# Patient Record
Sex: FEMALE | Race: WHITE | Employment: OTHER | RURAL
[De-identification: names, ages, dates, MRNs, and addresses within clinical notes are randomized per-mention and may not be internally consistent; named-entity substitution may affect disease eponyms.]

---

## 2017-01-09 DIAGNOSIS — M54.50 BILATERAL LOW BACK PAIN WITHOUT SCIATICA: ICD-10-CM

## 2017-01-12 RX ORDER — TRAMADOL HYDROCHLORIDE 50 MG/1
TABLET ORAL
Qty: 60 TABLET | Refills: 0 | Status: SHIPPED | OUTPATIENT
Start: 2017-01-12 | End: 2017-01-18 | Stop reason: SDUPTHER

## 2017-01-18 ENCOUNTER — OFFICE VISIT (OUTPATIENT)
Dept: PRIMARY CARE CLINIC | Age: 67
End: 2017-01-18
Payer: MEDICAID

## 2017-01-18 ENCOUNTER — HOSPITAL ENCOUNTER (OUTPATIENT)
Dept: OTHER | Age: 67
Discharge: HOME | End: 2017-01-18
Attending: NURSE PRACTITIONER | Admitting: NURSE PRACTITIONER

## 2017-01-18 VITALS
HEART RATE: 78 BPM | HEIGHT: 58 IN | SYSTOLIC BLOOD PRESSURE: 110 MMHG | RESPIRATION RATE: 20 BRPM | WEIGHT: 199.2 LBS | BODY MASS INDEX: 41.82 KG/M2 | OXYGEN SATURATION: 94 % | DIASTOLIC BLOOD PRESSURE: 70 MMHG

## 2017-01-18 DIAGNOSIS — E55.9 VITAMIN D DEFICIENCY: ICD-10-CM

## 2017-01-18 DIAGNOSIS — Z79.4 TYPE 2 DIABETES MELLITUS WITHOUT COMPLICATION, WITH LONG-TERM CURRENT USE OF INSULIN (HCC): ICD-10-CM

## 2017-01-18 DIAGNOSIS — Z13.29 THYROID DISORDER SCREEN: ICD-10-CM

## 2017-01-18 DIAGNOSIS — E11.9 TYPE 2 DIABETES MELLITUS WITHOUT COMPLICATION, WITH LONG-TERM CURRENT USE OF INSULIN (HCC): Primary | ICD-10-CM

## 2017-01-18 DIAGNOSIS — M1A.9XX0 CHRONIC GOUT WITHOUT TOPHUS, UNSPECIFIED CAUSE, UNSPECIFIED SITE: ICD-10-CM

## 2017-01-18 DIAGNOSIS — G89.29 CHRONIC BILATERAL LOW BACK PAIN WITHOUT SCIATICA: ICD-10-CM

## 2017-01-18 DIAGNOSIS — M54.50 CHRONIC BILATERAL LOW BACK PAIN WITHOUT SCIATICA: ICD-10-CM

## 2017-01-18 DIAGNOSIS — E11.9 TYPE 2 DIABETES MELLITUS WITHOUT COMPLICATION, WITH LONG-TERM CURRENT USE OF INSULIN (HCC): ICD-10-CM

## 2017-01-18 DIAGNOSIS — Z79.4 TYPE 2 DIABETES MELLITUS WITHOUT COMPLICATION, WITH LONG-TERM CURRENT USE OF INSULIN (HCC): Primary | ICD-10-CM

## 2017-01-18 DIAGNOSIS — E78.01 FAMILIAL HYPERCHOLESTEROLEMIA: ICD-10-CM

## 2017-01-18 DIAGNOSIS — I10 ESSENTIAL HYPERTENSION: ICD-10-CM

## 2017-01-18 LAB
A/G RATIO: 1.8 (ref 0.8–2)
ALBUMIN SERPL-MCNC: 4.5 G/DL (ref 3.4–4.8)
ALP BLD-CCNC: 90 U/L (ref 25–100)
ALT SERPL-CCNC: 31 U/L (ref 4–36)
ANION GAP SERPL CALCULATED.3IONS-SCNC: 16 MMOL/L (ref 3–16)
AST SERPL-CCNC: 27 U/L (ref 8–33)
BILIRUB SERPL-MCNC: 0.3 MG/DL (ref 0.3–1.2)
BUN BLDV-MCNC: 58 MG/DL (ref 6–20)
CALCIUM SERPL-MCNC: 10.1 MG/DL (ref 8.5–10.5)
CHLORIDE BLD-SCNC: 91 MMOL/L (ref 98–107)
CHOLESTEROL, TOTAL: 191 MG/DL (ref 0–200)
CO2: 35 MMOL/L (ref 20–30)
CREAT SERPL-MCNC: 2 MG/DL (ref 0.4–1.2)
FOLATE: 14.21 NG/ML
GFR AFRICAN AMERICAN: 30
GFR NON-AFRICAN AMERICAN: 25
GLOBULIN: 2.5 G/DL
GLUCOSE BLD-MCNC: 153 MG/DL (ref 74–106)
HCT VFR BLD CALC: 43.1 % (ref 37–47)
HDLC SERPL-MCNC: 36 MG/DL (ref 40–60)
HEMOGLOBIN: 13.3 G/DL (ref 11.5–16.5)
LDL CHOLESTEROL CALCULATED: ABNORMAL MG/DL
LDL CHOLESTEROL DIRECT: 107 MG/DL
MCH RBC QN AUTO: 31.1 PG (ref 27–32)
MCHC RBC AUTO-ENTMCNC: 30.9 G/DL (ref 31–35)
MCV RBC AUTO: 100.9 FL (ref 80–100)
PDW BLD-RTO: 15.3 % (ref 11–16)
PLATELET # BLD: 376 K/UL (ref 150–400)
PMV BLD AUTO: 10.9 FL (ref 6–10)
POTASSIUM SERPL-SCNC: 4.1 MMOL/L (ref 3.4–5.1)
RBC # BLD: 4.27 M/UL (ref 3.8–5.8)
SODIUM BLD-SCNC: 142 MMOL/L (ref 136–145)
TOTAL PROTEIN: 7 G/DL (ref 6.4–8.3)
TRIGL SERPL-MCNC: 412 MG/DL (ref 0–249)
TSH SERPL DL<=0.05 MIU/L-ACNC: 2.82 UIU/ML (ref 0.35–5.5)
VITAMIN B-12: 292 PG/ML (ref 211–911)
VITAMIN D 25-HYDROXY: 16.8 (ref 32–100)
VLDLC SERPL CALC-MCNC: ABNORMAL MG/DL
WBC # BLD: 15.5 K/UL (ref 4–11)

## 2017-01-18 PROCEDURE — 99213 OFFICE O/P EST LOW 20 MIN: CPT | Performed by: NURSE PRACTITIONER

## 2017-01-18 RX ORDER — ALLOPURINOL 300 MG/1
TABLET ORAL
Qty: 30 TABLET | Refills: 3 | Status: SHIPPED | OUTPATIENT
Start: 2017-01-18 | End: 2017-05-24 | Stop reason: SDUPTHER

## 2017-01-18 RX ORDER — ASPIRIN 81 MG/1
TABLET ORAL
Qty: 30 TABLET | Refills: 4 | Status: SHIPPED | OUTPATIENT
Start: 2017-01-18 | End: 2017-07-21 | Stop reason: SDUPTHER

## 2017-01-18 RX ORDER — BUSPIRONE HYDROCHLORIDE 10 MG/1
TABLET ORAL
Qty: 90 TABLET | Refills: 3 | Status: SHIPPED | OUTPATIENT
Start: 2017-01-18 | End: 2017-06-21 | Stop reason: SDUPTHER

## 2017-01-18 RX ORDER — TRAMADOL HYDROCHLORIDE 50 MG/1
TABLET ORAL
Qty: 90 TABLET | Refills: 2 | Status: SHIPPED | OUTPATIENT
Start: 2017-01-18 | End: 2017-04-26 | Stop reason: SDUPTHER

## 2017-01-18 ASSESSMENT — ENCOUNTER SYMPTOMS
GASTROINTESTINAL NEGATIVE: 1
RESPIRATORY NEGATIVE: 1

## 2017-01-26 RX ORDER — SUCRALFATE 1 G/1
TABLET ORAL
Qty: 90 TABLET | Refills: 3 | Status: SHIPPED | OUTPATIENT
Start: 2017-01-26 | End: 2017-05-24 | Stop reason: SDUPTHER

## 2017-01-26 RX ORDER — POTASSIUM CHLORIDE 1500 MG/1
TABLET, EXTENDED RELEASE ORAL
Qty: 30 TABLET | Refills: 3 | Status: SHIPPED | OUTPATIENT
Start: 2017-01-26 | End: 2017-05-24 | Stop reason: SDUPTHER

## 2017-01-26 RX ORDER — METOLAZONE 5 MG/1
TABLET ORAL
Qty: 30 TABLET | Refills: 0 | Status: SHIPPED | OUTPATIENT
Start: 2017-01-26 | End: 2017-02-27

## 2017-02-08 ENCOUNTER — OFFICE VISIT (OUTPATIENT)
Dept: PRIMARY CARE CLINIC | Age: 67
End: 2017-02-08
Payer: MEDICAID

## 2017-02-08 VITALS
TEMPERATURE: 98.8 F | OXYGEN SATURATION: 92 % | DIASTOLIC BLOOD PRESSURE: 76 MMHG | WEIGHT: 206.4 LBS | BODY MASS INDEX: 43.32 KG/M2 | RESPIRATION RATE: 22 BRPM | HEART RATE: 88 BPM | HEIGHT: 58 IN | SYSTOLIC BLOOD PRESSURE: 122 MMHG

## 2017-02-08 DIAGNOSIS — J44.1 COPD EXACERBATION (HCC): Primary | ICD-10-CM

## 2017-02-08 PROCEDURE — 99213 OFFICE O/P EST LOW 20 MIN: CPT | Performed by: NURSE PRACTITIONER

## 2017-02-08 RX ORDER — PREDNISONE 20 MG/1
TABLET ORAL
Qty: 10 TABLET | Refills: 0 | Status: SHIPPED | OUTPATIENT
Start: 2017-02-08 | End: 2017-02-22

## 2017-02-08 RX ORDER — LEVOFLOXACIN 500 MG/1
500 TABLET, FILM COATED ORAL DAILY
Qty: 10 TABLET | Refills: 0 | Status: SHIPPED | OUTPATIENT
Start: 2017-02-08 | End: 2017-02-18

## 2017-02-08 RX ORDER — CEFTRIAXONE 1 G/1
1 INJECTION, POWDER, FOR SOLUTION INTRAMUSCULAR; INTRAVENOUS ONCE
Status: COMPLETED | OUTPATIENT
Start: 2017-02-08 | End: 2017-02-08

## 2017-02-08 RX ADMIN — CEFTRIAXONE 1 G: 1 INJECTION, POWDER, FOR SOLUTION INTRAMUSCULAR; INTRAVENOUS at 14:27

## 2017-02-08 ASSESSMENT — ENCOUNTER SYMPTOMS
SHORTNESS OF BREATH: 1
GASTROINTESTINAL NEGATIVE: 1
WHEEZING: 1
COUGH: 1

## 2017-02-19 DIAGNOSIS — J42 CHRONIC BRONCHITIS, UNSPECIFIED CHRONIC BRONCHITIS TYPE (HCC): ICD-10-CM

## 2017-02-20 RX ORDER — TIOTROPIUM BROMIDE 18 UG/1
CAPSULE ORAL; RESPIRATORY (INHALATION)
Qty: 30 CAPSULE | Refills: 2 | Status: SHIPPED | OUTPATIENT
Start: 2017-02-20 | End: 2017-05-24 | Stop reason: SDUPTHER

## 2017-02-22 ENCOUNTER — OFFICE VISIT (OUTPATIENT)
Dept: PRIMARY CARE CLINIC | Age: 67
End: 2017-02-22
Payer: MEDICAID

## 2017-02-22 ENCOUNTER — HOSPITAL ENCOUNTER (OUTPATIENT)
Dept: OTHER | Age: 67
Discharge: HOME | End: 2017-02-22
Attending: NURSE PRACTITIONER | Admitting: NURSE PRACTITIONER

## 2017-02-22 VITALS
SYSTOLIC BLOOD PRESSURE: 118 MMHG | WEIGHT: 197.4 LBS | DIASTOLIC BLOOD PRESSURE: 70 MMHG | BODY MASS INDEX: 41.44 KG/M2 | RESPIRATION RATE: 20 BRPM | HEART RATE: 88 BPM | HEIGHT: 58 IN | OXYGEN SATURATION: 96 %

## 2017-02-22 DIAGNOSIS — E11.9 TYPE 2 DIABETES MELLITUS WITHOUT COMPLICATION, WITH LONG-TERM CURRENT USE OF INSULIN (HCC): ICD-10-CM

## 2017-02-22 DIAGNOSIS — E53.8 B12 DEFICIENCY: ICD-10-CM

## 2017-02-22 DIAGNOSIS — Z79.4 TYPE 2 DIABETES MELLITUS WITHOUT COMPLICATION, WITH LONG-TERM CURRENT USE OF INSULIN (HCC): ICD-10-CM

## 2017-02-22 DIAGNOSIS — R25.2 HAND CRAMPS: ICD-10-CM

## 2017-02-22 DIAGNOSIS — J42 CHRONIC BRONCHITIS, UNSPECIFIED CHRONIC BRONCHITIS TYPE (HCC): ICD-10-CM

## 2017-02-22 DIAGNOSIS — N18.30 CRF (CHRONIC RENAL FAILURE), STAGE 3 (MODERATE) (HCC): ICD-10-CM

## 2017-02-22 DIAGNOSIS — R79.9 ELEVATED BUN: ICD-10-CM

## 2017-02-22 DIAGNOSIS — N18.30 CRF (CHRONIC RENAL FAILURE), STAGE 3 (MODERATE) (HCC): Primary | ICD-10-CM

## 2017-02-22 DIAGNOSIS — R25.2 CRAMP OF BOTH LOWER EXTREMITIES: ICD-10-CM

## 2017-02-22 PROBLEM — N18.9 CRF (CHRONIC RENAL FAILURE): Status: ACTIVE | Noted: 2017-02-22

## 2017-02-22 LAB
A/G RATIO: 1.7 (ref 0.8–2)
ALBUMIN SERPL-MCNC: 4.4 G/DL (ref 3.4–4.8)
ALP BLD-CCNC: 86 U/L (ref 25–100)
ALT SERPL-CCNC: 24 U/L (ref 4–36)
ANION GAP SERPL CALCULATED.3IONS-SCNC: 15 MMOL/L (ref 3–16)
AST SERPL-CCNC: 21 U/L (ref 8–33)
BILIRUB SERPL-MCNC: <0.2 MG/DL (ref 0.3–1.2)
BUN BLDV-MCNC: 84 MG/DL (ref 6–20)
CALCIUM SERPL-MCNC: 10.3 MG/DL (ref 8.5–10.5)
CHLORIDE BLD-SCNC: 87 MMOL/L (ref 98–107)
CO2: 39 MMOL/L (ref 20–30)
CREAT SERPL-MCNC: 1.8 MG/DL (ref 0.4–1.2)
GFR AFRICAN AMERICAN: 34
GFR NON-AFRICAN AMERICAN: 28
GLOBULIN: 2.6 G/DL
GLUCOSE BLD-MCNC: 67 MG/DL (ref 74–106)
HBA1C MFR BLD: 6.5 %
HCT VFR BLD CALC: 44.6 % (ref 37–47)
HEMOGLOBIN: 14 G/DL (ref 11.5–16.5)
MAGNESIUM: 2.1 MG/DL (ref 1.7–2.4)
MCH RBC QN AUTO: 30.6 PG (ref 27–32)
MCHC RBC AUTO-ENTMCNC: 31.4 G/DL (ref 31–35)
MCV RBC AUTO: 97.6 FL (ref 80–100)
PDW BLD-RTO: 15.2 % (ref 11–16)
PLATELET # BLD: 349 K/UL (ref 150–400)
PMV BLD AUTO: 11.2 FL (ref 6–10)
POTASSIUM SERPL-SCNC: 3.3 MMOL/L (ref 3.4–5.1)
RBC # BLD: 4.57 M/UL (ref 3.8–5.8)
SODIUM BLD-SCNC: 141 MMOL/L (ref 136–145)
TOTAL PROTEIN: 7 G/DL (ref 6.4–8.3)
WBC # BLD: 15.8 K/UL (ref 4–11)

## 2017-02-22 PROCEDURE — 99214 OFFICE O/P EST MOD 30 MIN: CPT | Performed by: NURSE PRACTITIONER

## 2017-02-22 RX ORDER — CYANOCOBALAMIN 1000 UG/ML
1000 INJECTION INTRAMUSCULAR; SUBCUTANEOUS ONCE
Status: COMPLETED | OUTPATIENT
Start: 2017-02-22 | End: 2017-02-22

## 2017-02-22 RX ADMIN — CYANOCOBALAMIN 1000 MCG: 1000 INJECTION INTRAMUSCULAR; SUBCUTANEOUS at 11:19

## 2017-02-22 ASSESSMENT — ENCOUNTER SYMPTOMS
WHEEZING: 1
GASTROINTESTINAL NEGATIVE: 1

## 2017-02-25 DIAGNOSIS — E78.01 FAMILIAL HYPERCHOLESTEROLEMIA: ICD-10-CM

## 2017-02-25 DIAGNOSIS — Z79.4 TYPE 2 DIABETES MELLITUS WITHOUT COMPLICATION, WITH LONG-TERM CURRENT USE OF INSULIN (HCC): ICD-10-CM

## 2017-02-25 DIAGNOSIS — E11.9 TYPE 2 DIABETES MELLITUS WITHOUT COMPLICATION, WITH LONG-TERM CURRENT USE OF INSULIN (HCC): ICD-10-CM

## 2017-02-25 DIAGNOSIS — K21.9 GASTROESOPHAGEAL REFLUX DISEASE, ESOPHAGITIS PRESENCE NOT SPECIFIED: ICD-10-CM

## 2017-02-27 ENCOUNTER — HOSPITAL ENCOUNTER (OUTPATIENT)
Dept: OTHER | Age: 67
Discharge: HOME | End: 2017-02-27
Attending: NURSE PRACTITIONER | Admitting: NURSE PRACTITIONER

## 2017-02-27 ENCOUNTER — OFFICE VISIT (OUTPATIENT)
Dept: PRIMARY CARE CLINIC | Age: 67
End: 2017-02-27
Payer: MEDICAID

## 2017-02-27 VITALS
BODY MASS INDEX: 42.27 KG/M2 | WEIGHT: 201.4 LBS | DIASTOLIC BLOOD PRESSURE: 68 MMHG | RESPIRATION RATE: 20 BRPM | HEIGHT: 58 IN | HEART RATE: 88 BPM | SYSTOLIC BLOOD PRESSURE: 110 MMHG | OXYGEN SATURATION: 93 %

## 2017-02-27 DIAGNOSIS — E11.9 TYPE 2 DIABETES MELLITUS WITHOUT COMPLICATION, WITH LONG-TERM CURRENT USE OF INSULIN (HCC): ICD-10-CM

## 2017-02-27 DIAGNOSIS — R79.9 ELEVATED BUN: ICD-10-CM

## 2017-02-27 DIAGNOSIS — N18.30 CRF (CHRONIC RENAL FAILURE), STAGE 3 (MODERATE) (HCC): Primary | ICD-10-CM

## 2017-02-27 DIAGNOSIS — Z79.4 TYPE 2 DIABETES MELLITUS WITHOUT COMPLICATION, WITH LONG-TERM CURRENT USE OF INSULIN (HCC): ICD-10-CM

## 2017-02-27 LAB
ANION GAP SERPL CALCULATED.3IONS-SCNC: 13 MMOL/L (ref 3–16)
BUN BLDV-MCNC: 53 MG/DL (ref 6–20)
CALCIUM SERPL-MCNC: 10.4 MG/DL (ref 8.5–10.5)
CHLORIDE BLD-SCNC: 90 MMOL/L (ref 98–107)
CO2: 39 MMOL/L (ref 20–30)
CREAT SERPL-MCNC: 1.3 MG/DL (ref 0.4–1.2)
GFR AFRICAN AMERICAN: 49
GFR NON-AFRICAN AMERICAN: 41
GLUCOSE BLD-MCNC: 188 MG/DL (ref 74–106)
POTASSIUM SERPL-SCNC: 3.7 MMOL/L (ref 3.4–5.1)
SODIUM BLD-SCNC: 142 MMOL/L (ref 136–145)

## 2017-02-27 PROCEDURE — 99213 OFFICE O/P EST LOW 20 MIN: CPT | Performed by: NURSE PRACTITIONER

## 2017-02-27 RX ORDER — FERROUS SULFATE 325(65) MG
TABLET ORAL
Qty: 90 TABLET | Refills: 2 | Status: SHIPPED | OUTPATIENT
Start: 2017-02-27 | End: 2017-05-24 | Stop reason: SDUPTHER

## 2017-02-27 RX ORDER — LOVASTATIN 10 MG/1
TABLET ORAL
Qty: 30 TABLET | Refills: 2 | Status: SHIPPED | OUTPATIENT
Start: 2017-02-27 | End: 2017-05-24 | Stop reason: SDUPTHER

## 2017-02-27 RX ORDER — PANTOPRAZOLE SODIUM 40 MG/1
TABLET, DELAYED RELEASE ORAL
Qty: 30 TABLET | Refills: 2 | Status: SHIPPED | OUTPATIENT
Start: 2017-02-27 | End: 2017-05-24 | Stop reason: SDUPTHER

## 2017-02-27 ASSESSMENT — ENCOUNTER SYMPTOMS
RESPIRATORY NEGATIVE: 1
GASTROINTESTINAL NEGATIVE: 1

## 2017-03-01 PROBLEM — R79.9 ELEVATED BUN: Status: ACTIVE | Noted: 2017-03-01

## 2017-03-19 DIAGNOSIS — K21.9 GASTROESOPHAGEAL REFLUX DISEASE, ESOPHAGITIS PRESENCE NOT SPECIFIED: ICD-10-CM

## 2017-03-22 ENCOUNTER — HOSPITAL ENCOUNTER (OUTPATIENT)
Dept: OTHER | Age: 67
Discharge: HOME | End: 2017-03-22
Attending: NURSE PRACTITIONER | Admitting: NURSE PRACTITIONER

## 2017-03-22 DIAGNOSIS — Z79.4 TYPE 2 DIABETES MELLITUS WITHOUT COMPLICATION, WITH LONG-TERM CURRENT USE OF INSULIN (HCC): ICD-10-CM

## 2017-03-22 DIAGNOSIS — E11.9 TYPE 2 DIABETES MELLITUS WITHOUT COMPLICATION, WITH LONG-TERM CURRENT USE OF INSULIN (HCC): ICD-10-CM

## 2017-03-22 LAB
A/G RATIO: 1.7 (ref 0.8–2)
ALBUMIN SERPL-MCNC: 4.2 G/DL (ref 3.4–4.8)
ALP BLD-CCNC: 103 U/L (ref 25–100)
ALT SERPL-CCNC: 38 U/L (ref 4–36)
ANION GAP SERPL CALCULATED.3IONS-SCNC: 12 MMOL/L (ref 3–16)
AST SERPL-CCNC: 31 U/L (ref 8–33)
BILIRUB SERPL-MCNC: 0.3 MG/DL (ref 0.3–1.2)
BUN BLDV-MCNC: 47 MG/DL (ref 6–20)
CALCIUM SERPL-MCNC: 9.8 MG/DL (ref 8.5–10.5)
CHLORIDE BLD-SCNC: 91 MMOL/L (ref 98–107)
CO2: 38 MMOL/L (ref 20–30)
CREAT SERPL-MCNC: 1.4 MG/DL (ref 0.4–1.2)
GFR AFRICAN AMERICAN: 45
GFR NON-AFRICAN AMERICAN: 38
GLOBULIN: 2.5 G/DL
GLUCOSE BLD-MCNC: 272 MG/DL (ref 74–106)
HBA1C MFR BLD: 7.5 %
HCT VFR BLD CALC: 43.6 % (ref 37–47)
HEMOGLOBIN: 14 G/DL (ref 11.5–16.5)
MCH RBC QN AUTO: 31.6 PG (ref 27–32)
MCHC RBC AUTO-ENTMCNC: 32.1 G/DL (ref 31–35)
MCV RBC AUTO: 98.4 FL (ref 80–100)
PDW BLD-RTO: 15.8 % (ref 11–16)
PLATELET # BLD: 386 K/UL (ref 150–400)
PMV BLD AUTO: 10.6 FL (ref 6–10)
POTASSIUM SERPL-SCNC: 3.8 MMOL/L (ref 3.4–5.1)
RBC # BLD: 4.43 M/UL (ref 3.8–5.8)
SODIUM BLD-SCNC: 141 MMOL/L (ref 136–145)
TOTAL PROTEIN: 6.7 G/DL (ref 6.4–8.3)
WBC # BLD: 13.1 K/UL (ref 4–11)

## 2017-03-22 RX ORDER — METOLAZONE 5 MG/1
TABLET ORAL
Qty: 30 TABLET | Refills: 0 | Status: SHIPPED | OUTPATIENT
Start: 2017-03-22 | End: 2017-04-26 | Stop reason: SDUPTHER

## 2017-03-22 RX ORDER — METOCLOPRAMIDE 5 MG/1
TABLET ORAL
Qty: 90 TABLET | Refills: 3 | Status: SHIPPED | OUTPATIENT
Start: 2017-03-22 | End: 2017-07-19 | Stop reason: SDUPTHER

## 2017-03-29 ENCOUNTER — TELEPHONE (OUTPATIENT)
Dept: PRIMARY CARE CLINIC | Age: 67
End: 2017-03-29

## 2017-03-30 ENCOUNTER — OFFICE VISIT (OUTPATIENT)
Dept: PRIMARY CARE CLINIC | Age: 67
End: 2017-03-30
Payer: MEDICAID

## 2017-03-30 VITALS
BODY MASS INDEX: 43.32 KG/M2 | HEIGHT: 58 IN | OXYGEN SATURATION: 93 % | WEIGHT: 206.4 LBS | DIASTOLIC BLOOD PRESSURE: 74 MMHG | RESPIRATION RATE: 20 BRPM | SYSTOLIC BLOOD PRESSURE: 128 MMHG | HEART RATE: 78 BPM

## 2017-03-30 DIAGNOSIS — J44.1 COPD EXACERBATION (HCC): ICD-10-CM

## 2017-03-30 DIAGNOSIS — Z79.4 TYPE 2 DIABETES MELLITUS WITHOUT COMPLICATION, WITH LONG-TERM CURRENT USE OF INSULIN (HCC): Primary | ICD-10-CM

## 2017-03-30 DIAGNOSIS — I10 ESSENTIAL HYPERTENSION: ICD-10-CM

## 2017-03-30 DIAGNOSIS — E11.9 TYPE 2 DIABETES MELLITUS WITHOUT COMPLICATION, WITH LONG-TERM CURRENT USE OF INSULIN (HCC): Primary | ICD-10-CM

## 2017-03-30 PROCEDURE — 99213 OFFICE O/P EST LOW 20 MIN: CPT | Performed by: NURSE PRACTITIONER

## 2017-03-30 RX ORDER — AMOXICILLIN AND CLAVULANATE POTASSIUM 875; 125 MG/1; MG/1
1 TABLET, FILM COATED ORAL 2 TIMES DAILY
Qty: 20 TABLET | Refills: 0 | Status: SHIPPED | OUTPATIENT
Start: 2017-03-30 | End: 2017-04-09

## 2017-03-30 ASSESSMENT — ENCOUNTER SYMPTOMS
GASTROINTESTINAL NEGATIVE: 1
COUGH: 1

## 2017-04-07 ENCOUNTER — OFFICE VISIT (OUTPATIENT)
Dept: PRIMARY CARE CLINIC | Age: 67
End: 2017-04-07
Payer: MEDICAID

## 2017-04-07 VITALS
HEIGHT: 58 IN | OXYGEN SATURATION: 92 % | SYSTOLIC BLOOD PRESSURE: 112 MMHG | DIASTOLIC BLOOD PRESSURE: 72 MMHG | RESPIRATION RATE: 20 BRPM | WEIGHT: 203.6 LBS | TEMPERATURE: 98.2 F | BODY MASS INDEX: 42.74 KG/M2 | HEART RATE: 82 BPM

## 2017-04-07 DIAGNOSIS — J44.1 COPD EXACERBATION (HCC): Primary | ICD-10-CM

## 2017-04-07 PROCEDURE — 99213 OFFICE O/P EST LOW 20 MIN: CPT | Performed by: FAMILY MEDICINE

## 2017-04-07 PROCEDURE — 96372 THER/PROPH/DIAG INJ SC/IM: CPT | Performed by: FAMILY MEDICINE

## 2017-04-07 RX ORDER — CEFTRIAXONE 500 MG/1
1000 INJECTION, POWDER, FOR SOLUTION INTRAMUSCULAR; INTRAVENOUS ONCE
Status: COMPLETED | OUTPATIENT
Start: 2017-04-07 | End: 2017-04-07

## 2017-04-07 RX ORDER — LEVOFLOXACIN 500 MG/1
500 TABLET, FILM COATED ORAL DAILY
Qty: 10 TABLET | Refills: 0 | Status: SHIPPED | OUTPATIENT
Start: 2017-04-07 | End: 2017-04-17

## 2017-04-07 RX ORDER — METHYLPREDNISOLONE SODIUM SUCCINATE 40 MG/ML
80 INJECTION, POWDER, LYOPHILIZED, FOR SOLUTION INTRAMUSCULAR; INTRAVENOUS ONCE
Status: COMPLETED | OUTPATIENT
Start: 2017-04-07 | End: 2017-04-07

## 2017-04-07 RX ORDER — METHYLPREDNISOLONE 4 MG/1
TABLET ORAL
Qty: 21 TABLET | Refills: 0 | Status: SHIPPED | OUTPATIENT
Start: 2017-04-07 | End: 2017-04-13

## 2017-04-07 RX ORDER — GUAIFENESIN AND CODEINE PHOSPHATE 100; 10 MG/5ML; MG/5ML
5 SOLUTION ORAL EVERY 4 HOURS PRN
Qty: 180 ML | Refills: 0 | Status: SHIPPED | OUTPATIENT
Start: 2017-04-07 | End: 2017-04-14

## 2017-04-07 RX ADMIN — METHYLPREDNISOLONE SODIUM SUCCINATE 80 MG: 40 INJECTION, POWDER, LYOPHILIZED, FOR SOLUTION INTRAMUSCULAR; INTRAVENOUS at 20:12

## 2017-04-07 RX ADMIN — CEFTRIAXONE 1000 MG: 500 INJECTION, POWDER, FOR SOLUTION INTRAMUSCULAR; INTRAVENOUS at 20:17

## 2017-04-07 ASSESSMENT — ENCOUNTER SYMPTOMS
COUGH: 1
RHINORRHEA: 0
EYE ITCHING: 0
DIARRHEA: 0
EYE REDNESS: 0
EYE DISCHARGE: 0
ABDOMINAL PAIN: 0
CONSTIPATION: 0
SHORTNESS OF BREATH: 1
VOMITING: 0
NAUSEA: 0
WHEEZING: 1
SORE THROAT: 1
SINUS PRESSURE: 1

## 2017-04-17 DIAGNOSIS — J42 CHRONIC BRONCHITIS, UNSPECIFIED CHRONIC BRONCHITIS TYPE (HCC): ICD-10-CM

## 2017-04-20 RX ORDER — MONTELUKAST SODIUM 10 MG/1
TABLET ORAL
Qty: 30 TABLET | Refills: 4 | Status: SHIPPED | OUTPATIENT
Start: 2017-04-20 | End: 2017-08-14 | Stop reason: SDUPTHER

## 2017-04-21 ENCOUNTER — TELEPHONE (OUTPATIENT)
Dept: PRIMARY CARE CLINIC | Age: 67
End: 2017-04-21

## 2017-04-21 ENCOUNTER — OFFICE VISIT (OUTPATIENT)
Dept: PRIMARY CARE CLINIC | Age: 67
End: 2017-04-21
Payer: MEDICAID

## 2017-04-21 ENCOUNTER — HOSPITAL ENCOUNTER (OUTPATIENT)
Dept: OTHER | Age: 67
Discharge: HOME | End: 2017-04-21
Attending: NURSE PRACTITIONER | Admitting: NURSE PRACTITIONER

## 2017-04-21 VITALS
OXYGEN SATURATION: 88 % | SYSTOLIC BLOOD PRESSURE: 120 MMHG | DIASTOLIC BLOOD PRESSURE: 80 MMHG | RESPIRATION RATE: 22 BRPM | WEIGHT: 202.8 LBS | HEIGHT: 58 IN | HEART RATE: 74 BPM | BODY MASS INDEX: 42.57 KG/M2

## 2017-04-21 DIAGNOSIS — E55.9 VITAMIN D DEFICIENCY: ICD-10-CM

## 2017-04-21 DIAGNOSIS — Z79.4 TYPE 2 DIABETES MELLITUS WITHOUT COMPLICATION, WITH LONG-TERM CURRENT USE OF INSULIN (HCC): ICD-10-CM

## 2017-04-21 DIAGNOSIS — J45.51 SEVERE PERSISTENT ASTHMA WITH ACUTE EXACERBATION: ICD-10-CM

## 2017-04-21 DIAGNOSIS — E11.9 TYPE 2 DIABETES MELLITUS WITHOUT COMPLICATION, WITH LONG-TERM CURRENT USE OF INSULIN (HCC): ICD-10-CM

## 2017-04-21 DIAGNOSIS — Z23 NEED FOR PNEUMOCOCCAL VACCINATION: ICD-10-CM

## 2017-04-21 DIAGNOSIS — J45.51 SEVERE PERSISTENT ASTHMA WITH ACUTE EXACERBATION: Primary | ICD-10-CM

## 2017-04-21 LAB
A/G RATIO: 1.7 (ref 0.8–2)
ALBUMIN SERPL-MCNC: 4.3 G/DL (ref 3.4–4.8)
ALP BLD-CCNC: 103 U/L (ref 25–100)
ALT SERPL-CCNC: 30 U/L (ref 4–36)
ANION GAP SERPL CALCULATED.3IONS-SCNC: 14 MMOL/L (ref 3–16)
AST SERPL-CCNC: 27 U/L (ref 8–33)
BILIRUB SERPL-MCNC: 0.3 MG/DL (ref 0.3–1.2)
BUN BLDV-MCNC: 31 MG/DL (ref 6–20)
CALCIUM SERPL-MCNC: 9.9 MG/DL (ref 8.5–10.5)
CHLORIDE BLD-SCNC: 94 MMOL/L (ref 98–107)
CO2: 37 MMOL/L (ref 20–30)
CREAT SERPL-MCNC: 1.3 MG/DL (ref 0.4–1.2)
GFR AFRICAN AMERICAN: 49
GFR NON-AFRICAN AMERICAN: 41
GLOBULIN: 2.6 G/DL
GLUCOSE BLD-MCNC: 98 MG/DL (ref 74–106)
HCT VFR BLD CALC: 46.4 % (ref 37–47)
HEMOGLOBIN: 14.6 G/DL (ref 11.5–16.5)
MCH RBC QN AUTO: 31.8 PG (ref 27–32)
MCHC RBC AUTO-ENTMCNC: 31.5 G/DL (ref 31–35)
MCV RBC AUTO: 101.1 FL (ref 80–100)
PDW BLD-RTO: 15.3 % (ref 11–16)
PLATELET # BLD: 368 K/UL (ref 150–400)
PMV BLD AUTO: 11.2 FL (ref 6–10)
POTASSIUM SERPL-SCNC: 4.3 MMOL/L (ref 3.4–5.1)
RBC # BLD: 4.59 M/UL (ref 3.8–5.8)
SODIUM BLD-SCNC: 145 MMOL/L (ref 136–145)
TOTAL PROTEIN: 6.9 G/DL (ref 6.4–8.3)
VITAMIN D 25-HYDROXY: 22.1 (ref 32–100)
WBC # BLD: 16.6 K/UL (ref 4–11)

## 2017-04-21 PROCEDURE — 99214 OFFICE O/P EST MOD 30 MIN: CPT | Performed by: NURSE PRACTITIONER

## 2017-04-21 PROCEDURE — 90670 PCV13 VACCINE IM: CPT | Performed by: NURSE PRACTITIONER

## 2017-04-21 PROCEDURE — 90471 IMMUNIZATION ADMIN: CPT | Performed by: NURSE PRACTITIONER

## 2017-04-21 RX ORDER — IPRATROPIUM BROMIDE AND ALBUTEROL SULFATE 2.5; .5 MG/3ML; MG/3ML
1 SOLUTION RESPIRATORY (INHALATION) EVERY 4 HOURS
Qty: 360 ML | Refills: 3 | Status: SHIPPED | OUTPATIENT
Start: 2017-04-21 | End: 2018-10-24 | Stop reason: SDUPTHER

## 2017-04-21 RX ORDER — CEFDINIR 300 MG/1
300 CAPSULE ORAL 2 TIMES DAILY
Qty: 20 CAPSULE | Refills: 0 | Status: SHIPPED | OUTPATIENT
Start: 2017-04-21 | End: 2017-05-01

## 2017-04-21 RX ORDER — IPRATROPIUM BROMIDE AND ALBUTEROL SULFATE 2.5; .5 MG/3ML; MG/3ML
1 SOLUTION RESPIRATORY (INHALATION) EVERY 4 HOURS
Qty: 360 ML | Refills: 5 | Status: SHIPPED | OUTPATIENT
Start: 2017-04-21 | End: 2017-04-26

## 2017-04-21 RX ORDER — PREDNISONE 20 MG/1
20 TABLET ORAL DAILY
Qty: 10 TABLET | Refills: 0 | Status: SHIPPED | OUTPATIENT
Start: 2017-04-21 | End: 2017-04-26 | Stop reason: ALTCHOICE

## 2017-04-21 RX ORDER — EZETIMIBE 10 MG/1
TABLET ORAL
Qty: 90 TABLET | Refills: 3 | Status: SHIPPED | OUTPATIENT
Start: 2017-04-21 | End: 2018-05-12 | Stop reason: SDUPTHER

## 2017-04-21 ASSESSMENT — ENCOUNTER SYMPTOMS
GASTROINTESTINAL NEGATIVE: 1
COUGH: 1
WHEEZING: 1

## 2017-04-24 RX ORDER — SYRINGE AND NEEDLE,INSULIN,1ML 31 GX5/16"
SYRINGE, EMPTY DISPOSABLE MISCELLANEOUS
Qty: 100 EACH | Refills: 4 | Status: SHIPPED | OUTPATIENT
Start: 2017-04-24 | End: 2019-09-24 | Stop reason: SDUPTHER

## 2017-04-26 ENCOUNTER — OFFICE VISIT (OUTPATIENT)
Dept: PRIMARY CARE CLINIC | Age: 67
End: 2017-04-26
Payer: MEDICAID

## 2017-04-26 VITALS
HEIGHT: 58 IN | SYSTOLIC BLOOD PRESSURE: 116 MMHG | WEIGHT: 205 LBS | BODY MASS INDEX: 43.03 KG/M2 | OXYGEN SATURATION: 93 % | DIASTOLIC BLOOD PRESSURE: 68 MMHG | HEART RATE: 81 BPM | RESPIRATION RATE: 20 BRPM

## 2017-04-26 DIAGNOSIS — G89.29 CHRONIC BILATERAL LOW BACK PAIN WITHOUT SCIATICA: ICD-10-CM

## 2017-04-26 DIAGNOSIS — T80.90XA INJECTION SITE REACTION, INITIAL ENCOUNTER: ICD-10-CM

## 2017-04-26 DIAGNOSIS — J42 CHRONIC BRONCHITIS, UNSPECIFIED CHRONIC BRONCHITIS TYPE (HCC): ICD-10-CM

## 2017-04-26 DIAGNOSIS — M54.50 CHRONIC BILATERAL LOW BACK PAIN WITHOUT SCIATICA: ICD-10-CM

## 2017-04-26 PROCEDURE — 99213 OFFICE O/P EST LOW 20 MIN: CPT | Performed by: FAMILY MEDICINE

## 2017-04-26 RX ORDER — TRAMADOL HYDROCHLORIDE 50 MG/1
TABLET ORAL
Qty: 90 TABLET | Refills: 1 | Status: SHIPPED | OUTPATIENT
Start: 2017-04-26 | End: 2017-07-05 | Stop reason: SDUPTHER

## 2017-04-26 RX ORDER — ERGOCALCIFEROL 1.25 MG/1
CAPSULE ORAL
Qty: 4 CAPSULE | Refills: 4 | Status: SHIPPED | OUTPATIENT
Start: 2017-04-26 | End: 2017-05-30 | Stop reason: SDUPTHER

## 2017-04-26 RX ORDER — LORATADINE 10 MG/1
TABLET ORAL
Qty: 30 TABLET | Refills: 4 | Status: SHIPPED | OUTPATIENT
Start: 2017-04-26 | End: 2017-05-30 | Stop reason: SDUPTHER

## 2017-04-26 RX ORDER — SITAGLIPTIN 100 MG/1
TABLET, FILM COATED ORAL
Qty: 30 TABLET | Refills: 3 | Status: SHIPPED | OUTPATIENT
Start: 2017-04-26 | End: 2017-08-14 | Stop reason: SDUPTHER

## 2017-04-26 RX ORDER — METOLAZONE 5 MG/1
TABLET ORAL
Qty: 30 TABLET | Refills: 0 | Status: SHIPPED | OUTPATIENT
Start: 2017-04-26 | End: 2017-05-24 | Stop reason: SDUPTHER

## 2017-04-26 ASSESSMENT — ENCOUNTER SYMPTOMS
COUGH: 1
SORE THROAT: 0
RHINORRHEA: 0
DIARRHEA: 0
EYE DISCHARGE: 0
EYE ITCHING: 0
VOMITING: 0
CONSTIPATION: 0
ABDOMINAL PAIN: 0
SHORTNESS OF BREATH: 1
WHEEZING: 1
NAUSEA: 0
EYE REDNESS: 0

## 2017-04-28 ENCOUNTER — TELEPHONE (OUTPATIENT)
Dept: PRIMARY CARE CLINIC | Age: 67
End: 2017-04-28

## 2017-05-01 ENCOUNTER — TELEPHONE (OUTPATIENT)
Dept: PRIMARY CARE CLINIC | Age: 67
End: 2017-05-01

## 2017-05-12 ENCOUNTER — OFFICE VISIT (OUTPATIENT)
Dept: PRIMARY CARE CLINIC | Age: 67
End: 2017-05-12
Payer: MEDICARE

## 2017-05-12 VITALS
OXYGEN SATURATION: 94 % | BODY MASS INDEX: 42.36 KG/M2 | DIASTOLIC BLOOD PRESSURE: 70 MMHG | WEIGHT: 201.8 LBS | HEIGHT: 58 IN | SYSTOLIC BLOOD PRESSURE: 122 MMHG | RESPIRATION RATE: 20 BRPM | HEART RATE: 74 BPM

## 2017-05-12 DIAGNOSIS — E11.9 TYPE 2 DIABETES MELLITUS WITHOUT COMPLICATION, WITH LONG-TERM CURRENT USE OF INSULIN (HCC): Primary | ICD-10-CM

## 2017-05-12 DIAGNOSIS — J01.00 ACUTE NON-RECURRENT MAXILLARY SINUSITIS: ICD-10-CM

## 2017-05-12 DIAGNOSIS — B37.9 YEAST INFECTION: ICD-10-CM

## 2017-05-12 DIAGNOSIS — E78.00 PURE HYPERCHOLESTEROLEMIA: ICD-10-CM

## 2017-05-12 DIAGNOSIS — H10.13 ALLERGIC CONJUNCTIVITIS, BILATERAL: ICD-10-CM

## 2017-05-12 DIAGNOSIS — Z79.4 TYPE 2 DIABETES MELLITUS WITHOUT COMPLICATION, WITH LONG-TERM CURRENT USE OF INSULIN (HCC): Primary | ICD-10-CM

## 2017-05-12 PROCEDURE — 99213 OFFICE O/P EST LOW 20 MIN: CPT | Performed by: NURSE PRACTITIONER

## 2017-05-12 RX ORDER — AMOXICILLIN AND CLAVULANATE POTASSIUM 875; 125 MG/1; MG/1
1 TABLET, FILM COATED ORAL 2 TIMES DAILY
Qty: 20 TABLET | Refills: 0 | Status: SHIPPED | OUTPATIENT
Start: 2017-05-12 | End: 2017-05-22

## 2017-05-12 RX ORDER — MOMETASONE FUROATE 50 UG/1
2 SPRAY, METERED NASAL DAILY
Qty: 1 INHALER | Refills: 3 | Status: SHIPPED | OUTPATIENT
Start: 2017-05-12 | End: 2017-05-15 | Stop reason: SDUPTHER

## 2017-05-12 RX ORDER — FLUCONAZOLE 150 MG/1
150 TABLET ORAL ONCE
Qty: 1 TABLET | Refills: 1 | Status: SHIPPED | OUTPATIENT
Start: 2017-05-12 | End: 2017-05-12

## 2017-05-12 RX ORDER — OLOPATADINE HYDROCHLORIDE 1 MG/ML
1 SOLUTION/ DROPS OPHTHALMIC 2 TIMES DAILY
Qty: 5 ML | Refills: 2 | Status: SHIPPED | OUTPATIENT
Start: 2017-05-12 | End: 2017-06-14 | Stop reason: SDUPTHER

## 2017-05-12 ASSESSMENT — ENCOUNTER SYMPTOMS
RHINORRHEA: 1
RESPIRATORY NEGATIVE: 1
GASTROINTESTINAL NEGATIVE: 1

## 2017-05-15 RX ORDER — MOMETASONE FUROATE 50 UG/1
2 SPRAY, METERED NASAL DAILY
Qty: 1 INHALER | Refills: 3 | Status: SHIPPED | OUTPATIENT
Start: 2017-05-15 | End: 2019-01-24 | Stop reason: ALTCHOICE

## 2017-05-16 ENCOUNTER — TELEPHONE (OUTPATIENT)
Dept: PRIMARY CARE CLINIC | Age: 67
End: 2017-05-16

## 2017-05-22 ENCOUNTER — OFFICE VISIT (OUTPATIENT)
Dept: PRIMARY CARE CLINIC | Age: 67
End: 2017-05-22
Payer: MEDICAID

## 2017-05-22 VITALS
BODY MASS INDEX: 42.64 KG/M2 | DIASTOLIC BLOOD PRESSURE: 70 MMHG | RESPIRATION RATE: 20 BRPM | HEART RATE: 78 BPM | OXYGEN SATURATION: 95 % | SYSTOLIC BLOOD PRESSURE: 110 MMHG | WEIGHT: 204 LBS

## 2017-05-22 DIAGNOSIS — T78.40XA ALLERGIC REACTION, INITIAL ENCOUNTER: Primary | ICD-10-CM

## 2017-05-22 PROBLEM — J01.00 ACUTE NON-RECURRENT MAXILLARY SINUSITIS: Status: RESOLVED | Noted: 2017-05-12 | Resolved: 2017-05-22

## 2017-05-22 PROCEDURE — 99213 OFFICE O/P EST LOW 20 MIN: CPT | Performed by: FAMILY MEDICINE

## 2017-05-22 RX ORDER — METHYLPREDNISOLONE SODIUM SUCCINATE 40 MG/ML
40 INJECTION, POWDER, LYOPHILIZED, FOR SOLUTION INTRAMUSCULAR; INTRAVENOUS ONCE
Status: COMPLETED | OUTPATIENT
Start: 2017-05-22 | End: 2017-05-22

## 2017-05-22 RX ORDER — DIPHENHYDRAMINE HCL 25 MG
25 CAPSULE ORAL EVERY 4 HOURS PRN
Qty: 30 CAPSULE | Refills: 0 | Status: SHIPPED | OUTPATIENT
Start: 2017-05-22 | End: 2017-06-01

## 2017-05-22 RX ADMIN — METHYLPREDNISOLONE SODIUM SUCCINATE 40 MG: 40 INJECTION, POWDER, LYOPHILIZED, FOR SOLUTION INTRAMUSCULAR; INTRAVENOUS at 15:30

## 2017-05-22 ASSESSMENT — ENCOUNTER SYMPTOMS
SORE THROAT: 0
CONSTIPATION: 0
DIARRHEA: 0
SHORTNESS OF BREATH: 1
WHEEZING: 1
ABDOMINAL PAIN: 0
NAUSEA: 0
RHINORRHEA: 0
VOMITING: 0
COUGH: 0

## 2017-05-24 DIAGNOSIS — E78.01 FAMILIAL HYPERCHOLESTEROLEMIA: ICD-10-CM

## 2017-05-24 DIAGNOSIS — K21.9 GASTROESOPHAGEAL REFLUX DISEASE, ESOPHAGITIS PRESENCE NOT SPECIFIED: ICD-10-CM

## 2017-05-24 DIAGNOSIS — M1A.9XX0 CHRONIC GOUT WITHOUT TOPHUS, UNSPECIFIED CAUSE, UNSPECIFIED SITE: ICD-10-CM

## 2017-05-24 DIAGNOSIS — J42 CHRONIC BRONCHITIS, UNSPECIFIED CHRONIC BRONCHITIS TYPE (HCC): ICD-10-CM

## 2017-05-24 RX ORDER — FUROSEMIDE 40 MG/1
40 TABLET ORAL 2 TIMES DAILY
Qty: 60 TABLET | Refills: 5 | Status: SHIPPED | OUTPATIENT
Start: 2017-05-24 | End: 2017-10-17 | Stop reason: SDUPTHER

## 2017-05-24 RX ORDER — PANTOPRAZOLE SODIUM 40 MG/1
TABLET, DELAYED RELEASE ORAL
Qty: 30 TABLET | Refills: 2 | Status: SHIPPED | OUTPATIENT
Start: 2017-05-24 | End: 2017-07-26 | Stop reason: SDUPTHER

## 2017-05-24 RX ORDER — FERROUS SULFATE 325(65) MG
TABLET ORAL
Qty: 90 TABLET | Refills: 2 | Status: SHIPPED | OUTPATIENT
Start: 2017-05-24 | End: 2017-07-19 | Stop reason: SDUPTHER

## 2017-05-24 RX ORDER — ALLOPURINOL 300 MG/1
TABLET ORAL
Qty: 30 TABLET | Refills: 3 | Status: SHIPPED | OUTPATIENT
Start: 2017-05-24 | End: 2017-08-22 | Stop reason: SDUPTHER

## 2017-05-24 RX ORDER — METOLAZONE 5 MG/1
TABLET ORAL
Qty: 30 TABLET | Refills: 0 | Status: SHIPPED | OUTPATIENT
Start: 2017-05-24 | End: 2017-06-27 | Stop reason: SDUPTHER

## 2017-05-24 RX ORDER — LOVASTATIN 10 MG/1
TABLET ORAL
Qty: 30 TABLET | Refills: 2 | Status: SHIPPED | OUTPATIENT
Start: 2017-05-24 | End: 2017-07-19 | Stop reason: SDUPTHER

## 2017-05-24 RX ORDER — SUCRALFATE 1 G/1
TABLET ORAL
Qty: 90 TABLET | Refills: 3 | Status: SHIPPED | OUTPATIENT
Start: 2017-05-24 | End: 2017-08-22 | Stop reason: SDUPTHER

## 2017-05-24 RX ORDER — POTASSIUM CHLORIDE 20 MEQ/1
TABLET, EXTENDED RELEASE ORAL
Qty: 30 TABLET | Refills: 3 | Status: SHIPPED | OUTPATIENT
Start: 2017-05-24 | End: 2017-08-22 | Stop reason: SDUPTHER

## 2017-05-30 DIAGNOSIS — J42 CHRONIC BRONCHITIS, UNSPECIFIED CHRONIC BRONCHITIS TYPE (HCC): ICD-10-CM

## 2017-05-30 RX ORDER — LORATADINE 10 MG/1
TABLET ORAL
Qty: 30 TABLET | Refills: 4 | Status: SHIPPED | OUTPATIENT
Start: 2017-05-30 | End: 2017-09-19 | Stop reason: SDUPTHER

## 2017-05-30 RX ORDER — ERGOCALCIFEROL 1.25 MG/1
CAPSULE ORAL
Qty: 4 CAPSULE | Refills: 4 | Status: SHIPPED | OUTPATIENT
Start: 2017-05-30 | End: 2018-02-08 | Stop reason: SDUPTHER

## 2017-06-13 ENCOUNTER — TELEPHONE (OUTPATIENT)
Dept: PRIMARY CARE CLINIC | Age: 67
End: 2017-06-13

## 2017-06-13 DIAGNOSIS — H10.13 ALLERGIC CONJUNCTIVITIS, BILATERAL: ICD-10-CM

## 2017-06-14 ENCOUNTER — HOSPITAL ENCOUNTER (OUTPATIENT)
Dept: OTHER | Age: 67
Discharge: HOME | End: 2017-06-14
Attending: NURSE PRACTITIONER | Admitting: NURSE PRACTITIONER

## 2017-06-14 DIAGNOSIS — E78.00 PURE HYPERCHOLESTEROLEMIA: ICD-10-CM

## 2017-06-14 DIAGNOSIS — E11.9 TYPE 2 DIABETES MELLITUS WITHOUT COMPLICATION, WITH LONG-TERM CURRENT USE OF INSULIN (HCC): ICD-10-CM

## 2017-06-14 DIAGNOSIS — Z79.4 TYPE 2 DIABETES MELLITUS WITHOUT COMPLICATION, WITH LONG-TERM CURRENT USE OF INSULIN (HCC): ICD-10-CM

## 2017-06-14 LAB
A/G RATIO: 1.3 (ref 0.8–2)
ALBUMIN SERPL-MCNC: 4 G/DL (ref 3.4–4.8)
ALP BLD-CCNC: 108 U/L (ref 25–100)
ALT SERPL-CCNC: 57 U/L (ref 4–36)
ANION GAP SERPL CALCULATED.3IONS-SCNC: 12 MMOL/L (ref 3–16)
AST SERPL-CCNC: 69 U/L (ref 8–33)
BILIRUB SERPL-MCNC: 0.3 MG/DL (ref 0.3–1.2)
BUN BLDV-MCNC: 43 MG/DL (ref 6–20)
CALCIUM SERPL-MCNC: 9.9 MG/DL (ref 8.5–10.5)
CHLORIDE BLD-SCNC: 91 MMOL/L (ref 98–107)
CHOLESTEROL, TOTAL: 212 MG/DL (ref 0–200)
CO2: 39 MMOL/L (ref 20–30)
CREAT SERPL-MCNC: 1.4 MG/DL (ref 0.4–1.2)
GFR AFRICAN AMERICAN: 45
GFR NON-AFRICAN AMERICAN: 38
GLOBULIN: 3 G/DL
GLUCOSE BLD-MCNC: 116 MG/DL (ref 74–106)
HBA1C MFR BLD: 8.1 %
HCT VFR BLD CALC: 45.3 % (ref 37–47)
HDLC SERPL-MCNC: 39 MG/DL (ref 40–60)
HEMOGLOBIN: 14.4 G/DL (ref 11.5–16.5)
LDL CHOLESTEROL CALCULATED: 95 MG/DL
MCH RBC QN AUTO: 32 PG (ref 27–32)
MCHC RBC AUTO-ENTMCNC: 31.8 G/DL (ref 31–35)
MCV RBC AUTO: 100.7 FL (ref 80–100)
PDW BLD-RTO: 15.1 % (ref 11–16)
PLATELET # BLD: 353 K/UL (ref 150–400)
PMV BLD AUTO: 9.8 FL (ref 6–10)
POTASSIUM SERPL-SCNC: 4.1 MMOL/L (ref 3.4–5.1)
RBC # BLD: 4.5 M/UL (ref 3.8–5.8)
SODIUM BLD-SCNC: 142 MMOL/L (ref 136–145)
TOTAL PROTEIN: 7 G/DL (ref 6.4–8.3)
TRIGL SERPL-MCNC: 391 MG/DL (ref 0–249)
VLDLC SERPL CALC-MCNC: 78 MG/DL
WBC # BLD: 12.7 K/UL (ref 4–11)

## 2017-06-14 RX ORDER — OLOPATADINE HYDROCHLORIDE 1 MG/ML
1 SOLUTION/ DROPS OPHTHALMIC 2 TIMES DAILY
Qty: 5 ML | Refills: 2 | Status: SHIPPED | OUTPATIENT
Start: 2017-06-14 | End: 2017-07-10 | Stop reason: CLARIF

## 2017-06-21 RX ORDER — BUSPIRONE HYDROCHLORIDE 10 MG/1
TABLET ORAL
Qty: 90 TABLET | Refills: 0 | Status: SHIPPED | OUTPATIENT
Start: 2017-06-21 | End: 2017-07-19 | Stop reason: SDUPTHER

## 2017-06-28 RX ORDER — METOLAZONE 5 MG/1
TABLET ORAL
Qty: 30 TABLET | Refills: 0 | Status: SHIPPED | OUTPATIENT
Start: 2017-06-28 | End: 2017-07-31 | Stop reason: SDUPTHER

## 2017-07-05 DIAGNOSIS — G89.29 CHRONIC BILATERAL LOW BACK PAIN WITHOUT SCIATICA: ICD-10-CM

## 2017-07-05 DIAGNOSIS — M54.50 CHRONIC BILATERAL LOW BACK PAIN WITHOUT SCIATICA: ICD-10-CM

## 2017-07-05 RX ORDER — TRAMADOL HYDROCHLORIDE 50 MG/1
TABLET ORAL
Qty: 90 TABLET | Refills: 0 | Status: SHIPPED | OUTPATIENT
Start: 2017-07-05 | End: 2017-07-12 | Stop reason: SDUPTHER

## 2017-07-10 ENCOUNTER — OFFICE VISIT (OUTPATIENT)
Dept: PRIMARY CARE CLINIC | Age: 67
End: 2017-07-10
Payer: MEDICARE

## 2017-07-10 VITALS
BODY MASS INDEX: 43.32 KG/M2 | SYSTOLIC BLOOD PRESSURE: 124 MMHG | HEART RATE: 84 BPM | WEIGHT: 206.4 LBS | HEIGHT: 58 IN | DIASTOLIC BLOOD PRESSURE: 76 MMHG | OXYGEN SATURATION: 97 % | RESPIRATION RATE: 20 BRPM

## 2017-07-10 DIAGNOSIS — E78.01 FAMILIAL HYPERCHOLESTEROLEMIA: ICD-10-CM

## 2017-07-10 DIAGNOSIS — J42 CHRONIC BRONCHITIS, UNSPECIFIED CHRONIC BRONCHITIS TYPE (HCC): ICD-10-CM

## 2017-07-10 DIAGNOSIS — E11.9 TYPE 2 DIABETES MELLITUS WITHOUT COMPLICATION, WITH LONG-TERM CURRENT USE OF INSULIN (HCC): Primary | ICD-10-CM

## 2017-07-10 DIAGNOSIS — I10 ESSENTIAL HYPERTENSION: ICD-10-CM

## 2017-07-10 DIAGNOSIS — Z79.4 TYPE 2 DIABETES MELLITUS WITHOUT COMPLICATION, WITH LONG-TERM CURRENT USE OF INSULIN (HCC): Primary | ICD-10-CM

## 2017-07-10 PROCEDURE — 99213 OFFICE O/P EST LOW 20 MIN: CPT | Performed by: NURSE PRACTITIONER

## 2017-07-10 RX ORDER — OLOPATADINE HYDROCHLORIDE 2 MG/ML
1 SOLUTION/ DROPS OPHTHALMIC 2 TIMES DAILY
Qty: 1 BOTTLE | Refills: 3 | Status: SHIPPED | OUTPATIENT
Start: 2017-07-10 | End: 2017-10-16 | Stop reason: SDUPTHER

## 2017-07-10 ASSESSMENT — ENCOUNTER SYMPTOMS
GASTROINTESTINAL NEGATIVE: 1
RESPIRATORY NEGATIVE: 1

## 2017-07-12 DIAGNOSIS — M54.50 CHRONIC BILATERAL LOW BACK PAIN WITHOUT SCIATICA: ICD-10-CM

## 2017-07-12 DIAGNOSIS — G89.29 CHRONIC BILATERAL LOW BACK PAIN WITHOUT SCIATICA: ICD-10-CM

## 2017-07-12 RX ORDER — TRAMADOL HYDROCHLORIDE 50 MG/1
TABLET ORAL
Qty: 90 TABLET | Refills: 0 | Status: SHIPPED | OUTPATIENT
Start: 2017-07-12 | End: 2017-09-11 | Stop reason: SDUPTHER

## 2017-07-18 ENCOUNTER — HOSPITAL ENCOUNTER (OUTPATIENT)
Dept: GENERAL RADIOLOGY | Age: 67
Discharge: HOME | End: 2017-07-18
Attending: INTERNAL MEDICINE | Admitting: INTERNAL MEDICINE

## 2017-07-18 DIAGNOSIS — N18.30 CHRONIC KIDNEY DISEASE, STAGE III (MODERATE) (HCC): ICD-10-CM

## 2017-07-18 DIAGNOSIS — N18.30 CHRONIC KIDNEY DISEASE, STAGE 3 (MODERATE): ICD-10-CM

## 2017-07-19 DIAGNOSIS — E78.01 FAMILIAL HYPERCHOLESTEROLEMIA: ICD-10-CM

## 2017-07-19 DIAGNOSIS — K21.9 GASTROESOPHAGEAL REFLUX DISEASE, ESOPHAGITIS PRESENCE NOT SPECIFIED: ICD-10-CM

## 2017-07-20 RX ORDER — METOCLOPRAMIDE 5 MG/1
TABLET ORAL
Qty: 90 TABLET | Refills: 2 | Status: SHIPPED | OUTPATIENT
Start: 2017-07-20 | End: 2017-11-20 | Stop reason: SDUPTHER

## 2017-07-20 RX ORDER — LOVASTATIN 10 MG/1
TABLET ORAL
Qty: 30 TABLET | Refills: 2 | Status: SHIPPED | OUTPATIENT
Start: 2017-07-20 | End: 2017-10-17 | Stop reason: SDUPTHER

## 2017-07-20 RX ORDER — FERROUS SULFATE 325(65) MG
TABLET ORAL
Qty: 90 TABLET | Refills: 2 | Status: SHIPPED | OUTPATIENT
Start: 2017-07-20 | End: 2017-10-30 | Stop reason: SDUPTHER

## 2017-07-20 RX ORDER — BUSPIRONE HYDROCHLORIDE 10 MG/1
TABLET ORAL
Qty: 90 TABLET | Refills: 0 | Status: SHIPPED | OUTPATIENT
Start: 2017-07-20 | End: 2017-08-22 | Stop reason: SDUPTHER

## 2017-07-21 RX ORDER — ASPIRIN 81 MG/1
TABLET ORAL
Qty: 30 TABLET | Refills: 1 | Status: SHIPPED | OUTPATIENT
Start: 2017-07-21 | End: 2017-09-18 | Stop reason: SDUPTHER

## 2017-07-26 DIAGNOSIS — K21.9 GASTROESOPHAGEAL REFLUX DISEASE, ESOPHAGITIS PRESENCE NOT SPECIFIED: ICD-10-CM

## 2017-07-27 RX ORDER — PANTOPRAZOLE SODIUM 40 MG/1
TABLET, DELAYED RELEASE ORAL
Qty: 30 TABLET | Refills: 2 | Status: SHIPPED | OUTPATIENT
Start: 2017-07-27 | End: 2017-10-17 | Stop reason: SDUPTHER

## 2017-08-02 RX ORDER — METOLAZONE 5 MG/1
TABLET ORAL
Qty: 30 TABLET | Refills: 0 | Status: SHIPPED | OUTPATIENT
Start: 2017-08-02 | End: 2017-08-31 | Stop reason: SDUPTHER

## 2017-08-14 DIAGNOSIS — J42 CHRONIC BRONCHITIS, UNSPECIFIED CHRONIC BRONCHITIS TYPE (HCC): ICD-10-CM

## 2017-08-16 DIAGNOSIS — M54.50 CHRONIC BILATERAL LOW BACK PAIN WITHOUT SCIATICA: ICD-10-CM

## 2017-08-16 DIAGNOSIS — G89.29 CHRONIC BILATERAL LOW BACK PAIN WITHOUT SCIATICA: ICD-10-CM

## 2017-08-16 RX ORDER — SITAGLIPTIN 100 MG/1
TABLET, FILM COATED ORAL
Qty: 30 TABLET | Refills: 3 | Status: SHIPPED | OUTPATIENT
Start: 2017-08-16 | End: 2017-12-15 | Stop reason: SDUPTHER

## 2017-08-16 RX ORDER — LANOLIN ALCOHOL/MO/W.PET/CERES
CREAM (GRAM) TOPICAL
Qty: 90 TABLET | Refills: 3 | Status: SHIPPED | OUTPATIENT
Start: 2017-08-16 | End: 2018-01-19 | Stop reason: SDUPTHER

## 2017-08-16 RX ORDER — MONTELUKAST SODIUM 10 MG/1
TABLET ORAL
Qty: 30 TABLET | Refills: 3 | Status: SHIPPED | OUTPATIENT
Start: 2017-08-16 | End: 2017-12-15 | Stop reason: SDUPTHER

## 2017-08-17 RX ORDER — TRAMADOL HYDROCHLORIDE 50 MG/1
TABLET ORAL
Qty: 90 TABLET | Refills: 0 | Status: SHIPPED | OUTPATIENT
Start: 2017-08-17 | End: 2017-09-18 | Stop reason: SDUPTHER

## 2017-08-22 DIAGNOSIS — J42 CHRONIC BRONCHITIS, UNSPECIFIED CHRONIC BRONCHITIS TYPE (HCC): ICD-10-CM

## 2017-08-22 DIAGNOSIS — M1A.9XX0 CHRONIC GOUT WITHOUT TOPHUS, UNSPECIFIED CAUSE, UNSPECIFIED SITE: ICD-10-CM

## 2017-08-22 RX ORDER — GLUCOSAMINE HCL/CHONDROITIN SU 500-400 MG
CAPSULE ORAL
Qty: 100 STRIP | Refills: 3 | Status: SHIPPED | OUTPATIENT
Start: 2017-08-22 | End: 2018-10-03 | Stop reason: SDUPTHER

## 2017-08-22 RX ORDER — POTASSIUM CHLORIDE 20 MEQ/1
TABLET, EXTENDED RELEASE ORAL
Qty: 30 TABLET | Refills: 3 | Status: SHIPPED | OUTPATIENT
Start: 2017-08-22 | End: 2017-12-15 | Stop reason: SDUPTHER

## 2017-08-22 RX ORDER — TIOTROPIUM BROMIDE 18 UG/1
CAPSULE ORAL; RESPIRATORY (INHALATION)
Qty: 30 CAPSULE | Refills: 2 | Status: SHIPPED | OUTPATIENT
Start: 2017-08-22 | End: 2017-12-15 | Stop reason: SDUPTHER

## 2017-08-22 RX ORDER — SYRINGE AND NEEDLE,INSULIN,1ML 31 GX5/16"
SYRINGE, EMPTY DISPOSABLE MISCELLANEOUS
Qty: 100 EACH | Refills: 0 | Status: SHIPPED | OUTPATIENT
Start: 2017-08-22 | End: 2017-09-19 | Stop reason: SDUPTHER

## 2017-08-22 RX ORDER — SUCRALFATE 1 G/1
TABLET ORAL
Qty: 90 TABLET | Refills: 3 | Status: SHIPPED | OUTPATIENT
Start: 2017-08-22 | End: 2018-01-19 | Stop reason: SDUPTHER

## 2017-08-22 RX ORDER — BUSPIRONE HYDROCHLORIDE 10 MG/1
TABLET ORAL
Qty: 90 TABLET | Refills: 0 | Status: CANCELLED | OUTPATIENT
Start: 2017-08-22

## 2017-08-22 RX ORDER — BUSPIRONE HYDROCHLORIDE 10 MG/1
TABLET ORAL
Qty: 90 TABLET | Refills: 0 | Status: SHIPPED | OUTPATIENT
Start: 2017-08-22 | End: 2017-09-26 | Stop reason: SDUPTHER

## 2017-08-22 RX ORDER — ALLOPURINOL 300 MG/1
TABLET ORAL
Qty: 30 TABLET | Refills: 3 | Status: SHIPPED | OUTPATIENT
Start: 2017-08-22 | End: 2017-12-15 | Stop reason: SDUPTHER

## 2017-09-01 RX ORDER — METOLAZONE 5 MG/1
TABLET ORAL
Qty: 30 TABLET | Refills: 0 | Status: SHIPPED | OUTPATIENT
Start: 2017-09-01 | End: 2017-09-30 | Stop reason: SDUPTHER

## 2017-09-11 DIAGNOSIS — G89.29 CHRONIC BILATERAL LOW BACK PAIN WITHOUT SCIATICA: ICD-10-CM

## 2017-09-11 DIAGNOSIS — M54.50 CHRONIC BILATERAL LOW BACK PAIN WITHOUT SCIATICA: ICD-10-CM

## 2017-09-13 RX ORDER — TRAMADOL HYDROCHLORIDE 50 MG/1
TABLET ORAL
Qty: 90 TABLET | Refills: 0 | Status: SHIPPED | OUTPATIENT
Start: 2017-09-13 | End: 2017-11-08 | Stop reason: SDUPTHER

## 2017-09-18 DIAGNOSIS — M54.50 CHRONIC BILATERAL LOW BACK PAIN WITHOUT SCIATICA: ICD-10-CM

## 2017-09-18 DIAGNOSIS — G89.29 CHRONIC BILATERAL LOW BACK PAIN WITHOUT SCIATICA: ICD-10-CM

## 2017-09-18 RX ORDER — TRAMADOL HYDROCHLORIDE 50 MG/1
TABLET ORAL
Qty: 90 TABLET | Refills: 0 | Status: SHIPPED | OUTPATIENT
Start: 2017-09-18 | End: 2017-10-11 | Stop reason: SDUPTHER

## 2017-09-19 DIAGNOSIS — J42 CHRONIC BRONCHITIS, UNSPECIFIED CHRONIC BRONCHITIS TYPE (HCC): ICD-10-CM

## 2017-09-19 RX ORDER — SYRINGE AND NEEDLE,INSULIN,1ML 31 GX5/16"
SYRINGE, EMPTY DISPOSABLE MISCELLANEOUS
Qty: 100 EACH | Refills: 3 | Status: SHIPPED | OUTPATIENT
Start: 2017-09-19 | End: 2018-01-10 | Stop reason: SDUPTHER

## 2017-09-19 RX ORDER — LORATADINE 10 MG/1
TABLET ORAL
Qty: 30 TABLET | Refills: 4 | Status: SHIPPED | OUTPATIENT
Start: 2017-09-19

## 2017-09-19 RX ORDER — ASPIRIN 81 MG/1
TABLET ORAL
Qty: 30 TABLET | Refills: 1 | Status: SHIPPED | OUTPATIENT
Start: 2017-09-19 | End: 2017-10-17 | Stop reason: SDUPTHER

## 2017-09-28 RX ORDER — BUSPIRONE HYDROCHLORIDE 10 MG/1
TABLET ORAL
Qty: 90 TABLET | Refills: 0 | Status: SHIPPED | OUTPATIENT
Start: 2017-09-28 | End: 2017-10-30 | Stop reason: SDUPTHER

## 2017-10-02 RX ORDER — METOLAZONE 5 MG/1
TABLET ORAL
Qty: 30 TABLET | Refills: 0 | Status: ON HOLD | OUTPATIENT
Start: 2017-10-02 | End: 2017-12-08

## 2017-10-05 ENCOUNTER — HOSPITAL ENCOUNTER (OUTPATIENT)
Dept: OTHER | Age: 67
Discharge: HOME | End: 2017-10-05
Attending: NURSE PRACTITIONER | Admitting: NURSE PRACTITIONER

## 2017-10-05 DIAGNOSIS — E78.01 FAMILIAL HYPERCHOLESTEROLEMIA: ICD-10-CM

## 2017-10-05 DIAGNOSIS — E11.9 TYPE 2 DIABETES MELLITUS WITHOUT COMPLICATION, WITH LONG-TERM CURRENT USE OF INSULIN (HCC): ICD-10-CM

## 2017-10-05 DIAGNOSIS — Z79.4 TYPE 2 DIABETES MELLITUS WITHOUT COMPLICATION, WITH LONG-TERM CURRENT USE OF INSULIN (HCC): ICD-10-CM

## 2017-10-05 LAB
A/G RATIO: 1.3 (ref 0.8–2)
ALBUMIN SERPL-MCNC: 3.9 G/DL (ref 3.4–4.8)
ALP BLD-CCNC: 103 U/L (ref 25–100)
ALT SERPL-CCNC: 50 U/L (ref 4–36)
ANION GAP SERPL CALCULATED.3IONS-SCNC: 11 MMOL/L (ref 3–16)
AST SERPL-CCNC: 42 U/L (ref 8–33)
BILIRUB SERPL-MCNC: 0.4 MG/DL (ref 0.3–1.2)
BUN BLDV-MCNC: 46 MG/DL (ref 6–20)
CALCIUM SERPL-MCNC: 9.7 MG/DL (ref 8.5–10.5)
CHLORIDE BLD-SCNC: 92 MMOL/L (ref 98–107)
CHOLESTEROL, TOTAL: 192 MG/DL (ref 0–200)
CO2: 38 MMOL/L (ref 20–30)
CREAT SERPL-MCNC: 1.5 MG/DL (ref 0.4–1.2)
GFR AFRICAN AMERICAN: 42
GFR NON-AFRICAN AMERICAN: 35
GLOBULIN: 2.9 G/DL
GLUCOSE BLD-MCNC: 276 MG/DL (ref 74–106)
HBA1C MFR BLD: 8.1 %
HCT VFR BLD CALC: 43.3 % (ref 37–47)
HDLC SERPL-MCNC: 30 MG/DL (ref 40–60)
HEMOGLOBIN: 13.8 G/DL (ref 11.5–16.5)
LDL CHOLESTEROL CALCULATED: ABNORMAL MG/DL
LDL CHOLESTEROL DIRECT: 102 MG/DL
MCH RBC QN AUTO: 31.9 PG (ref 27–32)
MCHC RBC AUTO-ENTMCNC: 31.9 G/DL (ref 31–35)
MCV RBC AUTO: 100 FL (ref 80–100)
PDW BLD-RTO: 14.7 % (ref 11–16)
PHOSPHORUS: 4 MG/DL (ref 2.5–4.5)
PLATELET # BLD: 273 K/UL (ref 150–400)
PMV BLD AUTO: 11 FL (ref 6–10)
POTASSIUM SERPL-SCNC: 3.9 MMOL/L (ref 3.4–5.1)
RBC # BLD: 4.33 M/UL (ref 3.8–5.8)
SODIUM BLD-SCNC: 141 MMOL/L (ref 136–145)
TOTAL PROTEIN: 6.8 G/DL (ref 6.4–8.3)
TRIGL SERPL-MCNC: 497 MG/DL (ref 0–249)
VLDLC SERPL CALC-MCNC: ABNORMAL MG/DL
WBC # BLD: 11.9 K/UL (ref 4–11)

## 2017-10-09 DIAGNOSIS — G89.29 CHRONIC BILATERAL LOW BACK PAIN WITHOUT SCIATICA: ICD-10-CM

## 2017-10-09 DIAGNOSIS — M54.50 CHRONIC BILATERAL LOW BACK PAIN WITHOUT SCIATICA: ICD-10-CM

## 2017-10-09 RX ORDER — TRAMADOL HYDROCHLORIDE 50 MG/1
TABLET ORAL
Qty: 90 TABLET | Refills: 0 | Status: SHIPPED | OUTPATIENT
Start: 2017-10-09 | End: 2017-10-11 | Stop reason: SDUPTHER

## 2017-10-11 ENCOUNTER — OFFICE VISIT (OUTPATIENT)
Dept: PRIMARY CARE CLINIC | Age: 67
End: 2017-10-11
Payer: MEDICARE

## 2017-10-11 VITALS
DIASTOLIC BLOOD PRESSURE: 76 MMHG | OXYGEN SATURATION: 94 % | WEIGHT: 201.9 LBS | RESPIRATION RATE: 20 BRPM | SYSTOLIC BLOOD PRESSURE: 122 MMHG | BODY MASS INDEX: 42.38 KG/M2 | HEIGHT: 58 IN | HEART RATE: 72 BPM

## 2017-10-11 DIAGNOSIS — Z13.29 THYROID DISORDER SCREEN: ICD-10-CM

## 2017-10-11 DIAGNOSIS — J42 CHRONIC BRONCHITIS, UNSPECIFIED CHRONIC BRONCHITIS TYPE (HCC): ICD-10-CM

## 2017-10-11 DIAGNOSIS — I10 ESSENTIAL HYPERTENSION: ICD-10-CM

## 2017-10-11 DIAGNOSIS — G47.33 OBSTRUCTIVE SLEEP APNEA SYNDROME: ICD-10-CM

## 2017-10-11 DIAGNOSIS — Z79.4 TYPE 2 DIABETES MELLITUS WITHOUT COMPLICATION, WITH LONG-TERM CURRENT USE OF INSULIN (HCC): Primary | ICD-10-CM

## 2017-10-11 DIAGNOSIS — E11.9 TYPE 2 DIABETES MELLITUS WITHOUT COMPLICATION, WITH LONG-TERM CURRENT USE OF INSULIN (HCC): Primary | ICD-10-CM

## 2017-10-11 DIAGNOSIS — E55.9 VITAMIN D DEFICIENCY: ICD-10-CM

## 2017-10-11 DIAGNOSIS — E78.01 FAMILIAL HYPERCHOLESTEROLEMIA: ICD-10-CM

## 2017-10-11 PROCEDURE — 99214 OFFICE O/P EST MOD 30 MIN: CPT | Performed by: NURSE PRACTITIONER

## 2017-10-11 ASSESSMENT — ENCOUNTER SYMPTOMS
GASTROINTESTINAL NEGATIVE: 1
RESPIRATORY NEGATIVE: 1

## 2017-10-11 NOTE — PROGRESS NOTES
Have you seen any other physician or provider since your last visit? yes - Aktar    Have you had any other diagnostic tests since your last visit? yes - labs    Have you changed or stopped any medications since your last visit including any over-the-counter medicines, vitamins, or herbal medicines? no     Are you taking all your prescribed medications? Yes  If NO, why? -  N/A    Patient here for follow up on dm, htn, hyperlipidemia and copd, she had labs to discuss. She saw [facility] yesterday and everything was good. She is complaining of back pain down into her legs.

## 2017-10-11 NOTE — PROGRESS NOTES
erythema. Psychiatric: She has a normal mood and affect. Judgment normal.   Nursing note and vitals reviewed. Results in Past 30 Days  Result Component Current Result Ref Range Previous Result Ref Range   Alb 3.9 (10/5/2017) 3.4 - 4.8 g/dL Not in Time Range    Albumin/Globulin Ratio 1.3 (10/5/2017) 0.8 - 2.0 Not in Time Range    Alkaline Phosphatase 103 (H) (10/5/2017) 25 - 100 U/L Not in Time Range    ALT 50 (H) (10/5/2017) 4 - 36 U/L Not in Time Range    AST 42 (H) (10/5/2017) 8 - 33 U/L Not in Time Range    BUN 46 (H) (10/5/2017) 6 - 20 mg/dL Not in Time Range    Calcium 9.7 (10/5/2017) 8.5 - 10.5 mg/dL Not in Time Range    Chloride 92 (L) (10/5/2017) 98 - 107 mmol/L Not in Time Range    CO2 38 (H) (10/5/2017) 20 - 30 mmol/L Not in Time Range    CREATININE 1.5 (H) (10/5/2017) 0.4 - 1.2 mg/dL Not in Time Range    GFR  42 (L) (10/5/2017) >59 Not in Time Range    GFR Non- 35 (L) (10/5/2017) >59 Not in Time Range    Globulin 2.9 (10/5/2017) g/dL Not in Time Range    Glucose 276 (H) (10/5/2017) 74 - 106 mg/dL Not in Time Range    Potassium 3.9 (10/5/2017) 3.4 - 5.1 mmol/L Not in Time Range    Sodium 141 (10/5/2017) 136 - 145 mmol/L Not in Time Range    Total Bilirubin 0.4 (10/5/2017) 0.3 - 1.2 mg/dL Not in Time Range    Total Protein 6.8 (10/5/2017) 6.4 - 8.3 g/dL Not in Time Range        Hemoglobin A1C (%)   Date Value   10/05/2017 8.1 (H)     MICROALBUMIN, RANDOM URINE (mg/dL)   Date Value   10/12/2016 <1.20     LDL Calculated (mg/dL)   Date Value   10/05/2017 see below         Lab Results   Component Value Date    WBC 11.9 10/05/2017    NEUTROABS 14.1 03/21/2015    HGB 13.8 10/05/2017    HCT 43.3 10/05/2017    .0 10/05/2017     10/05/2017       Lab Results   Component Value Date    TSH 2.82 01/18/2017         ASSESSMENT/PLAN:     1.  Type 2 diabetes mellitus without complication, with long-term current use of insulin [facility])  Lab Results   Component Value Date LABA1C 8.1 (H) 10/05/2017     No results found for: EAG  Her blood sugar is better but will increase her Tresiba by 5 units to 75. She has had to stop the Metfomin due to her renal function.   - CBC; Future  - Comprehensive Metabolic Panel; Future  - Hemoglobin A1C; Future    2. Essential hypertension  Stable on current meds. Continue with nephrology and monitor renal function.   - Lipid Panel; Future    3. Familial hypercholesterolemia  Lab Results   Component Value Date    LDLCALC see below 10/05/2017    LDLDIRECT 102 10/05/2017         4. Chronic bronchitis, unspecified chronic bronchitis type (HCC)  Stable, oxygen dependent. benefits from O2 at all times. 5. Obstructive sleep apnea syndrome  Will refer for evaluation of her bipap machine.   - External Referral To Pediatric Pulmonology    6. Vitamin D deficiency    - Vitamin D 25 Hydroxy; Future  - Vitamin B12 & Folate; Future    7. Thyroid disorder screen    - TSH without Reflex;  Future

## 2017-10-16 RX ORDER — OLOPATADINE HCL 0.2 %
1 DROPS OPHTHALMIC (EYE) 2 TIMES DAILY
Qty: 2.5 ML | Refills: 3 | Status: SHIPPED | OUTPATIENT
Start: 2017-10-16 | End: 2018-10-24 | Stop reason: SDUPTHER

## 2017-10-17 DIAGNOSIS — E78.01 FAMILIAL HYPERCHOLESTEROLEMIA: ICD-10-CM

## 2017-10-17 DIAGNOSIS — Z79.4 TYPE 2 DIABETES MELLITUS WITHOUT COMPLICATION, WITH LONG-TERM CURRENT USE OF INSULIN (HCC): ICD-10-CM

## 2017-10-17 DIAGNOSIS — K21.9 GASTROESOPHAGEAL REFLUX DISEASE, ESOPHAGITIS PRESENCE NOT SPECIFIED: ICD-10-CM

## 2017-10-17 DIAGNOSIS — E11.9 TYPE 2 DIABETES MELLITUS WITHOUT COMPLICATION, WITH LONG-TERM CURRENT USE OF INSULIN (HCC): ICD-10-CM

## 2017-10-18 RX ORDER — PANTOPRAZOLE SODIUM 40 MG/1
TABLET, DELAYED RELEASE ORAL
Qty: 30 TABLET | Refills: 2 | Status: SHIPPED | OUTPATIENT
Start: 2017-10-18 | End: 2018-01-18 | Stop reason: SDUPTHER

## 2017-10-18 RX ORDER — FUROSEMIDE 40 MG/1
TABLET ORAL
Qty: 60 TABLET | Refills: 5 | Status: SHIPPED | OUTPATIENT
Start: 2017-10-18 | End: 2018-04-23 | Stop reason: SDUPTHER

## 2017-10-18 RX ORDER — ASPIRIN 81 MG/1
TABLET ORAL
Qty: 30 TABLET | Refills: 1 | Status: SHIPPED | OUTPATIENT
Start: 2017-10-18 | End: 2018-01-31 | Stop reason: SDUPTHER

## 2017-10-18 RX ORDER — INSULIN DEGLUDEC INJECTION 100 U/ML
INJECTION, SOLUTION SUBCUTANEOUS
Qty: 15 ML | Refills: 5 | Status: ON HOLD | OUTPATIENT
Start: 2017-10-18 | End: 2017-12-08

## 2017-10-18 RX ORDER — LOVASTATIN 10 MG/1
TABLET ORAL
Qty: 30 TABLET | Refills: 2 | Status: SHIPPED | OUTPATIENT
Start: 2017-10-18 | End: 2018-02-28 | Stop reason: SDUPTHER

## 2017-11-01 RX ORDER — FERROUS SULFATE 325(65) MG
TABLET ORAL
Qty: 90 TABLET | Refills: 2 | Status: SHIPPED | OUTPATIENT
Start: 2017-11-01 | End: 2018-02-07 | Stop reason: SDUPTHER

## 2017-11-01 RX ORDER — BUSPIRONE HYDROCHLORIDE 10 MG/1
TABLET ORAL
Qty: 90 TABLET | Refills: 0 | Status: SHIPPED | OUTPATIENT
Start: 2017-11-01 | End: 2017-11-29 | Stop reason: SDUPTHER

## 2017-11-03 ENCOUNTER — TELEPHONE (OUTPATIENT)
Dept: PRIMARY CARE CLINIC | Age: 67
End: 2017-11-03

## 2017-11-08 ENCOUNTER — TELEPHONE (OUTPATIENT)
Dept: PRIMARY CARE CLINIC | Age: 67
End: 2017-11-08

## 2017-11-08 DIAGNOSIS — M54.50 CHRONIC BILATERAL LOW BACK PAIN WITHOUT SCIATICA: ICD-10-CM

## 2017-11-08 DIAGNOSIS — G89.29 CHRONIC BILATERAL LOW BACK PAIN WITHOUT SCIATICA: ICD-10-CM

## 2017-11-08 RX ORDER — TRAMADOL HYDROCHLORIDE 50 MG/1
TABLET ORAL
Qty: 90 TABLET | Refills: 0 | Status: SHIPPED | OUTPATIENT
Start: 2017-11-08 | End: 2019-08-02 | Stop reason: SDUPTHER

## 2017-11-08 NOTE — TELEPHONE ENCOUNTER
Received a Prior Authorization from [employer] for patient's Novolog. Not sure if patient is taking this medication or maybe we can change it to something covered.

## 2017-11-13 ENCOUNTER — HOSPITAL ENCOUNTER (OUTPATIENT)
Dept: NEUROLOGY | Age: 67
Discharge: HOME | End: 2017-11-13
Attending: INTERNAL MEDICINE | Admitting: INTERNAL MEDICINE

## 2017-11-20 DIAGNOSIS — K21.9 GASTROESOPHAGEAL REFLUX DISEASE, ESOPHAGITIS PRESENCE NOT SPECIFIED: ICD-10-CM

## 2017-11-21 ENCOUNTER — OFFICE VISIT (OUTPATIENT)
Dept: CARDIOLOGY | Facility: CLINIC | Age: 67
End: 2017-11-21

## 2017-11-21 VITALS
SYSTOLIC BLOOD PRESSURE: 140 MMHG | OXYGEN SATURATION: 88 % | HEART RATE: 72 BPM | BODY MASS INDEX: 42.78 KG/M2 | HEIGHT: 58 IN | WEIGHT: 203.8 LBS | DIASTOLIC BLOOD PRESSURE: 60 MMHG

## 2017-11-21 DIAGNOSIS — E78.2 MIXED HYPERLIPIDEMIA: ICD-10-CM

## 2017-11-21 DIAGNOSIS — I50.32 CHRONIC DIASTOLIC HEART FAILURE (HCC): Primary | ICD-10-CM

## 2017-11-21 DIAGNOSIS — I10 ESSENTIAL HYPERTENSION: ICD-10-CM

## 2017-11-21 PROCEDURE — 99213 OFFICE O/P EST LOW 20 MIN: CPT | Performed by: INTERNAL MEDICINE

## 2017-11-21 RX ORDER — INSULIN DEGLUDEC INJECTION 100 U/ML
75 INJECTION, SOLUTION SUBCUTANEOUS DAILY
COMMUNITY
Start: 2017-10-20 | End: 2018-11-29 | Stop reason: SDUPTHER

## 2017-11-21 RX ORDER — OLOPATADINE HCL 0.2 %
DROPS OPHTHALMIC (EYE) 2 TIMES DAILY
Refills: 3 | COMMUNITY
Start: 2017-11-09 | End: 2020-01-01 | Stop reason: HOSPADM

## 2017-11-21 RX ORDER — IBUPROFEN 600 MG/1
TABLET ORAL AS NEEDED
COMMUNITY
Start: 2017-10-23 | End: 2018-09-18 | Stop reason: SDUPTHER

## 2017-11-21 RX ORDER — LOVASTATIN 10 MG/1
10 TABLET ORAL NIGHTLY
COMMUNITY
End: 2018-05-17 | Stop reason: ALTCHOICE

## 2017-11-21 RX ORDER — PANTOPRAZOLE SODIUM 40 MG/1
40 TABLET, DELAYED RELEASE ORAL DAILY
COMMUNITY
End: 2018-05-17

## 2017-11-21 RX ORDER — METOCLOPRAMIDE 5 MG/1
TABLET ORAL
Qty: 90 TABLET | Refills: 2 | Status: SHIPPED | OUTPATIENT
Start: 2017-11-21 | End: 2018-02-24 | Stop reason: SDUPTHER

## 2017-11-21 RX ORDER — ERGOCALCIFEROL 1.25 MG/1
50000 CAPSULE ORAL WEEKLY
COMMUNITY
End: 2020-01-01

## 2017-11-21 NOTE — PROGRESS NOTES
[facility]  Office visit  Mingo Guidry  1950  [phone]    VISIT DATE:  2017    PCP: Luz Prater, APRN  [address]  [facility] [id number]    CC:  Chief Complaint   Patient presents with   • Chronic diastolic heart failure   • Shortness of Breath   • Hypertension       PROBLEM LIST:  1. Chronic diastolic heart failure:  a. Normal dobutamine echocardiogram stress, 2005.  b. Echocardiogram on 2009: EF 50% to 55%. Left atrium 3.5 cm.  2. Chronic lower extremity edema/venous insufficiency.   3. Benign hypertension.   4. Type 2 diabetes mellitus, insulin dependent.   5. Chronic obstructive pulmonary disease/asthmatic bronchitis, on O2 use chronically.   6. Obesity.   7. Acid reflux.   8. Gout.   9. Seasonal allergies/rhinitis.   10. Surgical history:  a.  section.   b. Hysterectomy.   c. Lung surgery, data insufficient.   d. Tonsillectomy/adenoidectomy.      ASSESSMENT:   Diagnosis Plan   1. Chronic diastolic heart failure     2. Essential hypertension     3. Mixed hyperlipidemia         PLAN:  Chronic diastolic heart failure: Currently euvolemic and compensated.  Afterload is well-controlled based on home blood pressure measures.  Continue Lasix with as needed metolazone when she gains 3 pounds above her baseline weight.    Hyperlipidemia: Ideal goal LDL less than 100.  Currently on statin and Zetia.    Subjective  67-year-old female with stage III kidney disease chronic COPD on 3 L home O2.  Reports stable functional capacity.  [place] heart class II.  Mild lower extremity edema which is being controlled with combination of Lasix and as needed metolazone.  She is currently taking metolazone about 2-3 times per week.  She has stable class III chronic kidney disease.  She is compliant with medical therapy.  Blood pressures running less than 140/90 mmHg.    PHYSICAL EXAMINATION:  Vitals:    17 0925   BP: 140/60   BP Location: Right arm   Patient  "Position: Sitting   Pulse: 72   SpO2: (!) 88%   Weight: 203 lb 12.8 oz (92.4 kg)   Height: 58\" (147.3 cm)     General Appearance:    Alert, cooperative, no distress, appears stated age   Head:    Normocephalic, without obvious abnormality, atraumatic   Eyes:    conjunctiva/corneas clear   Nose:   Nares normal, septum midline, mucosa normal, no drainage   Throat:   Lips, teeth and gums normal   Neck:   Supple, symmetrical, trachea midline, no carotid    bruit or JVD   Lungs:     Diminished throughout, scattered expiratory wheezing, respirations unlabored   Chest Wall:    No tenderness or deformity    Heart:    Regular rate and rhythm, S1 and S2 normal, no murmur, rub   or gallop, normal carotid impulse bilaterally without bruit.   Abdomen:     Soft, non-tender   Extremities:   Extremities normal, atraumatic, no cyanosis, 1+ bilateral pretibial edema    Pulses:   2+ and symmetric all extremities   Skin:   Skin color, texture, turgor normal, no rashes or lesions       Diagnostic Data:  Procedures  No results found for: CHLPL, TRIG, HDL, LDLDIRECT  No results found for: GLUCOSE, BUN, CREATININE, NA, K, CL, CO2, CREATININE, BUN  No results found for: HGBA1C  No results found for: WBC, HGB, HCT, PLT    Allergies  Allergies   Allergen Reactions   • Shellfish-Derived Products        Current Medications    Current Outpatient Prescriptions:   •  allopurinol (ZYLOPRIM) 300 MG tablet, Take 300 mg by mouth Daily. 1/2 tablet daily, Disp: , Rfl:   •  aspirin 81 MG tablet, Take 81 mg by mouth Daily., Disp: , Rfl:   •  busPIRone (BUSPAR) 10 MG tablet, Take 10 mg by mouth 3 (Three) Times a Day., Disp: , Rfl:   •  ezetimibe (ZETIA) 10 MG tablet, Take 10 mg by mouth Daily., Disp: , Rfl:   •  ferrous sulfate 325 (65 FE) MG tablet, Take 325 mg by mouth 3 (Three) Times a Day With Meals., Disp: , Rfl:   •  fluticasone-salmeterol (ADVAIR) 500-50 MCG/DOSE DISKUS, Inhale 1 puff 2 (Two) Times a Day., Disp: , Rfl:   •  furosemide (LASIX) 40 MG " tablet, Take 40 mg by mouth 2 (Two) Times a Day., Disp: , Rfl:   •  HUMALOG 100 UNIT/ML injection, 35 Units 3 (Three) Times a Day., Disp: , Rfl:   •  Loratadine (CLARITIN) 10 MG capsule, Take  by mouth Daily., Disp: , Rfl:   •  lovastatin (MEVACOR) 10 MG tablet, Take 10 mg by mouth Every Night., Disp: , Rfl:   •  magnesium oxide (MAG-OX) 400 MG tablet, Take 400 mg by mouth 3 (Three) Times a Day., Disp: , Rfl:   •  metoclopramide (REGLAN) 5 MG tablet, Take 5 mg by mouth 3 (Three) Times a Day., Disp: , Rfl:   •  metolazone (ZAROXOLYN) 5 MG tablet, Take 5 mg by mouth As Needed., Disp: , Rfl:   •  metoprolol tartrate (LOPRESSOR) 25 MG tablet, Take 25 mg by mouth 2 (Two) Times a Day., Disp: , Rfl:   •  montelukast (SINGULAIR) 10 MG tablet, Take 10 mg by mouth Every Night., Disp: , Rfl:   •  NASONEX 50 MCG/ACT nasal spray, As Needed., Disp: , Rfl:   •  O2 (OXYGEN), Inhale 3 L/min 1 (One) Time. 3 L/min daily and 4 L/min when Pt is walking, Disp: , Rfl:   •  pantoprazole (PROTONIX) 40 MG EC tablet, Take 40 mg by mouth Daily., Disp: , Rfl:   •  PATADAY 0.2 % solution ophthalmic solution, Administer  to both eyes 2 (Two) Times a Day., Disp: , Rfl: 3  •  potassium chloride (KLOR-CON) 20 MEQ packet, Take 20 mEq by mouth Daily., Disp: , Rfl:   •  sitaGLIPtin (JANUVIA) 100 MG tablet, Take 100 mg by mouth Daily. 1/2 tablet daily, Disp: , Rfl:   •  sucralfate (CARAFATE) 1 G tablet, Take 1 g by mouth 3 (Three) Times a Day., Disp: , Rfl:   •  tiotropium (SPIRIVA) 18 MCG per inhalation capsule, Place 1 capsule into inhaler and inhale Daily., Disp: , Rfl:   •  traMADol (ULTRAM) 50 MG tablet, Take 50 mg by mouth Every 6 (Six) Hours As Needed for moderate pain (4-6)., Disp: , Rfl:   •  TRESIBA FLEXTOUCH 100 UNIT/ML solution pen-injector injection, 75 Units Daily., Disp: , Rfl:   •  vitamin D (ERGOCALCIFEROL) 38894 units capsule capsule, Take 50,000 Units by mouth 1 (One) Time Per Week., Disp: , Rfl:           ROS  Review of Systems    Cardiovascular: Positive for chest pain, irregular heartbeat and leg swelling.   Respiratory: Positive for shortness of breath, sputum production and wheezing.        SOCIAL HX  Social History     Social History   • Marital status:      Spouse name: N/A   • Number of children: N/A   • Years of education: N/A     Occupational History   • Not on file.     Social History Main Topics   • Smoking status: Former Smoker   • Smokeless tobacco: Never Used      Comment: quit 17 years ago   • Alcohol use No   • Drug use: Not on file   • Sexual activity: Defer     Other Topics Concern   • Not on file     Social History Narrative       FAMILY HX  No family history on file.          Jorge Luis Mcnair III, MD, FACC

## 2017-11-30 RX ORDER — BUSPIRONE HYDROCHLORIDE 10 MG/1
TABLET ORAL
Qty: 90 TABLET | Refills: 0 | Status: SHIPPED | OUTPATIENT
Start: 2017-11-30 | End: 2018-01-20 | Stop reason: SDUPTHER

## 2017-12-04 PROBLEM — J44.1 COPD EXACERBATION (HCC): Status: ACTIVE | Noted: 2017-12-04

## 2017-12-05 PROBLEM — E11.9 TYPE 2 DIABETES MELLITUS (HCC): Status: ACTIVE | Noted: 2017-12-05

## 2017-12-05 PROBLEM — N18.30 STAGE 3 CHRONIC KIDNEY DISEASE (HCC): Status: ACTIVE | Noted: 2017-12-05

## 2017-12-07 ENCOUNTER — OUTSIDE FACILITY SERVICE (OUTPATIENT)
Dept: CARDIOLOGY | Facility: CLINIC | Age: 67
End: 2017-12-07

## 2017-12-07 PROBLEM — Z79.4 TYPE 2 DIABETES MELLITUS WITH COMPLICATION, WITH LONG-TERM CURRENT USE OF INSULIN (HCC): Status: ACTIVE | Noted: 2017-12-05

## 2017-12-07 PROBLEM — E11.8 TYPE 2 DIABETES MELLITUS WITH COMPLICATION, WITH LONG-TERM CURRENT USE OF INSULIN (HCC): Status: ACTIVE | Noted: 2017-12-05

## 2017-12-07 PROCEDURE — 93306 TTE W/DOPPLER COMPLETE: CPT | Performed by: INTERNAL MEDICINE

## 2017-12-11 ENCOUNTER — CARE COORDINATION (OUTPATIENT)
Dept: CARE COORDINATION | Age: 67
End: 2017-12-11

## 2017-12-12 NOTE — CARE COORDINATION
[facility] Transitions Initial Follow Up Call    Call within 2 business days of discharge: Yes    Patient: Devonne Habermann Patient : 1950   MRN: [id number]  Reason for Admission: There are no discharge diagnoses documented for the most recent discharge.   Discharge Date: 17 RARS: isinger Risk Score: 17.75     Spoke with: [address] [place] [address] Avenue: @[facility]    Non-face-to-face services provided:  Scheduled appointment with [facility] 12/15/17  Obtained and reviewed discharge summary and/or continuity of care documents    Inpatient Assessment  Care Transitions 24 Hour Call    Care Transitions Interventions         Follow Up  Future Appointments  Date Time Provider Elena Pfeiffer   12/15/2017 1:15 PM DO Rosa Prieto [facility]   2018 8:45 AM Emily Singh, [address] [place] [address] [facility]       Allison Patel RN

## 2017-12-15 ENCOUNTER — OFFICE VISIT (OUTPATIENT)
Dept: PRIMARY CARE CLINIC | Age: 67
End: 2017-12-15
Payer: MEDICARE

## 2017-12-15 VITALS
BODY MASS INDEX: 42.19 KG/M2 | OXYGEN SATURATION: 92 % | DIASTOLIC BLOOD PRESSURE: 60 MMHG | SYSTOLIC BLOOD PRESSURE: 110 MMHG | HEART RATE: 80 BPM | HEIGHT: 58 IN | WEIGHT: 201 LBS

## 2017-12-15 DIAGNOSIS — J44.1 COPD EXACERBATION (HCC): Primary | ICD-10-CM

## 2017-12-15 DIAGNOSIS — Z79.4 TYPE 2 DIABETES MELLITUS WITH COMPLICATION, WITH LONG-TERM CURRENT USE OF INSULIN (HCC): ICD-10-CM

## 2017-12-15 DIAGNOSIS — E11.8 TYPE 2 DIABETES MELLITUS WITH COMPLICATION, WITH LONG-TERM CURRENT USE OF INSULIN (HCC): ICD-10-CM

## 2017-12-15 DIAGNOSIS — R09.02 HYPOXIA: ICD-10-CM

## 2017-12-15 PROCEDURE — 1111F DSCHRG MED/CURRENT MED MERGE: CPT | Performed by: FAMILY MEDICINE

## 2017-12-15 PROCEDURE — 99213 OFFICE O/P EST LOW 20 MIN: CPT | Performed by: FAMILY MEDICINE

## 2017-12-15 PROCEDURE — 96372 THER/PROPH/DIAG INJ SC/IM: CPT | Performed by: FAMILY MEDICINE

## 2017-12-15 RX ORDER — CEFTRIAXONE 500 MG/1
1000 INJECTION, POWDER, FOR SOLUTION INTRAMUSCULAR; INTRAVENOUS ONCE
Status: COMPLETED | OUTPATIENT
Start: 2017-12-15 | End: 2017-12-15

## 2017-12-15 RX ORDER — METHYLPREDNISOLONE SODIUM SUCCINATE 125 MG/2ML
125 INJECTION, POWDER, LYOPHILIZED, FOR SOLUTION INTRAMUSCULAR; INTRAVENOUS ONCE
Status: COMPLETED | OUTPATIENT
Start: 2017-12-15 | End: 2017-12-15

## 2017-12-15 RX ADMIN — CEFTRIAXONE 1000 MG: 500 INJECTION, POWDER, FOR SOLUTION INTRAMUSCULAR; INTRAVENOUS at 15:01

## 2017-12-15 RX ADMIN — METHYLPREDNISOLONE SODIUM SUCCINATE 125 MG: 125 INJECTION, POWDER, LYOPHILIZED, FOR SOLUTION INTRAMUSCULAR; INTRAVENOUS at 15:02

## 2017-12-15 ASSESSMENT — PATIENT HEALTH QUESTIONNAIRE - PHQ9
2. FEELING DOWN, DEPRESSED OR HOPELESS: 0
1. LITTLE INTEREST OR PLEASURE IN DOING THINGS: 0
SUM OF ALL RESPONSES TO PHQ9 QUESTIONS 1 & 2: 0
SUM OF ALL RESPONSES TO PHQ QUESTIONS 1-9: 0

## 2017-12-15 NOTE — PATIENT INSTRUCTIONS
[place] to help you successfully quit smoking. For the best results, work with your doctor. Together, you can test your lung function and compare the results to those of a nonsmoking person. The results can be given to you as your lung age. Finding out your lung age right after having the test done may help you to stop smoking. Your doctor can also discuss with you all of your options and refer you to smoking-cessation support groups. You may wish to use nicotine replacement (gum, patches, inhaler) or one of the prescription medications that have been shown to increase quit rates and prolong abstinence from smoking. But whatever you and your doctor decide on these matters, it will still be you who decides when an how to quit. Here are some techniques:   Switch Brands   Switch to a brand you find distasteful. Change to a brand that is low in tar and nicotine a couple of weeks before your target quit date. This will help change your smoking behavior. However, do not smoke more cigarettes, inhale them more often or more deeply, or place your fingertips over the holes in the filters. All of these actions will increase your nicotine intake, and the idea is to get your body used to functioning without nicotine. Cut Down the Number of Cigarettes You Smoke   Smoke only half of each cigarette. Each day, postpone the lighting of your first cigarette by one hour. Decide you'll only smoke during odd or even hours of the day. Decide beforehand how many cigarettes you'll smoke during the day. For each additional cigarette, give a dollar to your favorite perez. Change your eating habits to help you cut down. For example, drink milk, which many people consider incompatible with smoking. End meals or snacks with something that won't lead to a cigarette. Reach for a glass of juice instead of a cigarette for a \"pick-me-up. \"   Remember: Cutting down can help you quit, but it's not a substitute for quitting.  If yourself or someone else. Estimate the cost in terms of packs of cigarettes, and put the money aside to buy these presents. Keep very busy on the big day. Go to the movies, exercise, take long walks, or go bike riding. Remind your family and friends that this is your quit date, and ask them to help you over the rough spots of the first couple of days and weeks. Buy yourself a treat or do something special to celebrate. Telephone and Internet Support   Telephone, web-, and computer-based programs can offer you the support that you need to quit and to stay smoke-free. You can find many programs online, like the American Lung Association's [place] from Smoking . Immediately After Quitting   Develop a clean, fresh, nonsmoking environment around yourselfat work and at home. Buy yourself flowersyou may be surprised how much you can enjoy their scent now. The first few days after you quit, spend as much free time as possible in places where smoking isn't allowed, such as [address] [place] [address], museums, theaters, department stores, and churches. Drink large quantities of water and fruit juice (but avoid sodas that contain caffeine). Try to avoid alcohol, coffee, and other beverages that you associate with cigarette smoking. Strike up conversation instead of a match for a cigarette. If you miss the sensation of having a cigarette in your hand, play with something elsea pencil, a paper clip, a marble. If you miss having something in your mouth, try toothpicks or a fake cigarette.

## 2017-12-15 NOTE — PROGRESS NOTES
Post-Discharge Transitional Care Management Services      Meghann Life   YOB: 1950    Date of Office Visit:  12/15/2017  Date of Hospital Admission: 12/4/17  Date of Hospital Discharge: 12/8/17  [facility] Risk Score [risk of hospital readmission >=10  medium risk (chance of readmission ~ 12%) >14  high risk (chance of readmission ~18%)]:Risk Score: 17.75    Care management risk score Rising risk (score 2-5) and Complex Care (Scores >=6): No Risk Score On File       Patient Active Problem List   Diagnosis    DM type 2 (diabetes mellitus, type 2)    Elevated LFTs    Hyperlipidemia    HTN (hypertension)    Back pain    [address] (chronic obstructive pulmonary disease) ([address]    B12 deficiency    COPD with exacerbation ([address]    Pneumonia    Acute renal failure ([address]    COPD with acute exacerbation ([address]    Moderate persistent asthma without complication    Hypoxia    Chronic gout without tophus    Vitamin D deficiency    CRF (chronic renal failure)    Hand cramps    Cramp of both lower extremities    Elevated BUN    Injection site reaction    Allergic reaction    COPD exacerbation (HCC)    Type 2 diabetes mellitus with complication, with long-term current use of insulin (HCC)    Stage 3 chronic kidney disease       Allergies   Allergen Reactions    Shellfish-Derived Products     Wheat Bran Hives       Medications listed as ordered at the time of discharge from hospital   [facility], [address] [place] [address] Medication Instructions MALIA:    Printed on:01/01/18 2858   Medication Information                      allopurinol (ZYLOPRIM) 300 MG tablet  TAKE ONE TABLET BY MOUTH EVERY DAY             ASPIR-LOW 81 MG EC tablet  TAKE ONE TABLET BY MOUTH EVERY DAY             Blood Glucose Monitoring Suppl DWAIN  1 each by Does not apply route 4 times daily Dx:E10.9 Test QID             busPIRone (BUSPAR) 10 MG tablet  TAKE ONE TABLET BY MOUTH THREE TIMES A DAY             COMFORT ASSIST INSULIN SYRINGE 31G X 5/16\" 1 ML MISC  USE AS DIRECTED FIVE TIMES A DAY             diclofenac sodium (VOLTAREN) 1 % GEL  Apply 2 g topically 2 times daily             diphenhydrAMINE-zinc acetate (BENADRYL) 1-0.1 % cream  Apply topically 3 times daily as needed.              ezetimibe (ZETIA) 10 MG tablet  TAKE ONE TABLET BY MOUTH DAILY             ferrous sulfate 325 (65 Fe) MG tablet  TAKE ONE TABLET BY MOUTH THREE TIMES A DAY WITH FOOD             fluticasone-salmeterol (ADVAIR DISKUS) 250-50 MCG/DOSE AEPB  INHALE ONE PUFF BY MOUTH EVERY 12 HOURS             furosemide (LASIX) 40 MG tablet  TAKE ONE TABLET BY MOUTH TWO TIMES A DAY             Glucose Blood (BLOOD GLUCOSE TEST STRIPS) STRP  DX:E10.9 Test QID             Insulin Degludec (TRESIBA FLEXTOUCH) 100 UNIT/ML SOPN  INJECT 75 UNITS INTO THE SKIN ONCE DAILY             insulin lispro (HUMALOG) 100 UNIT/ML injection vial  35 Units             Insulin Syringe-Needle U-100 (B-D INS SYR ULTRAFINE 1CC/31G) 31G X 5/16\" 1 ML MISC               ipratropium-albuterol (DUONEB) 0.5-2.5 (3) MG/3ML SOLN nebulizer solution  Inhale 3 mLs into the lungs every 4 hours             JANUVIA 100 MG tablet  TAKE ONE TABLET BY MOUTH EVERY DAY             loratadine (CLARITIN) 10 MG tablet  TAKE ONE TABLET BY MOUTH EVERY DAY             lovastatin (MEVACOR) 10 MG tablet  TAKE ONE TABLET BY MOUTH EVERY EVENING AT BEDTIME             magnesium oxide (MAG-OX) 400 (240 Mg) MG tablet  TAKE ONE TABLET BY MOUTH THREE TIMES A DAY             metoclopramide (REGLAN) 5 MG tablet  TAKE ONE TABLET BY MOUTH THREE TIMES A DAY             metolazone (ZAROXOLYN) 5 MG tablet  Take 1 tablet by mouth daily as needed (swelling)             metoprolol tartrate (LOPRESSOR) 25 MG tablet  TAKE ONE TABLET BY MOUTH TWO TIMES A DAY             mometasone (NASONEX) 50 MCG/ACT nasal spray  2 sprays by Nasal route daily             montelukast (SINGULAIR) 10 MG tablet  TAKE ONE TABLET BY MOUTH EVERY DAY             omalizumab (REGLAN) 5 MG tablet TAKE ONE TABLET BY MOUTH THREE TIMES A DAY 90 tablet 2    traMADol (ULTRAM) 50 MG tablet TAKE ONE TABLET BY MOUTH THREE TIMES A DAY AS NEEDED 90 tablet 0    ferrous sulfate 325 (65 Fe) MG tablet TAKE ONE TABLET BY MOUTH THREE TIMES A DAY WITH FOOD 90 tablet 2    ASPIR-LOW 81 MG EC tablet TAKE ONE TABLET BY MOUTH EVERY DAY 30 tablet 1    furosemide (LASIX) 40 MG tablet TAKE ONE TABLET BY MOUTH TWO TIMES A DAY 60 tablet 5    pantoprazole (PROTONIX) 40 MG tablet TAKE ONE TABLET BY MOUTH EVERY DAY 30 tablet 2    lovastatin (MEVACOR) 10 MG tablet TAKE ONE TABLET BY MOUTH EVERY EVENING AT BEDTIME 30 tablet 2    PATADAY 0.2 % SOLN ophthalmic solution PLACE 1 DROP INTO BOTH EYES 2 TIMES DAILY 2.5 mL 3    ULTICARE INSULIN SYRINGE 31G X 5/16\" 1 ML MISC USE FOR INSULIN INJECTIONS FIVE TIMES DAILY 100 each 3    loratadine (CLARITIN) 10 MG tablet TAKE ONE TABLET BY MOUTH EVERY DAY 30 tablet 4    sucralfate (CARAFATE) 1 GM tablet TAKE ONE TABLET BY MOUTH THREE TIMES A DAY 90 tablet 3    Glucose Blood (BLOOD GLUCOSE TEST STRIPS) STRP DX:E10.9 Test  strip 3    Blood Glucose Monitoring Suppl DWAIN 1 each by Does not apply route 4 times daily Dx:E10.9 Test QID 1 Device 0    [DISCONTINUED] allopurinol (ZYLOPRIM) 300 MG tablet TAKE ONE TABLET BY MOUTH EVERY DAY (Patient taking differently: TAKE 1/2 TABLET BY MOUTH EVERY DAY) 30 tablet 3    [DISCONTINUED] SPIRIVA HANDIHALER 18 MCG inhalation capsule INHALE THE CONTENTS OF 1 CAPSULE EVERY DAY 30 capsule 2    [DISCONTINUED] potassium chloride (KLOR-CON M) 20 MEQ extended release tablet TAKE ONE TABLET BY MOUTH EVERY DAY 30 tablet 3    magnesium oxide (MAG-OX) 400 (240 Mg) MG tablet TAKE ONE TABLET BY MOUTH THREE TIMES A DAY 90 tablet 3    [DISCONTINUED] JANUVIA 100 MG tablet TAKE ONE TABLET BY MOUTH EVERY DAY (Patient taking differently: TAKE 1/2 TABLET BY MOUTH EVERY DAY) 30 tablet 3    [DISCONTINUED] montelukast (SINGULAIR) 10 MG tablet TAKE

## 2017-12-18 DIAGNOSIS — G89.29 CHRONIC BILATERAL LOW BACK PAIN WITHOUT SCIATICA: ICD-10-CM

## 2017-12-18 DIAGNOSIS — M54.50 CHRONIC BILATERAL LOW BACK PAIN WITHOUT SCIATICA: ICD-10-CM

## 2017-12-19 RX ORDER — TRAMADOL HYDROCHLORIDE 50 MG/1
TABLET ORAL
Qty: 90 TABLET | Refills: 0 | Status: SHIPPED | OUTPATIENT
Start: 2017-12-19 | End: 2018-01-01 | Stop reason: SDUPTHER

## 2017-12-27 RX ORDER — PREDNISONE 10 MG/1
TABLET ORAL
Qty: 18 TABLET | Refills: 0 | Status: SHIPPED | OUTPATIENT
Start: 2017-12-27 | End: 2018-01-01 | Stop reason: ALTCHOICE

## 2018-01-01 NOTE — ASSESSMENT & PLAN NOTE
COPD exacerbation persists. Patient's lungs are still very coarse with wheezes. She has been given Rocephin & Medrol injections today.

## 2018-01-01 NOTE — ASSESSMENT & PLAN NOTE
Resolved. However, patient continues to be oxygen dependent. She is on 3 L by nasal cannula at the current time.

## 2018-01-02 PROBLEM — J18.9 PNEUMONIA DUE TO INFECTIOUS ORGANISM: Status: ACTIVE | Noted: 2018-01-02

## 2018-01-04 ENCOUNTER — HOSPITAL ENCOUNTER (INPATIENT)
Facility: HOSPITAL | Age: 68
LOS: 7 days | Discharge: HOME | End: 2018-01-11
Attending: INTERNAL MEDICINE | Admitting: INTERNAL MEDICINE

## 2018-01-04 DIAGNOSIS — Z74.09 IMPAIRED FUNCTIONAL MOBILITY, BALANCE, GAIT, AND ENDURANCE: ICD-10-CM

## 2018-01-04 DIAGNOSIS — J96.21 ACUTE ON CHRONIC RESPIRATORY FAILURE WITH HYPOXIA (HCC): ICD-10-CM

## 2018-01-04 DIAGNOSIS — J18.9 PNEUMONIA OF LEFT UPPER LOBE DUE TO INFECTIOUS ORGANISM: Primary | ICD-10-CM

## 2018-01-04 DIAGNOSIS — J44.1 ACUTE EXACERBATION OF CHRONIC OBSTRUCTIVE PULMONARY DISEASE (COPD) (HCC): ICD-10-CM

## 2018-01-04 LAB
GLUCOSE BLDC GLUCOMTR-MCNC: 133 MG/DL (ref 70–130)
GLUCOSE BLDC GLUCOMTR-MCNC: 134 MG/DL (ref 70–130)
VANCOMYCIN SERPL-MCNC: 14.24 MCG/ML (ref 5–40)

## 2018-01-04 PROCEDURE — 94640 AIRWAY INHALATION TREATMENT: CPT

## 2018-01-04 PROCEDURE — 94660 CPAP INITIATION&MGMT: CPT

## 2018-01-04 PROCEDURE — 25010000002 VANCOMYCIN PER 500 MG: Performed by: INTERNAL MEDICINE

## 2018-01-04 PROCEDURE — 5A09357 ASSISTANCE WITH RESPIRATORY VENTILATION, LESS THAN 24 CONSECUTIVE HOURS, CONTINUOUS POSITIVE AIRWAY PRESSURE: ICD-10-PCS | Performed by: INTERNAL MEDICINE

## 2018-01-04 PROCEDURE — 25010000002 PIPERACILLIN SOD-TAZOBACTAM PER 1 G: Performed by: INTERNAL MEDICINE

## 2018-01-04 PROCEDURE — 80202 ASSAY OF VANCOMYCIN: CPT | Performed by: INTERNAL MEDICINE

## 2018-01-04 PROCEDURE — 94799 UNLISTED PULMONARY SVC/PX: CPT

## 2018-01-04 PROCEDURE — 25010000002 METHYLPREDNISOLONE PER 40 MG: Performed by: INTERNAL MEDICINE

## 2018-01-04 PROCEDURE — 82962 GLUCOSE BLOOD TEST: CPT

## 2018-01-04 PROCEDURE — 99223 1ST HOSP IP/OBS HIGH 75: CPT | Performed by: INTERNAL MEDICINE

## 2018-01-04 RX ORDER — GUAIFENESIN 600 MG/1
600 TABLET, EXTENDED RELEASE ORAL EVERY 12 HOURS SCHEDULED
Status: DISCONTINUED | OUTPATIENT
Start: 2018-01-04 | End: 2018-01-11 | Stop reason: HOSPADM

## 2018-01-04 RX ORDER — IPRATROPIUM BROMIDE AND ALBUTEROL SULFATE 2.5; .5 MG/3ML; MG/3ML
3 SOLUTION RESPIRATORY (INHALATION)
Status: DISCONTINUED | OUTPATIENT
Start: 2018-01-04 | End: 2018-01-08

## 2018-01-04 RX ORDER — METHYLPREDNISOLONE SODIUM SUCCINATE 40 MG/ML
40 INJECTION, POWDER, LYOPHILIZED, FOR SOLUTION INTRAMUSCULAR; INTRAVENOUS EVERY 12 HOURS
Status: DISCONTINUED | OUTPATIENT
Start: 2018-01-04 | End: 2018-01-10

## 2018-01-04 RX ORDER — BUDESONIDE 0.5 MG/2ML
0.5 INHALANT ORAL
Status: DISCONTINUED | OUTPATIENT
Start: 2018-01-04 | End: 2018-01-11 | Stop reason: HOSPADM

## 2018-01-04 RX ORDER — BUSPIRONE HYDROCHLORIDE 10 MG/1
10 TABLET ORAL 3 TIMES DAILY
Status: DISCONTINUED | OUTPATIENT
Start: 2018-01-04 | End: 2018-01-11 | Stop reason: HOSPADM

## 2018-01-04 RX ORDER — ONDANSETRON 2 MG/ML
4 INJECTION INTRAMUSCULAR; INTRAVENOUS EVERY 6 HOURS PRN
Status: DISCONTINUED | OUTPATIENT
Start: 2018-01-04 | End: 2018-01-11 | Stop reason: HOSPADM

## 2018-01-04 RX ORDER — ONDANSETRON 4 MG/1
4 TABLET, ORALLY DISINTEGRATING ORAL EVERY 6 HOURS PRN
Status: DISCONTINUED | OUTPATIENT
Start: 2018-01-04 | End: 2018-01-11 | Stop reason: HOSPADM

## 2018-01-04 RX ORDER — SODIUM CHLORIDE 0.9 % (FLUSH) 0.9 %
1-10 SYRINGE (ML) INJECTION AS NEEDED
Status: DISCONTINUED | OUTPATIENT
Start: 2018-01-04 | End: 2018-01-11 | Stop reason: HOSPADM

## 2018-01-04 RX ORDER — ACETAMINOPHEN 325 MG/1
650 TABLET ORAL EVERY 4 HOURS PRN
Status: DISCONTINUED | OUTPATIENT
Start: 2018-01-04 | End: 2018-01-11 | Stop reason: HOSPADM

## 2018-01-04 RX ORDER — KETOTIFEN FUMARATE 0.35 MG/ML
1 SOLUTION/ DROPS OPHTHALMIC 2 TIMES DAILY
Status: DISCONTINUED | OUTPATIENT
Start: 2018-01-04 | End: 2018-01-11 | Stop reason: HOSPADM

## 2018-01-04 RX ORDER — PANTOPRAZOLE SODIUM 40 MG/1
40 TABLET, DELAYED RELEASE ORAL DAILY
Status: DISCONTINUED | OUTPATIENT
Start: 2018-01-04 | End: 2018-01-11 | Stop reason: HOSPADM

## 2018-01-04 RX ORDER — ONDANSETRON 4 MG/1
4 TABLET, FILM COATED ORAL EVERY 6 HOURS PRN
Status: DISCONTINUED | OUTPATIENT
Start: 2018-01-04 | End: 2018-01-11 | Stop reason: HOSPADM

## 2018-01-04 RX ORDER — ASPIRIN 81 MG/1
81 TABLET ORAL DAILY
Status: DISCONTINUED | OUTPATIENT
Start: 2018-01-04 | End: 2018-01-11 | Stop reason: HOSPADM

## 2018-01-04 RX ORDER — ATORVASTATIN CALCIUM 10 MG/1
10 TABLET, FILM COATED ORAL DAILY
Status: DISCONTINUED | OUTPATIENT
Start: 2018-01-04 | End: 2018-01-11 | Stop reason: HOSPADM

## 2018-01-04 RX ORDER — NICOTINE POLACRILEX 4 MG
1 LOZENGE BUCCAL
Status: DISCONTINUED | OUTPATIENT
Start: 2018-01-04 | End: 2018-01-11 | Stop reason: HOSPADM

## 2018-01-04 RX ORDER — DEXTROSE MONOHYDRATE 25 G/50ML
25 INJECTION, SOLUTION INTRAVENOUS
Status: DISCONTINUED | OUTPATIENT
Start: 2018-01-04 | End: 2018-01-11 | Stop reason: HOSPADM

## 2018-01-04 RX ORDER — ALLOPURINOL 100 MG/1
300 TABLET ORAL DAILY
Status: DISCONTINUED | OUTPATIENT
Start: 2018-01-04 | End: 2018-01-11 | Stop reason: HOSPADM

## 2018-01-04 RX ORDER — IPRATROPIUM BROMIDE AND ALBUTEROL SULFATE 2.5; .5 MG/3ML; MG/3ML
3 SOLUTION RESPIRATORY (INHALATION) EVERY 4 HOURS PRN
Status: DISCONTINUED | OUTPATIENT
Start: 2018-01-04 | End: 2018-01-11 | Stop reason: HOSPADM

## 2018-01-04 RX ADMIN — GUAIFENESIN 600 MG: 600 TABLET, EXTENDED RELEASE ORAL at 20:48

## 2018-01-04 RX ADMIN — PIPERACILLIN SODIUM AND TAZOBACTAM SODIUM 3.38 G: 3; .375 INJECTION, POWDER, FOR SOLUTION INTRAVENOUS at 22:59

## 2018-01-04 RX ADMIN — BUSPIRONE HYDROCHLORIDE 10 MG: 10 TABLET ORAL at 20:48

## 2018-01-04 RX ADMIN — BUDESONIDE 0.5 MG: 0.5 INHALANT RESPIRATORY (INHALATION) at 19:13

## 2018-01-04 RX ADMIN — METOPROLOL TARTRATE 25 MG: 25 TABLET ORAL at 20:48

## 2018-01-04 RX ADMIN — IPRATROPIUM BROMIDE AND ALBUTEROL SULFATE 3 ML: .5; 3 SOLUTION RESPIRATORY (INHALATION) at 19:12

## 2018-01-04 RX ADMIN — ATORVASTATIN CALCIUM 10 MG: 10 TABLET, FILM COATED ORAL at 18:18

## 2018-01-04 RX ADMIN — METHYLPREDNISOLONE SODIUM SUCCINATE 40 MG: 40 INJECTION, POWDER, FOR SOLUTION INTRAMUSCULAR; INTRAVENOUS at 18:17

## 2018-01-04 RX ADMIN — KETOTIFEN FUMARATE 1 DROP: 0.35 SOLUTION/ DROPS OPHTHALMIC at 20:49

## 2018-01-04 RX ADMIN — PANTOPRAZOLE SODIUM 40 MG: 40 TABLET, DELAYED RELEASE ORAL at 18:18

## 2018-01-04 RX ADMIN — VANCOMYCIN HYDROCHLORIDE 1500 MG: 500 INJECTION, POWDER, LYOPHILIZED, FOR SOLUTION INTRAVENOUS at 20:49

## 2018-01-04 RX ADMIN — ASPIRIN 81 MG: 81 TABLET, COATED ORAL at 18:18

## 2018-01-04 NOTE — H&P
[facility] HOSPITALIST   HISTORY AND PHYSICAL      Name:  Mingo Guidry   Age:  67 y.o.  Sex:  female  :  1950  MRN:  [id number]   Visit Number:  [id number]  Admission Date:  2018  Date Of Service:  18  Primary Care Physician:  JACKI Mistry    Chief Complaint:     Patient was transferred from [facility] inpatient unit for persistent pneumonia that has not improved.    History Of Presenting Illness:      This is a 67-year-old pleasant female with history of COPD on home oxygen, hypertension, diabetes was transferred from [facility] for further evaluation and pulmonology consultation.  The history is obtained from the patient as well as the daughters were at the bedside.  Patient states that she was initially admitted at [facility] in the beginning of December for pneumonia.  She was apparently in the hospital for 5 days and was subsequently discharged home on antibiotics and steroids.  She subsequently followed up with primary care and was given steroid injections.  She continued to have significant shortness of breath and chest congestion and was subsequently admitted again to the hospital 4 days ago.    According to the referring physician, patient continues to require high FiO2 despite being on antibiotic therapy.  CT scan of the chest, report of which is currently not available, as endorsed by the physician, showed bilateral pneumonia with scarring.  Patient has been on Zosyn and vancomycin however due to no improvement and possibility of bronchial obstruction, it was felt that the patient be transferred to [facility] for evaluation by Dr. Dahl.    Patient is currently lying down on the bed and is on nonrebreather oxygen but is comfortable at rest.  She is able to speak in full sentences.  She denies any chest pain at this time.  She is able to walk independently and is independent in daily activities.   She even drives to her own appointments.  She does have home oxygen and home nebulizer therapy.  She does give history of previous left lung surgery in 1997 for some nodule which apparently was found to be benign.    Review Of Systems:     General ROS: Patient denies any fevers, chills or loss of consciousness. Complains of generalized weakness.  Psychological ROS: No history of any hallucinations and delusions.  Ophthalmic ROS: No history of any diplopia or transient loss of vision.  ENT ROS: No history of sore throat, nasal congestion or ear pain.   Allergy and Immunology ROS: No history of rash or itching.  Hematological and Lymphatic ROS: No history of neck swelling or easy bleeding.  Endocrine ROS: No history of any recent unintentional weight gain or loss.  Respiratory ROS: As per history of present illness.  Cardiovascular ROS: No history of chest pain or palpitations.   Gastrointestinal ROS: No history of nausea and vomiting. Denies any abdominal pain. No diarrhea.   Genito-Urinary ROS: No history of dysuria or hematuria.  Musculoskeletal ROS: No muscle pain. No calf pain.   Neurological ROS: No history of any focal weakness. No loss of consciousness. Denies any numbness.  Dermatological ROS: No history of any redness or pruritis.     Past Medical History:    Past Medical History:   Diagnosis Date   • Acid reflux 10/17/2016   • Chronic diastolic heart failure 10/17/2016    Normal dobutamine echocardiogram stress, 04/07/2005. Echocardiogram on 05/08/2009:  EF 50% to 55%. Left atrium 3.5 cm.   • Chronic obstructive pulmonary disease 10/17/2016    asthmatic bronchitis, on O2 use chronically.     • Gout 10/17/2016   • High cholesterol 01/03/2018   • Hypertension 10/17/2016    Benign hypertension.   • Lower extremity edema 10/17/2016    Chronic lower extremity edema/venous insufficiency.   • Obesity 10/17/2016   • Seasonal allergies 10/17/2016   • Type 2 diabetes mellitus 10/17/2016    insulin dependent.        Past Surgical history:    Past Surgical History:   Procedure Laterality Date   •  SECTION     • HYSTERECTOMY     • LUNG SURGERY      data insufficient   • TONSILLECTOMY AND ADENOIDECTOMY         Social History:    Social History     Social History   • Marital status:      Spouse name: N/A   • Number of children: N/A   • Years of education: N/A     Occupational History   • Not on file.     Social History Main Topics   • Smoking status: Former Smoker   • Smokeless tobacco: Never Used      Comment: quit 17 years ago   • Alcohol use No   • Drug use: Not on file   • Sexual activity: Defer     Other Topics Concern   • Not on file     Social History Narrative     Family History:    No family history on file.    Allergies:      Shellfish-derived products and Wheat bran    Home Medications:    Prior to Admission Medications     Prescriptions Last Dose Informant Patient Reported? Taking?    allopurinol (ZYLOPRIM) 300 MG tablet   Yes No    Take 300 mg by mouth Daily. 1/2 tablet daily    aspirin 81 MG tablet   Yes No    Take 81 mg by mouth Daily.    busPIRone (BUSPAR) 10 MG tablet   Yes No    Take 10 mg by mouth 3 (Three) Times a Day.    ezetimibe (ZETIA) 10 MG tablet   Yes No    Take 10 mg by mouth Daily.    ferrous sulfate 325 (65 FE) MG tablet   Yes No    Take 325 mg by mouth 3 (Three) Times a Day With Meals.    fluticasone-salmeterol (ADVAIR) 500-50 MCG/DOSE DISKUS   Yes No    Inhale 1 puff 2 (Two) Times a Day.    furosemide (LASIX) 40 MG tablet   Yes No    Take 40 mg by mouth 2 (Two) Times a Day.    HUMALOG 100 UNIT/ML injection   Yes No    35 Units 3 (Three) Times a Day.    Loratadine (CLARITIN) 10 MG capsule   Yes No    Take  by mouth Daily.    lovastatin (MEVACOR) 10 MG tablet   Yes No    Take 10 mg by mouth Every Night.    magnesium oxide (MAG-OX) 400 MG tablet   Yes No    Take 400 mg by mouth 3 (Three) Times a Day.    metoclopramide (REGLAN) 5 MG tablet   Yes No    Take 5 mg by mouth 3 (Three)  Times a Day.    metolazone (ZAROXOLYN) 5 MG tablet   Yes No    Take 5 mg by mouth As Needed.    metoprolol tartrate (LOPRESSOR) 25 MG tablet   Yes No    Take 25 mg by mouth 2 (Two) Times a Day.    montelukast (SINGULAIR) 10 MG tablet   Yes No    Take 10 mg by mouth Every Night.    NASONEX 50 MCG/ACT nasal spray   Yes No    As Needed.    O2 (OXYGEN)   Yes No    Inhale 3 L/min 1 (One) Time. 3 L/min daily and 4 L/min when Pt is walking    pantoprazole (PROTONIX) 40 MG EC tablet   Yes No    Take 40 mg by mouth Daily.    PATADAY 0.2 % solution ophthalmic solution   Yes No    Administer  to both eyes 2 (Two) Times a Day.    potassium chloride (KLOR-CON) 20 MEQ packet   Yes No    Take 20 mEq by mouth Daily.    sitaGLIPtin (JANUVIA) 100 MG tablet   Yes No    Take 100 mg by mouth Daily. 1/2 tablet daily    sucralfate (CARAFATE) 1 G tablet   Yes No    Take 1 g by mouth 3 (Three) Times a Day.    tiotropium (SPIRIVA) 18 MCG per inhalation capsule   Yes No    Place 1 capsule into inhaler and inhale Daily.    traMADol (ULTRAM) 50 MG tablet   Yes No    Take 50 mg by mouth Every 6 (Six) Hours As Needed for moderate pain (4-6).    TRESIBA FLEXTOUCH 100 UNIT/ML solution pen-injector injection   Yes No    75 Units Daily.    vitamin D (ERGOCALCIFEROL) 71750 units capsule capsule   Yes No    Take 50,000 Units by mouth 1 (One) Time Per Week.         Vital Signs:    Heart Rate:  [92] 92  Resp:  [18] 18  BP: (134)/(85) 134/85    Last 3 weights    01/04/18  1653   Weight: 91.6 kg (201 lb 14.4 oz)       Body mass index is 42.2 kg/(m^2).    Physical Exam:    General Appearance:  Alert and cooperative, not in any acute distress.   Head:  Atraumatic and normocephalic, without obvious abnormality.   Eyes:          PERRLA, conjunctivae and sclerae normal, no Icterus. No pallor. Extra-occular movements are within normal limits.   Ears:  Ears appear intact with no abnormalities noted.   Throat: No oral lesions, no thrush, oral mucosa moist.   Neck:  Supple, trachea midline, no thyromegaly, no carotid bruit.   Back:   No kyphoscoliosis present. No tenderness to palpation,   range of motion normal.   Lungs:   Chest shape is normal. Breath sounds heard bilaterally equally.  Extensive wheezing heard mainly on the left upper lung fields.  Bilateral scattered wheezing and crackles heard. No Pleural rub or bronchial breathing.   Heart:  Normal S1 and S2, no murmur, no gallop, no rub. No JVD.   Abdomen:   Normal bowel sounds, no masses, no organomegaly. Soft, non-tender, obese, no guarding, no rebound tenderness.  Multiple subcutaneous fibrotic areas are palpated on the abdominal wall due to her insulin injections.   Extremities: Moves all extremities well, 1+ edema, no cyanosis, no clubbing.   Pulses: Pulses palpable and equal bilaterally.   Skin: No bleeding, bruising or rash.   Neurologic: Alert and oriented x 3. Moves all four limbs equally. No tremors. No facial asymetry. No asterixis.       Laboratory data:    None available at this time.    EKG:      Not done.    Radiology:    Imaging Results (last 72 hours)     ** No results found for the last 72 hours. **        Assessment:    1.  Acute on chronic hypoxic respiratory failure.  2.  Persistent left upper lobe pneumonia, with failure of antibiotic therapy.  3.  Acute COPD exacerbation.  4.  Diabetes mellitus type 2 with nephropathy.  5.  Essential hypertension.    Plan:    Patient is currently being admitted to the medical floor with telemetry.  We will continue her on nasal cannula as well as nonrebreather oxygen to maintain saturations above 92%.  She will be continued on bronchodilator nebulizations, budesonide, mucolytic agents as well as IV Solu-Medrol.  I will continue her on Zosyn and vancomycin for now.  I have discussed the patient's condition with Dr. Dahl and he will see her in the morning.  I will keep her nothing by mouth from midnight in case he wants to do a bronchoscopy.  She will be placed on  subcutaneous insulin protocol for coverage.  Her home medications will be continued.  I have discussed the patient's condition with her daughters were at the bedside.  Further recommendations depend upon her clinical course.    Dave Hairston MD  01/04/18  5:09 PM    Dictated utilizing Dragon dictation.

## 2018-01-04 NOTE — PLAN OF CARE
Problem: Patient Care Overview (Adult)  Goal: Plan of Care Review  Outcome: Ongoing (interventions implemented as appropriate)   01/04/18 0967   Coping/Psychosocial Response Interventions   Plan Of Care Reviewed With patient;family   Outcome Evaluation   Outcome Summary/Follow up Plan new admit       Problem: Respiratory Insufficiency (Adult)  Goal: Identify Related Risk Factors and Signs and Symptoms  Outcome: Ongoing (interventions implemented as appropriate)    Goal: Acid/Base Balance  Outcome: Ongoing (interventions implemented as appropriate)    Goal: Effective Ventilation  Outcome: Ongoing (interventions implemented as appropriate)      Problem: Pneumonia (Adult)  Goal: Signs and Symptoms of Listed Potential Problems Will be Absent or Manageable (Pneumonia)  Outcome: Ongoing (interventions implemented as appropriate)

## 2018-01-05 LAB
ANION GAP SERPL CALCULATED.3IONS-SCNC: 14.2 MMOL/L
ARTERIAL PATENCY WRIST A: POSITIVE
ATMOSPHERIC PRESS: 741 MMHG
BASE EXCESS BLDA CALC-SCNC: 20.2 MMOL/L
BASOPHILS # BLD AUTO: 0.01 10*3/MM3 (ref 0–0.2)
BASOPHILS NFR BLD AUTO: 0.1 % (ref 0–2.5)
BDY SITE: ABNORMAL
BUN BLD-MCNC: 55 MG/DL (ref 7–20)
BUN/CREAT SERPL: 30.6 (ref 7.1–23.5)
CALCIUM SPEC-SCNC: 9.2 MG/DL (ref 8.4–10.2)
CHLORIDE SERPL-SCNC: 93 MMOL/L (ref 98–107)
CO2 SERPL-SCNC: 42 MMOL/L (ref 26–30)
CREAT BLD-MCNC: 1.8 MG/DL (ref 0.6–1.3)
DEPRECATED RDW RBC AUTO: 53.7 FL (ref 37–54)
EOSINOPHIL # BLD AUTO: 0.01 10*3/MM3 (ref 0–0.7)
EOSINOPHIL NFR BLD AUTO: 0.1 % (ref 0–7)
ERYTHROCYTE [DISTWIDTH] IN BLOOD BY AUTOMATED COUNT: 14.3 % (ref 11.5–14.5)
GFR SERPL CREATININE-BSD FRML MDRD: 28 ML/MIN/1.73
GLUCOSE BLD-MCNC: 223 MG/DL (ref 74–98)
GLUCOSE BLDC GLUCOMTR-MCNC: 199 MG/DL (ref 70–130)
GLUCOSE BLDC GLUCOMTR-MCNC: 280 MG/DL (ref 70–130)
GLUCOSE BLDC GLUCOMTR-MCNC: 320 MG/DL (ref 70–130)
GLUCOSE BLDC GLUCOMTR-MCNC: 323 MG/DL (ref 70–130)
HCO3 BLDA-SCNC: 44.2 MMOL/L (ref 22–28)
HCT VFR BLD AUTO: 41.3 % (ref 37–47)
HGB BLD-MCNC: 13.2 G/DL (ref 12–16)
HGB BLDA-MCNC: 13.9 G/DL (ref 12–18)
HOROWITZ INDEX BLD+IHG-RTO: 70 %
IMM GRANULOCYTES # BLD: 0.12 10*3/MM3 (ref 0–0.06)
IMM GRANULOCYTES NFR BLD: 0.9 % (ref 0–0.6)
INR PPP: 1.17 (ref 0.9–1.1)
LYMPHOCYTES # BLD AUTO: 1.34 10*3/MM3 (ref 0.6–3.4)
LYMPHOCYTES NFR BLD AUTO: 10.4 % (ref 10–50)
MCH RBC QN AUTO: 32.2 PG (ref 27–31)
MCHC RBC AUTO-ENTMCNC: 32 G/DL (ref 30–37)
MCV RBC AUTO: 100.7 FL (ref 81–99)
MODALITY: ABNORMAL
MONOCYTES # BLD AUTO: 1.08 10*3/MM3 (ref 0–0.9)
MONOCYTES NFR BLD AUTO: 8.4 % (ref 0–12)
NEUTROPHILS # BLD AUTO: 10.28 10*3/MM3 (ref 2–6.9)
NEUTROPHILS NFR BLD AUTO: 80.1 % (ref 37–80)
NRBC BLD MANUAL-RTO: 0 /100 WBC (ref 0–0)
PCO2 BLDA: 64.3 MM HG (ref 35–45)
PH BLDA: 7.45 PH UNITS (ref 7.3–7.5)
PLATELET # BLD AUTO: 341 10*3/MM3 (ref 130–400)
PMV BLD AUTO: 10.4 FL (ref 6–12)
PO2 BLDA: 57.2 MM HG (ref 75–100)
POTASSIUM BLD-SCNC: 4.2 MMOL/L (ref 3.5–5.1)
PROTHROMBIN TIME: 13.1 SECONDS (ref 9.3–12.1)
RBC # BLD AUTO: 4.1 10*6/MM3 (ref 4.2–5.4)
SAO2 % BLDCOA: 87.9 %
SODIUM BLD-SCNC: 145 MMOL/L (ref 137–145)
TROPONIN I SERPL-MCNC: 0.02 NG/ML (ref 0–0.03)
WBC NRBC COR # BLD: 12.84 10*3/MM3 (ref 4.8–10.8)

## 2018-01-05 PROCEDURE — 25010000002 VANCOMYCIN PER 500 MG: Performed by: INTERNAL MEDICINE

## 2018-01-05 PROCEDURE — 85610 PROTHROMBIN TIME: CPT | Performed by: INTERNAL MEDICINE

## 2018-01-05 PROCEDURE — 63710000001 INSULIN ASPART PER 5 UNITS: Performed by: INTERNAL MEDICINE

## 2018-01-05 PROCEDURE — 84484 ASSAY OF TROPONIN QUANT: CPT | Performed by: INTERNAL MEDICINE

## 2018-01-05 PROCEDURE — 36600 WITHDRAWAL OF ARTERIAL BLOOD: CPT

## 2018-01-05 PROCEDURE — 94799 UNLISTED PULMONARY SVC/PX: CPT

## 2018-01-05 PROCEDURE — 82962 GLUCOSE BLOOD TEST: CPT

## 2018-01-05 PROCEDURE — 85025 COMPLETE CBC W/AUTO DIFF WBC: CPT | Performed by: INTERNAL MEDICINE

## 2018-01-05 PROCEDURE — 25010000002 METHYLPREDNISOLONE PER 40 MG: Performed by: INTERNAL MEDICINE

## 2018-01-05 PROCEDURE — 82805 BLOOD GASES W/O2 SATURATION: CPT

## 2018-01-05 PROCEDURE — 80048 BASIC METABOLIC PNL TOTAL CA: CPT | Performed by: INTERNAL MEDICINE

## 2018-01-05 PROCEDURE — 99232 SBSQ HOSP IP/OBS MODERATE 35: CPT | Performed by: INTERNAL MEDICINE

## 2018-01-05 PROCEDURE — 25010000002 ENOXAPARIN PER 10 MG: Performed by: INTERNAL MEDICINE

## 2018-01-05 PROCEDURE — 25010000002 PIPERACILLIN SOD-TAZOBACTAM PER 1 G: Performed by: INTERNAL MEDICINE

## 2018-01-05 PROCEDURE — 99223 1ST HOSP IP/OBS HIGH 75: CPT | Performed by: INTERNAL MEDICINE

## 2018-01-05 RX ADMIN — INSULIN ASPART 7 UNITS: 100 INJECTION, SOLUTION INTRAVENOUS; SUBCUTANEOUS at 18:03

## 2018-01-05 RX ADMIN — KETOTIFEN FUMARATE 1 DROP: 0.35 SOLUTION/ DROPS OPHTHALMIC at 22:18

## 2018-01-05 RX ADMIN — IPRATROPIUM BROMIDE AND ALBUTEROL SULFATE 3 ML: .5; 3 SOLUTION RESPIRATORY (INHALATION) at 20:58

## 2018-01-05 RX ADMIN — KETOTIFEN FUMARATE 1 DROP: 0.35 SOLUTION/ DROPS OPHTHALMIC at 13:06

## 2018-01-05 RX ADMIN — PIPERACILLIN SODIUM AND TAZOBACTAM SODIUM 3.38 G: 3; .375 INJECTION, POWDER, FOR SOLUTION INTRAVENOUS at 05:41

## 2018-01-05 RX ADMIN — TAZOBACTAM SODIUM AND PIPERACILLIN SODIUM 3.38 G: 375; 3 INJECTION, SOLUTION INTRAVENOUS at 23:47

## 2018-01-05 RX ADMIN — TAZOBACTAM SODIUM AND PIPERACILLIN SODIUM 3.38 G: 375; 3 INJECTION, SOLUTION INTRAVENOUS at 14:33

## 2018-01-05 RX ADMIN — BUSPIRONE HYDROCHLORIDE 10 MG: 10 TABLET ORAL at 22:17

## 2018-01-05 RX ADMIN — VANCOMYCIN HYDROCHLORIDE 1500 MG: 500 INJECTION, POWDER, LYOPHILIZED, FOR SOLUTION INTRAVENOUS at 22:19

## 2018-01-05 RX ADMIN — BUDESONIDE 0.5 MG: 0.5 INHALANT RESPIRATORY (INHALATION) at 07:42

## 2018-01-05 RX ADMIN — GUAIFENESIN 600 MG: 600 TABLET, EXTENDED RELEASE ORAL at 22:17

## 2018-01-05 RX ADMIN — PANTOPRAZOLE SODIUM 40 MG: 40 TABLET, DELAYED RELEASE ORAL at 13:03

## 2018-01-05 RX ADMIN — BUSPIRONE HYDROCHLORIDE 10 MG: 10 TABLET ORAL at 18:02

## 2018-01-05 RX ADMIN — ATORVASTATIN CALCIUM 10 MG: 10 TABLET, FILM COATED ORAL at 13:03

## 2018-01-05 RX ADMIN — METHYLPREDNISOLONE SODIUM SUCCINATE 40 MG: 40 INJECTION, POWDER, FOR SOLUTION INTRAMUSCULAR; INTRAVENOUS at 07:27

## 2018-01-05 RX ADMIN — METOPROLOL TARTRATE 25 MG: 25 TABLET ORAL at 22:17

## 2018-01-05 RX ADMIN — ENOXAPARIN SODIUM 30 MG: 30 INJECTION SUBCUTANEOUS at 14:33

## 2018-01-05 RX ADMIN — INSULIN ASPART 6 UNITS: 100 INJECTION, SOLUTION INTRAVENOUS; SUBCUTANEOUS at 22:17

## 2018-01-05 RX ADMIN — BUDESONIDE 0.5 MG: 0.5 INHALANT RESPIRATORY (INHALATION) at 20:58

## 2018-01-05 RX ADMIN — BUSPIRONE HYDROCHLORIDE 10 MG: 10 TABLET ORAL at 13:04

## 2018-01-05 RX ADMIN — IPRATROPIUM BROMIDE AND ALBUTEROL SULFATE 3 ML: .5; 3 SOLUTION RESPIRATORY (INHALATION) at 13:12

## 2018-01-05 RX ADMIN — INSULIN ASPART 7 UNITS: 100 INJECTION, SOLUTION INTRAVENOUS; SUBCUTANEOUS at 12:44

## 2018-01-05 RX ADMIN — GUAIFENESIN 600 MG: 600 TABLET, EXTENDED RELEASE ORAL at 13:03

## 2018-01-05 RX ADMIN — METOPROLOL TARTRATE 25 MG: 25 TABLET ORAL at 13:03

## 2018-01-05 RX ADMIN — METHYLPREDNISOLONE SODIUM SUCCINATE 40 MG: 40 INJECTION, POWDER, FOR SOLUTION INTRAMUSCULAR; INTRAVENOUS at 18:03

## 2018-01-05 RX ADMIN — IPRATROPIUM BROMIDE AND ALBUTEROL SULFATE 3 ML: .5; 3 SOLUTION RESPIRATORY (INHALATION) at 00:58

## 2018-01-05 RX ADMIN — ASPIRIN 81 MG: 81 TABLET, COATED ORAL at 13:04

## 2018-01-05 RX ADMIN — IPRATROPIUM BROMIDE AND ALBUTEROL SULFATE 3 ML: .5; 3 SOLUTION RESPIRATORY (INHALATION) at 07:42

## 2018-01-05 NOTE — PROGRESS NOTES
"Pharmacokinetic Initial Note - Vancomycin    Mingo Guidry is a 67 y.o. female  147.3 cm (58\") 91.6 kg (201 lb 14.4 oz)    Indication for use: HCAP      Results from last 7 days     Lab Units 01/05/18  0603   WBC 10*3/mm3 12.84*   CREATININE mg/dL 1.80*      Estimated Creatinine Clearance: 30.6 mL/min (by C-G formula based on Cr of 1.8).  Temp Readings from Last 1 Encounters:   01/05/18 97.2 °F (36.2 °C) (Axillary)       Culture results  Microbiology Results (last 10 days)       ** No results found for the last 240 hours. **            Other Antimicrobials  Zosyn 3.375 g IV q8h    Assessment/Plan  Initiated Vancomycin 1500 mg Q24H. Will order Vancomycin trough prior to third dose. Pharmacy will monitor renal function and adjust dose accordingly.    Jerman Harden, [facility]  01/05/18 3:14 PM  "

## 2018-01-05 NOTE — PROGRESS NOTES
[facility]    Name:  Mingo Guidry   Age:  67 y.o.  Sex:  female  :  1950  MRN:  [id number]   Visit Number:  [id number]  Admission Date:  2018  Date Of Service:  18  Primary Care Physician:  JACKI Mistry     LOS: 1 day :  Patient Care Team:  JACKI Mistry as PCP - General:    Chief Complaint:      Shortness of breath on minimal exertion.    Subjective / Interval History:     Patient is currently lying down in the bed and is on high flow oxygen with nonrebreather mask.  She is currently saturating in the lower 90s.  She continues to have shortness of breath but fairly stable at rest.  She is able to speak in full sentences without any difficulty.  She was seen by Dr. Dahl this morning and he feels that the patient may have difficulty in secretion clearance and advised vibration vest therapy.  She is currently on IV antibiotic therapy with Zosyn and vancomycin.  She denies any chest pain.  Denies any nausea or vomiting.  Dr. Dahl as tentatively scheduled her for possible bronchoscopy on Monday.    Review of Systems:     General ROS: Patient denies any fevers, chills or loss of consciousness.  Respiratory ROS: Complains of shortness of breath and cough.  Cardiovascular ROS: Denies chest pain or palpitations. No history of exertional chest pain.  Gastrointestinal ROS: Denies nausea and vomiting. Denies any abdominal pain. No diarrhea.  Neurological ROS: Denies any focal weakness. No loss of consciousness. Denies any numbness.  Dermatological ROS: Denies any redness or pruritis.    Vital Signs:    Temp:  [97.2 °F (36.2 °C)-98.2 °F (36.8 °C)] 97.2 °F (36.2 °C)  Heart Rate:  [] 86  Resp:  [18-22] 20  BP: (132-144)/(67-85) 134/77    Intake and output:    I/O last 3 completed shifts:  In: 740 [P.O.:240; IV Piggyback:500]  Out: 1400 [Urine:1400]  I/O this shift:  In: -   Out: 950 [Urine:950]    Physical Examination:    General  Appearance:  Alert and cooperative, not in any acute distress.   Head:  Atraumatic and normocephalic, without obvious abnormality.   Eyes:          PERRLA, conjunctivae and sclerae normal, no Icterus. No pallor. Extra-occular movements are within normal limits.   Neck: Supple, trachea midline, no thyromegaly, no carotid bruit.   Lungs:   Chest shape is normal. Breath sounds heard bilaterally equally.  Bilateral wheezing and scattered crackles heard.  Slightly better compared to yesterday. No Pleural rub or bronchial breathing.   Heart:  Normal S1 and S2, no murmur, no gallop, no rub. No JVD   Abdomen:   Normal bowel sounds, no masses, no organomegaly. Soft, non-tender, obese, no guarding, no rebound tenderness. Multiple subcutaneous fibrotic areas are palpated on the abdominal wall due to her insulin injections.   Extremities: Moves all extremities well, 1+ edema, no cyanosis, no            clubbing.   Skin: No bleeding, bruising or rash.   Neurologic: Awake, alert and oriented times 3. Moves all 4 extremities equally.   Laboratory results:      Results from last 7 days  Lab Units 01/05/18  0603   SODIUM mmol/L 145   POTASSIUM mmol/L 4.2   CHLORIDE mmol/L 93*   CO2 mmol/L 42.0*   BUN mg/dL 55*   CREATININE mg/dL 1.80*   CALCIUM mg/dL 9.2   GLUCOSE mg/dL 223*       Results from last 7 days  Lab Units 01/05/18  0603   WBC 10*3/mm3 12.84*   HEMOGLOBIN g/dL 13.2   HEMATOCRIT % 41.3   PLATELETS 10*3/mm3 341       Results from last 7 days  Lab Units 01/05/18  0603   INR  1.17*       Results from last 7 days  Lab Units 01/05/18  0603   TROPONIN I ng/mL 0.022           I have reviewed the patient's laboratory results.    Radiology results:    Imaging Results (last 24 hours)     ** No results found for the last 24 hours. **        Medication Review:     I have reviewed the patients active and prn medications.     Principal Problem:    Acute on chronic respiratory failure with hypoxia  Active Problems:    Acute exacerbation  of chronic obstructive pulmonary disease (COPD)    Pneumonia of left upper lobe due to infectious organism    Essential hypertension    Type 2 diabetes mellitus    Gout    Pneumonia    Assessment:    1.  Acute on chronic hypoxic respiratory failure.  2.  Persistent left upper lobe pneumonia, with failure of antibiotic therapy.  3.  Acute COPD exacerbation.  4.  Diabetes mellitus type 2 with nephropathy.  5.  Essential hypertension.  6.  Obstructive sleep apnea on home BiPAP therapy.  7.  Chronic diastolic heart failure.  8.  Chronic kidney disease stage II.    Plan:    Patient is currently on hydroxyzine and will be continued on bronchodilators, budesonide and IV antibiotic therapy with Zosyn and vancomycin.  Recent cultures from [facility] have been negative to yesterday.  She was seen by Dr. Dahl this morning and I have discussed the patient's condition with him.  He felt that doing bronchoscopy while she is significantly hypoxic and not be a good idea.  He has tentatively scheduled bronchoscopy for Monday.  Meanwhile we will place her on vibration vest percussion therapy to improve her sputum clearance.  She will be continued on BiPAP therapy.  She will be continued on subcutaneous insulin protocol.  She is on Lovenox for DVT prophylaxis.  Further recommendations depend upon her clinical course.    Dave Hairston MD  01/05/18  3:24 PM    Dictated utilizing Dragon dictation.

## 2018-01-05 NOTE — PROGRESS NOTES
Continued Stay Note  BRIELLE Mills     Patient Name: Mingo Guidry  MRN: [id number]  Today's Date: 1/5/2018    Admit Date: 1/4/2018          Discharge Plan       01/05/18 1013    Case Management/Social Work Plan    Additional Comments Pt was on BIPAP  Unable to have discauusion at this time DCP held until not on BIPAP ot family present  Did speak to Vilma at  [facility] if rehab is needed they will consider pt                Discharge Codes     None            Ana Romano

## 2018-01-05 NOTE — PLAN OF CARE
Problem: Respiratory Insufficiency (Adult)  Goal: Identify Related Risk Factors and Signs and Symptoms  Outcome: Outcome(s) achieved Date Met: 01/05/18

## 2018-01-05 NOTE — PLAN OF CARE
Problem: Patient Care Overview (Adult)  Goal: Plan of Care Review  Outcome: Ongoing (interventions implemented as appropriate)   01/05/18 0648   Coping/Psychosocial Response Interventions   Plan Of Care Reviewed With patient   Outcome Evaluation   Outcome Summary/Follow up Plan Pt. still requiring 12L HF NC to maintain sats in high 80s. Needs BIPAP most of night.   Patient Care Overview   Progress no change       Problem: Respiratory Insufficiency (Adult)  Goal: Acid/Base Balance  Outcome: Ongoing (interventions implemented as appropriate)    Goal: Effective Ventilation  Outcome: Ongoing (interventions implemented as appropriate)      Problem: Pneumonia (Adult)  Goal: Signs and Symptoms of Listed Potential Problems Will be Absent or Manageable (Pneumonia)  Outcome: Ongoing (interventions implemented as appropriate)

## 2018-01-05 NOTE — CONSULTS
"Date of consultation:   January 5, 2018    Requested by:   Hospitalist Service.     PCP: JACKI Mistry      Reason:  Left Upper Lobe consolidation and collapse?    History of Present Illness:  67 y.o. female  was transferred from another facility after she was noted to require high flow oxygen and worsening abnormality in the left upper lobe.  The patient is currently on BiPAP and history is somewhat limited.  As per my discussion with the admitting physician and review of the chart, as well as simple yes and no questions from the patient, she was initially admitted in December with similar symptoms but only improved slightly.  The patient was once again found to have hypoxemia at the assisted living facility and was sent back to the local hospital.  She underwent repeat CT scan which continued to show abnormality which seemed to have worsened and therefore she was transferred to our facility for further workup and management.    The patient underwent left lung surgery back in 1997 and says that the tumors were benign.  She mentions \"4 small tumors\" which were resected.     She also says that she used to smoke one half packs per day for about 20 years, having quit more than 25 years ago.  She also has a positive family history of lung cancer in her sister.    She has been followed for obstructive sleep apnea by a local sleep physician and is currently on BiPAP.  She says that she actually recently received a new BiPAP device.    Review of System: Could not be obtained, as the patient is on BiPAP. All other review of systems negative except indicated in HPI.    Past Medical History:  Past Medical History:   Diagnosis Date   • Acid reflux 10/17/2016   • Chronic diastolic heart failure 10/17/2016    Normal dobutamine echocardiogram stress, 04/07/2005. Echocardiogram on 05/08/2009:  EF 50% to 55%. Left atrium 3.5 cm.   • Chronic obstructive pulmonary disease 10/17/2016    asthmatic bronchitis, on O2 use " "chronically.     • Gout 10/17/2016   • High cholesterol 2018   • Hypertension 10/17/2016    Benign hypertension.   • Lower extremity edema 10/17/2016    Chronic lower extremity edema/venous insufficiency.   • Obesity 10/17/2016   • Seasonal allergies 10/17/2016   • Type 2 diabetes mellitus 10/17/2016    insulin dependent.         Past Surgical History:  Past Surgical History:   Procedure Laterality Date   •  SECTION     • HYSTERECTOMY     • LUNG SURGERY      data insufficient   • TONSILLECTOMY AND ADENOIDECTOMY           Family History:  History reviewed. No pertinent family history.      Social History:  Social History     Social History   • Marital status:      Spouse name: N/A   • Number of children: N/A   • Years of education: N/A     Social History Main Topics   • Smoking status: Former Smoker   • Smokeless tobacco: Never Used      Comment: quit 17 years ago   • Alcohol use No   • Drug use: None   • Sexual activity: Defer     Other Topics Concern   • None     Social History Narrative         Physical Exam:  /74 (BP Location: Left arm, Patient Position: Lying)  Pulse 73  Temp 97.2 °F (36.2 °C) (Axillary)   Resp 22  Ht 147.3 cm (58\")  Wt 91.6 kg (201 lb 14.4 oz)  LMP  (LMP Unknown)  SpO2 90%  BMI 42.2 kg/m2    Constitutional:            Vital signs reviewed            Patient is currently on BiPAP    Head/Face/Eyes:            Pupils appeared equal and reactive to light     ENT:             Patient was on the BiPAP      Neck:             Supple. No JVD noted.     Cardiovascular:              S1 + S2. Regular.     Respiratory:            Transmitted breath sounds bilaterally with somewhat decreased air entry left upper lung zone.            Percussion could not be performed at this time.            B/L basal crackles were also heard.     Abdomen:            Soft.  Bowel sounds sluggishly positive. No obvious organomegaly noted.    Musculoskeletal/Extremities:             Gait " could not be assessed at this time.              No clubbing in the upper extremities             No cyanosis noted in the upper extremities.             1+ edema noted in the lower extremities bilaterally.    Neurologic/Psychiatric:             Was able to follow simple commands              Exam was limited since the patient was on BiPAP.    Skin:             No obvious rash noted.             Warm and dry.      Labs:   Reviewed. Pertinent labs were noted.     Lab Results   Component Value Date    WBC 12.84 (H) 01/05/2018    HGB 13.2 01/05/2018    HCT 41.3 01/05/2018    .7 (H) 01/05/2018     01/05/2018       Lab Results   Component Value Date    GLUCOSE 223 (H) 01/05/2018    CALCIUM 9.2 01/05/2018     01/05/2018    K 4.2 01/05/2018    CO2 42.0 (C) 01/05/2018    CL 93 (L) 01/05/2018    BUN 55 (H) 01/05/2018    CREATININE 1.80 (H) 01/05/2018    EGFRIFNONA 28 (L) 01/05/2018    BCR 30.6 (H) 01/05/2018    ANIONGAP 14.2 01/05/2018         ABG:  Lab Results   Component Value Date    PHART 7.446 01/05/2018    SDE1CIV 64.3 (C) 01/05/2018    PO2ART 57.2 (L) 01/05/2018    HGBBG 13.9 01/05/2018    B1OCHJDS 87.9 01/05/2018       Imaging Study: Chest X Ray from 1/4/2018 ([facility])   1. No new lesions.  2. Left hemithorax volume loss with left upper lobe  postobstructive consolidation, unchanged.  2. Right lower lobe airspace disease      Echo: 12/7/2017   Summary   Normal LV systolic function and wall motion. LVEF estimated at 55%. Grade 2   diastolic dysfunction.   The mitral valve leaflets appear normal. No prolapse or stenosis. Mild   mitral regurgitation.   The aortic valve is poorly visualized. There is no aortic stenosis or   insufficiency.   The tricuspid valve appears structurally normal. There is trace tricuspid   regurgitation.      Assessment:  1.  Acute hypoxic respiratory failure  2.  Left upper lobe healthcare associated pneumonia  3.  Left upper lobe atelectasis?  4.  Chronic hypoxemia  5.   "Obstructive sleep apnea.  On BiPAP for many years.  6.  History of smoking  7.  History of lung cancer in her sister  8.  Chronic hypercarbic and hypoxic respiratory failure (last ABG from 1 January showed pH of 7.41 with a PCO2 55 and PO2 of 62)  9.  Grade 2 Diastolic Dysfunction.   10. Likely CKD. Baseline Creatinine seems to be 1.3-1.5.    Discussion/Recommendations:   Unfortunately images were not provided for review but I reviewed the report of the CT scan from December 2017 as well as January 1, 2018.  Based on the report the left upper lobe consolidation seems to be new.  Although the same area also has scarring, the report clearly mentions \"Left upper lobe apicoposterior segment consolidation and volume loss associated with narrowing of the left upper lobe segmental bronchi, but no discrete obstructing mass lesion. The airspace disease could reflect a combination of atelectasis and pneumonia.\"    The patient is requiring extremely high flow oxygen at this time and even with 15 L her O2 saturation remains only 88%.  If he proceed with bronchoscopy, she will end up being intubated and since she clinically does not seem to be in respiratory distress, I will try conservative approach including attempting BiPAP rather than CPAP.  I will place her on BiPAP at a setting of 18/10.    I will also advise percussion therapy to possibly increase clearance of the left upper lobe atelectasis/mucous plug.    I will tentatively schedule bronchoscopy for Monday, especially if there is no improvement.    Chest x-ray will be performed on Sunday.    The risks of bronchoscopy under conscious sedation/general anesthesia including but not limited to damage to the overlying structures, pneumothorax, bleeding, worsening of respiratory failure, requirement for chest tube placement among others were explained.    I have told the patient that she is definitely at high risk for possible intubation and requiring mechanical " ventilation.    Patient understood the risks and benefits and agreed to proceed with the procedure    The alternatives were also discussed with the patient.  In this patient we are already addressing alternative approach in the form of percussion, BiPAP and oxygen supplementation as well as other measures including nebulized treatments etc.    Due to the recent development of the left upper lobe consolidation, pneumonia is most likely explanation.  Since she was in the hospital recently, it should be considered healthcare associated.    For obstructive sleep apnea, I would recommend continuation of BiPAP.    Due to family history of lung cancer and her own history of smoking, she will definitely need further imaging studies at a later date, to document resolution/improvement of the left upper lobe consolidation.    I told the patient that if despite clinical improvement the left upper lobe does not completely clear, then she will definitely need to be considered for bronchoscopy even as an outpatient.    Some parts of history, ROS & physical exam were obtained by and with the APRN.     All the relevant labs, radiology images, echocardiograms etc were reviewed with the APRN. Plan was also discussed in detail with APRN.     The plan was discussed with the patient.  I have also discussed the case with the nursing staff.    Recommendations were also discussed with the referring provider.     I would like to thank you for the opportunity to participate in the care of this patient.  We will communicate changes and recommendations, if and when necessary.    We have updated the admitting attending and nursing staff, as appropriate, on the patient's current status and plan. I will be going off shift tonight and will be unavailable.       Ryder Dahl MD  01/05/18  9:25 AM    Dictated utilizing Dragon dictation.

## 2018-01-06 LAB
ANION GAP SERPL CALCULATED.3IONS-SCNC: 12.6 MMOL/L
BUN BLD-MCNC: 55 MG/DL (ref 7–20)
BUN/CREAT SERPL: 34.4 (ref 7.1–23.5)
CALCIUM SPEC-SCNC: 9.2 MG/DL (ref 8.4–10.2)
CHLORIDE SERPL-SCNC: 93 MMOL/L (ref 98–107)
CO2 SERPL-SCNC: 40 MMOL/L (ref 26–30)
CREAT BLD-MCNC: 1.6 MG/DL (ref 0.6–1.3)
GFR SERPL CREATININE-BSD FRML MDRD: 32 ML/MIN/1.73
GLUCOSE BLD-MCNC: 386 MG/DL (ref 74–98)
GLUCOSE BLDC GLUCOMTR-MCNC: 311 MG/DL (ref 70–130)
GLUCOSE BLDC GLUCOMTR-MCNC: 315 MG/DL (ref 70–130)
GLUCOSE BLDC GLUCOMTR-MCNC: 326 MG/DL (ref 70–130)
GLUCOSE BLDC GLUCOMTR-MCNC: 338 MG/DL (ref 70–130)
POTASSIUM BLD-SCNC: 4.6 MMOL/L (ref 3.5–5.1)
SODIUM BLD-SCNC: 141 MMOL/L (ref 137–145)

## 2018-01-06 PROCEDURE — 94799 UNLISTED PULMONARY SVC/PX: CPT

## 2018-01-06 PROCEDURE — 99232 SBSQ HOSP IP/OBS MODERATE 35: CPT | Performed by: INTERNAL MEDICINE

## 2018-01-06 PROCEDURE — 82962 GLUCOSE BLOOD TEST: CPT

## 2018-01-06 PROCEDURE — 80048 BASIC METABOLIC PNL TOTAL CA: CPT | Performed by: INTERNAL MEDICINE

## 2018-01-06 PROCEDURE — 25010000002 PIPERACILLIN SOD-TAZOBACTAM PER 1 G: Performed by: INTERNAL MEDICINE

## 2018-01-06 PROCEDURE — 94660 CPAP INITIATION&MGMT: CPT

## 2018-01-06 PROCEDURE — 25010000002 ENOXAPARIN PER 10 MG: Performed by: INTERNAL MEDICINE

## 2018-01-06 PROCEDURE — 63710000001 INSULIN ASPART PER 5 UNITS: Performed by: INTERNAL MEDICINE

## 2018-01-06 PROCEDURE — 25010000002 METHYLPREDNISOLONE PER 40 MG: Performed by: INTERNAL MEDICINE

## 2018-01-06 PROCEDURE — 63710000001 INSULIN DETEMIR PER 5 UNITS: Performed by: INTERNAL MEDICINE

## 2018-01-06 RX ADMIN — IPRATROPIUM BROMIDE AND ALBUTEROL SULFATE 3 ML: .5; 3 SOLUTION RESPIRATORY (INHALATION) at 06:49

## 2018-01-06 RX ADMIN — ATORVASTATIN CALCIUM 10 MG: 10 TABLET, FILM COATED ORAL at 09:19

## 2018-01-06 RX ADMIN — TAZOBACTAM SODIUM AND PIPERACILLIN SODIUM 3.38 G: 375; 3 INJECTION, SOLUTION INTRAVENOUS at 06:56

## 2018-01-06 RX ADMIN — ENOXAPARIN SODIUM 30 MG: 30 INJECTION SUBCUTANEOUS at 16:12

## 2018-01-06 RX ADMIN — TAZOBACTAM SODIUM AND PIPERACILLIN SODIUM 3.38 G: 375; 3 INJECTION, SOLUTION INTRAVENOUS at 22:28

## 2018-01-06 RX ADMIN — INSULIN ASPART 7 UNITS: 100 INJECTION, SOLUTION INTRAVENOUS; SUBCUTANEOUS at 12:22

## 2018-01-06 RX ADMIN — BUSPIRONE HYDROCHLORIDE 10 MG: 10 TABLET ORAL at 16:12

## 2018-01-06 RX ADMIN — BUDESONIDE 0.5 MG: 0.5 INHALANT RESPIRATORY (INHALATION) at 06:49

## 2018-01-06 RX ADMIN — INSULIN DETEMIR 30 UNITS: 100 INJECTION, SOLUTION SUBCUTANEOUS at 22:29

## 2018-01-06 RX ADMIN — INSULIN ASPART 7 UNITS: 100 INJECTION, SOLUTION INTRAVENOUS; SUBCUTANEOUS at 22:28

## 2018-01-06 RX ADMIN — METHYLPREDNISOLONE SODIUM SUCCINATE 40 MG: 40 INJECTION, POWDER, FOR SOLUTION INTRAMUSCULAR; INTRAVENOUS at 06:56

## 2018-01-06 RX ADMIN — ALLOPURINOL 300 MG: 100 TABLET ORAL at 09:18

## 2018-01-06 RX ADMIN — IPRATROPIUM BROMIDE AND ALBUTEROL SULFATE 3 ML: .5; 3 SOLUTION RESPIRATORY (INHALATION) at 01:00

## 2018-01-06 RX ADMIN — IPRATROPIUM BROMIDE AND ALBUTEROL SULFATE 3 ML: .5; 3 SOLUTION RESPIRATORY (INHALATION) at 12:38

## 2018-01-06 RX ADMIN — IPRATROPIUM BROMIDE AND ALBUTEROL SULFATE 3 ML: .5; 3 SOLUTION RESPIRATORY (INHALATION) at 19:05

## 2018-01-06 RX ADMIN — GUAIFENESIN 600 MG: 600 TABLET, EXTENDED RELEASE ORAL at 22:28

## 2018-01-06 RX ADMIN — BUDESONIDE 0.5 MG: 0.5 INHALANT RESPIRATORY (INHALATION) at 19:05

## 2018-01-06 RX ADMIN — METOPROLOL TARTRATE 25 MG: 25 TABLET ORAL at 09:18

## 2018-01-06 RX ADMIN — BUSPIRONE HYDROCHLORIDE 10 MG: 10 TABLET ORAL at 22:28

## 2018-01-06 RX ADMIN — PANTOPRAZOLE SODIUM 40 MG: 40 TABLET, DELAYED RELEASE ORAL at 09:19

## 2018-01-06 RX ADMIN — BUSPIRONE HYDROCHLORIDE 10 MG: 10 TABLET ORAL at 09:19

## 2018-01-06 RX ADMIN — KETOTIFEN FUMARATE 1 DROP: 0.35 SOLUTION/ DROPS OPHTHALMIC at 09:19

## 2018-01-06 RX ADMIN — KETOTIFEN FUMARATE 1 DROP: 0.35 SOLUTION/ DROPS OPHTHALMIC at 22:29

## 2018-01-06 RX ADMIN — METHYLPREDNISOLONE SODIUM SUCCINATE 40 MG: 40 INJECTION, POWDER, FOR SOLUTION INTRAMUSCULAR; INTRAVENOUS at 17:48

## 2018-01-06 RX ADMIN — METOPROLOL TARTRATE 25 MG: 25 TABLET ORAL at 22:28

## 2018-01-06 RX ADMIN — TAZOBACTAM SODIUM AND PIPERACILLIN SODIUM 3.38 G: 375; 3 INJECTION, SOLUTION INTRAVENOUS at 15:10

## 2018-01-06 RX ADMIN — ASPIRIN 81 MG: 81 TABLET, COATED ORAL at 09:19

## 2018-01-06 RX ADMIN — GUAIFENESIN 600 MG: 600 TABLET, EXTENDED RELEASE ORAL at 09:18

## 2018-01-06 RX ADMIN — INSULIN ASPART 7 UNITS: 100 INJECTION, SOLUTION INTRAVENOUS; SUBCUTANEOUS at 17:48

## 2018-01-06 RX ADMIN — INSULIN ASPART 7 UNITS: 100 INJECTION, SOLUTION INTRAVENOUS; SUBCUTANEOUS at 07:01

## 2018-01-06 NOTE — PROGRESS NOTES
[facility]    Name:  Mingo Guidry   Age:  67 y.o.  Sex:  female  :  1950  MRN:  [id number]   Visit Number:  [id number]  Admission Date:  2018  Date Of Service:  18  Primary Care Physician:  JACKI Mistry     LOS: 2 days :  Patient Care Team:  JACKI Mistry as PCP - General:    Chief Complaint:      Shortness of breath on minimal exertion.    Subjective / Interval History:     Patient is currently lying down in the bed and is comfortable at rest.  She is however using liters of high flow oxygen to maintain her saturations above 90%.  She does have shortness of breath and cough and it is about the same compared to yesterday.  She is currently on IV antibiotic therapy with Zosyn and vancomycin.  She has been unable to give sputum sample for culture.  Denies any chest pain, nausea or abdominal pain.  Her creatinine has improved to 1.6 today.    Review of Systems:     General ROS: Patient denies any fevers, chills or loss of consciousness.  Complains of generalized weakness.  Respiratory ROS: Complains of shortness of breath and cough.  Cardiovascular ROS: Denies chest pain or palpitations. No history of exertional chest pain.  Gastrointestinal ROS: Denies nausea and vomiting. Denies any abdominal pain. No diarrhea.  Neurological ROS: Denies any focal weakness. No loss of consciousness. Denies any numbness.  Dermatological ROS: Denies any redness or pruritis.    Vital Signs:    Temp:  [97.8 °F (36.6 °C)-98.3 °F (36.8 °C)] 98.3 °F (36.8 °C)  Heart Rate:  [68-99] 68  Resp:  [16-23] 22  BP: (122-160)/(66-97) 143/66    Intake and output:    I/O last 3 completed shifts:  In: 1820 [P.O.:720; IV Piggyback:1100]  Out: 3350 [Urine:3350]       Physical Examination:    General Appearance:  Alert and cooperative, not in any acute distress.   Head:  Atraumatic and normocephalic, without obvious abnormality.   Eyes:          PERRLA, conjunctivae  and sclerae normal, no Icterus. No pallor. Extra-occular movements are within normal limits.   Neck: Supple, trachea midline, no thyromegaly, no carotid bruit.   Lungs:   Chest shape is normal. Breath sounds heard bilaterally equally.  Bilateral wheezing and scattered crackles heard especially worse in the left upper chest. No Pleural rub or bronchial breathing.   Heart:  Normal S1 and S2, no murmur, no gallop, no rub. No JVD   Abdomen:   Normal bowel sounds, no masses, no organomegaly. Soft, non-tender, obese, no guarding, no rebound tenderness. Multiple subcutaneous fibrotic areas are palpated on the abdominal wall due to her insulin injections.   Extremities: Moves all extremities well, 1+ edema, no cyanosis, no            clubbing.   Skin: No bleeding, bruising or rash.   Neurologic: Awake, alert and oriented times 3. Moves all 4 extremities equally.   Laboratory results:      Results from last 7 days  Lab Units 01/06/18  0637 01/05/18  0603   SODIUM mmol/L 141 145   POTASSIUM mmol/L 4.6 4.2   CHLORIDE mmol/L 93* 93*   CO2 mmol/L 40.0* 42.0*   BUN mg/dL 55* 55*   CREATININE mg/dL 1.60* 1.80*   CALCIUM mg/dL 9.2 9.2   GLUCOSE mg/dL 386* 223*       Results from last 7 days  Lab Units 01/05/18  0603   WBC 10*3/mm3 12.84*   HEMOGLOBIN g/dL 13.2   HEMATOCRIT % 41.3   PLATELETS 10*3/mm3 341       Results from last 7 days  Lab Units 01/05/18  0603   INR  1.17*       Results from last 7 days  Lab Units 01/05/18  0603   TROPONIN I ng/mL 0.022           I have reviewed the patient's laboratory results.    Radiology results:    Imaging Results (last 24 hours)     ** No results found for the last 24 hours. **        Medication Review:     I have reviewed the patients active and prn medications.     Principal Problem:    Acute on chronic respiratory failure with hypoxia  Active Problems:    Acute exacerbation of chronic obstructive pulmonary disease (COPD)    Pneumonia of left upper lobe due to infectious organism     Essential hypertension    Type 2 diabetes mellitus    Gout    Pneumonia    Assessment:    1.  Acute on chronic hypoxic respiratory failure.  2.  Persistent left upper lobe pneumonia, with failure of antibiotic therapy.  3.  Acute COPD exacerbation.  4.  Diabetes mellitus type 2 with nephropathy.  5.  Essential hypertension.  6.  Obstructive sleep apnea on home BiPAP therapy.  7.  Chronic diastolic heart failure.  8.  Chronic kidney disease stage II.    Plan:    Patient is currently hemodynamically stable but continues to have shortness of breath requiring high flow oxygen.  She is currently on IV antibiotic therapy with Zosyn and vancomycin for her left upper lobe pneumonia.  She is on IV Solu-Medrol along with bronchodilators, budesonide and mucolytic agents.  I will order chest percussion therapy to improve her sputum clearance.    Patient does have uncontrolled diabetes with sugars in the 300s.  She will be placed on Levemir 30 units at night and we will uptitrate depending upon her sugars.  She is on subcutaneous insulin protocol.  She will be continued on BiPAP therapy during the night.  She may be scheduled for bronchoscopy on Monday.  We will get physical therapy consultation.  She is on Lovenox for DVT prophylaxis.  Further recommendations depend upon her clinical course.      Dave Hairston MD  01/06/18  5:03 PM    Dictated utilizing Dragon dictation.

## 2018-01-06 NOTE — PROGRESS NOTES
Discharge Planning Assessment   [facility]     Patient Name: Mingo Guidry  MRN: [id number]  Today's Date: 1/6/2018    Admit Date: 1/4/2018          Discharge Needs Assessment       01/06/18 1242    Living Environment    Lives With alone    Living Arrangements house    Discharge Needs Assessment    Equipment Currently Used at Home bipap/ cpap;oxygen;respiratory supplies            Discharge Plan       01/06/18 1243    Case Management/Social Work Plan    Plan Spoke to patient.  Lives alone in a house.  Was independent prior to admit and still drives. Wears 4L O2 continous provided by [facility] and has a cpap as well.  Has medicaid waiver and someone to come in and clean her house.  Denies HH.  Plan to return home at ID. [facility], contacts and PCP confirmed as correct. No POA.    Patient/Family In Agreement With Plan yes        Discharge Placement     No information found        Expected Discharge Date and Time     Expected Discharge Date Expected Discharge Time    [place] 10, 2018               Demographic Summary     None            Functional Status     None            Psychosocial     None            Abuse/Neglect     None            Legal     None            Substance Abuse     None            Patient Forms     None          Analy Dupont

## 2018-01-06 NOTE — PLAN OF CARE
Problem: NPPV/CPAP (Adult)  Intervention: Monitor/Manage Anxiety Related to NPPV/CPAP   01/06/18 0105   Coping Strategies   Trust Relationship/Rapport care explained;choices provided     Intervention: Prevent Aspiration During Therapy   01/06/18 0105   Positioning   Head Of Bed (HOB) Position HOB elevated       Goal: Signs and Symptoms of Listed Potential Problems Will be Absent or Manageable (NPPV/CPAP)  Outcome: Ongoing (interventions implemented as appropriate)   01/06/18 0105   NPPV/CPAP   Problems Assessed (NPPV/CPAP) hypoxia/hypoxemia;dry mucous membranes;situational response;skin breakdown   Problems Present (NPPV/CPAP) none

## 2018-01-06 NOTE — PLAN OF CARE
Problem: Patient Care Overview (Adult)  Goal: Plan of Care Review  Outcome: Ongoing (interventions implemented as appropriate)   01/06/18 9569   Coping/Psychosocial Response Interventions   Plan Of Care Reviewed With patient   Outcome Evaluation   Outcome Summary/Follow up Plan Pt. is doing better with oxygenation today with little desaturation. Still on for bronchoscopy on Monday.   Patient Care Overview   Progress improving       Problem: Respiratory Insufficiency (Adult)  Goal: Acid/Base Balance  Outcome: Ongoing (interventions implemented as appropriate)    Goal: Effective Ventilation  Outcome: Ongoing (interventions implemented as appropriate)

## 2018-01-06 NOTE — PLAN OF CARE
Problem: Patient Care Overview (Adult)  Goal: Plan of Care Review  Outcome: Ongoing (interventions implemented as appropriate)   01/06/18 0535   Coping/Psychosocial Response Interventions   Plan Of Care Reviewed With patient   Patient Care Overview   Progress no change       Problem: Respiratory Insufficiency (Adult)  Goal: Acid/Base Balance  Outcome: Ongoing (interventions implemented as appropriate)   01/06/18 0535   Respiratory Insufficiency (Adult)   Acid/Base Balance making progress toward outcome     Goal: Effective Ventilation  Outcome: Ongoing (interventions implemented as appropriate)   01/06/18 0535   Respiratory Insufficiency (Adult)   Effective Ventilation making progress toward outcome       Problem: Pneumonia (Adult)  Goal: Signs and Symptoms of Listed Potential Problems Will be Absent or Manageable (Pneumonia)  Outcome: Ongoing (interventions implemented as appropriate)   01/06/18 0535   Pneumonia   Problems Assessed (Pneumonia) all   Problems Present (Pneumonia) respiratory compromise

## 2018-01-06 NOTE — PLAN OF CARE
Problem: Patient Care Overview (Adult)  Goal: Plan of Care Review  Outcome: Ongoing (interventions implemented as appropriate)      Problem: NPPV/CPAP (Adult)  Goal: Signs and Symptoms of Listed Potential Problems Will be Absent or Manageable (NPPV/CPAP)  Outcome: Ongoing (interventions implemented as appropriate)   01/06/18 0908   NPPV/CPAP   Problems Assessed (NPPV/CPAP) all   Problems Present (NPPV/CPAP) none

## 2018-01-07 ENCOUNTER — APPOINTMENT (OUTPATIENT)
Dept: GENERAL RADIOLOGY | Facility: HOSPITAL | Age: 68
End: 2018-01-07

## 2018-01-07 LAB
GLUCOSE BLDC GLUCOMTR-MCNC: 194 MG/DL (ref 70–130)
GLUCOSE BLDC GLUCOMTR-MCNC: 259 MG/DL (ref 70–130)
GLUCOSE BLDC GLUCOMTR-MCNC: 320 MG/DL (ref 70–130)
GLUCOSE BLDC GLUCOMTR-MCNC: 351 MG/DL (ref 70–130)
GLUCOSE BLDC GLUCOMTR-MCNC: 353 MG/DL (ref 70–130)

## 2018-01-07 PROCEDURE — 87106 FUNGI IDENTIFICATION YEAST: CPT | Performed by: INTERNAL MEDICINE

## 2018-01-07 PROCEDURE — 99233 SBSQ HOSP IP/OBS HIGH 50: CPT | Performed by: INTERNAL MEDICINE

## 2018-01-07 PROCEDURE — 25010000002 ENOXAPARIN PER 10 MG: Performed by: INTERNAL MEDICINE

## 2018-01-07 PROCEDURE — 99232 SBSQ HOSP IP/OBS MODERATE 35: CPT | Performed by: INTERNAL MEDICINE

## 2018-01-07 PROCEDURE — 94660 CPAP INITIATION&MGMT: CPT

## 2018-01-07 PROCEDURE — 63710000001 INSULIN ASPART PER 5 UNITS: Performed by: INTERNAL MEDICINE

## 2018-01-07 PROCEDURE — 25010000002 PIPERACILLIN SOD-TAZOBACTAM PER 1 G: Performed by: INTERNAL MEDICINE

## 2018-01-07 PROCEDURE — 94799 UNLISTED PULMONARY SVC/PX: CPT

## 2018-01-07 PROCEDURE — 25010000002 METHYLPREDNISOLONE PER 40 MG: Performed by: INTERNAL MEDICINE

## 2018-01-07 PROCEDURE — 87070 CULTURE OTHR SPECIMN AEROBIC: CPT | Performed by: INTERNAL MEDICINE

## 2018-01-07 PROCEDURE — 87205 SMEAR GRAM STAIN: CPT | Performed by: INTERNAL MEDICINE

## 2018-01-07 PROCEDURE — 97161 PT EVAL LOW COMPLEX 20 MIN: CPT

## 2018-01-07 PROCEDURE — 63710000001 INSULIN DETEMIR PER 5 UNITS: Performed by: INTERNAL MEDICINE

## 2018-01-07 PROCEDURE — 82962 GLUCOSE BLOOD TEST: CPT

## 2018-01-07 PROCEDURE — 94668 MNPJ CHEST WALL SBSQ: CPT

## 2018-01-07 PROCEDURE — 71045 X-RAY EXAM CHEST 1 VIEW: CPT

## 2018-01-07 PROCEDURE — 25010000002 VANCOMYCIN PER 500 MG: Performed by: INTERNAL MEDICINE

## 2018-01-07 RX ADMIN — ATORVASTATIN CALCIUM 10 MG: 10 TABLET, FILM COATED ORAL at 09:17

## 2018-01-07 RX ADMIN — TAZOBACTAM SODIUM AND PIPERACILLIN SODIUM 3.38 G: 375; 3 INJECTION, SOLUTION INTRAVENOUS at 14:05

## 2018-01-07 RX ADMIN — BUSPIRONE HYDROCHLORIDE 10 MG: 10 TABLET ORAL at 17:49

## 2018-01-07 RX ADMIN — VANCOMYCIN HYDROCHLORIDE 1500 MG: 500 INJECTION, POWDER, LYOPHILIZED, FOR SOLUTION INTRAVENOUS at 10:45

## 2018-01-07 RX ADMIN — BUSPIRONE HYDROCHLORIDE 10 MG: 10 TABLET ORAL at 09:17

## 2018-01-07 RX ADMIN — IPRATROPIUM BROMIDE AND ALBUTEROL SULFATE 3 ML: .5; 3 SOLUTION RESPIRATORY (INHALATION) at 13:13

## 2018-01-07 RX ADMIN — BUDESONIDE 0.5 MG: 0.5 INHALANT RESPIRATORY (INHALATION) at 19:35

## 2018-01-07 RX ADMIN — INSULIN ASPART 8 UNITS: 100 INJECTION, SOLUTION INTRAVENOUS; SUBCUTANEOUS at 21:53

## 2018-01-07 RX ADMIN — TAZOBACTAM SODIUM AND PIPERACILLIN SODIUM 3.38 G: 375; 3 INJECTION, SOLUTION INTRAVENOUS at 06:41

## 2018-01-07 RX ADMIN — METOPROLOL TARTRATE 25 MG: 25 TABLET ORAL at 21:52

## 2018-01-07 RX ADMIN — INSULIN ASPART 7 UNITS: 100 INJECTION, SOLUTION INTRAVENOUS; SUBCUTANEOUS at 17:50

## 2018-01-07 RX ADMIN — ENOXAPARIN SODIUM 40 MG: 40 INJECTION SUBCUTANEOUS at 17:59

## 2018-01-07 RX ADMIN — BUSPIRONE HYDROCHLORIDE 10 MG: 10 TABLET ORAL at 21:52

## 2018-01-07 RX ADMIN — GUAIFENESIN 600 MG: 600 TABLET, EXTENDED RELEASE ORAL at 21:52

## 2018-01-07 RX ADMIN — ALLOPURINOL 300 MG: 100 TABLET ORAL at 09:17

## 2018-01-07 RX ADMIN — METOPROLOL TARTRATE 25 MG: 25 TABLET ORAL at 09:17

## 2018-01-07 RX ADMIN — BUDESONIDE 0.5 MG: 0.5 INHALANT RESPIRATORY (INHALATION) at 08:01

## 2018-01-07 RX ADMIN — KETOTIFEN FUMARATE 1 DROP: 0.35 SOLUTION/ DROPS OPHTHALMIC at 21:59

## 2018-01-07 RX ADMIN — ASPIRIN 81 MG: 81 TABLET, COATED ORAL at 09:17

## 2018-01-07 RX ADMIN — INSULIN ASPART 6 UNITS: 100 INJECTION, SOLUTION INTRAVENOUS; SUBCUTANEOUS at 06:41

## 2018-01-07 RX ADMIN — GUAIFENESIN 600 MG: 600 TABLET, EXTENDED RELEASE ORAL at 09:17

## 2018-01-07 RX ADMIN — TAZOBACTAM SODIUM AND PIPERACILLIN SODIUM 3.38 G: 375; 3 INJECTION, SOLUTION INTRAVENOUS at 21:57

## 2018-01-07 RX ADMIN — IPRATROPIUM BROMIDE AND ALBUTEROL SULFATE 3 ML: .5; 3 SOLUTION RESPIRATORY (INHALATION) at 08:01

## 2018-01-07 RX ADMIN — INSULIN ASPART 2 UNITS: 100 INJECTION, SOLUTION INTRAVENOUS; SUBCUTANEOUS at 12:12

## 2018-01-07 RX ADMIN — IPRATROPIUM BROMIDE AND ALBUTEROL SULFATE 3 ML: .5; 3 SOLUTION RESPIRATORY (INHALATION) at 19:35

## 2018-01-07 RX ADMIN — PANTOPRAZOLE SODIUM 40 MG: 40 TABLET, DELAYED RELEASE ORAL at 09:18

## 2018-01-07 RX ADMIN — METHYLPREDNISOLONE SODIUM SUCCINATE 40 MG: 40 INJECTION, POWDER, FOR SOLUTION INTRAMUSCULAR; INTRAVENOUS at 06:41

## 2018-01-07 RX ADMIN — IPRATROPIUM BROMIDE AND ALBUTEROL SULFATE 3 ML: .5; 3 SOLUTION RESPIRATORY (INHALATION) at 00:31

## 2018-01-07 RX ADMIN — INSULIN DETEMIR 40 UNITS: 100 INJECTION, SOLUTION SUBCUTANEOUS at 21:53

## 2018-01-07 RX ADMIN — METHYLPREDNISOLONE SODIUM SUCCINATE 40 MG: 40 INJECTION, POWDER, FOR SOLUTION INTRAMUSCULAR; INTRAVENOUS at 17:49

## 2018-01-07 RX ADMIN — KETOTIFEN FUMARATE 1 DROP: 0.35 SOLUTION/ DROPS OPHTHALMIC at 09:18

## 2018-01-07 NOTE — PROGRESS NOTES
[facility]    Name:  Mingo Guidry   Age:  67 y.o.  Sex:  female  :  1950  MRN:  [id number]   Visit Number:  [id number]  Admission Date:  2018  Date Of Service:  18  Primary Care Physician:  JACKI Mistry     LOS: 3 days :  Patient Care Team:  JACKI Mistry as PCP - General:    Chief Complaint:      Shortness of breath and wheezing.    Subjective / Interval History:     Patient is currently lying down in the bed and is comfortable at rest.  No significant overnight events.  She continues to require high flow oxygen to maintain saturations above 90%.  She states that she has been walking to the bathroom with the use of oxygen but does get short of breath on exertion.  She was transferred from [facility] inpatient unit on 2018 with persistent pneumonia without improvement.  She does have remote history of lung surgery for benign tumor and was noted to have persistent left upper lobe opacity suggestive of obstructive pneumonia.  She has been on IV antibiotic therapy with Zosyn and vancomycin for the last one week.  Cultures have been negative so far.  She also does have chronic kidney disease which is stable.  She has been followed by Dr. Dahl.  She is scheduled to have bronchoscopy tomorrow.    Review of Systems:     General ROS: Patient denies any fevers, chills or loss of consciousness.  Complains of generalized weakness.  Respiratory ROS: Complains of shortness of breath and cough.  Cardiovascular ROS: Denies chest pain or palpitations. No history of exertional chest pain.  Gastrointestinal ROS: Denies nausea and vomiting. Denies any abdominal pain. No diarrhea.  Neurological ROS: Denies any focal weakness. No loss of consciousness. Denies any numbness.  Dermatological ROS: Denies any redness or pruritis.    Vital Signs:    Temp:  [97.4 °F (36.3 °C)-98.5 °F (36.9 °C)] 97.6 °F (36.4 °C)  Heart Rate:   [57-80] 74  Resp:  [16-24] 16  BP: (128-163)/(50-80) 147/69    Intake and output:    I/O last 3 completed shifts:  In: 1110 [P.O.:360; IV Piggyback:750]  Out: 1300 [Urine:1300]  I/O this shift:  In: 200 [P.O.:200]  Out: 350 [Urine:350]    Physical Examination:    General Appearance:  Alert and cooperative, not in any acute distress.   Head:  Atraumatic and normocephalic, without obvious abnormality.   Eyes:          PERRLA, conjunctivae and sclerae normal, no Icterus. No pallor. Extra-occular movements are within normal limits.   Neck: Supple, trachea midline, no thyromegaly, no carotid bruit.   Lungs:   Chest shape is normal. Breath sounds heard bilaterally equally.  Bilateral wheezing and scattered crackles heard especially worse in the left upper chest. No Pleural rub or bronchial breathing.   Heart:  Normal S1 and S2, no murmur, no gallop, no rub. No JVD   Abdomen:   Normal bowel sounds, no masses, no organomegaly. Soft, non-tender, obese, no guarding, no rebound tenderness. Multiple subcutaneous fibrotic areas are palpated on the abdominal wall due to her insulin injections.   Extremities: Moves all extremities well, 1+ edema, no cyanosis, no            clubbing.   Skin: No bleeding, bruising or rash.   Neurologic: Awake, alert and oriented times 3. Moves all 4 extremities equally.   Laboratory results:      Results from last 7 days  Lab Units 01/06/18  0637 01/05/18  0603   SODIUM mmol/L 141 145   POTASSIUM mmol/L 4.6 4.2   CHLORIDE mmol/L 93* 93*   CO2 mmol/L 40.0* 42.0*   BUN mg/dL 55* 55*   CREATININE mg/dL 1.60* 1.80*   CALCIUM mg/dL 9.2 9.2   GLUCOSE mg/dL 386* 223*       Results from last 7 days  Lab Units 01/05/18  0603   WBC 10*3/mm3 12.84*   HEMOGLOBIN g/dL 13.2   HEMATOCRIT % 41.3   PLATELETS 10*3/mm3 341       Results from last 7 days  Lab Units 01/05/18  0603   INR  1.17*       Results from last 7 days  Lab Units 01/05/18  0603   TROPONIN I ng/mL 0.022           I have reviewed the patient's  laboratory results.    Radiology results:    Imaging Results (last 24 hours)     Procedure Component Value Units Date/Time    XR Chest 1 View [[id number]] Collected:  01/07/18 0840     Updated:  01/07/18 0845    Narrative:       PROCEDURE: XR CHEST 1 VW-     HISTORY: FATUMA collapse. PNA.; J18.1-Lobar pneumonia, unspecified organism     COMPARISON: None.     FINDINGS: Electrodes overlie the chest. The heart is normal in size. The  mediastinum is unremarkable. Asymmetrical opacification of the left base  and left apex, correlate for airspace disease.  There is no  pneumothorax.  There are no acute osseous abnormalities.           Impression:       Asymmetrical opacification of the left lung as described  above. Follow-up standard 2 views of the chest or CT is recommended.        This report was finalized on 1/7/2018 8:43 AM by David Poole DO.        Medication Review:     I have reviewed the patients active and prn medications.     Principal Problem:    Acute on chronic respiratory failure with hypoxia  Active Problems:    Acute exacerbation of chronic obstructive pulmonary disease (COPD)    Pneumonia of left upper lobe due to infectious organism    Essential hypertension    Type 2 diabetes mellitus    Gout    Pneumonia    Assessment:    1.  Acute on chronic hypoxic respiratory failure.  2.  Persistent left upper lobe pneumonia, with failure of antibiotic therapy.  3.  Acute COPD exacerbation.  4.  Diabetes mellitus type 2 with nephropathy.  5.  Essential hypertension.  6.  Obstructive sleep apnea on home BiPAP therapy.  7.  Chronic diastolic heart failure.  8.  Chronic kidney disease stage II.    Plan:    Patient is currently hemodynamically stable but continues to have shortness of breath requiring high flow oxygen.  She is currently on IV antibiotic therapy with Zosyn and vancomycin for her left upper lobe pneumonia.  She is on IV Solu-Medrol along with bronchodilators, budesonide and mucolytic agents.  She is on  flutter valve therapy.    She does have diabetes and her sugars are better controlled at this time.  She is on Levemir 30 units daily along with subcutaneous insulin protocol.  I have discussed the patient's condition with Dr. Dahl.  He has discussed with the patient the possible need for intubation and mechanical ventilation during and after bronchoscopy.  The patient is agreeable for the procedure tomorrow.  She is on Lovenox for DVT prophylaxis.  Further recommendations depend upon her clinical course.  We will start her on physical therapy from tomorrow.      Dave Hairston MD  01/07/18  3:08 PM    Dictated utilizing Dragon dictation.

## 2018-01-07 NOTE — PROGRESS NOTES
"Adult Nutrition  Assessment/PES    Patient Name:  Mingo Guidry  YOB: 1950  MRN: [id number]  Admit Date:  1/4/2018    Assessment Date:  1/7/2018    Comments:  Rec. #1: Advance diet once medically feasible to include low purine. RD to add nutritional supplements once diet advances. Pt. Consuming ~65% prior to being placed NPO. Rec. #2: If pt. To remain NPO for greater than or equal to 5 days consider nutrition support. Consult RD PRN.           Reason for Assessment       01/07/18 0919    Reason for Assessment    Reason For Assessment/Visit admission assessment    Identified At Risk By Screening Criteria diagnosis;BMI    Cardiac HTN    Endocrine DM Type 2    Pulmonary/Critical Care COPD;Pneumonia;Chronic respiratory failure;Hypoxemia    Other diagnosis Gout    Factors Affecting Nutrition Factors    Appetite Good                Anthropometrics       01/07/18 0921    Anthropometrics    Height Method Stated    Height 147.3 cm (58\")    Weight Method Bed scale    Weight 93 kg (205 lb 0.4 oz)    Ideal Body Weight (IBW)    Ideal Body Weight (IBW), Female 41.69    % Ideal Body Weight 223.55    Body Mass Index (BMI)    BMI (kg/m2) 42.94    BMI Grade greater than 40 - obesity grade III            Labs/Tests/Procedures/Meds       01/07/18 0922    Labs/Tests/Procedures/Meds    Labs/Tests Review Reviewed;Cl-;Glucose;BUN;Creat   High: Gluc, BUN, Cr,     Medication Review Reviewed, pertinent;Insulin;Steroid;Antibiotic            Physical Findings       01/07/18 0922    Physical Findings/Assessment    Additional Documentation Physical Appearance (Group)    Physical Appearance    Overall Physical Appearance obese            Estimated/Assessed Needs       01/07/18 0922    Calculation Measurements    Weight Used For Calculations 41.7 kg (91 lb 14.6 oz)    Height Used for Calculations 1.473 m (4' 10\")    Estimated/Assessed Energy Needs    Energy Need Method [place]    Age 67    RMR ([place] Equation) " 841.65    Activity Factors ([place])  Out of bed, ambulatory  1.3    Estimated Kcal Range  ~0693-2200    Estimated/Assessed Protein Needs    Weight Used for Protein Calculation 41.7 kg (91 lb 14.6 oz)    Protein (gm/kg) 1.5    1.5 Gm Protein (gm) 62.54    Estimated Protein Range 50.03-62.54    Estimated/Assessed Fluid Needs    Fluid Need Method RDA method    RDA Method (mL)  1500            Nutrition Prescription Ordered       01/07/18 0924    Nutrition Prescription PO    Current PO Diet NPO   x 2 days            Evaluation of Received Nutrient/Fluid Intake       01/07/18 0924    PO Evaluation    Number of Days PO Intake Evaluated 3 days   prior to being placed NPO    Number of Meals 5    % PO Intake 65            Problem/Interventions:        Problem 1       01/07/18 0925    Nutrition Diagnoses Problem 1    Problem 1 Overweight/Obesity    Etiology (related to) Factors Affecting Nutrition    Food Habit/Preferences Large Meals    Signs/Symptoms (evidenced by) BMI    BMI Greater than 40            Problem 2       01/07/18 0925    Nutrition Diagnoses Problem 2    Problem 2 Altered Nutrition Related to Labs    Etiology (related to) Medical Diagnosis    Endocrine DM Type 2    Renal CKD    Signs/Symptoms (evidenced by) Biochemical    Labs Reviewed Done    Specific Labs Noted Glucose;BUN;Creatinine;Chloride            Problem 3       01/07/18 0931    Nutrition Diagnoses Problem 3    Problem 3 Increased Nutrient Needs    Macronutrient Kcal;Fluid;Protein    Etiology (related to) Medical Diagnosis    Pulmonary/Critical Care COPD;Pneumonia    Signs/Symptoms (evidenced by) Other (comment)   pulmonary dysfunction                Intervention Goal       01/07/18 0932    Intervention Goal    General Meet nutritional needs for age/condition    PO PO intake (%)    PO Intake % 75 %            Nutrition Intervention       01/07/18 0932    Nutrition Intervention    RD/Tech Action Follow Tx progress;Await begin PO     Recommended/Ordered --            Nutrition Prescription       01/07/18 0933    Nutrition Prescription PO    PO Prescription Begin/change supplement;Begin/change diet    Begin/Change Diet to Clear Liquid    Supplement Ensure Clear    Supplement Frequency 3 times a day    Other Orders    Other Monitor/replace electrolytes            Education/Evaluation       01/07/18 0934    Education    Education Provided education regarding;Education topics    Education Topics Cardiac diabetic;Other (comment)   Wt.     Monitor/Evaluation    Monitor Per protocol;PO intake;Pertinent labs;Weight        Electronically signed by:  Luanne Roslaes RD  01/07/18 9:34 AM

## 2018-01-07 NOTE — PROGRESS NOTES
"  CC: Acute Hypoxic Respiratory Failure.    S: Continues to be short of breath.     O: /69 (BP Location: Left arm, Patient Position: Lying)  Pulse 74  Temp 97.6 °F (36.4 °C) (Axillary)   Resp 16  Ht 147.3 cm (58\")  Wt 93 kg (205 lb 0.4 oz)  LMP  (LMP Unknown)  SpO2 94%  BMI 42.85 kg/m2      General: No significant respiratory distress noted.  Neck: No JVD.   Cardiovascular: S1 + S2.  Regular.   Respiratory: Bilateral scattered wheezing heard.  Left greater than right crackles noted  Abdomen: Soft.  Bowel sounds positive.  No obvious organomegaly.  Extremities: Trace  edema noted.  Neurologic:  AAOx3. Was able to follow commands.     Labs: Reviewed.       Results from last 7 days  Lab Units 01/06/18  0637 01/05/18  0603   SODIUM mmol/L 141 145   POTASSIUM mmol/L 4.6 4.2   CHLORIDE mmol/L 93* 93*   CO2 mmol/L 40.0* 42.0*   BUN mg/dL 55* 55*   CREATININE mg/dL 1.60* 1.80*   CALCIUM mg/dL 9.2 9.2   GLUCOSE mg/dL 386* 223*       Results from last 7 days  Lab Units 01/05/18  0603   WBC 10*3/mm3 12.84*   HEMOGLOBIN g/dL 13.2   PLATELETS 10*3/mm3 341         Results from last 7 days  Lab Units 01/05/18  0603   INR  1.17*         Pharmacy Consult        CXRay: Left asymmetric opacity.     Assessment & Recommendations/Plan:   1.  Acute hypoxic respiratory failure  Continues to require high flow oxygen  Oxygen saturation has remained more than 91% throughout    2.  Left upper lobe healthcare associated pneumonia  On appropriate antibiotics  Cultures pending    3.  Left upper lobe atelectasis?  Is on flutter valve therapy.  Incentive spirometer also being performed.  Unsure about percussion being performed by respiratory therapist.     Due to the continued presence of atelectasis and abnormal opacity in the left upper lobe on the chest x-ray done today, bronchoscopy will definitely be needed.  This is scheduled for 1/8/2018    The risks of bronchoscopy including but not limited to damage to the overlying " structures, pneumothorax, bleeding, worsening of respiratory failure, requirement for chest tube placement among others were explained.  I explained to the patient, in front of Dr. Hairston, the possibility of intubation and mechanical ventilation postprocedure, especially given high requirements of oxygen.    Patient understood the risks and benefits and agreed to proceed with the procedure    The alternatives were also discussed with the patient.    4.  Chronic hypoxemia  Is on 2 L at home.  Currently requiring high flow.    5.  Obstructive sleep apnea.    On BiPAP for many years.  Continue BiPAP    6.  Chronic hypercarbic and hypoxic respiratory failure (last ABG from 1 January showed pH of 7.41 with a PCO2 55 and PO2 of 62)  Is on BiPAP  We may need repeat ABG on outpatient basis, to assess her response and possible adjustments to the BiPAP.    7.  Grade 2 Diastolic Dysfunction.   Doesn't appear to be decompensated    10. Likely CKD.   Baseline Creatinine seems to be 1.3-1.5.    Will hold Lovenox after today's dose. Will need to restart in after Bronchoscopy.     The patient wants her son Adebayo to be informed if she is on mechanical ventilation.  She also told me and Dr. Hairston that he will be the one to make decisions if that were to occur.  The patient gave us his contact information. Phone number of [phone]    We have reviewed patient's current orders and changes, if any, have been suggested to primary care team. Plan was also discussed with nursing staff, as necessary.       Ryder Dahl MD  01/07/18  2:32 PM    Dictated utilizing Dragon dictation.

## 2018-01-07 NOTE — PLAN OF CARE
Problem: Patient Care Overview (Adult)  Goal: Plan of Care Review  Outcome: Ongoing (interventions implemented as appropriate)   01/07/18 1507   Coping/Psychosocial Response Interventions   Plan Of Care Reviewed With patient   Patient Care Overview   Progress improving       Problem: NPPV/CPAP (Adult)  Goal: Signs and Symptoms of Listed Potential Problems Will be Absent or Manageable (NPPV/CPAP)  Outcome: Ongoing (interventions implemented as appropriate)   01/07/18 1507   NPPV/CPAP   Problems Assessed (NPPV/CPAP) all   Problems Present (NPPV/CPAP) none

## 2018-01-07 NOTE — PLAN OF CARE
Problem: Patient Care Overview (Adult)  Goal: Plan of Care Review  Outcome: Ongoing (interventions implemented as appropriate)      Problem: Respiratory Insufficiency (Adult)  Goal: Acid/Base Balance  Outcome: Ongoing (interventions implemented as appropriate)   01/07/18 0354   Respiratory Insufficiency (Adult)   Acid/Base Balance making progress toward outcome     Goal: Effective Ventilation  Outcome: Ongoing (interventions implemented as appropriate)   01/07/18 0354   Respiratory Insufficiency (Adult)   Effective Ventilation making progress toward outcome       Problem: Pneumonia (Adult)  Goal: Signs and Symptoms of Listed Potential Problems Will be Absent or Manageable (Pneumonia)  Outcome: Ongoing (interventions implemented as appropriate)   01/07/18 0354   Pneumonia   Problems Assessed (Pneumonia) all   Problems Present (Pneumonia) respiratory compromise

## 2018-01-08 ENCOUNTER — ANESTHESIA (OUTPATIENT)
Dept: PERIOP | Facility: HOSPITAL | Age: 68
End: 2018-01-08

## 2018-01-08 ENCOUNTER — ANESTHESIA EVENT (OUTPATIENT)
Dept: PERIOP | Facility: HOSPITAL | Age: 68
End: 2018-01-08

## 2018-01-08 LAB
ANION GAP SERPL CALCULATED.3IONS-SCNC: 12.4 MMOL/L
BASOPHILS # BLD AUTO: 0.03 10*3/MM3 (ref 0–0.2)
BASOPHILS NFR BLD AUTO: 0.2 % (ref 0–2.5)
BUN BLD-MCNC: 44 MG/DL (ref 7–20)
BUN/CREAT SERPL: 36.7 (ref 7.1–23.5)
CALCIUM SPEC-SCNC: 9.7 MG/DL (ref 8.4–10.2)
CHLORIDE SERPL-SCNC: 97 MMOL/L (ref 98–107)
CO2 SERPL-SCNC: 38 MMOL/L (ref 26–30)
CREAT BLD-MCNC: 1.2 MG/DL (ref 0.6–1.3)
DEPRECATED RDW RBC AUTO: 48.3 FL (ref 37–54)
EOSINOPHIL # BLD AUTO: 0 10*3/MM3 (ref 0–0.7)
EOSINOPHIL NFR BLD AUTO: 0 % (ref 0–7)
ERYTHROCYTE [DISTWIDTH] IN BLOOD BY AUTOMATED COUNT: 13.4 % (ref 11.5–14.5)
GFR SERPL CREATININE-BSD FRML MDRD: 45 ML/MIN/1.73
GLUCOSE BLD-MCNC: 281 MG/DL (ref 74–98)
GLUCOSE BLDC GLUCOMTR-MCNC: 185 MG/DL (ref 70–130)
GLUCOSE BLDC GLUCOMTR-MCNC: 189 MG/DL (ref 70–130)
GLUCOSE BLDC GLUCOMTR-MCNC: 214 MG/DL (ref 70–130)
GLUCOSE BLDC GLUCOMTR-MCNC: 260 MG/DL (ref 70–130)
GLUCOSE BLDC GLUCOMTR-MCNC: 333 MG/DL (ref 70–130)
GLUCOSE BLDC GLUCOMTR-MCNC: 341 MG/DL (ref 70–130)
GRAM STN SPEC: NORMAL
GRAM STN SPEC: NORMAL
HCT VFR BLD AUTO: 42 % (ref 37–47)
HGB BLD-MCNC: 13.4 G/DL (ref 12–16)
IMM GRANULOCYTES # BLD: 0.26 10*3/MM3 (ref 0–0.06)
IMM GRANULOCYTES NFR BLD: 1.7 % (ref 0–0.6)
INR PPP: 1.1 (ref 0.9–1.1)
LYMPHOCYTES # BLD AUTO: 2.35 10*3/MM3 (ref 0.6–3.4)
LYMPHOCYTES NFR BLD AUTO: 15 % (ref 10–50)
MCH RBC QN AUTO: 30.9 PG (ref 27–31)
MCHC RBC AUTO-ENTMCNC: 31.9 G/DL (ref 30–37)
MCV RBC AUTO: 97 FL (ref 81–99)
MONOCYTES # BLD AUTO: 0.81 10*3/MM3 (ref 0–0.9)
MONOCYTES NFR BLD AUTO: 5.2 % (ref 0–12)
NEUTROPHILS # BLD AUTO: 12.26 10*3/MM3 (ref 2–6.9)
NEUTROPHILS NFR BLD AUTO: 77.9 % (ref 37–80)
NRBC BLD MANUAL-RTO: 0 /100 WBC (ref 0–0)
PLATELET # BLD AUTO: 370 10*3/MM3 (ref 130–400)
PMV BLD AUTO: 10.7 FL (ref 6–12)
POTASSIUM BLD-SCNC: 4.4 MMOL/L (ref 3.5–5.1)
PROTHROMBIN TIME: 12.4 SECONDS (ref 9.3–12.1)
RBC # BLD AUTO: 4.33 10*6/MM3 (ref 4.2–5.4)
SODIUM BLD-SCNC: 143 MMOL/L (ref 137–145)
WBC NRBC COR # BLD: 15.71 10*3/MM3 (ref 4.8–10.8)

## 2018-01-08 PROCEDURE — 25010000002 METHYLPREDNISOLONE PER 40 MG: Performed by: INTERNAL MEDICINE

## 2018-01-08 PROCEDURE — 63710000001 INSULIN DETEMIR PER 5 UNITS: Performed by: INTERNAL MEDICINE

## 2018-01-08 PROCEDURE — 25010000002 PIPERACILLIN SOD-TAZOBACTAM PER 1 G: Performed by: INTERNAL MEDICINE

## 2018-01-08 PROCEDURE — 31624 DX BRONCHOSCOPE/LAVAGE: CPT | Performed by: INTERNAL MEDICINE

## 2018-01-08 PROCEDURE — 87205 SMEAR GRAM STAIN: CPT | Performed by: INTERNAL MEDICINE

## 2018-01-08 PROCEDURE — 99232 SBSQ HOSP IP/OBS MODERATE 35: CPT | Performed by: NURSE PRACTITIONER

## 2018-01-08 PROCEDURE — 25010000002 VANCOMYCIN PER 500 MG: Performed by: INTERNAL MEDICINE

## 2018-01-08 PROCEDURE — 80048 BASIC METABOLIC PNL TOTAL CA: CPT | Performed by: INTERNAL MEDICINE

## 2018-01-08 PROCEDURE — 97116 GAIT TRAINING THERAPY: CPT

## 2018-01-08 PROCEDURE — 94799 UNLISTED PULMONARY SVC/PX: CPT

## 2018-01-08 PROCEDURE — 85610 PROTHROMBIN TIME: CPT | Performed by: INTERNAL MEDICINE

## 2018-01-08 PROCEDURE — 25010000002 PROPOFOL 1000 MG/ML EMULSION: Performed by: NURSE ANESTHETIST, CERTIFIED REGISTERED

## 2018-01-08 PROCEDURE — 94660 CPAP INITIATION&MGMT: CPT

## 2018-01-08 PROCEDURE — 82962 GLUCOSE BLOOD TEST: CPT

## 2018-01-08 PROCEDURE — 94668 MNPJ CHEST WALL SBSQ: CPT

## 2018-01-08 PROCEDURE — 25010000002 PROPOFOL 10 MG/ML EMULSION: Performed by: NURSE ANESTHETIST, CERTIFIED REGISTERED

## 2018-01-08 PROCEDURE — 97110 THERAPEUTIC EXERCISES: CPT

## 2018-01-08 PROCEDURE — 63710000001 INSULIN ASPART PER 5 UNITS: Performed by: INTERNAL MEDICINE

## 2018-01-08 PROCEDURE — 99232 SBSQ HOSP IP/OBS MODERATE 35: CPT | Performed by: INTERNAL MEDICINE

## 2018-01-08 PROCEDURE — 87070 CULTURE OTHR SPECIMN AEROBIC: CPT | Performed by: INTERNAL MEDICINE

## 2018-01-08 PROCEDURE — 88112 CYTOPATH CELL ENHANCE TECH: CPT | Performed by: INTERNAL MEDICINE

## 2018-01-08 PROCEDURE — 0B9G8ZX DRAINAGE OF LEFT UPPER LUNG LOBE, VIA NATURAL OR ARTIFICIAL OPENING ENDOSCOPIC, DIAGNOSTIC: ICD-10-PCS | Performed by: INTERNAL MEDICINE

## 2018-01-08 PROCEDURE — 85025 COMPLETE CBC W/AUTO DIFF WBC: CPT | Performed by: INTERNAL MEDICINE

## 2018-01-08 PROCEDURE — 87102 FUNGUS ISOLATION CULTURE: CPT | Performed by: INTERNAL MEDICINE

## 2018-01-08 RX ORDER — LIDOCAINE HYDROCHLORIDE 10 MG/ML
INJECTION, SOLUTION INFILTRATION; PERINEURAL
Status: DISPENSED
Start: 2018-01-08 | End: 2018-01-08

## 2018-01-08 RX ORDER — PROPOFOL 10 MG/ML
VIAL (ML) INTRAVENOUS AS NEEDED
Status: DISCONTINUED | OUTPATIENT
Start: 2018-01-08 | End: 2018-01-08 | Stop reason: SURG

## 2018-01-08 RX ORDER — SODIUM CHLORIDE 9 MG/ML
INJECTION, SOLUTION INTRAVENOUS CONTINUOUS PRN
Status: DISCONTINUED | OUTPATIENT
Start: 2018-01-08 | End: 2018-01-08 | Stop reason: SURG

## 2018-01-08 RX ORDER — POTASSIUM CHLORIDE 20 MEQ/1
20 TABLET, EXTENDED RELEASE ORAL DAILY
Status: DISCONTINUED | OUTPATIENT
Start: 2018-01-08 | End: 2018-01-11 | Stop reason: HOSPADM

## 2018-01-08 RX ORDER — LIDOCAINE HYDROCHLORIDE 20 MG/ML
INJECTION, SOLUTION INFILTRATION; PERINEURAL
Status: DISPENSED
Start: 2018-01-08 | End: 2018-01-08

## 2018-01-08 RX ORDER — POTASSIUM CHLORIDE 20 MEQ/1
20 TABLET, EXTENDED RELEASE ORAL DAILY
Status: ON HOLD | COMMUNITY
Start: 2017-12-15 | End: 2018-01-08

## 2018-01-08 RX ORDER — IPRATROPIUM BROMIDE AND ALBUTEROL SULFATE 2.5; .5 MG/3ML; MG/3ML
3 SOLUTION RESPIRATORY (INHALATION) EVERY 4 HOURS
Status: DISCONTINUED | OUTPATIENT
Start: 2018-01-08 | End: 2018-01-11 | Stop reason: HOSPADM

## 2018-01-08 RX ORDER — LIDOCAINE HYDROCHLORIDE 20 MG/ML
INJECTION, SOLUTION INFILTRATION; PERINEURAL AS NEEDED
Status: DISCONTINUED | OUTPATIENT
Start: 2018-01-08 | End: 2018-01-08 | Stop reason: HOSPADM

## 2018-01-08 RX ORDER — EZETIMIBE 10 MG/1
10 TABLET ORAL DAILY
COMMUNITY
End: 2020-01-01 | Stop reason: ALTCHOICE

## 2018-01-08 RX ORDER — KETAMINE HYDROCHLORIDE 50 MG/ML
INJECTION, SOLUTION, CONCENTRATE INTRAMUSCULAR; INTRAVENOUS AS NEEDED
Status: DISCONTINUED | OUTPATIENT
Start: 2018-01-08 | End: 2018-01-08 | Stop reason: SURG

## 2018-01-08 RX ORDER — IPRATROPIUM BROMIDE AND ALBUTEROL SULFATE 2.5; .5 MG/3ML; MG/3ML
3 SOLUTION RESPIRATORY (INHALATION) EVERY 4 HOURS
COMMUNITY
Start: 2017-04-21 | End: 2018-09-18 | Stop reason: SDUPTHER

## 2018-01-08 RX ADMIN — INSULIN ASPART 6 UNITS: 100 INJECTION, SOLUTION INTRAVENOUS; SUBCUTANEOUS at 06:46

## 2018-01-08 RX ADMIN — TAZOBACTAM SODIUM AND PIPERACILLIN SODIUM 3.38 G: 375; 3 INJECTION, SOLUTION INTRAVENOUS at 09:05

## 2018-01-08 RX ADMIN — BUSPIRONE HYDROCHLORIDE 10 MG: 10 TABLET ORAL at 21:38

## 2018-01-08 RX ADMIN — METOPROLOL TARTRATE 25 MG: 25 TABLET ORAL at 06:52

## 2018-01-08 RX ADMIN — TAZOBACTAM SODIUM AND PIPERACILLIN SODIUM 3.38 G: 375; 3 INJECTION, SOLUTION INTRAVENOUS at 15:14

## 2018-01-08 RX ADMIN — POTASSIUM CHLORIDE 20 MEQ: 20 TABLET, EXTENDED RELEASE ORAL at 11:21

## 2018-01-08 RX ADMIN — METOPROLOL TARTRATE 25 MG: 25 TABLET ORAL at 21:37

## 2018-01-08 RX ADMIN — INSULIN ASPART 2 UNITS: 100 INJECTION, SOLUTION INTRAVENOUS; SUBCUTANEOUS at 12:46

## 2018-01-08 RX ADMIN — PROPOFOL 100 MCG/KG/MIN: 10 INJECTION, EMULSION INTRAVENOUS at 09:18

## 2018-01-08 RX ADMIN — VANCOMYCIN HYDROCHLORIDE 1500 MG: 500 INJECTION, POWDER, LYOPHILIZED, FOR SOLUTION INTRAVENOUS at 13:07

## 2018-01-08 RX ADMIN — IPRATROPIUM BROMIDE AND ALBUTEROL SULFATE 3 ML: .5; 3 SOLUTION RESPIRATORY (INHALATION) at 01:10

## 2018-01-08 RX ADMIN — BUDESONIDE 0.5 MG: 0.5 INHALANT RESPIRATORY (INHALATION) at 13:46

## 2018-01-08 RX ADMIN — PROPOFOL 30 MG: 10 INJECTION, EMULSION INTRAVENOUS at 09:17

## 2018-01-08 RX ADMIN — TAZOBACTAM SODIUM AND PIPERACILLIN SODIUM 3.38 G: 375; 3 INJECTION, SOLUTION INTRAVENOUS at 06:46

## 2018-01-08 RX ADMIN — IPRATROPIUM BROMIDE AND ALBUTEROL SULFATE 3 ML: .5; 3 SOLUTION RESPIRATORY (INHALATION) at 16:10

## 2018-01-08 RX ADMIN — BUSPIRONE HYDROCHLORIDE 10 MG: 10 TABLET ORAL at 17:08

## 2018-01-08 RX ADMIN — TAZOBACTAM SODIUM AND PIPERACILLIN SODIUM 3.38 G: 375; 3 INJECTION, SOLUTION INTRAVENOUS at 21:43

## 2018-01-08 RX ADMIN — INSULIN ASPART 7 UNITS: 100 INJECTION, SOLUTION INTRAVENOUS; SUBCUTANEOUS at 21:38

## 2018-01-08 RX ADMIN — METHYLPREDNISOLONE SODIUM SUCCINATE 40 MG: 40 INJECTION, POWDER, FOR SOLUTION INTRAMUSCULAR; INTRAVENOUS at 17:08

## 2018-01-08 RX ADMIN — GUAIFENESIN 600 MG: 600 TABLET, EXTENDED RELEASE ORAL at 21:42

## 2018-01-08 RX ADMIN — SODIUM CHLORIDE: 9 INJECTION, SOLUTION INTRAVENOUS at 09:05

## 2018-01-08 RX ADMIN — IPRATROPIUM BROMIDE AND ALBUTEROL SULFATE 3 ML: .5; 3 SOLUTION RESPIRATORY (INHALATION) at 20:41

## 2018-01-08 RX ADMIN — IPRATROPIUM BROMIDE AND ALBUTEROL SULFATE 3 ML: .5; 3 SOLUTION RESPIRATORY (INHALATION) at 07:36

## 2018-01-08 RX ADMIN — KETAMINE HYDROCHLORIDE 20 MG: 50 INJECTION, SOLUTION INTRAMUSCULAR; INTRAVENOUS at 09:17

## 2018-01-08 RX ADMIN — INSULIN ASPART 7 UNITS: 100 INJECTION, SOLUTION INTRAVENOUS; SUBCUTANEOUS at 17:08

## 2018-01-08 RX ADMIN — INSULIN DETEMIR 40 UNITS: 100 INJECTION, SOLUTION SUBCUTANEOUS at 21:38

## 2018-01-08 RX ADMIN — METHYLPREDNISOLONE SODIUM SUCCINATE 40 MG: 40 INJECTION, POWDER, FOR SOLUTION INTRAMUSCULAR; INTRAVENOUS at 06:46

## 2018-01-08 RX ADMIN — BUDESONIDE 0.5 MG: 0.5 INHALANT RESPIRATORY (INHALATION) at 20:41

## 2018-01-08 RX ADMIN — IPRATROPIUM BROMIDE AND ALBUTEROL SULFATE 3 ML: .5; 3 SOLUTION RESPIRATORY (INHALATION) at 13:45

## 2018-01-08 RX ADMIN — KETOTIFEN FUMARATE 1 DROP: 0.35 SOLUTION/ DROPS OPHTHALMIC at 21:44

## 2018-01-08 NOTE — THERAPY EVALUATION
Acute Care - Physical Therapy Initial Evaluation  [facility]     Patient Name: Mingo Guidry  : 1950  MRN: [id number]  Today's Date: 2018   Onset of Illness/Injury or Date of Surgery Date: 18  Date of Referral to PT: 18  Referring Physician: Dave Hairston MD      Admit Date: 2018     Visit Dx:    ICD-10-CM ICD-9-CM   1. Pneumonia of left upper lobe due to infectious organism J18.1 486   2. Impaired functional mobility, balance, gait, and endurance Z74.09 V49.89     Patient Active Problem List   Diagnosis   • Chronic diastolic heart failure   • Lower extremity edema   • Essential hypertension   • Type 2 diabetes mellitus   • Acute exacerbation of chronic obstructive pulmonary disease (COPD)   • Obesity   • Acid reflux   • Gout   • Seasonal allergies   • Mixed hyperlipidemia   • Acute on chronic respiratory failure with hypoxia   • Pneumonia of left upper lobe due to infectious organism   • Pneumonia     Past Medical History:   Diagnosis Date   • Acid reflux 10/17/2016   • Asthma     bronchial   • Chronic diastolic heart failure 10/17/2016    Normal dobutamine echocardiogram stress, 2005. Echocardiogram on 2009:  EF 50% to 55%. Left atrium 3.5 cm.   • Chronic obstructive pulmonary disease 10/17/2016    asthmatic bronchitis, on O2 use chronically.     • Gout 10/17/2016   • High cholesterol 2018   • Hypertension 10/17/2016    Benign hypertension.   • Lower extremity edema 10/17/2016    Chronic lower extremity edema/venous insufficiency.   • Murmur, heart    • Obesity 10/17/2016   • Seasonal allergies 10/17/2016   • Type 2 diabetes mellitus 10/17/2016    insulin dependent.     Past Surgical History:   Procedure Laterality Date   •  SECTION     • CYSTECTOMY Right     neck   • DILATATION AND CURETTAGE     • EYE SURGERY Bilateral     cataract   • HYSTERECTOMY     • LUNG SURGERY Left 1997 tumors removed from left lung-benign   • TONSILLECTOMY AND ADENOIDECTOMY             PT ASSESSMENT (last 72 hours)      PT Evaluation       01/08/18 0810 01/07/18 1611    Rehab Evaluation    Document Type  evaluation  -JR    Subjective Information  agree to therapy;complains of;dyspnea  -JR    Patient Effort, Rehab Treatment  excellent  -JR    Symptoms Noted During/After Treatment  shortness of breath  -JR    General Information    Patient Profile Review  yes  -JR    Onset of Illness/Injury or Date of Surgery Date  01/04/18  -JR    Referring Physician  Dave Hairston MD  -JR    General Observations  Supine with oxygen per nasal cannula  -JR    Pertinent History Of Current Problem  Acute on chronic resp fail, LLL pneumonia, COPD exac, DM2, HTN, CHF, CKD!!  -JR    Precautions/Limitations  fall precautions;oxygen therapy device and L/min  -JR    Prior Level of Function  independent:;all household mobility  -JR    Equipment Currently Used at Home bipap/ cpap;oxygen;respiratory supplies;shower chair  -JW bipap/ cpap;oxygen;respiratory supplies;shower chair  -JR    Plans/Goals Discussed With  patient;agreed upon  -JR    Risks Reviewed  patient:;change in vital signs  -JR    Benefits Reviewed  patient:;increase strength;increase independence;improve function  -JR    Living Environment    Lives With alone  -JW alone  -JR    Living Arrangements house  -JW house  -JR    Home Accessibility no concerns;bed and bath on same level;stairs to enter home  -JW no concerns  -JR    Stair Railings at Home outside, present at both sides  -JW     Type of Financial/Environmental Concern none  -JW     Transportation Available car  -JW     Clinical Impression    Date of Referral to PT  01/06/18  -JR    PT Diagnosis  Dyspnea with minimal exertion  -JR    Prognosis  Good  -JR    Patient/Family Goals Statement  Patient wants to return home independently  -JR    Criteria for Skilled Therapeutic Interventions Met  yes;treatment indicated  -JR    Pathology/Pathophysiology Noted (Describe Specifically for Each System)   pulmonary;musculoskeletal;endocrine/metabolic  -    Impairments Found (describe specific impairments)  aerobic capacity/endurance;gait, locomotion, and balance;motor function  -    Rehab Potential  good, to achieve stated therapy goals  -    Pain Assessment    Pain Assessment  No/denies pain  -    Cognitive Assessment/Intervention    Current Cognitive/Communication Assessment  functional  -    Orientation Status  oriented x 4  -JR    Follows Commands/Answers Questions  able to follow single-step instructions  -    Personal Safety  WNL/WFL  -    ROM (Range of Motion)    General ROM  no range of motion deficits identified  -    MMT (Manual Muscle Testing)    General MMT Assessment Detail  Grossly 3+/5  -JR    Bed Mobility, Assessment/Treatment    Bed Mobility, Assistive Device  head of bed elevated  -    Bed Mob, Supine to Sit, [place]  independent  -    Bed Mob, Sit to Supine, [place]  independent  -    Transfer Assessment/Treatment    Transfers, Sit-Stand [place]  contact guard assist  -    Transfers, Stand-Sit [place]  contact guard assist  -    Transfer, Impairments  --   Dyspnea  -    Gait Assessment/Treatment    Gait, [place] Level  contact guard assist  -    Gait, Assistive Device  --   HHA  -    Gait, Distance (Feet)  15  -    Gait, Gait Pattern Analysis  swing-through gait  -    Gait, Gait Deviations  stride width increased;stride length decreased;forward flexed posture  -    Gait, Safety Issues  supplemental O2;sequencing ability decreased  -    Gait, Impairments  strength decreased;impaired balance  -    Gait, Comment  dyspnea limits mobility  -    Positioning and Restraints    Pre-Treatment Position  in bed  -JR    Post Treatment Position  bed  -JR    In Bed  sitting EOB;call light within reach;encouraged to call for assist  -      01/06/18 9312       General Information    Equipment Currently Used at Home bipap/ cpap;oxygen;respiratory  supplies  -LS     Living Environment    Lives With alone  -LS     Living Arrangements house  -LS       User Key  (r) = Recorded By, (t) = Taken By, (c) = Cosigned By    Initials Name Provider Type    JR Vivi Sahni, PT Physical Therapist    EMILI Dupont     MICKY Malloy, RN Registered Nurse          Physical Therapy Education     Title: PT OT SLP Therapies (Active)     Topic: Physical Therapy (Active)     Point: Mobility training (Done)    Learning Progress Summary    Learner Readiness Method Response Comment Documented by Status   Patient Acceptance E VU Importance of breathing coordination with activity  01/08/18 1024 Done                      User Key     Initials Effective Dates Name Provider Type Discipline     10/26/16 -  Vivi Sahni, PT Physical Therapist PT                PT Recommendation and Plan  Anticipated Discharge Disposition: home with assist (outpatient rehab setting)  Planned Therapy Interventions: strengthening, transfer training, gait training, patient/family education  PT Frequency: daily  Plan of Care Review  Plan Of Care Reviewed With: patient  Outcome Summary/Follow up Plan: Patient participates well in PT evaluation and demonstrates a need for additional PT to improve her ability to perform mobility tasks and maintain oxygen saturation levels at or above 90%.  She is expected to benefit from additional PT while hospitalized.          IP PT Goals       01/07/18 1930          Transfer Training PT LTG    Transfer Training PT LTG, Date Established 01/07/18  -      Transfer Training PT LTG, Time to Achieve 2 wks  -JR      Transfer Training PT LTG, Activity Type bed to chair /chair to bed;sit to stand/stand to sit  -JR      Transfer Training PT LTG, [place] Level supervision required  -JR      Transfer Training PT LTG, Additional Goal with oxygen saturation level at least 90%, without SOA  -JR      Gait Training PT LTG    Gait Training Goal PT LTG, Date  Established 01/07/18  -JR      Gait Training Goal PT LTG, Time to Achieve 2 wks  -JR      Gait Training Goal PT LTG, [place] Level contact guard assist  -JR      Gait Training Goal PT LTG, Distance to Achieve 400 with oxygen saturation levels above 90%  -JR      Gait Training Goal PT LTG, Additional Goal with minimal SOA  -JR        User Key  (r) = Recorded By, (t) = Taken By, (c) = Cosigned By    Initials Name Provider Type    JR Vivi Sahni PT Physical Therapist                Outcome Measures       01/07/18 1611          How much help from another person do you currently need...    Turning from your back to your side while in flat bed without using bedrails? 4  -JR      Moving from lying on back to sitting on the side of a flat bed without bedrails? 4  -JR      Moving to and from a bed to a chair (including a wheelchair)? 3  -JR      Standing up from a chair using your arms (e.g., wheelchair, bedside chair)? 3  -JR      Climbing 3-5 steps with a railing? 3  -JR      To walk in hospital room? 3  -JR      AM-PAC 6 Clicks Score 20  -JR      Functional Assessment    Outcome Measure Options AM-PAC 6 Clicks Basic Mobility (PT)  -JR        User Key  (r) = Recorded By, (t) = Taken By, (c) = Cosigned By    Initials Name Provider Type    JR Vivi Sahni PT Physical Therapist           Time Calculation:       Therapy Charges for Today     Code Description Service Date Service Provider Modifiers Qty    [id number] HC PT EVAL LOW COMPLEXITY 4 1/7/2018 Vivi Sahni, PT GP 1          PT G-Codes  Outcome Measure Options: AM-PAC 6 Clicks Basic Mobility (PT)      Vivi Sahni PT  1/8/2018

## 2018-01-08 NOTE — PROCEDURES
Date of Procedure: January 8, 2018       Type:   1. Bronchoscopy with BAL      Reason for procedure: Left upper lobe collapse? Asymmetric Left sided opacity.      Consent: Consent was obtained from the Patient after explanation of the risks and benefits of the procedure. Risks including but not limited to damage to the overlying structures, pneumothorax, bleeding, infection, worsening of respiratory status, requirement for chest tube placement among others were explained.    Patient understood the risks and benefits and agreed to proceed with the procedure.    Time Out: Time out was done with the RN & Bronch Technician at the bedside after confirmation of the site of the procedure and name and date of birth with the patient's ID band.    Details of the procedure:   MAC anesthesia was provided by the anesthesia team. Vital signs were monitored by them, as well. Once under MAC, the left nostril was anesthetized with lidocaine gel. The bronchoscope was introduced through the nostril with immediate  visualization of the vocal cords. The vocal cords were adducting and abducting to inspiration and expiration, equally      Lidocaine was then instilled on the vocal cords and surrounding structures. The bronchoscope was then introduced through the vocal cords with immediate visualization of the trachea.     The trachea was central. The antony was sharp but somewhat deviated to the left.     Right side was inspected first. The bronchoscope was introduced into the main stem.  Right side did not show any abnormality. The Right upper lobe, middle lobe and lower lobe were easily identified. There were minimal secretions in the lower lobe that were suctioned without any issues.      Next, the left side was evaluated. The bronchoscope was introduced into the main stem.  The left upper lobe had thick copious secretions that were suctioned with some difficulty. No abnormality was noted after the secretions were cleared. The  abnormality was somewhat distorted and although it appeared that the bronchoscope was in the location where the Left upper lobe would be expected, anatomy was difficult to understand. All the subsegments within the left lung were inspected and no endobronchial lesion was seen.     Bronchial alveolar lavage was collected during the procedure. A total of 100 mls was instilled and return of approximately 50  mls was obtained.     The samples obtained during the procedure will be sent for Pathology & Cytology. Additionally, cultures have been ordered .    The patient will be monitored by anesthesia. The patient will be sent back to floor once deemed stable by anesthesia team and if there are no post-procedural complications.     Complications:  No immediate complications noted.  Blood loss of <5 mls.        Post Op Impression:  1. Left upper lobe collapse/atelectasis.          This document was electronically signed by Ryder Dahl MD January 8, 2018  9:32 AM

## 2018-01-08 NOTE — PLAN OF CARE
Problem: Patient Care Overview (Adult)  Goal: Plan of Care Review  Outcome: Ongoing (interventions implemented as appropriate)   01/08/18 3624   Coping/Psychosocial Response Interventions   Plan Of Care Reviewed With patient   Outcome Evaluation   Outcome Summary/Follow up Plan Weaned patient from 15 Liters high flow nasal cannula to 5 liters. Pt has improved and sat in chair for dinner.    Patient Care Overview   Progress improving     Goal: Discharge Needs Assessment  Outcome: Ongoing (interventions implemented as appropriate)      Problem: Respiratory Insufficiency (Adult)  Goal: Acid/Base Balance  Outcome: Ongoing (interventions implemented as appropriate)    Goal: Effective Ventilation  Outcome: Ongoing (interventions implemented as appropriate)      Problem: Pneumonia (Adult)  Goal: Signs and Symptoms of Listed Potential Problems Will be Absent or Manageable (Pneumonia)  Outcome: Ongoing (interventions implemented as appropriate)

## 2018-01-08 NOTE — PLAN OF CARE
Problem: Patient Care Overview (Adult)  Goal: Plan of Care Review  Outcome: Ongoing (interventions implemented as appropriate)   01/07/18 1930   Coping/Psychosocial Response Interventions   Plan Of Care Reviewed With patient   Outcome Evaluation   Outcome Summary/Follow up Plan Patient participates well in PT evaluation and demonstrates a need for additional PT to improve her ability to perform mobility tasks and maintain oxygen saturation levels at or above 90%. She is expected to benefit from additional PT while hospitalized.       Problem: Inpatient Physical Therapy  Goal: Transfer Training Goal 1 LTG- PT  Outcome: Ongoing (interventions implemented as appropriate)   01/07/18 1930   Transfer Training PT LTG   Transfer Training PT LTG, Date Established 01/07/18   Transfer Training PT LTG, Time to Achieve 2 wks   Transfer Training PT LTG, Activity Type bed to chair /chair to bed;sit to stand/stand to sit   Transfer Training PT LTG, [place] Level supervision required   Transfer Training PT LTG, Additional Goal with oxygen saturation level at least 90%, without SOA     Goal: Gait Training Goal LTG- PT  Outcome: Ongoing (interventions implemented as appropriate)   01/07/18 1930   Gait Training PT LTG   Gait Training Goal PT LTG, Date Established 01/07/18   Gait Training Goal PT LTG, Time to Achieve 2 wks   Gait Training Goal PT LTG, [place] Level contact guard assist   Gait Training Goal PT LTG, Distance to Achieve 400 with oxygen saturation levels above 90%   Gait Training Goal PT LTG, Additional Goal with minimal SOA

## 2018-01-08 NOTE — PLAN OF CARE
Problem: Patient Care Overview (Adult)  Goal: Plan of Care Review  Outcome: Ongoing (interventions implemented as appropriate)   01/08/18 1446   Coping/Psychosocial Response Interventions   Plan Of Care Reviewed With patient   Outcome Evaluation   Outcome Summary/Follow up Plan Pt with unsteadiness noted and performs furniture walking would benefit from utilizing RW until pt is stronger and more steady. Pt with increased distance amb and decreased assist for transfers. Con't with PT POC.   Patient Care Overview   Progress progress toward functional goals as expected       Problem: Inpatient Physical Therapy  Goal: Transfer Training Goal 1 LTG- PT  Outcome: Ongoing (interventions implemented as appropriate)   01/07/18 1930 01/08/18 1446   Transfer Training PT LTG   Transfer Training PT LTG, Date Established 01/07/18 --    Transfer Training PT LTG, Time to Achieve 2 wks --    Transfer Training PT LTG, Activity Type bed to chair /chair to bed;sit to stand/stand to sit --    Transfer Training PT LTG, [place] Level supervision required --    Transfer Training PT LTG, Additional Goal with oxygen saturation level at least 90%, without SOA --    Transfer Training PT LTG, Outcome --  goal ongoing     Goal: Gait Training Goal LTG- PT  Outcome: Ongoing (interventions implemented as appropriate)   01/07/18 1930 01/08/18 1446   Gait Training PT LTG   Gait Training Goal PT LTG, Date Established 01/07/18 --    Gait Training Goal PT LTG, Time to Achieve 2 wks --    Gait Training Goal PT LTG, [place] Level contact guard assist --    Gait Training Goal PT LTG, Distance to Achieve 400 with oxygen saturation levels above 90% --    Gait Training Goal PT LTG, Additional Goal with minimal SOA --    Gait Training Goal PT LTG, Outcome --  goal partially met

## 2018-01-08 NOTE — ANESTHESIA POSTPROCEDURE EVALUATION
Patient: Mingo Guidry    Procedure Summary     Date Anesthesia Start Anesthesia Stop Room / Location    01/08/18 0905   [facility]       Procedure Diagnosis Surgeon Provider    BRONCHOSCOPY WITH WASHINGS (N/A Bronchus) Pneumonia of left upper lobe due to infectious organism  (Pneumonia of left upper lobe due to infectious organism [J18.1]) MD Yuriy Mora CRNA          Anesthesia Type: MAC  Last vitals  BP   133\55   Temp   98.4   Pulse   78   Resp   23   SpO2   92     Post Anesthesia Care and Evaluation    Patient location during evaluation: PACU  Patient participation: complete - patient participated  Level of consciousness: awake and alert  Pain score: 0  Pain management: satisfactory to patient  Airway patency: patent  Anesthetic complications: No anesthetic complications  PONV Status: none  Cardiovascular status: acceptable and stable  Respiratory status: acceptable and face mask  Hydration status: acceptable

## 2018-01-08 NOTE — ANESTHESIA PREPROCEDURE EVALUATION
Anesthesia Evaluation     Patient summary reviewed and Nursing notes reviewed   no history of anesthetic complications:  NPO Solid Status: > 8 hours  NPO Liquid Status: > 8 hours     Airway   Mallampati: II  TM distance: >3 FB  Neck ROM: full  possible difficult intubation  Dental - normal exam     Pulmonary - normal exam   (+) pneumonia improving , COPD moderate, asthma, shortness of breath, sleep apnea (patient uses bipap) on CPAP, decreased breath sounds,     ROS comment: Home Oxygen  Cardiovascular - normal exam    PT is on anticoagulation therapy  Rhythm: regular  Rate: normal    (+) hypertension, PVD, hyperlipidemia      Neuro/Psych  GI/Hepatic/Renal/Endo    (+) morbid obesity (BMI 42), GERD, renal disease CRI, diabetes mellitus type 2 poorly controlled using insulin,     Musculoskeletal (-) negative ROS    Abdominal  - normal exam  (+) obese,     Abdomen: soft.  Bowel sounds: normal.   Substance History - negative use     OB/GYN negative ob/gyn ROS         Other - negative ROS       ROS/Med Hx Other: Gout    RT- weaning patient from nonrebreather and provided NC high flows to allow patient to have mask removed from face.  Without high flow 10 LPM she will desaturate and at present she is at 91%                                        Anesthesia Plan    ASA 3     MAC   (Risks and benefits discussed including risk of aspiration, recall and dental damage. All patient questions answered. Will continue with POC.)  intravenous induction   Anesthetic plan and risks discussed with patient.

## 2018-01-08 NOTE — PLAN OF CARE
Problem: Perioperative Period (Pediatric)  Intervention: Promote Pulmonary Hygiene and Secretion Clearance   01/08/18 0938   Positioning   Head Of Bed (HOB) Position HOB elevated   Promote Aggressive Pulmonary Hygiene/Secretion Management   Cough And Deep Breathing done with encouragement   Activity   Activity Type activity adjusted per tolerance;dorsiflexion, plantar flexion encouraged     Intervention: Monitor/Manage Pain   01/08/18 0938   Safety Interventions   Medication Review/Management medications reviewed   Manage Acute Burn Pain   Bowel Intervention adequate fluid intake promoted   Pain Management Interventions pain care plan reviewed with patient/caregiver     Intervention: Promote Normothermia   01/08/18 0938   Cardiac Interventions   Warming Thermoregulation Maintenance warm blankets applied       Goal: Signs and Symptoms of Listed Potential Problems Will be Absent or Manageable (Perioperative Period)  Outcome: Ongoing (interventions implemented as appropriate)   01/08/18 0938   Perioperative Period   Problems Assessed (Perioperative Period) all   Problems Present (Perioperative Period) none

## 2018-01-08 NOTE — PROGRESS NOTES
PROGRESS NOTE        Date of Admission: 1/4/2018  Length of Stay: 4  Primary Care Physician: JACKI Mistry    Subjective   Chief Complaint: Cough, Dyspnea  HPI: This is a 67-year-old female who is transferred from [facility] on 1/4/18 with persistent pneumonia without any improvement.  She has remote history of having lung surgery for a benign tumor and was noted to have a left upper lobe opacity suggestive of an obstructive pneumonia.  She been on IV antibiotics in the form of Zosyn and vancomycin for one week with no improvement.  She was transferred for pulmonary evaluation.  She was seen and evaluated by Dr. antonio he did take her down for bronchoscopy today.  She was noted to have a mucous plug in the left upper lobe.  He has sent cultures and cytology.  Is currently lying in bed.  She has no complaints at this time.  She still has some shortness of breath that is feeling better.           Review Of Systems:   Review of Systems   General ROS: Patient denies any fevers, chills or loss of consciousness.    Psychological ROS: Denies any hallucinations and delusions.  Ophthalmic ROS: Denies any diplopia or transient loss of vision.  ENT ROS: Denies sore throat, nasal congestion or ear pain.   Allergy and Immunology ROS: Denies rash or itching.  Hematological and Lymphatic ROS: Denies neck swelling or easy bleeding.  Endocrine ROS: Denies any recent unintentional weight gain or loss.  Breast ROS: Denies any pain or swelling.  Respiratory ROS: cough and shortness of breath.   Cardiovascular ROS: Denies chest pain or palpitations. No history of exertional chest pain.   Gastrointestinal ROS: Denies nausea and vomiting. Denies any abdominal pain. No diarrhea.  Genito-Urinary ROS: Denies dysuria or hematuria.  Musculoskeletal ROS: Denies back pain. No muscle pain. No calf pain.   Neurological ROS: Denies any focal weakness. No loss of consciousness. Denies any numbness. Denies neck pain.    Dermatological ROS: Denies any redness or pruritis.    Objective      Temp:  [97.3 °F (36.3 °C)-98.5 °F (36.9 °C)] 98.5 °F (36.9 °C)  Heart Rate:  [62-80] 68  Resp:  [18-30] 18  BP: (133-151)/(50-81) 149/61  Physical Exam    General Appearance:  Alert and cooperative, not in any acute distress.   Head:  Atraumatic and normocephalic, without obvious abnormality.   Eyes:          PERRLA, conjunctivae and sclerae normal, no Icterus. No pallor. Extra-occular movements are within normal limits.   Ears:  Ears appear intact with no abnormalities noted.   Throat: No oral lesions, no thrush, oral mucosa moist.   Neck: Supple, trachea midline, no thyromegaly, no carotid bruit.   Back:   No kyphoscoliosis present. No tenderness to palpation,   range of motion normal.   Lungs:   Chest shape is normal. Breath sounds heard bilaterally equally.  Few crackles Wheezing improved. No Pleural rub or bronchial breathing.   Heart:  Normal S1 and S2, no murmur, no gallop, no rub. No JVD   Abdomen:   Normal bowel sounds, no masses, no organomegaly. Soft     non-tender, non-distended, no guarding, no rebound                Tenderness, obese   Extremities: Moves all extremities well, trace to +1 edema, no cyanosis, no clubbing.   Pulses: Pulses palpable and equal bilaterally   Skin: No bleeding, bruising or rash   Lymph nodes: No palpable adenopathy   Neurologic:    Psychiatric/Behavior:     Cranial nerves 2 - 12 grossly intact, sensation intact, Motor power is normal and equal bilaterally.  Mood normal, behavior normal       Results Review:    I have reviewed the labs, radiology results and diagnostic studies.      Results from last 7 days  Lab Units 01/08/18  0607   WBC 10*3/mm3 15.71*   HEMOGLOBIN g/dL 13.4   PLATELETS 10*3/mm3 370       Results from last 7 days  Lab Units 01/08/18  0607   SODIUM mmol/L 143   POTASSIUM mmol/L 4.4   CO2 mmol/L 38.0*   CREATININE mg/dL 1.20   GLUCOSE mg/dL 281*       Culture Data:    Radiology Data:    Cardiology Data:    I have reviewed the medications.    Assessment/Plan     Assessment and Plan:  1.  Acute on chronic hypoxic respiratory failure    2.  Persistent left upper lobe pneumonia with failure of outpatient antibiotic status post bronchoscopy with mucous plugging noted to be in left upper lobe.  Cytology and cultures have been sent.  Patient is currently on IV antibiotics in the form of Zosyn and vancomycin as well as breathing treatments, mucolytics, nebulized steroids as well as IV steroids.  Next    3.  Diabetes mellitus type 2 patient is on insulin protocol as well as Levemir.  Her blood sugars do appear to be doing better.    4.  Chronic essential hypertension-blood pressure stable continue to monitor    5.  Chronic kidney disease CKD to    6.  Obstructive sleep apnea on home BiPAP    7.  Chronic diastolic congestive heart failure ejection fraction unknown-medical management          DVT prophylaxis:  Lovenox  Discharge Planning:   JACKI Pollard 01/08/18 2:51 PM

## 2018-01-08 NOTE — PLAN OF CARE
Problem: Patient Care Overview (Adult)  Goal: Plan of Care Review  Outcome: Ongoing (interventions implemented as appropriate)   01/08/18 1025   Outcome Evaluation   Outcome Summary/Follow up Plan vss, pacu care complete, returned to 3rd floor in stable condition   Patient Care Overview   Progress progress toward functional goals as expected

## 2018-01-08 NOTE — PLAN OF CARE
Problem: Respiratory Insufficiency (Adult)  Goal: Acid/Base Balance   01/07/18 6849   Respiratory Insufficiency (Adult)   Acid/Base Balance making progress toward outcome

## 2018-01-08 NOTE — PLAN OF CARE
Problem: NPPV/CPAP (Adult)  Goal: Signs and Symptoms of Listed Potential Problems Will be Absent or Manageable (NPPV/CPAP)  Outcome: Ongoing (interventions implemented as appropriate)   01/08/18 0252   NPPV/CPAP   Problems Assessed (NPPV/CPAP) dry mucous membranes;hypoxia/hypoxemia;skin breakdown   Problems Present (NPPV/CPAP) none

## 2018-01-08 NOTE — PLAN OF CARE
Problem: Perioperative Period (Pediatric)  Goal: Signs and Symptoms of Listed Potential Problems Will be Absent or Manageable (Perioperative Period)  Outcome: Ongoing (interventions implemented as appropriate)   01/08/18 0814   Perioperative Period   Problems Assessed (Perioperative Period) all   Problems Present (Perioperative Period) situational response

## 2018-01-08 NOTE — THERAPY TREATMENT NOTE
Acute Care - Physical Therapy Treatment Note  [facility]     Patient Name: Mingo Guidry  : 1950  MRN: [id number]  Today's Date: 2018  Onset of Illness/Injury or Date of Surgery Date: 18  Date of Referral to PT: 18  Referring Physician: Dave Hairston MD    Admit Date: 2018    Visit Dx:    ICD-10-CM ICD-9-CM   1. Pneumonia of left upper lobe due to infectious organism J18.1 486   2. Impaired functional mobility, balance, gait, and endurance Z74.09 V49.89     Patient Active Problem List   Diagnosis   • Chronic diastolic heart failure   • Lower extremity edema   • Essential hypertension   • Type 2 diabetes mellitus   • Acute exacerbation of chronic obstructive pulmonary disease (COPD)   • Obesity   • Acid reflux   • Gout   • Seasonal allergies   • Mixed hyperlipidemia   • Acute on chronic respiratory failure with hypoxia   • Pneumonia of left upper lobe due to infectious organism   • Pneumonia               Adult Rehabilitation Note       18 1446          Rehab Assessment/Intervention    Discipline physical therapy assistant  -CC      Document Type therapy note (daily note)  -CC      Subjective Information agree to therapy;complains of;dyspnea  -CC      Patient Effort, Rehab Treatment excellent  -CC      Symptoms Noted During/After Treatment shortness of breath  -CC      Precautions/Limitations fall precautions;oxygen therapy device and L/min  -CC      Recorded by [CC] Gretchen Zuniga PTA      Pain Assessment    Pain Assessment No/denies pain  -CC      Recorded by [CC] Gretchen Zuniga PTA      Cognitive Assessment/Intervention    Current Cognitive/Communication Assessment functional  -CC      Orientation Status oriented x 4  -CC      Follows Commands/Answers Questions able to follow single-step instructions  -CC      Recorded by [CC] Gretchen Zuniga PTA      Bed Mobility, Assessment/Treatment    Bed Mobility, Assistive Device head of bed elevated  -CC      Bed Mob, Supine to Sit,  [place] independent  -CC      Bed Mob, Sit to Supine, [place] independent  -CC      Recorded by [CC] Gretchen Zuniga PTA      Transfer Assessment/Treatment    Transfers, Bed-Chair [place] contact guard assist;stand by assist;verbal cues required  -CC      Transfers, Sit-Stand [place] contact guard assist;stand by assist;verbal cues required  -CC      Transfers, Stand-Sit [place] contact guard assist;stand by assist;verbal cues required  -CC      Transfer, Comment SOA  -CC      Recorded by [CC] Gretchen Zuniga PTA      Gait Assessment/Treatment    Gait, [place] Level contact guard assist;hand held assist;verbal cues required  -CC      Gait, Distance (Feet) 30  -CC      Gait, Gait Pattern Analysis swing-through gait  -CC      Gait, Gait Deviations stride length decreased;stride width increased;john decreased  -CC      Gait, Safety Issues supplemental O2;balance decreased during turns;sequencing ability decreased  -CC      Gait, Impairments strength decreased;impaired balance  -CC      Gait, Comment dyspnea limits mobility, pt would benefit from RW to decrease unsteadiness  -CC      Recorded by [CC] Gretchen Zuniga PTA      Therapy Exercises    Bilateral Lower Extremities AROM:;15 reps;supine;ankle pumps/circles;heel slides;hip abduction/adduction;quad sets  -CC      Recorded by [RAMONE] Gretchen Zuniga PTA      Positioning and Restraints    Pre-Treatment Position in bed  -CC      Post Treatment Position chair  -CC      In Chair reclined;call light within reach;encouraged to call for assist  -CC      Recorded by [RAMONE] Gretchen Zuniga PTA        User Key  (r) = Recorded By, (t) = Taken By, (c) = Cosigned By    Initials Name Effective Dates    CC Gretchen Zuniga PTA 10/26/16 -                 IP PT Goals       01/08/18 1446 01/07/18 1930       Transfer Training PT LTG    Transfer Training PT LTG, Date Established  01/07/18  -JR     Transfer Training PT LTG, Time to Achieve  2 wks   -JR     Transfer Training PT LTG, Activity Type  bed to chair /chair to bed;sit to stand/stand to sit  -JR     Transfer Training PT LTG, [place] Level  supervision required  -JR     Transfer Training PT LTG, Additional Goal  with oxygen saturation level at least 90%, without SOA  -JR     Transfer Training PT LTG, Outcome goal ongoing  -CC      Gait Training PT LTG    Gait Training Goal PT LTG, Date Established  01/07/18  -JR     Gait Training Goal PT LTG, Time to Achieve  2 wks  -JR     Gait Training Goal PT LTG, [place] Level  contact guard assist  -JR     Gait Training Goal PT LTG, Distance to Achieve  400 with oxygen saturation levels above 90%  -JR     Gait Training Goal PT LTG, Additional Goal  with minimal SOA  -JR     Gait Training Goal PT LTG, Outcome goal partially met  -        User Key  (r) = Recorded By, (t) = Taken By, (c) = Cosigned By    Initials Name Provider Type    JR Vivi Sahni, PT Physical Therapist    CC Gretchen Zuniga, PTA Physical Therapy Assistant          Physical Therapy Education     Title: PT OT SLP Therapies (Active)     Topic: Physical Therapy (Active)     Point: Mobility training (Done)    Learning Progress Summary    Learner Readiness Method Response Comment Documented by Status   Patient Acceptance E VU Importance of breathing coordination with activity  01/08/18 1024 Done               Point: Home exercise program (Done)    Learning Progress Summary    Learner Readiness Method Response Comment Documented by Status   Patient Acceptance E VU,NR Perform ex daily when d/c home  01/08/18 1716 Done                      User Key     Initials Effective Dates Name Provider Type Discipline     10/26/16 -  Vivi Sahni, PT Physical Therapist PT     10/26/16 -  Gretchen Zuniga, PTA Physical Therapy Assistant PT                    PT Recommendation and Plan  Anticipated Discharge Disposition: home with assist (outpatient rehab setting)  Planned Therapy Interventions:  strengthening, transfer training, gait training, patient/family education  PT Frequency: daily  Plan of Care Review  Plan Of Care Reviewed With: patient  Progress: progress toward functional goals as expected  Outcome Summary/Follow up Plan: Pt with unsteadiness noted and performs furniture walking would benefit from utilizing RW until pt is stronger and more steady.  Pt with increased distance amb and decreased assist for transfers. Con't with PT POC.          Outcome Measures       01/08/18 1446 01/07/18 1611       How much help from another person do you currently need...    Turning from your back to your side while in flat bed without using bedrails? 4  -CC 4  -JR     Moving from lying on back to sitting on the side of a flat bed without bedrails? 4  -CC 4  -JR     Moving to and from a bed to a chair (including a wheelchair)? 3  -CC 3  -JR     Standing up from a chair using your arms (e.g., wheelchair, bedside chair)? 3  -CC 3  -JR     Climbing 3-5 steps with a railing? 3  -CC 3  -JR     To walk in hospital room? 3  -CC 3  -JR     AM-PAC 6 Clicks Score 20  -CC 20  -JR     Functional Assessment    Outcome Measure Options AM-PAC 6 Clicks Basic Mobility (PT)  -CC AM-PAC 6 Clicks Basic Mobility (PT)  -JR       User Key  (r) = Recorded By, (t) = Taken By, (c) = Cosigned By    Initials Name Provider Type    JR Vivi Sahni, PT Physical Therapist    RAMONE Zuniga PTA Physical Therapy Assistant           Time Calculation:         PT Charges       01/08/18 1446          Time Calculation    Start Time 1446  -CC      PT Received On 01/08/18  -      PT Goal Re-Cert Due Date 01/17/18  -      Time Calculation- PT    Total Timed Code Minutes- PT 32 minute(s)  -        User Key  (r) = Recorded By, (t) = Taken By, (c) = Cosigned By    Initials Name Provider Type    RAMONE Zuniga PTA Physical Therapy Assistant          Therapy Charges for Today     Code Description Service Date Service Provider Modifiers Qty     85537122714 HC GAIT TRAINING EA 15 MIN 1/8/2018 Gretchen Zuniga, PTA GP 1    55043488068 HC PT THER PROC EA 15 MIN 1/8/2018 Gretchen Zuniga, PTA GP 1          PT G-Codes  Outcome Measure Options: AM-PAC 6 Clicks Basic Mobility (PT)    Gretchen Zuniga, JOSE  1/8/2018

## 2018-01-09 ENCOUNTER — APPOINTMENT (OUTPATIENT)
Dept: GENERAL RADIOLOGY | Facility: HOSPITAL | Age: 68
End: 2018-01-09

## 2018-01-09 LAB
ANION GAP SERPL CALCULATED.3IONS-SCNC: 11 MMOL/L
ARTERIAL PATENCY WRIST A: POSITIVE
ATMOSPHERIC PRESS: 739 MMHG
BASE EXCESS BLDA CALC-SCNC: 13 MMOL/L
BASOPHILS # BLD AUTO: 0.04 10*3/MM3 (ref 0–0.2)
BASOPHILS NFR BLD AUTO: 0.3 % (ref 0–2.5)
BDY SITE: ABNORMAL
BUN BLD-MCNC: 42 MG/DL (ref 7–20)
BUN/CREAT SERPL: 38.2 (ref 7.1–23.5)
CALCIUM SPEC-SCNC: 9.4 MG/DL (ref 8.4–10.2)
CHLORIDE SERPL-SCNC: 100 MMOL/L (ref 98–107)
CO2 SERPL-SCNC: 37 MMOL/L (ref 26–30)
CREAT BLD-MCNC: 1.1 MG/DL (ref 0.6–1.3)
DEPRECATED RDW RBC AUTO: 48.4 FL (ref 37–54)
EOSINOPHIL # BLD AUTO: 0 10*3/MM3 (ref 0–0.7)
EOSINOPHIL NFR BLD AUTO: 0 % (ref 0–7)
ERYTHROCYTE [DISTWIDTH] IN BLOOD BY AUTOMATED COUNT: 13.5 % (ref 11.5–14.5)
GFR SERPL CREATININE-BSD FRML MDRD: 50 ML/MIN/1.73
GLUCOSE BLD-MCNC: 267 MG/DL (ref 74–98)
GLUCOSE BLDC GLUCOMTR-MCNC: 191 MG/DL (ref 70–130)
GLUCOSE BLDC GLUCOMTR-MCNC: 220 MG/DL (ref 70–130)
GLUCOSE BLDC GLUCOMTR-MCNC: 259 MG/DL (ref 70–130)
GLUCOSE BLDC GLUCOMTR-MCNC: 299 MG/DL (ref 70–130)
HCO3 BLDA-SCNC: 37.1 MMOL/L (ref 22–28)
HCT VFR BLD AUTO: 42 % (ref 37–47)
HGB BLD-MCNC: 13.7 G/DL (ref 12–16)
HGB BLDA-MCNC: 13.8 G/DL (ref 12–18)
HOROWITZ INDEX BLD+IHG-RTO: 60 %
IMM GRANULOCYTES # BLD: 0.21 10*3/MM3 (ref 0–0.06)
IMM GRANULOCYTES NFR BLD: 1.4 % (ref 0–0.6)
LYMPHOCYTES # BLD AUTO: 2.16 10*3/MM3 (ref 0.6–3.4)
LYMPHOCYTES NFR BLD AUTO: 14.1 % (ref 10–50)
MCH RBC QN AUTO: 32 PG (ref 27–31)
MCHC RBC AUTO-ENTMCNC: 32.6 G/DL (ref 30–37)
MCV RBC AUTO: 98.1 FL (ref 81–99)
MODALITY: ABNORMAL
MONOCYTES # BLD AUTO: 0.91 10*3/MM3 (ref 0–0.9)
MONOCYTES NFR BLD AUTO: 5.9 % (ref 0–12)
NEUTROPHILS # BLD AUTO: 11.99 10*3/MM3 (ref 2–6.9)
NEUTROPHILS NFR BLD AUTO: 78.3 % (ref 37–80)
NRBC BLD MANUAL-RTO: 0 /100 WBC (ref 0–0)
PCO2 BLDA: 54.1 MM HG (ref 35–45)
PH BLDA: 7.44 PH UNITS (ref 7.3–7.5)
PLATELET # BLD AUTO: 347 10*3/MM3 (ref 130–400)
PMV BLD AUTO: 10.8 FL (ref 6–12)
PO2 BLDA: 92.5 MM HG (ref 75–100)
POTASSIUM BLD-SCNC: 4 MMOL/L (ref 3.5–5.1)
RBC # BLD AUTO: 4.28 10*6/MM3 (ref 4.2–5.4)
SAO2 % BLDCOA: 95.7 %
SODIUM BLD-SCNC: 144 MMOL/L (ref 137–145)
VANCOMYCIN TROUGH SERPL-MCNC: 13.18 MCG/ML (ref 5–15)
WBC NRBC COR # BLD: 15.31 10*3/MM3 (ref 4.8–10.8)

## 2018-01-09 PROCEDURE — 99233 SBSQ HOSP IP/OBS HIGH 50: CPT | Performed by: INTERNAL MEDICINE

## 2018-01-09 PROCEDURE — 97116 GAIT TRAINING THERAPY: CPT

## 2018-01-09 PROCEDURE — 25010000002 VANCOMYCIN PER 500 MG: Performed by: INTERNAL MEDICINE

## 2018-01-09 PROCEDURE — 94668 MNPJ CHEST WALL SBSQ: CPT

## 2018-01-09 PROCEDURE — 25010000002 METHYLPREDNISOLONE PER 40 MG: Performed by: INTERNAL MEDICINE

## 2018-01-09 PROCEDURE — 82805 BLOOD GASES W/O2 SATURATION: CPT

## 2018-01-09 PROCEDURE — 94660 CPAP INITIATION&MGMT: CPT

## 2018-01-09 PROCEDURE — 94799 UNLISTED PULMONARY SVC/PX: CPT

## 2018-01-09 PROCEDURE — 82962 GLUCOSE BLOOD TEST: CPT

## 2018-01-09 PROCEDURE — 63710000001 INSULIN DETEMIR PER 5 UNITS: Performed by: NURSE PRACTITIONER

## 2018-01-09 PROCEDURE — 36600 WITHDRAWAL OF ARTERIAL BLOOD: CPT

## 2018-01-09 PROCEDURE — 25010000002 ENOXAPARIN PER 10 MG: Performed by: INTERNAL MEDICINE

## 2018-01-09 PROCEDURE — 80048 BASIC METABOLIC PNL TOTAL CA: CPT | Performed by: NURSE PRACTITIONER

## 2018-01-09 PROCEDURE — 25010000002 PIPERACILLIN SOD-TAZOBACTAM PER 1 G: Performed by: INTERNAL MEDICINE

## 2018-01-09 PROCEDURE — 97110 THERAPEUTIC EXERCISES: CPT

## 2018-01-09 PROCEDURE — 99232 SBSQ HOSP IP/OBS MODERATE 35: CPT | Performed by: NURSE PRACTITIONER

## 2018-01-09 PROCEDURE — 71045 X-RAY EXAM CHEST 1 VIEW: CPT

## 2018-01-09 PROCEDURE — 80202 ASSAY OF VANCOMYCIN: CPT | Performed by: INTERNAL MEDICINE

## 2018-01-09 PROCEDURE — 63710000001 INSULIN ASPART PER 5 UNITS: Performed by: INTERNAL MEDICINE

## 2018-01-09 PROCEDURE — 85025 COMPLETE CBC W/AUTO DIFF WBC: CPT | Performed by: NURSE PRACTITIONER

## 2018-01-09 RX ADMIN — INSULIN ASPART 6 UNITS: 100 INJECTION, SOLUTION INTRAVENOUS; SUBCUTANEOUS at 17:11

## 2018-01-09 RX ADMIN — BUSPIRONE HYDROCHLORIDE 10 MG: 10 TABLET ORAL at 17:11

## 2018-01-09 RX ADMIN — BUSPIRONE HYDROCHLORIDE 10 MG: 10 TABLET ORAL at 21:42

## 2018-01-09 RX ADMIN — POTASSIUM CHLORIDE 20 MEQ: 20 TABLET, EXTENDED RELEASE ORAL at 09:24

## 2018-01-09 RX ADMIN — BUDESONIDE 0.5 MG: 0.5 INHALANT RESPIRATORY (INHALATION) at 19:44

## 2018-01-09 RX ADMIN — INSULIN ASPART 2 UNITS: 100 INJECTION, SOLUTION INTRAVENOUS; SUBCUTANEOUS at 12:02

## 2018-01-09 RX ADMIN — GUAIFENESIN 600 MG: 600 TABLET, EXTENDED RELEASE ORAL at 21:42

## 2018-01-09 RX ADMIN — ENOXAPARIN SODIUM 40 MG: 40 INJECTION SUBCUTANEOUS at 17:11

## 2018-01-09 RX ADMIN — METOPROLOL TARTRATE 25 MG: 25 TABLET ORAL at 09:24

## 2018-01-09 RX ADMIN — Medication 10 ML: at 17:11

## 2018-01-09 RX ADMIN — ASPIRIN 81 MG: 81 TABLET, COATED ORAL at 09:24

## 2018-01-09 RX ADMIN — ATORVASTATIN CALCIUM 10 MG: 10 TABLET, FILM COATED ORAL at 09:24

## 2018-01-09 RX ADMIN — KETOTIFEN FUMARATE 1 DROP: 0.35 SOLUTION/ DROPS OPHTHALMIC at 21:43

## 2018-01-09 RX ADMIN — VANCOMYCIN HYDROCHLORIDE 1500 MG: 500 INJECTION, POWDER, LYOPHILIZED, FOR SOLUTION INTRAVENOUS at 14:26

## 2018-01-09 RX ADMIN — TAZOBACTAM SODIUM AND PIPERACILLIN SODIUM 3.38 G: 375; 3 INJECTION, SOLUTION INTRAVENOUS at 05:36

## 2018-01-09 RX ADMIN — IPRATROPIUM BROMIDE AND ALBUTEROL SULFATE 3 ML: .5; 3 SOLUTION RESPIRATORY (INHALATION) at 00:04

## 2018-01-09 RX ADMIN — IPRATROPIUM BROMIDE AND ALBUTEROL SULFATE 3 ML: .5; 3 SOLUTION RESPIRATORY (INHALATION) at 07:14

## 2018-01-09 RX ADMIN — BUDESONIDE 0.5 MG: 0.5 INHALANT RESPIRATORY (INHALATION) at 07:14

## 2018-01-09 RX ADMIN — KETOTIFEN FUMARATE 1 DROP: 0.35 SOLUTION/ DROPS OPHTHALMIC at 09:25

## 2018-01-09 RX ADMIN — METOPROLOL TARTRATE 25 MG: 25 TABLET ORAL at 21:42

## 2018-01-09 RX ADMIN — GUAIFENESIN 600 MG: 600 TABLET, EXTENDED RELEASE ORAL at 09:24

## 2018-01-09 RX ADMIN — PANTOPRAZOLE SODIUM 40 MG: 40 TABLET, DELAYED RELEASE ORAL at 09:24

## 2018-01-09 RX ADMIN — INSULIN ASPART 4 UNITS: 100 INJECTION, SOLUTION INTRAVENOUS; SUBCUTANEOUS at 06:47

## 2018-01-09 RX ADMIN — METHYLPREDNISOLONE SODIUM SUCCINATE 40 MG: 40 INJECTION, POWDER, FOR SOLUTION INTRAMUSCULAR; INTRAVENOUS at 17:10

## 2018-01-09 RX ADMIN — ALLOPURINOL 300 MG: 100 TABLET ORAL at 09:24

## 2018-01-09 RX ADMIN — PIPERACILLIN SODIUM AND TAZOBACTAM SODIUM 4.5 G: 3; .375 INJECTION, POWDER, FOR SOLUTION INTRAVENOUS at 17:11

## 2018-01-09 RX ADMIN — METHYLPREDNISOLONE SODIUM SUCCINATE 40 MG: 40 INJECTION, POWDER, FOR SOLUTION INTRAMUSCULAR; INTRAVENOUS at 05:36

## 2018-01-09 RX ADMIN — IPRATROPIUM BROMIDE AND ALBUTEROL SULFATE 3 ML: .5; 3 SOLUTION RESPIRATORY (INHALATION) at 19:44

## 2018-01-09 RX ADMIN — INSULIN ASPART 6 UNITS: 100 INJECTION, SOLUTION INTRAVENOUS; SUBCUTANEOUS at 21:42

## 2018-01-09 RX ADMIN — IPRATROPIUM BROMIDE AND ALBUTEROL SULFATE 3 ML: .5; 3 SOLUTION RESPIRATORY (INHALATION) at 12:53

## 2018-01-09 RX ADMIN — BUSPIRONE HYDROCHLORIDE 10 MG: 10 TABLET ORAL at 09:24

## 2018-01-09 RX ADMIN — INSULIN DETEMIR 45 UNITS: 100 INJECTION, SOLUTION SUBCUTANEOUS at 21:44

## 2018-01-09 NOTE — PLAN OF CARE
Problem: Patient Care Overview (Adult)  Goal: Plan of Care Review  Outcome: Ongoing (interventions implemented as appropriate)   01/09/18 1640   Coping/Psychosocial Response Interventions   Plan Of Care Reviewed With patient   Outcome Evaluation   Outcome Summary/Follow up Plan Pt with decreased assist for transfers and amb and increased distance amb and more stable during amb with AD. con't with PT POC   Patient Care Overview   Progress progress toward functional goals as expected       Problem: Inpatient Physical Therapy  Goal: Transfer Training Goal 1 LTG- PT  Outcome: Ongoing (interventions implemented as appropriate)   01/07/18 1930 01/09/18 1620   Transfer Training PT LTG   Transfer Training PT LTG, Date Established 01/07/18 --    Transfer Training PT LTG, Time to Achieve 2 wks --    Transfer Training PT LTG, Activity Type bed to chair /chair to bed;sit to stand/stand to sit --    Transfer Training PT LTG, [place] Level supervision required --    Transfer Training PT LTG, Additional Goal with oxygen saturation level at least 90%, without SOA --    Transfer Training PT LTG, Outcome --  goal ongoing     Goal: Gait Training Goal LTG- PT  Outcome: Ongoing (interventions implemented as appropriate)   01/07/18 1930 01/09/18 1620   Gait Training PT LTG   Gait Training Goal PT LTG, Date Established 01/07/18 --    Gait Training Goal PT LTG, Time to Achieve 2 wks --    Gait Training Goal PT LTG, [place] Level contact guard assist --    Gait Training Goal PT LTG, Distance to Achieve 400 with oxygen saturation levels above 90% --    Gait Training Goal PT LTG, Additional Goal with minimal SOA --    Gait Training Goal PT LTG, Outcome --  goal partially met

## 2018-01-09 NOTE — PLAN OF CARE
Problem: Pneumonia (Adult)  Goal: Signs and Symptoms of Listed Potential Problems Will be Absent or Manageable (Pneumonia)   01/09/18 0520   Pneumonia   Problems Assessed (Pneumonia) progression of infection;fluid/electrolyte imbalance   Problems Present (Pneumonia) none

## 2018-01-09 NOTE — PROGRESS NOTES
"  CC: Acute Hypoxic Respiratory Failure.    S: Continues to be short of breath.  Status post bronchoscopy yesterday.  Continues to have occasional cough and phlegm production.  Feels somewhat better today.    O: BP 99/77  Pulse 80  Temp 98 °F (36.7 °C)  Resp 18  Ht 147.3 cm (58\")  Wt 93.8 kg (206 lb 12.8 oz)  LMP  (LMP Unknown)  SpO2 92%  BMI 43.22 kg/m2      General: No significant respiratory distress noted.  Neck: No JVD.   Cardiovascular: S1 + S2.  Regular.   Respiratory: Minimal scattered wheezing heard.  Left greater than right crackles noted.  Definitely improved air entry in the left upper lung zone.  Abdomen: Soft.  Bowel sounds positive.  No obvious organomegaly.  Extremities: Trace  edema noted.  Neurologic:  AAOx3. Was able to follow commands.     Labs: Reviewed.       Results from last 7 days  Lab Units 01/09/18  0605 01/08/18  0607 01/06/18  0637   SODIUM mmol/L 144 143 141   POTASSIUM mmol/L 4.0 4.4 4.6   CHLORIDE mmol/L 100 97* 93*   CO2 mmol/L 37.0* 38.0* 40.0*   BUN mg/dL 42* 44* 55*   CREATININE mg/dL 1.10 1.20 1.60*   CALCIUM mg/dL 9.4 9.7 9.2   GLUCOSE mg/dL 267* 281* 386*       Results from last 7 days  Lab Units 01/09/18  0605 01/08/18  0607 01/05/18  0603   WBC 10*3/mm3 15.31* 15.71* 12.84*   HEMOGLOBIN g/dL 13.7 13.4 13.2   PLATELETS 10*3/mm3 347 370 341         Results from last 7 days  Lab Units 01/08/18  0607 01/05/18  0603   INR  1.10 1.17*     Lab Results   Component Value Date    PHART 7.444 01/09/2018    TOX5YOB 54.1 (H) 01/09/2018    PO2ART 92.5 01/09/2018    HGBBG 13.8 01/09/2018    F5YIUSLH 95.7 01/09/2018         Pharmacy Consult        CXRay: Improved aeration of the left lung base with a worsening  right basilar opacity which may reflect atelectasis or pneumonia.      Assessment & Recommendations/Plan:   1.  Acute hypoxic respiratory failure  Continues to require high flow oxygen although the requirements have decreased since bronchoscopy and she is currently on 4 L " nasal cannula.  We will advise nursing staff to only increase oxygen supplementation if oxygen saturation less than 88% on the current FiO2.    2.  Left upper lobe healthcare associated pneumonia  On appropriate antibiotics  Cultures pending  We will recommend discontinuation of vancomycin tomorrow morning, if no organism identified on the sputum culture or bronchial alveolar lavage specimen.    3.  Left upper lobe atelectasis?  Continue Flutter valve as well as incentive spirometry.    4.  Chronic hypoxemia  Is on 2 L at home.      5.  Obstructive sleep apnea.    On BiPAP for many years.  Continue BiPAP QHS and PRN.    6.  Chronic hypercarbic and hypoxic respiratory failure (last ABG from 1 January showed pH of 7.41 with a PCO2 55 and PO2 of 62)    7.  Grade 2 Diastolic Dysfunction.   Doesn't appear to be decompensated    8. Likely CKD.   Baseline Creatinine seems to be 1.3-1.5.  Currently 1.1.    Based on the results of bronchoscopy and her clinical progression, further recommendations will be made.    We have reviewed patient's current orders and changes, if any, have been suggested to primary care team. Plan was also discussed with nursing staff, as necessary.     Preliminary pathology results did not show any malignancy.  Acute inflammation was identified.    Ryder Dahl MD  01/09/18  5:55 PM    Dictated utilizing Dragon dictation.

## 2018-01-09 NOTE — THERAPY TREATMENT NOTE
Acute Care - Physical Therapy Treatment Note  [facility]     Patient Name: Mingo Guidry  : 1950  MRN: [id number]  Today's Date: 2018  Onset of Illness/Injury or Date of Surgery Date: 18  Date of Referral to PT: 18  Referring Physician: Dave Hairston MD    Admit Date: 2018    Visit Dx:    ICD-10-CM ICD-9-CM   1. Pneumonia of left upper lobe due to infectious organism J18.1 486   2. Impaired functional mobility, balance, gait, and endurance Z74.09 V49.89     Patient Active Problem List   Diagnosis   • Chronic diastolic heart failure   • Lower extremity edema   • Essential hypertension   • Type 2 diabetes mellitus   • Acute exacerbation of chronic obstructive pulmonary disease (COPD)   • Obesity   • Acid reflux   • Gout   • Seasonal allergies   • Mixed hyperlipidemia   • Acute on chronic respiratory failure with hypoxia   • Pneumonia of left upper lobe due to infectious organism   • Pneumonia               Adult Rehabilitation Note       18 1620 18 1446       Rehab Assessment/Intervention    Discipline physical therapy assistant  -CC physical therapy assistant  -CC     Document Type therapy note (daily note)  -CC therapy note (daily note)  -CC     Subjective Information agree to therapy;complains of;weakness  -CC agree to therapy;complains of;dyspnea  -CC     Patient Effort, Rehab Treatment good  -CC excellent  -CC     Symptoms Noted During/After Treatment shortness of breath;fatigue  -CC shortness of breath  -CC     Precautions/Limitations fall precautions;oxygen therapy device and L/min  -CC fall precautions;oxygen therapy device and L/min  -CC     Specific Treatment Considerations 5 L/min  -CC      Recorded by [CC] Gretchen Zuniga PTA [CC] Gretchen Zuniga PTA     Pain Assessment    Pain Assessment No/denies pain  -CC No/denies pain  -CC     Recorded by [CC] Gretchen Zuniga PTA [CC] Gretchen Zuniga PTA     Cognitive Assessment/Intervention    Current  Cognitive/Communication Assessment  functional  -CC     Orientation Status  oriented x 4  -CC     Follows Commands/Answers Questions  able to follow single-step instructions  -CC     Recorded by  [CC] Gretchen Zuniga PTA     Bed Mobility, Assessment/Treatment    Bed Mobility, Assistive Device head of bed elevated  -CC head of bed elevated  -CC     Bed Mob, Supine to Sit, [place] independent  -CC independent  -CC     Bed Mob, Sit to Supine, [place] independent  -CC independent  -CC     Recorded by [CC] Gretchen Zuniga PTA [CC] Gretchen Zuniga PTA     Transfer Assessment/Treatment    Transfers, Bed-Chair [place]  contact guard assist;stand by assist;verbal cues required  -CC     Transfers, Sit-Stand [place] stand by assist;verbal cues required  -CC contact guard assist;stand by assist;verbal cues required  -CC     Transfers, Stand-Sit [place] stand by assist;verbal cues required  -CC contact guard assist;stand by assist;verbal cues required  -CC     Transfers, Sit-Stand-Sit, Assist Device rolling walker  -CC      Transfer, Safety Issues balance decreased during turns;step length decreased  -      Transfer, Impairments strength decreased;impaired balance  -      Transfer, Comment SOA  -CC SOA  -CC     Recorded by [CC] Gretchen Zuniga PTA [CC] Gretchen Zuniga PTA     Gait Assessment/Treatment    Gait, [place] Level stand by assist  -CC contact guard assist;hand held assist;verbal cues required  -     Gait, Assistive Device rolling walker  -      Gait, Distance (Feet) 38  -CC 30  -CC     Gait, Gait Pattern Analysis swing-through gait  -CC swing-through gait  -CC     Gait, Gait Deviations john decreased;stride width increased;stride length decreased  - stride length decreased;stride width increased;john decreased  -     Gait, Safety Issues supplemental O2;balance decreased during turns;step length decreased  - supplemental O2;balance decreased during  turns;sequencing ability decreased  -CC     Gait, Impairments strength decreased;impaired balance  -CC strength decreased;impaired balance  -CC     Gait, Comment  dyspnea limits mobility, pt would benefit from RW to decrease unsteadiness  -CC     Recorded by [CC] Gretchen Zuniga PTA [CC] Gretchen Zuniga PTA     Therapy Exercises    Bilateral Lower Extremities AROM:;15 reps;supine;ankle pumps/circles;hip abduction/adduction;heel slides;SAQ;SLR  -CC AROM:;15 reps;supine;ankle pumps/circles;heel slides;hip abduction/adduction;quad sets  -CC     Recorded by [CC] Gretchen Zuniga PTA [CC] Gretchen Zuniga PTA     Positioning and Restraints    Pre-Treatment Position in bed  -CC in bed  -CC     Post Treatment Position bed  -CC chair  -CC     In Bed sitting EOB;call light within reach;encouraged to call for assist  -CC      In Chair  reclined;call light within reach;encouraged to call for assist  -CC     Recorded by [RAMONE] Gretchen Zuniga PTA [CC] Gretchen Zuniga PTA       User Key  (r) = Recorded By, (t) = Taken By, (c) = Cosigned By    Initials Name Effective Dates    CC Gretchen Zuniga PTA 10/26/16 -                 IP PT Goals       01/09/18 1620 01/08/18 1446 01/07/18 1930    Transfer Training PT LTG    Transfer Training PT LTG, Date Established   01/07/18  -JR    Transfer Training PT LTG, Time to Achieve   2 wks  -JR    Transfer Training PT LTG, Activity Type   bed to chair /chair to bed;sit to stand/stand to sit  -JR    Transfer Training PT LTG, [place] Level   supervision required  -JR    Transfer Training PT LTG, Additional Goal   with oxygen saturation level at least 90%, without SOA  -JR    Transfer Training PT LTG, Outcome goal ongoing  -CC goal ongoing  -CC     Gait Training PT LTG    Gait Training Goal PT LTG, Date Established   01/07/18  -JR    Gait Training Goal PT LTG, Time to Achieve   2 wks  -JR    Gait Training Goal PT LTG, [place] Level   contact guard assist  -JR    Gait Training  Goal PT LTG, Distance to Achieve   400 with oxygen saturation levels above 90%  -    Gait Training Goal PT LTG, Additional Goal   with minimal SOA  -JR    Gait Training Goal PT LTG, Outcome goal partially met  -CC goal partially met  -CC       User Key  (r) = Recorded By, (t) = Taken By, (c) = Cosigned By    Initials Name Provider Type    JR Vivi Sahni, PT Physical Therapist    CC Gretchen Zuniga, JOSE Physical Therapy Assistant          Physical Therapy Education     Title: PT OT SLP Therapies (Active)     Topic: Physical Therapy (Active)     Point: Mobility training (Done)    Learning Progress Summary    Learner Readiness Method Response Comment Documented by Status   Patient Acceptance E VU,NR increase mobility daily  01/09/18 1712 Done    Acceptance E VU Importance of breathing coordination with activity  01/08/18 1024 Done               Point: Home exercise program (Done)    Learning Progress Summary    Learner Readiness Method Response Comment Documented by Status   Patient Acceptance E VU,NR Perform ex daily when d/c home  01/08/18 1716 Done                      User Key     Initials Effective Dates Name Provider Type Discipline     10/26/16 -  Vivi Sahni, PT Physical Therapist PT     10/26/16 -  Gretchen Zuniga, JOSE Physical Therapy Assistant PT                    PT Recommendation and Plan  Anticipated Discharge Disposition: home with assist (outpatient rehab setting)  Planned Therapy Interventions: strengthening, transfer training, gait training, patient/family education  PT Frequency: daily  Plan of Care Review  Plan Of Care Reviewed With: patient  Progress: progress toward functional goals as expected  Outcome Summary/Follow up Plan: Pt with decreased assist for transfers and amb and increased distance amb and more stable during amb with AD. con't with PT POC          Outcome Measures       01/09/18 1700 01/08/18 1446 01/07/18 1611    How much help from another person do you currently  need...    Turning from your back to your side while in flat bed without using bedrails? 4  -CC 4  -CC 4  -JR    Moving from lying on back to sitting on the side of a flat bed without bedrails? 4  -CC 4  -CC 4  -JR    Moving to and from a bed to a chair (including a wheelchair)? 3  -CC 3  -CC 3  -JR    Standing up from a chair using your arms (e.g., wheelchair, bedside chair)? 4  -CC 3  -CC 3  -JR    Climbing 3-5 steps with a railing? 3  -CC 3  -CC 3  -JR    To walk in hospital room? 3  -CC 3  -CC 3  -JR    AM-PAC 6 Clicks Score 21  -CC 20  -CC 20  -JR    Functional Assessment    Outcome Measure Options AM-PAC 6 Clicks Basic Mobility (PT)  -CC AM-PAC 6 Clicks Basic Mobility (PT)  -CC AM-PAC 6 Clicks Basic Mobility (PT)  -      User Key  (r) = Recorded By, (t) = Taken By, (c) = Cosigned By    Initials Name Provider Type    JR Vivi Sahni, PT Physical Therapist    CC Gretchen Zuniga PTA Physical Therapy Assistant           Time Calculation:         PT Charges       01/09/18 1620          Time Calculation    Start Time 1620  -CC      PT Received On 01/09/18  -      PT Goal Re-Cert Due Date 01/17/18  -      Time Calculation- PT    Total Timed Code Minutes- PT 30 minute(s)  -        User Key  (r) = Recorded By, (t) = Taken By, (c) = Cosigned By    Initials Name Provider Type     Gretchen Zuniga PTA Physical Therapy Assistant          Therapy Charges for Today     Code Description Service Date Service Provider Modifiers Qty    06743666162 HC GAIT TRAINING EA 15 MIN 1/8/2018 Gretchen Zuniga PTA GP 1    18126690962 HC PT THER PROC EA 15 MIN 1/8/2018 Gretchen Zuniga PTA GP 1    30295421565 HC GAIT TRAINING EA 15 MIN 1/9/2018 Gretchen Zuniga PTA GP 1    40116628948 HC PT THER PROC EA 15 MIN 1/9/2018 Gretchen Zuniga PTA GP 1          PT G-Codes  Outcome Measure Options: AM-PAC 6 Clicks Basic Mobility (PT)    Gretchen Zuniga PTA  1/9/2018

## 2018-01-09 NOTE — PROGRESS NOTES
PROGRESS NOTE        Date of Admission: 1/4/2018  Length of Stay: 5  Primary Care Physician: JACKI Mistry    Subjective   Chief Complaint: Cough, Dyspnea  HPI: This is a 67-year-old female who is transferred from [facility] on 1/4/18 with persistent pneumonia without any improvement.  She has remote history of having lung surgery for a benign tumor and was noted to have a left upper lobe opacity suggestive of an obstructive pneumonia.  She been on IV antibiotics in the form of Zosyn and vancomycin for one week with no improvement.  She was transferred for pulmonary evaluation.  She was seen and evaluated by Dr. antonio he did take her down for bronchoscopy today.  She was noted to have a mucous plug in the left upper lobe.  He has sent cultures and cytology.      She was seen and evaluated today at bedside.  She is feeling some better today.  PT OT has been consulted for strength and mobility.  She is still on high flow oxygen.  Her respiratory cultures and cytology are still pending.  She denies any abdominal pain nausea or vomiting.  Shortness of breath and cough are some better.    Review Of Systems:   Review of Systems   General ROS: Patient denies any fevers, chills or loss of consciousness.    Psychological ROS: Denies any hallucinations and delusions.  Ophthalmic ROS: Denies any diplopia or transient loss of vision.  ENT ROS: Denies sore throat, nasal congestion or ear pain.   Allergy and Immunology ROS: Denies rash or itching.  Hematological and Lymphatic ROS: Denies neck swelling or easy bleeding.  Endocrine ROS: Denies any recent unintentional weight gain or loss.  Breast ROS: Denies any pain or swelling.  Respiratory ROS: cough and shortness of breath.   Cardiovascular ROS: Denies chest pain or palpitations. No history of exertional chest pain.   Gastrointestinal ROS: Denies nausea and vomiting. Denies any abdominal pain. No diarrhea.  Genito-Urinary ROS: Denies dysuria  or hematuria.  Musculoskeletal ROS: Denies back pain. No muscle pain. No calf pain.   Neurological ROS: Denies any focal weakness. No loss of consciousness. Denies any numbness. Denies neck pain.   Dermatological ROS: Denies any redness or pruritis.    Objective      Temp:  [97.5 °F (36.4 °C)-98.3 °F (36.8 °C)] 98 °F (36.7 °C)  Heart Rate:  [62-79] 74  Resp:  [18-22] 18  BP: (129-155)/(58-74) 148/58  Physical Exam    General Appearance:  Alert and cooperative, not in any acute distress.   Head:  Atraumatic and normocephalic, without obvious abnormality.   Eyes:          PERRLA, conjunctivae and sclerae normal, no Icterus. No pallor. Extra-occular movements are within normal limits.   Ears:  Ears appear intact with no abnormalities noted.   Throat: No oral lesions, no thrush, oral mucosa moist.   Neck: Supple, trachea midline, no thyromegaly, no carotid bruit.   Back:   No kyphoscoliosis present. No tenderness to palpation,   range of motion normal.   Lungs:   Chest shape is normal. Breath sounds heard bilaterally equally.  Few crackles Wheezing improved. No Pleural rub or bronchial breathing.   Heart:  Normal S1 and S2, no murmur, no gallop, no rub. No JVD   Abdomen:   Normal bowel sounds, no masses, no organomegaly. Soft     non-tender, non-distended, no guarding, no rebound                Tenderness, obese   Extremities: Moves all extremities well, trace to +1 edema, no cyanosis, no clubbing.   Pulses: Pulses palpable and equal bilaterally   Skin: No bleeding, bruising or rash   Lymph nodes: No palpable adenopathy   Neurologic:    Psychiatric/Behavior:     Cranial nerves 2 - 12 grossly intact, sensation intact, Motor power is normal and equal bilaterally.  Mood normal, behavior normal       Results Review:    I have reviewed the labs, radiology results and diagnostic studies.      Results from last 7 days  Lab Units 01/09/18  0605   WBC 10*3/mm3 15.31*   HEMOGLOBIN g/dL 13.7   PLATELETS 10*3/mm3 347       Results  from last 7 days  Lab Units 01/09/18  0605   SODIUM mmol/L 144   POTASSIUM mmol/L 4.0   CO2 mmol/L 37.0*   CREATININE mg/dL 1.10   GLUCOSE mg/dL 267*       Culture Data:    Radiology Data:   Cardiology Data:    I have reviewed the medications.    Assessment/Plan     Assessment and Plan:  1.  Acute on chronic hypoxic respiratory failure-  Patient is on high flow oxygen  Will continue to work to decrease his oxygen needs.      2.  Persistent left upper lobe pneumonia with failure of outpatient antibiotic status post bronchoscopy with mucous plugging noted to be in left upper lobe.  Cytology and cultures have been sent.  Patient is currently on IV antibiotics in the form of Zosyn and vancomycin as well as breathing treatments, mucolytics, nebulized steroids as well as IV steroids.  Since respiratory cultures and cytology are still pending.    3.  Diabetes mellitus type 2 patient is on insulin protocol as well as Levemir.  Lites sugars are still elevated which is likely secondary to the steroids.  We will increase her Levemir.    4.  Chronic essential hypertension-blood pressure stable continue to monitor    5.  Chronic kidney disease CKD II- stable    6.  Obstructive sleep apnea on home BiPAP    7.  Chronic stable diastolic congestive heart failure ejection fraction unknown-medical management          DVT prophylaxis:  Lovenox  Discharge Planning:   Deb Garcia, JACKI 01/09/18 3:21 PM

## 2018-01-09 NOTE — PROGRESS NOTES
Continued Stay Note  [facility]     Patient Name: Mingo Guidry  MRN: [id number]  Today's Date: 1/9/2018    Admit Date: 1/4/2018          Discharge Plan       01/09/18 1717    Case Management/Social Work Plan    Plan Home with HH    Additional Comments Followed up with pt regarding DCP.  She states that she is planning to return home at discharge.  She does states that she feels weak.  Rehab vs HH options discussed.  Pt will think about her options.  CM will continue to follow and assist with discharge as needed.              Discharge Codes     None        Expected Discharge Date and Time     Expected Discharge Date Expected Discharge Time    [place] 10, 2018             Ana Oneil

## 2018-01-09 NOTE — PLAN OF CARE
Problem: Patient Care Overview (Adult)  Goal: Plan of Care Review  Outcome: Ongoing (interventions implemented as appropriate)   01/09/18 1100   Coping/Psychosocial Response Interventions   Plan Of Care Reviewed With patient   Patient Care Overview   Progress improving       Problem: Respiratory Insufficiency (Adult)  Goal: Acid/Base Balance  Outcome: Ongoing (interventions implemented as appropriate)      Problem: Pneumonia (Adult)  Goal: Signs and Symptoms of Listed Potential Problems Will be Absent or Manageable (Pneumonia)  Outcome: Ongoing (interventions implemented as appropriate)      Problem: Perioperative Period (Pediatric)  Goal: Signs and Symptoms of Listed Potential Problems Will be Absent or Manageable (Perioperative Period)  Outcome: Ongoing (interventions implemented as appropriate)

## 2018-01-09 NOTE — PLAN OF CARE
Problem: NPPV/CPAP (Adult)  Intervention: Prevent Aspiration During Therapy   01/09/18 0100   Positioning   Head Of Bed (HOB) Position HOB at 20-30 degrees     Intervention: Assess/Manage Patient Tolerance of Noninvasive Ventilation   01/09/18 0239   Respiratory Interventions   Airway/Ventilation Management airway patency maintained     Intervention: Prevent/Minimize Device Friction/Shearing and Pressure Points   01/09/18 0239   Respiratory Interventions   NPPV/CPAP Maintenance mask adjusted;other (see comments)  (proper mask fit)       Goal: Signs and Symptoms of Listed Potential Problems Will be Absent or Manageable (NPPV/CPAP)  Outcome: Ongoing (interventions implemented as appropriate)   01/09/18 0239   NPPV/CPAP   Problems Assessed (NPPV/CPAP) hypoxia/hypoxemia;situational response;skin breakdown;dry mucous membranes   Problems Present (NPPV/CPAP) none

## 2018-01-10 ENCOUNTER — APPOINTMENT (OUTPATIENT)
Dept: GENERAL RADIOLOGY | Facility: HOSPITAL | Age: 68
End: 2018-01-10
Attending: INTERNAL MEDICINE

## 2018-01-10 LAB
ANION GAP SERPL CALCULATED.3IONS-SCNC: 10.5 MMOL/L
BACTERIA SPEC RESP CULT: NORMAL
BASOPHILS # BLD AUTO: 0.03 10*3/MM3 (ref 0–0.2)
BASOPHILS NFR BLD AUTO: 0.2 % (ref 0–2.5)
BUN BLD-MCNC: 37 MG/DL (ref 7–20)
BUN/CREAT SERPL: 30.8 (ref 7.1–23.5)
CALCIUM SPEC-SCNC: 9.3 MG/DL (ref 8.4–10.2)
CHLORIDE SERPL-SCNC: 105 MMOL/L (ref 98–107)
CO2 SERPL-SCNC: 32 MMOL/L (ref 26–30)
CREAT BLD-MCNC: 1.2 MG/DL (ref 0.6–1.3)
DEPRECATED RDW RBC AUTO: 49.2 FL (ref 37–54)
EOSINOPHIL # BLD AUTO: 0.01 10*3/MM3 (ref 0–0.7)
EOSINOPHIL NFR BLD AUTO: 0.1 % (ref 0–7)
ERYTHROCYTE [DISTWIDTH] IN BLOOD BY AUTOMATED COUNT: 13.7 % (ref 11.5–14.5)
GFR SERPL CREATININE-BSD FRML MDRD: 45 ML/MIN/1.73
GLUCOSE BLD-MCNC: 319 MG/DL (ref 74–98)
GLUCOSE BLDC GLUCOMTR-MCNC: 284 MG/DL (ref 70–130)
HCT VFR BLD AUTO: 40.4 % (ref 37–47)
HGB BLD-MCNC: 13.1 G/DL (ref 12–16)
IMM GRANULOCYTES # BLD: 0.31 10*3/MM3 (ref 0–0.06)
IMM GRANULOCYTES NFR BLD: 2.2 % (ref 0–0.6)
LYMPHOCYTES # BLD AUTO: 1.96 10*3/MM3 (ref 0.6–3.4)
LYMPHOCYTES NFR BLD AUTO: 13.8 % (ref 10–50)
MCH RBC QN AUTO: 31.9 PG (ref 27–31)
MCHC RBC AUTO-ENTMCNC: 32.4 G/DL (ref 30–37)
MCV RBC AUTO: 98.3 FL (ref 81–99)
MONOCYTES # BLD AUTO: 0.79 10*3/MM3 (ref 0–0.9)
MONOCYTES NFR BLD AUTO: 5.6 % (ref 0–12)
NEUTROPHILS # BLD AUTO: 11.07 10*3/MM3 (ref 2–6.9)
NEUTROPHILS NFR BLD AUTO: 78.1 % (ref 37–80)
NRBC BLD MANUAL-RTO: 0 /100 WBC (ref 0–0)
PLATELET # BLD AUTO: 313 10*3/MM3 (ref 130–400)
PMV BLD AUTO: 10.8 FL (ref 6–12)
POTASSIUM BLD-SCNC: 4.5 MMOL/L (ref 3.5–5.1)
RBC # BLD AUTO: 4.11 10*6/MM3 (ref 4.2–5.4)
SODIUM BLD-SCNC: 143 MMOL/L (ref 137–145)
WBC NRBC COR # BLD: 14.17 10*3/MM3 (ref 4.8–10.8)

## 2018-01-10 PROCEDURE — 97116 GAIT TRAINING THERAPY: CPT

## 2018-01-10 PROCEDURE — 99232 SBSQ HOSP IP/OBS MODERATE 35: CPT | Performed by: NURSE PRACTITIONER

## 2018-01-10 PROCEDURE — 25010000002 PIPERACILLIN SOD-TAZOBACTAM PER 1 G: Performed by: INTERNAL MEDICINE

## 2018-01-10 PROCEDURE — 94799 UNLISTED PULMONARY SVC/PX: CPT

## 2018-01-10 PROCEDURE — 63710000001 INSULIN ASPART PER 5 UNITS: Performed by: INTERNAL MEDICINE

## 2018-01-10 PROCEDURE — 25010000002 PIPERACILLIN SOD-TAZOBACTAM PER 1 G: Performed by: NURSE PRACTITIONER

## 2018-01-10 PROCEDURE — 71046 X-RAY EXAM CHEST 2 VIEWS: CPT

## 2018-01-10 PROCEDURE — 25010000002 ENOXAPARIN PER 10 MG: Performed by: INTERNAL MEDICINE

## 2018-01-10 PROCEDURE — 80048 BASIC METABOLIC PNL TOTAL CA: CPT | Performed by: NURSE PRACTITIONER

## 2018-01-10 PROCEDURE — 99232 SBSQ HOSP IP/OBS MODERATE 35: CPT | Performed by: INTERNAL MEDICINE

## 2018-01-10 PROCEDURE — 97110 THERAPEUTIC EXERCISES: CPT

## 2018-01-10 PROCEDURE — 25010000002 METHYLPREDNISOLONE PER 40 MG: Performed by: INTERNAL MEDICINE

## 2018-01-10 PROCEDURE — 82962 GLUCOSE BLOOD TEST: CPT

## 2018-01-10 PROCEDURE — 85025 COMPLETE CBC W/AUTO DIFF WBC: CPT | Performed by: NURSE PRACTITIONER

## 2018-01-10 PROCEDURE — 63710000001 INSULIN DETEMIR PER 5 UNITS: Performed by: NURSE PRACTITIONER

## 2018-01-10 PROCEDURE — 94660 CPAP INITIATION&MGMT: CPT

## 2018-01-10 RX ORDER — SYRINGE AND NEEDLE,INSULIN,1ML 31 GX5/16"
SYRINGE, EMPTY DISPOSABLE MISCELLANEOUS
Qty: 100 EACH | Refills: 3 | Status: SHIPPED | OUTPATIENT
Start: 2018-01-10 | End: 2018-03-09 | Stop reason: SDUPTHER

## 2018-01-10 RX ORDER — METHYLPREDNISOLONE SODIUM SUCCINATE 40 MG/ML
30 INJECTION, POWDER, LYOPHILIZED, FOR SOLUTION INTRAMUSCULAR; INTRAVENOUS EVERY 12 HOURS
Status: DISCONTINUED | OUTPATIENT
Start: 2018-01-10 | End: 2018-01-11 | Stop reason: HOSPADM

## 2018-01-10 RX ADMIN — GUAIFENESIN 600 MG: 600 TABLET, EXTENDED RELEASE ORAL at 10:00

## 2018-01-10 RX ADMIN — ATORVASTATIN CALCIUM 10 MG: 10 TABLET, FILM COATED ORAL at 10:00

## 2018-01-10 RX ADMIN — INSULIN DETEMIR 50 UNITS: 100 INJECTION, SOLUTION SUBCUTANEOUS at 22:00

## 2018-01-10 RX ADMIN — INSULIN ASPART 7 UNITS: 100 INJECTION, SOLUTION INTRAVENOUS; SUBCUTANEOUS at 22:00

## 2018-01-10 RX ADMIN — METOPROLOL TARTRATE 25 MG: 25 TABLET ORAL at 22:00

## 2018-01-10 RX ADMIN — METOPROLOL TARTRATE 25 MG: 25 TABLET ORAL at 10:00

## 2018-01-10 RX ADMIN — INSULIN ASPART 6 UNITS: 100 INJECTION, SOLUTION INTRAVENOUS; SUBCUTANEOUS at 06:48

## 2018-01-10 RX ADMIN — METHYLPREDNISOLONE SODIUM SUCCINATE 40 MG: 40 INJECTION, POWDER, FOR SOLUTION INTRAMUSCULAR; INTRAVENOUS at 06:48

## 2018-01-10 RX ADMIN — POTASSIUM CHLORIDE 20 MEQ: 20 TABLET, EXTENDED RELEASE ORAL at 10:29

## 2018-01-10 RX ADMIN — GUAIFENESIN 600 MG: 600 TABLET, EXTENDED RELEASE ORAL at 22:00

## 2018-01-10 RX ADMIN — METHYLPREDNISOLONE SODIUM SUCCINATE 30 MG: 40 INJECTION, POWDER, FOR SOLUTION INTRAMUSCULAR; INTRAVENOUS at 17:00

## 2018-01-10 RX ADMIN — BUSPIRONE HYDROCHLORIDE 10 MG: 10 TABLET ORAL at 22:00

## 2018-01-10 RX ADMIN — PIPERACILLIN SODIUM AND TAZOBACTAM SODIUM 4.5 G: 3; .375 INJECTION, POWDER, FOR SOLUTION INTRAVENOUS at 00:12

## 2018-01-10 RX ADMIN — INSULIN ASPART 6 UNITS: 100 INJECTION, SOLUTION INTRAVENOUS; SUBCUTANEOUS at 12:30

## 2018-01-10 RX ADMIN — INSULIN ASPART 7 UNITS: 100 INJECTION, SOLUTION INTRAVENOUS; SUBCUTANEOUS at 17:11

## 2018-01-10 RX ADMIN — PIPERACILLIN SODIUM AND TAZOBACTAM SODIUM 4.5 G: 3; .375 INJECTION, POWDER, FOR SOLUTION INTRAVENOUS at 10:00

## 2018-01-10 RX ADMIN — ENOXAPARIN SODIUM 40 MG: 40 INJECTION SUBCUTANEOUS at 17:00

## 2018-01-10 RX ADMIN — ALLOPURINOL 300 MG: 100 TABLET ORAL at 10:00

## 2018-01-10 RX ADMIN — ASPIRIN 81 MG: 81 TABLET, COATED ORAL at 10:00

## 2018-01-10 RX ADMIN — IPRATROPIUM BROMIDE AND ALBUTEROL SULFATE 3 ML: .5; 3 SOLUTION RESPIRATORY (INHALATION) at 20:19

## 2018-01-10 RX ADMIN — BUSPIRONE HYDROCHLORIDE 10 MG: 10 TABLET ORAL at 17:00

## 2018-01-10 RX ADMIN — PANTOPRAZOLE SODIUM 40 MG: 40 TABLET, DELAYED RELEASE ORAL at 10:00

## 2018-01-10 RX ADMIN — IPRATROPIUM BROMIDE AND ALBUTEROL SULFATE 3 ML: .5; 3 SOLUTION RESPIRATORY (INHALATION) at 13:29

## 2018-01-10 RX ADMIN — KETOTIFEN FUMARATE 1 DROP: 0.35 SOLUTION/ DROPS OPHTHALMIC at 10:00

## 2018-01-10 RX ADMIN — PIPERACILLIN SODIUM AND TAZOBACTAM SODIUM 4.5 G: 3; .375 INJECTION, POWDER, FOR SOLUTION INTRAVENOUS at 23:57

## 2018-01-10 RX ADMIN — PIPERACILLIN SODIUM AND TAZOBACTAM SODIUM 4.5 G: 3; .375 INJECTION, POWDER, FOR SOLUTION INTRAVENOUS at 17:00

## 2018-01-10 RX ADMIN — IPRATROPIUM BROMIDE AND ALBUTEROL SULFATE 3 ML: .5; 3 SOLUTION RESPIRATORY (INHALATION) at 04:30

## 2018-01-10 RX ADMIN — KETOTIFEN FUMARATE 1 DROP: 0.35 SOLUTION/ DROPS OPHTHALMIC at 22:00

## 2018-01-10 RX ADMIN — IPRATROPIUM BROMIDE AND ALBUTEROL SULFATE 3 ML: .5; 3 SOLUTION RESPIRATORY (INHALATION) at 07:36

## 2018-01-10 RX ADMIN — BUSPIRONE HYDROCHLORIDE 10 MG: 10 TABLET ORAL at 10:00

## 2018-01-10 RX ADMIN — BUDESONIDE 0.5 MG: 0.5 INHALANT RESPIRATORY (INHALATION) at 07:36

## 2018-01-10 RX ADMIN — IPRATROPIUM BROMIDE AND ALBUTEROL SULFATE 3 ML: .5; 3 SOLUTION RESPIRATORY (INHALATION) at 00:39

## 2018-01-10 RX ADMIN — BUDESONIDE 0.5 MG: 0.5 INHALANT RESPIRATORY (INHALATION) at 20:19

## 2018-01-10 NOTE — PLAN OF CARE
Problem: Patient Care Overview (Adult)  Goal: Plan of Care Review  Outcome: Ongoing (interventions implemented as appropriate)   01/10/18 0315   Coping/Psychosocial Response Interventions   Plan Of Care Reviewed With patient   Outcome Evaluation   Outcome Summary/Follow up Plan Patient shows SR on cardiac telemetry. VSS. Patient on 5L high-leela NC. Will continue to monitor.   Patient Care Overview   Progress improving

## 2018-01-10 NOTE — THERAPY TREATMENT NOTE
Acute Care - Physical Therapy Treatment Note  [facility]     Patient Name: Mingo Guidry  : 1950  MRN: [id number]  Today's Date: 1/10/2018  Onset of Illness/Injury or Date of Surgery Date: 18  Date of Referral to PT: 18  Referring Physician: Dave Hairston MD    Admit Date: 2018    Visit Dx:    ICD-10-CM ICD-9-CM   1. Pneumonia of left upper lobe due to infectious organism J18.1 486   2. Impaired functional mobility, balance, gait, and endurance Z74.09 V49.89     Patient Active Problem List   Diagnosis   • Chronic diastolic heart failure   • Lower extremity edema   • Essential hypertension   • Type 2 diabetes mellitus   • Acute exacerbation of chronic obstructive pulmonary disease (COPD)   • Obesity   • Acid reflux   • Gout   • Seasonal allergies   • Mixed hyperlipidemia   • Acute on chronic respiratory failure with hypoxia   • Pneumonia of left upper lobe due to infectious organism   • Pneumonia               Adult Rehabilitation Note       01/10/18 1414 18 1620 18 1446    Rehab Assessment/Intervention    Discipline physical therapy assistant  -RM physical therapy assistant  -CC physical therapy assistant  -CC    Document Type therapy note (daily note)  -RM therapy note (daily note)  -CC therapy note (daily note)  -CC    Subjective Information agree to therapy;complains of;fatigue  -RM agree to therapy;complains of;weakness  -CC agree to therapy;complains of;dyspnea  -CC    Patient Effort, Rehab Treatment adequate  -RM good  -CC excellent  -CC    Symptoms Noted During/After Treatment fatigue  -RM shortness of breath;fatigue  -CC shortness of breath  -CC    Precautions/Limitations fall precautions;oxygen therapy device and L/min  -RM fall precautions;oxygen therapy device and L/min  -CC fall precautions;oxygen therapy device and L/min  -CC    Specific Treatment Considerations  5 L/min  -CC     Recorded by [RM] Merlin Douglas PTA [CC] Gretchen Zuniga PTA [CC] Gretchen Zuniga,  PTA    Pain Assessment    Pain Assessment  No/denies pain  -CC No/denies pain  -CC    Recorded by  [CC] Gretchen Zuniga PTA [CC] Gretchen Zuniga PTA    Cognitive Assessment/Intervention    Current Cognitive/Communication Assessment   functional  -CC    Orientation Status   oriented x 4  -CC    Follows Commands/Answers Questions   able to follow single-step instructions  -CC    Recorded by   [CC] Gretchen Zuniga PTA    Bed Mobility, Assessment/Treatment    Bed Mobility, Assistive Device bed rails;head of bed elevated  -RM head of bed elevated  -CC head of bed elevated  -CC    Bed Mob, Supine to Sit, [place] conditional independence  -RM independent  -CC independent  -CC    Bed Mob, Sit to Supine, [place]  independent  -CC independent  -CC    Recorded by [RM] Merlin Douglas PTA [CC] Gretchen Zuniga PTA [CC] Gretchen Zuniga PTA    Transfer Assessment/Treatment    Transfers, Bed-Chair [place]   contact guard assist;stand by assist;verbal cues required  -CC    Transfers, Sit-Stand [place] stand by assist  -RM stand by assist;verbal cues required  -CC contact guard assist;stand by assist;verbal cues required  -CC    Transfers, Stand-Sit [place] stand by assist  -RM stand by assist;verbal cues required  -CC contact guard assist;stand by assist;verbal cues required  -CC    Transfers, Sit-Stand-Sit, Assist Device rolling walker  -RM rolling walker  -CC     Transfer, Safety Issues  balance decreased during turns;step length decreased  -CC     Transfer, Impairments  strength decreased;impaired balance  -CC     Transfer, Comment  SOA  -CC SOA  -CC    Recorded by [RM] Merlin Douglas PTA [CC] Gretchen Zuniga PTA [CC] Gretchen Zuniga PTA    Gait Assessment/Treatment    Gait, [place] Level contact guard assist  -RM stand by assist  -CC contact guard assist;hand held assist;verbal cues required  -CC    Gait, Assistive Device rolling walker  -RM rolling walker  -CC     Gait, Distance  (Feet) 78  -RM 38  -CC 30  -CC    Gait, Gait Pattern Analysis swing-through gait  -RM swing-through gait  -CC swing-through gait  -CC    Gait, Gait Deviations john decreased;stride width increased;step length decreased  -RM john decreased;stride width increased;stride length decreased  -CC stride length decreased;stride width increased;john decreased  -CC    Gait, Safety Issues supplemental O2;step length decreased  -RM supplemental O2;balance decreased during turns;step length decreased  -CC supplemental O2;balance decreased during turns;sequencing ability decreased  -CC    Gait, Impairments impaired balance;strength decreased  -RM strength decreased;impaired balance  -CC strength decreased;impaired balance  -CC    Gait, Comment   dyspnea limits mobility, pt would benefit from RW to decrease unsteadiness  -CC    Recorded by [RM] Merlin Douglas PTA [CC] Gretchen Zuniga PTA [CC] Gretchen Zuniga PTA    Therapy Exercises    Bilateral Lower Extremities AROM:;10 reps;sitting;ankle pumps/circles;glut sets;hip flexion;LAQ  -RM AROM:;15 reps;supine;ankle pumps/circles;hip abduction/adduction;heel slides;SAQ;SLR  -CC AROM:;15 reps;supine;ankle pumps/circles;heel slides;hip abduction/adduction;quad sets  -CC    Recorded by [RM] Merlin Douglas PTA [CC] Gretchen Zuniga PTA [CC] Gretchen Zuniga PTA    Positioning and Restraints    Pre-Treatment Position in bed  -RM in bed  -CC in bed  -CC    Post Treatment Position chair  -RM bed  -CC chair  -CC    In Bed  sitting EOB;call light within reach;encouraged to call for assist  -CC     In Chair reclined;call light within reach;encouraged to call for assist;notified nsg  -  reclined;call light within reach;encouraged to call for assist  -CC    Recorded by [RM] Merlin Douglas PTA [CC] Gretchen Zuniga PTA [CC] Gretchen Zuniga PTA      User Key  (r) = Recorded By, (t) = Taken By, (c) = Cosigned By    Initials Name Effective Dates    CC Gretchen Zuniga PTA  10/26/16 -     RM Merlin Douglas, PTA 10/26/16 -                 IP PT Goals       01/09/18 1620 01/08/18 1446 01/07/18 1930    Transfer Training PT LTG    Transfer Training PT LTG, Date Established   01/07/18  -JR    Transfer Training PT LTG, Time to Achieve   2 wks  -JR    Transfer Training PT LTG, Activity Type   bed to chair /chair to bed;sit to stand/stand to sit  -JR    Transfer Training PT LTG, [place] Level   supervision required  -JR    Transfer Training PT LTG, Additional Goal   with oxygen saturation level at least 90%, without SOA  -JR    Transfer Training PT LTG, Outcome goal ongoing  -CC goal ongoing  -CC     Gait Training PT LTG    Gait Training Goal PT LTG, Date Established   01/07/18  -JR    Gait Training Goal PT LTG, Time to Achieve   2 wks  -JR    Gait Training Goal PT LTG, [place] Level   contact guard assist  -JR    Gait Training Goal PT LTG, Distance to Achieve   400 with oxygen saturation levels above 90%  -JR    Gait Training Goal PT LTG, Additional Goal   with minimal SOA  -JR    Gait Training Goal PT LTG, Outcome goal partially met  -CC goal partially met  -CC       User Key  (r) = Recorded By, (t) = Taken By, (c) = Cosigned By    Initials Name Provider Type    JR Vivi Sahni, PT Physical Therapist    CC Gretchen Zuniga, PTA Physical Therapy Assistant          Physical Therapy Education     Title: PT OT SLP Therapies (Active)     Topic: Physical Therapy (Active)     Point: Mobility training (Done)    Learning Progress Summary    Learner Readiness Method Response Comment Documented by Status   Patient Acceptance EISABELLA DU, NR  RM 01/10/18 1500 Done    Acceptance E KESHIA CHURCH increase mobility daily CC 01/09/18 1712 Done    Acceptance E RANJIT Importance of breathing coordination with activity JR 01/08/18 1024 Done               Point: Home exercise program (Done)    Learning Progress Summary    Learner Readiness Method Response Comment Documented by Status   Patient Acceptance ITALIAD  VU,DU,NR   01/10/18 1500 Done    Acceptance E VU,NR Perform ex daily when d/c home  01/08/18 1716 Done                      User Key     Initials Effective Dates Name Provider Type Discipline     10/26/16 -  Vivi Sahni, PT Physical Therapist PT     10/26/16 -  Gretchen Zuniga, JOSE Physical Therapy Assistant PT     10/26/16 -  Merlin Douglas, JOSE Physical Therapy Assistant PT                    PT Recommendation and Plan  Anticipated Discharge Disposition: home with assist (outpatient rehab setting)  Planned Therapy Interventions: strengthening, transfer training, gait training, patient/family education  PT Frequency: daily             Outcome Measures       01/09/18 1700 01/08/18 1446 01/07/18 1611    How much help from another person do you currently need...    Turning from your back to your side while in flat bed without using bedrails? 4  -CC 4  -CC 4  -JR    Moving from lying on back to sitting on the side of a flat bed without bedrails? 4  -CC 4  -CC 4  -JR    Moving to and from a bed to a chair (including a wheelchair)? 3  -CC 3  -CC 3  -JR    Standing up from a chair using your arms (e.g., wheelchair, bedside chair)? 4  -CC 3  -CC 3  -JR    Climbing 3-5 steps with a railing? 3  -CC 3  -CC 3  -JR    To walk in hospital room? 3  -CC 3  -CC 3  -JR    AM-PAC 6 Clicks Score 21  -CC 20  -CC 20  -JR    Functional Assessment    Outcome Measure Options AM-PAC 6 Clicks Basic Mobility (PT)  -CC AM-PAC 6 Clicks Basic Mobility (PT)  - AM-PAC 6 Clicks Basic Mobility (PT)  -JR      User Key  (r) = Recorded By, (t) = Taken By, (c) = Cosigned By    Initials Name Provider Type    JR Vivi Sahni, PT Physical Therapist    CC Gretchen Zuniga, PTA Physical Therapy Assistant           Time Calculation:         PT Charges       01/10/18 1500          Time Calculation    Start Time 1414  -RM      PT Received On 01/10/18  -RM      PT Goal Re-Cert Due Date 01/17/18  -RM      Time Calculation- PT    Total Timed Code  Minutes- PT 23 minute(s)  -        User Key  (r) = Recorded By, (t) = Taken By, (c) = Cosigned By    Initials Name Provider Type     Merlin Douglas PTA Physical Therapy Assistant          Therapy Charges for Today     Code Description Service Date Service Provider Modifiers Qty    40815219132 HC GAIT TRAINING EA 15 MIN 1/10/2018 Merlin Douglas PTA GP 1    [id number] HC PT THER PROC EA 15 MIN 1/10/2018 Merlin Douglas PTA GP 1          PT G-Codes  Outcome Measure Options: AM-PAC 6 Clicks Basic Mobility (PT)    Merlin Douglas PTA  1/10/2018

## 2018-01-10 NOTE — PROGRESS NOTES
Continued Stay Note  [facility]     Patient Name: Mingo Guidry  MRN: [id number]  Today's Date: 1/10/2018    Admit Date: 1/4/2018          Discharge Plan       01/10/18 1404    Case Management/Social Work Plan    Plan Home    Patient/Family In Agreement With Plan yes    Additional Comments Followed up with pt regarding DCP because pt states she feels weak.  Pt reports that she is feeling better today.  She states that she drives to [place] 4 days/week and has the [employer] Waiver program.  She states that her son and his girlfriend live close by and help whenever she needs it. Pt denies any needs at this time.              Discharge Codes     None        Expected Discharge Date and Time     Expected Discharge Date Expected Discharge Time    [place] 10, 2018             Ana Oneil

## 2018-01-10 NOTE — PROGRESS NOTES
"  CC: Acute Hypoxic Respiratory Failure.    S: Continues to be short of breath.  Status post bronchoscopy on 01/08/2018.  Continues to have occasional cough and phlegm production.  Feels a lot better today.    O: /56 (BP Location: Left arm, Patient Position: Lying)  Pulse 73  Temp 98.2 °F (36.8 °C) (Oral)   Resp 20  Ht 147.3 cm (58\")  Wt 93.8 kg (206 lb 12.8 oz)  LMP  (LMP Unknown)  SpO2 91%  BMI 43.22 kg/m2      General: No significant respiratory distress noted.  Neck: No JVD.   Cardiovascular: S1 + S2.  Regular.   Respiratory: Minimal scattered wheezing heard.  Minimal Left crackles noted.   Abdomen: Soft.  Bowel sounds positive.  No obvious organomegaly.  Extremities: Trace edema noted.  Neurologic:  AAOx3. Was able to follow commands.     Labs: Reviewed.       Results from last 7 days  Lab Units 01/10/18  0700 01/09/18  0605 01/08/18  0607   SODIUM mmol/L 143 144 143   POTASSIUM mmol/L 4.5 4.0 4.4   CHLORIDE mmol/L 105 100 97*   CO2 mmol/L 32.0* 37.0* 38.0*   BUN mg/dL 37* 42* 44*   CREATININE mg/dL 1.20 1.10 1.20   CALCIUM mg/dL 9.3 9.4 9.7   GLUCOSE mg/dL 319* 267* 281*       Results from last 7 days  Lab Units 01/10/18  0700 01/09/18  0605 01/08/18  0607 01/05/18  0603   WBC 10*3/mm3 14.17* 15.31* 15.71* 12.84*   HEMOGLOBIN g/dL 13.1 13.7 13.4 13.2   PLATELETS 10*3/mm3 313 347 370 341         Results from last 7 days  Lab Units 01/08/18  0607 01/05/18  0603   INR  1.10 1.17*     Lab Results   Component Value Date    PHART 7.444 01/09/2018    HQH2GLP 54.1 (H) 01/09/2018    PO2ART 92.5 01/09/2018    HGBBG 13.8 01/09/2018    D5OFUMGF 95.7 01/09/2018         Pharmacy Consult        CXRay: Pending from today.       Assessment & Recommendations/Plan:   1.  Acute hypoxic respiratory failure  On 4 LPM. She seems to be on this at home.    2.  Left upper lobe healthcare associated pneumonia  On appropriate antibiotics  Cultures pending  Vancomycin discontinued as cultures have remained negative    3.  " Left upper lobe atelectasis?  Continue Flutter valve as well as incentive spirometry.    4.  Chronic hypoxemia  Says that she is actually on 4 LPM at home.     5.  Obstructive sleep apnea.    On BiPAP for many years.  Continue BiPAP QHS and PRN.    6.  Chronic hypercarbic and hypoxic respiratory failure (last ABG from 1 January showed pH of 7.41 with a PCO2 55 and PO2 of 62)    7.  Grade 2 Diastolic Dysfunction.   Doesn't appear to be decompensated    8. Likely CKD.   Baseline Creatinine seems to be 1.3-1.5.  Currently 1.2.    Preliminary pathology results did not show any malignancy.  Acute inflammation was identified.    We have reviewed patient's current orders and changes, if any, have been suggested to primary care team. Plan was also discussed with nursing staff, as necessary.       Ryder Dahl MD  01/10/18  8:44 AM    Dictated utilizing Dragon dictation.

## 2018-01-10 NOTE — PROGRESS NOTES
PROGRESS NOTE        Date of Admission: 1/4/2018  Length of Stay: 6  Primary Care Physician: JACKI Mistry    Subjective   Chief Complaint: Cough, Dyspnea  HPI: This is a 67-year-old female who is transferred from [facility] on 1/4/18 with persistent pneumonia without any improvement.  She has remote history of having lung surgery for a benign tumor and was noted to have a left upper lobe opacity suggestive of an obstructive pneumonia.  She been on IV antibiotics in the form of Zosyn and vancomycin for one week with no improvement.  She was transferred for pulmonary evaluation.  She was seen and evaluated by Dr. antonio he did take her down for bronchoscopy today.  She was noted to have a mucous plug in the left upper lobe.  He has sent cultures and cytology.      She was seen and evaluated today at bedside.  Dr. Dahl with pulmonology is following along with hospitalist service.  Her vancomycin was discontinued as the cultures have remained negative.  She does have chronic hypoxemia for which she is on 4 L of oxygen at home.  Her preliminary pathology results do not show any evidence of malignancy.  Hopefully patient can discharge home in the next 24-48 hours.  Overall patient states she is feeling much better.  She has been obtained delayed in with physical therapy.      Review Of Systems:   Review of Systems   General ROS: Patient denies any fevers, chills or loss of consciousness.    Psychological ROS: Denies any hallucinations and delusions.  Ophthalmic ROS: Denies any diplopia or transient loss of vision.  ENT ROS: Denies sore throat, nasal congestion or ear pain.   Allergy and Immunology ROS: Denies rash or itching.  Hematological and Lymphatic ROS: Denies neck swelling or easy bleeding.  Endocrine ROS: Denies any recent unintentional weight gain or loss.  Breast ROS: Denies any pain or swelling.  Respiratory ROS: cough and shortness of breath.   Cardiovascular ROS: Denies  chest pain or palpitations. No history of exertional chest pain.   Gastrointestinal ROS: Denies nausea and vomiting. Denies any abdominal pain. No diarrhea.  Genito-Urinary ROS: Denies dysuria or hematuria.  Musculoskeletal ROS: Denies back pain. No muscle pain. No calf pain.   Neurological ROS: Denies any focal weakness. No loss of consciousness. Denies any numbness. Denies neck pain.   Dermatological ROS: Denies any redness or pruritis.    Objective      Temp:  [97.3 °F (36.3 °C)-98.2 °F (36.8 °C)] 97.3 °F (36.3 °C)  Heart Rate:  [56-83] 69  Resp:  [18-23] 20  BP: ()/(56-77) 143/60  Physical Exam    General Appearance:  Alert and cooperative, not in any acute distress.   Head:  Atraumatic and normocephalic, without obvious abnormality.   Eyes:          PERRLA, conjunctivae and sclerae normal, no Icterus. No pallor. Extra-occular movements are within normal limits.   Ears:  Ears appear intact with no abnormalities noted.   Throat: No oral lesions, no thrush, oral mucosa moist.   Neck: Supple, trachea midline, no thyromegaly, no carotid bruit.   Back:   No kyphoscoliosis present. No tenderness to palpation,   range of motion normal.   Lungs:   Chest shape is normal. Breath sounds heard bilaterally equally.  Few crackles Wheezing improved. No Pleural rub or bronchial breathing.   Heart:  Normal S1 and S2, no murmur, no gallop, no rub. No JVD   Abdomen:   Normal bowel sounds, no masses, no organomegaly. Soft     non-tender, non-distended, no guarding, no rebound                Tenderness, obese   Extremities: Moves all extremities well, trace to +1 edema, no cyanosis, no clubbing.   Pulses: Pulses palpable and equal bilaterally   Skin: No bleeding, bruising or rash   Lymph nodes: No palpable adenopathy   Neurologic:    Psychiatric/Behavior:     Cranial nerves 2 - 12 grossly intact, sensation intact, Motor power is normal and equal bilaterally.  Mood normal, behavior normal       Results Review:    I have  reviewed the labs, radiology results and diagnostic studies.      Results from last 7 days  Lab Units 01/10/18  0700   WBC 10*3/mm3 14.17*   HEMOGLOBIN g/dL 13.1   PLATELETS 10*3/mm3 313       Results from last 7 days  Lab Units 01/10/18  0700   SODIUM mmol/L 143   POTASSIUM mmol/L 4.5   CO2 mmol/L 32.0*   CREATININE mg/dL 1.20   GLUCOSE mg/dL 319*       Culture Data:    Radiology Data:   Cardiology Data:    I have reviewed the medications.    Assessment/Plan     Assessment and Plan:  1.  Acute on chronic hypoxic respiratory failure-  Patient is on high flow oxygen  Will continue to work to decrease his oxygen needs.  He is on 4 L of oxygen at home.    2.  Persistent left upper lobe pneumonia with failure of outpatient antibiotic status post bronchoscopy with mucous plugging noted to be in left upper lobe.  Cytology and cultures have been sent.  Patient is currently on IV antibiotics in the form of Zosyn as well as breathing treatments, mucolytics, nebulized steroids as well as IV steroids.  Since respiratory cultures and cytology are still pending.  Her vancomycin has been discontinued as her cultures have not shown any growth.  Her preliminary cytology does not show any evidence of malignancy.    3.  Diabetes mellitus type 2 patient is on insulin protocol as well as Levemir.  Blood sugars are still elevated which is likely secondary to the steroids.  We will increase her Levemir. Steroids are in process of weaning.      4.  Chronic essential hypertension-blood pressure stable continue to monitor    5.  Chronic kidney disease CKD II- stable    6.  Obstructive sleep apnea on home BiPAP    7.  Chronic stable diastolic congestive heart failure ejection fraction unknown-medical management          DVT prophylaxis:  Lovenox  Discharge Planning:   Deb Garcia, JACKI 01/10/18 1:14 PM

## 2018-01-11 VITALS
RESPIRATION RATE: 18 BRPM | HEART RATE: 73 BPM | TEMPERATURE: 98 F | BODY MASS INDEX: 43.12 KG/M2 | DIASTOLIC BLOOD PRESSURE: 52 MMHG | OXYGEN SATURATION: 90 % | HEIGHT: 58 IN | WEIGHT: 205.4 LBS | SYSTOLIC BLOOD PRESSURE: 122 MMHG

## 2018-01-11 LAB
ANION GAP SERPL CALCULATED.3IONS-SCNC: 13.5 MMOL/L
BACTERIA SPEC RESP CULT: ABNORMAL
BASOPHILS # BLD AUTO: 0.03 10*3/MM3 (ref 0–0.2)
BASOPHILS NFR BLD AUTO: 0.2 % (ref 0–2.5)
BUN BLD-MCNC: 34 MG/DL (ref 7–20)
BUN/CREAT SERPL: 28.3 (ref 7.1–23.5)
C DIFF GDH STL QL: NEGATIVE
CALCIUM SPEC-SCNC: 9.2 MG/DL (ref 8.4–10.2)
CHLORIDE SERPL-SCNC: 103 MMOL/L (ref 98–107)
CO2 SERPL-SCNC: 30 MMOL/L (ref 26–30)
CREAT BLD-MCNC: 1.2 MG/DL (ref 0.6–1.3)
DEPRECATED RDW RBC AUTO: 48.1 FL (ref 37–54)
EOSINOPHIL # BLD AUTO: 0.01 10*3/MM3 (ref 0–0.7)
EOSINOPHIL NFR BLD AUTO: 0.1 % (ref 0–7)
ERYTHROCYTE [DISTWIDTH] IN BLOOD BY AUTOMATED COUNT: 13.5 % (ref 11.5–14.5)
GFR SERPL CREATININE-BSD FRML MDRD: 45 ML/MIN/1.73
GLUCOSE BLD-MCNC: 286 MG/DL (ref 74–98)
GLUCOSE BLDC GLUCOMTR-MCNC: 262 MG/DL (ref 70–130)
GLUCOSE BLDC GLUCOMTR-MCNC: 290 MG/DL (ref 70–130)
GLUCOSE BLDC GLUCOMTR-MCNC: 311 MG/DL (ref 70–130)
GLUCOSE BLDC GLUCOMTR-MCNC: 345 MG/DL (ref 70–130)
GRAM STN SPEC: ABNORMAL
HCT VFR BLD AUTO: 40.5 % (ref 37–47)
HGB BLD-MCNC: 13.3 G/DL (ref 12–16)
IMM GRANULOCYTES # BLD: 0.4 10*3/MM3 (ref 0–0.06)
IMM GRANULOCYTES NFR BLD: 2.3 % (ref 0–0.6)
LAB AP CASE REPORT: NORMAL
LAB AP DIAGNOSIS COMMENT: NORMAL
LYMPHOCYTES # BLD AUTO: 2.4 10*3/MM3 (ref 0.6–3.4)
LYMPHOCYTES NFR BLD AUTO: 14 % (ref 10–50)
Lab: NORMAL
MCH RBC QN AUTO: 32.2 PG (ref 27–31)
MCHC RBC AUTO-ENTMCNC: 32.8 G/DL (ref 30–37)
MCV RBC AUTO: 98.1 FL (ref 81–99)
MONOCYTES # BLD AUTO: 0.97 10*3/MM3 (ref 0–0.9)
MONOCYTES NFR BLD AUTO: 5.7 % (ref 0–12)
NEUTROPHILS # BLD AUTO: 13.29 10*3/MM3 (ref 2–6.9)
NEUTROPHILS NFR BLD AUTO: 77.7 % (ref 37–80)
NRBC BLD MANUAL-RTO: 0 /100 WBC (ref 0–0)
PLATELET # BLD AUTO: 301 10*3/MM3 (ref 130–400)
PMV BLD AUTO: 10.8 FL (ref 6–12)
POTASSIUM BLD-SCNC: 4.5 MMOL/L (ref 3.5–5.1)
RBC # BLD AUTO: 4.13 10*6/MM3 (ref 4.2–5.4)
SODIUM BLD-SCNC: 142 MMOL/L (ref 137–145)
WBC NRBC COR # BLD: 17.1 10*3/MM3 (ref 4.8–10.8)

## 2018-01-11 PROCEDURE — 85025 COMPLETE CBC W/AUTO DIFF WBC: CPT | Performed by: NURSE PRACTITIONER

## 2018-01-11 PROCEDURE — 94660 CPAP INITIATION&MGMT: CPT

## 2018-01-11 PROCEDURE — 99232 SBSQ HOSP IP/OBS MODERATE 35: CPT | Performed by: INTERNAL MEDICINE

## 2018-01-11 PROCEDURE — 87324 CLOSTRIDIUM AG IA: CPT | Performed by: INTERNAL MEDICINE

## 2018-01-11 PROCEDURE — 25010000002 PIPERACILLIN SOD-TAZOBACTAM PER 1 G: Performed by: NURSE PRACTITIONER

## 2018-01-11 PROCEDURE — 97110 THERAPEUTIC EXERCISES: CPT

## 2018-01-11 PROCEDURE — 94799 UNLISTED PULMONARY SVC/PX: CPT

## 2018-01-11 PROCEDURE — 94618 PULMONARY STRESS TESTING: CPT

## 2018-01-11 PROCEDURE — 25010000002 METHYLPREDNISOLONE PER 40 MG: Performed by: INTERNAL MEDICINE

## 2018-01-11 PROCEDURE — 87449 NOS EACH ORGANISM AG IA: CPT | Performed by: INTERNAL MEDICINE

## 2018-01-11 PROCEDURE — 99239 HOSP IP/OBS DSCHRG MGMT >30: CPT | Performed by: NURSE PRACTITIONER

## 2018-01-11 PROCEDURE — 80048 BASIC METABOLIC PNL TOTAL CA: CPT | Performed by: NURSE PRACTITIONER

## 2018-01-11 PROCEDURE — 82962 GLUCOSE BLOOD TEST: CPT

## 2018-01-11 PROCEDURE — 97116 GAIT TRAINING THERAPY: CPT

## 2018-01-11 PROCEDURE — 63710000001 INSULIN ASPART PER 5 UNITS: Performed by: INTERNAL MEDICINE

## 2018-01-11 RX ORDER — METHYLPREDNISOLONE 4 MG/1
TABLET ORAL
Qty: 21 EACH | Refills: 0 | Status: SHIPPED | OUTPATIENT
Start: 2018-01-11 | End: 2018-02-01

## 2018-01-11 RX ORDER — GUAIFENESIN 600 MG/1
600 TABLET, EXTENDED RELEASE ORAL EVERY 12 HOURS SCHEDULED
Qty: 20 TABLET | Refills: 0 | Status: SHIPPED | OUTPATIENT
Start: 2018-01-11 | End: 2018-12-13

## 2018-01-11 RX ADMIN — METHYLPREDNISOLONE SODIUM SUCCINATE 30 MG: 40 INJECTION, POWDER, FOR SOLUTION INTRAMUSCULAR; INTRAVENOUS at 17:13

## 2018-01-11 RX ADMIN — ALLOPURINOL 300 MG: 100 TABLET ORAL at 10:34

## 2018-01-11 RX ADMIN — BUSPIRONE HYDROCHLORIDE 10 MG: 10 TABLET ORAL at 17:12

## 2018-01-11 RX ADMIN — INSULIN ASPART 6 UNITS: 100 INJECTION, SOLUTION INTRAVENOUS; SUBCUTANEOUS at 12:22

## 2018-01-11 RX ADMIN — KETOTIFEN FUMARATE 1 DROP: 0.35 SOLUTION/ DROPS OPHTHALMIC at 10:37

## 2018-01-11 RX ADMIN — ATORVASTATIN CALCIUM 10 MG: 10 TABLET, FILM COATED ORAL at 10:35

## 2018-01-11 RX ADMIN — IPRATROPIUM BROMIDE AND ALBUTEROL SULFATE 3 ML: .5; 3 SOLUTION RESPIRATORY (INHALATION) at 04:37

## 2018-01-11 RX ADMIN — IPRATROPIUM BROMIDE AND ALBUTEROL SULFATE 3 ML: .5; 3 SOLUTION RESPIRATORY (INHALATION) at 14:00

## 2018-01-11 RX ADMIN — IPRATROPIUM BROMIDE AND ALBUTEROL SULFATE 3 ML: .5; 3 SOLUTION RESPIRATORY (INHALATION) at 07:04

## 2018-01-11 RX ADMIN — METOPROLOL TARTRATE 25 MG: 25 TABLET ORAL at 10:35

## 2018-01-11 RX ADMIN — PIPERACILLIN SODIUM AND TAZOBACTAM SODIUM 4.5 G: 3; .375 INJECTION, POWDER, FOR SOLUTION INTRAVENOUS at 08:55

## 2018-01-11 RX ADMIN — INSULIN ASPART 7 UNITS: 100 INJECTION, SOLUTION INTRAVENOUS; SUBCUTANEOUS at 17:12

## 2018-01-11 RX ADMIN — PANTOPRAZOLE SODIUM 40 MG: 40 TABLET, DELAYED RELEASE ORAL at 10:35

## 2018-01-11 RX ADMIN — IPRATROPIUM BROMIDE AND ALBUTEROL SULFATE 3 ML: .5; 3 SOLUTION RESPIRATORY (INHALATION) at 00:30

## 2018-01-11 RX ADMIN — ASPIRIN 81 MG: 81 TABLET, COATED ORAL at 10:35

## 2018-01-11 RX ADMIN — POTASSIUM CHLORIDE 20 MEQ: 20 TABLET, EXTENDED RELEASE ORAL at 10:35

## 2018-01-11 RX ADMIN — BUSPIRONE HYDROCHLORIDE 10 MG: 10 TABLET ORAL at 10:35

## 2018-01-11 RX ADMIN — BUDESONIDE 0.5 MG: 0.5 INHALANT RESPIRATORY (INHALATION) at 07:04

## 2018-01-11 RX ADMIN — INSULIN ASPART 6 UNITS: 100 INJECTION, SOLUTION INTRAVENOUS; SUBCUTANEOUS at 06:31

## 2018-01-11 RX ADMIN — GUAIFENESIN 600 MG: 600 TABLET, EXTENDED RELEASE ORAL at 10:34

## 2018-01-11 RX ADMIN — IPRATROPIUM BROMIDE AND ALBUTEROL SULFATE 3 ML: .5; 3 SOLUTION RESPIRATORY (INHALATION) at 16:14

## 2018-01-11 RX ADMIN — METHYLPREDNISOLONE SODIUM SUCCINATE 30 MG: 40 INJECTION, POWDER, FOR SOLUTION INTRAMUSCULAR; INTRAVENOUS at 05:53

## 2018-01-11 NOTE — DISCHARGE SUMMARY
[facility]   DISCHARGE SUMMARY    Name:  Mingo Guidry   Age:  67 y.o.  Sex:  female  :  1950  MRN:  [id number]   Visit Number:  [id number]  Primary Care Physician:  JACKI Mistry  Date of Discharge:  2018  Admission Date:  2018      Presenting Problem:    Pneumonia [J18.9]  Pneumonia of left upper lobe due to infectious organism [J18.1]       Discharge Diagnosis:     Principal Problem:    Acute on chronic respiratory failure with hypoxia  Active Problems:    Essential hypertension    Type 2 diabetes mellitus    Acute exacerbation of chronic obstructive pulmonary disease (COPD)    Gout    Pneumonia of left upper lobe due to infectious organism    Pneumonia        Past Medical History:  Past Medical History:   Diagnosis Date   • Acid reflux 10/17/2016   • Asthma     bronchial   • Chronic diastolic heart failure 10/17/2016    Normal dobutamine echocardiogram stress, 2005. Echocardiogram on 2009:  EF 50% to 55%. Left atrium 3.5 cm.   • Chronic obstructive pulmonary disease 10/17/2016    asthmatic bronchitis, on O2 use chronically.     • Gout 10/17/2016   • High cholesterol 2018   • Hypertension 10/17/2016    Benign hypertension.   • Lower extremity edema 10/17/2016    Chronic lower extremity edema/venous insufficiency.   • Murmur, heart    • Obesity 10/17/2016   • Seasonal allergies 10/17/2016   • Type 2 diabetes mellitus 10/17/2016    insulin dependent.         Consults:     Consults     Date and Time Order Name Status Description    2018 1726 Inpatient Consult to Pulmonology Completed           Procedures Performed:    Procedure(s):  BRONCHOSCOPY WITH WASHINGS         History of presenting illness:  This is a 67-year-old female past medical history significant for COPD on home oxygen, hypertension, diabetes mellitus who was transferred from [facility] to be evaluated by pulmonology.  According to the history from the patient and  daughters in the records from [facility] at the beginning of December patient started having respiratory issues with cough and shortness of breath.  She was treated for pneumonia and was hospitalized for 5 days and then discharged home on antibiotics and steroids.  She did followed up with her primary care provider where she did also receive steroid injections for continued shortness of breath congestion.  She was then once again admitted to [facility] 4 days prior to being transferred to [facility] in [place].  According to the records patient had required high FiO2 despite being on antibiotic therapy.  CT of the chest showed bilateral pneumonia with scarring.  Should been on Zosyn and vancomycin with no improvement.  There was concern about the possibility of endobronchial obstruction.  She was transferred to [facility] to be evaluated by pulmonology.      Hospital Course:  Upon admission to the hospitalist service patient was placed on IV antibiotics in the form of Zosyn and vancomycin.  She was also placed on steroids breathing treatments and mucolytics.  Dr. Dahl with pulmonology was consulted.  He did see and evaluate the patient and based upon the scans that he reviewed felt that the left upper lobe consolidation seemed to be new.  However there were some areas of scarring.  Due to concerns of requiring intubation to undergo bronchoscopy postoperatively initially conservative approach including BiPAP was tried with no success.  He did take her down for bronchoscopy on 1/8/18.  It was explained to the patient the possibility of requiring mechanical ventilation post procedure.  However patient did tolerate the procedure without any complications and was able to be transferred back to cardiac monitor floor.    As per Dr. Dahl she was noted to have a mucous plug in the left upper lobe.  Cultures and cytology have been sent.  Per Dr. Dahl's report the preliminary cytology is  negative for malignancy.  Her cultures are not growing anything at this time.  She did have a sputum culture that she had coughed up that grew Candida albicans however pulmonary recommends not treating this as her bronchoscopy culture did not grow any Candida.    Postprocedure patient's oxygen saturations have improved she has been weaned down to 3-1/2 L of high flow oxygen.  We will attempt to place her on nasal cannula and do a walking oximetry to assess home oxygen needs.  Patient states she is feeling much better.  Shortness of breath has improved.  We will plan to have her discharged home today if her oxygen needs are stable enough to go home on nasal cannula.  She will follow-up with Dr. Dahl as an outpatient he will follow up the final cultures and cytology.  Patient is anxious for discharge home she is feeling better and is been in the hospital for 2 weeks now.  Point she does not require any further antibiotics as she is finished a full course of antibiotics.  We will send her home on a Medrol Dosepak.  If the patient can maintain oxygenation at more than 89% requiring less than 5 L/m nasal cannula she can be discharged home as per pulmonary.  He feels that her elevated white count is likely secondary to the steroids especially given her clinical improvement and the fact that she is afebrile.  She is to continue her flutter valve as well as incentive spirometry at home.  She is to continue BiPAP at night and as needed.  She has been on BiPAP for many years for obstructive sleep apnea.    Vital Signs:    Temp:  [97.5 °F (36.4 °C)-98.1 °F (36.7 °C)] 98.1 °F (36.7 °C)  Heart Rate:  [55-82] 80  Resp:  [16-18] 18  BP: (124-152)/(52-85) 146/85    Physical Exam:  General Appearance:    Alert and cooperative, not in any acute distress.   Head:    Atraumatic and normocephalic, without obvious abnormality.   Eyes:            PERRLA, conjunctivae and sclerae normal, no Icterus. No pallor. Extra-occular movements are  within normal limits.   Ears:    Ears appear intact with no abnormalities noted.   Throat:   No oral lesions, no thrush, oral mucosa moist.   Neck:   Supple, trachea midline, no thyromegaly, no carotid bruit.   Back:     No kyphoscoliosis present. No tenderness to palpation,   range of motion normal.   Lungs:     Chest shape is normal. Breath sounds heard bilaterally equally.  No crackles or wheezing. No Pleural rub or bronchial breathing.  Decreased in bases.    Heart:    Normal S1 and S2, no murmur, no gallop, no rub. No JVD   Abdomen:     Normal bowel sounds, no masses, no organomegaly. Soft        non-tender, non-distended, no guarding, no rebound                tenderness   Extremities:   Moves all extremities well, no edema, no cyanosis, no             clubbing   Pulses:   Pulses palpable and equal bilaterally   Skin:   No bleeding, bruising or rash   Lymph nodes:  Psychiatric/Behavior:    No palpable adenopathy    Normal mood, normal behavior   Neurologic:   Cranial nerves 2 - 12 grossly intact, sensation intact, Motor power is normal and equal bilaterally.           Pertinent Lab Results:       Results from last 7 days  Lab Units 01/11/18  0649 01/10/18  0700 01/09/18  0605   SODIUM mmol/L 142 143 144   POTASSIUM mmol/L 4.5 4.5 4.0   CHLORIDE mmol/L 103 105 100   CO2 mmol/L 30.0 32.0* 37.0*   BUN mg/dL 34* 37* 42*   CREATININE mg/dL 1.20 1.20 1.10   CALCIUM mg/dL 9.2 9.3 9.4   GLUCOSE mg/dL 286* 319* 267*       Results from last 7 days  Lab Units 01/11/18  0649 01/10/18  0700 01/09/18  0605   WBC 10*3/mm3 17.10* 14.17* 15.31*   HEMOGLOBIN g/dL 13.3 13.1 13.7   HEMATOCRIT % 40.5 40.4 42.0   PLATELETS 10*3/mm3 301 313 347       Results from last 7 days  Lab Units 01/08/18  0607 01/05/18  0603   INR  1.10 1.17*            Pertinent Radiology Results:    Imaging Results (all)     Procedure Component Value Units Date/Time    XR Chest 1 View [[id number]] Collected:  01/07/18 0840     Updated:  01/07/18 0887     Narrative:       PROCEDURE: XR CHEST 1 VW-     HISTORY: FATUMA collapse. PNA.; J18.1-Lobar pneumonia, unspecified organism     COMPARISON: None.     FINDINGS: Electrodes overlie the chest. The heart is normal in size. The  mediastinum is unremarkable. Asymmetrical opacification of the left base  and left apex, correlate for airspace disease.  There is no  pneumothorax.  There are no acute osseous abnormalities.           Impression:       Asymmetrical opacification of the left lung as described  above. Follow-up standard 2 views of the chest or CT is recommended.        This report was finalized on 1/7/2018 8:43 AM by David Poole DO.    XR Chest 1 View [[id number]] Collected:  01/09/18 0904     Updated:  01/09/18 0907    Narrative:       PROCEDURE: XR CHEST 1 VW-     HISTORY: PNA; J18.1-Lobar pneumonia, unspecified organism; Z74.09-Other  reduced mobility     COMPARISON: January 7, 2018.     FINDINGS: The heart is normal in size. The mediastinum is unremarkable.  There is improved aeration of the left lung base, however there is a  worsening right basilar opacity. There is no pneumothorax.  There are no  acute osseous abnormalities.           Impression:       Improved aeration of the left lung base with a worsening  right basilar opacity which may reflect atelectasis or pneumonia.     Continued followup is recommended.     This report was finalized on 1/9/2018 9:05 AM by Fabricio Wolff M.D..    XR Chest 2 View [[id number]] Collected:  01/10/18 1033     Updated:  01/10/18 1045    Narrative:       PROCEDURE: XR CHEST 2 VW-     HISTORY: PNA; J18.1-Lobar pneumonia, unspecified organism; Z74.09-Other  reduced mobility     COMPARISON: None.     FINDINGS: The heart is normal in size. The mediastinum is unremarkable.  There are persistent opacities in the lingula and left lung apex. There  is improved aeration of the right lung base with only linear atelectasis  remaining. There is no pneumothorax.  There are no acute  osseous  abnormalities.           Impression:       Persistent opacities in the left lung apex and lingula.     Continued followup is recommended.     This report was finalized on 1/10/2018 10:43 AM by Fabricio Wolff M.D..          Condition on Discharge:    Stable        [place] [facility] [place] Medication:     Mingo Guidry   Home Medication Instructions KAIT:[id number]    Printed on:01/11/18 2988   Medication Information                      allopurinol (ZYLOPRIM) 300 MG tablet  Take 300 mg by mouth Daily. 1/2 tablet daily             aspirin 81 MG tablet  Take 81 mg by mouth Daily.             busPIRone (BUSPAR) 10 MG tablet  Take 10 mg by mouth 3 (Three) Times a Day.             ezetimibe (ZETIA) 10 MG tablet  Take 10 mg by mouth Daily.             ferrous sulfate 325 (65 FE) MG tablet  Take 325 mg by mouth 3 (Three) Times a Day With Meals.             fluticasone-salmeterol (ADVAIR) 500-50 MCG/DOSE DISKUS  Inhale 1 puff 2 (Two) Times a Day.             furosemide (LASIX) 40 MG tablet  Take 40 mg by mouth 2 (Two) Times a Day.             guaiFENesin (MUCINEX) 600 MG 12 hr tablet  Take 1 tablet by mouth Every 12 (Twelve) Hours.             HUMALOG 100 UNIT/ML injection  35 Units 3 (Three) Times a Day.             ipratropium-albuterol (DUO-NEB) 0.5-2.5 mg/mL nebulizer  Inhale 3 mL Every 4 (Four) Hours.             Loratadine (CLARITIN) 10 MG capsule  Take  by mouth Daily.             lovastatin (MEVACOR) 10 MG tablet  Take 10 mg by mouth Every Night.             magnesium oxide (MAG-OX) 400 MG tablet  Take 400 mg by mouth 3 (Three) Times a Day.             MethylPREDNISolone (MEDROL, AWA,) 4 MG tablet  Take as directed on package instructions.             metoclopramide (REGLAN) 5 MG tablet  Take 5 mg by mouth 3 (Three) Times a Day.             metolazone (ZAROXOLYN) 5 MG tablet  Take 5 mg by mouth As Needed.             metoprolol tartrate (LOPRESSOR) 25 MG tablet  Take  25 mg by mouth 2 (Two) Times a Day.             montelukast (SINGULAIR) 10 MG tablet  Take 10 mg by mouth Every Night.             NASONEX 50 MCG/ACT nasal spray  As Needed.             pantoprazole (PROTONIX) 40 MG EC tablet  Take 40 mg by mouth Daily.             PATADAY 0.2 % solution ophthalmic solution  Administer  to both eyes 2 (Two) Times a Day.             potassium chloride (KLOR-CON) 20 MEQ packet  Take 20 mEq by mouth Daily. Patient takes the tablets.             sitaGLIPtin (JANUVIA) 100 MG tablet  Take 100 mg by mouth Daily. 1/2 tablet daily             sucralfate (CARAFATE) 1 G tablet  Take 1 g by mouth 3 (Three) Times a Day.             tiotropium (SPIRIVA) 18 MCG per inhalation capsule  Place 1 capsule into inhaler and inhale Daily.             traMADol (ULTRAM) 50 MG tablet  Take 50 mg by mouth Every 6 (Six) Hours As Needed for moderate pain (4-6).             TRESIBA FLEXTOUCH 100 UNIT/ML solution pen-injector injection  75 Units Daily.             vitamin D (ERGOCALCIFEROL) 07180 units capsule capsule  Take 50,000 Units by mouth 1 (One) Time Per Week.                 Discharge Diet:     Diet Instructions     Diet: Consistent Carbohydrate, Cardiac; Thin       Discharge Diet:   Consistent Carbohydrate  Cardiac      Fluid Consistency:  Thin                 Activity at Discharge:     Activity Instructions     Discharge Activity       As tolerates                 Follow-up Appointments:    Follow up with Dr. Dahl/Yadi ECHEVERRIA in 3 weeks.    Future Appointments  Date Time Provider Department [place]   12/13/2018 9:45 AM Jorge Luis Mcnair III, MD [facility] None     Additional Instructions for the Follow-ups that You Need to Schedule     Discharge Follow-up with Specified Provider: Yadi Sheridan.; 3 Weeks    As directed    To:  Yadi Sheridan.    Follow Up:  3 Weeks    Follow Up Details:  If being discharged on a weekend, please call our office on the next working day to schedule this  appointment.                   Additional Instructions:  Oxygen 4 liters nasal canula  Continue Bipap as previous on home settings.        Test Results Pending at Discharge:     Order Current Status    Fungus Culture - Lavage, Lung, Left Upper Lobe Preliminary result             JACKI Pollard  01/11/18  3:52 PM    Time: 45   minutes were spent reviewing labs, history, evaluating patient and discharge planning.

## 2018-01-11 NOTE — PROGRESS NOTES
Continued Stay Note   Gene     Patient Name: Mingo Guidry  MRN: [id number]  Today's Date: 1/11/2018    Admit Date: 1/4/2018          Discharge Plan       01/11/18 1622    Case Management/Social Work Plan    Plan Home    Patient/Family In Agreement With Plan yes    Additional Comments Received new oxygen order.  Spoke with pt in room regarding order.  Pt reports that she is currently using 4L at home.  She has a tank with her, but states her son can bring another one, if needed.  ALYSA Ann updated.  Called [facility], spoke with Kervin.  Per Kervin, the last order on file is for 2L.  He states he will just need a new order.  Order and clinicals efaxed to [phone].              Discharge Codes     None        Expected Discharge Date and Time     Expected Discharge Date Expected Discharge Time    Jan 11, 2018             Ana Oneil

## 2018-01-11 NOTE — DISCHARGE PLACEMENT REQUEST
"BHAVYA Osorio RN  Case Management  Phone:  [phone]; Fax:  [phone]    New oxygen order.  Please see attached.    Jolene Guidry (67 y.o. Female)     Date of Birth Social Security Number Address Home Phone MRN    1950  [address] [place]  [facility] [address] [phone] [id number]    Denominational Marital Status          Unknown        Admission Date Admission Type Admitting Provider Attending Provider Department, Room/Bed    18 Elective Dave Hairston MD Nevarez-Maggard, April G, DO [facility]  3, /    Discharge Date Discharge Disposition Discharge Destination         [place] [facility] [place]             Attending Provider: Nitza Turpin DO     Allergies:  Shellfish-derived Products, Wheat Bran    Isolation:  None   Infection:  None   Code Status:  FULL    Ht:  147.3 cm (58\")   Wt:  93.2 kg (205 lb 6.4 oz)    Admission Cmt:  None   Principal Problem:  Acute on chronic respiratory failure with hypoxia [J96.21]                 Active Insurance as of 2018     Primary Coverage     Payor Plan Insurance Group Employer/Plan Group    [place]      Payor Plan Address Payor Plan Phone Number Effective From Effective To    [address] [phone] 2017     [id number]       Subscriber Name Subscriber Birth Date Member ID       JOLENE GUIDRY 1950 [id number]                 Emergency Contacts      (Rel.) Home Phone Work Phone Mobile Phone    Adebayo Guidry (Son) [phone] -- --    Chelly Judge (Other) [phone] -- [phone]          [facility]  3  [address] [facility] [address]  Phone:  [phone]  Fax:   Date Ordered: 2018         Patient:  Jolene Guidry MRN:  [id number]   [address] [facility] [address] [facility]  1950  SSN:    Phone: [phone] Sex:  F     Weight: 93.2 kg (205 lb 6.4 oz)         Ht Readings from Last 1 " "Encounters:   18 147.3 cm (58\")         Oxygen Therapy                   (Order ID: [id number])    Diagnosis:  Acute on chronic respiratory failure with hypoxia (J96.21 [ICD-10-CM] 518.84,799.02 [ICD-9-CM])  Acute exacerbation of chronic obstructive pulmonary disease (COPD) (J44.1 [ICD-10-CM] 491.21 [ICD-9-CM])   Quantity:  1     Delivery Modality: Nasal Cannula  Liters Per Minute: 4  Duration: Continuous  Equipment:  Oxygen Concentrator &  &  Portable Gaseous Oxygen System & Portable Oxygen Contents Gaseous &  Conserving Regulator  Length of Need (99 Months = Lifetime): 99 Months = Lifetime            Authorizing Provider:JACKI Pollard  Authorizing Provider's NPI: [id number]  Order Entered By: JACKI Pollard 2018  3:51 PM     Electronically signed by: JACKI Pollard 2018  3:51 PM                Discharge Summary      JACKI Pollard at 2018  2:16 PM              [facility]   DISCHARGE SUMMARY    Name:  Mingo Guidry   Age:  67 y.o.  Sex:  female  :  1950  MRN:  [id number]   Visit Number:  [id number]  Primary Care Physician:  JACKI Mistry  Date of Discharge:  2018  Admission Date:  2018      Presenting Problem:    Pneumonia [J18.9]  Pneumonia of left upper lobe due to infectious organism [J18.1]       Discharge Diagnosis:     Principal Problem:    Acute on chronic respiratory failure with hypoxia  Active Problems:    Essential hypertension    Type 2 diabetes mellitus    Acute exacerbation of chronic obstructive pulmonary disease (COPD)    Gout    Pneumonia of left upper lobe due to infectious organism    Pneumonia        Past Medical History:  Past Medical History:   Diagnosis Date   • Acid reflux 10/17/2016   • Asthma     bronchial   • Chronic diastolic heart failure 10/17/2016    Normal dobutamine echocardiogram stress, 2005. Echocardiogram on 2009:  EF 50% to " 55%. Left atrium 3.5 cm.   • Chronic obstructive pulmonary disease 10/17/2016    asthmatic bronchitis, on O2 use chronically.     • Gout 10/17/2016   • High cholesterol 01/03/2018   • Hypertension 10/17/2016    Benign hypertension.   • Lower extremity edema 10/17/2016    Chronic lower extremity edema/venous insufficiency.   • Murmur, heart    • Obesity 10/17/2016   • Seasonal allergies 10/17/2016   • Type 2 diabetes mellitus 10/17/2016    insulin dependent.         Consults:     Consults     Date and Time Order Name Status Description    1/4/2018 1726 Inpatient Consult to Pulmonology Completed           Procedures Performed:    Procedure(s):  BRONCHOSCOPY WITH WASHINGS         History of presenting illness:  This is a 67-year-old female past medical history significant for COPD on home oxygen, hypertension, diabetes mellitus who was transferred from [facility] to be evaluated by pulmonology.  According to the history from the patient and daughters in the records from [facility] at the beginning of December patient started having respiratory issues with cough and shortness of breath.  She was treated for pneumonia and was hospitalized for 5 days and then discharged home on antibiotics and steroids.  She did followed up with her primary care provider where she did also receive steroid injections for continued shortness of breath congestion.  She was then once again admitted to [facility] 4 days prior to being transferred to [facility] in [place].  According to the records patient had required high FiO2 despite being on antibiotic therapy.  CT of the chest showed bilateral pneumonia with scarring.  Should been on Zosyn and vancomycin with no improvement.  There was concern about the possibility of endobronchial obstruction.  She was transferred to [facility] to be evaluated by pulmonology.      Hospital Course:  Upon admission to the hospitalist service patient was placed on IV  antibiotics in the form of Zosyn and vancomycin.  She was also placed on steroids breathing treatments and mucolytics.  Dr. Dahl with pulmonology was consulted.  He did see and evaluate the patient and based upon the scans that he reviewed felt that the left upper lobe consolidation seemed to be new.  However there were some areas of scarring.  Due to concerns of requiring intubation to undergo bronchoscopy postoperatively initially conservative approach including BiPAP was tried with no success.  He did take her down for bronchoscopy on 1/8/18.  It was explained to the patient the possibility of requiring mechanical ventilation post procedure.  However patient did tolerate the procedure without any complications and was able to be transferred back to cardiac monitor floor.    As per Dr. Dahl she was noted to have a mucous plug in the left upper lobe.  Cultures and cytology have been sent.  Per Dr. Dahl's report the preliminary cytology is negative for malignancy.  Her cultures are not growing anything at this time.  She did have a sputum culture that she had coughed up that grew Candida albicans however pulmonary recommends not treating this as her bronchoscopy culture did not grow any Candida.    Postprocedure patient's oxygen saturations have improved she has been weaned down to 3-1/2 L of high flow oxygen.  We will attempt to place her on nasal cannula and do a walking oximetry to assess home oxygen needs.  Patient states she is feeling much better.  Shortness of breath has improved.  We will plan to have her discharged home today if her oxygen needs are stable enough to go home on nasal cannula.  She will follow-up with Dr. Dahl as an outpatient he will follow up the final cultures and cytology.  Patient is anxious for discharge home she is feeling better and is been in the hospital for 2 weeks now.  Point she does not require any further antibiotics as she is finished a full course of antibiotics.  We will  send her home on a Medrol Dosepak.  If the patient can maintain oxygenation at more than 89% requiring less than 5 L/m nasal cannula she can be discharged home as per pulmonary.  He feels that her elevated white count is likely secondary to the steroids especially given her clinical improvement and the fact that she is afebrile.  She is to continue her flutter valve as well as incentive spirometry at home.  She is to continue BiPAP at night and as needed.  She has been on BiPAP for many years for obstructive sleep apnea.    Vital Signs:    Temp:  [97.5 °F (36.4 °C)-98.1 °F (36.7 °C)] 98.1 °F (36.7 °C)  Heart Rate:  [55-82] 80  Resp:  [16-18] 18  BP: (124-152)/(52-85) 146/85    Physical Exam:  General Appearance:    Alert and cooperative, not in any acute distress.   Head:    Atraumatic and normocephalic, without obvious abnormality.   Eyes:            PERRLA, conjunctivae and sclerae normal, no Icterus. No pallor. Extra-occular movements are within normal limits.   Ears:    Ears appear intact with no abnormalities noted.   Throat:   No oral lesions, no thrush, oral mucosa moist.   Neck:   Supple, trachea midline, no thyromegaly, no carotid bruit.   Back:     No kyphoscoliosis present. No tenderness to palpation,   range of motion normal.   Lungs:     Chest shape is normal. Breath sounds heard bilaterally equally.  No crackles or wheezing. No Pleural rub or bronchial breathing.  Decreased in bases.    Heart:    Normal S1 and S2, no murmur, no gallop, no rub. No JVD   Abdomen:     Normal bowel sounds, no masses, no organomegaly. Soft        non-tender, non-distended, no guarding, no rebound                tenderness   Extremities:   Moves all extremities well, no edema, no cyanosis, no             clubbing   Pulses:   Pulses palpable and equal bilaterally   Skin:   No bleeding, bruising or rash   Lymph nodes:  Psychiatric/Behavior:    No palpable adenopathy    Normal mood, normal behavior   Neurologic:   Cranial  nerves 2 - 12 grossly intact, sensation intact, Motor power is normal and equal bilaterally.           Pertinent Lab Results:       Results from last 7 days  Lab Units 01/11/18  0649 01/10/18  0700 01/09/18  0605   SODIUM mmol/L 142 143 144   POTASSIUM mmol/L 4.5 4.5 4.0   CHLORIDE mmol/L 103 105 100   CO2 mmol/L 30.0 32.0* 37.0*   BUN mg/dL 34* 37* 42*   CREATININE mg/dL 1.20 1.20 1.10   CALCIUM mg/dL 9.2 9.3 9.4   GLUCOSE mg/dL 286* 319* 267*       Results from last 7 days  Lab Units 01/11/18  0649 01/10/18  0700 01/09/18  0605   WBC 10*3/mm3 17.10* 14.17* 15.31*   HEMOGLOBIN g/dL 13.3 13.1 13.7   HEMATOCRIT % 40.5 40.4 42.0   PLATELETS 10*3/mm3 301 313 347       Results from last 7 days  Lab Units 01/08/18  0607 01/05/18  0603   INR  1.10 1.17*            Pertinent Radiology Results:    Imaging Results (all)     Procedure Component Value Units Date/Time    XR Chest 1 View [[id number]] Collected:  01/07/18 0840     Updated:  01/07/18 0845    Narrative:       PROCEDURE: XR CHEST 1 VW-     HISTORY: FATUMA collapse. PNA.; J18.1-Lobar pneumonia, unspecified organism     COMPARISON: None.     FINDINGS: Electrodes overlie the chest. The heart is normal in size. The  mediastinum is unremarkable. Asymmetrical opacification of the left base  and left apex, correlate for airspace disease.  There is no  pneumothorax.  There are no acute osseous abnormalities.           Impression:       Asymmetrical opacification of the left lung as described  above. Follow-up standard 2 views of the chest or CT is recommended.        This report was finalized on 1/7/2018 8:43 AM by David Poole DO.    XR Chest 1 View [[id number]] Collected:  01/09/18 0904     Updated:  01/09/18 0907    Narrative:       PROCEDURE: XR CHEST 1 VW-     HISTORY: PNA; J18.1-Lobar pneumonia, unspecified organism; Z74.09-Other  reduced mobility     COMPARISON: January 7, 2018.     FINDINGS: The heart is normal in size. The mediastinum is unremarkable.  There is improved  aeration of the left lung base, however there is a  worsening right basilar opacity. There is no pneumothorax.  There are no  acute osseous abnormalities.           Impression:       Improved aeration of the left lung base with a worsening  right basilar opacity which may reflect atelectasis or pneumonia.     Continued followup is recommended.     This report was finalized on 1/9/2018 9:05 AM by Fabricio Wolff M.D..    XR Chest 2 View [[id number]] Collected:  01/10/18 1033     Updated:  01/10/18 1045    Narrative:       PROCEDURE: XR CHEST 2 VW-     HISTORY: PNA; J18.1-Lobar pneumonia, unspecified organism; Z74.09-Other  reduced mobility     COMPARISON: None.     FINDINGS: The heart is normal in size. The mediastinum is unremarkable.  There are persistent opacities in the lingula and left lung apex. There  is improved aeration of the right lung base with only linear atelectasis  remaining. There is no pneumothorax.  There are no acute osseous  abnormalities.           Impression:       Persistent opacities in the left lung apex and lingula.     Continued followup is recommended.     This report was finalized on 1/10/2018 10:43 AM by Fabricio Wolff M.D..          Condition on Discharge:    Stable        Discharge Disposition:    Home-Health Care [facility] [place] [place]    Discharge Medication:     Mingo Guidry   Home Medication Instructions KAIT:[id number]    Printed on:01/11/18 8759   Medication Information                      allopurinol (ZYLOPRIM) 300 MG tablet  Take 300 mg by mouth Daily. 1/2 tablet daily             aspirin 81 MG tablet  Take 81 mg by mouth Daily.             busPIRone (BUSPAR) 10 MG tablet  Take 10 mg by mouth 3 (Three) Times a Day.             ezetimibe (ZETIA) 10 MG tablet  Take 10 mg by mouth Daily.             ferrous sulfate 325 (65 FE) MG tablet  Take 325 mg by mouth 3 (Three) Times a Day With Meals.             fluticasone-salmeterol (ADVAIR) 500-50 MCG/DOSE DISKUS  Inhale 1 puff 2 (Two) Times  a Day.             furosemide (LASIX) 40 MG tablet  Take 40 mg by mouth 2 (Two) Times a Day.             guaiFENesin (MUCINEX) 600 MG 12 hr tablet  Take 1 tablet by mouth Every 12 (Twelve) Hours.             HUMALOG 100 UNIT/ML injection  35 Units 3 (Three) Times a Day.             ipratropium-albuterol (DUO-NEB) 0.5-2.5 mg/mL nebulizer  Inhale 3 mL Every 4 (Four) Hours.             Loratadine (CLARITIN) 10 MG capsule  Take  by mouth Daily.             lovastatin (MEVACOR) 10 MG tablet  Take 10 mg by mouth Every Night.             magnesium oxide (MAG-OX) 400 MG tablet  Take 400 mg by mouth 3 (Three) Times a Day.             MethylPREDNISolone (MEDROL, AWA,) 4 MG tablet  Take as directed on package instructions.             metoclopramide (REGLAN) 5 MG tablet  Take 5 mg by mouth 3 (Three) Times a Day.             metolazone (ZAROXOLYN) 5 MG tablet  Take 5 mg by mouth As Needed.             metoprolol tartrate (LOPRESSOR) 25 MG tablet  Take 25 mg by mouth 2 (Two) Times a Day.             montelukast (SINGULAIR) 10 MG tablet  Take 10 mg by mouth Every Night.             NASONEX 50 MCG/ACT nasal spray  As Needed.             pantoprazole (PROTONIX) 40 MG EC tablet  Take 40 mg by mouth Daily.             PATADAY 0.2 % solution ophthalmic solution  Administer  to both eyes 2 (Two) Times a Day.             potassium chloride (KLOR-CON) 20 MEQ packet  Take 20 mEq by mouth Daily. Patient takes the tablets.             sitaGLIPtin (JANUVIA) 100 MG tablet  Take 100 mg by mouth Daily. 1/2 tablet daily             sucralfate (CARAFATE) 1 G tablet  Take 1 g by mouth 3 (Three) Times a Day.             tiotropium (SPIRIVA) 18 MCG per inhalation capsule  Place 1 capsule into inhaler and inhale Daily.             traMADol (ULTRAM) 50 MG tablet  Take 50 mg by mouth Every 6 (Six) Hours As Needed for moderate pain (4-6).             TRESIBA FLEXTOUCH 100 UNIT/ML solution pen-injector injection  75 Units Daily.             vitamin D  (ERGOCALCIFEROL) 91308 units capsule capsule  Take 50,000 Units by mouth 1 (One) Time Per Week.                 Discharge Diet:     Diet Instructions     Diet: Consistent Carbohydrate, Cardiac; Thin       Discharge Diet:   Consistent Carbohydrate  Cardiac      Fluid Consistency:  Thin                 Activity at Discharge:     Activity Instructions     Discharge Activity       As tolerates                 Follow-up Appointments:    Follow up with Dr. Dahl/Yadi ECHEVERRIA in 3 weeks.    Future Appointments  Date Time Provider Department Center   12/13/2018 9:45 AM Jorge Luis Mcnair III, MD [facility] None     Additional Instructions for the Follow-ups that You Need to Schedule     Discharge Follow-up with Specified Provider: Yadi Sheridan.; 3 Weeks    As directed    To:  Yadi Sheridan.    Follow Up:  3 Weeks    Follow Up Details:  If being discharged on a weekend, please call our office on the next working day to schedule this appointment.                   Additional Instructions:  Oxygen 4 liters nasal canula  Continue Bipap as previous on home settings.        Test Results Pending at Discharge:     Order Current Status    Fungus Culture - Lavage, Lung, Left Upper Lobe Preliminary result             JACKI Pollard  01/11/18  3:52 PM    Time: 45   minutes were spent reviewing labs, history, evaluating patient and discharge planning.             Electronically signed by JACKI Pollard at 1/11/2018  3:55 PM

## 2018-01-11 NOTE — PROGRESS NOTES
Exercise Oximetry    Patient Name:Mingo Guidry   MRN: [id number]   Date: 01/11/18             ROOM AIR BASELINE   SpO2% 90   Heart Rate 79   Blood Pressure      EXERCISE ON ROOM AIR SpO2% EXERCISE ON O2 @ 4 LPM SpO2%   1 MINUTE  1 MINUTE 90   2 MINUTES  2 MINUTES 90   3 MINUTES  3 MINUTES 90   4 MINUTES  4 MINUTES 90   5 MINUTES  5 MINUTES 91   6 MINUTES  6 MINUTES 92              Distance Walked   Distance Walked  250 FT   Dyspnea (Светлана Scale)   Dyspnea (Светлана Scale) 3   Fatigue (Светлана Scale)   Fatigue (Светлана Scale)   SpO2% Post Exercise   SpO2% Post Exercise  91% on 4 lpm NC   HR Post Exercise   HR Post Exercise  93   Time to Recovery   Time to Recovery 5 Minutes     Comments: Pt walked hallway with walker at own pace.

## 2018-01-11 NOTE — PROGRESS NOTES
Continued Stay Note  BRIELLE Mills     Patient Name: Mingo Guidry  MRN: [id number]  Today's Date: 1/11/2018    Admit Date: 1/4/2018          Discharge Plan       01/11/18 1721    Case Management/Social Work Plan    Additional Comments Received call back from Kervin requesting additional oxygen testing.  Respiratory notes efaxed.      01/11/18 1622    Case Management/Social Work Plan    Plan Home    Patient/Family In Agreement With Plan yes    Additional Comments Received new oxygen order.  Spoke with pt in room regarding order.  Pt reports that she is currently using 4L at home.  She has a tank with her, but states her son can bring another one, if needed.  ALYSA Ann updated.  Called [facility], spoke with Kervin.  Per Kervin, the last order on file is for 2L.  He states he will just need a new order.  Order and clinicals efaxed to [phone].              Discharge Codes     None        Expected Discharge Date and Time     Expected Discharge Date Expected Discharge Time    Jan 11, 2018             Ana Oneil

## 2018-01-11 NOTE — PLAN OF CARE
Problem: NPPV/CPAP (Adult)  Intervention: Prevent Aspiration During Therapy   01/11/18 0220   Positioning   Head Of Bed (HOB) Position HOB elevated     Intervention: Assess/Manage Patient Tolerance of Noninvasive Ventilation   01/11/18 0220   Respiratory Interventions   Airway/Ventilation Management airway patency maintained;pulmonary hygiene promoted     Intervention: Prevent/Minimize Device Friction/Shearing and Pressure Points   01/11/18 0220   Respiratory Interventions   NPPV/CPAP Maintenance mask adjusted;other (see comments)  (proper mask fit)       Goal: Signs and Symptoms of Listed Potential Problems Will be Absent or Manageable (NPPV/CPAP)  Outcome: Ongoing (interventions implemented as appropriate)   01/11/18 0220   NPPV/CPAP   Problems Assessed (NPPV/CPAP) hypoxia/hypoxemia;situational response;skin breakdown;dry mucous membranes   Problems Present (NPPV/CPAP) hypoxia/hypoxemia

## 2018-01-11 NOTE — PROGRESS NOTES
"  CC: Acute Hypoxic Respiratory Failure.    S: Feels a lot better. S/P bronchoscopy on 01/08/2018.      O: /85 (BP Location: Right arm, Patient Position: Sitting)  Pulse 82  Temp 98.1 °F (36.7 °C) (Oral)   Resp 18  Ht 147.3 cm (58\")  Wt 93.2 kg (205 lb 6.4 oz)  LMP  (LMP Unknown)  SpO2 90%  BMI 42.93 kg/m2      General: No significant respiratory distress noted.  Neck: No JVD.   Cardiovascular: S1 + S2.  Regular.   Respiratory: Minimal scattered wheezing heard.  Minimal Left crackles noted.   Extremities: Trace edema noted.  Neurologic:  AAOx3. Was able to follow commands.     Labs: Reviewed.       Results from last 7 days  Lab Units 01/11/18  0649 01/10/18  0700 01/09/18  0605   SODIUM mmol/L 142 143 144   POTASSIUM mmol/L 4.5 4.5 4.0   CHLORIDE mmol/L 103 105 100   CO2 mmol/L 30.0 32.0* 37.0*   BUN mg/dL 34* 37* 42*   CREATININE mg/dL 1.20 1.20 1.10   CALCIUM mg/dL 9.2 9.3 9.4   GLUCOSE mg/dL 286* 319* 267*       Results from last 7 days  Lab Units 01/11/18  0649 01/10/18  0700 01/09/18  0605 01/08/18  0607 01/05/18  0603   WBC 10*3/mm3 17.10* 14.17* 15.31* 15.71* 12.84*   HEMOGLOBIN g/dL 13.3 13.1 13.7 13.4 13.2   PLATELETS 10*3/mm3 301 313 347 370 341         Results from last 7 days  Lab Units 01/08/18  0607 01/05/18  0603   INR  1.10 1.17*       Assessment & Recommendations/Plan:   1.  Acute hypoxic respiratory failure  Currently on 3-4 LPM.   Will Recommend performance of a walk test   If she can maintain oxygenation at more than 89% requiring less than 5 L/m and she can be discharged home    2.  Left upper lobe healthcare associated pneumonia  Finished 7 days of antibiotics  Cultures have remained negative thus far  We'll not recommend any further antibiotics at this time  Elevated white count is likely secondary to steroids especially given clinical improvement and lack of fever.    3.  Left upper lobe atelectasis?  Continue Flutter valve as well as incentive spirometry.  This will be useful " even at home    4.  Chronic hypoxemia  Continue oxygen at 4 L at rest at home and 5 L with exertion    5.  Obstructive sleep apnea.    On BiPAP for many years.  Continue BiPAP QHS and PRN.    6.  Chronic hypercarbic and hypoxic respiratory failure (last ABG from 1 January showed pH of 7.41 with a PCO2 55 and PO2 of 62)    7.  Grade 2 Diastolic Dysfunction.   Doesn't appear to be decompensated    8. Likely CKD.   Baseline Creatinine seems to be 1.3-1.5.  Currently 1.2.    Preliminary pathology results did not show any malignancy.  Acute inflammation was identified.    Discussed in great detail with the hospitalist service.    We have reviewed patient's current orders and changes, if any, have been suggested to primary care team. Plan was also discussed with nursing staff, as necessary.       Ryder Dahl MD  01/11/18  12:44 PM    Dictated utilizing Dragon dictation.

## 2018-01-11 NOTE — THERAPY TREATMENT NOTE
Acute Care - Physical Therapy Treatment Note  [facility]     Patient Name: Mingo Guidry  : 1950  MRN: [id number]  Today's Date: 2018  Onset of Illness/Injury or Date of Surgery Date: 18  Date of Referral to PT: 18  Referring Physician: Dave Hairston MD    Admit Date: 2018    Visit Dx:    ICD-10-CM ICD-9-CM   1. Pneumonia of left upper lobe due to infectious organism J18.1 486   2. Impaired functional mobility, balance, gait, and endurance Z74.09 V49.89     Patient Active Problem List   Diagnosis   • Chronic diastolic heart failure   • Lower extremity edema   • Essential hypertension   • Type 2 diabetes mellitus   • Acute exacerbation of chronic obstructive pulmonary disease (COPD)   • Obesity   • Acid reflux   • Gout   • Seasonal allergies   • Mixed hyperlipidemia   • Acute on chronic respiratory failure with hypoxia   • Pneumonia of left upper lobe due to infectious organism   • Pneumonia               Adult Rehabilitation Note       18 0902 01/10/18 1414 18 1620    Rehab Assessment/Intervention    Discipline physical therapist  -LM physical therapy assistant  -RM physical therapy assistant  -CC    Document Type therapy note (daily note)  -LM therapy note (daily note)  -RM therapy note (daily note)  -CC    Subjective Information agree to therapy;no complaints  -LM agree to therapy;complains of;fatigue  -RM agree to therapy;complains of;weakness  -CC    Patient Effort, Rehab Treatment good  -LM adequate  -RM good  -CC    Symptoms Noted During/After Treatment fatigue;shortness of breath  -LM fatigue  -RM shortness of breath;fatigue  -CC    Precautions/Limitations fall precautions;oxygen therapy device and L/min  -LM fall precautions;oxygen therapy device and L/min  -RM fall precautions;oxygen therapy device and L/min  -CC    Specific Treatment Considerations 5-6 LPM  -LM 5 L/min   -RM 5 L/min  -CC    Recorded by [LM] Davida Hines, PT [RM] Merlin Douglas PTA [CC] Gretcehn PICKENS  Efrain, PTA    Vital Signs    Pre SpO2 (%) 92  -LM 91  -RM     O2 Delivery Pre Treatment supplemental O2   5 LPM  -LM supplemental O2   5   -RM     Intra SpO2 (%) 89  -LM 92  -RM     O2 Delivery Intra Treatment supplemental O2   6 LPM  -LM supplemental O2   6  -RM     Post SpO2 (%) 92  -LM 92  -RM     O2 Delivery Post Treatment --   5 LPM  -LM supplemental O2   5  -RM     Pre Patient Position  Supine  -RM     Intra Patient Position  Standing  -RM     Post Patient Position  Sitting  -RM     Recorded by [LM] Davida Hines, PT [RM] Merlin Douglas, PTA     Pain Assessment    Pain Assessment No/denies pain  -LM  No/denies pain  -CC    Recorded by [LM] Davida Hines, PT  [CC] Gretchen Zuniga PTA    Bed Mobility, Assessment/Treatment    Bed Mobility, Assistive Device bed rails;head of bed elevated  -LM bed rails;head of bed elevated  -RM head of bed elevated  -CC    Bed Mob, Supine to Sit, [place] conditional independence  -LM conditional independence  -RM independent  -CC    Bed Mob, Sit to Supine, [place]   independent  -CC    Recorded by [LM] Davida Hines, PT [RM] Merlin Douglas, PTA [CC] Gretchen Zuniga, PTA    Transfer Assessment/Treatment    Transfers, Bed-Chair [place] contact guard assist  -LM      Transfers, Bed-Chair-Bed, Assist Device rolling walker  -LM      Transfers, Sit-Stand [place] stand by assist  -LM stand by assist  -RM stand by assist;verbal cues required  -CC    Transfers, Stand-Sit [place] stand by assist  -LM stand by assist  -RM stand by assist;verbal cues required  -CC    Transfers, Sit-Stand-Sit, Assist Device rolling walker  -LM rolling walker  -RM rolling walker  -CC    Transfer, Safety Issues   balance decreased during turns;step length decreased  -CC    Transfer, Impairments   strength decreased;impaired balance  -CC    Transfer, Comment   SOA  -CC    Recorded by [LM] Davida Hines, PT [RM] Merlin Douglas, PTA [CC] Gretchen Zuniga PTA    Gait  Assessment/Treatment    Gait, [place] Level contact guard assist  -LM contact guard assist  -RM stand by assist  -CC    Gait, Assistive Device rolling walker  -LM rolling walker  -RM rolling walker  -CC    Gait, Distance (Feet) 248  -LM 78  -RM 38  -CC    Gait, Gait Pattern Analysis swing-through gait  -LM swing-through gait  -RM swing-through gait  -CC    Gait, Gait Deviations john decreased;step length decreased  -LM john decreased;stride width increased;step length decreased  -RM john decreased;stride width increased;stride length decreased  -CC    Gait, Safety Issues supplemental O2;step length decreased  -LM supplemental O2;step length decreased  -RM supplemental O2;balance decreased during turns;step length decreased  -CC    Gait, Impairments strength decreased  -LM impaired balance;strength decreased  -RM strength decreased;impaired balance  -CC    Gait, Comment 3 standing rest breaks d/t dyspnea  -LM      Recorded by [LM] Davida Hines, PT [RM] Merlin Douglas, PTA [CC] Gretchen Zuniga, JOSE    Therapy Exercises    Bilateral Lower Extremities AROM:;10 reps;sitting;ankle pumps/circles;heel slides;hip abduction/adduction;SLR  -LM AROM:;10 reps;sitting;ankle pumps/circles;glut sets;hip flexion;LAQ  -RM AROM:;15 reps;supine;ankle pumps/circles;hip abduction/adduction;heel slides;SAQ;SLR  -CC    Recorded by [LM] Davida Hines, PT [RM] Merlin Douglas, PTA [CC] Gretchen Zuniga, JOSE    Positioning and Restraints    Pre-Treatment Position in bed  -LM in bed  -RM in bed  -CC    Post Treatment Position chair  -LM chair  -RM bed  -CC    In Bed   sitting EOB;call light within reach;encouraged to call for assist  -CC    In Chair reclined;call light within reach;encouraged to call for assist  -LM reclined;call light within reach;encouraged to call for assist;notified nsg  -RM     Recorded by [LM] Davida Hines, PT [RM] Merlin Douglas, PTA [CC] Gretchen Zuniga, JOSE      01/08/18 8166          Rehab  Assessment/Intervention    Discipline physical therapy assistant  -CC      Document Type therapy note (daily note)  -CC      Subjective Information agree to therapy;complains of;dyspnea  -CC      Patient Effort, Rehab Treatment excellent  -CC      Symptoms Noted During/After Treatment shortness of breath  -CC      Precautions/Limitations fall precautions;oxygen therapy device and L/min  -CC      Recorded by [CC] Gretchen Zuniga PTA      Pain Assessment    Pain Assessment No/denies pain  -CC      Recorded by [CC] Gretchen Zuniga PTA      Cognitive Assessment/Intervention    Current Cognitive/Communication Assessment functional  -CC      Orientation Status oriented x 4  -CC      Follows Commands/Answers Questions able to follow single-step instructions  -CC      Recorded by [CC] Gretchen Zuniga PTA      Bed Mobility, Assessment/Treatment    Bed Mobility, Assistive Device head of bed elevated  -CC      Bed Mob, Supine to Sit, [place] independent  -CC      Bed Mob, Sit to Supine, [place] independent  -CC      Recorded by [CC] Gretchen Zuniga PTA      Transfer Assessment/Treatment    Transfers, Bed-Chair [place] contact guard assist;stand by assist;verbal cues required  -CC      Transfers, Sit-Stand [place] contact guard assist;stand by assist;verbal cues required  -CC      Transfers, Stand-Sit [place] contact guard assist;stand by assist;verbal cues required  -CC      Transfer, Comment SOA  -CC      Recorded by [CC] Gretchen Zuniga PTA      Gait Assessment/Treatment    Gait, [place] Level contact guard assist;hand held assist;verbal cues required  -CC      Gait, Distance (Feet) 30  -CC      Gait, Gait Pattern Analysis swing-through gait  -      Gait, Gait Deviations stride length decreased;stride width increased;john decreased  -      Gait, Safety Issues supplemental O2;balance decreased during turns;sequencing ability decreased  -      Gait, Impairments strength  decreased;impaired balance  -CC      Gait, Comment dyspnea limits mobility, pt would benefit from RW to decrease unsteadiness  -CC      Recorded by [CC] Gretchen Zuniga PTA      Therapy Exercises    Bilateral Lower Extremities AROM:;15 reps;supine;ankle pumps/circles;heel slides;hip abduction/adduction;quad sets  -CC      Recorded by [CC] Gretchen Zuniga PTA      Positioning and Restraints    Pre-Treatment Position in bed  -CC      Post Treatment Position chair  -CC      In Chair reclined;call light within reach;encouraged to call for assist  -CC      Recorded by [CC] Gretchen Zuniga PTA        User Key  (r) = Recorded By, (t) = Taken By, (c) = Cosigned By    Initials Name Effective Dates    LM Davida Hines, PT 10/26/16 -     CC Gretchen Zuniga, PTA 10/26/16 -     RM Merlin Douglas, PTA 10/26/16 -                 IP PT Goals       01/11/18 0931 01/09/18 1620 01/08/18 1446    Transfer Training PT LTG    Transfer Training PT LTG, Outcome goal ongoing  -LM goal ongoing  -CC goal ongoing  -CC    Gait Training PT LTG    Gait Training Goal PT LTG, Outcome goal ongoing  -LM goal partially met  -CC goal partially met  -CC      01/07/18 1930          Transfer Training PT LTG    Transfer Training PT LTG, Date Established 01/07/18  -JR      Transfer Training PT LTG, Time to Achieve 2 wks  -JR      Transfer Training PT LTG, Activity Type bed to chair /chair to bed;sit to stand/stand to sit  -JR      Transfer Training PT LTG, [place] Level supervision required  -JR      Transfer Training PT LTG, Additional Goal with oxygen saturation level at least 90%, without SOA  -JR      Gait Training PT LTG    Gait Training Goal PT LTG, Date Established 01/07/18  -JR      Gait Training Goal PT LTG, Time to Achieve 2 wks  -JR      Gait Training Goal PT LTG, [place] Level contact guard assist  -JR      Gait Training Goal PT LTG, Distance to Achieve 400 with oxygen saturation levels above 90%  -JR      Gait Training Goal PT  LTG, Additional Goal with minimal SOA  -        User Key  (r) = Recorded By, (t) = Taken By, (c) = Cosigned By    Initials Name Provider Type    JR Vivi Sahni, PT Physical Therapist     Davida Hines, PT Physical Therapist    CC Gretchen Zuniga, PTA Physical Therapy Assistant          Physical Therapy Education     Title: PT OT SLP Therapies (Active)     Topic: Physical Therapy (Active)     Point: Mobility training (Done)    Learning Progress Summary    Learner Readiness Method Response Comment Documented by Status   Patient Acceptance E VU PLB/pacing to alleviate SOA; ther ex for HEP.  01/11/18 0931 Done    Acceptance E,D VU,DU,NR   01/10/18 1500 Done    Acceptance E VU,NR increase mobility daily CC 01/09/18 1712 Done    Acceptance E VU Importance of breathing coordination with activity  01/08/18 1024 Done               Point: Home exercise program (Done)    Learning Progress Summary    Learner Readiness Method Response Comment Documented by Status   Patient Acceptance E VU PLB/pacing to alleviate SOA; ther ex for HEP.  01/11/18 0931 Done    Acceptance E,D VU,DU,NR   01/10/18 1500 Done    Acceptance E VU,NR Perform ex daily when d/c home  01/08/18 1716 Done               Point: Precautions (Done)    Learning Progress Summary    Learner Readiness Method Response Comment Documented by Status   Patient Acceptance E VU PLB/pacing to alleviate SOA; ther ex for HEP.  01/11/18 0931 Done                      User Key     Initials Effective Dates Name Provider Type Discipline     10/26/16 -  Vivi Sahni, PT Physical Therapist PT     10/26/16 -  Davida Hines, PT Physical Therapist PT     10/26/16 -  Gretchen Zuniga, JOSE Physical Therapy Assistant PT     10/26/16 -  Merlin Douglas, PTA Physical Therapy Assistant PT                    PT Recommendation and Plan  Anticipated Discharge Disposition: home with assist (outpatient rehab setting)  Planned Therapy Interventions: strengthening, transfer  training, gait training, patient/family education  PT Frequency: daily  Plan of Care Review  Plan Of Care Reviewed With: patient  Progress: improving  Outcome Summary/Follow up Plan: PT tx completed.  Pt is able to perform bed mobility and sit to stand with I/SBA.  She is able to ambulate 248 feet with 6 LPM O2, RW and CGA. She requires 3 standing rest breaks during ambulation d/t reported dyspnea.  Pt performs B LE ther ex as indicated on care map.  Cont to goals.          Outcome Measures       01/11/18 0902 01/09/18 1700 01/08/18 1446    How much help from another person do you currently need...    Turning from your back to your side while in flat bed without using bedrails? 4  -LM 4  -CC 4  -CC    Moving from lying on back to sitting on the side of a flat bed without bedrails? 4  -LM 4  -CC 4  -CC    Moving to and from a bed to a chair (including a wheelchair)? 3  -LM 3  -CC 3  -CC    Standing up from a chair using your arms (e.g., wheelchair, bedside chair)? 4  -LM 4  -CC 3  -CC    Climbing 3-5 steps with a railing? 3  -LM 3  -CC 3  -CC    To walk in hospital room? 3  -LM 3  -CC 3  -CC    AM-PAC 6 Clicks Score 21  -LM 21  -CC 20  -CC    Functional Assessment    Outcome Measure Options AM-PAC 6 Clicks Basic Mobility (PT)  -LM AM-PAC 6 Clicks Basic Mobility (PT)  -CC AM-PAC 6 Clicks Basic Mobility (PT)  -CC      User Key  (r) = Recorded By, (t) = Taken By, (c) = Cosigned By    Initials Name Provider Type    RILEY Hines, ROSALIA Physical Therapist    CC Gretchen Zuniga PTA Physical Therapy Assistant           Time Calculation:         PT Charges       01/11/18 0933          Time Calculation    Start Time 0902  -LM      PT Received On 01/11/18  -LM      Time Calculation- PT    Total Timed Code Minutes- PT 24 minute(s)  -LM        User Key  (r) = Recorded By, (t) = Taken By, (c) = Cosigned By    Initials Name Provider Type    RILEY Hines PT Physical Therapist          Therapy Charges for Today     Code  Description Service Date Service Provider Modifiers Qty    77116746469 HC GAIT TRAINING EA 15 MIN 1/11/2018 Davida Hines, PT GP 1    [id number] HC PT THER PROC EA 15 MIN 1/11/2018 Davida Hines, PT GP 1          PT G-Codes  Outcome Measure Options: AM-PAC 6 Clicks Basic Mobility (PT)    Davida Hines, PT  1/11/2018

## 2018-01-11 NOTE — PLAN OF CARE
Problem: Patient Care Overview (Adult)  Goal: Plan of Care Review  Outcome: Ongoing (interventions implemented as appropriate)   01/11/18 0931   Coping/Psychosocial Response Interventions   Plan Of Care Reviewed With patient   Outcome Evaluation   Outcome Summary/Follow up Plan PT tx completed. Pt is able to perform bed mobility and sit to stand with I/SBA. She is able to ambulate 248 feet with 6 LPM O2, RW and CGA. She requires 3 standing rest breaks during ambulation d/t reported dyspnea. Pt performs B LE ther ex as indicated on care map. Cont to goals.   Patient Care Overview   Progress improving       Problem: Inpatient Physical Therapy  Goal: Transfer Training Goal 1 LTG- PT  Outcome: Ongoing (interventions implemented as appropriate)   01/07/18 1930 01/11/18 0931   Transfer Training PT LTG   Transfer Training PT LTG, Date Established 01/07/18 --    Transfer Training PT LTG, Time to Achieve 2 wks --    Transfer Training PT LTG, Activity Type bed to chair /chair to bed;sit to stand/stand to sit --    Transfer Training PT LTG, [place] Level supervision required --    Transfer Training PT LTG, Additional Goal with oxygen saturation level at least 90%, without SOA --    Transfer Training PT LTG, Outcome --  goal ongoing     Goal: Gait Training Goal LTG- PT  Outcome: Ongoing (interventions implemented as appropriate)   01/07/18 1930 01/11/18 0931   Gait Training PT LTG   Gait Training Goal PT LTG, Date Established 01/07/18 --    Gait Training Goal PT LTG, Time to Achieve 2 wks --    Gait Training Goal PT LTG, [place] Level contact guard assist --    Gait Training Goal PT LTG, Distance to Achieve 400 with oxygen saturation levels above 90% --    Gait Training Goal PT LTG, Additional Goal with minimal SOA --    Gait Training Goal PT LTG, Outcome --  goal ongoing

## 2018-01-11 NOTE — DISCHARGE PLACEMENT REQUEST
"BHAVYA Osorio RN  Case Management  Phone:  [phone]; Fax:  [phone]    Additional oxygen testing attached.    Jolene Guidry (67 y.o. Female)     Date of Birth Social Security Number Address Home Phone MRN    1950  [address] [place] [address] [phone] [id number]    Spiritism Marital Status          Unknown        Admission Date Admission Type Admitting Provider Attending Provider Department, Room/Bed    1/4/18 Elective Dave Hairston MD Nevarez-Maggard, April G, DO [facility] SURG  3, 323/1    Discharge Date Discharge Disposition Discharge Destination         Home-Health Care Svc             Attending Provider: Nitza Turpin DO     Allergies:  Shellfish-derived Products, Wheat Bran    Isolation:  None   Infection:  None   Code Status:  FULL    Ht:  147.3 cm (58\")   Wt:  93.2 kg (205 lb 6.4 oz)    Admission Cmt:  None   Principal Problem:  Acute on chronic respiratory failure with hypoxia [J96.21]                 Active Insurance as of 1/4/2018     Primary Coverage     Payor Plan Insurance Group Employer/Plan Group    [place]      Payor Plan Address Payor Plan Phone Number Effective From Effective To    [address] [phone] 11/21/2017     [place] [id number]       Subscriber Name Subscriber Birth Date Member ID       JOLENE GUIDRY 1950 [id number]                 Emergency Contacts      (Rel.) Home Phone Work Phone Mobile Phone    Adebayo Guidry (Son) [phone] -- --    Chelly Judge (Other) [phone] -- [phone]               Respiratory Therapy Notes (last 24 hours) (Notes from 1/10/2018  5:20 PM through 1/11/2018  5:20 PM)      Tayo Tello III, RRT at 1/11/2018  2:21 AM  Version 1 of 1         Problem: NPPV/CPAP (Adult)  Intervention: Prevent Aspiration During Therapy   01/11/18 0220   Positioning   Head Of Bed (HOB) Position HOB elevated     Intervention: Assess/Manage " Patient Tolerance of Noninvasive Ventilation   01/11/18 0220   Respiratory Interventions   Airway/Ventilation Management airway patency maintained;pulmonary hygiene promoted     Intervention: Prevent/Minimize Device Friction/Shearing and Pressure Points   01/11/18 0220   Respiratory Interventions   NPPV/CPAP Maintenance mask adjusted;other (see comments)  (proper mask fit)       Goal: Signs and Symptoms of Listed Potential Problems Will be Absent or Manageable (NPPV/CPAP)  Outcome: Ongoing (interventions implemented as appropriate)   01/11/18 0220   NPPV/CPAP   Problems Assessed (NPPV/CPAP) hypoxia/hypoxemia;situational response;skin breakdown;dry mucous membranes   Problems Present (NPPV/CPAP) hypoxia/hypoxemia            Electronically signed by Tayo Tello III, RRT at 1/11/2018  2:21 AM      Ce Bond, CRT at 1/11/2018  3:33 PM  Version 1 of 1         Exercise Oximetry    Patient Name:Mingo Guidry   MRN: [id number]   Date: 01/11/18             ROOM AIR BASELINE   SpO2% 90   Heart Rate 79   Blood Pressure      EXERCISE ON ROOM AIR SpO2% EXERCISE ON O2 @ 4 LPM SpO2%   1 MINUTE  1 MINUTE 90   2 MINUTES  2 MINUTES 90   3 MINUTES  3 MINUTES 90   4 MINUTES  4 MINUTES 90   5 MINUTES  5 MINUTES 91   6 MINUTES  6 MINUTES 92              Distance Walked   Distance Walked  250 FT   Dyspnea (Светлана Scale)   Dyspnea (Светлана Scale) 3   Fatigue (Светлана Scale)   Fatigue (Светлана Scale)   SpO2% Post Exercise   SpO2% Post Exercise  91% on 4 lpm NC   HR Post Exercise   HR Post Exercise  93   Time to Recovery   Time to Recovery 5 Minutes     Comments: Pt walked hallway with walker at own pace.     Electronically signed by Ce Bond, CRT at 1/11/2018  3:35 PM

## 2018-01-12 ENCOUNTER — CARE COORDINATION (OUTPATIENT)
Dept: CARE COORDINATION | Age: 68
End: 2018-01-12

## 2018-01-12 NOTE — PROGRESS NOTES
Continued Stay Note  [facility]     Patient Name: Mingo Guidry  MRN: [id number]  Today's Date: 1/12/2018    Admit Date: 1/4/2018          Discharge Plan       01/12/18 0934    Case Management/Social Work Plan    Additional Comments Home Helath mentioned in DC summary no order  From previous discussion pt goes to [facility] 4 times a week  still drives Called to confirm this had to leave voice message for call back  Pt called back she does not want Home Health She will be returning to [facility] Monday               Discharge Codes     None        Expected Discharge Date and Time     Expected Discharge Date Expected Discharge Time    Jan 11, 2018             Ana Romano

## 2018-01-18 DIAGNOSIS — K21.9 GASTROESOPHAGEAL REFLUX DISEASE, ESOPHAGITIS PRESENCE NOT SPECIFIED: ICD-10-CM

## 2018-01-21 RX ORDER — SUCRALFATE 1 G/1
TABLET ORAL
Qty: 90 TABLET | Refills: 3 | Status: SHIPPED | OUTPATIENT
Start: 2018-01-21 | End: 2018-04-16 | Stop reason: SDUPTHER

## 2018-01-21 RX ORDER — PANTOPRAZOLE SODIUM 40 MG/1
TABLET, DELAYED RELEASE ORAL
Qty: 30 TABLET | Refills: 5 | Status: SHIPPED | OUTPATIENT
Start: 2018-01-21 | End: 2018-04-23 | Stop reason: SDUPTHER

## 2018-01-22 RX ORDER — BUSPIRONE HYDROCHLORIDE 10 MG/1
TABLET ORAL
Qty: 90 TABLET | Refills: 5 | Status: SHIPPED | OUTPATIENT
Start: 2018-01-22 | End: 2018-06-11 | Stop reason: SDUPTHER

## 2018-01-25 ENCOUNTER — HOSPITAL ENCOUNTER (OUTPATIENT)
Dept: OTHER | Age: 68
Discharge: HOME | End: 2018-01-25
Attending: NURSE PRACTITIONER | Admitting: NURSE PRACTITIONER

## 2018-01-25 ENCOUNTER — OFFICE VISIT (OUTPATIENT)
Dept: PRIMARY CARE CLINIC | Age: 68
End: 2018-01-25
Payer: MEDICARE

## 2018-01-25 VITALS
SYSTOLIC BLOOD PRESSURE: 118 MMHG | HEART RATE: 92 BPM | DIASTOLIC BLOOD PRESSURE: 64 MMHG | RESPIRATION RATE: 20 BRPM | WEIGHT: 197.4 LBS | BODY MASS INDEX: 41.44 KG/M2 | HEIGHT: 58 IN | OXYGEN SATURATION: 96 % | TEMPERATURE: 98.3 F

## 2018-01-25 DIAGNOSIS — Z13.29 THYROID DISORDER SCREEN: ICD-10-CM

## 2018-01-25 DIAGNOSIS — R19.7 DIARRHEA, UNSPECIFIED TYPE: ICD-10-CM

## 2018-01-25 DIAGNOSIS — E55.9 VITAMIN D DEFICIENCY: ICD-10-CM

## 2018-01-25 DIAGNOSIS — Z79.4 TYPE 2 DIABETES MELLITUS WITHOUT COMPLICATION, WITH LONG-TERM CURRENT USE OF INSULIN (HCC): ICD-10-CM

## 2018-01-25 DIAGNOSIS — E53.8 B12 DEFICIENCY: ICD-10-CM

## 2018-01-25 DIAGNOSIS — E78.00 PURE HYPERCHOLESTEROLEMIA: ICD-10-CM

## 2018-01-25 DIAGNOSIS — J45.51 SEVERE PERSISTENT ASTHMA WITH ACUTE EXACERBATION: ICD-10-CM

## 2018-01-25 DIAGNOSIS — E11.9 TYPE 2 DIABETES MELLITUS WITHOUT COMPLICATION, WITH LONG-TERM CURRENT USE OF INSULIN (HCC): ICD-10-CM

## 2018-01-25 DIAGNOSIS — J44.1 COPD EXACERBATION (HCC): Primary | ICD-10-CM

## 2018-01-25 LAB
A/G RATIO: 1.4 (ref 0.8–2)
ALBUMIN SERPL-MCNC: 3.6 G/DL (ref 3.4–4.8)
ALP BLD-CCNC: 116 U/L (ref 25–100)
ALT SERPL-CCNC: 57 U/L (ref 4–36)
ANION GAP SERPL CALCULATED.3IONS-SCNC: 15 MMOL/L (ref 3–16)
AST SERPL-CCNC: 34 U/L (ref 8–33)
BILIRUB SERPL-MCNC: 0.4 MG/DL (ref 0.3–1.2)
BUN BLDV-MCNC: 40 MG/DL (ref 6–20)
CALCIUM SERPL-MCNC: 9.1 MG/DL (ref 8.5–10.5)
CHLORIDE BLD-SCNC: 92 MMOL/L (ref 98–107)
CHOLESTEROL, TOTAL: 185 MG/DL (ref 0–200)
CO2: 34 MMOL/L (ref 20–30)
CREAT SERPL-MCNC: 1.4 MG/DL (ref 0.4–1.2)
FOLATE: 14.16 NG/ML
GFR AFRICAN AMERICAN: 45
GFR NON-AFRICAN AMERICAN: 37
GLOBULIN: 2.5 G/DL
GLUCOSE BLD-MCNC: 165 MG/DL (ref 74–106)
HBA1C MFR BLD: 8.8 %
HCT VFR BLD CALC: 43.6 % (ref 37–47)
HDLC SERPL-MCNC: 28 MG/DL (ref 40–60)
HEMOGLOBIN: 13.8 G/DL (ref 11.5–16.5)
LDL CHOLESTEROL CALCULATED: ABNORMAL MG/DL
LDL CHOLESTEROL DIRECT: 73 MG/DL
MCH RBC QN AUTO: 32.1 PG (ref 27–32)
MCHC RBC AUTO-ENTMCNC: 31.7 G/DL (ref 31–35)
MCV RBC AUTO: 101.4 FL (ref 80–100)
PDW BLD-RTO: 15.4 % (ref 11–16)
PLATELET # BLD: 258 K/UL (ref 150–400)
PMV BLD AUTO: 11.2 FL (ref 6–10)
POTASSIUM SERPL-SCNC: 3.3 MMOL/L (ref 3.4–5.1)
RBC # BLD: 4.3 M/UL (ref 3.8–5.8)
SODIUM BLD-SCNC: 141 MMOL/L (ref 136–145)
TOTAL PROTEIN: 6.1 G/DL (ref 6.4–8.3)
TRIGL SERPL-MCNC: 656 MG/DL (ref 0–249)
TSH SERPL DL<=0.05 MIU/L-ACNC: 2.04 UIU/ML (ref 0.35–5.5)
VITAMIN B-12: 647 PG/ML (ref 211–911)
VITAMIN D 25-HYDROXY: 17 (ref 32–100)
VLDLC SERPL CALC-MCNC: ABNORMAL MG/DL
WBC # BLD: 13 K/UL (ref 4–11)

## 2018-01-25 PROCEDURE — 99495 TRANSJ CARE MGMT MOD F2F 14D: CPT | Performed by: NURSE PRACTITIONER

## 2018-01-25 PROCEDURE — 1111F DSCHRG MED/CURRENT MED MERGE: CPT | Performed by: NURSE PRACTITIONER

## 2018-01-25 RX ORDER — GREEN TEA/HOODIA GORDONII 315-12.5MG
1 CAPSULE ORAL 2 TIMES DAILY
Qty: 60 TABLET | Refills: 1 | Status: SHIPPED | OUTPATIENT
Start: 2018-01-25 | End: 2018-03-19 | Stop reason: SDUPTHER

## 2018-01-25 NOTE — PROGRESS NOTES
Have you seen any other physician or provider since your last visit yes - Adanhelio Duran 12/2017 and 01/01/2018 then transferred to [facility] 01/04/2018    Have you had any other diagnostic tests since your last visit? yes -     Have you changed or stopped any medications since your last visit? No    Patient was transferred to [facility] in [place] 01/04/2018 and had a bronchoscope. She was diagnosed with pneumonia at [facility]. Patient has a follow up appointment with Dr Marilyn Morris.      Patient states that her Tramadol is not helping as much as it use to. Patient is not interested in a mammogram today.
renal failure ([facility] [address]    COPD with acute exacerbation (HCC)    Moderate persistent asthma without complication    Hypoxia    Chronic gout without tophus    Vitamin D deficiency    CRF (chronic renal failure)    Hand cramps    Cramp of both lower extremities    Elevated BUN    Injection site reaction    Allergic reaction    COPD exacerbation (HCC)    Type 2 diabetes mellitus with complication, with long-term current use of insulin (HCC)    Stage 3 chronic kidney disease    Chronic bilateral low back pain without sciatica       Allergies   Allergen Reactions    Shellfish-Derived Products     Wheat Bran Hives       Medications listed as ordered at the time of discharge from [facility], [address] [place] [address] Medication Instructions MALIA:    Printed on:02/11/18 1911   Medication Information                      albuterol sulfate  (90 Base) MCG/ACT inhaler  Inhale 2 puffs into the lungs every 4 hours as needed              allopurinol (ZYLOPRIM) 300 MG tablet  TAKE ONE TABLET BY MOUTH EVERY DAY             ASPIR-LOW 81 MG EC tablet  TAKE ONE TABLET BY MOUTH EVERY DAY             Blood Glucose Monitoring Suppl DWAIN  1 each by Does not apply route 4 times daily Dx:E10.9 Test QID             busPIRone (BUSPAR) 10 MG tablet  TAKE ONE TABLET BY MOUTH THREE TIMES A DAY             COMFORT ASSIST INSULIN SYRINGE 31G X 5/16\" 1 ML MISC  USE AS DIRECTED FIVE TIMES A DAY             ergocalciferol (ERGOCALCIFEROL) 13959 units capsule  Take 1 capsule by mouth once a week             ezetimibe (ZETIA) 10 MG tablet  TAKE ONE TABLET BY MOUTH DAILY             ferrous sulfate 325 (65 Fe) MG tablet  TAKE ONE TABLET BY MOUTH THREE TIMES A DAY WITH FOOD             fluticasone-salmeterol (ADVAIR DISKUS) 250-50 MCG/DOSE AEPB  INHALE ONE PUFF BY MOUTH EVERY 12 HOURS             furosemide (LASIX) 40 MG tablet  TAKE ONE TABLET BY MOUTH TWO TIMES A DAY             Glucose Blood (BLOOD GLUCOSE TEST STRIPS) STRP  DX:E10.9 Test

## 2018-02-01 ENCOUNTER — OFFICE VISIT (OUTPATIENT)
Dept: PULMONOLOGY | Facility: CLINIC | Age: 68
End: 2018-02-01

## 2018-02-01 ENCOUNTER — TELEPHONE (OUTPATIENT)
Dept: PRIMARY CARE CLINIC | Age: 68
End: 2018-02-01

## 2018-02-01 VITALS
HEART RATE: 102 BPM | HEIGHT: 58 IN | SYSTOLIC BLOOD PRESSURE: 90 MMHG | BODY MASS INDEX: 41.14 KG/M2 | OXYGEN SATURATION: 82 % | RESPIRATION RATE: 20 BRPM | WEIGHT: 196 LBS | DIASTOLIC BLOOD PRESSURE: 52 MMHG

## 2018-02-01 DIAGNOSIS — E66.01 MORBID OBESITY (HCC): ICD-10-CM

## 2018-02-01 DIAGNOSIS — J96.21 ACUTE ON CHRONIC RESPIRATORY FAILURE WITH HYPOXIA (HCC): Primary | ICD-10-CM

## 2018-02-01 DIAGNOSIS — R09.02 HYPOXIA: ICD-10-CM

## 2018-02-01 DIAGNOSIS — R06.02 SHORTNESS OF BREATH: ICD-10-CM

## 2018-02-01 DIAGNOSIS — R06.02 SOB (SHORTNESS OF BREATH): Primary | ICD-10-CM

## 2018-02-01 DIAGNOSIS — G47.33 OSA (OBSTRUCTIVE SLEEP APNEA): ICD-10-CM

## 2018-02-01 DIAGNOSIS — J45.909 UNCOMPLICATED ASTHMA, UNSPECIFIED ASTHMA SEVERITY, UNSPECIFIED WHETHER PERSISTENT: ICD-10-CM

## 2018-02-01 DIAGNOSIS — J44.9 CHRONIC OBSTRUCTIVE PULMONARY DISEASE, UNSPECIFIED COPD TYPE (HCC): ICD-10-CM

## 2018-02-01 PROCEDURE — 94729 DIFFUSING CAPACITY: CPT | Performed by: INTERNAL MEDICINE

## 2018-02-01 PROCEDURE — 94726 PLETHYSMOGRAPHY LUNG VOLUMES: CPT | Performed by: INTERNAL MEDICINE

## 2018-02-01 PROCEDURE — 94060 EVALUATION OF WHEEZING: CPT | Performed by: INTERNAL MEDICINE

## 2018-02-01 PROCEDURE — 99214 OFFICE O/P EST MOD 30 MIN: CPT | Performed by: NURSE PRACTITIONER

## 2018-02-01 RX ORDER — ROFLUMILAST 500 UG/1
TABLET ORAL
Refills: 2 | COMMUNITY
Start: 2018-01-25 | End: 2018-05-17 | Stop reason: ALTCHOICE

## 2018-02-01 RX ORDER — SYRINGE AND NEEDLE,INSULIN,1ML 31 GX5/16"
SYRINGE, EMPTY DISPOSABLE MISCELLANEOUS
Refills: 3 | COMMUNITY
Start: 2018-01-29 | End: 2020-01-01

## 2018-02-01 RX ORDER — POTASSIUM CHLORIDE 20 MEQ/1
20 TABLET, EXTENDED RELEASE ORAL DAILY
Refills: 2 | COMMUNITY
Start: 2018-01-12 | End: 2020-01-01 | Stop reason: HOSPADM

## 2018-02-01 RX ORDER — ALBUTEROL SULFATE 90 UG/1
2 AEROSOL, METERED RESPIRATORY (INHALATION) EVERY 4 HOURS PRN
Qty: 1 INHALER | Refills: 5 | Status: SHIPPED | OUTPATIENT
Start: 2018-02-01 | End: 2018-11-29 | Stop reason: SDUPTHER

## 2018-02-01 RX ORDER — L. ACIDOPHILUS/PECTIN, CITRUS 25MM-100MG
1 TABLET ORAL DAILY
Refills: 1 | COMMUNITY
Start: 2018-01-25 | End: 2020-01-01

## 2018-02-01 RX ORDER — ERGOCALCIFEROL (VITAMIN D2) 1250 MCG
50000 CAPSULE ORAL WEEKLY
Qty: 4 CAPSULE | Refills: 3 | Status: SHIPPED | OUTPATIENT
Start: 2018-02-01 | End: 2018-03-07 | Stop reason: SDUPTHER

## 2018-02-01 RX ORDER — ASPIRIN 81 MG/1
81 TABLET ORAL DAILY
Refills: 1 | COMMUNITY
Start: 2017-12-27 | End: 2019-06-15 | Stop reason: HOSPADM

## 2018-02-01 RX ORDER — CALCIUM CITRATE/VITAMIN D3 200MG-6.25
TABLET ORAL
Refills: 3 | Status: ON HOLD | COMMUNITY
Start: 2018-01-20 | End: 2020-01-01 | Stop reason: SDUPTHER

## 2018-02-01 NOTE — PROGRESS NOTES
"Chief Complaint   Patient presents with   • Follow-up     Hospital follow up pneumonia.          Subjective   Minog Guidry is a 67 y.o. female.     History of Present Illness   Patient comes in today for follow-up of COPD, hypoxia, and obstructive sleep apnea.    She was discharged from the hospital about 2 weeks ago.  She is still feeling weak but doing much better.  She was treated for pneumonia.  She feels that her shortness of breath is nearly back to baseline and is definitely better than even on the day of her discharge 2 weeks ago.    She is using BiPAP every night.    She continues to use the nebulizer at home.  When she is not sick she uses the nebulizer 3-5 times per week.  When she is sick she uses the nebulizer more often.  She denies having a rescue inhaler.    She uses Advair twice a day and Spiriva daily.    She has recently been started on Daliresp by her PCP.  She was prescribed this medication about 1 week ago.  She has been taking the medication once daily and denies having any side effects such as diarrhea or worsening of diarrhea.    She is on continuous oxygen.    She does not smoke.  She quit 25+ years ago.    The following portions of the patient's history were reviewed and updated as appropriate: allergies, current medications, past family history, past medical history, past social history and past surgical history.    Review of Systems   Constitutional: Positive for fatigue. Negative for chills and fever.   HENT: Positive for postnasal drip and rhinorrhea. Negative for sinus pressure, sneezing and sore throat.    Respiratory: Positive for cough and shortness of breath. Negative for chest tightness and wheezing.    Psychiatric/Behavioral: Negative for sleep disturbance.       Objective   Visit Vitals   • BP 90/52   • Pulse 102   • Resp 20   • Ht 147.3 cm (57.99\")   • Wt 88.9 kg (196 lb)   • LMP  (LMP Unknown)   • SpO2 (!) 82%  Comment: Room Air   • BMI 40.98 kg/m2   SpO2 92 % on 2lpm "   Physical Exam   Constitutional: She is oriented to person, place, and time. She appears well-developed and well-nourished.   HENT:   Head: Normocephalic and atraumatic.   Mouth/Throat: Oropharynx is clear and moist.   Crowded oropharynx.   Eyes: EOM are normal.   Neck: Neck supple.   Cardiovascular: Normal rate and regular rhythm.    Pulmonary/Chest: Effort normal. No respiratory distress.   Somewhat hyperresonant to percussion  Somewhat decreased A/E with wheezing noted.   Musculoskeletal: She exhibits no edema.   Neurological: She is alert and oriented to person, place, and time.   Skin: Skin is warm and dry.   Psychiatric: She has a normal mood and affect.   Vitals reviewed.          Assessment/Plan   Mingo was seen today for follow-up.    Diagnoses and all orders for this visit:    Acute on chronic respiratory failure with hypoxia    Chronic obstructive pulmonary disease, unspecified COPD type  -     Pulmonary Function Test    Morbid obesity    Hypoxia    CRISSY (obstructive sleep apnea)    Shortness of breath  -     Pulmonary Function Test    Other orders  -     albuterol (VENTOLIN HFA) 108 (90 Base) MCG/ACT inhaler; Inhale 2 puffs Every 4 (Four) Hours As Needed for Wheezing or Shortness of Air.           Return in about 14 weeks (around 5/10/2018) for Recheck, For Dr. Dahl.    DISCUSSION (if any):  I reviewed her hospital discharge summary which does confirm pneumonia treatment as well as acute on chronic respiratory failure.  She has a history of obstructive sleep apnea and is on BiPAP at home.    I reviewed her PFT from today which shows no obstruction, mild restriction is suggested, and a mild decreased diffusion capacity.  She is status post a lobectomy which can explain the restriction.  I have discussed these results with her in detail.    She should continue using BiPAP at the current settings.  I will have the office staff request a compliance.    I will also have the office staff attempt to get the  patient's previous sleep study from Dr. Ortega's office.    I recommended that she continue Advair and Spiriva as directed.  I will add Ventolin as needed for shortness of breath and wheezing.  She may try using this inhaler prior to using the nebulizer.  She should certainly carry this inhaler when she is away from the house.    The patient is tolerating Daliresp with no issues at this time; however, I discussed that I do not feel she needs Daliresp at this time. If she is not having any side effects with the medication and cost is not a factor she may continue this medication if she chooses.    She may benefit from FeNO at next visit.     Continue oxygen 24/7.    Dictated utilizing Dragon dictation.    This document was electronically signed by JACKI Miller February 1, 2018  12:26 PM

## 2018-02-01 NOTE — TELEPHONE ENCOUNTER
----- Message from JADE Stephenson sent at 1/30/2018  3:06 PM EST -----  Potassium is slightly low. Increase potassium in her diet. Start on ergocaciferol 50,000 units po weekly x 4 weeks.   Refill 5

## 2018-02-02 RX ORDER — ASPIRIN 81 MG/1
TABLET ORAL
Qty: 30 TABLET | Refills: 5 | Status: SHIPPED | OUTPATIENT
Start: 2018-02-02 | End: 2018-07-30 | Stop reason: SDUPTHER

## 2018-02-07 RX ORDER — FERROUS SULFATE 325(65) MG
TABLET ORAL
Qty: 90 TABLET | Refills: 5 | Status: SHIPPED | OUTPATIENT
Start: 2018-02-07 | End: 2018-09-24 | Stop reason: SDUPTHER

## 2018-02-08 ENCOUNTER — OFFICE VISIT (OUTPATIENT)
Dept: PRIMARY CARE CLINIC | Age: 68
End: 2018-02-08
Payer: MEDICARE

## 2018-02-08 ENCOUNTER — HOSPITAL ENCOUNTER (OUTPATIENT)
Dept: OTHER | Age: 68
Discharge: HOME | End: 2018-02-08
Attending: NURSE PRACTITIONER | Admitting: NURSE PRACTITIONER

## 2018-02-08 VITALS
DIASTOLIC BLOOD PRESSURE: 60 MMHG | HEART RATE: 80 BPM | WEIGHT: 197 LBS | RESPIRATION RATE: 20 BRPM | BODY MASS INDEX: 41.35 KG/M2 | SYSTOLIC BLOOD PRESSURE: 130 MMHG | HEIGHT: 58 IN | OXYGEN SATURATION: 98 % | TEMPERATURE: 98.3 F

## 2018-02-08 DIAGNOSIS — E11.8 TYPE 2 DIABETES MELLITUS WITH COMPLICATION, WITH LONG-TERM CURRENT USE OF INSULIN (HCC): ICD-10-CM

## 2018-02-08 DIAGNOSIS — J41.8 MIXED SIMPLE AND MUCOPURULENT CHRONIC BRONCHITIS (HCC): ICD-10-CM

## 2018-02-08 DIAGNOSIS — R19.7 DIARRHEA, UNSPECIFIED TYPE: ICD-10-CM

## 2018-02-08 DIAGNOSIS — G89.29 CHRONIC BILATERAL LOW BACK PAIN WITHOUT SCIATICA: ICD-10-CM

## 2018-02-08 DIAGNOSIS — M54.50 CHRONIC BILATERAL LOW BACK PAIN WITHOUT SCIATICA: ICD-10-CM

## 2018-02-08 DIAGNOSIS — Z79.4 TYPE 2 DIABETES MELLITUS WITH COMPLICATION, WITH LONG-TERM CURRENT USE OF INSULIN (HCC): ICD-10-CM

## 2018-02-08 DIAGNOSIS — K52.9 CHRONIC DIARRHEA: Primary | ICD-10-CM

## 2018-02-08 PROBLEM — J18.9 PNEUMONIA OF LEFT UPPER LOBE DUE TO INFECTIOUS ORGANISM: Status: RESOLVED | Noted: 2018-01-02 | Resolved: 2018-02-08

## 2018-02-08 LAB — C DIFFICILE TOXIN, EIA: NORMAL

## 2018-02-08 PROCEDURE — 1036F TOBACCO NON-USER: CPT | Performed by: NURSE PRACTITIONER

## 2018-02-08 PROCEDURE — 3017F COLORECTAL CA SCREEN DOC REV: CPT | Performed by: NURSE PRACTITIONER

## 2018-02-08 PROCEDURE — 1123F ACP DISCUSS/DSCN MKR DOCD: CPT | Performed by: NURSE PRACTITIONER

## 2018-02-08 PROCEDURE — G8427 DOCREV CUR MEDS BY ELIG CLIN: HCPCS | Performed by: NURSE PRACTITIONER

## 2018-02-08 PROCEDURE — 3014F SCREEN MAMMO DOC REV: CPT | Performed by: NURSE PRACTITIONER

## 2018-02-08 PROCEDURE — 99214 OFFICE O/P EST MOD 30 MIN: CPT | Performed by: NURSE PRACTITIONER

## 2018-02-08 PROCEDURE — G8400 PT W/DXA NO RESULTS DOC: HCPCS | Performed by: NURSE PRACTITIONER

## 2018-02-08 PROCEDURE — 3045F PR MOST RECENT HEMOGLOBIN A1C LEVEL 7.0-9.0%: CPT | Performed by: NURSE PRACTITIONER

## 2018-02-08 PROCEDURE — 3023F SPIROM DOC REV: CPT | Performed by: NURSE PRACTITIONER

## 2018-02-08 PROCEDURE — G8926 SPIRO NO PERF OR DOC: HCPCS | Performed by: NURSE PRACTITIONER

## 2018-02-08 PROCEDURE — 4040F PNEUMOC VAC/ADMIN/RCVD: CPT | Performed by: NURSE PRACTITIONER

## 2018-02-08 PROCEDURE — 1090F PRES/ABSN URINE INCON ASSESS: CPT | Performed by: NURSE PRACTITIONER

## 2018-02-08 PROCEDURE — G8482 FLU IMMUNIZE ORDER/ADMIN: HCPCS | Performed by: NURSE PRACTITIONER

## 2018-02-08 PROCEDURE — G8417 CALC BMI ABV UP PARAM F/U: HCPCS | Performed by: NURSE PRACTITIONER

## 2018-02-08 RX ORDER — METRONIDAZOLE 500 MG/1
500 TABLET ORAL 3 TIMES DAILY
Qty: 30 TABLET | Refills: 0 | Status: SHIPPED | OUTPATIENT
Start: 2018-02-08 | End: 2018-02-18

## 2018-02-08 RX ORDER — ALBUTEROL SULFATE 90 UG/1
2 AEROSOL, METERED RESPIRATORY (INHALATION) EVERY 4 HOURS PRN
COMMUNITY
Start: 2018-02-01 | End: 2018-02-23 | Stop reason: SDUPTHER

## 2018-02-08 NOTE — PROGRESS NOTES
Chief Complaint   Patient presents with    Follow-up    COPD    Diabetes    Diarrhea             Have you seen any other physician or provider since your last visit yes - Dr Lorena Jansen    Have you had any other diagnostic tests since your last visit? yes - labs    Have you changed or stopped any medications since your last visit? yes - stopped Daliresp      Patient is here to follow up on lab results and to discuss her diarrhea that has lasted for 4 weeks. She took a sample this morning to be tested for C diff.

## 2018-02-09 DIAGNOSIS — J42 CHRONIC BRONCHITIS, UNSPECIFIED CHRONIC BRONCHITIS TYPE (HCC): ICD-10-CM

## 2018-02-09 LAB
BACTERIA SPEC AEROBE CULT: NORMAL
FUNGUS SPEC CULT: NORMAL
FUNGUS SPEC CULT: NORMAL
FUNGUS SPEC FUNGUS STN: NORMAL

## 2018-02-09 RX ORDER — MONTELUKAST SODIUM 10 MG/1
TABLET ORAL
Qty: 30 TABLET | Refills: 5 | Status: SHIPPED | OUTPATIENT
Start: 2018-02-09 | End: 2018-12-21 | Stop reason: SDUPTHER

## 2018-02-09 RX ORDER — TIOTROPIUM BROMIDE 18 UG/1
CAPSULE ORAL; RESPIRATORY (INHALATION)
Qty: 30 CAPSULE | Refills: 5 | Status: SHIPPED | OUTPATIENT
Start: 2018-02-09 | End: 2018-09-25 | Stop reason: SDUPTHER

## 2018-02-09 RX ORDER — SITAGLIPTIN 100 MG/1
TABLET, FILM COATED ORAL
Qty: 30 TABLET | Refills: 5 | Status: SHIPPED | OUTPATIENT
Start: 2018-02-09 | End: 2018-12-24 | Stop reason: SDUPTHER

## 2018-02-11 ASSESSMENT — ENCOUNTER SYMPTOMS
NAUSEA: 0
SHORTNESS OF BREATH: 1
RECTAL PAIN: 0
COUGH: 1
DIARRHEA: 1
BACK PAIN: 1
VOMITING: 0

## 2018-02-12 NOTE — TELEPHONE ENCOUNTER
Called and left message for patient and told her to call us back and let us know if she wasn't feeling better.

## 2018-02-19 ASSESSMENT — ENCOUNTER SYMPTOMS
COUGH: 1
SHORTNESS OF BREATH: 1
WHEEZING: 1
BACK PAIN: 1
DIARRHEA: 1

## 2018-02-23 ENCOUNTER — OFFICE VISIT (OUTPATIENT)
Dept: PRIMARY CARE CLINIC | Age: 68
End: 2018-02-23
Payer: MEDICARE

## 2018-02-23 VITALS
TEMPERATURE: 98.3 F | SYSTOLIC BLOOD PRESSURE: 120 MMHG | RESPIRATION RATE: 16 BRPM | OXYGEN SATURATION: 96 % | WEIGHT: 202.4 LBS | HEIGHT: 58 IN | BODY MASS INDEX: 42.49 KG/M2 | DIASTOLIC BLOOD PRESSURE: 62 MMHG | HEART RATE: 77 BPM

## 2018-02-23 DIAGNOSIS — E11.8 TYPE 2 DIABETES MELLITUS WITH COMPLICATION, WITH LONG-TERM CURRENT USE OF INSULIN (HCC): ICD-10-CM

## 2018-02-23 DIAGNOSIS — J42 CHRONIC BRONCHITIS, UNSPECIFIED CHRONIC BRONCHITIS TYPE (HCC): ICD-10-CM

## 2018-02-23 DIAGNOSIS — Z79.4 TYPE 2 DIABETES MELLITUS WITH COMPLICATION, WITH LONG-TERM CURRENT USE OF INSULIN (HCC): ICD-10-CM

## 2018-02-23 DIAGNOSIS — Z99.81 DEPENDENCE ON CONTINUOUS SUPPLEMENTAL OXYGEN: Primary | ICD-10-CM

## 2018-02-23 PROCEDURE — 3014F SCREEN MAMMO DOC REV: CPT | Performed by: NURSE PRACTITIONER

## 2018-02-23 PROCEDURE — 3045F PR MOST RECENT HEMOGLOBIN A1C LEVEL 7.0-9.0%: CPT | Performed by: NURSE PRACTITIONER

## 2018-02-23 PROCEDURE — G8417 CALC BMI ABV UP PARAM F/U: HCPCS | Performed by: NURSE PRACTITIONER

## 2018-02-23 PROCEDURE — 4040F PNEUMOC VAC/ADMIN/RCVD: CPT | Performed by: NURSE PRACTITIONER

## 2018-02-23 PROCEDURE — G8482 FLU IMMUNIZE ORDER/ADMIN: HCPCS | Performed by: NURSE PRACTITIONER

## 2018-02-23 PROCEDURE — 99214 OFFICE O/P EST MOD 30 MIN: CPT | Performed by: NURSE PRACTITIONER

## 2018-02-23 PROCEDURE — 1090F PRES/ABSN URINE INCON ASSESS: CPT | Performed by: NURSE PRACTITIONER

## 2018-02-23 PROCEDURE — 96372 THER/PROPH/DIAG INJ SC/IM: CPT | Performed by: NURSE PRACTITIONER

## 2018-02-23 PROCEDURE — 3023F SPIROM DOC REV: CPT | Performed by: NURSE PRACTITIONER

## 2018-02-23 PROCEDURE — 1123F ACP DISCUSS/DSCN MKR DOCD: CPT | Performed by: NURSE PRACTITIONER

## 2018-02-23 PROCEDURE — G8400 PT W/DXA NO RESULTS DOC: HCPCS | Performed by: NURSE PRACTITIONER

## 2018-02-23 PROCEDURE — 1036F TOBACCO NON-USER: CPT | Performed by: NURSE PRACTITIONER

## 2018-02-23 PROCEDURE — 3017F COLORECTAL CA SCREEN DOC REV: CPT | Performed by: NURSE PRACTITIONER

## 2018-02-23 PROCEDURE — G8926 SPIRO NO PERF OR DOC: HCPCS | Performed by: NURSE PRACTITIONER

## 2018-02-23 PROCEDURE — G8427 DOCREV CUR MEDS BY ELIG CLIN: HCPCS | Performed by: NURSE PRACTITIONER

## 2018-02-23 RX ORDER — ALBUTEROL SULFATE 90 UG/1
2 AEROSOL, METERED RESPIRATORY (INHALATION) EVERY 4 HOURS PRN
Qty: 1 INHALER | Refills: 5 | Status: SHIPPED | OUTPATIENT
Start: 2018-02-23 | End: 2018-02-26 | Stop reason: RX

## 2018-02-23 ASSESSMENT — ENCOUNTER SYMPTOMS
COUGH: 1
SHORTNESS OF BREATH: 1
GASTROINTESTINAL NEGATIVE: 1
WHEEZING: 1

## 2018-02-23 NOTE — PROGRESS NOTES
ACIDOPHILUS) TABS, Take 1 tablet by mouth 2 times daily, Disp: 60 tablet, Rfl: 1    busPIRone (BUSPAR) 10 MG tablet, TAKE ONE TABLET BY MOUTH THREE TIMES A DAY, Disp: 90 tablet, Rfl: 5    pantoprazole (PROTONIX) 40 MG tablet, TAKE ONE TABLET BY MOUTH EVERY DAY, Disp: 30 tablet, Rfl: 5    metoprolol tartrate (LOPRESSOR) 25 MG tablet, TAKE ONE TABLET BY MOUTH TWO TIMES A DAY, Disp: 60 tablet, Rfl: 5    magnesium oxide (MAG-OX) 400 (241.3 Mg) MG TABS tablet, TAKE ONE TABLET BY MOUTH THREE TIMES A DAY, Disp: 90 tablet, Rfl: 3    sucralfate (CARAFATE) 1 GM tablet, TAKE ONE TABLET BY MOUTH THREE TIMES A DAY, Disp: 90 tablet, Rfl: 3    ULTICARE INSULIN SYRINGE 31G X 5/16\" 1 ML MISC, USE FOR INSULIN INJECTIONS FIVE TIMES DAILY, Disp: 100 each, Rfl: 3    allopurinol (ZYLOPRIM) 300 MG tablet, TAKE ONE TABLET BY MOUTH EVERY DAY, Disp: 30 tablet, Rfl: 2    potassium chloride (KLOR-CON M) 20 MEQ extended release tablet, TAKE ONE TABLET BY MOUTH EVERY DAY, Disp: 30 tablet, Rfl: 2    Insulin Degludec (TRESIBA FLEXTOUCH) 100 UNIT/ML SOPN, INJECT 75 UNITS INTO THE SKIN ONCE DAILY, Disp: 15 mL, Rfl: 0    metolazone (ZAROXOLYN) 5 MG tablet, Take 1 tablet by mouth daily as needed (swelling), Disp: 30 tablet, Rfl: 0    metoclopramide (REGLAN) 5 MG tablet, TAKE ONE TABLET BY MOUTH THREE TIMES A DAY, Disp: 90 tablet, Rfl: 2    traMADol (ULTRAM) 50 MG tablet, TAKE ONE TABLET BY MOUTH THREE TIMES A DAY AS NEEDED, Disp: 90 tablet, Rfl: 0    furosemide (LASIX) 40 MG tablet, TAKE ONE TABLET BY MOUTH TWO TIMES A DAY, Disp: 60 tablet, Rfl: 5    lovastatin (MEVACOR) 10 MG tablet, TAKE ONE TABLET BY MOUTH EVERY EVENING AT BEDTIME, Disp: 30 tablet, Rfl: 2    PATADAY 0.2 % SOLN ophthalmic solution, PLACE 1 DROP INTO BOTH EYES 2 TIMES DAILY, Disp: 2.5 mL, Rfl: 3    loratadine (CLARITIN) 10 MG tablet, TAKE ONE TABLET BY MOUTH EVERY DAY, Disp: 30 tablet, Rfl: 4    Glucose Blood (BLOOD GLUCOSE TEST STRIPS) STRP, DX:E10.9 Test QID, Disp: NEUTROABS 10.4 01/04/2018    HGB 13.8 01/25/2018    HCT 43.6 01/25/2018    .4 01/25/2018     01/25/2018       Lab Results   Component Value Date    TSH 2.04 01/25/2018         ASSESSMENT/PLAN:     1. Dependence on continuous supplemental oxygen  Completed 6 min walk but after only 3 min of room air walking abotu 100 feet her O2 sat dropped to 80%  With mild respiratory distress. The test was stopped. She was placed in a chair on 4 l oxygen . She then walked 100 feet with an 02 saturation of 93%. She benefits from and must use oxygen at all times. - albuterol sulfate  (90 Base) MCG/ACT inhaler; Inhale 2 puffs into the lungs every 4 hours as needed for Wheezing or Shortness of Breath  Dispense: 1 Inhaler; Refill: 5    2. Type 2 diabetes mellitus with complication, with long-term current use of insulin ([facility]  Lab Results   Component Value Date    LABA1C 8.8 (H) 01/25/2018     No results found for: EAG  Will check A1C before her next appointment. She has had a lot of steroids. - Comprehensive Metabolic Panel; Future  - CBC; Future  - Hemoglobin A1C; Future    3. Chronic bronchitis, unspecified chronic bronchitis type ([facility]  Will restart the Xolair in hope to manage her allergies and asthma better to control the COPD.    - omalizumab Emerita More) injection 150 mg; Inject 150 mg into the skin every 28 days  - omalizumab Emerita More) injection 150 mg; Inject 150 mg into the skin every 28 days

## 2018-02-24 DIAGNOSIS — K21.9 GASTROESOPHAGEAL REFLUX DISEASE, ESOPHAGITIS PRESENCE NOT SPECIFIED: ICD-10-CM

## 2018-02-26 RX ORDER — METOCLOPRAMIDE 5 MG/1
TABLET ORAL
Qty: 90 TABLET | Refills: 2 | Status: SHIPPED | OUTPATIENT
Start: 2018-02-26 | End: 2018-04-23 | Stop reason: SDUPTHER

## 2018-02-26 RX ORDER — ALBUTEROL SULFATE 90 UG/1
2 AEROSOL, METERED RESPIRATORY (INHALATION) EVERY 4 HOURS PRN
Qty: 1 INHALER | Refills: 5 | Status: SHIPPED | OUTPATIENT
Start: 2018-02-26

## 2018-02-28 DIAGNOSIS — E78.01 FAMILIAL HYPERCHOLESTEROLEMIA: ICD-10-CM

## 2018-02-28 RX ORDER — LOVASTATIN 10 MG/1
TABLET ORAL
Qty: 30 TABLET | Refills: 2 | Status: SHIPPED | OUTPATIENT
Start: 2018-02-28 | End: 2018-04-23 | Stop reason: SDUPTHER

## 2018-03-09 DIAGNOSIS — M1A.9XX0 CHRONIC GOUT WITHOUT TOPHUS, UNSPECIFIED CAUSE, UNSPECIFIED SITE: ICD-10-CM

## 2018-03-09 RX ORDER — ALLOPURINOL 300 MG/1
TABLET ORAL
Qty: 30 TABLET | Refills: 5 | Status: SHIPPED | OUTPATIENT
Start: 2018-03-09 | End: 2019-04-29 | Stop reason: SDUPTHER

## 2018-03-09 RX ORDER — SYRINGE AND NEEDLE,INSULIN,1ML 31 GX5/16"
SYRINGE, EMPTY DISPOSABLE MISCELLANEOUS
Qty: 100 EACH | Refills: 5 | Status: SHIPPED | OUTPATIENT
Start: 2018-03-09 | End: 2018-08-06 | Stop reason: SDUPTHER

## 2018-03-09 RX ORDER — POTASSIUM CHLORIDE 20 MEQ/1
TABLET, EXTENDED RELEASE ORAL
Qty: 30 TABLET | Refills: 5 | Status: SHIPPED | OUTPATIENT
Start: 2018-03-09 | End: 2019-07-11 | Stop reason: SDUPTHER

## 2018-03-09 RX ORDER — ERGOCALCIFEROL 1.25 MG/1
CAPSULE ORAL
Qty: 4 CAPSULE | Refills: 4 | Status: SHIPPED | OUTPATIENT
Start: 2018-03-09 | End: 2018-10-22 | Stop reason: SDUPTHER

## 2018-03-10 DIAGNOSIS — E78.01 FAMILIAL HYPERCHOLESTEROLEMIA: ICD-10-CM

## 2018-03-12 RX ORDER — LOVASTATIN 10 MG/1
TABLET ORAL
Qty: 30 TABLET | Refills: 2 | Status: SHIPPED | OUTPATIENT
Start: 2018-03-12 | End: 2018-04-23 | Stop reason: SDUPTHER

## 2018-03-16 DIAGNOSIS — G89.29 CHRONIC BILATERAL LOW BACK PAIN WITHOUT SCIATICA: ICD-10-CM

## 2018-03-16 DIAGNOSIS — M54.50 CHRONIC BILATERAL LOW BACK PAIN WITHOUT SCIATICA: ICD-10-CM

## 2018-03-19 DIAGNOSIS — J45.51 SEVERE PERSISTENT ASTHMA WITH ACUTE EXACERBATION: ICD-10-CM

## 2018-03-19 DIAGNOSIS — R19.7 DIARRHEA, UNSPECIFIED TYPE: ICD-10-CM

## 2018-03-20 RX ORDER — TRAMADOL HYDROCHLORIDE 50 MG/1
TABLET ORAL
Qty: 90 TABLET | Refills: 0 | Status: SHIPPED | OUTPATIENT
Start: 2018-03-20 | End: 2018-04-23 | Stop reason: SDUPTHER

## 2018-03-20 RX ORDER — L. ACIDOPHILUS/PECTIN, CITRUS 25MM-100MG
TABLET ORAL
Qty: 60 TABLET | Refills: 1 | Status: SHIPPED | OUTPATIENT
Start: 2018-03-20 | End: 2018-04-16 | Stop reason: SDUPTHER

## 2018-03-21 DIAGNOSIS — J42 CHRONIC BRONCHITIS, UNSPECIFIED CHRONIC BRONCHITIS TYPE (HCC): ICD-10-CM

## 2018-03-21 RX ORDER — SITAGLIPTIN 100 MG/1
TABLET, FILM COATED ORAL
Qty: 30 TABLET | Refills: 2 | Status: SHIPPED | OUTPATIENT
Start: 2018-03-21 | End: 2018-04-23 | Stop reason: SDUPTHER

## 2018-03-21 RX ORDER — TIOTROPIUM BROMIDE 18 UG/1
CAPSULE ORAL; RESPIRATORY (INHALATION)
Qty: 30 CAPSULE | Refills: 2 | Status: SHIPPED | OUTPATIENT
Start: 2018-03-21 | End: 2018-04-23 | Stop reason: SDUPTHER

## 2018-03-21 RX ORDER — MONTELUKAST SODIUM 10 MG/1
TABLET ORAL
Qty: 30 TABLET | Refills: 2 | Status: SHIPPED | OUTPATIENT
Start: 2018-03-21 | End: 2018-04-23 | Stop reason: SDUPTHER

## 2018-03-29 ENCOUNTER — NURSE ONLY (OUTPATIENT)
Dept: PRIMARY CARE CLINIC | Age: 68
End: 2018-03-29
Payer: MEDICARE

## 2018-03-29 DIAGNOSIS — J45.40 MODERATE PERSISTENT ASTHMA WITHOUT COMPLICATION: Primary | ICD-10-CM

## 2018-03-29 DIAGNOSIS — J30.9 ALLERGIC RHINITIS, UNSPECIFIED CHRONICITY, UNSPECIFIED SEASONALITY, UNSPECIFIED TRIGGER: ICD-10-CM

## 2018-03-29 PROCEDURE — 95117 IMMUNOTHERAPY INJECTIONS: CPT | Performed by: NURSE PRACTITIONER

## 2018-04-13 ENCOUNTER — TELEPHONE (OUTPATIENT)
Dept: PRIMARY CARE CLINIC | Age: 68
End: 2018-04-13

## 2018-04-13 ENCOUNTER — HOSPITAL ENCOUNTER (OUTPATIENT)
Dept: OTHER | Age: 68
Discharge: HOME | End: 2018-04-13
Attending: NURSE PRACTITIONER | Admitting: NURSE PRACTITIONER

## 2018-04-13 DIAGNOSIS — E55.9 VITAMIN D DEFICIENCY: ICD-10-CM

## 2018-04-13 DIAGNOSIS — Z13.29 THYROID DISORDER SCREEN: ICD-10-CM

## 2018-04-13 DIAGNOSIS — I10 ESSENTIAL HYPERTENSION: ICD-10-CM

## 2018-04-13 DIAGNOSIS — E11.9 TYPE 2 DIABETES MELLITUS WITHOUT COMPLICATION, WITH LONG-TERM CURRENT USE OF INSULIN (HCC): ICD-10-CM

## 2018-04-13 DIAGNOSIS — Z79.4 TYPE 2 DIABETES MELLITUS WITHOUT COMPLICATION, WITH LONG-TERM CURRENT USE OF INSULIN (HCC): ICD-10-CM

## 2018-04-13 LAB
A/G RATIO: 1.6 (ref 0.8–2)
ALBUMIN SERPL-MCNC: 4.2 G/DL (ref 3.4–4.8)
ALP BLD-CCNC: 115 U/L (ref 25–100)
ALT SERPL-CCNC: 59 U/L (ref 4–36)
ANION GAP SERPL CALCULATED.3IONS-SCNC: 12 MMOL/L (ref 3–16)
AST SERPL-CCNC: 45 U/L (ref 8–33)
BILIRUB SERPL-MCNC: 0.3 MG/DL (ref 0.3–1.2)
BUN BLDV-MCNC: 47 MG/DL (ref 6–20)
CALCIUM SERPL-MCNC: 9.9 MG/DL (ref 8.5–10.5)
CHLORIDE BLD-SCNC: 93 MMOL/L (ref 98–107)
CHOLESTEROL, TOTAL: 280 MG/DL (ref 0–200)
CO2: 33 MMOL/L (ref 20–30)
CREAT SERPL-MCNC: 1.4 MG/DL (ref 0.4–1.2)
FOLATE: 10.41 NG/ML
GFR AFRICAN AMERICAN: 45
GFR NON-AFRICAN AMERICAN: 37
GLOBULIN: 2.7 G/DL
GLUCOSE BLD-MCNC: 354 MG/DL (ref 74–106)
HBA1C MFR BLD: 8.4 %
HCT VFR BLD CALC: 45.3 % (ref 37–47)
HDLC SERPL-MCNC: 32 MG/DL (ref 40–60)
HEMOGLOBIN: 14.7 G/DL (ref 11.5–16.5)
LDL CHOLESTEROL CALCULATED: ABNORMAL MG/DL
LDL CHOLESTEROL DIRECT: 109 MG/DL
MCH RBC QN AUTO: 32.2 PG (ref 27–32)
MCHC RBC AUTO-ENTMCNC: 32.5 G/DL (ref 31–35)
MCV RBC AUTO: 99.3 FL (ref 80–100)
PARATHYROID HORMONE INTACT: 51.7 PG/ML (ref 14–72)
PDW BLD-RTO: 14.6 % (ref 11–16)
PLATELET # BLD: 275 K/UL (ref 150–400)
PMV BLD AUTO: 10.9 FL (ref 6–10)
POTASSIUM SERPL-SCNC: 4.1 MMOL/L (ref 3.4–5.1)
RBC # BLD: 4.56 M/UL (ref 3.8–5.8)
SODIUM BLD-SCNC: 138 MMOL/L (ref 136–145)
TOTAL PROTEIN: 6.9 G/DL (ref 6.4–8.3)
TRIGL SERPL-MCNC: 1137 MG/DL (ref 0–249)
TSH SERPL DL<=0.05 MIU/L-ACNC: 4.2 UIU/ML (ref 0.35–5.5)
URIC ACID, SERUM: 8.3 MG/DL (ref 2.5–7.1)
VITAMIN B-12: 412 PG/ML (ref 211–911)
VITAMIN D 25-HYDROXY: 15.7 (ref 32–100)
VLDLC SERPL CALC-MCNC: ABNORMAL MG/DL
WBC # BLD: 11.4 K/UL (ref 4–11)

## 2018-04-16 DIAGNOSIS — R19.7 DIARRHEA, UNSPECIFIED TYPE: ICD-10-CM

## 2018-04-16 DIAGNOSIS — J45.51 SEVERE PERSISTENT ASTHMA WITH ACUTE EXACERBATION: ICD-10-CM

## 2018-04-16 RX ORDER — SUCRALFATE 1 G/1
TABLET ORAL
Qty: 90 TABLET | Refills: 3 | Status: SHIPPED | OUTPATIENT
Start: 2018-04-16 | End: 2018-09-22 | Stop reason: SDUPTHER

## 2018-04-16 RX ORDER — L. ACIDOPHILUS/PECTIN, CITRUS 25MM-100MG
TABLET ORAL
Qty: 60 TABLET | Refills: 1 | Status: SHIPPED | OUTPATIENT
Start: 2018-04-16 | End: 2018-10-29 | Stop reason: SDUPTHER

## 2018-04-21 DIAGNOSIS — E11.9 TYPE 2 DIABETES MELLITUS WITHOUT COMPLICATION, WITH LONG-TERM CURRENT USE OF INSULIN (HCC): ICD-10-CM

## 2018-04-21 DIAGNOSIS — Z79.4 TYPE 2 DIABETES MELLITUS WITHOUT COMPLICATION, WITH LONG-TERM CURRENT USE OF INSULIN (HCC): ICD-10-CM

## 2018-04-23 ENCOUNTER — OFFICE VISIT (OUTPATIENT)
Dept: PRIMARY CARE CLINIC | Age: 68
End: 2018-04-23
Payer: MEDICARE

## 2018-04-23 VITALS
SYSTOLIC BLOOD PRESSURE: 124 MMHG | BODY MASS INDEX: 42.78 KG/M2 | RESPIRATION RATE: 16 BRPM | HEART RATE: 73 BPM | OXYGEN SATURATION: 93 % | HEIGHT: 58 IN | DIASTOLIC BLOOD PRESSURE: 60 MMHG | TEMPERATURE: 98 F | WEIGHT: 203.8 LBS

## 2018-04-23 DIAGNOSIS — E78.01 FAMILIAL HYPERCHOLESTEROLEMIA: ICD-10-CM

## 2018-04-23 DIAGNOSIS — M79.671 RIGHT FOOT PAIN: ICD-10-CM

## 2018-04-23 DIAGNOSIS — G89.29 CHRONIC BILATERAL LOW BACK PAIN WITHOUT SCIATICA: ICD-10-CM

## 2018-04-23 DIAGNOSIS — E11.22 TYPE 2 DIABETES MELLITUS WITH STAGE 3 CHRONIC KIDNEY DISEASE, WITH LONG-TERM CURRENT USE OF INSULIN (HCC): Primary | ICD-10-CM

## 2018-04-23 DIAGNOSIS — M54.50 CHRONIC BILATERAL LOW BACK PAIN WITHOUT SCIATICA: ICD-10-CM

## 2018-04-23 DIAGNOSIS — Z79.4 TYPE 2 DIABETES MELLITUS WITH STAGE 3 CHRONIC KIDNEY DISEASE, WITH LONG-TERM CURRENT USE OF INSULIN (HCC): Primary | ICD-10-CM

## 2018-04-23 DIAGNOSIS — K21.9 GASTROESOPHAGEAL REFLUX DISEASE, ESOPHAGITIS PRESENCE NOT SPECIFIED: ICD-10-CM

## 2018-04-23 DIAGNOSIS — J42 CHRONIC BRONCHITIS, UNSPECIFIED CHRONIC BRONCHITIS TYPE (HCC): ICD-10-CM

## 2018-04-23 DIAGNOSIS — E78.00 PURE HYPERCHOLESTEROLEMIA: ICD-10-CM

## 2018-04-23 DIAGNOSIS — Z12.31 ENCOUNTER FOR SCREENING MAMMOGRAM FOR BREAST CANCER: ICD-10-CM

## 2018-04-23 DIAGNOSIS — I10 ESSENTIAL HYPERTENSION: ICD-10-CM

## 2018-04-23 DIAGNOSIS — G89.29 CHRONIC RIGHT-SIDED LOW BACK PAIN WITH RIGHT-SIDED SCIATICA: ICD-10-CM

## 2018-04-23 DIAGNOSIS — E55.9 VITAMIN D DEFICIENCY: ICD-10-CM

## 2018-04-23 DIAGNOSIS — M54.41 CHRONIC RIGHT-SIDED LOW BACK PAIN WITH RIGHT-SIDED SCIATICA: ICD-10-CM

## 2018-04-23 DIAGNOSIS — N18.30 TYPE 2 DIABETES MELLITUS WITH STAGE 3 CHRONIC KIDNEY DISEASE, WITH LONG-TERM CURRENT USE OF INSULIN (HCC): Primary | ICD-10-CM

## 2018-04-23 DIAGNOSIS — Z78.0 POST-MENOPAUSAL: ICD-10-CM

## 2018-04-23 PROCEDURE — G8427 DOCREV CUR MEDS BY ELIG CLIN: HCPCS | Performed by: NURSE PRACTITIONER

## 2018-04-23 PROCEDURE — 2022F DILAT RTA XM EVC RTNOPTHY: CPT | Performed by: NURSE PRACTITIONER

## 2018-04-23 PROCEDURE — 3023F SPIROM DOC REV: CPT | Performed by: NURSE PRACTITIONER

## 2018-04-23 PROCEDURE — 1123F ACP DISCUSS/DSCN MKR DOCD: CPT | Performed by: NURSE PRACTITIONER

## 2018-04-23 PROCEDURE — G8417 CALC BMI ABV UP PARAM F/U: HCPCS | Performed by: NURSE PRACTITIONER

## 2018-04-23 PROCEDURE — 3017F COLORECTAL CA SCREEN DOC REV: CPT | Performed by: NURSE PRACTITIONER

## 2018-04-23 PROCEDURE — 3045F PR MOST RECENT HEMOGLOBIN A1C LEVEL 7.0-9.0%: CPT | Performed by: NURSE PRACTITIONER

## 2018-04-23 PROCEDURE — G8400 PT W/DXA NO RESULTS DOC: HCPCS | Performed by: NURSE PRACTITIONER

## 2018-04-23 PROCEDURE — 4040F PNEUMOC VAC/ADMIN/RCVD: CPT | Performed by: NURSE PRACTITIONER

## 2018-04-23 PROCEDURE — 1036F TOBACCO NON-USER: CPT | Performed by: NURSE PRACTITIONER

## 2018-04-23 PROCEDURE — 99214 OFFICE O/P EST MOD 30 MIN: CPT | Performed by: NURSE PRACTITIONER

## 2018-04-23 PROCEDURE — 1090F PRES/ABSN URINE INCON ASSESS: CPT | Performed by: NURSE PRACTITIONER

## 2018-04-23 PROCEDURE — G8926 SPIRO NO PERF OR DOC: HCPCS | Performed by: NURSE PRACTITIONER

## 2018-04-23 RX ORDER — HYDROCODONE BITARTRATE AND ACETAMINOPHEN 5; 325 MG/1; MG/1
TABLET ORAL
Qty: 30 TABLET | Refills: 0 | Status: SHIPPED | OUTPATIENT
Start: 2018-04-23 | End: 2018-05-23 | Stop reason: SDUPTHER

## 2018-04-23 RX ORDER — FENOFIBRATE 145 MG/1
145 TABLET, COATED ORAL DAILY
Qty: 30 TABLET | Refills: 3 | Status: SHIPPED | OUTPATIENT
Start: 2018-04-23 | End: 2018-08-25 | Stop reason: SDUPTHER

## 2018-04-23 RX ORDER — LOVASTATIN 10 MG/1
TABLET ORAL
Qty: 30 TABLET | Refills: 2 | Status: SHIPPED | OUTPATIENT
Start: 2018-04-23 | End: 2018-10-24 | Stop reason: ALTCHOICE

## 2018-04-23 RX ORDER — RANITIDINE 300 MG/1
300 TABLET ORAL 2 TIMES DAILY
COMMUNITY
Start: 2018-04-18 | End: 2019-06-19 | Stop reason: ALTCHOICE

## 2018-04-23 RX ORDER — METOCLOPRAMIDE 5 MG/1
TABLET ORAL
Qty: 90 TABLET | Refills: 2 | Status: SHIPPED | OUTPATIENT
Start: 2018-04-23 | End: 2018-09-24 | Stop reason: SDUPTHER

## 2018-04-23 ASSESSMENT — ENCOUNTER SYMPTOMS
GASTROINTESTINAL NEGATIVE: 1
BACK PAIN: 1
RESPIRATORY NEGATIVE: 1

## 2018-04-24 RX ORDER — TRAMADOL HYDROCHLORIDE 50 MG/1
TABLET ORAL
Qty: 90 TABLET | Refills: 2 | Status: SHIPPED | OUTPATIENT
Start: 2018-04-24 | End: 2018-07-20 | Stop reason: SDUPTHER

## 2018-04-24 RX ORDER — FUROSEMIDE 40 MG/1
TABLET ORAL
Qty: 60 TABLET | Refills: 5 | Status: SHIPPED | OUTPATIENT
Start: 2018-04-24 | End: 2018-10-22 | Stop reason: SDUPTHER

## 2018-04-24 RX ORDER — INSULIN DEGLUDEC INJECTION 100 U/ML
INJECTION, SOLUTION SUBCUTANEOUS
Qty: 15 ML | Refills: 5 | Status: SHIPPED | OUTPATIENT
Start: 2018-04-24 | End: 2018-10-15 | Stop reason: SDUPTHER

## 2018-04-26 ENCOUNTER — HOSPITAL ENCOUNTER (OUTPATIENT)
Dept: GENERAL RADIOLOGY | Age: 68
Discharge: HOME | End: 2018-04-26
Admitting: NURSE PRACTITIONER

## 2018-04-26 DIAGNOSIS — M54.41 CHRONIC RIGHT-SIDED LOW BACK PAIN WITH RIGHT-SIDED SCIATICA: ICD-10-CM

## 2018-04-26 DIAGNOSIS — Z78.0 ASYMPTOMATIC AGE-RELATED POSTMENOPAUSAL STATE: ICD-10-CM

## 2018-04-26 DIAGNOSIS — G89.29 CHRONIC RIGHT-SIDED LOW BACK PAIN WITH RIGHT-SIDED SCIATICA: ICD-10-CM

## 2018-04-26 DIAGNOSIS — Z12.31 ENCOUNTER FOR SCREENING MAMMOGRAM FOR BREAST CANCER: ICD-10-CM

## 2018-04-26 DIAGNOSIS — Z78.0 ASYMPTOMATIC MENOPAUSAL STATE: ICD-10-CM

## 2018-04-27 ENCOUNTER — NURSE ONLY (OUTPATIENT)
Dept: PRIMARY CARE CLINIC | Age: 68
End: 2018-04-27
Payer: MEDICARE

## 2018-04-27 ENCOUNTER — TELEPHONE (OUTPATIENT)
Dept: PRIMARY CARE CLINIC | Age: 68
End: 2018-04-27

## 2018-04-27 DIAGNOSIS — J42 CHRONIC BRONCHITIS, UNSPECIFIED CHRONIC BRONCHITIS TYPE (HCC): Primary | ICD-10-CM

## 2018-04-27 DIAGNOSIS — M77.31 BILATERAL CALCANEAL SPURS: Primary | ICD-10-CM

## 2018-04-27 DIAGNOSIS — M77.32 BILATERAL CALCANEAL SPURS: Primary | ICD-10-CM

## 2018-04-27 PROCEDURE — 96372 THER/PROPH/DIAG INJ SC/IM: CPT | Performed by: NURSE PRACTITIONER

## 2018-05-01 ENCOUNTER — TELEPHONE (OUTPATIENT)
Dept: PRIMARY CARE CLINIC | Age: 68
End: 2018-05-01

## 2018-05-01 DIAGNOSIS — R92.8 ABNORMAL MAMMOGRAM: Primary | ICD-10-CM

## 2018-05-07 ENCOUNTER — HOSPITAL ENCOUNTER (OUTPATIENT)
Dept: GENERAL RADIOLOGY | Age: 68
Discharge: HOME | End: 2018-05-07
Admitting: NURSE PRACTITIONER

## 2018-05-07 DIAGNOSIS — R92.8 OTHER ABNORMAL AND INCONCLUSIVE FINDINGS ON DIAGNOSTIC IMAGING OF BREAST: ICD-10-CM

## 2018-05-07 DIAGNOSIS — R92.8 ABNORMAL MAMMOGRAM: ICD-10-CM

## 2018-05-10 ENCOUNTER — TELEPHONE (OUTPATIENT)
Dept: PRIMARY CARE CLINIC | Age: 68
End: 2018-05-10

## 2018-05-17 ENCOUNTER — OFFICE VISIT (OUTPATIENT)
Dept: PULMONOLOGY | Facility: CLINIC | Age: 68
End: 2018-05-17

## 2018-05-17 ENCOUNTER — HOSPITAL ENCOUNTER (OUTPATIENT)
Dept: GENERAL RADIOLOGY | Facility: HOSPITAL | Age: 68
Discharge: HOME | End: 2018-05-17
Attending: INTERNAL MEDICINE | Admitting: INTERNAL MEDICINE

## 2018-05-17 VITALS
WEIGHT: 199 LBS | HEART RATE: 100 BPM | SYSTOLIC BLOOD PRESSURE: 116 MMHG | HEIGHT: 58 IN | DIASTOLIC BLOOD PRESSURE: 70 MMHG | RESPIRATION RATE: 17 BRPM | OXYGEN SATURATION: 84 % | BODY MASS INDEX: 41.77 KG/M2

## 2018-05-17 DIAGNOSIS — J96.12 CHRONIC RESPIRATORY FAILURE WITH HYPERCAPNIA (HCC): ICD-10-CM

## 2018-05-17 DIAGNOSIS — E66.01 MORBID OBESITY (HCC): ICD-10-CM

## 2018-05-17 DIAGNOSIS — R06.02 SHORTNESS OF BREATH: Primary | ICD-10-CM

## 2018-05-17 DIAGNOSIS — R06.02 SHORTNESS OF BREATH: ICD-10-CM

## 2018-05-17 DIAGNOSIS — J44.9 CHRONIC OBSTRUCTIVE PULMONARY DISEASE, UNSPECIFIED COPD TYPE (HCC): ICD-10-CM

## 2018-05-17 DIAGNOSIS — R09.02 HYPOXIA: ICD-10-CM

## 2018-05-17 PROCEDURE — 99214 OFFICE O/P EST MOD 30 MIN: CPT | Performed by: INTERNAL MEDICINE

## 2018-05-17 PROCEDURE — 71046 X-RAY EXAM CHEST 2 VIEWS: CPT

## 2018-05-17 RX ORDER — RANITIDINE 300 MG/1
300 TABLET ORAL DAILY
COMMUNITY
Start: 2018-04-18 | End: 2019-06-15 | Stop reason: HOSPADM

## 2018-05-17 RX ORDER — HYDROCODONE BITARTRATE AND ACETAMINOPHEN 5; 325 MG/1; MG/1
1 TABLET ORAL NIGHTLY
COMMUNITY
End: 2020-01-01 | Stop reason: HOSPADM

## 2018-05-17 RX ORDER — FENOFIBRATE 145 MG/1
145 TABLET, COATED ORAL DAILY
COMMUNITY
Start: 2018-04-23 | End: 2020-01-01 | Stop reason: HOSPADM

## 2018-05-17 NOTE — PROGRESS NOTES
"Chief Complaint   Patient presents with   • Follow-up   • Shortness of Breath         Subjective   Mingo Guidry is a 67 y.o. female.     History of Present Illness   Patient comes in today to follow-up on COPD/asthma and chronic respiratory failure.    The patient has been using her BiPAP at a pressure of 17/6 extremely good effect and feels well rested in the morning.  She has a nasal mask which fits her well and she denies any significant issues with it.    She is using oxygen 24/7.    The following portions of the patient's history were reviewed and updated as appropriate: allergies, current medications, past family history, past medical history, past social history and past surgical history.    Review of Systems   Constitutional: Positive for fatigue. Negative for chills and fever.   HENT: Positive for rhinorrhea, sinus pressure and sore throat. Negative for sneezing.    Respiratory: Positive for cough, chest tightness, shortness of breath and wheezing.    Psychiatric/Behavioral: Negative for sleep disturbance.       Objective   Visit Vitals  /70   Pulse 100   Resp 17   Ht 147.3 cm (57.99\")   Wt 90.3 kg (199 lb)   LMP  (LMP Unknown)   SpO2 (!) 84% Comment: resting on room air   BMI 41.60 kg/m²   SpO2 @ 95% on 4 LPM     Physical Exam   Constitutional: She is oriented to person, place, and time. She appears well-developed and well-nourished.   HENT:   Head: Normocephalic and atraumatic.   Mouth/Throat: Oropharynx is clear and moist.   Crowded oropharynx.   Eyes: EOM are normal.   Neck: Neck supple.   Cardiovascular: Normal rate and regular rhythm.    Pulmonary/Chest: Effort normal. No respiratory distress.   Somewhat hyperresonant to percussion  Somewhat decreased A/E with wheezing noted.   Musculoskeletal: She exhibits no edema.   Neurological: She is alert and oriented to person, place, and time.   Skin: Skin is warm and dry.   Psychiatric: She has a normal mood and affect.   Vitals " reviewed.        Assessment/Plan   Mingo was seen today for follow-up and shortness of breath.    Diagnoses and all orders for this visit:    Shortness of breath  -     XR Chest PA & Lateral; Future    Chronic obstructive pulmonary disease, unspecified COPD type  -     XR Chest PA & Lateral; Future    Morbid obesity    Hypoxia    Chronic respiratory failure with hypercapnia         Return in about 4 months (around 9/17/2018) for Recheck, Imaging study, For Yadi.    DISCUSSION (if any):  I'll try to obtain her last sleep study from Dr. Ortega's office.    She'll definitely need to continue BiPAP at the current setting, especially given chronic hypercarbic respiratory failure.    Continue BiPAP 17/6 with nasal mask.    The patient was advised to continue oxygen, since there has been a good response since the patient is using it as prescribed.    I told the patient to use 3 L/m at rest and 4 L/m with exertion.    I will also asked her to undergo repeat chest x-ray and she was admitted to the hospital back in January with pneumonia and has not had a repeat chest x-ray since then.    I will also reviewed her last ABG with her and her family member which confirmed hypercarbia.  7.44/54/93.  I also reviewed her PFTs which suggested mild restriction with mild decreased diffusion capacity.    For now I have made no changes to her Spiriva or Advair but will consider discontinuing one of them upon follow-up visit, especially if her symptoms continue to improve.    She may actually benefit from repeat PFTs within the next 6-10 months.      Dictated utilizing Dragon dictation.    This document was electronically signed by Ryder Dahl MD May 17, 2018  2:32 PM

## 2018-05-18 RX ORDER — EZETIMIBE 10 MG/1
TABLET ORAL
Qty: 90 TABLET | Refills: 3 | Status: SHIPPED | OUTPATIENT
Start: 2018-05-18 | End: 2019-02-11 | Stop reason: SDUPTHER

## 2018-05-18 RX ORDER — METOLAZONE 2.5 MG/1
TABLET ORAL
Qty: 60 TABLET | Refills: 0 | Status: SHIPPED | OUTPATIENT
Start: 2018-05-18 | End: 2018-07-23 | Stop reason: DRUGHIGH

## 2018-05-23 DIAGNOSIS — M54.41 CHRONIC RIGHT-SIDED LOW BACK PAIN WITH RIGHT-SIDED SCIATICA: ICD-10-CM

## 2018-05-23 DIAGNOSIS — G89.29 CHRONIC RIGHT-SIDED LOW BACK PAIN WITH RIGHT-SIDED SCIATICA: ICD-10-CM

## 2018-05-24 RX ORDER — HYDROCODONE BITARTRATE AND ACETAMINOPHEN 5; 325 MG/1; MG/1
TABLET ORAL
Qty: 30 TABLET | Refills: 0 | Status: SHIPPED | OUTPATIENT
Start: 2018-05-24 | End: 2018-06-22 | Stop reason: SDUPTHER

## 2018-05-25 ENCOUNTER — NURSE ONLY (OUTPATIENT)
Dept: PRIMARY CARE CLINIC | Age: 68
End: 2018-05-25
Payer: MEDICARE

## 2018-05-25 DIAGNOSIS — J45.40 MODERATE PERSISTENT ASTHMA WITHOUT COMPLICATION: ICD-10-CM

## 2018-05-25 PROCEDURE — 96372 THER/PROPH/DIAG INJ SC/IM: CPT | Performed by: NURSE PRACTITIONER

## 2018-06-11 RX ORDER — BUSPIRONE HYDROCHLORIDE 10 MG/1
TABLET ORAL
Qty: 90 TABLET | Refills: 5 | Status: SHIPPED | OUTPATIENT
Start: 2018-06-11 | End: 2018-12-21 | Stop reason: SDUPTHER

## 2018-06-22 DIAGNOSIS — M54.41 CHRONIC RIGHT-SIDED LOW BACK PAIN WITH RIGHT-SIDED SCIATICA: ICD-10-CM

## 2018-06-22 DIAGNOSIS — G89.29 CHRONIC RIGHT-SIDED LOW BACK PAIN WITH RIGHT-SIDED SCIATICA: ICD-10-CM

## 2018-06-22 RX ORDER — HYDROCODONE BITARTRATE AND ACETAMINOPHEN 5; 325 MG/1; MG/1
TABLET ORAL
Qty: 30 TABLET | Refills: 0 | Status: SHIPPED | OUTPATIENT
Start: 2018-06-22 | End: 2018-07-20 | Stop reason: SDUPTHER

## 2018-06-26 ENCOUNTER — NURSE ONLY (OUTPATIENT)
Dept: PRIMARY CARE CLINIC | Age: 68
End: 2018-06-26
Payer: MEDICARE

## 2018-06-26 DIAGNOSIS — J45.40 MODERATE PERSISTENT ASTHMA WITHOUT COMPLICATION: ICD-10-CM

## 2018-06-26 DIAGNOSIS — J44.1 COPD WITH ACUTE EXACERBATION (HCC): Primary | ICD-10-CM

## 2018-06-26 PROCEDURE — 95117 IMMUNOTHERAPY INJECTIONS: CPT | Performed by: NURSE PRACTITIONER

## 2018-07-13 DIAGNOSIS — K21.9 GASTROESOPHAGEAL REFLUX DISEASE, ESOPHAGITIS PRESENCE NOT SPECIFIED: ICD-10-CM

## 2018-07-20 DIAGNOSIS — G89.29 CHRONIC BILATERAL LOW BACK PAIN WITHOUT SCIATICA: ICD-10-CM

## 2018-07-20 DIAGNOSIS — M54.50 CHRONIC BILATERAL LOW BACK PAIN WITHOUT SCIATICA: ICD-10-CM

## 2018-07-20 DIAGNOSIS — M54.41 CHRONIC RIGHT-SIDED LOW BACK PAIN WITH RIGHT-SIDED SCIATICA: ICD-10-CM

## 2018-07-20 DIAGNOSIS — G89.29 CHRONIC RIGHT-SIDED LOW BACK PAIN WITH RIGHT-SIDED SCIATICA: ICD-10-CM

## 2018-07-20 RX ORDER — HYDROCODONE BITARTRATE AND ACETAMINOPHEN 5; 325 MG/1; MG/1
TABLET ORAL
Qty: 30 TABLET | Refills: 0 | Status: SHIPPED | OUTPATIENT
Start: 2018-07-20 | End: 2018-08-17 | Stop reason: SDUPTHER

## 2018-07-20 RX ORDER — TRAMADOL HYDROCHLORIDE 50 MG/1
TABLET ORAL
Qty: 90 TABLET | Refills: 2 | Status: SHIPPED | OUTPATIENT
Start: 2018-07-20 | End: 2018-07-23 | Stop reason: SDUPTHER

## 2018-07-22 ENCOUNTER — HOSPITAL ENCOUNTER (OUTPATIENT)
Facility: HOSPITAL | Age: 68
Discharge: HOME | End: 2018-07-22
Payer: MEDICARE

## 2018-07-22 DIAGNOSIS — E55.9 VITAMIN D DEFICIENCY: ICD-10-CM

## 2018-07-22 DIAGNOSIS — N18.30 TYPE 2 DIABETES MELLITUS WITH STAGE 3 CHRONIC KIDNEY DISEASE, WITH LONG-TERM CURRENT USE OF INSULIN (HCC): ICD-10-CM

## 2018-07-22 DIAGNOSIS — Z79.4 TYPE 2 DIABETES MELLITUS WITH STAGE 3 CHRONIC KIDNEY DISEASE, WITH LONG-TERM CURRENT USE OF INSULIN (HCC): ICD-10-CM

## 2018-07-22 DIAGNOSIS — E78.01 FAMILIAL HYPERCHOLESTEROLEMIA: ICD-10-CM

## 2018-07-22 DIAGNOSIS — E11.22 TYPE 2 DIABETES MELLITUS WITH STAGE 3 CHRONIC KIDNEY DISEASE, WITH LONG-TERM CURRENT USE OF INSULIN (HCC): ICD-10-CM

## 2018-07-22 LAB
A/G RATIO: 1.4 (ref 0.8–2)
ALBUMIN SERPL-MCNC: 4.3 G/DL (ref 3.4–4.8)
ALP BLD-CCNC: 63 U/L (ref 25–100)
ALT SERPL-CCNC: 33 U/L (ref 4–36)
ANION GAP SERPL CALCULATED.3IONS-SCNC: 14 MMOL/L (ref 3–16)
AST SERPL-CCNC: 33 U/L (ref 8–33)
BILIRUB SERPL-MCNC: 0.3 MG/DL (ref 0.3–1.2)
BUN BLDV-MCNC: 48 MG/DL (ref 6–20)
CALCIUM SERPL-MCNC: 10.4 MG/DL (ref 8.5–10.5)
CHLORIDE BLD-SCNC: 97 MMOL/L (ref 98–107)
CHOLESTEROL, TOTAL: 230 MG/DL (ref 0–200)
CO2: 34 MMOL/L (ref 20–30)
CREAT SERPL-MCNC: 1.9 MG/DL (ref 0.4–1.2)
GFR AFRICAN AMERICAN: 32
GFR NON-AFRICAN AMERICAN: 26
GLOBULIN: 3 G/DL
GLUCOSE BLD-MCNC: 208 MG/DL (ref 74–106)
HBA1C MFR BLD: 9.2 %
HCT VFR BLD CALC: 42.9 % (ref 37–47)
HDLC SERPL-MCNC: 31 MG/DL (ref 40–60)
HEMOGLOBIN: 13.7 G/DL (ref 11.5–16.5)
LDL CHOLESTEROL CALCULATED: ABNORMAL MG/DL
LDL CHOLESTEROL DIRECT: 121 MG/DL
MCH RBC QN AUTO: 32.4 PG (ref 27–32)
MCHC RBC AUTO-ENTMCNC: 31.9 G/DL (ref 31–35)
MCV RBC AUTO: 101.4 FL (ref 80–100)
PDW BLD-RTO: 14.2 % (ref 11–16)
PLATELET # BLD: 425 K/UL (ref 150–400)
PMV BLD AUTO: 10.9 FL (ref 6–10)
POTASSIUM SERPL-SCNC: 4.2 MMOL/L (ref 3.4–5.1)
RBC # BLD: 4.23 M/UL (ref 3.8–5.8)
SODIUM BLD-SCNC: 145 MMOL/L (ref 136–145)
TOTAL PROTEIN: 7.3 G/DL (ref 6.4–8.3)
TRIGL SERPL-MCNC: 579 MG/DL (ref 0–249)
VLDLC SERPL CALC-MCNC: ABNORMAL MG/DL
WBC # BLD: 11.8 K/UL (ref 4–11)

## 2018-07-22 PROCEDURE — 80053 COMPREHEN METABOLIC PANEL: CPT

## 2018-07-22 PROCEDURE — 80061 LIPID PANEL: CPT

## 2018-07-22 PROCEDURE — 82306 VITAMIN D 25 HYDROXY: CPT

## 2018-07-22 PROCEDURE — 36415 COLL VENOUS BLD VENIPUNCTURE: CPT

## 2018-07-22 PROCEDURE — 85027 COMPLETE CBC AUTOMATED: CPT

## 2018-07-22 PROCEDURE — 83036 HEMOGLOBIN GLYCOSYLATED A1C: CPT

## 2018-07-22 PROCEDURE — 83721 ASSAY OF BLOOD LIPOPROTEIN: CPT

## 2018-07-23 ENCOUNTER — OFFICE VISIT (OUTPATIENT)
Dept: PRIMARY CARE CLINIC | Age: 68
End: 2018-07-23
Payer: MEDICARE

## 2018-07-23 VITALS
DIASTOLIC BLOOD PRESSURE: 74 MMHG | BODY MASS INDEX: 42.11 KG/M2 | TEMPERATURE: 98 F | WEIGHT: 200.6 LBS | OXYGEN SATURATION: 93 % | SYSTOLIC BLOOD PRESSURE: 126 MMHG | HEART RATE: 76 BPM | HEIGHT: 58 IN | RESPIRATION RATE: 16 BRPM

## 2018-07-23 DIAGNOSIS — J44.1 COPD EXACERBATION (HCC): ICD-10-CM

## 2018-07-23 DIAGNOSIS — M54.50 CHRONIC BILATERAL LOW BACK PAIN WITHOUT SCIATICA: ICD-10-CM

## 2018-07-23 DIAGNOSIS — E11.22 TYPE 2 DIABETES MELLITUS WITH STAGE 3 CHRONIC KIDNEY DISEASE, WITH LONG-TERM CURRENT USE OF INSULIN (HCC): Primary | ICD-10-CM

## 2018-07-23 DIAGNOSIS — N18.30 TYPE 2 DIABETES MELLITUS WITH STAGE 3 CHRONIC KIDNEY DISEASE, WITH LONG-TERM CURRENT USE OF INSULIN (HCC): Primary | ICD-10-CM

## 2018-07-23 DIAGNOSIS — Z79.4 TYPE 2 DIABETES MELLITUS WITH STAGE 3 CHRONIC KIDNEY DISEASE, WITH LONG-TERM CURRENT USE OF INSULIN (HCC): Primary | ICD-10-CM

## 2018-07-23 DIAGNOSIS — G89.29 CHRONIC BILATERAL LOW BACK PAIN WITHOUT SCIATICA: ICD-10-CM

## 2018-07-23 DIAGNOSIS — E78.01 FAMILIAL HYPERCHOLESTEROLEMIA: ICD-10-CM

## 2018-07-23 DIAGNOSIS — I10 ESSENTIAL HYPERTENSION: ICD-10-CM

## 2018-07-23 LAB — VITAMIN D 25-HYDROXY: 16.5 (ref 32–100)

## 2018-07-23 PROCEDURE — G8400 PT W/DXA NO RESULTS DOC: HCPCS | Performed by: NURSE PRACTITIONER

## 2018-07-23 PROCEDURE — 1123F ACP DISCUSS/DSCN MKR DOCD: CPT | Performed by: NURSE PRACTITIONER

## 2018-07-23 PROCEDURE — G8427 DOCREV CUR MEDS BY ELIG CLIN: HCPCS | Performed by: NURSE PRACTITIONER

## 2018-07-23 PROCEDURE — 3017F COLORECTAL CA SCREEN DOC REV: CPT | Performed by: NURSE PRACTITIONER

## 2018-07-23 PROCEDURE — 99214 OFFICE O/P EST MOD 30 MIN: CPT | Performed by: NURSE PRACTITIONER

## 2018-07-23 PROCEDURE — G8926 SPIRO NO PERF OR DOC: HCPCS | Performed by: NURSE PRACTITIONER

## 2018-07-23 PROCEDURE — 3023F SPIROM DOC REV: CPT | Performed by: NURSE PRACTITIONER

## 2018-07-23 PROCEDURE — 1090F PRES/ABSN URINE INCON ASSESS: CPT | Performed by: NURSE PRACTITIONER

## 2018-07-23 PROCEDURE — 3046F HEMOGLOBIN A1C LEVEL >9.0%: CPT | Performed by: NURSE PRACTITIONER

## 2018-07-23 PROCEDURE — 95117 IMMUNOTHERAPY INJECTIONS: CPT | Performed by: NURSE PRACTITIONER

## 2018-07-23 PROCEDURE — 1101F PT FALLS ASSESS-DOCD LE1/YR: CPT | Performed by: NURSE PRACTITIONER

## 2018-07-23 PROCEDURE — G8417 CALC BMI ABV UP PARAM F/U: HCPCS | Performed by: NURSE PRACTITIONER

## 2018-07-23 PROCEDURE — 2022F DILAT RTA XM EVC RTNOPTHY: CPT | Performed by: NURSE PRACTITIONER

## 2018-07-23 PROCEDURE — 4040F PNEUMOC VAC/ADMIN/RCVD: CPT | Performed by: NURSE PRACTITIONER

## 2018-07-23 PROCEDURE — 1036F TOBACCO NON-USER: CPT | Performed by: NURSE PRACTITIONER

## 2018-07-23 ASSESSMENT — ENCOUNTER SYMPTOMS
GASTROINTESTINAL NEGATIVE: 1
RESPIRATORY NEGATIVE: 1
BACK PAIN: 1

## 2018-07-23 NOTE — PROGRESS NOTES
SUBJECTIVE:    Patient ID: Debra Remy is a 76 y.o. female. Chief Complaint   Patient presents with    3 Month Follow-Up    Diabetes    Hypertension    Hyperlipidemia    Injections     Zolair    COPD         HPI:  She returns about her diabetes hypertension hyperlipidemia. She got her Xolair injection today for her asthmatic COPD. She states her blood sugars of been as high as a 300s. It's only in the 150s this morning. She checks her blood sugar often. She takes all medications as directed. She is on oxygen at all times. She says her activity level hasn't really changed. She does go to [employer] . She has a lot of arthritic pain that she takes pain medication regularly. She has taken it for many years. She is stable on current medications. She has been to the kidney specialist and is stable no changes. Patient's medications, allergies, past medical, surgical, social and family histories were reviewed and updated as appropriate.   .  Current Outpatient Prescriptions on File Prior to Visit   Medication Sig Dispense Refill    HYDROcodone-acetaminophen (NORCO) 5-325 MG per tablet TAKE ONE TABLET BY MOUTH EVERY DAY AS NEEDED FOR PAIN 30 tablet 0    fluticasone-salmeterol (ADVAIR DISKUS) 250-50 MCG/DOSE AEPB INHALE ONE PUFF BY MOUTH EVERY 12 HOURS 60 each 5    metoprolol tartrate (LOPRESSOR) 25 MG tablet TAKE ONE TABLET BY MOUTH TWO TIMES A DAY 60 tablet 5    busPIRone (BUSPAR) 10 MG tablet TAKE ONE TABLET BY MOUTH THREE TIMES A DAY 90 tablet 5    ezetimibe (ZETIA) 10 MG tablet TAKE ONE TABLET BY MOUTH EVERY DAY 90 tablet 3    TRESIBA FLEXTOUCH 100 UNIT/ML SOPN INJECT 65 UNITS UNDER THE SKIN ONCE DAILY (Patient taking differently: INJECT 75 UNITS UNDER THE SKIN ONCE DAILY) 15 mL 5    furosemide (LASIX) 40 MG tablet TAKE ONE TABLET BY MOUTH TWO TIMES A DAY 60 tablet 5    ranitidine (ZANTAC) 300 MG tablet Take 300 mg by mouth 2 times daily       lovastatin (MEVACOR) 10 MG tablet TAKE

## 2018-07-23 NOTE — PROGRESS NOTES
Administrations This Visit     omalizumab Devyn Peacock) injection 150 mg     Admin Date  07/23/2018  09:46 Action  Given Dose  150 mg Route  Subcutaneous Site  Arm Left Administered By  Traci Bueno    Ordering Provider:  JADE Beebe    NDC:  25177-803-57    Lot#:  [id number]    :  [facility]    Patient Supplied?:  Yes           Admin Date  07/23/2018  09:47 Action  Given Dose  150 mg Route  Subcutaneous Site  Arm Right Administered By  Traci Bueno    Ordering Provider:  JADE Beebe    NDC:  00723-087-07    Lot#:  [id number]    :  [facility]    Patient Supplied?:  Yes

## 2018-07-30 DIAGNOSIS — J42 CHRONIC BRONCHITIS, UNSPECIFIED CHRONIC BRONCHITIS TYPE (HCC): ICD-10-CM

## 2018-07-30 RX ORDER — MONTELUKAST SODIUM 10 MG/1
TABLET ORAL
Qty: 30 TABLET | Refills: 2 | Status: SHIPPED | OUTPATIENT
Start: 2018-07-30 | End: 2018-09-25 | Stop reason: SDUPTHER

## 2018-07-30 RX ORDER — ASPIRIN 81 MG/1
TABLET ORAL
Qty: 30 TABLET | Refills: 5 | Status: SHIPPED | OUTPATIENT
Start: 2018-07-30 | End: 2019-02-04 | Stop reason: SDUPTHER

## 2018-07-30 RX ORDER — SITAGLIPTIN 100 MG/1
TABLET, FILM COATED ORAL
Qty: 30 TABLET | Refills: 2 | Status: SHIPPED | OUTPATIENT
Start: 2018-07-30 | End: 2018-09-25 | Stop reason: SDUPTHER

## 2018-08-06 RX ORDER — BLOOD SUGAR DIAGNOSTIC
STRIP MISCELLANEOUS
Qty: 100 EACH | Refills: 5 | Status: SHIPPED | OUTPATIENT
Start: 2018-08-06 | End: 2018-10-24 | Stop reason: SDUPTHER

## 2018-08-07 ENCOUNTER — TELEPHONE (OUTPATIENT)
Dept: PRIMARY CARE CLINIC | Age: 68
End: 2018-08-07

## 2018-08-17 DIAGNOSIS — M54.41 CHRONIC RIGHT-SIDED LOW BACK PAIN WITH RIGHT-SIDED SCIATICA: ICD-10-CM

## 2018-08-17 DIAGNOSIS — G89.29 CHRONIC RIGHT-SIDED LOW BACK PAIN WITH RIGHT-SIDED SCIATICA: ICD-10-CM

## 2018-08-17 RX ORDER — HYDROCODONE BITARTRATE AND ACETAMINOPHEN 5; 325 MG/1; MG/1
TABLET ORAL
Qty: 30 TABLET | Refills: 0 | Status: SHIPPED | OUTPATIENT
Start: 2018-08-17 | End: 2018-09-18 | Stop reason: SDUPTHER

## 2018-08-20 ENCOUNTER — NURSE ONLY (OUTPATIENT)
Dept: PRIMARY CARE CLINIC | Age: 68
End: 2018-08-20
Payer: MEDICARE

## 2018-08-20 DIAGNOSIS — T78.40XD ALLERGIC REACTION, SUBSEQUENT ENCOUNTER: ICD-10-CM

## 2018-08-20 PROCEDURE — 95117 IMMUNOTHERAPY INJECTIONS: CPT | Performed by: NURSE PRACTITIONER

## 2018-08-25 DIAGNOSIS — E78.00 PURE HYPERCHOLESTEROLEMIA: ICD-10-CM

## 2018-08-27 RX ORDER — FENOFIBRATE 145 MG/1
TABLET, COATED ORAL
Qty: 30 TABLET | Refills: 3 | Status: SHIPPED | OUTPATIENT
Start: 2018-08-27 | End: 2018-11-16 | Stop reason: SDUPTHER

## 2018-08-27 RX ORDER — METOCLOPRAMIDE 5 MG/1
TABLET ORAL
Qty: 90 TABLET | Refills: 2 | OUTPATIENT
Start: 2018-08-27

## 2018-09-10 RX ORDER — METOCLOPRAMIDE 5 MG/1
TABLET ORAL
Qty: 90 TABLET | Refills: 2 | OUTPATIENT
Start: 2018-09-10

## 2018-09-18 ENCOUNTER — OFFICE VISIT (OUTPATIENT)
Dept: PULMONOLOGY | Facility: CLINIC | Age: 68
End: 2018-09-18

## 2018-09-18 ENCOUNTER — HOSPITAL ENCOUNTER (OUTPATIENT)
Dept: GENERAL RADIOLOGY | Facility: HOSPITAL | Age: 68
Discharge: HOME | End: 2018-09-18
Admitting: NURSE PRACTITIONER

## 2018-09-18 VITALS
DIASTOLIC BLOOD PRESSURE: 68 MMHG | HEIGHT: 58 IN | SYSTOLIC BLOOD PRESSURE: 120 MMHG | HEART RATE: 79 BPM | WEIGHT: 201 LBS | BODY MASS INDEX: 42.19 KG/M2 | RESPIRATION RATE: 18 BRPM | OXYGEN SATURATION: 87 %

## 2018-09-18 DIAGNOSIS — R09.02 HYPOXIA: ICD-10-CM

## 2018-09-18 DIAGNOSIS — R06.02 SHORTNESS OF BREATH: ICD-10-CM

## 2018-09-18 DIAGNOSIS — G89.29 CHRONIC RIGHT-SIDED LOW BACK PAIN WITH RIGHT-SIDED SCIATICA: ICD-10-CM

## 2018-09-18 DIAGNOSIS — E66.01 MORBID OBESITY (HCC): ICD-10-CM

## 2018-09-18 DIAGNOSIS — J44.9 CHRONIC OBSTRUCTIVE PULMONARY DISEASE, UNSPECIFIED COPD TYPE (HCC): ICD-10-CM

## 2018-09-18 DIAGNOSIS — M54.41 CHRONIC RIGHT-SIDED LOW BACK PAIN WITH RIGHT-SIDED SCIATICA: ICD-10-CM

## 2018-09-18 DIAGNOSIS — R06.02 SHORTNESS OF BREATH: Primary | ICD-10-CM

## 2018-09-18 DIAGNOSIS — J44.1 COPD WITH EXACERBATION (HCC): ICD-10-CM

## 2018-09-18 DIAGNOSIS — J96.12 CHRONIC RESPIRATORY FAILURE WITH HYPERCAPNIA (HCC): ICD-10-CM

## 2018-09-18 PROCEDURE — 71046 X-RAY EXAM CHEST 2 VIEWS: CPT

## 2018-09-18 PROCEDURE — 99214 OFFICE O/P EST MOD 30 MIN: CPT | Performed by: NURSE PRACTITIONER

## 2018-09-18 RX ORDER — INSULIN GLARGINE 100 [IU]/ML
INJECTION, SOLUTION SUBCUTANEOUS
Refills: 5 | COMMUNITY
Start: 2018-09-17 | End: 2018-12-13

## 2018-09-18 RX ORDER — HYDROCODONE BITARTRATE AND ACETAMINOPHEN 5; 325 MG/1; MG/1
1 TABLET ORAL DAILY PRN
Qty: 30 TABLET | Refills: 0 | Status: SHIPPED | OUTPATIENT
Start: 2018-09-18 | End: 2018-10-15 | Stop reason: SDUPTHER

## 2018-09-18 RX ORDER — PREDNISONE 10 MG/1
TABLET ORAL
Qty: 31 TABLET | Refills: 0 | Status: SHIPPED | OUTPATIENT
Start: 2018-09-18 | End: 2018-12-13

## 2018-09-18 RX ORDER — IBUPROFEN 600 MG/1
2 TABLET ORAL DAILY
Qty: 1 EACH | Refills: 4 | Status: SHIPPED | OUTPATIENT
Start: 2018-09-18 | End: 2018-11-29

## 2018-09-18 RX ORDER — AMOXICILLIN AND CLAVULANATE POTASSIUM 875; 125 MG/1; MG/1
1 TABLET, FILM COATED ORAL EVERY 12 HOURS SCHEDULED
Qty: 10 TABLET | Refills: 0 | Status: SHIPPED | OUTPATIENT
Start: 2018-09-18 | End: 2018-09-23

## 2018-09-18 RX ORDER — METHYLPREDNISOLONE ACETATE 80 MG/ML
80 INJECTION, SUSPENSION INTRA-ARTICULAR; INTRALESIONAL; INTRAMUSCULAR; SOFT TISSUE ONCE
Status: DISCONTINUED | OUTPATIENT
Start: 2018-09-18 | End: 2019-06-15 | Stop reason: HOSPADM

## 2018-09-18 RX ORDER — IPRATROPIUM BROMIDE AND ALBUTEROL SULFATE 2.5; .5 MG/3ML; MG/3ML
3 SOLUTION RESPIRATORY (INHALATION)
Qty: 360 ML | Refills: 2 | Status: SHIPPED | OUTPATIENT
Start: 2018-09-18 | End: 2018-11-29 | Stop reason: SDUPTHER

## 2018-09-18 NOTE — PROGRESS NOTES
"Chief Complaint   Patient presents with   • Follow-up   • Cough   • Shortness of Breath         Subjective   Mingo Guidry is a 68 y.o. female.     History of Present Illness   The patient comes in today for follow-up of shortness of breath, COPD, and hypoxia.    She complains today of having an upper respiratory infection for about a week and states she is not any better.  Her cough is productive with yellowish sputum.  She denies any fever or chills.  She is taking Mucinex and using 3 nebulized treatments a day for the past 2 weeks.  She uses the Ventolin inhaler every couple of days.    She takes Advair twice a day and Spiriva daily.  She is also on Xolair and is supposed to get an injection sometime this week.    She reports sleeping better with BiPAP therapy.  She feels like she breathes a little better as well.    She uses oxygen continuously.  At rest she is on 3 L/m and she increases it to 4 L/m with exertion.    The following portions of the patient's history were reviewed and updated as appropriate: allergies, current medications, past family history, past medical history, past social history and past surgical history.    Review of Systems   Constitutional: Negative for chills and fever.   HENT: Positive for rhinorrhea and sinus pressure. Negative for sore throat.    Respiratory: Positive for cough, chest tightness, shortness of breath and wheezing.        Objective   Visit Vitals  /68   Pulse 79   Resp 18   Ht 147.3 cm (57.99\")   Wt 91.2 kg (201 lb)   LMP  (LMP Unknown)   SpO2 (!) 87% Comment: RESTIN ROOM AIR   BMI 42.02 kg/m²   SpO2 91% on 3 lpm     Physical Exam   Constitutional: She is oriented to person, place, and time. She appears well-developed and well-nourished.   HENT:   Head: Normocephalic and atraumatic.   Crowded oropharynx.    Eyes: EOM are normal.   Neck: Neck supple.   Cardiovascular: Normal rate and regular rhythm.    Pulmonary/Chest: Effort normal. No respiratory distress. "   Somewhat decreased A/E with scattered wheezing noted and rales in the bases.   Musculoskeletal: She exhibits no edema.   Gait normal.   Neurological: She is alert and oriented to person, place, and time.   Skin: Skin is warm and dry.   Psychiatric: She has a normal mood and affect.   Vitals reviewed.          Assessment/Plan   Mingo was seen today for follow-up, cough and shortness of breath.    Diagnoses and all orders for this visit:    Shortness of breath  -     XR Chest PA & Lateral; Future    Chronic obstructive pulmonary disease, unspecified COPD type (CMS/HCC)  -     Oxygen Therapy; Future    Morbid obesity (CMS/HCC)    Hypoxia  -     Oxygen Therapy; Future    Chronic respiratory failure with hypercapnia (CMS/HCC)    COPD with exacerbation (CMS/HCC)  -     methylPREDNISolone acetate (DEPO-medrol) injection 80 mg; Inject 1 mL into the appropriate muscle as directed by prescriber 1 (One) Time.    Other orders  -     predniSONE (DELTASONE) 10 MG tablet; Take 4 tabs daily x 4 days, then take 3 daily x 3 days, then take 2 daily for 2 days, then take 1 daily x 2 days  -     amoxicillin-clavulanate (AUGMENTIN) 875-125 MG per tablet; Take 1 tablet by mouth Every 12 (Twelve) Hours for 5 days.  -     ipratropium-albuterol (DUO-NEB) 0.5-2.5 mg/3 ml nebulizer; Take 3 mL by nebulization 4 (Four) Times a Day.  -     NASONEX 50 MCG/ACT nasal spray; 2 sprays into the nostril(s) as directed by provider Daily.           Return in about 10 weeks (around 11/27/2018) for Recheck, For Me.    DISCUSSION (if any):    For the symptoms of acute exacerbation of chronic bronchitis, I have prescribed oral and intramuscular steroids.    Patient has been prescribed Augmentin.    Side effects have been discussed in detail    Patient was asked to report to the emergency room if symptoms do not improve.    She had a chest x-ray completed ordered today. Chest xray shows a right middle lobe opacity consistent with pneumonia.     She should  continue using Advair and Spiriva as directed.  She should also continue using her nebulized treatments at least twice a day while she is sick and on the steroids and then she may use them as needed.    She should continue using oxygen and I will send an order for a portable oxygen concentrator which would benefit her with decreased mobility.    She is 100% compliant with AutoPap use and should continue using the machine every night and if she naps during the day.      Dictated utilizing Dragon dictation.    This document was electronically signed by JACKI Miller September 18, 2018  11:26 AM

## 2018-09-19 NOTE — PROGRESS NOTES
Please call the patient regarding her chest xray. It shows pneumonia and she needs to finish all the antibiotics I prescribed yesterday.

## 2018-09-20 ENCOUNTER — NURSE ONLY (OUTPATIENT)
Dept: PRIMARY CARE CLINIC | Age: 68
End: 2018-09-20
Payer: MEDICARE

## 2018-09-20 DIAGNOSIS — J45.40 MODERATE PERSISTENT ASTHMA WITHOUT COMPLICATION: Primary | ICD-10-CM

## 2018-09-20 PROCEDURE — 95117 IMMUNOTHERAPY INJECTIONS: CPT | Performed by: NURSE PRACTITIONER

## 2018-09-24 DIAGNOSIS — K21.9 GASTROESOPHAGEAL REFLUX DISEASE, ESOPHAGITIS PRESENCE NOT SPECIFIED: ICD-10-CM

## 2018-09-24 RX ORDER — METOCLOPRAMIDE 5 MG/1
TABLET ORAL
Qty: 90 TABLET | Refills: 3 | Status: SHIPPED | OUTPATIENT
Start: 2018-09-24 | End: 2019-01-18 | Stop reason: SDUPTHER

## 2018-09-24 RX ORDER — SUCRALFATE 1 G/1
TABLET ORAL
Qty: 90 TABLET | Refills: 3 | Status: SHIPPED | OUTPATIENT
Start: 2018-09-24 | End: 2018-12-21 | Stop reason: SDUPTHER

## 2018-09-24 RX ORDER — FERROUS SULFATE 325(65) MG
TABLET ORAL
Qty: 90 TABLET | Refills: 3 | Status: SHIPPED | OUTPATIENT
Start: 2018-09-24 | End: 2018-12-24 | Stop reason: SDUPTHER

## 2018-09-25 DIAGNOSIS — J42 CHRONIC BRONCHITIS, UNSPECIFIED CHRONIC BRONCHITIS TYPE (HCC): ICD-10-CM

## 2018-09-25 RX ORDER — TIOTROPIUM BROMIDE 18 UG/1
CAPSULE ORAL; RESPIRATORY (INHALATION)
Qty: 30 CAPSULE | Refills: 5 | Status: SHIPPED | OUTPATIENT
Start: 2018-09-25 | End: 2020-01-24 | Stop reason: ALTCHOICE

## 2018-09-25 RX ORDER — MONTELUKAST SODIUM 10 MG/1
TABLET ORAL
Qty: 30 TABLET | Refills: 2 | Status: SHIPPED | OUTPATIENT
Start: 2018-09-25 | End: 2018-10-24 | Stop reason: SDUPTHER

## 2018-09-25 RX ORDER — SITAGLIPTIN 100 MG/1
TABLET, FILM COATED ORAL
Qty: 30 TABLET | Refills: 2 | Status: SHIPPED | OUTPATIENT
Start: 2018-09-25 | End: 2018-10-24 | Stop reason: SDUPTHER

## 2018-10-03 ENCOUNTER — HOSPITAL ENCOUNTER (OUTPATIENT)
Facility: HOSPITAL | Age: 68
Discharge: HOME | End: 2018-10-03
Payer: MEDICARE

## 2018-10-03 LAB
ALBUMIN SERPL-MCNC: 4.4 G/DL (ref 3.4–4.8)
ANION GAP SERPL CALCULATED.3IONS-SCNC: 11 MMOL/L (ref 3–16)
BUN BLDV-MCNC: 48 MG/DL (ref 6–20)
CALCIUM SERPL-MCNC: 9.9 MG/DL (ref 8.5–10.5)
CHLORIDE BLD-SCNC: 97 MMOL/L (ref 98–107)
CO2: 36 MMOL/L (ref 20–30)
CREAT SERPL-MCNC: 2 MG/DL (ref 0.4–1.2)
GFR AFRICAN AMERICAN: 30
GFR NON-AFRICAN AMERICAN: 25
GLUCOSE BLD-MCNC: 250 MG/DL (ref 74–106)
MAGNESIUM: 2.5 MG/DL (ref 1.7–2.4)
PARATHYROID HORMONE INTACT: 102.7 PG/ML (ref 14–72)
PHOSPHORUS: 3.8 MG/DL (ref 2.5–4.5)
POTASSIUM SERPL-SCNC: 4.6 MMOL/L (ref 3.4–5.1)
SODIUM BLD-SCNC: 144 MMOL/L (ref 136–145)
URIC ACID, SERUM: 6.1 MG/DL (ref 2.5–7.1)
VITAMIN D 25-HYDROXY: 19.7 (ref 32–100)

## 2018-10-03 PROCEDURE — 36415 COLL VENOUS BLD VENIPUNCTURE: CPT

## 2018-10-03 PROCEDURE — 83735 ASSAY OF MAGNESIUM: CPT

## 2018-10-03 PROCEDURE — 82306 VITAMIN D 25 HYDROXY: CPT

## 2018-10-03 PROCEDURE — 83970 ASSAY OF PARATHORMONE: CPT

## 2018-10-03 PROCEDURE — 80069 RENAL FUNCTION PANEL: CPT

## 2018-10-03 PROCEDURE — 84550 ASSAY OF BLOOD/URIC ACID: CPT

## 2018-10-03 RX ORDER — CALCIUM CITRATE/VITAMIN D3 200MG-6.25
TABLET ORAL
Qty: 100 STRIP | Refills: 3 | Status: SHIPPED | OUTPATIENT
Start: 2018-10-03 | End: 2019-08-29

## 2018-10-05 RX ORDER — PREDNISONE 10 MG/1
TABLET ORAL
Qty: 31 TABLET | Refills: 0 | OUTPATIENT
Start: 2018-10-05

## 2018-10-15 DIAGNOSIS — M54.41 CHRONIC RIGHT-SIDED LOW BACK PAIN WITH RIGHT-SIDED SCIATICA: ICD-10-CM

## 2018-10-15 DIAGNOSIS — G89.29 CHRONIC RIGHT-SIDED LOW BACK PAIN WITH RIGHT-SIDED SCIATICA: ICD-10-CM

## 2018-10-15 DIAGNOSIS — G89.29 CHRONIC BILATERAL LOW BACK PAIN WITHOUT SCIATICA: ICD-10-CM

## 2018-10-15 DIAGNOSIS — E11.9 TYPE 2 DIABETES MELLITUS WITHOUT COMPLICATION, WITH LONG-TERM CURRENT USE OF INSULIN (HCC): ICD-10-CM

## 2018-10-15 DIAGNOSIS — Z79.4 TYPE 2 DIABETES MELLITUS WITHOUT COMPLICATION, WITH LONG-TERM CURRENT USE OF INSULIN (HCC): ICD-10-CM

## 2018-10-15 DIAGNOSIS — M54.50 CHRONIC BILATERAL LOW BACK PAIN WITHOUT SCIATICA: ICD-10-CM

## 2018-10-17 ENCOUNTER — HOSPITAL ENCOUNTER (OUTPATIENT)
Facility: HOSPITAL | Age: 68
Discharge: HOME | End: 2018-10-17
Payer: MEDICARE

## 2018-10-17 LAB
A/G RATIO: 1.5 (ref 0.8–2)
ALBUMIN SERPL-MCNC: 4.4 G/DL (ref 3.4–4.8)
ALP BLD-CCNC: 60 U/L (ref 25–100)
ALT SERPL-CCNC: 33 U/L (ref 4–36)
ANION GAP SERPL CALCULATED.3IONS-SCNC: 15 MMOL/L (ref 3–16)
AST SERPL-CCNC: 36 U/L (ref 8–33)
BILIRUB SERPL-MCNC: <0.2 MG/DL (ref 0.3–1.2)
BUN BLDV-MCNC: 41 MG/DL (ref 6–20)
CALCIUM SERPL-MCNC: 10 MG/DL (ref 8.5–10.5)
CHLORIDE BLD-SCNC: 94 MMOL/L (ref 98–107)
CO2: 34 MMOL/L (ref 20–30)
CREAT SERPL-MCNC: 1.8 MG/DL (ref 0.4–1.2)
GFR AFRICAN AMERICAN: 34
GFR NON-AFRICAN AMERICAN: 28
GLOBULIN: 2.9 G/DL
GLUCOSE BLD-MCNC: 179 MG/DL (ref 74–106)
HBA1C MFR BLD: 7.8 %
HCT VFR BLD CALC: 44.6 % (ref 37–47)
HEMOGLOBIN: 13.8 G/DL (ref 11.5–16.5)
MCH RBC QN AUTO: 31.9 PG (ref 27–32)
MCHC RBC AUTO-ENTMCNC: 30.9 G/DL (ref 31–35)
MCV RBC AUTO: 103 FL (ref 80–100)
PDW BLD-RTO: 13.6 % (ref 11–16)
PLATELET # BLD: 450 K/UL (ref 150–400)
PMV BLD AUTO: 11.3 FL (ref 6–10)
POTASSIUM SERPL-SCNC: 4.5 MMOL/L (ref 3.4–5.1)
RBC # BLD: 4.33 M/UL (ref 3.8–5.8)
SODIUM BLD-SCNC: 143 MMOL/L (ref 136–145)
TOTAL PROTEIN: 7.3 G/DL (ref 6.4–8.3)
WBC # BLD: 12 K/UL (ref 4–11)

## 2018-10-17 PROCEDURE — 83036 HEMOGLOBIN GLYCOSYLATED A1C: CPT

## 2018-10-17 PROCEDURE — 80053 COMPREHEN METABOLIC PANEL: CPT

## 2018-10-17 PROCEDURE — 85027 COMPLETE CBC AUTOMATED: CPT

## 2018-10-18 RX ORDER — TRAMADOL HYDROCHLORIDE 50 MG/1
TABLET ORAL
Qty: 90 TABLET | Refills: 2 | Status: SHIPPED | OUTPATIENT
Start: 2018-10-18 | End: 2018-10-24 | Stop reason: SDUPTHER

## 2018-10-18 RX ORDER — HYDROCODONE BITARTRATE AND ACETAMINOPHEN 5; 325 MG/1; MG/1
TABLET ORAL
Qty: 30 TABLET | Refills: 0 | Status: SHIPPED | OUTPATIENT
Start: 2018-10-18 | End: 2018-11-16 | Stop reason: SDUPTHER

## 2018-10-18 RX ORDER — INSULIN DEGLUDEC INJECTION 100 U/ML
INJECTION, SOLUTION SUBCUTANEOUS
Qty: 15 ML | Refills: 5 | Status: SHIPPED | OUTPATIENT
Start: 2018-10-18 | End: 2018-10-24 | Stop reason: SDUPTHER

## 2018-10-24 ENCOUNTER — OFFICE VISIT (OUTPATIENT)
Dept: PRIMARY CARE CLINIC | Age: 68
End: 2018-10-24
Payer: MEDICARE

## 2018-10-24 VITALS
HEART RATE: 72 BPM | TEMPERATURE: 98.6 F | WEIGHT: 198.32 LBS | OXYGEN SATURATION: 95 % | DIASTOLIC BLOOD PRESSURE: 66 MMHG | SYSTOLIC BLOOD PRESSURE: 130 MMHG | BODY MASS INDEX: 41.45 KG/M2

## 2018-10-24 DIAGNOSIS — N17.9 ACUTE RENAL FAILURE, UNSPECIFIED ACUTE RENAL FAILURE TYPE (HCC): ICD-10-CM

## 2018-10-24 DIAGNOSIS — E11.22 TYPE 2 DIABETES MELLITUS WITH STAGE 3 CHRONIC KIDNEY DISEASE, WITH LONG-TERM CURRENT USE OF INSULIN (HCC): Primary | ICD-10-CM

## 2018-10-24 DIAGNOSIS — Z23 NEED FOR PROPHYLACTIC VACCINATION AGAINST STREPTOCOCCUS PNEUMONIAE (PNEUMOCOCCUS): ICD-10-CM

## 2018-10-24 DIAGNOSIS — E78.00 PURE HYPERCHOLESTEROLEMIA: ICD-10-CM

## 2018-10-24 DIAGNOSIS — N18.30 TYPE 2 DIABETES MELLITUS WITH STAGE 3 CHRONIC KIDNEY DISEASE, WITH LONG-TERM CURRENT USE OF INSULIN (HCC): Primary | ICD-10-CM

## 2018-10-24 DIAGNOSIS — J42 CHRONIC BRONCHITIS, UNSPECIFIED CHRONIC BRONCHITIS TYPE (HCC): ICD-10-CM

## 2018-10-24 DIAGNOSIS — Z79.4 TYPE 2 DIABETES MELLITUS WITH STAGE 3 CHRONIC KIDNEY DISEASE, WITH LONG-TERM CURRENT USE OF INSULIN (HCC): Primary | ICD-10-CM

## 2018-10-24 PROCEDURE — G8427 DOCREV CUR MEDS BY ELIG CLIN: HCPCS | Performed by: NURSE PRACTITIONER

## 2018-10-24 PROCEDURE — 3023F SPIROM DOC REV: CPT | Performed by: NURSE PRACTITIONER

## 2018-10-24 PROCEDURE — 1036F TOBACCO NON-USER: CPT | Performed by: NURSE PRACTITIONER

## 2018-10-24 PROCEDURE — 2022F DILAT RTA XM EVC RTNOPTHY: CPT | Performed by: NURSE PRACTITIONER

## 2018-10-24 PROCEDURE — 1123F ACP DISCUSS/DSCN MKR DOCD: CPT | Performed by: NURSE PRACTITIONER

## 2018-10-24 PROCEDURE — G0009 ADMIN PNEUMOCOCCAL VACCINE: HCPCS | Performed by: NURSE PRACTITIONER

## 2018-10-24 PROCEDURE — G8926 SPIRO NO PERF OR DOC: HCPCS | Performed by: NURSE PRACTITIONER

## 2018-10-24 PROCEDURE — G8417 CALC BMI ABV UP PARAM F/U: HCPCS | Performed by: NURSE PRACTITIONER

## 2018-10-24 PROCEDURE — 3045F PR MOST RECENT HEMOGLOBIN A1C LEVEL 7.0-9.0%: CPT | Performed by: NURSE PRACTITIONER

## 2018-10-24 PROCEDURE — 90732 PPSV23 VACC 2 YRS+ SUBQ/IM: CPT | Performed by: NURSE PRACTITIONER

## 2018-10-24 PROCEDURE — 4040F PNEUMOC VAC/ADMIN/RCVD: CPT | Performed by: NURSE PRACTITIONER

## 2018-10-24 PROCEDURE — 99214 OFFICE O/P EST MOD 30 MIN: CPT | Performed by: NURSE PRACTITIONER

## 2018-10-24 PROCEDURE — G8482 FLU IMMUNIZE ORDER/ADMIN: HCPCS | Performed by: NURSE PRACTITIONER

## 2018-10-24 PROCEDURE — 3017F COLORECTAL CA SCREEN DOC REV: CPT | Performed by: NURSE PRACTITIONER

## 2018-10-24 PROCEDURE — 1090F PRES/ABSN URINE INCON ASSESS: CPT | Performed by: NURSE PRACTITIONER

## 2018-10-24 PROCEDURE — 96372 THER/PROPH/DIAG INJ SC/IM: CPT | Performed by: NURSE PRACTITIONER

## 2018-10-24 PROCEDURE — G8400 PT W/DXA NO RESULTS DOC: HCPCS | Performed by: NURSE PRACTITIONER

## 2018-10-24 PROCEDURE — 1101F PT FALLS ASSESS-DOCD LE1/YR: CPT | Performed by: NURSE PRACTITIONER

## 2018-10-24 RX ORDER — OLOPATADINE HCL 0.2 %
1 DROPS OPHTHALMIC (EYE) 2 TIMES DAILY
Qty: 2.5 ML | Refills: 3 | Status: SHIPPED | OUTPATIENT
Start: 2018-10-24 | End: 2019-02-19 | Stop reason: SDUPTHER

## 2018-10-24 RX ORDER — IPRATROPIUM BROMIDE AND ALBUTEROL SULFATE 2.5; .5 MG/3ML; MG/3ML
1 SOLUTION RESPIRATORY (INHALATION) EVERY 4 HOURS
Qty: 360 ML | Refills: 3 | Status: SHIPPED | OUTPATIENT
Start: 2018-10-24 | End: 2019-06-07 | Stop reason: SDUPTHER

## 2018-10-24 RX ORDER — FUROSEMIDE 40 MG/1
TABLET ORAL
Qty: 60 TABLET | Refills: 5 | Status: SHIPPED | OUTPATIENT
Start: 2018-10-24 | End: 2019-04-15 | Stop reason: SDUPTHER

## 2018-10-24 RX ORDER — ERGOCALCIFEROL 1.25 MG/1
CAPSULE ORAL
Qty: 4 CAPSULE | Refills: 4 | Status: SHIPPED | OUTPATIENT
Start: 2018-10-24 | End: 2019-01-15 | Stop reason: SDUPTHER

## 2018-10-24 ASSESSMENT — PATIENT HEALTH QUESTIONNAIRE - PHQ9
1. LITTLE INTEREST OR PLEASURE IN DOING THINGS: 0
SUM OF ALL RESPONSES TO PHQ QUESTIONS 1-9: 0
SUM OF ALL RESPONSES TO PHQ QUESTIONS 1-9: 0

## 2018-10-24 NOTE — PROGRESS NOTES
THE SKIN 3 TIMES DAILY (Patient taking differently: Inject 40 Units into the skin 3 times daily (before meals) INJECT 40 UNITS UNDER THE SKIN 3 TIMES DAILY) 4 vial 5    ASPIR-LOW 81 MG EC tablet TAKE ONE TABLET BY MOUTH EVERY DAY 30 tablet 5    fluticasone-salmeterol (ADVAIR DISKUS) 250-50 MCG/DOSE AEPB INHALE ONE PUFF BY MOUTH EVERY 12 HOURS 60 each 5    metoprolol tartrate (LOPRESSOR) 25 MG tablet TAKE ONE TABLET BY MOUTH TWO TIMES A DAY 60 tablet 5    busPIRone (BUSPAR) 10 MG tablet TAKE ONE TABLET BY MOUTH THREE TIMES A DAY 90 tablet 5    ezetimibe (ZETIA) 10 MG tablet TAKE ONE TABLET BY MOUTH EVERY DAY 90 tablet 3    ranitidine (ZANTAC) 300 MG tablet Take 300 mg by mouth 2 times daily       potassium chloride (KLOR-CON M) 20 MEQ extended release tablet TAKE ONE TABLET BY MOUTH EVERY DAY 30 tablet 5    allopurinol (ZYLOPRIM) 300 MG tablet TAKE ONE TABLET BY MOUTH EVERY DAY 30 tablet 5    albuterol sulfate HFA (PROAIR HFA) 108 (90 Base) MCG/ACT inhaler Inhale 2 puffs into the lungs every 4 hours as needed for Wheezing 1 Inhaler 5    montelukast (SINGULAIR) 10 MG tablet TAKE ONE TABLET BY MOUTH EVERY DAY 30 tablet 5    JANUVIA 100 MG tablet TAKE ONE TABLET BY MOUTH EVERY DAY (Patient taking differently: TAKE ONE HALF TABLET BY MOUTH EVERY DAY) 30 tablet 5    metolazone (ZAROXOLYN) 5 MG tablet Take 1 tablet by mouth daily as needed (swelling) 30 tablet 0    traMADol (ULTRAM) 50 MG tablet TAKE ONE TABLET BY MOUTH THREE TIMES A DAY AS NEEDED 90 tablet 0    loratadine (CLARITIN) 10 MG tablet TAKE ONE TABLET BY MOUTH EVERY DAY 30 tablet 4    Blood Glucose Monitoring Suppl DWAIN 1 each by Does not apply route 4 times daily Dx:E10.9 Test QID 1 Device 0    COMFORT ASSIST INSULIN SYRINGE 31G X 5/16\" 1 ML MISC USE AS DIRECTED FIVE TIMES A  each 4    Insulin Syringe-Needle U-100 (B-D INS SYR ULTRAFINE 1CC/31G) 31G X 5/16\" 1 ML MISC  150 each 5    vitamin D (ERGOCALCIFEROL) 10304 units CAPS capsule

## 2018-10-29 DIAGNOSIS — R19.7 DIARRHEA, UNSPECIFIED TYPE: ICD-10-CM

## 2018-10-29 DIAGNOSIS — J45.51 SEVERE PERSISTENT ASTHMA WITH ACUTE EXACERBATION: ICD-10-CM

## 2018-10-29 RX ORDER — L. ACIDOPHILUS/PECTIN, CITRUS 25MM-100MG
TABLET ORAL
Qty: 60 TABLET | Refills: 1 | Status: SHIPPED | OUTPATIENT
Start: 2018-10-29 | End: 2018-12-21 | Stop reason: SDUPTHER

## 2018-11-01 DIAGNOSIS — J42 CHRONIC BRONCHITIS, UNSPECIFIED CHRONIC BRONCHITIS TYPE (HCC): ICD-10-CM

## 2018-11-01 RX ORDER — TIOTROPIUM BROMIDE 18 UG/1
CAPSULE ORAL; RESPIRATORY (INHALATION)
Qty: 30 CAPSULE | Refills: 2 | Status: SHIPPED | OUTPATIENT
Start: 2018-11-01 | End: 2018-12-27 | Stop reason: SDUPTHER

## 2018-11-05 DIAGNOSIS — J42 CHRONIC BRONCHITIS, UNSPECIFIED CHRONIC BRONCHITIS TYPE (HCC): ICD-10-CM

## 2018-11-06 RX ORDER — TIOTROPIUM BROMIDE 18 UG/1
CAPSULE ORAL; RESPIRATORY (INHALATION)
Qty: 30 CAPSULE | Refills: 5 | Status: SHIPPED | OUTPATIENT
Start: 2018-11-06 | End: 2018-12-27 | Stop reason: SDUPTHER

## 2018-11-08 ENCOUNTER — HOSPITAL ENCOUNTER (OUTPATIENT)
Dept: ULTRASOUND IMAGING | Facility: HOSPITAL | Age: 68
Discharge: HOME | End: 2018-11-08
Payer: MEDICARE

## 2018-11-08 DIAGNOSIS — R92.8 OTHER ABNORMAL AND INCONCLUSIVE FINDINGS ON DIAGNOSTIC IMAGING OF BREAST: ICD-10-CM

## 2018-11-08 DIAGNOSIS — N63.20 LEFT BREAST MASS: ICD-10-CM

## 2018-11-08 PROCEDURE — 76642 ULTRASOUND BREAST LIMITED: CPT

## 2018-11-09 ENCOUNTER — TELEPHONE (OUTPATIENT)
Dept: PRIMARY CARE CLINIC | Age: 68
End: 2018-11-09

## 2018-11-09 DIAGNOSIS — N60.02 CYST OF LEFT BREAST: Primary | ICD-10-CM

## 2018-11-09 NOTE — TELEPHONE ENCOUNTER
Called patient Left message informing patient of US results. Will order additional test in 6 months.

## 2018-11-11 ASSESSMENT — ENCOUNTER SYMPTOMS
WHEEZING: 1
SHORTNESS OF BREATH: 1
COUGH: 1
GASTROINTESTINAL NEGATIVE: 1

## 2018-11-16 DIAGNOSIS — G89.29 CHRONIC RIGHT-SIDED LOW BACK PAIN WITH RIGHT-SIDED SCIATICA: ICD-10-CM

## 2018-11-16 DIAGNOSIS — E78.00 PURE HYPERCHOLESTEROLEMIA: ICD-10-CM

## 2018-11-16 DIAGNOSIS — M54.41 CHRONIC RIGHT-SIDED LOW BACK PAIN WITH RIGHT-SIDED SCIATICA: ICD-10-CM

## 2018-11-16 RX ORDER — HYDROCODONE BITARTRATE AND ACETAMINOPHEN 5; 325 MG/1; MG/1
TABLET ORAL
Qty: 30 TABLET | Refills: 0 | Status: SHIPPED | OUTPATIENT
Start: 2018-11-16 | End: 2018-12-14 | Stop reason: SDUPTHER

## 2018-11-16 RX ORDER — FENOFIBRATE 145 MG/1
TABLET, COATED ORAL
Qty: 30 TABLET | Refills: 3 | Status: SHIPPED | OUTPATIENT
Start: 2018-11-16 | End: 2019-05-14 | Stop reason: SDUPTHER

## 2018-11-26 ENCOUNTER — NURSE ONLY (OUTPATIENT)
Dept: PRIMARY CARE CLINIC | Age: 68
End: 2018-11-26
Payer: MEDICARE

## 2018-11-26 DIAGNOSIS — J44.1 COPD WITH EXACERBATION (HCC): Primary | ICD-10-CM

## 2018-11-26 PROCEDURE — 96372 THER/PROPH/DIAG INJ SC/IM: CPT | Performed by: NURSE PRACTITIONER

## 2018-11-26 PROCEDURE — 95117 IMMUNOTHERAPY INJECTIONS: CPT | Performed by: NURSE PRACTITIONER

## 2018-11-29 ENCOUNTER — OFFICE VISIT (OUTPATIENT)
Dept: PULMONOLOGY | Facility: CLINIC | Age: 68
End: 2018-11-29

## 2018-11-29 VITALS
BODY MASS INDEX: 42.19 KG/M2 | RESPIRATION RATE: 18 BRPM | DIASTOLIC BLOOD PRESSURE: 68 MMHG | SYSTOLIC BLOOD PRESSURE: 118 MMHG | HEART RATE: 90 BPM | HEIGHT: 58 IN | OXYGEN SATURATION: 83 % | WEIGHT: 201 LBS

## 2018-11-29 DIAGNOSIS — Z99.89 OSA ON CPAP: ICD-10-CM

## 2018-11-29 DIAGNOSIS — J96.12 CHRONIC RESPIRATORY FAILURE WITH HYPERCAPNIA (HCC): ICD-10-CM

## 2018-11-29 DIAGNOSIS — J44.9 CHRONIC OBSTRUCTIVE PULMONARY DISEASE, UNSPECIFIED COPD TYPE (HCC): ICD-10-CM

## 2018-11-29 DIAGNOSIS — R06.02 SHORTNESS OF BREATH: Primary | ICD-10-CM

## 2018-11-29 DIAGNOSIS — G47.33 OSA ON CPAP: ICD-10-CM

## 2018-11-29 DIAGNOSIS — J18.9 PNEUMONIA DUE TO INFECTIOUS ORGANISM, UNSPECIFIED LATERALITY, UNSPECIFIED PART OF LUNG: ICD-10-CM

## 2018-11-29 PROCEDURE — 99214 OFFICE O/P EST MOD 30 MIN: CPT | Performed by: NURSE PRACTITIONER

## 2018-11-29 RX ORDER — IPRATROPIUM BROMIDE AND ALBUTEROL SULFATE 2.5; .5 MG/3ML; MG/3ML
3 SOLUTION RESPIRATORY (INHALATION)
Qty: 360 ML | Refills: 2 | Status: SHIPPED | OUTPATIENT
Start: 2018-11-29 | End: 2020-01-01 | Stop reason: ALTCHOICE

## 2018-11-29 RX ORDER — INSULIN DEGLUDEC 200 U/ML
INJECTION, SOLUTION SUBCUTANEOUS
COMMUNITY
Start: 2018-11-14 | End: 2018-12-13

## 2018-11-29 RX ORDER — FLUNISOLIDE 0.25 MG/ML
1 SOLUTION NASAL EVERY 12 HOURS
Qty: 1 BOTTLE | Refills: 5 | Status: SHIPPED | OUTPATIENT
Start: 2018-11-29 | End: 2019-04-15 | Stop reason: SDUPTHER

## 2018-11-29 RX ORDER — ALBUTEROL SULFATE 90 UG/1
2 AEROSOL, METERED RESPIRATORY (INHALATION) EVERY 4 HOURS PRN
Qty: 1 INHALER | Refills: 5 | Status: SHIPPED | OUTPATIENT
Start: 2018-11-29 | End: 2019-03-04 | Stop reason: SDUPTHER

## 2018-12-13 ENCOUNTER — OFFICE VISIT (OUTPATIENT)
Dept: CARDIOLOGY | Facility: CLINIC | Age: 68
End: 2018-12-13

## 2018-12-13 VITALS
DIASTOLIC BLOOD PRESSURE: 72 MMHG | BODY MASS INDEX: 42.07 KG/M2 | HEART RATE: 84 BPM | SYSTOLIC BLOOD PRESSURE: 154 MMHG | WEIGHT: 200.4 LBS | OXYGEN SATURATION: 95 % | HEIGHT: 58 IN

## 2018-12-13 DIAGNOSIS — I10 ESSENTIAL HYPERTENSION: ICD-10-CM

## 2018-12-13 DIAGNOSIS — E78.2 MIXED HYPERLIPIDEMIA: ICD-10-CM

## 2018-12-13 DIAGNOSIS — I50.32 CHRONIC DIASTOLIC HEART FAILURE (HCC): Primary | ICD-10-CM

## 2018-12-13 PROCEDURE — 99213 OFFICE O/P EST LOW 20 MIN: CPT | Performed by: INTERNAL MEDICINE

## 2018-12-13 RX ORDER — MOMETASONE FUROATE 50 UG/1
2 SPRAY, METERED NASAL DAILY
Status: ON HOLD | COMMUNITY
End: 2019-06-10

## 2018-12-13 RX ORDER — INSULIN DEGLUDEC INJECTION 100 U/ML
INJECTION, SOLUTION SUBCUTANEOUS 2 TIMES DAILY
Refills: 10 | COMMUNITY
Start: 2018-12-08 | End: 2020-01-01 | Stop reason: HOSPADM

## 2018-12-13 NOTE — PROGRESS NOTES
[facility]  Office visit  Mingo Guidry  1950  [phone]    VISIT DATE:  2017    PCP: Luz Prater, JACKI  [address] [facility] [id number]    CC:  Chief Complaint   Patient presents with   • Chronic diastolic heart failure   • Shortness of Breath       PROBLEM LIST:  1. Chronic diastolic heart failure:  a. Normal dobutamine echocardiogram stress, 2005.  b. Echocardiogram on 2009: EF 50% to 55%. Left atrium 3.5 cm.  2. Chronic lower extremity edema/venous insufficiency.   3. Benign hypertension.   4. Type 2 diabetes mellitus, insulin dependent.   5. Chronic obstructive pulmonary disease/asthmatic bronchitis, on O2 use chronically.   6. Obesity.   7. Acid reflux.   8. Gout.   9. Seasonal allergies/rhinitis.   10. Surgical history:  a.  section.   b. Hysterectomy.   c. Lung surgery, data insufficient.   d. Tonsillectomy/adenoidectomy.      ASSESSMENT:   Diagnosis Plan   1. Chronic diastolic heart failure (CMS/HCC)     2. Essential hypertension     3. Mixed hyperlipidemia         PLAN:  Chronic diastolic heart failure: Currently euvolemic and compensated.  Afterload is well-controlled based on home blood pressure measures.  Continue Lasix with as needed metolazone when she gains 3 pounds above her baseline weight.    Hyperlipidemia: Ideal goal LDL less than 100.  Currently on fenofibrate and Zetia.    Subjective  68-year-old female with stage III kidney disease chronic COPD on 3 L home O2.  Reports stable functional capacity.  New York heart class II.  Mild lower extremity edema which is being controlled with combination of Lasix and as needed metolazone.  She is currently taking metolazone about 2-3 times per month.  She has stable class III chronic kidney disease.  She is compliant with medical therapy.  Blood pressures running less than 140/90 mmHg.she was admitted twice over the previous year for pneumonia. She denies exertional chest pain and  "palpitations.    PHYSICAL EXAMINATION:  Vitals:    12/13/18 0934   BP: 154/72   BP Location: Right arm   Patient Position: Sitting   Pulse: 84   SpO2: 95%   Weight: 90.9 kg (200 lb 6.4 oz)   Height: 147.3 cm (58\")     General Appearance:    Alert, cooperative, no distress, appears stated age   Head:    Normocephalic, without obvious abnormality, atraumatic   Eyes:    conjunctiva/corneas clear   Nose:   Nares normal, septum midline, mucosa normal, no drainage   Throat:   Lips, teeth and gums normal   Neck:   Supple, symmetrical, trachea midline, no carotid    bruit or JVD   Lungs:     Diminished throughout,  respirations unlabored   Chest Wall:    No tenderness or deformity    Heart:    Regular rate and rhythm, S1 and S2 normal, no murmur, rub   or gallop, normal carotid impulse bilaterally without bruit.   Abdomen:     Soft, non-tender   Extremities:   Extremities normal, atraumatic, no cyanosis, trivial bilateral pretibial edema    Pulses:   2+ and symmetric all extremities   Skin:   Skin color, texture, turgor normal, no rashes or lesions       Diagnostic Data:  Procedures  No results found for: CHLPL, TRIG, HDL, LDLDIRECT  Lab Results   Component Value Date    GLUCOSE 286 (H) 01/11/2018    BUN 34 (H) 01/11/2018    CREATININE 1.20 01/11/2018     01/11/2018    K 4.5 01/11/2018     01/11/2018    CO2 30.0 01/11/2018     No results found for: HGBA1C  Lab Results   Component Value Date    WBC 17.10 (H) 01/11/2018    HGB 13.3 01/11/2018    HCT 40.5 01/11/2018     01/11/2018       Allergies  Allergies   Allergen Reactions   • Shellfish-Derived Products    • Wheat Bran Hives       Current Medications    Current Outpatient Medications:   •  ADVAIR DISKUS 250-50 MCG/DOSE DISKUS, Inhale 1 puff 2 (Two) Times a Day., Disp: 60 each, Rfl: 5  •  albuterol (VENTOLIN HFA) 108 (90 Base) MCG/ACT inhaler, Inhale 2 puffs Every 4 (Four) Hours As Needed for Wheezing or Shortness of Air., Disp: 1 inhaler, Rfl: 5  •  " allopurinol (ZYLOPRIM) 300 MG tablet, Take 300 mg by mouth Daily. 1/2 tablet daily, Disp: , Rfl:   •  ASPIR-LOW 81 MG EC tablet, Take 81 mg by mouth Daily., Disp: , Rfl: 1  •  busPIRone (BUSPAR) 10 MG tablet, Take 10 mg by mouth 3 (Three) Times a Day., Disp: , Rfl:   •  ezetimibe (ZETIA) 10 MG tablet, Take 10 mg by mouth Daily., Disp: , Rfl:   •  fenofibrate (TRICOR) 145 MG tablet, Take 145 mg by mouth Daily., Disp: , Rfl:   •  ferrous sulfate 325 (65 FE) MG tablet, Take 325 mg by mouth 3 (Three) Times a Day With Meals., Disp: , Rfl:   •  flunisolide (NASALIDE) 25 MCG/ACT (0.025%) solution nasal spray, Inhale 1 spray Every 12 (Twelve) Hours., Disp: 1 bottle, Rfl: 5  •  furosemide (LASIX) 40 MG tablet, Take 40 mg by mouth 2 (Two) Times a Day., Disp: , Rfl:   •  HUMALOG 100 UNIT/ML injection, Inject 35 Units under the skin into the appropriate area as directed 3 (Three) Times a Day., Disp: , Rfl:   •  HYDROcodone-acetaminophen (NORCO) 5-325 MG per tablet, Take 1 tablet by mouth Daily., Disp: , Rfl:   •  ipratropium-albuterol (DUO-NEB) 0.5-2.5 mg/3 ml nebulizer, Take 3 mL by nebulization 4 (Four) Times a Day., Disp: 360 mL, Rfl: 2  •  Lactobacillus Acid-Pectin (ACIDOPHILUS/CITRUS PECTIN) tablet tablet, Take 1 tablet by mouth 2 (Two) Times a Day., Disp: , Rfl: 1  •  Loratadine (CLARITIN) 10 MG capsule, Take 1 tablet by mouth Daily., Disp: , Rfl:   •  metoclopramide (REGLAN) 5 MG tablet, Take 5 mg by mouth 3 (Three) Times a Day., Disp: , Rfl:   •  metolazone (ZAROXOLYN) 5 MG tablet, Take 5 mg by mouth Daily As Needed., Disp: , Rfl:   •  metoprolol tartrate (LOPRESSOR) 25 MG tablet, Take 25 mg by mouth 2 (Two) Times a Day., Disp: , Rfl:   •  mometasone (NASONEX) 50 MCG/ACT nasal spray, 2 sprays into the nostril(s) as directed by provider Daily., Disp: , Rfl:   •  montelukast (SINGULAIR) 10 MG tablet, Take 10 mg by mouth Every Night., Disp: , Rfl:   •  O2 (OXYGEN), Inhale 3 L/min Daily., Disp: , Rfl:   •  omalizumab  "(XOLAIR) 150 MG injection, Inject 150 mg under the skin into the appropriate area as directed 1 (One) Time. 2 injections once a month, Disp: , Rfl:   •  PATADAY 0.2 % solution ophthalmic solution, Administer  to both eyes 2 (Two) Times a Day., Disp: , Rfl: 3  •  potassium chloride (K-DUR,KLOR-CON) 20 MEQ CR tablet, Take 20 mEq by mouth Daily., Disp: , Rfl: 2  •  raNITIdine (ZANTAC) 300 MG tablet, Take 300 mg by mouth Daily., Disp: , Rfl:   •  sitaGLIPtin (JANUVIA) 100 MG tablet, Take 100 mg by mouth Daily. 1/2 tablet daily, Disp: , Rfl:   •  sucralfate (CARAFATE) 1 G tablet, Take 1 g by mouth 3 (Three) Times a Day., Disp: , Rfl:   •  tiotropium (SPIRIVA) 18 MCG per inhalation capsule, Place 1 capsule into inhaler and inhale Daily., Disp: 30 capsule, Rfl: 4  •  traMADol (ULTRAM) 50 MG tablet, Take 50 mg by mouth Every 6 (Six) Hours As Needed for moderate pain (4-6)., Disp: , Rfl:   •  TRESIBA FLEXTOUCH 100 UNIT/ML solution pen-injector injection, INJECT 15 UNITS EVERY MORNING AND 75 UNITS EVERY IN THE EVENING, Disp: , Rfl: 10  •  TRUE METRIX BLOOD GLUCOSE TEST test strip, , Disp: , Rfl: 3  •  ULTICARE INSULIN SYRINGE 31G X 5/16\" 1 ML misc, , Disp: , Rfl: 3  •  vitamin D (ERGOCALCIFEROL) 25454 units capsule capsule, Take 50,000 Units by mouth 1 (One) Time Per Week., Disp: , Rfl:     Current Facility-Administered Medications:   •  methylPREDNISolone acetate (DEPO-medrol) injection 80 mg, 80 mg, Intramuscular, Once, Yadi Davis APRN ROS  Review of Systems   Cardiovascular: Positive for irregular heartbeat and leg swelling. Negative for chest pain.   Respiratory: Positive for cough, shortness of breath, sputum production and wheezing.        SOCIAL HX  Social History     Socioeconomic History   • Marital status:      Spouse name: Not on file   • Number of children: Not on file   • Years of education: Not on file   • Highest education level: Not on file   Social Needs   • Financial " resource strain: Not on file   • Food insecurity - worry: Not on file   • Food insecurity - inability: Not on file   • Transportation needs - medical: Not on file   • Transportation needs - non-medical: Not on file   Occupational History   • Not on file   Tobacco Use   • Smoking status: Former Smoker     Packs/day: 1.50     Years: 35.00     Pack years: 52.50     Types: Cigarettes     Last attempt to quit:      Years since quittin.9   • Smokeless tobacco: Never Used   Substance and Sexual Activity   • Alcohol use: No   • Drug use: No   • Sexual activity: Defer   Other Topics Concern   • Not on file   Social History Narrative   • Not on file       FAMILY HX  Family History   Problem Relation Age of Onset   • Heart disease Mother    • Heart disease Father    • Leukemia Father    • Diabetes Sister    • Diabetes Brother    • COPD Sister              Jorge Luis Mcnair III, MD, FACC

## 2018-12-14 DIAGNOSIS — M54.41 CHRONIC RIGHT-SIDED LOW BACK PAIN WITH RIGHT-SIDED SCIATICA: ICD-10-CM

## 2018-12-14 DIAGNOSIS — G89.29 CHRONIC RIGHT-SIDED LOW BACK PAIN WITH RIGHT-SIDED SCIATICA: ICD-10-CM

## 2018-12-14 RX ORDER — HYDROCODONE BITARTRATE AND ACETAMINOPHEN 5; 325 MG/1; MG/1
TABLET ORAL
Qty: 30 TABLET | Refills: 0 | Status: SHIPPED | OUTPATIENT
Start: 2018-12-14 | End: 2019-01-14 | Stop reason: SDUPTHER

## 2018-12-21 DIAGNOSIS — J45.51 SEVERE PERSISTENT ASTHMA WITH ACUTE EXACERBATION: ICD-10-CM

## 2018-12-21 DIAGNOSIS — R19.7 DIARRHEA, UNSPECIFIED TYPE: ICD-10-CM

## 2018-12-21 DIAGNOSIS — J42 CHRONIC BRONCHITIS, UNSPECIFIED CHRONIC BRONCHITIS TYPE (HCC): ICD-10-CM

## 2018-12-26 RX ORDER — BUSPIRONE HYDROCHLORIDE 10 MG/1
TABLET ORAL
Qty: 90 TABLET | Refills: 5 | Status: SHIPPED | OUTPATIENT
Start: 2018-12-26 | End: 2019-06-14 | Stop reason: SDUPTHER

## 2018-12-26 RX ORDER — L. ACIDOPHILUS/PECTIN, CITRUS 25MM-100MG
TABLET ORAL
Qty: 60 TABLET | Refills: 1 | Status: SHIPPED | OUTPATIENT
Start: 2018-12-26 | End: 2019-01-21 | Stop reason: SDUPTHER

## 2018-12-26 RX ORDER — SITAGLIPTIN 100 MG/1
TABLET, FILM COATED ORAL
Qty: 30 TABLET | Refills: 2 | Status: SHIPPED | OUTPATIENT
Start: 2018-12-26 | End: 2019-03-18 | Stop reason: SDUPTHER

## 2018-12-26 RX ORDER — MONTELUKAST SODIUM 10 MG/1
TABLET ORAL
Qty: 30 TABLET | Refills: 2 | Status: SHIPPED | OUTPATIENT
Start: 2018-12-26 | End: 2019-03-18 | Stop reason: SDUPTHER

## 2018-12-26 RX ORDER — SUCRALFATE 1 G/1
TABLET ORAL
Qty: 90 TABLET | Refills: 3 | Status: SHIPPED | OUTPATIENT
Start: 2018-12-26 | End: 2019-04-15 | Stop reason: SDUPTHER

## 2018-12-26 RX ORDER — FERROUS SULFATE 325(65) MG
TABLET ORAL
Qty: 90 TABLET | Refills: 3 | Status: SHIPPED | OUTPATIENT
Start: 2018-12-26 | End: 2019-04-15 | Stop reason: SDUPTHER

## 2018-12-27 ENCOUNTER — OFFICE VISIT (OUTPATIENT)
Dept: PRIMARY CARE CLINIC | Age: 68
End: 2018-12-27
Payer: MEDICARE

## 2018-12-27 VITALS
TEMPERATURE: 98 F | RESPIRATION RATE: 20 BRPM | HEART RATE: 90 BPM | DIASTOLIC BLOOD PRESSURE: 70 MMHG | HEIGHT: 58 IN | SYSTOLIC BLOOD PRESSURE: 132 MMHG | WEIGHT: 199.4 LBS | OXYGEN SATURATION: 90 % | BODY MASS INDEX: 41.86 KG/M2

## 2018-12-27 DIAGNOSIS — J45.50 SEVERE PERSISTENT ASTHMATIC BRONCHITIS WITHOUT COMPLICATION: Primary | ICD-10-CM

## 2018-12-27 PROCEDURE — 96372 THER/PROPH/DIAG INJ SC/IM: CPT | Performed by: NURSE PRACTITIONER

## 2018-12-27 PROCEDURE — G8482 FLU IMMUNIZE ORDER/ADMIN: HCPCS | Performed by: NURSE PRACTITIONER

## 2018-12-27 PROCEDURE — 1123F ACP DISCUSS/DSCN MKR DOCD: CPT | Performed by: NURSE PRACTITIONER

## 2018-12-27 PROCEDURE — 1090F PRES/ABSN URINE INCON ASSESS: CPT | Performed by: NURSE PRACTITIONER

## 2018-12-27 PROCEDURE — G8417 CALC BMI ABV UP PARAM F/U: HCPCS | Performed by: NURSE PRACTITIONER

## 2018-12-27 PROCEDURE — 4040F PNEUMOC VAC/ADMIN/RCVD: CPT | Performed by: NURSE PRACTITIONER

## 2018-12-27 PROCEDURE — 1101F PT FALLS ASSESS-DOCD LE1/YR: CPT | Performed by: NURSE PRACTITIONER

## 2018-12-27 PROCEDURE — 99213 OFFICE O/P EST LOW 20 MIN: CPT | Performed by: NURSE PRACTITIONER

## 2018-12-27 PROCEDURE — 1036F TOBACCO NON-USER: CPT | Performed by: NURSE PRACTITIONER

## 2018-12-27 PROCEDURE — G8427 DOCREV CUR MEDS BY ELIG CLIN: HCPCS | Performed by: NURSE PRACTITIONER

## 2018-12-27 PROCEDURE — 3017F COLORECTAL CA SCREEN DOC REV: CPT | Performed by: NURSE PRACTITIONER

## 2018-12-27 PROCEDURE — G8400 PT W/DXA NO RESULTS DOC: HCPCS | Performed by: NURSE PRACTITIONER

## 2018-12-27 RX ORDER — LIDOCAINE HYDROCHLORIDE 10 MG/ML
2 INJECTION, SOLUTION EPIDURAL; INFILTRATION; INTRACAUDAL; PERINEURAL ONCE
Status: COMPLETED | OUTPATIENT
Start: 2018-12-27 | End: 2018-12-27

## 2018-12-27 RX ORDER — PREDNISONE 20 MG/1
TABLET ORAL
Qty: 10 TABLET | Refills: 0 | Status: SHIPPED | OUTPATIENT
Start: 2018-12-27 | End: 2019-01-02 | Stop reason: SDUPTHER

## 2018-12-27 RX ORDER — CEFTRIAXONE 1 G/1
1 INJECTION, POWDER, FOR SOLUTION INTRAMUSCULAR; INTRAVENOUS ONCE
Status: COMPLETED | OUTPATIENT
Start: 2018-12-27 | End: 2018-12-27

## 2018-12-27 RX ORDER — FLUNISOLIDE 0.25 MG/ML
2 SOLUTION NASAL EVERY 12 HOURS
COMMUNITY
Start: 2018-11-29

## 2018-12-27 RX ORDER — DOXYCYCLINE HYCLATE 100 MG
100 TABLET ORAL 2 TIMES DAILY
Qty: 20 TABLET | Refills: 0 | Status: SHIPPED | OUTPATIENT
Start: 2018-12-27 | End: 2019-01-06

## 2018-12-27 RX ORDER — AZITHROMYCIN 250 MG/1
TABLET, FILM COATED ORAL
Qty: 6 TABLET | Refills: 0 | Status: SHIPPED | OUTPATIENT
Start: 2018-12-27 | End: 2019-04-25 | Stop reason: SDUPTHER

## 2018-12-27 RX ADMIN — LIDOCAINE HYDROCHLORIDE 2 ML: 10 INJECTION, SOLUTION EPIDURAL; INFILTRATION; INTRACAUDAL; PERINEURAL at 12:21

## 2018-12-27 RX ADMIN — CEFTRIAXONE 1 G: 1 INJECTION, POWDER, FOR SOLUTION INTRAMUSCULAR; INTRAVENOUS at 12:19

## 2018-12-27 ASSESSMENT — ENCOUNTER SYMPTOMS
WHEEZING: 1
SHORTNESS OF BREATH: 1
GASTROINTESTINAL NEGATIVE: 1
COUGH: 1

## 2018-12-27 NOTE — PROGRESS NOTES
WBC 12.0 10/17/2018    NEUTROABS 10.4 01/04/2018    HGB 13.8 10/17/2018    HCT 44.6 10/17/2018    .0 10/17/2018     10/17/2018       Lab Results   Component Value Date    TSH 4.20 04/13/2018         ASSESSMENT/PLAN:     Ramon Vega was seen today for uri, congestion and cough. Diagnoses and all orders for this visit:    Severe persistent asthmatic bronchitis without complication  -     cefTRIAXone (ROCEPHIN) injection 1 g; Inject 1 g into the muscle once  -     azithromycin (ZITHROMAX) 250 MG tablet; 2 po x 1 day, 1 po daily for 4 days. -     doxycycline hyclate (VIBRA-TABS) 100 MG tablet; Take 1 tablet by mouth 2 times daily for 10 days  -     predniSONE (DELTASONE) 20 MG tablet; 2 po daily for 5 days. -     lidocaine PF 1 % injection 2 mL; 2 mLs by Other route once    will treat aggressively. In no improvement in 48-72 hours will admit to the hospital.  She has long history of severe respiratory illness.     Medications Discontinued During This Encounter   Medication Reason    SPIRIVA HANDIHALER 18 MCG inhalation capsule DUPLICATE    SPIRIVA HANDIHALER 18 MCG inhalation capsule DUPLICATE

## 2019-01-02 DIAGNOSIS — J45.50 SEVERE PERSISTENT ASTHMATIC BRONCHITIS WITHOUT COMPLICATION: ICD-10-CM

## 2019-01-04 RX ORDER — PREDNISONE 20 MG/1
TABLET ORAL
Qty: 10 TABLET | Refills: 0 | Status: SHIPPED | OUTPATIENT
Start: 2019-01-04 | End: 2019-01-24 | Stop reason: ALTCHOICE

## 2019-01-07 ENCOUNTER — HOSPITAL ENCOUNTER (OUTPATIENT)
Facility: HOSPITAL | Age: 69
Discharge: HOME | End: 2019-01-07
Payer: MEDICARE

## 2019-01-07 LAB
PARATHYROID HORMONE INTACT: 93.8 PG/ML (ref 14–72)
RHEUMATOID FACTOR: <10 IU/ML
URIC ACID, SERUM: 8.3 MG/DL (ref 2.5–7.1)
VITAMIN D 25-HYDROXY: 18.4 (ref 32–100)

## 2019-01-07 PROCEDURE — 84550 ASSAY OF BLOOD/URIC ACID: CPT

## 2019-01-07 PROCEDURE — 82306 VITAMIN D 25 HYDROXY: CPT

## 2019-01-07 PROCEDURE — 86431 RHEUMATOID FACTOR QUANT: CPT

## 2019-01-07 PROCEDURE — 83970 ASSAY OF PARATHORMONE: CPT

## 2019-01-10 ENCOUNTER — TELEPHONE (OUTPATIENT)
Dept: PRIMARY CARE CLINIC | Age: 69
End: 2019-01-10

## 2019-01-14 DIAGNOSIS — M54.50 CHRONIC BILATERAL LOW BACK PAIN WITHOUT SCIATICA: ICD-10-CM

## 2019-01-14 DIAGNOSIS — G89.29 CHRONIC BILATERAL LOW BACK PAIN WITHOUT SCIATICA: ICD-10-CM

## 2019-01-14 DIAGNOSIS — M54.41 CHRONIC RIGHT-SIDED LOW BACK PAIN WITH RIGHT-SIDED SCIATICA: ICD-10-CM

## 2019-01-14 DIAGNOSIS — G89.29 CHRONIC RIGHT-SIDED LOW BACK PAIN WITH RIGHT-SIDED SCIATICA: ICD-10-CM

## 2019-01-15 ENCOUNTER — TELEPHONE (OUTPATIENT)
Dept: PRIMARY CARE CLINIC | Age: 69
End: 2019-01-15

## 2019-01-15 RX ORDER — ERGOCALCIFEROL 1.25 MG/1
CAPSULE ORAL
Qty: 4 CAPSULE | Refills: 4 | Status: SHIPPED | OUTPATIENT
Start: 2019-01-15 | End: 2019-09-10 | Stop reason: SDUPTHER

## 2019-01-16 ENCOUNTER — HOSPITAL ENCOUNTER (OUTPATIENT)
Facility: HOSPITAL | Age: 69
Discharge: HOME | End: 2019-01-16
Payer: MEDICARE

## 2019-01-16 LAB
ALBUMIN SERPL-MCNC: 4.2 G/DL (ref 3.4–4.8)
ANION GAP SERPL CALCULATED.3IONS-SCNC: 12 MMOL/L (ref 3–16)
BUN BLDV-MCNC: 42 MG/DL (ref 6–20)
CALCIUM SERPL-MCNC: 9.8 MG/DL (ref 8.5–10.5)
CHLORIDE BLD-SCNC: 92 MMOL/L (ref 98–107)
CO2: 38 MMOL/L (ref 20–30)
CREAT SERPL-MCNC: 1.6 MG/DL (ref 0.4–1.2)
GFR AFRICAN AMERICAN: 39
GFR NON-AFRICAN AMERICAN: 32
GLUCOSE BLD-MCNC: 216 MG/DL (ref 74–106)
PARATHYROID HORMONE INTACT: 62.4 PG/ML (ref 14–72)
PHOSPHORUS: 3.5 MG/DL (ref 2.5–4.5)
POTASSIUM SERPL-SCNC: 4.2 MMOL/L (ref 3.4–5.1)
SODIUM BLD-SCNC: 142 MMOL/L (ref 136–145)
URIC ACID, SERUM: 8.1 MG/DL (ref 2.5–7.1)
VITAMIN D 25-HYDROXY: 18.9 (ref 32–100)

## 2019-01-16 PROCEDURE — 83970 ASSAY OF PARATHORMONE: CPT

## 2019-01-16 PROCEDURE — 82306 VITAMIN D 25 HYDROXY: CPT

## 2019-01-16 PROCEDURE — 84550 ASSAY OF BLOOD/URIC ACID: CPT

## 2019-01-16 PROCEDURE — 80069 RENAL FUNCTION PANEL: CPT

## 2019-01-18 DIAGNOSIS — K21.9 GASTROESOPHAGEAL REFLUX DISEASE, ESOPHAGITIS PRESENCE NOT SPECIFIED: ICD-10-CM

## 2019-01-18 RX ORDER — HYDROCODONE BITARTRATE AND ACETAMINOPHEN 5; 325 MG/1; MG/1
TABLET ORAL
Qty: 30 TABLET | Refills: 0 | Status: SHIPPED | OUTPATIENT
Start: 2019-01-18 | End: 2019-02-18 | Stop reason: SDUPTHER

## 2019-01-18 RX ORDER — TRAMADOL HYDROCHLORIDE 50 MG/1
TABLET ORAL
Qty: 90 TABLET | Refills: 2 | Status: SHIPPED | OUTPATIENT
Start: 2019-01-18 | End: 2019-01-24 | Stop reason: SDUPTHER

## 2019-01-18 RX ORDER — METOCLOPRAMIDE 5 MG/1
TABLET ORAL
Qty: 90 TABLET | Refills: 3 | Status: SHIPPED | OUTPATIENT
Start: 2019-01-18 | End: 2019-04-15 | Stop reason: SDUPTHER

## 2019-01-21 ENCOUNTER — HOSPITAL ENCOUNTER (OUTPATIENT)
Facility: HOSPITAL | Age: 69
Discharge: HOME | End: 2019-01-21
Payer: MEDICARE

## 2019-01-21 DIAGNOSIS — R19.7 DIARRHEA, UNSPECIFIED TYPE: ICD-10-CM

## 2019-01-21 DIAGNOSIS — Z79.4 TYPE 2 DIABETES MELLITUS WITH STAGE 3 CHRONIC KIDNEY DISEASE, WITH LONG-TERM CURRENT USE OF INSULIN (HCC): ICD-10-CM

## 2019-01-21 DIAGNOSIS — N18.30 TYPE 2 DIABETES MELLITUS WITH STAGE 3 CHRONIC KIDNEY DISEASE, WITH LONG-TERM CURRENT USE OF INSULIN (HCC): ICD-10-CM

## 2019-01-21 DIAGNOSIS — J42 CHRONIC BRONCHITIS, UNSPECIFIED CHRONIC BRONCHITIS TYPE (HCC): ICD-10-CM

## 2019-01-21 DIAGNOSIS — E78.00 PURE HYPERCHOLESTEROLEMIA: ICD-10-CM

## 2019-01-21 DIAGNOSIS — E11.22 TYPE 2 DIABETES MELLITUS WITH STAGE 3 CHRONIC KIDNEY DISEASE, WITH LONG-TERM CURRENT USE OF INSULIN (HCC): ICD-10-CM

## 2019-01-21 DIAGNOSIS — J45.51 SEVERE PERSISTENT ASTHMA WITH ACUTE EXACERBATION: ICD-10-CM

## 2019-01-21 DIAGNOSIS — N17.9 ACUTE RENAL FAILURE, UNSPECIFIED ACUTE RENAL FAILURE TYPE (HCC): ICD-10-CM

## 2019-01-21 LAB
A/G RATIO: 1.8 (ref 0.8–2)
ALBUMIN SERPL-MCNC: 4.4 G/DL (ref 3.4–4.8)
ALP BLD-CCNC: 65 U/L (ref 25–100)
ALT SERPL-CCNC: 54 U/L (ref 4–36)
ANION GAP SERPL CALCULATED.3IONS-SCNC: 15 MMOL/L (ref 3–16)
AST SERPL-CCNC: 47 U/L (ref 8–33)
BILIRUB SERPL-MCNC: 0.4 MG/DL (ref 0.3–1.2)
BUN BLDV-MCNC: 36 MG/DL (ref 6–20)
CALCIUM SERPL-MCNC: 9.5 MG/DL (ref 8.5–10.5)
CHLORIDE BLD-SCNC: 91 MMOL/L (ref 98–107)
CHOLESTEROL, TOTAL: 233 MG/DL (ref 0–200)
CO2: 34 MMOL/L (ref 20–30)
CREAT SERPL-MCNC: 1.7 MG/DL (ref 0.4–1.2)
GFR AFRICAN AMERICAN: 36
GFR NON-AFRICAN AMERICAN: 30
GLOBULIN: 2.5 G/DL
GLUCOSE BLD-MCNC: 217 MG/DL (ref 74–106)
HBA1C MFR BLD: 7.3 %
HCT VFR BLD CALC: 44.7 % (ref 37–47)
HDLC SERPL-MCNC: 31 MG/DL (ref 40–60)
HEMOGLOBIN: 14.1 G/DL (ref 11.5–16.5)
LDL CHOLESTEROL CALCULATED: ABNORMAL MG/DL
LDL CHOLESTEROL DIRECT: 136 MG/DL
MCH RBC QN AUTO: 32.6 PG (ref 27–32)
MCHC RBC AUTO-ENTMCNC: 31.5 G/DL (ref 31–35)
MCV RBC AUTO: 103.2 FL (ref 80–100)
PDW BLD-RTO: 13.7 % (ref 11–16)
PLATELET # BLD: 328 K/UL (ref 150–400)
PMV BLD AUTO: 11.5 FL (ref 6–10)
POTASSIUM SERPL-SCNC: 4.3 MMOL/L (ref 3.4–5.1)
RBC # BLD: 4.33 M/UL (ref 3.8–5.8)
SODIUM BLD-SCNC: 140 MMOL/L (ref 136–145)
TOTAL PROTEIN: 6.9 G/DL (ref 6.4–8.3)
TRIGL SERPL-MCNC: 507 MG/DL (ref 0–249)
VLDLC SERPL CALC-MCNC: ABNORMAL MG/DL
WBC # BLD: 10.3 K/UL (ref 4–11)

## 2019-01-21 PROCEDURE — 80061 LIPID PANEL: CPT

## 2019-01-21 PROCEDURE — 83036 HEMOGLOBIN GLYCOSYLATED A1C: CPT

## 2019-01-21 PROCEDURE — 80053 COMPREHEN METABOLIC PANEL: CPT

## 2019-01-21 PROCEDURE — 85027 COMPLETE CBC AUTOMATED: CPT

## 2019-01-22 RX ORDER — TIOTROPIUM BROMIDE 18 UG/1
CAPSULE ORAL; RESPIRATORY (INHALATION)
Qty: 30 CAPSULE | Refills: 2 | OUTPATIENT
Start: 2019-01-22

## 2019-01-22 RX ORDER — L. ACIDOPHILUS/PECTIN, CITRUS 25MM-100MG
TABLET ORAL
Qty: 60 TABLET | Refills: 1 | Status: SHIPPED | OUTPATIENT
Start: 2019-01-22 | End: 2019-03-04 | Stop reason: SDUPTHER

## 2019-01-24 ENCOUNTER — OFFICE VISIT (OUTPATIENT)
Dept: PRIMARY CARE CLINIC | Age: 69
End: 2019-01-24
Payer: MEDICARE

## 2019-01-24 VITALS
TEMPERATURE: 98.5 F | HEIGHT: 58 IN | SYSTOLIC BLOOD PRESSURE: 130 MMHG | BODY MASS INDEX: 42.02 KG/M2 | WEIGHT: 200.2 LBS | RESPIRATION RATE: 16 BRPM | OXYGEN SATURATION: 96 % | DIASTOLIC BLOOD PRESSURE: 62 MMHG | HEART RATE: 78 BPM

## 2019-01-24 DIAGNOSIS — Z79.4 TYPE 2 DIABETES MELLITUS WITH COMPLICATION, WITH LONG-TERM CURRENT USE OF INSULIN (HCC): Primary | ICD-10-CM

## 2019-01-24 DIAGNOSIS — Z79.4 TYPE 2 DIABETES MELLITUS WITH STAGE 3 CHRONIC KIDNEY DISEASE, WITH LONG-TERM CURRENT USE OF INSULIN (HCC): ICD-10-CM

## 2019-01-24 DIAGNOSIS — E66.01 MORBID OBESITY WITH BMI OF 40.0-44.9, ADULT (HCC): ICD-10-CM

## 2019-01-24 DIAGNOSIS — N18.30 CHRONIC KIDNEY DISEASE, STAGE III (MODERATE) (HCC): ICD-10-CM

## 2019-01-24 DIAGNOSIS — Z99.81 DEPENDENCE ON CONTINUOUS SUPPLEMENTAL OXYGEN: ICD-10-CM

## 2019-01-24 DIAGNOSIS — N18.30 TYPE 2 DIABETES MELLITUS WITH STAGE 3 CHRONIC KIDNEY DISEASE, WITH LONG-TERM CURRENT USE OF INSULIN (HCC): ICD-10-CM

## 2019-01-24 DIAGNOSIS — J42 CHRONIC BRONCHITIS, UNSPECIFIED CHRONIC BRONCHITIS TYPE (HCC): ICD-10-CM

## 2019-01-24 DIAGNOSIS — R09.02 HYPOXIA: ICD-10-CM

## 2019-01-24 DIAGNOSIS — E11.8 TYPE 2 DIABETES MELLITUS WITH COMPLICATION, WITH LONG-TERM CURRENT USE OF INSULIN (HCC): Primary | ICD-10-CM

## 2019-01-24 DIAGNOSIS — Z13.31 POSITIVE DEPRESSION SCREENING: ICD-10-CM

## 2019-01-24 DIAGNOSIS — E55.9 VITAMIN D DEFICIENCY: ICD-10-CM

## 2019-01-24 DIAGNOSIS — E78.00 PURE HYPERCHOLESTEROLEMIA: ICD-10-CM

## 2019-01-24 DIAGNOSIS — N18.30 CHRONIC RENAL FAILURE, STAGE 3 (MODERATE) (HCC): ICD-10-CM

## 2019-01-24 DIAGNOSIS — Z12.11 SCREENING FOR COLON CANCER: ICD-10-CM

## 2019-01-24 DIAGNOSIS — Z78.0 POST-MENOPAUSAL: ICD-10-CM

## 2019-01-24 DIAGNOSIS — E11.22 TYPE 2 DIABETES MELLITUS WITH STAGE 3 CHRONIC KIDNEY DISEASE, WITH LONG-TERM CURRENT USE OF INSULIN (HCC): ICD-10-CM

## 2019-01-24 PROBLEM — R25.2 CRAMP OF BOTH LOWER EXTREMITIES: Status: RESOLVED | Noted: 2017-02-22 | Resolved: 2019-01-24

## 2019-01-24 PROCEDURE — 1123F ACP DISCUSS/DSCN MKR DOCD: CPT | Performed by: NURSE PRACTITIONER

## 2019-01-24 PROCEDURE — 99214 OFFICE O/P EST MOD 30 MIN: CPT | Performed by: NURSE PRACTITIONER

## 2019-01-24 PROCEDURE — 3023F SPIROM DOC REV: CPT | Performed by: NURSE PRACTITIONER

## 2019-01-24 PROCEDURE — 1090F PRES/ABSN URINE INCON ASSESS: CPT | Performed by: NURSE PRACTITIONER

## 2019-01-24 PROCEDURE — 4040F PNEUMOC VAC/ADMIN/RCVD: CPT | Performed by: NURSE PRACTITIONER

## 2019-01-24 PROCEDURE — G8427 DOCREV CUR MEDS BY ELIG CLIN: HCPCS | Performed by: NURSE PRACTITIONER

## 2019-01-24 PROCEDURE — G8482 FLU IMMUNIZE ORDER/ADMIN: HCPCS | Performed by: NURSE PRACTITIONER

## 2019-01-24 PROCEDURE — G0444 DEPRESSION SCREEN ANNUAL: HCPCS | Performed by: NURSE PRACTITIONER

## 2019-01-24 PROCEDURE — G8417 CALC BMI ABV UP PARAM F/U: HCPCS | Performed by: NURSE PRACTITIONER

## 2019-01-24 PROCEDURE — G8926 SPIRO NO PERF OR DOC: HCPCS | Performed by: NURSE PRACTITIONER

## 2019-01-24 PROCEDURE — 96372 THER/PROPH/DIAG INJ SC/IM: CPT | Performed by: NURSE PRACTITIONER

## 2019-01-24 PROCEDURE — G8400 PT W/DXA NO RESULTS DOC: HCPCS | Performed by: NURSE PRACTITIONER

## 2019-01-24 PROCEDURE — 1036F TOBACCO NON-USER: CPT | Performed by: NURSE PRACTITIONER

## 2019-01-24 PROCEDURE — 3017F COLORECTAL CA SCREEN DOC REV: CPT | Performed by: NURSE PRACTITIONER

## 2019-01-24 PROCEDURE — 3045F PR MOST RECENT HEMOGLOBIN A1C LEVEL 7.0-9.0%: CPT | Performed by: NURSE PRACTITIONER

## 2019-01-24 PROCEDURE — 2022F DILAT RTA XM EVC RTNOPTHY: CPT | Performed by: NURSE PRACTITIONER

## 2019-01-24 PROCEDURE — G8431 POS CLIN DEPRES SCRN F/U DOC: HCPCS | Performed by: NURSE PRACTITIONER

## 2019-01-24 PROCEDURE — 1101F PT FALLS ASSESS-DOCD LE1/YR: CPT | Performed by: NURSE PRACTITIONER

## 2019-01-24 ASSESSMENT — PATIENT HEALTH QUESTIONNAIRE - PHQ9
5. POOR APPETITE OR OVEREATING: 1
SUM OF ALL RESPONSES TO PHQ9 QUESTIONS 1 & 2: 6
SUM OF ALL RESPONSES TO PHQ QUESTIONS 1-9: 19
1. LITTLE INTEREST OR PLEASURE IN DOING THINGS: 3
7. TROUBLE CONCENTRATING ON THINGS, SUCH AS READING THE NEWSPAPER OR WATCHING TELEVISION: 3
3. TROUBLE FALLING OR STAYING ASLEEP: 3
2. FEELING DOWN, DEPRESSED OR HOPELESS: 3
10. IF YOU CHECKED OFF ANY PROBLEMS, HOW DIFFICULT HAVE THESE PROBLEMS MADE IT FOR YOU TO DO YOUR WORK, TAKE CARE OF THINGS AT HOME, OR GET ALONG WITH OTHER PEOPLE: 2
6. FEELING BAD ABOUT YOURSELF - OR THAT YOU ARE A FAILURE OR HAVE LET YOURSELF OR YOUR FAMILY DOWN: 3
SUM OF ALL RESPONSES TO PHQ QUESTIONS 1-9: 19
9. THOUGHTS THAT YOU WOULD BE BETTER OFF DEAD, OR OF HURTING YOURSELF: 0
4. FEELING TIRED OR HAVING LITTLE ENERGY: 3
8. MOVING OR SPEAKING SO SLOWLY THAT OTHER PEOPLE COULD HAVE NOTICED. OR THE OPPOSITE, BEING SO FIGETY OR RESTLESS THAT YOU HAVE BEEN MOVING AROUND A LOT MORE THAN USUAL: 0

## 2019-01-24 ASSESSMENT — ENCOUNTER SYMPTOMS: GASTROINTESTINAL NEGATIVE: 1

## 2019-01-30 RX ORDER — SYRINGE AND NEEDLE,INSULIN,1ML 31 GX5/16"
SYRINGE, EMPTY DISPOSABLE MISCELLANEOUS
Qty: 100 EACH | Refills: 3 | Status: SHIPPED | OUTPATIENT
Start: 2019-01-30 | End: 2019-05-13 | Stop reason: SDUPTHER

## 2019-02-02 DIAGNOSIS — K21.9 GASTROESOPHAGEAL REFLUX DISEASE, ESOPHAGITIS PRESENCE NOT SPECIFIED: ICD-10-CM

## 2019-02-03 ASSESSMENT — ENCOUNTER SYMPTOMS
WHEEZING: 1
SHORTNESS OF BREATH: 1

## 2019-02-07 RX ORDER — ASPIRIN 81 MG/1
TABLET ORAL
Qty: 30 TABLET | Refills: 5 | Status: SHIPPED | OUTPATIENT
Start: 2019-02-07 | End: 2019-04-25 | Stop reason: SDUPTHER

## 2019-02-11 RX ORDER — EZETIMIBE 10 MG/1
TABLET ORAL
Qty: 90 TABLET | Refills: 3 | Status: SHIPPED | OUTPATIENT
Start: 2019-02-11 | End: 2020-03-27

## 2019-02-18 DIAGNOSIS — M54.41 CHRONIC RIGHT-SIDED LOW BACK PAIN WITH RIGHT-SIDED SCIATICA: ICD-10-CM

## 2019-02-18 DIAGNOSIS — G89.29 CHRONIC RIGHT-SIDED LOW BACK PAIN WITH RIGHT-SIDED SCIATICA: ICD-10-CM

## 2019-02-18 RX ORDER — HYDROCODONE BITARTRATE AND ACETAMINOPHEN 5; 325 MG/1; MG/1
TABLET ORAL
Qty: 30 TABLET | Refills: 0 | Status: SHIPPED | OUTPATIENT
Start: 2019-02-18 | End: 2019-03-18 | Stop reason: SDUPTHER

## 2019-02-19 DIAGNOSIS — J42 CHRONIC BRONCHITIS, UNSPECIFIED CHRONIC BRONCHITIS TYPE (HCC): ICD-10-CM

## 2019-02-19 RX ORDER — OLOPATADINE HCL 0.2 %
1 DROPS OPHTHALMIC (EYE) 2 TIMES DAILY
Qty: 2.5 ML | Refills: 3 | Status: SHIPPED | OUTPATIENT
Start: 2019-02-19 | End: 2019-06-14 | Stop reason: SDUPTHER

## 2019-02-21 ENCOUNTER — NURSE ONLY (OUTPATIENT)
Dept: PRIMARY CARE CLINIC | Age: 69
End: 2019-02-21
Payer: MEDICARE

## 2019-02-21 DIAGNOSIS — J45.40 MODERATE PERSISTENT ASTHMA WITHOUT COMPLICATION: Primary | ICD-10-CM

## 2019-02-21 PROCEDURE — 96372 THER/PROPH/DIAG INJ SC/IM: CPT | Performed by: NURSE PRACTITIONER

## 2019-03-04 DIAGNOSIS — J45.51 SEVERE PERSISTENT ASTHMA WITH ACUTE EXACERBATION: ICD-10-CM

## 2019-03-04 DIAGNOSIS — R19.7 DIARRHEA, UNSPECIFIED TYPE: ICD-10-CM

## 2019-03-04 RX ORDER — L. ACIDOPHILUS/PECTIN, CITRUS 25MM-100MG
TABLET ORAL
Qty: 60 TABLET | Refills: 1 | Status: SHIPPED | OUTPATIENT
Start: 2019-03-04 | End: 2019-05-14 | Stop reason: SDUPTHER

## 2019-03-05 ENCOUNTER — OFFICE VISIT (OUTPATIENT)
Dept: PULMONOLOGY | Facility: CLINIC | Age: 69
End: 2019-03-05

## 2019-03-05 VITALS
RESPIRATION RATE: 18 BRPM | WEIGHT: 198 LBS | HEIGHT: 58 IN | BODY MASS INDEX: 41.56 KG/M2 | HEART RATE: 76 BPM | DIASTOLIC BLOOD PRESSURE: 62 MMHG | SYSTOLIC BLOOD PRESSURE: 118 MMHG | OXYGEN SATURATION: 88 %

## 2019-03-05 DIAGNOSIS — J30.89 OTHER ALLERGIC RHINITIS: ICD-10-CM

## 2019-03-05 DIAGNOSIS — R06.02 SHORTNESS OF BREATH: Primary | ICD-10-CM

## 2019-03-05 DIAGNOSIS — R09.02 HYPOXIA: ICD-10-CM

## 2019-03-05 DIAGNOSIS — J44.9 CHRONIC OBSTRUCTIVE PULMONARY DISEASE, UNSPECIFIED COPD TYPE (HCC): ICD-10-CM

## 2019-03-05 DIAGNOSIS — J96.12 CHRONIC RESPIRATORY FAILURE WITH HYPERCAPNIA (HCC): ICD-10-CM

## 2019-03-05 DIAGNOSIS — E66.01 MORBID OBESITY (HCC): ICD-10-CM

## 2019-03-05 PROCEDURE — 99214 OFFICE O/P EST MOD 30 MIN: CPT | Performed by: INTERNAL MEDICINE

## 2019-03-05 RX ORDER — PREDNISONE 20 MG/1
TABLET ORAL
Refills: 0 | COMMUNITY
Start: 2019-01-04 | End: 2019-06-15 | Stop reason: HOSPADM

## 2019-03-05 NOTE — PROGRESS NOTES
"Chief Complaint   Patient presents with   • Follow-up   • Shortness of Breath   • Sleeping Problem         Subjective   Mingo Guidry is a 68 y.o. female.     History of Present Illness   Patient comes today for follow up of shortness of breath and COPD.     Patient says that her symptoms have been stable since the last clinic visit. she reports no recent exacerbations.     Patient is using medications, as prescribed. Exercise tolerance has also remained stable.     Quit 21 years ago.    Patient doesn't report any issues with the BiPAP device or mask.     Patient says that the compliance with the use of the equipment is good.     Patient says that her symptoms of tiredness, sleep disturbance & daytime sleepiness have been helped greatly with the use of PAP, as prescribed.     Patient says that she has been using her nasal sprays on a regular basis and describes no significant ongoing issues other than occasional congestion.       The following portions of the patient's history were reviewed and updated as appropriate: allergies, current medications, past family history, past medical history, past social history and past surgical history.    Review of Systems   Constitutional: Negative for chills and fever.   HENT: Positive for congestion and sore throat. Negative for sinus pressure.    Respiratory: Positive for cough and shortness of breath. Negative for wheezing.    Psychiatric/Behavioral: Negative for sleep disturbance.       Objective   Visit Vitals  /62   Pulse 76   Resp 18   Ht 147.3 cm (57.99\")   Wt 89.8 kg (198 lb)   LMP  (LMP Unknown)   SpO2 (!) 88% Comment: resting on room air   BMI 41.39 kg/m²   SpO2 93 % on 3 lpm     Physical Exam   Constitutional: She is oriented to person, place, and time. She appears well-developed and well-nourished.   HENT:   Head: Atraumatic.   Crowded oropharynx.   Dentures noted.    Eyes: EOM are normal.   Neck: Neck supple.   Cardiovascular: Normal rate and regular rhythm. "   Pulmonary/Chest: Effort normal. No respiratory distress.   Somewhat hyperresonant to percussion  Somewhat decreased A/E with out wheezing noted.   Musculoskeletal: She exhibits no edema.   Gait was normal.   Neurological: She is alert and oriented to person, place, and time.   Skin: Skin is warm and dry.   Psychiatric: She has a normal mood and affect.   Vitals reviewed.      Assessment/Plan   Mingo was seen today for follow-up, shortness of breath and sleeping problem.    Diagnoses and all orders for this visit:    Shortness of breath  -     Pulmonary Function Test; Future    Chronic obstructive pulmonary disease, unspecified COPD type (CMS/HCC)  -     Pulmonary Function Test; Future    Chronic respiratory failure with hypercapnia (CMS/HCC)    Morbid obesity (CMS/HCC)    Hypoxia    Other allergic rhinitis           Return in about 6 months (around 9/5/2019) for Recheck, PFT on day of F/Up, For Yadi.    DISCUSSION (if any):  Patient was advised to continue her nasal spray, especially given improvement in symptoms overall.    We have reviewed her pulmonary medications in great detail.    Any needed adjustments to her pulmonary medications, either for clinical or insurance coverage reasons, have been made and are reflected in the orders.    Compliance with medications stressed.     Side effects of prescribed medications discussed with the patient    Continue treatment with BiPAP at a pressure of 17/6, with nasal mask.    Patient seems to be compliant with PAP device, based on the available data and her account of improved symptoms.     Weight loss advised.    The patient was once again reminded to continue using the PAP device regularly, every night for atleast 4 hours.    The patient was advised to continue oxygen 24/7, since she has noticed improvement in some symptoms since using it as prescribed.    The patient says that she is up-to-date with influenza and pneumonia vaccinations.       Dictated utilizing  Kirk dictation.    This document was electronically signed by Ryder Dahl MD on 03/05/19 at 5:08 PM

## 2019-03-18 DIAGNOSIS — G89.29 CHRONIC RIGHT-SIDED LOW BACK PAIN WITH RIGHT-SIDED SCIATICA: ICD-10-CM

## 2019-03-18 DIAGNOSIS — J42 CHRONIC BRONCHITIS, UNSPECIFIED CHRONIC BRONCHITIS TYPE (HCC): ICD-10-CM

## 2019-03-18 DIAGNOSIS — M54.41 CHRONIC RIGHT-SIDED LOW BACK PAIN WITH RIGHT-SIDED SCIATICA: ICD-10-CM

## 2019-03-18 DIAGNOSIS — G89.29 CHRONIC BILATERAL LOW BACK PAIN WITHOUT SCIATICA: ICD-10-CM

## 2019-03-18 DIAGNOSIS — M54.50 CHRONIC BILATERAL LOW BACK PAIN WITHOUT SCIATICA: ICD-10-CM

## 2019-03-19 RX ORDER — MONTELUKAST SODIUM 10 MG/1
TABLET ORAL
Qty: 30 TABLET | Refills: 2 | Status: SHIPPED | OUTPATIENT
Start: 2019-03-19 | End: 2019-07-08 | Stop reason: SDUPTHER

## 2019-03-19 RX ORDER — SITAGLIPTIN 100 MG/1
TABLET, FILM COATED ORAL
Qty: 30 TABLET | Refills: 2 | Status: SHIPPED | OUTPATIENT
Start: 2019-03-19 | End: 2019-06-14 | Stop reason: SDUPTHER

## 2019-03-19 RX ORDER — HYDROCODONE BITARTRATE AND ACETAMINOPHEN 5; 325 MG/1; MG/1
TABLET ORAL
Qty: 30 TABLET | Refills: 0 | Status: SHIPPED | OUTPATIENT
Start: 2019-03-19 | End: 2019-04-18 | Stop reason: SDUPTHER

## 2019-03-19 RX ORDER — TRAMADOL HYDROCHLORIDE 50 MG/1
TABLET ORAL
Qty: 90 TABLET | Refills: 2 | Status: SHIPPED | OUTPATIENT
Start: 2019-03-19 | End: 2019-07-12 | Stop reason: SDUPTHER

## 2019-03-21 ENCOUNTER — NURSE ONLY (OUTPATIENT)
Dept: PRIMARY CARE CLINIC | Age: 69
End: 2019-03-21
Payer: MEDICARE

## 2019-03-21 DIAGNOSIS — M54.41 CHRONIC RIGHT-SIDED LOW BACK PAIN WITH RIGHT-SIDED SCIATICA: ICD-10-CM

## 2019-03-21 DIAGNOSIS — J42 CHRONIC BRONCHITIS, UNSPECIFIED CHRONIC BRONCHITIS TYPE (HCC): ICD-10-CM

## 2019-03-21 DIAGNOSIS — G89.29 CHRONIC RIGHT-SIDED LOW BACK PAIN WITH RIGHT-SIDED SCIATICA: ICD-10-CM

## 2019-03-21 PROCEDURE — 96372 THER/PROPH/DIAG INJ SC/IM: CPT | Performed by: NURSE PRACTITIONER

## 2019-03-22 RX ORDER — HYDROCODONE BITARTRATE AND ACETAMINOPHEN 5; 325 MG/1; MG/1
TABLET ORAL
Qty: 30 TABLET | Refills: 0 | Status: SHIPPED | OUTPATIENT
Start: 2019-03-22 | End: 2019-05-24 | Stop reason: SDUPTHER

## 2019-04-15 DIAGNOSIS — K21.9 GASTROESOPHAGEAL REFLUX DISEASE, ESOPHAGITIS PRESENCE NOT SPECIFIED: ICD-10-CM

## 2019-04-15 RX ORDER — METOCLOPRAMIDE 5 MG/1
TABLET ORAL
Qty: 90 TABLET | Refills: 3 | Status: SHIPPED | OUTPATIENT
Start: 2019-04-15 | End: 2019-09-21 | Stop reason: SDUPTHER

## 2019-04-15 RX ORDER — FERROUS SULFATE 325(65) MG
TABLET ORAL
Qty: 90 TABLET | Refills: 3 | Status: SHIPPED | OUTPATIENT
Start: 2019-04-15 | End: 2019-08-17 | Stop reason: SDUPTHER

## 2019-04-15 RX ORDER — SUCRALFATE 1 G/1
TABLET ORAL
Qty: 90 TABLET | Refills: 3 | Status: SHIPPED | OUTPATIENT
Start: 2019-04-15 | End: 2019-09-21 | Stop reason: SDUPTHER

## 2019-04-15 RX ORDER — FLUNISOLIDE 0.25 MG/ML
1 SOLUTION NASAL EVERY 12 HOURS
Qty: 25 ML | Refills: 5 | Status: SHIPPED | OUTPATIENT
Start: 2019-04-15 | End: 2020-01-01

## 2019-04-15 RX ORDER — FUROSEMIDE 40 MG/1
TABLET ORAL
Qty: 60 TABLET | Refills: 5 | Status: SHIPPED | OUTPATIENT
Start: 2019-04-15 | End: 2019-10-07 | Stop reason: SDUPTHER

## 2019-04-18 ENCOUNTER — NURSE ONLY (OUTPATIENT)
Dept: PRIMARY CARE CLINIC | Age: 69
End: 2019-04-18
Payer: MEDICARE

## 2019-04-18 DIAGNOSIS — J45.40 MODERATE PERSISTENT ASTHMA WITHOUT COMPLICATION: Primary | ICD-10-CM

## 2019-04-18 DIAGNOSIS — M54.41 CHRONIC RIGHT-SIDED LOW BACK PAIN WITH RIGHT-SIDED SCIATICA: ICD-10-CM

## 2019-04-18 DIAGNOSIS — G89.29 CHRONIC RIGHT-SIDED LOW BACK PAIN WITH RIGHT-SIDED SCIATICA: ICD-10-CM

## 2019-04-18 PROCEDURE — 96372 THER/PROPH/DIAG INJ SC/IM: CPT | Performed by: NURSE PRACTITIONER

## 2019-04-18 RX ORDER — HYDROCODONE BITARTRATE AND ACETAMINOPHEN 5; 325 MG/1; MG/1
TABLET ORAL
Qty: 30 TABLET | Refills: 0 | Status: SHIPPED | OUTPATIENT
Start: 2019-04-18 | End: 2019-05-18

## 2019-04-18 NOTE — PROGRESS NOTES
After obtaining consent, and per orders of Tessa Shay, injection of allergen extract, Xolair given in both arms by Eliud Sales. Patient instructed to remain in clinic for 20 minutes afterwards, and to report any adverse reaction to me immediately.

## 2019-04-23 ENCOUNTER — HOSPITAL ENCOUNTER (OUTPATIENT)
Facility: HOSPITAL | Age: 69
Discharge: HOME | End: 2019-04-23
Payer: MEDICARE

## 2019-04-23 DIAGNOSIS — Z79.4 TYPE 2 DIABETES MELLITUS WITH COMPLICATION, WITH LONG-TERM CURRENT USE OF INSULIN (HCC): ICD-10-CM

## 2019-04-23 DIAGNOSIS — E11.8 TYPE 2 DIABETES MELLITUS WITH COMPLICATION, WITH LONG-TERM CURRENT USE OF INSULIN (HCC): ICD-10-CM

## 2019-04-23 LAB
CREATININE URINE: 15.9 MG/DL (ref 1.5–300)
MICROALBUMIN UR-MCNC: <1.2 MG/DL (ref 0–22)
MICROALBUMIN/CREAT UR-RTO: NORMAL MG/G (ref 0–30)

## 2019-04-23 PROCEDURE — 82043 UR ALBUMIN QUANTITATIVE: CPT

## 2019-04-23 PROCEDURE — 82570 ASSAY OF URINE CREATININE: CPT

## 2019-04-24 ENCOUNTER — TELEPHONE (OUTPATIENT)
Dept: PRIMARY CARE CLINIC | Age: 69
End: 2019-04-24

## 2019-04-24 ENCOUNTER — HOSPITAL ENCOUNTER (OUTPATIENT)
Facility: HOSPITAL | Age: 69
Discharge: HOME | End: 2019-04-24
Payer: MEDICARE

## 2019-04-24 DIAGNOSIS — E78.00 PURE HYPERCHOLESTEROLEMIA: ICD-10-CM

## 2019-04-24 DIAGNOSIS — N18.30 CHRONIC RENAL FAILURE, STAGE 3 (MODERATE) (HCC): ICD-10-CM

## 2019-04-24 DIAGNOSIS — Z79.4 TYPE 2 DIABETES MELLITUS WITH COMPLICATION, WITH LONG-TERM CURRENT USE OF INSULIN (HCC): ICD-10-CM

## 2019-04-24 DIAGNOSIS — E55.9 VITAMIN D DEFICIENCY: ICD-10-CM

## 2019-04-24 DIAGNOSIS — E11.8 TYPE 2 DIABETES MELLITUS WITH COMPLICATION, WITH LONG-TERM CURRENT USE OF INSULIN (HCC): ICD-10-CM

## 2019-04-24 LAB
A/G RATIO: 1.6 (ref 0.8–2)
ALBUMIN SERPL-MCNC: 4.3 G/DL (ref 3.4–4.8)
ALP BLD-CCNC: 64 U/L (ref 25–100)
ALT SERPL-CCNC: 49 U/L (ref 4–36)
ANION GAP SERPL CALCULATED.3IONS-SCNC: 16 MMOL/L (ref 3–16)
AST SERPL-CCNC: 50 U/L (ref 8–33)
BILIRUB SERPL-MCNC: 0.4 MG/DL (ref 0.3–1.2)
BUN BLDV-MCNC: 60 MG/DL (ref 6–20)
CALCIUM SERPL-MCNC: 10 MG/DL (ref 8.5–10.5)
CHLORIDE BLD-SCNC: 89 MMOL/L (ref 98–107)
CHOLESTEROL, TOTAL: 225 MG/DL (ref 0–200)
CO2: 33 MMOL/L (ref 20–30)
CREAT SERPL-MCNC: 2 MG/DL (ref 0.4–1.2)
GFR AFRICAN AMERICAN: 30
GFR NON-AFRICAN AMERICAN: 25
GLOBULIN: 2.7 G/DL
GLUCOSE BLD-MCNC: 292 MG/DL (ref 74–106)
HBA1C MFR BLD: 10.1 %
HCT VFR BLD CALC: 45.4 % (ref 37–47)
HDLC SERPL-MCNC: 23 MG/DL (ref 40–60)
HEMOGLOBIN: 14.7 G/DL (ref 11.5–16.5)
LDL CHOLESTEROL CALCULATED: ABNORMAL MG/DL
LDL CHOLESTEROL DIRECT: 84 MG/DL
MCH RBC QN AUTO: 32.5 PG (ref 27–32)
MCHC RBC AUTO-ENTMCNC: 32.4 G/DL (ref 31–35)
MCV RBC AUTO: 100.2 FL (ref 80–100)
PDW BLD-RTO: 14 % (ref 11–16)
PLATELET # BLD: 306 K/UL (ref 150–400)
PMV BLD AUTO: 11.6 FL (ref 6–10)
POTASSIUM SERPL-SCNC: 3.7 MMOL/L (ref 3.4–5.1)
RBC # BLD: 4.53 M/UL (ref 3.8–5.8)
SODIUM BLD-SCNC: 138 MMOL/L (ref 136–145)
TOTAL PROTEIN: 7 G/DL (ref 6.4–8.3)
TRIGL SERPL-MCNC: 801 MG/DL (ref 0–249)
TSH SERPL DL<=0.05 MIU/L-ACNC: 3.49 UIU/ML (ref 0.35–5.5)
VITAMIN D 25-HYDROXY: 18.2 (ref 32–100)
VLDLC SERPL CALC-MCNC: ABNORMAL MG/DL
WBC # BLD: 11 K/UL (ref 4–11)

## 2019-04-24 PROCEDURE — 80053 COMPREHEN METABOLIC PANEL: CPT

## 2019-04-24 PROCEDURE — 80061 LIPID PANEL: CPT

## 2019-04-24 PROCEDURE — 82306 VITAMIN D 25 HYDROXY: CPT

## 2019-04-24 PROCEDURE — 84443 ASSAY THYROID STIM HORMONE: CPT

## 2019-04-24 PROCEDURE — 36415 COLL VENOUS BLD VENIPUNCTURE: CPT

## 2019-04-24 PROCEDURE — 85027 COMPLETE CBC AUTOMATED: CPT

## 2019-04-24 PROCEDURE — 83036 HEMOGLOBIN GLYCOSYLATED A1C: CPT

## 2019-04-25 ENCOUNTER — OFFICE VISIT (OUTPATIENT)
Dept: PRIMARY CARE CLINIC | Age: 69
End: 2019-04-25
Payer: MEDICARE

## 2019-04-25 VITALS
SYSTOLIC BLOOD PRESSURE: 122 MMHG | BODY MASS INDEX: 41.9 KG/M2 | HEIGHT: 58 IN | HEART RATE: 74 BPM | DIASTOLIC BLOOD PRESSURE: 60 MMHG | TEMPERATURE: 98.6 F | WEIGHT: 199.6 LBS | RESPIRATION RATE: 16 BRPM | OXYGEN SATURATION: 95 %

## 2019-04-25 DIAGNOSIS — J42 CHRONIC BRONCHITIS, UNSPECIFIED CHRONIC BRONCHITIS TYPE (HCC): ICD-10-CM

## 2019-04-25 DIAGNOSIS — I10 ESSENTIAL HYPERTENSION: ICD-10-CM

## 2019-04-25 DIAGNOSIS — E11.8 TYPE 2 DIABETES MELLITUS WITH COMPLICATION, WITH LONG-TERM CURRENT USE OF INSULIN (HCC): Primary | ICD-10-CM

## 2019-04-25 DIAGNOSIS — Z79.4 TYPE 2 DIABETES MELLITUS WITH COMPLICATION, WITH LONG-TERM CURRENT USE OF INSULIN (HCC): Primary | ICD-10-CM

## 2019-04-25 DIAGNOSIS — E78.00 PURE HYPERCHOLESTEROLEMIA: ICD-10-CM

## 2019-04-25 PROCEDURE — 3023F SPIROM DOC REV: CPT | Performed by: NURSE PRACTITIONER

## 2019-04-25 PROCEDURE — 4040F PNEUMOC VAC/ADMIN/RCVD: CPT | Performed by: NURSE PRACTITIONER

## 2019-04-25 PROCEDURE — G8417 CALC BMI ABV UP PARAM F/U: HCPCS | Performed by: NURSE PRACTITIONER

## 2019-04-25 PROCEDURE — 1123F ACP DISCUSS/DSCN MKR DOCD: CPT | Performed by: NURSE PRACTITIONER

## 2019-04-25 PROCEDURE — 3017F COLORECTAL CA SCREEN DOC REV: CPT | Performed by: NURSE PRACTITIONER

## 2019-04-25 PROCEDURE — 2022F DILAT RTA XM EVC RTNOPTHY: CPT | Performed by: NURSE PRACTITIONER

## 2019-04-25 PROCEDURE — 3046F HEMOGLOBIN A1C LEVEL >9.0%: CPT | Performed by: NURSE PRACTITIONER

## 2019-04-25 PROCEDURE — G8926 SPIRO NO PERF OR DOC: HCPCS | Performed by: NURSE PRACTITIONER

## 2019-04-25 PROCEDURE — 1036F TOBACCO NON-USER: CPT | Performed by: NURSE PRACTITIONER

## 2019-04-25 PROCEDURE — G8427 DOCREV CUR MEDS BY ELIG CLIN: HCPCS | Performed by: NURSE PRACTITIONER

## 2019-04-25 PROCEDURE — G8400 PT W/DXA NO RESULTS DOC: HCPCS | Performed by: NURSE PRACTITIONER

## 2019-04-25 PROCEDURE — 99214 OFFICE O/P EST MOD 30 MIN: CPT | Performed by: NURSE PRACTITIONER

## 2019-04-25 PROCEDURE — 1090F PRES/ABSN URINE INCON ASSESS: CPT | Performed by: NURSE PRACTITIONER

## 2019-04-25 RX ORDER — ICOSAPENT ETHYL 1000 MG/1
2 CAPSULE ORAL 2 TIMES DAILY
Qty: 60 CAPSULE | Refills: 3 | Status: SHIPPED | OUTPATIENT
Start: 2019-04-25 | End: 2019-06-20 | Stop reason: SDUPTHER

## 2019-04-25 ASSESSMENT — ENCOUNTER SYMPTOMS: GASTROINTESTINAL NEGATIVE: 1

## 2019-04-25 NOTE — PROGRESS NOTES
Patient counseled on need for colon cancer screening and procedure options. At this time patient refuses both Colonoscopy and Fit Testing due to already having a fit test at home and has not finished yet. . Provider notified.

## 2019-04-25 NOTE — PROGRESS NOTES
injection 150 mg  150 mg Subcutaneous Q28 Days Jearlean Lair, APRN   150 mg at 09/20/18 1429    omalizumab Loura [place]) injection 150 mg  150 mg Subcutaneous Q28 Days Jearlean Lair, APRN   150 mg at 09/20/18 1428    omalizumab Loura [place]) injection 150 mg  150 mg Subcutaneous Q28 Days Jearlean Lair, APRN   150 mg at 08/20/18 1306    omalizumab Loura Marsha) injection 150 mg  150 mg Subcutaneous Q28 Days Jearlean Lair, APRN   150 mg at 08/20/18 1305    omalizumab Loura Marsha) injection 150 mg  150 mg Subcutaneous Q28 Days Jearlean Lair, APRN   150 mg at 07/23/18 0176    omalizumab Loura Marsha) injection 150 mg  150 mg Subcutaneous Q28 Days Jearlean Lair, APRN   150 mg at 07/23/18 1573    omalizumab Loura Marsha) injection 150 mg  150 mg Subcutaneous Q28 Days Denise Arcola, APRN   150 mg at 05/25/18 1405    omalizumab Loura [place]) injection 150 mg  150 mg Subcutaneous Q28 Days Denise Arcola, APRN   150 mg at 05/25/18 1405    omalizumab Loura Marsha) injection 150 mg  150 mg Subcutaneous Q28 Days Jearlean Lair, APRN   150 mg at 03/29/18 1433    omalizumab Loura [place]) injection 150 mg  150 mg Subcutaneous Q28 Days Jearlean Lair, APRN   150 mg at 03/29/18 1432    omalizumab Loura Marsha) injection 150 mg  150 mg Subcutaneous Q28 Days Jearlean Lair, APRN   150 mg at 02/23/18 1431    omalizumab Loura [place]) injection 150 mg  150 mg Subcutaneous Q28 Days Jearlean Lair, APRN   150 mg at 02/23/18 1430        Review of Systems   Constitutional: Positive for fatigue. HENT: Negative. Respiratory: Positive for shortness of breath. Cardiovascular: Negative. Gastrointestinal: Negative. Genitourinary: Negative. Musculoskeletal: Negative. Skin: Negative. Neurological: Negative. Psychiatric/Behavioral: Negative.         OBJECTIVE:  /60 (Site: Right Upper Arm, Position: Sitting, Cuff Size: Medium Adult)   Pulse 74   Temp 98.6 °F (37 °C) (Oral)   Resp 16   Ht 4' 10\" (1.473 m)   Wt 199 lb 9.6 oz (90.5 kg)   SpO2 95% Comment: 2 liters  BMI 41.72 kg/m²    Physical Exam   Constitutional: She is oriented to person, place, and time. She appears well-developed and well-nourished. HENT:   Head: Normocephalic and atraumatic. Right Ear: External ear normal.   Left Ear: External ear normal.   Nose: Nose normal.   Eyes: Pupils are equal, round, and reactive to light. Conjunctivae and EOM are normal.   Neck: Normal range of motion. Neck supple. No JVD present. No thyromegaly present. Cardiovascular: Normal rate, regular rhythm and normal heart sounds. Exam reveals no gallop and no friction rub. No murmur heard. Pulmonary/Chest: Effort normal. No respiratory distress. She has decreased breath sounds. She has no wheezes. She has no rales. Abdominal: Soft. Bowel sounds are normal. She exhibits no distension. There is no tenderness. There is no guarding. Genitourinary: No breast tenderness, discharge or bleeding. Musculoskeletal: She exhibits no edema or tenderness. Lymphadenopathy:        Right: No inguinal adenopathy present. Left: No inguinal adenopathy present. Neurological: She is alert and oriented to person, place, and time. She has normal reflexes. Skin: Skin is warm and dry. No rash noted. No erythema. Psychiatric: She has a normal mood and affect. Judgment normal.   Nursing note and vitals reviewed.       Results in Past 30 Days  Result Component Current Result Ref Range Previous Result Ref Range   Alb 4.3 (4/24/2019) 3.4 - 4.8 g/dL Not in Time Range    Albumin/Globulin Ratio 1.6 (4/24/2019) 0.8 - 2.0 Not in Time Range    Alkaline Phosphatase 64 (4/24/2019) 25 - 100 U/L Not in Time Range    ALT 49 (H) (4/24/2019) 4 - 36 U/L Not in Time Range    AST 50 (H) (4/24/2019) 8 - 33 U/L Not in Time Range    BUN 60 (H) (4/24/2019) 6 - 20 mg/dL Not in Time Range    Calcium 10.0 (4/24/2019) 8.5 - 10.5 mg/dL Not in Time Range    Chloride 89 (L) (4/24/2019) 98 - 107 mmol/L Not in Time Range    CO2 33 (H) (4/24/2019) 20 - 30 mmol/L Not in Time Range    CREATININE 2.0 (H) (4/24/2019) 0.4 - 1.2 mg/dL Not in Time Range    GFR  30 (L) (4/24/2019) >59 Not in Time Range    GFR Non- 25 (L) (4/24/2019) >59 Not in Time Range    Globulin 2.7 (4/24/2019) g/dL Not in Time Range    Glucose 292 (H) (4/24/2019) 74 - 106 mg/dL Not in Time Range    Potassium 3.7 (4/24/2019) 3.4 - 5.1 mmol/L Not in Time Range    Sodium 138 (4/24/2019) 136 - 145 mmol/L Not in Time Range    Total Bilirubin 0.4 (4/24/2019) 0.3 - 1.2 mg/dL Not in Time Range    Total Protein 7.0 (4/24/2019) 6.4 - 8.3 g/dL Not in Time Range        Hemoglobin A1C (%)   Date Value   04/24/2019 10.1 (H)     Microalbumin, Random Urine (mg/dL)   Date Value   04/23/2019 <1.20     LDL Calculated (mg/dL)   Date Value   04/24/2019 see below         Lab Results   Component Value Date    WBC 11.0 04/24/2019    NEUTROABS 10.4 01/04/2018    HGB 14.7 04/24/2019    HCT 45.4 04/24/2019    .2 04/24/2019     04/24/2019       Lab Results   Component Value Date    TSH 3.49 04/24/2019         ASSESSMENT/PLAN:     Judy Diaz was seen today for 3 month follow-up, diabetes, hypertension, hyperlipidemia and discuss labs. Diagnoses and all orders for this visit:    Type 2 diabetes mellitus with complication, with long-term current use of insulin ([facility])  -      DIABETES FOOT EXAM  -     Insulin Degludec (TRESIBA FLEXTOUCH) 100 UNIT/ML SOPN; 20 units in the am and 75 units in pm  -     External Referral To Ophthalmology    Pure hypercholesterolemia  -     Icosapent Ethyl (VASCEPA) 1 g CAPS capsule; Take 2 capsules by mouth 2 times daily    Chronic bronchitis, unspecified chronic bronchitis type ([address]  Continue oxygen, continue inhalers. Essential hypertension  Continue current medications.     Medications Discontinued During This Encounter   Medication Reason    vitamin D-3 (CHOLECALCIFEROL) 5000 units TABS Alternate therapy    ASPIR-LOW 81 MG EC

## 2019-04-25 NOTE — PROGRESS NOTES
Chief Complaint   Patient presents with    3 Month Follow-Up    Diabetes    Hypertension    Hyperlipidemia    Discuss Labs           Have you seen any other physician or provider since your last visit no    Have you had any other diagnostic tests since your last visit? yes - labs    Have you changed or stopped any medications since your last visit? no   Goals      Exercise 3x per week (30 min per time)      Reduce calorie intake to 1800 calories per day      Reduce fat intake       Weight < 125 lb (56.7 kg)       Patient is here to follow up on labs for diabetes and hyperlipidemia. Patient is having a lot of cramping in her legs and feet and her back pain is no better. Patient was advised to get a Lift Chair from [employer]. Patient has been trying to eat better.  Her sugar was 274 this morning before breakfast.

## 2019-04-26 ENCOUNTER — NURSE ONLY (OUTPATIENT)
Dept: PRIMARY CARE CLINIC | Age: 69
End: 2019-04-26
Payer: MEDICARE

## 2019-04-26 DIAGNOSIS — Z12.11 SCREENING FOR COLON CANCER: ICD-10-CM

## 2019-04-26 DIAGNOSIS — Z12.11 COLON CANCER SCREENING: Primary | ICD-10-CM

## 2019-04-26 LAB
CONTROL: ABNORMAL
HEMOCCULT STL QL: ABNORMAL

## 2019-04-26 PROCEDURE — 82274 ASSAY TEST FOR BLOOD FECAL: CPT | Performed by: NURSE PRACTITIONER

## 2019-04-29 ENCOUNTER — TELEPHONE (OUTPATIENT)
Dept: PRIMARY CARE CLINIC | Age: 69
End: 2019-04-29

## 2019-04-29 DIAGNOSIS — M1A.9XX0 CHRONIC GOUT WITHOUT TOPHUS, UNSPECIFIED CAUSE, UNSPECIFIED SITE: ICD-10-CM

## 2019-04-29 DIAGNOSIS — R19.5 POSITIVE FIT (FECAL IMMUNOCHEMICAL TEST): Primary | ICD-10-CM

## 2019-04-29 RX ORDER — ALLOPURINOL 300 MG/1
TABLET ORAL
Qty: 30 TABLET | Refills: 5 | Status: SHIPPED | OUTPATIENT
Start: 2019-04-29 | End: 2020-02-13

## 2019-04-29 NOTE — TELEPHONE ENCOUNTER
----- Message from JADE Bah sent at 4/26/2019  4:31 PM EDT -----  Fit test positive, needs colonoscopy.

## 2019-05-05 ASSESSMENT — ENCOUNTER SYMPTOMS: SHORTNESS OF BREATH: 1

## 2019-05-07 ENCOUNTER — TELEPHONE (OUTPATIENT)
Dept: PRIMARY CARE CLINIC | Age: 69
End: 2019-05-07

## 2019-05-07 NOTE — TELEPHONE ENCOUNTER
[facility] called asking if you had any med changes regarding pts recent abnormal lab results (A1C 10.1, Triglycerides 801)

## 2019-05-09 NOTE — TELEPHONE ENCOUNTER
Trying to get Chris Cuevas approved for the patient.   She is to increase insulin by 10 units  This was discussed with the patient

## 2019-05-13 DIAGNOSIS — E78.00 PURE HYPERCHOLESTEROLEMIA: ICD-10-CM

## 2019-05-13 DIAGNOSIS — M1A.9XX0 CHRONIC GOUT WITHOUT TOPHUS, UNSPECIFIED CAUSE, UNSPECIFIED SITE: ICD-10-CM

## 2019-05-13 DIAGNOSIS — Z79.4 TYPE 2 DIABETES MELLITUS WITH COMPLICATION, WITH LONG-TERM CURRENT USE OF INSULIN (HCC): Primary | ICD-10-CM

## 2019-05-13 DIAGNOSIS — E11.8 TYPE 2 DIABETES MELLITUS WITH COMPLICATION, WITH LONG-TERM CURRENT USE OF INSULIN (HCC): Primary | ICD-10-CM

## 2019-05-14 DIAGNOSIS — R19.7 DIARRHEA, UNSPECIFIED TYPE: ICD-10-CM

## 2019-05-14 DIAGNOSIS — E78.00 PURE HYPERCHOLESTEROLEMIA: ICD-10-CM

## 2019-05-14 DIAGNOSIS — J45.51 SEVERE PERSISTENT ASTHMA WITH ACUTE EXACERBATION: ICD-10-CM

## 2019-05-15 RX ORDER — FENOFIBRATE 145 MG/1
TABLET, COATED ORAL
Qty: 30 TABLET | Refills: 3 | Status: SHIPPED | OUTPATIENT
Start: 2019-05-15 | End: 2019-08-05 | Stop reason: SDUPTHER

## 2019-05-15 RX ORDER — SYRINGE AND NEEDLE,INSULIN,1ML 31 GX5/16"
SYRINGE, EMPTY DISPOSABLE MISCELLANEOUS
Qty: 100 EACH | Refills: 3 | Status: SHIPPED | OUTPATIENT
Start: 2019-05-15 | End: 2019-08-29

## 2019-05-15 RX ORDER — L. ACIDOPHILUS/PECTIN, CITRUS 25MM-100MG
TABLET ORAL
Qty: 60 TABLET | Refills: 1 | Status: SHIPPED | OUTPATIENT
Start: 2019-05-15 | End: 2019-07-22 | Stop reason: SDUPTHER

## 2019-05-15 RX ORDER — BLOOD SUGAR DIAGNOSTIC
1 STRIP MISCELLANEOUS 4 TIMES DAILY
Qty: 150 EACH | Refills: 5 | Status: SHIPPED | OUTPATIENT
Start: 2019-05-15 | End: 2019-08-29

## 2019-05-15 RX ORDER — FENOFIBRATE 145 MG/1
TABLET, COATED ORAL
Qty: 30 TABLET | Refills: 3 | OUTPATIENT
Start: 2019-05-15

## 2019-05-16 ENCOUNTER — OFFICE VISIT (OUTPATIENT)
Dept: SURGERY | Facility: CLINIC | Age: 69
End: 2019-05-16

## 2019-05-16 VITALS
HEART RATE: 92 BPM | HEIGHT: 58 IN | TEMPERATURE: 98 F | DIASTOLIC BLOOD PRESSURE: 41 MMHG | SYSTOLIC BLOOD PRESSURE: 106 MMHG | BODY MASS INDEX: 41.58 KG/M2 | WEIGHT: 198.1 LBS | OXYGEN SATURATION: 78 %

## 2019-05-16 DIAGNOSIS — R19.5 HEME POSITIVE STOOL: Primary | ICD-10-CM

## 2019-05-16 DIAGNOSIS — R10.13 EPIGASTRIC PAIN: ICD-10-CM

## 2019-05-16 DIAGNOSIS — K21.9 GASTROESOPHAGEAL REFLUX DISEASE, ESOPHAGITIS PRESENCE NOT SPECIFIED: ICD-10-CM

## 2019-05-16 PROCEDURE — 99204 OFFICE O/P NEW MOD 45 MIN: CPT | Performed by: SURGERY

## 2019-05-16 RX ORDER — POLYETHYLENE GLYCOL 3350 17 G/17G
238 POWDER, FOR SOLUTION ORAL ONCE
Qty: 238 G | Refills: 0 | Status: SHIPPED | OUTPATIENT
Start: 2019-05-16 | End: 2019-05-17 | Stop reason: SDUPTHER

## 2019-05-16 RX ORDER — ICOSAPENT ETHYL 1000 MG/1
2 CAPSULE ORAL 2 TIMES DAILY WITH MEALS
COMMUNITY
Start: 2019-05-08 | End: 2020-01-01 | Stop reason: ALTCHOICE

## 2019-05-16 RX ORDER — BISACODYL 5 MG/1
5 TABLET, DELAYED RELEASE ORAL DAILY PRN
Qty: 4 TABLET | Refills: 0 | Status: SHIPPED | OUTPATIENT
Start: 2019-05-16 | End: 2019-05-17

## 2019-05-16 NOTE — PROGRESS NOTES
Patient: Mingo Guidry    YOB: 1950    Date: 05/16/2019    Primary Care Provider: Luz Prater APRN    Chief Complaint   Patient presents with   • Rectal Bleeding       SUBJECTIVE:    History of present illness:  I saw the patient in the office today as a consultation for evaluation and treatment of a positive hemoccult test.  The patient has never seen blood in the stool.  She denies  abdominal pain but does have constipation alternating with diarrhea.  Her last colonoscopy was 3-4 years ago with polypectomy.  She denies family history of colon cancer.  Patient also does have significant reflux symptoms, is not on a PPI medication.  Has history of gastroparesis, on Reglan with some improvement.  Last EGD over 5 years ago.    The following portions of the patient's history were reviewed and updated as appropriate: allergies, current medications, past family history, past medical history, past social history, past surgical history and problem list.      Review of Systems   Constitutional: Negative for chills, fever and unexpected weight change.   HENT: Negative for hearing loss, trouble swallowing and voice change.    Eyes: Negative for visual disturbance.   Respiratory: Negative for apnea, cough, chest tightness, shortness of breath and wheezing.    Cardiovascular: Negative for chest pain, palpitations and leg swelling.   Gastrointestinal: Positive for blood in stool, constipation and diarrhea. Negative for abdominal distention, abdominal pain, anal bleeding, nausea, rectal pain and vomiting.   Endocrine: Negative for cold intolerance and heat intolerance.   Genitourinary: Negative for difficulty urinating, dysuria and flank pain.   Musculoskeletal: Negative for back pain and gait problem.   Skin: Negative for color change, rash and wound.   Neurological: Negative for dizziness, syncope, speech difficulty, weakness, light-headedness, numbness and headaches.   Hematological: Negative for  adenopathy. Does not bruise/bleed easily.   Psychiatric/Behavioral: Negative for confusion. The patient is not nervous/anxious.        History:  Past Medical History:   Diagnosis Date   • Acid reflux 10/17/2016   • Asthma     bronchial   • Cancer (CMS/[facility])     uterine   • Chronic diastolic heart failure (CMS/[facility]) 10/17/2016    Normal dobutamine echocardiogram stress, 2005. Echocardiogram on 2009:  EF 50% to 55%. Left atrium 3.5 cm.   • Chronic obstructive pulmonary disease (CMS/[facility]) 10/17/2016    asthmatic bronchitis, on O2 use chronically.     • Gout 10/17/2016   • High cholesterol 2018   • Hypertension 10/17/2016    Benign hypertension.   • Lower extremity edema 10/17/2016    Chronic lower extremity edema/venous insufficiency.   • Murmur, heart    • Obesity 10/17/2016   • Seasonal allergies 10/17/2016   • Type 2 diabetes mellitus (CMS/[facility]) 10/17/2016    insulin dependent.       Past Surgical History:   Procedure Laterality Date   • BRONCHOSCOPY N/A 2018    Procedure: BRONCHOSCOPY WITH WASHINGS;  Surgeon: Ryder Dahl MD;  Location: [facility];  Service:    •  SECTION     • CYSTECTOMY Right     neck   • DILATATION AND CURETTAGE     • EYE SURGERY Bilateral     cataract   • HYSTERECTOMY     • LUNG SURGERY Left 1997 tumors removed from left lung-benign   • TONSILLECTOMY AND ADENOIDECTOMY         Family History   Problem Relation Age of Onset   • Heart disease Mother    • Heart disease Father    • Leukemia Father    • Diabetes Sister    • Diabetes Brother    • COPD Sister        Social History     Tobacco Use   • Smoking status: Former Smoker     Packs/day: 1.50     Years: 35.00     Pack years: 52.50     Types: Cigarettes     Last attempt to quit:      Years since quittin.3   • Smokeless tobacco: Never Used   Substance Use Topics   • Alcohol use: No   • Drug use: No       Medications:   Current Outpatient Medications:   •  ADVAIR DISKUS 250-50 MCG/DOSE DISKUS, Inhale  1 puff 2 (Two) Times a Day., Disp: 60 each, Rfl: 5  •  allopurinol (ZYLOPRIM) 300 MG tablet, Take 300 mg by mouth Daily. 1/2 tablet daily, Disp: , Rfl:   •  ASPIR-LOW 81 MG EC tablet, Take 81 mg by mouth Daily., Disp: , Rfl: 1  •  Blood Glucose Monitoring Suppl device, 1 each., Disp: , Rfl:   •  busPIRone (BUSPAR) 10 MG tablet, Take 10 mg by mouth 3 (Three) Times a Day., Disp: , Rfl:   •  Cholecalciferol (VITAMIN D-3) 5000 units tablet, Take 5,000 Units by mouth., Disp: , Rfl:   •  ezetimibe (ZETIA) 10 MG tablet, Take 10 mg by mouth Daily., Disp: , Rfl:   •  fenofibrate (TRICOR) 145 MG tablet, Take 145 mg by mouth Daily., Disp: , Rfl:   •  ferrous sulfate 325 (65 FE) MG tablet, Take 325 mg by mouth 3 (Three) Times a Day With Meals., Disp: , Rfl:   •  flunisolide (NASALIDE) 25 MCG/ACT (0.025%) solution nasal spray, INHALE 1 SPRAY EVERY 12 (TWELVE) HOURS., Disp: 25 mL, Rfl: 5  •  furosemide (LASIX) 40 MG tablet, Take 40 mg by mouth 2 (Two) Times a Day., Disp: , Rfl:   •  HUMALOG 100 UNIT/ML injection, Inject 35 Units under the skin into the appropriate area as directed 3 (Three) Times a Day., Disp: , Rfl:   •  HYDROcodone-acetaminophen (NORCO) 5-325 MG per tablet, Take 1 tablet by mouth Daily., Disp: , Rfl:   •  ipratropium-albuterol (DUO-NEB) 0.5-2.5 mg/3 ml nebulizer, Take 3 mL by nebulization 4 (Four) Times a Day., Disp: 360 mL, Rfl: 2  •  Lactobacillus Acid-Pectin (ACIDOPHILUS/CITRUS PECTIN) tablet tablet, Take 1 tablet by mouth 2 (Two) Times a Day., Disp: , Rfl: 1  •  Loratadine (CLARITIN) 10 MG capsule, Take 1 tablet by mouth Daily., Disp: , Rfl:   •  Magnesium 400 MG capsule, Take  by mouth., Disp: , Rfl:   •  metoclopramide (REGLAN) 5 MG tablet, Take 5 mg by mouth 3 (Three) Times a Day., Disp: , Rfl:   •  metolazone (ZAROXOLYN) 5 MG tablet, Take 5 mg by mouth Daily As Needed., Disp: , Rfl:   •  metoprolol tartrate (LOPRESSOR) 25 MG tablet, Take 25 mg by mouth 2 (Two) Times a Day., Disp: , Rfl:   •  mometasone  "(NASONEX) 50 MCG/ACT nasal spray, 2 sprays into the nostril(s) as directed by provider Daily., Disp: , Rfl:   •  montelukast (SINGULAIR) 10 MG tablet, Take 10 mg by mouth Every Night., Disp: , Rfl:   •  O2 (OXYGEN), Inhale 3 L/min Daily., Disp: , Rfl:   •  omalizumab (XOLAIR) 150 MG injection, Inject 150 mg under the skin into the appropriate area as directed 1 (One) Time. 2 injections once a month, Disp: , Rfl:   •  OXYGEN-HELIUM IN, Inhale 3 L/min., Disp: , Rfl:   •  PATADAY 0.2 % solution ophthalmic solution, Administer  to both eyes 2 (Two) Times a Day., Disp: , Rfl: 3  •  potassium chloride (K-DUR,KLOR-CON) 20 MEQ CR tablet, Take 20 mEq by mouth Daily., Disp: , Rfl: 2  •  predniSONE (DELTASONE) 20 MG tablet, TAKE TWO TABLETS BY MOUTH EVERY DAY FOR 5 DAYS, Disp: , Rfl: 0  •  PROAIR  (90 Base) MCG/ACT inhaler, INHALE 2 PUFFS EVERY 4 (FOUR) HOURS AS NEEDED FOR WHEEZING OR SHORTNESS OF AIR., Disp: 8.5 g, Rfl: 5  •  raNITIdine (ZANTAC) 300 MG tablet, Take 300 mg by mouth Daily., Disp: , Rfl:   •  sitaGLIPtin (JANUVIA) 100 MG tablet, Take 100 mg by mouth Daily. 1/2 tablet daily, Disp: , Rfl:   •  sucralfate (CARAFATE) 1 G tablet, Take 1 g by mouth 3 (Three) Times a Day., Disp: , Rfl:   •  tiotropium (SPIRIVA) 18 MCG per inhalation capsule, Place 1 capsule into inhaler and inhale Daily., Disp: 30 capsule, Rfl: 4  •  traMADol (ULTRAM) 50 MG tablet, Take 50 mg by mouth Every 6 (Six) Hours As Needed for moderate pain (4-6)., Disp: , Rfl:   •  TRESIBA FLEXTOUCH 100 UNIT/ML solution pen-injector injection, INJECT 15 UNITS EVERY MORNING AND 75 UNITS EVERY IN THE EVENING, Disp: , Rfl: 10  •  TRUE METRIX BLOOD GLUCOSE TEST test strip, , Disp: , Rfl: 3  •  ULTICARE INSULIN SYRINGE 31G X 5/16\" 1 ML misc, , Disp: , Rfl: 3  •  VASCEPA 1 g capsule capsule, , Disp: , Rfl:   •  vitamin D (ERGOCALCIFEROL) 84472 units capsule capsule, Take 50,000 Units by mouth 1 (One) Time Per Week., Disp: , Rfl:   •  bisacodyl (DULCOLAX) 5 MG " "EC tablet, Take 1 tablet by mouth Daily As Needed for Constipation for up to 1 day. Take as directed for colonoscopy prep, Disp: 4 tablet, Rfl: 0  •  polyethylene glycol (GLYCOLAX) powder, Take 238 g by mouth 1 (One) Time for 1 dose., Disp: 238 g, Rfl: 0    Current Facility-Administered Medications:   •  methylPREDNISolone acetate (DEPO-medrol) injection 80 mg, 80 mg, Intramuscular, Once, Yadi Davis APRN       Allergies:   Allergies   Allergen Reactions   • Shellfish-Derived Products Hives   • Wheat Bran Hives       OBJECTIVE:    Vital Signs:  Vitals:    05/16/19 0922   BP: 106/41   Pulse: 92   Temp: 98 °F (36.7 °C)   TempSrc: Temporal   SpO2: (!) 78%   Weight: 89.9 kg (198 lb 1.6 oz)   Height: 147.3 cm (58\")       Physical Exam:   General Appearance:    Alert, cooperative, in no acute distress   Head:    Normocephalic, without obvious abnormality, atraumatic   Eyes:            Lids and lashes normal, conjunctivae and sclerae normal, no   icterus, no pallor, corneas clear, PERRL   Ears:    Ears appear intact with no abnormalities noted   Throat:   No oral lesions, no thrush, oral mucosa moist   Neck:   No adenopathy, supple, trachea midline, no thyromegaly,  no JVD   Lungs:     Clear to auscultation,respirations regular, even and                  unlabored    Heart:    Regular rhythm and normal rate, normal S1 and S2, no            murmur   Abdomen:     no masses, no organomegaly, minimal epigastric tenderness without rebound or guarding   Extremities:   Moves all extremities well, no edema, no cyanosis, no             redness   Pulses:   Pulses palpable and equal bilaterally   Skin:   No bleeding, bruising or rash   Lymph nodes:   No palpable adenopathy   Neurologic:   Cranial nerves 2 - 12 grossly intact, sensation intact  Psychiatric: No evidence of depression or anxiety   Results Review:   I reviewed the patient's new clinical results.    Review of Systems was reviewed and confirmed as accurate " today.    ASSESSMENT/PLAN:    1. Heme positive stool    2. Gastroesophageal reflux disease, esophagitis presence not specified    3. Epigastric pain        I recommend a colonoscopy and EGD for further evaluation. The procedure was explained as well as the risks which include but are not limited to bleeding, infection, perforation, abdominal pain etc. The patient understands these risks and the procedure and wishes to proceed.  Continue Zantac as needed for reflux symptoms.     Electronically signed by Skinny York MD  05/16/19  9:27 AM

## 2019-05-17 RX ORDER — POLYETHYLENE GLYCOL 3350 17 G/17G
POWDER, FOR SOLUTION ORAL
Qty: 238 G | Refills: 0 | Status: SHIPPED | OUTPATIENT
Start: 2019-05-17 | End: 2019-06-15 | Stop reason: HOSPADM

## 2019-05-17 RX ORDER — ALLOPURINOL 300 MG/1
TABLET ORAL
Qty: 30 TABLET | Refills: 2 | Status: SHIPPED | OUTPATIENT
Start: 2019-05-17 | End: 2019-05-29 | Stop reason: SDUPTHER

## 2019-05-20 ENCOUNTER — NURSE ONLY (OUTPATIENT)
Dept: PRIMARY CARE CLINIC | Age: 69
End: 2019-05-20
Payer: MEDICARE

## 2019-05-20 VITALS — HEIGHT: 58 IN | WEIGHT: 199 LBS | BODY MASS INDEX: 41.77 KG/M2

## 2019-05-20 DIAGNOSIS — J44.1 COPD WITH EXACERBATION (HCC): Primary | ICD-10-CM

## 2019-05-20 PROCEDURE — 96372 THER/PROPH/DIAG INJ SC/IM: CPT | Performed by: NURSE PRACTITIONER

## 2019-05-20 NOTE — PROGRESS NOTES
Administrations This Visit     omalizumab Damon Ford) injection 150 mg     Admin Date  05/20/2019  13:09 Action  Given Dose  150 mg Route  Subcutaneous Site  Arm Left Administered By  Milton Camilo MA    Ordering Provider:  JADE Wong    NDC:  67676-881-32    Lot#:  [id number]    :  [facility]    Patient Supplied?:  Yes    Comments:  exp 09/2022           Admin Date  05/20/2019  13:10 Action  Given Dose  150 mg Route  Subcutaneous Site  Arm Right Administered By  Milton Camilo MA    Ordering Provider:  JADE Wong    NDC:  29192-517-93    Lot#:  [id number]    :  [facility]    Patient Supplied?:  Yes    Comments:  exp 09/2022

## 2019-05-21 ENCOUNTER — TELEPHONE (OUTPATIENT)
Dept: SURGERY | Facility: CLINIC | Age: 69
End: 2019-05-21

## 2019-05-23 ENCOUNTER — ANESTHESIA (OUTPATIENT)
Dept: ENDOSCOPY | Facility: HOSPITAL | Age: 69
End: 2019-05-23
Payer: MEDICARE

## 2019-05-23 ENCOUNTER — ANESTHESIA EVENT (OUTPATIENT)
Dept: ENDOSCOPY | Facility: HOSPITAL | Age: 69
End: 2019-05-23
Payer: MEDICARE

## 2019-05-23 ENCOUNTER — OUTSIDE FACILITY SERVICE (OUTPATIENT)
Dept: SURGERY | Facility: CLINIC | Age: 69
End: 2019-05-23

## 2019-05-23 ENCOUNTER — HOSPITAL ENCOUNTER (OUTPATIENT)
Facility: HOSPITAL | Age: 69
Setting detail: OUTPATIENT SURGERY
Discharge: HOME | End: 2019-05-23
Attending: SURGERY | Admitting: INTERNAL MEDICINE
Payer: MEDICARE

## 2019-05-23 VITALS
RESPIRATION RATE: 19 BRPM | OXYGEN SATURATION: 94 % | SYSTOLIC BLOOD PRESSURE: 147 MMHG | DIASTOLIC BLOOD PRESSURE: 78 MMHG

## 2019-05-23 VITALS
RESPIRATION RATE: 20 BRPM | SYSTOLIC BLOOD PRESSURE: 135 MMHG | OXYGEN SATURATION: 94 % | HEIGHT: 58 IN | DIASTOLIC BLOOD PRESSURE: 63 MMHG | WEIGHT: 199 LBS | BODY MASS INDEX: 41.77 KG/M2 | HEART RATE: 67 BPM | TEMPERATURE: 98.1 F

## 2019-05-23 LAB
GLUCOSE BLD-MCNC: 151 MG/DL (ref 74–106)
PERFORMED ON: ABNORMAL

## 2019-05-23 PROCEDURE — 3609010400 HC COLONOSCOPY POLYPECTOMY HOT BIOPSY: Performed by: SURGERY

## 2019-05-23 PROCEDURE — 7100000011 HC PHASE II RECOVERY - ADDTL 15 MIN: Performed by: SURGERY

## 2019-05-23 PROCEDURE — 3609012400 HC EGD TRANSORAL BIOPSY SINGLE/MULTIPLE: Performed by: SURGERY

## 2019-05-23 PROCEDURE — 2500000003 HC RX 250 WO HCPCS: Performed by: NURSE ANESTHETIST, CERTIFIED REGISTERED

## 2019-05-23 PROCEDURE — 2580000003 HC RX 258: Performed by: SURGERY

## 2019-05-23 PROCEDURE — 45385 COLONOSCOPY W/LESION REMOVAL: CPT | Performed by: SURGERY

## 2019-05-23 PROCEDURE — 45384 COLONOSCOPY W/LESION REMOVAL: CPT | Performed by: SURGERY

## 2019-05-23 PROCEDURE — 6360000002 HC RX W HCPCS: Performed by: NURSE ANESTHETIST, CERTIFIED REGISTERED

## 2019-05-23 PROCEDURE — 3700000000 HC ANESTHESIA ATTENDED CARE: Performed by: SURGERY

## 2019-05-23 PROCEDURE — 7100000010 HC PHASE II RECOVERY - FIRST 15 MIN: Performed by: SURGERY

## 2019-05-23 PROCEDURE — 3700000001 HC ADD 15 MINUTES (ANESTHESIA): Performed by: SURGERY

## 2019-05-23 PROCEDURE — 43239 EGD BIOPSY SINGLE/MULTIPLE: CPT | Performed by: SURGERY

## 2019-05-23 PROCEDURE — 2709999900 HC NON-CHARGEABLE SUPPLY: Performed by: SURGERY

## 2019-05-23 PROCEDURE — 88305 TISSUE EXAM BY PATHOLOGIST: CPT

## 2019-05-23 RX ORDER — PROPOFOL 10 MG/ML
INJECTION, EMULSION INTRAVENOUS PRN
Status: DISCONTINUED | OUTPATIENT
Start: 2019-05-23 | End: 2019-05-23 | Stop reason: SDUPTHER

## 2019-05-23 RX ORDER — SODIUM CHLORIDE, SODIUM LACTATE, POTASSIUM CHLORIDE, CALCIUM CHLORIDE 600; 310; 30; 20 MG/100ML; MG/100ML; MG/100ML; MG/100ML
INJECTION, SOLUTION INTRAVENOUS CONTINUOUS
Status: DISCONTINUED | OUTPATIENT
Start: 2019-05-23 | End: 2019-05-23 | Stop reason: HOSPADM

## 2019-05-23 RX ADMIN — PROPOFOL 30 MG: 10 INJECTION, EMULSION INTRAVENOUS at 08:26

## 2019-05-23 RX ADMIN — PROPOFOL 30 MG: 10 INJECTION, EMULSION INTRAVENOUS at 08:37

## 2019-05-23 RX ADMIN — SODIUM CHLORIDE, POTASSIUM CHLORIDE, SODIUM LACTATE AND CALCIUM CHLORIDE: 600; 310; 30; 20 INJECTION, SOLUTION INTRAVENOUS at 08:02

## 2019-05-23 RX ADMIN — PROPOFOL 30 MG: 10 INJECTION, EMULSION INTRAVENOUS at 08:41

## 2019-05-23 RX ADMIN — PROPOFOL 30 MG: 10 INJECTION, EMULSION INTRAVENOUS at 08:44

## 2019-05-23 RX ADMIN — PROPOFOL 30 MG: 10 INJECTION, EMULSION INTRAVENOUS at 08:33

## 2019-05-23 RX ADMIN — LIDOCAINE HYDROCHLORIDE 20 MG: 10 INJECTION, SOLUTION INFILTRATION; PERINEURAL at 08:26

## 2019-05-23 RX ADMIN — PROPOFOL 20 MG: 10 INJECTION, EMULSION INTRAVENOUS at 08:29

## 2019-05-23 ASSESSMENT — COPD QUESTIONNAIRES: CAT_SEVERITY: SEVERE

## 2019-05-23 ASSESSMENT — ENCOUNTER SYMPTOMS
SHORTNESS OF BREATH: 1
DYSPNEA ACTIVITY LEVEL: AFTER AMBULATING 1 FLIGHT OF STAIRS

## 2019-05-23 NOTE — PROGRESS NOTES
Pt awake. No complaints of pain or nausea. Abd soft. +BS. +Flatus. Swallowing ok. Family at bedside. Tolerates CL well. Verbalizes understanding of d/c instructions.

## 2019-05-23 NOTE — PROGRESS NOTES
Pt arrives ambulatory. No complaints noted. Verifies she is here for EGD and colonoscopy with Dr. Katie Donahue. States prep was effective. Verbalizes understanding of procedure. Consent on chart. States Kamala Herron, friend, will drive her home post-procedure. Per pt, ok for Dr. Katie Donahue to speak with MsEffie Alex Miles regarding results. Side rails up x 2. Lungs with expiratory rhonchi. HR irregular. Pt notes that she wears O2 at 3L at home and that she gets SOA on a regular basis and has chest pain with this occasionally. Per pt, here today for evaluation of positive occult blood. States that she had a colonoscopy approximately 3 years ago and several polyps were found. +BS.

## 2019-05-23 NOTE — ANESTHESIA PRE PROCEDURE
Department of Anesthesiology  Preprocedure Note       Name:  Rebekah Mcgrath   Age:  76 y.o.  :  1950                                          MRN:  [id number]         Date:  2019      Surgeon: Neil Rameshal):  Olivia Marinelli MD    Procedure: EGD BIOPSY (N/A Esophagus)  COLONOSCOPY DIAGNOSTIC (N/A Anus)    Medications prior to admission:   Prior to Admission medications    Medication Sig Start Date End Date Taking?  Authorizing Provider   allopurinol (ZYLOPRIM) 300 MG tablet TAKE ONE TABLET BY MOUTH EVERY DAY 19   JADE Vazquez   ULTICARE INSULIN SYRINGE 31G X \" 1 ML MISC USE TO INJECT INSULIN 5 TIMES A DAY 5/15/19   Barronlogan Vega APRTJ   fenofibrate (TRICOR) 145 MG tablet TAKE ONE TABLET BY MOUTH EVERY DAY 5/15/19   Barron Gary APRN   Lactobacillus Acid-Pectin (ACIDOPHILUS/CITRUS PECTIN) TABS TAKE ONE TABLET BY MOUTH TWO TIMES A DAY 5/15/19   Barron Gary APRTJ   Insulin Syringe-Needle U-100 (B-D INS SYR ULTRAFINE 1CC/31G) 31G X \" 1 ML MISC 1 each by Other route 4 times daily 5/15/19   Barron JADE Vega   allopurinol (ZYLOPRIM) 300 MG tablet TAKE ONE TABLET BY MOUTH EVERY DAY 19   Barron Gary APRN   Insulin Degludec (TRESIBA FLEXTOUCH) 100 UNIT/ML SOPN 20 units in the am and 75 units in pm 19   Barron Gary APRTJ   Icosapent Ethyl (VASCEPA) 1 g CAPS capsule Take 2 capsules by mouth 2 times daily 19   Barron Gary APRN   metoclopramide (REGLAN) 5 MG tablet TAKE ONE TABLET BY MOUTH THREE TIMES A DAY 4/15/19   Barron Gary APRN   furosemide (LASIX) 40 MG tablet TAKE ONE TABLET BY MOUTH TWO TIMES A DAY 4/15/19   Barron Gary APRN   sucralfate (CARAFATE) 1 GM tablet TAKE ONE TABLET BY MOUTH THREE TIMES A DAY 4/15/19   Barron Gary APRN   FEROSUL 325 (65 Fe) MG tablet TAKE ONE TABLET BY MOUTH THREE TIMES A DAY WITH FOOD 4/15/19   JADE Falk   montelukast (SINGULAIR) 10 MG tablet TAKE ONE TABLET BY MOUTH EVERY DAY 3/19/19   Kevin Khan Veronique Cardozo, JDAE   JANUVIA 100 MG tablet TAKE ONE TABLET BY MOUTH EVERY DAY 3/19/19   Earnie Rolling, JADE   PATADAY 0.2 % SOLN ophthalmic solution PLACE 1 DROP INTO BOTH EYES 2 TIMES DAILY 2/19/19   Earnie Rolling, JADE   ezetimibe (ZETIA) 10 MG tablet TAKE ONE TABLET BY MOUTH EVERY DAY 2/11/19   Earnie Rolling, JADE   ADVAIR DISKUS 250-50 MCG/DOSE AEPB INHALE ONE PUFF INTO THE LUNGS EVERY 12 HOURS 2/4/19   Earnie Rolling, JADE   ASPIR-LOW 81 MG EC tablet TAKE ONE TABLET BY MOUTH EVERY DAY 2/4/19   Devon Ratin, DO   vitamin D (ERGOCALCIFEROL) 52460 units CAPS capsule TAKE ONE CAPSULE BY MOUTH ONCE WEEKLY 1/15/19   Earnie Rolling, JADE   OXYGEN Inhale 3 L/min into the lungs continuous    Historical Provider, MD   flunisolide (NASALIDE) 25 MCG/ACT (0.025%) SOLN Inhale 1 spray into the lungs 11/29/18   Historical Provider, MD   busPIRone (BUSPAR) 10 MG tablet TAKE ONE TABLET BY MOUTH THREE TIMES A DAY 12/26/18   Earnie Rolling, JADE   metoprolol tartrate (LOPRESSOR) 25 MG tablet TAKE ONE TABLET BY MOUTH TWO TIMES A DAY 12/26/18   Earnie Rolling, JADE   HUMALOG 100 UNIT/ML injection vial INJECT 35 UNITS THREE TIMES A DAY 12/26/18   Earnie Rolling, JADE   ipratropium-albuterol (DUONEB) 0.5-2.5 (3) MG/3ML SOLN nebulizer solution Inhale 3 mLs into the lungs every 4 hours 10/24/18   Earnie Rolling, JADE   TRUE METRIX BLOOD GLUCOSE TEST strip USE TO CHECK BLOOD SUGAR FOUR TIMES A DAY 10/3/18   Earnie Rolling, JADE   SPIRIVA HANDIHALER 18 MCG inhalation capsule INHALE THE CONTENTS OF ONE CAPSULE INTO THE LUNGS ONCE DAILY 9/25/18   Earnie Rolling, JADE   ranitidine (ZANTAC) 300 MG tablet Take 300 mg by mouth 2 times daily  4/18/18   Historical Provider, MD   potassium chloride (KLOR-CON M) 20 MEQ extended release tablet TAKE ONE TABLET BY MOUTH EVERY DAY 3/9/18   JADE Eckert   albuterol sulfate HFA (PROAIR HFA) 108 (90 Base) MCG/ACT inhaler Inhale 2 puffs into the lungs every 4 hours as needed for Wheezing 2/26/18   JADE Forde   metolazone (ZAROXOLYN) 5 MG tablet Take 1 tablet by mouth daily as needed (swelling) 12/8/17   JADE Miller   traMADol (ULTRAM) 50 MG tablet TAKE ONE TABLET BY MOUTH THREE TIMES A DAY AS NEEDED 11/8/17   JADE Forde   loratadine (CLARITIN) 10 MG tablet TAKE ONE TABLET BY MOUTH EVERY DAY 9/19/17   JADE Forde   Blood Glucose Monitoring Suppl DWAIN 1 each by Does not apply route 4 times daily Dx:E10.9 Test QID 8/22/17   JADE Forde   COMFORT ASSIST INSULIN SYRINGE 31G X 5/16\" 1 ML MISC USE AS DIRECTED FIVE TIMES A DAY 4/24/17   JADE Forde       Current medications:    Current Facility-Administered Medications   Medication Dose Route Frequency Provider Last Rate Last Dose    lactated ringers infusion   Intravenous Continuous Loyd Callahan MD 80 mL/hr at 05/23/19 0802         Allergies: Allergies   Allergen Reactions    Shellfish-Derived Products     Wheat Bran Hives       Problem List:    Patient Active Problem List   Diagnosis Code    DM type 2 (diabetes mellitus, type 2) E11.9    Elevated LFTs R94.5    Hyperlipidemia E78.5    HTN (hypertension) I10    Back pain M54.9    COPD (chronic obstructive pulmonary disease) ([facility]) J44.9    B12 deficiency E53.8    Pneumonia J18.9    Moderate persistent asthma without complication E21.58    Hypoxia R09.02    Chronic gout without tophus M1A. 9XX0    Vitamin D deficiency E55.9    CRF (chronic renal failure) N18.9    Hand cramps R25.2    Elevated BUN R79.9    Injection site reaction T80.90XA    Allergic reaction T78.40XA    COPD exacerbation ([facility]) J44.1    Type 2 diabetes mellitus with complication, with long-term current use of insulin ([facility]) E11.8, Z79.4    Stage 3 chronic kidney disease (HCC) N18.3    Chronic bilateral low back pain without sciatica M54.5, G89.29    Dependence on continuous supplemental oxygen Z99.81    Pure hypercholesterolemia E78.00       Past lb 9.6 oz (90.5 kg)     Body mass index is 41.59 kg/m². CBC:   Lab Results   Component Value Date    WBC 11.0 04/24/2019    RBC 4.53 04/24/2019    HGB 14.7 04/24/2019    HCT 45.4 04/24/2019    .2 04/24/2019    RDW 14.0 04/24/2019     04/24/2019       CMP:   Lab Results   Component Value Date     04/24/2019    K 3.7 04/24/2019    CL 89 04/24/2019    CO2 33 04/24/2019    BUN 60 04/24/2019    CREATININE 2.0 04/24/2019    GFRAA 30 04/24/2019    AGRATIO 1.6 04/24/2019    LABGLOM 25 04/24/2019    GLUCOSE 292 04/24/2019    PROT 7.0 04/24/2019    CALCIUM 10.0 04/24/2019    BILITOT 0.4 04/24/2019    ALKPHOS 64 04/24/2019    AST 50 04/24/2019    ALT 49 04/24/2019       POC Tests: No results for input(s): POCGLU, POCNA, POCK, POCCL, POCBUN, POCHEMO, POCHCT in the last 72 hours.     Coags: No results found for: PROTIME, INR, APTT    HCG (If Applicable): No results found for: PREGTESTUR, PREGSERUM, HCG, HCGQUANT     ABGs:   Lab Results   Component Value Date    PHART 7.410 01/01/2018    PO2ART 62.0 01/01/2018    RHQ7VPH 55.0 01/01/2018    RUN4DWI 34.4 01/01/2018    BEART 8.3 01/01/2018    O0LJNFIH 90.0 01/01/2018        Type & Screen (If Applicable):  No results found for: [facility]    Anesthesia Evaluation  Patient summary reviewed and Nursing notes reviewed  Airway: Mallampati: II        Dental:          Pulmonary:normal exam  breath sounds clear to auscultation  (+) pneumonia: resolved,  COPD: severe,  shortness of breath: chronic,  asthma: allergic asthma,                           ROS comment: O2 24 hr/day   Hx of lung tumors removed    Cardiovascular:  Exercise tolerance: poor (<4 METS),   (+) hypertension: moderate, THOMPSON: after ambulating 1 flight of stairs, hyperlipidemia      ECG reviewed  Rhythm: regular  Rate: normal  Echocardiogram reviewed                  Neuro/Psych:   (+) depression/anxiety             GI/Hepatic/Renal:   (+) GERD: poorly controlled, liver disease:, renal disease: CRI, morbid obesity         ROS comment: MBI 41  Elevated LFT's. Endo/Other:    (+) DiabetesType II DM, poorly controlled, using insulin, : arthritis:., .          Pt had PAT visit. ROS comment: CBP Abdominal:   (+) obese,         Vascular: negative vascular ROS. Anesthesia Plan      MAC     ASA 4       Induction: intravenous. Anesthetic plan and risks discussed with patient. Use of blood products discussed with patient whom.                    JADE Alfred - CRNA   5/23/2019

## 2019-05-23 NOTE — ANESTHESIA POSTPROCEDURE EVALUATION
Department of Anesthesiology  Postprocedure Note    Patient: Ortega Jordan  MRN: [id number]  YOB: 1950  Date of evaluation: 5/23/2019  Time:  11:40 AM     Procedure Summary     Date:  05/23/19 Room / Location:  [address] [place] [address] ENDO 01 / [address] [place] [address] ENDOSCOPY    Anesthesia Start:  6890 Anesthesia Stop:  [place] Debar    Procedures:       EGD BIOPSY (N/A Esophagus)      COLONOSCOPY DIAGNOSTIC (N/A Anus) Diagnosis:       (R19.5)      (K21.9)    Surgeon:  Agustin Rdz MD Responsible Provider:  AJDE Hardy CRNA    Anesthesia Type:  MAC ASA Status:  4          Anesthesia Type: MAC    Romy Phase I: Romy Score: 10    Romy Phase II: Romy Score: 10    Last vitals: Reviewed and per EMR flowsheets.        Anesthesia Post Evaluation    Patient location during evaluation: bedside  Patient participation: complete - patient participated  Level of consciousness: awake and alert  Airway patency: patent  Nausea & Vomiting: no nausea and no vomiting  Complications: no  Cardiovascular status: blood pressure returned to baseline  Respiratory status: acceptable and nasal cannula  Hydration status: stable  Comments: Discharged with nasal O2 at 2L/min

## 2019-05-23 NOTE — OP NOTE
in the patient's mouth and under direct visualization passed into the esophagus. The scope was ultimately passed to the second portion duodenum. Cold biopsy forceps biopsies were obtained of gastric antral mucosa. Visualization was performed during both introduction and withdrawal of the endoscope and retroflexed view of the proximal stomach was obtained. Findings[de-identified]   Esophagus: Hiatal Hernia with reflux esophagitis; normal esophageal contractions. The findings do not support a diagnosis of Schreiber's Esophagus. Biopsy ×2 obtained. Stomach: Moderate gastritis, biopsied ×2 obtained  Duodenum: Normal       Recommendations: Follow-up one week, repeat colonoscopy in one year.      Electronically signed by Layo Mejia MD, FACS on 5/23/2019 at 8:59 AM

## 2019-05-24 DIAGNOSIS — G89.29 CHRONIC RIGHT-SIDED LOW BACK PAIN WITH RIGHT-SIDED SCIATICA: ICD-10-CM

## 2019-05-24 DIAGNOSIS — M54.41 CHRONIC RIGHT-SIDED LOW BACK PAIN WITH RIGHT-SIDED SCIATICA: ICD-10-CM

## 2019-05-24 RX ORDER — HYDROCODONE BITARTRATE AND ACETAMINOPHEN 5; 325 MG/1; MG/1
TABLET ORAL
Qty: 30 TABLET | Refills: 0 | Status: SHIPPED | OUTPATIENT
Start: 2019-05-24 | End: 2019-06-23

## 2019-05-24 NOTE — H&P
injection 150 mg  150 mg Subcutaneous Q28 Days Venecia Pickles, APRN   150 mg at 03/21/19 1009    omalizumab Anusha Dusky) injection 150 mg  150 mg Subcutaneous Q28 Days Venecia Pickles, APRN   150 mg at 01/24/19 1046    omalizumab Anusha Dusky) injection 150 mg  150 mg Subcutaneous Q28 Days Venecia Pickles, APRN   150 mg at 01/24/19 1046    omalizumab Anusha Dusky) injection 150 mg  150 mg Subcutaneous Q28 Days Venecia Pickles, APRN   150 mg at 11/26/18 1003    omalizumab Anusha Dusky) injection 150 mg  150 mg Subcutaneous Q28 Days Venecia Pickles, APRN   150 mg at 11/26/18 1003    omalizumab Anusha Dusky) injection 150 mg  150 mg Subcutaneous Q28 Days Venecia Pickles, APRN   150 mg at 10/24/18 1131    omalizumab Anusha Dusky) injection 150 mg  150 mg Subcutaneous Q28 Days Venecia Pickles, APRN   150 mg at 10/24/18 1131    omalizumab Anusha Dusky) injection 150 mg  150 mg Subcutaneous Q28 Days Venecia Pickles, APRN   150 mg at 09/20/18 1429    omalizumab Anusha Dusky) injection 150 mg  150 mg Subcutaneous Q28 Days Venecia Pickles, APRN   150 mg at 09/20/18 1428    omalizumab Anusha Dusky) injection 150 mg  150 mg Subcutaneous Q28 Days Venecia Pickles, APRN   150 mg at 08/20/18 1306    omalizumab Anusha Dusky) injection 150 mg  150 mg Subcutaneous Q28 Days Venecia Pickles, APRN   150 mg at 08/20/18 1305    omalizumab Anusha Dusky) injection 150 mg  150 mg Subcutaneous Q28 Days Venecia Pickles, APRN   150 mg at 07/23/18 8774    omalizumab Anusha Dusky) injection 150 mg  150 mg Subcutaneous Q28 Days Venecia Pickles, APRN   150 mg at 07/23/18 1041    omalizumab Anusha Dusky) injection 150 mg  150 mg Subcutaneous Q28 Days Renarine Servando, APRN   150 mg at 05/25/18 1405    omalizumab Anusha Dusky) injection 150 mg  150 mg Subcutaneous Q28 Days JADE Hayden   150 mg at 05/25/18 1405    omalizumab Anusha Huff) injection 150 mg  150 mg Subcutaneous Q28 Days JADE Mendosa   150 mg at 03/29/18 1433    omalizumab (XOLAIR) injection 150 mg  150 mg Subcutaneous Q28 Days Maryruth Chain, APRN   150 mg at 03/29/18 1432    omalizumab Dandre Servant) injection 150 mg  150 mg Subcutaneous Q28 Days [facility], APRN   150 mg at 02/23/18 1431    omalizumab Dandre Servant) injection 150 mg  150 mg Subcutaneous Q28 Days [facility], APRN   150 mg at 02/23/18 1430     Current Outpatient Medications   Medication Sig Dispense Refill    allopurinol (ZYLOPRIM) 300 MG tablet TAKE ONE TABLET BY MOUTH EVERY DAY 30 tablet 2    ULTICARE INSULIN SYRINGE 31G X 5/16\" 1 ML MISC USE TO INJECT INSULIN 5 TIMES A  each 3    fenofibrate (TRICOR) 145 MG tablet TAKE ONE TABLET BY MOUTH EVERY DAY 30 tablet 3    Lactobacillus Acid-Pectin (ACIDOPHILUS/CITRUS PECTIN) TABS TAKE ONE TABLET BY MOUTH TWO TIMES A DAY 60 tablet 1    Insulin Syringe-Needle U-100 (B-D INS SYR ULTRAFINE 1CC/31G) 31G X 5/16\" 1 ML MISC 1 each by Other route 4 times daily 150 each 5    allopurinol (ZYLOPRIM) 300 MG tablet TAKE ONE TABLET BY MOUTH EVERY DAY 30 tablet 5    Insulin Degludec (TRESIBA FLEXTOUCH) 100 UNIT/ML SOPN 20 units in the am and 75 units in pm 6 pen 5    Icosapent Ethyl (VASCEPA) 1 g CAPS capsule Take 2 capsules by mouth 2 times daily 60 capsule 3    metoclopramide (REGLAN) 5 MG tablet TAKE ONE TABLET BY MOUTH THREE TIMES A DAY 90 tablet 3    furosemide (LASIX) 40 MG tablet TAKE ONE TABLET BY MOUTH TWO TIMES A DAY 60 tablet 5    sucralfate (CARAFATE) 1 GM tablet TAKE ONE TABLET BY MOUTH THREE TIMES A DAY 90 tablet 3    FEROSUL 325 (65 Fe) MG tablet TAKE ONE TABLET BY MOUTH THREE TIMES A DAY WITH FOOD 90 tablet 3    montelukast (SINGULAIR) 10 MG tablet TAKE ONE TABLET BY MOUTH EVERY DAY 30 tablet 2    JANUVIA 100 MG tablet TAKE ONE TABLET BY MOUTH EVERY DAY 30 tablet 2    PATADAY 0.2 % SOLN ophthalmic solution PLACE 1 DROP INTO BOTH EYES 2 TIMES DAILY 2.5 mL 3    ezetimibe (ZETIA) 10 MG tablet TAKE ONE TABLET BY MOUTH EVERY DAY 90 tablet 3    ADVAIR DISKUS 250-50 MCG/DOSE AEPB INHALE ONE PUFF INTO THE LUNGS EVERY 12 HOURS 60 each 5    ASPIR-LOW 81 MG EC tablet TAKE ONE TABLET BY MOUTH EVERY DAY 30 tablet 0    vitamin D (ERGOCALCIFEROL) 18457 units CAPS capsule TAKE ONE CAPSULE BY MOUTH ONCE WEEKLY 4 capsule 4    OXYGEN Inhale 3 L/min into the lungs continuous      flunisolide (NASALIDE) 25 MCG/ACT (0.025%) SOLN Inhale 1 spray into the lungs      busPIRone (BUSPAR) 10 MG tablet TAKE ONE TABLET BY MOUTH THREE TIMES A DAY 90 tablet 5    metoprolol tartrate (LOPRESSOR) 25 MG tablet TAKE ONE TABLET BY MOUTH TWO TIMES A DAY 60 tablet 5    HUMALOG 100 UNIT/ML injection vial INJECT 35 UNITS THREE TIMES A DAY 40 mL 4    ipratropium-albuterol (DUONEB) 0.5-2.5 (3) MG/3ML SOLN nebulizer solution Inhale 3 mLs into the lungs every 4 hours 360 mL 3    TRUE METRIX BLOOD GLUCOSE TEST strip USE TO CHECK BLOOD SUGAR FOUR TIMES A  strip 3    SPIRIVA HANDIHALER 18 MCG inhalation capsule INHALE THE CONTENTS OF ONE CAPSULE INTO THE LUNGS ONCE DAILY 30 capsule 5    ranitidine (ZANTAC) 300 MG tablet Take 300 mg by mouth 2 times daily       potassium chloride (KLOR-CON M) 20 MEQ extended release tablet TAKE ONE TABLET BY MOUTH EVERY DAY 30 tablet 5    albuterol sulfate HFA (PROAIR HFA) 108 (90 Base) MCG/ACT inhaler Inhale 2 puffs into the lungs every 4 hours as needed for Wheezing 1 Inhaler 5    metolazone (ZAROXOLYN) 5 MG tablet Take 1 tablet by mouth daily as needed (swelling) 30 tablet 0    traMADol (ULTRAM) 50 MG tablet TAKE ONE TABLET BY MOUTH THREE TIMES A DAY AS NEEDED 90 tablet 0    loratadine (CLARITIN) 10 MG tablet TAKE ONE TABLET BY MOUTH EVERY DAY 30 tablet 4    Blood Glucose Monitoring Suppl DWAIN 1 each by Does not apply route 4 times daily Dx:E10.9 Test QID 1 Device 0    COMFORT ASSIST INSULIN SYRINGE 31G X 5/16\" 1 ML MISC USE AS DIRECTED FIVE TIMES A  each 4        Electronically signed by Agustin Rdz MD on 5/24/2019 at 9:21 AM

## 2019-05-29 ENCOUNTER — OFFICE VISIT (OUTPATIENT)
Dept: PRIMARY CARE CLINIC | Age: 69
End: 2019-05-29
Payer: MEDICARE

## 2019-05-29 VITALS
RESPIRATION RATE: 16 BRPM | HEIGHT: 58 IN | WEIGHT: 201 LBS | DIASTOLIC BLOOD PRESSURE: 58 MMHG | SYSTOLIC BLOOD PRESSURE: 120 MMHG | HEART RATE: 74 BPM | TEMPERATURE: 98.1 F | OXYGEN SATURATION: 94 % | BODY MASS INDEX: 42.19 KG/M2

## 2019-05-29 DIAGNOSIS — Z79.4 TYPE 2 DIABETES MELLITUS WITH STAGE 3 CHRONIC KIDNEY DISEASE, WITH LONG-TERM CURRENT USE OF INSULIN (HCC): ICD-10-CM

## 2019-05-29 DIAGNOSIS — N18.30 TYPE 2 DIABETES MELLITUS WITH STAGE 3 CHRONIC KIDNEY DISEASE, WITH LONG-TERM CURRENT USE OF INSULIN (HCC): ICD-10-CM

## 2019-05-29 DIAGNOSIS — Z00.00 ROUTINE GENERAL MEDICAL EXAMINATION AT A HEALTH CARE FACILITY: ICD-10-CM

## 2019-05-29 DIAGNOSIS — Z00.00 MEDICARE ANNUAL WELLNESS VISIT, INITIAL: Primary | ICD-10-CM

## 2019-05-29 DIAGNOSIS — E11.22 TYPE 2 DIABETES MELLITUS WITH STAGE 3 CHRONIC KIDNEY DISEASE, WITH LONG-TERM CURRENT USE OF INSULIN (HCC): ICD-10-CM

## 2019-05-29 DIAGNOSIS — H53.8 BLURRED VISION: ICD-10-CM

## 2019-05-29 PROCEDURE — 3046F HEMOGLOBIN A1C LEVEL >9.0%: CPT | Performed by: NURSE PRACTITIONER

## 2019-05-29 PROCEDURE — 4040F PNEUMOC VAC/ADMIN/RCVD: CPT | Performed by: NURSE PRACTITIONER

## 2019-05-29 PROCEDURE — G0438 PPPS, INITIAL VISIT: HCPCS | Performed by: NURSE PRACTITIONER

## 2019-05-29 PROCEDURE — 3017F COLORECTAL CA SCREEN DOC REV: CPT | Performed by: NURSE PRACTITIONER

## 2019-05-29 PROCEDURE — 1123F ACP DISCUSS/DSCN MKR DOCD: CPT | Performed by: NURSE PRACTITIONER

## 2019-05-29 ASSESSMENT — PATIENT HEALTH QUESTIONNAIRE - PHQ9
SUM OF ALL RESPONSES TO PHQ QUESTIONS 1-9: 2
SUM OF ALL RESPONSES TO PHQ QUESTIONS 1-9: 2

## 2019-05-29 ASSESSMENT — LIFESTYLE VARIABLES: HOW OFTEN DO YOU HAVE A DRINK CONTAINING ALCOHOL: 0

## 2019-05-29 ASSESSMENT — ANXIETY QUESTIONNAIRES: GAD7 TOTAL SCORE: 6

## 2019-05-29 NOTE — PROGRESS NOTES
Medicare Annual Wellness Visit  Name: Jeane Bernabe Date: 2019   MRN: [id number] Sex: Female   Age: 76 y.o. Ethnicity: Non-/Non    : 1950 Race: Melissa Cornell is here for Medicare AWV  she comes in for her wellness. She just finished her colonoscopy. She says she hasn't been feeling well. She has been coughing. She thinks that the Xolair helped her. She   Screenings for behavioral, psychosocial and functional/safety risks, and cognitive dysfunction are all negative except as indicated below. These results, as well as other patient data from the [address] form, are documented in Flowsheets linked to this Encounter. Allergies   Allergen Reactions    Shellfish-Derived Products Hives    Wheat Bran Hives       Prior to Visit Medications    Medication Sig Taking?  Authorizing Provider   HYDROcodone-acetaminophen ([facility]) 5-325 MG per tablet TAKE ONE TABLET BY MOUTH EVERY DAY AS NEEDED FOR PAIN Yes JADE Pitts   ULTICARE INSULIN SYRINGE 31G X 16\" 1 ML MISC USE TO INJECT INSULIN 5 TIMES A DAY Yes JADE Grayson   fenofibrate (TRICOR) 145 MG tablet TAKE ONE TABLET BY MOUTH EVERY DAY Yes JADE Grayson   Lactobacillus Acid-Pectin (ACIDOPHILUS/CITRUS PECTIN) TABS TAKE ONE TABLET BY MOUTH TWO TIMES A DAY Yes JADE Grayson   Insulin Syringe-Needle U-100 (B-D INS SYR ULTRAFINE 1CC/31G) 31G X 16\" 1 ML MISC 1 each by Other route 4 times daily Yes JADE Grayson   allopurinol (ZYLOPRIM) 300 MG tablet TAKE ONE TABLET BY MOUTH EVERY DAY Yes JADE Grayson   Insulin Degludec (TRESIBA FLEXTOUCH) 100 UNIT/ML SOPN 20 units in the am and 75 units in pm Yes JADE Grayson   Icosapent Ethyl (VASCEPA) 1 g CAPS capsule Take 2 capsules by mouth 2 times daily Yes JADE Grayson   metoclopramide (REGLAN) 5 MG tablet TAKE ONE TABLET BY MOUTH THREE TIMES A DAY Yes JADE Grayson   furosemide (LASIX) 40 MG tablet TAKE Provider, MD   potassium chloride (KLOR-CON M) 20 MEQ extended release tablet TAKE ONE TABLET BY MOUTH EVERY DAY Yes JADE Soni   albuterol sulfate HFA (PROAIR HFA) 108 (90 Base) MCG/ACT inhaler Inhale 2 puffs into the lungs every 4 hours as needed for Wheezing Yes JADE Soni   metolazone (ZAROXOLYN) 5 MG tablet Take 1 tablet by mouth daily as needed (swelling) Yes JADE Peralta   traMADol (ULTRAM) 50 MG tablet TAKE ONE TABLET BY MOUTH THREE TIMES A DAY AS NEEDED Yes JADE Soni   loratadine (CLARITIN) 10 MG tablet TAKE ONE TABLET BY MOUTH EVERY DAY Yes JADE Soni   Blood Glucose Monitoring Suppl DWAIN 1 each by Does not apply route 4 times daily Dx:E10.9 Test QID Yes JADE Soni   COMFORT ASSIST INSULIN SYRINGE 31G X 5/16\" 1 ML MISC USE AS DIRECTED FIVE TIMES A DAY Yes JADE Soni       Past Medical History:   Diagnosis Date    Anemia     Anxiety     Asthma     Cataract     COPD (chronic obstructive pulmonary disease) ([facility] [address]     DDD (degenerative disc disease), cervical     Depression     Diabetes mellitus type 2     GERD (gastroesophageal reflux disease)     Gout     History of uterine cancer     Hyperlipidemia     Hypertension      Past Surgical History:   Procedure Laterality Date    CATARACT REMOVAL WITH IMPLANT       SECTION  , 1979    x2    COLONOSCOPY  2013    COLONOSCOPY N/A 2019    COLONOSCOPY POLYPECTOMY HOT BIOPSY performed [facility]ustin Rdz MD at [facility]      partial    LUNG SURGERY      tumor removed; left    NECK SURGERY      cyst removed; right     TONSILLECTOMY      UPPER GASTROINTESTINAL ENDOSCOPY N/A 2019    EGD BIOPSY performed by Agustin Rdz MD at [facility]       Family History   Problem Relation Age of Onset    Dementia Mother     Hypertension Mother     Hypertension Father     Coronary Art Dis Father     Cancer Father leukemia       CareTeam (Including outside providers/suppliers regularly involved in providing care):   Patient Care Team:  JADE Wong as PCP - General (Family Nurse Practitioner)    Wt Readings from Last 3 Encounters:   05/29/19 201 lb (91.2 kg)   05/23/19 199 lb (90.3 kg)   05/20/19 199 lb (90.3 kg)     Vitals:    05/29/19 0920   BP: (!) 120/58   Site: Left Upper Arm   Position: Sitting   Cuff Size: Medium Adult   Pulse: 74   Resp: 16   Temp: 98.1 °F (36.7 °C)   TempSrc: Oral   SpO2: 94%   Weight: 201 lb (91.2 kg)   Height: 4' 10\" (1.473 m)     Body mass index is 42.01 kg/m². Based upon direct observation of the patient, evaluation of cognition reveals recent and remote memory intact.     General Appearance: alert and oriented to person, place and time, well developed and well- nourished, in no acute distress  Skin: warm and dry, no rash or erythema  Head: normocephalic and atraumatic  Eyes: pupils equal, round, and reactive to light, extraocular eye movements intact, conjunctivae normal  ENT: tympanic membrane, external ear and ear canal normal bilaterally, nose without deformity, nasal mucosa and turbinates normal without polyps  Neck: supple and non-tender without mass, no thyromegaly or thyroid nodules, no cervical lymphadenopathy  Pulmonary/Chest: clear to auscultation bilaterally- no wheezes, rales or rhonchi, normal air movement, no respiratory distress  Cardiovascular: normal rate, regular rhythm, normal S1 and S2, no murmurs, rubs, clicks, or gallops, distal pulses intact, no carotid bruits  Abdomen: soft, non-tender, non-distended, normal bowel sounds, no masses or organomegaly  Extremities: no cyanosis, clubbing or edema  Musculoskeletal: normal range of motion, no joint swelling, deformity or tenderness  Neurologic: reflexes normal and symmetric, no cranial nerve deficit, gait, coordination and speech normal    Patient's complete Health Risk Assessment and screening values have been reviewed and are found in [facility]. The following problems were reviewed today and where indicated follow up appointments were made and/or referrals ordered. Positive Risk Factor Screenings with Interventions:     General Health:  General  In general, how would you say your health is?: Fair  In the past 7 days, have you experienced any of the following? New or Increased Pain, New or Increased Fatigue, Loneliness, Social Isolation, Stress or Anger?: (!) Stress  Do you get the social and emotional support that you need?: Yes  Do you have a Living Will?: (!) No  General Health Risk Interventions:  · No Living Will: provided the state-specific advance directive document to the patient    Health Habits/Nutrition:  Health Habits/Nutrition  Do you exercise for at least 20 minutes 2-3 times per week?: (!) No  Have you lost any weight without trying in the past 3 months?: No  Do you eat fewer than 2 meals per day?: No  Have you seen a dentist within the past year?: (!) No  Body mass index is 42.01 kg/m². Health Habits/Nutrition Interventions:  · she is trying to work more. Hearing/Vision:  Hearing/Vision  Do you or your family notice any trouble with your hearing?: (!) Yes  Do you have difficulty driving, watching TV, or doing any of your daily activities because of your eyesight?: (!) Yes  Have you had an eye exam within the past year?: Yes(09/2018)  Hearing/Vision Interventions:  · Vision concerns:  patient encouraged to make appointment with his/her eye specialist    ADL:  ADLs  In the past 7 days, did you need help from others to perform any of the following everyday activities? Eating, dressing, grooming, bathing, toileting, or walking/balance?: None  In the past 7 days, did you need help from others to take care of any of the following?  Laundry, housekeeping, banking/finances, shopping, telephone use, food preparation, transportation, or taking medications?: [employer], Housekeeping  ADL Interventions:  · Patient declines any further evaluation/treatment for this issue    Personalized Preventive Plan   Current Health Maintenance Status  Immunization History   Administered Date(s) Administered    Hepatitis A Adult (Havarix) 03/28/2019    Influenza Virus Vaccine 10/17/2012, 11/15/2013, 09/25/2014, 09/17/2015, 10/10/2017    Influenza, High Dose (Fluzone 65 yrs and older) 10/03/2018    Influenza, Jeana Gamma, 3 yrs and older, IM, PF (Fluzone 3 yrs and older or Afluria 5 yrs and older) 10/12/2016    Pneumococcal 13-valent Conjugate ([id number]) 04/21/2017    Pneumococcal Conjugate 7-valent 09/01/2011    Pneumococcal Polysaccharide ([id number]) 10/24/2018    Tdap (Boostrix, Adacel) 09/01/2011        Health Maintenance   Topic Date Due    Hepatitis C screen  1950    Shingles Vaccine (1 of 2) 06/14/2000    DEXA (modify frequency per FRAX score)  06/14/2015    A1C test (Diabetic or Prediabetic)  07/24/2019    Diabetic retinal exam  09/04/2019    Diabetic microalbuminuria test  04/23/2020    Lipid screen  04/24/2020    Potassium monitoring  04/24/2020    Creatinine monitoring  04/24/2020    Diabetic foot exam  04/25/2020    Breast cancer screen  04/26/2020    DTaP/Tdap/Td vaccine (2 - Td) 09/01/2021    Colon cancer screen colonoscopy  05/23/2024    Flu vaccine  Completed    Pneumococcal 65+ years Vaccine  Completed     Recommendations for Preventive Services Due: see orders and patient instructions/AVS.  .   Recommended screening schedule for the next 5-10 years is provided to the patient in written form: see Patient Instructions/AVS.

## 2019-05-29 NOTE — PATIENT INSTRUCTIONS
· Keep a list of your medicines with you. List all of the prescription medicines, nonprescription medicines, supplements, natural remedies, and vitamins that you take. Tell your healthcare providers who treat you about all of the products you are taking. Your provider can provide you with a form to keep track of them. Just ask. · Follow the directions that come with your medicine, including information about food or alcohol. Make sure you know how and when to take your medicine. Do not take more or less than you are supposed to take. · Keep all medicines out of the reach of children. · Store medicines according to the directions on the label. · Monitor yourself. Learn to know how your body reacts to your new medicine and keep track of how it makes you feel before attempting (If your provider has allowed you to do so) to drive or go to work. · Seek emergency medical attention if you think you have used too much of this medicine. An overdose of any prescription medicine can be fatal. Overdose symptoms may include extreme drowsiness, muscle weakness, confusion, cold and clammy skin, pinpoint pupils, shallow breathing, slow heart rate, fainting, or coma. · Don't share prescription medicines with others, even when they seem to have the same symptoms. What may be good for you may be harmful to others. · If you are no longer taking a prescribed medication and you have pills left please take your pills out of their original containers. Mix crushed pills with an undesirable substance, such as cat litter or used coffee grounds. Put the mixture into a disposable container with a lid, such as an empty margarine tub, or into a sealable bag. Cover up or remove any of your personal information on the empty containers by covering it with black permanent marker or duct tape. Place the sealed container with the mixture, and the empty drug containers, in the trash.    · If you use a medication that is in the form of a patch, dispose of used patches by folding them in half so that the sticky sides meet, and then flushing them down a toilet. They should not be placed in the household trash where children or pets can find them. · If you have any questions, ask your provider or pharmacist for more information. · Be sure to keep all appointments for provider visits or tests. We are committed to providing you with the best care possible. In order to help us achieve these goals please remember to bring all medications, herbal products, and over the counter supplements with you to each visit. If your provider has ordered testing for you, please be sure to follow up with our office if you have not received results within 7 days after the testing took place. *If you receive a survey after visiting one of our offices, please take time to share your experience concerning your physician office visit. These surveys are confidential and no health information about you is shared. We are eager to improve for you and we are counting on your feedback to help make that happen. Personalized Preventive Plan for Maritza Smith - 5/29/2019  Medicare offers a range of preventive health benefits. Some of the tests and screenings are paid in full while other may be subject to a deductible, co-insurance, and/or copay. Some of these benefits include a comprehensive review of your medical history including lifestyle, illnesses that may run in your family, and various assessments and screenings as appropriate. After reviewing your medical record and screening and assessments performed today your provider may have ordered immunizations, labs, imaging, and/or referrals for you. A list of these orders (if applicable) as well as your Preventive Care list are included within your After Visit Summary for your review.     Other Preventive Recommendations:    · A preventive eye exam performed by an eye specialist is recommended every 1-2 years to screen for glaucoma; cataracts, macular degeneration, and other eye disorders. · A preventive dental visit is recommended every 6 months. · Try to get at least 150 minutes of exercise per week or 10,000 steps per day on a pedometer . · Order or download the FREE \"Exercise & Physical Activity: Your Everyday Guide\" from [employer]. Call 5-292.131.1437 or search [employer] online. · You need 2696-0938 mg of calcium and 2820-1938 IU of vitamin D per day. It is possible to meet your calcium requirement with diet alone, but a vitamin D supplement is usually necessary to meet this goal.  · When exposed to the sun, use a sunscreen that protects against both UVA and UVB radiation with an SPF of 30 or greater. Reapply every 2 to 3 hours or after sweating, drying off with a towel, or swimming. · Always wear a seat belt when traveling in a car. Always wear a helmet when riding a bicycle or motorcycle.

## 2019-05-30 ENCOUNTER — TELEPHONE (OUTPATIENT)
Dept: PRIMARY CARE CLINIC | Age: 69
End: 2019-05-30

## 2019-05-30 ENCOUNTER — HOSPITAL ENCOUNTER (OUTPATIENT)
Dept: MAMMOGRAPHY | Facility: HOSPITAL | Age: 69
Discharge: HOME | End: 2019-05-30
Payer: MEDICARE

## 2019-05-30 ENCOUNTER — OFFICE VISIT (OUTPATIENT)
Dept: SURGERY | Facility: CLINIC | Age: 69
End: 2019-05-30

## 2019-05-30 ENCOUNTER — HOSPITAL ENCOUNTER (OUTPATIENT)
Dept: GENERAL RADIOLOGY | Facility: HOSPITAL | Age: 69
Discharge: HOME | End: 2019-05-30
Payer: MEDICARE

## 2019-05-30 ENCOUNTER — HOSPITAL ENCOUNTER (OUTPATIENT)
Dept: ULTRASOUND IMAGING | Facility: HOSPITAL | Age: 69
Discharge: HOME | End: 2019-05-30
Payer: MEDICARE

## 2019-05-30 VITALS
BODY MASS INDEX: 41.6 KG/M2 | HEIGHT: 58 IN | TEMPERATURE: 97.6 F | SYSTOLIC BLOOD PRESSURE: 112 MMHG | HEART RATE: 74 BPM | OXYGEN SATURATION: 96 % | WEIGHT: 198.19 LBS | DIASTOLIC BLOOD PRESSURE: 58 MMHG

## 2019-05-30 DIAGNOSIS — M54.41 CHRONIC RIGHT-SIDED LOW BACK PAIN WITH RIGHT-SIDED SCIATICA: ICD-10-CM

## 2019-05-30 DIAGNOSIS — D12.2 ADENOMATOUS POLYP OF ASCENDING COLON: ICD-10-CM

## 2019-05-30 DIAGNOSIS — N60.02 CYST OF LEFT BREAST: ICD-10-CM

## 2019-05-30 DIAGNOSIS — K22.70 BARRETT'S ESOPHAGUS DETERMINED BY BIOPSY: Primary | ICD-10-CM

## 2019-05-30 DIAGNOSIS — Z78.0 POST-MENOPAUSAL: ICD-10-CM

## 2019-05-30 DIAGNOSIS — G89.29 CHRONIC RIGHT-SIDED LOW BACK PAIN WITH RIGHT-SIDED SCIATICA: ICD-10-CM

## 2019-05-30 DIAGNOSIS — K21.00 GASTROESOPHAGEAL REFLUX DISEASE WITH ESOPHAGITIS: ICD-10-CM

## 2019-05-30 PROCEDURE — 77066 DX MAMMO INCL CAD BI: CPT

## 2019-05-30 PROCEDURE — 76642 ULTRASOUND BREAST LIMITED: CPT

## 2019-05-30 PROCEDURE — 99212 OFFICE O/P EST SF 10 MIN: CPT | Performed by: SURGERY

## 2019-05-30 PROCEDURE — 77080 DXA BONE DENSITY AXIAL: CPT

## 2019-05-30 NOTE — PROGRESS NOTES
Patient: Mingo Guidry    YOB: 1950    Date: 05/30/2019    Primary Care Provider: Luz Prater APRN    Reason for Consultation: Follow-up colonoscopy and EGD    Chief Complaint   Patient presents with   • Follow-up     EGD and colonoscopy       History of present illness:  I saw the patient in the office today as a followup from their recent colonoscopy with polypectomy and EGD, the pathology report did show reactive gastropathy, Mg's esophagus, and tubular adenomas.  They state that they have done well and are having no complaints.  Patient on Zantac daily, controls her reflux symptoms.  And concerned about a serrated adenoma and a large polyp in the cecum.    The following portions of the patient's history were reviewed and updated as appropriate: allergies, current medications, past family history, past medical history, past social history, past surgical history and problem list.      Review of Systems   Constitutional: Negative for chills, fatigue and fever.   Respiratory: Negative for cough.    Cardiovascular: Negative for chest pain.   Gastrointestinal: Negative for abdominal pain, diarrhea, nausea and vomiting.       Allergies:  Allergies   Allergen Reactions   • Shellfish-Derived Products Hives   • Wheat Bran Hives       Medications:    Current Outpatient Medications:   •  ADVAIR DISKUS 250-50 MCG/DOSE DISKUS, Inhale 1 puff 2 (Two) Times a Day., Disp: 60 each, Rfl: 5  •  allopurinol (ZYLOPRIM) 300 MG tablet, Take 300 mg by mouth Daily. 1/2 tablet daily, Disp: , Rfl:   •  ASPIR-LOW 81 MG EC tablet, Take 81 mg by mouth Daily., Disp: , Rfl: 1  •  Blood Glucose Monitoring Suppl device, 1 each., Disp: , Rfl:   •  busPIRone (BUSPAR) 10 MG tablet, Take 10 mg by mouth 3 (Three) Times a Day., Disp: , Rfl:   •  Cholecalciferol (VITAMIN D-3) 5000 units tablet, Take 5,000 Units by mouth., Disp: , Rfl:   •  ezetimibe (ZETIA) 10 MG tablet, Take 10 mg by mouth Daily., Disp: , Rfl:   •   fenofibrate (TRICOR) 145 MG tablet, Take 145 mg by mouth Daily., Disp: , Rfl:   •  ferrous sulfate 325 (65 FE) MG tablet, Take 325 mg by mouth 3 (Three) Times a Day With Meals., Disp: , Rfl:   •  flunisolide (NASALIDE) 25 MCG/ACT (0.025%) solution nasal spray, INHALE 1 SPRAY EVERY 12 (TWELVE) HOURS., Disp: 25 mL, Rfl: 5  •  furosemide (LASIX) 40 MG tablet, Take 40 mg by mouth 2 (Two) Times a Day., Disp: , Rfl:   •  GNP CLEARLAX powder, MIX WITH LIQUID AND DRINK AS 1 DOSE, Disp: 238 g, Rfl: 0  •  HUMALOG 100 UNIT/ML injection, Inject 35 Units under the skin into the appropriate area as directed 3 (Three) Times a Day., Disp: , Rfl:   •  HYDROcodone-acetaminophen (NORCO) 5-325 MG per tablet, Take 1 tablet by mouth Daily., Disp: , Rfl:   •  ipratropium-albuterol (DUO-NEB) 0.5-2.5 mg/3 ml nebulizer, Take 3 mL by nebulization 4 (Four) Times a Day., Disp: 360 mL, Rfl: 2  •  Lactobacillus Acid-Pectin (ACIDOPHILUS/CITRUS PECTIN) tablet tablet, Take 1 tablet by mouth 2 (Two) Times a Day., Disp: , Rfl: 1  •  Loratadine (CLARITIN) 10 MG capsule, Take 1 tablet by mouth Daily., Disp: , Rfl:   •  Magnesium 400 MG capsule, Take  by mouth., Disp: , Rfl:   •  metoclopramide (REGLAN) 5 MG tablet, Take 5 mg by mouth 3 (Three) Times a Day., Disp: , Rfl:   •  metolazone (ZAROXOLYN) 5 MG tablet, Take 5 mg by mouth Daily As Needed., Disp: , Rfl:   •  metoprolol tartrate (LOPRESSOR) 25 MG tablet, Take 25 mg by mouth 2 (Two) Times a Day., Disp: , Rfl:   •  mometasone (NASONEX) 50 MCG/ACT nasal spray, 2 sprays into the nostril(s) as directed by provider Daily., Disp: , Rfl:   •  montelukast (SINGULAIR) 10 MG tablet, Take 10 mg by mouth Every Night., Disp: , Rfl:   •  O2 (OXYGEN), Inhale 3 L/min Daily., Disp: , Rfl:   •  omalizumab (XOLAIR) 150 MG injection, Inject 150 mg under the skin into the appropriate area as directed 1 (One) Time. 2 injections once a month, Disp: , Rfl:   •  OXYGEN-HELIUM IN, Inhale 3 L/min., Disp: , Rfl:   •  PATADAY  "0.2 % solution ophthalmic solution, Administer  to both eyes 2 (Two) Times a Day., Disp: , Rfl: 3  •  potassium chloride (K-DUR,KLOR-CON) 20 MEQ CR tablet, Take 20 mEq by mouth Daily., Disp: , Rfl: 2  •  predniSONE (DELTASONE) 20 MG tablet, TAKE TWO TABLETS BY MOUTH EVERY DAY FOR 5 DAYS, Disp: , Rfl: 0  •  PROAIR  (90 Base) MCG/ACT inhaler, INHALE 2 PUFFS EVERY 4 (FOUR) HOURS AS NEEDED FOR WHEEZING OR SHORTNESS OF AIR., Disp: 8.5 g, Rfl: 5  •  raNITIdine (ZANTAC) 300 MG tablet, Take 300 mg by mouth Daily., Disp: , Rfl:   •  sitaGLIPtin (JANUVIA) 100 MG tablet, Take 100 mg by mouth Daily. 1/2 tablet daily, Disp: , Rfl:   •  sucralfate (CARAFATE) 1 G tablet, Take 1 g by mouth 3 (Three) Times a Day., Disp: , Rfl:   •  tiotropium (SPIRIVA) 18 MCG per inhalation capsule, Place 1 capsule into inhaler and inhale Daily., Disp: 30 capsule, Rfl: 4  •  traMADol (ULTRAM) 50 MG tablet, Take 50 mg by mouth Every 6 (Six) Hours As Needed for moderate pain (4-6)., Disp: , Rfl:   •  TRESIBA FLEXTOUCH 100 UNIT/ML solution pen-injector injection, INJECT 15 UNITS EVERY MORNING AND 75 UNITS EVERY IN THE EVENING, Disp: , Rfl: 10  •  TRUE METRIX BLOOD GLUCOSE TEST test strip, , Disp: , Rfl: 3  •  ULTICARE INSULIN SYRINGE 31G X 5/16\" 1 ML misc, , Disp: , Rfl: 3  •  VASCEPA 1 g capsule capsule, , Disp: , Rfl:   •  vitamin D (ERGOCALCIFEROL) 71643 units capsule capsule, Take 50,000 Units by mouth 1 (One) Time Per Week., Disp: , Rfl:     Current Facility-Administered Medications:   •  methylPREDNISolone acetate (DEPO-medrol) injection 80 mg, 80 mg, Intramuscular, Once, Yadi Davis APRN    Vital Signs:  Vitals:    05/30/19 0858   BP: 112/58   Pulse: 74   Temp: 97.6 °F (36.4 °C)   TempSrc: Temporal   SpO2: 96%   Weight: 89.9 kg (198 lb 3.1 oz)   Height: 147.3 cm (57.99\")       Physical Exam:   General Appearance:    Alert, cooperative, in no acute distress   Abdomen:     no masses, no organomegaly, soft and tender in " epigastric region without rebound.   Chest:      Clear to ausculation            Cor:     Regular rate and rhythm    Results Review:   I reviewed the patient's new clinical results.    Assessment / Plan:    1. Mg's esophagus determined by biopsy    2. Gastroesophageal reflux disease with esophagitis    3. Adenomatous polyp of ascending colon        I did discuss the situation with the patient today in the office and they have done well from their recent colonoscopy with polypectomy.  I have released the patient back to normal activity.  I need to see the patient back in the office in 1 year and they will need to have repeat colonoscopy at that time.  Also will need repeat EGD in 3 years to follow-up on Mg's esophagitis.    Electronically signed by Skinny York MD  05/30/19    Portions of this note have been scribed for Skinny York MD by Michelle Milligan. 5/30/2019  9:06 AM

## 2019-05-30 NOTE — TELEPHONE ENCOUNTER
----- Message from JADE Latham sent at 5/30/2019  3:12 PM EDT -----  Stable nodule normal ultrasound

## 2019-05-30 NOTE — TELEPHONE ENCOUNTER
----- Message from JADE Clarke sent at 5/30/2019  3:12 PM EDT -----  Stable nodule normal ultrasound

## 2019-05-31 RX ORDER — HYDROCODONE BITARTRATE AND ACETAMINOPHEN 5; 325 MG/1; MG/1
TABLET ORAL
Qty: 30 TABLET | Refills: 0 | Status: SHIPPED | OUTPATIENT
Start: 2019-05-31 | End: 2019-06-19 | Stop reason: SDUPTHER

## 2019-06-07 ENCOUNTER — OFFICE VISIT (OUTPATIENT)
Dept: PRIMARY CARE CLINIC | Age: 69
End: 2019-06-07
Payer: MEDICARE

## 2019-06-07 VITALS
TEMPERATURE: 97.9 F | BODY MASS INDEX: 42.23 KG/M2 | SYSTOLIC BLOOD PRESSURE: 102 MMHG | RESPIRATION RATE: 16 BRPM | OXYGEN SATURATION: 91 % | HEIGHT: 58 IN | WEIGHT: 201.2 LBS | DIASTOLIC BLOOD PRESSURE: 64 MMHG | HEART RATE: 67 BPM

## 2019-06-07 DIAGNOSIS — J44.1 COPD WITH ACUTE EXACERBATION (HCC): Primary | ICD-10-CM

## 2019-06-07 DIAGNOSIS — R09.1 PLEURISY: ICD-10-CM

## 2019-06-07 DIAGNOSIS — J42 CHRONIC BRONCHITIS, UNSPECIFIED CHRONIC BRONCHITIS TYPE (HCC): ICD-10-CM

## 2019-06-07 PROCEDURE — G8926 SPIRO NO PERF OR DOC: HCPCS | Performed by: NURSE PRACTITIONER

## 2019-06-07 PROCEDURE — 99214 OFFICE O/P EST MOD 30 MIN: CPT | Performed by: NURSE PRACTITIONER

## 2019-06-07 PROCEDURE — 3023F SPIROM DOC REV: CPT | Performed by: NURSE PRACTITIONER

## 2019-06-07 PROCEDURE — 96372 THER/PROPH/DIAG INJ SC/IM: CPT | Performed by: NURSE PRACTITIONER

## 2019-06-07 PROCEDURE — G8427 DOCREV CUR MEDS BY ELIG CLIN: HCPCS | Performed by: NURSE PRACTITIONER

## 2019-06-07 PROCEDURE — 1123F ACP DISCUSS/DSCN MKR DOCD: CPT | Performed by: NURSE PRACTITIONER

## 2019-06-07 PROCEDURE — G8400 PT W/DXA NO RESULTS DOC: HCPCS | Performed by: NURSE PRACTITIONER

## 2019-06-07 PROCEDURE — 4040F PNEUMOC VAC/ADMIN/RCVD: CPT | Performed by: NURSE PRACTITIONER

## 2019-06-07 PROCEDURE — 1036F TOBACCO NON-USER: CPT | Performed by: NURSE PRACTITIONER

## 2019-06-07 PROCEDURE — 3017F COLORECTAL CA SCREEN DOC REV: CPT | Performed by: NURSE PRACTITIONER

## 2019-06-07 PROCEDURE — 1090F PRES/ABSN URINE INCON ASSESS: CPT | Performed by: NURSE PRACTITIONER

## 2019-06-07 PROCEDURE — G8417 CALC BMI ABV UP PARAM F/U: HCPCS | Performed by: NURSE PRACTITIONER

## 2019-06-07 PROCEDURE — 94640 AIRWAY INHALATION TREATMENT: CPT | Performed by: NURSE PRACTITIONER

## 2019-06-07 RX ORDER — GUAIFENESIN 100 MG/5ML
400 SYRUP ORAL 3 TIMES DAILY PRN
Qty: 355 ML | Refills: 0 | Status: SHIPPED | OUTPATIENT
Start: 2019-06-07 | End: 2019-06-19 | Stop reason: ALTCHOICE

## 2019-06-07 RX ORDER — DOXYCYCLINE HYCLATE 100 MG
100 TABLET ORAL 2 TIMES DAILY
Qty: 20 TABLET | Refills: 0 | Status: SHIPPED | OUTPATIENT
Start: 2019-06-07 | End: 2019-06-17

## 2019-06-07 RX ORDER — IPRATROPIUM BROMIDE AND ALBUTEROL SULFATE 2.5; .5 MG/3ML; MG/3ML
1 SOLUTION RESPIRATORY (INHALATION) EVERY 4 HOURS
Qty: 360 ML | Refills: 3 | Status: SHIPPED | OUTPATIENT
Start: 2019-06-07 | End: 2019-06-19 | Stop reason: SDUPTHER

## 2019-06-07 RX ORDER — METHYLPREDNISOLONE SODIUM SUCCINATE 40 MG/ML
80 INJECTION, POWDER, LYOPHILIZED, FOR SOLUTION INTRAMUSCULAR; INTRAVENOUS ONCE
Status: COMPLETED | OUTPATIENT
Start: 2019-06-07 | End: 2019-06-07

## 2019-06-07 RX ORDER — PREDNISONE 20 MG/1
20 TABLET ORAL 2 TIMES DAILY
Qty: 10 TABLET | Refills: 0 | Status: SHIPPED | OUTPATIENT
Start: 2019-06-07 | End: 2019-06-10 | Stop reason: SDUPTHER

## 2019-06-07 RX ORDER — IPRATROPIUM BROMIDE AND ALBUTEROL SULFATE 2.5; .5 MG/3ML; MG/3ML
1 SOLUTION RESPIRATORY (INHALATION) ONCE
Status: COMPLETED | OUTPATIENT
Start: 2019-06-07 | End: 2019-06-07

## 2019-06-07 RX ADMIN — METHYLPREDNISOLONE SODIUM SUCCINATE 80 MG: 40 INJECTION, POWDER, LYOPHILIZED, FOR SOLUTION INTRAMUSCULAR; INTRAVENOUS at 12:11

## 2019-06-07 RX ADMIN — IPRATROPIUM BROMIDE AND ALBUTEROL SULFATE 1 AMPULE: 2.5; .5 SOLUTION RESPIRATORY (INHALATION) at 12:10

## 2019-06-07 ASSESSMENT — ENCOUNTER SYMPTOMS
CONSTIPATION: 0
COLOR CHANGE: 0
CHEST TIGHTNESS: 0
SORE THROAT: 0
ABDOMINAL PAIN: 0
WHEEZING: 1
ABDOMINAL DISTENTION: 0
BACK PAIN: 0
SHORTNESS OF BREATH: 1
DIARRHEA: 0
COUGH: 1

## 2019-06-07 NOTE — PROGRESS NOTES
by mouth 2 times daily 60 capsule 3    metoclopramide (REGLAN) 5 MG tablet TAKE ONE TABLET BY MOUTH THREE TIMES A DAY 90 tablet 3    furosemide (LASIX) 40 MG tablet TAKE ONE TABLET BY MOUTH TWO TIMES A DAY 60 tablet 5    sucralfate (CARAFATE) 1 GM tablet TAKE ONE TABLET BY MOUTH THREE TIMES A DAY 90 tablet 3    FEROSUL 325 (65 Fe) MG tablet TAKE ONE TABLET BY MOUTH THREE TIMES A DAY WITH FOOD 90 tablet 3    montelukast (SINGULAIR) 10 MG tablet TAKE ONE TABLET BY MOUTH EVERY DAY 30 tablet 2    JANUVIA 100 MG tablet TAKE ONE TABLET BY MOUTH EVERY DAY 30 tablet 2    PATADAY 0.2 % SOLN ophthalmic solution PLACE 1 DROP INTO BOTH EYES 2 TIMES DAILY 2.5 mL 3    ezetimibe (ZETIA) 10 MG tablet TAKE ONE TABLET BY MOUTH EVERY DAY 90 tablet 3    ADVAIR DISKUS 250-50 MCG/DOSE AEPB INHALE ONE PUFF INTO THE LUNGS EVERY 12 HOURS 60 each 5    ASPIR-LOW 81 MG EC tablet TAKE ONE TABLET BY MOUTH EVERY DAY 30 tablet 0    vitamin D (ERGOCALCIFEROL) 49358 units CAPS capsule TAKE ONE CAPSULE BY MOUTH ONCE WEEKLY 4 capsule 4    OXYGEN Inhale 3 L/min into the lungs continuous      flunisolide (NASALIDE) 25 MCG/ACT (0.025%) SOLN Inhale 1 spray into the lungs      busPIRone (BUSPAR) 10 MG tablet TAKE ONE TABLET BY MOUTH THREE TIMES A DAY 90 tablet 5    metoprolol tartrate (LOPRESSOR) 25 MG tablet TAKE ONE TABLET BY MOUTH TWO TIMES A DAY 60 tablet 5    HUMALOG 100 UNIT/ML injection vial INJECT 35 UNITS THREE TIMES A DAY 40 mL 4    ipratropium-albuterol (DUONEB) 0.5-2.5 (3) MG/3ML SOLN nebulizer solution Inhale 3 mLs into the lungs every 4 hours 360 mL 3    TRUE METRIX BLOOD GLUCOSE TEST strip USE TO CHECK BLOOD SUGAR FOUR TIMES A  strip 3    SPIRIVA HANDIHALER 18 MCG inhalation capsule INHALE THE CONTENTS OF ONE CAPSULE INTO THE LUNGS ONCE DAILY 30 capsule 5    ranitidine (ZANTAC) 300 MG tablet Take 300 mg by mouth 2 times daily       potassium chloride (KLOR-CON M) 20 MEQ extended release tablet TAKE ONE TABLET BY no cervical adenopathy. Neurological: She is alert and oriented to person, place, and time. Psychiatric: She has a normal mood and affect. Her behavior is normal.   Nursing note and vitals reviewed. Lab Results   Component Value Date     04/24/2019    K 3.7 04/24/2019    CL 89 04/24/2019    CO2 33 04/24/2019    GLUCOSE 292 04/24/2019    BUN 60 04/24/2019    CREATININE 2.0 04/24/2019    CALCIUM 10.0 04/24/2019    PROT 7.0 04/24/2019    LABALBU 4.3 04/24/2019    BILITOT 0.4 04/24/2019    ALT 49 04/24/2019    AST 50 04/24/2019       Hemoglobin A1C (%)   Date Value   04/24/2019 10.1 (H)     Microalbumin, Random Urine (mg/dL)   Date Value   04/23/2019 <1.20     LDL Calculated (mg/dL)   Date Value   04/24/2019 see below         Lab Results   Component Value Date    WBC 11.0 04/24/2019    NEUTROABS 10.4 01/04/2018    HGB 14.7 04/24/2019    HCT 45.4 04/24/2019    .2 04/24/2019     04/24/2019       Lab Results   Component Value Date    TSH 3.49 04/24/2019         ASSESSMENT/PLAN:     1. COPD with acute exacerbation (HCC)  Neb, IM steroid. Start oral antibiotic and steroids. Rx sent in for neb refills. CXR if no improvement or worsening despite antibiotics and pt to call if not better. Take medications as prescribed. Continue home medications. Return to clinic or go to ED if S&S worsen or no improvement noted. Patient verbalized understanding.     - predniSONE (DELTASONE) 20 MG tablet; Take 1 tablet by mouth 2 times daily for 5 days  Dispense: 10 tablet; Refill: 0  - doxycycline hyclate (VIBRA-TABS) 100 MG tablet; Take 1 tablet by mouth 2 times daily for 10 days  Dispense: 20 tablet; Refill: 0  - guaiFENesin (ALTARUSSIN) 100 MG/5ML syrup; Take 20 mLs by mouth 3 times daily as needed for Cough or Congestion  Dispense: 355 mL; Refill: 0    2. Pleurisy  Related to coughing. Oral steroids. Return to clinic or go to ED if S&S worsen or no improvement noted.  Patient verbalized understanding.     - predniSONE (DELTASONE) 20 MG tablet; Take 1 tablet by mouth 2 times daily for 5 days  Dispense: 10 tablet; Refill: 0    3. Chronic bronchitis, unspecified chronic bronchitis type ([facility]  See #1.    - ipratropium-albuterol (DUONEB) 0.5-2.5 (3) MG/3ML SOLN nebulizer solution;  Inhale 3 mLs into the lungs every 4 hours  Dispense: 360 mL; Refill: 3        Orders Placed This Encounter   Medications    ipratropium-albuterol (DUONEB) nebulizer solution 1 ampule    methylPREDNISolone sodium (SOLU-MEDROL) injection 80 mg    predniSONE (DELTASONE) 20 MG tablet     Sig: Take 1 tablet by mouth 2 times daily for 5 days     Dispense:  10 tablet     Refill:  0    doxycycline hyclate (VIBRA-TABS) 100 MG tablet     Sig: Take 1 tablet by mouth 2 times daily for 10 days     Dispense:  20 tablet     Refill:  0    guaiFENesin (ALTARUSSIN) 100 MG/5ML syrup     Sig: Take 20 mLs by mouth 3 times daily as needed for Cough or Congestion     Dispense:  355 mL     Refill:  0    ipratropium-albuterol (DUONEB) 0.5-2.5 (3) MG/3ML SOLN nebulizer solution     Sig: Inhale 3 mLs into the lungs every 4 hours     Dispense:  360 mL     Refill:  3

## 2019-06-07 NOTE — PROGRESS NOTES
Chief Complaint   Patient presents with    Cough    Shortness of Breath         Have you seen any other physician or provider since your last visit no    Have you had any other diagnostic tests since your last visit? no    Have you changed or stopped any medications since your last visit? no   Goals      Exercise 3x per week (30 min per time)      Reduce calorie intake to 1800 calories per day      Reduce fat intake       Weight < 125 lb (56.7 kg)         Patient c/o SOB and productive cough of yellow/green sputum. Onset was 3 weeks ago and getting worse. She has pain in the right upper back behind her lung when she coughs. She has wheezing and then she will have a tightness.

## 2019-06-09 ENCOUNTER — HOSPITAL ENCOUNTER (INPATIENT)
Facility: HOSPITAL | Age: 69
LOS: 6 days | Discharge: HOME | End: 2019-06-15
Attending: EMERGENCY MEDICINE | Admitting: INTERNAL MEDICINE

## 2019-06-09 ENCOUNTER — APPOINTMENT (OUTPATIENT)
Dept: CT IMAGING | Facility: HOSPITAL | Age: 69
End: 2019-06-09

## 2019-06-09 DIAGNOSIS — R73.9 HYPERGLYCEMIA: ICD-10-CM

## 2019-06-09 DIAGNOSIS — K92.2 ACUTE GI BLEEDING: Primary | ICD-10-CM

## 2019-06-09 LAB
ALBUMIN SERPL-MCNC: 3.9 G/DL (ref 3.5–5)
ALBUMIN/GLOB SERPL: 1.3 G/DL (ref 1–2)
ALP SERPL-CCNC: 69 U/L (ref 38–126)
ALT SERPL W P-5'-P-CCNC: 51 U/L (ref 13–69)
ANION GAP SERPL CALCULATED.3IONS-SCNC: 16.6 MMOL/L (ref 10–20)
AST SERPL-CCNC: 46 U/L (ref 15–46)
BASOPHILS # BLD AUTO: 0.05 10*3/MM3 (ref 0–0.2)
BASOPHILS NFR BLD AUTO: 0.4 % (ref 0–1.5)
BILIRUB SERPL-MCNC: 0.3 MG/DL (ref 0.2–1.3)
BUN BLD-MCNC: 64 MG/DL (ref 7–20)
BUN/CREAT SERPL: 42.7 (ref 7.1–23.5)
CALCIUM SPEC-SCNC: 8.7 MG/DL (ref 8.4–10.2)
CHLORIDE SERPL-SCNC: 89 MMOL/L (ref 98–107)
CO2 SERPL-SCNC: 34 MMOL/L (ref 26–30)
CREAT BLD-MCNC: 1.5 MG/DL (ref 0.6–1.3)
DEPRECATED RDW RBC AUTO: 51 FL (ref 37–54)
EOSINOPHIL # BLD AUTO: 0 10*3/MM3 (ref 0–0.4)
EOSINOPHIL NFR BLD AUTO: 0 % (ref 0.3–6.2)
ERYTHROCYTE [DISTWIDTH] IN BLOOD BY AUTOMATED COUNT: 13.5 % (ref 12.3–15.4)
GFR SERPL CREATININE-BSD FRML MDRD: 35 ML/MIN/1.73
GLOBULIN UR ELPH-MCNC: 2.9 GM/DL
GLUCOSE BLD-MCNC: 509 MG/DL (ref 74–98)
HCT VFR BLD AUTO: 36.4 % (ref 34–46.6)
HEMOCCULT STL QL: POSITIVE
HGB BLD-MCNC: 12.1 G/DL (ref 12–15.9)
IMM GRANULOCYTES # BLD AUTO: 0.05 10*3/MM3 (ref 0–0.05)
IMM GRANULOCYTES NFR BLD AUTO: 0.4 % (ref 0–0.5)
INR PPP: 0.97 (ref 0.9–1.1)
LYMPHOCYTES # BLD AUTO: 1.8 10*3/MM3 (ref 0.7–3.1)
LYMPHOCYTES NFR BLD AUTO: 14.8 % (ref 19.6–45.3)
MCH RBC QN AUTO: 33.6 PG (ref 26.6–33)
MCHC RBC AUTO-ENTMCNC: 33.2 G/DL (ref 31.5–35.7)
MCV RBC AUTO: 101.1 FL (ref 79–97)
MONOCYTES # BLD AUTO: 0.54 10*3/MM3 (ref 0.1–0.9)
MONOCYTES NFR BLD AUTO: 4.4 % (ref 5–12)
NEUTROPHILS # BLD AUTO: 9.72 10*3/MM3 (ref 1.7–7)
NEUTROPHILS NFR BLD AUTO: 80 % (ref 42.7–76)
NRBC BLD AUTO-RTO: 0 /100 WBC (ref 0–0.2)
PLATELET # BLD AUTO: 354 10*3/MM3 (ref 140–450)
PMV BLD AUTO: 11 FL (ref 6–12)
POTASSIUM BLD-SCNC: 4.6 MMOL/L (ref 3.5–5.1)
PROT SERPL-MCNC: 6.8 G/DL (ref 6.3–8.2)
PROTHROMBIN TIME: 13.2 SECONDS (ref 12–15.1)
RBC # BLD AUTO: 3.6 10*6/MM3 (ref 3.77–5.28)
SODIUM BLD-SCNC: 135 MMOL/L (ref 137–145)
WBC NRBC COR # BLD: 12.16 10*3/MM3 (ref 3.4–10.8)

## 2019-06-09 PROCEDURE — 85610 PROTHROMBIN TIME: CPT | Performed by: EMERGENCY MEDICINE

## 2019-06-09 PROCEDURE — 82272 OCCULT BLD FECES 1-3 TESTS: CPT | Performed by: EMERGENCY MEDICINE

## 2019-06-09 PROCEDURE — 80053 COMPREHEN METABOLIC PANEL: CPT | Performed by: EMERGENCY MEDICINE

## 2019-06-09 PROCEDURE — 99284 EMERGENCY DEPT VISIT MOD MDM: CPT

## 2019-06-09 PROCEDURE — 74176 CT ABD & PELVIS W/O CONTRAST: CPT

## 2019-06-09 PROCEDURE — 85025 COMPLETE CBC W/AUTO DIFF WBC: CPT | Performed by: EMERGENCY MEDICINE

## 2019-06-09 RX ORDER — PANTOPRAZOLE SODIUM 40 MG/10ML
80 INJECTION, POWDER, LYOPHILIZED, FOR SOLUTION INTRAVENOUS ONCE
Status: COMPLETED | OUTPATIENT
Start: 2019-06-09 | End: 2019-06-09

## 2019-06-09 RX ORDER — SODIUM CHLORIDE 0.9 % (FLUSH) 0.9 %
10 SYRINGE (ML) INJECTION AS NEEDED
Status: DISCONTINUED | OUTPATIENT
Start: 2019-06-09 | End: 2019-06-15 | Stop reason: HOSPADM

## 2019-06-09 RX ADMIN — PANTOPRAZOLE SODIUM 80 MG: 40 INJECTION, POWDER, FOR SOLUTION INTRAVENOUS at 22:27

## 2019-06-09 RX ADMIN — SODIUM CHLORIDE 500 ML: 9 INJECTION, SOLUTION INTRAVENOUS at 21:21

## 2019-06-09 RX ADMIN — SODIUM CHLORIDE 8 MG/HR: 9 INJECTION, SOLUTION INTRAVENOUS at 22:27

## 2019-06-10 ENCOUNTER — ANESTHESIA (OUTPATIENT)
Dept: GASTROENTEROLOGY | Facility: HOSPITAL | Age: 69
End: 2019-06-10

## 2019-06-10 ENCOUNTER — ANESTHESIA EVENT (OUTPATIENT)
Dept: GASTROENTEROLOGY | Facility: HOSPITAL | Age: 69
End: 2019-06-10

## 2019-06-10 DIAGNOSIS — R09.1 PLEURISY: ICD-10-CM

## 2019-06-10 DIAGNOSIS — J44.1 COPD WITH ACUTE EXACERBATION (HCC): ICD-10-CM

## 2019-06-10 LAB
ABO GROUP BLD: NORMAL
ABO GROUP BLD: NORMAL
ANION GAP SERPL CALCULATED.3IONS-SCNC: 13.2 MMOL/L (ref 10–20)
BLD GP AB SCN SERPL QL: NEGATIVE
BUN BLD-MCNC: 65 MG/DL (ref 7–20)
BUN/CREAT SERPL: 38.2 (ref 7.1–23.5)
CALCIUM SPEC-SCNC: 8.1 MG/DL (ref 8.4–10.2)
CHLORIDE SERPL-SCNC: 96 MMOL/L (ref 98–107)
CO2 SERPL-SCNC: 35 MMOL/L (ref 26–30)
CREAT BLD-MCNC: 1.7 MG/DL (ref 0.6–1.3)
DEPRECATED RDW RBC AUTO: 50.8 FL (ref 37–54)
ERYTHROCYTE [DISTWIDTH] IN BLOOD BY AUTOMATED COUNT: 13.9 % (ref 12.3–15.4)
GFR SERPL CREATININE-BSD FRML MDRD: 30 ML/MIN/1.73
GLUCOSE BLD-MCNC: 281 MG/DL (ref 74–98)
GLUCOSE BLDC GLUCOMTR-MCNC: 165 MG/DL (ref 70–130)
GLUCOSE BLDC GLUCOMTR-MCNC: 180 MG/DL (ref 70–130)
GLUCOSE BLDC GLUCOMTR-MCNC: 254 MG/DL (ref 70–130)
GLUCOSE BLDC GLUCOMTR-MCNC: 295 MG/DL (ref 70–130)
GLUCOSE BLDC GLUCOMTR-MCNC: 300 MG/DL (ref 70–130)
GLUCOSE BLDC GLUCOMTR-MCNC: 400 MG/DL (ref 70–130)
HCT VFR BLD AUTO: 28.6 % (ref 34–46.6)
HCT VFR BLD AUTO: 29.2 % (ref 34–46.6)
HCT VFR BLD AUTO: 31.9 % (ref 34–46.6)
HCT VFR BLD AUTO: 33.5 % (ref 34–46.6)
HGB BLD-MCNC: 10.3 G/DL (ref 12–15.9)
HGB BLD-MCNC: 10.8 G/DL (ref 12–15.9)
HGB BLD-MCNC: 9.5 G/DL (ref 12–15.9)
HGB BLD-MCNC: 9.7 G/DL (ref 12–15.9)
MCH RBC QN AUTO: 33.4 PG (ref 26.6–33)
MCHC RBC AUTO-ENTMCNC: 33.2 G/DL (ref 31.5–35.7)
MCV RBC AUTO: 100.7 FL (ref 79–97)
PLATELET # BLD AUTO: 331 10*3/MM3 (ref 140–450)
PMV BLD AUTO: 11.2 FL (ref 6–12)
POTASSIUM BLD-SCNC: 4.2 MMOL/L (ref 3.5–5.1)
RBC # BLD AUTO: 2.9 10*6/MM3 (ref 3.77–5.28)
RH BLD: NEGATIVE
RH BLD: NEGATIVE
SODIUM BLD-SCNC: 140 MMOL/L (ref 137–145)
T&S EXPIRATION DATE: NORMAL
WBC NRBC COR # BLD: 16.11 10*3/MM3 (ref 3.4–10.8)

## 2019-06-10 PROCEDURE — 86920 COMPATIBILITY TEST SPIN: CPT

## 2019-06-10 PROCEDURE — 25010000002 ONDANSETRON PER 1 MG: Performed by: INTERNAL MEDICINE

## 2019-06-10 PROCEDURE — 99223 1ST HOSP IP/OBS HIGH 75: CPT | Performed by: INTERNAL MEDICINE

## 2019-06-10 PROCEDURE — 63710000001 INSULIN DETEMIR PER 5 UNITS: Performed by: NURSE PRACTITIONER

## 2019-06-10 PROCEDURE — 63710000001 INSULIN ASPART PER 5 UNITS: Performed by: INTERNAL MEDICINE

## 2019-06-10 PROCEDURE — 94799 UNLISTED PULMONARY SVC/PX: CPT

## 2019-06-10 PROCEDURE — 99221 1ST HOSP IP/OBS SF/LOW 40: CPT | Performed by: SURGERY

## 2019-06-10 PROCEDURE — 85014 HEMATOCRIT: CPT | Performed by: NURSE PRACTITIONER

## 2019-06-10 PROCEDURE — 94640 AIRWAY INHALATION TREATMENT: CPT

## 2019-06-10 PROCEDURE — 25010000002 FUROSEMIDE PER 20 MG: Performed by: NURSE PRACTITIONER

## 2019-06-10 PROCEDURE — 63710000001 INSULIN REGULAR HUMAN PER 5 UNITS: Performed by: INTERNAL MEDICINE

## 2019-06-10 PROCEDURE — 86900 BLOOD TYPING SEROLOGIC ABO: CPT | Performed by: NURSE PRACTITIONER

## 2019-06-10 PROCEDURE — 86901 BLOOD TYPING SEROLOGIC RH(D): CPT

## 2019-06-10 PROCEDURE — 88305 TISSUE EXAM BY PATHOLOGIST: CPT | Performed by: SURGERY

## 2019-06-10 PROCEDURE — 85027 COMPLETE CBC AUTOMATED: CPT | Performed by: INTERNAL MEDICINE

## 2019-06-10 PROCEDURE — 36430 TRANSFUSION BLD/BLD COMPNT: CPT

## 2019-06-10 PROCEDURE — 86850 RBC ANTIBODY SCREEN: CPT | Performed by: NURSE PRACTITIONER

## 2019-06-10 PROCEDURE — 0DB68ZX EXCISION OF STOMACH, VIA NATURAL OR ARTIFICIAL OPENING ENDOSCOPIC, DIAGNOSTIC: ICD-10-PCS | Performed by: SURGERY

## 2019-06-10 PROCEDURE — 86901 BLOOD TYPING SEROLOGIC RH(D): CPT | Performed by: NURSE PRACTITIONER

## 2019-06-10 PROCEDURE — 86900 BLOOD TYPING SEROLOGIC ABO: CPT

## 2019-06-10 PROCEDURE — 80048 BASIC METABOLIC PNL TOTAL CA: CPT | Performed by: INTERNAL MEDICINE

## 2019-06-10 PROCEDURE — 85014 HEMATOCRIT: CPT | Performed by: INTERNAL MEDICINE

## 2019-06-10 PROCEDURE — 82962 GLUCOSE BLOOD TEST: CPT

## 2019-06-10 PROCEDURE — 85018 HEMOGLOBIN: CPT | Performed by: NURSE PRACTITIONER

## 2019-06-10 PROCEDURE — 85018 HEMOGLOBIN: CPT | Performed by: INTERNAL MEDICINE

## 2019-06-10 PROCEDURE — 25010000002 PROPOFOL 10 MG/ML EMULSION: Performed by: NURSE ANESTHETIST, CERTIFIED REGISTERED

## 2019-06-10 PROCEDURE — P9016 RBC LEUKOCYTES REDUCED: HCPCS

## 2019-06-10 RX ORDER — ONDANSETRON 4 MG/1
4 TABLET, FILM COATED ORAL EVERY 6 HOURS PRN
Status: DISCONTINUED | OUTPATIENT
Start: 2019-06-10 | End: 2019-06-15 | Stop reason: HOSPADM

## 2019-06-10 RX ORDER — KETOTIFEN FUMARATE 0.35 MG/ML
1 SOLUTION/ DROPS OPHTHALMIC 2 TIMES DAILY
Status: DISCONTINUED | OUTPATIENT
Start: 2019-06-10 | End: 2019-06-15 | Stop reason: HOSPADM

## 2019-06-10 RX ORDER — SODIUM CHLORIDE 9 MG/ML
75 INJECTION, SOLUTION INTRAVENOUS CONTINUOUS
Status: DISCONTINUED | OUTPATIENT
Start: 2019-06-10 | End: 2019-06-11

## 2019-06-10 RX ORDER — SODIUM CHLORIDE 0.9 % (FLUSH) 0.9 %
3-10 SYRINGE (ML) INJECTION AS NEEDED
Status: DISCONTINUED | OUTPATIENT
Start: 2019-06-10 | End: 2019-06-15 | Stop reason: HOSPADM

## 2019-06-10 RX ORDER — ONDANSETRON 2 MG/ML
4 INJECTION INTRAMUSCULAR; INTRAVENOUS ONCE AS NEEDED
Status: ACTIVE | OUTPATIENT
Start: 2019-06-10 | End: 2019-06-10

## 2019-06-10 RX ORDER — TRAMADOL HYDROCHLORIDE 50 MG/1
50 TABLET ORAL 3 TIMES DAILY
Status: DISCONTINUED | OUTPATIENT
Start: 2019-06-10 | End: 2019-06-15 | Stop reason: HOSPADM

## 2019-06-10 RX ORDER — MONTELUKAST SODIUM 10 MG/1
10 TABLET ORAL NIGHTLY
Status: DISCONTINUED | OUTPATIENT
Start: 2019-06-10 | End: 2019-06-15 | Stop reason: HOSPADM

## 2019-06-10 RX ORDER — PROPOFOL 10 MG/ML
VIAL (ML) INTRAVENOUS AS NEEDED
Status: DISCONTINUED | OUTPATIENT
Start: 2019-06-10 | End: 2019-06-10 | Stop reason: SURG

## 2019-06-10 RX ORDER — ACETAMINOPHEN 325 MG/1
650 TABLET ORAL EVERY 4 HOURS PRN
Status: DISCONTINUED | OUTPATIENT
Start: 2019-06-10 | End: 2019-06-15 | Stop reason: HOSPADM

## 2019-06-10 RX ORDER — DOXYCYCLINE HYCLATE 100 MG/1
100 CAPSULE ORAL 2 TIMES DAILY
COMMUNITY
End: 2019-06-15 | Stop reason: HOSPADM

## 2019-06-10 RX ORDER — LIDOCAINE HYDROCHLORIDE 20 MG/ML
INJECTION, SOLUTION INFILTRATION; PERINEURAL AS NEEDED
Status: DISCONTINUED | OUTPATIENT
Start: 2019-06-10 | End: 2019-06-10 | Stop reason: SURG

## 2019-06-10 RX ORDER — BUSPIRONE HYDROCHLORIDE 5 MG/1
10 TABLET ORAL 3 TIMES DAILY
Status: DISCONTINUED | OUTPATIENT
Start: 2019-06-10 | End: 2019-06-15 | Stop reason: HOSPADM

## 2019-06-10 RX ORDER — METOCLOPRAMIDE 5 MG/1
5 TABLET ORAL 3 TIMES DAILY
Status: DISCONTINUED | OUTPATIENT
Start: 2019-06-10 | End: 2019-06-12

## 2019-06-10 RX ORDER — SODIUM CHLORIDE 0.9 % (FLUSH) 0.9 %
3 SYRINGE (ML) INJECTION EVERY 12 HOURS SCHEDULED
Status: DISCONTINUED | OUTPATIENT
Start: 2019-06-10 | End: 2019-06-15 | Stop reason: HOSPADM

## 2019-06-10 RX ORDER — FUROSEMIDE 10 MG/ML
20 INJECTION INTRAMUSCULAR; INTRAVENOUS ONCE
Status: COMPLETED | OUTPATIENT
Start: 2019-06-10 | End: 2019-06-10

## 2019-06-10 RX ORDER — BUDESONIDE AND FORMOTEROL FUMARATE DIHYDRATE 160; 4.5 UG/1; UG/1
2 AEROSOL RESPIRATORY (INHALATION)
Status: DISCONTINUED | OUTPATIENT
Start: 2019-06-10 | End: 2019-06-12

## 2019-06-10 RX ORDER — CHOLECALCIFEROL (VITAMIN D3) 125 MCG
5 CAPSULE ORAL NIGHTLY PRN
Status: DISCONTINUED | OUTPATIENT
Start: 2019-06-10 | End: 2019-06-15 | Stop reason: HOSPADM

## 2019-06-10 RX ORDER — DEXTROSE MONOHYDRATE 25 G/50ML
25 INJECTION, SOLUTION INTRAVENOUS
Status: DISCONTINUED | OUTPATIENT
Start: 2019-06-10 | End: 2019-06-15 | Stop reason: SDUPTHER

## 2019-06-10 RX ORDER — ONDANSETRON 2 MG/ML
4 INJECTION INTRAMUSCULAR; INTRAVENOUS EVERY 6 HOURS PRN
Status: DISCONTINUED | OUTPATIENT
Start: 2019-06-10 | End: 2019-06-15 | Stop reason: HOSPADM

## 2019-06-10 RX ORDER — IPRATROPIUM BROMIDE AND ALBUTEROL SULFATE 2.5; .5 MG/3ML; MG/3ML
3 SOLUTION RESPIRATORY (INHALATION)
Status: DISCONTINUED | OUTPATIENT
Start: 2019-06-10 | End: 2019-06-11

## 2019-06-10 RX ORDER — NICOTINE POLACRILEX 4 MG
1 LOZENGE BUCCAL
Status: DISCONTINUED | OUTPATIENT
Start: 2019-06-10 | End: 2019-06-15 | Stop reason: SDUPTHER

## 2019-06-10 RX ADMIN — MONTELUKAST 10 MG: 10 TABLET, FILM COATED ORAL at 20:12

## 2019-06-10 RX ADMIN — ONDANSETRON 4 MG: 2 INJECTION INTRAMUSCULAR; INTRAVENOUS at 08:29

## 2019-06-10 RX ADMIN — SODIUM CHLORIDE 8 MG/HR: 9 INJECTION, SOLUTION INTRAVENOUS at 15:17

## 2019-06-10 RX ADMIN — HUMAN INSULIN 4 UNITS: 100 INJECTION, SOLUTION SUBCUTANEOUS at 05:47

## 2019-06-10 RX ADMIN — PROPOFOL 50 MG: 10 INJECTION, EMULSION INTRAVENOUS at 11:13

## 2019-06-10 RX ADMIN — INSULIN DETEMIR 10 UNITS: 100 INJECTION, SOLUTION SUBCUTANEOUS at 09:21

## 2019-06-10 RX ADMIN — TRAMADOL HYDROCHLORIDE 50 MG: 50 TABLET, FILM COATED ORAL at 20:12

## 2019-06-10 RX ADMIN — HUMAN INSULIN 2 UNITS: 100 INJECTION, SOLUTION SUBCUTANEOUS at 16:50

## 2019-06-10 RX ADMIN — INSULIN ASPART 6 UNITS: 100 INJECTION, SOLUTION INTRAVENOUS; SUBCUTANEOUS at 02:01

## 2019-06-10 RX ADMIN — METOCLOPRAMIDE 5 MG: 5 TABLET ORAL at 20:12

## 2019-06-10 RX ADMIN — MONTELUKAST 10 MG: 10 TABLET, FILM COATED ORAL at 02:01

## 2019-06-10 RX ADMIN — BUDESONIDE AND FORMOTEROL FUMARATE DIHYDRATE 2 PUFF: 160; 4.5 AEROSOL RESPIRATORY (INHALATION) at 19:18

## 2019-06-10 RX ADMIN — SODIUM CHLORIDE 75 ML/HR: 9 INJECTION, SOLUTION INTRAVENOUS at 03:15

## 2019-06-10 RX ADMIN — BUDESONIDE AND FORMOTEROL FUMARATE DIHYDRATE 2 PUFF: 160; 4.5 AEROSOL RESPIRATORY (INHALATION) at 07:15

## 2019-06-10 RX ADMIN — IPRATROPIUM BROMIDE AND ALBUTEROL SULFATE 3 ML: .5; 3 SOLUTION RESPIRATORY (INHALATION) at 07:15

## 2019-06-10 RX ADMIN — SODIUM CHLORIDE, PRESERVATIVE FREE 3 ML: 5 INJECTION INTRAVENOUS at 03:16

## 2019-06-10 RX ADMIN — LIDOCAINE HYDROCHLORIDE 100 MG: 20 INJECTION, SOLUTION INFILTRATION; PERINEURAL at 11:09

## 2019-06-10 RX ADMIN — SODIUM CHLORIDE 8 MG/HR: 9 INJECTION, SOLUTION INTRAVENOUS at 23:22

## 2019-06-10 RX ADMIN — SODIUM CHLORIDE 8 MG/HR: 9 INJECTION, SOLUTION INTRAVENOUS at 03:42

## 2019-06-10 RX ADMIN — SODIUM CHLORIDE 8 MG/HR: 9 INJECTION, SOLUTION INTRAVENOUS at 18:07

## 2019-06-10 RX ADMIN — IPRATROPIUM BROMIDE AND ALBUTEROL SULFATE 3 ML: .5; 3 SOLUTION RESPIRATORY (INHALATION) at 15:20

## 2019-06-10 RX ADMIN — IPRATROPIUM BROMIDE AND ALBUTEROL SULFATE 3 ML: .5; 3 SOLUTION RESPIRATORY (INHALATION) at 19:18

## 2019-06-10 RX ADMIN — FUROSEMIDE 20 MG: 10 INJECTION, SOLUTION INTRAMUSCULAR; INTRAVENOUS at 16:51

## 2019-06-10 RX ADMIN — SODIUM CHLORIDE, PRESERVATIVE FREE 3 ML: 5 INJECTION INTRAVENOUS at 20:18

## 2019-06-10 RX ADMIN — KETOTIFEN FUMARATE 1 DROP: 0.35 SOLUTION/ DROPS OPHTHALMIC at 08:32

## 2019-06-10 RX ADMIN — HUMAN INSULIN 5 UNITS: 100 INJECTION, SOLUTION SUBCUTANEOUS at 09:13

## 2019-06-10 RX ADMIN — KETOTIFEN FUMARATE 1 DROP: 0.35 SOLUTION/ DROPS OPHTHALMIC at 20:38

## 2019-06-10 RX ADMIN — BUSPIRONE HYDROCHLORIDE 10 MG: 5 TABLET ORAL at 20:12

## 2019-06-10 RX ADMIN — HUMAN INSULIN 2 UNITS: 100 INJECTION, SOLUTION SUBCUTANEOUS at 20:38

## 2019-06-10 NOTE — PROGRESS NOTES
Continued Stay Note   Gene     Patient Name: Mingo Guidry  MRN: [id number]  Today's Date: 6/10/2019    Admit Date: 6/9/2019    Discharge Plan     Row Name 06/10/19 1416       Plan    Plan Spoke to pt in room.  Has no POA or LW, states has information on LW given to her at Dr's office.  Lives alone with her dog in 1 story house has 5 steps to climb to porch but has rails on both sides.  Address and phone # is correct. Has a Bipap and home o2.  Wears o2 a 3 liters at rest and 4 liters with actiivity.  Has no HH. No other medical equipment.  Plans to go home at discharge. Has transportation.  Denies discharge needs.          Discharge Codes    No documentation.       Expected Discharge Date and Time     Expected Discharge Date Expected Discharge Time    Jun 12, 2019             Roselyn Gracia

## 2019-06-10 NOTE — CONSULTS
Mingo Guidry    1950    Primary Care Provider: Luz Prater APRN    Chief Complaint   Patient presents with   • Black or Bloody Stool       SUBJECTIVE:    History of present illness: 68-year-old female who is 18 days status post EGD and colonoscopy where she was found to have significant gastritis and Mg's esophagitis comes in with melena and dark stools.  Hemoglobin initially was stable within dropped to 9.7 from a preop level of 12.1.  Patient on aspirin daily, no other blood thinners.  No epigastric pain or nausea.  No bright red blood per rectum.  No weight loss or significant anemia.    Review of Systems:  Constitutional:  Negative for chills, fever, and unexpected weight change.  HENT: Negative for trouble swallowing and voice change.  Eyes:  Negative for visual disturbance.  Respiratory:  Negative for apnea, cough, chest tightness, shortness of breath, and wheezing.  Cardiovascular:  Negative for chest pain, palpitations, and leg swelling.  Gastrointestinal:  Negative for abdominal distention, abdominal pain, anal bleeding, positive for blood in stool, constipation, diarrhea, nausea, but no rectal pain, and vomiting.  Musculoskeletal:  Negative for back pain, gait problem, and joint swelling.  Skin:  Negative for color change, rash, and wound  Neurological:  Negative for dizziness, syncope, speech difficulty, weakness, numbness, and headaches.  Hematological:  Negative for adenopathy.  Does not bruise/bleed easily.  Psychiatric/Behavioral:  Negative for confusion.  The patient is not nervous/anxious.        History:    Past Medical History:   Diagnosis Date   • Acid reflux 10/17/2016   • Asthma     bronchial   • Cancer (CMS/HCC)     uterine   • Chronic diastolic heart failure (CMS/HCC) 10/17/2016    Normal dobutamine echocardiogram stress, 04/07/2005. Echocardiogram on 05/08/2009:  EF 50% to 55%. Left atrium 3.5 cm.   • Chronic obstructive pulmonary disease (CMS/HCC) 10/17/2016    asthmatic  bronchitis, on O2 use chronically.     • Gout 10/17/2016   • High cholesterol 2018   • Hypertension 10/17/2016    Benign hypertension.   • Lower extremity edema 10/17/2016    Chronic lower extremity edema/venous insufficiency.   • Murmur, heart    • Obesity 10/17/2016   • Seasonal allergies 10/17/2016   • Type 2 diabetes mellitus (CMS/HCC) 10/17/2016    insulin dependent.       Past Surgical History:   Procedure Laterality Date   • BRONCHOSCOPY N/A 2018    Procedure: BRONCHOSCOPY WITH WASHINGS;  Surgeon: Ryder Dahl MD;  Location: [facility];  Service:    •  SECTION     • COLONOSCOPY     • CYSTECTOMY Right     neck   • DILATATION AND CURETTAGE     • EYE SURGERY Bilateral     cataract   • HYSTERECTOMY     • LUNG SURGERY Left 1997 tumors removed from left lung-benign   • TONSILLECTOMY AND ADENOIDECTOMY         Family History   Problem Relation Age of Onset   • Heart disease Mother    • Heart disease Father    • Leukemia Father    • Diabetes Sister    • Diabetes Brother    • COPD Sister        Social History     Socioeconomic History   • Marital status:      Spouse name: Not on file   • Number of children: Not on file   • Years of education: Not on file   • Highest education level: Not on file   Tobacco Use   • Smoking status: Former Smoker     Packs/day: 1.50     Years: 35.00     Pack years: 52.50     Types: Cigarettes     Last attempt to quit:      Years since quittin.4   • Smokeless tobacco: Never Used   Substance and Sexual Activity   • Alcohol use: No   • Drug use: No   • Sexual activity: Defer       Allergies:  Allergies   Allergen Reactions   • Shellfish-Derived Products Hives   • Wheat Bran Hives       Medications:    Current Facility-Administered Medications:   •  acetaminophen (TYLENOL) tablet 650 mg, 650 mg, Oral, Q4H PRN, Eddie Benson MD  •  budesonide-formoterol (SYMBICORT) 160-4.5 MCG/ACT inhaler 2 puff, 2 puff, Inhalation, BID - RT, Eddie Benson,  MD, 2 puff at 06/10/19 0715  •  busPIRone (BUSPAR) tablet 10 mg, 10 mg, Oral, TID, Eddie Benson MD  •  dextrose (D50W) 25 g/ 50mL Intravenous Solution 25 g, 25 g, Intravenous, Q15 Min PRN, Eddie Benson MD  •  dextrose (GLUTOSE) oral gel 1 tube, 1 tube, Oral, Q15 Min PRN, Eddie Benson MD  •  furosemide (LASIX) injection 20 mg, 20 mg, Intravenous, Once, Deb Garcia, APRN  •  glucagon (human recombinant) (GLUCAGEN DIAGNOSTIC) injection 1 mg, 1 mg, Subcutaneous, PRN, Eddie Benson MD  •  insulin detemir (LEVEMIR) injection 10 Units, 10 Units, Subcutaneous, QAM, Deb Garcia, JACKI, 10 Units at 06/10/19 0921  •  insulin regular (humuLIN R,novoLIN R) injection 0-7 Units, 0-7 Units, Subcutaneous, Q4H, Eddie Benson MD, 5 Units at 06/10/19 0913  •  ipratropium-albuterol (DUO-NEB) nebulizer solution 3 mL, 3 mL, Nebulization, 4x Daily - RT, Eddie Benson MD, 3 mL at 06/10/19 0715  •  ketotifen (ZADITOR) 0.025 % ophthalmic solution 1 drop, 1 drop, Both Eyes, BID, Eddie Benson MD, 1 drop at 06/10/19 0832  •  melatonin tablet 5 mg, 5 mg, Oral, Nightly PRN, Eddie Benson MD  •  metoclopramide (REGLAN) tablet 5 mg, 5 mg, Oral, TID, Eddie Benson MD  •  montelukast (SINGULAIR) tablet 10 mg, 10 mg, Oral, Nightly, Eddie Benson MD, 10 mg at 06/10/19 0201  •  O2 (OXYGEN), 3 L/min, Inhalation, Daily, Eddie Benson MD, 3 L/min at 06/10/19 0913  •  ondansetron (ZOFRAN) tablet 4 mg, 4 mg, Oral, Q6H PRN **OR** ondansetron (ZOFRAN) injection 4 mg, 4 mg, Intravenous, Q6H PRN, Eddie Benson MD, 4 mg at 06/10/19 0829  •  [COMPLETED] pantoprazole (PROTONIX) injection 80 mg, 80 mg, Intravenous, Once, 80 mg at 06/09/19 2227 **AND** pantoprazole (PROTONIX) 40 mg in sodium chloride 0.9 % 100 mL (0.4 mg/mL) infusion, 8 mg/hr, Intravenous, Continuous, Jaron Monroe, DO, Last Rate: 20 mL/hr at 06/10/19 0342, 8 mg/hr at 06/10/19 0342  •  [COMPLETED] Insert peripheral IV, , , Once  **AND** sodium chloride 0.9 % flush 10 mL, 10 mL, Intravenous, PRN, Jaron Monroe, DO  •  sodium chloride 0.9 % flush 3 mL, 3 mL, Intravenous, Q12H, Eddie Benson MD, 3 mL at 06/10/19 0316  •  sodium chloride 0.9 % flush 3-10 mL, 3-10 mL, Intravenous, PRN, Eddie Benson MD  •  sodium chloride 0.9 % infusion, 75 mL/hr, Intravenous, Continuous, Eddie Benson MD, Last Rate: 75 mL/hr at 06/10/19 0315, 75 mL/hr at 06/10/19 0315  •  traMADol (ULTRAM) tablet 50 mg, 50 mg, Oral, TID, Eddie Benson MD    OBJECTIVE:    Vital Signs:   Vitals:    06/10/19 0334 06/10/19 0700 06/10/19 0715 06/10/19 0730   BP: 125/51 124/54     BP Location: Left arm Left arm     Patient Position: Lying      Pulse: 67 77 75 81   Resp: 18 18 18 16   Temp: 97.4 °F (36.3 °C) 97.8 °F (36.6 °C)     TempSrc: Oral Oral     SpO2: 97% 97% 99% 100%   Weight:       Height:           Physical Exam:   General Appearance:    Alert, cooperative, in no acute distress   Head:    Normocephalic, without obvious abnormality, atraumatic   Eyes:            Lids and lashes normal, conjunctivae and sclerae normal, no   icterus, no pallor, corneas clear, PERRLA   Throat:   No oral lesions, no thrush, oral mucosa moist   Neck:   No adenopathy, supple, trachea midline, no thyromegaly, no   carotid bruit, no JVD   Lungs:     Clear to auscultation,respirations regular, even and                  unlabored    Heart:    Regular rhythm and normal rate, normal S1 and S2, no            murmur, no gallop, no rub, no click   Chest Wall:    No abnormalities observed   Abdomen:     Normal bowel sounds, no masses, no organomegaly, soft        non-tender, non-distended, no guarding, no rebound                tenderness   Extremities:   Moves all extremities well, no edema, no cyanosis, no             redness   Pulses:   Pulses palpable and equal bilaterally   Skin:   No bleeding, bruising or rash   Lymph nodes:   No palpable adenopathy   Neurologic:   Cranial nerves 2 -  12 grossly intact, sensation intact, DTR       present and equal bilaterally   Results Review:   I reviewed the patient's new clinical results.    ASSESSMENT PLAN:    1. Acute GI bleeding    2. Hyperglycemia        Patient likely has an acute upper GI bleed.  Melena stools with drop in hemoglobin.  Plans for EGD today to rule out an upper GI bleeding source and possibly clip the bleeding source.  Risk of bleeding and perforation discussed and patient agreeable.  Continue PPI medication.    I discussed the patients findings and my recommendations with patient    Skinny York MD  06/10/19  9:34 AM         details… No joint pain, swelling or deformity; no limitation of movement

## 2019-06-10 NOTE — PLAN OF CARE
Problem: Patient Care Overview  Goal: Plan of Care Review  Outcome: Ongoing (interventions implemented as appropriate)   06/10/19 9366   Coping/Psychosocial   Plan of Care Reviewed With patient   Plan of Care Review   Progress improving

## 2019-06-10 NOTE — PLAN OF CARE
Problem: Patient Care Overview  Goal: Plan of Care Review  Outcome: Ongoing (interventions implemented as appropriate)   06/10/19 0250   Coping/Psychosocial   Plan of Care Reviewed With patient   Plan of Care Review   Progress no change   OTHER   Outcome Summary VSS. Pt continues to have bloody stool. Coninue care as ordered.       Problem: Fall Risk (Adult)  Goal: Identify Related Risk Factors and Signs and Symptoms  Outcome: Ongoing (interventions implemented as appropriate)    Goal: Absence of Fall  Outcome: Ongoing (interventions implemented as appropriate)      Problem: Pain, Chronic (Adult)  Goal: Identify Related Risk Factors and Signs and Symptoms  Outcome: Ongoing (interventions implemented as appropriate)    Goal: Acceptable Pain/Comfort Level and Functional Ability  Outcome: Ongoing (interventions implemented as appropriate)

## 2019-06-10 NOTE — ED PROVIDER NOTES
"Subjective   69 yo f with cc of bright red blood per rectum and blood in stool. Pt states she has had 5-6 blood bowel movements today where she has passed enough bright red blood to turn the toilet water bright red. Also indicates she has passed blackish stools and large blood clots that look like \"liver\". She denies abdominal pain. No nausea vomiting or diarrhea. No fever or chills. Pt had an EGD and colonoscopy performed on 19. She states she had 5 polyps removed was also told she had gastritis. She denies use of blood thinners. No other complaints at this time.             Review of Systems   Gastrointestinal: Positive for anal bleeding and blood in stool. Negative for abdominal distention, diarrhea, nausea and vomiting.   All other systems reviewed and are negative.      Past Medical History:   Diagnosis Date   • Acid reflux 10/17/2016   • Asthma     bronchial   • Cancer (CMS/HCC)     uterine   • Chronic diastolic heart failure (CMS/HCC) 10/17/2016    Normal dobutamine echocardiogram stress, 2005. Echocardiogram on 2009:  EF 50% to 55%. Left atrium 3.5 cm.   • Chronic obstructive pulmonary disease (CMS/HCC) 10/17/2016    asthmatic bronchitis, on O2 use chronically.     • Gout 10/17/2016   • High cholesterol 2018   • Hypertension 10/17/2016    Benign hypertension.   • Lower extremity edema 10/17/2016    Chronic lower extremity edema/venous insufficiency.   • Murmur, heart    • Obesity 10/17/2016   • Seasonal allergies 10/17/2016   • Type 2 diabetes mellitus (CMS/HCC) 10/17/2016    insulin dependent.       Allergies   Allergen Reactions   • Shellfish-Derived Products Hives   • Wheat Bran Hives       Past Surgical History:   Procedure Laterality Date   • BRONCHOSCOPY N/A 2018    Procedure: BRONCHOSCOPY WITH WASHINGS;  Surgeon: Ryder Dahl MD;  Location: [facility];  Service:    •  SECTION     • COLONOSCOPY     • CYSTECTOMY Right     neck   • DILATATION AND CURETTAGE     • EYE " SURGERY Bilateral     cataract   • HYSTERECTOMY     • LUNG SURGERY Left 1997 tumors removed from left lung-benign   • TONSILLECTOMY AND ADENOIDECTOMY         Family History   Problem Relation Age of Onset   • Heart disease Mother    • Heart disease Father    • Leukemia Father    • Diabetes Sister    • Diabetes Brother    • COPD Sister        Social History     Socioeconomic History   • Marital status:      Spouse name: Not on file   • Number of children: Not on file   • Years of education: Not on file   • Highest education level: Not on file   Tobacco Use   • Smoking status: Former Smoker     Packs/day: 1.50     Years: 35.00     Pack years: 52.50     Types: Cigarettes     Last attempt to quit:      Years since quittin.4   • Smokeless tobacco: Never Used   Substance and Sexual Activity   • Alcohol use: No   • Drug use: No   • Sexual activity: Defer           Objective   Physical Exam   Constitutional: She is oriented to person, place, and time. No distress.   Chronically ill appearing    HENT:   Head: Normocephalic and atraumatic.   Nose: Nose normal.   Eyes: Conjunctivae and EOM are normal.   Cardiovascular: Normal rate and regular rhythm.   Pulmonary/Chest: Effort normal and breath sounds normal. No respiratory distress.   Abdominal: Soft. She exhibits no distension. There is no tenderness. There is no guarding.   Right sided abdominal wall hernia, nontender, no rigidity   Musculoskeletal: She exhibits no edema or deformity.   Neurological: She is alert and oriented to person, place, and time. No cranial nerve deficit.   Skin: She is not diaphoretic.   Nursing note and vitals reviewed.      Procedures           ED Course          Pt presents with rectal bleeding. PE shows large amount of melanotic stool. Hemoccult positive. CT negative for acute process. Pt takes daily asa. Initial hgb stable. Concern for upper GI bleeding given BUN/Cr ratio and melena. Started on protonix IV bolus and  infusion. Discussed with Dr. Thurston general surgery on call, will consult. Pt will be admitted for further evaluation and medical management. Discussed with Dr. Benson who accepts the patient for admission.         SIMA      Final diagnoses:   Acute GI bleeding   Hyperglycemia            Jaron Monroe, DO  06/10/19 0036

## 2019-06-10 NOTE — ANESTHESIA POSTPROCEDURE EVALUATION
Patient: Mingo Guidry    Procedure Summary     Date:  06/10/19 Room / Location:  [facility] ENDOSCOPY 1 / [facility] ENDOSCOPY    Anesthesia Start:  1106 Anesthesia Stop:  1117    Procedure:  ESOPHAGOGASTRODUODENOSCOPY WITH BIOPSY (N/A Esophagus) Diagnosis:       Acute GI bleeding      (Acute GI bleeding [K92.2])    Surgeon:  Skinny York MD Provider:  Scout Prescott CRNA    Anesthesia Type:  MAC ASA Status:  3          Anesthesia Type: MAC  Last vitals  BP   109/46 (06/10/19 1208)   Temp   98.3 °F (36.8 °C) (06/10/19 1208)   Pulse   86 (06/10/19 1128)   Resp   16 (06/10/19 1208)     SpO2   100 % (06/10/19 1208)     Post Anesthesia Care and Evaluation    Patient location during evaluation: PACU  Patient participation: complete - patient participated  Level of consciousness: awake  Pain score: 1  Pain management: adequate  Airway patency: patent  Anesthetic complications: No anesthetic complications  PONV Status: controlled  Cardiovascular status: acceptable and stable  Respiratory status: acceptable and nasal cannula  Hydration status: acceptable

## 2019-06-10 NOTE — H&P
"    [facility] Medicine Services  HISTORY AND PHYSICAL    Primary Care Physician: Luz Prater APRN    Subjective     Chief Complaint:    Chief Complaint   Patient presents with   • Black or Bloody Stool       History of Present Illness:     I have reviewed labs/imaging/records from this hospitalization, including ER staff to establish a comprehensive understanding of this patient's clinical issues, as well as to establish plan of care appropriately.     Ms. Guidry is a 68-year-old female with medical history of chronic diastolic heart failure, COPD on 3 L of home oxygen, hyperlipidemia, hypertension, insulin-dependent type 2 diabetes mellitus and GERD who presented to the ER with complaints of bright red blood per rectum.    Patient states that she woke up this morning feeling like she had to have a bowel movement.  She was not able to make it to the toilet in time and noticed a big blood clot that looked like a \"piece of liver\" in her underwear.  Since then she has had multiple bowel movements that consisted of initially bright red blood and now melena.  She has some lower abdominal cramping associated with this.  She denies any nausea or vomiting.  She has not had any fever or chills.  She does not take any blood thinners other than aspirin. She does suffer from chronic constipation but has been taking stool softeners and laxatives with regular bowel movements.  Her last bowel movement was around 6 PM today.  Of note, patient had an EGD and colonoscopy 2 weeks ago for evaluation of heme positive stool, performed by Dr. York on an outpatient basis.  She was found to have reactive gastropathy, Mg's esophagus and tubular adenomas.  She had 5 polyps removed.  She did not have any bleeding after the procedures until today.    Patient found to have normal vital signs on arrival to the ER.  Labs show a slightly elevated WBC count of 12.1, H&H 12/36 with an MCV of 101.  This is her " baseline.  CMP notable for glucose 509 (missed her insulin today), BUN/creatinine 64/1.5, sodium 135, serum bicarb 34.  He has grossly positive stool for blood.  A CT of the abdomen was done which did not reveal any acute process.  Patient was given 500 cc of IV fluid bolus and started on Protonix gtt. with an initial bolus.  Dr. Thurston was contacted by the ER provider and has agreed to see the patient in consultation.  Patient will be admitted to the hospitalist service.    Review of Systems   1. Constitutional: Negative for fever. Negative for chills, diaphoresis, fatigue and unexpected weight change.   2. HENT: Negative for congestion and hearing loss.   3. Eyes: Negative for redness and visual disturbance.   4. Respiratory: negative for shortness of breath. Negative for chest pain . Negative for cough and chest tightness.   5. Cardiovascular: Negative for chest pain and palpitations.   6. Gastrointestinal: + Abdominal cramping, blood in stool  7. Endocrine: Negative for cold intolerance and heat intolerance.   8. Genitourinary: Negative for difficulty urinating, dysuria and frequency.   9. Musculoskeletal: Negative for arthralgias, back pain and myalgias.   10. Skin: Negative for color change, rash and wound.   11. Neurological: Negative for syncope, weakness and headaches.   12. Hematological: Negative for adenopathy. Does not bruise/bleed easily.   13. Psychiatric/Behavioral: Negative for confusion. The patient is not nervous/anxious.     Past Medical History:   Past Medical History:   Diagnosis Date   • Acid reflux 10/17/2016   • Asthma     bronchial   • Cancer (CMS/[facility])     uterine   • Chronic diastolic heart failure (CMS/HCC) 10/17/2016    Normal dobutamine echocardiogram stress, 04/07/2005. Echocardiogram on 05/08/2009:  EF 50% to 55%. Left atrium 3.5 cm.   • Chronic obstructive pulmonary disease (CMS/HCC) 10/17/2016    asthmatic bronchitis, on O2 use chronically.     • Gout 10/17/2016   • High cholesterol  2018   • Hypertension 10/17/2016    Benign hypertension.   • Lower extremity edema 10/17/2016    Chronic lower extremity edema/venous insufficiency.   • Murmur, heart    • Obesity 10/17/2016   • Seasonal allergies 10/17/2016   • Type 2 diabetes mellitus (CMS/HCC) 10/17/2016    insulin dependent.       Past Surgical History:  Past Surgical History:   Procedure Laterality Date   • BRONCHOSCOPY N/A 2018    Procedure: BRONCHOSCOPY WITH WASHINGS;  Surgeon: Ryder Dahl MD;  Location: [facility];  Service:    •  SECTION     • COLONOSCOPY     • CYSTECTOMY Right     neck   • DILATATION AND CURETTAGE     • EYE SURGERY Bilateral     cataract   • HYSTERECTOMY     • LUNG SURGERY Left 1997 tumors removed from left lung-benign   • TONSILLECTOMY AND ADENOIDECTOMY         Family History: family history includes COPD in her sister; Diabetes in her brother and sister; Heart disease in her father and mother; Leukemia in her father.    Social History:  reports that she quit smoking about 20 years ago. Her smoking use included cigarettes. She has a 52.50 pack-year smoking history. She has never used smokeless tobacco. She reports that she does not drink alcohol or use drugs.    Home medications:   Prior to Admission Medications     Prescriptions Last Dose Informant Patient Reported? Taking?    ADVAIR DISKUS 250-50 MCG/DOSE DISKUS 2019  No Yes    Inhale 1 puff 2 (Two) Times a Day.    allopurinol (ZYLOPRIM) 300 MG tablet 2019  Yes Yes    Take 300 mg by mouth Daily. 1/2 tablet daily    ASPIR-LOW 81 MG EC tablet 2019  Yes Yes    Take 81 mg by mouth Daily.    busPIRone (BUSPAR) 10 MG tablet 2019  Yes Yes    Take 10 mg by mouth 3 (Three) Times a Day.    doxycycline (VIBRAMYCIN) 100 MG capsule 2019 Self Yes Yes    Take 100 mg by mouth 2 (Two) Times a Day.    ezetimibe (ZETIA) 10 MG tablet 2019  Yes Yes    Take 10 mg by mouth Daily.    fenofibrate (TRICOR) 145 MG tablet 2019  Yes Yes     Take 145 mg by mouth Daily.    ferrous sulfate 325 (65 FE) MG tablet 6/9/2019  Yes Yes    Take 325 mg by mouth 3 (Three) Times a Day With Meals.    flunisolide (NASALIDE) 25 MCG/ACT (0.025%) solution nasal spray 6/9/2019  No Yes    INHALE 1 SPRAY EVERY 12 (TWELVE) HOURS.    furosemide (LASIX) 40 MG tablet 6/9/2019  Yes Yes    Take 40 mg by mouth 2 (Two) Times a Day.    HUMALOG 100 UNIT/ML injection 6/9/2019  Yes Yes    Inject 35 Units under the skin into the appropriate area as directed 3 (Three) Times a Day.    HYDROcodone-acetaminophen (NORCO) 5-325 MG per tablet 6/8/2019  Yes Yes    Take 1 tablet by mouth Daily.    Lactobacillus Acid-Pectin (ACIDOPHILUS/CITRUS PECTIN) tablet tablet 6/9/2019  Yes Yes    Take 1 tablet by mouth 2 (Two) Times a Day.    Loratadine (CLARITIN) 10 MG capsule 6/9/2019  Yes Yes    Take 1 tablet by mouth Daily.    metoclopramide (REGLAN) 5 MG tablet 6/9/2019  Yes Yes    Take 5 mg by mouth 3 (Three) Times a Day.    metolazone (ZAROXOLYN) 5 MG tablet 6/9/2019  Yes Yes    Take 5 mg by mouth Daily As Needed.    metoprolol tartrate (LOPRESSOR) 25 MG tablet 6/9/2019  Yes Yes    Take 25 mg by mouth 2 (Two) Times a Day.    montelukast (SINGULAIR) 10 MG tablet 6/9/2019  Yes Yes    Take 10 mg by mouth Every Night.    O2 (OXYGEN) 6/10/2019  Yes Yes    Inhale 3 L/min Daily.    omalizumab (XOLAIR) 150 MG injection Past Month  Yes Yes    Inject 150 mg under the skin into the appropriate area as directed 1 (One) Time. 2 injections once a month    OXYGEN-HELIUM IN 6/10/2019  Yes Yes    Inhale 3 L/min.    PATADAY 0.2 % solution ophthalmic solution 6/9/2019  Yes Yes    Administer  to both eyes 2 (Two) Times a Day.    potassium chloride (K-DUR,KLOR-CON) 20 MEQ CR tablet 6/9/2019  Yes Yes    Take 20 mEq by mouth Daily.    predniSONE (DELTASONE) 20 MG tablet 6/9/2019  Yes Yes    TAKE TWO TABLETS BY MOUTH EVERY DAY FOR 5 DAYS    PROAIR  (90 Base) MCG/ACT inhaler 6/9/2019  No Yes    INHALE 2 PUFFS EVERY 4  "(FOUR) HOURS AS NEEDED FOR WHEEZING OR SHORTNESS OF AIR.    raNITIdine (ZANTAC) 300 MG tablet 6/9/2019  Yes Yes    Take 300 mg by mouth Daily.    sitaGLIPtin (JANUVIA) 100 MG tablet 6/9/2019  Yes Yes    Take 100 mg by mouth Daily. 1/2 tablet daily    sucralfate (CARAFATE) 1 G tablet 6/9/2019  Yes Yes    Take 1 g by mouth 3 (Three) Times a Day.    tiotropium (SPIRIVA) 18 MCG per inhalation capsule 6/9/2019  No Yes    Place 1 capsule into inhaler and inhale Daily.    traMADol (ULTRAM) 50 MG tablet 6/9/2019 Self Yes Yes    Take 50 mg by mouth 3 (Three) Times a Day.    VASCEPA 1 g capsule capsule 6/9/2019  Yes Yes    vitamin D (ERGOCALCIFEROL) 71490 units capsule capsule 6/9/2019  Yes Yes    Take 50,000 Units by mouth 1 (One) Time Per Week.    Blood Glucose Monitoring Suppl device   Yes No    1 each.    Cholecalciferol (VITAMIN D-3) 5000 units tablet   Yes No    Take 5,000 Units by mouth.    GNP CLEARLAX powder   No No    MIX WITH LIQUID AND DRINK AS 1 DOSE    ipratropium-albuterol (DUO-NEB) 0.5-2.5 mg/3 ml nebulizer More than a month  No No    Take 3 mL by nebulization 4 (Four) Times a Day.    Magnesium 400 MG capsule   Yes No    Take  by mouth.    methylPREDNISolone acetate (DEPO-medrol) injection 80 mg   No No    mometasone (NASONEX) 50 MCG/ACT nasal spray   Yes No    2 sprays into the nostril(s) as directed by provider Daily.    TRESIBA FLEXTOUCH 100 UNIT/ML solution pen-injector injection   Yes No    INJECT 15 UNITS EVERY MORNING AND 75 UNITS EVERY IN THE EVENING    TRUE METRIX BLOOD GLUCOSE TEST test strip   Yes No    ULTICARE INSULIN SYRINGE 31G X 5/16\" 1 ML misc   Yes No          Emergency department medications:   Medications   sodium chloride 0.9 % flush 10 mL (not administered)   pantoprazole (PROTONIX) injection 80 mg (80 mg Intravenous Given 6/9/19 2227)     And   pantoprazole (PROTONIX) 40 mg in sodium chloride 0.9 % 100 mL (0.4 mg/mL) infusion (8 mg/hr Intravenous New Bag 6/9/19 2227)   dextrose (GLUTOSE) " oral gel 1 tube (not administered)   dextrose (D50W) 25 g/ 50mL Intravenous Solution 25 g (not administered)   glucagon (human recombinant) (GLUCAGEN DIAGNOSTIC) injection 1 mg (not administered)   insulin aspart (novoLOG) injection 0-7 Units (not administered)   budesonide-formoterol (SYMBICORT) 160-4.5 MCG/ACT inhaler 2 puff (2 puffs Inhalation Not Given 6/10/19 0135)   busPIRone (BUSPAR) tablet 10 mg (not administered)   ipratropium-albuterol (DUO-NEB) nebulizer solution 3 mL (not administered)   metoclopramide (REGLAN) tablet 5 mg (not administered)   montelukast (SINGULAIR) tablet 10 mg (not administered)   O2 (OXYGEN) (not administered)   ketotifen (ZADITOR) 0.025 % ophthalmic solution 1 drop (not administered)   traMADol (ULTRAM) tablet 50 mg (not administered)   sodium chloride 0.9 % flush 3 mL (not administered)   sodium chloride 0.9 % flush 3-10 mL (not administered)   sodium chloride 0.9 % infusion (not administered)   acetaminophen (TYLENOL) tablet 650 mg (not administered)   melatonin tablet 5 mg (not administered)   ondansetron (ZOFRAN) tablet 4 mg (not administered)     Or   ondansetron (ZOFRAN) injection 4 mg (not administered)   sodium chloride 0.9 % bolus 500 mL (0 mL Intravenous Stopped 6/9/19 4141)       Medications:  Facility-Administered Medications Prior to Admission   Medication Dose Route Frequency Provider Last Rate Last Dose   • methylPREDNISolone acetate (DEPO-medrol) injection 80 mg  80 mg Intramuscular Once Yadi Davis APRN         Medications Prior to Admission   Medication Sig Dispense Refill Last Dose   • ADVAIR DISKUS 250-50 MCG/DOSE DISKUS Inhale 1 puff 2 (Two) Times a Day. 60 each 5 6/9/2019 at Unknown time   • allopurinol (ZYLOPRIM) 300 MG tablet Take 300 mg by mouth Daily. 1/2 tablet daily   6/9/2019 at Unknown time   • ASPIR-LOW 81 MG EC tablet Take 81 mg by mouth Daily.  1 6/9/2019 at Unknown time   • busPIRone (BUSPAR) 10 MG tablet Take 10 mg by mouth 3  (Three) Times a Day.   6/9/2019 at Unknown time   • doxycycline (VIBRAMYCIN) 100 MG capsule Take 100 mg by mouth 2 (Two) Times a Day.   6/9/2019 at Unknown time   • ezetimibe (ZETIA) 10 MG tablet Take 10 mg by mouth Daily.   6/9/2019 at Unknown time   • fenofibrate (TRICOR) 145 MG tablet Take 145 mg by mouth Daily.   6/9/2019 at Unknown time   • ferrous sulfate 325 (65 FE) MG tablet Take 325 mg by mouth 3 (Three) Times a Day With Meals.   6/9/2019 at Unknown time   • flunisolide (NASALIDE) 25 MCG/ACT (0.025%) solution nasal spray INHALE 1 SPRAY EVERY 12 (TWELVE) HOURS. 25 mL 5 6/9/2019 at Unknown time   • furosemide (LASIX) 40 MG tablet Take 40 mg by mouth 2 (Two) Times a Day.   6/9/2019 at Unknown time   • HUMALOG 100 UNIT/ML injection Inject 35 Units under the skin into the appropriate area as directed 3 (Three) Times a Day.   6/9/2019 at Unknown time   • HYDROcodone-acetaminophen (NORCO) 5-325 MG per tablet Take 1 tablet by mouth Daily.   6/8/2019 at 2100   • Lactobacillus Acid-Pectin (ACIDOPHILUS/CITRUS PECTIN) tablet tablet Take 1 tablet by mouth 2 (Two) Times a Day.  1 6/9/2019 at Unknown time   • Loratadine (CLARITIN) 10 MG capsule Take 1 tablet by mouth Daily.   6/9/2019 at Unknown time   • metoclopramide (REGLAN) 5 MG tablet Take 5 mg by mouth 3 (Three) Times a Day.   6/9/2019 at Unknown time   • metolazone (ZAROXOLYN) 5 MG tablet Take 5 mg by mouth Daily As Needed.   6/9/2019 at Unknown time   • metoprolol tartrate (LOPRESSOR) 25 MG tablet Take 25 mg by mouth 2 (Two) Times a Day.   6/9/2019 at Unknown time   • montelukast (SINGULAIR) 10 MG tablet Take 10 mg by mouth Every Night.   6/9/2019 at Unknown time   • O2 (OXYGEN) Inhale 3 L/min Daily.   6/10/2019 at Unknown time   • omalizumab (XOLAIR) 150 MG injection Inject 150 mg under the skin into the appropriate area as directed 1 (One) Time. 2 injections once a month   Past Month at Unknown time   • OXYGEN-HELIUM IN Inhale 3 L/min.   6/10/2019 at Unknown  time   • PATADAY 0.2 % solution ophthalmic solution Administer  to both eyes 2 (Two) Times a Day.  3 6/9/2019 at Unknown time   • potassium chloride (K-DUR,KLOR-CON) 20 MEQ CR tablet Take 20 mEq by mouth Daily.  2 6/9/2019 at Unknown time   • predniSONE (DELTASONE) 20 MG tablet TAKE TWO TABLETS BY MOUTH EVERY DAY FOR 5 DAYS  0 6/9/2019 at Unknown time   • PROAIR  (90 Base) MCG/ACT inhaler INHALE 2 PUFFS EVERY 4 (FOUR) HOURS AS NEEDED FOR WHEEZING OR SHORTNESS OF AIR. 8.5 g 5 6/9/2019 at Unknown time   • raNITIdine (ZANTAC) 300 MG tablet Take 300 mg by mouth Daily.   6/9/2019 at Unknown time   • sitaGLIPtin (JANUVIA) 100 MG tablet Take 100 mg by mouth Daily. 1/2 tablet daily   6/9/2019 at Unknown time   • sucralfate (CARAFATE) 1 G tablet Take 1 g by mouth 3 (Three) Times a Day.   6/9/2019 at Unknown time   • tiotropium (SPIRIVA) 18 MCG per inhalation capsule Place 1 capsule into inhaler and inhale Daily. 30 capsule 4 6/9/2019 at Unknown time   • traMADol (ULTRAM) 50 MG tablet Take 50 mg by mouth 3 (Three) Times a Day.   6/9/2019 at Unknown time   • VASCEPA 1 g capsule capsule    6/9/2019 at Unknown time   • vitamin D (ERGOCALCIFEROL) 69363 units capsule capsule Take 50,000 Units by mouth 1 (One) Time Per Week.   6/9/2019 at Unknown time   • Blood Glucose Monitoring Suppl device 1 each.   Taking   • Cholecalciferol (VITAMIN D-3) 5000 units tablet Take 5,000 Units by mouth.   Taking   • GNP CLEARLAX powder MIX WITH LIQUID AND DRINK AS 1 DOSE 238 g 0    • ipratropium-albuterol (DUO-NEB) 0.5-2.5 mg/3 ml nebulizer Take 3 mL by nebulization 4 (Four) Times a Day. 360 mL 2 More than a month at Unknown time   • Magnesium 400 MG capsule Take  by mouth.   Taking   • mometasone (NASONEX) 50 MCG/ACT nasal spray 2 sprays into the nostril(s) as directed by provider Daily.   Taking   • TRESIBA FLEXTOUCH 100 UNIT/ML solution pen-injector injection INJECT 15 UNITS EVERY MORNING AND 75 UNITS EVERY IN THE EVENING  10 Taking   •  "TRUE METRIX BLOOD GLUCOSE TEST test strip   3 Taking   • ULTICARE INSULIN SYRINGE 31G X 5/16\" 1 ML misc   3 Taking       Allergies:  Allergies   Allergen Reactions   • Shellfish-Derived Products Hives   • Wheat Bran Hives         Objective     Physical Exam:  Vital Signs: /54 (BP Location: Left arm, Patient Position: Lying)   Pulse 71   Temp 98.3 °F (36.8 °C) (Oral)   Resp 18   Ht 147.3 cm (58\")   Wt 90.3 kg (199 lb)   LMP  (LMP Unknown)   SpO2 96%   BMI 41.59 kg/m²      Physical Exam:     General Appearance:   Alert, cooperative, in no acute distress.     Head:   Normocephalic, without obvious abnormality, atraumatic.     Eyes:       Normal, conjunctivae and sclerae, no icterus, no pallor, corneas clear, PERRLA        Throat:   Oral mucosa dry      Neck:  No adenopathy, supple, trachea midline, no thyromegaly, no carotid bruit, no JVD      Back:   No CVA tenderness on Percussion.     Lungs:    Clear to auscultation and fair air movement noted.      Heart:   Regular rhythm and normal rate, normal S1 and S2.       Abdomen:   Obese. Normal bowel sounds, no masses, no organomegaly, soft non-tender, non-distended, no guarding, no rebound tenderness        Extremities:  Moves all extremities, no edema, no cyanosis, no redness.     Pulses:  Pulses palpable and equal bilaterally but weak.     Skin:  No bleeding, bruising or rash        Neurologic:  Cranial nerves grossly intact, move all extremities         Results Review:  Lab Results (last 7 days)     Procedure Component Value Units Date/Time    Comprehensive Metabolic Panel [[id number]]  (Abnormal) Collected:  06/09/19 2120    Specimen:  Blood Updated:  06/09/19 2152     Glucose 509 mg/dL      BUN 64 mg/dL      Creatinine 1.50 mg/dL      Sodium 135 mmol/L      Potassium 4.6 mmol/L      Chloride 89 mmol/L      CO2 34.0 mmol/L      Calcium 8.7 mg/dL      Total Protein 6.8 g/dL      Albumin 3.90 g/dL      ALT (SGPT) 51 U/L      AST (SGOT) 46 U/L      Alkaline " Phosphatase 69 U/L      Total Bilirubin 0.3 mg/dL      eGFR Non African Amer 35 mL/min/1.73      Globulin 2.9 gm/dL      A/G Ratio 1.3 g/dL      BUN/Creatinine Ratio 42.7     Anion Gap 16.6 mmol/L     Narrative:       GFR Normal >60  Chronic Kidney Disease <60  Kidney Failure <15    Protime-INR [[id number]]  (Normal) Collected:  06/09/19 2120    Specimen:  Blood Updated:  06/09/19 2147     Protime 13.2 Seconds      INR 0.97    Narrative:       Suggested INR therapeutic range for stable oral anticoagulant therapy:    Low Intensity therapy:   1.5-2.0  Moderate Intensity therapy:   2.0-3.0  High Intensity therapy:   2.5-4.0    Occult Blood X 1, Stool - Stool, Per Rectum [[id number]]  (Abnormal) Collected:  06/09/19 2121    Specimen:  Stool from Per Rectum Updated:  06/09/19 2133     Fecal Occult Blood Positive    CBC & Differential [[id number]] Collected:  06/09/19 2120    Specimen:  Blood Updated:  06/09/19 2132    Narrative:       The following orders were created for panel order CBC & Differential.  Procedure                               Abnormality         Status                     ---------                               -----------         ------                     CBC Auto Differential[[id number]]        Abnormal            Final result                 Please view results for these tests on the individual orders.    CBC Auto Differential [[id number]]  (Abnormal) Collected:  06/09/19 2120    Specimen:  Blood Updated:  06/09/19 2132     WBC 12.16 10*3/mm3      RBC 3.60 10*6/mm3      Hemoglobin 12.1 g/dL      Hematocrit 36.4 %      .1 fL      MCH 33.6 pg      MCHC 33.2 g/dL      RDW 13.5 %      RDW-SD 51.0 fl      MPV 11.0 fL      Platelets 354 10*3/mm3      Neutrophil % 80.0 %      Lymphocyte % 14.8 %      Monocyte % 4.4 %      Eosinophil % 0.0 %      Basophil % 0.4 %      Immature Grans % 0.4 %      Neutrophils, Absolute 9.72 10*3/mm3      Lymphocytes, Absolute 1.80 10*3/mm3      Monocytes, Absolute 0.54  10*3/mm3      Eosinophils, Absolute 0.00 10*3/mm3      Basophils, Absolute 0.05 10*3/mm3      Immature Grans, Absolute 0.05 10*3/mm3      nRBC 0.0 /100 WBC         Imaging Results (last 72 hours)     Procedure Component Value Units Date/Time    CT Abdomen Pelvis Without Contrast [[id number]] Collected:  06/09/19 2232     Updated:  06/09/19 2233    Narrative:       FINAL REPORT    TECHNIQUE:  Routine axial images through the abdomen and pelvis were  obtained.    CLINICAL HISTORY:  rectal bleeding, abdominal pain, concern for colitis    FINDINGS:  Abdomen:  The gallbladder appears absent.  There are peripheral  areas of decreased attenuation within the liver seen in 4  locations.  This likely represents focal fatty change.  The  solid abdominal organs and ureters are otherwise unremarkable.  The GI tract is unremarkable, with no sign of colitis or  appendicitis.  Pelvis: The uterus is absent.  There are small  bilateral ovarian cysts, less than 3 cm in diameter.  The  urinary bladder is normal. There is no pelvic or abdominal  ascites, adenopathy or acute osseous abnormality.      Impression:       No acute disease.  Specifically, no evidence of colitis.    Authenticated by Mustapha Pink M.D. on 06/09/2019 10:32:20 PM        I have personally reviewed and interpreted available lab data, radiology studies and ECG obtained at time of admission.     Assessment / Plan     Assessment/Problem List:       Acute GI bleeding    1.  Acute GI hemorrhage, suspect upper, present on admission  2.  Acute kidney injury, due to #1, present on admission  3.  Hyperglycemia in a type 2 diabetic, due to missed dose of insulin, present on admission  4.  COPD with chronic hypoxic respiratory failure, not in acute exacerbation  5.  Chronic diastolic heart failure, not in acute exacerbation  6.  Hypertension  7.  Hyperlipidemia  8.  GERD    Plan:  Patient will be admitted to [facility] with telemetry.  She will be kept n.p.o. she will be continued on  Protonix gtt.  We will hold diuretics and start low-dose IV fluids 75 cc/h.  Holding blood pressure medications as her blood pressure is currently on the lower side and she has acute kidney injury.  Repeat H&H at 2 AM and then trended every 8 hours.  I do suspect that the patient is hemoconcentrated.  Dr. Thurston who is currently on call, was contacted by the ER provider. Repeat renal function panel in the morning.    Anticipate blood glucose to improve after fluid bolus and continued IV fluids.  Will monitor fingersticks every 4 hours with sliding scale coverage.  Holding long-acting as well as pre-meal insulin for now.    No DVT chemoprophylaxis in the setting of acute GI bleed.  SCUDs have been ordered.  Details were discussed with the patient.   Further recommendations will depend on clinical course of the patient during the current hospitalization.    I also discussed the details with the nursing staff.    Rest as ordered.    Anticipated stay is greater than 2 midnights.    Eddie Benson MD 06/10/19 1:51 AM    Dictated using Dragon.

## 2019-06-10 NOTE — H&P (VIEW-ONLY)
Mingo Guidry    1950    Primary Care Provider: Luz Prater APRN    Chief Complaint   Patient presents with   • Black or Bloody Stool       SUBJECTIVE:    History of present illness: 68-year-old female who is 18 days status post EGD and colonoscopy where she was found to have significant gastritis and Mg's esophagitis comes in with melena and dark stools.  Hemoglobin initially was stable within dropped to 9.7 from a preop level of 12.1.  Patient on aspirin daily, no other blood thinners.  No epigastric pain or nausea.  No bright red blood per rectum.  No weight loss or significant anemia.    Review of Systems:  Constitutional:  Negative for chills, fever, and unexpected weight change.  HENT: Negative for trouble swallowing and voice change.  Eyes:  Negative for visual disturbance.  Respiratory:  Negative for apnea, cough, chest tightness, shortness of breath, and wheezing.  Cardiovascular:  Negative for chest pain, palpitations, and leg swelling.  Gastrointestinal:  Negative for abdominal distention, abdominal pain, anal bleeding, positive for blood in stool, constipation, diarrhea, nausea, but no rectal pain, and vomiting.  Musculoskeletal:  Negative for back pain, gait problem, and joint swelling.  Skin:  Negative for color change, rash, and wound  Neurological:  Negative for dizziness, syncope, speech difficulty, weakness, numbness, and headaches.  Hematological:  Negative for adenopathy.  Does not bruise/bleed easily.  Psychiatric/Behavioral:  Negative for confusion.  The patient is not nervous/anxious.        History:    Past Medical History:   Diagnosis Date   • Acid reflux 10/17/2016   • Asthma     bronchial   • Cancer (CMS/HCC)     uterine   • Chronic diastolic heart failure (CMS/HCC) 10/17/2016    Normal dobutamine echocardiogram stress, 04/07/2005. Echocardiogram on 05/08/2009:  EF 50% to 55%. Left atrium 3.5 cm.   • Chronic obstructive pulmonary disease (CMS/HCC) 10/17/2016    asthmatic  bronchitis, on O2 use chronically.     • Gout 10/17/2016   • High cholesterol 2018   • Hypertension 10/17/2016    Benign hypertension.   • Lower extremity edema 10/17/2016    Chronic lower extremity edema/venous insufficiency.   • Murmur, heart    • Obesity 10/17/2016   • Seasonal allergies 10/17/2016   • Type 2 diabetes mellitus (CMS/HCC) 10/17/2016    insulin dependent.       Past Surgical History:   Procedure Laterality Date   • BRONCHOSCOPY N/A 2018    Procedure: BRONCHOSCOPY WITH WASHINGS;  Surgeon: Ryder Dahl MD;  Location: [facility];  Service:    •  SECTION     • COLONOSCOPY     • CYSTECTOMY Right     neck   • DILATATION AND CURETTAGE     • EYE SURGERY Bilateral     cataract   • HYSTERECTOMY     • LUNG SURGERY Left 1997 tumors removed from left lung-benign   • TONSILLECTOMY AND ADENOIDECTOMY         Family History   Problem Relation Age of Onset   • Heart disease Mother    • Heart disease Father    • Leukemia Father    • Diabetes Sister    • Diabetes Brother    • COPD Sister        Social History     Socioeconomic History   • Marital status:      Spouse name: Not on file   • Number of children: Not on file   • Years of education: Not on file   • Highest education level: Not on file   Tobacco Use   • Smoking status: Former Smoker     Packs/day: 1.50     Years: 35.00     Pack years: 52.50     Types: Cigarettes     Last attempt to quit:      Years since quittin.4   • Smokeless tobacco: Never Used   Substance and Sexual Activity   • Alcohol use: No   • Drug use: No   • Sexual activity: Defer       Allergies:  Allergies   Allergen Reactions   • Shellfish-Derived Products Hives   • Wheat Bran Hives       Medications:    Current Facility-Administered Medications:   •  acetaminophen (TYLENOL) tablet 650 mg, 650 mg, Oral, Q4H PRN, Eddie Benson MD  •  budesonide-formoterol (SYMBICORT) 160-4.5 MCG/ACT inhaler 2 puff, 2 puff, Inhalation, BID - RT, Eddie Benson,  MD, 2 puff at 06/10/19 0715  •  busPIRone (BUSPAR) tablet 10 mg, 10 mg, Oral, TID, Eddie Benson MD  •  dextrose (D50W) 25 g/ 50mL Intravenous Solution 25 g, 25 g, Intravenous, Q15 Min PRN, Eddie Benson MD  •  dextrose (GLUTOSE) oral gel 1 tube, 1 tube, Oral, Q15 Min PRN, Eddie Benson MD  •  furosemide (LASIX) injection 20 mg, 20 mg, Intravenous, Once, Deb Garcia, APRN  •  glucagon (human recombinant) (GLUCAGEN DIAGNOSTIC) injection 1 mg, 1 mg, Subcutaneous, PRN, Eddie Benson MD  •  insulin detemir (LEVEMIR) injection 10 Units, 10 Units, Subcutaneous, QAM, Deb Garcia, JACKI, 10 Units at 06/10/19 0921  •  insulin regular (humuLIN R,novoLIN R) injection 0-7 Units, 0-7 Units, Subcutaneous, Q4H, Eddie Benson MD, 5 Units at 06/10/19 0913  •  ipratropium-albuterol (DUO-NEB) nebulizer solution 3 mL, 3 mL, Nebulization, 4x Daily - RT, Eddie Benson MD, 3 mL at 06/10/19 0715  •  ketotifen (ZADITOR) 0.025 % ophthalmic solution 1 drop, 1 drop, Both Eyes, BID, Eddie Benson MD, 1 drop at 06/10/19 0832  •  melatonin tablet 5 mg, 5 mg, Oral, Nightly PRN, Eddie Benson MD  •  metoclopramide (REGLAN) tablet 5 mg, 5 mg, Oral, TID, Eddie Benson MD  •  montelukast (SINGULAIR) tablet 10 mg, 10 mg, Oral, Nightly, Eddie Benson MD, 10 mg at 06/10/19 0201  •  O2 (OXYGEN), 3 L/min, Inhalation, Daily, Eddie Benson MD, 3 L/min at 06/10/19 0913  •  ondansetron (ZOFRAN) tablet 4 mg, 4 mg, Oral, Q6H PRN **OR** ondansetron (ZOFRAN) injection 4 mg, 4 mg, Intravenous, Q6H PRN, Eddie Benson MD, 4 mg at 06/10/19 0829  •  [COMPLETED] pantoprazole (PROTONIX) injection 80 mg, 80 mg, Intravenous, Once, 80 mg at 06/09/19 2227 **AND** pantoprazole (PROTONIX) 40 mg in sodium chloride 0.9 % 100 mL (0.4 mg/mL) infusion, 8 mg/hr, Intravenous, Continuous, Jaron Monroe, DO, Last Rate: 20 mL/hr at 06/10/19 0342, 8 mg/hr at 06/10/19 0342  •  [COMPLETED] Insert peripheral IV, , , Once  **AND** sodium chloride 0.9 % flush 10 mL, 10 mL, Intravenous, PRN, Jaron Monroe, DO  •  sodium chloride 0.9 % flush 3 mL, 3 mL, Intravenous, Q12H, Eddie Benson MD, 3 mL at 06/10/19 0316  •  sodium chloride 0.9 % flush 3-10 mL, 3-10 mL, Intravenous, PRN, Eddie Benson MD  •  sodium chloride 0.9 % infusion, 75 mL/hr, Intravenous, Continuous, Eddie Benson MD, Last Rate: 75 mL/hr at 06/10/19 0315, 75 mL/hr at 06/10/19 0315  •  traMADol (ULTRAM) tablet 50 mg, 50 mg, Oral, TID, Eddie Benson MD    OBJECTIVE:    Vital Signs:   Vitals:    06/10/19 0334 06/10/19 0700 06/10/19 0715 06/10/19 0730   BP: 125/51 124/54     BP Location: Left arm Left arm     Patient Position: Lying      Pulse: 67 77 75 81   Resp: 18 18 18 16   Temp: 97.4 °F (36.3 °C) 97.8 °F (36.6 °C)     TempSrc: Oral Oral     SpO2: 97% 97% 99% 100%   Weight:       Height:           Physical Exam:   General Appearance:    Alert, cooperative, in no acute distress   Head:    Normocephalic, without obvious abnormality, atraumatic   Eyes:            Lids and lashes normal, conjunctivae and sclerae normal, no   icterus, no pallor, corneas clear, PERRLA   Throat:   No oral lesions, no thrush, oral mucosa moist   Neck:   No adenopathy, supple, trachea midline, no thyromegaly, no   carotid bruit, no JVD   Lungs:     Clear to auscultation,respirations regular, even and                  unlabored    Heart:    Regular rhythm and normal rate, normal S1 and S2, no            murmur, no gallop, no rub, no click   Chest Wall:    No abnormalities observed   Abdomen:     Normal bowel sounds, no masses, no organomegaly, soft        non-tender, non-distended, no guarding, no rebound                tenderness   Extremities:   Moves all extremities well, no edema, no cyanosis, no             redness   Pulses:   Pulses palpable and equal bilaterally   Skin:   No bleeding, bruising or rash   Lymph nodes:   No palpable adenopathy   Neurologic:   Cranial nerves 2 -  12 grossly intact, sensation intact, DTR       present and equal bilaterally   Results Review:   I reviewed the patient's new clinical results.    ASSESSMENT PLAN:    1. Acute GI bleeding    2. Hyperglycemia        Patient likely has an acute upper GI bleed.  Melena stools with drop in hemoglobin.  Plans for EGD today to rule out an upper GI bleeding source and possibly clip the bleeding source.  Risk of bleeding and perforation discussed and patient agreeable.  Continue PPI medication.    I discussed the patients findings and my recommendations with patient    Skinny York MD  06/10/19  9:34 AM

## 2019-06-10 NOTE — PROGRESS NOTES
PROGRESS NOTE        Date of Admission: 6/9/2019  Length of Stay: 1  Primary Care Physician: Luz Prater APRN    Subjective   Chief Complaint: Follow-up GI bleed  HPI: This is a 68-year-old female with history of chronic diastolic congestive heart failure, COPD on 3 L home oxygen, dyslipidemia, diabetes mellitus type 2 who presented to the emergency room with complaints of bright red blood per rectum.  According to the patient she had gotten up in the morning and felt like she needed to have a bowel movement however she noticed a large clot in her underwear.  Since that time she had several bowel movements that were bright red blood and now melena.  At the time of my evaluation this morning she just had a large bowel movement for which she was noted to have melena.  She is not on blood thinners at home but she does take aspirin.  She does have a history of chronic constipation for which she does have to take stool softeners and laxatives.  Patient was admitted to the hospitalist service.  Dr. York had recently done an EGD and colonoscopy 2 weeks ago on the patient due to heme positive stools for which she was noted to have reactive gastropathy, Mg's esophagus with tubular adenomas and 5 polyps removed.  She denied any bleeding after the procedure until the day of admission.  I did repeat hemoglobin hematocrit this morning after her bowel movement and she had dropped to 9.5 from 12.1 in the emergency room.  I did order for 2 units of packed red blood cells.  She did have some acute renal failure as well with creatinine of 1.7 hence she does appear to have a baseline of around 1.2.  She is getting IV fluids for hydration.  Dr. York with general surgery has been consulted.  She was noted to have elevated blood sugars in the emergency room of 500.  She was placed on every 4 fingersticks.  Her blood sugars are improving  And I have started her on 10 units of Levemir.  We will continue to closely monitor  blood sugars.  He has been placed on a Protonix drip.         Review Of Systems:   Review of Systems  General ROS: Patient denies any fevers, chills or loss of consciousness.    ENT ROS: Denies sore throat, nasal congestion or ear pain.   Hematological and Lymphatic ROS: Denies neck swelling or easy bleeding.  Endocrine ROS: Denies any recent unintentional weight gain or loss.  Respiratory ROS: Denies cough or shortness of breath.   Cardiovascular ROS: Denies chest pain or palpitations. No history of exertional chest pain.   Gastrointestinal ROS: Abdominal cramping, melena, bright red blood.  Genito-Urinary ROS: Denies dysuria or hematuria.  Musculoskeletal ROS: Denies back pain. No muscle pain. No calf pain.   Neurological ROS: Denies any focal weakness. No loss of consciousness. Denies any numbness. Denies neck pain.   Dermatological ROS: Denies any redness or pruritis.    Objective      Temp:  [97.3 °F (36.3 °C)-99.2 °F (37.3 °C)] 98.2 °F (36.8 °C)  Heart Rate:  [67-91] 84  Resp:  [14-18] 16  BP: ()/(33-62) 108/33  Physical Exam    General Appearance:  Alert and cooperative, not in any acute distress.   Head:  Atraumatic and normocephalic, without obvious abnormality.   Eyes:          PERRLA, conjunctivae and sclerae normal, no Icterus. No pallor. Extraocular movements are within normal limits.   Ears:  Ears appear intact with no abnormalities noted.   Throat: No oral lesions, no thrush, oral mucosa moist.   Neck: Supple, trachea midline, no thyromegaly, no carotid bruit.       Lungs:   Chest shape is normal. Breath sounds heard bilaterally equally.  No crackles or wheezing. No Pleural rub or bronchial breathing.   Heart:  Normal S1 and S2, no murmur, no gallop, no rub. No JVD   Abdomen:   Normal bowel sounds, no masses, no organomegaly. Soft    Generalized tender, non-distended, no guarding, no rebound             tenderness   Extremities: Moves all extremities well, no edema, no cyanosis, no clubbing.    Pulses: Pulses palpable and equal bilaterally   Skin: No bleeding, bruising or rash   Lymph nodes: No palpable adenopathy   Neurologic:    Psychiatric/Behavior:     Cranial nerves 2 - 12 grossly intact, sensation intact, Motor power is normal and equal bilaterally.  Mood normal, behavior normal       Results Review:    I have reviewed the labs, radiology results and diagnostic studies.    Results from last 7 days   Lab Units 06/10/19  0738 06/10/19  0547   WBC 10*3/mm3  --  16.11*   HEMOGLOBIN g/dL 9.5* 9.7*   PLATELETS 10*3/mm3  --  331     Results from last 7 days   Lab Units 06/10/19  0547   SODIUM mmol/L 140   POTASSIUM mmol/L 4.2   CO2 mmol/L 35.0*   CREATININE mg/dL 1.70*   GLUCOSE mg/dL 281*       Culture Data:    Radiology Data:   Cardiology Data:    I have reviewed the medications.    Assessment/Plan     Assessment and Plan:  1.  Acute GI bleed present on admission-Dr. York with general surgery has been consulted.  Serial hemoglobin hematocrit has been ordered.  Patient is on a Protonix drip.    2.  Acute blood loss anemia-I have ordered for 2 units of packed red blood cells and will give 20 mg IV Lasix in between.  We will recheck a hemoglobin hematocrit 1 hour after last unit of blood.    2.  Acute renal failure-likely due to dehydration-patient is getting IV fluids which we will continue.  Recheck a creatinine in the morning.    3.  Diabetes mellitus type 2 uncontrolled-patient is on insulin protocol.  I have started low dose Levemir.  Monitor blood sugars every 4 hours until it stabilizes.    4.  COPD with no evidence of exacerbation    5.  Chronic hypoxic respiratory failure    6.  Chronic diastolic congestive heart failure with no evidence of exacerbation    7.  Chronic essential hypertension    8.  Dyslipidemia    9.  Gastroesophageal reflux        DVT prophylaxis:SCDs  Discharge Planning:   Deb Garcia, JACKI 06/10/19 2:12 PM

## 2019-06-10 NOTE — ED NOTES
Contacted House Supervisor regarding admission.  She assigned room 402.     Jerman, April Tri  06/09/19 9050

## 2019-06-11 ENCOUNTER — APPOINTMENT (OUTPATIENT)
Dept: GENERAL RADIOLOGY | Facility: HOSPITAL | Age: 69
End: 2019-06-11

## 2019-06-11 PROBLEM — J20.9 ACUTE BRONCHITIS: Status: ACTIVE | Noted: 2019-06-11

## 2019-06-11 PROBLEM — K22.70 BARRETT'S ESOPHAGUS: Status: ACTIVE | Noted: 2019-06-11

## 2019-06-11 PROBLEM — K29.70 GASTRITIS: Status: ACTIVE | Noted: 2019-06-11

## 2019-06-11 PROBLEM — D62 ACUTE BLOOD LOSS ANEMIA: Status: ACTIVE | Noted: 2019-06-11

## 2019-06-11 LAB
ABO + RH BLD: NORMAL
ABO + RH BLD: NORMAL
ALBUMIN SERPL-MCNC: 3 G/DL (ref 3.5–5)
ANION GAP SERPL CALCULATED.3IONS-SCNC: 12.9 MMOL/L (ref 10–20)
ANION GAP SERPL CALCULATED.3IONS-SCNC: 7 MMOL/L (ref 10–20)
BH BB BLOOD EXPIRATION DATE: NORMAL
BH BB BLOOD EXPIRATION DATE: NORMAL
BH BB BLOOD TYPE BARCODE: 600
BH BB BLOOD TYPE BARCODE: 600
BH BB DISPENSE STATUS: NORMAL
BH BB DISPENSE STATUS: NORMAL
BH BB PRODUCT CODE: NORMAL
BH BB PRODUCT CODE: NORMAL
BH BB UNIT NUMBER: NORMAL
BH BB UNIT NUMBER: NORMAL
BUN BLD-MCNC: 51 MG/DL (ref 7–20)
BUN BLD-MCNC: 53 MG/DL (ref 7–20)
BUN/CREAT SERPL: 31.2 (ref 7.1–23.5)
BUN/CREAT SERPL: 31.9 (ref 7.1–23.5)
CALCIUM SPEC-SCNC: 7.3 MG/DL (ref 8.4–10.2)
CALCIUM SPEC-SCNC: 7.6 MG/DL (ref 8.4–10.2)
CHLORIDE SERPL-SCNC: 102 MMOL/L (ref 98–107)
CHLORIDE SERPL-SCNC: 96 MMOL/L (ref 98–107)
CO2 SERPL-SCNC: 32 MMOL/L (ref 26–30)
CO2 SERPL-SCNC: 35 MMOL/L (ref 26–30)
CREAT BLD-MCNC: 1.6 MG/DL (ref 0.6–1.3)
CREAT BLD-MCNC: 1.7 MG/DL (ref 0.6–1.3)
CROSSMATCH INTERPRETATION: NORMAL
CROSSMATCH INTERPRETATION: NORMAL
DEPRECATED RDW RBC AUTO: 59.5 FL (ref 37–54)
ERYTHROCYTE [DISTWIDTH] IN BLOOD BY AUTOMATED COUNT: 16.3 % (ref 12.3–15.4)
GFR SERPL CREATININE-BSD FRML MDRD: 30 ML/MIN/1.73
GFR SERPL CREATININE-BSD FRML MDRD: 32 ML/MIN/1.73
GLUCOSE BLD-MCNC: 150 MG/DL (ref 74–98)
GLUCOSE BLD-MCNC: 384 MG/DL (ref 74–98)
GLUCOSE BLDC GLUCOMTR-MCNC: 118 MG/DL (ref 70–130)
GLUCOSE BLDC GLUCOMTR-MCNC: 147 MG/DL (ref 70–130)
GLUCOSE BLDC GLUCOMTR-MCNC: 161 MG/DL (ref 70–130)
GLUCOSE BLDC GLUCOMTR-MCNC: 163 MG/DL (ref 70–130)
GLUCOSE BLDC GLUCOMTR-MCNC: 327 MG/DL (ref 70–130)
GLUCOSE BLDC GLUCOMTR-MCNC: 337 MG/DL (ref 70–130)
GLUCOSE BLDC GLUCOMTR-MCNC: 404 MG/DL (ref 70–130)
HCT VFR BLD AUTO: 26.7 % (ref 34–46.6)
HCT VFR BLD AUTO: 29.8 % (ref 34–46.6)
HCT VFR BLD AUTO: 30.8 % (ref 34–46.6)
HCT VFR BLD AUTO: 33.9 % (ref 34–46.6)
HGB BLD-MCNC: 10.1 G/DL (ref 12–15.9)
HGB BLD-MCNC: 11 G/DL (ref 12–15.9)
HGB BLD-MCNC: 8.7 G/DL (ref 12–15.9)
HGB BLD-MCNC: 9.8 G/DL (ref 12–15.9)
MCH RBC QN AUTO: 32.6 PG (ref 26.6–33)
MCHC RBC AUTO-ENTMCNC: 32.9 G/DL (ref 31.5–35.7)
MCV RBC AUTO: 99 FL (ref 79–97)
PLATELET # BLD AUTO: 235 10*3/MM3 (ref 140–450)
PMV BLD AUTO: 11 FL (ref 6–12)
POTASSIUM BLD-SCNC: 3.9 MMOL/L (ref 3.5–5.1)
POTASSIUM BLD-SCNC: 4 MMOL/L (ref 3.5–5.1)
RBC # BLD AUTO: 3.01 10*6/MM3 (ref 3.77–5.28)
SODIUM BLD-SCNC: 137 MMOL/L (ref 137–145)
SODIUM BLD-SCNC: 140 MMOL/L (ref 137–145)
UNIT  ABO: NORMAL
UNIT  ABO: NORMAL
UNIT  RH: NORMAL
UNIT  RH: NORMAL
WBC NRBC COR # BLD: 9.73 10*3/MM3 (ref 3.4–10.8)

## 2019-06-11 PROCEDURE — 82962 GLUCOSE BLOOD TEST: CPT

## 2019-06-11 PROCEDURE — 85014 HEMATOCRIT: CPT | Performed by: NURSE PRACTITIONER

## 2019-06-11 PROCEDURE — 85018 HEMOGLOBIN: CPT | Performed by: NURSE PRACTITIONER

## 2019-06-11 PROCEDURE — 80048 BASIC METABOLIC PNL TOTAL CA: CPT | Performed by: NURSE PRACTITIONER

## 2019-06-11 PROCEDURE — 94799 UNLISTED PULMONARY SVC/PX: CPT

## 2019-06-11 PROCEDURE — 85018 HEMOGLOBIN: CPT | Performed by: INTERNAL MEDICINE

## 2019-06-11 PROCEDURE — 85014 HEMATOCRIT: CPT | Performed by: INTERNAL MEDICINE

## 2019-06-11 PROCEDURE — 99232 SBSQ HOSP IP/OBS MODERATE 35: CPT | Performed by: SURGERY

## 2019-06-11 PROCEDURE — 63710000001 INSULIN DETEMIR PER 5 UNITS: Performed by: NURSE PRACTITIONER

## 2019-06-11 PROCEDURE — 82040 ASSAY OF SERUM ALBUMIN: CPT | Performed by: NURSE PRACTITIONER

## 2019-06-11 PROCEDURE — 63710000001 INSULIN ASPART PER 5 UNITS: Performed by: NURSE PRACTITIONER

## 2019-06-11 PROCEDURE — 85027 COMPLETE CBC AUTOMATED: CPT | Performed by: NURSE PRACTITIONER

## 2019-06-11 PROCEDURE — 99232 SBSQ HOSP IP/OBS MODERATE 35: CPT | Performed by: NURSE PRACTITIONER

## 2019-06-11 PROCEDURE — 71045 X-RAY EXAM CHEST 1 VIEW: CPT

## 2019-06-11 PROCEDURE — 63710000001 INSULIN REGULAR HUMAN PER 5 UNITS: Performed by: INTERNAL MEDICINE

## 2019-06-11 PROCEDURE — 25010000002 ONDANSETRON PER 1 MG: Performed by: INTERNAL MEDICINE

## 2019-06-11 PROCEDURE — 25010000002 FUROSEMIDE PER 20 MG: Performed by: NURSE PRACTITIONER

## 2019-06-11 RX ORDER — FUROSEMIDE 40 MG/1
40 TABLET ORAL DAILY
Status: DISCONTINUED | OUTPATIENT
Start: 2019-06-11 | End: 2019-06-12

## 2019-06-11 RX ORDER — IPRATROPIUM BROMIDE AND ALBUTEROL SULFATE 2.5; .5 MG/3ML; MG/3ML
3 SOLUTION RESPIRATORY (INHALATION)
Status: DISCONTINUED | OUTPATIENT
Start: 2019-06-11 | End: 2019-06-15 | Stop reason: HOSPADM

## 2019-06-11 RX ORDER — PANTOPRAZOLE SODIUM 40 MG/1
40 TABLET, DELAYED RELEASE ORAL
Status: DISCONTINUED | OUTPATIENT
Start: 2019-06-11 | End: 2019-06-12

## 2019-06-11 RX ORDER — GUAIFENESIN 600 MG/1
600 TABLET, EXTENDED RELEASE ORAL EVERY 12 HOURS SCHEDULED
Status: DISCONTINUED | OUTPATIENT
Start: 2019-06-11 | End: 2019-06-15 | Stop reason: HOSPADM

## 2019-06-11 RX ORDER — DOXYCYCLINE 100 MG/1
100 CAPSULE ORAL EVERY 12 HOURS SCHEDULED
Status: DISCONTINUED | OUTPATIENT
Start: 2019-06-11 | End: 2019-06-14

## 2019-06-11 RX ORDER — FUROSEMIDE 10 MG/ML
40 INJECTION INTRAMUSCULAR; INTRAVENOUS ONCE
Status: COMPLETED | OUTPATIENT
Start: 2019-06-11 | End: 2019-06-11

## 2019-06-11 RX ADMIN — INSULIN ASPART 10 UNITS: 100 INJECTION, SOLUTION INTRAVENOUS; SUBCUTANEOUS at 18:19

## 2019-06-11 RX ADMIN — METOCLOPRAMIDE 5 MG: 5 TABLET ORAL at 17:10

## 2019-06-11 RX ADMIN — GUAIFENESIN 600 MG: 600 TABLET, EXTENDED RELEASE ORAL at 08:28

## 2019-06-11 RX ADMIN — INSULIN DETEMIR 10 UNITS: 100 INJECTION, SOLUTION SUBCUTANEOUS at 06:51

## 2019-06-11 RX ADMIN — BUDESONIDE AND FORMOTEROL FUMARATE DIHYDRATE 2 PUFF: 160; 4.5 AEROSOL RESPIRATORY (INHALATION) at 08:35

## 2019-06-11 RX ADMIN — BUSPIRONE HYDROCHLORIDE 10 MG: 5 TABLET ORAL at 17:10

## 2019-06-11 RX ADMIN — FUROSEMIDE 40 MG: 10 INJECTION, SOLUTION INTRAMUSCULAR; INTRAVENOUS at 08:28

## 2019-06-11 RX ADMIN — TRAMADOL HYDROCHLORIDE 50 MG: 50 TABLET, FILM COATED ORAL at 08:23

## 2019-06-11 RX ADMIN — ONDANSETRON 4 MG: 2 INJECTION INTRAMUSCULAR; INTRAVENOUS at 21:44

## 2019-06-11 RX ADMIN — TRAMADOL HYDROCHLORIDE 50 MG: 50 TABLET, FILM COATED ORAL at 20:02

## 2019-06-11 RX ADMIN — KETOTIFEN FUMARATE 1 DROP: 0.35 SOLUTION/ DROPS OPHTHALMIC at 20:08

## 2019-06-11 RX ADMIN — KETOTIFEN FUMARATE 1 DROP: 0.35 SOLUTION/ DROPS OPHTHALMIC at 08:28

## 2019-06-11 RX ADMIN — MONTELUKAST 10 MG: 10 TABLET, FILM COATED ORAL at 20:02

## 2019-06-11 RX ADMIN — HUMAN INSULIN 2 UNITS: 100 INJECTION, SOLUTION SUBCUTANEOUS at 08:33

## 2019-06-11 RX ADMIN — IPRATROPIUM BROMIDE AND ALBUTEROL SULFATE 3 ML: .5; 3 SOLUTION RESPIRATORY (INHALATION) at 08:35

## 2019-06-11 RX ADMIN — METOCLOPRAMIDE 5 MG: 5 TABLET ORAL at 08:23

## 2019-06-11 RX ADMIN — DOXYCYCLINE 100 MG: 100 CAPSULE ORAL at 20:02

## 2019-06-11 RX ADMIN — DOXYCYCLINE 100 MG: 100 CAPSULE ORAL at 08:28

## 2019-06-11 RX ADMIN — SODIUM CHLORIDE 8 MG/HR: 9 INJECTION, SOLUTION INTRAVENOUS at 03:43

## 2019-06-11 RX ADMIN — PANTOPRAZOLE SODIUM 40 MG: 40 TABLET, DELAYED RELEASE ORAL at 08:23

## 2019-06-11 RX ADMIN — METOCLOPRAMIDE 5 MG: 5 TABLET ORAL at 20:02

## 2019-06-11 RX ADMIN — SODIUM CHLORIDE, PRESERVATIVE FREE 3 ML: 5 INJECTION INTRAVENOUS at 10:22

## 2019-06-11 RX ADMIN — FUROSEMIDE 40 MG: 40 TABLET ORAL at 17:23

## 2019-06-11 RX ADMIN — INSULIN DETEMIR 25 UNITS: 100 INJECTION, SOLUTION SUBCUTANEOUS at 21:20

## 2019-06-11 RX ADMIN — BUSPIRONE HYDROCHLORIDE 10 MG: 5 TABLET ORAL at 20:02

## 2019-06-11 RX ADMIN — BUDESONIDE AND FORMOTEROL FUMARATE DIHYDRATE 2 PUFF: 160; 4.5 AEROSOL RESPIRATORY (INHALATION) at 21:12

## 2019-06-11 RX ADMIN — INSULIN ASPART 5 UNITS: 100 INJECTION, SOLUTION INTRAVENOUS; SUBCUTANEOUS at 17:11

## 2019-06-11 RX ADMIN — SODIUM CHLORIDE, PRESERVATIVE FREE 3 ML: 5 INJECTION INTRAVENOUS at 20:02

## 2019-06-11 RX ADMIN — PANTOPRAZOLE SODIUM 40 MG: 40 TABLET, DELAYED RELEASE ORAL at 17:11

## 2019-06-11 RX ADMIN — BUSPIRONE HYDROCHLORIDE 10 MG: 5 TABLET ORAL at 08:23

## 2019-06-11 RX ADMIN — TRAMADOL HYDROCHLORIDE 50 MG: 50 TABLET, FILM COATED ORAL at 17:10

## 2019-06-11 RX ADMIN — GUAIFENESIN 600 MG: 600 TABLET, EXTENDED RELEASE ORAL at 20:02

## 2019-06-11 RX ADMIN — INSULIN ASPART 5 UNITS: 100 INJECTION, SOLUTION INTRAVENOUS; SUBCUTANEOUS at 21:20

## 2019-06-11 RX ADMIN — INSULIN ASPART 15 UNITS: 100 INJECTION, SOLUTION INTRAVENOUS; SUBCUTANEOUS at 12:07

## 2019-06-11 NOTE — PROGRESS NOTES
Adult Nutrition  Assessment/PES    Patient Name:  Mingo Guidry  YOB: 1950  MRN: [id number]  Admit Date:  6/9/2019    Assessment Date:  6/11/2019    Comments:  Rec #1: Continue current diet order; Encouraging intake. Pt may benefit from adding Consistent Carbohydrate diet modification to diet order r/t elevated BG and diabetes diagnosis. Pt receiving 100% PO intake over 1 meal. Rec #2: Consider MVI with minerals daily. Rec #3: Continue to monitor/replace electrolytes PRN. RD to follow pt. Consult RD PRN.       Reason for Assessment     Row Name 06/11/19 1156          Reason for Assessment    Reason For Assessment  diagnosis/disease state;identified at risk by screening criteria     Diagnosis  cardiac disease;gastrointestinal disease;pulmonary disease;diabetes diagnosis/complications DM2, Heart Failure, HLD, HTN, GI bleed, MISHA     Identified At Risk by Screening Criteria  BMI;difficulty chewing/swallowing             Labs/Tests/Procedures/Meds     Row Name 06/11/19 1157          Labs/Procedures/Meds    Lab Results Reviewed  reviewed, pertinent     Lab Results Comments  High: Glu, BUN, Creat        Medications    Pertinent Medications Reviewed  reviewed     Pertinent Medications Comments  Insulin           Estimated/Assessed Needs     Row Name 06/11/19 1159          Calculation Measurements    Weight Used For Calculations  90.3 kg (199 lb 1.2 oz)        Estimated/Assessed Needs    Additional Documentation  Fluid Requirements (Group);Summers-St. Jeor Equation (Group);Protein Requirements (Group);Calorie Requirements (Group)        Calorie Requirements    Estimated Calorie Need Method  Summers-St Jeor     Estimated Calorie Requirement Comment  ~0532-3990        Summers-St. Jeor Equation    RMR (Summers-St. Jeor Equation)  1322.75        Protein Requirements    Est Protein Requirement Amount (gms/kg)  1.2 gm protein  gm     Estimated Protein Requirements (gms/day)  108.36        Fluid Requirements     Estimated Fluid Requirement Method  Alexis-Segar Formula     [place]-Segar Method (over 20 kg)  3306         Nutrition Prescription Ordered     Row Name 06/11/19 1159          Nutrition Prescription PO    Current PO Diet  Regular     Common Modifiers  GI Soft/[place]         Evaluation of Received Nutrient/Fluid Intake     Row Name 06/11/19 1159 06/11/19 1158       Calculation Measurements    Weight Used For Calculations  --  90.3 kg (199 lb 1.2 oz)       PO Evaluation    Number of Days PO Intake Evaluated  1 day  --    Number of Meals  1  --    % PO Intake  100  --        Evaluation of Prescribed Nutrient/Fluid Intake     Row Name 06/11/19 1158          Calculation Measurements    Weight Used For Calculations  90.3 kg (199 lb 1.2 oz)             Problem/Interventions:  Problem 1     Row Name 06/11/19 1200          Nutrition Diagnoses Problem 1    Problem 1  Overweight/Obesity     Etiology (related to)  Factors Affecting Nutrition     Food Habit/Preferences  Large Meals     Signs/Symptoms (evidenced by)  BMI     BMI  Greater than 40         Problem 2     Row Name 06/11/19 1200          Nutrition Diagnoses Problem 2    Problem 2  Impaired Nutrient Utilization     Etiology (related to)  Medical Diagnosis     Endocrine  DM Type 2     Renal  MISHA     Signs/Symptoms (evidenced by)  Biochemical     Specific Labs Noted  Glucose;BUN;Creatinine         Problem 3     Row Name 06/11/19 1201          Nutrition Diagnoses Problem 3    Problem 3  Altered GI Function     Etiology (related to)  Medical Diagnosis     Gastrointestinal  Gastrointestinal bleed     Signs/Symptoms (evidenced by)  Report/Observation     Reported GI Symptoms  GI distress             Intervention Goal     Row Name 06/11/19 1201          Intervention Goal    General  Meet nutritional needs for age/condition     PO  Meet estimated needs;Maintain intake;PO intake (%)     PO Intake %  100 %     Weight  No significant weight loss         Nutrition Intervention      Row Name 06/11/19 1202          Nutrition Intervention    RD/Tech Action  Follow Tx progress;Care plan reviewd         Nutrition Prescription     Row Name 06/11/19 1202          Nutrition Prescription PO    PO Prescription  Other (comment) Continue current diet order     New PO Prescription Ordered?  No, recommended        Other Orders    Obtain Weight  Daily     Obtain Weight Ordered?  No, recommended     Supplement  Vitamin mineral supplement     Supplement Ordered?  No, recommended         Education/Evaluation     Row Name 06/11/19 1202          Education    Education  Will Instruct as appropriate        Monitor/Evaluation    Monitor  Per protocol;PO intake;I&O;Pertinent labs;Weight           Electronically signed by:  Yvette Rocha RD  06/11/19 12:03 PM

## 2019-06-11 NOTE — PROGRESS NOTES
PROGRESS NOTE        Date of Admission: 6/9/2019  Length of Stay: 2  Primary Care Physician: Luz Prater APRN    Subjective   Chief Complaint: Follow-up GI bleed  HPI: This is a 68-year-old female with history of chronic diastolic congestive heart failure, COPD on 3 L home oxygen, dyslipidemia, diabetes mellitus type 2 who presented to the emergency room with complaints of bright red blood per rectum.  According to the patient she had gotten up in the morning and felt like she needed to have a bowel movement however she noticed a large clot in her underwear.  Since that time she had several bowel movements that were bright red blood and now melena.  At the time of my evaluation this morning she just had a large bowel movement for which she was noted to have melena.  She is not on blood thinners at home but she does take aspirin.  She does have a history of chronic constipation for which she does have to take stool softeners and laxatives.  Patient was admitted to the hospitalist service.  Dr. York had recently done an EGD and colonoscopy 2 weeks ago on the patient due to heme positive stools for which she was noted to have reactive gastropathy, Mg's esophagus with tubular adenomas and 5 polyps removed.  She denied any bleeding after the procedure until the day of admission.  He was noted on 6/10/2019 to have further bleeding with a drop in her hemoglobin to 9.5.  Given that she was actively bleeding and drop in her hemoglobin she was transfused 2 units of packed red blood cells.  Dr. York with general surgery did see and evaluate patient and she was takenTo endoscopy where she underwent an EGD.  She was noted to have significant gastritis as well as Mg's esophagus.  He did take some biopsies.  Surgery feels that upper gastric region is the likely source of her bleeding however cannot completely rule out a diverticular bleed.  However over the last 24 hours she has stopped bleeding.  Her  hemoglobin has remained stable this morning.  Dr. York with general surgery has advanced her diet.  He does recommend holding off aspirin for at least 1 week.  I have discontinued the Protonix drip and place the patient on oral Protonix twice daily for 1 week then daily with breakfast.    I did see and evaluate patient at bedside.  She denies any melena, hematochezia or hematic emesis.  She is not had a bowel movement today.  We will plan to advance her diet.  She is complaining of some cough and congestion.  She states that she did see her primary care provider prior to admission and was placed on Mucinex, steroids and antibiotics in the form of doxycycline.  Repeat chest x-ray was done which did not show any evidence of pneumonia.  She had a stable left perihilar linear density consistent with scarring but there was no new areas of consolidation.  I have started the patient back on antibiotics and mucolytic's for an acute bronchitis.  She has no wheezing and is moving air well she does have some rhonchi so I will hold off starting steroids especially given that her blood sugars have been quite difficult to control.  I have also ordered a PET valve for the patient to help with mucus clearance.      I have started the patient back on home Lasix however she does take 40 mg twice daily at home I have decreased that to 40 mg daily.  She does have some renal failure with a creatinine of 1.7.  I do suspect she has some chronic kidney disease stage III and this may be her new baseline.  Her creatinine was noted in January 2018 to be 1.2.  She will need to follow with a nephrologist on an outpatient basis.    Her blood sugars are starting to go back up.  She does take Tresiba at home as well as Humalog.  I will place her on NovoLog before meals and adjust her Levemir to 25 units every 12 hours to close her compared to her home dosing.  We will continue to monitor her blood sugars and titrate accordingly.        Review Of  Systems:   Review of Systems  General ROS: Patient denies any fevers, chills or loss of consciousness.    ENT ROS: Denies sore throat, nasal congestion or ear pain.   Hematological and Lymphatic ROS: Denies neck swelling or easy bleeding.  Endocrine ROS: Denies any recent unintentional weight gain or loss.  Diabetes mellitus.  Respiratory ROS: She does have some cough and congestion.  According to the patient she did have this prior to admission but forgot to tell the staff that she was supposed to be on antibiotic and Mucinex for an upper respiratory infection.  Cardiovascular ROS: Denies chest pain or palpitations. No history of exertional chest pain.   Gastrointestinal ROS: Denies abdominal pain, melena, hematochezia, hematemesis.  Genito-Urinary ROS: Denies dysuria or hematuria.  Musculoskeletal ROS: Denies back pain. No muscle pain. No calf pain.   Neurological ROS: Denies any focal weakness. No loss of consciousness. Denies any numbness. Denies neck pain.   Dermatological ROS: Denies any redness or pruritis.    Objective      Temp:  [98 °F (36.7 °C)-98.7 °F (37.1 °C)] 98.5 °F (36.9 °C)  Heart Rate:  [] 96  Resp:  [16-18] 18  BP: ()/(30-92) 124/40  Physical Exam    General Appearance:  Alert and cooperative, not in any acute distress.   Head:  Atraumatic and normocephalic, without obvious abnormality.   Eyes:          PERRLA, conjunctivae and sclerae normal, no Icterus. No pallor. Extraocular movements are within normal limits.   Ears:  Ears appear intact with no abnormalities noted.   Throat: No oral lesions, no thrush, oral mucosa moist.   Neck: Supple, trachea midline, no thyromegaly, no carotid bruit.       Lungs:   Chest shape is normal. Breath sounds heard bilaterally equally.  No crackles or wheezing.  Bilateral rhonchi no Pleural rub or bronchial breathing.   Heart:  Normal S1 and S2, no murmur, no gallop, no rub. No JVD   Abdomen:   Normal bowel sounds, no masses, no organomegaly. Soft     Generalized tender, non-distended, no guarding, no rebound             tenderness   Extremities: Moves all extremities well, no edema, no cyanosis, no clubbing.   Pulses: Pulses palpable and equal bilaterally   Skin: No bleeding, bruising or rash       Neurologic:    Psychiatric/Behavior:     Cranial nerves 2 - 12 grossly intact, sensation intact, Motor power is normal and equal bilaterally.  Mood normal, behavior normal       Results Review:    I have reviewed the labs, radiology results and diagnostic studies.    Results from last 7 days   Lab Units 06/11/19  0738  06/10/19  0547   WBC 10*3/mm3  --   --  16.11*   HEMOGLOBIN g/dL 10.1*   < > 9.7*   PLATELETS 10*3/mm3  --   --  331    < > = values in this interval not displayed.     Results from last 7 days   Lab Units 06/11/19  0738   SODIUM mmol/L 140   POTASSIUM mmol/L 4.0   CO2 mmol/L 35.0*   CREATININE mg/dL 1.70*   GLUCOSE mg/dL 150*       Culture Data:    Radiology Data:   Cardiology Data:    I have reviewed the medications.    Assessment/Plan     Assessment and Plan:  1.  Acute GI bleed present on admission-Dr. York with general surgery has been consulted.  Serial hemoglobin hematocrit has been ordered.  I have discontinued the Protonix drip.  Surgery does feel that this was likely upper GI bleed however he cannot rule out diverticular bleed.  Patient has had no further bleeding in the last 24 hours and her hemoglobin remained stable.  We will continue to monitor over the next 24 hours and should she have any drop in hemoglobin or further bleeding will likely have to consider a colonoscopy.  Dr. York with general surgery does recommend that she remain off aspirin for at least 1 more week.    2.  Acute blood loss anemia-status post 2 units of packed red blood cells.  We will continue to monitor hemoglobin hematocrit and transfuse if indicated.    2.  Chronic renal failure stage III-   I have reviewed her labs done at [facility] and it does appear that her  creatinine does run from 1.7-2.0.  She is followed by Dr. Alba with nephrology as an outpatient.    3.  Diabetes mellitus type 2 uncontrolled-patient is on insulin protocol.  I have started the patient on NovoLog before meals as well as increase Levemir to be comparable to her home Humalog and Tresiba.    4.  COPD with no evidence of exacerbation    5.  Chronic hypoxic respiratory failure on home oxygen 3 L nasal cannula    6.  Chronic diastolic congestive heart failure with no evidence of exacerbation-I do not feel that the patient is having an exacerbation of her CHF.  Diuretics were given since she does take these at home to prevent any fluid overload.  Her congestion does appear to be more acute bronchitis.    7.  Chronic essential hypertension-blood pressure is stable we will continue to monitor.    8.  Dyslipidemia    9.  Gastroesophageal reflux/gastritis with Mg's esophagus-patient is on proton pump inhibitor.  Will continue with Protonix twice daily for 1 week then daily.    10.  Acute bronchitis-present on admission-we will resume patient's home doxycycline as well as Mucinex.  I have also ordered for a flutter valve and bronchodilators.  Since she is not wheezing I will hold off on steroids since her blood sugars are difficult to control.        DVT prophylaxis:SCDs  Discharge Planning:   Deb Garcia, JACKI 06/11/19 12:09 PM

## 2019-06-11 NOTE — PROGRESS NOTES
LOS: 2 days   Patient Care Team:  Luz Prater APRN as PCP - General      Chief Complaint: GI bleed      Interval History: Patient doing well, no bowel movement or bleeding from the rectum in the last 24 hours.  No abdominal pain, nausea vomiting.  EGD yesterday indicated significant gastritis and Mg's esophagitis.  No active bleed was seen.    Patient Complaints: None    History taken from: patient    Vital Signs  Temp:  [97.4 °F (36.3 °C)-99.2 °F (37.3 °C)] 98.7 °F (37.1 °C)  Heart Rate:  [] 81  Resp:  [14-18] 18  BP: ()/(30-92) 109/49    Physical Exam:     General Appearance:    Alert, cooperative, in no acute distress   Head:    Normocephalic, without obvious abnormality, atraumatic   Lungs:     Clear to auscultation,respirations regular, even and                  unlabored    Heart:    Regular rhythm and normal rate, normal S1 and S2, no            murmur, no gallop, no rub, no click   Abdomen:     Normal bowel sounds, no masses, no organomegaly, soft        non-tender, non-distended, no guarding, no rebound                tenderness   Extremities:   Moves all extremities well, no edema, no cyanosis, no             redness   Pulses:   Pulses palpable and equal bilaterally   Skin:   No bleeding, bruising or rash        Results Review:       Lab Results (last 24 hours)     Procedure Component Value Units Date/Time    POC Glucose Once [[id number]]  (Abnormal) Collected:  06/11/19 0400    Specimen:  Blood Updated:  06/11/19 0405     Glucose 147 mg/dL      Comment: Serial Number: [id number]:  [id number]       Hemoglobin & Hematocrit, Blood [[id number]]  (Abnormal) Collected:  06/11/19 0321    Specimen:  Blood Updated:  06/11/19 0328     Hemoglobin 11.0 g/dL      Hematocrit 33.9 %     POC Glucose Once [[id number]]  (Normal) Collected:  06/11/19 0001    Specimen:  Blood Updated:  06/11/19 0005     Glucose 118 mg/dL      Comment: Serial Number: [id number]:  [id number]       Hemoglobin  & Hematocrit, Blood [[id number]]  (Abnormal) Collected:  06/10/19 2140    Specimen:  Blood from Hand, Right Updated:  06/10/19 2152     Hemoglobin 10.3 g/dL      Hematocrit 31.9 %     POC Glucose Once [[id number]]  (Abnormal) Collected:  06/10/19 2005    Specimen:  Blood Updated:  06/10/19 2025     Glucose 180 mg/dL      Comment: Serial Number: [id number]:  [id number]       POC Glucose Once [[id number]]  (Abnormal) Collected:  06/10/19 1618    Specimen:  Blood Updated:  06/10/19 1643     Glucose 165 mg/dL      Comment: Serial Number: [id number]:  [id number]       Tissue Pathology Exam [[id number]] Collected:  06/10/19 1113    Specimen:  Tissue from Gastric, Antrum Updated:  06/10/19 1412    POC Glucose Once [[id number]]  (Abnormal) Collected:  06/10/19 1202    Specimen:  Blood Updated:  06/10/19 1216     Glucose 254 mg/dL      Comment: Serial Number: [id number]:  [id number]       POC Glucose Once [[id number]]  (Abnormal) Collected:  06/10/19 0835    Specimen:  Blood Updated:  06/10/19 0846     Glucose 300 mg/dL      Comment: Serial Number: [id number]:  [id number]       Hemoglobin & Hematocrit, Blood [[id number]]  (Abnormal) Collected:  06/10/19 0738    Specimen:  Blood Updated:  06/10/19 0751     Hemoglobin 9.5 g/dL      Hematocrit 28.6 %     POC Glucose Once [[id number]]  (Abnormal) Collected:  06/10/19 0523    Specimen:  Blood Updated:  06/10/19 0645     Glucose 295 mg/dL      Comment: Serial Number: [id number]:  [id number]       Basic Metabolic Panel [[id number]]  (Abnormal) Collected:  06/10/19 0547    Specimen:  Blood Updated:  06/10/19 0639     Glucose 281 mg/dL      BUN 65 mg/dL      Creatinine 1.70 mg/dL      Sodium 140 mmol/L      Potassium 4.2 mmol/L      Chloride 96 mmol/L      CO2 35.0 mmol/L      Calcium 8.1 mg/dL      eGFR Non African Amer 30 mL/min/1.73      BUN/Creatinine Ratio 38.2     Anion Gap 13.2 mmol/L     Narrative:       GFR Normal >60  Chronic Kidney Disease <60  Kidney Failure  <15    CBC (No Diff) [[id number]]  (Abnormal) Collected:  06/10/19 0547    Specimen:  Blood Updated:  06/10/19 0628     WBC 16.11 10*3/mm3      RBC 2.90 10*6/mm3      Hemoglobin 9.7 g/dL      Hematocrit 29.2 %      .7 fL      MCH 33.4 pg      MCHC 33.2 g/dL      RDW 13.9 %      RDW-SD 50.8 fl      MPV 11.2 fL      Platelets 331 10*3/mm3               Assessment/Plan       Acute GI bleeding      Resolving GI bleed, upper gastric region likely source, cannot rule out diverticular bleed.  Appears to stop completely.  Hemoglobin stable this morning at 11.0.  Should increase somewhat with diuresis.  Would hold off on aspirin for at least 1 more week.  Will advance diet today.      Skinny York MD  06/11/19  5:46 AM

## 2019-06-11 NOTE — PLAN OF CARE
Problem: Patient Care Overview  Goal: Plan of Care Review  Outcome: Ongoing (interventions implemented as appropriate)   06/11/19 3743   Coping/Psychosocial   Plan of Care Reviewed With patient   Plan of Care Review   Progress improving   OTHER   Outcome Summary X1 DARK RED MEDIUM LIQUID BM NOTED TODAY. HGB IMPROVING. INSULING ADJUSTED FOR ELEVATED GLUCOSE.

## 2019-06-11 NOTE — PLAN OF CARE
Problem: Patient Care Overview  Goal: Plan of Care Review  Outcome: Ongoing (interventions implemented as appropriate)   06/11/19 0531   Coping/Psychosocial   Plan of Care Reviewed With patient   Plan of Care Review   Progress improving   OTHER   Outcome Summary Patient received 2 units of blood and her H/H has improved. no complaints from patients. Vitals stable and will continue to monitor.     Goal: Interprofessional Rounds/Family Conf  Outcome: Ongoing (interventions implemented as appropriate)   06/11/19 0531   Interdisciplinary Rounds/Family Conf   Participants family;pharmacy;patient;physician;nursing       Problem: Fall Risk (Adult)  Goal: Absence of Fall  Outcome: Ongoing (interventions implemented as appropriate)   06/11/19 0531   Fall Risk (Adult)   Absence of Fall making progress toward outcome       Problem: Pain, Chronic (Adult)  Goal: Acceptable Pain/Comfort Level and Functional Ability  Outcome: Ongoing (interventions implemented as appropriate)   06/11/19 0531   Pain, Chronic (Adult)   Acceptable Pain/Comfort Level and Functional Ability making progress toward outcome

## 2019-06-11 NOTE — PROGRESS NOTES
Patient: Mingo Guidry  Procedure(s):  ESOPHAGOGASTRODUODENOSCOPY WITH BIOPSY  Anesthesia type: [unfilled]    Patient location: [facility] Surgical Floor  Last vitals:   Vitals:    06/11/19 0725   BP: 125/44   Pulse: 81   Resp: 18   Temp: 98.5 °F (36.9 °C)   SpO2: 96%     Level of consciousness: awake, alert and oriented    Post-anesthesia pain: adequate analgesia  Airway patency: patent  Respiratory: unassisted  Cardiovascular: stable and blood pressure at baseline  Hydration: euvolemic    Anesthetic complications: no

## 2019-06-12 ENCOUNTER — ANESTHESIA EVENT (OUTPATIENT)
Dept: GASTROENTEROLOGY | Facility: HOSPITAL | Age: 69
End: 2019-06-12

## 2019-06-12 ENCOUNTER — ANESTHESIA (OUTPATIENT)
Dept: GASTROENTEROLOGY | Facility: HOSPITAL | Age: 69
End: 2019-06-12

## 2019-06-12 LAB
BASOPHILS # BLD AUTO: 0.05 10*3/MM3 (ref 0–0.2)
BASOPHILS NFR BLD AUTO: 0.3 % (ref 0–1.5)
DEPRECATED RDW RBC AUTO: 56.8 FL (ref 37–54)
EOSINOPHIL # BLD AUTO: 0.06 10*3/MM3 (ref 0–0.4)
EOSINOPHIL NFR BLD AUTO: 0.4 % (ref 0.3–6.2)
ERYTHROCYTE [DISTWIDTH] IN BLOOD BY AUTOMATED COUNT: 15.9 % (ref 12.3–15.4)
GLUCOSE BLDC GLUCOMTR-MCNC: 225 MG/DL (ref 70–130)
GLUCOSE BLDC GLUCOMTR-MCNC: 396 MG/DL (ref 70–130)
GLUCOSE BLDC GLUCOMTR-MCNC: 411 MG/DL (ref 70–130)
GLUCOSE BLDC GLUCOMTR-MCNC: 428 MG/DL (ref 70–130)
GLUCOSE BLDC GLUCOMTR-MCNC: 432 MG/DL (ref 70–130)
HCT VFR BLD AUTO: 20.8 % (ref 34–46.6)
HCT VFR BLD AUTO: 21.9 % (ref 34–46.6)
HCT VFR BLD AUTO: 25.5 % (ref 34–46.6)
HGB BLD-MCNC: 6.7 G/DL (ref 12–15.9)
HGB BLD-MCNC: 7.1 G/DL (ref 12–15.9)
HGB BLD-MCNC: 8.5 G/DL (ref 12–15.9)
IMM GRANULOCYTES # BLD AUTO: 0.1 10*3/MM3 (ref 0–0.05)
IMM GRANULOCYTES NFR BLD AUTO: 0.7 % (ref 0–0.5)
LYMPHOCYTES # BLD AUTO: 2.48 10*3/MM3 (ref 0.7–3.1)
LYMPHOCYTES NFR BLD AUTO: 16.3 % (ref 19.6–45.3)
MCH RBC QN AUTO: 31.8 PG (ref 26.6–33)
MCHC RBC AUTO-ENTMCNC: 32.2 G/DL (ref 31.5–35.7)
MCV RBC AUTO: 98.6 FL (ref 79–97)
MONOCYTES # BLD AUTO: 0.84 10*3/MM3 (ref 0.1–0.9)
MONOCYTES NFR BLD AUTO: 5.5 % (ref 5–12)
NEUTROPHILS # BLD AUTO: 11.7 10*3/MM3 (ref 1.7–7)
NEUTROPHILS NFR BLD AUTO: 76.8 % (ref 42.7–76)
NRBC BLD AUTO-RTO: 0.1 /100 WBC (ref 0–0.2)
PLATELET # BLD AUTO: 256 10*3/MM3 (ref 140–450)
PMV BLD AUTO: 11.5 FL (ref 6–12)
RBC # BLD AUTO: 2.11 10*6/MM3 (ref 3.77–5.28)
WBC NRBC COR # BLD: 15.23 10*3/MM3 (ref 3.4–10.8)

## 2019-06-12 PROCEDURE — 99231 SBSQ HOSP IP/OBS SF/LOW 25: CPT | Performed by: SURGERY

## 2019-06-12 PROCEDURE — 94799 UNLISTED PULMONARY SVC/PX: CPT

## 2019-06-12 PROCEDURE — 85018 HEMOGLOBIN: CPT | Performed by: NURSE PRACTITIONER

## 2019-06-12 PROCEDURE — 63710000001 INSULIN ASPART PER 5 UNITS: Performed by: NURSE PRACTITIONER

## 2019-06-12 PROCEDURE — 85025 COMPLETE CBC W/AUTO DIFF WBC: CPT | Performed by: NURSE PRACTITIONER

## 2019-06-12 PROCEDURE — 85014 HEMATOCRIT: CPT | Performed by: NURSE PRACTITIONER

## 2019-06-12 PROCEDURE — 63710000001 INSULIN DETEMIR PER 5 UNITS: Performed by: NURSE PRACTITIONER

## 2019-06-12 PROCEDURE — 82962 GLUCOSE BLOOD TEST: CPT

## 2019-06-12 PROCEDURE — 99232 SBSQ HOSP IP/OBS MODERATE 35: CPT | Performed by: NURSE PRACTITIONER

## 2019-06-12 PROCEDURE — 86900 BLOOD TYPING SEROLOGIC ABO: CPT

## 2019-06-12 PROCEDURE — 25010000002 FUROSEMIDE PER 20 MG: Performed by: NURSE PRACTITIONER

## 2019-06-12 PROCEDURE — 85014 HEMATOCRIT: CPT | Performed by: INTERNAL MEDICINE

## 2019-06-12 PROCEDURE — 85018 HEMOGLOBIN: CPT | Performed by: INTERNAL MEDICINE

## 2019-06-12 PROCEDURE — 25010000002 ONDANSETRON PER 1 MG: Performed by: INTERNAL MEDICINE

## 2019-06-12 PROCEDURE — 63710000001 INSULIN ASPART PER 5 UNITS: Performed by: INTERNAL MEDICINE

## 2019-06-12 PROCEDURE — P9016 RBC LEUKOCYTES REDUCED: HCPCS

## 2019-06-12 PROCEDURE — 63710000001 INSULIN ASPART PER 5 UNITS

## 2019-06-12 PROCEDURE — 25010000002 PROPOFOL 10 MG/ML EMULSION: Performed by: NURSE ANESTHETIST, CERTIFIED REGISTERED

## 2019-06-12 PROCEDURE — 36430 TRANSFUSION BLD/BLD COMPNT: CPT

## 2019-06-12 PROCEDURE — 0DJD8ZZ INSPECTION OF LOWER INTESTINAL TRACT, VIA NATURAL OR ARTIFICIAL OPENING ENDOSCOPIC: ICD-10-PCS | Performed by: SURGERY

## 2019-06-12 RX ORDER — SODIUM CHLORIDE 9 MG/ML
INJECTION, SOLUTION INTRAVENOUS CONTINUOUS PRN
Status: DISCONTINUED | OUTPATIENT
Start: 2019-06-12 | End: 2019-06-12 | Stop reason: SURG

## 2019-06-12 RX ORDER — ONDANSETRON 2 MG/ML
4 INJECTION INTRAMUSCULAR; INTRAVENOUS ONCE AS NEEDED
Status: DISCONTINUED | OUTPATIENT
Start: 2019-06-12 | End: 2019-06-12 | Stop reason: HOSPADM

## 2019-06-12 RX ORDER — NICOTINE POLACRILEX 4 MG
1 LOZENGE BUCCAL
Status: DISCONTINUED | OUTPATIENT
Start: 2019-06-12 | End: 2019-06-12 | Stop reason: HOSPADM

## 2019-06-12 RX ORDER — POLYETHYLENE GLYCOL 3350, SODIUM CHLORIDE, POTASSIUM CHLORIDE, SODIUM BICARBONATE, AND SODIUM SULFATE 240; 5.84; 2.98; 6.72; 22.72 G/4L; G/4L; G/4L; G/4L; G/4L
2000 POWDER, FOR SOLUTION ORAL EVERY 8 HOURS SCHEDULED
Status: DISCONTINUED | OUTPATIENT
Start: 2019-06-12 | End: 2019-06-12

## 2019-06-12 RX ORDER — DEXTROSE MONOHYDRATE 25 G/50ML
25 INJECTION, SOLUTION INTRAVENOUS
Status: DISCONTINUED | OUTPATIENT
Start: 2019-06-12 | End: 2019-06-15 | Stop reason: HOSPADM

## 2019-06-12 RX ORDER — NICOTINE POLACRILEX 4 MG
1 LOZENGE BUCCAL
Status: DISCONTINUED | OUTPATIENT
Start: 2019-06-12 | End: 2019-06-15 | Stop reason: HOSPADM

## 2019-06-12 RX ORDER — ECHINACEA PURPUREA EXTRACT 125 MG
2 TABLET ORAL AS NEEDED
Status: DISCONTINUED | OUTPATIENT
Start: 2019-06-12 | End: 2019-06-15 | Stop reason: HOSPADM

## 2019-06-12 RX ORDER — LIDOCAINE HYDROCHLORIDE 20 MG/ML
INJECTION, SOLUTION INFILTRATION; PERINEURAL AS NEEDED
Status: DISCONTINUED | OUTPATIENT
Start: 2019-06-12 | End: 2019-06-12 | Stop reason: SURG

## 2019-06-12 RX ORDER — PROPOFOL 10 MG/ML
VIAL (ML) INTRAVENOUS AS NEEDED
Status: DISCONTINUED | OUTPATIENT
Start: 2019-06-12 | End: 2019-06-12 | Stop reason: SURG

## 2019-06-12 RX ORDER — FUROSEMIDE 40 MG/1
40 TABLET ORAL DAILY
Status: DISCONTINUED | OUTPATIENT
Start: 2019-06-13 | End: 2019-06-13

## 2019-06-12 RX ORDER — BUDESONIDE 0.5 MG/2ML
0.5 INHALANT ORAL
Status: DISCONTINUED | OUTPATIENT
Start: 2019-06-12 | End: 2019-06-15 | Stop reason: HOSPADM

## 2019-06-12 RX ORDER — FUROSEMIDE 10 MG/ML
40 INJECTION INTRAMUSCULAR; INTRAVENOUS ONCE
Status: COMPLETED | OUTPATIENT
Start: 2019-06-12 | End: 2019-06-12

## 2019-06-12 RX ORDER — PANTOPRAZOLE SODIUM 40 MG/10ML
40 INJECTION, POWDER, LYOPHILIZED, FOR SOLUTION INTRAVENOUS EVERY 12 HOURS SCHEDULED
Status: DISCONTINUED | OUTPATIENT
Start: 2019-06-12 | End: 2019-06-15 | Stop reason: HOSPADM

## 2019-06-12 RX ORDER — DEXTROSE MONOHYDRATE 25 G/50ML
25 INJECTION, SOLUTION INTRAVENOUS
Status: DISCONTINUED | OUTPATIENT
Start: 2019-06-12 | End: 2019-06-12 | Stop reason: HOSPADM

## 2019-06-12 RX ADMIN — SODIUM CHLORIDE, PRESERVATIVE FREE 3 ML: 5 INJECTION INTRAVENOUS at 09:33

## 2019-06-12 RX ADMIN — KETOTIFEN FUMARATE 1 DROP: 0.35 SOLUTION/ DROPS OPHTHALMIC at 09:32

## 2019-06-12 RX ADMIN — INSULIN DETEMIR 30 UNITS: 100 INJECTION, SOLUTION SUBCUTANEOUS at 21:02

## 2019-06-12 RX ADMIN — PANTOPRAZOLE SODIUM 40 MG: 40 INJECTION, POWDER, FOR SOLUTION INTRAVENOUS at 10:46

## 2019-06-12 RX ADMIN — BUSPIRONE HYDROCHLORIDE 10 MG: 5 TABLET ORAL at 20:57

## 2019-06-12 RX ADMIN — PANTOPRAZOLE SODIUM 40 MG: 40 INJECTION, POWDER, FOR SOLUTION INTRAVENOUS at 20:53

## 2019-06-12 RX ADMIN — SALINE NASAL SPRAY 2 SPRAY: 1.5 SOLUTION NASAL at 09:32

## 2019-06-12 RX ADMIN — GUAIFENESIN 600 MG: 600 TABLET, EXTENDED RELEASE ORAL at 09:32

## 2019-06-12 RX ADMIN — GUAIFENESIN 600 MG: 600 TABLET, EXTENDED RELEASE ORAL at 20:57

## 2019-06-12 RX ADMIN — BUDESONIDE 0.5 MG: 0.5 INHALANT RESPIRATORY (INHALATION) at 19:00

## 2019-06-12 RX ADMIN — IPRATROPIUM BROMIDE AND ALBUTEROL SULFATE 3 ML: .5; 3 SOLUTION RESPIRATORY (INHALATION) at 19:00

## 2019-06-12 RX ADMIN — DOXYCYCLINE 100 MG: 100 CAPSULE ORAL at 09:32

## 2019-06-12 RX ADMIN — IPRATROPIUM BROMIDE AND ALBUTEROL SULFATE 3 ML: .5; 3 SOLUTION RESPIRATORY (INHALATION) at 09:44

## 2019-06-12 RX ADMIN — INSULIN ASPART 9 UNITS: 100 INJECTION, SOLUTION INTRAVENOUS; SUBCUTANEOUS at 17:07

## 2019-06-12 RX ADMIN — PROPOFOL 130 MG: 10 INJECTION, EMULSION INTRAVENOUS at 07:21

## 2019-06-12 RX ADMIN — MONTELUKAST 10 MG: 10 TABLET, FILM COATED ORAL at 20:57

## 2019-06-12 RX ADMIN — KETOTIFEN FUMARATE 1 DROP: 0.35 SOLUTION/ DROPS OPHTHALMIC at 21:01

## 2019-06-12 RX ADMIN — BUDESONIDE 0.5 MG: 0.5 INHALANT RESPIRATORY (INHALATION) at 09:44

## 2019-06-12 RX ADMIN — INSULIN ASPART 6 UNITS: 100 INJECTION, SOLUTION INTRAVENOUS; SUBCUTANEOUS at 17:07

## 2019-06-12 RX ADMIN — TRAMADOL HYDROCHLORIDE 50 MG: 50 TABLET, FILM COATED ORAL at 17:07

## 2019-06-12 RX ADMIN — BUSPIRONE HYDROCHLORIDE 10 MG: 5 TABLET ORAL at 09:32

## 2019-06-12 RX ADMIN — SODIUM CHLORIDE, PRESERVATIVE FREE 3 ML: 5 INJECTION INTRAVENOUS at 21:02

## 2019-06-12 RX ADMIN — TRAMADOL HYDROCHLORIDE 50 MG: 50 TABLET, FILM COATED ORAL at 20:57

## 2019-06-12 RX ADMIN — INSULIN ASPART 15 UNITS: 100 INJECTION, SOLUTION INTRAVENOUS; SUBCUTANEOUS at 07:52

## 2019-06-12 RX ADMIN — FUROSEMIDE 40 MG: 10 INJECTION, SOLUTION INTRAMUSCULAR; INTRAVENOUS at 10:46

## 2019-06-12 RX ADMIN — BUSPIRONE HYDROCHLORIDE 10 MG: 5 TABLET ORAL at 17:07

## 2019-06-12 RX ADMIN — INSULIN ASPART 4 UNITS: 100 INJECTION, SOLUTION INTRAVENOUS; SUBCUTANEOUS at 21:01

## 2019-06-12 RX ADMIN — SODIUM CHLORIDE: 9 INJECTION, SOLUTION INTRAVENOUS at 06:51

## 2019-06-12 RX ADMIN — DOXYCYCLINE 100 MG: 100 CAPSULE ORAL at 20:57

## 2019-06-12 RX ADMIN — INSULIN DETEMIR 25 UNITS: 100 INJECTION, SOLUTION SUBCUTANEOUS at 09:33

## 2019-06-12 RX ADMIN — TRAMADOL HYDROCHLORIDE 50 MG: 50 TABLET, FILM COATED ORAL at 09:32

## 2019-06-12 RX ADMIN — ONDANSETRON 4 MG: 2 INJECTION INTRAMUSCULAR; INTRAVENOUS at 03:52

## 2019-06-12 RX ADMIN — LIDOCAINE HYDROCHLORIDE 100 MG: 20 INJECTION, SOLUTION INFILTRATION; PERINEURAL at 06:58

## 2019-06-12 RX ADMIN — METOPROLOL TARTRATE 12.5 MG: 25 TABLET ORAL at 20:57

## 2019-06-12 NOTE — ANESTHESIA PREPROCEDURE EVALUATION
Anesthesia Evaluation     Patient summary reviewed and Nursing notes reviewed   no history of anesthetic complications:  NPO Solid Status: > 8 hours  NPO Liquid Status: > 8 hours           Airway   Mallampati: II  TM distance: >3 FB  Neck ROM: full  no difficulty expected  Dental - normal exam     Pulmonary - normal exam   (+) pneumonia resolved , COPD moderate, asthma,   Cardiovascular - normal exam    Rhythm: regular  Rate: normal    (+) hypertension, valvular problems/murmurs, hyperlipidemia,       Neuro/Psych- negative ROS  GI/Hepatic/Renal/Endo    (+) obesity, morbid obesity, GERD poorly controlled, GI bleeding, diabetes mellitus type 2,     Musculoskeletal (-) negative ROS    Abdominal    Substance History - negative use     OB/GYN negative ob/gyn ROS         Other      history of cancer                    Anesthesia Plan    ASA 3 - emergent     MAC   (Pt told that intravenous sedation will be used as the primary anesthetic along with local anesthesia if necessary. Every effort will be made to make sure the patient is comfortable.     The patient was told they may or may not have recall for the procedure. It was further explained that if the MAC was not adequate that a general anesthetic with either an LMA or endotracheal tube would be required.     Will proceed with the plan of care.)  intravenous induction   Anesthetic plan, all risks, benefits, and alternatives have been provided, discussed and informed consent has been obtained with: patient.

## 2019-06-12 NOTE — SIGNIFICANT NOTE
0815- denies lightheadedness, nausea, increased weakness. C/o hunger. Aler, responds appropiately.   notified Scout Prescott CRNA of continued low bps. No new orders received.

## 2019-06-12 NOTE — ANESTHESIA POSTPROCEDURE EVALUATION
Patient: Mingo Guidry    Procedure Summary     Date:  06/12/19 Room / Location:  [facility] 3 / [facility]    Anesthesia Start:  0651 Anesthesia Stop:  0728    Procedure:  COLONOSCOPY (N/A ) Diagnosis:       GI bleed      (rectal bleeding)    Surgeon:  Skinny York MD Provider:  Scout Prescott CRNA    Anesthesia Type:  MAC ASA Status:  3 - Emergent          Anesthesia Type: MAC  Last vitals  BP   (!) 128/39 (06/12/19 0830)   Temp   98.7 °F (37.1 °C) (06/12/19 0830)   Pulse   100 (06/12/19 0830)   Resp   18 (06/12/19 0830)     SpO2   96 % (06/12/19 0830)     Post Anesthesia Care and Evaluation    Patient location during evaluation: PACU  Patient participation: complete - patient participated  Level of consciousness: awake  Pain score: 1  Pain management: adequate  Airway patency: patent  Anesthetic complications: No anesthetic complications  PONV Status: controlled  Cardiovascular status: acceptable and stable  Respiratory status: acceptable and nasal cannula  Hydration status: acceptable

## 2019-06-12 NOTE — PROGRESS NOTES
LOS: 3 days   Patient Care Team:  Luz Prater APRN as PCP - General      Chief Complaint: GI bleed      Interval History: Patient developed 2 large dark bloody bowel movements this morning.  Hemoglobin dropped to 7.5, after 2 units of blood.  Patient hemodynamically stable, concerned about bleeding source in the colon.    Patient Complaints: None    History taken from: patient    Vital Signs  Temp:  [96.3 °F (35.7 °C)-98.7 °F (37.1 °C)] 98.2 °F (36.8 °C)  Heart Rate:  [] 101  Resp:  [17-18] 18  BP: (107-149)/(34-57) 124/57    Physical Exam:     General Appearance:    Alert, cooperative, in no acute distress   Head:    Normocephalic, without obvious abnormality, atraumatic   Lungs:     Clear to auscultation,respirations regular, even and                  unlabored    Heart:    Regular rhythm and normal rate, normal S1 and S2, no            murmur, no gallop, no rub, no click   Abdomen:     Normal bowel sounds, no masses, no organomegaly, soft        non-tender, non-distended, no guarding, no rebound                tenderness   Extremities:   Moves all extremities well, no edema, no cyanosis, no             redness   Pulses:   Pulses palpable and equal bilaterally   Skin:   No bleeding, bruising or rash        Results Review:       Lab Results (last 24 hours)     Procedure Component Value Units Date/Time    CBC & Differential [[id number]] Collected:  06/12/19 0551    Specimen:  Blood Updated:  06/12/19 0627    Narrative:       The following orders were created for panel order CBC & Differential.  Procedure                               Abnormality         Status                     ---------                               -----------         ------                     CBC Auto Differential[[id number]]                            In process                   Please view results for these tests on the individual orders.    CBC Auto Differential [[id number]] Collected:  06/12/19 0551    Specimen:  Blood  Updated:  06/12/19 0627    Hemoglobin & Hematocrit, Blood [[id number]]  (Abnormal) Collected:  06/12/19 0135    Specimen:  Blood Updated:  06/12/19 0155     Hemoglobin 7.1 g/dL      Hematocrit 21.9 %     POC Glucose Once [[id number]]  (Abnormal) Collected:  06/11/19 1949    Specimen:  Blood Updated:  06/11/19 2050     Glucose 327 mg/dL      Comment: Serial Number: [id number]:  [id number]       Hemoglobin & Hematocrit, Blood [[id number]]  (Abnormal) Collected:  06/11/19 2011    Specimen:  Blood Updated:  06/11/19 2017     Hemoglobin 8.7 g/dL      Hematocrit 26.7 %     POC Glucose Once [[id number]]  (Abnormal) Collected:  06/11/19 1633    Specimen:  Blood Updated:  06/11/19 1642     Glucose 337 mg/dL      Comment: Serial Number: [id number]:  [id number]       Basic Metabolic Panel [[id number]]  (Abnormal) Collected:  06/11/19 1337    Specimen:  Blood Updated:  06/11/19 1407     Glucose 384 mg/dL      BUN 51 mg/dL      Creatinine 1.60 mg/dL      Sodium 137 mmol/L      Potassium 3.9 mmol/L      Chloride 96 mmol/L      CO2 32.0 mmol/L      Calcium 7.3 mg/dL      eGFR Non African Amer 32 mL/min/1.73      BUN/Creatinine Ratio 31.9     Anion Gap 12.9 mmol/L     Narrative:       GFR Normal >60  Chronic Kidney Disease <60  Kidney Failure <15    Albumin [[id number]]  (Abnormal) Collected:  06/11/19 1337    Specimen:  Blood Updated:  06/11/19 1356     Albumin 3.00 g/dL     CBC (No Diff) [[id number]]  (Abnormal) Collected:  06/11/19 1337    Specimen:  Blood Updated:  06/11/19 1348     WBC 9.73 10*3/mm3      RBC 3.01 10*6/mm3      Hemoglobin 9.8 g/dL      Hematocrit 29.8 %      MCV 99.0 fL      MCH 32.6 pg      MCHC 32.9 g/dL      RDW 16.3 %      RDW-SD 59.5 fl      MPV 11.0 fL      Platelets 235 10*3/mm3     POC Glucose Once [[id number]]  (Abnormal) Collected:  06/11/19 1036    Specimen:  Blood Updated:  06/11/19 1045     Glucose 404 mg/dL      Comment: Serial Number: [id number]:  [id number]       POC Glucose Once [[id number]]   (Abnormal) Collected:  06/11/19 0800    Specimen:  Blood Updated:  06/11/19 0805     Glucose 161 mg/dL      Comment: Serial Number: [id number]:  [id number]       Basic Metabolic Panel [[id number]]  (Abnormal) Collected:  06/11/19 0738    Specimen:  Blood Updated:  06/11/19 0804     Glucose 150 mg/dL      BUN 53 mg/dL      Creatinine 1.70 mg/dL      Sodium 140 mmol/L      Potassium 4.0 mmol/L      Chloride 102 mmol/L      CO2 35.0 mmol/L      Calcium 7.6 mg/dL      eGFR Non African Amer 30 mL/min/1.73      BUN/Creatinine Ratio 31.2     Anion Gap 7.0 mmol/L     Narrative:       GFR Normal >60  Chronic Kidney Disease <60  Kidney Failure <15    Hemoglobin & Hematocrit, Blood [[id number]]  (Abnormal) Collected:  06/11/19 0738    Specimen:  Blood Updated:  06/11/19 0750     Hemoglobin 10.1 g/dL      Hematocrit 30.8 %     POC Glucose Once [[id number]]  (Abnormal) Collected:  06/11/19 0639    Specimen:  Blood Updated:  06/11/19 0645     Glucose 163 mg/dL      Comment: Serial Number: [id number]:  [id number]                 Assessment/Plan       Acute GI bleeding    Chronic diastolic heart failure (CMS/HCC)    Type 2 diabetes mellitus (CMS/HCC)    Obesity    Acid reflux    Mixed hyperlipidemia    Gastritis    Mg's esophagus    Acute blood loss anemia    Acute bronchitis      GI bleed, significant.  Upper endoscopy indicates just gastritis with no active bleed.  Plans for colonoscopy today to evaluate the colon for source of bleeding.  Risk of bleeding perforation discussed and patient agreeable.      Skinny York MD  06/12/19  6:36 AM

## 2019-06-12 NOTE — PROGRESS NOTES
PROGRESS NOTE        Date of Admission: 6/9/2019  Length of Stay: 3  Primary Care Physician: Luz Prater APRN    Subjective   Chief Complaint: Follow-up GI bleed  HPI: This is a 68-year-old female with history of chronic diastolic congestive heart failure, COPD on 3 L home oxygen, dyslipidemia, diabetes mellitus type 2 who presented to the emergency room with complaints of bright red blood per rectum.  According to the patient she had gotten up in the morning and felt like she needed to have a bowel movement however she noticed a large clot in her underwear.  Since that time she had several bowel movements that were bright red blood and now melena.  At the time of my evaluation this morning she just had a large bowel movement for which she was noted to have melena.  She is not on blood thinners at home but she does take aspirin.  She does have a history of chronic constipation for which she does have to take stool softeners and laxatives.  Patient was admitted to the hospitalist service.  Dr. York had recently done an EGD and colonoscopy 2 weeks ago on the patient due to heme positive stools for which she was noted to have reactive gastropathy, Mg's esophagus with tubular adenomas and 5 polyps removed.  She denied any bleeding after the procedure until the day of admission.  He was noted on 6/10/2019 to have further bleeding with a drop in her hemoglobin to 9.5.  Given that she was actively bleeding and drop in her hemoglobin she was transfused 2 units of packed red blood cells.  Dr. York with general surgery did see and evaluate patient and she was takenTo endoscopy where she underwent an EGD.  She was noted to have significant gastritis as well as Mg's esophagus.  He did take some biopsies.      She was noted to have some cough and congestion.  She states that she did see her primary care provider prior to admission and was placed on Mucinex, steroids and antibiotics in the form of  doxycycline.  Repeat chest x-ray was done on 6/11/19 which did not show any evidence of pneumonia.  She had a stable left perihilar linear density consistent with scarring but there was no new areas of consolidation.  I have started the patient back on antibiotics and mucolytic's for an acute bronchitis.  She has no wheezing and is moving air well she does have some rhonchi so I will hold off starting steroids especially given that her blood sugars have been quite difficult to control.  I have also ordered a flutter valve for the patient to help with mucus clearance.    It was felt initially that patient's source of bleed was upper GI.  Her hemoglobin was monitored however last evening repeat hemoglobin did show a drop to 8.7 and then was repeated and a drop to 7.1.  She did have further episodes of bleeding with melenic stools.  Dr. Yanes did evaluate the patient and she was taken to the OR this morning where she underwent colonoscopy.  She was noted to have blood sigmoid colon but he cannot identify an active source of bleeding.  She will be placed back on clear liquids.  I have changed her from oral back to IV Protonix.    Review Of Systems: Systems reviewed on 6/12/2019 with the following findings:  Review of Systems  General ROS: Patient denies any fevers, chills or loss of consciousness.    ENT ROS: Denies sore throat, nasal congestion or ear pain.   Hematological and Lymphatic ROS: Denies neck swelling or easy bleeding.  Endocrine ROS: Denies any recent unintentional weight gain or loss.  Diabetes mellitus.  Respiratory ROS: She does have some cough and congestion but is not coughing anythin g up.  Denies SOA.  Cardiovascular ROS: Denies chest pain or palpitations. No history of exertional chest pain.   Gastrointestinal ROS: Abdominal cramping.  Melenic stool.  No hematic emesis.  Genito-Urinary ROS: Denies dysuria or hematuria.  Musculoskeletal ROS: Denies back pain. No muscle pain. No calf pain.    Neurological ROS: Denies any focal weakness. No loss of consciousness. Denies any numbness. Denies neck pain.   Dermatological ROS: Denies any redness or pruritis.    Objective      Temp:  [96.3 °F (35.7 °C)-98.7 °F (37.1 °C)] 98.7 °F (37.1 °C)  Heart Rate:  [] 100  Resp:  [15-18] 18  BP: (107-149)/(22-57) 128/39  Physical Exam physical examination done on 6/12/2019 with the following findings:    General Appearance:  Alert and cooperative, not in any acute distress.   Head:  Atraumatic and normocephalic, without obvious abnormality.   Eyes:          PERRLA, conjunctivae and sclerae normal, no Icterus. No pallor. Extraocular movements are within normal limits.   Ears:  Ears appear intact with no abnormalities noted.   Throat: No oral lesions, no thrush, oral mucosa moist.   Neck: Supple, trachea midline, no thyromegaly, no carotid bruit.       Lungs:   Chest shape is normal. Breath sounds heard bilaterally equally.  No crackles or wheezing.  Bilateral rhonchi no Pleural rub or bronchial breathing.   Heart:  Normal S1 and S2, no murmur, no gallop, no rub. No JVD   Abdomen:   Normal bowel sounds, no masses, no organomegaly. Soft    Generalized tender, non-distended, no guarding, no rebound             tenderness   Extremities: Moves all extremities well, no edema, no cyanosis, no clubbing.   Pulses: Pulses palpable and equal bilaterally   Skin: No bleeding, bruising or rash       Neurologic:    Psychiatric/Behavior:     Cranial nerves 2 - 12 grossly intact, sensation intact, Motor power is normal and equal bilaterally.  Mood normal, behavior normal       Results Review:    I have reviewed the labs, radiology results and diagnostic studies.    Results from last 7 days   Lab Units 06/12/19  0551   WBC 10*3/mm3 15.23*   HEMOGLOBIN g/dL 6.7*   PLATELETS 10*3/mm3 256     Results from last 7 days   Lab Units 06/11/19  1337   SODIUM mmol/L 137   POTASSIUM mmol/L 3.9   CO2 mmol/L 32.0*   CREATININE mg/dL 1.60*    GLUCOSE mg/dL 384*       Culture Data:    Radiology Data:   Cardiology Data:    I have reviewed the medications.    Assessment/Plan     Assessment and Plan:  1.  Acute GI bleed present on admission-Dr. York with general surgery has been consulted.  Patient did undergo an upper endoscopy however there was no active bleeding noted but it was still felt that the source of her bleed was likely upper however last evening patient's hemoglobin did start to drop and she did have more episodes of melena.  Dr. York has taken her down this morning for colonoscopy for which she did have a blood throughout her sigmoid however no identifiable source of bleeding was noted.  We will need to monitor hemoglobin closely transfuse as indicated and should she have continued drop or melena will need a bleeding scan.  Patient is on SCDs for DVT prophylaxis.      2.  Acute blood loss anemia-status post 2 units of packed red blood cells.  She did have another drop last evening and we will order 2 more units of packed red blood cells.  Will need to continue to monitor hemoglobin hematocrit closely.      2.  Chronic renal failure stage III-   I have reviewed her labs done at [facility] and it does appear that her creatinine does run from 1.7-2.0.  She is followed by Dr. Alba with nephrology as an outpatient.    3.  Diabetes mellitus type 2 uncontrolled-patient is on insulin protocol.  I have started the patient on NovoLog before meals as well as increase Levemir to be comparable to her home Humalog and Tresiba.  Patient is currently getting Levemir 25 units twice daily.  Since she is back to clear liquids only I will monitor closely and adjust the Levemir if needed.    4.  COPD with no evidence of exacerbation    5.  Chronic hypoxic respiratory failure on home oxygen 3 L nasal cannula    6.  Chronic diastolic congestive heart failure with no evidence of exacerbation-I do not feel that the patient is having an exacerbation of her CHF.   Diuretics were given since she does take these at home to prevent any fluid overload.  Her congestion does appear to be more acute bronchitis.    7.  Chronic essential hypertension-blood pressure is stable we will continue to monitor.    8.  Dyslipidemia    9.  Gastroesophageal reflux/gastritis with Mg's esophagus-patient is on proton pump inhibitor.  Will continue with Protonix twice daily for 1 week then daily.    10.  Acute bronchitis-present on admission - Bronchodilators, nebulized steroids, doxycycline as well as Mucinex.  I have also ordered for a flutter valve.      DVT prophylaxis:SCDs  Discharge Planning:   JACKI Pollard 06/12/19 8:35 AM

## 2019-06-13 ENCOUNTER — APPOINTMENT (OUTPATIENT)
Dept: NUCLEAR MEDICINE | Facility: HOSPITAL | Age: 69
End: 2019-06-13

## 2019-06-13 LAB
ABO + RH BLD: NORMAL
ABO + RH BLD: NORMAL
ANION GAP SERPL CALCULATED.3IONS-SCNC: 8.7 MMOL/L (ref 10–20)
BH BB BLOOD EXPIRATION DATE: NORMAL
BH BB BLOOD EXPIRATION DATE: NORMAL
BH BB BLOOD TYPE BARCODE: 600
BH BB BLOOD TYPE BARCODE: 600
BH BB DISPENSE STATUS: NORMAL
BH BB DISPENSE STATUS: NORMAL
BH BB PRODUCT CODE: NORMAL
BH BB PRODUCT CODE: NORMAL
BH BB UNIT NUMBER: NORMAL
BH BB UNIT NUMBER: NORMAL
BUN BLD-MCNC: 45 MG/DL (ref 7–20)
BUN/CREAT SERPL: 23.7 (ref 7.1–23.5)
CALCIUM SPEC-SCNC: 7.2 MG/DL (ref 8.4–10.2)
CHLORIDE SERPL-SCNC: 99 MMOL/L (ref 98–107)
CO2 SERPL-SCNC: 32 MMOL/L (ref 26–30)
CREAT BLD-MCNC: 1.9 MG/DL (ref 0.6–1.3)
CROSSMATCH INTERPRETATION: NORMAL
CROSSMATCH INTERPRETATION: NORMAL
DEPRECATED RDW RBC AUTO: 55.2 FL (ref 37–54)
ERYTHROCYTE [DISTWIDTH] IN BLOOD BY AUTOMATED COUNT: 16.6 % (ref 12.3–15.4)
GFR SERPL CREATININE-BSD FRML MDRD: 26 ML/MIN/1.73
GLUCOSE BLD-MCNC: 83 MG/DL (ref 74–98)
GLUCOSE BLDC GLUCOMTR-MCNC: 138 MG/DL (ref 70–130)
GLUCOSE BLDC GLUCOMTR-MCNC: 151 MG/DL (ref 70–130)
GLUCOSE BLDC GLUCOMTR-MCNC: 91 MG/DL (ref 70–130)
GLUCOSE BLDC GLUCOMTR-MCNC: 93 MG/DL (ref 70–130)
HCT VFR BLD AUTO: 23.7 % (ref 34–46.6)
HCT VFR BLD AUTO: 23.7 % (ref 34–46.6)
HCT VFR BLD AUTO: 24.6 % (ref 34–46.6)
HCT VFR BLD AUTO: 26 % (ref 34–46.6)
HGB BLD-MCNC: 7.8 G/DL (ref 12–15.9)
HGB BLD-MCNC: 8.1 G/DL (ref 12–15.9)
HGB BLD-MCNC: 8.2 G/DL (ref 12–15.9)
HGB BLD-MCNC: 8.5 G/DL (ref 12–15.9)
LAB AP CASE REPORT: NORMAL
MAGNESIUM SERPL-MCNC: 2 MG/DL (ref 1.6–2.3)
MCH RBC QN AUTO: 31.2 PG (ref 26.6–33)
MCHC RBC AUTO-ENTMCNC: 33.3 G/DL (ref 31.5–35.7)
MCV RBC AUTO: 93.5 FL (ref 79–97)
PATH REPORT.FINAL DX SPEC: NORMAL
PLATELET # BLD AUTO: 207 10*3/MM3 (ref 140–450)
PMV BLD AUTO: 10.6 FL (ref 6–12)
POTASSIUM BLD-SCNC: 3.7 MMOL/L (ref 3.5–5.1)
RBC # BLD AUTO: 2.63 10*6/MM3 (ref 3.77–5.28)
SODIUM BLD-SCNC: 136 MMOL/L (ref 137–145)
UNIT  ABO: NORMAL
UNIT  ABO: NORMAL
UNIT  RH: NORMAL
UNIT  RH: NORMAL
WBC NRBC COR # BLD: 12.06 10*3/MM3 (ref 3.4–10.8)

## 2019-06-13 PROCEDURE — 99232 SBSQ HOSP IP/OBS MODERATE 35: CPT | Performed by: NURSE PRACTITIONER

## 2019-06-13 PROCEDURE — A9560 TC99M LABELED RBC: HCPCS | Performed by: INTERNAL MEDICINE

## 2019-06-13 PROCEDURE — 99232 SBSQ HOSP IP/OBS MODERATE 35: CPT | Performed by: SURGERY

## 2019-06-13 PROCEDURE — 78278 ACUTE GI BLOOD LOSS IMAGING: CPT

## 2019-06-13 PROCEDURE — 63710000001 INSULIN DETEMIR PER 5 UNITS: Performed by: NURSE PRACTITIONER

## 2019-06-13 PROCEDURE — P9016 RBC LEUKOCYTES REDUCED: HCPCS

## 2019-06-13 PROCEDURE — 94799 UNLISTED PULMONARY SVC/PX: CPT

## 2019-06-13 PROCEDURE — 0 TECHNETIUM LABELED RED BLOOD CELLS: Performed by: INTERNAL MEDICINE

## 2019-06-13 PROCEDURE — 36430 TRANSFUSION BLD/BLD COMPNT: CPT

## 2019-06-13 PROCEDURE — 86900 BLOOD TYPING SEROLOGIC ABO: CPT

## 2019-06-13 PROCEDURE — 83735 ASSAY OF MAGNESIUM: CPT | Performed by: NURSE PRACTITIONER

## 2019-06-13 PROCEDURE — 80048 BASIC METABOLIC PNL TOTAL CA: CPT | Performed by: NURSE PRACTITIONER

## 2019-06-13 PROCEDURE — 85018 HEMOGLOBIN: CPT | Performed by: NURSE PRACTITIONER

## 2019-06-13 PROCEDURE — 82962 GLUCOSE BLOOD TEST: CPT

## 2019-06-13 PROCEDURE — 85014 HEMATOCRIT: CPT | Performed by: NURSE PRACTITIONER

## 2019-06-13 PROCEDURE — 85027 COMPLETE CBC AUTOMATED: CPT | Performed by: NURSE PRACTITIONER

## 2019-06-13 RX ORDER — FUROSEMIDE 10 MG/ML
40 INJECTION INTRAMUSCULAR; INTRAVENOUS ONCE
Status: DISCONTINUED | OUTPATIENT
Start: 2019-06-13 | End: 2019-06-15

## 2019-06-13 RX ADMIN — BUSPIRONE HYDROCHLORIDE 10 MG: 5 TABLET ORAL at 16:21

## 2019-06-13 RX ADMIN — GUAIFENESIN 600 MG: 600 TABLET, EXTENDED RELEASE ORAL at 08:44

## 2019-06-13 RX ADMIN — SALINE NASAL SPRAY 2 SPRAY: 1.5 SOLUTION NASAL at 08:44

## 2019-06-13 RX ADMIN — BUDESONIDE 0.5 MG: 0.5 INHALANT RESPIRATORY (INHALATION) at 06:57

## 2019-06-13 RX ADMIN — INSULIN DETEMIR 25 UNITS: 100 INJECTION, SOLUTION SUBCUTANEOUS at 08:45

## 2019-06-13 RX ADMIN — TECHNETIUM TC 99M-LABELED RED BLOOD CELLS 1 DOSE: KIT at 14:35

## 2019-06-13 RX ADMIN — METOPROLOL TARTRATE 12.5 MG: 25 TABLET ORAL at 08:44

## 2019-06-13 RX ADMIN — BUSPIRONE HYDROCHLORIDE 10 MG: 5 TABLET ORAL at 08:44

## 2019-06-13 RX ADMIN — SODIUM CHLORIDE, PRESERVATIVE FREE 3 ML: 5 INJECTION INTRAVENOUS at 08:45

## 2019-06-13 RX ADMIN — TRAMADOL HYDROCHLORIDE 50 MG: 50 TABLET, FILM COATED ORAL at 08:45

## 2019-06-13 RX ADMIN — BUSPIRONE HYDROCHLORIDE 10 MG: 5 TABLET ORAL at 21:48

## 2019-06-13 RX ADMIN — INSULIN DETEMIR 20 UNITS: 100 INJECTION, SOLUTION SUBCUTANEOUS at 23:21

## 2019-06-13 RX ADMIN — MELATONIN TAB 5 MG 5 MG: 5 TAB at 21:33

## 2019-06-13 RX ADMIN — IPRATROPIUM BROMIDE AND ALBUTEROL SULFATE 3 ML: .5; 3 SOLUTION RESPIRATORY (INHALATION) at 19:23

## 2019-06-13 RX ADMIN — DOXYCYCLINE 100 MG: 100 CAPSULE ORAL at 21:32

## 2019-06-13 RX ADMIN — TRAMADOL HYDROCHLORIDE 50 MG: 50 TABLET, FILM COATED ORAL at 21:32

## 2019-06-13 RX ADMIN — GUAIFENESIN 600 MG: 600 TABLET, EXTENDED RELEASE ORAL at 21:32

## 2019-06-13 RX ADMIN — TRAMADOL HYDROCHLORIDE 50 MG: 50 TABLET, FILM COATED ORAL at 16:21

## 2019-06-13 RX ADMIN — BUDESONIDE 0.5 MG: 0.5 INHALANT RESPIRATORY (INHALATION) at 19:23

## 2019-06-13 RX ADMIN — SODIUM CHLORIDE, PRESERVATIVE FREE 3 ML: 5 INJECTION INTRAVENOUS at 21:44

## 2019-06-13 RX ADMIN — DOXYCYCLINE 100 MG: 100 CAPSULE ORAL at 08:44

## 2019-06-13 RX ADMIN — KETOTIFEN FUMARATE 1 DROP: 0.35 SOLUTION/ DROPS OPHTHALMIC at 08:43

## 2019-06-13 RX ADMIN — MONTELUKAST 10 MG: 10 TABLET, FILM COATED ORAL at 21:32

## 2019-06-13 RX ADMIN — IPRATROPIUM BROMIDE AND ALBUTEROL SULFATE 3 ML: .5; 3 SOLUTION RESPIRATORY (INHALATION) at 06:57

## 2019-06-13 RX ADMIN — PANTOPRAZOLE SODIUM 40 MG: 40 INJECTION, POWDER, FOR SOLUTION INTRAVENOUS at 21:32

## 2019-06-13 RX ADMIN — METOPROLOL TARTRATE 12.5 MG: 25 TABLET ORAL at 21:32

## 2019-06-13 RX ADMIN — KETOTIFEN FUMARATE 1 DROP: 0.35 SOLUTION/ DROPS OPHTHALMIC at 21:44

## 2019-06-13 RX ADMIN — PANTOPRAZOLE SODIUM 40 MG: 40 INJECTION, POWDER, FOR SOLUTION INTRAVENOUS at 08:44

## 2019-06-13 NOTE — PROGRESS NOTES
Patient: Mingo Guidry  Procedure(s):  COLONOSCOPY  Anesthesia type: [unfilled]    Patient location: [facility] Floor  Last vitals:   Vitals:    06/13/19 0710   BP:    Pulse:    Resp:    Temp:    SpO2: 97%     Level of consciousness: awake, alert and oriented    Post-anesthesia pain: adequate analgesia  Airway patency: patent  Respiratory: unassisted, nasal cannula  Cardiovascular: stable and blood pressure at baseline  Hydration: euvolemic    Anesthetic complications: no

## 2019-06-13 NOTE — PROGRESS NOTES
PROGRESS NOTE        Date of Admission: 6/9/2019  Length of Stay: 4  Primary Care Physician: Luz Prater APRN    Subjective   Chief Complaint: Follow-up GI bleed  HPI: This is a 68-year-old female with history of chronic diastolic congestive heart failure, COPD on 3 L home oxygen, dyslipidemia, diabetes mellitus type 2 who presented to the emergency room with complaints of bright red blood per rectum.  According to the patient she had gotten up in the morning and felt like she needed to have a bowel movement however she noticed a large clot in her underwear.  Since that time she had several bowel movements that were bright red blood and now melena.  At the time of my evaluation this morning she just had a large bowel movement for which she was noted to have melena.  She is not on blood thinners at home but she does take aspirin.  She does have a history of chronic constipation for which she does have to take stool softeners and laxatives.  Patient was admitted to the hospitalist service.  Dr. York had recently done an EGD and colonoscopy 2 weeks ago on the patient due to heme positive stools for which she was noted to have reactive gastropathy, Mg's esophagus with tubular adenomas and 5 polyps removed.  She denied any bleeding after the procedure until the day of admission.  He was noted on 6/10/2019 to have further bleeding with a drop in her hemoglobin to 9.5.  Given that she was actively bleeding and drop in her hemoglobin she was transfused 2 units of packed red blood cells.  Dr. York with general surgery did see and evaluate patient and she was takenTo endoscopy where she underwent an EGD.  She was noted to have significant gastritis as well as Mg's esophagus.  He did take some biopsies.      She was noted to have some cough and congestion.  She states that she did see her primary care provider prior to admission and was placed on Mucinex, steroids and antibiotics in the form of  doxycycline.  Repeat chest x-ray was done on 6/11/19 which did not show any evidence of pneumonia.  She had a stable left perihilar linear density consistent with scarring but there was no new areas of consolidation.  Patient was started back on antibiotics and mucolytic's for an acute bronchitis.      It was felt initially that patient's source of bleed was upper GI.  Her hemoglobin was monitored however last evening repeat hemoglobin did show a drop to 8.7 and then was repeated and a drop to 7.1.  She did have 4 further episodes of bleeding with melenic stools.  The patient was prepped and she was taken back to the OR on 6/12/2019 where she underwent a colonoscopy.  She was noted to have blood sigmoid colon but he cannot identify an active source of bleeding.  She was placed on clear liquid diet and return to the floor.    Patient did require 2 more units of packed red blood cells on 6/12/2019.  Her hemoglobin has been followed every 4 hours.  Patient did have another episode of melanotic stool this morning.  I did discuss with general surgery who feels that this is likely secondary to the blood that was already in the colon.  However a repeat hemoglobin later this morning did show a drop from 8.6-7.8.  I have ordered 2 more units of packed red blood cells.  I have also ordered a nuclear medicine GI blood loss scan for further evaluation.  I have notified Dr. Yanes with general surgery as well as discussed with the patient length at bedside the plan of treatment.  She was noted to have a slight bump in her creatinine to 1.9 which is up from 1.7.  However reviewing labs at [facility] her creatinine does range anywhere from 1.7-2.0.  We will continue to monitor.      Review Of Systems: Systems reviewed on 6/13/19 with the following findings  Review of Systems  General ROS: Patient denies any fevers, chills or loss of consciousness.    ENT ROS: Denies sore throat, nasal congestion or ear pain.   Hematological  and Lymphatic ROS: Denies neck swelling or easy bleeding.  Endocrine ROS: Denies any recent unintentional weight gain or loss.  Diabetes mellitus.  Respiratory ROS: She does have some cough and congestion but is not coughing anythin g up.  Denies SOA.  Cardiovascular ROS: Denies chest pain or palpitations. No history of exertional chest pain.   Gastrointestinal ROS: Denies abdominal pain.  No further evidence of Melenic stools.   Genito-Urinary ROS: Denies dysuria or hematuria.  Musculoskeletal ROS: Denies back pain. No muscle pain. No calf pain.   Neurological ROS: Denies any focal weakness. No loss of consciousness. Denies any numbness. Denies neck pain.   Dermatological ROS: Denies any redness or pruritis.    Objective      Temp:  [97.9 °F (36.6 °C)-99 °F (37.2 °C)] 98.2 °F (36.8 °C)  Heart Rate:  [] 82  Resp:  [14-18] 18  BP: ()/(20-82) 118/52  Physical Exam   Physical examination on 6/13/19 with the following findings:    General Appearance:  Alert and cooperative, not in any acute distress.   Head:  Atraumatic and normocephalic, without obvious abnormality.   Eyes:          PERRLA, conjunctivae and sclerae normal, no Icterus. No pallor. Extraocular movements are within normal limits.   Ears:  Ears appear intact with no abnormalities noted.   Throat: No oral lesions, no thrush, oral mucosa moist.   Neck: Supple, trachea midline, no thyromegaly, no carotid bruit.       Lungs:   Chest shape is normal. Breath sounds heard bilaterally equally.  No crackles or wheezing.  Bilateral rhonchi no Pleural rub or bronchial breathing.   Heart:  Normal S1 and S2, no murmur, no gallop, no rub. No JVD   Abdomen:   Normal bowel sounds, no masses, no organomegaly. Soft    Generalized tender, non-distended, no guarding, no rebound             tenderness   Extremities: Moves all extremities well, no edema, no cyanosis, no clubbing.   Pulses: Pulses palpable and equal bilaterally   Skin: No bleeding, bruising or rash        Neurologic:    Psychiatric/Behavior:     Cranial nerves 2 - 12 grossly intact, sensation intact, Motor power is normal and equal bilaterally.  Mood normal, behavior normal       Results Review:    I have reviewed the labs, radiology results and diagnostic studies.    Results from last 7 days   Lab Units 06/13/19  0401   WBC 10*3/mm3 12.06*   HEMOGLOBIN g/dL 8.2*   PLATELETS 10*3/mm3 207     Results from last 7 days   Lab Units 06/13/19  0401   SODIUM mmol/L 136*   POTASSIUM mmol/L 3.7   CO2 mmol/L 32.0*   CREATININE mg/dL 1.90*   GLUCOSE mg/dL 83       Culture Data:    Radiology Data:   Cardiology Data:    I have reviewed the medications.    Assessment/Plan     Assessment and Plan:  1.  Acute GI bleed present on admission-Dr. York with general surgery has been following along with the hospitalist service.  Patient did undergo an upper and lower endoscopy for which there was no evidence of bleeding.  She did have have  blood throughout her sigmoid however no identifiable source of bleeding was noted.  We have been monitoring hemoglobin and hematocrit.  She did have one episode of melena this morning per patient report.  She has had a drop in her hemoglobin from 8.6-7.8.  I have ordered for 2 more units of packed red blood cells with Lasix in between which will make a total of 6 units during this admission.  I have ordered for nuclear medicine GI blood loss scan.    2.  Acute blood loss anemia-status post 4 units of packed red blood cells.  Due to drop this morning and bleeding, I will order for 2 more units PRBCs.      2.  Chronic renal failure stage III-   I have reviewed her labs done at [facility] and it does appear that her creatinine does run from 1.7-2.0.  She is followed by Dr. Alba with nephrology as an outpatient.  She did have a mild bump in creatinine from 1.7-1.9 however this is still within the range that she normally runs.  I will hold off on regular scheduled home Lasix as patient only on clear  liquids.  She does not appear to be overloaded.    3.  Diabetes mellitus type 2 uncontrolled-patient is on insulin protocol.  Patient does use Humalog and Tresiba at home.  I did initially place her on NovoLog 10 units before meals however she is going back to clear liquids and have been n.p.o. so this was discontinued.  She has been getting Levemir in place of the Tresiba 25 units twice daily however her blood sugars were bumping up yesterday so she was increased to 30 units twice daily of the Levemir.  This morning her blood sugars are back down and in the 80s to 90s.  Given that she is still only getting clear liquids I will back off the Levemir back to 25 units twice daily.    4.  COPD with no evidence of exacerbation    5.  Chronic hypoxic respiratory failure on home oxygen 3 L nasal cannula    6.  Chronic diastolic congestive heart failure with no evidence of exacerbation-I do not feel that the patient is having an exacerbation of her CHF.      7.  Chronic essential hypertension-blood pressure is stable we will continue to monitor.    8.  Dyslipidemia    9.  Gastroesophageal reflux/gastritis with Mg's esophagus-patient is on proton pump inhibitor.  Will continue with Protonix twice daily for 1 week then daily.    10.  Acute bronchitis-present on admission - Bronchodilators, nebulized steroids, doxycycline as well as Mucinex.  I have also ordered for a flutter valve.  Patient is doing better from the respiratory standpoint.      DVT prophylaxis:SCDs  Discharge Planning:   Deb Garcia, APRN 06/13/19 7:32 AM

## 2019-06-13 NOTE — PROGRESS NOTES
LOS: 4 days   Patient Care Team:  Luz Prater APRN as PCP - General      Chief Complaint: Lower GI bleed      Interval History: Patient had a large bowel movement this morning with blood.  Globin dropped to 7.2 again.  Currently being transfused 2 more units of blood.  Colonoscopy yesterday did not indicate that the polypectomy sites were bleeding.  Bleeding source appeared to be in the distal transverse colon versus the descending and sigmoid colon.  No active bleed was found.    Patient Complaints: None    History taken from: patient    Vital Signs  Temp:  [97.9 °F (36.6 °C)-99 °F (37.2 °C)] 98.3 °F (36.8 °C)  Heart Rate:  [75-98] 75  Resp:  [15-18] 18  BP: ()/(29-54) 118/52    Physical Exam:     General Appearance:    Alert, cooperative, in no acute distress   Head:    Normocephalic, without obvious abnormality, atraumatic   Lungs:     Clear to auscultation,respirations regular, even and                  unlabored    Heart:    Regular rhythm and normal rate, normal S1 and S2, no            murmur, no gallop, no rub, no click   Abdomen:     Normal bowel sounds, no masses, no organomegaly, soft        non-tender, non-distended, no guarding, no rebound                tenderness   Extremities:   Moves all extremities well, no edema, no cyanosis, no             redness   Pulses:   Pulses palpable and equal bilaterally   Skin:   No bleeding, bruising or rash        Results Review:       Lab Results (last 24 hours)     Procedure Component Value Units Date/Time    Hemoglobin & Hematocrit, Blood [[id number]]  (Abnormal) Collected:  06/13/19 1154    Specimen:  Blood Updated:  06/13/19 1204     Hemoglobin 7.8 g/dL      Hematocrit 23.7 %     POC Glucose Once [[id number]]  (Abnormal) Collected:  06/13/19 1102    Specimen:  Blood Updated:  06/13/19 1200     Glucose 138 mg/dL      Comment: Serial Number: [id number]:  [id number]       Tissue Pathology Exam [[id number]] Collected:  06/10/19 1113    Specimen:   Tissue from Gastric, Antrum Updated:  06/13/19 0824     Case Report --     Surgical Pathology Report                         Case: [id number]                                  Authorizing Provider:  Skinny York MD        Collected:           06/10/2019 11:13 AM          Ordering Location:     [facility]    Received:            06/10/2019 02:12 PM                                 [facility]                                                                    Pathologist:           Jayden De Paz MD                                                             Specimen:    Gastric, Antrum                                                                             Final Diagnosis --     See Scanned Report        Hemoglobin & Hematocrit, Blood [[id number]]  (Abnormal) Collected:  06/13/19 0809    Specimen:  Blood Updated:  06/13/19 0824     Hemoglobin 8.5 g/dL      Hematocrit 26.0 %     POC Glucose Once [[id number]]  (Normal) Collected:  06/13/19 0621    Specimen:  Blood Updated:  06/13/19 0627     Glucose 91 mg/dL      Comment: Serial Number: [id number]:  [id number]       Basic Metabolic Panel [[id number]]  (Abnormal) Collected:  06/13/19 0401    Specimen:  Blood Updated:  06/13/19 0440     Glucose 83 mg/dL      BUN 45 mg/dL      Creatinine 1.90 mg/dL      Sodium 136 mmol/L      Potassium 3.7 mmol/L      Chloride 99 mmol/L      CO2 32.0 mmol/L      Calcium 7.2 mg/dL      eGFR Non African Amer 26 mL/min/1.73      BUN/Creatinine Ratio 23.7     Anion Gap 8.7 mmol/L     Narrative:       GFR Normal >60  Chronic Kidney Disease <60  Kidney Failure <15    Magnesium [[id number]]  (Normal) Collected:  06/13/19 0401    Specimen:  Blood Updated:  06/13/19 0421     Magnesium 2.0 mg/dL     CBC (No Diff) [[id number]]  (Abnormal) Collected:  06/13/19 0401    Specimen:  Blood Updated:  06/13/19 0406     WBC 12.06 10*3/mm3      RBC 2.63 10*6/mm3      Hemoglobin 8.2 g/dL      Hematocrit 24.6 %      MCV 93.5 fL      MCH 31.2 pg       MCHC 33.3 g/dL      RDW 16.6 %      RDW-SD 55.2 fl      MPV 10.6 fL      Platelets 207 10*3/mm3     Hemoglobin & Hematocrit, Blood [[id number]]  (Abnormal) Collected:  06/13/19 0000    Specimen:  Blood Updated:  06/13/19 0005     Hemoglobin 8.1 g/dL      Hematocrit 23.7 %     Hemoglobin & Hematocrit, Blood [[id number]]  (Abnormal) Collected:  06/12/19 2048    Specimen:  Blood Updated:  06/12/19 2056     Hemoglobin 8.5 g/dL      Hematocrit 25.5 %     POC Glucose Once [[id number]]  (Abnormal) Collected:  06/12/19 2005    Specimen:  Blood Updated:  06/12/19 2010     Glucose 225 mg/dL      Comment: Serial Number: [id number]:  [id number]       POC Glucose Once [[id number]]  (Abnormal) Collected:  06/12/19 1625    Specimen:  Blood Updated:  06/12/19 1631     Glucose 411 mg/dL      Comment: Serial Number: [id number]:  [id number]                 Assessment/Plan       Acute GI bleeding    Chronic diastolic heart failure (CMS/HCC)    Type 2 diabetes mellitus (CMS/HCC)    Obesity    Acid reflux    Mixed hyperlipidemia    Gastritis    Mg's esophagus    Acute blood loss anemia    Acute bronchitis      Please get the data for identify bleeding site.  Continue to transfuse as needed.  Would like to hold off on surgical intervention due to patient being a poor risk.  Also I was unable to find a source of bleeding in the left colon yesterday.      Skinny York MD  06/13/19  3:45 PM

## 2019-06-14 DIAGNOSIS — J42 CHRONIC BRONCHITIS, UNSPECIFIED CHRONIC BRONCHITIS TYPE (HCC): ICD-10-CM

## 2019-06-14 LAB
ANION GAP SERPL CALCULATED.3IONS-SCNC: 8.6 MMOL/L (ref 10–20)
BUN BLD-MCNC: 27 MG/DL (ref 7–20)
BUN/CREAT SERPL: 19.3 (ref 7.1–23.5)
CALCIUM SPEC-SCNC: 7.8 MG/DL (ref 8.4–10.2)
CHLORIDE SERPL-SCNC: 102 MMOL/L (ref 98–107)
CO2 SERPL-SCNC: 32 MMOL/L (ref 26–30)
CREAT BLD-MCNC: 1.4 MG/DL (ref 0.6–1.3)
DEPRECATED RDW RBC AUTO: 53.7 FL (ref 37–54)
ERYTHROCYTE [DISTWIDTH] IN BLOOD BY AUTOMATED COUNT: 17.1 % (ref 12.3–15.4)
GFR SERPL CREATININE-BSD FRML MDRD: 37 ML/MIN/1.73
GLUCOSE BLD-MCNC: 84 MG/DL (ref 74–98)
GLUCOSE BLDC GLUCOMTR-MCNC: 102 MG/DL (ref 70–130)
GLUCOSE BLDC GLUCOMTR-MCNC: 120 MG/DL (ref 70–130)
GLUCOSE BLDC GLUCOMTR-MCNC: 121 MG/DL (ref 70–130)
GLUCOSE BLDC GLUCOMTR-MCNC: 94 MG/DL (ref 70–130)
HCT VFR BLD AUTO: 28.7 % (ref 34–46.6)
HCT VFR BLD AUTO: 30.3 % (ref 34–46.6)
HCT VFR BLD AUTO: 30.5 % (ref 34–46.6)
HCT VFR BLD AUTO: 30.8 % (ref 34–46.6)
HCT VFR BLD AUTO: 33 % (ref 34–46.6)
HGB BLD-MCNC: 10 G/DL (ref 12–15.9)
HGB BLD-MCNC: 10.1 G/DL (ref 12–15.9)
HGB BLD-MCNC: 10.3 G/DL (ref 12–15.9)
HGB BLD-MCNC: 10.6 G/DL (ref 12–15.9)
HGB BLD-MCNC: 9.6 G/DL (ref 12–15.9)
MAGNESIUM SERPL-MCNC: 2.2 MG/DL (ref 1.6–2.3)
MCH RBC QN AUTO: 30.3 PG (ref 26.6–33)
MCHC RBC AUTO-ENTMCNC: 33 G/DL (ref 31.5–35.7)
MCV RBC AUTO: 91.8 FL (ref 79–97)
PLATELET # BLD AUTO: 217 10*3/MM3 (ref 140–450)
PMV BLD AUTO: 11 FL (ref 6–12)
POTASSIUM BLD-SCNC: 3.6 MMOL/L (ref 3.5–5.1)
RBC # BLD AUTO: 3.3 10*6/MM3 (ref 3.77–5.28)
SODIUM BLD-SCNC: 139 MMOL/L (ref 137–145)
WBC NRBC COR # BLD: 9.66 10*3/MM3 (ref 3.4–10.8)

## 2019-06-14 PROCEDURE — 94799 UNLISTED PULMONARY SVC/PX: CPT

## 2019-06-14 PROCEDURE — 85014 HEMATOCRIT: CPT | Performed by: NURSE PRACTITIONER

## 2019-06-14 PROCEDURE — 85018 HEMOGLOBIN: CPT | Performed by: NURSE PRACTITIONER

## 2019-06-14 PROCEDURE — 63710000001 INSULIN DETEMIR PER 5 UNITS: Performed by: NURSE PRACTITIONER

## 2019-06-14 PROCEDURE — 99232 SBSQ HOSP IP/OBS MODERATE 35: CPT | Performed by: SURGERY

## 2019-06-14 PROCEDURE — 80048 BASIC METABOLIC PNL TOTAL CA: CPT | Performed by: NURSE PRACTITIONER

## 2019-06-14 PROCEDURE — 85027 COMPLETE CBC AUTOMATED: CPT | Performed by: NURSE PRACTITIONER

## 2019-06-14 PROCEDURE — 25010000002 PIPERACILLIN SOD-TAZOBACTAM PER 1 G: Performed by: NURSE PRACTITIONER

## 2019-06-14 PROCEDURE — 99232 SBSQ HOSP IP/OBS MODERATE 35: CPT | Performed by: NURSE PRACTITIONER

## 2019-06-14 PROCEDURE — 83735 ASSAY OF MAGNESIUM: CPT | Performed by: NURSE PRACTITIONER

## 2019-06-14 PROCEDURE — 82962 GLUCOSE BLOOD TEST: CPT

## 2019-06-14 RX ORDER — OLOPATADINE HCL 0.2 %
1 DROPS OPHTHALMIC (EYE) 2 TIMES DAILY
Qty: 2.5 ML | Refills: 3 | Status: SHIPPED | OUTPATIENT
Start: 2019-06-14 | End: 2019-10-04 | Stop reason: SDUPTHER

## 2019-06-14 RX ORDER — FUROSEMIDE 40 MG/1
40 TABLET ORAL DAILY
Status: DISCONTINUED | OUTPATIENT
Start: 2019-06-14 | End: 2019-06-15 | Stop reason: HOSPADM

## 2019-06-14 RX ORDER — PREDNISONE 20 MG/1
20 TABLET ORAL 2 TIMES DAILY
Qty: 10 TABLET | Refills: 0 | Status: SHIPPED | OUTPATIENT
Start: 2019-06-14 | End: 2019-06-19 | Stop reason: ALTCHOICE

## 2019-06-14 RX ADMIN — TRAMADOL HYDROCHLORIDE 50 MG: 50 TABLET, FILM COATED ORAL at 09:43

## 2019-06-14 RX ADMIN — MONTELUKAST 10 MG: 10 TABLET, FILM COATED ORAL at 20:19

## 2019-06-14 RX ADMIN — GUAIFENESIN 600 MG: 600 TABLET, EXTENDED RELEASE ORAL at 09:43

## 2019-06-14 RX ADMIN — METOPROLOL TARTRATE 12.5 MG: 25 TABLET ORAL at 09:43

## 2019-06-14 RX ADMIN — SODIUM CHLORIDE, PRESERVATIVE FREE 3 ML: 5 INJECTION INTRAVENOUS at 09:44

## 2019-06-14 RX ADMIN — INSULIN DETEMIR 15 UNITS: 100 INJECTION, SOLUTION SUBCUTANEOUS at 20:21

## 2019-06-14 RX ADMIN — TAZOBACTAM SODIUM AND PIPERACILLIN SODIUM 3.38 G: 375; 3 INJECTION, SOLUTION INTRAVENOUS at 22:08

## 2019-06-14 RX ADMIN — IPRATROPIUM BROMIDE AND ALBUTEROL SULFATE 3 ML: .5; 3 SOLUTION RESPIRATORY (INHALATION) at 19:29

## 2019-06-14 RX ADMIN — METOPROLOL TARTRATE 12.5 MG: 25 TABLET ORAL at 20:19

## 2019-06-14 RX ADMIN — TRAMADOL HYDROCHLORIDE 50 MG: 50 TABLET, FILM COATED ORAL at 20:19

## 2019-06-14 RX ADMIN — BUSPIRONE HYDROCHLORIDE 10 MG: 5 TABLET ORAL at 20:19

## 2019-06-14 RX ADMIN — BUSPIRONE HYDROCHLORIDE 10 MG: 5 TABLET ORAL at 16:42

## 2019-06-14 RX ADMIN — PANTOPRAZOLE SODIUM 40 MG: 40 INJECTION, POWDER, FOR SOLUTION INTRAVENOUS at 20:19

## 2019-06-14 RX ADMIN — IPRATROPIUM BROMIDE AND ALBUTEROL SULFATE 3 ML: .5; 3 SOLUTION RESPIRATORY (INHALATION) at 06:58

## 2019-06-14 RX ADMIN — PANTOPRAZOLE SODIUM 40 MG: 40 INJECTION, POWDER, FOR SOLUTION INTRAVENOUS at 09:42

## 2019-06-14 RX ADMIN — BUSPIRONE HYDROCHLORIDE 10 MG: 5 TABLET ORAL at 09:43

## 2019-06-14 RX ADMIN — TAZOBACTAM SODIUM AND PIPERACILLIN SODIUM 3.38 G: 375; 3 INJECTION, SOLUTION INTRAVENOUS at 14:24

## 2019-06-14 RX ADMIN — KETOTIFEN FUMARATE 1 DROP: 0.35 SOLUTION/ DROPS OPHTHALMIC at 09:48

## 2019-06-14 RX ADMIN — GUAIFENESIN 600 MG: 600 TABLET, EXTENDED RELEASE ORAL at 20:19

## 2019-06-14 RX ADMIN — TRAMADOL HYDROCHLORIDE 50 MG: 50 TABLET, FILM COATED ORAL at 16:42

## 2019-06-14 RX ADMIN — KETOTIFEN FUMARATE 1 DROP: 0.35 SOLUTION/ DROPS OPHTHALMIC at 20:21

## 2019-06-14 RX ADMIN — BUDESONIDE 0.5 MG: 0.5 INHALANT RESPIRATORY (INHALATION) at 19:29

## 2019-06-14 RX ADMIN — TAZOBACTAM SODIUM AND PIPERACILLIN SODIUM 3.38 G: 375; 3 INJECTION, SOLUTION INTRAVENOUS at 09:50

## 2019-06-14 RX ADMIN — FUROSEMIDE 40 MG: 40 TABLET ORAL at 09:43

## 2019-06-14 RX ADMIN — SODIUM CHLORIDE, PRESERVATIVE FREE 3 ML: 5 INJECTION INTRAVENOUS at 20:19

## 2019-06-14 RX ADMIN — INSULIN DETEMIR 15 UNITS: 100 INJECTION, SOLUTION SUBCUTANEOUS at 09:42

## 2019-06-14 RX ADMIN — BUDESONIDE 0.5 MG: 0.5 INHALANT RESPIRATORY (INHALATION) at 06:59

## 2019-06-14 NOTE — PLAN OF CARE
Problem: Patient Care Overview  Goal: Plan of Care Review  Outcome: Ongoing (interventions implemented as appropriate)   06/14/19 1512   Coping/Psychosocial   Plan of Care Reviewed With patient   Plan of Care Review   Progress improving   OTHER   Outcome Summary no bleeding       Problem: Fall Risk (Adult)  Goal: Absence of Fall  Outcome: Ongoing (interventions implemented as appropriate)      Problem: Pain, Chronic (Adult)  Goal: Acceptable Pain/Comfort Level and Functional Ability  Outcome: Ongoing (interventions implemented as appropriate)

## 2019-06-14 NOTE — PROGRESS NOTES
Continued Stay Note  [facility]     Patient Name: Mingo Guidry  MRN: [id number]  Today's Date: 6/14/2019    Admit Date: 6/9/2019    Discharge Plan     Row Name 06/14/19 1257       Plan    Plan  Spoke to pt in room.  Denies discharge needs.          Discharge Codes    No documentation.       Expected Discharge Date and Time     Expected Discharge Date Expected Discharge Time    Jun 14, 2019             Roselyn Gracia

## 2019-06-14 NOTE — PLAN OF CARE
Problem: Patient Care Overview  Goal: Plan of Care Review  Outcome: Ongoing (interventions implemented as appropriate)   06/14/19 2300   Coping/Psychosocial   Plan of Care Reviewed With patient   Plan of Care Review   Progress improving   OTHER   Outcome Summary Pt. has no s/s bleeding during the night. She received 2 units of PRBC's yesterday. q4h H&H ordered. VSS. Will continue to monitor.

## 2019-06-14 NOTE — PROGRESS NOTES
LOS: 5 days   Patient Care Team:  Luz Prater APRN as PCP - General      Chief Complaint: Lower GI bleed      Interval History: Patient doing well, no further bloody bowel movements.  Hemoglobin appears to be stabilizing around 10.  No drop in last 12 hours.  Tolerating diet well.  Bleeding scan yesterday was negative for active bleed.    Patient Complaints: None    History taken from: patient    Vital Signs  Temp:  [97.7 °F (36.5 °C)-99.2 °F (37.3 °C)] 97.7 °F (36.5 °C)  Heart Rate:  [68-84] 76  Resp:  [18-20] 18  BP: ()/(28-76) 114/40    Physical Exam:     General Appearance:    Alert, cooperative, in no acute distress   Head:    Normocephalic, without obvious abnormality, atraumatic   Lungs:     Clear to auscultation,respirations regular, even and                  unlabored    Heart:    Regular rhythm and normal rate, normal S1 and S2, no            murmur, no gallop, no rub, no click   Abdomen:     Normal bowel sounds, no masses, no organomegaly, soft        non-tender, non-distended, no guarding, no rebound                tenderness   Extremities:   Moves all extremities well, no edema, no cyanosis, no             redness   Pulses:   Pulses palpable and equal bilaterally   Skin:   No bleeding, bruising or rash        Results Review:       Lab Results (last 24 hours)     Procedure Component Value Units Date/Time    POC Glucose Once [[id number]]  (Normal) Collected:  06/14/19 0625    Specimen:  Blood Updated:  06/14/19 0645     Glucose 94 mg/dL      Comment: Serial Number: [id number]:  [id number]       Basic Metabolic Panel [[id number]]  (Abnormal) Collected:  06/14/19 0410    Specimen:  Blood Updated:  06/14/19 0536     Glucose 84 mg/dL      BUN 27 mg/dL      Creatinine 1.40 mg/dL      Sodium 139 mmol/L      Potassium 3.6 mmol/L      Chloride 102 mmol/L      CO2 32.0 mmol/L      Calcium 7.8 mg/dL      eGFR Non African Amer 37 mL/min/1.73      BUN/Creatinine Ratio 19.3     Anion Gap 8.6  mmol/L     Narrative:       GFR Normal >60  Chronic Kidney Disease <60  Kidney Failure <15    Magnesium [[id number]]  (Normal) Collected:  06/14/19 0410    Specimen:  Blood Updated:  06/14/19 0536     Magnesium 2.2 mg/dL     CBC (No Diff) [[id number]]  (Abnormal) Collected:  06/14/19 0410    Specimen:  Blood Updated:  06/14/19 0500     WBC 9.66 10*3/mm3      RBC 3.30 10*6/mm3      Hemoglobin 10.0 g/dL      Hematocrit 30.3 %      MCV 91.8 fL      MCH 30.3 pg      MCHC 33.0 g/dL      RDW 17.1 %      RDW-SD 53.7 fl      MPV 11.0 fL      Platelets 217 10*3/mm3     Hemoglobin & Hematocrit, Blood [[id number]]  (Abnormal) Collected:  06/14/19 0040    Specimen:  Blood Updated:  06/14/19 0052     Hemoglobin 9.6 g/dL      Hematocrit 28.7 %     Narrative:       Post Transfusion Specimen    POC Glucose Once [[id number]]  (Abnormal) Collected:  06/13/19 2037    Specimen:  Blood Updated:  06/13/19 2046     Glucose 151 mg/dL      Comment: Serial Number: [id number]:  [id number]       POC Glucose Once [[id number]]  (Normal) Collected:  06/13/19 1730    Specimen:  Blood Updated:  06/13/19 1735     Glucose 93 mg/dL      Comment: Serial Number: [id number]:  [id number]       Hemoglobin & Hematocrit, Blood [[id number]]  (Abnormal) Collected:  06/13/19 1154    Specimen:  Blood Updated:  06/13/19 1204     Hemoglobin 7.8 g/dL      Hematocrit 23.7 %     POC Glucose Once [[id number]]  (Abnormal) Collected:  06/13/19 1102    Specimen:  Blood Updated:  06/13/19 1200     Glucose 138 mg/dL      Comment: Serial Number: [id number]:  [id number]       Tissue Pathology Exam [[id number]] Collected:  06/10/19 1113    Specimen:  Tissue from Gastric, Antrum Updated:  06/13/19 0824     Case Report --     Surgical Pathology Report                         Case: [id number]                                  Authorizing Provider:  Skinny York MD        Collected:           06/10/2019 11:13 AM          Ordering Location:     [facility]     Received:            06/10/2019 02:12 PM                                 [facility]                                                                    Pathologist:           Jayden De Paz MD                                                             Specimen:    Gastric, Antrum                                                                             Final Diagnosis --     See Scanned Report        Hemoglobin & Hematocrit, Blood [[id number]]  (Abnormal) Collected:  06/13/19 0809    Specimen:  Blood Updated:  06/13/19 0824     Hemoglobin 8.5 g/dL      Hematocrit 26.0 %               Assessment/Plan       Acute GI bleeding    Chronic diastolic heart failure (CMS/HCC)    Type 2 diabetes mellitus (CMS/HCC)    Obesity    Acid reflux    Mixed hyperlipidemia    Gastritis    Mg's esophagus    Acute blood loss anemia    Acute bronchitis      Appears to be resolving lower GI bleed, most likely diverticular in source.  No bleeding polypectomy site seen on colonoscopy 2 days ago.  Would recommend discharge today if hemoglobin remains stable by this afternoon.  Would not resume aspirin or blood thinners for the next 7 days to 14 days.  Follow-up with me as needed.      Skinny York MD  06/14/19  7:27 AM

## 2019-06-14 NOTE — PROGRESS NOTES
PROGRESS NOTE        Date of Admission: 6/9/2019  Length of Stay: 5  Primary Care Physician: Luz Prater APRN    Subjective   Chief Complaint: Follow-up GI bleed  HPI: This is a 68-year-old female with history of chronic diastolic congestive heart failure, COPD on 3 L home oxygen, dyslipidemia, diabetes mellitus type 2 who presented to the emergency room with complaints of bright red blood per rectum.  According to the patient she had gotten up on the morning of admission and felt like she needed to have a bowel movement however she noticed a large clot in her underwear.  She had several more episodes before coming into the ER.  She is not on blood thinners at home but she does take aspirin.  She does have a history of chronic constipation for which she does have to take stool softeners and laxatives.      Dr. York had recently done an EGD and colonoscopy 2 weeks prior to admission due to heme positive stools for which she was noted to have reactive gastropathy, Mg's esophagus with tubular adenomas and 5 polyps removed.  She denied any bleeding after the procedure until the day of admission.      Dr. York with general surgery did see and evaluate patient and she was taken to endoscopy where she underwent an EGD.  She was noted to have significant gastritis as well as Mg's esophagus.  He did take some biopsies.  It was felt initially that patient's source of bleed was upper GI.  Her hemoglobin was monitored however last evening repeat hemoglobin did show a drop to 8.7 and then was repeated and a drop to 7.1.  Following the upper endoscopy she did have further episodes of bright red blood so she was prepped and taken back to endoscopy for a colonoscopy on 6/12/2019.  She was noted to have blood sigmoid colon but he cannot identify an active source of bleeding.  She was placed on clear liquid diet and return to the floor.  Unfortunately after the colonoscopy she continued to have drop in  hemoglobin and has required at this point  6 units.   I did have a discussion with general surgery who feels that the bleeding is certainly coming from the colon as opposed to the small bowel.  She did undergo a nuclear medicine GI blood loss scan on 6/13/2019 which was negative.  We are continuing to monitor serial hemoglobin and hematocrits.  So far this morning her hemoglobin is stable.  Patient denies having any more melenic stools.  We will continue to monitor her lab work closely.  She does complain however of some pain in her left lower quadrant.  Based on the symptomatology, I will treat empirically for diverticulitis based upon the symptoms.   I will change her antibiotic from doxycycline to Zosyn.    She was noted to have some cough and congestion prior to and early in this admission.  She states that she did see her primary care provider prior to admission and was placed on Mucinex, steroids and antibiotics in the form of doxycycline.  Repeat chest x-ray was done on 6/11/19 which did not show any evidence of pneumonia.  She had a stable left perihilar linear density consistent with scarring but there was no new areas of consolidation.  Patient was started back on antibiotics and mucolytic's for an acute bronchitis.  She is doing much better from the respiratory standpoint with no complaints at this time.  She does have a history of chronic hypoxic respiratory failure for which she is on 3 L of oxygen at home.        Review Of Systems: Systems reviewed on 6/14/2019 with the following findings:  Review of Systems  General ROS: Patient denies any fevers, chills or loss of consciousness.    ENT ROS: Denies sore throat, nasal congestion or ear pain.   Hematological and Lymphatic ROS: Denies neck swelling or easy bleeding.  Endocrine ROS: Denies any recent unintentional weight gain or loss.  Diabetes mellitus.  Respiratory ROS: She does have some cough and congestion but is not coughing anythin g up.  Denies  SOA.  Cardiovascular ROS: Denies chest pain or palpitations. No history of exertional chest pain.   Gastrointestinal ROS: Denies abdominal pain.  No further evidence of Melenic stools.   Genito-Urinary ROS: Denies dysuria or hematuria.  Musculoskeletal ROS: Denies back pain. No muscle pain. No calf pain.   Neurological ROS: Denies any focal weakness. No loss of consciousness. Denies any numbness. Denies neck pain.   Dermatological ROS: Denies any redness or pruritis.    Objective      Temp:  [97.7 °F (36.5 °C)-99.2 °F (37.3 °C)] 97.7 °F (36.5 °C)  Heart Rate:  [68-84] 76  Resp:  [18-20] 18  BP: ()/(28-76) 114/40  Physical Exam   physical examination done on 6/14/2019 with the following findings:    General Appearance:  Alert and cooperative, not in any acute distress.   Head:  Atraumatic and normocephalic, without obvious abnormality.   Eyes:          PERRLA, conjunctivae and sclerae normal, no Icterus. No pallor. Extraocular movements are within normal limits.   Ears:  Ears appear intact with no abnormalities noted.   Throat: No oral lesions, no thrush, oral mucosa moist.   Neck: Supple, trachea midline, no thyromegaly, no carotid bruit.       Lungs:   Chest shape is normal. Breath sounds heard bilaterally equally.  No crackles or wheezing.  Bilateral rhonchi no Pleural rub or bronchial breathing.   Heart:  Normal S1 and S2, no murmur, no gallop, no rub. No JVD   Abdomen:   Normal bowel sounds, no masses, no organomegaly. Soft    LLQ tender today, non-distended, no guarding, no rebound  tenderness   Extremities: Moves all extremities well, no edema, no cyanosis, no clubbing.   Pulses: Pulses palpable and equal bilaterally   Skin: No bleeding, bruising or rash       Neurologic:    Psychiatric/Behavior:     Cranial nerves 2 - 12 grossly intact, sensation intact, Motor power is normal and equal bilaterally.  Mood normal, behavior normal       Results Review:    I have reviewed the labs, radiology results and  diagnostic studies.    Results from last 7 days   Lab Units 06/14/19  0410   WBC 10*3/mm3 9.66   HEMOGLOBIN g/dL 10.0*   PLATELETS 10*3/mm3 217     Results from last 7 days   Lab Units 06/14/19  0410   SODIUM mmol/L 139   POTASSIUM mmol/L 3.6   CO2 mmol/L 32.0*   CREATININE mg/dL 1.40*   GLUCOSE mg/dL 84       Culture Data:    Radiology Data:   Cardiology Data:    I have reviewed the medications.    Assessment/Plan     Assessment and Plan:  1.  Acute GI bleed present on admission-Dr. York with general surgery has been following along with the hospitalist service.  Patient did undergo an upper and lower endoscopy for which there was no evidence of bleeding.  She did have have  blood throughout her sigmoid however no identifiable soIurce of bleeding was noted.  We have been monitoring hemoglobin and hematocrit.  She has required 6 units of packed red blood cells during this admission.  Currently at this time she denies any bleeding.  Her hemoglobin is stable at 10 this morning.  We will continue to monitor.  Bleeding scan was negative.      I do suspect that this is likely a diverticular bleed.      2.  Acute blood loss anemia-patient has required a total of 6 units of packed red blood cells during this admission.  Bleeding scan was noted to be negative.  We will continue to monitor hemoglobin and hematocrit closely.    3.  Possible diverticulitis based on symptomatology-patient is having some pain and states that she just feels funny in her left lower quadrant like it is inflamed.  I will go ahead and start her on Zosyn empirically for diverticulitis which may possibly explain why she is having the bleeds if she has bleeding diverticula.    4.  Chronic renal failure stage III-   I have reviewed her labs done at [facility] and it does appear that her creatinine does run from 1.7-2.0.  She is followed by Dr. Alba with nephrology as an outpatient.  Her creatinine is currently stable.  We will continue to monitor.   I will start the patient back on Lasix 40 mg daily which is her home dose however we will need to closely monitor her fluid status and creatinine.  Should she start to become dehydrated or have bump in creatinine may need to consider holding off.  He does take it twice daily at home however we will discontinue 40 mg daily for the time being.    5.  Diabetes mellitus type 2 uncontrolled-patient is on insulin protocol.  Patient does use Humalog and Tresiba at home.  Patient is currently on clear liquids.  Her blood sugars are much better but she does have some at this point that are on the lower side so I will back off the Levemir and see how she does.  We will continue with 15 units twice daily and adjust if needed.  Once her diet advances she may require increased insulin.    6.  COPD with no evidence of exacerbation    7.  Chronic hypoxic respiratory failure on home oxygen 3 L nasal cannula    8.  Chronic diastolic congestive heart failure with no evidence of exacerbation-I do not feel that the patient is having an exacerbation of her CHF.      9.  Chronic essential hypertension-blood pressure is stable we will continue to monitor.    10.  Dyslipidemia    11.  Gastroesophageal reflux/gastritis with Mg's esophagus-patient is on proton pump inhibitor.  Will continue with Protonix twice daily for 1 week then daily.    12.  Acute bronchitis-present on admission - Bronchodilators, nebulized steroids, doxycycline as well as Mucinex.  I have also ordered for a flutter valve.  Patient is doing better from the respiratory standpoint.      DVT prophylaxis:SCDs  Discharge Planning:   Deb Garcia, APRN 06/14/19 7:28 AM

## 2019-06-15 ENCOUNTER — NURSE TRIAGE (OUTPATIENT)
Dept: CALL CENTER | Facility: HOSPITAL | Age: 69
End: 2019-06-15

## 2019-06-15 VITALS
RESPIRATION RATE: 16 BRPM | OXYGEN SATURATION: 100 % | HEART RATE: 72 BPM | BODY MASS INDEX: 42.3 KG/M2 | TEMPERATURE: 98.5 F | SYSTOLIC BLOOD PRESSURE: 122 MMHG | DIASTOLIC BLOOD PRESSURE: 50 MMHG | WEIGHT: 201.5 LBS | HEIGHT: 58 IN

## 2019-06-15 DIAGNOSIS — R09.1 PLEURISY: ICD-10-CM

## 2019-06-15 DIAGNOSIS — J44.1 COPD WITH ACUTE EXACERBATION (HCC): ICD-10-CM

## 2019-06-15 LAB
ABO + RH BLD: NORMAL
ABO + RH BLD: NORMAL
ANION GAP SERPL CALCULATED.3IONS-SCNC: 9.5 MMOL/L (ref 10–20)
BH BB BLOOD EXPIRATION DATE: NORMAL
BH BB BLOOD EXPIRATION DATE: NORMAL
BH BB BLOOD TYPE BARCODE: 600
BH BB BLOOD TYPE BARCODE: 600
BH BB DISPENSE STATUS: NORMAL
BH BB DISPENSE STATUS: NORMAL
BH BB PRODUCT CODE: NORMAL
BH BB PRODUCT CODE: NORMAL
BH BB UNIT NUMBER: NORMAL
BH BB UNIT NUMBER: NORMAL
BUN BLD-MCNC: 19 MG/DL (ref 7–20)
BUN/CREAT SERPL: 13.6 (ref 7.1–23.5)
CALCIUM SPEC-SCNC: 8.2 MG/DL (ref 8.4–10.2)
CHLORIDE SERPL-SCNC: 100 MMOL/L (ref 98–107)
CO2 SERPL-SCNC: 34 MMOL/L (ref 26–30)
CREAT BLD-MCNC: 1.4 MG/DL (ref 0.6–1.3)
CROSSMATCH INTERPRETATION: NORMAL
CROSSMATCH INTERPRETATION: NORMAL
DEPRECATED RDW RBC AUTO: 53.2 FL (ref 37–54)
ERYTHROCYTE [DISTWIDTH] IN BLOOD BY AUTOMATED COUNT: 17.4 % (ref 12.3–15.4)
GFR SERPL CREATININE-BSD FRML MDRD: 37 ML/MIN/1.73
GLUCOSE BLD-MCNC: 84 MG/DL (ref 74–98)
GLUCOSE BLDC GLUCOMTR-MCNC: 87 MG/DL (ref 70–130)
HCT VFR BLD AUTO: 31.3 % (ref 34–46.6)
HCT VFR BLD AUTO: 31.7 % (ref 34–46.6)
HGB BLD-MCNC: 10.2 G/DL (ref 12–15.9)
HGB BLD-MCNC: 10.3 G/DL (ref 12–15.9)
MCH RBC QN AUTO: 30.4 PG (ref 26.6–33)
MCHC RBC AUTO-ENTMCNC: 32.5 G/DL (ref 31.5–35.7)
MCV RBC AUTO: 93.5 FL (ref 79–97)
PLATELET # BLD AUTO: 234 10*3/MM3 (ref 140–450)
PMV BLD AUTO: 10.7 FL (ref 6–12)
POTASSIUM BLD-SCNC: 3.5 MMOL/L (ref 3.5–5.1)
RBC # BLD AUTO: 3.39 10*6/MM3 (ref 3.77–5.28)
SODIUM BLD-SCNC: 140 MMOL/L (ref 137–145)
UNIT  ABO: NORMAL
UNIT  ABO: NORMAL
UNIT  RH: NORMAL
UNIT  RH: NORMAL
WBC NRBC COR # BLD: 8.14 10*3/MM3 (ref 3.4–10.8)

## 2019-06-15 PROCEDURE — 94799 UNLISTED PULMONARY SVC/PX: CPT

## 2019-06-15 PROCEDURE — 99238 HOSP IP/OBS DSCHRG MGMT 30/<: CPT | Performed by: INTERNAL MEDICINE

## 2019-06-15 PROCEDURE — 85018 HEMOGLOBIN: CPT | Performed by: NURSE PRACTITIONER

## 2019-06-15 PROCEDURE — 82962 GLUCOSE BLOOD TEST: CPT

## 2019-06-15 PROCEDURE — 85027 COMPLETE CBC AUTOMATED: CPT | Performed by: NURSE PRACTITIONER

## 2019-06-15 PROCEDURE — 63710000001 INSULIN DETEMIR PER 5 UNITS: Performed by: NURSE PRACTITIONER

## 2019-06-15 PROCEDURE — 25010000002 PIPERACILLIN SOD-TAZOBACTAM PER 1 G: Performed by: NURSE PRACTITIONER

## 2019-06-15 PROCEDURE — 85014 HEMATOCRIT: CPT | Performed by: NURSE PRACTITIONER

## 2019-06-15 PROCEDURE — 80048 BASIC METABOLIC PNL TOTAL CA: CPT | Performed by: NURSE PRACTITIONER

## 2019-06-15 RX ORDER — PANTOPRAZOLE SODIUM 40 MG/1
40 TABLET, DELAYED RELEASE ORAL DAILY
Qty: 30 TABLET | Refills: 0 | Status: SHIPPED | OUTPATIENT
Start: 2019-06-15 | End: 2020-01-01

## 2019-06-15 RX ADMIN — TRAMADOL HYDROCHLORIDE 50 MG: 50 TABLET, FILM COATED ORAL at 09:30

## 2019-06-15 RX ADMIN — TAZOBACTAM SODIUM AND PIPERACILLIN SODIUM 3.38 G: 375; 3 INJECTION, SOLUTION INTRAVENOUS at 06:01

## 2019-06-15 RX ADMIN — INSULIN DETEMIR 15 UNITS: 100 INJECTION, SOLUTION SUBCUTANEOUS at 09:30

## 2019-06-15 RX ADMIN — SODIUM CHLORIDE, PRESERVATIVE FREE 3 ML: 5 INJECTION INTRAVENOUS at 09:31

## 2019-06-15 RX ADMIN — GUAIFENESIN 600 MG: 600 TABLET, EXTENDED RELEASE ORAL at 09:30

## 2019-06-15 RX ADMIN — PANTOPRAZOLE SODIUM 40 MG: 40 INJECTION, POWDER, FOR SOLUTION INTRAVENOUS at 09:29

## 2019-06-15 RX ADMIN — BUDESONIDE 0.5 MG: 0.5 INHALANT RESPIRATORY (INHALATION) at 07:08

## 2019-06-15 RX ADMIN — BUSPIRONE HYDROCHLORIDE 10 MG: 5 TABLET ORAL at 09:30

## 2019-06-15 RX ADMIN — FUROSEMIDE 40 MG: 40 TABLET ORAL at 09:30

## 2019-06-15 RX ADMIN — KETOTIFEN FUMARATE 1 DROP: 0.35 SOLUTION/ DROPS OPHTHALMIC at 09:31

## 2019-06-15 RX ADMIN — METOPROLOL TARTRATE 12.5 MG: 25 TABLET ORAL at 09:29

## 2019-06-15 RX ADMIN — IPRATROPIUM BROMIDE AND ALBUTEROL SULFATE 3 ML: .5; 3 SOLUTION RESPIRATORY (INHALATION) at 07:08

## 2019-06-15 NOTE — PROGRESS NOTES
Continued Stay Note   [facility]     Patient Name: Mingo Guidry  MRN: [id number]  Today's Date: 6/15/2019    Admit Date: 6/9/2019    Discharge Plan     Row Name 06/15/19 0931       Plan    Plan  Pt. uses [employer] for o2        Discharge Codes    No documentation.       Expected Discharge Date and Time     Expected Discharge Date Expected Discharge Time    Giorgio 15, 2019             Tatiana Cordova

## 2019-06-15 NOTE — DISCHARGE SUMMARY
[facility]   DISCHARGE SUMMARY      Name:  Mingo Guidry   Age:  69 y.o.  Sex:  female  :  1950  MRN:  [id number]   Visit Number:  [id number]    Admission Date:  2019  Date of Discharge:  6/15/2019  Primary Care Physician:  Luz Prater APRN    Important issues to note:    1.  Hold low-dose aspirin therapy until evaluated by primary care physician in 1 week.  2.  CBC in 1 week to follow-up hemoglobin levels.  3.  Follow-up with primary care provider in 1 week to follow-up gastric biopsy results.    Discharge Diagnoses:     1.  Acute blood loss anemia, present on admission status post 6 units of packed red blood cell transfusion.  2.  Acute lower GI bleed secondary to sigmoid diverticulosis, present on admission.  3.  Chronic gastritis and short segment Mg's esophagus, noted on upper GI endoscopy.  4.  Chronic kidney disease stage III, stable.  5.  Diabetes mellitus type 2 with nephropathy.  6.  Chronic diastolic heart failure without exacerbation.  7.  Essential hypertension.  8.  Dyslipidemia.  9.  COPD with no evidence of exacerbation.  10.  Chronic hypoxic respiratory failure on home oxygen.  11.  Gastroesophageal reflux disease.  12.  Chronic gout.      Acute GI bleeding    Type 2 diabetes mellitus (CMS/HCC)    Chronic diastolic heart failure (CMS/HCC)    Obesity    Acid reflux    Mixed hyperlipidemia    Gastritis    Mg's esophagus    Acute blood loss anemia    Acute bronchitis    Presenting Problem:    Acute GI bleeding [K92.2]     Consults:     Consults     Date and Time Order Name Status Description    6/10/2019 0122 Inpatient General Surgery Consult      2019 3464 Inpatient General Surgery Consult          Consulting Physician(s)     Provider Relationship Specialty    Skinny York MD Consulting Physician General Surgery        Procedures Performed:    1.  Upper GI endoscopy and gastric biopsies on 6/10/2019.  2.  Colonoscopy on  6/12/2019.  3.  Transfusion of 6 units of packed red blood cells.    History of presenting illness/Hospital Course:    Ms. Guidry is a 68-year-old female with history of chronic diastolic heart failure, COPD on 3 L home oxygen, dyslipidemia, diabetes mellitus type 2 who presented to the emergency room with complaints of bright red blood per rectum.  According to the patient she had gotten up on the morning of admission and felt like she needed to have a bowel movement, however she noticed a large clot in her underwear.  She had several more episodes before coming into the ER.  She is not on blood thinners at home but she does take aspirin.  She does have a history of chronic constipation for which she does have to take stool softeners and laxatives.       Dr. York had recently done an EGD and colonoscopy 2 weeks prior to admission due to heme positive stools for which she was noted to have reactive gastropathy, Mg's esophagus with tubular adenomas and 5 polyps where removed.  She denied any bleeding after the procedure until the day of admission.       Dr. York with general surgery did see and evaluate patient and she was taken to endoscopy where she underwent an EGD.  She was noted to have significant gastritis as well as Mg's esophagus.  He did take some biopsies.  It was felt initially that patient's source of bleed was upper GI.  Her hemoglobin was monitored and did show a drop to 8.7 and then was repeated and a drop to 7.1.  Following the upper endoscopy she did have further episodes of bright red blood so she was prepped and taken back to endoscopy for a colonoscopy on 6/12/2019.  She was noted to have blood in the sigmoid colon but no active source of bleeding could be identified.  She was placed on clear liquid diet and return to the floor.  Unfortunately after the colonoscopy she continued to have drop in hemoglobin and has required 6 units of packed red blood cells during the hospital stay.        Dr. York felt that her gastrointestinal bleeding is due to her diverticulosis.  During the hospital stay, her aspirin was placed on hold.  She did have a bleeding scan during the hospital stay and it was negative.  She was placed on Zosyn during the hospital stay for suspected diverticulitis which has been discontinued.  She is currently feeling better and her hemoglobin has remained stable in the last 48 hours.  She denies any further episodes of bright red blood per rectum.  She is currently being discharged home.  She denies any home health needs.  She states that she is going to the adult .  She already has home oxygen at 3 L continuously.  She also sees Dr. Dahl for COPD and has nebulizations and inhalers at home.    She was noted to have some cough and congestion prior to and early in this admission.  She states that she did see her primary care provider prior to admission and was placed on Mucinex, steroids and antibiotics in the form of doxycycline.  Repeat chest x-ray was done on 6/11/19 which did not show any evidence of pneumonia.  She had a stable left perihilar linear density consistent with scarring but there was no new areas of consolidation.  She is doing much better from the respiratory standpoint with no complaints at this time other than chronic cough.    She does not need any further antibiotics at this time.  She does have a history of chronic hypoxic respiratory failure for which she is on 3 L of oxygen at home.    Patient has been advised to follow-up with her primary care physician in 1 week with CBC and results of gastric biopsy.  She has been advised to hold aspirin for 1 week.  She has been taking ranitidine at home and due to her gastritis, I have changed it to Protonix.  Ranitidine has been discontinued.  She does have polypharmacy but unfortunately I was not able to discontinue any medications other than recent course of antibiotics and prednisone.  She may be able to come  off some of her antihistaminics.    Vital Signs:    Temp:  [98 °F (36.7 °C)-98.7 °F (37.1 °C)] 98.5 °F (36.9 °C)  Heart Rate:  [66-72] 72  Resp:  [16-17] 16  BP: ()/(38-60) 122/50    Physical Exam:    General Appearance:  Alert and cooperative, not in any acute distress.   Head:  Atraumatic and normocephalic, without obvious abnormality.   Eyes:          PERRLA, conjunctivae and sclerae normal, no Icterus. No pallor. Extraocular movements are within normal limits.   Ears:  Ears appear intact with no abnormalities noted.   Throat: No oral lesions, no thrush, oral mucosa moist.   Neck: Supple, trachea midline, no thyromegaly, no carotid bruit.   Back:   No kyphoscoliosis present. No tenderness to palpation,   range of motion normal.   Lungs:   Chest shape is normal. Breath sounds heard bilaterally equally.  No crackles or wheezing. No Pleural rub or bronchial breathing.   Heart:  Normal S1 and S2, no murmur, no gallop, no rub. No JVD.   Abdomen:   Normal bowel sounds, no masses, no organomegaly. Soft, non-tender, obese, no guarding, no rebound tenderness.  Lumpy subcutaneous nodules were palpated in the abdominal wall bilaterally likely related to insulin injections.   Extremities: Moves all extremities well, 1+ edema, no cyanosis, no clubbing.   Pulses: Pulses palpable and equal bilaterally.   Skin: No bleeding, bruising or rash.   Neurologic: Alert and oriented x 3. Moves all four limbs equally. No tremors. No facial asymetry.     Pertinent Lab Results:     Results from last 7 days   Lab Units 06/15/19  0559 06/14/19  0410 06/13/19  0401  06/09/19  2120   SODIUM mmol/L 140 139 136*   < > 135*   POTASSIUM mmol/L 3.5 3.6 3.7   < > 4.6   CHLORIDE mmol/L 100 102 99   < > 89*   CO2 mmol/L 34.0* 32.0* 32.0*   < > 34.0*   BUN mg/dL 19 27* 45*   < > 64*   CREATININE mg/dL 1.40* 1.40* 1.90*   < > 1.50*   CALCIUM mg/dL 8.2* 7.8* 7.2*   < > 8.7   BILIRUBIN mg/dL  --   --   --   --  0.3   ALK PHOS U/L  --   --   --   --   69   ALT (SGPT) U/L  --   --   --   --  51   AST (SGOT) U/L  --   --   --   --  46   GLUCOSE mg/dL 84 84 83   < > 509*    < > = values in this interval not displayed.     Results from last 7 days   Lab Units 06/15/19  0559 06/15/19  0021 06/14/19  1805  06/14/19  0410  06/13/19  0401   WBC 10*3/mm3 8.14  --   --   --  9.66  --  12.06*   HEMOGLOBIN g/dL 10.3* 10.2* 10.3*   < > 10.0*   < > 8.2*   HEMATOCRIT % 31.7* 31.3* 30.8*   < > 30.3*   < > 24.6*   PLATELETS 10*3/mm3 234  --   --   --  217  --  207    < > = values in this interval not displayed.     Results from last 7 days   Lab Units 06/09/19  2120   INR  0.97     Pertinent Radiology Results:    Imaging Results (all)     Procedure Component Value Units Date/Time    NM GI Blood Loss [[id number]] Collected:  06/13/19 1548     Updated:  06/13/19 1552    Narrative:       PROCEDURE: NM GI BLOOD LOSS-     HISTORY: GI bleeding; K92.2-Gastrointestinal hemorrhage, unspecified;  R73.9-Hyperglycemia, unspecified,     PROCEDURE: The patient was injected with 27.7 mCi of tagged red blood  cells. Images of the abdomen were obtained for 1 hour.     FINDINGS: Hepatic and cardiac activity is normal.. . There is no  evidence of acute GI bleeding.       Impression:       No evidence of acute GI bleed.     This report was finalized on 6/13/2019 3:50 PM by Analy Garnett MD.    XR Chest 1 View [[id number]] Collected:  06/11/19 0833     Updated:  06/11/19 0836    Narrative:       PROCEDURE: XR CHEST 1 VW-     HISTORY: cough congestion; K92.2-Gastrointestinal hemorrhage,  unspecified; R73.9-Hyperglycemia, unspecified     COMPARISON: 09/18/2018.     FINDINGS: The heart is normal in size. The mediastinum is unremarkable.  There is a stable left perihilar linear density consistent with  scarring. No new area of consolidation identified. Image is in an apical  lordotic projection.. There is no pneumothorax.  There are no acute  osseous abnormalities.       Impression:       No new infiltrate  identified..     .     This report was finalized on 6/11/2019 8:34 AM by Analy Garnett MD.    CT Abdomen Pelvis Without Contrast [[id number]] Collected:  06/09/19 2232     Updated:  06/09/19 2233    Narrative:       FINAL REPORT    TECHNIQUE:  Routine axial images through the abdomen and pelvis were  obtained.    CLINICAL HISTORY:  rectal bleeding, abdominal pain, concern for colitis    FINDINGS:  Abdomen:  The gallbladder appears absent.  There are peripheral  areas of decreased attenuation within the liver seen in 4  locations.  This likely represents focal fatty change.  The  solid abdominal organs and ureters are otherwise unremarkable.  The GI tract is unremarkable, with no sign of colitis or  appendicitis.  Pelvis: The uterus is absent.  There are small  bilateral ovarian cysts, less than 3 cm in diameter.  The  urinary bladder is normal. There is no pelvic or abdominal  ascites, adenopathy or acute osseous abnormality.      Impression:       No acute disease.  Specifically, no evidence of colitis.    Authenticated by Mustapha Pink M.D. on 06/09/2019 10:32:20 PM        Condition on Discharge:      Stable.    Code status during the hospital stay:    Code Status and Medical Interventions:   Ordered at: 06/10/19 0122     Level Of Support Discussed With:    Patient     Code Status:    CPR     Medical Interventions (Level of Support Prior to Arrest):    Full     Discharge Disposition:    Home or Self Care    Discharge Medications:       Discharge Medications      New Medications      Instructions Start Date   pantoprazole 40 MG EC tablet  Commonly known as:  PROTONIX   40 mg, Oral, Daily         Changes to Medications      Instructions Start Date   flunisolide 25 MCG/ACT (0.025%) solution nasal spray  Commonly known as:  NASALIDE  What changed:    · when to take this  · reasons to take this   1 spray, Inhalation, Every 12 Hours      ipratropium-albuterol 0.5-2.5 mg/3 ml nebulizer  Commonly known as:  DUO-NEB  What  changed:    · when to take this  · reasons to take this   3 mL, Nebulization, 4 Times Daily - RT         Continue These Medications      Instructions Start Date   ACIDOPHILUS/CITRUS PECTIN tablet tablet   1 tablet, Oral, 2 Times Daily      ADVAIR DISKUS 250-50 MCG/DOSE DISKUS  Generic drug:  fluticasone-salmeterol   1 puff, Inhalation, 2 Times Daily - RT      allopurinol 300 MG tablet  Commonly known as:  ZYLOPRIM   300 mg, Oral, Daily, 1/2 tablet daily      Blood Glucose Monitoring Suppl device   1 each, Does not apply      busPIRone 10 MG tablet  Commonly known as:  BUSPAR   10 mg, Oral, 3 Times Daily      CLARITIN 10 MG capsule  Generic drug:  Loratadine   1 tablet, Oral, Daily      ezetimibe 10 MG tablet  Commonly known as:  ZETIA   10 mg, Oral, Daily      fenofibrate 145 MG tablet  Commonly known as:  TRICOR   145 mg, Oral, Daily      ferrous sulfate 325 (65 FE) MG tablet   325 mg, Oral, 3 Times Daily With Meals      furosemide 40 MG tablet  Commonly known as:  LASIX   40 mg, Oral, 2 Times Daily      HUMALOG 100 UNIT/ML injection  Generic drug:  insulin lispro   35 Units, Subcutaneous, 3 Times Daily      HYDROcodone-acetaminophen 5-325 MG per tablet  Commonly known as:  NORCO   1 tablet, Oral, Nightly      metoclopramide 5 MG tablet  Commonly known as:  REGLAN   5 mg, Oral, 3 Times Daily      metOLazone 5 MG tablet  Commonly known as:  ZAROXOLYN   5 mg, Oral, Daily PRN      metoprolol tartrate 25 MG tablet  Commonly known as:  LOPRESSOR   25 mg, Oral, 2 Times Daily      montelukast 10 MG tablet  Commonly known as:  SINGULAIR   10 mg, Oral, Nightly      O2  Commonly known as:  OXYGEN   3 L/min, Inhalation, Daily      omalizumab 150 MG injection  Commonly known as:  XOLAIR   300 mg, Subcutaneous, Every 28 Days, 2 injections once a month      OXYGEN-HELIUM IN   3 L/min, Inhalation      PATADAY 0.2 % solution ophthalmic solution  Generic drug:  olopatadine   Both Eyes, 2 Times Daily      potassium chloride 20 MEQ  "CR tablet  Commonly known as:  K-DUR,KLOR-CON   20 mEq, Oral, Daily      PROAIR  (90 Base) MCG/ACT inhaler  Generic drug:  albuterol sulfate HFA   INHALE 2 PUFFS EVERY 4 (FOUR) HOURS AS NEEDED FOR WHEEZING OR SHORTNESS OF AIR.      SITagliptin 100 MG tablet  Commonly known as:  JANUVIA   100 mg, Oral, Daily, 1/2 tablet daily      sucralfate 1 g tablet  Commonly known as:  CARAFATE   1 g, Oral, 3 Times Daily      tiotropium 18 MCG per inhalation capsule  Commonly known as:  SPIRIVA   1 capsule, Inhalation, Daily - RT      traMADol 50 MG tablet  Commonly known as:  ULTRAM   50 mg, Oral, 3 Times Daily      TRESIBA FLEXTOUCH 100 UNIT/ML solution pen-injector injection  Generic drug:  insulin degludec   INJECT 30 UNITS EVERY MORNING AND 75 UNITS EVERY IN THE EVENING      TRUE METRIX BLOOD GLUCOSE TEST test strip  Generic drug:  glucose blood   No dose, route, or frequency recorded.      ULTICARE INSULIN SYRINGE 31G X 5/16\" 1 ML misc  Generic drug:  Insulin Syringe-Needle U-100   No dose, route, or frequency recorded.      VASCEPA 1 g capsule capsule  Generic drug:  icosapent ethyl   2 g, 2 Times Daily With Meals      vitamin D 00979 units capsule capsule  Commonly known as:  ERGOCALCIFEROL   50,000 Units, Oral, Weekly         Stop These Medications    ASPIR-LOW 81 MG EC tablet  Generic drug:  aspirin     doxycycline 100 MG capsule  Commonly known as:  VIBRAMYCIN     GNP CLEARLAX powder  Generic drug:  polyethylene glycol     predniSONE 20 MG tablet  Commonly known as:  DELTASONE     raNITIdine 300 MG tablet  Commonly known as:  ZANTAC          Discharge Diet:     Diet Instructions     Diet: Consistent Carbohydrate, Cardiac; Thin      Discharge Diet:   Consistent Carbohydrate  Cardiac       Fluid Consistency:  Thin        Activity at Discharge:     Activity Instructions     Activity as Tolerated          Follow-up Appointments:    Follow-up Information     Luz Prater APRN Follow up in 1 week(s).  "   Specialty:  Family Medicine  Contact information:  [place] [id number]  [phone]                 Future Appointments   Date Time Provider Department Center   9/5/2019 10:30 AM [facility] None   9/5/2019 11:30 AM Yadi Davis APRN [facility] None   12/17/2019 10:30 AM Jorge Luis Mcnair III, MD [facility] None     Test Results Pending at Discharge:    None.       Dave Hairston MD  06/15/19  8:50 AM    Time spent: 25 min.    Dictated utilizing Dragon dictation.

## 2019-06-15 NOTE — PLAN OF CARE
Problem: Patient Care Overview  Goal: Plan of Care Review  Outcome: Ongoing (interventions implemented as appropriate)   06/15/19 0413   Coping/Psychosocial   Plan of Care Reviewed With patient   Plan of Care Review   Progress improving   OTHER   Outcome Summary no s/s of bleeding evident this shift. minimal c/o of discomfort by pt. will cont to monitor       Problem: Fall Risk (Adult)  Goal: Absence of Fall  Outcome: Outcome(s) achieved Date Met: 06/15/19      Problem: Pain, Chronic (Adult)  Goal: Acceptable Pain/Comfort Level and Functional Ability  Outcome: Ongoing (interventions implemented as appropriate)

## 2019-06-16 ENCOUNTER — READMISSION MANAGEMENT (OUTPATIENT)
Dept: CALL CENTER | Facility: HOSPITAL | Age: 69
End: 2019-06-16

## 2019-06-16 NOTE — TELEPHONE ENCOUNTER
"    Reason for Disposition  • SEVERE rectal bleeding (large blood clots; on and off, or constant bleeding)    Additional Information  • Negative: Shock suspected (e.g., cold/pale/clammy skin, too weak to stand, low BP, rapid pulse)  • Negative: Difficult to awaken or acting confused (e.g., disoriented, slurred speech)  • Negative: Passed out (i.e., lost consciousness, collapsed and was not responding)  • Negative: [1] Vomiting AND [2] contains red blood or black (\"coffee ground\") material  (Exception: few red streaks in vomit that only happened once)  • Negative: Sounds like a life-threatening emergency to the triager  • Negative: Diarrhea is main symptom  • Negative: Stool color other than brown or tan is main concern  (no bleeding and no melena)    Answer Assessment - Initial Assessment Questions  1. APPEARANCE of BLOOD: \"What color is it?\" \"Is it passed separately, on the surface of the stool, or mixed in with the stool?\"       Dark and clots   2. AMOUNT: \"How much blood was passed?\"       Few clots   3. FREQUENCY: \"How many times has blood been passed with the stools?\"       Was just discharged from the hospital today for same problem. Now it has restarted   4. ONSET: \"When was the blood first seen in the stools?\" (Days or weeks)       Few days ago and just now   5. DIARRHEA: \"Is there also some diarrhea?\" If so, ask: \"How many diarrhea stools were passed in past 24 hours?\"       No   6. CONSTIPATION: \"Do you have constipation?\" If so, \"How bad is it?\"      No   7. RECURRENT SYMPTOMS: \"Have you had blood in your stools before?\" If so, ask: \"When was the last time?\" and \"What happened that time?\"       Yes; recent GI bleed  8. BLOOD THINNERS: \"Do you take any blood thinners?\" (e.g., Coumadin/warfarin, Pradaxa/dabigatran, aspirin)      No   9. OTHER SYMPTOMS: \"Do you have any other symptoms?\"  (e.g., abdominal pain, vomiting, dizziness, fever)      No   10. PREGNANCY: \"Is there any chance you are pregnant?\" \"When " "was your last menstrual period?\"        No    Protocols used: RECTAL BLEEDING-ADULT-AH      "

## 2019-06-16 NOTE — PROGRESS NOTES
Case Management Discharge Note    Final Note: home, [employer] for o2 by car    Destination      No service has been selected for the patient.      Durable Medical Equipment - Selection Complete      Service Provider Request Status Selected Services Address Phone Number Fax Number    PATRICIA MARTIN ANDREWS Selected Durable Medical Equipment [id number] [place], [facility] [place] [facility] [place] [address] [phone]      Dialysis/Infusion      No service has been selected for the patient.      Home Medical Care      No service has been selected for the patient.      Therapy      No service has been selected for the patient.      Community Resources      No service has been selected for the patient.        Transportation Services  Private: Car    Final Discharge Disposition Code: 01 - home or self-care

## 2019-06-16 NOTE — OUTREACH NOTE
Prep Survey      Responses   Facility patient discharged from?  [facility]   Is patient eligible?  Yes   Discharge diagnosis  GI Bleed   Does the patient have one of the following disease processes/diagnoses(primary or secondary)?  Other   Does the patient have Home health ordered?  No   Is there a DME ordered?  No   Comments regarding appointments  see AVS   Prep survey completed?  Yes          Mery Gracia RN

## 2019-06-17 ENCOUNTER — TELEPHONE (OUTPATIENT)
Dept: TELEMETRY | Facility: HOSPITAL | Age: 69
End: 2019-06-17

## 2019-06-17 RX ORDER — BUSPIRONE HYDROCHLORIDE 10 MG/1
TABLET ORAL
Qty: 90 TABLET | Refills: 5 | Status: SHIPPED | OUTPATIENT
Start: 2019-06-17 | End: 2020-01-07

## 2019-06-17 RX ORDER — PREDNISONE 20 MG/1
20 TABLET ORAL 2 TIMES DAILY
Qty: 10 TABLET | Refills: 0 | Status: SHIPPED | OUTPATIENT
Start: 2019-06-17 | End: 2019-06-19 | Stop reason: ALTCHOICE

## 2019-06-17 RX ORDER — SITAGLIPTIN 100 MG/1
TABLET, FILM COATED ORAL
Qty: 30 TABLET | Refills: 2 | Status: SHIPPED | OUTPATIENT
Start: 2019-06-17 | End: 2019-10-07 | Stop reason: SDUPTHER

## 2019-06-18 ENCOUNTER — CARE COORDINATION (OUTPATIENT)
Dept: CARE COORDINATION | Age: 69
End: 2019-06-18

## 2019-06-19 ENCOUNTER — HOSPITAL ENCOUNTER (OUTPATIENT)
Facility: HOSPITAL | Age: 69
Discharge: HOME | End: 2019-06-19
Payer: MEDICARE

## 2019-06-19 ENCOUNTER — READMISSION MANAGEMENT (OUTPATIENT)
Dept: CALL CENTER | Facility: HOSPITAL | Age: 69
End: 2019-06-19

## 2019-06-19 ENCOUNTER — OFFICE VISIT (OUTPATIENT)
Dept: PRIMARY CARE CLINIC | Age: 69
End: 2019-06-19
Payer: MEDICARE

## 2019-06-19 VITALS
WEIGHT: 202.4 LBS | DIASTOLIC BLOOD PRESSURE: 47 MMHG | BODY MASS INDEX: 42.49 KG/M2 | SYSTOLIC BLOOD PRESSURE: 126 MMHG | RESPIRATION RATE: 16 BRPM | HEART RATE: 73 BPM | HEIGHT: 58 IN | OXYGEN SATURATION: 94 % | TEMPERATURE: 97.6 F

## 2019-06-19 DIAGNOSIS — J44.1 COPD WITH ACUTE EXACERBATION (HCC): Primary | ICD-10-CM

## 2019-06-19 DIAGNOSIS — K22.719 BARRETT'S ESOPHAGUS WITH DYSPLASIA: ICD-10-CM

## 2019-06-19 DIAGNOSIS — J44.1 COPD WITH ACUTE EXACERBATION (HCC): ICD-10-CM

## 2019-06-19 DIAGNOSIS — J42 CHRONIC BRONCHITIS, UNSPECIFIED CHRONIC BRONCHITIS TYPE (HCC): ICD-10-CM

## 2019-06-19 DIAGNOSIS — K57.91 GASTROINTESTINAL HEMORRHAGE ASSOCIATED WITH INTESTINAL DIVERTICULOSIS: ICD-10-CM

## 2019-06-19 DIAGNOSIS — K57.91 GASTROINTESTINAL HEMORRHAGE ASSOCIATED WITH INTESTINAL DIVERTICULOSIS: Primary | ICD-10-CM

## 2019-06-19 PROCEDURE — 85027 COMPLETE CBC AUTOMATED: CPT

## 2019-06-19 PROCEDURE — 99496 TRANSJ CARE MGMT HIGH F2F 7D: CPT | Performed by: NURSE PRACTITIONER

## 2019-06-19 PROCEDURE — 1111F DSCHRG MED/CURRENT MED MERGE: CPT | Performed by: NURSE PRACTITIONER

## 2019-06-19 PROCEDURE — 96372 THER/PROPH/DIAG INJ SC/IM: CPT | Performed by: NURSE PRACTITIONER

## 2019-06-19 RX ORDER — PANTOPRAZOLE SODIUM 40 MG/1
TABLET, DELAYED RELEASE ORAL
Qty: 90 TABLET | Refills: 3 | Status: SHIPPED | OUTPATIENT
Start: 2019-06-19 | End: 2020-03-18

## 2019-06-19 RX ORDER — PANTOPRAZOLE SODIUM 40 MG/1
TABLET, DELAYED RELEASE ORAL
Refills: 0 | COMMUNITY
Start: 2019-06-15 | End: 2019-06-19 | Stop reason: SDUPTHER

## 2019-06-19 RX ORDER — IPRATROPIUM BROMIDE AND ALBUTEROL SULFATE 2.5; .5 MG/3ML; MG/3ML
1 SOLUTION RESPIRATORY (INHALATION) EVERY 4 HOURS
Qty: 360 ML | Refills: 3 | Status: SHIPPED | OUTPATIENT
Start: 2019-06-19 | End: 2020-11-10

## 2019-06-19 ASSESSMENT — ENCOUNTER SYMPTOMS
RESPIRATORY NEGATIVE: 1
GASTROINTESTINAL NEGATIVE: 1

## 2019-06-19 NOTE — OUTREACH NOTE
Medical Week 1 Survey      Responses   Facility patient discharged from?  [facility]   Does the patient have one of the following disease processes/diagnoses(primary or secondary)?  Other   Is there a successful TCM telephone encounter documented?  No   Week 1 attempt successful?  No   Unsuccessful attempts  Attempt 1          Cuco Albarado RN

## 2019-06-19 NOTE — PROGRESS NOTES
ONE TABLET BY MOUTH EVERY DAY             ASPIR-LOW 81 MG EC tablet  TAKE ONE TABLET BY MOUTH EVERY DAY             Blood Glucose Monitoring Suppl DWAIN  1 each by Does not apply route 4 times daily Dx:E10.9 Test QID             busPIRone (BUSPAR) 10 MG tablet  TAKE ONE TABLET BY MOUTH THREE TIMES A DAY             COMFORT ASSIST INSULIN SYRINGE 31G X 5/16\" 1 ML MISC  USE AS DIRECTED FIVE TIMES A DAY             ezetimibe (ZETIA) 10 MG tablet  TAKE ONE TABLET BY MOUTH EVERY DAY             fenofibrate (TRICOR) 145 MG tablet  TAKE ONE TABLET BY MOUTH EVERY DAY             FEROSUL 325 (65 Fe) MG tablet  TAKE ONE TABLET BY MOUTH THREE TIMES A DAY WITH FOOD             flunisolide (NASALIDE) 25 MCG/ACT (0.025%) SOLN  Inhale 1 spray into the lungs             furosemide (LASIX) 40 MG tablet  TAKE ONE TABLET BY MOUTH TWO TIMES A DAY             HUMALOG 100 UNIT/ML injection vial  INJECT 35 UNITS THREE TIMES A DAY             HYDROcodone-acetaminophen (NORCO) 5-325 MG per tablet  TAKE ONE TABLET BY MOUTH EVERY DAY AS NEEDED FOR PAIN             Insulin Degludec (TRESIBA FLEXTOUCH) 100 UNIT/ML SOPN  20 units in the am and 75 units in pm             Insulin Syringe-Needle U-100 (B-D INS SYR ULTRAFINE 1CC/31G) 31G X 5/16\" 1 ML MISC  1 each by Other route 4 times daily             ipratropium-albuterol (DUONEB) 0.5-2.5 (3) MG/3ML SOLN nebulizer solution  Inhale 3 mLs into the lungs every 4 hours             JANUVIA 100 MG tablet  TAKE ONE TABLET BY MOUTH EVERY DAY             Lactobacillus Acid-Pectin (ACIDOPHILUS/CITRUS PECTIN) TABS  TAKE ONE TABLET BY MOUTH TWO TIMES A DAY             loratadine (CLARITIN) 10 MG tablet  TAKE ONE TABLET BY MOUTH EVERY DAY             metoclopramide (REGLAN) 5 MG tablet  TAKE ONE TABLET BY MOUTH THREE TIMES A DAY             metolazone (ZAROXOLYN) 5 MG tablet  Take 1 tablet by mouth daily as needed (swelling)             metoprolol tartrate (LOPRESSOR) 25 MG tablet  TAKE ONE TABLET BY MOUTH TWO TIMES A DAY             montelukast (SINGULAIR) 10 MG tablet  TAKE ONE TABLET BY MOUTH EVERY DAY             OXYGEN  Inhale 3 L/min into the lungs continuous             pantoprazole (PROTONIX) 40 MG tablet  TAKE ONE TABLET BY MOUTH EVERY DAY             PATADAY 0.2 % SOLN ophthalmic solution  PLACE 1 DROP INTO BOTH EYES 2 TIMES DAILY             potassium chloride (KLOR-CON M) 20 MEQ extended release tablet  TAKE ONE TABLET BY MOUTH EVERY DAY             SPIRIVA HANDIHALER 18 MCG inhalation capsule  INHALE THE CONTENTS OF ONE CAPSULE INTO THE LUNGS ONCE DAILY             sucralfate (CARAFATE) 1 GM tablet  TAKE ONE TABLET BY MOUTH THREE TIMES A DAY             traMADol (ULTRAM) 50 MG tablet  TAKE ONE TABLET BY MOUTH THREE TIMES A DAY AS NEEDED             TRUE METRIX BLOOD GLUCOSE TEST strip  USE TO CHECK BLOOD SUGAR FOUR TIMES A DAY             ULTICARE INSULIN SYRINGE 31G X 5/16\" 1 ML MISC  USE TO INJECT INSULIN 5 TIMES A DAY             VASCEPA 1 g CAPS capsule  TAKE TWO CAPSULES BY MOUTH TWO TIMES A DAY             vitamin D (ERGOCALCIFEROL) 60745 units CAPS capsule  TAKE ONE CAPSULE BY MOUTH ONCE WEEKLY                   Medications marked \"taking\" at this time  Outpatient Medications Marked as Taking for the 6/19/19 encounter (Office Visit) with JADE Hernandez   Medication Sig Dispense Refill    ipratropium-albuterol (DUONEB) 0.5-2.5 (3) MG/3ML SOLN nebulizer solution Inhale 3 mLs into the lungs every 4 hours 360 mL 3    pantoprazole (PROTONIX) 40 MG tablet TAKE ONE TABLET BY MOUTH EVERY DAY 90 tablet 3    JANUVIA 100 MG tablet TAKE ONE TABLET BY MOUTH EVERY DAY 30 tablet 2    metoprolol tartrate (LOPRESSOR) 25 MG tablet TAKE ONE TABLET BY MOUTH TWO TIMES A DAY 60 tablet 5    busPIRone (BUSPAR) 10 MG tablet TAKE ONE TABLET BY MOUTH THREE TIMES A DAY 90 tablet 5    HUMALOG 100 UNIT/ML injection vial INJECT 35 UNITS THREE TIMES A DAY 40 mL 4    PATADAY 0.2 % SOLN ophthalmic solution PLACE 1 DROP Psychiatric/Behavioral: Negative. Vitals:    06/19/19 1529 06/19/19 1557   BP: (!) 147/56 (!) 126/47   Pulse: 73    Resp: 16    Temp: 97.6 °F (36.4 °C)    SpO2: 94%    Weight: 202 lb 6.4 oz (91.8 kg)    Height: 4' 10\" (1.473 m)      Body mass index is 42.3 kg/m². Wt Readings from Last 3 Encounters:   06/19/19 202 lb 6.4 oz (91.8 kg)   06/07/19 201 lb 3.2 oz (91.3 kg)   05/29/19 201 lb (91.2 kg)     BP Readings from Last 3 Encounters:   06/19/19 (!) 126/47   06/07/19 102/64   05/29/19 (!) 120/58       Physical Exam   Constitutional: She is oriented to person, place, and time. She appears well-developed and well-nourished. HENT:   Head: Normocephalic and atraumatic. Eyes: Pupils are equal, round, and reactive to light. Conjunctivae and EOM are normal.   Neck: Normal range of motion. Neck supple. No JVD present. No thyromegaly present. Cardiovascular: Normal rate and regular rhythm. Exam reveals no gallop and no friction rub. No murmur heard. Pulmonary/Chest: Effort normal and breath sounds normal. No respiratory distress. She has no wheezes. She has no rales. Abdominal: Soft. Bowel sounds are normal. She exhibits no distension. There is no tenderness. There is no guarding. Musculoskeletal: She exhibits no edema or tenderness. Neurological: She is alert and oriented to person, place, and time. Skin: Skin is warm and dry. No rash noted. Psychiatric: She has a normal mood and affect. Judgment normal.   Nursing note and vitals reviewed. Assessment/Plan:  1. Gastrointestinal hemorrhage associated with intestinal diverticulosis  Continue on her PPI. We'll check labs to assess whether she can start back on her aspirin yet. - pantoprazole (PROTONIX) 40 MG tablet; TAKE ONE TABLET BY MOUTH EVERY DAY  Dispense: 90 tablet; Refill: 3  - [place] DISCHARGE MEDS RECONCILED W/ CURRENT OUTPATIENT MED LIST  - CBC; Future    2.  Schreiber's esophagus with dysplasia  She has had esophagogastroduodenoscopy and colonoscopy. We'll continue on proton pump inhibitor. Discussed reflux precautions. Right now hold aspirin until CBC results  - pantoprazole (PROTONIX) 40 MG tablet; TAKE ONE TABLET BY MOUTH EVERY DAY  Dispense: 90 tablet; Refill: 3  - [place] DISCHARGE MEDS RECONCILED W/ CURRENT OUTPATIENT MED LIST  - CBC; Future    3. COPD with acute exacerbation (HCC)    - omalizumab Eric Brod) injection 150 mg  - omalizumab Eric Brod) injection 150 mg    I did receive her CBC which is still a little low for her baseline. I've advised her to hold her aspirin for another week and then will we start back on 81 mg dose.     Medical Decision Making: high complexity

## 2019-06-20 ENCOUNTER — READMISSION MANAGEMENT (OUTPATIENT)
Dept: CALL CENTER | Facility: HOSPITAL | Age: 69
End: 2019-06-20

## 2019-06-20 DIAGNOSIS — E78.00 PURE HYPERCHOLESTEROLEMIA: ICD-10-CM

## 2019-06-20 LAB
HCT VFR BLD CALC: 35.8 % (ref 37–47)
HEMOGLOBIN: 11.2 G/DL (ref 11.5–16.5)
MCH RBC QN AUTO: 31.3 PG (ref 27–32)
MCHC RBC AUTO-ENTMCNC: 31.3 G/DL (ref 31–35)
MCV RBC AUTO: 100 FL (ref 80–100)
PDW BLD-RTO: 17 % (ref 11–16)
PLATELET # BLD: 376 K/UL (ref 150–400)
PMV BLD AUTO: 11.7 FL (ref 6–10)
RBC # BLD: 3.58 M/UL (ref 3.8–5.8)
WBC # BLD: 11.3 K/UL (ref 4–11)

## 2019-06-20 RX ORDER — ICOSAPENT ETHYL 1000 MG/1
CAPSULE ORAL
Qty: 60 CAPSULE | Refills: 3 | Status: SHIPPED | OUTPATIENT
Start: 2019-06-20 | End: 2019-08-19 | Stop reason: SDUPTHER

## 2019-06-20 NOTE — OUTREACH NOTE
Medical Week 1 Survey      Responses   Facility patient discharged from?  [facility]   Does the patient have one of the following disease processes/diagnoses(primary or secondary)?  Other   Is there a successful TCM telephone encounter documented?  No   Week 1 attempt successful?  Yes   Call start time  1601   Call end time  1613   Discharge diagnosis  GI Bleed   Is patient permission given to speak with other caregiver?  No   Meds reviewed with patient/caregiver?  Yes   Is the patient having any side effects they believe may be caused by any medication additions or changes?  No   Does the patient have all medications ordered at discharge?  Yes   Is the patient taking all medications as directed (includes completed medication regime)?  Yes   Does the patient have a primary care provider?   Yes   Does the patient have an appointment with their PCP within 7 days of discharge?  Yes   Comments regarding PCP   JACKI Mistry .  Patient has seen since discharge.    Has the patient kept scheduled appointments due by today?  Yes   Comments  Patient states that she had lab work done yesterday, no results yet. Patient to call office in the am if has not heard by then.    Has home health visited the patient within 72 hours of discharge?  N/A   Psychosocial issues?  No   Did the patient receive a copy of their discharge instructions?  Yes   Nursing interventions  Reviewed instructions with patient   What is the patient's perception of their health status since discharge?  Improving   Is the patient/caregiver able to teach back signs and symptoms related to disease process for when to call PCP?  Yes   Is the patient/caregiver able to teach back signs and symptoms related to disease process for when to call 911?  Yes   Is the patient/caregiver able to teach back the hierarchy of who to call/visit for symptoms/problems? PCP, Specialist, Home health nurse, Urgent Care, ED, 911  Yes   Week 1 call completed?  Yes           Dolly Cody, RN

## 2019-06-21 ENCOUNTER — TELEPHONE (OUTPATIENT)
Dept: PRIMARY CARE CLINIC | Age: 69
End: 2019-06-21

## 2019-06-21 DIAGNOSIS — J44.1 COPD WITH ACUTE EXACERBATION (HCC): ICD-10-CM

## 2019-06-21 DIAGNOSIS — R09.1 PLEURISY: ICD-10-CM

## 2019-06-21 RX ORDER — PREDNISONE 20 MG/1
20 TABLET ORAL 2 TIMES DAILY
Qty: 10 TABLET | Refills: 0 | OUTPATIENT
Start: 2019-06-21 | End: 2019-06-26

## 2019-06-21 NOTE — TELEPHONE ENCOUNTER
----- Message from JADE Wadsworth sent at 6/21/2019  9:21 AM EDT -----  Her hgb is doing better but I wouldn't start her aspirin back until 2 weeks from hospitalization.

## 2019-06-27 ENCOUNTER — READMISSION MANAGEMENT (OUTPATIENT)
Dept: CALL CENTER | Facility: HOSPITAL | Age: 69
End: 2019-06-27

## 2019-06-27 NOTE — OUTREACH NOTE
Medical Week 2 Survey      Responses   Facility patient discharged from?  [facility]   Does the patient have one of the following disease processes/diagnoses(primary or secondary)?  Other   Week 2 attempt successful?  No   Unsuccessful attempts  Attempt 1          Ana Martinez RN

## 2019-06-28 ENCOUNTER — OFFICE VISIT (OUTPATIENT)
Dept: PRIMARY CARE CLINIC | Age: 69
End: 2019-06-28
Payer: MEDICARE

## 2019-06-28 ENCOUNTER — READMISSION MANAGEMENT (OUTPATIENT)
Dept: CALL CENTER | Facility: HOSPITAL | Age: 69
End: 2019-06-28

## 2019-06-28 VITALS
DIASTOLIC BLOOD PRESSURE: 60 MMHG | WEIGHT: 199.2 LBS | HEART RATE: 90 BPM | SYSTOLIC BLOOD PRESSURE: 126 MMHG | HEIGHT: 58 IN | RESPIRATION RATE: 20 BRPM | BODY MASS INDEX: 41.82 KG/M2 | TEMPERATURE: 97.8 F | OXYGEN SATURATION: 93 %

## 2019-06-28 DIAGNOSIS — R06.2 WHEEZING: ICD-10-CM

## 2019-06-28 DIAGNOSIS — J44.1 COPD EXACERBATION (HCC): Primary | ICD-10-CM

## 2019-06-28 PROCEDURE — 4040F PNEUMOC VAC/ADMIN/RCVD: CPT | Performed by: NURSE PRACTITIONER

## 2019-06-28 PROCEDURE — 1090F PRES/ABSN URINE INCON ASSESS: CPT | Performed by: NURSE PRACTITIONER

## 2019-06-28 PROCEDURE — 1036F TOBACCO NON-USER: CPT | Performed by: NURSE PRACTITIONER

## 2019-06-28 PROCEDURE — 96372 THER/PROPH/DIAG INJ SC/IM: CPT | Performed by: NURSE PRACTITIONER

## 2019-06-28 PROCEDURE — G8400 PT W/DXA NO RESULTS DOC: HCPCS | Performed by: NURSE PRACTITIONER

## 2019-06-28 PROCEDURE — G8926 SPIRO NO PERF OR DOC: HCPCS | Performed by: NURSE PRACTITIONER

## 2019-06-28 PROCEDURE — G8427 DOCREV CUR MEDS BY ELIG CLIN: HCPCS | Performed by: NURSE PRACTITIONER

## 2019-06-28 PROCEDURE — 1123F ACP DISCUSS/DSCN MKR DOCD: CPT | Performed by: NURSE PRACTITIONER

## 2019-06-28 PROCEDURE — 3023F SPIROM DOC REV: CPT | Performed by: NURSE PRACTITIONER

## 2019-06-28 PROCEDURE — 99213 OFFICE O/P EST LOW 20 MIN: CPT | Performed by: NURSE PRACTITIONER

## 2019-06-28 PROCEDURE — G8417 CALC BMI ABV UP PARAM F/U: HCPCS | Performed by: NURSE PRACTITIONER

## 2019-06-28 PROCEDURE — 3017F COLORECTAL CA SCREEN DOC REV: CPT | Performed by: NURSE PRACTITIONER

## 2019-06-28 RX ORDER — IPRATROPIUM BROMIDE AND ALBUTEROL SULFATE 2.5; .5 MG/3ML; MG/3ML
1 SOLUTION RESPIRATORY (INHALATION) ONCE
Status: COMPLETED | OUTPATIENT
Start: 2019-06-28 | End: 2019-06-28

## 2019-06-28 RX ORDER — AZITHROMYCIN 500 MG/1
500 TABLET, FILM COATED ORAL DAILY
Qty: 3 TABLET | Refills: 0 | Status: SHIPPED | OUTPATIENT
Start: 2019-06-28 | End: 2019-07-01

## 2019-06-28 RX ORDER — METHYLPREDNISOLONE SODIUM SUCCINATE 125 MG/2ML
125 INJECTION, POWDER, LYOPHILIZED, FOR SOLUTION INTRAMUSCULAR; INTRAVENOUS ONCE
Status: COMPLETED | OUTPATIENT
Start: 2019-06-28 | End: 2019-06-28

## 2019-06-28 RX ADMIN — IPRATROPIUM BROMIDE AND ALBUTEROL SULFATE 1 AMPULE: 2.5; .5 SOLUTION RESPIRATORY (INHALATION) at 10:31

## 2019-06-28 RX ADMIN — METHYLPREDNISOLONE SODIUM SUCCINATE 125 MG: 125 INJECTION, POWDER, LYOPHILIZED, FOR SOLUTION INTRAMUSCULAR; INTRAVENOUS at 10:32

## 2019-06-28 NOTE — PROGRESS NOTES
SUBJECTIVE:    Patient ID: Andreina Parmar is a 71 y. o.female. Chief Complaint   Patient presents with    Wheezing    Shortness of Breath    COPD         HPI:    Patient presents to clinic with complaints of worsening SOB and symptoms started 2-3 days ago. Associated symptoms include sore throat, sinus pressure and now SOB, wheezing. Hx of COPD on 3L continuous oxygen. Has not had a neb treatment today. Denies fevers, productive cough, body aches, CP, palpitations, THOMPSON. No relieving or worsening factors reported. Treatment regimens include mucinex with some relief but still coughing and SOB. Was recently hosptialized for GI bleed and did not complete her previous doxycycline and prednisone for a COPD exacerbation and still has that rx at home she can take. Patient's medications, allergies, past medical, surgical, social and family histories were reviewed and updated as appropriate in electronic medical record.         Outpatient Medications Marked as Taking for the 6/28/19 encounter (Office Visit) with JADE Black CNP   Medication Sig Dispense Refill    VASCEPA 1 g CAPS capsule TAKE TWO CAPSULES BY MOUTH TWO TIMES A DAY 60 capsule 3    ipratropium-albuterol (DUONEB) 0.5-2.5 (3) MG/3ML SOLN nebulizer solution Inhale 3 mLs into the lungs every 4 hours 360 mL 3    pantoprazole (PROTONIX) 40 MG tablet TAKE ONE TABLET BY MOUTH EVERY DAY 90 tablet 3    JANUVIA 100 MG tablet TAKE ONE TABLET BY MOUTH EVERY DAY 30 tablet 2    metoprolol tartrate (LOPRESSOR) 25 MG tablet TAKE ONE TABLET BY MOUTH TWO TIMES A DAY 60 tablet 5    busPIRone (BUSPAR) 10 MG tablet TAKE ONE TABLET BY MOUTH THREE TIMES A DAY 90 tablet 5    HUMALOG 100 UNIT/ML injection vial INJECT 35 UNITS THREE TIMES A DAY 40 mL 4    PATADAY 0.2 % SOLN ophthalmic solution PLACE 1 DROP INTO BOTH EYES 2 TIMES DAILY 2.5 mL 3    ULTICARE INSULIN SYRINGE 31G X 5/16\" 1 ML MISC USE TO INJECT INSULIN 5 TIMES A  each 3    rales.   Congested cough. 3L NC. Scattered bilateral expiratory wheezing present. Abdominal: Soft. Bowel sounds are normal.   Musculoskeletal: Normal range of motion. Neurological: She is alert and oriented to person, place, and time. Skin: Skin is warm and dry. No rash noted. There is pallor. Psychiatric: She has a normal mood and affect. Her behavior is normal.   Nursing note and vitals reviewed. Lab Results   Component Value Date     04/24/2019    K 3.7 04/24/2019    CL 89 04/24/2019    CO2 33 04/24/2019    GLUCOSE 292 04/24/2019    BUN 60 04/24/2019    CREATININE 2.0 04/24/2019    CALCIUM 10.0 04/24/2019    PROT 7.0 04/24/2019    LABALBU 4.3 04/24/2019    BILITOT 0.4 04/24/2019    ALT 49 04/24/2019    AST 50 04/24/2019       Hemoglobin A1C (%)   Date Value   04/24/2019 10.1 (H)     Microalbumin, Random Urine (mg/dL)   Date Value   04/23/2019 <1.20     LDL Calculated (mg/dL)   Date Value   04/24/2019 see below         Lab Results   Component Value Date    WBC 11.3 06/19/2019    NEUTROABS 10.4 01/04/2018    HGB 11.2 06/19/2019    HCT 35.8 06/19/2019    .0 06/19/2019     06/19/2019       Lab Results   Component Value Date    TSH 3.49 04/24/2019         ASSESSMENT/PLAN:     1. COPD exacerbation ([facility] [address]  Neb, IM steroid now in office. Start oral doxy and prednisone at home. Start azithromycin. Cont home inhalers and meds. Return to clinic or go to ED if S&S worsen or no improvement noted. Patient verbalized understanding. 2. Wheezing  See plans in #1.         Orders Placed This Encounter   Medications    ipratropium-albuterol (DUONEB) nebulizer solution 1 ampule    methylPREDNISolone sodium (SOLU-MEDROL) injection 125 mg    azithromycin (ZITHROMAX) 500 MG tablet     Sig: Take 1 tablet by mouth daily for 3 days     Dispense:  3 tablet     Refill:  0

## 2019-06-28 NOTE — OUTREACH NOTE
Medical Week 2 Survey      Responses   Facility patient discharged from?  [facility]   Does the patient have one of the following disease processes/diagnoses(primary or secondary)?  Other   Week 2 attempt successful?  No   Unsuccessful attempts  Attempt 2          Linda Spears RN

## 2019-07-01 ENCOUNTER — READMISSION MANAGEMENT (OUTPATIENT)
Dept: CALL CENTER | Facility: HOSPITAL | Age: 69
End: 2019-07-01

## 2019-07-01 NOTE — OUTREACH NOTE
Medical Week 3 Survey      Responses   Facility patient discharged from?  [facility]   Does the patient have one of the following disease processes/diagnoses(primary or secondary)?  Other   Week 3 attempt successful?  Yes   Call start time  1159   Call end time  1205   Discharge diagnosis  GI Bleed   Is patient permission given to speak with other caregiver?  Yes   List who call center can speak with  Anirudh Alberto   Person spoke with today (if not patient) and relationship  Anirudh,Alberto   Meds reviewed with patient/caregiver?  Yes   Is the patient taking all medications as directed (includes completed medication regime)?  Yes   Medication comments  Alberto states that patient currently on antibiotic for respiratory infection.    Does the patient have a primary care provider?   Yes   Comments regarding PCP   JACKI Mistry .  Patient has seen since discharge.    Has the patient kept scheduled appointments due by today?  Yes   Has home health visited the patient within 72 hours of discharge?  N/A   DME comments  Patient has home O2, CPAP, nebulizer.    Psychosocial issues?  No   Did the patient receive a copy of their discharge instructions?  Yes   Nursing interventions  Reviewed instructions with patient   What is the patient's perception of their health status since discharge?  New symptoms unrelated to diagnosis [Alberto states patient currently being treated for respiratory infection with antibiotics. ]   Is the patient/caregiver able to teach back signs and symptoms related to disease process for when to call PCP?  Yes   Is the patient/caregiver able to teach back signs and symptoms related to disease process for when to call 911?  Yes   Is the patient/caregiver able to teach back the hierarchy of who to call/visit for symptoms/problems? PCP, Specialist, Home health nurse, Urgent Care, ED, 911  Yes   Week 3 Call Completed?  Yes          Dolly Cody RN

## 2019-07-07 ASSESSMENT — ENCOUNTER SYMPTOMS
SINUS PRESSURE: 1
DIARRHEA: 0
SORE THROAT: 1
SHORTNESS OF BREATH: 1
COUGH: 1
COLOR CHANGE: 0
CHEST TIGHTNESS: 0
WHEEZING: 1
TROUBLE SWALLOWING: 0
NAUSEA: 0
BACK PAIN: 0
ABDOMINAL PAIN: 0
ABDOMINAL DISTENTION: 0
CONSTIPATION: 0

## 2019-07-08 ENCOUNTER — READMISSION MANAGEMENT (OUTPATIENT)
Dept: CALL CENTER | Facility: HOSPITAL | Age: 69
End: 2019-07-08

## 2019-07-08 NOTE — OUTREACH NOTE
Medical Week 4 Survey      Responses   Facility patient discharged from?  [facility]   Does the patient have one of the following disease processes/diagnoses(primary or secondary)?  Other   Week 4 attempt successful?  No          Karine Oneil RN

## 2019-07-11 ENCOUNTER — HOSPITAL ENCOUNTER (OUTPATIENT)
Facility: HOSPITAL | Age: 69
Discharge: HOME | End: 2019-07-11
Payer: MEDICARE

## 2019-07-11 ENCOUNTER — OFFICE VISIT (OUTPATIENT)
Dept: PRIMARY CARE CLINIC | Age: 69
End: 2019-07-11
Payer: MEDICARE

## 2019-07-11 ENCOUNTER — TELEPHONE (OUTPATIENT)
Dept: PRIMARY CARE CLINIC | Age: 69
End: 2019-07-11

## 2019-07-11 VITALS
BODY MASS INDEX: 40.26 KG/M2 | HEIGHT: 58 IN | HEART RATE: 68 BPM | TEMPERATURE: 97.9 F | OXYGEN SATURATION: 96 % | RESPIRATION RATE: 16 BRPM | WEIGHT: 191.8 LBS | DIASTOLIC BLOOD PRESSURE: 50 MMHG | SYSTOLIC BLOOD PRESSURE: 126 MMHG

## 2019-07-11 DIAGNOSIS — Z79.4 TYPE 2 DIABETES MELLITUS WITH STAGE 3 CHRONIC KIDNEY DISEASE, WITH LONG-TERM CURRENT USE OF INSULIN (HCC): Primary | ICD-10-CM

## 2019-07-11 DIAGNOSIS — G89.29 CHRONIC BILATERAL LOW BACK PAIN WITHOUT SCIATICA: ICD-10-CM

## 2019-07-11 DIAGNOSIS — Z91.81 AT HIGH RISK FOR FALLS: ICD-10-CM

## 2019-07-11 DIAGNOSIS — E11.22 TYPE 2 DIABETES MELLITUS WITH STAGE 3 CHRONIC KIDNEY DISEASE, WITH LONG-TERM CURRENT USE OF INSULIN (HCC): Primary | ICD-10-CM

## 2019-07-11 DIAGNOSIS — M54.50 CHRONIC BILATERAL LOW BACK PAIN WITHOUT SCIATICA: ICD-10-CM

## 2019-07-11 DIAGNOSIS — N18.30 CHRONIC RENAL FAILURE, STAGE 3 (MODERATE) (HCC): ICD-10-CM

## 2019-07-11 DIAGNOSIS — J42 CHRONIC BRONCHITIS, UNSPECIFIED CHRONIC BRONCHITIS TYPE (HCC): ICD-10-CM

## 2019-07-11 DIAGNOSIS — R29.898 WEAKNESS OF BOTH LOWER EXTREMITIES: Primary | ICD-10-CM

## 2019-07-11 DIAGNOSIS — N18.30 TYPE 2 DIABETES MELLITUS WITH STAGE 3 CHRONIC KIDNEY DISEASE, WITH LONG-TERM CURRENT USE OF INSULIN (HCC): Primary | ICD-10-CM

## 2019-07-11 DIAGNOSIS — R29.898 WEAKNESS OF BOTH LOWER EXTREMITIES: ICD-10-CM

## 2019-07-11 LAB
A/G RATIO: 2 (ref 0.8–2)
ALBUMIN SERPL-MCNC: 4.4 G/DL (ref 3.4–4.8)
ALP BLD-CCNC: 66 U/L (ref 25–100)
ALT SERPL-CCNC: 56 U/L (ref 4–36)
ANION GAP SERPL CALCULATED.3IONS-SCNC: 15 MMOL/L (ref 3–16)
AST SERPL-CCNC: 48 U/L (ref 8–33)
BILIRUB SERPL-MCNC: 0.4 MG/DL (ref 0.3–1.2)
BUN BLDV-MCNC: 43 MG/DL (ref 6–20)
CALCIUM SERPL-MCNC: 9.4 MG/DL (ref 8.5–10.5)
CHLORIDE BLD-SCNC: 99 MMOL/L (ref 98–107)
CHP ED QC CHECK: NORMAL
CHP ED QC CHECK: NORMAL
CO2: 32 MMOL/L (ref 20–30)
CREAT SERPL-MCNC: 1.4 MG/DL (ref 0.4–1.2)
GFR AFRICAN AMERICAN: 45
GFR NON-AFRICAN AMERICAN: 37
GLOBULIN: 2.2 G/DL
GLUCOSE BLD-MCNC: 68 MG/DL
GLUCOSE BLD-MCNC: 79 MG/DL (ref 74–106)
GLUCOSE BLD-MCNC: 84 MG/DL
HBA1C MFR BLD: 7.3 %
POTASSIUM SERPL-SCNC: 3.8 MMOL/L (ref 3.4–5.1)
SODIUM BLD-SCNC: 146 MMOL/L (ref 136–145)
TOTAL PROTEIN: 6.6 G/DL (ref 6.4–8.3)

## 2019-07-11 PROCEDURE — 99214 OFFICE O/P EST MOD 30 MIN: CPT | Performed by: NURSE PRACTITIONER

## 2019-07-11 PROCEDURE — 3017F COLORECTAL CA SCREEN DOC REV: CPT | Performed by: NURSE PRACTITIONER

## 2019-07-11 PROCEDURE — 1123F ACP DISCUSS/DSCN MKR DOCD: CPT | Performed by: NURSE PRACTITIONER

## 2019-07-11 PROCEDURE — G8427 DOCREV CUR MEDS BY ELIG CLIN: HCPCS | Performed by: NURSE PRACTITIONER

## 2019-07-11 PROCEDURE — 80053 COMPREHEN METABOLIC PANEL: CPT

## 2019-07-11 PROCEDURE — 4040F PNEUMOC VAC/ADMIN/RCVD: CPT | Performed by: NURSE PRACTITIONER

## 2019-07-11 PROCEDURE — 1036F TOBACCO NON-USER: CPT | Performed by: NURSE PRACTITIONER

## 2019-07-11 PROCEDURE — G8400 PT W/DXA NO RESULTS DOC: HCPCS | Performed by: NURSE PRACTITIONER

## 2019-07-11 PROCEDURE — G8417 CALC BMI ABV UP PARAM F/U: HCPCS | Performed by: NURSE PRACTITIONER

## 2019-07-11 PROCEDURE — 3045F PR MOST RECENT HEMOGLOBIN A1C LEVEL 7.0-9.0%: CPT | Performed by: NURSE PRACTITIONER

## 2019-07-11 PROCEDURE — 2022F DILAT RTA XM EVC RTNOPTHY: CPT | Performed by: NURSE PRACTITIONER

## 2019-07-11 PROCEDURE — 83036 HEMOGLOBIN GLYCOSYLATED A1C: CPT | Performed by: NURSE PRACTITIONER

## 2019-07-11 PROCEDURE — G8926 SPIRO NO PERF OR DOC: HCPCS | Performed by: NURSE PRACTITIONER

## 2019-07-11 PROCEDURE — 3023F SPIROM DOC REV: CPT | Performed by: NURSE PRACTITIONER

## 2019-07-11 PROCEDURE — 1090F PRES/ABSN URINE INCON ASSESS: CPT | Performed by: NURSE PRACTITIONER

## 2019-07-11 PROCEDURE — 82962 GLUCOSE BLOOD TEST: CPT | Performed by: NURSE PRACTITIONER

## 2019-07-11 RX ORDER — POTASSIUM CHLORIDE 20 MEQ/1
TABLET, EXTENDED RELEASE ORAL
Qty: 30 TABLET | Refills: 5 | Status: SHIPPED | OUTPATIENT
Start: 2019-07-11 | End: 2019-12-30

## 2019-07-11 NOTE — PATIENT INSTRUCTIONS
dispose of used patches by folding them in half so that the sticky sides meet, and then flushing them down a toilet. They should not be placed in the household trash where children or pets can find them. · If you have any questions, ask your provider or pharmacist for more information. · Be sure to keep all appointments for provider visits or tests. We are committed to providing you with the best care possible. In order to help us achieve these goals please remember to bring all medications, herbal products, and over the counter supplements with you to each visit. If your provider has ordered testing for you, please be sure to follow up with our office if you have not received results within 7 days after the testing took place. *If you receive a survey after visiting one of our offices, please take time to share your experience concerning your physician office visit. These surveys are confidential and no health information about you is shared. We are eager to improve for you and we are counting on your feedback to help make that happen. Detail Level: Detailed

## 2019-07-11 NOTE — PROGRESS NOTES
SUBJECTIVE:    Patient ID: Reyna Mancini is a 71 y.o. female. Chief Complaint   Patient presents with    Follow-up    Diabetes    COPD         HPI:  She presents about her diabetes and copd. She has been on steroids recently which made her sugar go up. She is taking 30 units Tresiba and 75 units in the evening. She did not eat this morning and took 30 of inuslin. Her sugar was 68 in the office. She has had low back pain for years and has been taking pain medication for years. She is having problems with her legs going out. She was in the bathroom and her legs gave out causing her to fall on the toilet. Had a long history of chronic arthralgia but she is having significant back pain makes her legs weak. She is having trouble walking. She has been taking pain medication for several years. Patient's medications,allergies, past medical, surgical, social and family histories were reviewed and updated as appropriate.   .  Current Outpatient Medications on File Prior to Visit   Medication Sig Dispense Refill    montelukast (SINGULAIR) 10 MG tablet TAKE ONE TABLET BY MOUTH EVERY DAY 30 tablet 2    VASCEPA 1 g CAPS capsule TAKE TWO CAPSULES BY MOUTH TWO TIMES A DAY 60 capsule 3    ipratropium-albuterol (DUONEB) 0.5-2.5 (3) MG/3ML SOLN nebulizer solution Inhale 3 mLs into the lungs every 4 hours 360 mL 3    pantoprazole (PROTONIX) 40 MG tablet TAKE ONE TABLET BY MOUTH EVERY DAY 90 tablet 3    JANUVIA 100 MG tablet TAKE ONE TABLET BY MOUTH EVERY DAY 30 tablet 2    metoprolol tartrate (LOPRESSOR) 25 MG tablet TAKE ONE TABLET BY MOUTH TWO TIMES A DAY 60 tablet 5    busPIRone (BUSPAR) 10 MG tablet TAKE ONE TABLET BY MOUTH THREE TIMES A DAY 90 tablet 5    HUMALOG 100 UNIT/ML injection vial INJECT 35 UNITS THREE TIMES A DAY 40 mL 4    PATADAY 0.2 % SOLN ophthalmic solution PLACE 1 DROP INTO BOTH EYES 2 TIMES DAILY 2.5 mL 3    ULTICARE INSULIN SYRINGE 31G X 5/16\" 1 ML MISC USE TO INJECT INSULIN 5 TIMES A 150 mg at 10/24/18 1131    omalizumab (XOLAIR) injection 150 mg  150 mg Subcutaneous Q28 Days Arnette Gent, APRN   150 mg at 10/24/18 1131    omalizumab Smiley Dagmar) injection 150 mg  150 mg Subcutaneous Q28 Days Arnette Gent, APRN   150 mg at 09/20/18 1429    omalizumab Smiley Dagmar) injection 150 mg  150 mg Subcutaneous Q28 Days Arnette Gent, APRN   150 mg at 09/20/18 1428    omalizumab Smiley Dagmar) injection 150 mg  150 mg Subcutaneous Q28 Days Arnette Gent, APRN   150 mg at 08/20/18 1306    omalizumab Smiley Dagmar) injection 150 mg  150 mg Subcutaneous Q28 Days Arnette Gent, APRN   150 mg at 08/20/18 1305    omalizumab Smiley Dagmar) injection 150 mg  150 mg Subcutaneous Q28 Days [place] Black, APRN   150 mg at 05/25/18 1405    omalizumab Smiley Dagmar) injection 150 mg  150 mg Subcutaneous Q28 Days [place] Black, APRN   150 mg at 05/25/18 1405    omalizumab Smiley Dagmar) injection 150 mg  150 mg Subcutaneous Q28 Days Arnette Gent, APRN   150 mg at 03/29/18 1433    omalizumab Smiley Dagmar) injection 150 mg  150 mg Subcutaneous Q28 Days Arnette Gent, APRN   150 mg at 03/29/18 1432    omalizumab Smiley Dagmar) injection 150 mg  150 mg Subcutaneous Q28 Days Arnette Gent, APRN   150 mg at 02/23/18 1431    omalizumab Smiley Dagmar) injection 150 mg  150 mg Subcutaneous Q28 Days Arnette Gent, APRN   150 mg at 02/23/18 1430        Review of Systems   Constitutional: Negative. HENT: Negative. Respiratory: Positive for cough, chest tightness and shortness of breath. Cardiovascular: Negative. Gastrointestinal: Negative. Genitourinary: Positive for frequency. Musculoskeletal: Positive for arthralgias, back pain and gait problem. Skin: Negative. Psychiatric/Behavioral: Negative.         OBJECTIVE:  BP (!) 126/50 (Site: Right Upper Arm, Position: Sitting, Cuff Size: Medium Adult)   Pulse 68   Temp 97.9 °F (36.6 °C) (Oral)   Resp 16   Ht 4' 10\" (1.473 m)   Wt 191 lb 12.8 oz (87 kg)   SpO2 96% Comment: 3

## 2019-07-12 DIAGNOSIS — M54.50 CHRONIC BILATERAL LOW BACK PAIN WITHOUT SCIATICA: ICD-10-CM

## 2019-07-12 DIAGNOSIS — G89.29 CHRONIC BILATERAL LOW BACK PAIN WITHOUT SCIATICA: ICD-10-CM

## 2019-07-15 ENCOUNTER — TELEPHONE (OUTPATIENT)
Dept: PRIMARY CARE CLINIC | Age: 69
End: 2019-07-15

## 2019-07-17 RX ORDER — TRAMADOL HYDROCHLORIDE 50 MG/1
TABLET ORAL
Qty: 90 TABLET | Refills: 2 | Status: SHIPPED | OUTPATIENT
Start: 2019-07-17 | End: 2019-09-12 | Stop reason: SDUPTHER

## 2019-07-22 ENCOUNTER — NURSE ONLY (OUTPATIENT)
Dept: PRIMARY CARE CLINIC | Age: 69
End: 2019-07-22
Payer: MEDICARE

## 2019-07-22 DIAGNOSIS — J45.51 SEVERE PERSISTENT ASTHMA WITH ACUTE EXACERBATION: ICD-10-CM

## 2019-07-22 DIAGNOSIS — J44.1 COPD WITH ACUTE EXACERBATION (HCC): Primary | ICD-10-CM

## 2019-07-22 DIAGNOSIS — R19.7 DIARRHEA, UNSPECIFIED TYPE: ICD-10-CM

## 2019-07-22 PROCEDURE — 96372 THER/PROPH/DIAG INJ SC/IM: CPT | Performed by: NURSE PRACTITIONER

## 2019-07-22 RX ORDER — L. ACIDOPHILUS/PECTIN, CITRUS 25MM-100MG
TABLET ORAL
Qty: 60 TABLET | Refills: 1 | Status: ON HOLD | OUTPATIENT
Start: 2019-07-22 | End: 2020-07-22

## 2019-07-30 ASSESSMENT — ENCOUNTER SYMPTOMS
CHEST TIGHTNESS: 1
SHORTNESS OF BREATH: 1
BACK PAIN: 1
COUGH: 1
GASTROINTESTINAL NEGATIVE: 1

## 2019-08-02 ENCOUNTER — OFFICE VISIT (OUTPATIENT)
Dept: PRIMARY CARE CLINIC | Age: 69
End: 2019-08-02
Payer: MEDICARE

## 2019-08-02 VITALS
DIASTOLIC BLOOD PRESSURE: 54 MMHG | BODY MASS INDEX: 42.45 KG/M2 | HEART RATE: 84 BPM | SYSTOLIC BLOOD PRESSURE: 120 MMHG | HEIGHT: 58 IN | WEIGHT: 202.2 LBS | RESPIRATION RATE: 16 BRPM | OXYGEN SATURATION: 94 % | TEMPERATURE: 97.9 F

## 2019-08-02 DIAGNOSIS — J42 CHRONIC BRONCHITIS, UNSPECIFIED CHRONIC BRONCHITIS TYPE (HCC): ICD-10-CM

## 2019-08-02 DIAGNOSIS — I10 ESSENTIAL HYPERTENSION: ICD-10-CM

## 2019-08-02 DIAGNOSIS — E11.22 TYPE 2 DIABETES MELLITUS WITH STAGE 3 CHRONIC KIDNEY DISEASE, WITH LONG-TERM CURRENT USE OF INSULIN (HCC): ICD-10-CM

## 2019-08-02 DIAGNOSIS — N18.30 TYPE 2 DIABETES MELLITUS WITH STAGE 3 CHRONIC KIDNEY DISEASE, WITH LONG-TERM CURRENT USE OF INSULIN (HCC): ICD-10-CM

## 2019-08-02 DIAGNOSIS — M54.42 CHRONIC BILATERAL LOW BACK PAIN WITH BILATERAL SCIATICA: ICD-10-CM

## 2019-08-02 DIAGNOSIS — G89.29 CHRONIC BILATERAL LOW BACK PAIN WITH BILATERAL SCIATICA: ICD-10-CM

## 2019-08-02 DIAGNOSIS — M54.41 CHRONIC BILATERAL LOW BACK PAIN WITH BILATERAL SCIATICA: ICD-10-CM

## 2019-08-02 DIAGNOSIS — Z79.4 TYPE 2 DIABETES MELLITUS WITH STAGE 3 CHRONIC KIDNEY DISEASE, WITH LONG-TERM CURRENT USE OF INSULIN (HCC): ICD-10-CM

## 2019-08-02 DIAGNOSIS — R60.9 SWELLING: Primary | ICD-10-CM

## 2019-08-02 PROCEDURE — 1090F PRES/ABSN URINE INCON ASSESS: CPT | Performed by: NURSE PRACTITIONER

## 2019-08-02 PROCEDURE — G8926 SPIRO NO PERF OR DOC: HCPCS | Performed by: NURSE PRACTITIONER

## 2019-08-02 PROCEDURE — 3023F SPIROM DOC REV: CPT | Performed by: NURSE PRACTITIONER

## 2019-08-02 PROCEDURE — 1123F ACP DISCUSS/DSCN MKR DOCD: CPT | Performed by: NURSE PRACTITIONER

## 2019-08-02 PROCEDURE — G8417 CALC BMI ABV UP PARAM F/U: HCPCS | Performed by: NURSE PRACTITIONER

## 2019-08-02 PROCEDURE — G8427 DOCREV CUR MEDS BY ELIG CLIN: HCPCS | Performed by: NURSE PRACTITIONER

## 2019-08-02 PROCEDURE — G8400 PT W/DXA NO RESULTS DOC: HCPCS | Performed by: NURSE PRACTITIONER

## 2019-08-02 PROCEDURE — 4040F PNEUMOC VAC/ADMIN/RCVD: CPT | Performed by: NURSE PRACTITIONER

## 2019-08-02 PROCEDURE — 1036F TOBACCO NON-USER: CPT | Performed by: NURSE PRACTITIONER

## 2019-08-02 PROCEDURE — 3045F PR MOST RECENT HEMOGLOBIN A1C LEVEL 7.0-9.0%: CPT | Performed by: NURSE PRACTITIONER

## 2019-08-02 PROCEDURE — 3017F COLORECTAL CA SCREEN DOC REV: CPT | Performed by: NURSE PRACTITIONER

## 2019-08-02 PROCEDURE — 99214 OFFICE O/P EST MOD 30 MIN: CPT | Performed by: NURSE PRACTITIONER

## 2019-08-02 PROCEDURE — 2022F DILAT RTA XM EVC RTNOPTHY: CPT | Performed by: NURSE PRACTITIONER

## 2019-08-02 RX ORDER — HYDROCODONE BITARTRATE AND ACETAMINOPHEN 5; 325 MG/1; MG/1
TABLET ORAL
Refills: 0 | COMMUNITY
Start: 2019-07-11 | End: 2019-10-07 | Stop reason: SDUPTHER

## 2019-08-02 RX ORDER — METOLAZONE 5 MG/1
5 TABLET ORAL DAILY PRN
Qty: 30 TABLET | Refills: 3 | Status: SHIPPED | OUTPATIENT
Start: 2019-08-02 | End: 2019-10-26 | Stop reason: SDUPTHER

## 2019-08-02 ASSESSMENT — ENCOUNTER SYMPTOMS
RESPIRATORY NEGATIVE: 1
BACK PAIN: 1
GASTROINTESTINAL NEGATIVE: 1

## 2019-08-02 NOTE — PROGRESS NOTES
Inhaler 5    loratadine (CLARITIN) 10 MG tablet TAKE ONE TABLET BY MOUTH EVERY DAY 30 tablet 4    Blood Glucose Monitoring Suppl DWAIN 1 each by Does not apply route 4 times daily Dx:E10.9 Test QID 1 Device 0    COMFORT ASSIST INSULIN SYRINGE 31G X 5/16\" 1 ML MISC USE AS DIRECTED FIVE TIMES A  each 4    HYDROcodone-acetaminophen (NORCO) 5-325 MG per tablet TAKE ONE TABLET BY MOUTH EVERY DAY AS NEEDED FOR PAIN  0     Current Facility-Administered Medications on File Prior to Visit   Medication Dose Route Frequency Provider Last Rate Last Dose    omalizumab Edmonia Arm) injection 150 mg  150 mg Subcutaneous Q28 Days Rickford Close, APRN   150 mg at 07/22/19 9222    omalizumab Edmonia Arm) injection 150 mg  150 mg Subcutaneous Q28 Days Rickford Close, APRN   150 mg at 07/22/19 5935    omalizumab Edmonia Arm) injection 150 mg  150 mg Subcutaneous Q28 Days Rickford Close, APRN   150 mg at 06/19/19 1720    omalizumab Edmonia Arm) injection 150 mg  150 mg Subcutaneous Q28 Days Rickford Close, APRN   150 mg at 06/19/19 1719    omalizumab Edmonia Arm) injection 150 mg  150 mg Subcutaneous Q28 Days Rickford Close, APRN   150 mg at 05/20/19 1310    omalizumab Edmonia Arm) injection 150 mg  150 mg Subcutaneous Q28 Days Rickford Close, APRN   150 mg at 05/20/19 1309    omalizumab (XOLAIR) injection 150 mg  150 mg Subcutaneous Q28 Days Rickford Close, APRN   150 mg at 04/18/19 7367    omalizumab Edmonia Arm) injection 150 mg  150 mg Subcutaneous Q28 Days Rickford Close, APRN   150 mg at 04/18/19 0940    omalizumab Edmonia Arm) injection 150 mg  150 mg Subcutaneous Q28 Days Rickford Close, APRN   150 mg at 03/21/19 1010    omalizumab Edmonia Arm) injection 150 mg  150 mg Subcutaneous Q28 Days Rickford Close, APRN   150 mg at 03/21/19 1009    omalizumab Edmonia Arm) injection 150 mg  150 mg Subcutaneous Q28 Days Rickford Close, APRN   150 mg at 01/24/19 1046    [facility]) injection 150 mg  150 mg Subcutaneous Q28 Days Letha MAYS labs.    Diagnoses and all orders for this visit:    Swelling  -     metolazone (ZAROXOLYN) 5 MG tablet; Take 1 tablet by mouth daily as needed (swelling)    Type 2 diabetes mellitus with stage 3 chronic kidney disease, with long-term current use of insulin (HCC)  Stable. I advised her regarding diabetic diet, exercise and weight control. Also, I advised her to stay on the current medication, monitor her fingerstick closely. I am going to check the A1c every few months. I will check microalbumin on a yearly basis. I have also advised her to have a yearly eye exam and to monitor her feet closely. Along with instruction of possible complications related to diabetes, even with good control. A1C will be checked  in few months. Lab Results   Component Value Date    LABA1C 7.3 07/11/2019       Essential hypertension  Blood pressure is stable. Continue current medication. Chronic bronchitis, unspecified chronic bronchitis type ([address]  Continue inhalers and oxygen.    Chronic bilateral low back pain with bilateral sciatica  She will get her MRI and possible specialist referral.     Medications Discontinued During This Encounter   Medication Reason    traMADol (ULTRAM) 50 MG tablet DUPLICATE    metolazone (ZAROXOLYN) 5 MG tablet REORDER

## 2019-08-05 ENCOUNTER — HOSPITAL ENCOUNTER (OUTPATIENT)
Dept: PHYSICAL THERAPY | Facility: HOSPITAL | Age: 69
Setting detail: THERAPIES SERIES
Discharge: HOME | End: 2019-08-05
Payer: MEDICARE

## 2019-08-05 DIAGNOSIS — E78.00 PURE HYPERCHOLESTEROLEMIA: ICD-10-CM

## 2019-08-05 DIAGNOSIS — M1A.9XX0 CHRONIC GOUT WITHOUT TOPHUS, UNSPECIFIED CAUSE, UNSPECIFIED SITE: ICD-10-CM

## 2019-08-05 PROCEDURE — 97161 PT EVAL LOW COMPLEX 20 MIN: CPT

## 2019-08-05 PROCEDURE — 97110 THERAPEUTIC EXERCISES: CPT

## 2019-08-05 RX ORDER — ALLOPURINOL 300 MG/1
TABLET ORAL
Qty: 30 TABLET | Refills: 2 | Status: SHIPPED | OUTPATIENT
Start: 2019-08-05 | End: 2019-08-29 | Stop reason: SDUPTHER

## 2019-08-05 RX ORDER — FENOFIBRATE 145 MG/1
TABLET, COATED ORAL
Qty: 30 TABLET | Refills: 3 | Status: SHIPPED
Start: 2019-08-05 | End: 2020-09-16 | Stop reason: DRUGHIGH

## 2019-08-05 ASSESSMENT — PAIN DESCRIPTION - LOCATION: LOCATION: BACK;LEG

## 2019-08-05 ASSESSMENT — PAIN DESCRIPTION - PAIN TYPE: TYPE: CHRONIC PAIN

## 2019-08-05 ASSESSMENT — PAIN SCALES - GENERAL: PAINLEVEL_OUTOF10: 5

## 2019-08-05 NOTE — PROGRESS NOTES
in lumbar flexion AROM  Short term goal 4: Pt to demonstrate 4/5 or greater LE strength grossly  Short term goal 5: Pt to be educated in use of a single point cane. Long term goals  Time Frame for Long term goals : 8 weeks  Long term goal 1: Back Index score to improve to 25% or less indicating improved function. Long term goal 2: Pt to demonstrate 4+/5 B LE strength or greater  Long term goal 3: m-CTSB test to improve to 3/4 indicating improved balance  Long term goal 4: Pt to perform daily activities with back and LE pain of 3/10 or less on average. Long term goal 5: Pt to have no falls during POC period. Yessica Etienne, PT       Certification of Medical Necessity: It will be understood that this treatment plan is certified medically necessary by the documenting therapist and referring physician mentioned in this report. Unless the physician indicated otherwise through written correspondence with our office, all further referrals will act as certification of medical necessity on this treatment plan. Thank you for this referral.  If you have questions regarding this plan of care, please call [phone].           Revisions to this plan (optional):                     Please sign and return this plan to:   FAX: [phone]      Signature:                                 Date:

## 2019-08-07 ENCOUNTER — HOSPITAL ENCOUNTER (OUTPATIENT)
Dept: MRI IMAGING | Facility: HOSPITAL | Age: 69
Discharge: HOME | End: 2019-08-07
Payer: MEDICARE

## 2019-08-07 DIAGNOSIS — R29.898 WEAKNESS OF BOTH LOWER EXTREMITIES: ICD-10-CM

## 2019-08-07 DIAGNOSIS — G89.29 CHRONIC BILATERAL LOW BACK PAIN WITHOUT SCIATICA: ICD-10-CM

## 2019-08-07 DIAGNOSIS — M54.50 CHRONIC BILATERAL LOW BACK PAIN WITHOUT SCIATICA: ICD-10-CM

## 2019-08-07 PROCEDURE — 72148 MRI LUMBAR SPINE W/O DYE: CPT

## 2019-08-08 ENCOUNTER — HOSPITAL ENCOUNTER (OUTPATIENT)
Dept: PHYSICAL THERAPY | Facility: HOSPITAL | Age: 69
Setting detail: THERAPIES SERIES
Discharge: HOME | End: 2019-08-08
Payer: MEDICARE

## 2019-08-08 DIAGNOSIS — M54.41 CHRONIC RIGHT-SIDED LOW BACK PAIN WITH RIGHT-SIDED SCIATICA: ICD-10-CM

## 2019-08-08 DIAGNOSIS — G89.29 CHRONIC RIGHT-SIDED LOW BACK PAIN WITH RIGHT-SIDED SCIATICA: ICD-10-CM

## 2019-08-08 PROCEDURE — 97150 GROUP THERAPEUTIC PROCEDURES: CPT

## 2019-08-08 PROCEDURE — 97110 THERAPEUTIC EXERCISES: CPT

## 2019-08-08 RX ORDER — HYDROCODONE BITARTRATE AND ACETAMINOPHEN 5; 325 MG/1; MG/1
TABLET ORAL
Qty: 30 TABLET | Refills: 0 | Status: SHIPPED | OUTPATIENT
Start: 2019-08-08 | End: 2019-08-29 | Stop reason: SDUPTHER

## 2019-08-09 DIAGNOSIS — M54.41 CHRONIC RIGHT-SIDED LOW BACK PAIN WITH RIGHT-SIDED SCIATICA: ICD-10-CM

## 2019-08-09 DIAGNOSIS — G89.29 CHRONIC RIGHT-SIDED LOW BACK PAIN WITH RIGHT-SIDED SCIATICA: ICD-10-CM

## 2019-08-09 RX ORDER — HYDROCODONE BITARTRATE AND ACETAMINOPHEN 5; 325 MG/1; MG/1
TABLET ORAL
Qty: 30 TABLET | Refills: 0 | Status: SHIPPED | OUTPATIENT
Start: 2019-08-09 | End: 2019-08-29

## 2019-08-12 ENCOUNTER — HOSPITAL ENCOUNTER (OUTPATIENT)
Dept: PHYSICAL THERAPY | Facility: HOSPITAL | Age: 69
Setting detail: THERAPIES SERIES
Discharge: HOME | End: 2019-08-12
Payer: MEDICARE

## 2019-08-12 PROCEDURE — 97150 GROUP THERAPEUTIC PROCEDURES: CPT

## 2019-08-12 PROCEDURE — 97530 THERAPEUTIC ACTIVITIES: CPT

## 2019-08-12 PROCEDURE — 97110 THERAPEUTIC EXERCISES: CPT

## 2019-08-15 ENCOUNTER — HOSPITAL ENCOUNTER (OUTPATIENT)
Dept: PHYSICAL THERAPY | Facility: HOSPITAL | Age: 69
Setting detail: THERAPIES SERIES
Discharge: HOME | End: 2019-08-15
Payer: MEDICARE

## 2019-08-15 PROCEDURE — 97110 THERAPEUTIC EXERCISES: CPT

## 2019-08-15 PROCEDURE — 97150 GROUP THERAPEUTIC PROCEDURES: CPT

## 2019-08-15 PROCEDURE — 97530 THERAPEUTIC ACTIVITIES: CPT

## 2019-08-19 ENCOUNTER — HOSPITAL ENCOUNTER (OUTPATIENT)
Dept: PHYSICAL THERAPY | Facility: HOSPITAL | Age: 69
Setting detail: THERAPIES SERIES
Discharge: HOME | End: 2019-08-19
Payer: MEDICARE

## 2019-08-19 DIAGNOSIS — E78.00 PURE HYPERCHOLESTEROLEMIA: ICD-10-CM

## 2019-08-19 PROCEDURE — 97150 GROUP THERAPEUTIC PROCEDURES: CPT

## 2019-08-19 PROCEDURE — 97110 THERAPEUTIC EXERCISES: CPT

## 2019-08-19 PROCEDURE — 97530 THERAPEUTIC ACTIVITIES: CPT

## 2019-08-19 RX ORDER — FERROUS SULFATE 325(65) MG
TABLET ORAL
Qty: 90 TABLET | Refills: 3 | Status: SHIPPED | OUTPATIENT
Start: 2019-08-19 | End: 2019-12-05 | Stop reason: SDUPTHER

## 2019-08-19 RX ORDER — ICOSAPENT ETHYL 1000 MG/1
CAPSULE ORAL
Qty: 60 CAPSULE | Refills: 3 | Status: SHIPPED | OUTPATIENT
Start: 2019-08-19 | End: 2019-10-16 | Stop reason: SDUPTHER

## 2019-08-19 RX ORDER — ASPIRIN 81 MG/1
TABLET ORAL
Qty: 30 TABLET | Refills: 5 | Status: SHIPPED | OUTPATIENT
Start: 2019-08-19 | End: 2019-08-29 | Stop reason: SDUPTHER

## 2019-08-21 ENCOUNTER — APPOINTMENT (OUTPATIENT)
Dept: PHYSICAL THERAPY | Facility: HOSPITAL | Age: 69
End: 2019-08-21
Payer: MEDICARE

## 2019-08-22 ENCOUNTER — NURSE ONLY (OUTPATIENT)
Dept: PRIMARY CARE CLINIC | Age: 69
End: 2019-08-22
Payer: MEDICARE

## 2019-08-22 DIAGNOSIS — J30.9 ALLERGIC RHINITIS, UNSPECIFIED SEASONALITY, UNSPECIFIED TRIGGER: Primary | ICD-10-CM

## 2019-08-22 PROCEDURE — 95115 IMMUNOTHERAPY ONE INJECTION: CPT | Performed by: NURSE PRACTITIONER

## 2019-08-27 ENCOUNTER — HOSPITAL ENCOUNTER (OUTPATIENT)
Dept: PHYSICAL THERAPY | Facility: HOSPITAL | Age: 69
Setting detail: THERAPIES SERIES
Discharge: HOME | End: 2019-08-27
Payer: MEDICARE

## 2019-08-27 PROCEDURE — 97110 THERAPEUTIC EXERCISES: CPT

## 2019-08-27 PROCEDURE — 97530 THERAPEUTIC ACTIVITIES: CPT

## 2019-08-27 PROCEDURE — 97150 GROUP THERAPEUTIC PROCEDURES: CPT

## 2019-08-29 ENCOUNTER — APPOINTMENT (OUTPATIENT)
Dept: PHYSICAL THERAPY | Facility: HOSPITAL | Age: 69
End: 2019-08-29
Payer: MEDICARE

## 2019-08-29 ENCOUNTER — HOSPITAL ENCOUNTER (INPATIENT)
Facility: HOSPITAL | Age: 69
LOS: 4 days | Discharge: HOME | DRG: 190 | End: 2019-09-02
Attending: HOSPITALIST | Admitting: INTERNAL MEDICINE
Payer: MEDICARE

## 2019-08-29 ENCOUNTER — OFFICE VISIT (OUTPATIENT)
Dept: PRIMARY CARE CLINIC | Age: 69
End: 2019-08-29
Payer: MEDICARE

## 2019-08-29 ENCOUNTER — APPOINTMENT (OUTPATIENT)
Dept: GENERAL RADIOLOGY | Facility: HOSPITAL | Age: 69
DRG: 190 | End: 2019-08-29
Payer: MEDICARE

## 2019-08-29 VITALS
WEIGHT: 200 LBS | DIASTOLIC BLOOD PRESSURE: 64 MMHG | BODY MASS INDEX: 41.98 KG/M2 | RESPIRATION RATE: 36 BRPM | HEART RATE: 93 BPM | SYSTOLIC BLOOD PRESSURE: 122 MMHG | OXYGEN SATURATION: 81 % | HEIGHT: 58 IN

## 2019-08-29 DIAGNOSIS — J18.9 PNEUMONIA DUE TO ORGANISM: Primary | ICD-10-CM

## 2019-08-29 DIAGNOSIS — J44.1 ACUTE EXACERBATION OF CHRONIC OBSTRUCTIVE PULMONARY DISEASE (COPD) (HCC): ICD-10-CM

## 2019-08-29 DIAGNOSIS — J96.01 ACUTE RESPIRATORY FAILURE WITH HYPOXIA (HCC): ICD-10-CM

## 2019-08-29 DIAGNOSIS — E11.8 TYPE 2 DIABETES MELLITUS WITH COMPLICATION, WITH LONG-TERM CURRENT USE OF INSULIN (HCC): ICD-10-CM

## 2019-08-29 DIAGNOSIS — R09.02 HYPOXIA: ICD-10-CM

## 2019-08-29 DIAGNOSIS — Z79.4 TYPE 2 DIABETES MELLITUS WITH COMPLICATION, WITH LONG-TERM CURRENT USE OF INSULIN (HCC): ICD-10-CM

## 2019-08-29 DIAGNOSIS — J44.1 COPD EXACERBATION (HCC): Primary | ICD-10-CM

## 2019-08-29 LAB
A/G RATIO: 1 (ref 0.8–2)
ALBUMIN SERPL-MCNC: 3.7 G/DL (ref 3.4–4.8)
ALP BLD-CCNC: 71 U/L (ref 25–100)
ALT SERPL-CCNC: 18 U/L (ref 4–36)
ANION GAP SERPL CALCULATED.3IONS-SCNC: 13 MMOL/L (ref 3–16)
AST SERPL-CCNC: 29 U/L (ref 8–33)
BASOPHILS ABSOLUTE: 0.1 K/UL (ref 0–0.1)
BASOPHILS RELATIVE PERCENT: 0.7 %
BILIRUB SERPL-MCNC: 0.4 MG/DL (ref 0.3–1.2)
BUN BLDV-MCNC: 35 MG/DL (ref 6–20)
CALCIUM SERPL-MCNC: 9.4 MG/DL (ref 8.5–10.5)
CHLORIDE BLD-SCNC: 94 MMOL/L (ref 98–107)
CO2: 32 MMOL/L (ref 20–30)
CREAT SERPL-MCNC: 1.7 MG/DL (ref 0.4–1.2)
EOSINOPHILS ABSOLUTE: 0.4 K/UL (ref 0–0.4)
EOSINOPHILS RELATIVE PERCENT: 2.7 %
GFR AFRICAN AMERICAN: 36
GFR NON-AFRICAN AMERICAN: 30
GLOBULIN: 3.6 G/DL
GLUCOSE BLD-MCNC: 166 MG/DL (ref 74–106)
GLUCOSE BLD-MCNC: 212 MG/DL (ref 74–106)
GLUCOSE BLD-MCNC: 272 MG/DL (ref 74–106)
HCT VFR BLD CALC: 40.1 % (ref 37–47)
HEMOGLOBIN: 12.8 G/DL (ref 11.5–16.5)
IMMATURE GRANULOCYTES #: 0.1 K/UL
IMMATURE GRANULOCYTES %: 0.6 % (ref 0–5)
LYMPHOCYTES ABSOLUTE: 3.1 K/UL (ref 1.5–4)
LYMPHOCYTES RELATIVE PERCENT: 19.5 %
MCH RBC QN AUTO: 32.1 PG (ref 27–32)
MCHC RBC AUTO-ENTMCNC: 31.9 G/DL (ref 31–35)
MCV RBC AUTO: 100.5 FL (ref 80–100)
MONOCYTES ABSOLUTE: 1.4 K/UL (ref 0.2–0.8)
MONOCYTES RELATIVE PERCENT: 8.7 %
NEUTROPHILS ABSOLUTE: 10.7 K/UL (ref 2–7.5)
NEUTROPHILS RELATIVE PERCENT: 67.8 %
PDW BLD-RTO: 15 % (ref 11–16)
PERFORMED ON: ABNORMAL
PERFORMED ON: ABNORMAL
PLATELET # BLD: 455 K/UL (ref 150–400)
PMV BLD AUTO: 10.7 FL (ref 6–10)
POTASSIUM REFLEX MAGNESIUM: 3.8 MMOL/L (ref 3.4–5.1)
RBC # BLD: 3.99 M/UL (ref 3.8–5.8)
SODIUM BLD-SCNC: 139 MMOL/L (ref 136–145)
TOTAL PROTEIN: 7.3 G/DL (ref 6.4–8.3)
TROPONIN: <0.3 NG/ML
WBC # BLD: 15.8 K/UL (ref 4–11)

## 2019-08-29 PROCEDURE — 3017F COLORECTAL CA SCREEN DOC REV: CPT | Performed by: PEDIATRICS

## 2019-08-29 PROCEDURE — 84484 ASSAY OF TROPONIN QUANT: CPT

## 2019-08-29 PROCEDURE — G8400 PT W/DXA NO RESULTS DOC: HCPCS | Performed by: PEDIATRICS

## 2019-08-29 PROCEDURE — 1036F TOBACCO NON-USER: CPT | Performed by: PEDIATRICS

## 2019-08-29 PROCEDURE — 6360000002 HC RX W HCPCS: Performed by: HOSPITALIST

## 2019-08-29 PROCEDURE — 94640 AIRWAY INHALATION TREATMENT: CPT

## 2019-08-29 PROCEDURE — 99213 OFFICE O/P EST LOW 20 MIN: CPT | Performed by: PEDIATRICS

## 2019-08-29 PROCEDURE — 6360000002 HC RX W HCPCS: Performed by: PHYSICIAN ASSISTANT

## 2019-08-29 PROCEDURE — 36415 COLL VENOUS BLD VENIPUNCTURE: CPT

## 2019-08-29 PROCEDURE — 99285 EMERGENCY DEPT VISIT HI MDM: CPT

## 2019-08-29 PROCEDURE — 3023F SPIROM DOC REV: CPT | Performed by: PEDIATRICS

## 2019-08-29 PROCEDURE — G8926 SPIRO NO PERF OR DOC: HCPCS | Performed by: PEDIATRICS

## 2019-08-29 PROCEDURE — 6370000000 HC RX 637 (ALT 250 FOR IP): Performed by: PHYSICIAN ASSISTANT

## 2019-08-29 PROCEDURE — 1123F ACP DISCUSS/DSCN MKR DOCD: CPT | Performed by: PEDIATRICS

## 2019-08-29 PROCEDURE — 1200000000 HC SEMI PRIVATE

## 2019-08-29 PROCEDURE — G8427 DOCREV CUR MEDS BY ELIG CLIN: HCPCS | Performed by: PEDIATRICS

## 2019-08-29 PROCEDURE — 96365 THER/PROPH/DIAG IV INF INIT: CPT

## 2019-08-29 PROCEDURE — G8417 CALC BMI ABV UP PARAM F/U: HCPCS | Performed by: PEDIATRICS

## 2019-08-29 PROCEDURE — 94640 AIRWAY INHALATION TREATMENT: CPT | Performed by: PEDIATRICS

## 2019-08-29 PROCEDURE — 71045 X-RAY EXAM CHEST 1 VIEW: CPT

## 2019-08-29 PROCEDURE — 1090F PRES/ABSN URINE INCON ASSESS: CPT | Performed by: PEDIATRICS

## 2019-08-29 PROCEDURE — 87040 BLOOD CULTURE FOR BACTERIA: CPT

## 2019-08-29 PROCEDURE — 2580000003 HC RX 258: Performed by: PHYSICIAN ASSISTANT

## 2019-08-29 PROCEDURE — 80053 COMPREHEN METABOLIC PANEL: CPT

## 2019-08-29 PROCEDURE — 4040F PNEUMOC VAC/ADMIN/RCVD: CPT | Performed by: PEDIATRICS

## 2019-08-29 PROCEDURE — 93005 ELECTROCARDIOGRAM TRACING: CPT

## 2019-08-29 PROCEDURE — 85025 COMPLETE CBC W/AUTO DIFF WBC: CPT

## 2019-08-29 RX ORDER — IPRATROPIUM BROMIDE AND ALBUTEROL SULFATE 2.5; .5 MG/3ML; MG/3ML
1 SOLUTION RESPIRATORY (INHALATION) EVERY 4 HOURS
Status: DISCONTINUED | OUTPATIENT
Start: 2019-08-29 | End: 2019-08-29

## 2019-08-29 RX ORDER — INSULIN GLARGINE 100 [IU]/ML
60 INJECTION, SOLUTION SUBCUTANEOUS NIGHTLY
Status: DISCONTINUED | OUTPATIENT
Start: 2019-08-29 | End: 2019-08-31

## 2019-08-29 RX ORDER — OLOPATADINE HYDROCHLORIDE 2 MG/ML
1 SOLUTION/ DROPS OPHTHALMIC 2 TIMES DAILY
Status: DISCONTINUED | OUTPATIENT
Start: 2019-08-29 | End: 2019-09-02 | Stop reason: HOSPADM

## 2019-08-29 RX ORDER — ALLOPURINOL 100 MG/1
150 TABLET ORAL DAILY
Status: DISCONTINUED | OUTPATIENT
Start: 2019-08-29 | End: 2019-09-02 | Stop reason: HOSPADM

## 2019-08-29 RX ORDER — ASPIRIN 81 MG/1
81 TABLET ORAL DAILY
Status: DISCONTINUED | OUTPATIENT
Start: 2019-08-29 | End: 2019-09-02 | Stop reason: HOSPADM

## 2019-08-29 RX ORDER — SODIUM CHLORIDE 0.9 % (FLUSH) 0.9 %
10 SYRINGE (ML) INJECTION EVERY 12 HOURS SCHEDULED
Status: DISCONTINUED | OUTPATIENT
Start: 2019-08-29 | End: 2019-09-02 | Stop reason: HOSPADM

## 2019-08-29 RX ORDER — TRAMADOL HYDROCHLORIDE 50 MG/1
50 TABLET ORAL 3 TIMES DAILY
COMMUNITY
End: 2019-10-07 | Stop reason: SDUPTHER

## 2019-08-29 RX ORDER — IPRATROPIUM BROMIDE AND ALBUTEROL SULFATE 2.5; .5 MG/3ML; MG/3ML
1 SOLUTION RESPIRATORY (INHALATION) ONCE
Status: DISCONTINUED | OUTPATIENT
Start: 2019-08-29 | End: 2019-08-29 | Stop reason: HOSPADM

## 2019-08-29 RX ORDER — ONDANSETRON 2 MG/ML
4 INJECTION INTRAMUSCULAR; INTRAVENOUS EVERY 6 HOURS PRN
Status: DISCONTINUED | OUTPATIENT
Start: 2019-08-29 | End: 2019-09-02 | Stop reason: HOSPADM

## 2019-08-29 RX ORDER — BUSPIRONE HYDROCHLORIDE 5 MG/1
10 TABLET ORAL 3 TIMES DAILY
Status: DISCONTINUED | OUTPATIENT
Start: 2019-08-29 | End: 2019-09-02 | Stop reason: HOSPADM

## 2019-08-29 RX ORDER — PANTOPRAZOLE SODIUM 40 MG/1
40 TABLET, DELAYED RELEASE ORAL DAILY
Status: DISCONTINUED | OUTPATIENT
Start: 2019-08-29 | End: 2019-09-02 | Stop reason: HOSPADM

## 2019-08-29 RX ORDER — FLUTICASONE PROPIONATE 50 MCG
1 SPRAY, SUSPENSION (ML) NASAL 2 TIMES DAILY
Status: DISCONTINUED | OUTPATIENT
Start: 2019-08-29 | End: 2019-09-02 | Stop reason: HOSPADM

## 2019-08-29 RX ORDER — LACTOBACILLUS RHAMNOSUS GG 10B CELL
1 CAPSULE ORAL DAILY
Status: DISCONTINUED | OUTPATIENT
Start: 2019-08-29 | End: 2019-09-02 | Stop reason: HOSPADM

## 2019-08-29 RX ORDER — CETIRIZINE HYDROCHLORIDE 10 MG/1
5 TABLET ORAL DAILY
Status: DISCONTINUED | OUTPATIENT
Start: 2019-08-29 | End: 2019-09-02 | Stop reason: HOSPADM

## 2019-08-29 RX ORDER — NICOTINE 21 MG/24HR
1 PATCH, TRANSDERMAL 24 HOURS TRANSDERMAL DAILY
Status: DISCONTINUED | OUTPATIENT
Start: 2019-08-29 | End: 2019-08-31

## 2019-08-29 RX ORDER — SODIUM CHLORIDE 0.9 % (FLUSH) 0.9 %
10 SYRINGE (ML) INJECTION PRN
Status: DISCONTINUED | OUTPATIENT
Start: 2019-08-29 | End: 2019-09-02 | Stop reason: HOSPADM

## 2019-08-29 RX ORDER — LEVOFLOXACIN 5 MG/ML
500 INJECTION, SOLUTION INTRAVENOUS ONCE
Status: COMPLETED | OUTPATIENT
Start: 2019-08-29 | End: 2019-08-29

## 2019-08-29 RX ORDER — SODIUM CHLORIDE 9 MG/ML
INJECTION, SOLUTION INTRAVENOUS CONTINUOUS
Status: DISCONTINUED | OUTPATIENT
Start: 2019-08-29 | End: 2019-08-30

## 2019-08-29 RX ORDER — DEXTROSE MONOHYDRATE 50 MG/ML
100 INJECTION, SOLUTION INTRAVENOUS PRN
Status: DISCONTINUED | OUTPATIENT
Start: 2019-08-29 | End: 2019-09-02 | Stop reason: HOSPADM

## 2019-08-29 RX ORDER — ALBUTEROL SULFATE 2.5 MG/3ML
2.5 SOLUTION RESPIRATORY (INHALATION) ONCE
Status: COMPLETED | OUTPATIENT
Start: 2019-08-29 | End: 2019-08-29

## 2019-08-29 RX ORDER — TRAMADOL HYDROCHLORIDE 50 MG/1
50 TABLET ORAL 3 TIMES DAILY PRN
Status: DISCONTINUED | OUTPATIENT
Start: 2019-08-29 | End: 2019-09-02 | Stop reason: HOSPADM

## 2019-08-29 RX ORDER — LEVOFLOXACIN 5 MG/ML
250 INJECTION, SOLUTION INTRAVENOUS EVERY 24 HOURS
Status: DISCONTINUED | OUTPATIENT
Start: 2019-08-30 | End: 2019-09-02 | Stop reason: HOSPADM

## 2019-08-29 RX ORDER — NICOTINE POLACRILEX 4 MG
15 LOZENGE BUCCAL PRN
Status: DISCONTINUED | OUTPATIENT
Start: 2019-08-29 | End: 2019-09-02 | Stop reason: HOSPADM

## 2019-08-29 RX ORDER — FENOFIBRATE 160 MG/1
160 TABLET ORAL DAILY
Status: DISCONTINUED | OUTPATIENT
Start: 2019-08-29 | End: 2019-09-02 | Stop reason: HOSPADM

## 2019-08-29 RX ORDER — METHYLPREDNISOLONE SODIUM SUCCINATE 40 MG/ML
40 INJECTION, POWDER, LYOPHILIZED, FOR SOLUTION INTRAMUSCULAR; INTRAVENOUS EVERY 8 HOURS
Status: DISCONTINUED | OUTPATIENT
Start: 2019-08-29 | End: 2019-09-01

## 2019-08-29 RX ORDER — SUCRALFATE 1 G/1
1 TABLET ORAL EVERY 8 HOURS SCHEDULED
Status: DISCONTINUED | OUTPATIENT
Start: 2019-08-29 | End: 2019-09-02 | Stop reason: HOSPADM

## 2019-08-29 RX ORDER — BUDESONIDE 0.5 MG/2ML
0.5 INHALANT ORAL 2 TIMES DAILY
Status: DISCONTINUED | OUTPATIENT
Start: 2019-08-29 | End: 2019-09-02 | Stop reason: HOSPADM

## 2019-08-29 RX ORDER — METOCLOPRAMIDE 5 MG/1
5 TABLET ORAL
Status: DISCONTINUED | OUTPATIENT
Start: 2019-08-29 | End: 2019-09-02 | Stop reason: HOSPADM

## 2019-08-29 RX ORDER — HYDROCODONE BITARTRATE AND ACETAMINOPHEN 5; 325 MG/1; MG/1
1 TABLET ORAL EVERY 6 HOURS PRN
Status: DISCONTINUED | OUTPATIENT
Start: 2019-08-29 | End: 2019-09-02 | Stop reason: HOSPADM

## 2019-08-29 RX ORDER — IPRATROPIUM BROMIDE AND ALBUTEROL SULFATE 2.5; .5 MG/3ML; MG/3ML
1 SOLUTION RESPIRATORY (INHALATION)
Status: DISCONTINUED | OUTPATIENT
Start: 2019-08-29 | End: 2019-09-02 | Stop reason: HOSPADM

## 2019-08-29 RX ORDER — DEXTROSE MONOHYDRATE 25 G/50ML
12.5 INJECTION, SOLUTION INTRAVENOUS PRN
Status: DISCONTINUED | OUTPATIENT
Start: 2019-08-29 | End: 2019-09-02 | Stop reason: HOSPADM

## 2019-08-29 RX ORDER — FERROUS SULFATE 325(65) MG
325 TABLET ORAL
Status: DISCONTINUED | OUTPATIENT
Start: 2019-08-29 | End: 2019-08-30 | Stop reason: CLARIF

## 2019-08-29 RX ORDER — INSULIN GLARGINE 100 [IU]/ML
20 INJECTION, SOLUTION SUBCUTANEOUS EVERY MORNING
Status: DISCONTINUED | OUTPATIENT
Start: 2019-08-30 | End: 2019-08-30

## 2019-08-29 RX ORDER — FUROSEMIDE 40 MG/1
40 TABLET ORAL DAILY
Status: DISCONTINUED | OUTPATIENT
Start: 2019-08-29 | End: 2019-09-02 | Stop reason: HOSPADM

## 2019-08-29 RX ORDER — ACETAMINOPHEN 325 MG/1
650 TABLET ORAL EVERY 4 HOURS PRN
Status: DISCONTINUED | OUTPATIENT
Start: 2019-08-29 | End: 2019-09-02 | Stop reason: HOSPADM

## 2019-08-29 RX ORDER — MONTELUKAST SODIUM 10 MG/1
10 TABLET ORAL DAILY
Status: DISCONTINUED | OUTPATIENT
Start: 2019-08-29 | End: 2019-09-02 | Stop reason: HOSPADM

## 2019-08-29 RX ORDER — BENZONATATE 100 MG/1
100 CAPSULE ORAL 3 TIMES DAILY PRN
Status: DISCONTINUED | OUTPATIENT
Start: 2019-08-29 | End: 2019-09-02 | Stop reason: HOSPADM

## 2019-08-29 RX ADMIN — ENOXAPARIN SODIUM 40 MG: 40 INJECTION SUBCUTANEOUS at 17:23

## 2019-08-29 RX ADMIN — INSULIN LISPRO 2 UNITS: 100 INJECTION, SOLUTION INTRAVENOUS; SUBCUTANEOUS at 17:32

## 2019-08-29 RX ADMIN — BENZONATATE 100 MG: 100 CAPSULE ORAL at 21:19

## 2019-08-29 RX ADMIN — SODIUM CHLORIDE: 9 INJECTION, SOLUTION INTRAVENOUS at 17:23

## 2019-08-29 RX ADMIN — CETIRIZINE HYDROCHLORIDE 5 MG: 10 TABLET, FILM COATED ORAL at 17:23

## 2019-08-29 RX ADMIN — FENOFIBRATE 160 MG: 160 TABLET ORAL at 17:23

## 2019-08-29 RX ADMIN — INSULIN LISPRO 20 UNITS: 100 INJECTION, SOLUTION INTRAVENOUS; SUBCUTANEOUS at 17:31

## 2019-08-29 RX ADMIN — IPRATROPIUM BROMIDE AND ALBUTEROL SULFATE 1 AMPULE: 2.5; .5 SOLUTION RESPIRATORY (INHALATION) at 15:12

## 2019-08-29 RX ADMIN — INSULIN GLARGINE 60 UNITS: 100 INJECTION, SOLUTION SUBCUTANEOUS at 21:28

## 2019-08-29 RX ADMIN — BUSPIRONE HYDROCHLORIDE 10 MG: 5 TABLET ORAL at 21:18

## 2019-08-29 RX ADMIN — BUDESONIDE 500 MCG: 0.5 SUSPENSION RESPIRATORY (INHALATION) at 14:31

## 2019-08-29 RX ADMIN — METOPROLOL TARTRATE 25 MG: 25 TABLET ORAL at 21:18

## 2019-08-29 RX ADMIN — INSULIN LISPRO 2 UNITS: 100 INJECTION, SOLUTION INTRAVENOUS; SUBCUTANEOUS at 21:19

## 2019-08-29 RX ADMIN — HYDROCODONE BITARTRATE AND ACETAMINOPHEN 1 TABLET: 5; 325 TABLET ORAL at 21:19

## 2019-08-29 RX ADMIN — ALBUTEROL SULFATE 2.5 MG: 2.5 SOLUTION RESPIRATORY (INHALATION) at 14:31

## 2019-08-29 RX ADMIN — METHYLPREDNISOLONE SODIUM SUCCINATE 40 MG: 40 INJECTION, POWDER, FOR SOLUTION INTRAMUSCULAR; INTRAVENOUS at 17:23

## 2019-08-29 RX ADMIN — LEVOFLOXACIN 500 MG: 5 INJECTION, SOLUTION INTRAVENOUS at 15:53

## 2019-08-29 RX ADMIN — SUCRALFATE 1 G: 1 TABLET ORAL at 21:19

## 2019-08-29 ASSESSMENT — ENCOUNTER SYMPTOMS
SINUS PRESSURE: 0
EYE DISCHARGE: 0
SINUS PAIN: 1
SORE THROAT: 0
SHORTNESS OF BREATH: 1
BACK PAIN: 0
NAUSEA: 0
WHEEZING: 1
COUGH: 1
ABDOMINAL PAIN: 0
VOMITING: 0

## 2019-08-29 ASSESSMENT — PAIN SCALES - GENERAL: PAINLEVEL_OUTOF10: 5

## 2019-08-29 NOTE — ED PROVIDER NOTES
Previous Medications    ALBUTEROL SULFATE HFA (PROAIR HFA) 108 (90 BASE) MCG/ACT INHALER    Inhale 2 puffs into the lungs every 4 hours as needed for Wheezing    ALLOPURINOL (ZYLOPRIM) 300 MG TABLET    TAKE ONE TABLET BY MOUTH EVERY DAY    ASPIR-LOW 81 MG EC TABLET    TAKE ONE TABLET BY MOUTH EVERY DAY    BUSPIRONE (BUSPAR) 10 MG TABLET    TAKE ONE TABLET BY MOUTH THREE TIMES A DAY    COMFORT ASSIST INSULIN SYRINGE 31G X 5/16\" 1 ML MISC    USE AS DIRECTED FIVE TIMES A DAY    EZETIMIBE (ZETIA) 10 MG TABLET    TAKE ONE TABLET BY MOUTH EVERY DAY    FENOFIBRATE (TRICOR) 145 MG TABLET    TAKE ONE TABLET BY MOUTH EVERY DAY    FEROSUL 325 (65 FE) MG TABLET    TAKE ONE TABLET BY MOUTH THREE TIMES A DAY WITH FOOD    FLUNISOLIDE (NASALIDE) 25 MCG/ACT (0.025%) SOLN    1 spray     FLUTICASONE-SALMETEROL (ADVAIR) 500-50 MCG/DOSE DISKUS INHALER    Inhale 1 puff into the lungs every 12 hours    FUROSEMIDE (LASIX) 40 MG TABLET    TAKE ONE TABLET BY MOUTH TWO TIMES A DAY    HUMALOG 100 UNIT/ML INJECTION VIAL    INJECT 35 UNITS THREE TIMES A DAY    HYDROCODONE-ACETAMINOPHEN (NORCO) 5-325 MG PER TABLET    TAKE ONE TABLET BY MOUTH EVERY DAY AS NEEDED FOR PAIN    INSULIN DEGLUDEC (TRESIBA FLEXTOUCH) 100 UNIT/ML SOPN    20 units in the am and 75 units in pm    IPRATROPIUM-ALBUTEROL (DUONEB) 0.5-2.5 (3) MG/3ML SOLN NEBULIZER SOLUTION    Inhale 3 mLs into the lungs every 4 hours    JANUVIA 100 MG TABLET    TAKE ONE TABLET BY MOUTH EVERY DAY    LACTOBACILLUS ACID-PECTIN (ACIDOPHILUS/CITRUS PECTIN) TABS    TAKE ONE TABLET BY MOUTH TWO TIMES A DAY    LORATADINE (CLARITIN) 10 MG TABLET    TAKE ONE TABLET BY MOUTH EVERY DAY    METOCLOPRAMIDE (REGLAN) 5 MG TABLET    TAKE ONE TABLET BY MOUTH THREE TIMES A DAY    METOLAZONE (ZAROXOLYN) 5 MG TABLET    Take 1 tablet by mouth daily as needed (swelling)    METOPROLOL TARTRATE (LOPRESSOR) 25 MG TABLET    TAKE ONE TABLET BY MOUTH TWO TIMES A DAY    MONTELUKAST (SINGULAIR) 10 MG TABLET    TAKE ONE 74 - 106 mg/dL    BUN 35 (H) 6 - 20 mg/dL    CREATININE 1.7 (H) 0.4 - 1.2 mg/dL    GFR Non-African American 30 (L) >59    GFR  36 (L) >59    Calcium 9.4 8.5 - 10.5 mg/dL    Total Protein 7.3 6.4 - 8.3 g/dL    Alb 3.7 3.4 - 4.8 g/dL    Albumin/Globulin Ratio 1.0 0.8 - 2.0    Total Bilirubin 0.4 0.3 - 1.2 mg/dL    Alkaline Phosphatase 71 25 - 100 U/L    ALT 18 4 - 36 U/L    AST 29 8 - 33 U/L    Globulin 3.6 g/dL   Troponin   Result Value Ref Range    Troponin <0.30 <0.30 ng/mL        All other labs were within normal range or not returned as of this dictation. EMERGENCY DEPARTMENT COURSE and DIFFERENTIAL DIAGNOSIS/MDM:   Vitals:    Vitals:    08/29/19 1445 08/29/19 1500 08/29/19 1515 08/29/19 1530   BP: (!) 125/57 (!) 132/57 (!) 125/54 127/63   Pulse: 82 83 80 82   Resp:       Temp:       TempSrc:       SpO2: 98% 92% 90% 91%   Weight:       Height:           MEDICATIONS ADMINISTERED IN ED:  Medications   budesonide (PULMICORT) nebulizer suspension 500 mcg (500 mcg Nebulization Given 8/29/19 1431)   levofloxacin (LEVAQUIN) 500 MG/100ML infusion 500 mg (has no administration in time range)   albuterol (PROVENTIL) nebulizer solution 2.5 mg (2.5 mg Nebulization Given 8/29/19 1431)       After initial evaluation and examination I did have a conversation with the patient about the upcoming plan, treatment and possible disposition to labor agreeable to the time of this dictation. Patient advised that we give her albuterol and Pulmicort nebulizer here. Patient also a portable chest radiograph performed. We'll also check a CBC, CMP and a troponin. Patient's final disposition be determined once her radiological and diagnostic studies been performed and reviewed. Patient denies any fevers or chills or body aches we will perform an influenza swab at this time.  Based on the patient's presentation and her pulse ox being as low as what initially was an is now requiring out twice the amount of oxygen via nasal medications on file       Comment: Please note this report has been produced using speech recognition software and may contain errorsrelated to that system including errors in grammar, punctuation, and spelling, as well as words and phrases that may be inappropriate. If there are any questions or concerns please feel free to contact the dictating providerfor clarification.     Kaylee So,   Attending Emergency Physician              Kaylee So DO  08/29/19 1534

## 2019-08-29 NOTE — PROGRESS NOTES
SUBJECTIVE:    Patient ID: January Avila is a 71 y.o. female. Chief Complaint   Patient presents with    Congestion    Cough    Shortness of Breath       HPI:    Patient's medications, allergies, past medical, surgical,social and family histories were reviewed and updated as appropriate. Shortness of Breath   This is a new problem. The current episode started in the past 7 days. The problem occurs constantly. The problem has been rapidly worsening. Associated symptoms include wheezing. Pertinent negatives include no abdominal pain, chest pain, fever, headaches, rash, sore throat or vomiting. The symptoms are aggravated by any activity. She has tried beta agonist inhalers for the symptoms. The treatment provided no relief. Her past medical history is significant for COPD. Review of Systems   Constitutional: Negative for chills and fever. HENT: Positive for sinus pain. Negative for congestion, sinus pressure and sore throat. Eyes: Negative for discharge and visual disturbance. Respiratory: Positive for cough, shortness of breath and wheezing. Cardiovascular: Negative for chest pain and palpitations. Gastrointestinal: Negative for abdominal pain, nausea and vomiting. Endocrine: Negative for cold intolerance and heat intolerance. Genitourinary: Negative for dysuria, frequency and urgency. Musculoskeletal: Negative for arthralgias and back pain. Skin: Negative for rash and wound. Neurological: Positive for weakness. Negative for syncope, numbness and headaches. Hematological: Negative. Psychiatric/Behavioral: Negative for agitation and sleep disturbance. The patient is not nervous/anxious.         Past Medical History:   Diagnosis Date    Anemia     Anxiety     Asthma     Cataract     COPD [facility]ch[facility] obstructive [facility] disease) ([facility] [address]     DDD (degenerative disc disease), cervical     Depression     Diabetes mellitus type 2     GERD (gastroesophageal reflux disease)     below         Lab Results   Component Value Date    WBC 11.3 (H) 06/19/2019    HGB 11.2 (L) 06/19/2019    HCT 35.8 (L) 06/19/2019    .0 06/19/2019     06/19/2019    LYMPHOPCT 5.9 01/04/2018    RBC 3.58 (L) 06/19/2019    MCH 31.3 06/19/2019    MCHC 31.3 06/19/2019    RDW 17.0 (H) 06/19/2019       Lab Results   Component Value Date     (H) 07/11/2019    K 3.8 07/11/2019    CL 99 07/11/2019    CO2 32 (H) 07/11/2019    BUN 43 (H) 07/11/2019    CREATININE 1.4 (H) 07/11/2019    GLUCOSE 79 07/11/2019    CALCIUM 9.4 07/11/2019    PROT 6.6 07/11/2019    LABALBU 4.4 07/11/2019    BILITOT 0.4 07/11/2019    ALKPHOS 66 07/11/2019    AST 48 (H) 07/11/2019    ALT 56 (H) 07/11/2019    LABGLOM 37 (L) 07/11/2019    GFRAA 45 (L) 07/11/2019    AGRATIO 2.0 07/11/2019    GLOB 2.2 07/11/2019       Lab Results   Component Value Date    TSH 3.49 04/24/2019       No current facility-administered medications for this visit.       Current Outpatient Medications   Medication Sig Dispense Refill    omalizumab (OMALIZUMAB) 150 MG injection Inject 150 mg into the skin every 28 days 1 vial 3    FEROSUL 325 (65 Fe) MG tablet TAKE ONE TABLET BY MOUTH THREE TIMES A DAY WITH FOOD 90 tablet 3    VASCEPA 1 g CAPS capsule TAKE TWO CAPSULES BY MOUTH TWO TIMES A DAY 60 capsule 3    HYDROcodone-acetaminophen (NORCO) 5-325 MG per tablet TAKE ONE TABLET BY MOUTH EVERY DAY AS NEEDED FOR PAIN 30 tablet 0    fenofibrate (TRICOR) 145 MG tablet TAKE ONE TABLET BY MOUTH EVERY DAY 30 tablet 3    metolazone (ZAROXOLYN) 5 MG tablet Take 1 tablet by mouth daily as needed (swelling) 30 tablet 3    HYDROcodone-acetaminophen (NORCO) 5-325 MG per tablet TAKE ONE TABLET BY MOUTH EVERY DAY AS NEEDED FOR PAIN  0    Lactobacillus Acid-Pectin (ACIDOPHILUS/CITRUS PECTIN) TABS TAKE ONE TABLET BY MOUTH TWO TIMES A DAY 60 tablet 1    potassium chloride (KLOR-CON M) 20 MEQ extended release tablet TAKE ONE TABLET BY MOUTH EVERY DAY 30 tablet 5   

## 2019-08-30 PROBLEM — I50.32 CHRONIC DIASTOLIC HEART FAILURE (HCC): Status: ACTIVE | Noted: 2019-08-30

## 2019-08-30 PROBLEM — J45.909 ASTHMA: Status: ACTIVE | Noted: 2019-08-30

## 2019-08-30 LAB
A/G RATIO: 0.8 (ref 0.8–2)
ALBUMIN SERPL-MCNC: 3.2 G/DL (ref 3.4–4.8)
ALP BLD-CCNC: 70 U/L (ref 25–100)
ALT SERPL-CCNC: 18 U/L (ref 4–36)
ANION GAP SERPL CALCULATED.3IONS-SCNC: 13 MMOL/L (ref 3–16)
AST SERPL-CCNC: 24 U/L (ref 8–33)
BASOPHILS ABSOLUTE: 0 K/UL (ref 0–0.1)
BASOPHILS RELATIVE PERCENT: 0.2 %
BILIRUB SERPL-MCNC: 0.4 MG/DL (ref 0.3–1.2)
BUN BLDV-MCNC: 35 MG/DL (ref 6–20)
CALCIUM SERPL-MCNC: 9.3 MG/DL (ref 8.5–10.5)
CHLORIDE BLD-SCNC: 94 MMOL/L (ref 98–107)
CO2: 31 MMOL/L (ref 20–30)
CREAT SERPL-MCNC: 1.6 MG/DL (ref 0.4–1.2)
EOSINOPHILS ABSOLUTE: 0 K/UL (ref 0–0.4)
EOSINOPHILS RELATIVE PERCENT: 0 %
GFR AFRICAN AMERICAN: 39
GFR NON-AFRICAN AMERICAN: 32
GLOBULIN: 3.9 G/DL
GLUCOSE BLD-MCNC: 203 MG/DL (ref 74–106)
GLUCOSE BLD-MCNC: 262 MG/DL (ref 74–106)
GLUCOSE BLD-MCNC: 283 MG/DL (ref 74–106)
GLUCOSE BLD-MCNC: 288 MG/DL (ref 74–106)
GLUCOSE BLD-MCNC: 290 MG/DL (ref 74–106)
HCT VFR BLD CALC: 39.9 % (ref 37–47)
HEMOGLOBIN: 12.5 G/DL (ref 11.5–16.5)
IMMATURE GRANULOCYTES #: 0.1 K/UL
IMMATURE GRANULOCYTES %: 0.6 % (ref 0–5)
LYMPHOCYTES ABSOLUTE: 1.3 K/UL (ref 1.5–4)
LYMPHOCYTES RELATIVE PERCENT: 9.6 %
MCH RBC QN AUTO: 31.7 PG (ref 27–32)
MCHC RBC AUTO-ENTMCNC: 31.3 G/DL (ref 31–35)
MCV RBC AUTO: 101.3 FL (ref 80–100)
MONOCYTES ABSOLUTE: 0.3 K/UL (ref 0.2–0.8)
MONOCYTES RELATIVE PERCENT: 2.3 %
NEUTROPHILS ABSOLUTE: 12.2 K/UL (ref 2–7.5)
NEUTROPHILS RELATIVE PERCENT: 87.3 %
PDW BLD-RTO: 14.8 % (ref 11–16)
PERFORMED ON: ABNORMAL
PLATELET # BLD: 423 K/UL (ref 150–400)
PMV BLD AUTO: 11.1 FL (ref 6–10)
POTASSIUM REFLEX MAGNESIUM: 4.5 MMOL/L (ref 3.4–5.1)
RBC # BLD: 3.94 M/UL (ref 3.8–5.8)
SODIUM BLD-SCNC: 138 MMOL/L (ref 136–145)
TOTAL PROTEIN: 7.1 G/DL (ref 6.4–8.3)
WBC # BLD: 14 K/UL (ref 4–11)

## 2019-08-30 PROCEDURE — 87205 SMEAR GRAM STAIN: CPT

## 2019-08-30 PROCEDURE — 85025 COMPLETE CBC W/AUTO DIFF WBC: CPT

## 2019-08-30 PROCEDURE — 36415 COLL VENOUS BLD VENIPUNCTURE: CPT

## 2019-08-30 PROCEDURE — 6360000002 HC RX W HCPCS: Performed by: PHYSICIAN ASSISTANT

## 2019-08-30 PROCEDURE — 87070 CULTURE OTHR SPECIMN AEROBIC: CPT

## 2019-08-30 PROCEDURE — 94640 AIRWAY INHALATION TREATMENT: CPT

## 2019-08-30 PROCEDURE — 2700000000 HC OXYGEN THERAPY PER DAY

## 2019-08-30 PROCEDURE — 94669 MECHANICAL CHEST WALL OSCILL: CPT

## 2019-08-30 PROCEDURE — 1200000000 HC SEMI PRIVATE

## 2019-08-30 PROCEDURE — 6370000000 HC RX 637 (ALT 250 FOR IP): Performed by: PHYSICIAN ASSISTANT

## 2019-08-30 PROCEDURE — 99222 1ST HOSP IP/OBS MODERATE 55: CPT | Performed by: INTERNAL MEDICINE

## 2019-08-30 PROCEDURE — 94761 N-INVAS EAR/PLS OXIMETRY MLT: CPT

## 2019-08-30 PROCEDURE — 80053 COMPREHEN METABOLIC PANEL: CPT

## 2019-08-30 RX ORDER — FERROUS SULFATE TAB EC 324 MG (65 MG FE EQUIVALENT) 324 (65 FE) MG
324 TABLET DELAYED RESPONSE ORAL
Status: DISCONTINUED | OUTPATIENT
Start: 2019-08-30 | End: 2019-09-02 | Stop reason: HOSPADM

## 2019-08-30 RX ORDER — INSULIN GLARGINE 100 [IU]/ML
30 INJECTION, SOLUTION SUBCUTANEOUS EVERY MORNING
Status: DISCONTINUED | OUTPATIENT
Start: 2019-08-31 | End: 2019-09-02 | Stop reason: HOSPADM

## 2019-08-30 RX ADMIN — INSULIN LISPRO 3 UNITS: 100 INJECTION, SOLUTION INTRAVENOUS; SUBCUTANEOUS at 17:27

## 2019-08-30 RX ADMIN — INSULIN LISPRO 3 UNITS: 100 INJECTION, SOLUTION INTRAVENOUS; SUBCUTANEOUS at 11:15

## 2019-08-30 RX ADMIN — IPRATROPIUM BROMIDE AND ALBUTEROL SULFATE 1 AMPULE: .5; 3 SOLUTION RESPIRATORY (INHALATION) at 09:19

## 2019-08-30 RX ADMIN — PANTOPRAZOLE SODIUM 40 MG: 40 TABLET, DELAYED RELEASE ORAL at 08:08

## 2019-08-30 RX ADMIN — INSULIN LISPRO 20 UNITS: 100 INJECTION, SOLUTION INTRAVENOUS; SUBCUTANEOUS at 11:17

## 2019-08-30 RX ADMIN — HYDROCODONE BITARTRATE AND ACETAMINOPHEN 1 TABLET: 5; 325 TABLET ORAL at 23:14

## 2019-08-30 RX ADMIN — FLUTICASONE PROPIONATE 1 SPRAY: 50 SPRAY, METERED NASAL at 08:20

## 2019-08-30 RX ADMIN — CETIRIZINE HYDROCHLORIDE 5 MG: 10 TABLET, FILM COATED ORAL at 08:07

## 2019-08-30 RX ADMIN — INSULIN LISPRO 30 UNITS: 100 INJECTION, SOLUTION INTRAVENOUS; SUBCUTANEOUS at 17:26

## 2019-08-30 RX ADMIN — METOCLOPRAMIDE HYDROCHLORIDE 5 MG: 5 TABLET ORAL at 06:28

## 2019-08-30 RX ADMIN — FLUTICASONE PROPIONATE 1 SPRAY: 50 SPRAY, METERED NASAL at 20:47

## 2019-08-30 RX ADMIN — BUSPIRONE HYDROCHLORIDE 10 MG: 5 TABLET ORAL at 08:08

## 2019-08-30 RX ADMIN — IPRATROPIUM BROMIDE AND ALBUTEROL SULFATE 1 AMPULE: .5; 3 SOLUTION RESPIRATORY (INHALATION) at 05:22

## 2019-08-30 RX ADMIN — SUCRALFATE 1 G: 1 TABLET ORAL at 06:28

## 2019-08-30 RX ADMIN — LEVOFLOXACIN 250 MG: 5 INJECTION, SOLUTION INTRAVENOUS at 14:53

## 2019-08-30 RX ADMIN — LINAGLIPTIN 5 MG: 5 TABLET, FILM COATED ORAL at 08:07

## 2019-08-30 RX ADMIN — FENOFIBRATE 160 MG: 160 TABLET ORAL at 08:08

## 2019-08-30 RX ADMIN — INSULIN LISPRO 1 UNITS: 100 INJECTION, SOLUTION INTRAVENOUS; SUBCUTANEOUS at 20:59

## 2019-08-30 RX ADMIN — METHYLPREDNISOLONE SODIUM SUCCINATE 40 MG: 40 INJECTION, POWDER, FOR SOLUTION INTRAMUSCULAR; INTRAVENOUS at 01:36

## 2019-08-30 RX ADMIN — METOCLOPRAMIDE HYDROCHLORIDE 5 MG: 5 TABLET ORAL at 11:12

## 2019-08-30 RX ADMIN — FERROUS SULFATE TAB EC 324 MG (65 MG FE EQUIVALENT) 324 MG: 324 (65 FE) TABLET DELAYED RESPONSE at 14:52

## 2019-08-30 RX ADMIN — METHYLPREDNISOLONE SODIUM SUCCINATE 40 MG: 40 INJECTION, POWDER, FOR SOLUTION INTRAMUSCULAR; INTRAVENOUS at 17:22

## 2019-08-30 RX ADMIN — ASPIRIN 81 MG: 81 TABLET, COATED ORAL at 08:07

## 2019-08-30 RX ADMIN — METHYLPREDNISOLONE SODIUM SUCCINATE 40 MG: 40 INJECTION, POWDER, FOR SOLUTION INTRAMUSCULAR; INTRAVENOUS at 08:08

## 2019-08-30 RX ADMIN — IPRATROPIUM BROMIDE AND ALBUTEROL SULFATE 1 AMPULE: .5; 3 SOLUTION RESPIRATORY (INHALATION) at 17:06

## 2019-08-30 RX ADMIN — INSULIN GLARGINE 20 UNITS: 100 INJECTION, SOLUTION SUBCUTANEOUS at 08:18

## 2019-08-30 RX ADMIN — ENOXAPARIN SODIUM 40 MG: 40 INJECTION SUBCUTANEOUS at 08:08

## 2019-08-30 RX ADMIN — BUDESONIDE 500 MCG: 0.5 SUSPENSION RESPIRATORY (INHALATION) at 17:06

## 2019-08-30 RX ADMIN — Medication 1 CAPSULE: at 08:08

## 2019-08-30 RX ADMIN — SUCRALFATE 1 G: 1 TABLET ORAL at 22:00

## 2019-08-30 RX ADMIN — INSULIN GLARGINE 60 UNITS: 100 INJECTION, SOLUTION SUBCUTANEOUS at 20:58

## 2019-08-30 RX ADMIN — BUSPIRONE HYDROCHLORIDE 10 MG: 5 TABLET ORAL at 20:46

## 2019-08-30 RX ADMIN — METOCLOPRAMIDE HYDROCHLORIDE 5 MG: 5 TABLET ORAL at 17:22

## 2019-08-30 RX ADMIN — BUDESONIDE 500 MCG: 0.5 SUSPENSION RESPIRATORY (INHALATION) at 05:22

## 2019-08-30 RX ADMIN — FERROUS SULFATE TAB EC 324 MG (65 MG FE EQUIVALENT) 324 MG: 324 (65 FE) TABLET DELAYED RESPONSE at 17:22

## 2019-08-30 RX ADMIN — SUCRALFATE 1 G: 1 TABLET ORAL at 14:52

## 2019-08-30 RX ADMIN — INSULIN LISPRO 3 UNITS: 100 INJECTION, SOLUTION INTRAVENOUS; SUBCUTANEOUS at 08:16

## 2019-08-30 RX ADMIN — METOPROLOL TARTRATE 25 MG: 25 TABLET ORAL at 08:08

## 2019-08-30 RX ADMIN — INSULIN LISPRO 20 UNITS: 100 INJECTION, SOLUTION INTRAVENOUS; SUBCUTANEOUS at 08:19

## 2019-08-30 RX ADMIN — ALLOPURINOL 150 MG: 100 TABLET ORAL at 08:07

## 2019-08-30 RX ADMIN — IPRATROPIUM BROMIDE AND ALBUTEROL SULFATE 1 AMPULE: .5; 3 SOLUTION RESPIRATORY (INHALATION) at 13:05

## 2019-08-30 RX ADMIN — BUSPIRONE HYDROCHLORIDE 10 MG: 5 TABLET ORAL at 14:52

## 2019-08-30 RX ADMIN — MONTELUKAST SODIUM 10 MG: 10 TABLET, COATED ORAL at 08:07

## 2019-08-30 RX ADMIN — METOPROLOL TARTRATE 25 MG: 25 TABLET ORAL at 20:46

## 2019-08-30 ASSESSMENT — PAIN SCALES - GENERAL: PAINLEVEL_OUTOF10: 5

## 2019-08-30 NOTE — PLAN OF CARE
Problem: Falls - Risk of:  Goal: Will remain free from falls  Description  Will remain free from falls  Outcome: Ongoing     Problem: SAFETY  Goal: Free from accidental physical injury  Outcome: Ongoing     Problem: DAILY CARE  Goal: Daily care needs are met  Outcome: Ongoing     Problem: SKIN INTEGRITY  Goal: Skin integrity is maintained or improved  Outcome: Ongoing     Problem: DISCHARGE BARRIERS  Goal: Patient's continuum of care needs are met  Outcome: Ongoing

## 2019-08-30 NOTE — H&P
History and Physical    Patient:  Andreina Parmar    CHIEF COMPLAINT:    Cough and shortness of breath    HISTORY OF PRESENT ILLNESS:   The patient is a 71 y.o. female with past medical history of COPD, diabetes, asthma, hypertension, CHF who presents due to cough and shortness of breath. Patient reports for the past 2 weeks she has had worsening chest congestion and cough. Has become progressively more short of breath. States she goes to [employer] and was told twice to go to the doctor but she didn't. Went to PCP yesterday and she was hypoxic on her baseline 3 L O2 so she was sent to the emergency department. Patient reports shortness of breath some better today compared to yesterday. She is on 5 L of oxygen this morning and was on 6 L yesterday. Having productive cough of yellow/green sputum. No fever, sweats, chills.   Denies worsening swelling in lower extremity stating there actually not swollen at all compared to her usual.    Past Medical History:      Diagnosis Date    Anemia     Anxiety     Asthma     Cataract     [facility]) [facility] [address]     DDD (degenerative disc disease), cervical     Depression     Diabetes mellitus type 2     GERD (gastroesophageal reflux disease)     Gout     History of uterine cancer     Hyperlipidemia     Hypertension        Past Surgical History:      Procedure Laterality Date    CATARACT REMOVAL WITH IMPLANT  2005   [address] [place]    x2    COLONOSCOPY  05/01/2013    COLONOSCOPY N/A 5/23/2019    COLONOSCOPY POLYPECTOMY HOT BIOPSY performed by Dalila Stoddard [facility] at [facility]      partial    LUNG SURGERY      tumor removed; left    NECK SURGERY      cyst removed; right     TONSILLECTOMY  1956    UPPER GASTROINTESTINAL ENDOSCOPY N/A 5/23/2019    EGD BIOPSY performed by Dalila Stoddard MD at [facility] END[facility]       Medications Prior to Admission:    Prior to Admission medications    Medication Sig

## 2019-08-30 NOTE — PROGRESS NOTES
Pt states that she is breathing some better. Pt not as SOA. Pt. Resting with eyes closed and no s/s of resp distress noted. Sats 93% on 4 lpm nc.  -- CELIO Sanchez CRT--

## 2019-08-31 LAB
ANION GAP SERPL CALCULATED.3IONS-SCNC: 9 MMOL/L (ref 3–16)
BUN BLDV-MCNC: 44 MG/DL (ref 6–20)
CALCIUM SERPL-MCNC: 10 MG/DL (ref 8.5–10.5)
CHLORIDE BLD-SCNC: 97 MMOL/L (ref 98–107)
CO2: 36 MMOL/L (ref 20–30)
CREAT SERPL-MCNC: 1.6 MG/DL (ref 0.4–1.2)
GFR AFRICAN AMERICAN: 39
GFR NON-AFRICAN AMERICAN: 32
GLUCOSE BLD-MCNC: 139 MG/DL (ref 74–106)
GLUCOSE BLD-MCNC: 169 MG/DL (ref 74–106)
GLUCOSE BLD-MCNC: 181 MG/DL (ref 74–106)
GLUCOSE BLD-MCNC: 204 MG/DL (ref 74–106)
GLUCOSE BLD-MCNC: 237 MG/DL (ref 74–106)
HCT VFR BLD CALC: 41.7 % (ref 37–47)
HEMOGLOBIN: 12.9 G/DL (ref 11.5–16.5)
MAGNESIUM: 2.4 MG/DL (ref 1.7–2.4)
MCH RBC QN AUTO: 31.8 PG (ref 27–32)
MCHC RBC AUTO-ENTMCNC: 30.9 G/DL (ref 31–35)
MCV RBC AUTO: 102.7 FL (ref 80–100)
PDW BLD-RTO: 14.7 % (ref 11–16)
PERFORMED ON: ABNORMAL
PLATELET # BLD: 470 K/UL (ref 150–400)
PMV BLD AUTO: 11 FL (ref 6–10)
POTASSIUM SERPL-SCNC: 4.5 MMOL/L (ref 3.4–5.1)
RBC # BLD: 4.06 M/UL (ref 3.8–5.8)
SODIUM BLD-SCNC: 142 MMOL/L (ref 136–145)
TOTAL CK: 108 U/L (ref 26–174)
TSH SERPL DL<=0.05 MIU/L-ACNC: 0.81 UIU/ML (ref 0.35–5.5)
WBC # BLD: 15.8 K/UL (ref 4–11)

## 2019-08-31 PROCEDURE — 82550 ASSAY OF CK (CPK): CPT

## 2019-08-31 PROCEDURE — 6360000002 HC RX W HCPCS: Performed by: PHYSICIAN ASSISTANT

## 2019-08-31 PROCEDURE — 84443 ASSAY THYROID STIM HORMONE: CPT

## 2019-08-31 PROCEDURE — 6370000000 HC RX 637 (ALT 250 FOR IP): Performed by: PHYSICIAN ASSISTANT

## 2019-08-31 PROCEDURE — 6370000000 HC RX 637 (ALT 250 FOR IP): Performed by: INTERNAL MEDICINE

## 2019-08-31 PROCEDURE — 99231 SBSQ HOSP IP/OBS SF/LOW 25: CPT | Performed by: INTERNAL MEDICINE

## 2019-08-31 PROCEDURE — 94669 MECHANICAL CHEST WALL OSCILL: CPT

## 2019-08-31 PROCEDURE — 80048 BASIC METABOLIC PNL TOTAL CA: CPT

## 2019-08-31 PROCEDURE — 2580000003 HC RX 258: Performed by: PHYSICIAN ASSISTANT

## 2019-08-31 PROCEDURE — 94761 N-INVAS EAR/PLS OXIMETRY MLT: CPT

## 2019-08-31 PROCEDURE — 94640 AIRWAY INHALATION TREATMENT: CPT

## 2019-08-31 PROCEDURE — 2700000000 HC OXYGEN THERAPY PER DAY

## 2019-08-31 PROCEDURE — 83735 ASSAY OF MAGNESIUM: CPT

## 2019-08-31 PROCEDURE — 85027 COMPLETE CBC AUTOMATED: CPT

## 2019-08-31 PROCEDURE — 36415 COLL VENOUS BLD VENIPUNCTURE: CPT

## 2019-08-31 PROCEDURE — 1200000000 HC SEMI PRIVATE

## 2019-08-31 RX ORDER — INSULIN GLARGINE 100 [IU]/ML
65 INJECTION, SOLUTION SUBCUTANEOUS NIGHTLY
Status: DISCONTINUED | OUTPATIENT
Start: 2019-08-31 | End: 2019-09-02 | Stop reason: HOSPADM

## 2019-08-31 RX ORDER — ROPINIROLE 0.25 MG/1
0.5 TABLET, FILM COATED ORAL 2 TIMES DAILY
Status: DISCONTINUED | OUTPATIENT
Start: 2019-08-31 | End: 2019-09-02 | Stop reason: HOSPADM

## 2019-08-31 RX ADMIN — MONTELUKAST SODIUM 10 MG: 10 TABLET, COATED ORAL at 08:17

## 2019-08-31 RX ADMIN — SUCRALFATE 1 G: 1 TABLET ORAL at 20:59

## 2019-08-31 RX ADMIN — ALLOPURINOL 150 MG: 100 TABLET ORAL at 08:17

## 2019-08-31 RX ADMIN — FLUTICASONE PROPIONATE 1 SPRAY: 50 SPRAY, METERED NASAL at 20:51

## 2019-08-31 RX ADMIN — INSULIN GLARGINE 30 UNITS: 100 INJECTION, SOLUTION SUBCUTANEOUS at 08:26

## 2019-08-31 RX ADMIN — ASPIRIN 81 MG: 81 TABLET, COATED ORAL at 08:17

## 2019-08-31 RX ADMIN — PANTOPRAZOLE SODIUM 40 MG: 40 TABLET, DELAYED RELEASE ORAL at 08:17

## 2019-08-31 RX ADMIN — METHYLPREDNISOLONE SODIUM SUCCINATE 40 MG: 40 INJECTION, POWDER, FOR SOLUTION INTRAMUSCULAR; INTRAVENOUS at 08:17

## 2019-08-31 RX ADMIN — INSULIN LISPRO 30 UNITS: 100 INJECTION, SOLUTION INTRAVENOUS; SUBCUTANEOUS at 11:13

## 2019-08-31 RX ADMIN — FERROUS SULFATE TAB EC 324 MG (65 MG FE EQUIVALENT) 324 MG: 324 (65 FE) TABLET DELAYED RESPONSE at 08:17

## 2019-08-31 RX ADMIN — SUCRALFATE 1 G: 1 TABLET ORAL at 13:11

## 2019-08-31 RX ADMIN — METHYLPREDNISOLONE SODIUM SUCCINATE 40 MG: 40 INJECTION, POWDER, FOR SOLUTION INTRAMUSCULAR; INTRAVENOUS at 02:00

## 2019-08-31 RX ADMIN — METOPROLOL TARTRATE 25 MG: 25 TABLET ORAL at 08:17

## 2019-08-31 RX ADMIN — IPRATROPIUM BROMIDE AND ALBUTEROL SULFATE 1 AMPULE: .5; 3 SOLUTION RESPIRATORY (INHALATION) at 08:38

## 2019-08-31 RX ADMIN — Medication 10 ML: at 16:25

## 2019-08-31 RX ADMIN — BUDESONIDE 500 MCG: 0.5 SUSPENSION RESPIRATORY (INHALATION) at 17:13

## 2019-08-31 RX ADMIN — METHYLPREDNISOLONE SODIUM SUCCINATE 40 MG: 40 INJECTION, POWDER, FOR SOLUTION INTRAMUSCULAR; INTRAVENOUS at 02:27

## 2019-08-31 RX ADMIN — METOCLOPRAMIDE HYDROCHLORIDE 5 MG: 5 TABLET ORAL at 05:45

## 2019-08-31 RX ADMIN — BUDESONIDE 500 MCG: 0.5 SUSPENSION RESPIRATORY (INHALATION) at 05:03

## 2019-08-31 RX ADMIN — BUSPIRONE HYDROCHLORIDE 10 MG: 5 TABLET ORAL at 08:17

## 2019-08-31 RX ADMIN — METOPROLOL TARTRATE 25 MG: 25 TABLET ORAL at 21:00

## 2019-08-31 RX ADMIN — BUSPIRONE HYDROCHLORIDE 10 MG: 5 TABLET ORAL at 20:58

## 2019-08-31 RX ADMIN — BUSPIRONE HYDROCHLORIDE 10 MG: 5 TABLET ORAL at 13:11

## 2019-08-31 RX ADMIN — Medication 1 CAPSULE: at 08:17

## 2019-08-31 RX ADMIN — IPRATROPIUM BROMIDE AND ALBUTEROL SULFATE 1 AMPULE: .5; 3 SOLUTION RESPIRATORY (INHALATION) at 12:44

## 2019-08-31 RX ADMIN — IPRATROPIUM BROMIDE AND ALBUTEROL SULFATE 1 AMPULE: .5; 3 SOLUTION RESPIRATORY (INHALATION) at 05:02

## 2019-08-31 RX ADMIN — INSULIN LISPRO 2 UNITS: 100 INJECTION, SOLUTION INTRAVENOUS; SUBCUTANEOUS at 08:26

## 2019-08-31 RX ADMIN — LINAGLIPTIN 5 MG: 5 TABLET, FILM COATED ORAL at 08:17

## 2019-08-31 RX ADMIN — METOCLOPRAMIDE HYDROCHLORIDE 5 MG: 5 TABLET ORAL at 11:39

## 2019-08-31 RX ADMIN — INSULIN GLARGINE 65 UNITS: 100 INJECTION, SOLUTION SUBCUTANEOUS at 21:07

## 2019-08-31 RX ADMIN — FERROUS SULFATE TAB EC 324 MG (65 MG FE EQUIVALENT) 324 MG: 324 (65 FE) TABLET DELAYED RESPONSE at 11:39

## 2019-08-31 RX ADMIN — FLUTICASONE PROPIONATE 1 SPRAY: 50 SPRAY, METERED NASAL at 08:18

## 2019-08-31 RX ADMIN — FENOFIBRATE 160 MG: 160 TABLET ORAL at 08:17

## 2019-08-31 RX ADMIN — HYDROCODONE BITARTRATE AND ACETAMINOPHEN 1 TABLET: 5; 325 TABLET ORAL at 20:49

## 2019-08-31 RX ADMIN — SUCRALFATE 1 G: 1 TABLET ORAL at 05:45

## 2019-08-31 RX ADMIN — LEVOFLOXACIN 250 MG: 5 INJECTION, SOLUTION INTRAVENOUS at 16:25

## 2019-08-31 RX ADMIN — IPRATROPIUM BROMIDE AND ALBUTEROL SULFATE 1 AMPULE: .5; 3 SOLUTION RESPIRATORY (INHALATION) at 17:13

## 2019-08-31 RX ADMIN — INSULIN LISPRO 1 UNITS: 100 INJECTION, SOLUTION INTRAVENOUS; SUBCUTANEOUS at 16:31

## 2019-08-31 RX ADMIN — ROPINIROLE HYDROCHLORIDE 0.5 MG: 0.25 TABLET, FILM COATED ORAL at 23:46

## 2019-08-31 RX ADMIN — ENOXAPARIN SODIUM 40 MG: 40 INJECTION SUBCUTANEOUS at 08:17

## 2019-08-31 RX ADMIN — Medication 10 ML: at 08:23

## 2019-08-31 RX ADMIN — INSULIN LISPRO 2 UNITS: 100 INJECTION, SOLUTION INTRAVENOUS; SUBCUTANEOUS at 11:12

## 2019-08-31 RX ADMIN — FUROSEMIDE 40 MG: 40 TABLET ORAL at 08:17

## 2019-08-31 RX ADMIN — METOCLOPRAMIDE HYDROCHLORIDE 5 MG: 5 TABLET ORAL at 16:25

## 2019-08-31 RX ADMIN — Medication 10 ML: at 20:53

## 2019-08-31 RX ADMIN — FERROUS SULFATE TAB EC 324 MG (65 MG FE EQUIVALENT) 324 MG: 324 (65 FE) TABLET DELAYED RESPONSE at 16:25

## 2019-08-31 RX ADMIN — INSULIN LISPRO 30 UNITS: 100 INJECTION, SOLUTION INTRAVENOUS; SUBCUTANEOUS at 16:33

## 2019-08-31 RX ADMIN — METHYLPREDNISOLONE SODIUM SUCCINATE 40 MG: 40 INJECTION, POWDER, FOR SOLUTION INTRAMUSCULAR; INTRAVENOUS at 16:24

## 2019-08-31 RX ADMIN — CETIRIZINE HYDROCHLORIDE 5 MG: 10 TABLET, FILM COATED ORAL at 08:32

## 2019-08-31 RX ADMIN — INSULIN LISPRO 30 UNITS: 100 INJECTION, SOLUTION INTRAVENOUS; SUBCUTANEOUS at 08:29

## 2019-08-31 ASSESSMENT — PAIN SCALES - GENERAL: PAINLEVEL_OUTOF10: 6

## 2019-09-01 ENCOUNTER — APPOINTMENT (OUTPATIENT)
Dept: GENERAL RADIOLOGY | Facility: HOSPITAL | Age: 69
DRG: 190 | End: 2019-09-01
Payer: MEDICARE

## 2019-09-01 LAB
ANION GAP SERPL CALCULATED.3IONS-SCNC: 12 MMOL/L (ref 3–16)
BUN BLDV-MCNC: 52 MG/DL (ref 6–20)
CALCIUM SERPL-MCNC: 10 MG/DL (ref 8.5–10.5)
CHLORIDE BLD-SCNC: 97 MMOL/L (ref 98–107)
CO2: 35 MMOL/L (ref 20–30)
CREAT SERPL-MCNC: 1.7 MG/DL (ref 0.4–1.2)
CULTURE, RESPIRATORY: NORMAL
GFR AFRICAN AMERICAN: 36
GFR NON-AFRICAN AMERICAN: 30
GLUCOSE BLD-MCNC: 105 MG/DL (ref 74–106)
GLUCOSE BLD-MCNC: 143 MG/DL (ref 74–106)
GLUCOSE BLD-MCNC: 174 MG/DL (ref 74–106)
GLUCOSE BLD-MCNC: 176 MG/DL (ref 74–106)
GLUCOSE BLD-MCNC: 191 MG/DL (ref 74–106)
GRAM STAIN RESULT: NORMAL
HCT VFR BLD CALC: 41.3 % (ref 37–47)
HEMOGLOBIN: 13 G/DL (ref 11.5–16.5)
MCH RBC QN AUTO: 32.3 PG (ref 27–32)
MCHC RBC AUTO-ENTMCNC: 31.5 G/DL (ref 31–35)
MCV RBC AUTO: 102.5 FL (ref 80–100)
PDW BLD-RTO: 14.7 % (ref 11–16)
PERFORMED ON: ABNORMAL
PERFORMED ON: NORMAL
PLATELET # BLD: 471 K/UL (ref 150–400)
PMV BLD AUTO: 10.7 FL (ref 6–10)
POTASSIUM SERPL-SCNC: 4.6 MMOL/L (ref 3.4–5.1)
RBC # BLD: 4.03 M/UL (ref 3.8–5.8)
SODIUM BLD-SCNC: 144 MMOL/L (ref 136–145)
WBC # BLD: 19.2 K/UL (ref 4–11)

## 2019-09-01 PROCEDURE — 2700000000 HC OXYGEN THERAPY PER DAY

## 2019-09-01 PROCEDURE — 6370000000 HC RX 637 (ALT 250 FOR IP): Performed by: INTERNAL MEDICINE

## 2019-09-01 PROCEDURE — 99231 SBSQ HOSP IP/OBS SF/LOW 25: CPT | Performed by: PHYSICIAN ASSISTANT

## 2019-09-01 PROCEDURE — 2580000003 HC RX 258: Performed by: PHYSICIAN ASSISTANT

## 2019-09-01 PROCEDURE — 36415 COLL VENOUS BLD VENIPUNCTURE: CPT

## 2019-09-01 PROCEDURE — 80048 BASIC METABOLIC PNL TOTAL CA: CPT

## 2019-09-01 PROCEDURE — 6370000000 HC RX 637 (ALT 250 FOR IP): Performed by: PHYSICIAN ASSISTANT

## 2019-09-01 PROCEDURE — 85027 COMPLETE CBC AUTOMATED: CPT

## 2019-09-01 PROCEDURE — 94669 MECHANICAL CHEST WALL OSCILL: CPT

## 2019-09-01 PROCEDURE — 71046 X-RAY EXAM CHEST 2 VIEWS: CPT

## 2019-09-01 PROCEDURE — 1200000000 HC SEMI PRIVATE

## 2019-09-01 PROCEDURE — 6360000002 HC RX W HCPCS: Performed by: PHYSICIAN ASSISTANT

## 2019-09-01 PROCEDURE — 94761 N-INVAS EAR/PLS OXIMETRY MLT: CPT

## 2019-09-01 PROCEDURE — 94640 AIRWAY INHALATION TREATMENT: CPT

## 2019-09-01 RX ORDER — METHYLPREDNISOLONE SODIUM SUCCINATE 40 MG/ML
40 INJECTION, POWDER, LYOPHILIZED, FOR SOLUTION INTRAMUSCULAR; INTRAVENOUS EVERY 12 HOURS
Status: DISCONTINUED | OUTPATIENT
Start: 2019-09-01 | End: 2019-09-02 | Stop reason: HOSPADM

## 2019-09-01 RX ADMIN — METHYLPREDNISOLONE SODIUM SUCCINATE 40 MG: 40 INJECTION, POWDER, FOR SOLUTION INTRAMUSCULAR; INTRAVENOUS at 08:19

## 2019-09-01 RX ADMIN — LEVOFLOXACIN 250 MG: 5 INJECTION, SOLUTION INTRAVENOUS at 14:41

## 2019-09-01 RX ADMIN — ROPINIROLE HYDROCHLORIDE 0.5 MG: 0.25 TABLET, FILM COATED ORAL at 08:09

## 2019-09-01 RX ADMIN — METOCLOPRAMIDE HYDROCHLORIDE 5 MG: 5 TABLET ORAL at 11:59

## 2019-09-01 RX ADMIN — ENOXAPARIN SODIUM 40 MG: 40 INJECTION SUBCUTANEOUS at 08:08

## 2019-09-01 RX ADMIN — INSULIN GLARGINE 30 UNITS: 100 INJECTION, SOLUTION SUBCUTANEOUS at 08:15

## 2019-09-01 RX ADMIN — ASPIRIN 81 MG: 81 TABLET, COATED ORAL at 08:09

## 2019-09-01 RX ADMIN — FLUTICASONE PROPIONATE 1 SPRAY: 50 SPRAY, METERED NASAL at 08:09

## 2019-09-01 RX ADMIN — IPRATROPIUM BROMIDE AND ALBUTEROL SULFATE 1 AMPULE: .5; 3 SOLUTION RESPIRATORY (INHALATION) at 13:00

## 2019-09-01 RX ADMIN — SUCRALFATE 1 G: 1 TABLET ORAL at 05:58

## 2019-09-01 RX ADMIN — Medication 1 CAPSULE: at 08:09

## 2019-09-01 RX ADMIN — IPRATROPIUM BROMIDE AND ALBUTEROL SULFATE 1 AMPULE: .5; 3 SOLUTION RESPIRATORY (INHALATION) at 17:07

## 2019-09-01 RX ADMIN — FERROUS SULFATE TAB EC 324 MG (65 MG FE EQUIVALENT) 324 MG: 324 (65 FE) TABLET DELAYED RESPONSE at 11:59

## 2019-09-01 RX ADMIN — SUCRALFATE 1 G: 1 TABLET ORAL at 14:41

## 2019-09-01 RX ADMIN — SUCRALFATE 1 G: 1 TABLET ORAL at 20:40

## 2019-09-01 RX ADMIN — MAGNESIUM HYDROXIDE 30 ML: 400 SUSPENSION ORAL at 08:09

## 2019-09-01 RX ADMIN — BUSPIRONE HYDROCHLORIDE 10 MG: 5 TABLET ORAL at 14:41

## 2019-09-01 RX ADMIN — INSULIN LISPRO 1 UNITS: 100 INJECTION, SOLUTION INTRAVENOUS; SUBCUTANEOUS at 08:14

## 2019-09-01 RX ADMIN — INSULIN LISPRO 30 UNITS: 100 INJECTION, SOLUTION INTRAVENOUS; SUBCUTANEOUS at 11:38

## 2019-09-01 RX ADMIN — METOCLOPRAMIDE HYDROCHLORIDE 5 MG: 5 TABLET ORAL at 06:00

## 2019-09-01 RX ADMIN — FERROUS SULFATE TAB EC 324 MG (65 MG FE EQUIVALENT) 324 MG: 324 (65 FE) TABLET DELAYED RESPONSE at 16:50

## 2019-09-01 RX ADMIN — BUSPIRONE HYDROCHLORIDE 10 MG: 5 TABLET ORAL at 08:08

## 2019-09-01 RX ADMIN — BUDESONIDE 500 MCG: 0.5 SUSPENSION RESPIRATORY (INHALATION) at 05:34

## 2019-09-01 RX ADMIN — BUDESONIDE 500 MCG: 0.5 SUSPENSION RESPIRATORY (INHALATION) at 17:07

## 2019-09-01 RX ADMIN — BUSPIRONE HYDROCHLORIDE 10 MG: 5 TABLET ORAL at 20:39

## 2019-09-01 RX ADMIN — LINAGLIPTIN 5 MG: 5 TABLET, FILM COATED ORAL at 08:09

## 2019-09-01 RX ADMIN — INSULIN GLARGINE 65 UNITS: 100 INJECTION, SOLUTION SUBCUTANEOUS at 20:45

## 2019-09-01 RX ADMIN — IPRATROPIUM BROMIDE AND ALBUTEROL SULFATE 1 AMPULE: .5; 3 SOLUTION RESPIRATORY (INHALATION) at 05:34

## 2019-09-01 RX ADMIN — CETIRIZINE HYDROCHLORIDE 5 MG: 10 TABLET, FILM COATED ORAL at 08:09

## 2019-09-01 RX ADMIN — FERROUS SULFATE TAB EC 324 MG (65 MG FE EQUIVALENT) 324 MG: 324 (65 FE) TABLET DELAYED RESPONSE at 08:09

## 2019-09-01 RX ADMIN — FENOFIBRATE 160 MG: 160 TABLET ORAL at 08:08

## 2019-09-01 RX ADMIN — HYDROCODONE BITARTRATE AND ACETAMINOPHEN 1 TABLET: 5; 325 TABLET ORAL at 20:39

## 2019-09-01 RX ADMIN — FLUTICASONE PROPIONATE 1 SPRAY: 50 SPRAY, METERED NASAL at 20:39

## 2019-09-01 RX ADMIN — MONTELUKAST SODIUM 10 MG: 10 TABLET, COATED ORAL at 08:09

## 2019-09-01 RX ADMIN — METOPROLOL TARTRATE 25 MG: 25 TABLET ORAL at 08:09

## 2019-09-01 RX ADMIN — INSULIN LISPRO 1 UNITS: 100 INJECTION, SOLUTION INTRAVENOUS; SUBCUTANEOUS at 11:40

## 2019-09-01 RX ADMIN — METOPROLOL TARTRATE 25 MG: 25 TABLET ORAL at 20:39

## 2019-09-01 RX ADMIN — INSULIN LISPRO 30 UNITS: 100 INJECTION, SOLUTION INTRAVENOUS; SUBCUTANEOUS at 16:49

## 2019-09-01 RX ADMIN — INSULIN LISPRO 1 UNITS: 100 INJECTION, SOLUTION INTRAVENOUS; SUBCUTANEOUS at 16:48

## 2019-09-01 RX ADMIN — Medication 10 ML: at 20:44

## 2019-09-01 RX ADMIN — ROPINIROLE HYDROCHLORIDE 0.5 MG: 0.25 TABLET, FILM COATED ORAL at 20:39

## 2019-09-01 RX ADMIN — Medication 10 ML: at 08:10

## 2019-09-01 RX ADMIN — ALLOPURINOL 150 MG: 100 TABLET ORAL at 08:09

## 2019-09-01 RX ADMIN — INSULIN LISPRO 30 UNITS: 100 INJECTION, SOLUTION INTRAVENOUS; SUBCUTANEOUS at 08:19

## 2019-09-01 RX ADMIN — PANTOPRAZOLE SODIUM 40 MG: 40 TABLET, DELAYED RELEASE ORAL at 08:09

## 2019-09-01 RX ADMIN — METOCLOPRAMIDE HYDROCHLORIDE 5 MG: 5 TABLET ORAL at 16:50

## 2019-09-01 RX ADMIN — METHYLPREDNISOLONE SODIUM SUCCINATE 40 MG: 40 INJECTION, POWDER, FOR SOLUTION INTRAMUSCULAR; INTRAVENOUS at 20:40

## 2019-09-01 ASSESSMENT — PAIN SCALES - GENERAL: PAINLEVEL_OUTOF10: 6

## 2019-09-01 NOTE — FLOWSHEET NOTE
08/30/19 1015   Assessment   Charting Type Shift assessment   Neurological   Neuro (WDL) WDL   Level of Consciousness 0   Swallow Screening   Is the patient able to remain alert for testing? Yes   [place] Coma Scale   Eye Opening 4   Best Verbal Response 5   Best Motor Response 6   [place] Coma Scale Score 15   NIH/MNHISS Stroke Scale   NIH/MNIHSS Stroke Scale Assessed No   HEENT   HEENT (WDL) X   Right Eye Impaired vision   Left Eye Impaired vision   Teeth Missing teeth   Respiratory   Respiratory (WDL) X   Respiratory Pattern Regular   Respiratory Depth Shallow   Respiratory Quality/Effort Labored;Dyspnea with exertion   Chest Assessment Chest expansion symmetrical   L Breath Sounds Expiratory Wheezes   R Breath Sounds Expiratory Wheezes   Breath Sounds   Right Upper Lobe Expiratory Wheezes   Right Middle Lobe Expiratory Wheezes   Right Lower Lobe Expiratory Wheezes   Left Upper Lobe Expiratory Wheezes   Left Lower Lobe Expiratory Wheezes   Cardiac   Cardiac (WDL) WDL   Cardiac Rhythm NSR   Ectopy PAC   Ectopy Frequency Occasional   Cardiac Monitor   Telemetry Monitor On No   Telemetry Audible Yes   Telemetry Alarms Set Yes   Gastrointestinal   Abdominal (WDL) X   Abdomen Inspection Rotund   RUQ Bowel Sounds Active   LUQ Bowel Sounds Active   RLQ Bowel Sounds Active   LLQ Bowel Sounds Active   Peripheral Vascular   Peripheral Vascular (WDL) X   Edema Right lower extremity; Left lower extremity   RLE Edema Non-pitting;Trace   LLE Edema Non-pitting;Trace   Skin Color/Condition   Skin Color/Condition (WDL) WDL   Skin Integrity   Skin Integrity (WDL) X   Skin Integrity Bruising   Location scattered   Musculoskeletal   Musculoskeletal (WDL) X   RUE Full movement   LUE Full movement   RL Extremity Weakness; Unsteady   LL Extremity Weakness; Unsteady   Genitourinary   Genitourinary (WDL) WDL   Psychosocial   Psychosocial (WDL) WDL
08/30/19 2109   Assessment   Charting Type Shift assessment   Neurological   Neuro (WDL) WDL   Level of Consciousness 0   [place] Coma Scale   Eye Opening 4   Best Verbal Response 5   Best Motor Response 6   [place] Coma Scale Score 15   HEENT   HEENT (WDL) X   Right Eye Impaired vision   Left Eye Impaired vision   Teeth Missing teeth   Respiratory   Respiratory (WDL) X   Respiratory Pattern Regular   Respiratory Depth Shallow   Respiratory Quality/Effort Labored;Dyspnea with exertion   Chest Assessment Chest expansion symmetrical   L Breath Sounds Expiratory Wheezes   R Breath Sounds Expiratory Wheezes   Breath Sounds   Right Upper Lobe Expiratory Wheezes   Right Middle Lobe Expiratory Wheezes   Right Lower Lobe Expiratory Wheezes   Left Upper Lobe Expiratory Wheezes   Left Lower Lobe Expiratory Wheezes   Cough/Sputum   Cough Productive;Moist   Frequency Occasional   Sputum Amount Moderate   Sputum Color Yellow   Tenacity Thick   Incentive Spirometry Tx   Respiratory Effort Dyspnea with Exertion   Cardiac   Cardiac (WDL) WDL   Cardiac Rhythm NSR   Ectopy PAC   Ectopy Frequency Occasional   Cardiac Monitor   Telemetry Monitor On No   Telemetry Audible Yes   Telemetry Alarms Set Yes   Gastrointestinal   Abdominal (WDL) X   Abdomen Inspection Rotund   RUQ Bowel Sounds Active   LUQ Bowel Sounds Active   RLQ Bowel Sounds Active   LLQ Bowel Sounds Active   Peripheral Vascular   Peripheral Vascular (WDL) X   Edema Right lower extremity; Left lower extremity   RLE Edema Non-pitting;Trace   LLE Edema Non-pitting;Trace   Skin Color/Condition   Skin Color/Condition (WDL) WDL   Skin Integrity   Skin Integrity (WDL) X   Musculoskeletal   Musculoskeletal (WDL) X   RUE Full movement   LUE Full movement   RL Extremity Weakness; Unsteady   LL Extremity Weakness; Unsteady   Genitourinary   Genitourinary (WDL) WDL   Psychosocial   Psychosocial (WDL) WDL
L NC. Pt lung sounds expiratory wheezing jose. Pt encouraged to cough and deep breathe. Pt coughing up yellow sputum. Pt has trace edema in BLE. Pt states no bm in couple days, see eMAR for intervention. Pt call bell and bedside table within reach. Will continue to monitor pt.

## 2019-09-01 NOTE — PLAN OF CARE
Problem: Falls - Risk of:  Goal: Will remain free from falls  Description  Will remain free from falls  9/1/2019 0824 by Dayan Ballard RN  Outcome: Ongoing  9/1/2019 0125 by Reed Aguillon RN  Outcome: Ongoing     Problem: SAFETY  Goal: Free from accidental physical injury  9/1/2019 0824 by Dayan Ballard RN  Outcome: Ongoing  9/1/2019 0125 by Reed Aguillon RN  Outcome: Ongoing

## 2019-09-02 VITALS
WEIGHT: 196.4 LBS | HEIGHT: 58 IN | DIASTOLIC BLOOD PRESSURE: 45 MMHG | OXYGEN SATURATION: 93 % | TEMPERATURE: 98.1 F | HEART RATE: 84 BPM | SYSTOLIC BLOOD PRESSURE: 128 MMHG | RESPIRATION RATE: 22 BRPM | BODY MASS INDEX: 41.23 KG/M2

## 2019-09-02 LAB
ANION GAP SERPL CALCULATED.3IONS-SCNC: 8 MMOL/L (ref 3–16)
BUN BLDV-MCNC: 54 MG/DL (ref 6–20)
CALCIUM SERPL-MCNC: 10 MG/DL (ref 8.5–10.5)
CHLORIDE BLD-SCNC: 99 MMOL/L (ref 98–107)
CO2: 37 MMOL/L (ref 20–30)
CREAT SERPL-MCNC: 1.6 MG/DL (ref 0.4–1.2)
GFR AFRICAN AMERICAN: 39
GFR NON-AFRICAN AMERICAN: 32
GLUCOSE BLD-MCNC: 173 MG/DL (ref 74–106)
GLUCOSE BLD-MCNC: 202 MG/DL (ref 74–106)
GLUCOSE BLD-MCNC: 86 MG/DL (ref 74–106)
HCT VFR BLD CALC: 45.9 % (ref 37–47)
HEMOGLOBIN: 14 G/DL (ref 11.5–16.5)
MCH RBC QN AUTO: 31.5 PG (ref 27–32)
MCHC RBC AUTO-ENTMCNC: 30.5 G/DL (ref 31–35)
MCV RBC AUTO: 103.1 FL (ref 80–100)
PDW BLD-RTO: 14.8 % (ref 11–16)
PERFORMED ON: ABNORMAL
PERFORMED ON: NORMAL
PLATELET # BLD: 489 K/UL (ref 150–400)
PMV BLD AUTO: 10.8 FL (ref 6–10)
POTASSIUM SERPL-SCNC: 4.6 MMOL/L (ref 3.4–5.1)
POTASSIUM SERPL-SCNC: 5.8 MMOL/L (ref 3.4–5.1)
RBC # BLD: 4.45 M/UL (ref 3.8–5.8)
SODIUM BLD-SCNC: 144 MMOL/L (ref 136–145)
WBC # BLD: 18.4 K/UL (ref 4–11)

## 2019-09-02 PROCEDURE — 6370000000 HC RX 637 (ALT 250 FOR IP): Performed by: PHYSICIAN ASSISTANT

## 2019-09-02 PROCEDURE — 6370000000 HC RX 637 (ALT 250 FOR IP): Performed by: INTERNAL MEDICINE

## 2019-09-02 PROCEDURE — 84132 ASSAY OF SERUM POTASSIUM: CPT

## 2019-09-02 PROCEDURE — 36415 COLL VENOUS BLD VENIPUNCTURE: CPT

## 2019-09-02 PROCEDURE — 2580000003 HC RX 258: Performed by: PHYSICIAN ASSISTANT

## 2019-09-02 PROCEDURE — 94669 MECHANICAL CHEST WALL OSCILL: CPT

## 2019-09-02 PROCEDURE — 6360000002 HC RX W HCPCS: Performed by: PHYSICIAN ASSISTANT

## 2019-09-02 PROCEDURE — 80048 BASIC METABOLIC PNL TOTAL CA: CPT

## 2019-09-02 PROCEDURE — 2700000000 HC OXYGEN THERAPY PER DAY

## 2019-09-02 PROCEDURE — 94640 AIRWAY INHALATION TREATMENT: CPT

## 2019-09-02 PROCEDURE — 99238 HOSP IP/OBS DSCHRG MGMT 30/<: CPT | Performed by: INTERNAL MEDICINE

## 2019-09-02 PROCEDURE — 94761 N-INVAS EAR/PLS OXIMETRY MLT: CPT

## 2019-09-02 PROCEDURE — 85027 COMPLETE CBC AUTOMATED: CPT

## 2019-09-02 RX ORDER — SODIUM POLYSTYRENE SULFONATE 15 G/60ML
15 SUSPENSION ORAL; RECTAL ONCE
Status: DISCONTINUED | OUTPATIENT
Start: 2019-09-02 | End: 2019-09-02 | Stop reason: HOSPADM

## 2019-09-02 RX ORDER — LEVOFLOXACIN 500 MG/1
500 TABLET, FILM COATED ORAL DAILY
Qty: 7 TABLET | Refills: 0 | Status: SHIPPED | OUTPATIENT
Start: 2019-09-02 | End: 2019-09-09

## 2019-09-02 RX ORDER — PREDNISONE 10 MG/1
TABLET ORAL
Qty: 18 TABLET | Refills: 0 | Status: SHIPPED | OUTPATIENT
Start: 2019-09-02 | End: 2019-10-07 | Stop reason: ALTCHOICE

## 2019-09-02 RX ORDER — ROPINIROLE 0.5 MG/1
0.5 TABLET, FILM COATED ORAL 2 TIMES DAILY
Qty: 60 TABLET | Refills: 0 | Status: SHIPPED | OUTPATIENT
Start: 2019-09-02 | End: 2019-10-01 | Stop reason: SDUPTHER

## 2019-09-02 RX ADMIN — INSULIN GLARGINE 30 UNITS: 100 INJECTION, SOLUTION SUBCUTANEOUS at 08:08

## 2019-09-02 RX ADMIN — ASPIRIN 81 MG: 81 TABLET, COATED ORAL at 08:05

## 2019-09-02 RX ADMIN — Medication 1 CAPSULE: at 08:06

## 2019-09-02 RX ADMIN — BUDESONIDE 500 MCG: 0.5 SUSPENSION RESPIRATORY (INHALATION) at 05:05

## 2019-09-02 RX ADMIN — IPRATROPIUM BROMIDE AND ALBUTEROL SULFATE 1 AMPULE: .5; 3 SOLUTION RESPIRATORY (INHALATION) at 05:05

## 2019-09-02 RX ADMIN — ENOXAPARIN SODIUM 40 MG: 40 INJECTION SUBCUTANEOUS at 08:07

## 2019-09-02 RX ADMIN — LINAGLIPTIN 5 MG: 5 TABLET, FILM COATED ORAL at 08:06

## 2019-09-02 RX ADMIN — FENOFIBRATE 160 MG: 160 TABLET ORAL at 08:07

## 2019-09-02 RX ADMIN — METOCLOPRAMIDE HYDROCHLORIDE 5 MG: 5 TABLET ORAL at 06:02

## 2019-09-02 RX ADMIN — PANTOPRAZOLE SODIUM 40 MG: 40 TABLET, DELAYED RELEASE ORAL at 08:07

## 2019-09-02 RX ADMIN — FLUTICASONE PROPIONATE 1 SPRAY: 50 SPRAY, METERED NASAL at 08:05

## 2019-09-02 RX ADMIN — MONTELUKAST SODIUM 10 MG: 10 TABLET, COATED ORAL at 08:07

## 2019-09-02 RX ADMIN — SUCRALFATE 1 G: 1 TABLET ORAL at 06:02

## 2019-09-02 RX ADMIN — Medication 10 ML: at 08:05

## 2019-09-02 RX ADMIN — INSULIN LISPRO 30 UNITS: 100 INJECTION, SOLUTION INTRAVENOUS; SUBCUTANEOUS at 08:21

## 2019-09-02 RX ADMIN — TRAMADOL HYDROCHLORIDE 50 MG: 50 TABLET, FILM COATED ORAL at 11:19

## 2019-09-02 RX ADMIN — METHYLPREDNISOLONE SODIUM SUCCINATE 40 MG: 40 INJECTION, POWDER, FOR SOLUTION INTRAMUSCULAR; INTRAVENOUS at 08:05

## 2019-09-02 RX ADMIN — BUSPIRONE HYDROCHLORIDE 10 MG: 5 TABLET ORAL at 13:38

## 2019-09-02 RX ADMIN — ALLOPURINOL 150 MG: 100 TABLET ORAL at 08:05

## 2019-09-02 RX ADMIN — ROPINIROLE HYDROCHLORIDE 0.5 MG: 0.25 TABLET, FILM COATED ORAL at 08:06

## 2019-09-02 RX ADMIN — FERROUS SULFATE TAB EC 324 MG (65 MG FE EQUIVALENT) 324 MG: 324 (65 FE) TABLET DELAYED RESPONSE at 13:38

## 2019-09-02 RX ADMIN — BUSPIRONE HYDROCHLORIDE 10 MG: 5 TABLET ORAL at 08:06

## 2019-09-02 RX ADMIN — METOPROLOL TARTRATE 25 MG: 25 TABLET ORAL at 08:07

## 2019-09-02 RX ADMIN — INSULIN LISPRO 1 UNITS: 100 INJECTION, SOLUTION INTRAVENOUS; SUBCUTANEOUS at 08:09

## 2019-09-02 RX ADMIN — CETIRIZINE HYDROCHLORIDE 5 MG: 10 TABLET, FILM COATED ORAL at 08:06

## 2019-09-02 RX ADMIN — SUCRALFATE 1 G: 1 TABLET ORAL at 13:38

## 2019-09-02 RX ADMIN — FERROUS SULFATE TAB EC 324 MG (65 MG FE EQUIVALENT) 324 MG: 324 (65 FE) TABLET DELAYED RESPONSE at 08:07

## 2019-09-02 ASSESSMENT — PAIN SCALES - GENERAL: PAINLEVEL_OUTOF10: 6

## 2019-09-02 NOTE — DISCHARGE SUMMARY
09/02/2019       RENAL:   Lab Results   Component Value Date     09/02/2019    K 4.6 09/02/2019    K 4.5 08/30/2019    CL 99 09/02/2019    CO2 37 09/02/2019    BUN 54 09/02/2019    CREATININE 1.6 09/02/2019         Discharge Medications:     Current Discharge Medication List           Details   rOPINIRole (REQUIP) 0.5 MG tablet Take 1 tablet by mouth 2 times daily  Qty: 60 tablet, Refills: 0      levofloxacin (LEVAQUIN) 500 MG tablet Take 1 tablet by mouth daily for 7 days  Qty: 7 tablet, Refills: 0      predniSONE (DELTASONE) 10 MG tablet Take 3 tablets PO daily x 3 days, 2 tablets PO daily x 3 days, 1 tablet PO daily x 3 days, then stop  Qty: 18 tablet, Refills: 0      Roflumilast (DALIRESP) 250 MCG TABS Take 1 tablet by mouth daily  Qty: 28 tablet, Refills: 0              Details   Insulin Degludec (TRESIBA FLEXTOUCH) 100 UNIT/ML SOPN 30 units in the am and 65 units in pm  Qty: 6 pen, Refills: 0    Associated Diagnoses: Type 2 diabetes mellitus with complication, with long-term current use of insulin ([facility])              Details   traMADol (ULTRAM) 50 MG tablet Take 50 mg by mouth three times daily. fluticasone-salmeterol (ADVAIR) 500-50 MCG/DOSE diskus inhaler Inhale 1 puff into the lungs every 12 hours      omalizumab (OMALIZUMAB) 150 MG injection Inject 150 mg into the skin every 28 days  Qty: 1 vial, Refills: 3    Associated Diagnoses:  Allergic rhinitis, unspecified seasonality, unspecified trigger      FEROSUL 325 (65 Fe) MG tablet TAKE ONE TABLET BY MOUTH THREE TIMES A DAY WITH FOOD  Qty: 90 tablet, Refills: 3      VASCEPA 1 g CAPS capsule TAKE TWO CAPSULES BY MOUTH TWO TIMES A DAY  Qty: 60 capsule, Refills: 3    Associated Diagnoses: Pure hypercholesterolemia      fenofibrate (TRICOR) 145 MG tablet TAKE ONE TABLET BY MOUTH EVERY DAY  Qty: 30 tablet, Refills: 3    Associated Diagnoses: Pure hypercholesterolemia      metolazone (ZAROXOLYN) 5 MG tablet Take 1 tablet by mouth daily as needed DAY  Qty: 30 tablet, Refills: 5    Associated Diagnoses: Chronic gout without tophus, unspecified cause, unspecified site      metoclopramide (REGLAN) 5 MG tablet TAKE ONE TABLET BY MOUTH THREE TIMES A DAY  Qty: 90 tablet, Refills: 3    Associated Diagnoses: Gastroesophageal reflux disease, esophagitis presence not specified      furosemide (LASIX) 40 MG tablet TAKE ONE TABLET BY MOUTH TWO TIMES A DAY  Qty: 60 tablet, Refills: 5      sucralfate (CARAFATE) 1 GM tablet TAKE ONE TABLET BY MOUTH THREE TIMES A DAY  Qty: 90 tablet, Refills: 3      ezetimibe (ZETIA) 10 MG tablet TAKE ONE TABLET BY MOUTH EVERY DAY  Qty: 90 tablet, Refills: 3      ASPIR-LOW 81 MG EC tablet TAKE ONE TABLET BY MOUTH EVERY DAY  Qty: 30 tablet, Refills: 0      vitamin D (ERGOCALCIFEROL) 10682 units CAPS capsule TAKE ONE CAPSULE BY MOUTH ONCE WEEKLY  Qty: 4 capsule, Refills: 4      OXYGEN Inhale 3 L/min into the lungs continuous      flunisolide (NASALIDE) 25 MCG/ACT (0.025%) SOLN 1 spray       SPIRIVA HANDIHALER 18 MCG inhalation capsule INHALE THE CONTENTS OF ONE CAPSULE INTO THE LUNGS ONCE DAILY  Qty: 30 capsule, Refills: 5    Associated Diagnoses: Chronic bronchitis, unspecified chronic bronchitis type (HCC)      albuterol sulfate HFA (PROAIR HFA) 108 (90 Base) MCG/ACT inhaler Inhale 2 puffs into the lungs every 4 hours as needed for Wheezing  Qty: 1 Inhaler, Refills: 5      loratadine (CLARITIN) 10 MG tablet TAKE ONE TABLET BY MOUTH EVERY DAY  Qty: 30 tablet, Refills: 4    Associated Diagnoses: Chronic bronchitis, unspecified chronic bronchitis type (HCC)      COMFORT ASSIST INSULIN SYRINGE 31G X 5/16\" 1 ML MISC USE AS DIRECTED FIVE TIMES A DAY  Qty: 100 each, Refills: 4              Patient was seen and examined by Dr. Annabel Hernandez and plan of care reviewed. Signed:  Electronically signed by JADE Patiño on 9/2/2019 at 1:03 PM       Thank you JADE Leslie for the opportunity to be involved in this patient's care.  If you

## 2019-09-04 ENCOUNTER — CARE COORDINATION (OUTPATIENT)
Dept: CARE COORDINATION | Age: 69
End: 2019-09-04

## 2019-09-04 LAB
BLOOD CULTURE, ROUTINE: NORMAL
CULTURE, BLOOD 2: NORMAL

## 2019-09-05 ENCOUNTER — OFFICE VISIT (OUTPATIENT)
Dept: PULMONOLOGY | Facility: CLINIC | Age: 69
End: 2019-09-05

## 2019-09-05 VITALS
HEIGHT: 58 IN | HEART RATE: 77 BPM | RESPIRATION RATE: 16 BRPM | WEIGHT: 193 LBS | OXYGEN SATURATION: 86 % | SYSTOLIC BLOOD PRESSURE: 118 MMHG | DIASTOLIC BLOOD PRESSURE: 68 MMHG | BODY MASS INDEX: 40.51 KG/M2

## 2019-09-05 DIAGNOSIS — R06.02 SHORTNESS OF BREATH: Primary | ICD-10-CM

## 2019-09-05 DIAGNOSIS — J44.9 CHRONIC OBSTRUCTIVE PULMONARY DISEASE, UNSPECIFIED COPD TYPE (HCC): ICD-10-CM

## 2019-09-05 DIAGNOSIS — J96.12 CHRONIC RESPIRATORY FAILURE WITH HYPERCAPNIA (HCC): ICD-10-CM

## 2019-09-05 DIAGNOSIS — R06.02 SHORTNESS OF BREATH: ICD-10-CM

## 2019-09-05 DIAGNOSIS — R09.02 HYPOXIA: ICD-10-CM

## 2019-09-05 PROCEDURE — 94729 DIFFUSING CAPACITY: CPT | Performed by: INTERNAL MEDICINE

## 2019-09-05 PROCEDURE — 94726 PLETHYSMOGRAPHY LUNG VOLUMES: CPT | Performed by: INTERNAL MEDICINE

## 2019-09-05 PROCEDURE — 94060 EVALUATION OF WHEEZING: CPT | Performed by: INTERNAL MEDICINE

## 2019-09-05 PROCEDURE — 99214 OFFICE O/P EST MOD 30 MIN: CPT | Performed by: NURSE PRACTITIONER

## 2019-09-05 RX ORDER — ROPINIROLE 0.5 MG/1
0.5 TABLET, FILM COATED ORAL 2 TIMES DAILY
Refills: 0 | COMMUNITY
Start: 2019-09-03 | End: 2020-01-01 | Stop reason: HOSPADM

## 2019-09-05 RX ORDER — ROFLUMILAST 500 UG/1
500 TABLET ORAL DAILY
Qty: 30 TABLET | Refills: 5 | Status: SHIPPED | OUTPATIENT
Start: 2019-09-05 | End: 2020-01-01

## 2019-09-05 RX ORDER — PREDNISONE 10 MG/1
TABLET ORAL
Refills: 0 | COMMUNITY
Start: 2019-09-03 | End: 2020-01-01

## 2019-09-05 RX ORDER — ROFLUMILAST 250 UG/1
1 TABLET ORAL DAILY
Refills: 0 | COMMUNITY
Start: 2019-09-03 | End: 2019-09-05

## 2019-09-05 NOTE — PROGRESS NOTES
"Chief Complaint   Patient presents with   • Follow-up   • Shortness of Breath         Subjective   Mingo Guidry is a 69 y.o. female.     History of Present Illness   The patient comes in today for follow-up of COPD and chronic respiratory failure.    She states she was in the hospital last week at [facility].  She was diagnosed with pneumonia and a COPD exacerbation.  She was discharged home on Levaquin and prednisone.  She was also started on Daliresp 250 mcg at discharge.    She is feeling and breathing better since discharge however she continues to have some cough.    She is using Advair 250/50 twice a day and Spiriva daily.  She takes Singulair at night.    She is using the nebulizer 2-3 times a day. She uses the rescue inhaler only on occasion if she is not home to do the nebulizer.    She is on BiPAP however I do not know the pressure that she is currently on.  She has oxygen bled into the machine.    She uses oxygen 24/7 at 3 L/min.    The following portions of the patient's history were reviewed and updated as appropriate: allergies, current medications, past family history, past medical history, past social history and past surgical history.    Review of Systems   HENT: Positive for sinus pressure.    Respiratory: Positive for cough, shortness of breath and wheezing.        Objective   Visit Vitals  /68   Pulse 77   Resp 16   Ht 147.3 cm (57.99\")   Wt 87.5 kg (193 lb)   LMP  (LMP Unknown)   SpO2 (!) 86% Comment: on room air (REST)   BMI 40.35 kg/m²   O2: 92 % on 3LPM    Physical Exam   Constitutional: She is oriented to person, place, and time. She appears well-developed and well-nourished.   HENT:   Head: Normocephalic and atraumatic.   Eyes: EOM are normal.   Neck: Neck supple.   Cardiovascular: Normal rate and regular rhythm.   Pulmonary/Chest: Effort normal. No respiratory distress.   Somewhat decreased A/E with diffuse wheezing noted.   Musculoskeletal: She exhibits no edema. "   Neurological: She is alert and oriented to person, place, and time.   Skin: Skin is warm and dry.   Psychiatric: She has a normal mood and affect.   Vitals reviewed.          Assessment/Plan   Mingo was seen today for follow-up and shortness of breath.    Diagnoses and all orders for this visit:    Shortness of breath    Chronic obstructive pulmonary disease, unspecified COPD type (CMS/HCC)  -     Oxygen Therapy    Chronic respiratory failure with hypercapnia (CMS/HCC)  -     Oxygen Therapy    Hypoxia    Other orders  -     fluticasone-salmeterol (ADVAIR) 500-50 MCG/DOSE DISKUS; Inhale 1 puff 2 (Two) Times a Day.  -     roflumilast (DALIRESP) 500 MCG tablet tablet; Take 1 tablet by mouth Daily.           Return in about 3 months (around 12/5/2019) for Recheck, For Me.    DISCUSSION (if any):  I reviewed her PFT from today and discussed the results with her.  The PFT reveals severe obstruction with no bronchodilator response.  Air-trapping is not suggested.  Mild restriction is suggested.  She has a decreased diffusion capacity.  This has not changed significantly since her previous PFT which was in February 2018.    I reviewed her chest x-ray from [facility] from September 1, 2019 which shows improved aeration of bilateral lung bases.  Left midlung atelectasis.    I recommended that she use a flutter valve 10 repetitions twice a day.    She should definitely continue Daliresp 250 mcg daily and when she finishes the prescription she currently has she may increase to the 500 mcg daily dose which will be sent to the pharmacy.    Her Advair has been increased to 500/50 and she should continue to use it twice a day.  She should continue to use Spiriva, Singulair, and the rescue medication as directed.    She will need to continue using BiPAP with oxygen bled into the machine.    Given her oxygen saturation of 86% on room air she would definitely need to continue using oxygen 24/7 at 3 L/min.  She would  definitely like to have a portable oxygen concentrator as this would make traveling easier for her.  I will send in order for portable oxygen concentrator.      Dictated utilizing Dragon dictation.    This document was electronically signed by JACKI Miller September 5, 2019  1:15 PM

## 2019-09-06 ENCOUNTER — OFFICE VISIT (OUTPATIENT)
Dept: PRIMARY CARE CLINIC | Age: 69
End: 2019-09-06
Payer: MEDICARE

## 2019-09-06 VITALS
DIASTOLIC BLOOD PRESSURE: 60 MMHG | HEIGHT: 58 IN | OXYGEN SATURATION: 93 % | HEART RATE: 73 BPM | SYSTOLIC BLOOD PRESSURE: 112 MMHG | WEIGHT: 192.4 LBS | TEMPERATURE: 97.2 F | RESPIRATION RATE: 16 BRPM | BODY MASS INDEX: 40.39 KG/M2

## 2019-09-06 DIAGNOSIS — N18.30 TYPE 2 DIABETES MELLITUS WITH STAGE 3 CHRONIC KIDNEY DISEASE, WITH LONG-TERM CURRENT USE OF INSULIN (HCC): ICD-10-CM

## 2019-09-06 DIAGNOSIS — I10 ESSENTIAL HYPERTENSION: ICD-10-CM

## 2019-09-06 DIAGNOSIS — E11.22 TYPE 2 DIABETES MELLITUS WITH STAGE 3 CHRONIC KIDNEY DISEASE, WITH LONG-TERM CURRENT USE OF INSULIN (HCC): ICD-10-CM

## 2019-09-06 DIAGNOSIS — J44.1 COPD EXACERBATION (HCC): Primary | ICD-10-CM

## 2019-09-06 DIAGNOSIS — I50.32 CHRONIC DIASTOLIC HEART FAILURE (HCC): ICD-10-CM

## 2019-09-06 DIAGNOSIS — Z79.4 TYPE 2 DIABETES MELLITUS WITH STAGE 3 CHRONIC KIDNEY DISEASE, WITH LONG-TERM CURRENT USE OF INSULIN (HCC): ICD-10-CM

## 2019-09-06 DIAGNOSIS — B37.0 THRUSH: ICD-10-CM

## 2019-09-06 DIAGNOSIS — Z13.29 THYROID DISORDER SCREEN: ICD-10-CM

## 2019-09-06 DIAGNOSIS — E78.01 FAMILIAL HYPERCHOLESTEROLEMIA: ICD-10-CM

## 2019-09-06 DIAGNOSIS — E55.9 VITAMIN D DEFICIENCY: ICD-10-CM

## 2019-09-06 PROCEDURE — 99495 TRANSJ CARE MGMT MOD F2F 14D: CPT | Performed by: NURSE PRACTITIONER

## 2019-09-06 PROCEDURE — 1111F DSCHRG MED/CURRENT MED MERGE: CPT | Performed by: NURSE PRACTITIONER

## 2019-09-06 RX ORDER — IPRATROPIUM BROMIDE AND ALBUTEROL SULFATE 2.5; .5 MG/3ML; MG/3ML
1 SOLUTION RESPIRATORY (INHALATION) ONCE
Status: COMPLETED | OUTPATIENT
Start: 2019-09-06 | End: 2019-09-06

## 2019-09-06 RX ADMIN — IPRATROPIUM BROMIDE AND ALBUTEROL SULFATE 1 AMPULE: 2.5; .5 SOLUTION RESPIRATORY (INHALATION) at 11:42

## 2019-09-06 ASSESSMENT — ENCOUNTER SYMPTOMS
GASTROINTESTINAL NEGATIVE: 1
COUGH: 1

## 2019-09-06 NOTE — PROGRESS NOTES
Chief Complaint   Patient presents with    Follow-Up from [facility] D/C 09/02/2019         Have you seen any other physician or provider since your last visit yes - Luis Duran and Dr Hezzie Landau    Have you had any other diagnostic tests since your last visit? yes - labs,x rays,PFT    Have you changed or stopped any medications since your last visit? yes - Advair increased. Started on Levaquin. Goals      Exercise 3x per week (30 min per time)      Reduce calorie intake to 1800 calories per day      Reduce fat intake       Weight < 125 lb (56.7 kg)         Patient has thrush after taking so many antibiotics and would like medication. Patient is here to follow up on hospital admission. She has had trouble with her sugar dropping since being released from the hospital. It was 67 this morning. Patient was seen by Dr Hezzie Landau assistant yesterday.
montelukast (SINGULAIR) 10 MG tablet  TAKE ONE TABLET BY MOUTH EVERY DAY             nystatin (MYCOSTATIN) 949689 UNIT/ML suspension  Take 5 mLs by mouth 4 times daily             omalizumab (OMALIZUMAB) 150 MG injection  Inject 150 mg into the skin every 28 days             OXYGEN  Inhale 3 L/min into the lungs continuous             pantoprazole (PROTONIX) 40 MG tablet  TAKE ONE TABLET BY MOUTH EVERY DAY             PATADAY 0.2 % SOLN ophthalmic solution  PLACE 1 DROP INTO BOTH EYES 2 TIMES DAILY             potassium chloride (KLOR-CON M) 20 MEQ extended release tablet  TAKE ONE TABLET BY MOUTH EVERY DAY             predniSONE (DELTASONE) 10 MG tablet  Take 3 tablets PO daily x 3 days, 2 tablets PO daily x 3 days, 1 tablet PO daily x 3 days, then stop             Roflumilast (DALIRESP) 250 MCG TABS  Take 1 tablet by mouth daily             rOPINIRole (REQUIP) 0.5 MG tablet  Take 1 tablet by mouth 2 times daily             SPIRIVA HANDIHALER 18 MCG inhalation capsule  INHALE THE CONTENTS OF ONE CAPSULE INTO THE LUNGS ONCE DAILY             sucralfate (CARAFATE) 1 GM tablet  TAKE ONE TABLET BY MOUTH THREE TIMES A DAY             traMADol (ULTRAM) 50 MG tablet  Take 50 mg by mouth three times daily.              traMADol (ULTRAM) 50 MG tablet  TAKE ONE TABLET BY MOUTH THREE TIMES A DAY AS NEEDED             VASCEPA 1 g CAPS capsule  TAKE TWO CAPSULES BY MOUTH TWO TIMES A DAY             vitamin D (ERGOCALCIFEROL) 78013 units CAPS capsule  TAKE ONE CAPSULE BY MOUTH ONCE WEEKLY                   Medications marked \"taking\" at this time  Outpatient Medications Marked as Taking for the 9/6/19 encounter (Office Visit) with JADE Corcoran   Medication Sig Dispense Refill    nystatin (MYCOSTATIN) 046128 UNIT/ML suspension Take 5 mLs by mouth 4 times daily 473 mL 1    rOPINIRole (REQUIP) 0.5 MG tablet Take 1 tablet by mouth 2 times daily 60 tablet 0    Insulin Degludec (TRESIBA FLEXTOUCH) 100 UNIT/ML SOPN 30

## 2019-09-07 DIAGNOSIS — M54.41 CHRONIC RIGHT-SIDED LOW BACK PAIN WITH RIGHT-SIDED SCIATICA: ICD-10-CM

## 2019-09-07 DIAGNOSIS — G89.29 CHRONIC RIGHT-SIDED LOW BACK PAIN WITH RIGHT-SIDED SCIATICA: ICD-10-CM

## 2019-09-09 ENCOUNTER — HOSPITAL ENCOUNTER (OUTPATIENT)
Facility: HOSPITAL | Age: 69
Discharge: HOME | End: 2019-09-09
Payer: MEDICARE

## 2019-09-09 LAB
ALBUMIN SERPL-MCNC: 4.1 G/DL (ref 3.4–4.8)
ANION GAP SERPL CALCULATED.3IONS-SCNC: 12 MMOL/L (ref 3–16)
BASOPHILS ABSOLUTE: 0.1 K/UL (ref 0–0.1)
BASOPHILS RELATIVE PERCENT: 0.3 %
BUN BLDV-MCNC: 53 MG/DL (ref 6–20)
CALCIUM SERPL-MCNC: 9.8 MG/DL (ref 8.5–10.5)
CHLORIDE BLD-SCNC: 97 MMOL/L (ref 98–107)
CO2: 33 MMOL/L (ref 20–30)
CREAT SERPL-MCNC: 1.9 MG/DL (ref 0.4–1.2)
CREATININE URINE: 37.1 MG/DL (ref 28–259)
EOSINOPHILS ABSOLUTE: 0 K/UL (ref 0–0.4)
EOSINOPHILS RELATIVE PERCENT: 0.2 %
GFR AFRICAN AMERICAN: 32
GFR NON-AFRICAN AMERICAN: 26
GLUCOSE BLD-MCNC: 193 MG/DL (ref 74–106)
HCT VFR BLD CALC: 45.6 % (ref 37–47)
HEMOGLOBIN: 14.2 G/DL (ref 11.5–16.5)
IMMATURE GRANULOCYTES #: 0.1 K/UL
IMMATURE GRANULOCYTES %: 0.6 % (ref 0–5)
LYMPHOCYTES ABSOLUTE: 2.3 K/UL (ref 1.5–4)
LYMPHOCYTES RELATIVE PERCENT: 13 %
MCH RBC QN AUTO: 31.3 PG (ref 27–32)
MCHC RBC AUTO-ENTMCNC: 31.1 G/DL (ref 31–35)
MCV RBC AUTO: 100.7 FL (ref 80–100)
MONOCYTES ABSOLUTE: 0.5 K/UL (ref 0.2–0.8)
MONOCYTES RELATIVE PERCENT: 2.6 %
NEUTROPHILS ABSOLUTE: 14.9 K/UL (ref 2–7.5)
NEUTROPHILS RELATIVE PERCENT: 83.3 %
PARATHYROID HORMONE INTACT: 135.2 PG/ML (ref 14–72)
PDW BLD-RTO: 14.6 % (ref 11–16)
PHOSPHORUS: 4.2 MG/DL (ref 2.5–4.5)
PLATELET # BLD: 382 K/UL (ref 150–400)
PMV BLD AUTO: 10.5 FL (ref 6–10)
POTASSIUM SERPL-SCNC: 4.6 MMOL/L (ref 3.4–5.1)
PROTEIN PROTEIN: 8 MG/DL
PROTEIN/CREAT RATIO: 0.2 MG/DL
RBC # BLD: 4.53 M/UL (ref 3.8–5.8)
SODIUM BLD-SCNC: 142 MMOL/L (ref 136–145)
URIC ACID, SERUM: 6.9 MG/DL (ref 2.5–7.1)
VITAMIN D 25-HYDROXY: 14.8 (ref 32–100)
WBC # BLD: 17.9 K/UL (ref 4–11)

## 2019-09-09 PROCEDURE — 85025 COMPLETE CBC W/AUTO DIFF WBC: CPT

## 2019-09-09 PROCEDURE — 84550 ASSAY OF BLOOD/URIC ACID: CPT

## 2019-09-09 PROCEDURE — 82570 ASSAY OF URINE CREATININE: CPT

## 2019-09-09 PROCEDURE — 83970 ASSAY OF PARATHORMONE: CPT

## 2019-09-09 PROCEDURE — 36415 COLL VENOUS BLD VENIPUNCTURE: CPT

## 2019-09-09 PROCEDURE — 80069 RENAL FUNCTION PANEL: CPT

## 2019-09-09 PROCEDURE — 82306 VITAMIN D 25 HYDROXY: CPT

## 2019-09-09 PROCEDURE — 84156 ASSAY OF PROTEIN URINE: CPT

## 2019-09-11 RX ORDER — HYDROCODONE BITARTRATE AND ACETAMINOPHEN 5; 325 MG/1; MG/1
TABLET ORAL
Qty: 30 TABLET | Refills: 0 | Status: SHIPPED | OUTPATIENT
Start: 2019-09-11 | End: 2019-11-04 | Stop reason: SDUPTHER

## 2019-09-11 RX ORDER — ERGOCALCIFEROL 1.25 MG/1
CAPSULE ORAL
Qty: 4 CAPSULE | Refills: 4 | Status: SHIPPED | OUTPATIENT
Start: 2019-09-11 | End: 2019-10-07 | Stop reason: ALTCHOICE

## 2019-09-15 ASSESSMENT — ENCOUNTER SYMPTOMS: SHORTNESS OF BREATH: 1

## 2019-09-20 ENCOUNTER — NURSE ONLY (OUTPATIENT)
Dept: PRIMARY CARE CLINIC | Age: 69
End: 2019-09-20
Payer: MEDICARE

## 2019-09-20 VITALS — BODY MASS INDEX: 40.3 KG/M2 | WEIGHT: 192 LBS | HEIGHT: 58 IN

## 2019-09-20 DIAGNOSIS — J44.1 COPD EXACERBATION (HCC): Primary | ICD-10-CM

## 2019-09-20 PROCEDURE — 96372 THER/PROPH/DIAG INJ SC/IM: CPT | Performed by: NURSE PRACTITIONER

## 2019-09-20 NOTE — PROGRESS NOTES
Administrations This Visit     omalizumab Damari Joseph) injection 150 mg     Admin Date  09/20/2019  11:56 Action  Given Dose  150 mg Route  Subcutaneous Site  Arm Left Administered By  Rosa Maria Patten MA    Ordering Provider:  JADE Upton    NDC:  82434-641-15    Lot#:  [id number]    :  [facility] [place]    Patient Supplied?:  Yes    Comments:  exp 10/2022           Admin Date  09/20/2019  11:57 Action  Given Dose  150 mg Route  Subcutaneous Site  Arm Right Administered By  Rosa Maria Patten MA    Ordering Provider:  JADE Upotn    NDC:  03166-448-91    Lot#:  [id number]    :  [facility] [place]    Patient Supplied?:  Yes    Comments:  exp 10/2022

## 2019-09-21 DIAGNOSIS — K21.9 GASTROESOPHAGEAL REFLUX DISEASE, ESOPHAGITIS PRESENCE NOT SPECIFIED: ICD-10-CM

## 2019-09-23 RX ORDER — METOCLOPRAMIDE 5 MG/1
TABLET ORAL
Qty: 90 TABLET | Refills: 3 | Status: SHIPPED | OUTPATIENT
Start: 2019-09-23 | End: 2019-12-16 | Stop reason: SDUPTHER

## 2019-09-23 RX ORDER — SUCRALFATE 1 G/1
TABLET ORAL
Qty: 90 TABLET | Refills: 3 | Status: SHIPPED | OUTPATIENT
Start: 2019-09-23 | End: 2019-12-16 | Stop reason: SDUPTHER

## 2019-09-24 RX ORDER — BLOOD SUGAR DIAGNOSTIC
STRIP MISCELLANEOUS
Qty: 150 EACH | Refills: 4 | Status: SHIPPED | OUTPATIENT
Start: 2019-09-24 | End: 2020-02-19

## 2019-10-01 RX ORDER — ROPINIROLE 0.5 MG/1
TABLET, FILM COATED ORAL
Qty: 60 TABLET | Refills: 0 | Status: SHIPPED | OUTPATIENT
Start: 2019-10-01 | End: 2019-10-07 | Stop reason: SDUPTHER

## 2019-10-02 RX ORDER — ROPINIROLE 0.5 MG/1
TABLET, FILM COATED ORAL
Qty: 60 TABLET | Refills: 0 | Status: SHIPPED | OUTPATIENT
Start: 2019-10-02 | End: 2019-10-26 | Stop reason: SDUPTHER

## 2019-10-04 DIAGNOSIS — J42 CHRONIC BRONCHITIS, UNSPECIFIED CHRONIC BRONCHITIS TYPE (HCC): ICD-10-CM

## 2019-10-04 RX ORDER — OLOPATADINE HCL 0.2 %
1 DROPS OPHTHALMIC (EYE) 2 TIMES DAILY
Qty: 2.5 ML | Refills: 3 | Status: SHIPPED | OUTPATIENT
Start: 2019-10-04 | End: 2020-01-31

## 2019-10-07 ENCOUNTER — OFFICE VISIT (OUTPATIENT)
Dept: PRIMARY CARE CLINIC | Age: 69
End: 2019-10-07
Payer: MEDICARE

## 2019-10-07 VITALS
WEIGHT: 195.8 LBS | TEMPERATURE: 97.9 F | DIASTOLIC BLOOD PRESSURE: 56 MMHG | BODY MASS INDEX: 41.1 KG/M2 | RESPIRATION RATE: 20 BRPM | HEIGHT: 58 IN | HEART RATE: 78 BPM | OXYGEN SATURATION: 95 % | SYSTOLIC BLOOD PRESSURE: 124 MMHG

## 2019-10-07 DIAGNOSIS — N18.30 TYPE 2 DIABETES MELLITUS WITH STAGE 3 CHRONIC KIDNEY DISEASE, WITH LONG-TERM CURRENT USE OF INSULIN (HCC): Primary | ICD-10-CM

## 2019-10-07 DIAGNOSIS — E78.01 FAMILIAL HYPERCHOLESTEROLEMIA: ICD-10-CM

## 2019-10-07 DIAGNOSIS — E11.22 TYPE 2 DIABETES MELLITUS WITH STAGE 3 CHRONIC KIDNEY DISEASE, WITH LONG-TERM CURRENT USE OF INSULIN (HCC): Primary | ICD-10-CM

## 2019-10-07 DIAGNOSIS — E55.9 VITAMIN D DEFICIENCY: ICD-10-CM

## 2019-10-07 DIAGNOSIS — J42 CHRONIC BRONCHITIS, UNSPECIFIED CHRONIC BRONCHITIS TYPE (HCC): ICD-10-CM

## 2019-10-07 DIAGNOSIS — Z79.4 TYPE 2 DIABETES MELLITUS WITH STAGE 3 CHRONIC KIDNEY DISEASE, WITH LONG-TERM CURRENT USE OF INSULIN (HCC): Primary | ICD-10-CM

## 2019-10-07 PROCEDURE — G8417 CALC BMI ABV UP PARAM F/U: HCPCS | Performed by: NURSE PRACTITIONER

## 2019-10-07 PROCEDURE — 3017F COLORECTAL CA SCREEN DOC REV: CPT | Performed by: NURSE PRACTITIONER

## 2019-10-07 PROCEDURE — G8482 FLU IMMUNIZE ORDER/ADMIN: HCPCS | Performed by: NURSE PRACTITIONER

## 2019-10-07 PROCEDURE — 4040F PNEUMOC VAC/ADMIN/RCVD: CPT | Performed by: NURSE PRACTITIONER

## 2019-10-07 PROCEDURE — 90688 IIV4 VACCINE SPLT 0.5 ML IM: CPT | Performed by: NURSE PRACTITIONER

## 2019-10-07 PROCEDURE — 1036F TOBACCO NON-USER: CPT | Performed by: NURSE PRACTITIONER

## 2019-10-07 PROCEDURE — 99213 OFFICE O/P EST LOW 20 MIN: CPT | Performed by: NURSE PRACTITIONER

## 2019-10-07 PROCEDURE — 1090F PRES/ABSN URINE INCON ASSESS: CPT | Performed by: NURSE PRACTITIONER

## 2019-10-07 PROCEDURE — G8400 PT W/DXA NO RESULTS DOC: HCPCS | Performed by: NURSE PRACTITIONER

## 2019-10-07 PROCEDURE — 3023F SPIROM DOC REV: CPT | Performed by: NURSE PRACTITIONER

## 2019-10-07 PROCEDURE — G0008 ADMIN INFLUENZA VIRUS VAC: HCPCS | Performed by: NURSE PRACTITIONER

## 2019-10-07 PROCEDURE — 2022F DILAT RTA XM EVC RTNOPTHY: CPT | Performed by: NURSE PRACTITIONER

## 2019-10-07 PROCEDURE — G8427 DOCREV CUR MEDS BY ELIG CLIN: HCPCS | Performed by: NURSE PRACTITIONER

## 2019-10-07 PROCEDURE — G8926 SPIRO NO PERF OR DOC: HCPCS | Performed by: NURSE PRACTITIONER

## 2019-10-07 PROCEDURE — 3045F PR MOST RECENT HEMOGLOBIN A1C LEVEL 7.0-9.0%: CPT | Performed by: NURSE PRACTITIONER

## 2019-10-07 PROCEDURE — 1123F ACP DISCUSS/DSCN MKR DOCD: CPT | Performed by: NURSE PRACTITIONER

## 2019-10-07 ASSESSMENT — ENCOUNTER SYMPTOMS
SHORTNESS OF BREATH: 1
GASTROINTESTINAL NEGATIVE: 1
WHEEZING: 1

## 2019-10-09 RX ORDER — SITAGLIPTIN 100 MG/1
TABLET, FILM COATED ORAL
Qty: 30 TABLET | Refills: 2 | Status: SHIPPED | OUTPATIENT
Start: 2019-10-09 | End: 2020-10-07

## 2019-10-09 RX ORDER — FUROSEMIDE 40 MG/1
TABLET ORAL
Qty: 60 TABLET | Refills: 5 | Status: ON HOLD | OUTPATIENT
Start: 2019-10-09 | End: 2019-10-26 | Stop reason: SDUPTHER

## 2019-10-11 DIAGNOSIS — R73.9 HYPERGLYCEMIA: ICD-10-CM

## 2019-10-11 DIAGNOSIS — E11.22 TYPE 2 DIABETES MELLITUS WITH STAGE 3 CHRONIC KIDNEY DISEASE, WITH LONG-TERM CURRENT USE OF INSULIN (HCC): Primary | ICD-10-CM

## 2019-10-11 DIAGNOSIS — N18.30 TYPE 2 DIABETES MELLITUS WITH STAGE 3 CHRONIC KIDNEY DISEASE, WITH LONG-TERM CURRENT USE OF INSULIN (HCC): Primary | ICD-10-CM

## 2019-10-11 DIAGNOSIS — Z79.4 TYPE 2 DIABETES MELLITUS WITH STAGE 3 CHRONIC KIDNEY DISEASE, WITH LONG-TERM CURRENT USE OF INSULIN (HCC): Primary | ICD-10-CM

## 2019-10-12 DIAGNOSIS — G89.29 CHRONIC BILATERAL LOW BACK PAIN WITHOUT SCIATICA: ICD-10-CM

## 2019-10-12 DIAGNOSIS — M54.50 CHRONIC BILATERAL LOW BACK PAIN WITHOUT SCIATICA: ICD-10-CM

## 2019-10-16 DIAGNOSIS — E78.00 PURE HYPERCHOLESTEROLEMIA: ICD-10-CM

## 2019-10-16 RX ORDER — TRAMADOL HYDROCHLORIDE 50 MG/1
TABLET ORAL
Qty: 90 TABLET | Refills: 2 | Status: SHIPPED | OUTPATIENT
Start: 2019-10-16 | End: 2020-01-06

## 2019-10-16 RX ORDER — ICOSAPENT ETHYL 1000 MG/1
CAPSULE ORAL
Qty: 60 CAPSULE | Refills: 3 | Status: SHIPPED | OUTPATIENT
Start: 2019-10-16 | End: 2019-10-26 | Stop reason: SDUPTHER

## 2019-10-21 ENCOUNTER — HOSPITAL ENCOUNTER (INPATIENT)
Facility: HOSPITAL | Age: 69
LOS: 5 days | Discharge: HOME | DRG: 190 | End: 2019-10-26
Attending: EMERGENCY MEDICINE | Admitting: INTERNAL MEDICINE
Payer: MEDICARE

## 2019-10-21 ENCOUNTER — APPOINTMENT (OUTPATIENT)
Dept: GENERAL RADIOLOGY | Facility: HOSPITAL | Age: 69
DRG: 190 | End: 2019-10-21
Payer: MEDICARE

## 2019-10-21 DIAGNOSIS — J44.1 COPD EXACERBATION (HCC): Primary | ICD-10-CM

## 2019-10-21 DIAGNOSIS — J18.9 PNEUMONIA DUE TO ORGANISM: ICD-10-CM

## 2019-10-21 DIAGNOSIS — K21.9 GASTROESOPHAGEAL REFLUX DISEASE, ESOPHAGITIS PRESENCE NOT SPECIFIED: ICD-10-CM

## 2019-10-21 DIAGNOSIS — N18.30 STAGE 3 CHRONIC KIDNEY DISEASE (HCC): ICD-10-CM

## 2019-10-21 LAB
A/G RATIO: 1 (ref 0.8–2)
ALBUMIN SERPL-MCNC: 3.9 G/DL (ref 3.4–4.8)
ALP BLD-CCNC: 91 U/L (ref 25–100)
ALT SERPL-CCNC: 20 U/L (ref 4–36)
ANION GAP SERPL CALCULATED.3IONS-SCNC: 15 MMOL/L (ref 3–16)
AST SERPL-CCNC: 21 U/L (ref 8–33)
BACTERIA: ABNORMAL /HPF
BASE EXCESS VENOUS: 5.6 MMOL/L (ref -3–3)
BASOPHILS ABSOLUTE: 0.1 K/UL (ref 0–0.1)
BASOPHILS RELATIVE PERCENT: 0.4 %
BILIRUB SERPL-MCNC: 0.6 MG/DL (ref 0.3–1.2)
BILIRUBIN URINE: NEGATIVE
BLOOD, URINE: ABNORMAL
BUN BLDV-MCNC: 17 MG/DL (ref 6–20)
CALCIUM SERPL-MCNC: 9.9 MG/DL (ref 8.5–10.5)
CHLORIDE BLD-SCNC: 99 MMOL/L (ref 98–107)
CLARITY: CLEAR
CO2: 30 MMOL/L (ref 20–30)
COLOR: YELLOW
CREAT SERPL-MCNC: 1.2 MG/DL (ref 0.4–1.2)
EOSINOPHILS ABSOLUTE: 0.1 K/UL (ref 0–0.4)
EOSINOPHILS RELATIVE PERCENT: 0.5 %
EPITHELIAL CELLS, UA: ABNORMAL /HPF
FIO2: 0.3 %
GFR AFRICAN AMERICAN: 54
GFR NON-AFRICAN AMERICAN: 44
GLOBULIN: 3.8 G/DL
GLUCOSE BLD-MCNC: 156 MG/DL (ref 74–106)
GLUCOSE BLD-MCNC: 306 MG/DL (ref 74–106)
GLUCOSE BLD-MCNC: 335 MG/DL (ref 74–106)
GLUCOSE URINE: NEGATIVE MG/DL
HCO3 VENOUS: 32.7 MMOL/L (ref 23–29)
HCT VFR BLD CALC: 39.4 % (ref 37–47)
HEMOGLOBIN: 12.2 G/DL (ref 11.5–16.5)
IMMATURE GRANULOCYTES #: 0.1 K/UL
IMMATURE GRANULOCYTES %: 0.6 % (ref 0–5)
KETONES, URINE: NEGATIVE MG/DL
LACTIC ACID: 1.8 MMOL/L (ref 0.4–2)
LEUKOCYTE ESTERASE, URINE: ABNORMAL
LYMPHOCYTES ABSOLUTE: 1.7 K/UL (ref 1.5–4)
LYMPHOCYTES RELATIVE PERCENT: 11.5 %
MCH RBC QN AUTO: 31.4 PG (ref 27–32)
MCHC RBC AUTO-ENTMCNC: 31 G/DL (ref 31–35)
MCV RBC AUTO: 101.5 FL (ref 80–100)
MICROSCOPIC EXAMINATION: YES
MONOCYTES ABSOLUTE: 0.9 K/UL (ref 0.2–0.8)
MONOCYTES RELATIVE PERCENT: 5.7 %
NEUTROPHILS ABSOLUTE: 12.1 K/UL (ref 2–7.5)
NEUTROPHILS RELATIVE PERCENT: 81.3 %
NITRITE, URINE: NEGATIVE
O2 SAT, VEN: 87 %
O2 THERAPY: ABNORMAL
PCO2, VEN: 59.7 MMHG (ref 40–50)
PDW BLD-RTO: 14.8 % (ref 11–16)
PERFORMED ON: ABNORMAL
PERFORMED ON: ABNORMAL
PH UA: 6.5 (ref 5–8)
PH VENOUS: 7.36 (ref 7.35–7.45)
PLATELET # BLD: 408 K/UL (ref 150–400)
PMV BLD AUTO: 10.6 FL (ref 6–10)
PO2, VEN: 56.2 MMHG (ref 25–40)
POTASSIUM REFLEX MAGNESIUM: 4.4 MMOL/L (ref 3.4–5.1)
PRO-BNP: 564 PG/ML (ref 0–1800)
PROTEIN UA: 100 MG/DL
RBC # BLD: 3.88 M/UL (ref 3.8–5.8)
RBC UA: ABNORMAL /HPF (ref 0–2)
SODIUM BLD-SCNC: 144 MMOL/L (ref 136–145)
SPECIFIC GRAVITY UA: 1.01 (ref 1–1.03)
TCO2 CALC VENOUS: 35 MMOL/L
TOTAL PROTEIN: 7.7 G/DL (ref 6.4–8.3)
TROPONIN: <0.3 NG/ML
URINE REFLEX TO CULTURE: YES
URINE TYPE: ABNORMAL
UROBILINOGEN, URINE: 0.2 E.U./DL
WBC # BLD: 14.9 K/UL (ref 4–11)
WBC UA: ABNORMAL /HPF (ref 0–5)

## 2019-10-21 PROCEDURE — 6360000002 HC RX W HCPCS: Performed by: PHYSICIAN ASSISTANT

## 2019-10-21 PROCEDURE — 99285 EMERGENCY DEPT VISIT HI MDM: CPT

## 2019-10-21 PROCEDURE — 87040 BLOOD CULTURE FOR BACTERIA: CPT

## 2019-10-21 PROCEDURE — 96375 TX/PRO/DX INJ NEW DRUG ADDON: CPT

## 2019-10-21 PROCEDURE — 87086 URINE CULTURE/COLONY COUNT: CPT

## 2019-10-21 PROCEDURE — 6370000000 HC RX 637 (ALT 250 FOR IP): Performed by: PHYSICIAN ASSISTANT

## 2019-10-21 PROCEDURE — 36415 COLL VENOUS BLD VENIPUNCTURE: CPT

## 2019-10-21 PROCEDURE — 85025 COMPLETE CBC W/AUTO DIFF WBC: CPT

## 2019-10-21 PROCEDURE — 83605 ASSAY OF LACTIC ACID: CPT

## 2019-10-21 PROCEDURE — 83880 ASSAY OF NATRIURETIC PEPTIDE: CPT

## 2019-10-21 PROCEDURE — 96365 THER/PROPH/DIAG IV INF INIT: CPT

## 2019-10-21 PROCEDURE — 94640 AIRWAY INHALATION TREATMENT: CPT

## 2019-10-21 PROCEDURE — 80053 COMPREHEN METABOLIC PANEL: CPT

## 2019-10-21 PROCEDURE — 84484 ASSAY OF TROPONIN QUANT: CPT

## 2019-10-21 PROCEDURE — 71045 X-RAY EXAM CHEST 1 VIEW: CPT

## 2019-10-21 PROCEDURE — 2580000003 HC RX 258: Performed by: EMERGENCY MEDICINE

## 2019-10-21 PROCEDURE — 82803 BLOOD GASES ANY COMBINATION: CPT

## 2019-10-21 PROCEDURE — 1200000000 HC SEMI PRIVATE

## 2019-10-21 PROCEDURE — 93005 ELECTROCARDIOGRAM TRACING: CPT

## 2019-10-21 PROCEDURE — 6370000000 HC RX 637 (ALT 250 FOR IP): Performed by: EMERGENCY MEDICINE

## 2019-10-21 PROCEDURE — 96367 TX/PROPH/DG ADDL SEQ IV INF: CPT

## 2019-10-21 PROCEDURE — 81001 URINALYSIS AUTO W/SCOPE: CPT

## 2019-10-21 PROCEDURE — 96366 THER/PROPH/DIAG IV INF ADDON: CPT

## 2019-10-21 PROCEDURE — 6360000002 HC RX W HCPCS: Performed by: EMERGENCY MEDICINE

## 2019-10-21 RX ORDER — METHYLPREDNISOLONE SODIUM SUCCINATE 40 MG/ML
40 INJECTION, POWDER, LYOPHILIZED, FOR SOLUTION INTRAMUSCULAR; INTRAVENOUS EVERY 8 HOURS
Status: DISCONTINUED | OUTPATIENT
Start: 2019-10-21 | End: 2019-10-22

## 2019-10-21 RX ORDER — METOCLOPRAMIDE 5 MG/1
5 TABLET ORAL
Status: DISCONTINUED | OUTPATIENT
Start: 2019-10-21 | End: 2019-10-26 | Stop reason: HOSPADM

## 2019-10-21 RX ORDER — BUDESONIDE 0.5 MG/2ML
0.5 INHALANT ORAL 2 TIMES DAILY
Status: DISCONTINUED | OUTPATIENT
Start: 2019-10-21 | End: 2019-10-26 | Stop reason: HOSPADM

## 2019-10-21 RX ORDER — DEXTROSE MONOHYDRATE 25 G/50ML
12.5 INJECTION, SOLUTION INTRAVENOUS PRN
Status: DISCONTINUED | OUTPATIENT
Start: 2019-10-21 | End: 2019-10-26 | Stop reason: HOSPADM

## 2019-10-21 RX ORDER — NICOTINE POLACRILEX 4 MG
15 LOZENGE BUCCAL PRN
Status: DISCONTINUED | OUTPATIENT
Start: 2019-10-21 | End: 2019-10-26 | Stop reason: HOSPADM

## 2019-10-21 RX ORDER — NICOTINE 21 MG/24HR
1 PATCH, TRANSDERMAL 24 HOURS TRANSDERMAL DAILY
Status: DISCONTINUED | OUTPATIENT
Start: 2019-10-21 | End: 2019-10-22

## 2019-10-21 RX ORDER — SODIUM CHLORIDE 0.9 % (FLUSH) 0.9 %
10 SYRINGE (ML) INJECTION EVERY 12 HOURS SCHEDULED
Status: DISCONTINUED | OUTPATIENT
Start: 2019-10-21 | End: 2019-10-26 | Stop reason: HOSPADM

## 2019-10-21 RX ORDER — PANTOPRAZOLE SODIUM 40 MG/1
40 TABLET, DELAYED RELEASE ORAL DAILY
Status: DISCONTINUED | OUTPATIENT
Start: 2019-10-21 | End: 2019-10-26 | Stop reason: HOSPADM

## 2019-10-21 RX ORDER — POLYETHYLENE GLYCOL 3350 17 G/17G
17 POWDER, FOR SOLUTION ORAL DAILY PRN
Status: DISCONTINUED | OUTPATIENT
Start: 2019-10-21 | End: 2019-10-26 | Stop reason: HOSPADM

## 2019-10-21 RX ORDER — LACTOBACILLUS RHAMNOSUS GG 10B CELL
1 CAPSULE ORAL DAILY
Status: DISCONTINUED | OUTPATIENT
Start: 2019-10-21 | End: 2019-10-26 | Stop reason: HOSPADM

## 2019-10-21 RX ORDER — ALLOPURINOL 100 MG/1
150 TABLET ORAL DAILY
Status: DISCONTINUED | OUTPATIENT
Start: 2019-10-21 | End: 2019-10-26 | Stop reason: HOSPADM

## 2019-10-21 RX ORDER — 0.9 % SODIUM CHLORIDE 0.9 %
1000 INTRAVENOUS SOLUTION INTRAVENOUS ONCE
Status: COMPLETED | OUTPATIENT
Start: 2019-10-21 | End: 2019-10-21

## 2019-10-21 RX ORDER — ASPIRIN 81 MG/1
81 TABLET ORAL DAILY
Status: DISCONTINUED | OUTPATIENT
Start: 2019-10-21 | End: 2019-10-26 | Stop reason: HOSPADM

## 2019-10-21 RX ORDER — ROPINIROLE 0.25 MG/1
0.5 TABLET, FILM COATED ORAL 2 TIMES DAILY
Status: DISCONTINUED | OUTPATIENT
Start: 2019-10-21 | End: 2019-10-26 | Stop reason: HOSPADM

## 2019-10-21 RX ORDER — DEXTROSE MONOHYDRATE 50 MG/ML
100 INJECTION, SOLUTION INTRAVENOUS PRN
Status: DISCONTINUED | OUTPATIENT
Start: 2019-10-21 | End: 2019-10-26 | Stop reason: HOSPADM

## 2019-10-21 RX ORDER — BUSPIRONE HYDROCHLORIDE 5 MG/1
10 TABLET ORAL 3 TIMES DAILY
Status: DISCONTINUED | OUTPATIENT
Start: 2019-10-21 | End: 2019-10-26 | Stop reason: HOSPADM

## 2019-10-21 RX ORDER — TRAMADOL HYDROCHLORIDE 50 MG/1
50 TABLET ORAL 3 TIMES DAILY PRN
Status: DISCONTINUED | OUTPATIENT
Start: 2019-10-21 | End: 2019-10-26 | Stop reason: HOSPADM

## 2019-10-21 RX ORDER — INSULIN GLARGINE 100 [IU]/ML
30 INJECTION, SOLUTION SUBCUTANEOUS DAILY
Status: DISCONTINUED | OUTPATIENT
Start: 2019-10-21 | End: 2019-10-22

## 2019-10-21 RX ORDER — ACETAMINOPHEN 325 MG/1
650 TABLET ORAL EVERY 4 HOURS PRN
Status: DISCONTINUED | OUTPATIENT
Start: 2019-10-21 | End: 2019-10-26 | Stop reason: HOSPADM

## 2019-10-21 RX ORDER — OLOPATADINE HYDROCHLORIDE 2 MG/ML
1 SOLUTION/ DROPS OPHTHALMIC 2 TIMES DAILY
Status: DISCONTINUED | OUTPATIENT
Start: 2019-10-21 | End: 2019-10-26 | Stop reason: HOSPADM

## 2019-10-21 RX ORDER — IPRATROPIUM BROMIDE AND ALBUTEROL SULFATE 2.5; .5 MG/3ML; MG/3ML
1 SOLUTION RESPIRATORY (INHALATION) EVERY 4 HOURS
Status: DISCONTINUED | OUTPATIENT
Start: 2019-10-21 | End: 2019-10-25

## 2019-10-21 RX ORDER — SUCRALFATE 1 G/1
1 TABLET ORAL EVERY 8 HOURS SCHEDULED
Status: DISCONTINUED | OUTPATIENT
Start: 2019-10-21 | End: 2019-10-26 | Stop reason: HOSPADM

## 2019-10-21 RX ORDER — ONDANSETRON 2 MG/ML
4 INJECTION INTRAMUSCULAR; INTRAVENOUS EVERY 6 HOURS PRN
Status: DISCONTINUED | OUTPATIENT
Start: 2019-10-21 | End: 2019-10-26 | Stop reason: HOSPADM

## 2019-10-21 RX ORDER — SODIUM CHLORIDE 0.9 % (FLUSH) 0.9 %
10 SYRINGE (ML) INJECTION PRN
Status: DISCONTINUED | OUTPATIENT
Start: 2019-10-21 | End: 2019-10-26 | Stop reason: HOSPADM

## 2019-10-21 RX ORDER — FUROSEMIDE 40 MG/1
40 TABLET ORAL 2 TIMES DAILY
Status: DISCONTINUED | OUTPATIENT
Start: 2019-10-21 | End: 2019-10-23

## 2019-10-21 RX ORDER — FENOFIBRATE 160 MG/1
160 TABLET ORAL DAILY
Status: DISCONTINUED | OUTPATIENT
Start: 2019-10-21 | End: 2019-10-26 | Stop reason: HOSPADM

## 2019-10-21 RX ORDER — MONTELUKAST SODIUM 10 MG/1
10 TABLET ORAL DAILY
Status: DISCONTINUED | OUTPATIENT
Start: 2019-10-21 | End: 2019-10-26 | Stop reason: HOSPADM

## 2019-10-21 RX ORDER — CETIRIZINE HYDROCHLORIDE 10 MG/1
5 TABLET ORAL DAILY
Status: DISCONTINUED | OUTPATIENT
Start: 2019-10-21 | End: 2019-10-26 | Stop reason: HOSPADM

## 2019-10-21 RX ORDER — POTASSIUM CHLORIDE 20 MEQ/1
20 TABLET, EXTENDED RELEASE ORAL DAILY
Status: DISCONTINUED | OUTPATIENT
Start: 2019-10-21 | End: 2019-10-26 | Stop reason: HOSPADM

## 2019-10-21 RX ORDER — IPRATROPIUM BROMIDE AND ALBUTEROL SULFATE 2.5; .5 MG/3ML; MG/3ML
1 SOLUTION RESPIRATORY (INHALATION)
Status: DISCONTINUED | OUTPATIENT
Start: 2019-10-21 | End: 2019-10-21

## 2019-10-21 RX ORDER — FERROUS SULFATE TAB EC 324 MG (65 MG FE EQUIVALENT) 324 (65 FE) MG
325 TABLET DELAYED RESPONSE ORAL
Status: DISCONTINUED | OUTPATIENT
Start: 2019-10-21 | End: 2019-10-24 | Stop reason: ALTCHOICE

## 2019-10-21 RX ORDER — METHYLPREDNISOLONE SODIUM SUCCINATE 125 MG/2ML
125 INJECTION, POWDER, LYOPHILIZED, FOR SOLUTION INTRAMUSCULAR; INTRAVENOUS ONCE
Status: COMPLETED | OUTPATIENT
Start: 2019-10-21 | End: 2019-10-21

## 2019-10-21 RX ADMIN — ROPINIROLE HYDROCHLORIDE 0.5 MG: 0.25 TABLET, FILM COATED ORAL at 21:30

## 2019-10-21 RX ADMIN — METHYLPREDNISOLONE SODIUM SUCCINATE 40 MG: 40 INJECTION, POWDER, FOR SOLUTION INTRAMUSCULAR; INTRAVENOUS at 21:31

## 2019-10-21 RX ADMIN — FUROSEMIDE 40 MG: 40 TABLET ORAL at 17:08

## 2019-10-21 RX ADMIN — FERROUS SULFATE TAB EC 324 MG (65 MG FE EQUIVALENT) 324 MG: 324 (65 FE) TABLET DELAYED RESPONSE at 17:08

## 2019-10-21 RX ADMIN — ENOXAPARIN SODIUM 40 MG: 40 INJECTION SUBCUTANEOUS at 17:08

## 2019-10-21 RX ADMIN — TRAMADOL HYDROCHLORIDE 50 MG: 50 TABLET, FILM COATED ORAL at 21:31

## 2019-10-21 RX ADMIN — IPRATROPIUM BROMIDE AND ALBUTEROL SULFATE 1 AMPULE: .5; 3 SOLUTION RESPIRATORY (INHALATION) at 12:11

## 2019-10-21 RX ADMIN — METHYLPREDNISOLONE SODIUM SUCCINATE 125 MG: 125 INJECTION, POWDER, FOR SOLUTION INTRAMUSCULAR; INTRAVENOUS at 10:02

## 2019-10-21 RX ADMIN — IPRATROPIUM BROMIDE AND ALBUTEROL SULFATE 1 AMPULE: .5; 3 SOLUTION RESPIRATORY (INHALATION) at 09:39

## 2019-10-21 RX ADMIN — METOPROLOL TARTRATE 25 MG: 25 TABLET ORAL at 21:30

## 2019-10-21 RX ADMIN — SUCRALFATE 1 G: 1 TABLET ORAL at 17:07

## 2019-10-21 RX ADMIN — METOCLOPRAMIDE HYDROCHLORIDE 5 MG: 5 TABLET ORAL at 17:07

## 2019-10-21 RX ADMIN — CEFTRIAXONE 1 G: 1 INJECTION, POWDER, FOR SOLUTION INTRAMUSCULAR; INTRAVENOUS at 12:15

## 2019-10-21 RX ADMIN — IPRATROPIUM BROMIDE AND ALBUTEROL SULFATE 3 ML: .5; 3 SOLUTION RESPIRATORY (INHALATION) at 16:47

## 2019-10-21 RX ADMIN — SODIUM CHLORIDE 1000 ML: 9 INJECTION, SOLUTION INTRAVENOUS at 10:02

## 2019-10-21 RX ADMIN — IPRATROPIUM BROMIDE AND ALBUTEROL SULFATE 1 AMPULE: .5; 3 SOLUTION RESPIRATORY (INHALATION) at 11:39

## 2019-10-21 RX ADMIN — AZITHROMYCIN DIHYDRATE 500 MG: 500 INJECTION, POWDER, LYOPHILIZED, FOR SOLUTION INTRAVENOUS at 13:11

## 2019-10-21 RX ADMIN — SUCRALFATE 1 G: 1 TABLET ORAL at 21:31

## 2019-10-21 RX ADMIN — BUSPIRONE HYDROCHLORIDE 10 MG: 5 TABLET ORAL at 21:30

## 2019-10-21 RX ADMIN — IPRATROPIUM BROMIDE AND ALBUTEROL SULFATE 3 ML: .5; 3 SOLUTION RESPIRATORY (INHALATION) at 20:54

## 2019-10-21 RX ADMIN — TRAMADOL HYDROCHLORIDE 50 MG: 50 TABLET, FILM COATED ORAL at 17:07

## 2019-10-21 RX ADMIN — INSULIN LISPRO 35 UNITS: 100 INJECTION, SOLUTION INTRAVENOUS; SUBCUTANEOUS at 16:57

## 2019-10-21 RX ADMIN — BUDESONIDE 500 MCG: 0.5 SUSPENSION RESPIRATORY (INHALATION) at 20:54

## 2019-10-21 RX ADMIN — BUSPIRONE HYDROCHLORIDE 10 MG: 5 TABLET ORAL at 17:08

## 2019-10-21 ASSESSMENT — PAIN DESCRIPTION - PAIN TYPE
TYPE: ACUTE PAIN
TYPE: ACUTE PAIN

## 2019-10-21 ASSESSMENT — PAIN DESCRIPTION - FREQUENCY
FREQUENCY: INTERMITTENT
FREQUENCY: INTERMITTENT

## 2019-10-21 ASSESSMENT — ENCOUNTER SYMPTOMS
VOMITING: 0
NAUSEA: 0
COUGH: 1
EYE DISCHARGE: 0
SPUTUM PRODUCTION: 1
SHORTNESS OF BREATH: 1
DIARRHEA: 0
STRIDOR: 0
SORE THROAT: 0
ABDOMINAL PAIN: 0
WHEEZING: 1
SNORING: 0
PHOTOPHOBIA: 0

## 2019-10-21 ASSESSMENT — PAIN DESCRIPTION - DESCRIPTORS
DESCRIPTORS: ACHING
DESCRIPTORS: ACHING

## 2019-10-21 ASSESSMENT — PAIN SCALES - GENERAL
PAINLEVEL_OUTOF10: 7
PAINLEVEL_OUTOF10: 0
PAINLEVEL_OUTOF10: 0
PAINLEVEL_OUTOF10: 8
PAINLEVEL_OUTOF10: 3
PAINLEVEL_OUTOF10: 7

## 2019-10-21 ASSESSMENT — PAIN DESCRIPTION - ORIENTATION: ORIENTATION: RIGHT

## 2019-10-21 ASSESSMENT — PAIN DESCRIPTION - LOCATION
LOCATION: BACK;SHOULDER
LOCATION: SHOULDER

## 2019-10-22 LAB
A/G RATIO: 1 (ref 0.8–2)
ALBUMIN SERPL-MCNC: 3.6 G/DL (ref 3.4–4.8)
ALP BLD-CCNC: 89 U/L (ref 25–100)
ALT SERPL-CCNC: 17 U/L (ref 4–36)
ANION GAP SERPL CALCULATED.3IONS-SCNC: 13 MMOL/L (ref 3–16)
AST SERPL-CCNC: 12 U/L (ref 8–33)
BASOPHILS ABSOLUTE: 0 K/UL (ref 0–0.1)
BASOPHILS RELATIVE PERCENT: 0.2 %
BILIRUB SERPL-MCNC: 0.3 MG/DL (ref 0.3–1.2)
BUN BLDV-MCNC: 26 MG/DL (ref 6–20)
CALCIUM SERPL-MCNC: 9.5 MG/DL (ref 8.5–10.5)
CHLORIDE BLD-SCNC: 96 MMOL/L (ref 98–107)
CO2: 32 MMOL/L (ref 20–30)
CREAT SERPL-MCNC: 1.4 MG/DL (ref 0.4–1.2)
EOSINOPHILS ABSOLUTE: 0 K/UL (ref 0–0.4)
EOSINOPHILS RELATIVE PERCENT: 0 %
GFR AFRICAN AMERICAN: 45
GFR NON-AFRICAN AMERICAN: 37
GLOBULIN: 3.7 G/DL
GLUCOSE BLD-MCNC: 160 MG/DL (ref 74–106)
GLUCOSE BLD-MCNC: 162 MG/DL (ref 74–106)
GLUCOSE BLD-MCNC: 172 MG/DL (ref 74–106)
GLUCOSE BLD-MCNC: 209 MG/DL (ref 74–106)
GLUCOSE BLD-MCNC: 333 MG/DL (ref 74–106)
GLUCOSE BLD-MCNC: 357 MG/DL (ref 74–106)
HCT VFR BLD CALC: 37.4 % (ref 37–47)
HEMOGLOBIN: 11.5 G/DL (ref 11.5–16.5)
IMMATURE GRANULOCYTES #: 0.1 K/UL
IMMATURE GRANULOCYTES %: 0.7 % (ref 0–5)
LYMPHOCYTES ABSOLUTE: 1.4 K/UL (ref 1.5–4)
LYMPHOCYTES RELATIVE PERCENT: 9.4 %
MCH RBC QN AUTO: 31 PG (ref 27–32)
MCHC RBC AUTO-ENTMCNC: 30.7 G/DL (ref 31–35)
MCV RBC AUTO: 100.8 FL (ref 80–100)
MONOCYTES ABSOLUTE: 0.6 K/UL (ref 0.2–0.8)
MONOCYTES RELATIVE PERCENT: 3.8 %
NEUTROPHILS ABSOLUTE: 13.2 K/UL (ref 2–7.5)
NEUTROPHILS RELATIVE PERCENT: 85.9 %
PDW BLD-RTO: 14.3 % (ref 11–16)
PERFORMED ON: ABNORMAL
PLATELET # BLD: 414 K/UL (ref 150–400)
PMV BLD AUTO: 10.6 FL (ref 6–10)
POTASSIUM REFLEX MAGNESIUM: 5 MMOL/L (ref 3.4–5.1)
RBC # BLD: 3.71 M/UL (ref 3.8–5.8)
SODIUM BLD-SCNC: 141 MMOL/L (ref 136–145)
TOTAL PROTEIN: 7.3 G/DL (ref 6.4–8.3)
WBC # BLD: 15.3 K/UL (ref 4–11)

## 2019-10-22 PROCEDURE — 6370000000 HC RX 637 (ALT 250 FOR IP): Performed by: PHYSICIAN ASSISTANT

## 2019-10-22 PROCEDURE — 2580000003 HC RX 258: Performed by: PHYSICIAN ASSISTANT

## 2019-10-22 PROCEDURE — 87070 CULTURE OTHR SPECIMN AEROBIC: CPT

## 2019-10-22 PROCEDURE — 36415 COLL VENOUS BLD VENIPUNCTURE: CPT

## 2019-10-22 PROCEDURE — 99222 1ST HOSP IP/OBS MODERATE 55: CPT | Performed by: INTERNAL MEDICINE

## 2019-10-22 PROCEDURE — 1200000000 HC SEMI PRIVATE

## 2019-10-22 PROCEDURE — 80053 COMPREHEN METABOLIC PANEL: CPT

## 2019-10-22 PROCEDURE — 6360000002 HC RX W HCPCS: Performed by: PHYSICIAN ASSISTANT

## 2019-10-22 PROCEDURE — 87205 SMEAR GRAM STAIN: CPT

## 2019-10-22 PROCEDURE — 85025 COMPLETE CBC W/AUTO DIFF WBC: CPT

## 2019-10-22 PROCEDURE — 2700000000 HC OXYGEN THERAPY PER DAY

## 2019-10-22 PROCEDURE — 94640 AIRWAY INHALATION TREATMENT: CPT

## 2019-10-22 RX ORDER — METHYLPREDNISOLONE SODIUM SUCCINATE 40 MG/ML
40 INJECTION, POWDER, LYOPHILIZED, FOR SOLUTION INTRAMUSCULAR; INTRAVENOUS EVERY 8 HOURS
Status: DISCONTINUED | OUTPATIENT
Start: 2019-10-22 | End: 2019-10-24

## 2019-10-22 RX ORDER — METHYLPREDNISOLONE SODIUM SUCCINATE 40 MG/ML
40 INJECTION, POWDER, LYOPHILIZED, FOR SOLUTION INTRAMUSCULAR; INTRAVENOUS 2 TIMES DAILY
Status: DISCONTINUED | OUTPATIENT
Start: 2019-10-22 | End: 2019-10-22

## 2019-10-22 RX ORDER — INSULIN GLARGINE 100 [IU]/ML
35 INJECTION, SOLUTION SUBCUTANEOUS DAILY
Status: DISCONTINUED | OUTPATIENT
Start: 2019-10-23 | End: 2019-10-24

## 2019-10-22 RX ADMIN — METOPROLOL TARTRATE 25 MG: 25 TABLET ORAL at 08:26

## 2019-10-22 RX ADMIN — BUSPIRONE HYDROCHLORIDE 10 MG: 5 TABLET ORAL at 08:25

## 2019-10-22 RX ADMIN — IPRATROPIUM BROMIDE AND ALBUTEROL SULFATE 3 ML: .5; 3 SOLUTION RESPIRATORY (INHALATION) at 17:35

## 2019-10-22 RX ADMIN — SUCRALFATE 1 G: 1 TABLET ORAL at 20:13

## 2019-10-22 RX ADMIN — ALLOPURINOL 150 MG: 100 TABLET ORAL at 08:24

## 2019-10-22 RX ADMIN — ROPINIROLE HYDROCHLORIDE 0.5 MG: 0.25 TABLET, FILM COATED ORAL at 20:07

## 2019-10-22 RX ADMIN — FUROSEMIDE 40 MG: 40 TABLET ORAL at 16:42

## 2019-10-22 RX ADMIN — ROFLUMILAST 250 MCG: 500 TABLET ORAL at 08:25

## 2019-10-22 RX ADMIN — SUCRALFATE 1 G: 1 TABLET ORAL at 14:04

## 2019-10-22 RX ADMIN — METHYLPREDNISOLONE SODIUM SUCCINATE 40 MG: 40 INJECTION, POWDER, FOR SOLUTION INTRAMUSCULAR; INTRAVENOUS at 20:07

## 2019-10-22 RX ADMIN — IPRATROPIUM BROMIDE AND ALBUTEROL SULFATE 3 ML: .5; 3 SOLUTION RESPIRATORY (INHALATION) at 21:37

## 2019-10-22 RX ADMIN — Medication 1 CAPSULE: at 08:26

## 2019-10-22 RX ADMIN — INSULIN LISPRO 35 UNITS: 100 INJECTION, SOLUTION INTRAVENOUS; SUBCUTANEOUS at 11:14

## 2019-10-22 RX ADMIN — INSULIN LISPRO 35 UNITS: 100 INJECTION, SOLUTION INTRAVENOUS; SUBCUTANEOUS at 07:35

## 2019-10-22 RX ADMIN — Medication 10 ML: at 20:12

## 2019-10-22 RX ADMIN — CETIRIZINE HYDROCHLORIDE 5 MG: 10 TABLET, FILM COATED ORAL at 08:27

## 2019-10-22 RX ADMIN — ROPINIROLE HYDROCHLORIDE 0.5 MG: 0.25 TABLET, FILM COATED ORAL at 08:25

## 2019-10-22 RX ADMIN — METOCLOPRAMIDE HYDROCHLORIDE 5 MG: 5 TABLET ORAL at 05:09

## 2019-10-22 RX ADMIN — BUSPIRONE HYDROCHLORIDE 10 MG: 5 TABLET ORAL at 20:07

## 2019-10-22 RX ADMIN — POTASSIUM CHLORIDE 20 MEQ: 20 TABLET, EXTENDED RELEASE ORAL at 08:25

## 2019-10-22 RX ADMIN — METOPROLOL TARTRATE 25 MG: 25 TABLET ORAL at 20:07

## 2019-10-22 RX ADMIN — INSULIN LISPRO 35 UNITS: 100 INJECTION, SOLUTION INTRAVENOUS; SUBCUTANEOUS at 17:37

## 2019-10-22 RX ADMIN — FUROSEMIDE 40 MG: 40 TABLET ORAL at 08:26

## 2019-10-22 RX ADMIN — FERROUS SULFATE TAB EC 324 MG (65 MG FE EQUIVALENT) 324 MG: 324 (65 FE) TABLET DELAYED RESPONSE at 16:41

## 2019-10-22 RX ADMIN — PANTOPRAZOLE SODIUM 40 MG: 40 TABLET, DELAYED RELEASE ORAL at 08:25

## 2019-10-22 RX ADMIN — ENOXAPARIN SODIUM 40 MG: 40 INJECTION SUBCUTANEOUS at 08:24

## 2019-10-22 RX ADMIN — FENOFIBRATE 160 MG: 160 TABLET ORAL at 08:25

## 2019-10-22 RX ADMIN — LINAGLIPTIN 5 MG: 5 TABLET, FILM COATED ORAL at 08:25

## 2019-10-22 RX ADMIN — IPRATROPIUM BROMIDE AND ALBUTEROL SULFATE 3 ML: .5; 3 SOLUTION RESPIRATORY (INHALATION) at 11:03

## 2019-10-22 RX ADMIN — INSULIN LISPRO 2 UNITS: 100 INJECTION, SOLUTION INTRAVENOUS; SUBCUTANEOUS at 00:59

## 2019-10-22 RX ADMIN — BUDESONIDE 500 MCG: 0.5 SUSPENSION RESPIRATORY (INHALATION) at 05:39

## 2019-10-22 RX ADMIN — METHYLPREDNISOLONE SODIUM SUCCINATE 40 MG: 40 INJECTION, POWDER, FOR SOLUTION INTRAMUSCULAR; INTRAVENOUS at 05:09

## 2019-10-22 RX ADMIN — METHYLPREDNISOLONE SODIUM SUCCINATE 40 MG: 40 INJECTION, POWDER, FOR SOLUTION INTRAMUSCULAR; INTRAVENOUS at 11:33

## 2019-10-22 RX ADMIN — INSULIN GLARGINE 30 UNITS: 100 INJECTION, SOLUTION SUBCUTANEOUS at 07:32

## 2019-10-22 RX ADMIN — SUCRALFATE 1 G: 1 TABLET ORAL at 05:09

## 2019-10-22 RX ADMIN — Medication 10 ML: at 08:26

## 2019-10-22 RX ADMIN — MONTELUKAST 10 MG: 10 TABLET, FILM COATED ORAL at 08:24

## 2019-10-22 RX ADMIN — CEFTRIAXONE 1 G: 1 INJECTION, POWDER, FOR SOLUTION INTRAMUSCULAR; INTRAVENOUS at 10:59

## 2019-10-22 RX ADMIN — INSULIN LISPRO 4 UNITS: 100 INJECTION, SOLUTION INTRAVENOUS; SUBCUTANEOUS at 11:14

## 2019-10-22 RX ADMIN — METOCLOPRAMIDE HYDROCHLORIDE 5 MG: 5 TABLET ORAL at 16:42

## 2019-10-22 RX ADMIN — IPRATROPIUM BROMIDE AND ALBUTEROL SULFATE 3 ML: .5; 3 SOLUTION RESPIRATORY (INHALATION) at 05:39

## 2019-10-22 RX ADMIN — AZITHROMYCIN MONOHYDRATE 500 MG: 500 INJECTION, POWDER, LYOPHILIZED, FOR SOLUTION INTRAVENOUS at 11:33

## 2019-10-22 RX ADMIN — ASPIRIN 81 MG: 81 TABLET, COATED ORAL at 08:24

## 2019-10-22 RX ADMIN — FERROUS SULFATE TAB EC 324 MG (65 MG FE EQUIVALENT) 324 MG: 324 (65 FE) TABLET DELAYED RESPONSE at 11:34

## 2019-10-22 RX ADMIN — FERROUS SULFATE TAB EC 324 MG (65 MG FE EQUIVALENT) 324 MG: 324 (65 FE) TABLET DELAYED RESPONSE at 08:24

## 2019-10-22 RX ADMIN — METOCLOPRAMIDE HYDROCHLORIDE 5 MG: 5 TABLET ORAL at 10:59

## 2019-10-22 RX ADMIN — BUSPIRONE HYDROCHLORIDE 10 MG: 5 TABLET ORAL at 14:04

## 2019-10-22 ASSESSMENT — PAIN SCALES - GENERAL
PAINLEVEL_OUTOF10: 0

## 2019-10-23 ENCOUNTER — OUTSIDE FACILITY SERVICE (OUTPATIENT)
Dept: CARDIOLOGY | Facility: CLINIC | Age: 69
End: 2019-10-23

## 2019-10-23 ENCOUNTER — APPOINTMENT (OUTPATIENT)
Dept: GENERAL RADIOLOGY | Facility: HOSPITAL | Age: 69
DRG: 190 | End: 2019-10-23
Payer: MEDICARE

## 2019-10-23 LAB
ANION GAP SERPL CALCULATED.3IONS-SCNC: 14 MMOL/L (ref 3–16)
BUN BLDV-MCNC: 38 MG/DL (ref 6–20)
CALCIUM SERPL-MCNC: 9.8 MG/DL (ref 8.5–10.5)
CHLORIDE BLD-SCNC: 94 MMOL/L (ref 98–107)
CO2: 35 MMOL/L (ref 20–30)
CREAT SERPL-MCNC: 1.4 MG/DL (ref 0.4–1.2)
GFR AFRICAN AMERICAN: 45
GFR NON-AFRICAN AMERICAN: 37
GLUCOSE BLD-MCNC: 157 MG/DL (ref 74–106)
GLUCOSE BLD-MCNC: 241 MG/DL (ref 74–106)
GLUCOSE BLD-MCNC: 262 MG/DL (ref 74–106)
GLUCOSE BLD-MCNC: 289 MG/DL (ref 74–106)
GLUCOSE BLD-MCNC: 92 MG/DL (ref 74–106)
HCT VFR BLD CALC: 40.8 % (ref 37–47)
HEMOGLOBIN: 12.5 G/DL (ref 11.5–16.5)
LV EF: 50 %
LVEF MODALITY: NORMAL
MCH RBC QN AUTO: 30.7 PG (ref 27–32)
MCHC RBC AUTO-ENTMCNC: 30.6 G/DL (ref 31–35)
MCV RBC AUTO: 100.2 FL (ref 80–100)
PDW BLD-RTO: 14.4 % (ref 11–16)
PERFORMED ON: ABNORMAL
PERFORMED ON: NORMAL
PLATELET # BLD: 478 K/UL (ref 150–400)
PMV BLD AUTO: 10.5 FL (ref 6–10)
POTASSIUM SERPL-SCNC: 4.7 MMOL/L (ref 3.4–5.1)
RBC # BLD: 4.07 M/UL (ref 3.8–5.8)
SODIUM BLD-SCNC: 143 MMOL/L (ref 136–145)
URINE CULTURE, ROUTINE: NORMAL
WBC # BLD: 18.1 K/UL (ref 4–11)

## 2019-10-23 PROCEDURE — 6370000000 HC RX 637 (ALT 250 FOR IP): Performed by: PHYSICIAN ASSISTANT

## 2019-10-23 PROCEDURE — 2580000003 HC RX 258: Performed by: PHYSICIAN ASSISTANT

## 2019-10-23 PROCEDURE — 36415 COLL VENOUS BLD VENIPUNCTURE: CPT

## 2019-10-23 PROCEDURE — 6360000002 HC RX W HCPCS: Performed by: PHYSICIAN ASSISTANT

## 2019-10-23 PROCEDURE — 93306 TTE W/DOPPLER COMPLETE: CPT | Performed by: INTERNAL MEDICINE

## 2019-10-23 PROCEDURE — 2700000000 HC OXYGEN THERAPY PER DAY

## 2019-10-23 PROCEDURE — 1200000000 HC SEMI PRIVATE

## 2019-10-23 PROCEDURE — 85027 COMPLETE CBC AUTOMATED: CPT

## 2019-10-23 PROCEDURE — 80048 BASIC METABOLIC PNL TOTAL CA: CPT

## 2019-10-23 PROCEDURE — 71046 X-RAY EXAM CHEST 2 VIEWS: CPT

## 2019-10-23 PROCEDURE — 99231 SBSQ HOSP IP/OBS SF/LOW 25: CPT | Performed by: INTERNAL MEDICINE

## 2019-10-23 PROCEDURE — 94761 N-INVAS EAR/PLS OXIMETRY MLT: CPT

## 2019-10-23 PROCEDURE — 94640 AIRWAY INHALATION TREATMENT: CPT

## 2019-10-23 PROCEDURE — 93306 TTE W/DOPPLER COMPLETE: CPT

## 2019-10-23 RX ORDER — FUROSEMIDE 40 MG/1
40 TABLET ORAL DAILY
Status: DISCONTINUED | OUTPATIENT
Start: 2019-10-24 | End: 2019-10-26

## 2019-10-23 RX ADMIN — SUCRALFATE 1 G: 1 TABLET ORAL at 05:49

## 2019-10-23 RX ADMIN — IPRATROPIUM BROMIDE AND ALBUTEROL SULFATE 3 ML: .5; 3 SOLUTION RESPIRATORY (INHALATION) at 12:04

## 2019-10-23 RX ADMIN — FENOFIBRATE 160 MG: 160 TABLET ORAL at 09:27

## 2019-10-23 RX ADMIN — BUSPIRONE HYDROCHLORIDE 10 MG: 5 TABLET ORAL at 12:33

## 2019-10-23 RX ADMIN — BUDESONIDE 500 MCG: 0.5 SUSPENSION RESPIRATORY (INHALATION) at 05:13

## 2019-10-23 RX ADMIN — TRAMADOL HYDROCHLORIDE 50 MG: 50 TABLET, FILM COATED ORAL at 21:11

## 2019-10-23 RX ADMIN — METOCLOPRAMIDE HYDROCHLORIDE 5 MG: 5 TABLET ORAL at 05:50

## 2019-10-23 RX ADMIN — SUCRALFATE 1 G: 1 TABLET ORAL at 12:33

## 2019-10-23 RX ADMIN — Medication 10 ML: at 21:15

## 2019-10-23 RX ADMIN — INSULIN LISPRO 35 UNITS: 100 INJECTION, SOLUTION INTRAVENOUS; SUBCUTANEOUS at 11:28

## 2019-10-23 RX ADMIN — AZITHROMYCIN MONOHYDRATE 500 MG: 500 INJECTION, POWDER, LYOPHILIZED, FOR SOLUTION INTRAVENOUS at 11:26

## 2019-10-23 RX ADMIN — METOCLOPRAMIDE HYDROCHLORIDE 5 MG: 5 TABLET ORAL at 11:26

## 2019-10-23 RX ADMIN — INSULIN LISPRO 6 UNITS: 100 INJECTION, SOLUTION INTRAVENOUS; SUBCUTANEOUS at 11:27

## 2019-10-23 RX ADMIN — BUSPIRONE HYDROCHLORIDE 10 MG: 5 TABLET ORAL at 21:11

## 2019-10-23 RX ADMIN — LINAGLIPTIN 5 MG: 5 TABLET, FILM COATED ORAL at 09:28

## 2019-10-23 RX ADMIN — METHYLPREDNISOLONE SODIUM SUCCINATE 40 MG: 40 INJECTION, POWDER, FOR SOLUTION INTRAMUSCULAR; INTRAVENOUS at 21:11

## 2019-10-23 RX ADMIN — CETIRIZINE HYDROCHLORIDE 5 MG: 10 TABLET, FILM COATED ORAL at 09:27

## 2019-10-23 RX ADMIN — POTASSIUM CHLORIDE 20 MEQ: 20 TABLET, EXTENDED RELEASE ORAL at 09:27

## 2019-10-23 RX ADMIN — METOCLOPRAMIDE HYDROCHLORIDE 5 MG: 5 TABLET ORAL at 16:22

## 2019-10-23 RX ADMIN — ASPIRIN 81 MG: 81 TABLET, COATED ORAL at 09:27

## 2019-10-23 RX ADMIN — BUSPIRONE HYDROCHLORIDE 10 MG: 5 TABLET ORAL at 09:27

## 2019-10-23 RX ADMIN — INSULIN LISPRO 4 UNITS: 100 INJECTION, SOLUTION INTRAVENOUS; SUBCUTANEOUS at 09:31

## 2019-10-23 RX ADMIN — ROPINIROLE HYDROCHLORIDE 0.5 MG: 0.25 TABLET, FILM COATED ORAL at 09:28

## 2019-10-23 RX ADMIN — FERROUS SULFATE TAB EC 324 MG (65 MG FE EQUIVALENT) 324 MG: 324 (65 FE) TABLET DELAYED RESPONSE at 16:23

## 2019-10-23 RX ADMIN — ROFLUMILAST 250 MCG: 500 TABLET ORAL at 09:28

## 2019-10-23 RX ADMIN — ROPINIROLE HYDROCHLORIDE 0.5 MG: 0.25 TABLET, FILM COATED ORAL at 21:11

## 2019-10-23 RX ADMIN — ENOXAPARIN SODIUM 40 MG: 40 INJECTION SUBCUTANEOUS at 09:27

## 2019-10-23 RX ADMIN — INSULIN GLARGINE 35 UNITS: 100 INJECTION, SOLUTION SUBCUTANEOUS at 09:30

## 2019-10-23 RX ADMIN — METHYLPREDNISOLONE SODIUM SUCCINATE 40 MG: 40 INJECTION, POWDER, FOR SOLUTION INTRAMUSCULAR; INTRAVENOUS at 11:26

## 2019-10-23 RX ADMIN — IPRATROPIUM BROMIDE AND ALBUTEROL SULFATE 3 ML: .5; 3 SOLUTION RESPIRATORY (INHALATION) at 05:13

## 2019-10-23 RX ADMIN — METOPROLOL TARTRATE 25 MG: 25 TABLET ORAL at 09:29

## 2019-10-23 RX ADMIN — IPRATROPIUM BROMIDE AND ALBUTEROL SULFATE 3 ML: .5; 3 SOLUTION RESPIRATORY (INHALATION) at 17:18

## 2019-10-23 RX ADMIN — FUROSEMIDE 40 MG: 40 TABLET ORAL at 09:27

## 2019-10-23 RX ADMIN — Medication 10 ML: at 09:29

## 2019-10-23 RX ADMIN — FERROUS SULFATE TAB EC 324 MG (65 MG FE EQUIVALENT) 324 MG: 324 (65 FE) TABLET DELAYED RESPONSE at 11:26

## 2019-10-23 RX ADMIN — METOPROLOL TARTRATE 25 MG: 25 TABLET ORAL at 21:11

## 2019-10-23 RX ADMIN — MONTELUKAST 10 MG: 10 TABLET, FILM COATED ORAL at 09:27

## 2019-10-23 RX ADMIN — Medication 1 CAPSULE: at 09:28

## 2019-10-23 RX ADMIN — PANTOPRAZOLE SODIUM 40 MG: 40 TABLET, DELAYED RELEASE ORAL at 09:28

## 2019-10-23 RX ADMIN — IPRATROPIUM BROMIDE AND ALBUTEROL SULFATE 3 ML: .5; 3 SOLUTION RESPIRATORY (INHALATION) at 22:17

## 2019-10-23 RX ADMIN — INSULIN LISPRO 35 UNITS: 100 INJECTION, SOLUTION INTRAVENOUS; SUBCUTANEOUS at 09:33

## 2019-10-23 RX ADMIN — BUDESONIDE 500 MCG: 0.5 SUSPENSION RESPIRATORY (INHALATION) at 17:18

## 2019-10-23 RX ADMIN — FERROUS SULFATE TAB EC 324 MG (65 MG FE EQUIVALENT) 324 MG: 324 (65 FE) TABLET DELAYED RESPONSE at 09:28

## 2019-10-23 RX ADMIN — Medication 10 ML: at 05:48

## 2019-10-23 RX ADMIN — CEFTRIAXONE 1 G: 1 INJECTION, POWDER, FOR SOLUTION INTRAMUSCULAR; INTRAVENOUS at 09:38

## 2019-10-23 RX ADMIN — ALLOPURINOL 150 MG: 100 TABLET ORAL at 09:27

## 2019-10-23 RX ADMIN — METHYLPREDNISOLONE SODIUM SUCCINATE 40 MG: 40 INJECTION, POWDER, FOR SOLUTION INTRAMUSCULAR; INTRAVENOUS at 05:48

## 2019-10-23 ASSESSMENT — PAIN SCALES - GENERAL: PAINLEVEL_OUTOF10: 7

## 2019-10-24 LAB
ANION GAP SERPL CALCULATED.3IONS-SCNC: 15 MMOL/L (ref 3–16)
BUN BLDV-MCNC: 53 MG/DL (ref 6–20)
CALCIUM SERPL-MCNC: 9.6 MG/DL (ref 8.5–10.5)
CHLORIDE BLD-SCNC: 94 MMOL/L (ref 98–107)
CO2: 34 MMOL/L (ref 20–30)
CREAT SERPL-MCNC: 1.4 MG/DL (ref 0.4–1.2)
CULTURE, RESPIRATORY: NORMAL
GFR AFRICAN AMERICAN: 45
GFR NON-AFRICAN AMERICAN: 37
GLUCOSE BLD-MCNC: 119 MG/DL (ref 74–106)
GLUCOSE BLD-MCNC: 126 MG/DL (ref 74–106)
GLUCOSE BLD-MCNC: 227 MG/DL (ref 74–106)
GLUCOSE BLD-MCNC: 267 MG/DL (ref 74–106)
GLUCOSE BLD-MCNC: 294 MG/DL (ref 74–106)
GLUCOSE BLD-MCNC: 319 MG/DL (ref 74–106)
GRAM STAIN RESULT: NORMAL
HCT VFR BLD CALC: 39.6 % (ref 37–47)
HEMOGLOBIN: 12.3 G/DL (ref 11.5–16.5)
MCH RBC QN AUTO: 31.4 PG (ref 27–32)
MCHC RBC AUTO-ENTMCNC: 31.1 G/DL (ref 31–35)
MCV RBC AUTO: 101 FL (ref 80–100)
PDW BLD-RTO: 14.5 % (ref 11–16)
PERFORMED ON: ABNORMAL
PLATELET # BLD: 475 K/UL (ref 150–400)
PMV BLD AUTO: 10.6 FL (ref 6–10)
POTASSIUM SERPL-SCNC: 5.1 MMOL/L (ref 3.4–5.1)
RBC # BLD: 3.92 M/UL (ref 3.8–5.8)
SODIUM BLD-SCNC: 143 MMOL/L (ref 136–145)
WBC # BLD: 16.5 K/UL (ref 4–11)

## 2019-10-24 PROCEDURE — 6370000000 HC RX 637 (ALT 250 FOR IP): Performed by: PHYSICIAN ASSISTANT

## 2019-10-24 PROCEDURE — 2700000000 HC OXYGEN THERAPY PER DAY

## 2019-10-24 PROCEDURE — 6360000002 HC RX W HCPCS: Performed by: PHYSICIAN ASSISTANT

## 2019-10-24 PROCEDURE — 94669 MECHANICAL CHEST WALL OSCILL: CPT

## 2019-10-24 PROCEDURE — 6370000000 HC RX 637 (ALT 250 FOR IP): Performed by: INTERNAL MEDICINE

## 2019-10-24 PROCEDURE — 1200000000 HC SEMI PRIVATE

## 2019-10-24 PROCEDURE — 80048 BASIC METABOLIC PNL TOTAL CA: CPT

## 2019-10-24 PROCEDURE — 36415 COLL VENOUS BLD VENIPUNCTURE: CPT

## 2019-10-24 PROCEDURE — 2580000003 HC RX 258: Performed by: PHYSICIAN ASSISTANT

## 2019-10-24 PROCEDURE — 99231 SBSQ HOSP IP/OBS SF/LOW 25: CPT | Performed by: INTERNAL MEDICINE

## 2019-10-24 PROCEDURE — 85027 COMPLETE CBC AUTOMATED: CPT

## 2019-10-24 PROCEDURE — 6370000000 HC RX 637 (ALT 250 FOR IP): Performed by: PEDIATRICS

## 2019-10-24 PROCEDURE — 94640 AIRWAY INHALATION TREATMENT: CPT

## 2019-10-24 RX ORDER — FERROUS SULFATE TAB EC 324 MG (65 MG FE EQUIVALENT) 324 (65 FE) MG
324 TABLET DELAYED RESPONSE ORAL
Status: DISCONTINUED | OUTPATIENT
Start: 2019-10-24 | End: 2019-10-26 | Stop reason: HOSPADM

## 2019-10-24 RX ORDER — INSULIN GLARGINE 100 [IU]/ML
35 INJECTION, SOLUTION SUBCUTANEOUS 2 TIMES DAILY
Status: DISCONTINUED | OUTPATIENT
Start: 2019-10-24 | End: 2019-10-26 | Stop reason: HOSPADM

## 2019-10-24 RX ORDER — METHYLPREDNISOLONE SODIUM SUCCINATE 40 MG/ML
40 INJECTION, POWDER, LYOPHILIZED, FOR SOLUTION INTRAMUSCULAR; INTRAVENOUS EVERY 12 HOURS
Status: DISCONTINUED | OUTPATIENT
Start: 2019-10-24 | End: 2019-10-25

## 2019-10-24 RX ORDER — GUAIFENESIN 600 MG/1
600 TABLET, EXTENDED RELEASE ORAL 2 TIMES DAILY
Status: DISCONTINUED | OUTPATIENT
Start: 2019-10-24 | End: 2019-10-26 | Stop reason: HOSPADM

## 2019-10-24 RX ADMIN — METHYLPREDNISOLONE SODIUM SUCCINATE 40 MG: 40 INJECTION, POWDER, FOR SOLUTION INTRAMUSCULAR; INTRAVENOUS at 05:23

## 2019-10-24 RX ADMIN — INSULIN GLARGINE 35 UNITS: 100 INJECTION, SOLUTION SUBCUTANEOUS at 08:52

## 2019-10-24 RX ADMIN — BUDESONIDE 500 MCG: 0.5 SUSPENSION RESPIRATORY (INHALATION) at 08:42

## 2019-10-24 RX ADMIN — IPRATROPIUM BROMIDE AND ALBUTEROL SULFATE 3 ML: .5; 3 SOLUTION RESPIRATORY (INHALATION) at 20:18

## 2019-10-24 RX ADMIN — IPRATROPIUM BROMIDE AND ALBUTEROL SULFATE 3 ML: .5; 3 SOLUTION RESPIRATORY (INHALATION) at 16:28

## 2019-10-24 RX ADMIN — IPRATROPIUM BROMIDE AND ALBUTEROL SULFATE 3 ML: .5; 3 SOLUTION RESPIRATORY (INHALATION) at 05:29

## 2019-10-24 RX ADMIN — DICLOFENAC 2 G: 10 GEL TOPICAL at 08:45

## 2019-10-24 RX ADMIN — METOCLOPRAMIDE HYDROCHLORIDE 5 MG: 5 TABLET ORAL at 05:23

## 2019-10-24 RX ADMIN — TRAMADOL HYDROCHLORIDE 50 MG: 50 TABLET, FILM COATED ORAL at 20:53

## 2019-10-24 RX ADMIN — LINAGLIPTIN 5 MG: 5 TABLET, FILM COATED ORAL at 08:44

## 2019-10-24 RX ADMIN — DICLOFENAC 2 G: 10 GEL TOPICAL at 14:59

## 2019-10-24 RX ADMIN — ROPINIROLE HYDROCHLORIDE 0.5 MG: 0.25 TABLET, FILM COATED ORAL at 08:42

## 2019-10-24 RX ADMIN — FERROUS SULFATE TAB EC 324 MG (65 MG FE EQUIVALENT) 324 MG: 324 (65 FE) TABLET DELAYED RESPONSE at 17:07

## 2019-10-24 RX ADMIN — POTASSIUM CHLORIDE 20 MEQ: 20 TABLET, EXTENDED RELEASE ORAL at 08:45

## 2019-10-24 RX ADMIN — SUCRALFATE 1 G: 1 TABLET ORAL at 05:23

## 2019-10-24 RX ADMIN — MONTELUKAST 10 MG: 10 TABLET, FILM COATED ORAL at 08:43

## 2019-10-24 RX ADMIN — PANTOPRAZOLE SODIUM 40 MG: 40 TABLET, DELAYED RELEASE ORAL at 08:44

## 2019-10-24 RX ADMIN — CETIRIZINE HYDROCHLORIDE 5 MG: 10 TABLET, FILM COATED ORAL at 08:44

## 2019-10-24 RX ADMIN — ROFLUMILAST 250 MCG: 500 TABLET ORAL at 08:44

## 2019-10-24 RX ADMIN — GUAIFENESIN 600 MG: 600 TABLET, EXTENDED RELEASE ORAL at 14:53

## 2019-10-24 RX ADMIN — BUSPIRONE HYDROCHLORIDE 10 MG: 5 TABLET ORAL at 14:53

## 2019-10-24 RX ADMIN — SUCRALFATE 1 G: 1 TABLET ORAL at 20:53

## 2019-10-24 RX ADMIN — Medication 1 CAPSULE: at 08:44

## 2019-10-24 RX ADMIN — GUAIFENESIN 600 MG: 600 TABLET, EXTENDED RELEASE ORAL at 20:52

## 2019-10-24 RX ADMIN — IPRATROPIUM BROMIDE AND ALBUTEROL SULFATE 3 ML: .5; 3 SOLUTION RESPIRATORY (INHALATION) at 12:18

## 2019-10-24 RX ADMIN — DICLOFENAC 2 G: 10 GEL TOPICAL at 00:34

## 2019-10-24 RX ADMIN — INSULIN LISPRO 35 UNITS: 100 INJECTION, SOLUTION INTRAVENOUS; SUBCUTANEOUS at 08:54

## 2019-10-24 RX ADMIN — Medication 10 ML: at 21:03

## 2019-10-24 RX ADMIN — METOPROLOL TARTRATE 25 MG: 25 TABLET ORAL at 20:53

## 2019-10-24 RX ADMIN — DICLOFENAC 2 G: 10 GEL TOPICAL at 20:52

## 2019-10-24 RX ADMIN — IPRATROPIUM BROMIDE AND ALBUTEROL SULFATE 3 ML: .5; 3 SOLUTION RESPIRATORY (INHALATION) at 08:43

## 2019-10-24 RX ADMIN — INSULIN LISPRO 9 UNITS: 100 INJECTION, SOLUTION INTRAVENOUS; SUBCUTANEOUS at 11:28

## 2019-10-24 RX ADMIN — METOPROLOL TARTRATE 25 MG: 25 TABLET ORAL at 08:43

## 2019-10-24 RX ADMIN — FERROUS SULFATE TAB EC 324 MG (65 MG FE EQUIVALENT) 324 MG: 324 (65 FE) TABLET DELAYED RESPONSE at 08:43

## 2019-10-24 RX ADMIN — CEFTRIAXONE 1 G: 1 INJECTION, POWDER, FOR SOLUTION INTRAMUSCULAR; INTRAVENOUS at 11:02

## 2019-10-24 RX ADMIN — METOCLOPRAMIDE HYDROCHLORIDE 5 MG: 5 TABLET ORAL at 11:28

## 2019-10-24 RX ADMIN — ROPINIROLE HYDROCHLORIDE 0.5 MG: 0.25 TABLET, FILM COATED ORAL at 20:52

## 2019-10-24 RX ADMIN — BUSPIRONE HYDROCHLORIDE 10 MG: 5 TABLET ORAL at 20:53

## 2019-10-24 RX ADMIN — SUCRALFATE 1 G: 1 TABLET ORAL at 00:34

## 2019-10-24 RX ADMIN — AZITHROMYCIN MONOHYDRATE 500 MG: 500 INJECTION, POWDER, LYOPHILIZED, FOR SOLUTION INTRAVENOUS at 11:39

## 2019-10-24 RX ADMIN — Medication 10 ML: at 10:23

## 2019-10-24 RX ADMIN — INSULIN LISPRO 35 UNITS: 100 INJECTION, SOLUTION INTRAVENOUS; SUBCUTANEOUS at 11:37

## 2019-10-24 RX ADMIN — METOCLOPRAMIDE HYDROCHLORIDE 5 MG: 5 TABLET ORAL at 14:53

## 2019-10-24 RX ADMIN — INSULIN LISPRO 6 UNITS: 100 INJECTION, SOLUTION INTRAVENOUS; SUBCUTANEOUS at 08:54

## 2019-10-24 RX ADMIN — BUSPIRONE HYDROCHLORIDE 10 MG: 5 TABLET ORAL at 08:42

## 2019-10-24 RX ADMIN — ALLOPURINOL 150 MG: 100 TABLET ORAL at 08:44

## 2019-10-24 RX ADMIN — INSULIN GLARGINE 35 UNITS: 100 INJECTION, SOLUTION SUBCUTANEOUS at 21:00

## 2019-10-24 RX ADMIN — ASPIRIN 81 MG: 81 TABLET, COATED ORAL at 08:43

## 2019-10-24 RX ADMIN — FENOFIBRATE 160 MG: 160 TABLET ORAL at 08:44

## 2019-10-24 RX ADMIN — INSULIN LISPRO 2 UNITS: 100 INJECTION, SOLUTION INTRAVENOUS; SUBCUTANEOUS at 20:59

## 2019-10-24 RX ADMIN — FERROUS SULFATE TAB EC 324 MG (65 MG FE EQUIVALENT) 325 MG: 324 (65 FE) TABLET DELAYED RESPONSE at 11:27

## 2019-10-24 RX ADMIN — ENOXAPARIN SODIUM 40 MG: 40 INJECTION SUBCUTANEOUS at 08:42

## 2019-10-24 RX ADMIN — METHYLPREDNISOLONE SODIUM SUCCINATE 40 MG: 40 INJECTION, POWDER, FOR SOLUTION INTRAMUSCULAR; INTRAVENOUS at 18:14

## 2019-10-24 RX ADMIN — FUROSEMIDE 40 MG: 40 TABLET ORAL at 08:44

## 2019-10-24 RX ADMIN — BUDESONIDE 500 MCG: 0.5 SUSPENSION RESPIRATORY (INHALATION) at 20:18

## 2019-10-24 RX ADMIN — SUCRALFATE 1 G: 1 TABLET ORAL at 14:53

## 2019-10-24 ASSESSMENT — PAIN SCALES - GENERAL: PAINLEVEL_OUTOF10: 6

## 2019-10-25 ENCOUNTER — APPOINTMENT (OUTPATIENT)
Dept: GENERAL RADIOLOGY | Facility: HOSPITAL | Age: 69
DRG: 190 | End: 2019-10-25
Payer: MEDICARE

## 2019-10-25 DIAGNOSIS — M54.50 CHRONIC BILATERAL LOW BACK PAIN WITHOUT SCIATICA: ICD-10-CM

## 2019-10-25 DIAGNOSIS — G89.29 CHRONIC BILATERAL LOW BACK PAIN WITHOUT SCIATICA: ICD-10-CM

## 2019-10-25 LAB
ANION GAP SERPL CALCULATED.3IONS-SCNC: 13 MMOL/L (ref 3–16)
BUN BLDV-MCNC: 60 MG/DL (ref 6–20)
CALCIUM SERPL-MCNC: 9.4 MG/DL (ref 8.5–10.5)
CHLORIDE BLD-SCNC: 96 MMOL/L (ref 98–107)
CO2: 35 MMOL/L (ref 20–30)
CREAT SERPL-MCNC: 1.5 MG/DL (ref 0.4–1.2)
GFR AFRICAN AMERICAN: 42
GFR NON-AFRICAN AMERICAN: 34
GLUCOSE BLD-MCNC: 112 MG/DL (ref 74–106)
GLUCOSE BLD-MCNC: 113 MG/DL (ref 74–106)
GLUCOSE BLD-MCNC: 227 MG/DL (ref 74–106)
GLUCOSE BLD-MCNC: 291 MG/DL (ref 74–106)
GLUCOSE BLD-MCNC: 321 MG/DL (ref 74–106)
GLUCOSE BLD-MCNC: 54 MG/DL (ref 74–106)
HCT VFR BLD CALC: 40.2 % (ref 37–47)
HEMOGLOBIN: 12.7 G/DL (ref 11.5–16.5)
MCH RBC QN AUTO: 31.9 PG (ref 27–32)
MCHC RBC AUTO-ENTMCNC: 31.6 G/DL (ref 31–35)
MCV RBC AUTO: 101 FL (ref 80–100)
PDW BLD-RTO: 14.5 % (ref 11–16)
PERFORMED ON: ABNORMAL
PLATELET # BLD: 466 K/UL (ref 150–400)
PMV BLD AUTO: 10.9 FL (ref 6–10)
POTASSIUM SERPL-SCNC: 4.9 MMOL/L (ref 3.4–5.1)
PRO-BNP: 329 PG/ML (ref 0–1800)
RBC # BLD: 3.98 M/UL (ref 3.8–5.8)
SODIUM BLD-SCNC: 144 MMOL/L (ref 136–145)
WBC # BLD: 16.3 K/UL (ref 4–11)

## 2019-10-25 PROCEDURE — 2580000003 HC RX 258: Performed by: PHYSICIAN ASSISTANT

## 2019-10-25 PROCEDURE — 85027 COMPLETE CBC AUTOMATED: CPT

## 2019-10-25 PROCEDURE — 6360000002 HC RX W HCPCS: Performed by: PHYSICIAN ASSISTANT

## 2019-10-25 PROCEDURE — 94761 N-INVAS EAR/PLS OXIMETRY MLT: CPT

## 2019-10-25 PROCEDURE — 2700000000 HC OXYGEN THERAPY PER DAY

## 2019-10-25 PROCEDURE — 6370000000 HC RX 637 (ALT 250 FOR IP): Performed by: PHYSICIAN ASSISTANT

## 2019-10-25 PROCEDURE — 6370000000 HC RX 637 (ALT 250 FOR IP): Performed by: INTERNAL MEDICINE

## 2019-10-25 PROCEDURE — 83880 ASSAY OF NATRIURETIC PEPTIDE: CPT

## 2019-10-25 PROCEDURE — 80048 BASIC METABOLIC PNL TOTAL CA: CPT

## 2019-10-25 PROCEDURE — 94640 AIRWAY INHALATION TREATMENT: CPT

## 2019-10-25 PROCEDURE — 36415 COLL VENOUS BLD VENIPUNCTURE: CPT

## 2019-10-25 PROCEDURE — 71046 X-RAY EXAM CHEST 2 VIEWS: CPT

## 2019-10-25 PROCEDURE — 99231 SBSQ HOSP IP/OBS SF/LOW 25: CPT | Performed by: INTERNAL MEDICINE

## 2019-10-25 PROCEDURE — 1200000000 HC SEMI PRIVATE

## 2019-10-25 RX ORDER — TRAMADOL HYDROCHLORIDE 50 MG/1
TABLET ORAL
Qty: 90 TABLET | Refills: 2 | Status: SHIPPED | OUTPATIENT
Start: 2019-10-25 | End: 2019-11-24

## 2019-10-25 RX ORDER — IPRATROPIUM BROMIDE AND ALBUTEROL SULFATE 2.5; .5 MG/3ML; MG/3ML
1 SOLUTION RESPIRATORY (INHALATION)
Status: DISCONTINUED | OUTPATIENT
Start: 2019-10-26 | End: 2019-10-26 | Stop reason: HOSPADM

## 2019-10-25 RX ORDER — METHYLPREDNISOLONE SODIUM SUCCINATE 40 MG/ML
40 INJECTION, POWDER, LYOPHILIZED, FOR SOLUTION INTRAMUSCULAR; INTRAVENOUS DAILY
Status: DISCONTINUED | OUTPATIENT
Start: 2019-10-26 | End: 2019-10-26 | Stop reason: HOSPADM

## 2019-10-25 RX ADMIN — ROPINIROLE HYDROCHLORIDE 0.5 MG: 0.25 TABLET, FILM COATED ORAL at 21:11

## 2019-10-25 RX ADMIN — DICLOFENAC 2 G: 10 GEL TOPICAL at 21:13

## 2019-10-25 RX ADMIN — ASPIRIN 81 MG: 81 TABLET, COATED ORAL at 09:30

## 2019-10-25 RX ADMIN — BUSPIRONE HYDROCHLORIDE 10 MG: 5 TABLET ORAL at 21:11

## 2019-10-25 RX ADMIN — LINAGLIPTIN 5 MG: 5 TABLET, FILM COATED ORAL at 09:30

## 2019-10-25 RX ADMIN — BUDESONIDE 500 MCG: 0.5 SUSPENSION RESPIRATORY (INHALATION) at 05:35

## 2019-10-25 RX ADMIN — PANTOPRAZOLE SODIUM 40 MG: 40 TABLET, DELAYED RELEASE ORAL at 09:31

## 2019-10-25 RX ADMIN — METOCLOPRAMIDE HYDROCHLORIDE 5 MG: 5 TABLET ORAL at 11:32

## 2019-10-25 RX ADMIN — FERROUS SULFATE TAB EC 324 MG (65 MG FE EQUIVALENT) 324 MG: 324 (65 FE) TABLET DELAYED RESPONSE at 17:24

## 2019-10-25 RX ADMIN — FERROUS SULFATE TAB EC 324 MG (65 MG FE EQUIVALENT) 324 MG: 324 (65 FE) TABLET DELAYED RESPONSE at 11:32

## 2019-10-25 RX ADMIN — TRAMADOL HYDROCHLORIDE 50 MG: 50 TABLET, FILM COATED ORAL at 21:12

## 2019-10-25 RX ADMIN — ROPINIROLE HYDROCHLORIDE 0.5 MG: 0.25 TABLET, FILM COATED ORAL at 09:29

## 2019-10-25 RX ADMIN — Medication 10 ML: at 21:14

## 2019-10-25 RX ADMIN — ENOXAPARIN SODIUM 40 MG: 40 INJECTION SUBCUTANEOUS at 09:29

## 2019-10-25 RX ADMIN — METOPROLOL TARTRATE 25 MG: 25 TABLET ORAL at 21:11

## 2019-10-25 RX ADMIN — METHYLPREDNISOLONE SODIUM SUCCINATE 40 MG: 40 INJECTION, POWDER, FOR SOLUTION INTRAMUSCULAR; INTRAVENOUS at 06:00

## 2019-10-25 RX ADMIN — INSULIN GLARGINE 35 UNITS: 100 INJECTION, SOLUTION SUBCUTANEOUS at 09:35

## 2019-10-25 RX ADMIN — MONTELUKAST 10 MG: 10 TABLET, FILM COATED ORAL at 09:30

## 2019-10-25 RX ADMIN — INSULIN LISPRO 35 UNITS: 100 INJECTION, SOLUTION INTRAVENOUS; SUBCUTANEOUS at 11:41

## 2019-10-25 RX ADMIN — METOCLOPRAMIDE HYDROCHLORIDE 5 MG: 5 TABLET ORAL at 06:01

## 2019-10-25 RX ADMIN — INSULIN LISPRO 35 UNITS: 100 INJECTION, SOLUTION INTRAVENOUS; SUBCUTANEOUS at 09:51

## 2019-10-25 RX ADMIN — INSULIN LISPRO 4 UNITS: 100 INJECTION, SOLUTION INTRAVENOUS; SUBCUTANEOUS at 09:33

## 2019-10-25 RX ADMIN — POTASSIUM CHLORIDE 20 MEQ: 20 TABLET, EXTENDED RELEASE ORAL at 09:29

## 2019-10-25 RX ADMIN — METOCLOPRAMIDE HYDROCHLORIDE 5 MG: 5 TABLET ORAL at 15:50

## 2019-10-25 RX ADMIN — INSULIN LISPRO 8 UNITS: 100 INJECTION, SOLUTION INTRAVENOUS; SUBCUTANEOUS at 11:33

## 2019-10-25 RX ADMIN — IPRATROPIUM BROMIDE AND ALBUTEROL SULFATE 3 ML: .5; 3 SOLUTION RESPIRATORY (INHALATION) at 05:35

## 2019-10-25 RX ADMIN — FENOFIBRATE 160 MG: 160 TABLET ORAL at 09:29

## 2019-10-25 RX ADMIN — SUCRALFATE 1 G: 1 TABLET ORAL at 21:11

## 2019-10-25 RX ADMIN — SUCRALFATE 1 G: 1 TABLET ORAL at 06:01

## 2019-10-25 RX ADMIN — ALLOPURINOL 150 MG: 100 TABLET ORAL at 09:29

## 2019-10-25 RX ADMIN — IPRATROPIUM BROMIDE AND ALBUTEROL SULFATE 3 ML: .5; 3 SOLUTION RESPIRATORY (INHALATION) at 09:20

## 2019-10-25 RX ADMIN — BUSPIRONE HYDROCHLORIDE 10 MG: 5 TABLET ORAL at 15:50

## 2019-10-25 RX ADMIN — Medication 10 ML: at 09:28

## 2019-10-25 RX ADMIN — AZITHROMYCIN MONOHYDRATE 500 MG: 500 INJECTION, POWDER, LYOPHILIZED, FOR SOLUTION INTRAVENOUS at 13:00

## 2019-10-25 RX ADMIN — SUCRALFATE 1 G: 1 TABLET ORAL at 15:54

## 2019-10-25 RX ADMIN — DICLOFENAC 2 G: 10 GEL TOPICAL at 09:28

## 2019-10-25 RX ADMIN — METOPROLOL TARTRATE 25 MG: 25 TABLET ORAL at 09:31

## 2019-10-25 RX ADMIN — GUAIFENESIN 600 MG: 600 TABLET, EXTENDED RELEASE ORAL at 09:31

## 2019-10-25 RX ADMIN — CEFTRIAXONE 1 G: 1 INJECTION, POWDER, FOR SOLUTION INTRAMUSCULAR; INTRAVENOUS at 11:32

## 2019-10-25 RX ADMIN — GUAIFENESIN 600 MG: 600 TABLET, EXTENDED RELEASE ORAL at 21:11

## 2019-10-25 RX ADMIN — CETIRIZINE HYDROCHLORIDE 5 MG: 10 TABLET, FILM COATED ORAL at 09:31

## 2019-10-25 RX ADMIN — ROFLUMILAST 250 MCG: 500 TABLET ORAL at 09:30

## 2019-10-25 RX ADMIN — BUSPIRONE HYDROCHLORIDE 10 MG: 5 TABLET ORAL at 09:29

## 2019-10-25 RX ADMIN — FERROUS SULFATE TAB EC 324 MG (65 MG FE EQUIVALENT) 324 MG: 324 (65 FE) TABLET DELAYED RESPONSE at 09:30

## 2019-10-25 RX ADMIN — Medication 1 CAPSULE: at 09:31

## 2019-10-25 ASSESSMENT — PAIN SCALES - GENERAL: PAINLEVEL_OUTOF10: 8

## 2019-10-26 VITALS
WEIGHT: 191 LBS | HEIGHT: 58 IN | TEMPERATURE: 97.9 F | SYSTOLIC BLOOD PRESSURE: 130 MMHG | BODY MASS INDEX: 40.09 KG/M2 | RESPIRATION RATE: 18 BRPM | DIASTOLIC BLOOD PRESSURE: 73 MMHG | HEART RATE: 79 BPM | OXYGEN SATURATION: 99 %

## 2019-10-26 DIAGNOSIS — R60.9 SWELLING: ICD-10-CM

## 2019-10-26 DIAGNOSIS — E78.00 PURE HYPERCHOLESTEROLEMIA: ICD-10-CM

## 2019-10-26 LAB
ANION GAP SERPL CALCULATED.3IONS-SCNC: 10 MMOL/L (ref 3–16)
BLOOD CULTURE, ROUTINE: NORMAL
BUN BLDV-MCNC: 51 MG/DL (ref 6–20)
CALCIUM SERPL-MCNC: 9.8 MG/DL (ref 8.5–10.5)
CHLORIDE BLD-SCNC: 99 MMOL/L (ref 98–107)
CO2: 36 MMOL/L (ref 20–30)
CREAT SERPL-MCNC: 1.3 MG/DL (ref 0.4–1.2)
CULTURE, BLOOD 2: NORMAL
GFR AFRICAN AMERICAN: 49
GFR NON-AFRICAN AMERICAN: 41
GLUCOSE BLD-MCNC: 118 MG/DL (ref 74–106)
GLUCOSE BLD-MCNC: 196 MG/DL (ref 74–106)
GLUCOSE BLD-MCNC: 204 MG/DL (ref 74–106)
PERFORMED ON: ABNORMAL
PERFORMED ON: ABNORMAL
POTASSIUM SERPL-SCNC: 4.9 MMOL/L (ref 3.4–5.1)
SODIUM BLD-SCNC: 145 MMOL/L (ref 136–145)

## 2019-10-26 PROCEDURE — 6370000000 HC RX 637 (ALT 250 FOR IP): Performed by: INTERNAL MEDICINE

## 2019-10-26 PROCEDURE — 94761 N-INVAS EAR/PLS OXIMETRY MLT: CPT

## 2019-10-26 PROCEDURE — 2700000000 HC OXYGEN THERAPY PER DAY

## 2019-10-26 PROCEDURE — 94640 AIRWAY INHALATION TREATMENT: CPT

## 2019-10-26 PROCEDURE — 99238 HOSP IP/OBS DSCHRG MGMT 30/<: CPT | Performed by: INTERNAL MEDICINE

## 2019-10-26 PROCEDURE — 6370000000 HC RX 637 (ALT 250 FOR IP): Performed by: PHYSICIAN ASSISTANT

## 2019-10-26 PROCEDURE — 6360000002 HC RX W HCPCS: Performed by: PHYSICIAN ASSISTANT

## 2019-10-26 PROCEDURE — 36415 COLL VENOUS BLD VENIPUNCTURE: CPT

## 2019-10-26 PROCEDURE — 2580000003 HC RX 258: Performed by: PHYSICIAN ASSISTANT

## 2019-10-26 PROCEDURE — 80048 BASIC METABOLIC PNL TOTAL CA: CPT

## 2019-10-26 PROCEDURE — 99238 HOSP IP/OBS DSCHRG MGMT 30/<: CPT | Performed by: PHYSICIAN ASSISTANT

## 2019-10-26 RX ORDER — FUROSEMIDE 40 MG/1
40 TABLET ORAL DAILY
Qty: 60 TABLET | Refills: 5 | Status: SHIPPED
Start: 2019-10-26 | End: 2020-06-15

## 2019-10-26 RX ORDER — FUROSEMIDE 40 MG/1
40 TABLET ORAL DAILY
Status: DISCONTINUED | OUTPATIENT
Start: 2019-10-26 | End: 2019-10-26 | Stop reason: HOSPADM

## 2019-10-26 RX ORDER — PREDNISONE 10 MG/1
TABLET ORAL
Qty: 18 TABLET | Refills: 0 | Status: SHIPPED | OUTPATIENT
Start: 2019-10-26 | End: 2019-11-07 | Stop reason: ALTCHOICE

## 2019-10-26 RX ORDER — GUAIFENESIN 600 MG/1
600 TABLET, EXTENDED RELEASE ORAL 2 TIMES DAILY
Qty: 20 TABLET | Refills: 0 | Status: SHIPPED | OUTPATIENT
Start: 2019-10-26 | End: 2019-11-05

## 2019-10-26 RX ORDER — CEFDINIR 300 MG/1
300 CAPSULE ORAL 2 TIMES DAILY
Qty: 10 CAPSULE | Refills: 0 | Status: SHIPPED | OUTPATIENT
Start: 2019-10-26 | End: 2019-10-31

## 2019-10-26 RX ADMIN — INSULIN LISPRO 2 UNITS: 100 INJECTION, SOLUTION INTRAVENOUS; SUBCUTANEOUS at 08:19

## 2019-10-26 RX ADMIN — METOCLOPRAMIDE HYDROCHLORIDE 5 MG: 5 TABLET ORAL at 06:06

## 2019-10-26 RX ADMIN — FENOFIBRATE 160 MG: 160 TABLET ORAL at 08:03

## 2019-10-26 RX ADMIN — METOCLOPRAMIDE HYDROCHLORIDE 5 MG: 5 TABLET ORAL at 11:28

## 2019-10-26 RX ADMIN — POTASSIUM CHLORIDE 20 MEQ: 20 TABLET, EXTENDED RELEASE ORAL at 08:02

## 2019-10-26 RX ADMIN — Medication 1 CAPSULE: at 08:02

## 2019-10-26 RX ADMIN — CEFTRIAXONE 1 G: 1 INJECTION, POWDER, FOR SOLUTION INTRAMUSCULAR; INTRAVENOUS at 11:28

## 2019-10-26 RX ADMIN — MONTELUKAST 10 MG: 10 TABLET, FILM COATED ORAL at 08:02

## 2019-10-26 RX ADMIN — ALLOPURINOL 150 MG: 100 TABLET ORAL at 08:01

## 2019-10-26 RX ADMIN — BUSPIRONE HYDROCHLORIDE 10 MG: 5 TABLET ORAL at 08:01

## 2019-10-26 RX ADMIN — CETIRIZINE HYDROCHLORIDE 5 MG: 10 TABLET, FILM COATED ORAL at 08:01

## 2019-10-26 RX ADMIN — INSULIN LISPRO 20 UNITS: 100 INJECTION, SOLUTION INTRAVENOUS; SUBCUTANEOUS at 08:15

## 2019-10-26 RX ADMIN — BUDESONIDE 500 MCG: 0.5 SUSPENSION RESPIRATORY (INHALATION) at 05:49

## 2019-10-26 RX ADMIN — FERROUS SULFATE TAB EC 324 MG (65 MG FE EQUIVALENT) 324 MG: 324 (65 FE) TABLET DELAYED RESPONSE at 08:02

## 2019-10-26 RX ADMIN — INSULIN GLARGINE 35 UNITS: 100 INJECTION, SOLUTION SUBCUTANEOUS at 08:13

## 2019-10-26 RX ADMIN — ROFLUMILAST 250 MCG: 500 TABLET ORAL at 08:02

## 2019-10-26 RX ADMIN — LINAGLIPTIN 5 MG: 5 TABLET, FILM COATED ORAL at 08:02

## 2019-10-26 RX ADMIN — FERROUS SULFATE TAB EC 324 MG (65 MG FE EQUIVALENT) 324 MG: 324 (65 FE) TABLET DELAYED RESPONSE at 12:04

## 2019-10-26 RX ADMIN — METOPROLOL TARTRATE 25 MG: 25 TABLET ORAL at 08:02

## 2019-10-26 RX ADMIN — DICLOFENAC 2 G: 10 GEL TOPICAL at 08:04

## 2019-10-26 RX ADMIN — IPRATROPIUM BROMIDE AND ALBUTEROL SULFATE 3 ML: .5; 3 SOLUTION RESPIRATORY (INHALATION) at 05:49

## 2019-10-26 RX ADMIN — ASPIRIN 81 MG: 81 TABLET, COATED ORAL at 08:02

## 2019-10-26 RX ADMIN — Medication 10 ML: at 08:00

## 2019-10-26 RX ADMIN — ROPINIROLE HYDROCHLORIDE 0.5 MG: 0.25 TABLET, FILM COATED ORAL at 08:01

## 2019-10-26 RX ADMIN — SUCRALFATE 1 G: 1 TABLET ORAL at 06:06

## 2019-10-26 RX ADMIN — IPRATROPIUM BROMIDE AND ALBUTEROL SULFATE 3 ML: .5; 3 SOLUTION RESPIRATORY (INHALATION) at 10:25

## 2019-10-26 RX ADMIN — GUAIFENESIN 600 MG: 600 TABLET, EXTENDED RELEASE ORAL at 08:02

## 2019-10-26 RX ADMIN — FUROSEMIDE 40 MG: 40 TABLET ORAL at 11:28

## 2019-10-26 RX ADMIN — ENOXAPARIN SODIUM 40 MG: 40 INJECTION SUBCUTANEOUS at 08:00

## 2019-10-26 RX ADMIN — PANTOPRAZOLE SODIUM 40 MG: 40 TABLET, DELAYED RELEASE ORAL at 08:02

## 2019-10-26 RX ADMIN — METHYLPREDNISOLONE SODIUM SUCCINATE 40 MG: 40 INJECTION, POWDER, FOR SOLUTION INTRAMUSCULAR; INTRAVENOUS at 08:03

## 2019-10-26 ASSESSMENT — PAIN SCALES - GENERAL
PAINLEVEL_OUTOF10: 0

## 2019-10-28 ENCOUNTER — HOSPITAL ENCOUNTER (OUTPATIENT)
Facility: HOSPITAL | Age: 69
Discharge: HOME | End: 2019-10-28
Payer: MEDICARE

## 2019-10-28 ENCOUNTER — OFFICE VISIT (OUTPATIENT)
Dept: PRIMARY CARE CLINIC | Age: 69
End: 2019-10-28
Payer: MEDICARE

## 2019-10-28 VITALS
HEART RATE: 72 BPM | TEMPERATURE: 98.3 F | SYSTOLIC BLOOD PRESSURE: 120 MMHG | WEIGHT: 187.8 LBS | DIASTOLIC BLOOD PRESSURE: 54 MMHG | BODY MASS INDEX: 39.42 KG/M2 | OXYGEN SATURATION: 92 % | RESPIRATION RATE: 16 BRPM | HEIGHT: 58 IN

## 2019-10-28 DIAGNOSIS — J18.9 PNEUMONIA OF BOTH LOWER LOBES DUE TO INFECTIOUS ORGANISM: ICD-10-CM

## 2019-10-28 DIAGNOSIS — Z79.4 TYPE 2 DIABETES MELLITUS WITH STAGE 3 CHRONIC KIDNEY DISEASE, WITH LONG-TERM CURRENT USE OF INSULIN (HCC): ICD-10-CM

## 2019-10-28 DIAGNOSIS — E11.22 TYPE 2 DIABETES MELLITUS WITH STAGE 3 CHRONIC KIDNEY DISEASE, WITH LONG-TERM CURRENT USE OF INSULIN (HCC): ICD-10-CM

## 2019-10-28 DIAGNOSIS — I50.32 CHRONIC DIASTOLIC (CONGESTIVE) HEART FAILURE (HCC): Primary | ICD-10-CM

## 2019-10-28 DIAGNOSIS — N18.30 CHRONIC RENAL FAILURE, STAGE 3 (MODERATE) (HCC): ICD-10-CM

## 2019-10-28 DIAGNOSIS — N18.30 TYPE 2 DIABETES MELLITUS WITH STAGE 3 CHRONIC KIDNEY DISEASE, WITH LONG-TERM CURRENT USE OF INSULIN (HCC): ICD-10-CM

## 2019-10-28 DIAGNOSIS — J44.1 COPD EXACERBATION (HCC): ICD-10-CM

## 2019-10-28 LAB
ANION GAP SERPL CALCULATED.3IONS-SCNC: 16 MMOL/L (ref 3–16)
BUN BLDV-MCNC: 47 MG/DL (ref 6–20)
CALCIUM SERPL-MCNC: 10.5 MG/DL (ref 8.5–10.5)
CHLORIDE BLD-SCNC: 99 MMOL/L (ref 98–107)
CO2: 33 MMOL/L (ref 20–30)
CREAT SERPL-MCNC: 1.5 MG/DL (ref 0.4–1.2)
GFR AFRICAN AMERICAN: 42
GFR NON-AFRICAN AMERICAN: 34
GLUCOSE BLD-MCNC: 97 MG/DL (ref 74–106)
POTASSIUM SERPL-SCNC: 5 MMOL/L (ref 3.4–5.1)
SODIUM BLD-SCNC: 148 MMOL/L (ref 136–145)

## 2019-10-28 PROCEDURE — 96372 THER/PROPH/DIAG INJ SC/IM: CPT | Performed by: NURSE PRACTITIONER

## 2019-10-28 PROCEDURE — 99495 TRANSJ CARE MGMT MOD F2F 14D: CPT | Performed by: NURSE PRACTITIONER

## 2019-10-28 PROCEDURE — 1111F DSCHRG MED/CURRENT MED MERGE: CPT | Performed by: NURSE PRACTITIONER

## 2019-10-28 PROCEDURE — 80048 BASIC METABOLIC PNL TOTAL CA: CPT

## 2019-10-28 RX ORDER — ICOSAPENT ETHYL 1000 MG/1
CAPSULE ORAL
Qty: 60 CAPSULE | Refills: 3 | Status: SHIPPED | OUTPATIENT
Start: 2019-10-28 | End: 2020-03-31

## 2019-10-28 RX ORDER — METOLAZONE 5 MG/1
5 TABLET ORAL DAILY PRN
Qty: 30 TABLET | Refills: 3 | Status: SHIPPED | OUTPATIENT
Start: 2019-10-28 | End: 2020-03-21

## 2019-10-28 RX ORDER — ROPINIROLE 0.5 MG/1
TABLET, FILM COATED ORAL
Qty: 60 TABLET | Refills: 0 | Status: SHIPPED | OUTPATIENT
Start: 2019-10-28 | End: 2019-11-25 | Stop reason: SDUPTHER

## 2019-10-28 ASSESSMENT — ENCOUNTER SYMPTOMS
GASTROINTESTINAL NEGATIVE: 1
SHORTNESS OF BREATH: 1

## 2019-11-04 ENCOUNTER — HOSPITAL ENCOUNTER (OUTPATIENT)
Facility: HOSPITAL | Age: 69
Discharge: HOME | End: 2019-11-04
Payer: MEDICARE

## 2019-11-04 DIAGNOSIS — N18.30 STAGE 3 CHRONIC KIDNEY DISEASE (HCC): ICD-10-CM

## 2019-11-04 DIAGNOSIS — G89.29 CHRONIC RIGHT-SIDED LOW BACK PAIN WITH RIGHT-SIDED SCIATICA: ICD-10-CM

## 2019-11-04 DIAGNOSIS — M54.41 CHRONIC RIGHT-SIDED LOW BACK PAIN WITH RIGHT-SIDED SCIATICA: ICD-10-CM

## 2019-11-04 LAB
ANION GAP SERPL CALCULATED.3IONS-SCNC: 11 MMOL/L (ref 3–16)
BUN BLDV-MCNC: 34 MG/DL (ref 6–20)
CALCIUM SERPL-MCNC: 9.8 MG/DL (ref 8.5–10.5)
CHLORIDE BLD-SCNC: 97 MMOL/L (ref 98–107)
CO2: 37 MMOL/L (ref 20–30)
CREAT SERPL-MCNC: 1.4 MG/DL (ref 0.4–1.2)
GFR AFRICAN AMERICAN: 45
GFR NON-AFRICAN AMERICAN: 37
GLUCOSE BLD-MCNC: 204 MG/DL (ref 74–106)
POTASSIUM SERPL-SCNC: 4.6 MMOL/L (ref 3.4–5.1)
SODIUM BLD-SCNC: 145 MMOL/L (ref 136–145)

## 2019-11-04 PROCEDURE — 80048 BASIC METABOLIC PNL TOTAL CA: CPT

## 2019-11-04 PROCEDURE — 36415 COLL VENOUS BLD VENIPUNCTURE: CPT

## 2019-11-06 RX ORDER — HYDROCODONE BITARTRATE AND ACETAMINOPHEN 5; 325 MG/1; MG/1
TABLET ORAL
Qty: 30 TABLET | Refills: 0 | Status: SHIPPED | OUTPATIENT
Start: 2019-11-06 | End: 2019-12-03 | Stop reason: SDUPTHER

## 2019-11-07 ENCOUNTER — HOSPITAL ENCOUNTER (OUTPATIENT)
Dept: GENERAL RADIOLOGY | Facility: HOSPITAL | Age: 69
Discharge: HOME | End: 2019-11-07
Payer: MEDICARE

## 2019-11-07 ENCOUNTER — HOSPITAL ENCOUNTER (OUTPATIENT)
Facility: HOSPITAL | Age: 69
Discharge: HOME | End: 2019-11-07
Payer: MEDICARE

## 2019-11-07 ENCOUNTER — OFFICE VISIT (OUTPATIENT)
Dept: PRIMARY CARE CLINIC | Age: 69
End: 2019-11-07
Payer: MEDICARE

## 2019-11-07 VITALS
RESPIRATION RATE: 16 BRPM | OXYGEN SATURATION: 95 % | WEIGHT: 191 LBS | DIASTOLIC BLOOD PRESSURE: 60 MMHG | TEMPERATURE: 98.2 F | HEART RATE: 81 BPM | BODY MASS INDEX: 40.09 KG/M2 | SYSTOLIC BLOOD PRESSURE: 142 MMHG | HEIGHT: 58 IN

## 2019-11-07 DIAGNOSIS — M54.2 NECK PAIN: ICD-10-CM

## 2019-11-07 DIAGNOSIS — M25.511 ACUTE PAIN OF RIGHT SHOULDER: ICD-10-CM

## 2019-11-07 DIAGNOSIS — M54.2 NECK PAIN: Primary | ICD-10-CM

## 2019-11-07 DIAGNOSIS — M48.02 CERVICAL SPINAL STENOSIS: Primary | ICD-10-CM

## 2019-11-07 DIAGNOSIS — R20.0 ARM NUMBNESS: ICD-10-CM

## 2019-11-07 DIAGNOSIS — Z78.0 POST-MENOPAUSAL: ICD-10-CM

## 2019-11-07 PROCEDURE — 72050 X-RAY EXAM NECK SPINE 4/5VWS: CPT

## 2019-11-07 PROCEDURE — 73030 X-RAY EXAM OF SHOULDER: CPT

## 2019-11-07 PROCEDURE — G8400 PT W/DXA NO RESULTS DOC: HCPCS | Performed by: NURSE PRACTITIONER

## 2019-11-07 PROCEDURE — 1090F PRES/ABSN URINE INCON ASSESS: CPT | Performed by: NURSE PRACTITIONER

## 2019-11-07 PROCEDURE — G8482 FLU IMMUNIZE ORDER/ADMIN: HCPCS | Performed by: NURSE PRACTITIONER

## 2019-11-07 PROCEDURE — 1123F ACP DISCUSS/DSCN MKR DOCD: CPT | Performed by: NURSE PRACTITIONER

## 2019-11-07 PROCEDURE — G8427 DOCREV CUR MEDS BY ELIG CLIN: HCPCS | Performed by: NURSE PRACTITIONER

## 2019-11-07 PROCEDURE — 1111F DSCHRG MED/CURRENT MED MERGE: CPT | Performed by: NURSE PRACTITIONER

## 2019-11-07 PROCEDURE — 1036F TOBACCO NON-USER: CPT | Performed by: NURSE PRACTITIONER

## 2019-11-07 PROCEDURE — 4040F PNEUMOC VAC/ADMIN/RCVD: CPT | Performed by: NURSE PRACTITIONER

## 2019-11-07 PROCEDURE — 99213 OFFICE O/P EST LOW 20 MIN: CPT | Performed by: NURSE PRACTITIONER

## 2019-11-07 PROCEDURE — 3017F COLORECTAL CA SCREEN DOC REV: CPT | Performed by: NURSE PRACTITIONER

## 2019-11-07 PROCEDURE — G8417 CALC BMI ABV UP PARAM F/U: HCPCS | Performed by: NURSE PRACTITIONER

## 2019-11-07 RX ORDER — BACLOFEN 10 MG/1
5 TABLET ORAL NIGHTLY PRN
Qty: 15 TABLET | Refills: 0 | Status: SHIPPED | OUTPATIENT
Start: 2019-11-07 | End: 2019-12-05 | Stop reason: SDUPTHER

## 2019-11-07 ASSESSMENT — ENCOUNTER SYMPTOMS
GASTROINTESTINAL NEGATIVE: 1
RESPIRATORY NEGATIVE: 1

## 2019-11-13 ENCOUNTER — OFFICE VISIT (OUTPATIENT)
Dept: PRIMARY CARE CLINIC | Age: 69
End: 2019-11-13
Payer: MEDICARE

## 2019-11-13 VITALS
BODY MASS INDEX: 40.22 KG/M2 | OXYGEN SATURATION: 94 % | SYSTOLIC BLOOD PRESSURE: 138 MMHG | HEIGHT: 58 IN | HEART RATE: 96 BPM | TEMPERATURE: 98.3 F | DIASTOLIC BLOOD PRESSURE: 60 MMHG | WEIGHT: 191.6 LBS | RESPIRATION RATE: 16 BRPM

## 2019-11-13 DIAGNOSIS — R20.0 ARM NUMBNESS: ICD-10-CM

## 2019-11-13 DIAGNOSIS — M48.02 CERVICAL SPINAL STENOSIS: Primary | ICD-10-CM

## 2019-11-13 DIAGNOSIS — M25.511 ACUTE PAIN OF RIGHT SHOULDER: ICD-10-CM

## 2019-11-13 PROCEDURE — G8427 DOCREV CUR MEDS BY ELIG CLIN: HCPCS | Performed by: NURSE PRACTITIONER

## 2019-11-13 PROCEDURE — 1123F ACP DISCUSS/DSCN MKR DOCD: CPT | Performed by: NURSE PRACTITIONER

## 2019-11-13 PROCEDURE — 99214 OFFICE O/P EST MOD 30 MIN: CPT | Performed by: NURSE PRACTITIONER

## 2019-11-13 PROCEDURE — G8482 FLU IMMUNIZE ORDER/ADMIN: HCPCS | Performed by: NURSE PRACTITIONER

## 2019-11-13 PROCEDURE — 4040F PNEUMOC VAC/ADMIN/RCVD: CPT | Performed by: NURSE PRACTITIONER

## 2019-11-13 PROCEDURE — G8417 CALC BMI ABV UP PARAM F/U: HCPCS | Performed by: NURSE PRACTITIONER

## 2019-11-13 PROCEDURE — 3017F COLORECTAL CA SCREEN DOC REV: CPT | Performed by: NURSE PRACTITIONER

## 2019-11-13 PROCEDURE — 1111F DSCHRG MED/CURRENT MED MERGE: CPT | Performed by: NURSE PRACTITIONER

## 2019-11-13 PROCEDURE — 1036F TOBACCO NON-USER: CPT | Performed by: NURSE PRACTITIONER

## 2019-11-13 PROCEDURE — 1090F PRES/ABSN URINE INCON ASSESS: CPT | Performed by: NURSE PRACTITIONER

## 2019-11-13 PROCEDURE — G8400 PT W/DXA NO RESULTS DOC: HCPCS | Performed by: NURSE PRACTITIONER

## 2019-11-13 SDOH — ECONOMIC STABILITY: FOOD INSECURITY: WITHIN THE PAST 12 MONTHS, THE FOOD YOU BOUGHT JUST DIDN'T LAST AND YOU DIDN'T HAVE MONEY TO GET MORE.: NEVER TRUE

## 2019-11-13 SDOH — ECONOMIC STABILITY: TRANSPORTATION INSECURITY
IN THE PAST 12 MONTHS, HAS LACK OF TRANSPORTATION KEPT YOU FROM MEETINGS, WORK, OR FROM GETTING THINGS NEEDED FOR DAILY LIVING?: NO

## 2019-11-13 SDOH — SOCIAL STABILITY: SOCIAL INSECURITY
WITHIN THE LAST YEAR, HAVE TO BEEN RAPED OR FORCED TO HAVE ANY KIND OF SEXUAL ACTIVITY BY YOUR PARTNER OR EX-PARTNER?: NO

## 2019-11-13 SDOH — SOCIAL STABILITY: SOCIAL NETWORK
DO YOU BELONG TO ANY CLUBS OR ORGANIZATIONS SUCH AS CHURCH GROUPS UNIONS, FRATERNAL OR ATHLETIC GROUPS, OR SCHOOL GROUPS?: NO

## 2019-11-13 SDOH — SOCIAL STABILITY: SOCIAL INSECURITY
WITHIN THE LAST YEAR, HAVE YOU BEEN KICKED, HIT, SLAPPED, OR OTHERWISE PHYSICALLY HURT BY YOUR PARTNER OR EX-PARTNER?: NO

## 2019-11-13 SDOH — SOCIAL STABILITY: SOCIAL INSECURITY: WITHIN THE LAST YEAR, HAVE YOU BEEN HUMILIATED OR EMOTIONALLY ABUSED IN OTHER WAYS BY YOUR PARTNER OR EX-PARTNER?: NO

## 2019-11-13 SDOH — SOCIAL STABILITY: SOCIAL NETWORK
IN A TYPICAL WEEK, HOW MANY TIMES DO YOU TALK ON THE PHONE WITH FAMILY, FRIENDS, OR NEIGHBORS?: MORE THAN THREE TIMES A WEEK

## 2019-11-13 SDOH — HEALTH STABILITY: MENTAL HEALTH
STRESS IS WHEN SOMEONE FEELS TENSE, NERVOUS, ANXIOUS, OR CAN'T SLEEP AT NIGHT BECAUSE THEIR MIND IS TROUBLED. HOW STRESSED ARE YOU?: RATHER MUCH

## 2019-11-13 SDOH — ECONOMIC STABILITY: FOOD INSECURITY: WITHIN THE PAST 12 MONTHS, YOU WORRIED THAT YOUR FOOD WOULD RUN OUT BEFORE YOU GOT MONEY TO BUY MORE.: NEVER TRUE

## 2019-11-13 SDOH — ECONOMIC STABILITY: TRANSPORTATION INSECURITY
IN THE PAST 12 MONTHS, HAS THE LACK OF TRANSPORTATION KEPT YOU FROM MEDICAL APPOINTMENTS OR FROM GETTING MEDICATIONS?: NO

## 2019-11-13 SDOH — ECONOMIC STABILITY: INCOME INSECURITY: HOW HARD IS IT FOR YOU TO PAY FOR THE VERY BASICS LIKE FOOD, HOUSING, MEDICAL CARE, AND HEATING?: NOT HARD AT ALL

## 2019-11-13 SDOH — SOCIAL STABILITY: SOCIAL INSECURITY: WITHIN THE LAST YEAR, HAVE YOU BEEN AFRAID OF YOUR PARTNER OR EX-PARTNER?: NO

## 2019-11-13 SDOH — SOCIAL STABILITY: SOCIAL NETWORK: HOW OFTEN DO YOU ATTENT MEETINGS OF THE CLUB OR ORGANIZATION YOU BELONG TO?: NEVER

## 2019-11-13 ASSESSMENT — ENCOUNTER SYMPTOMS
RESPIRATORY NEGATIVE: 1
GASTROINTESTINAL NEGATIVE: 1

## 2019-11-15 ENCOUNTER — HOSPITAL ENCOUNTER (OUTPATIENT)
Dept: CT IMAGING | Facility: HOSPITAL | Age: 69
Discharge: HOME | End: 2019-11-15
Payer: MEDICARE

## 2019-11-15 DIAGNOSIS — M48.02 CERVICAL SPINAL STENOSIS: ICD-10-CM

## 2019-11-15 DIAGNOSIS — M25.511 ACUTE PAIN OF RIGHT SHOULDER: ICD-10-CM

## 2019-11-15 DIAGNOSIS — R20.0 ARM NUMBNESS: ICD-10-CM

## 2019-11-15 PROCEDURE — 72125 CT NECK SPINE W/O DYE: CPT

## 2019-11-20 ENCOUNTER — TELEPHONE (OUTPATIENT)
Dept: PRIMARY CARE CLINIC | Age: 69
End: 2019-11-20

## 2019-11-22 DIAGNOSIS — J42 CHRONIC BRONCHITIS, UNSPECIFIED CHRONIC BRONCHITIS TYPE (HCC): ICD-10-CM

## 2019-11-22 RX ORDER — MONTELUKAST SODIUM 10 MG/1
TABLET ORAL
Qty: 30 TABLET | Refills: 2 | Status: SHIPPED | OUTPATIENT
Start: 2019-11-22 | End: 2020-02-11

## 2019-11-25 ENCOUNTER — NURSE ONLY (OUTPATIENT)
Dept: PRIMARY CARE CLINIC | Age: 69
End: 2019-11-25
Payer: MEDICARE

## 2019-11-25 DIAGNOSIS — Z78.0 POST-MENOPAUSAL: Primary | ICD-10-CM

## 2019-11-25 DIAGNOSIS — J44.1 COPD EXACERBATION (HCC): ICD-10-CM

## 2019-11-25 PROCEDURE — 96372 THER/PROPH/DIAG INJ SC/IM: CPT | Performed by: NURSE PRACTITIONER

## 2019-11-25 RX ORDER — ROFLUMILAST 500 UG/1
TABLET ORAL
Refills: 5 | COMMUNITY
Start: 2019-11-18 | End: 2020-05-22

## 2019-11-25 RX ORDER — ROPINIROLE 0.5 MG/1
TABLET, FILM COATED ORAL
Qty: 60 TABLET | Refills: 0 | Status: SHIPPED | OUTPATIENT
Start: 2019-11-25 | End: 2019-12-23

## 2019-11-25 RX ORDER — DM/PE/ACETAMINOPHEN/CHLORPHENR 10-5-325-2
1 TABLET, SEQUENTIAL ORAL DAILY
Refills: 5 | COMMUNITY
Start: 2019-11-13

## 2019-11-26 ENCOUNTER — OFFICE VISIT (OUTPATIENT)
Dept: PRIMARY CARE CLINIC | Age: 69
End: 2019-11-26
Payer: MEDICARE

## 2019-11-26 VITALS
OXYGEN SATURATION: 96 % | BODY MASS INDEX: 40.51 KG/M2 | DIASTOLIC BLOOD PRESSURE: 60 MMHG | HEIGHT: 58 IN | SYSTOLIC BLOOD PRESSURE: 130 MMHG | WEIGHT: 193 LBS | RESPIRATION RATE: 16 BRPM | HEART RATE: 71 BPM | TEMPERATURE: 98.2 F

## 2019-11-26 DIAGNOSIS — Z79.4 TYPE 2 DIABETES MELLITUS WITH COMPLICATION, WITH LONG-TERM CURRENT USE OF INSULIN (HCC): ICD-10-CM

## 2019-11-26 DIAGNOSIS — M50.20 CERVICAL DISC HERNIATION: Primary | ICD-10-CM

## 2019-11-26 DIAGNOSIS — G62.9 NEUROPATHY: ICD-10-CM

## 2019-11-26 DIAGNOSIS — E11.8 TYPE 2 DIABETES MELLITUS WITH COMPLICATION, WITH LONG-TERM CURRENT USE OF INSULIN (HCC): ICD-10-CM

## 2019-11-26 PROCEDURE — 3051F HG A1C>EQUAL 7.0%<8.0%: CPT | Performed by: NURSE PRACTITIONER

## 2019-11-26 PROCEDURE — 2022F DILAT RTA XM EVC RTNOPTHY: CPT | Performed by: NURSE PRACTITIONER

## 2019-11-26 PROCEDURE — G8400 PT W/DXA NO RESULTS DOC: HCPCS | Performed by: NURSE PRACTITIONER

## 2019-11-26 PROCEDURE — 99214 OFFICE O/P EST MOD 30 MIN: CPT | Performed by: NURSE PRACTITIONER

## 2019-11-26 PROCEDURE — G8482 FLU IMMUNIZE ORDER/ADMIN: HCPCS | Performed by: NURSE PRACTITIONER

## 2019-11-26 PROCEDURE — G8427 DOCREV CUR MEDS BY ELIG CLIN: HCPCS | Performed by: NURSE PRACTITIONER

## 2019-11-26 PROCEDURE — 1123F ACP DISCUSS/DSCN MKR DOCD: CPT | Performed by: NURSE PRACTITIONER

## 2019-11-26 PROCEDURE — G8417 CALC BMI ABV UP PARAM F/U: HCPCS | Performed by: NURSE PRACTITIONER

## 2019-11-26 PROCEDURE — 3017F COLORECTAL CA SCREEN DOC REV: CPT | Performed by: NURSE PRACTITIONER

## 2019-11-26 PROCEDURE — 1090F PRES/ABSN URINE INCON ASSESS: CPT | Performed by: NURSE PRACTITIONER

## 2019-11-26 PROCEDURE — 1036F TOBACCO NON-USER: CPT | Performed by: NURSE PRACTITIONER

## 2019-11-26 PROCEDURE — 4040F PNEUMOC VAC/ADMIN/RCVD: CPT | Performed by: NURSE PRACTITIONER

## 2019-12-03 DIAGNOSIS — M54.41 CHRONIC RIGHT-SIDED LOW BACK PAIN WITH RIGHT-SIDED SCIATICA: ICD-10-CM

## 2019-12-03 DIAGNOSIS — G89.29 CHRONIC RIGHT-SIDED LOW BACK PAIN WITH RIGHT-SIDED SCIATICA: ICD-10-CM

## 2019-12-05 ENCOUNTER — HOSPITAL ENCOUNTER (OUTPATIENT)
Facility: HOSPITAL | Age: 69
Discharge: HOME | End: 2019-12-05
Payer: MEDICARE

## 2019-12-05 DIAGNOSIS — R20.0 ARM NUMBNESS: ICD-10-CM

## 2019-12-05 DIAGNOSIS — M25.511 ACUTE PAIN OF RIGHT SHOULDER: ICD-10-CM

## 2019-12-05 DIAGNOSIS — M48.02 CERVICAL SPINAL STENOSIS: ICD-10-CM

## 2019-12-05 DIAGNOSIS — Z79.4 TYPE 2 DIABETES MELLITUS WITH STAGE 3 CHRONIC KIDNEY DISEASE, WITH LONG-TERM CURRENT USE OF INSULIN (HCC): ICD-10-CM

## 2019-12-05 DIAGNOSIS — E11.22 TYPE 2 DIABETES MELLITUS WITH STAGE 3 CHRONIC KIDNEY DISEASE, WITH LONG-TERM CURRENT USE OF INSULIN (HCC): ICD-10-CM

## 2019-12-05 DIAGNOSIS — N18.30 TYPE 2 DIABETES MELLITUS WITH STAGE 3 CHRONIC KIDNEY DISEASE, WITH LONG-TERM CURRENT USE OF INSULIN (HCC): ICD-10-CM

## 2019-12-05 DIAGNOSIS — E55.9 VITAMIN D DEFICIENCY: ICD-10-CM

## 2019-12-05 DIAGNOSIS — Z13.29 THYROID DISORDER SCREEN: ICD-10-CM

## 2019-12-05 RX ORDER — FERROUS SULFATE 325(65) MG
TABLET ORAL
Qty: 90 TABLET | Refills: 3 | Status: SHIPPED | OUTPATIENT
Start: 2019-12-05 | End: 2020-01-13

## 2019-12-05 RX ORDER — BACLOFEN 10 MG/1
TABLET ORAL
Qty: 15 TABLET | Refills: 0 | Status: SHIPPED | OUTPATIENT
Start: 2019-12-05 | End: 2020-05-22

## 2019-12-05 RX ORDER — BACLOFEN 10 MG/1
TABLET ORAL
Qty: 15 TABLET | Refills: 0 | OUTPATIENT
Start: 2019-12-05

## 2019-12-06 RX ORDER — HYDROCODONE BITARTRATE AND ACETAMINOPHEN 5; 325 MG/1; MG/1
TABLET ORAL
Qty: 30 TABLET | Refills: 0 | Status: SHIPPED | OUTPATIENT
Start: 2019-12-06 | End: 2020-01-06

## 2019-12-09 ENCOUNTER — OFFICE VISIT (OUTPATIENT)
Dept: PULMONOLOGY | Facility: CLINIC | Age: 69
End: 2019-12-09

## 2019-12-09 VITALS
HEART RATE: 94 BPM | HEIGHT: 58 IN | OXYGEN SATURATION: 84 % | DIASTOLIC BLOOD PRESSURE: 80 MMHG | SYSTOLIC BLOOD PRESSURE: 130 MMHG | WEIGHT: 192 LBS | BODY MASS INDEX: 40.3 KG/M2 | RESPIRATION RATE: 18 BRPM

## 2019-12-09 DIAGNOSIS — R06.02 SHORTNESS OF BREATH: Primary | ICD-10-CM

## 2019-12-09 DIAGNOSIS — J44.1 CHRONIC OBSTRUCTIVE PULMONARY DISEASE WITH ACUTE EXACERBATION (HCC): ICD-10-CM

## 2019-12-09 DIAGNOSIS — J96.12 CHRONIC RESPIRATORY FAILURE WITH HYPERCAPNIA (HCC): ICD-10-CM

## 2019-12-09 DIAGNOSIS — J30.89 OTHER ALLERGIC RHINITIS: ICD-10-CM

## 2019-12-09 PROCEDURE — 99214 OFFICE O/P EST MOD 30 MIN: CPT | Performed by: NURSE PRACTITIONER

## 2019-12-09 PROCEDURE — 96372 THER/PROPH/DIAG INJ SC/IM: CPT | Performed by: NURSE PRACTITIONER

## 2019-12-09 RX ORDER — ASPIRIN 81 MG/1
81 TABLET, COATED ORAL DAILY
Refills: 5 | COMMUNITY
Start: 2019-11-22 | End: 2020-01-01 | Stop reason: HOSPADM

## 2019-12-09 RX ORDER — GUAIFENESIN 600 MG/1
1200 TABLET, EXTENDED RELEASE ORAL 2 TIMES DAILY
Qty: 30 TABLET | Refills: 0 | Status: SHIPPED | OUTPATIENT
Start: 2019-12-09 | End: 2020-01-01

## 2019-12-09 RX ORDER — METHYLPREDNISOLONE ACETATE 80 MG/ML
80 INJECTION, SUSPENSION INTRA-ARTICULAR; INTRALESIONAL; INTRAMUSCULAR; SOFT TISSUE ONCE
Status: COMPLETED | OUTPATIENT
Start: 2019-12-09 | End: 2019-12-09

## 2019-12-09 RX ORDER — DM/PE/ACETAMINOPHEN/CHLORPHENR 10-5-325-2
1 TABLET, SEQUENTIAL ORAL DAILY
Refills: 5 | COMMUNITY
Start: 2019-11-13 | End: 2020-01-01 | Stop reason: HOSPADM

## 2019-12-09 RX ORDER — TIOTROPIUM BROMIDE 18 UG/1
CAPSULE ORAL; RESPIRATORY (INHALATION)
Qty: 30 CAPSULE | Refills: 4 | Status: SHIPPED | OUTPATIENT
Start: 2019-12-09 | End: 2020-01-01

## 2019-12-09 RX ADMIN — METHYLPREDNISOLONE ACETATE 80 MG: 80 INJECTION, SUSPENSION INTRA-ARTICULAR; INTRALESIONAL; INTRAMUSCULAR; SOFT TISSUE at 16:33

## 2019-12-09 NOTE — PROGRESS NOTES
"Chief Complaint   Patient presents with   • Follow-up   • Shortness of Breath         Subjective   Mingo Guidry is a 69 y.o. female.     History of Present Illness   Patient comes today for follow up of shortness of breath and COPD.     She reports being in the hospital in October and was treated for an acute exacerbation of COPD and was told she had fluid around her heart and lungs.    She reports having some increased shortness of breath and cough the last 4 days.  Her cough has not been productive however she feels there is something there that needs to come out.  She denies any fever or chills.    Patient is using  Advair and Spiriva as directed.  She uses her rescue medication 2-3 times a day.  She received a letter from her insurance company regarding the Spiriva and she thinks they will no longer be covering it.    She is taking Daliresp on a daily basis and tolerating the medication well.    She is using the Nasalide on a regular basis.    Exercise tolerance has also decreased.    She uses BiPAP at a pressure of 17/6 with a nasal mask.  She is not having any difficulty using the machine or getting supplies.  She has oxygen bled into the machine at night.    She uses oxygen continuously.    Quit smoking 10 years ago.    The following portions of the patient's history were reviewed and updated as appropriate: allergies, current medications, past family history, past medical history, past social history and past surgical history.    Review of Systems   Constitutional: Negative for chills and fever.   HENT: Positive for rhinorrhea. Negative for sinus pressure and sore throat.    Respiratory: Positive for cough and shortness of breath. Negative for chest tightness and wheezing.    Psychiatric/Behavioral: Positive for sleep disturbance.       Objective   Visit Vitals  /80   Pulse 94   Resp 18   Ht 147.3 cm (58\")   Wt 87.1 kg (192 lb)   LMP  (LMP Unknown)   SpO2 (!) 84% Comment: Resting on room air   BMI " 40.13 kg/m²   93% on 2 LPM    Physical Exam   Constitutional: She is oriented to person, place, and time.   HENT:   Head: Atraumatic.   Crowded oropharynx.  Dentures present.   Eyes: EOM are normal.   Neck: Neck supple.   Cardiovascular: Normal rate and regular rhythm.   Pulmonary/Chest: Effort normal. No respiratory distress.   Somewhat decreased A/E with diffuse wheezing noted.   Musculoskeletal: She exhibits no edema.   Neurological: She is alert and oriented to person, place, and time.   Psychiatric: She has a normal mood and affect.   Vitals reviewed.          Assessment/Plan   Mingo was seen today for follow-up and shortness of breath.    Diagnoses and all orders for this visit:    Shortness of breath    Chronic obstructive pulmonary disease with acute exacerbation (CMS/HCC)  -     methylPREDNISolone acetate (DEPO-medrol) injection 80 mg    Chronic respiratory failure with hypercapnia (CMS/HCC)    Other orders  -     guaiFENesin (MUCINEX) 600 MG 12 hr tablet; Take 2 tablets by mouth 2 (Two) Times a Day.           Return in about 4 months (around 4/9/2020) for Recheck, For Dr. Dahl.    DISCUSSION (if any):  No change to the current medications has been made.  She should continue using Advair, Spiriva, Daliresp, the rescue medication as directed.  She definitely seems to be having worsening shortness of breath.    Compliance with medications stressed.     Side effects of prescribed medications discussed with the patient.    She has had 3 exacerbations in the last 6 months and progressively worsening shortness of breath so I will order a CT. The last CT that she had [facility] was January 2018 at [facility].     For symptoms consistent with acute exacerbation of COPD I have prescribed a steroid injection.  I have also asked her to take Mucinex twice a day for 1 week.  She should continue to use her nebulized treatments as needed.    Dictated utilizing Dragon dictation.    This document was  electronically signed by JACKI Miller December 9, 2019  11:31 AM

## 2019-12-12 ENCOUNTER — HOSPITAL ENCOUNTER (OUTPATIENT)
Dept: CT IMAGING | Facility: HOSPITAL | Age: 69
Discharge: HOME | End: 2019-12-12
Payer: MEDICARE

## 2019-12-12 DIAGNOSIS — R06.02 BREATH SHORTNESS: ICD-10-CM

## 2019-12-12 PROCEDURE — 71250 CT THORAX DX C-: CPT

## 2019-12-16 DIAGNOSIS — K21.9 GASTROESOPHAGEAL REFLUX DISEASE, ESOPHAGITIS PRESENCE NOT SPECIFIED: ICD-10-CM

## 2019-12-16 RX ORDER — METOCLOPRAMIDE 5 MG/1
TABLET ORAL
Qty: 90 TABLET | Refills: 3 | Status: SHIPPED | OUTPATIENT
Start: 2019-12-16 | End: 2020-04-27

## 2019-12-16 RX ORDER — SUCRALFATE 1 G/1
TABLET ORAL
Qty: 90 TABLET | Refills: 3 | Status: SHIPPED | OUTPATIENT
Start: 2019-12-16 | End: 2020-04-27

## 2019-12-17 DIAGNOSIS — E78.00 PURE HYPERCHOLESTEROLEMIA: ICD-10-CM

## 2019-12-17 RX ORDER — ICOSAPENT ETHYL 1000 MG/1
CAPSULE ORAL
Qty: 60 CAPSULE | Refills: 3 | Status: SHIPPED | OUTPATIENT
Start: 2019-12-17 | End: 2020-01-08 | Stop reason: SDUPTHER

## 2019-12-18 RX ORDER — GUAIFENESIN 600 MG/1
TABLET, EXTENDED RELEASE ORAL
Qty: 30 TABLET | Refills: 0 | OUTPATIENT
Start: 2019-12-18

## 2019-12-23 ENCOUNTER — NURSE ONLY (OUTPATIENT)
Dept: PRIMARY CARE CLINIC | Age: 69
End: 2019-12-23
Payer: MEDICARE

## 2019-12-23 PROCEDURE — 96372 THER/PROPH/DIAG INJ SC/IM: CPT | Performed by: NURSE PRACTITIONER

## 2019-12-30 RX ORDER — ROPINIROLE 0.5 MG/1
TABLET, FILM COATED ORAL
Qty: 60 TABLET | Refills: 0 | Status: SHIPPED | OUTPATIENT
Start: 2019-12-30 | End: 2020-03-16

## 2019-12-30 RX ORDER — POTASSIUM CHLORIDE 20 MEQ/1
TABLET, EXTENDED RELEASE ORAL
Qty: 30 TABLET | Refills: 5 | Status: SHIPPED | OUTPATIENT
Start: 2019-12-30 | End: 2020-05-20

## 2020-01-01 ENCOUNTER — APPOINTMENT (OUTPATIENT)
Dept: GENERAL RADIOLOGY | Facility: HOSPITAL | Age: 70
End: 2020-01-01

## 2020-01-01 ENCOUNTER — HOSPITAL ENCOUNTER (INPATIENT)
Facility: HOSPITAL | Age: 70
LOS: 15 days | Discharge: HOME | End: 2020-03-12
Attending: FAMILY MEDICINE | Admitting: INTERNAL MEDICINE

## 2020-01-01 ENCOUNTER — APPOINTMENT (OUTPATIENT)
Dept: CARDIOLOGY | Facility: HOSPITAL | Age: 70
End: 2020-01-01

## 2020-01-01 ENCOUNTER — READMISSION MANAGEMENT (OUTPATIENT)
Dept: CALL CENTER | Facility: HOSPITAL | Age: 70
End: 2020-01-01

## 2020-01-01 ENCOUNTER — OFFICE VISIT (OUTPATIENT)
Dept: PULMONOLOGY | Facility: CLINIC | Age: 70
End: 2020-01-01

## 2020-01-01 ENCOUNTER — LAB (OUTPATIENT)
Dept: LAB | Facility: HOSPITAL | Age: 70
End: 2020-01-01

## 2020-01-01 ENCOUNTER — TELEPHONE (OUTPATIENT)
Dept: OBSTETRICS AND GYNECOLOGY | Facility: CLINIC | Age: 70
End: 2020-01-01

## 2020-01-01 ENCOUNTER — OFFICE VISIT (OUTPATIENT)
Dept: UROLOGY | Facility: CLINIC | Age: 70
End: 2020-01-01

## 2020-01-01 ENCOUNTER — APPOINTMENT (OUTPATIENT)
Dept: MRI IMAGING | Facility: HOSPITAL | Age: 70
End: 2020-01-01

## 2020-01-01 ENCOUNTER — RESULTS ENCOUNTER (OUTPATIENT)
Dept: OBSTETRICS AND GYNECOLOGY | Facility: CLINIC | Age: 70
End: 2020-01-01

## 2020-01-01 ENCOUNTER — APPOINTMENT (OUTPATIENT)
Dept: OTHER | Facility: HOSPITAL | Age: 70
End: 2020-01-01

## 2020-01-01 ENCOUNTER — TELEPHONE (OUTPATIENT)
Dept: ONCOLOGY | Facility: CLINIC | Age: 70
End: 2020-01-01

## 2020-01-01 ENCOUNTER — CONSULT (OUTPATIENT)
Dept: ONCOLOGY | Facility: CLINIC | Age: 70
End: 2020-01-01

## 2020-01-01 ENCOUNTER — OFFICE VISIT (OUTPATIENT)
Dept: GASTROENTEROLOGY | Facility: CLINIC | Age: 70
End: 2020-01-01

## 2020-01-01 ENCOUNTER — APPOINTMENT (OUTPATIENT)
Dept: NUCLEAR MEDICINE | Facility: HOSPITAL | Age: 70
End: 2020-01-01

## 2020-01-01 ENCOUNTER — ANESTHESIA (OUTPATIENT)
Dept: PERIOP | Facility: HOSPITAL | Age: 70
End: 2020-01-01

## 2020-01-01 ENCOUNTER — OFFICE VISIT (OUTPATIENT)
Dept: CARDIOLOGY | Facility: CLINIC | Age: 70
End: 2020-01-01

## 2020-01-01 ENCOUNTER — HOSPITAL ENCOUNTER (INPATIENT)
Facility: HOSPITAL | Age: 70
LOS: 4 days | End: 2020-12-21
Attending: EMERGENCY MEDICINE | Admitting: INTERNAL MEDICINE

## 2020-01-01 ENCOUNTER — ANESTHESIA EVENT (OUTPATIENT)
Dept: PERIOP | Facility: HOSPITAL | Age: 70
End: 2020-01-01

## 2020-01-01 ENCOUNTER — HOSPITAL ENCOUNTER (OUTPATIENT)
Dept: MRI IMAGING | Facility: HOSPITAL | Age: 70
Discharge: HOME | End: 2020-11-04

## 2020-01-01 ENCOUNTER — HOSPITAL ENCOUNTER (INPATIENT)
Facility: HOSPITAL | Age: 70
LOS: 8 days | Discharge: HOME HEALTH | End: 2020-05-30
Attending: HOSPITALIST | Admitting: INTERNAL MEDICINE

## 2020-01-01 ENCOUNTER — APPOINTMENT (OUTPATIENT)
Dept: ULTRASOUND IMAGING | Facility: HOSPITAL | Age: 70
End: 2020-01-01

## 2020-01-01 ENCOUNTER — HOSPITAL ENCOUNTER (INPATIENT)
Facility: HOSPITAL | Age: 70
LOS: 3 days | Discharge: HOME | End: 2020-03-18
Attending: EMERGENCY MEDICINE | Admitting: INTERNAL MEDICINE

## 2020-01-01 ENCOUNTER — APPOINTMENT (OUTPATIENT)
Dept: CT IMAGING | Facility: HOSPITAL | Age: 70
End: 2020-01-01

## 2020-01-01 ENCOUNTER — OFFICE VISIT (OUTPATIENT)
Dept: OBSTETRICS AND GYNECOLOGY | Facility: CLINIC | Age: 70
End: 2020-01-01

## 2020-01-01 VITALS
HEIGHT: 58 IN | OXYGEN SATURATION: 93 % | RESPIRATION RATE: 18 BRPM | DIASTOLIC BLOOD PRESSURE: 50 MMHG | TEMPERATURE: 97.3 F | WEIGHT: 195 LBS | DIASTOLIC BLOOD PRESSURE: 52 MMHG | HEIGHT: 58 IN | OXYGEN SATURATION: 94 % | HEART RATE: 69 BPM | SYSTOLIC BLOOD PRESSURE: 100 MMHG | SYSTOLIC BLOOD PRESSURE: 112 MMHG | WEIGHT: 195 LBS | BODY MASS INDEX: 40.93 KG/M2 | BODY MASS INDEX: 40.93 KG/M2 | HEART RATE: 85 BPM | RESPIRATION RATE: 22 BRPM

## 2020-01-01 VITALS
HEART RATE: 79 BPM | HEIGHT: 58 IN | TEMPERATURE: 98.1 F | DIASTOLIC BLOOD PRESSURE: 59 MMHG | RESPIRATION RATE: 20 BRPM | SYSTOLIC BLOOD PRESSURE: 142 MMHG | WEIGHT: 192.68 LBS | OXYGEN SATURATION: 93 % | BODY MASS INDEX: 40.45 KG/M2

## 2020-01-01 VITALS
SYSTOLIC BLOOD PRESSURE: 118 MMHG | WEIGHT: 194 LBS | HEART RATE: 92 BPM | BODY MASS INDEX: 40.72 KG/M2 | HEIGHT: 58 IN | OXYGEN SATURATION: 95 % | DIASTOLIC BLOOD PRESSURE: 54 MMHG

## 2020-01-01 VITALS
SYSTOLIC BLOOD PRESSURE: 119 MMHG | TEMPERATURE: 98.2 F | BODY MASS INDEX: 38.73 KG/M2 | DIASTOLIC BLOOD PRESSURE: 61 MMHG | HEIGHT: 58 IN | HEART RATE: 72 BPM | RESPIRATION RATE: 18 BRPM | OXYGEN SATURATION: 92 % | WEIGHT: 184.53 LBS

## 2020-01-01 VITALS
RESPIRATION RATE: 18 BRPM | SYSTOLIC BLOOD PRESSURE: 116 MMHG | TEMPERATURE: 97.1 F | DIASTOLIC BLOOD PRESSURE: 70 MMHG | WEIGHT: 192 LBS | HEART RATE: 83 BPM | HEIGHT: 58 IN | BODY MASS INDEX: 40.3 KG/M2 | OXYGEN SATURATION: 80 %

## 2020-01-01 VITALS
HEIGHT: 58 IN | BODY MASS INDEX: 39.47 KG/M2 | DIASTOLIC BLOOD PRESSURE: 58 MMHG | WEIGHT: 188 LBS | OXYGEN SATURATION: 96 % | SYSTOLIC BLOOD PRESSURE: 124 MMHG | HEART RATE: 92 BPM

## 2020-01-01 VITALS
OXYGEN SATURATION: 93 % | HEART RATE: 102 BPM | SYSTOLIC BLOOD PRESSURE: 140 MMHG | TEMPERATURE: 98 F | HEIGHT: 58 IN | WEIGHT: 197 LBS | BODY MASS INDEX: 41.35 KG/M2 | RESPIRATION RATE: 20 BRPM | DIASTOLIC BLOOD PRESSURE: 65 MMHG

## 2020-01-01 VITALS
BODY MASS INDEX: 40.09 KG/M2 | SYSTOLIC BLOOD PRESSURE: 118 MMHG | HEIGHT: 58 IN | DIASTOLIC BLOOD PRESSURE: 82 MMHG | WEIGHT: 191 LBS

## 2020-01-01 VITALS
BODY MASS INDEX: 37.72 KG/M2 | WEIGHT: 179.7 LBS | RESPIRATION RATE: 20 BRPM | DIASTOLIC BLOOD PRESSURE: 55 MMHG | HEIGHT: 58 IN | OXYGEN SATURATION: 92 % | SYSTOLIC BLOOD PRESSURE: 135 MMHG | HEART RATE: 82 BPM | TEMPERATURE: 97.9 F

## 2020-01-01 VITALS
BODY MASS INDEX: 40.01 KG/M2 | HEIGHT: 58 IN | WEIGHT: 190.6 LBS | HEART RATE: 83 BPM | TEMPERATURE: 97.5 F | SYSTOLIC BLOOD PRESSURE: 126 MMHG | DIASTOLIC BLOOD PRESSURE: 46 MMHG | OXYGEN SATURATION: 90 %

## 2020-01-01 VITALS
DIASTOLIC BLOOD PRESSURE: 72 MMHG | HEART RATE: 72 BPM | SYSTOLIC BLOOD PRESSURE: 122 MMHG | BODY MASS INDEX: 41.14 KG/M2 | OXYGEN SATURATION: 79 % | TEMPERATURE: 97.5 F | HEIGHT: 58 IN | RESPIRATION RATE: 18 BRPM | WEIGHT: 196 LBS

## 2020-01-01 VITALS
DIASTOLIC BLOOD PRESSURE: 58 MMHG | WEIGHT: 183 LBS | HEIGHT: 58 IN | HEART RATE: 86 BPM | OXYGEN SATURATION: 86 % | BODY MASS INDEX: 38.41 KG/M2 | TEMPERATURE: 97.7 F | SYSTOLIC BLOOD PRESSURE: 134 MMHG | RESPIRATION RATE: 16 BRPM

## 2020-01-01 VITALS
DIASTOLIC BLOOD PRESSURE: 85 MMHG | WEIGHT: 194.67 LBS | TEMPERATURE: 96.8 F | HEIGHT: 61 IN | HEART RATE: 86 BPM | RESPIRATION RATE: 22 BRPM | SYSTOLIC BLOOD PRESSURE: 135 MMHG | BODY MASS INDEX: 36.75 KG/M2 | OXYGEN SATURATION: 86 %

## 2020-01-01 VITALS
HEART RATE: 88 BPM | WEIGHT: 191 LBS | HEIGHT: 58 IN | TEMPERATURE: 97.9 F | BODY MASS INDEX: 40.09 KG/M2 | OXYGEN SATURATION: 96 %

## 2020-01-01 DIAGNOSIS — R22.2 ABDOMINAL WALL MASS: ICD-10-CM

## 2020-01-01 DIAGNOSIS — E11.22 TYPE 2 DIABETES MELLITUS WITH STAGE 2 CHRONIC KIDNEY DISEASE, WITHOUT LONG-TERM CURRENT USE OF INSULIN (HCC): ICD-10-CM

## 2020-01-01 DIAGNOSIS — R06.02 SHORTNESS OF BREATH: Primary | ICD-10-CM

## 2020-01-01 DIAGNOSIS — I50.32 CHRONIC DIASTOLIC HEART FAILURE (HCC): Primary | ICD-10-CM

## 2020-01-01 DIAGNOSIS — I25.10 CORONARY ARTERY DISEASE INVOLVING NATIVE CORONARY ARTERY OF NATIVE HEART WITHOUT ANGINA PECTORIS: ICD-10-CM

## 2020-01-01 DIAGNOSIS — E78.2 MIXED HYPERLIPIDEMIA: ICD-10-CM

## 2020-01-01 DIAGNOSIS — N28.9 RENAL LESION: Primary | ICD-10-CM

## 2020-01-01 DIAGNOSIS — R97.8 OTHER ABNORMAL TUMOR MARKERS: ICD-10-CM

## 2020-01-01 DIAGNOSIS — N18.30 CHRONIC RENAL FAILURE SYNDROME, STAGE 3 (MODERATE) (HCC): ICD-10-CM

## 2020-01-01 DIAGNOSIS — N83.202 CYSTS OF BOTH OVARIES: Primary | ICD-10-CM

## 2020-01-01 DIAGNOSIS — D72.828 OTHER ELEVATED WHITE BLOOD CELL (WBC) COUNT: ICD-10-CM

## 2020-01-01 DIAGNOSIS — J96.02 ACUTE RESPIRATORY FAILURE WITH HYPOXIA AND HYPERCAPNIA (HCC): Primary | ICD-10-CM

## 2020-01-01 DIAGNOSIS — R97.0 ELEVATED CARCINOEMBRYONIC ANTIGEN (CEA): ICD-10-CM

## 2020-01-01 DIAGNOSIS — N83.201 CYSTS OF BOTH OVARIES: Primary | ICD-10-CM

## 2020-01-01 DIAGNOSIS — J96.01 ACUTE RESPIRATORY FAILURE WITH HYPOXIA AND HYPERCAPNIA (HCC): Primary | ICD-10-CM

## 2020-01-01 DIAGNOSIS — N83.201 CYSTS OF BOTH OVARIES: ICD-10-CM

## 2020-01-01 DIAGNOSIS — J96.21 ACUTE ON CHRONIC RESPIRATORY FAILURE WITH HYPOXIA (HCC): ICD-10-CM

## 2020-01-01 DIAGNOSIS — Z74.09 IMPAIRED MOBILITY AND ADLS: Primary | ICD-10-CM

## 2020-01-01 DIAGNOSIS — N28.9 KIDNEY LESION: ICD-10-CM

## 2020-01-01 DIAGNOSIS — J18.9 PNEUMONIA OF RIGHT LOWER LOBE DUE TO INFECTIOUS ORGANISM: ICD-10-CM

## 2020-01-01 DIAGNOSIS — K21.9 CHRONIC GERD: ICD-10-CM

## 2020-01-01 DIAGNOSIS — R97.8 ELEVATED CA 19-9 LEVEL: ICD-10-CM

## 2020-01-01 DIAGNOSIS — D72.829 LEUKOCYTOSIS, UNSPECIFIED TYPE: Primary | ICD-10-CM

## 2020-01-01 DIAGNOSIS — R97.8 ELEVATED TUMOR MARKERS: Primary | ICD-10-CM

## 2020-01-01 DIAGNOSIS — J96.12 CHRONIC RESPIRATORY FAILURE WITH HYPERCAPNIA (HCC): ICD-10-CM

## 2020-01-01 DIAGNOSIS — I10 ESSENTIAL HYPERTENSION: ICD-10-CM

## 2020-01-01 DIAGNOSIS — D62 ACUTE BLOOD LOSS ANEMIA: Primary | ICD-10-CM

## 2020-01-01 DIAGNOSIS — N20.0 NEPHROLITHIASIS: ICD-10-CM

## 2020-01-01 DIAGNOSIS — R91.8 ABNORMAL CT SCAN OF LUNG: ICD-10-CM

## 2020-01-01 DIAGNOSIS — J44.1 COPD WITH ACUTE EXACERBATION (HCC): ICD-10-CM

## 2020-01-01 DIAGNOSIS — J44.1 CHRONIC OBSTRUCTIVE PULMONARY DISEASE WITH ACUTE EXACERBATION (HCC): ICD-10-CM

## 2020-01-01 DIAGNOSIS — D72.823 LEUKEMOID REACTION: ICD-10-CM

## 2020-01-01 DIAGNOSIS — R14.0 ABDOMINAL BLOATING: ICD-10-CM

## 2020-01-01 DIAGNOSIS — R77.8 ELEVATED TROPONIN: ICD-10-CM

## 2020-01-01 DIAGNOSIS — J84.115 RESPIRATORY BRONCHIOLITIS INTERSTITIAL LUNG DISEASE (HCC): ICD-10-CM

## 2020-01-01 DIAGNOSIS — D39.9 NEOPLASM OF UNCERTAIN BEHAVIOR OF FEMALE GENITAL ORGAN, UNSPECIFIED: ICD-10-CM

## 2020-01-01 DIAGNOSIS — U07.1 PNEUMONIA DUE TO COVID-19 VIRUS: ICD-10-CM

## 2020-01-01 DIAGNOSIS — R09.02 HYPOXIA: ICD-10-CM

## 2020-01-01 DIAGNOSIS — J12.82 PNEUMONIA DUE TO COVID-19 VIRUS: ICD-10-CM

## 2020-01-01 DIAGNOSIS — I50.32 CHRONIC DIASTOLIC HEART FAILURE (HCC): ICD-10-CM

## 2020-01-01 DIAGNOSIS — N28.9 RENAL LESION: ICD-10-CM

## 2020-01-01 DIAGNOSIS — I20.0 UNSTABLE ANGINA (HCC): ICD-10-CM

## 2020-01-01 DIAGNOSIS — R74.8 LIVER ENZYME ELEVATION: ICD-10-CM

## 2020-01-01 DIAGNOSIS — R06.02 SOB (SHORTNESS OF BREATH): Primary | ICD-10-CM

## 2020-01-01 DIAGNOSIS — E66.01 MORBID OBESITY (HCC): ICD-10-CM

## 2020-01-01 DIAGNOSIS — Z78.9 IMPAIRED MOBILITY AND ADLS: ICD-10-CM

## 2020-01-01 DIAGNOSIS — N83.202 CYSTS OF BOTH OVARIES: ICD-10-CM

## 2020-01-01 DIAGNOSIS — Z78.9 IMPAIRED MOBILITY AND ADLS: Primary | ICD-10-CM

## 2020-01-01 DIAGNOSIS — J96.21 ACUTE ON CHRONIC RESPIRATORY FAILURE WITH HYPOXIA (HCC): Primary | ICD-10-CM

## 2020-01-01 DIAGNOSIS — Z74.09 IMPAIRED MOBILITY AND ADLS: ICD-10-CM

## 2020-01-01 DIAGNOSIS — K31.84 GASTROPARESIS: Primary | ICD-10-CM

## 2020-01-01 DIAGNOSIS — N18.2 TYPE 2 DIABETES MELLITUS WITH STAGE 2 CHRONIC KIDNEY DISEASE, WITHOUT LONG-TERM CURRENT USE OF INSULIN (HCC): ICD-10-CM

## 2020-01-01 DIAGNOSIS — I48.0 PAF (PAROXYSMAL ATRIAL FIBRILLATION) (HCC): ICD-10-CM

## 2020-01-01 LAB
A-A DO2: 124.5 MMHG
A-A DO2: 176.4 MMHG
A-A DO2: 333.8 MMHG
A-A DO2: 335.8 MMHG
A-A DO2: 342 MMHG
A-A DO2: 463.3 MMHG
ACID FAST STN SPEC: NEGATIVE
ACID FAST STN SPEC: NEGATIVE
ALBUMIN SERPL-MCNC: 3.1 G/DL (ref 3.5–5.2)
ALBUMIN SERPL-MCNC: 3.2 G/DL (ref 3.5–5.2)
ALBUMIN SERPL-MCNC: 3.2 G/DL (ref 3.5–5.2)
ALBUMIN SERPL-MCNC: 3.3 G/DL (ref 3.5–5.2)
ALBUMIN SERPL-MCNC: 3.4 G/DL (ref 3.5–5.2)
ALBUMIN SERPL-MCNC: 3.5 G/DL (ref 3.5–5.2)
ALBUMIN SERPL-MCNC: 3.6 G/DL (ref 3.5–5.2)
ALBUMIN SERPL-MCNC: 3.6 G/DL (ref 3.5–5.2)
ALBUMIN SERPL-MCNC: 3.7 G/DL (ref 3.5–5.2)
ALBUMIN SERPL-MCNC: 4 G/DL (ref 3.5–5.2)
ALBUMIN SERPL-MCNC: 4.5 G/DL (ref 3.5–5.2)
ALBUMIN/GLOB SERPL: 0.8 G/DL
ALBUMIN/GLOB SERPL: 0.9 G/DL
ALBUMIN/GLOB SERPL: 1 G/DL
ALBUMIN/GLOB SERPL: 1.1 G/DL
ALBUMIN/GLOB SERPL: 1.2 G/DL
ALBUMIN/GLOB SERPL: 1.3 G/DL
ALBUMIN/GLOB SERPL: 1.4 G/DL
ALBUMIN/GLOB SERPL: 1.6 G/DL
ALP SERPL-CCNC: 52 U/L (ref 39–117)
ALP SERPL-CCNC: 56 U/L (ref 39–117)
ALP SERPL-CCNC: 59 U/L (ref 39–117)
ALP SERPL-CCNC: 60 U/L (ref 39–117)
ALP SERPL-CCNC: 62 U/L (ref 39–117)
ALP SERPL-CCNC: 65 U/L (ref 39–117)
ALP SERPL-CCNC: 73 U/L (ref 39–117)
ALP SERPL-CCNC: 77 U/L (ref 39–117)
ALP SERPL-CCNC: 77 U/L (ref 39–117)
ALT SERPL W P-5'-P-CCNC: 12 U/L (ref 1–33)
ALT SERPL W P-5'-P-CCNC: 12 U/L (ref 1–33)
ALT SERPL W P-5'-P-CCNC: 14 U/L (ref 1–33)
ALT SERPL W P-5'-P-CCNC: 30 U/L (ref 1–33)
ALT SERPL W P-5'-P-CCNC: 39 U/L (ref 1–33)
ALT SERPL W P-5'-P-CCNC: 48 U/L (ref 1–33)
ALT SERPL W P-5'-P-CCNC: 56 U/L (ref 1–33)
ALT SERPL W P-5'-P-CCNC: 64 U/L (ref 1–33)
ALT SERPL W P-5'-P-CCNC: 83 U/L (ref 1–33)
ANION GAP SERPL CALCULATED.3IONS-SCNC: 10 MMOL/L (ref 5–15)
ANION GAP SERPL CALCULATED.3IONS-SCNC: 10.3 MMOL/L (ref 5–15)
ANION GAP SERPL CALCULATED.3IONS-SCNC: 10.4 MMOL/L (ref 5–15)
ANION GAP SERPL CALCULATED.3IONS-SCNC: 10.7 MMOL/L (ref 5–15)
ANION GAP SERPL CALCULATED.3IONS-SCNC: 10.8 MMOL/L (ref 5–15)
ANION GAP SERPL CALCULATED.3IONS-SCNC: 11.1 MMOL/L (ref 5–15)
ANION GAP SERPL CALCULATED.3IONS-SCNC: 11.1 MMOL/L (ref 5–15)
ANION GAP SERPL CALCULATED.3IONS-SCNC: 11.2 MMOL/L (ref 5–15)
ANION GAP SERPL CALCULATED.3IONS-SCNC: 11.8 MMOL/L (ref 5–15)
ANION GAP SERPL CALCULATED.3IONS-SCNC: 12 MMOL/L (ref 5–15)
ANION GAP SERPL CALCULATED.3IONS-SCNC: 12.3 MMOL/L (ref 5–15)
ANION GAP SERPL CALCULATED.3IONS-SCNC: 12.8 MMOL/L (ref 5–15)
ANION GAP SERPL CALCULATED.3IONS-SCNC: 13 MMOL/L (ref 5–15)
ANION GAP SERPL CALCULATED.3IONS-SCNC: 13.4 MMOL/L (ref 5–15)
ANION GAP SERPL CALCULATED.3IONS-SCNC: 14 MMOL/L (ref 5–15)
ANION GAP SERPL CALCULATED.3IONS-SCNC: 14 MMOL/L (ref 5–15)
ANION GAP SERPL CALCULATED.3IONS-SCNC: 14.3 MMOL/L (ref 5–15)
ANION GAP SERPL CALCULATED.3IONS-SCNC: 14.4 MMOL/L (ref 5–15)
ANION GAP SERPL CALCULATED.3IONS-SCNC: 15 MMOL/L (ref 5–15)
ANION GAP SERPL CALCULATED.3IONS-SCNC: 17 MMOL/L (ref 5–15)
ANION GAP SERPL CALCULATED.3IONS-SCNC: 19 MMOL/L (ref 5–15)
ANION GAP SERPL CALCULATED.3IONS-SCNC: 21 MMOL/L (ref 5–15)
ANION GAP SERPL CALCULATED.3IONS-SCNC: 6.9 MMOL/L (ref 5–15)
ANION GAP SERPL CALCULATED.3IONS-SCNC: 8 MMOL/L (ref 5–15)
ANION GAP SERPL CALCULATED.3IONS-SCNC: 8.2 MMOL/L (ref 5–15)
ANION GAP SERPL CALCULATED.3IONS-SCNC: 8.6 MMOL/L (ref 5–15)
ANION GAP SERPL CALCULATED.3IONS-SCNC: 8.9 MMOL/L (ref 5–15)
ANION GAP SERPL CALCULATED.3IONS-SCNC: 9.7 MMOL/L (ref 5–15)
ANION GAP SERPL CALCULATED.3IONS-SCNC: 9.8 MMOL/L (ref 5–15)
ANISOCYTOSIS BLD QL: NORMAL
ANISOCYTOSIS BLD QL: NORMAL
APTT PPP: 26.8 SECONDS (ref 24.5–37.2)
ARTERIAL PATENCY WRIST A: ABNORMAL
ARTERIAL PATENCY WRIST A: POSITIVE
AST SERPL-CCNC: 13 U/L (ref 1–32)
AST SERPL-CCNC: 14 U/L (ref 1–32)
AST SERPL-CCNC: 16 U/L (ref 1–32)
AST SERPL-CCNC: 17 U/L (ref 1–32)
AST SERPL-CCNC: 19 U/L (ref 1–32)
AST SERPL-CCNC: 31 U/L (ref 1–32)
AST SERPL-CCNC: 34 U/L (ref 1–32)
AST SERPL-CCNC: 57 U/L (ref 1–32)
AST SERPL-CCNC: 76 U/L (ref 1–32)
ATMOSPHERIC PRESS: 725 MMHG
ATMOSPHERIC PRESS: 728 MMHG
ATMOSPHERIC PRESS: 728 MMHG
ATMOSPHERIC PRESS: 731 MMHG
ATMOSPHERIC PRESS: 736 MMHG
ATMOSPHERIC PRESS: 736 MMHG
ATMOSPHERIC PRESS: ABNORMAL MM[HG]
B PARAPERT DNA SPEC QL NAA+PROBE: NOT DETECTED
B PERT DNA SPEC QL NAA+PROBE: NOT DETECTED
BACTERIA SPEC AEROBE CULT: ABNORMAL
BACTERIA SPEC AEROBE CULT: ABNORMAL
BACTERIA SPEC AEROBE CULT: NEGATIVE
BACTERIA SPEC AEROBE CULT: NEGATIVE
BACTERIA SPEC AEROBE CULT: NORMAL
BACTERIA SPEC RESP CULT: ABNORMAL
BACTERIA SPEC RESP CULT: ABNORMAL
BACTERIA SPEC RESP CULT: NO GROWTH
BACTERIA SPEC RESP CULT: NORMAL
BACTERIA UR QL AUTO: ABNORMAL /HPF
BASE EXCESS BLDA CALC-SCNC: 12.8 MMOL/L (ref 0–2)
BASE EXCESS BLDA CALC-SCNC: 13.4 MMOL/L (ref 0–2)
BASE EXCESS BLDA CALC-SCNC: 14.2 MMOL/L (ref 0–2)
BASE EXCESS BLDA CALC-SCNC: 14.9 MMOL/L (ref 0–2)
BASE EXCESS BLDA CALC-SCNC: 15 MMOL/L (ref 0–2)
BASE EXCESS BLDA CALC-SCNC: 16.2 MMOL/L (ref 0–2)
BASE EXCESS BLDA CALC-SCNC: 9 MMOL/L (ref 0–2)
BASOPHILS # BLD AUTO: 0.01 10*3/MM3 (ref 0–0.2)
BASOPHILS # BLD AUTO: 0.02 10*3/MM3 (ref 0–0.2)
BASOPHILS # BLD AUTO: 0.03 10*3/MM3 (ref 0–0.2)
BASOPHILS # BLD AUTO: 0.04 10*3/MM3 (ref 0–0.2)
BASOPHILS # BLD AUTO: 0.05 10*3/MM3 (ref 0–0.2)
BASOPHILS # BLD AUTO: 0.07 10*3/MM3 (ref 0–0.2)
BASOPHILS # BLD AUTO: 0.1 10*3/MM3 (ref 0–0.2)
BASOPHILS NFR BLD AUTO: 0.1 % (ref 0–1.5)
BASOPHILS NFR BLD AUTO: 0.2 % (ref 0–1.5)
BASOPHILS NFR BLD AUTO: 0.3 % (ref 0–1.5)
BASOPHILS NFR BLD AUTO: 0.4 % (ref 0–1.5)
BDY SITE: ABNORMAL
BH CV ECHO MEAS - IVSD: 1.3 CM
BH CV ECHO MEAS - LA DIMENSION: 3.8 CM
BH CV ECHO MEAS - LVPWD: 1.1 CM
BH CV ECHO MEAS - RAP SYSTOLE: 15 MMHG
BILIRUB CONJ SERPL-MCNC: 0.2 MG/DL (ref 0.2–0.3)
BILIRUB INDIRECT SERPL-MCNC: 0.1 MG/DL
BILIRUB SERPL-MCNC: 0.2 MG/DL (ref 0–1.2)
BILIRUB SERPL-MCNC: 0.3 MG/DL (ref 0.2–1.2)
BILIRUB SERPL-MCNC: 0.4 MG/DL (ref 0.2–1.2)
BILIRUB SERPL-MCNC: 0.4 MG/DL (ref 0.2–1.2)
BILIRUB UR QL STRIP: NEGATIVE
BODY TEMPERATURE: 37 C
BUN BLD-MCNC: 49 MG/DL (ref 8–23)
BUN BLD-MCNC: 51 MG/DL (ref 8–23)
BUN BLD-MCNC: 52 MG/DL (ref 8–23)
BUN BLD-MCNC: 53 MG/DL (ref 8–23)
BUN BLD-MCNC: 54 MG/DL (ref 8–23)
BUN BLD-MCNC: 56 MG/DL (ref 8–23)
BUN BLD-MCNC: 58 MG/DL (ref 8–23)
BUN BLD-MCNC: 59 MG/DL (ref 8–23)
BUN BLD-MCNC: 59 MG/DL (ref 8–23)
BUN BLD-MCNC: 61 MG/DL (ref 8–23)
BUN BLD-MCNC: 61 MG/DL (ref 8–23)
BUN BLD-MCNC: 63 MG/DL (ref 8–23)
BUN BLD-MCNC: 63 MG/DL (ref 8–23)
BUN BLD-MCNC: 64 MG/DL (ref 8–23)
BUN BLD-MCNC: 64 MG/DL (ref 8–23)
BUN BLD-MCNC: 65 MG/DL (ref 8–23)
BUN BLD-MCNC: 67 MG/DL (ref 8–23)
BUN BLD-MCNC: 68 MG/DL (ref 8–23)
BUN BLD-MCNC: 68 MG/DL (ref 8–23)
BUN SERPL-MCNC: 25 MG/DL (ref 8–23)
BUN SERPL-MCNC: 29 MG/DL (ref 8–23)
BUN SERPL-MCNC: 37 MG/DL (ref 8–23)
BUN SERPL-MCNC: 38 MG/DL (ref 8–23)
BUN SERPL-MCNC: 70 MG/DL (ref 8–23)
BUN/CREAT SERPL: 18.9 (ref 7–25)
BUN/CREAT SERPL: 20.3 (ref 7–25)
BUN/CREAT SERPL: 24.8 (ref 7–25)
BUN/CREAT SERPL: 27.4 (ref 7–25)
BUN/CREAT SERPL: 28.2 (ref 7–25)
BUN/CREAT SERPL: 30.1 (ref 7–25)
BUN/CREAT SERPL: 31.2 (ref 7–25)
BUN/CREAT SERPL: 31.8 (ref 7–25)
BUN/CREAT SERPL: 32.5 (ref 7–25)
BUN/CREAT SERPL: 32.9 (ref 7–25)
BUN/CREAT SERPL: 34.2 (ref 7–25)
BUN/CREAT SERPL: 34.3 (ref 7–25)
BUN/CREAT SERPL: 34.5 (ref 7–25)
BUN/CREAT SERPL: 34.6 (ref 7–25)
BUN/CREAT SERPL: 34.6 (ref 7–25)
BUN/CREAT SERPL: 35.2 (ref 7–25)
BUN/CREAT SERPL: 35.5 (ref 7–25)
BUN/CREAT SERPL: 36 (ref 7–25)
BUN/CREAT SERPL: 36.5 (ref 7–25)
BUN/CREAT SERPL: 36.6 (ref 7–25)
BUN/CREAT SERPL: 38.1 (ref 7–25)
BUN/CREAT SERPL: 39.6 (ref 7–25)
BUN/CREAT SERPL: 40 (ref 7–25)
BUN/CREAT SERPL: 41.3 (ref 7–25)
BUN/CREAT SERPL: 42.3 (ref 7–25)
BUN/CREAT SERPL: 43.1 (ref 7–25)
BUN/CREAT SERPL: 44.6 (ref 7–25)
BUN/CREAT SERPL: 44.7 (ref 7–25)
BUN/CREAT SERPL: 46.5 (ref 7–25)
BUN/CREAT SERPL: 47.9 (ref 7–25)
BUN/CREAT SERPL: 48 (ref 7–25)
BUN/CREAT SERPL: 51.1 (ref 7–25)
C PNEUM DNA NPH QL NAA+NON-PROBE: NOT DETECTED
CALCIUM SPEC-SCNC: 10.3 MG/DL (ref 8.6–10.5)
CALCIUM SPEC-SCNC: 10.3 MG/DL (ref 8.6–10.5)
CALCIUM SPEC-SCNC: 8.7 MG/DL (ref 8.6–10.5)
CALCIUM SPEC-SCNC: 8.7 MG/DL (ref 8.6–10.5)
CALCIUM SPEC-SCNC: 8.8 MG/DL (ref 8.6–10.5)
CALCIUM SPEC-SCNC: 9 MG/DL (ref 8.6–10.5)
CALCIUM SPEC-SCNC: 9 MG/DL (ref 8.6–10.5)
CALCIUM SPEC-SCNC: 9.1 MG/DL (ref 8.6–10.5)
CALCIUM SPEC-SCNC: 9.1 MG/DL (ref 8.6–10.5)
CALCIUM SPEC-SCNC: 9.2 MG/DL (ref 8.6–10.5)
CALCIUM SPEC-SCNC: 9.3 MG/DL (ref 8.6–10.5)
CALCIUM SPEC-SCNC: 9.4 MG/DL (ref 8.6–10.5)
CALCIUM SPEC-SCNC: 9.5 MG/DL (ref 8.6–10.5)
CALCIUM SPEC-SCNC: 9.6 MG/DL (ref 8.6–10.5)
CALCIUM SPEC-SCNC: 9.7 MG/DL (ref 8.6–10.5)
CALCIUM SPEC-SCNC: 9.8 MG/DL (ref 8.6–10.5)
CALCIUM SPEC-SCNC: 9.9 MG/DL (ref 8.6–10.5)
CANCER AG125 SERPL-ACNC: 37.2 U/ML (ref 0–38.1)
CANCER AG19-9 SERPL-ACNC: 106 U/ML (ref 0–35)
CEA SERPL-MCNC: 9.2 NG/ML
CHLORIDE SERPL-SCNC: 100 MMOL/L (ref 98–107)
CHLORIDE SERPL-SCNC: 100 MMOL/L (ref 98–107)
CHLORIDE SERPL-SCNC: 101 MMOL/L (ref 98–107)
CHLORIDE SERPL-SCNC: 102 MMOL/L (ref 98–107)
CHLORIDE SERPL-SCNC: 102 MMOL/L (ref 98–107)
CHLORIDE SERPL-SCNC: 103 MMOL/L (ref 98–107)
CHLORIDE SERPL-SCNC: 103 MMOL/L (ref 98–107)
CHLORIDE SERPL-SCNC: 88 MMOL/L (ref 98–107)
CHLORIDE SERPL-SCNC: 88 MMOL/L (ref 98–107)
CHLORIDE SERPL-SCNC: 90 MMOL/L (ref 98–107)
CHLORIDE SERPL-SCNC: 91 MMOL/L (ref 98–107)
CHLORIDE SERPL-SCNC: 92 MMOL/L (ref 98–107)
CHLORIDE SERPL-SCNC: 93 MMOL/L (ref 98–107)
CHLORIDE SERPL-SCNC: 95 MMOL/L (ref 98–107)
CHLORIDE SERPL-SCNC: 96 MMOL/L (ref 98–107)
CHLORIDE SERPL-SCNC: 97 MMOL/L (ref 98–107)
CHLORIDE SERPL-SCNC: 98 MMOL/L (ref 98–107)
CHLORIDE SERPL-SCNC: 98 MMOL/L (ref 98–107)
CHLORIDE SERPL-SCNC: 99 MMOL/L (ref 98–107)
CLARITY UR: CLEAR
CLUMPED PLATELETS: ABNORMAL
CO2 BLDA-SCNC: 39.5 MMOL/L (ref 22–33)
CO2 SERPL-SCNC: 28 MMOL/L (ref 22–29)
CO2 SERPL-SCNC: 28 MMOL/L (ref 22–29)
CO2 SERPL-SCNC: 29 MMOL/L (ref 22–29)
CO2 SERPL-SCNC: 29.8 MMOL/L (ref 22–29)
CO2 SERPL-SCNC: 30 MMOL/L (ref 22–29)
CO2 SERPL-SCNC: 30.6 MMOL/L (ref 22–29)
CO2 SERPL-SCNC: 30.6 MMOL/L (ref 22–29)
CO2 SERPL-SCNC: 31 MMOL/L (ref 22–29)
CO2 SERPL-SCNC: 31 MMOL/L (ref 22–29)
CO2 SERPL-SCNC: 31.6 MMOL/L (ref 22–29)
CO2 SERPL-SCNC: 32 MMOL/L (ref 22–29)
CO2 SERPL-SCNC: 32 MMOL/L (ref 22–29)
CO2 SERPL-SCNC: 33 MMOL/L (ref 22–29)
CO2 SERPL-SCNC: 33 MMOL/L (ref 22–29)
CO2 SERPL-SCNC: 34.1 MMOL/L (ref 22–29)
CO2 SERPL-SCNC: 34.2 MMOL/L (ref 22–29)
CO2 SERPL-SCNC: 34.2 MMOL/L (ref 22–29)
CO2 SERPL-SCNC: 34.3 MMOL/L (ref 22–29)
CO2 SERPL-SCNC: 34.9 MMOL/L (ref 22–29)
CO2 SERPL-SCNC: 35.3 MMOL/L (ref 22–29)
CO2 SERPL-SCNC: 35.4 MMOL/L (ref 22–29)
CO2 SERPL-SCNC: 35.7 MMOL/L (ref 22–29)
CO2 SERPL-SCNC: 35.8 MMOL/L (ref 22–29)
CO2 SERPL-SCNC: 36.9 MMOL/L (ref 22–29)
CO2 SERPL-SCNC: 38.1 MMOL/L (ref 22–29)
CO2 SERPL-SCNC: 39.2 MMOL/L (ref 22–29)
CO2 SERPL-SCNC: 39.2 MMOL/L (ref 22–29)
CO2 SERPL-SCNC: 41 MMOL/L (ref 22–29)
COHGB MFR BLD: 0.7 % (ref 0–2)
COHGB MFR BLD: 0.8 % (ref 0–2)
COHGB MFR BLD: 1 % (ref 0–2)
COHGB MFR BLD: 1.3 % (ref 0–2)
COHGB MFR BLD: <0.7 % (ref 0–2)
COLOR UR: YELLOW
CREAT BLD-MCNC: 1.23 MG/DL (ref 0.57–1)
CREAT BLD-MCNC: 1.27 MG/DL (ref 0.57–1)
CREAT BLD-MCNC: 1.33 MG/DL (ref 0.57–1)
CREAT BLD-MCNC: 1.34 MG/DL (ref 0.57–1)
CREAT BLD-MCNC: 1.4 MG/DL (ref 0.57–1)
CREAT BLD-MCNC: 1.4 MG/DL (ref 0.57–1)
CREAT BLD-MCNC: 1.41 MG/DL (ref 0.57–1)
CREAT BLD-MCNC: 1.43 MG/DL (ref 0.57–1)
CREAT BLD-MCNC: 1.44 MG/DL (ref 0.57–1)
CREAT BLD-MCNC: 1.49 MG/DL (ref 0.57–1)
CREAT BLD-MCNC: 1.52 MG/DL (ref 0.57–1)
CREAT BLD-MCNC: 1.53 MG/DL (ref 0.57–1)
CREAT BLD-MCNC: 1.58 MG/DL (ref 0.57–1)
CREAT BLD-MCNC: 1.62 MG/DL (ref 0.57–1)
CREAT BLD-MCNC: 1.68 MG/DL (ref 0.57–1)
CREAT BLD-MCNC: 1.72 MG/DL (ref 0.57–1)
CREAT BLD-MCNC: 1.74 MG/DL (ref 0.57–1)
CREAT BLD-MCNC: 1.76 MG/DL (ref 0.57–1)
CREAT BLD-MCNC: 1.77 MG/DL (ref 0.57–1)
CREAT BLD-MCNC: 1.78 MG/DL (ref 0.57–1)
CREAT BLD-MCNC: 1.78 MG/DL (ref 0.57–1)
CREAT BLD-MCNC: 1.86 MG/DL (ref 0.57–1)
CREAT BLD-MCNC: 1.86 MG/DL (ref 0.57–1)
CREAT BLD-MCNC: 1.88 MG/DL (ref 0.57–1)
CREAT BLD-MCNC: 1.9 MG/DL (ref 0.57–1)
CREAT BLD-MCNC: 1.93 MG/DL (ref 0.57–1)
CREAT BLD-MCNC: 2.11 MG/DL (ref 0.57–1)
CREAT SERPL-MCNC: 1.17 MG/DL (ref 0.57–1)
CREAT SERPL-MCNC: 1.17 MG/DL (ref 0.57–1)
CREAT SERPL-MCNC: 1.32 MG/DL (ref 0.57–1)
CREAT SERPL-MCNC: 1.57 MG/DL (ref 0.57–1)
CREAT SERPL-MCNC: 1.82 MG/DL (ref 0.57–1)
CRP SERPL-MCNC: 0.08 MG/DL (ref 0–0.5)
CRP SERPL-MCNC: 0.55 MG/DL (ref 0–0.5)
CRP SERPL-MCNC: 0.58 MG/DL (ref 0–0.5)
CRP SERPL-MCNC: 3.75 MG/DL (ref 0–0.5)
CRP SERPL-MCNC: 8.04 MG/DL (ref 0–0.5)
CYTOLOGIST CVX/VAG CYTO: NORMAL
CYTOLOGIST CVX/VAG CYTO: NORMAL
D DIMER PPP FEU-MCNC: 0.17 MCGFEU/ML (ref 0–0.56)
D-LACTATE SERPL-SCNC: 1.3 MMOL/L (ref 0.5–2)
D-LACTATE SERPL-SCNC: 1.4 MMOL/L (ref 0.5–2)
D-LACTATE SERPL-SCNC: 1.6 MMOL/L (ref 0.5–2)
D-LACTATE SERPL-SCNC: 2 MMOL/L (ref 0.5–2)
D-LACTATE SERPL-SCNC: 2.2 MMOL/L (ref 0.5–2)
D-LACTATE SERPL-SCNC: 2.5 MMOL/L (ref 0.5–2)
D-LACTATE SERPL-SCNC: 3.3 MMOL/L (ref 0.5–2)
DEPRECATED RDW RBC AUTO: 46.6 FL (ref 37–54)
DEPRECATED RDW RBC AUTO: 47.3 FL (ref 37–54)
DEPRECATED RDW RBC AUTO: 47.5 FL (ref 37–54)
DEPRECATED RDW RBC AUTO: 47.9 FL (ref 37–54)
DEPRECATED RDW RBC AUTO: 48.8 FL (ref 37–54)
DEPRECATED RDW RBC AUTO: 49 FL (ref 37–54)
DEPRECATED RDW RBC AUTO: 49.1 FL (ref 37–54)
DEPRECATED RDW RBC AUTO: 49.1 FL (ref 37–54)
DEPRECATED RDW RBC AUTO: 49.4 FL (ref 37–54)
DEPRECATED RDW RBC AUTO: 49.6 FL (ref 37–54)
DEPRECATED RDW RBC AUTO: 49.7 FL (ref 37–54)
DEPRECATED RDW RBC AUTO: 50.1 FL (ref 37–54)
DEPRECATED RDW RBC AUTO: 50.1 FL (ref 37–54)
DEPRECATED RDW RBC AUTO: 50.2 FL (ref 37–54)
DEPRECATED RDW RBC AUTO: 50.4 FL (ref 37–54)
DEPRECATED RDW RBC AUTO: 50.9 FL (ref 37–54)
DEPRECATED RDW RBC AUTO: 50.9 FL (ref 37–54)
DEPRECATED RDW RBC AUTO: 51.3 FL (ref 37–54)
DEPRECATED RDW RBC AUTO: 51.5 FL (ref 37–54)
DEPRECATED RDW RBC AUTO: 51.6 FL (ref 37–54)
DEPRECATED RDW RBC AUTO: 52.2 FL (ref 37–54)
DEPRECATED RDW RBC AUTO: 52.3 FL (ref 37–54)
DEPRECATED RDW RBC AUTO: 53.1 FL (ref 37–54)
DEPRECATED RDW RBC AUTO: 53.6 FL (ref 37–54)
DEPRECATED RDW RBC AUTO: 53.7 FL (ref 37–54)
DEPRECATED RDW RBC AUTO: 54 FL (ref 37–54)
DEPRECATED RDW RBC AUTO: 54 FL (ref 37–54)
DEPRECATED RDW RBC AUTO: 54.5 FL (ref 37–54)
DEPRECATED RDW RBC AUTO: 55.2 FL (ref 37–54)
DEPRECATED RDW RBC AUTO: 55.8 FL (ref 37–54)
DEPRECATED RDW RBC AUTO: 56.1 FL (ref 37–54)
EBV EA IGG SER-ACNC: 21.4 U/ML (ref 0–8.9)
EBV NA IGG SER IA-ACNC: 285 U/ML (ref 0–17.9)
EBV NA IGG SER IA-ACNC: 296 U/ML (ref 0–17.9)
EBV VCA IGG SER-ACNC: >600 U/ML (ref 0–17.9)
EBV VCA IGG SER-ACNC: >600 U/ML (ref 0–17.9)
EBV VCA IGM SER-ACNC: <36 U/ML (ref 0–35.9)
EOSINOPHIL # BLD AUTO: 0 10*3/MM3 (ref 0–0.4)
EOSINOPHIL # BLD AUTO: 0.01 10*3/MM3 (ref 0–0.4)
EOSINOPHIL # BLD AUTO: 0.01 10*3/MM3 (ref 0–0.4)
EOSINOPHIL # BLD AUTO: 0.02 10*3/MM3 (ref 0–0.4)
EOSINOPHIL # BLD AUTO: 0.05 10*3/MM3 (ref 0–0.4)
EOSINOPHIL # BLD AUTO: 0.06 10*3/MM3 (ref 0–0.4)
EOSINOPHIL # BLD AUTO: 0.09 10*3/MM3 (ref 0–0.4)
EOSINOPHIL # BLD AUTO: 0.09 10*3/MM3 (ref 0–0.4)
EOSINOPHIL NFR BLD AUTO: 0 % (ref 0.3–6.2)
EOSINOPHIL NFR BLD AUTO: 0.1 % (ref 0.3–6.2)
EOSINOPHIL NFR BLD AUTO: 0.2 % (ref 0.3–6.2)
EOSINOPHIL NFR BLD AUTO: 0.3 % (ref 0.3–6.2)
EOSINOPHIL NFR BLD AUTO: 0.4 % (ref 0.3–6.2)
EOSINOPHIL NFR BLD AUTO: 0.5 % (ref 0.3–6.2)
EPAP: 5
EPAP: 6
ERYTHROCYTE [DISTWIDTH] IN BLOOD BY AUTOMATED COUNT: 13.2 % (ref 12.3–15.4)
ERYTHROCYTE [DISTWIDTH] IN BLOOD BY AUTOMATED COUNT: 13.3 % (ref 12.3–15.4)
ERYTHROCYTE [DISTWIDTH] IN BLOOD BY AUTOMATED COUNT: 13.3 % (ref 12.3–15.4)
ERYTHROCYTE [DISTWIDTH] IN BLOOD BY AUTOMATED COUNT: 13.4 % (ref 12.3–15.4)
ERYTHROCYTE [DISTWIDTH] IN BLOOD BY AUTOMATED COUNT: 13.5 % (ref 12.3–15.4)
ERYTHROCYTE [DISTWIDTH] IN BLOOD BY AUTOMATED COUNT: 13.6 % (ref 12.3–15.4)
ERYTHROCYTE [DISTWIDTH] IN BLOOD BY AUTOMATED COUNT: 13.6 % (ref 12.3–15.4)
ERYTHROCYTE [DISTWIDTH] IN BLOOD BY AUTOMATED COUNT: 13.7 % (ref 12.3–15.4)
ERYTHROCYTE [DISTWIDTH] IN BLOOD BY AUTOMATED COUNT: 13.8 % (ref 12.3–15.4)
ERYTHROCYTE [DISTWIDTH] IN BLOOD BY AUTOMATED COUNT: 13.9 % (ref 12.3–15.4)
ERYTHROCYTE [DISTWIDTH] IN BLOOD BY AUTOMATED COUNT: 14 % (ref 12.3–15.4)
ERYTHROCYTE [DISTWIDTH] IN BLOOD BY AUTOMATED COUNT: 14.1 % (ref 12.3–15.4)
ERYTHROCYTE [DISTWIDTH] IN BLOOD BY AUTOMATED COUNT: 14.2 % (ref 12.3–15.4)
ERYTHROCYTE [DISTWIDTH] IN BLOOD BY AUTOMATED COUNT: 14.2 % (ref 12.3–15.4)
ERYTHROCYTE [DISTWIDTH] IN BLOOD BY AUTOMATED COUNT: 14.3 % (ref 12.3–15.4)
ERYTHROCYTE [DISTWIDTH] IN BLOOD BY AUTOMATED COUNT: 14.5 % (ref 12.3–15.4)
ERYTHROCYTE [DISTWIDTH] IN BLOOD BY AUTOMATED COUNT: 14.6 % (ref 12.3–15.4)
ERYTHROCYTE [DISTWIDTH] IN BLOOD BY AUTOMATED COUNT: 14.7 % (ref 12.3–15.4)
ERYTHROCYTE [DISTWIDTH] IN BLOOD BY AUTOMATED COUNT: 16.6 % (ref 12.3–15.4)
ERYTHROCYTE [DISTWIDTH] IN BLOOD BY AUTOMATED COUNT: 16.8 % (ref 12.3–15.4)
ERYTHROCYTE [DISTWIDTH] IN BLOOD BY AUTOMATED COUNT: 16.8 % (ref 12.3–15.4)
FLUAV H1 2009 PAND RNA NPH QL NAA+PROBE: NOT DETECTED
FLUAV H1 HA GENE NPH QL NAA+PROBE: NOT DETECTED
FLUAV H3 RNA NPH QL NAA+PROBE: NOT DETECTED
FLUAV SUBTYP SPEC NAA+PROBE: NOT DETECTED
FLUBV RNA ISLT QL NAA+PROBE: NOT DETECTED
FUNGUS SPEC CULT: ABNORMAL
FUNGUS SPEC FUNGUS STN: ABNORMAL
FUNGUS SPEC FUNGUS STN: ABNORMAL
GAS FLOW AIRWAY: 4 LPM
GAS FLOW AIRWAY: 6 LPM
GFR SERPL CREATININE-BSD FRML MDRD: 23 ML/MIN/1.73
GFR SERPL CREATININE-BSD FRML MDRD: 26 ML/MIN/1.73
GFR SERPL CREATININE-BSD FRML MDRD: 26 ML/MIN/1.73
GFR SERPL CREATININE-BSD FRML MDRD: 27 ML/MIN/1.73
GFR SERPL CREATININE-BSD FRML MDRD: 28 ML/MIN/1.73
GFR SERPL CREATININE-BSD FRML MDRD: 29 ML/MIN/1.73
GFR SERPL CREATININE-BSD FRML MDRD: 30 ML/MIN/1.73
GFR SERPL CREATININE-BSD FRML MDRD: 32 ML/MIN/1.73
GFR SERPL CREATININE-BSD FRML MDRD: 32 ML/MIN/1.73
GFR SERPL CREATININE-BSD FRML MDRD: 33 ML/MIN/1.73
GFR SERPL CREATININE-BSD FRML MDRD: 34 ML/MIN/1.73
GFR SERPL CREATININE-BSD FRML MDRD: 34 ML/MIN/1.73
GFR SERPL CREATININE-BSD FRML MDRD: 35 ML/MIN/1.73
GFR SERPL CREATININE-BSD FRML MDRD: 36 ML/MIN/1.73
GFR SERPL CREATININE-BSD FRML MDRD: 36 ML/MIN/1.73
GFR SERPL CREATININE-BSD FRML MDRD: 37 ML/MIN/1.73
GFR SERPL CREATININE-BSD FRML MDRD: 39 ML/MIN/1.73
GFR SERPL CREATININE-BSD FRML MDRD: 40 ML/MIN/1.73
GFR SERPL CREATININE-BSD FRML MDRD: 40 ML/MIN/1.73
GFR SERPL CREATININE-BSD FRML MDRD: 42 ML/MIN/1.73
GFR SERPL CREATININE-BSD FRML MDRD: 43 ML/MIN/1.73
GFR SERPL CREATININE-BSD FRML MDRD: 46 ML/MIN/1.73
GFR SERPL CREATININE-BSD FRML MDRD: 46 ML/MIN/1.73
GLOBULIN UR ELPH-MCNC: 2.5 GM/DL
GLOBULIN UR ELPH-MCNC: 2.5 GM/DL
GLOBULIN UR ELPH-MCNC: 2.9 GM/DL
GLOBULIN UR ELPH-MCNC: 2.9 GM/DL
GLOBULIN UR ELPH-MCNC: 3.4 GM/DL
GLOBULIN UR ELPH-MCNC: 3.5 GM/DL
GLOBULIN UR ELPH-MCNC: 3.7 GM/DL
GLOBULIN UR ELPH-MCNC: 4.1 GM/DL
GLUCOSE BLD-MCNC: 154 MG/DL (ref 65–99)
GLUCOSE BLD-MCNC: 158 MG/DL (ref 65–99)
GLUCOSE BLD-MCNC: 186 MG/DL (ref 65–99)
GLUCOSE BLD-MCNC: 188 MG/DL (ref 65–99)
GLUCOSE BLD-MCNC: 194 MG/DL (ref 65–99)
GLUCOSE BLD-MCNC: 195 MG/DL (ref 65–99)
GLUCOSE BLD-MCNC: 224 MG/DL (ref 65–99)
GLUCOSE BLD-MCNC: 244 MG/DL (ref 65–99)
GLUCOSE BLD-MCNC: 262 MG/DL (ref 65–99)
GLUCOSE BLD-MCNC: 286 MG/DL (ref 65–99)
GLUCOSE BLD-MCNC: 293 MG/DL (ref 65–99)
GLUCOSE BLD-MCNC: 320 MG/DL (ref 65–99)
GLUCOSE BLD-MCNC: 320 MG/DL (ref 65–99)
GLUCOSE BLD-MCNC: 322 MG/DL (ref 65–99)
GLUCOSE BLD-MCNC: 336 MG/DL (ref 65–99)
GLUCOSE BLD-MCNC: 340 MG/DL (ref 65–99)
GLUCOSE BLD-MCNC: 351 MG/DL (ref 65–99)
GLUCOSE BLD-MCNC: 363 MG/DL (ref 65–99)
GLUCOSE BLD-MCNC: 383 MG/DL (ref 65–99)
GLUCOSE BLD-MCNC: 392 MG/DL (ref 65–99)
GLUCOSE BLD-MCNC: 485 MG/DL (ref 65–99)
GLUCOSE BLD-MCNC: 500 MG/DL (ref 65–99)
GLUCOSE BLD-MCNC: 510 MG/DL (ref 65–99)
GLUCOSE BLD-MCNC: 56 MG/DL (ref 65–99)
GLUCOSE BLD-MCNC: 66 MG/DL (ref 65–99)
GLUCOSE BLD-MCNC: 72 MG/DL (ref 65–99)
GLUCOSE BLD-MCNC: 82 MG/DL (ref 65–99)
GLUCOSE BLDC GLUCOMTR-MCNC: 101 MG/DL (ref 70–130)
GLUCOSE BLDC GLUCOMTR-MCNC: 109 MG/DL (ref 70–130)
GLUCOSE BLDC GLUCOMTR-MCNC: 111 MG/DL (ref 70–130)
GLUCOSE BLDC GLUCOMTR-MCNC: 112 MG/DL (ref 70–130)
GLUCOSE BLDC GLUCOMTR-MCNC: 114 MG/DL (ref 70–130)
GLUCOSE BLDC GLUCOMTR-MCNC: 115 MG/DL (ref 70–130)
GLUCOSE BLDC GLUCOMTR-MCNC: 120 MG/DL (ref 70–130)
GLUCOSE BLDC GLUCOMTR-MCNC: 125 MG/DL (ref 70–130)
GLUCOSE BLDC GLUCOMTR-MCNC: 145 MG/DL (ref 70–130)
GLUCOSE BLDC GLUCOMTR-MCNC: 147 MG/DL (ref 70–130)
GLUCOSE BLDC GLUCOMTR-MCNC: 150 MG/DL (ref 70–130)
GLUCOSE BLDC GLUCOMTR-MCNC: 158 MG/DL (ref 70–130)
GLUCOSE BLDC GLUCOMTR-MCNC: 159 MG/DL (ref 70–130)
GLUCOSE BLDC GLUCOMTR-MCNC: 160 MG/DL (ref 70–130)
GLUCOSE BLDC GLUCOMTR-MCNC: 161 MG/DL (ref 70–130)
GLUCOSE BLDC GLUCOMTR-MCNC: 166 MG/DL (ref 70–130)
GLUCOSE BLDC GLUCOMTR-MCNC: 168 MG/DL (ref 70–130)
GLUCOSE BLDC GLUCOMTR-MCNC: 171 MG/DL (ref 70–130)
GLUCOSE BLDC GLUCOMTR-MCNC: 174 MG/DL (ref 70–130)
GLUCOSE BLDC GLUCOMTR-MCNC: 175 MG/DL (ref 70–130)
GLUCOSE BLDC GLUCOMTR-MCNC: 176 MG/DL (ref 70–130)
GLUCOSE BLDC GLUCOMTR-MCNC: 182 MG/DL (ref 70–130)
GLUCOSE BLDC GLUCOMTR-MCNC: 188 MG/DL (ref 70–130)
GLUCOSE BLDC GLUCOMTR-MCNC: 189 MG/DL (ref 70–130)
GLUCOSE BLDC GLUCOMTR-MCNC: 196 MG/DL (ref 70–130)
GLUCOSE BLDC GLUCOMTR-MCNC: 200 MG/DL (ref 70–130)
GLUCOSE BLDC GLUCOMTR-MCNC: 201 MG/DL (ref 70–130)
GLUCOSE BLDC GLUCOMTR-MCNC: 205 MG/DL (ref 70–130)
GLUCOSE BLDC GLUCOMTR-MCNC: 206 MG/DL (ref 70–130)
GLUCOSE BLDC GLUCOMTR-MCNC: 209 MG/DL (ref 70–130)
GLUCOSE BLDC GLUCOMTR-MCNC: 212 MG/DL (ref 70–130)
GLUCOSE BLDC GLUCOMTR-MCNC: 215 MG/DL (ref 70–130)
GLUCOSE BLDC GLUCOMTR-MCNC: 217 MG/DL (ref 70–130)
GLUCOSE BLDC GLUCOMTR-MCNC: 231 MG/DL (ref 70–130)
GLUCOSE BLDC GLUCOMTR-MCNC: 231 MG/DL (ref 70–130)
GLUCOSE BLDC GLUCOMTR-MCNC: 232 MG/DL (ref 70–130)
GLUCOSE BLDC GLUCOMTR-MCNC: 233 MG/DL (ref 70–130)
GLUCOSE BLDC GLUCOMTR-MCNC: 236 MG/DL (ref 70–130)
GLUCOSE BLDC GLUCOMTR-MCNC: 236 MG/DL (ref 70–130)
GLUCOSE BLDC GLUCOMTR-MCNC: 239 MG/DL (ref 70–130)
GLUCOSE BLDC GLUCOMTR-MCNC: 240 MG/DL (ref 70–130)
GLUCOSE BLDC GLUCOMTR-MCNC: 241 MG/DL (ref 70–130)
GLUCOSE BLDC GLUCOMTR-MCNC: 243 MG/DL (ref 70–130)
GLUCOSE BLDC GLUCOMTR-MCNC: 244 MG/DL (ref 70–130)
GLUCOSE BLDC GLUCOMTR-MCNC: 246 MG/DL (ref 70–130)
GLUCOSE BLDC GLUCOMTR-MCNC: 247 MG/DL (ref 70–130)
GLUCOSE BLDC GLUCOMTR-MCNC: 248 MG/DL (ref 70–130)
GLUCOSE BLDC GLUCOMTR-MCNC: 253 MG/DL (ref 70–130)
GLUCOSE BLDC GLUCOMTR-MCNC: 258 MG/DL (ref 70–130)
GLUCOSE BLDC GLUCOMTR-MCNC: 263 MG/DL (ref 70–130)
GLUCOSE BLDC GLUCOMTR-MCNC: 270 MG/DL (ref 70–130)
GLUCOSE BLDC GLUCOMTR-MCNC: 273 MG/DL (ref 70–130)
GLUCOSE BLDC GLUCOMTR-MCNC: 274 MG/DL (ref 70–130)
GLUCOSE BLDC GLUCOMTR-MCNC: 279 MG/DL (ref 70–130)
GLUCOSE BLDC GLUCOMTR-MCNC: 282 MG/DL (ref 70–130)
GLUCOSE BLDC GLUCOMTR-MCNC: 284 MG/DL (ref 70–130)
GLUCOSE BLDC GLUCOMTR-MCNC: 285 MG/DL (ref 70–130)
GLUCOSE BLDC GLUCOMTR-MCNC: 286 MG/DL (ref 70–130)
GLUCOSE BLDC GLUCOMTR-MCNC: 286 MG/DL (ref 70–130)
GLUCOSE BLDC GLUCOMTR-MCNC: 288 MG/DL (ref 70–130)
GLUCOSE BLDC GLUCOMTR-MCNC: 290 MG/DL (ref 70–130)
GLUCOSE BLDC GLUCOMTR-MCNC: 291 MG/DL (ref 70–130)
GLUCOSE BLDC GLUCOMTR-MCNC: 292 MG/DL (ref 70–130)
GLUCOSE BLDC GLUCOMTR-MCNC: 292 MG/DL (ref 70–130)
GLUCOSE BLDC GLUCOMTR-MCNC: 296 MG/DL (ref 70–130)
GLUCOSE BLDC GLUCOMTR-MCNC: 299 MG/DL (ref 70–130)
GLUCOSE BLDC GLUCOMTR-MCNC: 301 MG/DL (ref 70–130)
GLUCOSE BLDC GLUCOMTR-MCNC: 302 MG/DL (ref 70–130)
GLUCOSE BLDC GLUCOMTR-MCNC: 303 MG/DL (ref 70–130)
GLUCOSE BLDC GLUCOMTR-MCNC: 303 MG/DL (ref 70–130)
GLUCOSE BLDC GLUCOMTR-MCNC: 305 MG/DL (ref 70–130)
GLUCOSE BLDC GLUCOMTR-MCNC: 308 MG/DL (ref 70–130)
GLUCOSE BLDC GLUCOMTR-MCNC: 310 MG/DL (ref 70–130)
GLUCOSE BLDC GLUCOMTR-MCNC: 310 MG/DL (ref 70–130)
GLUCOSE BLDC GLUCOMTR-MCNC: 311 MG/DL (ref 70–130)
GLUCOSE BLDC GLUCOMTR-MCNC: 315 MG/DL (ref 70–130)
GLUCOSE BLDC GLUCOMTR-MCNC: 316 MG/DL (ref 70–130)
GLUCOSE BLDC GLUCOMTR-MCNC: 317 MG/DL (ref 70–130)
GLUCOSE BLDC GLUCOMTR-MCNC: 318 MG/DL (ref 70–130)
GLUCOSE BLDC GLUCOMTR-MCNC: 319 MG/DL (ref 70–130)
GLUCOSE BLDC GLUCOMTR-MCNC: 319 MG/DL (ref 70–130)
GLUCOSE BLDC GLUCOMTR-MCNC: 320 MG/DL (ref 70–130)
GLUCOSE BLDC GLUCOMTR-MCNC: 320 MG/DL (ref 70–130)
GLUCOSE BLDC GLUCOMTR-MCNC: 325 MG/DL (ref 70–130)
GLUCOSE BLDC GLUCOMTR-MCNC: 325 MG/DL (ref 70–130)
GLUCOSE BLDC GLUCOMTR-MCNC: 330 MG/DL (ref 70–130)
GLUCOSE BLDC GLUCOMTR-MCNC: 330 MG/DL (ref 70–130)
GLUCOSE BLDC GLUCOMTR-MCNC: 333 MG/DL (ref 70–130)
GLUCOSE BLDC GLUCOMTR-MCNC: 334 MG/DL (ref 70–130)
GLUCOSE BLDC GLUCOMTR-MCNC: 339 MG/DL (ref 70–130)
GLUCOSE BLDC GLUCOMTR-MCNC: 342 MG/DL (ref 70–130)
GLUCOSE BLDC GLUCOMTR-MCNC: 342 MG/DL (ref 70–130)
GLUCOSE BLDC GLUCOMTR-MCNC: 344 MG/DL (ref 70–130)
GLUCOSE BLDC GLUCOMTR-MCNC: 345 MG/DL (ref 70–130)
GLUCOSE BLDC GLUCOMTR-MCNC: 346 MG/DL (ref 70–130)
GLUCOSE BLDC GLUCOMTR-MCNC: 348 MG/DL (ref 70–130)
GLUCOSE BLDC GLUCOMTR-MCNC: 358 MG/DL (ref 70–130)
GLUCOSE BLDC GLUCOMTR-MCNC: 360 MG/DL (ref 70–130)
GLUCOSE BLDC GLUCOMTR-MCNC: 364 MG/DL (ref 70–130)
GLUCOSE BLDC GLUCOMTR-MCNC: 365 MG/DL (ref 70–130)
GLUCOSE BLDC GLUCOMTR-MCNC: 366 MG/DL (ref 70–130)
GLUCOSE BLDC GLUCOMTR-MCNC: 366 MG/DL (ref 70–130)
GLUCOSE BLDC GLUCOMTR-MCNC: 368 MG/DL (ref 70–130)
GLUCOSE BLDC GLUCOMTR-MCNC: 369 MG/DL (ref 70–130)
GLUCOSE BLDC GLUCOMTR-MCNC: 375 MG/DL (ref 70–130)
GLUCOSE BLDC GLUCOMTR-MCNC: 375 MG/DL (ref 70–130)
GLUCOSE BLDC GLUCOMTR-MCNC: 393 MG/DL (ref 70–130)
GLUCOSE BLDC GLUCOMTR-MCNC: 395 MG/DL (ref 70–130)
GLUCOSE BLDC GLUCOMTR-MCNC: 399 MG/DL (ref 70–130)
GLUCOSE BLDC GLUCOMTR-MCNC: 400 MG/DL (ref 70–130)
GLUCOSE BLDC GLUCOMTR-MCNC: 402 MG/DL (ref 70–130)
GLUCOSE BLDC GLUCOMTR-MCNC: 412 MG/DL (ref 70–130)
GLUCOSE BLDC GLUCOMTR-MCNC: 413 MG/DL (ref 70–130)
GLUCOSE BLDC GLUCOMTR-MCNC: 420 MG/DL (ref 70–130)
GLUCOSE BLDC GLUCOMTR-MCNC: 423 MG/DL (ref 70–130)
GLUCOSE BLDC GLUCOMTR-MCNC: 424 MG/DL (ref 70–130)
GLUCOSE BLDC GLUCOMTR-MCNC: 43 MG/DL (ref 70–130)
GLUCOSE BLDC GLUCOMTR-MCNC: 430 MG/DL (ref 70–130)
GLUCOSE BLDC GLUCOMTR-MCNC: 435 MG/DL (ref 70–130)
GLUCOSE BLDC GLUCOMTR-MCNC: 446 MG/DL (ref 70–130)
GLUCOSE BLDC GLUCOMTR-MCNC: 447 MG/DL (ref 70–130)
GLUCOSE BLDC GLUCOMTR-MCNC: 447 MG/DL (ref 70–130)
GLUCOSE BLDC GLUCOMTR-MCNC: 448 MG/DL (ref 70–130)
GLUCOSE BLDC GLUCOMTR-MCNC: 449 MG/DL (ref 70–130)
GLUCOSE BLDC GLUCOMTR-MCNC: 450 MG/DL (ref 70–130)
GLUCOSE BLDC GLUCOMTR-MCNC: 456 MG/DL (ref 70–130)
GLUCOSE BLDC GLUCOMTR-MCNC: 501 MG/DL (ref 70–130)
GLUCOSE BLDC GLUCOMTR-MCNC: 510 MG/DL (ref 70–130)
GLUCOSE BLDC GLUCOMTR-MCNC: 536 MG/DL (ref 70–130)
GLUCOSE BLDC GLUCOMTR-MCNC: 63 MG/DL (ref 70–130)
GLUCOSE BLDC GLUCOMTR-MCNC: 64 MG/DL (ref 70–130)
GLUCOSE BLDC GLUCOMTR-MCNC: 66 MG/DL (ref 70–130)
GLUCOSE BLDC GLUCOMTR-MCNC: 67 MG/DL (ref 70–130)
GLUCOSE BLDC GLUCOMTR-MCNC: 68 MG/DL (ref 70–130)
GLUCOSE BLDC GLUCOMTR-MCNC: 75 MG/DL (ref 70–130)
GLUCOSE BLDC GLUCOMTR-MCNC: 76 MG/DL (ref 70–130)
GLUCOSE BLDC GLUCOMTR-MCNC: 78 MG/DL (ref 70–130)
GLUCOSE BLDC GLUCOMTR-MCNC: 86 MG/DL (ref 70–130)
GLUCOSE BLDC GLUCOMTR-MCNC: 91 MG/DL (ref 70–130)
GLUCOSE SERPL-MCNC: 151 MG/DL (ref 65–99)
GLUCOSE SERPL-MCNC: 154 MG/DL (ref 65–99)
GLUCOSE SERPL-MCNC: 225 MG/DL (ref 65–99)
GLUCOSE SERPL-MCNC: 304 MG/DL (ref 65–99)
GLUCOSE SERPL-MCNC: 336 MG/DL (ref 65–99)
GLUCOSE UR STRIP-MCNC: ABNORMAL MG/DL
GRAM STN SPEC: ABNORMAL
GRAM STN SPEC: NORMAL
HADV DNA SPEC NAA+PROBE: NOT DETECTED
HBA1C MFR BLD: 9.6 % (ref 4.8–5.6)
HCO3 BLDA-SCNC: 36.2 MMOL/L (ref 22–28)
HCO3 BLDA-SCNC: 38 MMOL/L (ref 20–26)
HCO3 BLDA-SCNC: 39.3 MMOL/L (ref 22–28)
HCO3 BLDA-SCNC: 42.5 MMOL/L (ref 22–28)
HCO3 BLDA-SCNC: 42.8 MMOL/L (ref 22–28)
HCO3 BLDA-SCNC: 43 MMOL/L (ref 22–28)
HCO3 BLDA-SCNC: 44.7 MMOL/L (ref 22–28)
HCOV 229E RNA SPEC QL NAA+PROBE: NOT DETECTED
HCOV HKU1 RNA SPEC QL NAA+PROBE: NOT DETECTED
HCOV NL63 RNA SPEC QL NAA+PROBE: NOT DETECTED
HCOV OC43 RNA SPEC QL NAA+PROBE: NOT DETECTED
HCT VFR BLD AUTO: 31.9 % (ref 34–46.6)
HCT VFR BLD AUTO: 33.1 % (ref 34–46.6)
HCT VFR BLD AUTO: 33.4 % (ref 34–46.6)
HCT VFR BLD AUTO: 33.4 % (ref 34–46.6)
HCT VFR BLD AUTO: 33.8 % (ref 34–46.6)
HCT VFR BLD AUTO: 34.8 % (ref 34–46.6)
HCT VFR BLD AUTO: 35.5 % (ref 34–46.6)
HCT VFR BLD AUTO: 35.9 % (ref 34–46.6)
HCT VFR BLD AUTO: 36 % (ref 34–46.6)
HCT VFR BLD AUTO: 36 % (ref 34–46.6)
HCT VFR BLD AUTO: 36.3 % (ref 34–46.6)
HCT VFR BLD AUTO: 36.6 % (ref 34–46.6)
HCT VFR BLD AUTO: 38.1 % (ref 34–46.6)
HCT VFR BLD AUTO: 38.2 % (ref 34–46.6)
HCT VFR BLD AUTO: 38.5 % (ref 34–46.6)
HCT VFR BLD AUTO: 39.4 % (ref 34–46.6)
HCT VFR BLD AUTO: 39.5 % (ref 34–46.6)
HCT VFR BLD AUTO: 39.9 % (ref 34–46.6)
HCT VFR BLD AUTO: 40.2 % (ref 34–46.6)
HCT VFR BLD AUTO: 40.7 % (ref 34–46.6)
HCT VFR BLD AUTO: 40.8 % (ref 34–46.6)
HCT VFR BLD AUTO: 40.9 % (ref 34–46.6)
HCT VFR BLD AUTO: 41 % (ref 34–46.6)
HCT VFR BLD AUTO: 41.6 % (ref 34–46.6)
HCT VFR BLD AUTO: 42.1 % (ref 34–46.6)
HCT VFR BLD AUTO: 42.7 % (ref 34–46.6)
HCT VFR BLD AUTO: 43.5 % (ref 34–46.6)
HCT VFR BLD AUTO: 44.3 % (ref 34–46.6)
HCT VFR BLD AUTO: 44.8 % (ref 34–46.6)
HCT VFR BLD AUTO: 46 % (ref 34–46.6)
HCT VFR BLD AUTO: 46.4 % (ref 34–46.6)
HCT VFR BLD CALC: 31.4 %
HCT VFR BLD CALC: 35.9 %
HCT VFR BLD CALC: 41.4 %
HCT VFR BLD CALC: 41.6 %
HCT VFR BLD CALC: 43 %
HCT VFR BLD CALC: 43.2 %
HCT VFR BLD CALC: 44.8 %
HGB BLD-MCNC: 10.4 G/DL (ref 12–15.9)
HGB BLD-MCNC: 10.5 G/DL (ref 12–15.9)
HGB BLD-MCNC: 10.5 G/DL (ref 12–15.9)
HGB BLD-MCNC: 10.7 G/DL (ref 12–15.9)
HGB BLD-MCNC: 10.8 G/DL (ref 12–15.9)
HGB BLD-MCNC: 10.9 G/DL (ref 12–15.9)
HGB BLD-MCNC: 11.2 G/DL (ref 12–15.9)
HGB BLD-MCNC: 11.5 G/DL (ref 12–15.9)
HGB BLD-MCNC: 11.5 G/DL (ref 12–15.9)
HGB BLD-MCNC: 11.7 G/DL (ref 12–15.9)
HGB BLD-MCNC: 11.8 G/DL (ref 12–15.9)
HGB BLD-MCNC: 11.8 G/DL (ref 12–15.9)
HGB BLD-MCNC: 12 G/DL (ref 12–15.9)
HGB BLD-MCNC: 12.4 G/DL (ref 12–15.9)
HGB BLD-MCNC: 12.4 G/DL (ref 12–15.9)
HGB BLD-MCNC: 12.5 G/DL (ref 12–15.9)
HGB BLD-MCNC: 12.7 G/DL (ref 12–15.9)
HGB BLD-MCNC: 12.7 G/DL (ref 12–15.9)
HGB BLD-MCNC: 13 G/DL (ref 12–15.9)
HGB BLD-MCNC: 13 G/DL (ref 12–15.9)
HGB BLD-MCNC: 13.1 G/DL (ref 12–15.9)
HGB BLD-MCNC: 13.2 G/DL (ref 12–15.9)
HGB BLD-MCNC: 13.4 G/DL (ref 12–15.9)
HGB BLD-MCNC: 13.5 G/DL (ref 12–15.9)
HGB BLD-MCNC: 13.6 G/DL (ref 12–15.9)
HGB BLD-MCNC: 13.8 G/DL (ref 12–15.9)
HGB BLD-MCNC: 14 G/DL (ref 12–15.9)
HGB BLD-MCNC: 14.2 G/DL (ref 12–15.9)
HGB BLD-MCNC: 14.7 G/DL (ref 12–15.9)
HGB BLD-MCNC: 14.7 G/DL (ref 12–15.9)
HGB BLD-MCNC: 9.5 G/DL (ref 12–15.9)
HGB BLDA-MCNC: 11.7 G/DL (ref 12–18)
HGB BLDA-MCNC: 13.5 G/DL (ref 12–18)
HGB BLDA-MCNC: 13.6 G/DL (ref 14–18)
HGB BLDA-MCNC: 14 G/DL (ref 12–18)
HGB BLDA-MCNC: 14.1 G/DL (ref 12–18)
HGB BLDA-MCNC: 14.6 G/DL (ref 12–18)
HGB UR QL STRIP.AUTO: NEGATIVE
HMPV RNA NPH QL NAA+NON-PROBE: NOT DETECTED
HOLD SPECIMEN: NORMAL
HOROWITZ INDEX BLD+IHG-RTO: 36 %
HOROWITZ INDEX BLD+IHG-RTO: 44 %
HOROWITZ INDEX BLD+IHG-RTO: 44 %
HOROWITZ INDEX BLD+IHG-RTO: 70 %
HPIV1 RNA SPEC QL NAA+PROBE: NOT DETECTED
HPIV2 RNA SPEC QL NAA+PROBE: NOT DETECTED
HPIV3 RNA NPH QL NAA+PROBE: NOT DETECTED
HPIV4 P GENE NPH QL NAA+PROBE: NOT DETECTED
HYALINE CASTS UR QL AUTO: ABNORMAL /LPF
HYPOCHROMIA BLD QL: NORMAL
HYPOCHROMIA BLD QL: NORMAL
IMM GRANULOCYTES # BLD AUTO: 0.04 10*3/MM3 (ref 0–0.05)
IMM GRANULOCYTES # BLD AUTO: 0.05 10*3/MM3 (ref 0–0.05)
IMM GRANULOCYTES # BLD AUTO: 0.05 10*3/MM3 (ref 0–0.05)
IMM GRANULOCYTES # BLD AUTO: 0.06 10*3/MM3 (ref 0–0.05)
IMM GRANULOCYTES # BLD AUTO: 0.08 10*3/MM3 (ref 0–0.05)
IMM GRANULOCYTES # BLD AUTO: 0.09 10*3/MM3 (ref 0–0.05)
IMM GRANULOCYTES # BLD AUTO: 0.12 10*3/MM3 (ref 0–0.05)
IMM GRANULOCYTES # BLD AUTO: 0.12 10*3/MM3 (ref 0–0.05)
IMM GRANULOCYTES # BLD AUTO: 0.14 10*3/MM3 (ref 0–0.05)
IMM GRANULOCYTES # BLD AUTO: 0.18 10*3/MM3 (ref 0–0.05)
IMM GRANULOCYTES # BLD AUTO: 0.18 10*3/MM3 (ref 0–0.05)
IMM GRANULOCYTES # BLD AUTO: 0.2 10*3/MM3 (ref 0–0.05)
IMM GRANULOCYTES # BLD AUTO: 0.21 10*3/MM3 (ref 0–0.05)
IMM GRANULOCYTES # BLD AUTO: 0.25 10*3/MM3 (ref 0–0.05)
IMM GRANULOCYTES # BLD AUTO: 0.31 10*3/MM3 (ref 0–0.05)
IMM GRANULOCYTES # BLD AUTO: 0.33 10*3/MM3 (ref 0–0.05)
IMM GRANULOCYTES # BLD AUTO: 0.36 10*3/MM3 (ref 0–0.05)
IMM GRANULOCYTES # BLD AUTO: 0.5 10*3/MM3 (ref 0–0.05)
IMM GRANULOCYTES # BLD AUTO: 0.56 10*3/MM3 (ref 0–0.05)
IMM GRANULOCYTES # BLD AUTO: 0.81 10*3/MM3 (ref 0–0.05)
IMM GRANULOCYTES NFR BLD AUTO: 0.4 % (ref 0–0.5)
IMM GRANULOCYTES NFR BLD AUTO: 0.5 % (ref 0–0.5)
IMM GRANULOCYTES NFR BLD AUTO: 0.5 % (ref 0–0.5)
IMM GRANULOCYTES NFR BLD AUTO: 0.6 % (ref 0–0.5)
IMM GRANULOCYTES NFR BLD AUTO: 0.6 % (ref 0–0.5)
IMM GRANULOCYTES NFR BLD AUTO: 0.7 % (ref 0–0.5)
IMM GRANULOCYTES NFR BLD AUTO: 0.7 % (ref 0–0.5)
IMM GRANULOCYTES NFR BLD AUTO: 0.8 % (ref 0–0.5)
IMM GRANULOCYTES NFR BLD AUTO: 0.8 % (ref 0–0.5)
IMM GRANULOCYTES NFR BLD AUTO: 1 % (ref 0–0.5)
IMM GRANULOCYTES NFR BLD AUTO: 1.1 % (ref 0–0.5)
IMM GRANULOCYTES NFR BLD AUTO: 1.3 % (ref 0–0.5)
IMM GRANULOCYTES NFR BLD AUTO: 1.5 % (ref 0–0.5)
IMM GRANULOCYTES NFR BLD AUTO: 2 % (ref 0–0.5)
IMM GRANULOCYTES NFR BLD AUTO: 2.5 % (ref 0–0.5)
IMM GRANULOCYTES NFR BLD AUTO: 2.7 % (ref 0–0.5)
INHALED O2 CONCENTRATION: 90 %
INR PPP: 1.05 (ref 0.9–1.1)
INR PPP: 1.24 (ref 0.9–1.1)
INTERPRETATION: ABNORMAL
IPAP: 12
IPAP: 16
KETONES UR QL STRIP: NEGATIVE
LAB AP CASE REPORT: NORMAL
LACTATE HOLD SPECIMEN: NORMAL
LARGE PLATELETS: NORMAL
LDH SERPL-CCNC: 208 U/L (ref 135–214)
LDH SERPL-CCNC: 256 U/L (ref 135–214)
LEUKOCYTE ESTERASE UR QL STRIP.AUTO: ABNORMAL
LV EF 2D ECHO EST: 66 %
LYMPHOCYTES # BLD AUTO: 0.77 10*3/MM3 (ref 0.7–3.1)
LYMPHOCYTES # BLD AUTO: 0.8 10*3/MM3 (ref 0.7–3.1)
LYMPHOCYTES # BLD AUTO: 0.83 10*3/MM3 (ref 0.7–3.1)
LYMPHOCYTES # BLD AUTO: 1.04 10*3/MM3 (ref 0.7–3.1)
LYMPHOCYTES # BLD AUTO: 1.11 10*3/MM3 (ref 0.7–3.1)
LYMPHOCYTES # BLD AUTO: 1.24 10*3/MM3 (ref 0.7–3.1)
LYMPHOCYTES # BLD AUTO: 1.25 10*3/MM3 (ref 0.7–3.1)
LYMPHOCYTES # BLD AUTO: 1.41 10*3/MM3 (ref 0.7–3.1)
LYMPHOCYTES # BLD AUTO: 1.55 10*3/MM3 (ref 0.7–3.1)
LYMPHOCYTES # BLD AUTO: 1.71 10*3/MM3 (ref 0.7–3.1)
LYMPHOCYTES # BLD AUTO: 1.87 10*3/MM3 (ref 0.7–3.1)
LYMPHOCYTES # BLD AUTO: 2.02 10*3/MM3 (ref 0.7–3.1)
LYMPHOCYTES # BLD AUTO: 2.28 10*3/MM3 (ref 0.7–3.1)
LYMPHOCYTES # BLD AUTO: 2.48 10*3/MM3 (ref 0.7–3.1)
LYMPHOCYTES # BLD AUTO: 2.53 10*3/MM3 (ref 0.7–3.1)
LYMPHOCYTES # BLD AUTO: 2.61 10*3/MM3 (ref 0.7–3.1)
LYMPHOCYTES # BLD AUTO: 3.1 10*3/MM3 (ref 0.7–3.1)
LYMPHOCYTES # BLD AUTO: 3.82 10*3/MM3 (ref 0.7–3.1)
LYMPHOCYTES # BLD AUTO: 4.06 10*3/MM3 (ref 0.7–3.1)
LYMPHOCYTES # BLD AUTO: 4.24 10*3/MM3 (ref 0.7–3.1)
LYMPHOCYTES # BLD MANUAL: 2.48 10*3/MM3 (ref 0.7–3.1)
LYMPHOCYTES # BLD MANUAL: 4.8 10*3/MM3 (ref 0.7–3.1)
LYMPHOCYTES # BLD MANUAL: 8.33 10*3/MM3 (ref 0.7–3.1)
LYMPHOCYTES NFR BLD AUTO: 11.7 % (ref 19.6–45.3)
LYMPHOCYTES NFR BLD AUTO: 11.9 % (ref 19.6–45.3)
LYMPHOCYTES NFR BLD AUTO: 12 % (ref 19.6–45.3)
LYMPHOCYTES NFR BLD AUTO: 12.4 % (ref 19.6–45.3)
LYMPHOCYTES NFR BLD AUTO: 14.4 % (ref 19.6–45.3)
LYMPHOCYTES NFR BLD AUTO: 14.7 % (ref 19.6–45.3)
LYMPHOCYTES NFR BLD AUTO: 15.5 % (ref 19.6–45.3)
LYMPHOCYTES NFR BLD AUTO: 16.2 % (ref 19.6–45.3)
LYMPHOCYTES NFR BLD AUTO: 20.3 % (ref 19.6–45.3)
LYMPHOCYTES NFR BLD AUTO: 3.8 % (ref 19.6–45.3)
LYMPHOCYTES NFR BLD AUTO: 4.8 % (ref 19.6–45.3)
LYMPHOCYTES NFR BLD AUTO: 5.7 % (ref 19.6–45.3)
LYMPHOCYTES NFR BLD AUTO: 5.8 % (ref 19.6–45.3)
LYMPHOCYTES NFR BLD AUTO: 6.1 % (ref 19.6–45.3)
LYMPHOCYTES NFR BLD AUTO: 6.5 % (ref 19.6–45.3)
LYMPHOCYTES NFR BLD AUTO: 6.9 % (ref 19.6–45.3)
LYMPHOCYTES NFR BLD AUTO: 7 % (ref 19.6–45.3)
LYMPHOCYTES NFR BLD AUTO: 8.5 % (ref 19.6–45.3)
LYMPHOCYTES NFR BLD AUTO: 9.3 % (ref 19.6–45.3)
LYMPHOCYTES NFR BLD AUTO: 9.6 % (ref 19.6–45.3)
LYMPHOCYTES NFR BLD MANUAL: 14 % (ref 19.6–45.3)
LYMPHOCYTES NFR BLD MANUAL: 16.5 % (ref 19.6–45.3)
LYMPHOCYTES NFR BLD MANUAL: 3.5 % (ref 5–12)
LYMPHOCYTES NFR BLD MANUAL: 6 % (ref 19.6–45.3)
LYMPHOCYTES NFR BLD MANUAL: 7.5 % (ref 5–12)
LYMPHOCYTES NFR BLD MANUAL: 8 % (ref 5–12)
M PNEUMO IGG SER IA-ACNC: NOT DETECTED
MAGNESIUM SERPL-MCNC: 2 MG/DL (ref 1.6–2.4)
MAGNESIUM SERPL-MCNC: 2.1 MG/DL (ref 1.6–2.4)
MAGNESIUM SERPL-MCNC: 2.1 MG/DL (ref 1.6–2.4)
MAGNESIUM SERPL-MCNC: 2.2 MG/DL (ref 1.6–2.4)
MAGNESIUM SERPL-MCNC: 2.6 MG/DL (ref 1.6–2.4)
MAXIMAL PREDICTED HEART RATE: 151 BPM
MAXIMAL PREDICTED HEART RATE: 151 BPM
MCH RBC QN AUTO: 25.5 PG (ref 26.6–33)
MCH RBC QN AUTO: 25.6 PG (ref 26.6–33)
MCH RBC QN AUTO: 25.8 PG (ref 26.6–33)
MCH RBC QN AUTO: 31 PG (ref 26.6–33)
MCH RBC QN AUTO: 31.3 PG (ref 26.6–33)
MCH RBC QN AUTO: 31.3 PG (ref 26.6–33)
MCH RBC QN AUTO: 31.4 PG (ref 26.6–33)
MCH RBC QN AUTO: 31.4 PG (ref 26.6–33)
MCH RBC QN AUTO: 31.5 PG (ref 26.6–33)
MCH RBC QN AUTO: 31.5 PG (ref 26.6–33)
MCH RBC QN AUTO: 31.6 PG (ref 26.6–33)
MCH RBC QN AUTO: 31.7 PG (ref 26.6–33)
MCH RBC QN AUTO: 31.8 PG (ref 26.6–33)
MCH RBC QN AUTO: 31.8 PG (ref 26.6–33)
MCH RBC QN AUTO: 31.9 PG (ref 26.6–33)
MCH RBC QN AUTO: 32 PG (ref 26.6–33)
MCH RBC QN AUTO: 32.2 PG (ref 26.6–33)
MCH RBC QN AUTO: 32.3 PG (ref 26.6–33)
MCH RBC QN AUTO: 32.3 PG (ref 26.6–33)
MCH RBC QN AUTO: 32.6 PG (ref 26.6–33)
MCHC RBC AUTO-ENTMCNC: 27.6 G/DL (ref 31.5–35.7)
MCHC RBC AUTO-ENTMCNC: 28.7 G/DL (ref 31.5–35.7)
MCHC RBC AUTO-ENTMCNC: 28.9 G/DL (ref 31.5–35.7)
MCHC RBC AUTO-ENTMCNC: 29.7 G/DL (ref 31.5–35.7)
MCHC RBC AUTO-ENTMCNC: 31.1 G/DL (ref 31.5–35.7)
MCHC RBC AUTO-ENTMCNC: 31.2 G/DL (ref 31.5–35.7)
MCHC RBC AUTO-ENTMCNC: 31.3 G/DL (ref 31.5–35.7)
MCHC RBC AUTO-ENTMCNC: 31.4 G/DL (ref 31.5–35.7)
MCHC RBC AUTO-ENTMCNC: 31.5 G/DL (ref 31.5–35.7)
MCHC RBC AUTO-ENTMCNC: 31.7 G/DL (ref 31.5–35.7)
MCHC RBC AUTO-ENTMCNC: 31.8 G/DL (ref 31.5–35.7)
MCHC RBC AUTO-ENTMCNC: 31.9 G/DL (ref 31.5–35.7)
MCHC RBC AUTO-ENTMCNC: 32 G/DL (ref 31.5–35.7)
MCHC RBC AUTO-ENTMCNC: 32.1 G/DL (ref 31.5–35.7)
MCHC RBC AUTO-ENTMCNC: 32.2 G/DL (ref 31.5–35.7)
MCHC RBC AUTO-ENTMCNC: 32.2 G/DL (ref 31.5–35.7)
MCHC RBC AUTO-ENTMCNC: 32.3 G/DL (ref 31.5–35.7)
MCHC RBC AUTO-ENTMCNC: 32.4 G/DL (ref 31.5–35.7)
MCHC RBC AUTO-ENTMCNC: 32.5 G/DL (ref 31.5–35.7)
MCHC RBC AUTO-ENTMCNC: 32.6 G/DL (ref 31.5–35.7)
MCHC RBC AUTO-ENTMCNC: 32.8 G/DL (ref 31.5–35.7)
MCHC RBC AUTO-ENTMCNC: 32.9 G/DL (ref 31.5–35.7)
MCV RBC AUTO: 100.2 FL (ref 79–97)
MCV RBC AUTO: 100.2 FL (ref 79–97)
MCV RBC AUTO: 100.3 FL (ref 79–97)
MCV RBC AUTO: 100.5 FL (ref 79–97)
MCV RBC AUTO: 100.7 FL (ref 79–97)
MCV RBC AUTO: 101.2 FL (ref 79–97)
MCV RBC AUTO: 101.4 FL (ref 79–97)
MCV RBC AUTO: 101.5 FL (ref 79–97)
MCV RBC AUTO: 106 FL (ref 79–97)
MCV RBC AUTO: 89.2 FL (ref 79–97)
MCV RBC AUTO: 89.3 FL (ref 79–97)
MCV RBC AUTO: 92.5 FL (ref 79–97)
MCV RBC AUTO: 94.2 FL (ref 79–97)
MCV RBC AUTO: 96.7 FL (ref 79–97)
MCV RBC AUTO: 97.1 FL (ref 79–97)
MCV RBC AUTO: 97.1 FL (ref 79–97)
MCV RBC AUTO: 97.4 FL (ref 79–97)
MCV RBC AUTO: 97.5 FL (ref 79–97)
MCV RBC AUTO: 97.9 FL (ref 79–97)
MCV RBC AUTO: 98.1 FL (ref 79–97)
MCV RBC AUTO: 98.6 FL (ref 79–97)
MCV RBC AUTO: 98.8 FL (ref 79–97)
MCV RBC AUTO: 98.9 FL (ref 79–97)
MCV RBC AUTO: 99 FL (ref 79–97)
MCV RBC AUTO: 99 FL (ref 79–97)
MCV RBC AUTO: 99.1 FL (ref 79–97)
MCV RBC AUTO: 99.4 FL (ref 79–97)
MCV RBC AUTO: 99.4 FL (ref 79–97)
MCV RBC AUTO: 99.5 FL (ref 79–97)
MCV RBC AUTO: 99.7 FL (ref 79–97)
MCV RBC AUTO: 99.8 FL (ref 79–97)
METAMYELOCYTES NFR BLD MANUAL: 1.5 % (ref 0–0)
METHGB BLD QL: 0.4 % (ref 0–1.5)
METHGB BLD QL: 0.7 % (ref 0–1.5)
METHGB BLD QL: 1 % (ref 0–1.5)
METHGB BLD QL: 1.2 % (ref 0–1.5)
METHGB BLD QL: 1.3 % (ref 0–1.5)
MODALITY: ABNORMAL
MONOCYTES # BLD AUTO: 0.17 10*3/MM3 (ref 0.1–0.9)
MONOCYTES # BLD AUTO: 0.46 10*3/MM3 (ref 0.1–0.9)
MONOCYTES # BLD AUTO: 0.47 10*3/MM3 (ref 0.1–0.9)
MONOCYTES # BLD AUTO: 0.53 10*3/MM3 (ref 0.1–0.9)
MONOCYTES # BLD AUTO: 0.55 10*3/MM3 (ref 0.1–0.9)
MONOCYTES # BLD AUTO: 0.59 10*3/MM3 (ref 0.1–0.9)
MONOCYTES # BLD AUTO: 0.6 10*3/MM3 (ref 0.1–0.9)
MONOCYTES # BLD AUTO: 0.69 10*3/MM3 (ref 0.1–0.9)
MONOCYTES # BLD AUTO: 0.82 10*3/MM3 (ref 0.1–0.9)
MONOCYTES # BLD AUTO: 0.88 10*3/MM3 (ref 0.1–0.9)
MONOCYTES # BLD AUTO: 0.96 10*3/MM3 (ref 0.1–0.9)
MONOCYTES # BLD AUTO: 1.07 10*3/MM3 (ref 0.1–0.9)
MONOCYTES # BLD AUTO: 1.12 10*3/MM3 (ref 0.1–0.9)
MONOCYTES # BLD AUTO: 1.14 10*3/MM3 (ref 0.1–0.9)
MONOCYTES # BLD AUTO: 1.37 10*3/MM3 (ref 0.1–0.9)
MONOCYTES # BLD AUTO: 1.42 10*3/MM3 (ref 0.1–0.9)
MONOCYTES # BLD AUTO: 1.45 10*3/MM3 (ref 0.1–0.9)
MONOCYTES # BLD AUTO: 1.49 10*3/MM3 (ref 0.1–0.9)
MONOCYTES # BLD AUTO: 1.51 10*3/MM3 (ref 0.1–0.9)
MONOCYTES # BLD AUTO: 1.64 10*3/MM3 (ref 0.1–0.9)
MONOCYTES # BLD AUTO: 2.15 10*3/MM3 (ref 0.1–0.9)
MONOCYTES # BLD AUTO: 2.74 10*3/MM3 (ref 0.1–0.9)
MONOCYTES # BLD AUTO: 3.78 10*3/MM3 (ref 0.1–0.9)
MONOCYTES NFR BLD AUTO: 0.8 % (ref 5–12)
MONOCYTES NFR BLD AUTO: 10.1 % (ref 5–12)
MONOCYTES NFR BLD AUTO: 10.2 % (ref 5–12)
MONOCYTES NFR BLD AUTO: 2.1 % (ref 5–12)
MONOCYTES NFR BLD AUTO: 2.4 % (ref 5–12)
MONOCYTES NFR BLD AUTO: 2.7 % (ref 5–12)
MONOCYTES NFR BLD AUTO: 2.8 % (ref 5–12)
MONOCYTES NFR BLD AUTO: 3 % (ref 5–12)
MONOCYTES NFR BLD AUTO: 3.7 % (ref 5–12)
MONOCYTES NFR BLD AUTO: 3.9 % (ref 5–12)
MONOCYTES NFR BLD AUTO: 4.6 % (ref 5–12)
MONOCYTES NFR BLD AUTO: 4.8 % (ref 5–12)
MONOCYTES NFR BLD AUTO: 5.2 % (ref 5–12)
MONOCYTES NFR BLD AUTO: 5.5 % (ref 5–12)
MONOCYTES NFR BLD AUTO: 5.7 % (ref 5–12)
MONOCYTES NFR BLD AUTO: 6 % (ref 5–12)
MONOCYTES NFR BLD AUTO: 6.5 % (ref 5–12)
MONOCYTES NFR BLD AUTO: 6.5 % (ref 5–12)
MONOCYTES NFR BLD AUTO: 6.7 % (ref 5–12)
MONOCYTES NFR BLD AUTO: 8.4 % (ref 5–12)
MRSA DNA SPEC QL NAA+PROBE: ABNORMAL
MRSA DNA SPEC QL NAA+PROBE: NEGATIVE
NEUTROPHILS # BLD AUTO: 11.76 10*3/MM3 (ref 1.7–7)
NEUTROPHILS # BLD AUTO: 12.9 10*3/MM3 (ref 1.7–7)
NEUTROPHILS # BLD AUTO: 13.85 10*3/MM3 (ref 1.7–7)
NEUTROPHILS # BLD AUTO: 14.39 10*3/MM3 (ref 1.7–7)
NEUTROPHILS # BLD AUTO: 14.62 10*3/MM3 (ref 1.7–7)
NEUTROPHILS # BLD AUTO: 15.33 10*3/MM3 (ref 1.7–7)
NEUTROPHILS # BLD AUTO: 15.71 10*3/MM3 (ref 1.7–7)
NEUTROPHILS # BLD AUTO: 16.02 10*3/MM3 (ref 1.7–7)
NEUTROPHILS # BLD AUTO: 18.84 10*3/MM3 (ref 1.7–7)
NEUTROPHILS # BLD AUTO: 18.98 10*3/MM3 (ref 1.7–7)
NEUTROPHILS # BLD AUTO: 19.02 10*3/MM3 (ref 1.7–7)
NEUTROPHILS # BLD AUTO: 19.9 10*3/MM3 (ref 1.7–7)
NEUTROPHILS # BLD AUTO: 22.3 10*3/MM3 (ref 1.7–7)
NEUTROPHILS # BLD AUTO: 22.6 10*3/MM3 (ref 1.7–7)
NEUTROPHILS # BLD AUTO: 24.75 10*3/MM3 (ref 1.7–7)
NEUTROPHILS # BLD AUTO: 25.69 10*3/MM3 (ref 1.7–7)
NEUTROPHILS # BLD AUTO: 26.72 10*3/MM3 (ref 1.7–7)
NEUTROPHILS # BLD AUTO: 27.16 10*3/MM3 (ref 1.7–7)
NEUTROPHILS # BLD AUTO: 34.06 10*3/MM3 (ref 1.7–7)
NEUTROPHILS # BLD AUTO: 36.77 10*3/MM3 (ref 1.7–7)
NEUTROPHILS # BLD AUTO: 8.81 10*3/MM3 (ref 1.7–7)
NEUTROPHILS NFR BLD AUTO: 10.53 10*3/MM3 (ref 1.7–7)
NEUTROPHILS NFR BLD AUTO: 6.76 10*3/MM3 (ref 1.7–7)
NEUTROPHILS NFR BLD AUTO: 72.3 % (ref 42.7–76)
NEUTROPHILS NFR BLD AUTO: 73 % (ref 42.7–76)
NEUTROPHILS NFR BLD AUTO: 76.9 % (ref 42.7–76)
NEUTROPHILS NFR BLD AUTO: 77.4 % (ref 42.7–76)
NEUTROPHILS NFR BLD AUTO: 78.2 % (ref 42.7–76)
NEUTROPHILS NFR BLD AUTO: 78.6 % (ref 42.7–76)
NEUTROPHILS NFR BLD AUTO: 78.6 % (ref 42.7–76)
NEUTROPHILS NFR BLD AUTO: 79.2 % (ref 42.7–76)
NEUTROPHILS NFR BLD AUTO: 80.5 % (ref 42.7–76)
NEUTROPHILS NFR BLD AUTO: 84.6 % (ref 42.7–76)
NEUTROPHILS NFR BLD AUTO: 86.5 % (ref 42.7–76)
NEUTROPHILS NFR BLD AUTO: 87 % (ref 42.7–76)
NEUTROPHILS NFR BLD AUTO: 87 % (ref 42.7–76)
NEUTROPHILS NFR BLD AUTO: 87.8 % (ref 42.7–76)
NEUTROPHILS NFR BLD AUTO: 87.8 % (ref 42.7–76)
NEUTROPHILS NFR BLD AUTO: 88.4 % (ref 42.7–76)
NEUTROPHILS NFR BLD AUTO: 89.8 % (ref 42.7–76)
NEUTROPHILS NFR BLD AUTO: 89.9 % (ref 42.7–76)
NEUTROPHILS NFR BLD AUTO: 91.4 % (ref 42.7–76)
NEUTROPHILS NFR BLD AUTO: 94.3 % (ref 42.7–76)
NEUTROPHILS NFR BLD MANUAL: 67.5 % (ref 42.7–76)
NEUTROPHILS NFR BLD MANUAL: 76 % (ref 42.7–76)
NEUTROPHILS NFR BLD MANUAL: 86.5 % (ref 42.7–76)
NEUTS BAND NFR BLD MANUAL: 2 % (ref 0–5)
NEUTS BAND NFR BLD MANUAL: 2.5 % (ref 0–5)
NITRITE UR QL STRIP: NEGATIVE
NOTE: ABNORMAL
NRBC BLD AUTO-RTO: 0 /100 WBC (ref 0–0.2)
NT-PROBNP SERPL-MCNC: 241.4 PG/ML (ref 5–900)
NT-PROBNP SERPL-MCNC: 2713 PG/ML (ref 5–900)
NT-PROBNP SERPL-MCNC: 419.4 PG/ML (ref 5–900)
NT-PROBNP SERPL-MCNC: 521.1 PG/ML (ref 5–900)
NT-PROBNP SERPL-MCNC: 844.6 PG/ML (ref 5–900)
OXYHGB MFR BLDV: 86.1 % (ref 94–99)
OXYHGB MFR BLDV: 87.1 % (ref 94–99)
OXYHGB MFR BLDV: 90.8 % (ref 94–99)
OXYHGB MFR BLDV: 91.9 % (ref 94–99)
OXYHGB MFR BLDV: 92 % (ref 94–99)
OXYHGB MFR BLDV: 93.4 % (ref 94–99)
OXYHGB MFR BLDV: 94.3 % (ref 94–99)
PATH INTERP BLD-IMP: NORMAL
PATH INTERP BLD-IMP: NORMAL
PATH REPORT.FINAL DX SPEC: NORMAL
PCO2 BLDA: 49.2 MM HG (ref 35–45)
PCO2 BLDA: 54.9 MM HG (ref 35–45)
PCO2 BLDA: 63.4 MM HG (ref 35–45)
PCO2 BLDA: 64.3 MM HG (ref 35–45)
PCO2 BLDA: 66.5 MM HG (ref 35–45)
PCO2 BLDA: 69.1 MM HG (ref 35–45)
PCO2 BLDA: 72.5 MM HG (ref 35–45)
PCO2 TEMP ADJ BLD: 49.2 MM HG (ref 35–45)
PCO2 TEMP ADJ BLD: ABNORMAL MM[HG]
PH BLDA: 7.36 PH UNITS (ref 7.3–7.5)
PH BLDA: 7.38 PH UNITS (ref 7.3–7.5)
PH BLDA: 7.42 PH UNITS (ref 7.3–7.5)
PH BLDA: 7.42 PH UNITS (ref 7.3–7.5)
PH BLDA: 7.43 PH UNITS (ref 7.3–7.5)
PH BLDA: 7.46 PH UNITS (ref 7.3–7.5)
PH BLDA: 7.5 PH UNITS (ref 7.35–7.45)
PH UR STRIP.AUTO: 5.5 [PH] (ref 5–8)
PH, TEMP CORRECTED: 7.5 PH UNITS
PH, TEMP CORRECTED: ABNORMAL
PHOSPHATE SERPL-MCNC: 3.5 MG/DL (ref 2.5–4.5)
PHOSPHATE SERPL-MCNC: 4.3 MG/DL (ref 2.5–4.5)
PLAT MORPH BLD: NORMAL
PLATELET # BLD AUTO: 281 10*3/MM3 (ref 140–450)
PLATELET # BLD AUTO: 287 10*3/MM3 (ref 140–450)
PLATELET # BLD AUTO: 294 10*3/MM3 (ref 140–450)
PLATELET # BLD AUTO: 296 10*3/MM3 (ref 140–450)
PLATELET # BLD AUTO: 311 10*3/MM3 (ref 140–450)
PLATELET # BLD AUTO: 314 10*3/MM3 (ref 140–450)
PLATELET # BLD AUTO: 321 10*3/MM3 (ref 140–450)
PLATELET # BLD AUTO: 325 10*3/MM3 (ref 140–450)
PLATELET # BLD AUTO: 330 10*3/MM3 (ref 140–450)
PLATELET # BLD AUTO: 333 10*3/MM3 (ref 140–450)
PLATELET # BLD AUTO: 339 10*3/MM3 (ref 140–450)
PLATELET # BLD AUTO: 357 10*3/MM3 (ref 140–450)
PLATELET # BLD AUTO: 360 10*3/MM3 (ref 140–450)
PLATELET # BLD AUTO: 360 10*3/MM3 (ref 140–450)
PLATELET # BLD AUTO: 364 10*3/MM3 (ref 140–450)
PLATELET # BLD AUTO: 364 10*3/MM3 (ref 140–450)
PLATELET # BLD AUTO: 365 10*3/MM3 (ref 140–450)
PLATELET # BLD AUTO: 368 10*3/MM3 (ref 140–450)
PLATELET # BLD AUTO: 384 10*3/MM3 (ref 140–450)
PLATELET # BLD AUTO: 385 10*3/MM3 (ref 140–450)
PLATELET # BLD AUTO: 401 10*3/MM3 (ref 140–450)
PLATELET # BLD AUTO: 428 10*3/MM3 (ref 140–450)
PLATELET # BLD AUTO: 431 10*3/MM3 (ref 140–450)
PLATELET # BLD AUTO: 439 10*3/MM3 (ref 140–450)
PLATELET # BLD AUTO: 446 10*3/MM3 (ref 140–450)
PLATELET # BLD AUTO: 462 10*3/MM3 (ref 140–450)
PLATELET # BLD AUTO: 465 10*3/MM3 (ref 140–450)
PLATELET # BLD AUTO: 466 10*3/MM3 (ref 140–450)
PLATELET # BLD AUTO: 524 10*3/MM3 (ref 140–450)
PLATELET # BLD AUTO: 535 10*3/MM3 (ref 140–450)
PLATELET # BLD AUTO: 611 10*3/MM3 (ref 140–450)
PMV BLD AUTO: 10.2 FL (ref 6–12)
PMV BLD AUTO: 10.4 FL (ref 6–12)
PMV BLD AUTO: 10.6 FL (ref 6–12)
PMV BLD AUTO: 10.7 FL (ref 6–12)
PMV BLD AUTO: 10.8 FL (ref 6–12)
PMV BLD AUTO: 10.8 FL (ref 6–12)
PMV BLD AUTO: 10.9 FL (ref 6–12)
PMV BLD AUTO: 11 FL (ref 6–12)
PMV BLD AUTO: 11.1 FL (ref 6–12)
PMV BLD AUTO: 11.1 FL (ref 6–12)
PMV BLD AUTO: 11.2 FL (ref 6–12)
PMV BLD AUTO: 11.3 FL (ref 6–12)
PMV BLD AUTO: 11.4 FL (ref 6–12)
PMV BLD AUTO: 11.4 FL (ref 6–12)
PMV BLD AUTO: 11.7 FL (ref 6–12)
PMV BLD AUTO: 11.7 FL (ref 6–12)
PMV BLD AUTO: 9.9 FL (ref 6–12)
PO2 BLDA: 52.7 MM HG (ref 75–100)
PO2 BLDA: 53.1 MM HG (ref 83–108)
PO2 BLDA: 63 MM HG (ref 75–100)
PO2 BLDA: 66.6 MM HG (ref 75–100)
PO2 BLDA: 67.1 MM HG (ref 75–100)
PO2 BLDA: 68.4 MM HG (ref 75–100)
PO2 BLDA: 91.8 MM HG (ref 75–100)
PO2 TEMP ADJ BLD: 53.1 MM HG (ref 83–108)
PO2 TEMP ADJ BLD: ABNORMAL MM[HG]
POIKILOCYTOSIS BLD QL SMEAR: NORMAL
POIKILOCYTOSIS BLD QL SMEAR: NORMAL
POTASSIUM BLD-SCNC: 3.7 MMOL/L (ref 3.5–5.2)
POTASSIUM BLD-SCNC: 3.8 MMOL/L (ref 3.5–5.2)
POTASSIUM BLD-SCNC: 4.1 MMOL/L (ref 3.5–5.2)
POTASSIUM BLD-SCNC: 4.1 MMOL/L (ref 3.5–5.2)
POTASSIUM BLD-SCNC: 4.3 MMOL/L (ref 3.5–5.2)
POTASSIUM BLD-SCNC: 4.4 MMOL/L (ref 3.5–5.2)
POTASSIUM BLD-SCNC: 4.4 MMOL/L (ref 3.5–5.2)
POTASSIUM BLD-SCNC: 4.5 MMOL/L (ref 3.5–5.2)
POTASSIUM BLD-SCNC: 4.6 MMOL/L (ref 3.5–5.2)
POTASSIUM BLD-SCNC: 4.8 MMOL/L (ref 3.5–5.2)
POTASSIUM BLD-SCNC: 4.9 MMOL/L (ref 3.5–5.2)
POTASSIUM BLD-SCNC: 4.9 MMOL/L (ref 3.5–5.2)
POTASSIUM BLD-SCNC: 5 MMOL/L (ref 3.5–5.2)
POTASSIUM BLD-SCNC: 5 MMOL/L (ref 3.5–5.2)
POTASSIUM BLD-SCNC: 5.1 MMOL/L (ref 3.5–5.2)
POTASSIUM BLD-SCNC: 5.1 MMOL/L (ref 3.5–5.2)
POTASSIUM BLD-SCNC: 5.2 MMOL/L (ref 3.5–5.2)
POTASSIUM BLD-SCNC: 5.5 MMOL/L (ref 3.5–5.2)
POTASSIUM SERPL-SCNC: 4.2 MMOL/L (ref 3.5–5.2)
POTASSIUM SERPL-SCNC: 4.2 MMOL/L (ref 3.5–5.2)
POTASSIUM SERPL-SCNC: 4.3 MMOL/L (ref 3.5–5.2)
POTASSIUM SERPL-SCNC: 4.6 MMOL/L (ref 3.5–5.2)
POTASSIUM SERPL-SCNC: 6.2 MMOL/L (ref 3.5–5.2)
PROCALCITONIN SERPL-MCNC: 0.13 NG/ML (ref 0–0.25)
PROCALCITONIN SERPL-MCNC: 0.16 NG/ML (ref 0.1–0.25)
PROCALCITONIN SERPL-MCNC: 0.18 NG/ML (ref 0.1–0.25)
PROCALCITONIN SERPL-MCNC: 0.23 NG/ML (ref 0.1–0.25)
PROCALCITONIN SERPL-MCNC: 0.26 NG/ML (ref 0.1–0.25)
PROCALCITONIN SERPL-MCNC: 0.26 NG/ML (ref 0.1–0.25)
PROCALCITONIN SERPL-MCNC: 0.3 NG/ML (ref 0.1–0.25)
PROCALCITONIN SERPL-MCNC: 0.33 NG/ML (ref 0.1–0.25)
PROCALCITONIN SERPL-MCNC: 0.37 NG/ML (ref 0.1–0.25)
PROCALCITONIN SERPL-MCNC: 0.37 NG/ML (ref 0.1–0.25)
PROT SERPL-MCNC: 5.8 G/DL (ref 6–8.5)
PROT SERPL-MCNC: 5.8 G/DL (ref 6–8.5)
PROT SERPL-MCNC: 6.1 G/DL (ref 6–8.5)
PROT SERPL-MCNC: 6.1 G/DL (ref 6–8.5)
PROT SERPL-MCNC: 6.7 G/DL (ref 6–8.5)
PROT SERPL-MCNC: 7.2 G/DL (ref 6–8.5)
PROT SERPL-MCNC: 7.4 G/DL (ref 6–8.5)
PROT UR QL STRIP: ABNORMAL
PROTHROMBIN TIME: 14.2 SECONDS (ref 12–15.1)
PROTHROMBIN TIME: 16.2 SECONDS (ref 12–15.1)
RBC # BLD AUTO: 3.26 10*6/MM3 (ref 3.77–5.28)
RBC # BLD AUTO: 3.38 10*6/MM3 (ref 3.77–5.28)
RBC # BLD AUTO: 3.4 10*6/MM3 (ref 3.77–5.28)
RBC # BLD AUTO: 3.43 10*6/MM3 (ref 3.77–5.28)
RBC # BLD AUTO: 3.51 10*6/MM3 (ref 3.77–5.28)
RBC # BLD AUTO: 3.6 10*6/MM3 (ref 3.77–5.28)
RBC # BLD AUTO: 3.62 10*6/MM3 (ref 3.77–5.28)
RBC # BLD AUTO: 3.65 10*6/MM3 (ref 3.77–5.28)
RBC # BLD AUTO: 3.67 10*6/MM3 (ref 3.77–5.28)
RBC # BLD AUTO: 3.71 10*6/MM3 (ref 3.77–5.28)
RBC # BLD AUTO: 3.81 10*6/MM3 (ref 3.77–5.28)
RBC # BLD AUTO: 3.84 10*6/MM3 (ref 3.77–5.28)
RBC # BLD AUTO: 3.92 10*6/MM3 (ref 3.77–5.28)
RBC # BLD AUTO: 3.93 10*6/MM3 (ref 3.77–5.28)
RBC # BLD AUTO: 3.95 10*6/MM3 (ref 3.77–5.28)
RBC # BLD AUTO: 4.03 10*6/MM3 (ref 3.77–5.28)
RBC # BLD AUTO: 4.03 10*6/MM3 (ref 3.77–5.28)
RBC # BLD AUTO: 4.05 10*6/MM3 (ref 3.77–5.28)
RBC # BLD AUTO: 4.07 10*6/MM3 (ref 3.77–5.28)
RBC # BLD AUTO: 4.11 10*6/MM3 (ref 3.77–5.28)
RBC # BLD AUTO: 4.12 10*6/MM3 (ref 3.77–5.28)
RBC # BLD AUTO: 4.13 10*6/MM3 (ref 3.77–5.28)
RBC # BLD AUTO: 4.14 10*6/MM3 (ref 3.77–5.28)
RBC # BLD AUTO: 4.14 10*6/MM3 (ref 3.77–5.28)
RBC # BLD AUTO: 4.19 10*6/MM3 (ref 3.77–5.28)
RBC # BLD AUTO: 4.29 10*6/MM3 (ref 3.77–5.28)
RBC # BLD AUTO: 4.32 10*6/MM3 (ref 3.77–5.28)
RBC # BLD AUTO: 4.42 10*6/MM3 (ref 3.77–5.28)
RBC # BLD AUTO: 4.48 10*6/MM3 (ref 3.77–5.28)
RBC # BLD AUTO: 4.61 10*6/MM3 (ref 3.77–5.28)
RBC # BLD AUTO: 4.63 10*6/MM3 (ref 3.77–5.28)
RBC # UR: ABNORMAL /HPF
RBC MORPH BLD: NORMAL
REF LAB TEST METHOD: ABNORMAL
RHINOVIRUS RNA SPEC NAA+PROBE: NOT DETECTED
RSV RNA NPH QL NAA+NON-PROBE: NOT DETECTED
SAO2 % BLDCOA: 88.2 % (ref 94–100)
SAO2 % BLDCOA: 92.7 % (ref 94–100)
SAO2 % BLDCOA: 93 % (ref 94–100)
SAO2 % BLDCOA: 93.1 % (ref 94–100)
SAO2 % BLDCOA: 94.4 % (ref 94–100)
SAO2 % BLDCOA: 96.5 % (ref 94–100)
SARS-COV-2 RNA PNL SPEC NAA+PROBE: DETECTED
SARS-COV-2 RNA RESP QL NAA+PROBE: DETECTED
SARS-COV-2 RNA RESP QL NAA+PROBE: NOT DETECTED
SCAN SLIDE: NORMAL
SET MECH RESP RATE: 14
SET MECH RESP RATE: 16
SMALL PLATELETS BLD QL SMEAR: ABNORMAL
SMALL PLATELETS BLD QL SMEAR: ADEQUATE
SMALL PLATELETS BLD QL SMEAR: NORMAL
SMALL PLATELETS BLD QL SMEAR: NORMAL
SODIUM BLD-SCNC: 135 MMOL/L (ref 136–145)
SODIUM BLD-SCNC: 135 MMOL/L (ref 136–145)
SODIUM BLD-SCNC: 136 MMOL/L (ref 136–145)
SODIUM BLD-SCNC: 136 MMOL/L (ref 136–145)
SODIUM BLD-SCNC: 138 MMOL/L (ref 136–145)
SODIUM BLD-SCNC: 139 MMOL/L (ref 136–145)
SODIUM BLD-SCNC: 143 MMOL/L (ref 136–145)
SODIUM BLD-SCNC: 145 MMOL/L (ref 136–145)
SODIUM BLD-SCNC: 146 MMOL/L (ref 136–145)
SODIUM BLD-SCNC: 146 MMOL/L (ref 136–145)
SODIUM BLD-SCNC: 147 MMOL/L (ref 136–145)
SODIUM BLD-SCNC: 148 MMOL/L (ref 136–145)
SODIUM BLD-SCNC: 148 MMOL/L (ref 136–145)
SODIUM BLD-SCNC: 149 MMOL/L (ref 136–145)
SODIUM BLD-SCNC: 150 MMOL/L (ref 136–145)
SODIUM SERPL-SCNC: 138 MMOL/L (ref 136–145)
SODIUM SERPL-SCNC: 141 MMOL/L (ref 136–145)
SODIUM SERPL-SCNC: 141 MMOL/L (ref 136–145)
SODIUM SERPL-SCNC: 145 MMOL/L (ref 136–145)
SODIUM SERPL-SCNC: 145 MMOL/L (ref 136–145)
SP GR UR STRIP: 1.02 (ref 1–1.03)
SPECIMEN PREPARATION: NORMAL
SPECIMEN PREPARATION: NORMAL
SQUAMOUS #/AREA URNS HPF: ABNORMAL /HPF
STRESS TARGET HR: 128 BPM
STRESS TARGET HR: 128 BPM
TROPONIN T SERPL-MCNC: 0.01 NG/ML (ref 0–0.03)
TROPONIN T SERPL-MCNC: 0.02 NG/ML (ref 0–0.03)
TROPONIN T SERPL-MCNC: 0.03 NG/ML (ref 0–0.03)
TROPONIN T SERPL-MCNC: 0.04 NG/ML (ref 0–0.03)
TROPONIN T SERPL-MCNC: 0.05 NG/ML (ref 0–0.03)
TROPONIN T SERPL-MCNC: 0.07 NG/ML (ref 0–0.03)
TROPONIN T SERPL-MCNC: <0.01 NG/ML (ref 0–0.03)
TROPONIN T SERPL-MCNC: <0.01 NG/ML (ref 0–0.03)
TSH SERPL DL<=0.05 MIU/L-ACNC: 0.72 UIU/ML (ref 0.27–4.2)
UROBILINOGEN UR QL STRIP: ABNORMAL
VANCOMYCIN SERPL-MCNC: 17.2 MCG/ML (ref 5–40)
VARIANT LYMPHS NFR BLD MANUAL: 8.5 % (ref 0–5)
VENTILATOR MODE: ABNORMAL
WBC # BLD AUTO: 13.29 10*3/MM3 (ref 3.4–10.8)
WBC # BLD AUTO: 17.94 10*3/MM3 (ref 3.4–10.8)
WBC # BLD AUTO: 18.77 10*3/MM3 (ref 3.4–10.8)
WBC # BLD AUTO: 8.73 10*3/MM3 (ref 3.4–10.8)
WBC MORPH BLD: NORMAL
WBC NRBC COR # BLD: 12.19 10*3/MM3 (ref 3.4–10.8)
WBC NRBC COR # BLD: 14.36 10*3/MM3 (ref 3.4–10.8)
WBC NRBC COR # BLD: 16.1 10*3/MM3 (ref 3.4–10.8)
WBC NRBC COR # BLD: 16.55 10*3/MM3 (ref 3.4–10.8)
WBC NRBC COR # BLD: 16.65 10*3/MM3 (ref 3.4–10.8)
WBC NRBC COR # BLD: 17.19 10*3/MM3 (ref 3.4–10.8)
WBC NRBC COR # BLD: 17.6 10*3/MM3 (ref 3.4–10.8)
WBC NRBC COR # BLD: 17.76 10*3/MM3 (ref 3.4–10.8)
WBC NRBC COR # BLD: 18.25 10*3/MM3 (ref 3.4–10.8)
WBC NRBC COR # BLD: 18.56 10*3/MM3 (ref 3.4–10.8)
WBC NRBC COR # BLD: 19.03 10*3/MM3 (ref 3.4–10.8)
WBC NRBC COR # BLD: 20.01 10*3/MM3 (ref 3.4–10.8)
WBC NRBC COR # BLD: 21.77 10*3/MM3 (ref 3.4–10.8)
WBC NRBC COR # BLD: 21.82 10*3/MM3 (ref 3.4–10.8)
WBC NRBC COR # BLD: 21.96 10*3/MM3 (ref 3.4–10.8)
WBC NRBC COR # BLD: 22.97 10*3/MM3 (ref 3.4–10.8)
WBC NRBC COR # BLD: 24.71 10*3/MM3 (ref 3.4–10.8)
WBC NRBC COR # BLD: 24.81 10*3/MM3 (ref 3.4–10.8)
WBC NRBC COR # BLD: 26.16 10*3/MM3 (ref 3.4–10.8)
WBC NRBC COR # BLD: 28.16 10*3/MM3 (ref 3.4–10.8)
WBC NRBC COR # BLD: 28.6 10*3/MM3 (ref 3.4–10.8)
WBC NRBC COR # BLD: 28.79 10*3/MM3 (ref 3.4–10.8)
WBC NRBC COR # BLD: 32.15 10*3/MM3 (ref 3.4–10.8)
WBC NRBC COR # BLD: 32.83 10*3/MM3 (ref 3.4–10.8)
WBC NRBC COR # BLD: 34.25 10*3/MM3 (ref 3.4–10.8)
WBC NRBC COR # BLD: 41.31 10*3/MM3 (ref 3.4–10.8)
WBC NRBC COR # BLD: 50.46 10*3/MM3 (ref 3.4–10.8)
WBC UR QL AUTO: ABNORMAL /HPF
WHOLE BLOOD HOLD SPECIMEN: NORMAL
YEAST URNS QL MICRO: ABNORMAL /HPF

## 2020-01-01 PROCEDURE — 85025 COMPLETE CBC W/AUTO DIFF WBC: CPT | Performed by: FAMILY MEDICINE

## 2020-01-01 PROCEDURE — 97110 THERAPEUTIC EXERCISES: CPT

## 2020-01-01 PROCEDURE — 25010000002 MORPHINE PER 10 MG: Performed by: EMERGENCY MEDICINE

## 2020-01-01 PROCEDURE — 85007 BL SMEAR W/DIFF WBC COUNT: CPT | Performed by: NURSE PRACTITIONER

## 2020-01-01 PROCEDURE — G0378 HOSPITAL OBSERVATION PER HR: HCPCS

## 2020-01-01 PROCEDURE — 87040 BLOOD CULTURE FOR BACTERIA: CPT | Performed by: PHYSICIAN ASSISTANT

## 2020-01-01 PROCEDURE — 83735 ASSAY OF MAGNESIUM: CPT | Performed by: FAMILY MEDICINE

## 2020-01-01 PROCEDURE — 80048 BASIC METABOLIC PNL TOTAL CA: CPT | Performed by: INTERNAL MEDICINE

## 2020-01-01 PROCEDURE — 99204 OFFICE O/P NEW MOD 45 MIN: CPT | Performed by: PHYSICIAN ASSISTANT

## 2020-01-01 PROCEDURE — 94799 UNLISTED PULMONARY SVC/PX: CPT

## 2020-01-01 PROCEDURE — 63710000001 INSULIN LISPRO (HUMAN) PER 5 UNITS: Performed by: FAMILY MEDICINE

## 2020-01-01 PROCEDURE — 99232 SBSQ HOSP IP/OBS MODERATE 35: CPT | Performed by: INTERNAL MEDICINE

## 2020-01-01 PROCEDURE — 63710000001 INSULIN DETEMIR PER 5 UNITS: Performed by: INTERNAL MEDICINE

## 2020-01-01 PROCEDURE — 25010000002 AZITHROMYCIN 500 MG/250 ML: Performed by: FAMILY MEDICINE

## 2020-01-01 PROCEDURE — 63710000001 PREDNISONE PER 1 MG: Performed by: INTERNAL MEDICINE

## 2020-01-01 PROCEDURE — 97161 PT EVAL LOW COMPLEX 20 MIN: CPT

## 2020-01-01 PROCEDURE — 82962 GLUCOSE BLOOD TEST: CPT

## 2020-01-01 PROCEDURE — 0099U HC BIOFIRE FILMARRAY RESP PANEL 1: CPT | Performed by: FAMILY MEDICINE

## 2020-01-01 PROCEDURE — 94729 DIFFUSING CAPACITY: CPT | Performed by: INTERNAL MEDICINE

## 2020-01-01 PROCEDURE — 99232 SBSQ HOSP IP/OBS MODERATE 35: CPT | Performed by: FAMILY MEDICINE

## 2020-01-01 PROCEDURE — 81001 URINALYSIS AUTO W/SCOPE: CPT | Performed by: NURSE PRACTITIONER

## 2020-01-01 PROCEDURE — 99214 OFFICE O/P EST MOD 30 MIN: CPT | Performed by: INTERNAL MEDICINE

## 2020-01-01 PROCEDURE — 63710000001 INSULIN ASPART PER 5 UNITS: Performed by: INTERNAL MEDICINE

## 2020-01-01 PROCEDURE — 97530 THERAPEUTIC ACTIVITIES: CPT

## 2020-01-01 PROCEDURE — 25010000002 MIDAZOLAM PER 1MG: Performed by: NURSE ANESTHETIST, CERTIFIED REGISTERED

## 2020-01-01 PROCEDURE — 99233 SBSQ HOSP IP/OBS HIGH 50: CPT | Performed by: INTERNAL MEDICINE

## 2020-01-01 PROCEDURE — 94667 MNPJ CHEST WALL 1ST: CPT

## 2020-01-01 PROCEDURE — 94669 MECHANICAL CHEST WALL OSCILL: CPT

## 2020-01-01 PROCEDURE — 83605 ASSAY OF LACTIC ACID: CPT | Performed by: INTERNAL MEDICINE

## 2020-01-01 PROCEDURE — 25010000002 METHYLPREDNISOLONE PER 40 MG: Performed by: NURSE PRACTITIONER

## 2020-01-01 PROCEDURE — 87070 CULTURE OTHR SPECIMN AEROBIC: CPT

## 2020-01-01 PROCEDURE — 25010000002 METHYLPREDNISOLONE PER 40 MG: Performed by: INTERNAL MEDICINE

## 2020-01-01 PROCEDURE — 87186 SC STD MICRODIL/AGAR DIL: CPT | Performed by: INTERNAL MEDICINE

## 2020-01-01 PROCEDURE — 25010000002 FENTANYL CITRATE (PF) 100 MCG/2ML SOLUTION: Performed by: NURSE ANESTHETIST, CERTIFIED REGISTERED

## 2020-01-01 PROCEDURE — 25010000002 AZITHROMYCIN 500 MG/250 ML: Performed by: INTERNAL MEDICINE

## 2020-01-01 PROCEDURE — 63710000001 INSULIN DETEMIR PER 5 UNITS: Performed by: NURSE PRACTITIONER

## 2020-01-01 PROCEDURE — 25010000002 VANCOMYCIN 10 G RECONSTITUTED SOLUTION

## 2020-01-01 PROCEDURE — 80202 ASSAY OF VANCOMYCIN: CPT | Performed by: NURSE PRACTITIONER

## 2020-01-01 PROCEDURE — 94660 CPAP INITIATION&MGMT: CPT

## 2020-01-01 PROCEDURE — 25010000002 MAGNESIUM SULFATE 2 GM/50ML SOLUTION: Performed by: NURSE PRACTITIONER

## 2020-01-01 PROCEDURE — 63710000001 INSULIN LISPRO (HUMAN) PER 5 UNITS: Performed by: NURSE PRACTITIONER

## 2020-01-01 PROCEDURE — 99221 1ST HOSP IP/OBS SF/LOW 40: CPT | Performed by: PHYSICIAN ASSISTANT

## 2020-01-01 PROCEDURE — 85060 BLOOD SMEAR INTERPRETATION: CPT | Performed by: NURSE PRACTITIONER

## 2020-01-01 PROCEDURE — 71250 CT THORAX DX C-: CPT

## 2020-01-01 PROCEDURE — 99231 SBSQ HOSP IP/OBS SF/LOW 25: CPT | Performed by: INTERNAL MEDICINE

## 2020-01-01 PROCEDURE — 85025 COMPLETE CBC W/AUTO DIFF WBC: CPT | Performed by: INTERNAL MEDICINE

## 2020-01-01 PROCEDURE — 63710000001 PREDNISONE PER 1 MG: Performed by: NURSE PRACTITIONER

## 2020-01-01 PROCEDURE — 80048 BASIC METABOLIC PNL TOTAL CA: CPT | Performed by: NURSE PRACTITIONER

## 2020-01-01 PROCEDURE — 63710000001 INSULIN ASPART PER 5 UNITS: Performed by: FAMILY MEDICINE

## 2020-01-01 PROCEDURE — 97166 OT EVAL MOD COMPLEX 45 MIN: CPT

## 2020-01-01 PROCEDURE — 25010000002 CEFTRIAXONE SODIUM-DEXTROSE 1-3.74 GM-%(50ML) RECONSTITUTED SOLUTION: Performed by: INTERNAL MEDICINE

## 2020-01-01 PROCEDURE — 97165 OT EVAL LOW COMPLEX 30 MIN: CPT

## 2020-01-01 PROCEDURE — 84484 ASSAY OF TROPONIN QUANT: CPT | Performed by: INTERNAL MEDICINE

## 2020-01-01 PROCEDURE — 0 TECHNETIUM SULFUR COLLOID: Performed by: INTERNAL MEDICINE

## 2020-01-01 PROCEDURE — 25010000002 PIPERACILLIN SOD-TAZOBACTAM PER 1 G: Performed by: NURSE PRACTITIONER

## 2020-01-01 PROCEDURE — 83735 ASSAY OF MAGNESIUM: CPT | Performed by: INTERNAL MEDICINE

## 2020-01-01 PROCEDURE — 85610 PROTHROMBIN TIME: CPT | Performed by: NURSE PRACTITIONER

## 2020-01-01 PROCEDURE — 87040 BLOOD CULTURE FOR BACTERIA: CPT | Performed by: INTERNAL MEDICINE

## 2020-01-01 PROCEDURE — 99239 HOSP IP/OBS DSCHRG MGMT >30: CPT | Performed by: INTERNAL MEDICINE

## 2020-01-01 PROCEDURE — 94618 PULMONARY STRESS TESTING: CPT

## 2020-01-01 PROCEDURE — 36600 WITHDRAWAL OF ARTERIAL BLOOD: CPT

## 2020-01-01 PROCEDURE — 99223 1ST HOSP IP/OBS HIGH 75: CPT | Performed by: FAMILY MEDICINE

## 2020-01-01 PROCEDURE — 63710000001 INSULIN ASPART PER 5 UNITS: Performed by: NURSE PRACTITIONER

## 2020-01-01 PROCEDURE — 85027 COMPLETE CBC AUTOMATED: CPT | Performed by: NURSE PRACTITIONER

## 2020-01-01 PROCEDURE — 99233 SBSQ HOSP IP/OBS HIGH 50: CPT | Performed by: FAMILY MEDICINE

## 2020-01-01 PROCEDURE — 25010000002 MAGNESIUM SULFATE 2 GM/50ML SOLUTION: Performed by: FAMILY MEDICINE

## 2020-01-01 PROCEDURE — 99223 1ST HOSP IP/OBS HIGH 75: CPT | Performed by: INTERNAL MEDICINE

## 2020-01-01 PROCEDURE — 71045 X-RAY EXAM CHEST 1 VIEW: CPT

## 2020-01-01 PROCEDURE — 25010000002 METHYLPREDNISOLONE PER 40 MG: Performed by: FAMILY MEDICINE

## 2020-01-01 PROCEDURE — 25010000002 VANCOMYCIN 5 G RECONSTITUTED SOLUTION 5,000 MG VIAL: Performed by: NURSE PRACTITIONER

## 2020-01-01 PROCEDURE — 87635 SARS-COV-2 COVID-19 AMP PRB: CPT | Performed by: EMERGENCY MEDICINE

## 2020-01-01 PROCEDURE — 86140 C-REACTIVE PROTEIN: CPT | Performed by: INTERNAL MEDICINE

## 2020-01-01 PROCEDURE — 80069 RENAL FUNCTION PANEL: CPT | Performed by: INTERNAL MEDICINE

## 2020-01-01 PROCEDURE — 72195 MRI PELVIS W/O DYE: CPT

## 2020-01-01 PROCEDURE — 73560 X-RAY EXAM OF KNEE 1 OR 2: CPT

## 2020-01-01 PROCEDURE — 85007 BL SMEAR W/DIFF WBC COUNT: CPT | Performed by: EMERGENCY MEDICINE

## 2020-01-01 PROCEDURE — 97116 GAIT TRAINING THERAPY: CPT

## 2020-01-01 PROCEDURE — 99213 OFFICE O/P EST LOW 20 MIN: CPT | Performed by: INTERNAL MEDICINE

## 2020-01-01 PROCEDURE — 84443 ASSAY THYROID STIM HORMONE: CPT | Performed by: INTERNAL MEDICINE

## 2020-01-01 PROCEDURE — 99222 1ST HOSP IP/OBS MODERATE 55: CPT | Performed by: NURSE PRACTITIONER

## 2020-01-01 PROCEDURE — 93306 TTE W/DOPPLER COMPLETE: CPT | Performed by: INTERNAL MEDICINE

## 2020-01-01 PROCEDURE — 85027 COMPLETE CBC AUTOMATED: CPT | Performed by: INTERNAL MEDICINE

## 2020-01-01 PROCEDURE — 85025 COMPLETE CBC W/AUTO DIFF WBC: CPT | Performed by: NURSE PRACTITIONER

## 2020-01-01 PROCEDURE — 99232 SBSQ HOSP IP/OBS MODERATE 35: CPT | Performed by: NURSE PRACTITIONER

## 2020-01-01 PROCEDURE — 87070 CULTURE OTHR SPECIMN AEROBIC: CPT | Performed by: INTERNAL MEDICINE

## 2020-01-01 PROCEDURE — 25010000002 FUROSEMIDE PER 20 MG: Performed by: INTERNAL MEDICINE

## 2020-01-01 PROCEDURE — 87077 CULTURE AEROBIC IDENTIFY: CPT | Performed by: INTERNAL MEDICINE

## 2020-01-01 PROCEDURE — 80048 BASIC METABOLIC PNL TOTAL CA: CPT | Performed by: FAMILY MEDICINE

## 2020-01-01 PROCEDURE — 63710000001 INSULIN DETEMIR PER 5 UNITS: Performed by: FAMILY MEDICINE

## 2020-01-01 PROCEDURE — 25010000002 HEPARIN (PORCINE) PER 1000 UNITS: Performed by: INTERNAL MEDICINE

## 2020-01-01 PROCEDURE — 25010000002 PIPERACILLIN SOD-TAZOBACTAM PER 1 G: Performed by: INTERNAL MEDICINE

## 2020-01-01 PROCEDURE — 25010000002 CHLOROTHIAZIDE PER 500 MG: Performed by: INTERNAL MEDICINE

## 2020-01-01 PROCEDURE — 80053 COMPREHEN METABOLIC PANEL: CPT

## 2020-01-01 PROCEDURE — 87641 MR-STAPH DNA AMP PROBE: CPT

## 2020-01-01 PROCEDURE — 99213 OFFICE O/P EST LOW 20 MIN: CPT | Performed by: UROLOGY

## 2020-01-01 PROCEDURE — 25010000002 ENOXAPARIN PER 10 MG: Performed by: FAMILY MEDICINE

## 2020-01-01 PROCEDURE — 84145 PROCALCITONIN (PCT): CPT | Performed by: INTERNAL MEDICINE

## 2020-01-01 PROCEDURE — 25010000002 CEFEPIME PER 500 MG: Performed by: INTERNAL MEDICINE

## 2020-01-01 PROCEDURE — 25010000002 METHYLPREDNISOLONE PER 125 MG: Performed by: FAMILY MEDICINE

## 2020-01-01 PROCEDURE — 87040 BLOOD CULTURE FOR BACTERIA: CPT | Performed by: EMERGENCY MEDICINE

## 2020-01-01 PROCEDURE — 63710000001 INSULIN LISPRO (HUMAN) PER 5 UNITS: Performed by: INTERNAL MEDICINE

## 2020-01-01 PROCEDURE — 99222 1ST HOSP IP/OBS MODERATE 55: CPT | Performed by: INTERNAL MEDICINE

## 2020-01-01 PROCEDURE — 25010000002 DEXAMETHASONE PER 1 MG: Performed by: INTERNAL MEDICINE

## 2020-01-01 PROCEDURE — 83880 ASSAY OF NATRIURETIC PEPTIDE: CPT | Performed by: EMERGENCY MEDICINE

## 2020-01-01 PROCEDURE — 87205 SMEAR GRAM STAIN: CPT | Performed by: INTERNAL MEDICINE

## 2020-01-01 PROCEDURE — 84145 PROCALCITONIN (PCT): CPT | Performed by: EMERGENCY MEDICINE

## 2020-01-01 PROCEDURE — XW033E5 INTRODUCTION OF REMDESIVIR ANTI-INFECTIVE INTO PERIPHERAL VEIN, PERCUTANEOUS APPROACH, NEW TECHNOLOGY GROUP 5: ICD-10-PCS | Performed by: INTERNAL MEDICINE

## 2020-01-01 PROCEDURE — 84145 PROCALCITONIN (PCT): CPT | Performed by: FAMILY MEDICINE

## 2020-01-01 PROCEDURE — 78264 GASTRIC EMPTYING IMG STUDY: CPT

## 2020-01-01 PROCEDURE — 0BH17EZ INSERTION OF ENDOTRACHEAL AIRWAY INTO TRACHEA, VIA NATURAL OR ARTIFICIAL OPENING: ICD-10-PCS | Performed by: STUDENT IN AN ORGANIZED HEALTH CARE EDUCATION/TRAINING PROGRAM

## 2020-01-01 PROCEDURE — 36415 COLL VENOUS BLD VENIPUNCTURE: CPT

## 2020-01-01 PROCEDURE — A9541 TC99M SULFUR COLLOID: HCPCS | Performed by: INTERNAL MEDICINE

## 2020-01-01 PROCEDURE — 25010000002 CEFTRIAXONE SODIUM-DEXTROSE 1-3.74 GM-%(50ML) RECONSTITUTED SOLUTION: Performed by: FAMILY MEDICINE

## 2020-01-01 PROCEDURE — 93010 ELECTROCARDIOGRAM REPORT: CPT | Performed by: INTERNAL MEDICINE

## 2020-01-01 PROCEDURE — 86663 EPSTEIN-BARR ANTIBODY: CPT | Performed by: NURSE PRACTITIONER

## 2020-01-01 PROCEDURE — 82805 BLOOD GASES W/O2 SATURATION: CPT

## 2020-01-01 PROCEDURE — 86140 C-REACTIVE PROTEIN: CPT | Performed by: NURSE PRACTITIONER

## 2020-01-01 PROCEDURE — 25010000002 ENOXAPARIN PER 10 MG: Performed by: INTERNAL MEDICINE

## 2020-01-01 PROCEDURE — 97162 PT EVAL MOD COMPLEX 30 MIN: CPT

## 2020-01-01 PROCEDURE — 93306 TTE W/DOPPLER COMPLETE: CPT

## 2020-01-01 PROCEDURE — 99214 OFFICE O/P EST MOD 30 MIN: CPT | Performed by: NURSE PRACTITIONER

## 2020-01-01 PROCEDURE — 25010000002 ONDANSETRON PER 1 MG: Performed by: PHYSICIAN ASSISTANT

## 2020-01-01 PROCEDURE — 31635 BRONCHOSCOPY W/FB REMOVAL: CPT | Performed by: INTERNAL MEDICINE

## 2020-01-01 PROCEDURE — 93005 ELECTROCARDIOGRAM TRACING: CPT | Performed by: FAMILY MEDICINE

## 2020-01-01 PROCEDURE — 88305 TISSUE EXAM BY PATHOLOGIST: CPT | Performed by: INTERNAL MEDICINE

## 2020-01-01 PROCEDURE — 83605 ASSAY OF LACTIC ACID: CPT | Performed by: PHYSICIAN ASSISTANT

## 2020-01-01 PROCEDURE — 85379 FIBRIN DEGRADATION QUANT: CPT | Performed by: PHYSICIAN ASSISTANT

## 2020-01-01 PROCEDURE — 93970 EXTREMITY STUDY: CPT

## 2020-01-01 PROCEDURE — 97535 SELF CARE MNGMENT TRAINING: CPT

## 2020-01-01 PROCEDURE — 31623 DX BRONCHOSCOPE/BRUSH: CPT | Performed by: INTERNAL MEDICINE

## 2020-01-01 PROCEDURE — 93005 ELECTROCARDIOGRAM TRACING: CPT | Performed by: INTERNAL MEDICINE

## 2020-01-01 PROCEDURE — 94640 AIRWAY INHALATION TREATMENT: CPT

## 2020-01-01 PROCEDURE — C1769 GUIDE WIRE: HCPCS | Performed by: INTERNAL MEDICINE

## 2020-01-01 PROCEDURE — 83880 ASSAY OF NATRIURETIC PEPTIDE: CPT | Performed by: INTERNAL MEDICINE

## 2020-01-01 PROCEDURE — 94668 MNPJ CHEST WALL SBSQ: CPT

## 2020-01-01 PROCEDURE — 83880 ASSAY OF NATRIURETIC PEPTIDE: CPT | Performed by: FAMILY MEDICINE

## 2020-01-01 PROCEDURE — 85025 COMPLETE CBC W/AUTO DIFF WBC: CPT | Performed by: EMERGENCY MEDICINE

## 2020-01-01 PROCEDURE — 83050 HGB METHEMOGLOBIN QUAN: CPT

## 2020-01-01 PROCEDURE — 31624 DX BRONCHOSCOPE/LAVAGE: CPT | Performed by: INTERNAL MEDICINE

## 2020-01-01 PROCEDURE — 85007 BL SMEAR W/DIFF WBC COUNT: CPT | Performed by: INTERNAL MEDICINE

## 2020-01-01 PROCEDURE — 87641 MR-STAPH DNA AMP PROBE: CPT | Performed by: NURSE PRACTITIONER

## 2020-01-01 PROCEDURE — C1894 INTRO/SHEATH, NON-LASER: HCPCS | Performed by: INTERNAL MEDICINE

## 2020-01-01 PROCEDURE — 0100U HC BIOFIRE FILMARRAY RESP PANEL 2: CPT | Performed by: PHYSICIAN ASSISTANT

## 2020-01-01 PROCEDURE — 87116 MYCOBACTERIA CULTURE: CPT | Performed by: INTERNAL MEDICINE

## 2020-01-01 PROCEDURE — 93454 CORONARY ARTERY ANGIO S&I: CPT | Performed by: INTERNAL MEDICINE

## 2020-01-01 PROCEDURE — 94726 PLETHYSMOGRAPHY LUNG VOLUMES: CPT | Performed by: INTERNAL MEDICINE

## 2020-01-01 PROCEDURE — 87635 SARS-COV-2 COVID-19 AMP PRB: CPT | Performed by: PHYSICIAN ASSISTANT

## 2020-01-01 PROCEDURE — 0B9D7ZX DRAINAGE OF RIGHT MIDDLE LUNG LOBE, VIA NATURAL OR ARTIFICIAL OPENING, DIAGNOSTIC: ICD-10-PCS | Performed by: INTERNAL MEDICINE

## 2020-01-01 PROCEDURE — 93005 ELECTROCARDIOGRAM TRACING: CPT | Performed by: EMERGENCY MEDICINE

## 2020-01-01 PROCEDURE — 80053 COMPREHEN METABOLIC PANEL: CPT | Performed by: INTERNAL MEDICINE

## 2020-01-01 PROCEDURE — 93005 ELECTROCARDIOGRAM TRACING: CPT | Performed by: PHYSICIAN ASSISTANT

## 2020-01-01 PROCEDURE — 86665 EPSTEIN-BARR CAPSID VCA: CPT | Performed by: NURSE PRACTITIONER

## 2020-01-01 PROCEDURE — 25010000002 CEFEPIME PER 500 MG

## 2020-01-01 PROCEDURE — 97164 PT RE-EVAL EST PLAN CARE: CPT

## 2020-01-01 PROCEDURE — 83615 LACTATE (LD) (LDH) ENZYME: CPT | Performed by: FAMILY MEDICINE

## 2020-01-01 PROCEDURE — 99239 HOSP IP/OBS DSCHRG MGMT >30: CPT | Performed by: NURSE PRACTITIONER

## 2020-01-01 PROCEDURE — 93000 ELECTROCARDIOGRAM COMPLETE: CPT | Performed by: INTERNAL MEDICINE

## 2020-01-01 PROCEDURE — 83880 ASSAY OF NATRIURETIC PEPTIDE: CPT | Performed by: PHYSICIAN ASSISTANT

## 2020-01-01 PROCEDURE — 25010000002 FUROSEMIDE PER 20 MG: Performed by: FAMILY MEDICINE

## 2020-01-01 PROCEDURE — 74018 RADEX ABDOMEN 1 VIEW: CPT

## 2020-01-01 PROCEDURE — 25010000002 METHYLPREDNISOLONE PER 125 MG: Performed by: NURSE PRACTITIONER

## 2020-01-01 PROCEDURE — 99285 EMERGENCY DEPT VISIT HI MDM: CPT

## 2020-01-01 PROCEDURE — 86664 EPSTEIN-BARR NUCLEAR ANTIGEN: CPT | Performed by: NURSE PRACTITIONER

## 2020-01-01 PROCEDURE — 83036 HEMOGLOBIN GLYCOSYLATED A1C: CPT | Performed by: FAMILY MEDICINE

## 2020-01-01 PROCEDURE — 87070 CULTURE OTHR SPECIMN AEROBIC: CPT | Performed by: FAMILY MEDICINE

## 2020-01-01 PROCEDURE — 85730 THROMBOPLASTIN TIME PARTIAL: CPT | Performed by: NURSE PRACTITIONER

## 2020-01-01 PROCEDURE — 80053 COMPREHEN METABOLIC PANEL: CPT | Performed by: NURSE PRACTITIONER

## 2020-01-01 PROCEDURE — 63710000001 INSULIN REGULAR HUMAN PER 5 UNITS: Performed by: INTERNAL MEDICINE

## 2020-01-01 PROCEDURE — 92950 HEART/LUNG RESUSCITATION CPR: CPT

## 2020-01-01 PROCEDURE — 83605 ASSAY OF LACTIC ACID: CPT | Performed by: EMERGENCY MEDICINE

## 2020-01-01 PROCEDURE — 93307 TTE W/O DOPPLER COMPLETE: CPT | Performed by: INTERNAL MEDICINE

## 2020-01-01 PROCEDURE — 99204 OFFICE O/P NEW MOD 45 MIN: CPT | Performed by: UROLOGY

## 2020-01-01 PROCEDURE — 82375 ASSAY CARBOXYHB QUANT: CPT

## 2020-01-01 PROCEDURE — 84145 PROCALCITONIN (PCT): CPT | Performed by: NURSE PRACTITIONER

## 2020-01-01 PROCEDURE — 25010000002 PROPOFOL 1000 MG/100ML EMULSION: Performed by: NURSE ANESTHETIST, CERTIFIED REGISTERED

## 2020-01-01 PROCEDURE — 25010000002 FUROSEMIDE PER 20 MG: Performed by: NURSE PRACTITIONER

## 2020-01-01 PROCEDURE — 25010000002 METHYLNALTREXONE 12 MG/0.6ML SOLUTION: Performed by: PHYSICIAN ASSISTANT

## 2020-01-01 PROCEDURE — 25010000002 DEXAMETHASONE PER 1 MG: Performed by: EMERGENCY MEDICINE

## 2020-01-01 PROCEDURE — 84484 ASSAY OF TROPONIN QUANT: CPT | Performed by: EMERGENCY MEDICINE

## 2020-01-01 PROCEDURE — 86140 C-REACTIVE PROTEIN: CPT | Performed by: PHYSICIAN ASSISTANT

## 2020-01-01 PROCEDURE — 5A12012 PERFORMANCE OF CARDIAC OUTPUT, SINGLE, MANUAL: ICD-10-PCS

## 2020-01-01 PROCEDURE — 25010000003 LIDOCAINE 1 % SOLUTION: Performed by: INTERNAL MEDICINE

## 2020-01-01 PROCEDURE — 74181 MRI ABDOMEN W/O CONTRAST: CPT

## 2020-01-01 PROCEDURE — 80076 HEPATIC FUNCTION PANEL: CPT | Performed by: NURSE PRACTITIONER

## 2020-01-01 PROCEDURE — B2111ZZ FLUOROSCOPY OF MULTIPLE CORONARY ARTERIES USING LOW OSMOLAR CONTRAST: ICD-10-PCS | Performed by: INTERNAL MEDICINE

## 2020-01-01 PROCEDURE — 25010000002 EPINEPHRINE 1 MG/10ML SOLUTION PREFILLED SYRINGE: Performed by: STUDENT IN AN ORGANIZED HEALTH CARE EDUCATION/TRAINING PROGRAM

## 2020-01-01 PROCEDURE — 83615 LACTATE (LD) (LDH) ENZYME: CPT | Performed by: PHYSICIAN ASSISTANT

## 2020-01-01 PROCEDURE — 84484 ASSAY OF TROPONIN QUANT: CPT | Performed by: FAMILY MEDICINE

## 2020-01-01 PROCEDURE — 83605 ASSAY OF LACTIC ACID: CPT | Performed by: NURSE PRACTITIONER

## 2020-01-01 PROCEDURE — 25010000002 ONDANSETRON PER 1 MG: Performed by: INTERNAL MEDICINE

## 2020-01-01 PROCEDURE — 99284 EMERGENCY DEPT VISIT MOD MDM: CPT

## 2020-01-01 PROCEDURE — 86140 C-REACTIVE PROTEIN: CPT

## 2020-01-01 PROCEDURE — 0BJ08ZZ INSPECTION OF TRACHEOBRONCHIAL TREE, VIA NATURAL OR ARTIFICIAL OPENING ENDOSCOPIC: ICD-10-PCS | Performed by: INTERNAL MEDICINE

## 2020-01-01 PROCEDURE — 25010000003 ATROPINE SULFATE: Performed by: STUDENT IN AN ORGANIZED HEALTH CARE EDUCATION/TRAINING PROGRAM

## 2020-01-01 PROCEDURE — 80053 COMPREHEN METABOLIC PANEL: CPT | Performed by: EMERGENCY MEDICINE

## 2020-01-01 PROCEDURE — 87102 FUNGUS ISOLATION CULTURE: CPT | Performed by: INTERNAL MEDICINE

## 2020-01-01 PROCEDURE — 96372 THER/PROPH/DIAG INJ SC/IM: CPT | Performed by: NURSE PRACTITIONER

## 2020-01-01 PROCEDURE — 83605 ASSAY OF LACTIC ACID: CPT | Performed by: FAMILY MEDICINE

## 2020-01-01 PROCEDURE — 76705 ECHO EXAM OF ABDOMEN: CPT

## 2020-01-01 PROCEDURE — 0 IOPAMIDOL PER 1 ML: Performed by: INTERNAL MEDICINE

## 2020-01-01 PROCEDURE — 84484 ASSAY OF TROPONIN QUANT: CPT | Performed by: NURSE PRACTITIONER

## 2020-01-01 PROCEDURE — 88112 CYTOPATH CELL ENHANCE TECH: CPT | Performed by: INTERNAL MEDICINE

## 2020-01-01 PROCEDURE — 84145 PROCALCITONIN (PCT): CPT | Performed by: PHYSICIAN ASSISTANT

## 2020-01-01 PROCEDURE — U0004 COV-19 TEST NON-CDC HGH THRU: HCPCS | Performed by: INTERNAL MEDICINE

## 2020-01-01 PROCEDURE — 87205 SMEAR GRAM STAIN: CPT | Performed by: FAMILY MEDICINE

## 2020-01-01 PROCEDURE — 25010000002 METHYLPREDNISOLONE PER 80 MG: Performed by: INTERNAL MEDICINE

## 2020-01-01 PROCEDURE — 87205 SMEAR GRAM STAIN: CPT

## 2020-01-01 PROCEDURE — 94060 EVALUATION OF WHEEZING: CPT | Performed by: INTERNAL MEDICINE

## 2020-01-01 PROCEDURE — 0BC38ZZ EXTIRPATION OF MATTER FROM RIGHT MAIN BRONCHUS, VIA NATURAL OR ARTIFICIAL OPENING ENDOSCOPIC: ICD-10-PCS | Performed by: INTERNAL MEDICINE

## 2020-01-01 PROCEDURE — 87206 SMEAR FLUORESCENT/ACID STAI: CPT | Performed by: INTERNAL MEDICINE

## 2020-01-01 PROCEDURE — 0099U HC BIOFIRE FILMARRAY RESP PANEL 1: CPT | Performed by: INTERNAL MEDICINE

## 2020-01-01 PROCEDURE — 36415 COLL VENOUS BLD VENIPUNCTURE: CPT | Performed by: NURSE PRACTITIONER

## 2020-01-01 PROCEDURE — B2151ZZ FLUOROSCOPY OF LEFT HEART USING LOW OSMOLAR CONTRAST: ICD-10-PCS | Performed by: INTERNAL MEDICINE

## 2020-01-01 PROCEDURE — 92610 EVALUATE SWALLOWING FUNCTION: CPT

## 2020-01-01 PROCEDURE — 99204 OFFICE O/P NEW MOD 45 MIN: CPT | Performed by: INTERNAL MEDICINE

## 2020-01-01 PROCEDURE — 71046 X-RAY EXAM CHEST 2 VIEWS: CPT

## 2020-01-01 RX ORDER — NICOTINE POLACRILEX 4 MG
15 LOZENGE BUCCAL
Status: DISCONTINUED | OUTPATIENT
Start: 2020-01-01 | End: 2020-01-01 | Stop reason: HOSPADM

## 2020-01-01 RX ORDER — PRAVASTATIN SODIUM 10 MG
10 TABLET ORAL DAILY
Qty: 90 TABLET | Refills: 3 | Status: SHIPPED | OUTPATIENT
Start: 2020-01-01 | End: 2020-01-01

## 2020-01-01 RX ORDER — GUAIFENESIN 600 MG/1
600 TABLET, EXTENDED RELEASE ORAL EVERY 12 HOURS SCHEDULED
Status: DISCONTINUED | OUTPATIENT
Start: 2020-01-01 | End: 2020-01-01

## 2020-01-01 RX ORDER — METHYLPREDNISOLONE SODIUM SUCCINATE 40 MG/ML
40 INJECTION, POWDER, LYOPHILIZED, FOR SOLUTION INTRAMUSCULAR; INTRAVENOUS EVERY 8 HOURS
Status: DISCONTINUED | OUTPATIENT
Start: 2020-01-01 | End: 2020-01-01 | Stop reason: SDUPTHER

## 2020-01-01 RX ORDER — TIOTROPIUM BROMIDE 18 UG/1
CAPSULE ORAL; RESPIRATORY (INHALATION)
Qty: 30 CAPSULE | Refills: 4 | Status: SHIPPED | OUTPATIENT
Start: 2020-01-01 | End: 2020-01-01 | Stop reason: SDUPTHER

## 2020-01-01 RX ORDER — NICOTINE POLACRILEX 4 MG
1 LOZENGE BUCCAL
Status: DISCONTINUED | OUTPATIENT
Start: 2020-01-01 | End: 2020-01-01 | Stop reason: HOSPADM

## 2020-01-01 RX ORDER — IPRATROPIUM BROMIDE AND ALBUTEROL SULFATE 2.5; .5 MG/3ML; MG/3ML
3 SOLUTION RESPIRATORY (INHALATION) ONCE
Status: COMPLETED | OUTPATIENT
Start: 2020-01-01 | End: 2020-01-01

## 2020-01-01 RX ORDER — DEXAMETHASONE 1 MG
1 TABLET ORAL
COMMUNITY
Start: 2020-01-01 | End: 2020-01-01

## 2020-01-01 RX ORDER — ONDANSETRON 4 MG/1
4 TABLET, FILM COATED ORAL EVERY 6 HOURS PRN
Status: DISCONTINUED | OUTPATIENT
Start: 2020-01-01 | End: 2020-01-01 | Stop reason: HOSPADM

## 2020-01-01 RX ORDER — METHYLPREDNISOLONE SODIUM SUCCINATE 125 MG/2ML
60 INJECTION, POWDER, LYOPHILIZED, FOR SOLUTION INTRAMUSCULAR; INTRAVENOUS EVERY 8 HOURS
Status: DISCONTINUED | OUTPATIENT
Start: 2020-01-01 | End: 2020-01-01

## 2020-01-01 RX ORDER — ROFLUMILAST 500 UG/1
TABLET ORAL
Qty: 30 TABLET | Refills: 5 | Status: SHIPPED | OUTPATIENT
Start: 2020-01-01 | End: 2020-01-01 | Stop reason: SDUPTHER

## 2020-01-01 RX ORDER — ASPIRIN 81 MG/1
81 TABLET ORAL DAILY
Status: DISCONTINUED | OUTPATIENT
Start: 2020-01-01 | End: 2020-01-01 | Stop reason: HOSPADM

## 2020-01-01 RX ORDER — CEFTRIAXONE 1 G/50ML
1 INJECTION, SOLUTION INTRAVENOUS EVERY 24 HOURS
Status: COMPLETED | OUTPATIENT
Start: 2020-01-01 | End: 2020-01-01

## 2020-01-01 RX ORDER — ALLOPURINOL 100 MG/1
300 TABLET ORAL DAILY
Status: DISCONTINUED | OUTPATIENT
Start: 2020-01-01 | End: 2020-01-01 | Stop reason: HOSPADM

## 2020-01-01 RX ORDER — FUROSEMIDE 40 MG/1
40 TABLET ORAL DAILY
Status: DISCONTINUED | OUTPATIENT
Start: 2020-01-01 | End: 2020-01-01

## 2020-01-01 RX ORDER — SODIUM CHLORIDE 9 MG/ML
100 INJECTION, SOLUTION INTRAVENOUS CONTINUOUS
Status: ACTIVE | OUTPATIENT
Start: 2020-01-01 | End: 2020-01-01

## 2020-01-01 RX ORDER — IPRATROPIUM BROMIDE AND ALBUTEROL SULFATE 2.5; .5 MG/3ML; MG/3ML
3 SOLUTION RESPIRATORY (INHALATION) EVERY 4 HOURS PRN
Status: DISCONTINUED | OUTPATIENT
Start: 2020-01-01 | End: 2020-01-01 | Stop reason: HOSPADM

## 2020-01-01 RX ORDER — ASCORBIC ACID 1000 MG
TABLET ORAL DAILY
COMMUNITY
End: 2020-01-01 | Stop reason: HOSPADM

## 2020-01-01 RX ORDER — IPRATROPIUM BROMIDE AND ALBUTEROL SULFATE 2.5; .5 MG/3ML; MG/3ML
3 SOLUTION RESPIRATORY (INHALATION)
Status: DISCONTINUED | OUTPATIENT
Start: 2020-01-01 | End: 2020-01-01 | Stop reason: HOSPADM

## 2020-01-01 RX ORDER — METOPROLOL SUCCINATE 100 MG/1
100 TABLET, EXTENDED RELEASE ORAL
Status: DISCONTINUED | OUTPATIENT
Start: 2020-01-01 | End: 2020-01-01 | Stop reason: HOSPADM

## 2020-01-01 RX ORDER — CETIRIZINE HYDROCHLORIDE 10 MG/1
10 TABLET ORAL DAILY
Status: DISCONTINUED | OUTPATIENT
Start: 2020-01-01 | End: 2020-01-01 | Stop reason: HOSPADM

## 2020-01-01 RX ORDER — METHYLPREDNISOLONE ACETATE 80 MG/ML
80 INJECTION, SUSPENSION INTRA-ARTICULAR; INTRALESIONAL; INTRAMUSCULAR; SOFT TISSUE ONCE
Status: COMPLETED | OUTPATIENT
Start: 2020-01-01 | End: 2020-01-01

## 2020-01-01 RX ORDER — DEXTROSE MONOHYDRATE 25 G/50ML
25 INJECTION, SOLUTION INTRAVENOUS
Status: DISCONTINUED | OUTPATIENT
Start: 2020-01-01 | End: 2020-01-01 | Stop reason: HOSPADM

## 2020-01-01 RX ORDER — DILTIAZEM HYDROCHLORIDE 180 MG/1
180 CAPSULE, COATED, EXTENDED RELEASE ORAL
Status: DISCONTINUED | OUTPATIENT
Start: 2020-01-01 | End: 2020-01-01 | Stop reason: HOSPADM

## 2020-01-01 RX ORDER — ACETAMINOPHEN 650 MG/1
650 SUPPOSITORY RECTAL EVERY 4 HOURS PRN
Status: DISCONTINUED | OUTPATIENT
Start: 2020-01-01 | End: 2020-01-01 | Stop reason: HOSPADM

## 2020-01-01 RX ORDER — ICOSAPENT ETHYL 1000 MG/1
2 CAPSULE ORAL 2 TIMES DAILY WITH MEALS
COMMUNITY
End: 2020-01-01 | Stop reason: HOSPADM

## 2020-01-01 RX ORDER — AMOXICILLIN 250 MG
1 CAPSULE ORAL NIGHTLY
Status: DISCONTINUED | OUTPATIENT
Start: 2020-01-01 | End: 2020-01-01 | Stop reason: HOSPADM

## 2020-01-01 RX ORDER — METOPROLOL SUCCINATE 25 MG/1
25 TABLET, EXTENDED RELEASE ORAL ONCE
Status: COMPLETED | OUTPATIENT
Start: 2020-01-01 | End: 2020-01-01

## 2020-01-01 RX ORDER — METHYLPREDNISOLONE SODIUM SUCCINATE 125 MG/2ML
60 INJECTION, POWDER, LYOPHILIZED, FOR SOLUTION INTRAMUSCULAR; INTRAVENOUS EVERY 8 HOURS
Status: COMPLETED | OUTPATIENT
Start: 2020-01-01 | End: 2020-01-01

## 2020-01-01 RX ORDER — ALBUTEROL SULFATE 90 UG/1
2 AEROSOL, METERED RESPIRATORY (INHALATION)
Status: DISCONTINUED | OUTPATIENT
Start: 2020-01-01 | End: 2020-01-01 | Stop reason: HOSPADM

## 2020-01-01 RX ORDER — FENOFIBRATE 48 MG/1
48 TABLET, COATED ORAL DAILY
Status: DISCONTINUED | OUTPATIENT
Start: 2020-01-01 | End: 2020-01-01 | Stop reason: HOSPADM

## 2020-01-01 RX ORDER — BENZONATATE 100 MG/1
200 CAPSULE ORAL 3 TIMES DAILY PRN
Status: DISCONTINUED | OUTPATIENT
Start: 2020-01-01 | End: 2020-01-01 | Stop reason: HOSPADM

## 2020-01-01 RX ORDER — ROPINIROLE 0.25 MG/1
0.5 TABLET, FILM COATED ORAL 2 TIMES DAILY
Status: DISCONTINUED | OUTPATIENT
Start: 2020-01-01 | End: 2020-01-01 | Stop reason: HOSPADM

## 2020-01-01 RX ORDER — AMOXICILLIN AND CLAVULANATE POTASSIUM 875; 125 MG/1; MG/1
1 TABLET, FILM COATED ORAL EVERY 12 HOURS SCHEDULED
Qty: 5 TABLET | Refills: 0 | Status: SHIPPED | OUTPATIENT
Start: 2020-01-01 | End: 2020-01-01

## 2020-01-01 RX ORDER — BUDESONIDE AND FORMOTEROL FUMARATE DIHYDRATE 160; 4.5 UG/1; UG/1
2 AEROSOL RESPIRATORY (INHALATION)
Status: DISCONTINUED | OUTPATIENT
Start: 2020-01-01 | End: 2020-01-01 | Stop reason: HOSPADM

## 2020-01-01 RX ORDER — IPRATROPIUM BROMIDE AND ALBUTEROL SULFATE 2.5; .5 MG/3ML; MG/3ML
3 SOLUTION RESPIRATORY (INHALATION) EVERY 4 HOURS PRN
Status: DISCONTINUED | OUTPATIENT
Start: 2020-01-01 | End: 2020-01-01

## 2020-01-01 RX ORDER — METOCLOPRAMIDE 5 MG/1
5 TABLET ORAL 3 TIMES DAILY
Status: DISCONTINUED | OUTPATIENT
Start: 2020-01-01 | End: 2020-01-01 | Stop reason: HOSPADM

## 2020-01-01 RX ORDER — ONDANSETRON 2 MG/ML
4 INJECTION INTRAMUSCULAR; INTRAVENOUS EVERY 6 HOURS PRN
Status: DISCONTINUED | OUTPATIENT
Start: 2020-01-01 | End: 2020-01-01 | Stop reason: HOSPADM

## 2020-01-01 RX ORDER — SYRINGE-NEEDLE,INSULIN,0.5 ML 27GX1/2"
SYRINGE, EMPTY DISPOSABLE MISCELLANEOUS
COMMUNITY
Start: 2020-01-01

## 2020-01-01 RX ORDER — SODIUM CHLORIDE 0.9 % (FLUSH) 0.9 %
10 SYRINGE (ML) INJECTION EVERY 12 HOURS SCHEDULED
Status: DISCONTINUED | OUTPATIENT
Start: 2020-01-01 | End: 2020-01-01 | Stop reason: HOSPADM

## 2020-01-01 RX ORDER — SODIUM CHLORIDE 0.9 % (FLUSH) 0.9 %
10 SYRINGE (ML) INJECTION AS NEEDED
Status: DISCONTINUED | OUTPATIENT
Start: 2020-01-01 | End: 2020-01-01 | Stop reason: HOSPADM

## 2020-01-01 RX ORDER — L.ACID,PARA/B.BIFIDUM/S.THERM 8B CELL
1 CAPSULE ORAL 2 TIMES DAILY
Status: DISCONTINUED | OUTPATIENT
Start: 2020-01-01 | End: 2020-01-01 | Stop reason: HOSPADM

## 2020-01-01 RX ORDER — ALBUTEROL SULFATE 2.5 MG/3ML
2.5 SOLUTION RESPIRATORY (INHALATION) ONCE AS NEEDED
Status: DISCONTINUED | OUTPATIENT
Start: 2020-01-01 | End: 2020-01-01 | Stop reason: HOSPADM

## 2020-01-01 RX ORDER — DEXAMETHASONE SODIUM PHOSPHATE 4 MG/ML
6 INJECTION, SOLUTION INTRA-ARTICULAR; INTRALESIONAL; INTRAMUSCULAR; INTRAVENOUS; SOFT TISSUE ONCE
Status: COMPLETED | OUTPATIENT
Start: 2020-01-01 | End: 2020-01-01

## 2020-01-01 RX ORDER — BUDESONIDE 0.5 MG/2ML
0.5 INHALANT ORAL
Status: DISCONTINUED | OUTPATIENT
Start: 2020-01-01 | End: 2020-01-01

## 2020-01-01 RX ORDER — METHYLPREDNISOLONE SODIUM SUCCINATE 40 MG/ML
40 INJECTION, POWDER, LYOPHILIZED, FOR SOLUTION INTRAMUSCULAR; INTRAVENOUS EVERY 8 HOURS
Status: DISCONTINUED | OUTPATIENT
Start: 2020-01-01 | End: 2020-01-01

## 2020-01-01 RX ORDER — PREDNISONE 20 MG/1
20 TABLET ORAL
Status: DISCONTINUED | OUTPATIENT
Start: 2020-01-01 | End: 2020-01-01 | Stop reason: HOSPADM

## 2020-01-01 RX ORDER — HYDROCODONE BITARTRATE AND ACETAMINOPHEN 5; 325 MG/1; MG/1
1 TABLET ORAL NIGHTLY
Status: DISCONTINUED | OUTPATIENT
Start: 2020-01-01 | End: 2020-01-01 | Stop reason: HOSPADM

## 2020-01-01 RX ORDER — MULTIPLE VITAMINS W/ MINERALS TAB 9MG-400MCG
1 TAB ORAL DAILY
Status: DISCONTINUED | OUTPATIENT
Start: 2020-01-01 | End: 2020-01-01 | Stop reason: HOSPADM

## 2020-01-01 RX ORDER — L.ACID,PARA/B.BIFIDUM/S.THERM 8B CELL
1 CAPSULE ORAL 3 TIMES DAILY
Status: DISCONTINUED | OUTPATIENT
Start: 2020-01-01 | End: 2020-01-01 | Stop reason: HOSPADM

## 2020-01-01 RX ORDER — METOPROLOL SUCCINATE 50 MG/1
50 TABLET, EXTENDED RELEASE ORAL
Status: DISCONTINUED | OUTPATIENT
Start: 2020-01-01 | End: 2020-01-01

## 2020-01-01 RX ORDER — ROFLUMILAST 500 UG/1
500 TABLET ORAL DAILY
Status: DISCONTINUED | OUTPATIENT
Start: 2020-01-01 | End: 2020-01-01 | Stop reason: HOSPADM

## 2020-01-01 RX ORDER — MONTELUKAST SODIUM 10 MG/1
10 TABLET ORAL NIGHTLY
Status: DISCONTINUED | OUTPATIENT
Start: 2020-01-01 | End: 2020-01-01 | Stop reason: HOSPADM

## 2020-01-01 RX ORDER — ACETAMINOPHEN 325 MG/1
650 TABLET ORAL EVERY 4 HOURS PRN
Status: DISCONTINUED | OUTPATIENT
Start: 2020-01-01 | End: 2020-01-01 | Stop reason: HOSPADM

## 2020-01-01 RX ORDER — FAMOTIDINE 20 MG/1
40 TABLET, FILM COATED ORAL DAILY
Status: DISCONTINUED | OUTPATIENT
Start: 2020-01-01 | End: 2020-01-01 | Stop reason: HOSPADM

## 2020-01-01 RX ORDER — GUAIFENESIN 600 MG/1
1200 TABLET, EXTENDED RELEASE ORAL
COMMUNITY
Start: 2020-01-01 | End: 2020-01-01

## 2020-01-01 RX ORDER — PREDNISONE 20 MG/1
40 TABLET ORAL
Status: DISCONTINUED | OUTPATIENT
Start: 2020-01-01 | End: 2020-01-01

## 2020-01-01 RX ORDER — METOPROLOL TARTRATE 5 MG/5ML
5 INJECTION INTRAVENOUS ONCE
Status: COMPLETED | OUTPATIENT
Start: 2020-01-01 | End: 2020-01-01

## 2020-01-01 RX ORDER — DOXYCYCLINE 100 MG/1
100 CAPSULE ORAL EVERY 12 HOURS SCHEDULED
Status: COMPLETED | OUTPATIENT
Start: 2020-01-01 | End: 2020-01-01

## 2020-01-01 RX ORDER — AZITHROMYCIN 250 MG/1
500 TABLET, FILM COATED ORAL
Status: DISCONTINUED | OUTPATIENT
Start: 2020-01-01 | End: 2020-01-01

## 2020-01-01 RX ORDER — ROPINIROLE 0.5 MG/1
0.5 TABLET, FILM COATED ORAL 2 TIMES DAILY
Status: DISCONTINUED | OUTPATIENT
Start: 2020-01-01 | End: 2020-01-01 | Stop reason: HOSPADM

## 2020-01-01 RX ORDER — METHYLPREDNISOLONE SODIUM SUCCINATE 40 MG/ML
40 INJECTION, POWDER, LYOPHILIZED, FOR SOLUTION INTRAMUSCULAR; INTRAVENOUS EVERY 12 HOURS
Status: DISCONTINUED | OUTPATIENT
Start: 2020-01-01 | End: 2020-01-01

## 2020-01-01 RX ORDER — PANTOPRAZOLE SODIUM 40 MG/10ML
40 INJECTION, POWDER, LYOPHILIZED, FOR SOLUTION INTRAVENOUS EVERY 12 HOURS SCHEDULED
Status: DISCONTINUED | OUTPATIENT
Start: 2020-01-01 | End: 2020-01-01 | Stop reason: HOSPADM

## 2020-01-01 RX ORDER — PANTOPRAZOLE SODIUM 40 MG/1
40 TABLET, DELAYED RELEASE ORAL DAILY
Status: DISCONTINUED | OUTPATIENT
Start: 2020-01-01 | End: 2020-01-01 | Stop reason: HOSPADM

## 2020-01-01 RX ORDER — TRAMADOL HYDROCHLORIDE 50 MG/1
50 TABLET ORAL 3 TIMES DAILY
Status: DISCONTINUED | OUTPATIENT
Start: 2020-01-01 | End: 2020-01-01 | Stop reason: HOSPADM

## 2020-01-01 RX ORDER — ATORVASTATIN CALCIUM 10 MG/1
10 TABLET, FILM COATED ORAL DAILY
Status: DISCONTINUED | OUTPATIENT
Start: 2020-01-01 | End: 2020-01-01 | Stop reason: HOSPADM

## 2020-01-01 RX ORDER — AMOXICILLIN AND CLAVULANATE POTASSIUM 875; 125 MG/1; MG/1
1 TABLET, FILM COATED ORAL EVERY 12 HOURS SCHEDULED
Status: DISCONTINUED | OUTPATIENT
Start: 2020-01-01 | End: 2020-01-01 | Stop reason: HOSPADM

## 2020-01-01 RX ORDER — SODIUM CHLORIDE FOR INHALATION 3 %
4 VIAL, NEBULIZER (ML) INHALATION EVERY 8 HOURS
Status: DISCONTINUED | OUTPATIENT
Start: 2020-01-01 | End: 2020-01-01 | Stop reason: HOSPADM

## 2020-01-01 RX ORDER — BUSPIRONE HYDROCHLORIDE 10 MG/1
10 TABLET ORAL 3 TIMES DAILY
Status: DISCONTINUED | OUTPATIENT
Start: 2020-01-01 | End: 2020-01-01 | Stop reason: HOSPADM

## 2020-01-01 RX ORDER — MORPHINE SULFATE 4 MG/ML
4 INJECTION, SOLUTION INTRAMUSCULAR; INTRAVENOUS
Status: DISCONTINUED | OUTPATIENT
Start: 2020-01-01 | End: 2020-01-01 | Stop reason: HOSPADM

## 2020-01-01 RX ORDER — LEVOFLOXACIN 5 MG/ML
500 INJECTION, SOLUTION INTRAVENOUS ONCE
Status: DISCONTINUED | OUTPATIENT
Start: 2020-01-01 | End: 2020-01-01

## 2020-01-01 RX ORDER — TIOTROPIUM BROMIDE 18 UG/1
CAPSULE ORAL; RESPIRATORY (INHALATION)
Qty: 30 CAPSULE | Refills: 4 | Status: SHIPPED | OUTPATIENT
Start: 2020-01-01 | End: 2020-01-01

## 2020-01-01 RX ORDER — ATROPINE SULFATE 1 MG/ML
INJECTION, SOLUTION INTRAMUSCULAR; INTRAVENOUS; SUBCUTANEOUS
Status: COMPLETED | OUTPATIENT
Start: 2020-01-01 | End: 2020-01-01

## 2020-01-01 RX ORDER — GUAIFENESIN 600 MG/1
600 TABLET, EXTENDED RELEASE ORAL 2 TIMES DAILY
Status: DISCONTINUED | OUTPATIENT
Start: 2020-01-01 | End: 2020-01-01 | Stop reason: HOSPADM

## 2020-01-01 RX ORDER — CHOLECALCIFEROL (VITAMIN D3) 125 MCG
5 CAPSULE ORAL NIGHTLY PRN
Status: DISCONTINUED | OUTPATIENT
Start: 2020-01-01 | End: 2020-01-01 | Stop reason: HOSPADM

## 2020-01-01 RX ORDER — PREDNISONE 20 MG/1
TABLET ORAL
COMMUNITY
Start: 2020-01-01 | End: 2020-01-01

## 2020-01-01 RX ORDER — PROPOFOL 10 MG/ML
INJECTION, EMULSION INTRAVENOUS AS NEEDED
Status: DISCONTINUED | OUTPATIENT
Start: 2020-01-01 | End: 2020-01-01 | Stop reason: SURG

## 2020-01-01 RX ORDER — IPRATROPIUM BROMIDE AND ALBUTEROL SULFATE 2.5; .5 MG/3ML; MG/3ML
3 SOLUTION RESPIRATORY (INHALATION)
Status: DISCONTINUED | OUTPATIENT
Start: 2020-01-01 | End: 2020-01-01

## 2020-01-01 RX ORDER — ALPRAZOLAM 0.5 MG/1
0.5 TABLET ORAL 3 TIMES DAILY PRN
Status: DISPENSED | OUTPATIENT
Start: 2020-01-01 | End: 2020-01-01

## 2020-01-01 RX ORDER — DIPHENHYDRAMINE HYDROCHLORIDE 50 MG/ML
25 INJECTION INTRAMUSCULAR; INTRAVENOUS EVERY 6 HOURS PRN
Status: DISCONTINUED | OUTPATIENT
Start: 2020-01-01 | End: 2020-01-01 | Stop reason: HOSPADM

## 2020-01-01 RX ORDER — AMOXICILLIN AND CLAVULANATE POTASSIUM 875; 125 MG/1; MG/1
1 TABLET, FILM COATED ORAL EVERY 12 HOURS SCHEDULED
Status: DISCONTINUED | OUTPATIENT
Start: 2020-01-01 | End: 2020-01-01

## 2020-01-01 RX ORDER — DOCUSATE SODIUM 250 MG
250 CAPSULE ORAL DAILY
COMMUNITY
End: 2020-01-01 | Stop reason: HOSPADM

## 2020-01-01 RX ORDER — MAGNESIUM HYDROXIDE 1200 MG/15ML
LIQUID ORAL AS NEEDED
Status: DISCONTINUED | OUTPATIENT
Start: 2020-01-01 | End: 2020-01-01 | Stop reason: HOSPADM

## 2020-01-01 RX ORDER — PEN NEEDLE, DIABETIC 32GX 5/32"
NEEDLE, DISPOSABLE MISCELLANEOUS
COMMUNITY
Start: 2020-01-01

## 2020-01-01 RX ORDER — OSELTAMIVIR PHOSPHATE 30 MG/1
30 CAPSULE ORAL EVERY 12 HOURS SCHEDULED
Status: COMPLETED | OUTPATIENT
Start: 2020-01-01 | End: 2020-01-01

## 2020-01-01 RX ORDER — ALPRAZOLAM 0.25 MG/1
0.25 TABLET ORAL 3 TIMES DAILY PRN
Status: DISCONTINUED | OUTPATIENT
Start: 2020-01-01 | End: 2020-01-01 | Stop reason: HOSPADM

## 2020-01-01 RX ORDER — FENOFIBRATE 48 MG/1
48 TABLET, COATED ORAL DAILY
COMMUNITY
End: 2020-01-01 | Stop reason: HOSPADM

## 2020-01-01 RX ORDER — ALBUTEROL SULFATE 90 UG/1
2 AEROSOL, METERED RESPIRATORY (INHALATION)
Status: DISCONTINUED | OUTPATIENT
Start: 2020-01-01 | End: 2020-01-01

## 2020-01-01 RX ORDER — FLUTICASONE PROPIONATE 50 MCG
1 SPRAY, SUSPENSION (ML) NASAL DAILY
Status: DISCONTINUED | OUTPATIENT
Start: 2020-01-01 | End: 2020-01-01 | Stop reason: HOSPADM

## 2020-01-01 RX ORDER — TRAMADOL HYDROCHLORIDE 50 MG/1
50 TABLET ORAL EVERY 6 HOURS PRN
Status: DISCONTINUED | OUTPATIENT
Start: 2020-01-01 | End: 2020-01-01 | Stop reason: HOSPADM

## 2020-01-01 RX ORDER — BISACODYL 10 MG
10 SUPPOSITORY, RECTAL RECTAL DAILY
Status: DISCONTINUED | OUTPATIENT
Start: 2020-01-01 | End: 2020-01-01

## 2020-01-01 RX ORDER — ACETAMINOPHEN 160 MG/5ML
650 SOLUTION ORAL EVERY 4 HOURS PRN
Status: DISCONTINUED | OUTPATIENT
Start: 2020-01-01 | End: 2020-01-01 | Stop reason: HOSPADM

## 2020-01-01 RX ORDER — SUCRALFATE 1 G/1
1 TABLET ORAL EVERY 8 HOURS
Status: DISCONTINUED | OUTPATIENT
Start: 2020-01-01 | End: 2020-01-01 | Stop reason: HOSPADM

## 2020-01-01 RX ORDER — ROFLUMILAST 500 UG/1
500 TABLET ORAL DAILY
Qty: 30 TABLET | Refills: 5 | Status: SHIPPED | OUTPATIENT
Start: 2020-01-01 | End: 2020-01-01 | Stop reason: HOSPADM

## 2020-01-01 RX ORDER — FENOFIBRATE 145 MG/1
145 TABLET, COATED ORAL DAILY
Status: DISCONTINUED | OUTPATIENT
Start: 2020-01-01 | End: 2020-01-01

## 2020-01-01 RX ORDER — MIDAZOLAM HYDROCHLORIDE 2 MG/2ML
INJECTION, SOLUTION INTRAMUSCULAR; INTRAVENOUS AS NEEDED
Status: DISCONTINUED | OUTPATIENT
Start: 2020-01-01 | End: 2020-01-01 | Stop reason: SURG

## 2020-01-01 RX ORDER — MORPHINE SULFATE 4 MG/ML
4 INJECTION, SOLUTION INTRAMUSCULAR; INTRAVENOUS ONCE
Status: COMPLETED | OUTPATIENT
Start: 2020-01-01 | End: 2020-01-01

## 2020-01-01 RX ORDER — FENOFIBRATE 48 MG/1
TABLET, COATED ORAL
COMMUNITY
Start: 2020-01-01 | End: 2020-01-01 | Stop reason: ALTCHOICE

## 2020-01-01 RX ORDER — ECHINACEA PURPUREA EXTRACT 125 MG
1 TABLET ORAL
COMMUNITY
Start: 2020-01-01 | End: 2020-01-01 | Stop reason: HOSPADM

## 2020-01-01 RX ORDER — LIDOCAINE HYDROCHLORIDE 20 MG/ML
INJECTION, SOLUTION INTRAVENOUS AS NEEDED
Status: DISCONTINUED | OUTPATIENT
Start: 2020-01-01 | End: 2020-01-01 | Stop reason: SURG

## 2020-01-01 RX ORDER — NALOXONE HCL 0.4 MG/ML
0.4 VIAL (ML) INJECTION
Status: DISCONTINUED | OUTPATIENT
Start: 2020-01-01 | End: 2020-01-01 | Stop reason: HOSPADM

## 2020-01-01 RX ORDER — DILTIAZEM HYDROCHLORIDE 180 MG/1
180 CAPSULE, COATED, EXTENDED RELEASE ORAL
Qty: 30 CAPSULE | Refills: 0 | Status: SHIPPED | OUTPATIENT
Start: 2020-01-01 | End: 2020-01-01 | Stop reason: HOSPADM

## 2020-01-01 RX ORDER — METHYLPREDNISOLONE SODIUM SUCCINATE 40 MG/ML
40 INJECTION, POWDER, LYOPHILIZED, FOR SOLUTION INTRAMUSCULAR; INTRAVENOUS EVERY 12 HOURS
Status: COMPLETED | OUTPATIENT
Start: 2020-01-01 | End: 2020-01-01

## 2020-01-01 RX ORDER — EZETIMIBE 10 MG/1
10 TABLET ORAL DAILY
COMMUNITY
End: 2020-01-01 | Stop reason: HOSPADM

## 2020-01-01 RX ORDER — SODIUM CHLORIDE FOR INHALATION 3 %
4 VIAL, NEBULIZER (ML) INHALATION ONCE
Status: COMPLETED | OUTPATIENT
Start: 2020-01-01 | End: 2020-01-01

## 2020-01-01 RX ORDER — PRAVASTATIN SODIUM 20 MG
10 TABLET ORAL NIGHTLY
Status: DISCONTINUED | OUTPATIENT
Start: 2020-01-01 | End: 2020-01-01 | Stop reason: HOSPADM

## 2020-01-01 RX ORDER — ECHINACEA PURPUREA EXTRACT 125 MG
2 TABLET ORAL AS NEEDED
Status: DISCONTINUED | OUTPATIENT
Start: 2020-01-01 | End: 2020-01-01 | Stop reason: HOSPADM

## 2020-01-01 RX ORDER — TIZANIDINE 4 MG/1
4 TABLET ORAL 2 TIMES DAILY PRN
COMMUNITY
Start: 2020-01-01 | End: 2020-01-01 | Stop reason: HOSPADM

## 2020-01-01 RX ORDER — FUROSEMIDE 10 MG/ML
20 INJECTION INTRAMUSCULAR; INTRAVENOUS ONCE
Status: COMPLETED | OUTPATIENT
Start: 2020-01-01 | End: 2020-01-01

## 2020-01-01 RX ORDER — ATORVASTATIN CALCIUM 40 MG/1
40 TABLET, FILM COATED ORAL DAILY
Status: DISCONTINUED | OUTPATIENT
Start: 2020-01-01 | End: 2020-01-01 | Stop reason: HOSPADM

## 2020-01-01 RX ORDER — FLUCONAZOLE 100 MG/1
100 TABLET ORAL EVERY 24 HOURS
Status: DISCONTINUED | OUTPATIENT
Start: 2020-01-01 | End: 2020-01-01 | Stop reason: HOSPADM

## 2020-01-01 RX ORDER — FUROSEMIDE 10 MG/ML
40 INJECTION INTRAMUSCULAR; INTRAVENOUS EVERY 12 HOURS
Status: DISCONTINUED | OUTPATIENT
Start: 2020-01-01 | End: 2020-01-01 | Stop reason: HOSPADM

## 2020-01-01 RX ORDER — DILTIAZEM HYDROCHLORIDE 240 MG/1
240 CAPSULE, COATED, EXTENDED RELEASE ORAL
Status: DISCONTINUED | OUTPATIENT
Start: 2020-01-01 | End: 2020-01-01 | Stop reason: HOSPADM

## 2020-01-01 RX ORDER — PANTOPRAZOLE SODIUM 40 MG/1
40 TABLET, DELAYED RELEASE ORAL DAILY
COMMUNITY
End: 2020-01-01 | Stop reason: HOSPADM

## 2020-01-01 RX ORDER — MAGNESIUM SULFATE HEPTAHYDRATE 40 MG/ML
2 INJECTION, SOLUTION INTRAVENOUS ONCE
Status: COMPLETED | OUTPATIENT
Start: 2020-01-01 | End: 2020-01-01

## 2020-01-01 RX ORDER — ALBUTEROL SULFATE 90 UG/1
2 AEROSOL, METERED RESPIRATORY (INHALATION)
Status: DISCONTINUED | OUTPATIENT
Start: 2020-01-01 | End: 2020-01-01 | Stop reason: SDUPTHER

## 2020-01-01 RX ORDER — LEVOFLOXACIN 250 MG/1
250 TABLET ORAL DAILY
Qty: 3 TABLET | Refills: 0 | Status: SHIPPED | OUTPATIENT
Start: 2020-01-01 | End: 2020-01-01

## 2020-01-01 RX ORDER — SUCRALFATE 1 G/1
1 TABLET ORAL 3 TIMES DAILY
Status: DISCONTINUED | OUTPATIENT
Start: 2020-01-01 | End: 2020-01-01 | Stop reason: HOSPADM

## 2020-01-01 RX ORDER — IPRATROPIUM BROMIDE AND ALBUTEROL SULFATE 2.5; .5 MG/3ML; MG/3ML
3 SOLUTION RESPIRATORY (INHALATION) 4 TIMES DAILY PRN
Qty: 360 ML | Refills: 1 | Status: SHIPPED | OUTPATIENT
Start: 2020-01-01 | End: 2020-01-01 | Stop reason: HOSPADM

## 2020-01-01 RX ORDER — GUAIFENESIN 600 MG/1
1200 TABLET, EXTENDED RELEASE ORAL EVERY 12 HOURS SCHEDULED
Status: DISCONTINUED | OUTPATIENT
Start: 2020-01-01 | End: 2020-01-01 | Stop reason: HOSPADM

## 2020-01-01 RX ORDER — CHLOROTHIAZIDE SODIUM 500 MG/1
250 INJECTION INTRAVENOUS ONCE
Status: COMPLETED | OUTPATIENT
Start: 2020-01-01 | End: 2020-01-01

## 2020-01-01 RX ORDER — SODIUM CHLORIDE 9 MG/ML
75 INJECTION, SOLUTION INTRAVENOUS CONTINUOUS
Status: ACTIVE | OUTPATIENT
Start: 2020-01-01 | End: 2020-01-01

## 2020-01-01 RX ORDER — POLYETHYLENE GLYCOL 3350 17 G/17G
17 POWDER, FOR SOLUTION ORAL DAILY
Qty: 30 PACKET | Refills: 0 | Status: SHIPPED | OUTPATIENT
Start: 2020-01-01 | End: 2020-01-01

## 2020-01-01 RX ORDER — DOXYCYCLINE HYCLATE 100 MG
100 TABLET ORAL
COMMUNITY
Start: 2020-01-01 | End: 2020-01-01

## 2020-01-01 RX ORDER — SODIUM CHLORIDE FOR INHALATION 3 %
4 VIAL, NEBULIZER (ML) INHALATION EVERY 8 HOURS
Status: DISPENSED | OUTPATIENT
Start: 2020-01-01 | End: 2020-01-01

## 2020-01-01 RX ORDER — BUDESONIDE 0.5 MG/2ML
1 INHALANT ORAL
Status: DISCONTINUED | OUTPATIENT
Start: 2020-01-01 | End: 2020-01-01 | Stop reason: HOSPADM

## 2020-01-01 RX ORDER — FLUTICASONE PROPIONATE 50 MCG
2 SPRAY, SUSPENSION (ML) NASAL 2 TIMES DAILY
COMMUNITY
End: 2020-01-01 | Stop reason: HOSPADM

## 2020-01-01 RX ORDER — LIDOCAINE HYDROCHLORIDE 10 MG/ML
INJECTION, SOLUTION INFILTRATION; PERINEURAL AS NEEDED
Status: DISCONTINUED | OUTPATIENT
Start: 2020-01-01 | End: 2020-01-01 | Stop reason: HOSPADM

## 2020-01-01 RX ORDER — DOCUSATE SODIUM 100 MG/1
200 CAPSULE, LIQUID FILLED ORAL DAILY
Status: DISCONTINUED | OUTPATIENT
Start: 2020-01-01 | End: 2020-01-01 | Stop reason: HOSPADM

## 2020-01-01 RX ORDER — HYDROCODONE BITARTRATE AND ACETAMINOPHEN 5; 325 MG/1; MG/1
1 TABLET ORAL NIGHTLY PRN
Status: DISCONTINUED | OUTPATIENT
Start: 2020-01-01 | End: 2020-01-01 | Stop reason: HOSPADM

## 2020-01-01 RX ORDER — METOPROLOL SUCCINATE 25 MG/1
25 TABLET, EXTENDED RELEASE ORAL
Status: DISCONTINUED | OUTPATIENT
Start: 2020-01-01 | End: 2020-01-01

## 2020-01-01 RX ORDER — PREDNISONE 20 MG/1
40 TABLET ORAL
COMMUNITY
Start: 2020-01-01 | End: 2020-01-01

## 2020-01-01 RX ORDER — SODIUM CHLORIDE 9 MG/ML
60 INJECTION, SOLUTION INTRAVENOUS CONTINUOUS
Status: DISCONTINUED | OUTPATIENT
Start: 2020-01-01 | End: 2020-01-01

## 2020-01-01 RX ORDER — L.ACID,PARA/B.BIFIDUM/S.THERM 8B CELL
1 CAPSULE ORAL DAILY
Status: DISCONTINUED | OUTPATIENT
Start: 2020-01-01 | End: 2020-01-01 | Stop reason: HOSPADM

## 2020-01-01 RX ORDER — GUAIFENESIN 600 MG/1
600 TABLET, EXTENDED RELEASE ORAL EVERY 12 HOURS SCHEDULED
Status: DISCONTINUED | OUTPATIENT
Start: 2020-01-01 | End: 2020-01-01 | Stop reason: HOSPADM

## 2020-01-01 RX ORDER — CALCIUM CARBONATE 750 MG/1
750 TABLET, CHEWABLE ORAL 3 TIMES DAILY PRN
Status: DISCONTINUED | OUTPATIENT
Start: 2020-01-01 | End: 2020-01-01 | Stop reason: HOSPADM

## 2020-01-01 RX ORDER — ATORVASTATIN CALCIUM 40 MG/1
40 TABLET, FILM COATED ORAL DAILY
Qty: 30 TABLET | Refills: 10 | Status: SHIPPED | OUTPATIENT
Start: 2020-01-01 | End: 2020-01-01 | Stop reason: HOSPADM

## 2020-01-01 RX ORDER — VITAMIN B COMPLEX
1000 TABLET ORAL DAILY
Status: DISCONTINUED | OUTPATIENT
Start: 2020-01-01 | End: 2020-01-01 | Stop reason: HOSPADM

## 2020-01-01 RX ORDER — PREDNISONE 20 MG/1
20 TABLET ORAL 2 TIMES DAILY
COMMUNITY
End: 2020-01-01 | Stop reason: HOSPADM

## 2020-01-01 RX ORDER — MONTELUKAST SODIUM 10 MG/1
10 TABLET ORAL NIGHTLY
Qty: 30 TABLET | Refills: 5 | Status: SHIPPED | OUTPATIENT
Start: 2020-01-01 | End: 2020-01-01 | Stop reason: HOSPADM

## 2020-01-01 RX ORDER — FUROSEMIDE 10 MG/ML
40 INJECTION INTRAMUSCULAR; INTRAVENOUS ONCE
Status: COMPLETED | OUTPATIENT
Start: 2020-01-01 | End: 2020-01-01

## 2020-01-01 RX ORDER — METOPROLOL SUCCINATE 50 MG/1
100 TABLET, EXTENDED RELEASE ORAL
Status: DISCONTINUED | OUTPATIENT
Start: 2020-01-01 | End: 2020-01-01 | Stop reason: HOSPADM

## 2020-01-01 RX ORDER — PREDNISONE 20 MG/1
40 TABLET ORAL
Status: COMPLETED | OUTPATIENT
Start: 2020-01-01 | End: 2020-01-01

## 2020-01-01 RX ORDER — LOVASTATIN 10 MG/1
10 TABLET ORAL NIGHTLY
COMMUNITY
End: 2020-01-01 | Stop reason: HOSPADM

## 2020-01-01 RX ORDER — HYDROCODONE BITARTRATE AND ACETAMINOPHEN 5; 325 MG/1; MG/1
1 TABLET ORAL EVERY 4 HOURS PRN
Status: DISCONTINUED | OUTPATIENT
Start: 2020-01-01 | End: 2020-01-01 | Stop reason: HOSPADM

## 2020-01-01 RX ORDER — FUROSEMIDE 20 MG/1
40 TABLET ORAL EVERY EVENING
COMMUNITY
End: 2020-01-01 | Stop reason: HOSPADM

## 2020-01-01 RX ORDER — FLUTICASONE PROPIONATE 50 MCG
2 SPRAY, SUSPENSION (ML) NASAL 2 TIMES DAILY
Status: DISCONTINUED | OUTPATIENT
Start: 2020-01-01 | End: 2020-01-01 | Stop reason: HOSPADM

## 2020-01-01 RX ORDER — L.ACID,PARA/B.BIFIDUM/S.THERM 8B CELL
1 CAPSULE ORAL DAILY
Qty: 10 CAPSULE | Refills: 0 | Status: SHIPPED | OUTPATIENT
Start: 2020-01-01 | End: 2020-01-01

## 2020-01-01 RX ORDER — BUDESONIDE 0.5 MG/2ML
1 INHALANT ORAL
Status: DISCONTINUED | OUTPATIENT
Start: 2020-01-01 | End: 2020-01-01

## 2020-01-01 RX ORDER — FENOFIBRATE 145 MG/1
145 TABLET, COATED ORAL DAILY
Status: DISCONTINUED | OUTPATIENT
Start: 2020-01-01 | End: 2020-01-01 | Stop reason: HOSPADM

## 2020-01-01 RX ORDER — LIDOCAINE HYDROCHLORIDE 20 MG/ML
INJECTION, SOLUTION INFILTRATION; PERINEURAL AS NEEDED
Status: DISCONTINUED | OUTPATIENT
Start: 2020-01-01 | End: 2020-01-01 | Stop reason: HOSPADM

## 2020-01-01 RX ORDER — FLUTICASONE PROPIONATE 50 MCG
2 SPRAY, SUSPENSION (ML) NASAL DAILY
Status: DISCONTINUED | OUTPATIENT
Start: 2020-01-01 | End: 2020-01-01 | Stop reason: HOSPADM

## 2020-01-01 RX ORDER — FENTANYL CITRATE 50 UG/ML
INJECTION, SOLUTION INTRAMUSCULAR; INTRAVENOUS AS NEEDED
Status: DISCONTINUED | OUTPATIENT
Start: 2020-01-01 | End: 2020-01-01 | Stop reason: SURG

## 2020-01-01 RX ORDER — SUCRALFATE 1 G/1
1 TABLET ORAL 3 TIMES DAILY
Status: DISCONTINUED | OUTPATIENT
Start: 2020-01-01 | End: 2020-01-01

## 2020-01-01 RX ORDER — FERROUS SULFATE 325(65) MG
325 TABLET ORAL
Status: DISCONTINUED | OUTPATIENT
Start: 2020-01-01 | End: 2020-01-01

## 2020-01-01 RX ORDER — PRAVASTATIN SODIUM 10 MG
TABLET ORAL
COMMUNITY
Start: 2020-01-01 | End: 2020-01-01 | Stop reason: ALTCHOICE

## 2020-01-01 RX ORDER — SODIUM CHLORIDE 9 MG/ML
75 INJECTION, SOLUTION INTRAVENOUS CONTINUOUS
Status: DISCONTINUED | OUTPATIENT
Start: 2020-01-01 | End: 2020-01-01

## 2020-01-01 RX ORDER — METOCLOPRAMIDE 5 MG/1
TABLET ORAL
COMMUNITY
Start: 2020-01-01 | End: 2020-01-01 | Stop reason: ALTCHOICE

## 2020-01-01 RX ORDER — BLOOD SUGAR DIAGNOSTIC
STRIP MISCELLANEOUS
COMMUNITY
Start: 2020-01-01 | End: 2020-01-01 | Stop reason: HOSPADM

## 2020-01-01 RX ORDER — ACETAMINOPHEN 325 MG/1
650 TABLET ORAL EVERY 4 HOURS PRN
Status: DISCONTINUED | OUTPATIENT
Start: 2020-01-01 | End: 2020-01-01 | Stop reason: SDUPTHER

## 2020-01-01 RX ORDER — IPRATROPIUM BROMIDE AND ALBUTEROL SULFATE 2.5; .5 MG/3ML; MG/3ML
3 SOLUTION RESPIRATORY (INHALATION) ONCE
Status: DISCONTINUED | OUTPATIENT
Start: 2020-01-01 | End: 2020-01-01

## 2020-01-01 RX ORDER — DILTIAZEM HYDROCHLORIDE 180 MG/1
180 CAPSULE, COATED, EXTENDED RELEASE ORAL
Status: DISCONTINUED | OUTPATIENT
Start: 2020-01-01 | End: 2020-01-01

## 2020-01-01 RX ORDER — METOPROLOL SUCCINATE 100 MG/1
100 TABLET, EXTENDED RELEASE ORAL
Qty: 30 TABLET | Refills: 0 | Status: SHIPPED | OUTPATIENT
Start: 2020-01-01 | End: 2020-01-01 | Stop reason: HOSPADM

## 2020-01-01 RX ORDER — LANCETS 30 GAUGE
1 EACH MISCELLANEOUS
COMMUNITY
Start: 2020-01-01

## 2020-01-01 RX ORDER — BISACODYL 10 MG
10 SUPPOSITORY, RECTAL RECTAL DAILY PRN
Status: DISCONTINUED | OUTPATIENT
Start: 2020-01-01 | End: 2020-01-01 | Stop reason: HOSPADM

## 2020-01-01 RX ORDER — PANTOPRAZOLE SODIUM 40 MG/10ML
40 INJECTION, POWDER, LYOPHILIZED, FOR SOLUTION INTRAVENOUS EVERY 12 HOURS SCHEDULED
Status: DISCONTINUED | OUTPATIENT
Start: 2020-01-01 | End: 2020-01-01

## 2020-01-01 RX ORDER — DEXAMETHASONE SODIUM PHOSPHATE 4 MG/ML
6 INJECTION, SOLUTION INTRA-ARTICULAR; INTRALESIONAL; INTRAMUSCULAR; INTRAVENOUS; SOFT TISSUE DAILY
Status: DISCONTINUED | OUTPATIENT
Start: 2020-01-01 | End: 2020-01-01 | Stop reason: HOSPADM

## 2020-01-01 RX ORDER — PANTOPRAZOLE SODIUM 40 MG/1
40 TABLET, DELAYED RELEASE ORAL DAILY
Status: DISCONTINUED | OUTPATIENT
Start: 2020-01-01 | End: 2020-01-01

## 2020-01-01 RX ORDER — EPINEPHRINE 0.1 MG/ML
SYRINGE (ML) INJECTION
Status: COMPLETED | OUTPATIENT
Start: 2020-01-01 | End: 2020-01-01

## 2020-01-01 RX ORDER — SODIUM CHLORIDE 0.9 % (FLUSH) 0.9 %
3 SYRINGE (ML) INJECTION EVERY 12 HOURS SCHEDULED
Status: DISCONTINUED | OUTPATIENT
Start: 2020-01-01 | End: 2020-01-01 | Stop reason: HOSPADM

## 2020-01-01 RX ORDER — ATORVASTATIN CALCIUM 40 MG/1
40 TABLET, FILM COATED ORAL NIGHTLY
Status: DISCONTINUED | OUTPATIENT
Start: 2020-01-01 | End: 2020-01-01 | Stop reason: HOSPADM

## 2020-01-01 RX ORDER — FUROSEMIDE 20 MG/1
20 TABLET ORAL DAILY
Status: DISCONTINUED | OUTPATIENT
Start: 2020-01-01 | End: 2020-01-01 | Stop reason: HOSPADM

## 2020-01-01 RX ORDER — SODIUM CHLORIDE 0.9 % (FLUSH) 0.9 %
3-10 SYRINGE (ML) INJECTION AS NEEDED
Status: DISCONTINUED | OUTPATIENT
Start: 2020-01-01 | End: 2020-01-01 | Stop reason: HOSPADM

## 2020-01-01 RX ORDER — TEMAZEPAM 15 MG/1
15 CAPSULE ORAL NIGHTLY PRN
Status: DISCONTINUED | OUTPATIENT
Start: 2020-01-01 | End: 2020-01-01 | Stop reason: HOSPADM

## 2020-01-01 RX ORDER — DOXYCYCLINE 100 MG/1
100 CAPSULE ORAL EVERY 12 HOURS SCHEDULED
Status: DISCONTINUED | OUTPATIENT
Start: 2020-01-01 | End: 2020-01-01

## 2020-01-01 RX ORDER — CALCIUM CITRATE/VITAMIN D3 200MG-6.25
TABLET ORAL
Qty: 100 EACH | Refills: 0 | Status: SHIPPED | OUTPATIENT
Start: 2020-01-01 | End: 2020-01-01

## 2020-01-01 RX ORDER — FUROSEMIDE 40 MG/1
40 TABLET ORAL DAILY
Status: DISCONTINUED | OUTPATIENT
Start: 2020-01-01 | End: 2020-01-01 | Stop reason: HOSPADM

## 2020-01-01 RX ORDER — ALLOPURINOL 100 MG/1
150 TABLET ORAL DAILY
Status: DISCONTINUED | OUTPATIENT
Start: 2020-01-01 | End: 2020-01-01 | Stop reason: HOSPADM

## 2020-01-01 RX ORDER — BUDESONIDE 0.5 MG/2ML
0.5 INHALANT ORAL
Status: DISCONTINUED | OUTPATIENT
Start: 2020-01-01 | End: 2020-01-01 | Stop reason: HOSPADM

## 2020-01-01 RX ORDER — ALLOPURINOL 300 MG/1
300 TABLET ORAL DAILY
Status: DISCONTINUED | OUTPATIENT
Start: 2020-01-01 | End: 2020-01-01 | Stop reason: HOSPADM

## 2020-01-01 RX ORDER — CHLORTHALIDONE 25 MG/1
12.5 TABLET ORAL DAILY
Status: DISCONTINUED | OUTPATIENT
Start: 2020-01-01 | End: 2020-01-01 | Stop reason: HOSPADM

## 2020-01-01 RX ORDER — PREDNISONE 20 MG/1
20 TABLET ORAL
Qty: 3 TABLET | Refills: 0 | Status: SHIPPED | OUTPATIENT
Start: 2020-01-01 | End: 2020-01-01 | Stop reason: HOSPADM

## 2020-01-01 RX ORDER — SODIUM CHLORIDE 9 MG/ML
500 INJECTION, SOLUTION INTRAVENOUS CONTINUOUS
Status: DISCONTINUED | OUTPATIENT
Start: 2020-01-01 | End: 2020-01-01

## 2020-01-01 RX ADMIN — IPRATROPIUM BROMIDE 0.5 MG: 0.5 SOLUTION RESPIRATORY (INHALATION) at 19:32

## 2020-01-01 RX ADMIN — FENOFIBRATE 145 MG: 145 TABLET ORAL at 08:35

## 2020-01-01 RX ADMIN — ALLOPURINOL 300 MG: 100 TABLET ORAL at 17:05

## 2020-01-01 RX ADMIN — FERROUS SULFATE TAB 325 MG (65 MG ELEMENTAL FE) 325 MG: 325 (65 FE) TAB at 09:25

## 2020-01-01 RX ADMIN — IPRATROPIUM BROMIDE 0.5 MG: 0.5 SOLUTION RESPIRATORY (INHALATION) at 06:43

## 2020-01-01 RX ADMIN — CETIRIZINE HYDROCHLORIDE 10 MG: 10 TABLET, FILM COATED ORAL at 09:41

## 2020-01-01 RX ADMIN — ALLOPURINOL 150 MG: 100 TABLET ORAL at 08:44

## 2020-01-01 RX ADMIN — BUDESONIDE AND FORMOTEROL FUMARATE DIHYDRATE 2 PUFF: 160; 4.5 AEROSOL RESPIRATORY (INHALATION) at 08:57

## 2020-01-01 RX ADMIN — AMOXICILLIN AND CLAVULANATE POTASSIUM 1 TABLET: 875; 125 TABLET, FILM COATED ORAL at 10:16

## 2020-01-01 RX ADMIN — OSELTAMIVIR PHOSPHATE 30 MG: 30 CAPSULE ORAL at 20:18

## 2020-01-01 RX ADMIN — SODIUM CHLORIDE 100 ML/HR: 9 INJECTION, SOLUTION INTRAVENOUS at 12:22

## 2020-01-01 RX ADMIN — FUROSEMIDE 40 MG: 40 TABLET ORAL at 10:09

## 2020-01-01 RX ADMIN — ATORVASTATIN CALCIUM 10 MG: 10 TABLET, FILM COATED ORAL at 16:50

## 2020-01-01 RX ADMIN — SODIUM CHLORIDE, PRESERVATIVE FREE 10 ML: 5 INJECTION INTRAVENOUS at 08:43

## 2020-01-01 RX ADMIN — ROPINIROLE HYDROCHLORIDE 0.5 MG: 0.25 TABLET, FILM COATED ORAL at 20:18

## 2020-01-01 RX ADMIN — BUDESONIDE 0.5 MG: 0.5 INHALANT RESPIRATORY (INHALATION) at 07:26

## 2020-01-01 RX ADMIN — ALLOPURINOL 300 MG: 300 TABLET ORAL at 09:41

## 2020-01-01 RX ADMIN — METHYLPREDNISOLONE SODIUM SUCCINATE 40 MG: 40 INJECTION, POWDER, LYOPHILIZED, FOR SOLUTION INTRAMUSCULAR; INTRAVENOUS at 02:45

## 2020-01-01 RX ADMIN — FLUTICASONE PROPIONATE 2 SPRAY: 50 SPRAY, METERED NASAL at 21:15

## 2020-01-01 RX ADMIN — Medication 1 CAPSULE: at 08:02

## 2020-01-01 RX ADMIN — FERROUS SULFATE TAB 325 MG (65 MG ELEMENTAL FE) 325 MG: 325 (65 FE) TAB at 08:57

## 2020-01-01 RX ADMIN — ALBUTEROL SULFATE 2 PUFF: 90 AEROSOL, METERED RESPIRATORY (INHALATION) at 19:26

## 2020-01-01 RX ADMIN — ENOXAPARIN SODIUM 30 MG: 30 INJECTION SUBCUTANEOUS at 20:45

## 2020-01-01 RX ADMIN — IPRATROPIUM BROMIDE 0.5 MG: 0.5 SOLUTION RESPIRATORY (INHALATION) at 07:00

## 2020-01-01 RX ADMIN — SUCRALFATE 1 G: 1 TABLET ORAL at 12:06

## 2020-01-01 RX ADMIN — SODIUM CHLORIDE, PRESERVATIVE FREE 10 ML: 5 INJECTION INTRAVENOUS at 20:38

## 2020-01-01 RX ADMIN — BUSPIRONE HYDROCHLORIDE 10 MG: 10 TABLET ORAL at 22:16

## 2020-01-01 RX ADMIN — ROPINIROLE HYDROCHLORIDE 0.5 MG: 0.25 TABLET, FILM COATED ORAL at 08:35

## 2020-01-01 RX ADMIN — DILTIAZEM HYDROCHLORIDE 5 MG/HR: 100 INJECTION, POWDER, LYOPHILIZED, FOR SOLUTION INTRAVENOUS at 14:31

## 2020-01-01 RX ADMIN — ALLOPURINOL 300 MG: 300 TABLET ORAL at 08:57

## 2020-01-01 RX ADMIN — ROPINIROLE 0.5 MG: 0.5 TABLET, FILM COATED ORAL at 10:05

## 2020-01-01 RX ADMIN — METHYLPREDNISOLONE SODIUM SUCCINATE 60 MG: 125 INJECTION, POWDER, FOR SOLUTION INTRAMUSCULAR; INTRAVENOUS at 01:26

## 2020-01-01 RX ADMIN — INSULIN DETEMIR 80 UNITS: 100 INJECTION, SOLUTION SUBCUTANEOUS at 20:30

## 2020-01-01 RX ADMIN — INSULIN ASPART 8 UNITS: 100 INJECTION, SOLUTION INTRAVENOUS; SUBCUTANEOUS at 17:00

## 2020-01-01 RX ADMIN — APIXABAN 5 MG: 5 TABLET, FILM COATED ORAL at 19:52

## 2020-01-01 RX ADMIN — FUROSEMIDE 20 MG: 20 TABLET ORAL at 08:33

## 2020-01-01 RX ADMIN — ALBUTEROL SULFATE 2 PUFF: 90 AEROSOL, METERED RESPIRATORY (INHALATION) at 03:08

## 2020-01-01 RX ADMIN — INSULIN DETEMIR 60 UNITS: 100 INJECTION, SOLUTION SUBCUTANEOUS at 22:05

## 2020-01-01 RX ADMIN — HYDROCODONE BITARTRATE AND ACETAMINOPHEN 1 TABLET: 5; 325 TABLET ORAL at 21:07

## 2020-01-01 RX ADMIN — SUCRALFATE 1 G: 1 TABLET ORAL at 21:43

## 2020-01-01 RX ADMIN — BUSPIRONE HYDROCHLORIDE 10 MG: 10 TABLET ORAL at 08:24

## 2020-01-01 RX ADMIN — INSULIN DETEMIR 80 UNITS: 100 INJECTION, SOLUTION SUBCUTANEOUS at 20:38

## 2020-01-01 RX ADMIN — Medication 1 CAPSULE: at 09:19

## 2020-01-01 RX ADMIN — ALLOPURINOL 150 MG: 100 TABLET ORAL at 09:24

## 2020-01-01 RX ADMIN — BUDESONIDE 0.5 MG: 0.5 INHALANT RESPIRATORY (INHALATION) at 07:06

## 2020-01-01 RX ADMIN — FLUCONAZOLE 100 MG: 100 TABLET ORAL at 11:42

## 2020-01-01 RX ADMIN — APIXABAN 5 MG: 5 TABLET, FILM COATED ORAL at 10:06

## 2020-01-01 RX ADMIN — ATORVASTATIN CALCIUM 40 MG: 40 TABLET, FILM COATED ORAL at 10:16

## 2020-01-01 RX ADMIN — INSULIN LISPRO 20 UNITS: 100 INJECTION, SOLUTION INTRAVENOUS; SUBCUTANEOUS at 13:03

## 2020-01-01 RX ADMIN — SODIUM CHLORIDE, PRESERVATIVE FREE 10 ML: 5 INJECTION INTRAVENOUS at 08:41

## 2020-01-01 RX ADMIN — INSULIN LISPRO 6 UNITS: 100 INJECTION, SOLUTION INTRAVENOUS; SUBCUTANEOUS at 17:12

## 2020-01-01 RX ADMIN — TRAMADOL HYDROCHLORIDE 50 MG: 50 TABLET, FILM COATED ORAL at 22:48

## 2020-01-01 RX ADMIN — OSELTAMIVIR PHOSPHATE 30 MG: 30 CAPSULE ORAL at 21:07

## 2020-01-01 RX ADMIN — IPRATROPIUM BROMIDE 0.5 MG: 0.5 SOLUTION RESPIRATORY (INHALATION) at 07:06

## 2020-01-01 RX ADMIN — METHYLPREDNISOLONE SODIUM SUCCINATE 40 MG: 40 INJECTION, POWDER, FOR SOLUTION INTRAMUSCULAR; INTRAVENOUS at 05:24

## 2020-01-01 RX ADMIN — BUDESONIDE AND FORMOTEROL FUMARATE DIHYDRATE 2 PUFF: 160; 4.5 AEROSOL RESPIRATORY (INHALATION) at 07:38

## 2020-01-01 RX ADMIN — IPRATROPIUM BROMIDE 0.5 MG: 0.5 SOLUTION RESPIRATORY (INHALATION) at 19:48

## 2020-01-01 RX ADMIN — INSULIN ASPART 12 UNITS: 100 INJECTION, SOLUTION INTRAVENOUS; SUBCUTANEOUS at 12:00

## 2020-01-01 RX ADMIN — ASPIRIN 81 MG: 81 TABLET, COATED ORAL at 10:03

## 2020-01-01 RX ADMIN — FLUTICASONE PROPIONATE 2 SPRAY: 50 SPRAY, METERED NASAL at 09:14

## 2020-01-01 RX ADMIN — LINAGLIPTIN 2.5 MG: 5 TABLET, FILM COATED ORAL at 09:19

## 2020-01-01 RX ADMIN — ATORVASTATIN CALCIUM 10 MG: 10 TABLET, FILM COATED ORAL at 08:25

## 2020-01-01 RX ADMIN — SUCRALFATE 1 G: 1 TABLET ORAL at 04:24

## 2020-01-01 RX ADMIN — ROPINIROLE 0.5 MG: 0.5 TABLET, FILM COATED ORAL at 22:41

## 2020-01-01 RX ADMIN — METOPROLOL SUCCINATE 100 MG: 100 TABLET, EXTENDED RELEASE ORAL at 08:57

## 2020-01-01 RX ADMIN — HYDROCODONE BITARTRATE AND ACETAMINOPHEN 1 TABLET: 5; 325 TABLET ORAL at 22:20

## 2020-01-01 RX ADMIN — ASPIRIN 81 MG: 81 TABLET, COATED ORAL at 08:35

## 2020-01-01 RX ADMIN — SUCRALFATE 1 G: 1 TABLET ORAL at 21:24

## 2020-01-01 RX ADMIN — INSULIN ASPART 16 UNITS: 100 INJECTION, SOLUTION INTRAVENOUS; SUBCUTANEOUS at 21:46

## 2020-01-01 RX ADMIN — IPRATROPIUM BROMIDE 0.5 MG: 0.5 SOLUTION RESPIRATORY (INHALATION) at 01:29

## 2020-01-01 RX ADMIN — ROFLUMILAST 500 MCG: 500 TABLET ORAL at 10:53

## 2020-01-01 RX ADMIN — ROPINIROLE HYDROCHLORIDE 0.5 MG: 0.25 TABLET, FILM COATED ORAL at 20:46

## 2020-01-01 RX ADMIN — DILTIAZEM HYDROCHLORIDE 180 MG: 180 CAPSULE, COATED, EXTENDED RELEASE ORAL at 09:23

## 2020-01-01 RX ADMIN — DILTIAZEM HYDROCHLORIDE 180 MG: 180 CAPSULE, COATED, EXTENDED RELEASE ORAL at 10:05

## 2020-01-01 RX ADMIN — SODIUM CHLORIDE, PRESERVATIVE FREE 3 ML: 5 INJECTION INTRAVENOUS at 21:35

## 2020-01-01 RX ADMIN — IPRATROPIUM BROMIDE 0.5 MG: 0.5 SOLUTION RESPIRATORY (INHALATION) at 20:09

## 2020-01-01 RX ADMIN — SODIUM CHLORIDE, PRESERVATIVE FREE 10 ML: 5 INJECTION INTRAVENOUS at 22:18

## 2020-01-01 RX ADMIN — GUAIFENESIN 600 MG: 600 TABLET, EXTENDED RELEASE ORAL at 09:49

## 2020-01-01 RX ADMIN — MONTELUKAST SODIUM 10 MG: 10 TABLET, COATED ORAL at 20:29

## 2020-01-01 RX ADMIN — PREDNISONE 20 MG: 20 TABLET ORAL at 09:35

## 2020-01-01 RX ADMIN — INSULIN ASPART 4 UNITS: 100 INJECTION, SOLUTION INTRAVENOUS; SUBCUTANEOUS at 06:35

## 2020-01-01 RX ADMIN — GUAIFENESIN 600 MG: 600 TABLET, EXTENDED RELEASE ORAL at 20:45

## 2020-01-01 RX ADMIN — APIXABAN 5 MG: 5 TABLET, FILM COATED ORAL at 21:21

## 2020-01-01 RX ADMIN — BUSPIRONE HYDROCHLORIDE 10 MG: 10 TABLET ORAL at 08:55

## 2020-01-01 RX ADMIN — Medication 1 CAPSULE: at 15:59

## 2020-01-01 RX ADMIN — FUROSEMIDE 40 MG: 10 INJECTION, SOLUTION INTRAMUSCULAR; INTRAVENOUS at 15:26

## 2020-01-01 RX ADMIN — DILTIAZEM HYDROCHLORIDE 180 MG: 180 CAPSULE, COATED, EXTENDED RELEASE ORAL at 09:25

## 2020-01-01 RX ADMIN — REMDESIVIR 100 MG: 100 INJECTION, POWDER, LYOPHILIZED, FOR SOLUTION INTRAVENOUS at 17:37

## 2020-01-01 RX ADMIN — BUDESONIDE AND FORMOTEROL FUMARATE DIHYDRATE 2 PUFF: 160; 4.5 AEROSOL RESPIRATORY (INHALATION) at 08:05

## 2020-01-01 RX ADMIN — SODIUM CHLORIDE 30 MG/ML INHALATION SOLUTION 4 ML: 30 SOLUTION INHALANT at 20:32

## 2020-01-01 RX ADMIN — INSULIN LISPRO 20 UNITS: 100 INJECTION, SOLUTION INTRAVENOUS; SUBCUTANEOUS at 18:27

## 2020-01-01 RX ADMIN — BUSPIRONE HYDROCHLORIDE 10 MG: 10 TABLET ORAL at 20:48

## 2020-01-01 RX ADMIN — ALBUTEROL SULFATE 2 PUFF: 90 AEROSOL, METERED RESPIRATORY (INHALATION) at 19:09

## 2020-01-01 RX ADMIN — APIXABAN 5 MG: 5 TABLET, FILM COATED ORAL at 20:29

## 2020-01-01 RX ADMIN — Medication 1 CAPSULE: at 09:24

## 2020-01-01 RX ADMIN — IPRATROPIUM BROMIDE AND ALBUTEROL SULFATE 3 ML: 2.5; .5 SOLUTION RESPIRATORY (INHALATION) at 07:57

## 2020-01-01 RX ADMIN — APIXABAN 5 MG: 5 TABLET, FILM COATED ORAL at 08:00

## 2020-01-01 RX ADMIN — INSULIN DETEMIR 15 UNITS: 100 INJECTION, SOLUTION SUBCUTANEOUS at 22:11

## 2020-01-01 RX ADMIN — BUSPIRONE HYDROCHLORIDE 10 MG: 10 TABLET ORAL at 16:38

## 2020-01-01 RX ADMIN — SODIUM CHLORIDE, PRESERVATIVE FREE 10 ML: 5 INJECTION INTRAVENOUS at 19:38

## 2020-01-01 RX ADMIN — INSULIN LISPRO 20 UNITS: 100 INJECTION, SOLUTION INTRAVENOUS; SUBCUTANEOUS at 12:44

## 2020-01-01 RX ADMIN — FENOFIBRATE 145 MG: 145 TABLET ORAL at 09:13

## 2020-01-01 RX ADMIN — BUDESONIDE 0.5 MG: 0.5 INHALANT RESPIRATORY (INHALATION) at 20:03

## 2020-01-01 RX ADMIN — FUROSEMIDE 40 MG: 40 TABLET ORAL at 09:41

## 2020-01-01 RX ADMIN — GUAIFENESIN 600 MG: 600 TABLET, EXTENDED RELEASE ORAL at 09:19

## 2020-01-01 RX ADMIN — INSULIN ASPART 35 UNITS: 100 INJECTION, SOLUTION INTRAVENOUS; SUBCUTANEOUS at 12:19

## 2020-01-01 RX ADMIN — METOCLOPRAMIDE 5 MG: 5 TABLET ORAL at 16:53

## 2020-01-01 RX ADMIN — AMOXICILLIN AND CLAVULANATE POTASSIUM 1 TABLET: 875; 125 TABLET, FILM COATED ORAL at 08:56

## 2020-01-01 RX ADMIN — SUCRALFATE 1 G: 1 TABLET ORAL at 10:05

## 2020-01-01 RX ADMIN — SODIUM CHLORIDE, PRESERVATIVE FREE 10 ML: 5 INJECTION INTRAVENOUS at 16:51

## 2020-01-01 RX ADMIN — SODIUM CHLORIDE, PRESERVATIVE FREE 10 ML: 5 INJECTION INTRAVENOUS at 08:01

## 2020-01-01 RX ADMIN — SODIUM CHLORIDE, PRESERVATIVE FREE 10 ML: 5 INJECTION INTRAVENOUS at 09:25

## 2020-01-01 RX ADMIN — BUSPIRONE HYDROCHLORIDE 10 MG: 10 TABLET ORAL at 20:38

## 2020-01-01 RX ADMIN — SODIUM CHLORIDE, PRESERVATIVE FREE 10 ML: 5 INJECTION INTRAVENOUS at 06:31

## 2020-01-01 RX ADMIN — CEFEPIME 2 G: 2 INJECTION, POWDER, FOR SOLUTION INTRAVENOUS at 17:55

## 2020-01-01 RX ADMIN — INSULIN ASPART 35 UNITS: 100 INJECTION, SOLUTION INTRAVENOUS; SUBCUTANEOUS at 08:53

## 2020-01-01 RX ADMIN — ROPINIROLE HYDROCHLORIDE 0.5 MG: 0.25 TABLET, FILM COATED ORAL at 08:41

## 2020-01-01 RX ADMIN — BUDESONIDE 0.5 MG: 0.5 INHALANT RESPIRATORY (INHALATION) at 18:49

## 2020-01-01 RX ADMIN — HYDROCODONE BITARTRATE AND ACETAMINOPHEN 1 TABLET: 5; 325 TABLET ORAL at 19:37

## 2020-01-01 RX ADMIN — IPRATROPIUM BROMIDE AND ALBUTEROL SULFATE 3 ML: .5; 3 SOLUTION RESPIRATORY (INHALATION) at 19:42

## 2020-01-01 RX ADMIN — METHYLPREDNISOLONE SODIUM SUCCINATE 40 MG: 40 INJECTION, POWDER, FOR SOLUTION INTRAMUSCULAR; INTRAVENOUS at 07:04

## 2020-01-01 RX ADMIN — ROPINIROLE HYDROCHLORIDE 0.5 MG: 0.25 TABLET, FILM COATED ORAL at 08:00

## 2020-01-01 RX ADMIN — TRAMADOL HYDROCHLORIDE 50 MG: 50 TABLET, FILM COATED ORAL at 09:35

## 2020-01-01 RX ADMIN — BUSPIRONE HYDROCHLORIDE 10 MG: 10 TABLET ORAL at 15:43

## 2020-01-01 RX ADMIN — METOCLOPRAMIDE HYDROCHLORIDE 5 MG: 5 TABLET ORAL at 16:49

## 2020-01-01 RX ADMIN — INSULIN ASPART 8 UNITS: 100 INJECTION, SOLUTION INTRAVENOUS; SUBCUTANEOUS at 16:52

## 2020-01-01 RX ADMIN — APIXABAN 5 MG: 5 TABLET, FILM COATED ORAL at 10:10

## 2020-01-01 RX ADMIN — METOCLOPRAMIDE 5 MG: 5 TABLET ORAL at 15:40

## 2020-01-01 RX ADMIN — ALPRAZOLAM 0.5 MG: 0.5 TABLET ORAL at 20:50

## 2020-01-01 RX ADMIN — SODIUM CHLORIDE, PRESERVATIVE FREE 3 ML: 5 INJECTION INTRAVENOUS at 22:00

## 2020-01-01 RX ADMIN — ALBUTEROL SULFATE 2 PUFF: 90 AEROSOL, METERED RESPIRATORY (INHALATION) at 15:15

## 2020-01-01 RX ADMIN — TAZOBACTAM SODIUM AND PIPERACILLIN SODIUM 3.38 G: 375; 3 INJECTION, SOLUTION INTRAVENOUS at 20:50

## 2020-01-01 RX ADMIN — ALLOPURINOL 150 MG: 100 TABLET ORAL at 08:35

## 2020-01-01 RX ADMIN — METOCLOPRAMIDE HYDROCHLORIDE 5 MG: 5 TABLET ORAL at 21:41

## 2020-01-01 RX ADMIN — DOXYCYCLINE 100 MG: 100 INJECTION, POWDER, LYOPHILIZED, FOR SOLUTION INTRAVENOUS at 21:40

## 2020-01-01 RX ADMIN — AMOXICILLIN AND CLAVULANATE POTASSIUM 1 TABLET: 875; 125 TABLET, FILM COATED ORAL at 22:58

## 2020-01-01 RX ADMIN — FENOFIBRATE 145 MG: 145 TABLET ORAL at 08:00

## 2020-01-01 RX ADMIN — MELATONIN TAB 5 MG 5 MG: 5 TAB at 20:19

## 2020-01-01 RX ADMIN — DILTIAZEM HYDROCHLORIDE 180 MG: 180 CAPSULE, COATED, EXTENDED RELEASE ORAL at 10:15

## 2020-01-01 RX ADMIN — BUSPIRONE HYDROCHLORIDE 10 MG: 10 TABLET ORAL at 08:40

## 2020-01-01 RX ADMIN — INSULIN ASPART 7 UNITS: 100 INJECTION, SOLUTION INTRAVENOUS; SUBCUTANEOUS at 17:03

## 2020-01-01 RX ADMIN — FLUTICASONE PROPIONATE 2 SPRAY: 50 SPRAY, METERED NASAL at 08:30

## 2020-01-01 RX ADMIN — SODIUM CHLORIDE, PRESERVATIVE FREE 10 ML: 5 INJECTION INTRAVENOUS at 21:43

## 2020-01-01 RX ADMIN — SODIUM CHLORIDE, PRESERVATIVE FREE 10 ML: 5 INJECTION INTRAVENOUS at 06:37

## 2020-01-01 RX ADMIN — BUSPIRONE HYDROCHLORIDE 10 MG: 10 TABLET ORAL at 15:02

## 2020-01-01 RX ADMIN — SUCRALFATE 1 G: 1 TABLET ORAL at 21:35

## 2020-01-01 RX ADMIN — IPRATROPIUM BROMIDE 0.5 MG: 0.5 SOLUTION RESPIRATORY (INHALATION) at 06:45

## 2020-01-01 RX ADMIN — DEXAMETHASONE SODIUM PHOSPHATE 6 MG: 4 INJECTION, SOLUTION INTRA-ARTICULAR; INTRALESIONAL; INTRAMUSCULAR; INTRAVENOUS; SOFT TISSUE at 14:33

## 2020-01-01 RX ADMIN — BUSPIRONE HYDROCHLORIDE 10 MG: 10 TABLET ORAL at 08:57

## 2020-01-01 RX ADMIN — METOCLOPRAMIDE 5 MG: 5 TABLET ORAL at 17:06

## 2020-01-01 RX ADMIN — GUAIFENESIN 600 MG: 600 TABLET, EXTENDED RELEASE ORAL at 20:18

## 2020-01-01 RX ADMIN — SODIUM CHLORIDE, PRESERVATIVE FREE 10 ML: 5 INJECTION INTRAVENOUS at 09:26

## 2020-01-01 RX ADMIN — INSULIN DETEMIR 25 UNITS: 100 INJECTION, SOLUTION SUBCUTANEOUS at 09:41

## 2020-01-01 RX ADMIN — METOPROLOL SUCCINATE 25 MG: 25 TABLET, EXTENDED RELEASE ORAL at 18:26

## 2020-01-01 RX ADMIN — ROPINIROLE HYDROCHLORIDE 0.5 MG: 0.25 TABLET, FILM COATED ORAL at 22:18

## 2020-01-01 RX ADMIN — IPRATROPIUM BROMIDE AND ALBUTEROL SULFATE 3 ML: .5; 3 SOLUTION RESPIRATORY (INHALATION) at 06:45

## 2020-01-01 RX ADMIN — SODIUM CHLORIDE, PRESERVATIVE FREE 10 ML: 5 INJECTION INTRAVENOUS at 21:26

## 2020-01-01 RX ADMIN — DILTIAZEM HYDROCHLORIDE 30 MG: 30 TABLET, FILM COATED ORAL at 00:51

## 2020-01-01 RX ADMIN — INSULIN ASPART 12 UNITS: 100 INJECTION, SOLUTION INTRAVENOUS; SUBCUTANEOUS at 22:19

## 2020-01-01 RX ADMIN — SUCRALFATE 1 G: 1 TABLET ORAL at 17:05

## 2020-01-01 RX ADMIN — ALBUTEROL SULFATE 2 PUFF: 90 AEROSOL, METERED RESPIRATORY (INHALATION) at 21:20

## 2020-01-01 RX ADMIN — INSULIN ASPART 16 UNITS: 100 INJECTION, SOLUTION INTRAVENOUS; SUBCUTANEOUS at 11:23

## 2020-01-01 RX ADMIN — MUPIROCIN: 20 OINTMENT TOPICAL at 08:40

## 2020-01-01 RX ADMIN — INSULIN DETEMIR 80 UNITS: 100 INJECTION, SOLUTION SUBCUTANEOUS at 19:29

## 2020-01-01 RX ADMIN — AMOXICILLIN AND CLAVULANATE POTASSIUM 1 TABLET: 875; 125 TABLET, FILM COATED ORAL at 21:43

## 2020-01-01 RX ADMIN — SUCRALFATE 1 G: 1 TABLET ORAL at 20:46

## 2020-01-01 RX ADMIN — ROFLUMILAST 500 MCG: 500 TABLET ORAL at 09:40

## 2020-01-01 RX ADMIN — ALBUTEROL SULFATE 2 PUFF: 90 AEROSOL, METERED RESPIRATORY (INHALATION) at 12:45

## 2020-01-01 RX ADMIN — DOXYCYCLINE 100 MG: 100 INJECTION, POWDER, LYOPHILIZED, FOR SOLUTION INTRAVENOUS at 09:19

## 2020-01-01 RX ADMIN — CETIRIZINE HYDROCHLORIDE 10 MG: 10 TABLET, FILM COATED ORAL at 14:47

## 2020-01-01 RX ADMIN — IPRATROPIUM BROMIDE 0.5 MG: 0.5 SOLUTION RESPIRATORY (INHALATION) at 11:26

## 2020-01-01 RX ADMIN — IPRATROPIUM BROMIDE 0.5 MG: 0.5 SOLUTION RESPIRATORY (INHALATION) at 12:41

## 2020-01-01 RX ADMIN — ROPINIROLE 0.5 MG: 0.5 TABLET, FILM COATED ORAL at 22:10

## 2020-01-01 RX ADMIN — BUDESONIDE 1 MG: 0.5 INHALANT RESPIRATORY (INHALATION) at 19:27

## 2020-01-01 RX ADMIN — METOCLOPRAMIDE HYDROCHLORIDE 5 MG: 5 TABLET ORAL at 15:02

## 2020-01-01 RX ADMIN — CETIRIZINE HYDROCHLORIDE 10 MG: 10 TABLET, FILM COATED ORAL at 09:25

## 2020-01-01 RX ADMIN — BUDESONIDE 0.5 MG: 0.5 INHALANT RESPIRATORY (INHALATION) at 19:36

## 2020-01-01 RX ADMIN — LIDOCAINE HYDROCHLORIDE 100 MG: 20 INJECTION, SOLUTION INTRAVENOUS at 14:39

## 2020-01-01 RX ADMIN — ROPINIROLE HYDROCHLORIDE 0.5 MG: 0.25 TABLET, FILM COATED ORAL at 19:28

## 2020-01-01 RX ADMIN — INSULIN DETEMIR 15 UNITS: 100 INJECTION, SOLUTION SUBCUTANEOUS at 21:21

## 2020-01-01 RX ADMIN — INSULIN ASPART 9 UNITS: 100 INJECTION, SOLUTION INTRAVENOUS; SUBCUTANEOUS at 09:37

## 2020-01-01 RX ADMIN — INSULIN ASPART 8 UNITS: 100 INJECTION, SOLUTION INTRAVENOUS; SUBCUTANEOUS at 06:32

## 2020-01-01 RX ADMIN — FUROSEMIDE 40 MG: 40 TABLET ORAL at 08:41

## 2020-01-01 RX ADMIN — MONTELUKAST 10 MG: 10 TABLET, FILM COATED ORAL at 21:40

## 2020-01-01 RX ADMIN — PREDNISONE 40 MG: 20 TABLET ORAL at 10:16

## 2020-01-01 RX ADMIN — ATORVASTATIN CALCIUM 10 MG: 10 TABLET, FILM COATED ORAL at 09:24

## 2020-01-01 RX ADMIN — BUSPIRONE HYDROCHLORIDE 10 MG: 10 TABLET ORAL at 19:37

## 2020-01-01 RX ADMIN — INSULIN DETEMIR 40 UNITS: 100 INJECTION, SOLUTION SUBCUTANEOUS at 06:03

## 2020-01-01 RX ADMIN — TAZOBACTAM SODIUM AND PIPERACILLIN SODIUM 3.38 G: 375; 3 INJECTION, SOLUTION INTRAVENOUS at 17:10

## 2020-01-01 RX ADMIN — MONTELUKAST 10 MG: 10 TABLET, FILM COATED ORAL at 21:07

## 2020-01-01 RX ADMIN — SUCRALFATE 1 G: 1 TABLET ORAL at 20:29

## 2020-01-01 RX ADMIN — TAZOBACTAM SODIUM AND PIPERACILLIN SODIUM 3.38 G: 375; 3 INJECTION, SOLUTION INTRAVENOUS at 04:53

## 2020-01-01 RX ADMIN — INSULIN ASPART 6 UNITS: 100 INJECTION, SOLUTION INTRAVENOUS; SUBCUTANEOUS at 22:34

## 2020-01-01 RX ADMIN — FLUTICASONE PROPIONATE 2 SPRAY: 50 SPRAY, METERED NASAL at 08:37

## 2020-01-01 RX ADMIN — SODIUM CHLORIDE, PRESERVATIVE FREE 10 ML: 5 INJECTION INTRAVENOUS at 21:47

## 2020-01-01 RX ADMIN — GUAIFENESIN 600 MG: 600 TABLET, EXTENDED RELEASE ORAL at 20:37

## 2020-01-01 RX ADMIN — METOCLOPRAMIDE 5 MG: 5 TABLET ORAL at 21:34

## 2020-01-01 RX ADMIN — MUPIROCIN: 20 OINTMENT TOPICAL at 08:34

## 2020-01-01 RX ADMIN — BUSPIRONE HYDROCHLORIDE 10 MG: 10 TABLET ORAL at 09:20

## 2020-01-01 RX ADMIN — Medication 1 CAPSULE: at 21:30

## 2020-01-01 RX ADMIN — SUCRALFATE 1 G: 1 TABLET ORAL at 22:10

## 2020-01-01 RX ADMIN — BUDESONIDE 0.5 MG: 0.5 INHALANT RESPIRATORY (INHALATION) at 19:12

## 2020-01-01 RX ADMIN — INSULIN ASPART 7 UNITS: 100 INJECTION, SOLUTION INTRAVENOUS; SUBCUTANEOUS at 07:12

## 2020-01-01 RX ADMIN — IPRATROPIUM BROMIDE 0.5 MG: 0.5 SOLUTION RESPIRATORY (INHALATION) at 12:49

## 2020-01-01 RX ADMIN — GUAIFENESIN 1200 MG: 600 TABLET, EXTENDED RELEASE ORAL at 20:46

## 2020-01-01 RX ADMIN — METOCLOPRAMIDE HYDROCHLORIDE 5 MG: 5 TABLET ORAL at 16:02

## 2020-01-01 RX ADMIN — INSULIN DETEMIR 15 UNITS: 100 INJECTION, SOLUTION SUBCUTANEOUS at 21:37

## 2020-01-01 RX ADMIN — ALLOPURINOL 300 MG: 100 TABLET ORAL at 10:10

## 2020-01-01 RX ADMIN — ALPRAZOLAM 0.25 MG: 0.25 TABLET ORAL at 22:05

## 2020-01-01 RX ADMIN — FENOFIBRATE 145 MG: 145 TABLET ORAL at 10:05

## 2020-01-01 RX ADMIN — APIXABAN 5 MG: 5 TABLET, FILM COATED ORAL at 09:24

## 2020-01-01 RX ADMIN — METHYLPREDNISOLONE SODIUM SUCCINATE 40 MG: 40 INJECTION, POWDER, FOR SOLUTION INTRAMUSCULAR; INTRAVENOUS at 00:49

## 2020-01-01 RX ADMIN — BUDESONIDE AND FORMOTEROL FUMARATE DIHYDRATE 2 PUFF: 160; 4.5 AEROSOL RESPIRATORY (INHALATION) at 07:54

## 2020-01-01 RX ADMIN — GUAIFENESIN 600 MG: 600 TABLET, EXTENDED RELEASE ORAL at 08:20

## 2020-01-01 RX ADMIN — METOCLOPRAMIDE HYDROCHLORIDE 5 MG: 5 TABLET ORAL at 15:59

## 2020-01-01 RX ADMIN — INSULIN LISPRO 12 UNITS: 100 INJECTION, SOLUTION INTRAVENOUS; SUBCUTANEOUS at 17:31

## 2020-01-01 RX ADMIN — FUROSEMIDE 40 MG: 40 TABLET ORAL at 08:36

## 2020-01-01 RX ADMIN — AZITHROMYCIN MONOHYDRATE 500 MG: 250 TABLET ORAL at 17:06

## 2020-01-01 RX ADMIN — BUDESONIDE AND FORMOTEROL FUMARATE DIHYDRATE 2 PUFF: 160; 4.5 AEROSOL RESPIRATORY (INHALATION) at 20:03

## 2020-01-01 RX ADMIN — ASPIRIN 81 MG: 81 TABLET, COATED ORAL at 09:35

## 2020-01-01 RX ADMIN — ATORVASTATIN CALCIUM 40 MG: 40 TABLET, FILM COATED ORAL at 09:22

## 2020-01-01 RX ADMIN — BUDESONIDE AND FORMOTEROL FUMARATE DIHYDRATE 2 PUFF: 160; 4.5 AEROSOL RESPIRATORY (INHALATION) at 07:08

## 2020-01-01 RX ADMIN — APIXABAN 5 MG: 5 TABLET, FILM COATED ORAL at 23:22

## 2020-01-01 RX ADMIN — INSULIN ASPART 16 UNITS: 100 INJECTION, SOLUTION INTRAVENOUS; SUBCUTANEOUS at 20:57

## 2020-01-01 RX ADMIN — INSULIN LISPRO 6 UNITS: 100 INJECTION, SOLUTION INTRAVENOUS; SUBCUTANEOUS at 10:17

## 2020-01-01 RX ADMIN — INSULIN LISPRO 4 UNITS: 100 INJECTION, SOLUTION INTRAVENOUS; SUBCUTANEOUS at 18:18

## 2020-01-01 RX ADMIN — MONTELUKAST 10 MG: 10 TABLET, FILM COATED ORAL at 21:21

## 2020-01-01 RX ADMIN — BUSPIRONE HYDROCHLORIDE 10 MG: 10 TABLET ORAL at 10:09

## 2020-01-01 RX ADMIN — METOCLOPRAMIDE HYDROCHLORIDE 5 MG: 5 TABLET ORAL at 15:50

## 2020-01-01 RX ADMIN — LINAGLIPTIN 2.5 MG: 5 TABLET, FILM COATED ORAL at 16:50

## 2020-01-01 RX ADMIN — DILTIAZEM HYDROCHLORIDE 180 MG: 180 CAPSULE, COATED, EXTENDED RELEASE ORAL at 09:42

## 2020-01-01 RX ADMIN — INSULIN ASPART 12 UNITS: 100 INJECTION, SOLUTION INTRAVENOUS; SUBCUTANEOUS at 06:44

## 2020-01-01 RX ADMIN — IPRATROPIUM BROMIDE AND ALBUTEROL SULFATE 3 ML: .5; 3 SOLUTION RESPIRATORY (INHALATION) at 20:34

## 2020-01-01 RX ADMIN — ROFLUMILAST 500 MCG: 500 TABLET ORAL at 09:21

## 2020-01-01 RX ADMIN — ALLOPURINOL 150 MG: 100 TABLET ORAL at 16:50

## 2020-01-01 RX ADMIN — GUAIFENESIN 600 MG: 600 TABLET, EXTENDED RELEASE ORAL at 21:16

## 2020-01-01 RX ADMIN — ASPIRIN 81 MG: 81 TABLET, COATED ORAL at 09:57

## 2020-01-01 RX ADMIN — CHLOROTHIAZIDE SODIUM 250 MG: 500 INJECTION INTRAVENOUS at 18:08

## 2020-01-01 RX ADMIN — ROPINIROLE 0.5 MG: 0.5 TABLET, FILM COATED ORAL at 08:40

## 2020-01-01 RX ADMIN — DILTIAZEM HYDROCHLORIDE 30 MG: 30 TABLET, FILM COATED ORAL at 06:32

## 2020-01-01 RX ADMIN — FLUTICASONE PROPIONATE 2 SPRAY: 50 SPRAY, METERED NASAL at 08:42

## 2020-01-01 RX ADMIN — INSULIN DETEMIR 25 UNITS: 100 INJECTION, SOLUTION SUBCUTANEOUS at 12:14

## 2020-01-01 RX ADMIN — IPRATROPIUM BROMIDE 0.5 MG: 0.5 SOLUTION RESPIRATORY (INHALATION) at 06:58

## 2020-01-01 RX ADMIN — BUDESONIDE 1 MG: 0.5 INHALANT RESPIRATORY (INHALATION) at 07:09

## 2020-01-01 RX ADMIN — FUROSEMIDE 40 MG: 40 TABLET ORAL at 08:56

## 2020-01-01 RX ADMIN — SUCRALFATE 1 G: 1 TABLET ORAL at 16:09

## 2020-01-01 RX ADMIN — Medication 1 CAPSULE: at 09:26

## 2020-01-01 RX ADMIN — AZITHROMYCIN 500 MG: 500 INJECTION, POWDER, LYOPHILIZED, FOR SOLUTION INTRAVENOUS at 21:16

## 2020-01-01 RX ADMIN — INSULIN DETEMIR 15 UNITS: 100 INJECTION, SOLUTION SUBCUTANEOUS at 20:50

## 2020-01-01 RX ADMIN — PANTOPRAZOLE SODIUM 40 MG: 40 INJECTION, POWDER, FOR SOLUTION INTRAVENOUS at 23:23

## 2020-01-01 RX ADMIN — GUAIFENESIN 600 MG: 600 TABLET, EXTENDED RELEASE ORAL at 08:45

## 2020-01-01 RX ADMIN — ASPIRIN 81 MG: 81 TABLET, COATED ORAL at 09:19

## 2020-01-01 RX ADMIN — SUCRALFATE 1 G: 1 TABLET ORAL at 08:54

## 2020-01-01 RX ADMIN — INSULIN DETEMIR 80 UNITS: 100 INJECTION, SOLUTION SUBCUTANEOUS at 22:27

## 2020-01-01 RX ADMIN — METHYLPREDNISOLONE SODIUM SUCCINATE 40 MG: 40 INJECTION, POWDER, FOR SOLUTION INTRAMUSCULAR; INTRAVENOUS at 17:17

## 2020-01-01 RX ADMIN — INSULIN DETEMIR 80 UNITS: 100 INJECTION, SOLUTION SUBCUTANEOUS at 22:00

## 2020-01-01 RX ADMIN — APIXABAN 5 MG: 5 TABLET, FILM COATED ORAL at 09:23

## 2020-01-01 RX ADMIN — METOCLOPRAMIDE 5 MG: 5 TABLET ORAL at 22:00

## 2020-01-01 RX ADMIN — METOCLOPRAMIDE 5 MG: 5 TABLET ORAL at 09:35

## 2020-01-01 RX ADMIN — METOCLOPRAMIDE 5 MG: 5 TABLET ORAL at 17:07

## 2020-01-01 RX ADMIN — ASPIRIN 81 MG: 81 TABLET, COATED ORAL at 10:15

## 2020-01-01 RX ADMIN — METOCLOPRAMIDE HYDROCHLORIDE 5 MG: 5 TABLET ORAL at 08:46

## 2020-01-01 RX ADMIN — TRAMADOL HYDROCHLORIDE 50 MG: 50 TABLET, FILM COATED ORAL at 16:50

## 2020-01-01 RX ADMIN — BUSPIRONE HYDROCHLORIDE 10 MG: 10 TABLET ORAL at 09:41

## 2020-01-01 RX ADMIN — INSULIN ASPART 12 UNITS: 100 INJECTION, SOLUTION INTRAVENOUS; SUBCUTANEOUS at 20:49

## 2020-01-01 RX ADMIN — SENNOSIDES AND DOCUSATE SODIUM 1 TABLET: 8.6; 5 TABLET ORAL at 21:43

## 2020-01-01 RX ADMIN — BUSPIRONE HYDROCHLORIDE 10 MG: 10 TABLET ORAL at 21:30

## 2020-01-01 RX ADMIN — METOCLOPRAMIDE HYDROCHLORIDE 5 MG: 5 TABLET ORAL at 16:38

## 2020-01-01 RX ADMIN — MUPIROCIN 1 APPLICATION: 20 OINTMENT TOPICAL at 09:14

## 2020-01-01 RX ADMIN — INSULIN ASPART 20 UNITS: 100 INJECTION, SOLUTION INTRAVENOUS; SUBCUTANEOUS at 18:09

## 2020-01-01 RX ADMIN — Medication 1 CAPSULE: at 15:56

## 2020-01-01 RX ADMIN — METHYLPREDNISOLONE ACETATE 80 MG: 80 INJECTION, SUSPENSION INTRA-ARTICULAR; INTRALESIONAL; INTRAMUSCULAR; SOFT TISSUE at 11:42

## 2020-01-01 RX ADMIN — PANTOPRAZOLE SODIUM 40 MG: 40 TABLET, DELAYED RELEASE ORAL at 06:37

## 2020-01-01 RX ADMIN — METOCLOPRAMIDE HYDROCHLORIDE 5 MG: 5 TABLET ORAL at 17:17

## 2020-01-01 RX ADMIN — FERROUS SULFATE TAB 325 MG (65 MG ELEMENTAL FE) 325 MG: 325 (65 FE) TAB at 12:45

## 2020-01-01 RX ADMIN — ROPINIROLE HYDROCHLORIDE 0.5 MG: 0.25 TABLET, FILM COATED ORAL at 19:53

## 2020-01-01 RX ADMIN — INSULIN ASPART 24 UNITS: 100 INJECTION, SOLUTION INTRAVENOUS; SUBCUTANEOUS at 17:02

## 2020-01-01 RX ADMIN — DILTIAZEM HYDROCHLORIDE 180 MG: 180 CAPSULE, COATED, EXTENDED RELEASE ORAL at 12:19

## 2020-01-01 RX ADMIN — LINAGLIPTIN 2.5 MG: 5 TABLET, FILM COATED ORAL at 08:01

## 2020-01-01 RX ADMIN — INSULIN DETEMIR 15 UNITS: 100 INJECTION, SOLUTION SUBCUTANEOUS at 09:17

## 2020-01-01 RX ADMIN — BUSPIRONE HYDROCHLORIDE 10 MG: 10 TABLET ORAL at 22:20

## 2020-01-01 RX ADMIN — GUAIFENESIN 600 MG: 600 TABLET, EXTENDED RELEASE ORAL at 21:44

## 2020-01-01 RX ADMIN — SODIUM CHLORIDE, PRESERVATIVE FREE 10 ML: 5 INJECTION INTRAVENOUS at 20:49

## 2020-01-01 RX ADMIN — ACETAMINOPHEN 650 MG: 325 TABLET, FILM COATED ORAL at 11:28

## 2020-01-01 RX ADMIN — HYDROCODONE BITARTRATE AND ACETAMINOPHEN 1 TABLET: 5; 325 TABLET ORAL at 19:28

## 2020-01-01 RX ADMIN — IPRATROPIUM BROMIDE 0.5 MG: 0.5 SOLUTION RESPIRATORY (INHALATION) at 16:30

## 2020-01-01 RX ADMIN — TRAMADOL HYDROCHLORIDE 50 MG: 50 TABLET, FILM COATED ORAL at 09:32

## 2020-01-01 RX ADMIN — FLUTICASONE PROPIONATE 2 SPRAY: 50 SPRAY, METERED NASAL at 17:23

## 2020-01-01 RX ADMIN — HYDROCODONE BITARTRATE AND ACETAMINOPHEN 1 TABLET: 5; 325 TABLET ORAL at 21:23

## 2020-01-01 RX ADMIN — INSULIN ASPART 6 UNITS: 100 INJECTION, SOLUTION INTRAVENOUS; SUBCUTANEOUS at 17:09

## 2020-01-01 RX ADMIN — ROPINIROLE 0.5 MG: 0.5 TABLET, FILM COATED ORAL at 23:22

## 2020-01-01 RX ADMIN — MONTELUKAST 10 MG: 10 TABLET, FILM COATED ORAL at 20:45

## 2020-01-01 RX ADMIN — ROPINIROLE HYDROCHLORIDE 0.5 MG: 0.25 TABLET, FILM COATED ORAL at 20:45

## 2020-01-01 RX ADMIN — INSULIN ASPART 4 UNITS: 100 INJECTION, SOLUTION INTRAVENOUS; SUBCUTANEOUS at 06:04

## 2020-01-01 RX ADMIN — INSULIN DETEMIR 20 UNITS: 100 INJECTION, SOLUTION SUBCUTANEOUS at 14:49

## 2020-01-01 RX ADMIN — INSULIN ASPART 8 UNITS: 100 INJECTION, SOLUTION INTRAVENOUS; SUBCUTANEOUS at 11:41

## 2020-01-01 RX ADMIN — METOCLOPRAMIDE HYDROCHLORIDE 5 MG: 5 TABLET ORAL at 21:45

## 2020-01-01 RX ADMIN — BUDESONIDE 1 MG: 0.5 INHALANT RESPIRATORY (INHALATION) at 07:00

## 2020-01-01 RX ADMIN — INSULIN DETEMIR 15 UNITS: 100 INJECTION, SOLUTION SUBCUTANEOUS at 21:39

## 2020-01-01 RX ADMIN — DOXYCYCLINE 100 MG: 100 CAPSULE ORAL at 10:15

## 2020-01-01 RX ADMIN — ASPIRIN 81 MG: 81 TABLET, COATED ORAL at 09:13

## 2020-01-01 RX ADMIN — INSULIN DETEMIR 40 UNITS: 100 INJECTION, SOLUTION SUBCUTANEOUS at 07:07

## 2020-01-01 RX ADMIN — ROPINIROLE HYDROCHLORIDE 0.5 MG: 0.25 TABLET, FILM COATED ORAL at 08:45

## 2020-01-01 RX ADMIN — METOPROLOL SUCCINATE 100 MG: 100 TABLET, EXTENDED RELEASE ORAL at 09:00

## 2020-01-01 RX ADMIN — METOPROLOL SUCCINATE 50 MG: 50 TABLET, EXTENDED RELEASE ORAL at 09:54

## 2020-01-01 RX ADMIN — SUCRALFATE 1 G: 1 TABLET ORAL at 09:36

## 2020-01-01 RX ADMIN — ASPIRIN 81 MG: 81 TABLET, COATED ORAL at 08:01

## 2020-01-01 RX ADMIN — FLUTICASONE PROPIONATE 2 SPRAY: 50 SPRAY, METERED NASAL at 08:53

## 2020-01-01 RX ADMIN — MONTELUKAST 10 MG: 10 TABLET, FILM COATED ORAL at 21:34

## 2020-01-01 RX ADMIN — IPRATROPIUM BROMIDE 0.5 MG: 0.5 SOLUTION RESPIRATORY (INHALATION) at 07:04

## 2020-01-01 RX ADMIN — BUSPIRONE HYDROCHLORIDE 10 MG: 10 TABLET ORAL at 09:55

## 2020-01-01 RX ADMIN — ROPINIROLE 0.5 MG: 0.5 TABLET, FILM COATED ORAL at 20:49

## 2020-01-01 RX ADMIN — FERROUS SULFATE TAB 325 MG (65 MG ELEMENTAL FE) 325 MG: 325 (65 FE) TAB at 17:29

## 2020-01-01 RX ADMIN — DOXYCYCLINE 100 MG: 100 CAPSULE ORAL at 22:57

## 2020-01-01 RX ADMIN — MONTELUKAST SODIUM 10 MG: 10 TABLET, COATED ORAL at 22:57

## 2020-01-01 RX ADMIN — METOCLOPRAMIDE HYDROCHLORIDE 5 MG: 5 TABLET ORAL at 09:53

## 2020-01-01 RX ADMIN — METOPROLOL SUCCINATE 100 MG: 100 TABLET, EXTENDED RELEASE ORAL at 08:36

## 2020-01-01 RX ADMIN — MONTELUKAST SODIUM 10 MG: 10 TABLET, COATED ORAL at 20:49

## 2020-01-01 RX ADMIN — FENOFIBRATE 145 MG: 145 TABLET ORAL at 09:20

## 2020-01-01 RX ADMIN — SODIUM CHLORIDE, PRESERVATIVE FREE 10 ML: 5 INJECTION INTRAVENOUS at 08:58

## 2020-01-01 RX ADMIN — DEXAMETHASONE SODIUM PHOSPHATE 6 MG: 4 INJECTION, SOLUTION INTRAMUSCULAR; INTRAVENOUS at 09:45

## 2020-01-01 RX ADMIN — IPRATROPIUM BROMIDE AND ALBUTEROL SULFATE 3 ML: .5; 3 SOLUTION RESPIRATORY (INHALATION) at 07:15

## 2020-01-01 RX ADMIN — APIXABAN 5 MG: 5 TABLET, FILM COATED ORAL at 21:30

## 2020-01-01 RX ADMIN — MELATONIN TAB 5 MG 5 MG: 5 TAB at 20:50

## 2020-01-01 RX ADMIN — INSULIN LISPRO 24 UNITS: 100 INJECTION, SOLUTION INTRAVENOUS; SUBCUTANEOUS at 12:28

## 2020-01-01 RX ADMIN — OSELTAMIVIR PHOSPHATE 30 MG: 30 CAPSULE ORAL at 08:21

## 2020-01-01 RX ADMIN — Medication 1 CAPSULE: at 21:41

## 2020-01-01 RX ADMIN — BUSPIRONE HYDROCHLORIDE 10 MG: 10 TABLET ORAL at 09:22

## 2020-01-01 RX ADMIN — METHYLPREDNISOLONE SODIUM SUCCINATE 40 MG: 40 INJECTION, POWDER, FOR SOLUTION INTRAMUSCULAR; INTRAVENOUS at 15:33

## 2020-01-01 RX ADMIN — Medication 1 CAPSULE: at 15:28

## 2020-01-01 RX ADMIN — ATORVASTATIN CALCIUM 40 MG: 40 TABLET, FILM COATED ORAL at 09:40

## 2020-01-01 RX ADMIN — BUSPIRONE HYDROCHLORIDE 10 MG: 10 TABLET ORAL at 16:19

## 2020-01-01 RX ADMIN — IPRATROPIUM BROMIDE 0.5 MG: 0.5 SOLUTION RESPIRATORY (INHALATION) at 12:39

## 2020-01-01 RX ADMIN — ONDANSETRON 4 MG: 2 INJECTION INTRAMUSCULAR; INTRAVENOUS at 15:43

## 2020-01-01 RX ADMIN — DILTIAZEM HYDROCHLORIDE 180 MG: 180 CAPSULE, COATED, EXTENDED RELEASE ORAL at 08:01

## 2020-01-01 RX ADMIN — ATORVASTATIN CALCIUM 40 MG: 40 TABLET, FILM COATED ORAL at 10:02

## 2020-01-01 RX ADMIN — METOCLOPRAMIDE HYDROCHLORIDE 5 MG: 5 TABLET ORAL at 20:29

## 2020-01-01 RX ADMIN — INSULIN LISPRO 9 UNITS: 100 INJECTION, SOLUTION INTRAVENOUS; SUBCUTANEOUS at 09:59

## 2020-01-01 RX ADMIN — INSULIN ASPART 4 UNITS: 100 INJECTION, SOLUTION INTRAVENOUS; SUBCUTANEOUS at 21:33

## 2020-01-01 RX ADMIN — METHYLPREDNISOLONE SODIUM SUCCINATE 40 MG: 40 INJECTION, POWDER, FOR SOLUTION INTRAMUSCULAR; INTRAVENOUS at 08:21

## 2020-01-01 RX ADMIN — INSULIN LISPRO 16 UNITS: 100 INJECTION, SOLUTION INTRAVENOUS; SUBCUTANEOUS at 09:31

## 2020-01-01 RX ADMIN — METHYLPREDNISOLONE SODIUM SUCCINATE 40 MG: 40 INJECTION, POWDER, FOR SOLUTION INTRAMUSCULAR; INTRAVENOUS at 06:36

## 2020-01-01 RX ADMIN — APIXABAN 5 MG: 5 TABLET, FILM COATED ORAL at 08:55

## 2020-01-01 RX ADMIN — INSULIN DETEMIR 25 UNITS: 100 INJECTION, SOLUTION SUBCUTANEOUS at 22:56

## 2020-01-01 RX ADMIN — INSULIN ASPART 8 UNITS: 100 INJECTION, SOLUTION INTRAVENOUS; SUBCUTANEOUS at 21:42

## 2020-01-01 RX ADMIN — METOPROLOL SUCCINATE 100 MG: 100 TABLET, EXTENDED RELEASE ORAL at 08:01

## 2020-01-01 RX ADMIN — FLUTICASONE PROPIONATE 1 SPRAY: 50 SPRAY, METERED NASAL at 08:56

## 2020-01-01 RX ADMIN — SODIUM CHLORIDE, PRESERVATIVE FREE 10 ML: 5 INJECTION INTRAVENOUS at 08:34

## 2020-01-01 RX ADMIN — TRAMADOL HYDROCHLORIDE 50 MG: 50 TABLET, FILM COATED ORAL at 15:24

## 2020-01-01 RX ADMIN — AZITHROMYCIN 500 MG: 500 INJECTION, POWDER, LYOPHILIZED, FOR SOLUTION INTRAVENOUS at 22:21

## 2020-01-01 RX ADMIN — BUDESONIDE 1 MG: 0.5 INHALANT RESPIRATORY (INHALATION) at 19:48

## 2020-01-01 RX ADMIN — Medication 1 CAPSULE: at 08:35

## 2020-01-01 RX ADMIN — INSULIN ASPART 35 UNITS: 100 INJECTION, SOLUTION INTRAVENOUS; SUBCUTANEOUS at 08:21

## 2020-01-01 RX ADMIN — ASPIRIN 81 MG: 81 TABLET, COATED ORAL at 08:42

## 2020-01-01 RX ADMIN — GUAIFENESIN 600 MG: 600 TABLET, EXTENDED RELEASE ORAL at 09:14

## 2020-01-01 RX ADMIN — Medication 1 CAPSULE: at 08:57

## 2020-01-01 RX ADMIN — HYDROCODONE BITARTRATE AND ACETAMINOPHEN 1 TABLET: 5; 325 TABLET ORAL at 21:34

## 2020-01-01 RX ADMIN — FENOFIBRATE 145 MG: 145 TABLET ORAL at 16:50

## 2020-01-01 RX ADMIN — Medication 1 CAPSULE: at 21:20

## 2020-01-01 RX ADMIN — INSULIN ASPART 9 UNITS: 100 INJECTION, SOLUTION INTRAVENOUS; SUBCUTANEOUS at 12:12

## 2020-01-01 RX ADMIN — CEFTRIAXONE 1 G: 1 INJECTION, SOLUTION INTRAVENOUS at 17:05

## 2020-01-01 RX ADMIN — MELATONIN TAB 5 MG 5 MG: 5 TAB at 21:41

## 2020-01-01 RX ADMIN — METOPROLOL SUCCINATE 100 MG: 100 TABLET, EXTENDED RELEASE ORAL at 09:13

## 2020-01-01 RX ADMIN — ALBUTEROL SULFATE 2 PUFF: 90 AEROSOL, METERED RESPIRATORY (INHALATION) at 16:01

## 2020-01-01 RX ADMIN — ASPIRIN 81 MG: 81 TABLET, COATED ORAL at 08:55

## 2020-01-01 RX ADMIN — INSULIN LISPRO 3 UNITS: 100 INJECTION, SOLUTION INTRAVENOUS; SUBCUTANEOUS at 09:47

## 2020-01-01 RX ADMIN — ALLOPURINOL 300 MG: 100 TABLET ORAL at 12:09

## 2020-01-01 RX ADMIN — MUPIROCIN: 20 OINTMENT TOPICAL at 11:37

## 2020-01-01 RX ADMIN — HYDROCODONE BITARTRATE AND ACETAMINOPHEN 1 TABLET: 5; 325 TABLET ORAL at 21:17

## 2020-01-01 RX ADMIN — PANTOPRAZOLE SODIUM 40 MG: 40 INJECTION, POWDER, FOR SOLUTION INTRAVENOUS at 15:58

## 2020-01-01 RX ADMIN — INSULIN LISPRO 3 UNITS: 100 INJECTION, SOLUTION INTRAVENOUS; SUBCUTANEOUS at 17:30

## 2020-01-01 RX ADMIN — METHYLPREDNISOLONE SODIUM SUCCINATE 40 MG: 40 INJECTION, POWDER, FOR SOLUTION INTRAMUSCULAR; INTRAVENOUS at 23:58

## 2020-01-01 RX ADMIN — FUROSEMIDE 40 MG: 40 TABLET ORAL at 10:04

## 2020-01-01 RX ADMIN — ONDANSETRON 4 MG: 2 INJECTION INTRAMUSCULAR; INTRAVENOUS at 10:16

## 2020-01-01 RX ADMIN — METHYLPREDNISOLONE SODIUM SUCCINATE 40 MG: 40 INJECTION, POWDER, FOR SOLUTION INTRAMUSCULAR; INTRAVENOUS at 06:37

## 2020-01-01 RX ADMIN — BUSPIRONE HYDROCHLORIDE 10 MG: 10 TABLET ORAL at 08:35

## 2020-01-01 RX ADMIN — INSULIN LISPRO 12 UNITS: 100 INJECTION, SOLUTION INTRAVENOUS; SUBCUTANEOUS at 14:48

## 2020-01-01 RX ADMIN — SODIUM CHLORIDE, PRESERVATIVE FREE 10 ML: 5 INJECTION INTRAVENOUS at 21:59

## 2020-01-01 RX ADMIN — IPRATROPIUM BROMIDE AND ALBUTEROL SULFATE 3 ML: 2.5; .5 SOLUTION RESPIRATORY (INHALATION) at 15:37

## 2020-01-01 RX ADMIN — POLYETHYLENE GLYCOL 3350 17 G: 17 POWDER, FOR SOLUTION ORAL at 16:11

## 2020-01-01 RX ADMIN — SODIUM CHLORIDE, PRESERVATIVE FREE 10 ML: 5 INJECTION INTRAVENOUS at 21:30

## 2020-01-01 RX ADMIN — INSULIN DETEMIR 35 UNITS: 100 INJECTION, SOLUTION SUBCUTANEOUS at 10:14

## 2020-01-01 RX ADMIN — PREDNISONE 20 MG: 20 TABLET ORAL at 08:56

## 2020-01-01 RX ADMIN — Medication 1 CAPSULE: at 22:20

## 2020-01-01 RX ADMIN — INSULIN LISPRO 3 UNITS: 100 INJECTION, SOLUTION INTRAVENOUS; SUBCUTANEOUS at 12:45

## 2020-01-01 RX ADMIN — BUDESONIDE AND FORMOTEROL FUMARATE DIHYDRATE 2 PUFF: 160; 4.5 AEROSOL RESPIRATORY (INHALATION) at 08:58

## 2020-01-01 RX ADMIN — METHYLPREDNISOLONE SODIUM SUCCINATE 40 MG: 40 INJECTION, POWDER, FOR SOLUTION INTRAMUSCULAR; INTRAVENOUS at 22:19

## 2020-01-01 RX ADMIN — LINAGLIPTIN 5 MG: 5 TABLET, FILM COATED ORAL at 18:34

## 2020-01-01 RX ADMIN — SODIUM CHLORIDE 75 ML/HR: 9 INJECTION, SOLUTION INTRAVENOUS at 22:11

## 2020-01-01 RX ADMIN — METOCLOPRAMIDE 5 MG: 5 TABLET ORAL at 21:21

## 2020-01-01 RX ADMIN — METOCLOPRAMIDE HYDROCHLORIDE 5 MG: 5 TABLET ORAL at 19:28

## 2020-01-01 RX ADMIN — METHYLPREDNISOLONE SODIUM SUCCINATE 60 MG: 125 INJECTION, POWDER, FOR SOLUTION INTRAMUSCULAR; INTRAVENOUS at 00:10

## 2020-01-01 RX ADMIN — BUSPIRONE HYDROCHLORIDE 10 MG: 10 TABLET ORAL at 09:13

## 2020-01-01 RX ADMIN — BUDESONIDE 0.5 MG: 0.5 INHALANT RESPIRATORY (INHALATION) at 19:57

## 2020-01-01 RX ADMIN — OSELTAMIVIR PHOSPHATE 30 MG: 30 CAPSULE ORAL at 08:35

## 2020-01-01 RX ADMIN — Medication 1 CAPSULE: at 16:38

## 2020-01-01 RX ADMIN — BUSPIRONE HYDROCHLORIDE 10 MG: 10 TABLET ORAL at 21:06

## 2020-01-01 RX ADMIN — ALLOPURINOL 300 MG: 300 TABLET ORAL at 09:23

## 2020-01-01 RX ADMIN — GUAIFENESIN 600 MG: 600 TABLET, EXTENDED RELEASE ORAL at 08:02

## 2020-01-01 RX ADMIN — FLUTICASONE PROPIONATE 2 SPRAY: 50 SPRAY, METERED NASAL at 10:20

## 2020-01-01 RX ADMIN — INSULIN LISPRO 6 UNITS: 100 INJECTION, SOLUTION INTRAVENOUS; SUBCUTANEOUS at 12:28

## 2020-01-01 RX ADMIN — PREDNISONE 40 MG: 20 TABLET ORAL at 08:33

## 2020-01-01 RX ADMIN — HYDROCODONE BITARTRATE AND ACETAMINOPHEN 1 TABLET: 5; 325 TABLET ORAL at 20:50

## 2020-01-01 RX ADMIN — METOPROLOL SUCCINATE 100 MG: 100 TABLET, EXTENDED RELEASE ORAL at 10:00

## 2020-01-01 RX ADMIN — DILTIAZEM HYDROCHLORIDE 180 MG: 180 CAPSULE, COATED, EXTENDED RELEASE ORAL at 09:20

## 2020-01-01 RX ADMIN — GUAIFENESIN 600 MG: 600 TABLET, EXTENDED RELEASE ORAL at 21:34

## 2020-01-01 RX ADMIN — POLYETHYLENE GLYCOL 3350 17 G: 17 POWDER, FOR SOLUTION ORAL at 14:45

## 2020-01-01 RX ADMIN — INSULIN ASPART 9 UNITS: 100 INJECTION, SOLUTION INTRAVENOUS; SUBCUTANEOUS at 11:55

## 2020-01-01 RX ADMIN — SUCRALFATE 1 G: 1 TABLET ORAL at 17:55

## 2020-01-01 RX ADMIN — GUAIFENESIN 600 MG: 600 TABLET, EXTENDED RELEASE ORAL at 10:10

## 2020-01-01 RX ADMIN — ROPINIROLE 0.5 MG: 0.5 TABLET, FILM COATED ORAL at 09:25

## 2020-01-01 RX ADMIN — BUSPIRONE HYDROCHLORIDE 10 MG: 10 TABLET ORAL at 15:50

## 2020-01-01 RX ADMIN — HYDROCODONE BITARTRATE AND ACETAMINOPHEN 1 TABLET: 5; 325 TABLET ORAL at 20:19

## 2020-01-01 RX ADMIN — IPRATROPIUM BROMIDE AND ALBUTEROL SULFATE 3 ML: .5; 3 SOLUTION RESPIRATORY (INHALATION) at 20:14

## 2020-01-01 RX ADMIN — METOCLOPRAMIDE HYDROCHLORIDE 5 MG: 5 TABLET ORAL at 21:30

## 2020-01-01 RX ADMIN — IPRATROPIUM BROMIDE 0.5 MG: 0.5 SOLUTION RESPIRATORY (INHALATION) at 19:27

## 2020-01-01 RX ADMIN — GUAIFENESIN 600 MG: 600 TABLET, EXTENDED RELEASE ORAL at 08:31

## 2020-01-01 RX ADMIN — METOPROLOL SUCCINATE 100 MG: 100 TABLET, EXTENDED RELEASE ORAL at 08:30

## 2020-01-01 RX ADMIN — ROPINIROLE HYDROCHLORIDE 0.5 MG: 0.25 TABLET, FILM COATED ORAL at 10:10

## 2020-01-01 RX ADMIN — ROPINIROLE HYDROCHLORIDE 0.5 MG: 0.25 TABLET, FILM COATED ORAL at 09:59

## 2020-01-01 RX ADMIN — METOCLOPRAMIDE HYDROCHLORIDE 5 MG: 5 TABLET ORAL at 16:19

## 2020-01-01 RX ADMIN — PREDNISONE 40 MG: 20 TABLET ORAL at 08:41

## 2020-01-01 RX ADMIN — INSULIN DETEMIR 30 UNITS: 100 INJECTION, SOLUTION SUBCUTANEOUS at 06:37

## 2020-01-01 RX ADMIN — APIXABAN 5 MG: 5 TABLET, FILM COATED ORAL at 21:20

## 2020-01-01 RX ADMIN — INSULIN DETEMIR 28 UNITS: 100 INJECTION, SOLUTION SUBCUTANEOUS at 09:00

## 2020-01-01 RX ADMIN — GUAIFENESIN 600 MG: 600 TABLET, EXTENDED RELEASE ORAL at 09:00

## 2020-01-01 RX ADMIN — INSULIN ASPART 12 UNITS: 100 INJECTION, SOLUTION INTRAVENOUS; SUBCUTANEOUS at 05:23

## 2020-01-01 RX ADMIN — ATROPINE SULFATE 1 MG: 1 INJECTION, SOLUTION INTRAMUSCULAR; INTRAVENOUS; SUBCUTANEOUS at 07:54

## 2020-01-01 RX ADMIN — ACETAMINOPHEN 650 MG: 325 TABLET, FILM COATED ORAL at 07:11

## 2020-01-01 RX ADMIN — METHYLPREDNISOLONE SODIUM SUCCINATE 40 MG: 40 INJECTION, POWDER, FOR SOLUTION INTRAMUSCULAR; INTRAVENOUS at 16:50

## 2020-01-01 RX ADMIN — METHYLPREDNISOLONE SODIUM SUCCINATE 40 MG: 40 INJECTION, POWDER, FOR SOLUTION INTRAMUSCULAR; INTRAVENOUS at 15:27

## 2020-01-01 RX ADMIN — SODIUM CHLORIDE 75 ML/HR: 9 INJECTION, SOLUTION INTRAVENOUS at 09:29

## 2020-01-01 RX ADMIN — GUAIFENESIN 600 MG: 600 TABLET, EXTENDED RELEASE ORAL at 08:42

## 2020-01-01 RX ADMIN — METHYLPREDNISOLONE SODIUM SUCCINATE 40 MG: 40 INJECTION, POWDER, FOR SOLUTION INTRAMUSCULAR; INTRAVENOUS at 16:20

## 2020-01-01 RX ADMIN — HYDROCODONE BITARTRATE AND ACETAMINOPHEN 1 TABLET: 5; 325 TABLET ORAL at 19:53

## 2020-01-01 RX ADMIN — IPRATROPIUM BROMIDE 0.5 MG: 0.5 SOLUTION RESPIRATORY (INHALATION) at 19:18

## 2020-01-01 RX ADMIN — DILTIAZEM HYDROCHLORIDE 180 MG: 180 CAPSULE, COATED, EXTENDED RELEASE ORAL at 08:56

## 2020-01-01 RX ADMIN — INSULIN ASPART 4 UNITS: 100 INJECTION, SOLUTION INTRAVENOUS; SUBCUTANEOUS at 06:37

## 2020-01-01 RX ADMIN — BUDESONIDE AND FORMOTEROL FUMARATE DIHYDRATE 2 PUFF: 160; 4.5 AEROSOL RESPIRATORY (INHALATION) at 21:29

## 2020-01-01 RX ADMIN — DEXTROSE 50 % IN WATER (D50W) INTRAVENOUS SYRINGE 25 G: at 17:16

## 2020-01-01 RX ADMIN — MULTIPLE VITAMINS W/ MINERALS TAB 1 TABLET: TAB at 17:03

## 2020-01-01 RX ADMIN — MORPHINE SULFATE 4 MG: 4 INJECTION, SOLUTION INTRAMUSCULAR; INTRAVENOUS at 08:52

## 2020-01-01 RX ADMIN — METHYLPREDNISOLONE SODIUM SUCCINATE 40 MG: 40 INJECTION, POWDER, LYOPHILIZED, FOR SOLUTION INTRAMUSCULAR; INTRAVENOUS at 12:48

## 2020-01-01 RX ADMIN — BUSPIRONE HYDROCHLORIDE 10 MG: 10 TABLET ORAL at 16:57

## 2020-01-01 RX ADMIN — IPRATROPIUM BROMIDE AND ALBUTEROL SULFATE 3 ML: .5; 3 SOLUTION RESPIRATORY (INHALATION) at 19:51

## 2020-01-01 RX ADMIN — BUDESONIDE 0.5 MG: 0.5 INHALANT RESPIRATORY (INHALATION) at 07:02

## 2020-01-01 RX ADMIN — SODIUM CHLORIDE, PRESERVATIVE FREE 3 ML: 5 INJECTION INTRAVENOUS at 08:57

## 2020-01-01 RX ADMIN — GUAIFENESIN 600 MG: 600 TABLET, EXTENDED RELEASE ORAL at 21:21

## 2020-01-01 RX ADMIN — HYDROCODONE BITARTRATE AND ACETAMINOPHEN 1 TABLET: 5; 325 TABLET ORAL at 21:41

## 2020-01-01 RX ADMIN — BUSPIRONE HYDROCHLORIDE 10 MG: 10 TABLET ORAL at 20:49

## 2020-01-01 RX ADMIN — ALBUTEROL SULFATE 2 PUFF: 90 AEROSOL, METERED RESPIRATORY (INHALATION) at 19:23

## 2020-01-01 RX ADMIN — PANTOPRAZOLE SODIUM 40 MG: 40 TABLET, DELAYED RELEASE ORAL at 06:04

## 2020-01-01 RX ADMIN — ALLOPURINOL 150 MG: 100 TABLET ORAL at 08:24

## 2020-01-01 RX ADMIN — INSULIN ASPART 15 UNITS: 100 INJECTION, SOLUTION INTRAVENOUS; SUBCUTANEOUS at 07:39

## 2020-01-01 RX ADMIN — MONTELUKAST 10 MG: 10 TABLET, FILM COATED ORAL at 21:44

## 2020-01-01 RX ADMIN — METHYLPREDNISOLONE SODIUM SUCCINATE 60 MG: 125 INJECTION, POWDER, FOR SOLUTION INTRAMUSCULAR; INTRAVENOUS at 06:30

## 2020-01-01 RX ADMIN — SODIUM CHLORIDE, PRESERVATIVE FREE 10 ML: 5 INJECTION INTRAVENOUS at 08:36

## 2020-01-01 RX ADMIN — IPRATROPIUM BROMIDE 0.5 MG: 0.5 SOLUTION RESPIRATORY (INHALATION) at 01:19

## 2020-01-01 RX ADMIN — ALLOPURINOL 150 MG: 100 TABLET ORAL at 09:13

## 2020-01-01 RX ADMIN — METOPROLOL SUCCINATE 100 MG: 100 TABLET, EXTENDED RELEASE ORAL at 09:24

## 2020-01-01 RX ADMIN — INSULIN DETEMIR 50 UNITS: 100 INJECTION, SOLUTION SUBCUTANEOUS at 05:19

## 2020-01-01 RX ADMIN — INSULIN LISPRO 16 UNITS: 100 INJECTION, SOLUTION INTRAVENOUS; SUBCUTANEOUS at 08:58

## 2020-01-01 RX ADMIN — FERROUS SULFATE TAB 325 MG (65 MG ELEMENTAL FE) 325 MG: 325 (65 FE) TAB at 17:56

## 2020-01-01 RX ADMIN — SUCRALFATE 1 G: 1 TABLET ORAL at 09:24

## 2020-01-01 RX ADMIN — GUAIFENESIN 600 MG: 600 TABLET, EXTENDED RELEASE ORAL at 09:24

## 2020-01-01 RX ADMIN — METOCLOPRAMIDE HYDROCHLORIDE 5 MG: 5 TABLET ORAL at 19:37

## 2020-01-01 RX ADMIN — INSULIN ASPART 8 UNITS: 100 INJECTION, SOLUTION INTRAVENOUS; SUBCUTANEOUS at 17:07

## 2020-01-01 RX ADMIN — Medication 1 CAPSULE: at 20:50

## 2020-01-01 RX ADMIN — METHYLPREDNISOLONE SODIUM SUCCINATE 40 MG: 40 INJECTION, POWDER, FOR SOLUTION INTRAMUSCULAR; INTRAVENOUS at 00:15

## 2020-01-01 RX ADMIN — ASPIRIN 81 MG: 81 TABLET, COATED ORAL at 09:00

## 2020-01-01 RX ADMIN — IPRATROPIUM BROMIDE AND ALBUTEROL SULFATE 3 ML: .5; 3 SOLUTION RESPIRATORY (INHALATION) at 12:25

## 2020-01-01 RX ADMIN — INSULIN DETEMIR 15 UNITS: 100 INJECTION, SOLUTION SUBCUTANEOUS at 21:17

## 2020-01-01 RX ADMIN — INSULIN DETEMIR 80 UNITS: 100 INJECTION, SOLUTION SUBCUTANEOUS at 19:38

## 2020-01-01 RX ADMIN — CHLOROTHIAZIDE SODIUM 250 MG: 500 INJECTION INTRAVENOUS at 17:56

## 2020-01-01 RX ADMIN — BUSPIRONE HYDROCHLORIDE 10 MG: 10 TABLET ORAL at 16:03

## 2020-01-01 RX ADMIN — APIXABAN 5 MG: 5 TABLET, FILM COATED ORAL at 21:43

## 2020-01-01 RX ADMIN — APIXABAN 5 MG: 5 TABLET, FILM COATED ORAL at 22:41

## 2020-01-01 RX ADMIN — SUCRALFATE 1 G: 1 TABLET ORAL at 17:30

## 2020-01-01 RX ADMIN — ALLOPURINOL 150 MG: 100 TABLET ORAL at 08:01

## 2020-01-01 RX ADMIN — PANTOPRAZOLE SODIUM 40 MG: 40 TABLET, DELAYED RELEASE ORAL at 06:12

## 2020-01-01 RX ADMIN — BUDESONIDE 1 MG: 0.5 INHALANT RESPIRATORY (INHALATION) at 19:18

## 2020-01-01 RX ADMIN — MUPIROCIN 1 APPLICATION: 20 OINTMENT TOPICAL at 19:54

## 2020-01-01 RX ADMIN — IPRATROPIUM BROMIDE AND ALBUTEROL SULFATE 3 ML: .5; 3 SOLUTION RESPIRATORY (INHALATION) at 07:03

## 2020-01-01 RX ADMIN — MONTELUKAST SODIUM 10 MG: 10 TABLET, COATED ORAL at 22:41

## 2020-01-01 RX ADMIN — BUSPIRONE HYDROCHLORIDE 10 MG: 10 TABLET ORAL at 15:59

## 2020-01-01 RX ADMIN — APIXABAN 5 MG: 5 TABLET, FILM COATED ORAL at 08:35

## 2020-01-01 RX ADMIN — ALLOPURINOL 150 MG: 100 TABLET ORAL at 08:41

## 2020-01-01 RX ADMIN — MONTELUKAST 10 MG: 10 TABLET, FILM COATED ORAL at 21:30

## 2020-01-01 RX ADMIN — GUAIFENESIN 600 MG: 600 TABLET, EXTENDED RELEASE ORAL at 20:50

## 2020-01-01 RX ADMIN — IPRATROPIUM BROMIDE AND ALBUTEROL SULFATE 3 ML: .5; 3 SOLUTION RESPIRATORY (INHALATION) at 07:22

## 2020-01-01 RX ADMIN — PREDNISONE 20 MG: 20 TABLET ORAL at 12:11

## 2020-01-01 RX ADMIN — AZITHROMYCIN 500 MG: 500 INJECTION, POWDER, LYOPHILIZED, FOR SOLUTION INTRAVENOUS at 17:42

## 2020-01-01 RX ADMIN — MELATONIN TAB 5 MG 5 MG: 5 TAB at 22:12

## 2020-01-01 RX ADMIN — INSULIN ASPART 8 UNITS: 100 INJECTION, SOLUTION INTRAVENOUS; SUBCUTANEOUS at 17:18

## 2020-01-01 RX ADMIN — ATORVASTATIN CALCIUM 40 MG: 40 TABLET, FILM COATED ORAL at 08:41

## 2020-01-01 RX ADMIN — METOPROLOL SUCCINATE 100 MG: 100 TABLET, EXTENDED RELEASE ORAL at 09:19

## 2020-01-01 RX ADMIN — PANTOPRAZOLE SODIUM 40 MG: 40 TABLET, DELAYED RELEASE ORAL at 05:49

## 2020-01-01 RX ADMIN — GUAIFENESIN 1200 MG: 600 TABLET, EXTENDED RELEASE ORAL at 21:34

## 2020-01-01 RX ADMIN — ROPINIROLE 0.5 MG: 0.5 TABLET, FILM COATED ORAL at 09:23

## 2020-01-01 RX ADMIN — INSULIN DETEMIR 10 UNITS: 100 INJECTION, SOLUTION SUBCUTANEOUS at 21:23

## 2020-01-01 RX ADMIN — ALBUTEROL SULFATE 2 PUFF: 90 AEROSOL, METERED RESPIRATORY (INHALATION) at 15:28

## 2020-01-01 RX ADMIN — BUDESONIDE AND FORMOTEROL FUMARATE DIHYDRATE 2 PUFF: 160; 4.5 AEROSOL RESPIRATORY (INHALATION) at 19:26

## 2020-01-01 RX ADMIN — Medication 1 CAPSULE: at 08:41

## 2020-01-01 RX ADMIN — BUDESONIDE 0.5 MG: 0.5 INHALANT RESPIRATORY (INHALATION) at 07:04

## 2020-01-01 RX ADMIN — IPRATROPIUM BROMIDE AND ALBUTEROL SULFATE 3 ML: 2.5; .5 SOLUTION RESPIRATORY (INHALATION) at 08:12

## 2020-01-01 RX ADMIN — BUSPIRONE HYDROCHLORIDE 10 MG: 10 TABLET ORAL at 17:01

## 2020-01-01 RX ADMIN — AMOXICILLIN AND CLAVULANATE POTASSIUM 1 TABLET: 875; 125 TABLET, FILM COATED ORAL at 23:21

## 2020-01-01 RX ADMIN — BUSPIRONE HYDROCHLORIDE 10 MG: 10 TABLET ORAL at 20:45

## 2020-01-01 RX ADMIN — IPRATROPIUM BROMIDE 0.5 MG: 0.5 SOLUTION RESPIRATORY (INHALATION) at 00:39

## 2020-01-01 RX ADMIN — FUROSEMIDE 20 MG: 10 INJECTION, SOLUTION INTRAMUSCULAR; INTRAVENOUS at 18:26

## 2020-01-01 RX ADMIN — MUPIROCIN 1 APPLICATION: 20 OINTMENT TOPICAL at 21:19

## 2020-01-01 RX ADMIN — MONTELUKAST SODIUM 10 MG: 10 TABLET, COATED ORAL at 21:43

## 2020-01-01 RX ADMIN — IPRATROPIUM BROMIDE 0.5 MG: 0.5 SOLUTION RESPIRATORY (INHALATION) at 20:45

## 2020-01-01 RX ADMIN — BUSPIRONE HYDROCHLORIDE 10 MG: 10 TABLET ORAL at 20:18

## 2020-01-01 RX ADMIN — INSULIN ASPART 8 UNITS: 100 INJECTION, SOLUTION INTRAVENOUS; SUBCUTANEOUS at 17:45

## 2020-01-01 RX ADMIN — METOPROLOL SUCCINATE 100 MG: 100 TABLET, EXTENDED RELEASE ORAL at 08:55

## 2020-01-01 RX ADMIN — ASPIRIN 81 MG: 81 TABLET, COATED ORAL at 08:21

## 2020-01-01 RX ADMIN — ALBUTEROL SULFATE 2 PUFF: 90 AEROSOL, METERED RESPIRATORY (INHALATION) at 17:52

## 2020-01-01 RX ADMIN — SODIUM CHLORIDE 40 MG: 9 INJECTION, SOLUTION INTRAVENOUS at 20:30

## 2020-01-01 RX ADMIN — FLUTICASONE PROPIONATE 2 SPRAY: 50 SPRAY, METERED NASAL at 08:02

## 2020-01-01 RX ADMIN — BUSPIRONE HYDROCHLORIDE 10 MG: 10 TABLET ORAL at 17:17

## 2020-01-01 RX ADMIN — INSULIN LISPRO 12 UNITS: 100 INJECTION, SOLUTION INTRAVENOUS; SUBCUTANEOUS at 17:54

## 2020-01-01 RX ADMIN — ROFLUMILAST 500 MCG: 500 TABLET ORAL at 08:59

## 2020-01-01 RX ADMIN — ATORVASTATIN CALCIUM 40 MG: 40 TABLET, FILM COATED ORAL at 14:47

## 2020-01-01 RX ADMIN — METOPROLOL SUCCINATE 25 MG: 25 TABLET, EXTENDED RELEASE ORAL at 08:45

## 2020-01-01 RX ADMIN — HYDROCODONE BITARTRATE AND ACETAMINOPHEN 1 TABLET: 5; 325 TABLET ORAL at 20:29

## 2020-01-01 RX ADMIN — FUROSEMIDE 20 MG: 20 TABLET ORAL at 16:02

## 2020-01-01 RX ADMIN — METHYLNALTREXONE BROMIDE 12 MG: 12 INJECTION, SOLUTION SUBCUTANEOUS at 18:29

## 2020-01-01 RX ADMIN — SENNOSIDES AND DOCUSATE SODIUM 1 TABLET: 8.6; 5 TABLET ORAL at 20:29

## 2020-01-01 RX ADMIN — METOPROLOL SUCCINATE 100 MG: 100 TABLET, EXTENDED RELEASE ORAL at 09:23

## 2020-01-01 RX ADMIN — IPRATROPIUM BROMIDE 0.5 MG: 0.5 SOLUTION RESPIRATORY (INHALATION) at 00:03

## 2020-01-01 RX ADMIN — SODIUM CHLORIDE, PRESERVATIVE FREE 10 ML: 5 INJECTION INTRAVENOUS at 15:34

## 2020-01-01 RX ADMIN — GUAIFENESIN 600 MG: 600 TABLET, EXTENDED RELEASE ORAL at 08:23

## 2020-01-01 RX ADMIN — TRAMADOL HYDROCHLORIDE 50 MG: 50 TABLET, FILM COATED ORAL at 12:08

## 2020-01-01 RX ADMIN — SODIUM CHLORIDE, PRESERVATIVE FREE 10 ML: 5 INJECTION INTRAVENOUS at 20:29

## 2020-01-01 RX ADMIN — BUSPIRONE HYDROCHLORIDE 10 MG: 10 TABLET ORAL at 15:39

## 2020-01-01 RX ADMIN — IPRATROPIUM BROMIDE 0.5 MG: 0.5 SOLUTION RESPIRATORY (INHALATION) at 06:59

## 2020-01-01 RX ADMIN — ATORVASTATIN CALCIUM 10 MG: 10 TABLET, FILM COATED ORAL at 08:01

## 2020-01-01 RX ADMIN — BUSPIRONE HYDROCHLORIDE 10 MG: 10 TABLET ORAL at 17:04

## 2020-01-01 RX ADMIN — FUROSEMIDE 20 MG: 20 TABLET ORAL at 08:31

## 2020-01-01 RX ADMIN — FLUTICASONE PROPIONATE 2 SPRAY: 50 SPRAY, METERED NASAL at 21:36

## 2020-01-01 RX ADMIN — ALBUTEROL SULFATE 2 PUFF: 90 AEROSOL, METERED RESPIRATORY (INHALATION) at 07:44

## 2020-01-01 RX ADMIN — BUDESONIDE 0.5 MG: 0.5 INHALANT RESPIRATORY (INHALATION) at 06:45

## 2020-01-01 RX ADMIN — METOPROLOL SUCCINATE 100 MG: 100 TABLET, EXTENDED RELEASE ORAL at 14:47

## 2020-01-01 RX ADMIN — BUSPIRONE HYDROCHLORIDE 10 MG: 10 TABLET ORAL at 10:15

## 2020-01-01 RX ADMIN — ALLOPURINOL 150 MG: 100 TABLET ORAL at 09:19

## 2020-01-01 RX ADMIN — LINAGLIPTIN 5 MG: 5 TABLET, FILM COATED ORAL at 09:59

## 2020-01-01 RX ADMIN — SODIUM CHLORIDE, PRESERVATIVE FREE 10 ML: 5 INJECTION INTRAVENOUS at 22:09

## 2020-01-01 RX ADMIN — MELATONIN TAB 5 MG 5 MG: 5 TAB at 21:06

## 2020-01-01 RX ADMIN — APIXABAN 5 MG: 5 TABLET, FILM COATED ORAL at 22:57

## 2020-01-01 RX ADMIN — METOPROLOL SUCCINATE 100 MG: 100 TABLET, EXTENDED RELEASE ORAL at 08:34

## 2020-01-01 RX ADMIN — BUDESONIDE 1 MG: 0.5 INHALANT RESPIRATORY (INHALATION) at 20:46

## 2020-01-01 RX ADMIN — MONTELUKAST 10 MG: 10 TABLET, FILM COATED ORAL at 19:36

## 2020-01-01 RX ADMIN — TAZOBACTAM SODIUM AND PIPERACILLIN SODIUM 3.38 G: 375; 3 INJECTION, SOLUTION INTRAVENOUS at 15:04

## 2020-01-01 RX ADMIN — INSULIN LISPRO 6 UNITS: 100 INJECTION, SOLUTION INTRAVENOUS; SUBCUTANEOUS at 14:45

## 2020-01-01 RX ADMIN — METHYLPREDNISOLONE SODIUM SUCCINATE 40 MG: 40 INJECTION, POWDER, FOR SOLUTION INTRAMUSCULAR; INTRAVENOUS at 17:05

## 2020-01-01 RX ADMIN — MONTELUKAST 10 MG: 10 TABLET, FILM COATED ORAL at 20:50

## 2020-01-01 RX ADMIN — INSULIN ASPART 24 UNITS: 100 INJECTION, SOLUTION INTRAVENOUS; SUBCUTANEOUS at 20:29

## 2020-01-01 RX ADMIN — INSULIN ASPART 12 UNITS: 100 INJECTION, SOLUTION INTRAVENOUS; SUBCUTANEOUS at 13:03

## 2020-01-01 RX ADMIN — Medication 1 CAPSULE: at 15:50

## 2020-01-01 RX ADMIN — IPRATROPIUM BROMIDE 0.5 MG: 0.5 SOLUTION RESPIRATORY (INHALATION) at 12:42

## 2020-01-01 RX ADMIN — INSULIN ASPART 4 UNITS: 100 INJECTION, SOLUTION INTRAVENOUS; SUBCUTANEOUS at 20:59

## 2020-01-01 RX ADMIN — ROPINIROLE HYDROCHLORIDE 0.5 MG: 0.25 TABLET, FILM COATED ORAL at 08:30

## 2020-01-01 RX ADMIN — Medication 1 CAPSULE: at 09:13

## 2020-01-01 RX ADMIN — BUSPIRONE HYDROCHLORIDE 10 MG: 10 TABLET ORAL at 20:46

## 2020-01-01 RX ADMIN — MULTIPLE VITAMINS W/ MINERALS TAB 1 TABLET: TAB at 08:57

## 2020-01-01 RX ADMIN — ATORVASTATIN CALCIUM 10 MG: 10 TABLET, FILM COATED ORAL at 08:40

## 2020-01-01 RX ADMIN — GUAIFENESIN 600 MG: 600 TABLET, EXTENDED RELEASE ORAL at 20:46

## 2020-01-01 RX ADMIN — SODIUM CHLORIDE, PRESERVATIVE FREE 10 ML: 5 INJECTION INTRAVENOUS at 19:29

## 2020-01-01 RX ADMIN — INSULIN DETEMIR 80 UNITS: 100 INJECTION, SOLUTION SUBCUTANEOUS at 21:47

## 2020-01-01 RX ADMIN — APIXABAN 5 MG: 5 TABLET, FILM COATED ORAL at 20:48

## 2020-01-01 RX ADMIN — FENOFIBRATE 145 MG: 145 TABLET ORAL at 08:30

## 2020-01-01 RX ADMIN — METOCLOPRAMIDE HYDROCHLORIDE 5 MG: 5 TABLET ORAL at 15:33

## 2020-01-01 RX ADMIN — ALLOPURINOL 150 MG: 100 TABLET ORAL at 09:54

## 2020-01-01 RX ADMIN — ATORVASTATIN CALCIUM 40 MG: 40 TABLET, FILM COATED ORAL at 08:56

## 2020-01-01 RX ADMIN — DILTIAZEM HYDROCHLORIDE 180 MG: 180 CAPSULE, COATED, EXTENDED RELEASE ORAL at 08:31

## 2020-01-01 RX ADMIN — ALLOPURINOL 300 MG: 100 TABLET ORAL at 08:56

## 2020-01-01 RX ADMIN — METOCLOPRAMIDE 5 MG: 5 TABLET ORAL at 08:55

## 2020-01-01 RX ADMIN — APIXABAN 5 MG: 5 TABLET, FILM COATED ORAL at 20:38

## 2020-01-01 RX ADMIN — BUSPIRONE HYDROCHLORIDE 10 MG: 10 TABLET ORAL at 20:29

## 2020-01-01 RX ADMIN — BUSPIRONE HYDROCHLORIDE 10 MG: 10 TABLET ORAL at 21:40

## 2020-01-01 RX ADMIN — DILTIAZEM HYDROCHLORIDE 180 MG: 180 CAPSULE, COATED, EXTENDED RELEASE ORAL at 09:00

## 2020-01-01 RX ADMIN — INSULIN ASPART 14 UNITS: 100 INJECTION, SOLUTION INTRAVENOUS; SUBCUTANEOUS at 17:03

## 2020-01-01 RX ADMIN — INSULIN DETEMIR 28 UNITS: 100 INJECTION, SOLUTION SUBCUTANEOUS at 10:28

## 2020-01-01 RX ADMIN — BUSPIRONE HYDROCHLORIDE 10 MG: 10 TABLET ORAL at 16:53

## 2020-01-01 RX ADMIN — LINAGLIPTIN 2.5 MG: 5 TABLET, FILM COATED ORAL at 08:23

## 2020-01-01 RX ADMIN — TRAMADOL HYDROCHLORIDE 50 MG: 50 TABLET, FILM COATED ORAL at 21:34

## 2020-01-01 RX ADMIN — HYDROCODONE BITARTRATE AND ACETAMINOPHEN 1 TABLET: 5; 325 TABLET ORAL at 20:38

## 2020-01-01 RX ADMIN — IPRATROPIUM BROMIDE AND ALBUTEROL SULFATE 3 ML: .5; 3 SOLUTION RESPIRATORY (INHALATION) at 08:54

## 2020-01-01 RX ADMIN — GUAIFENESIN 600 MG: 600 TABLET, EXTENDED RELEASE ORAL at 19:27

## 2020-01-01 RX ADMIN — GUAIFENESIN 600 MG: 600 TABLET, EXTENDED RELEASE ORAL at 19:37

## 2020-01-01 RX ADMIN — ONDANSETRON 4 MG: 2 INJECTION INTRAMUSCULAR; INTRAVENOUS at 23:47

## 2020-01-01 RX ADMIN — BUDESONIDE 0.5 MG: 0.5 INHALANT RESPIRATORY (INHALATION) at 19:48

## 2020-01-01 RX ADMIN — SUCRALFATE 1 G: 1 TABLET ORAL at 17:07

## 2020-01-01 RX ADMIN — METOCLOPRAMIDE HYDROCHLORIDE 5 MG: 5 TABLET ORAL at 17:02

## 2020-01-01 RX ADMIN — FENOFIBRATE 145 MG: 145 TABLET ORAL at 08:23

## 2020-01-01 RX ADMIN — BUDESONIDE 1 MG: 0.5 INHALANT RESPIRATORY (INHALATION) at 06:44

## 2020-01-01 RX ADMIN — CHLORTHALIDONE 12.5 MG: 25 TABLET ORAL at 08:56

## 2020-01-01 RX ADMIN — ALBUTEROL SULFATE 2 PUFF: 90 AEROSOL, METERED RESPIRATORY (INHALATION) at 07:28

## 2020-01-01 RX ADMIN — INSULIN LISPRO 3 UNITS: 100 INJECTION, SOLUTION INTRAVENOUS; SUBCUTANEOUS at 17:29

## 2020-01-01 RX ADMIN — LINAGLIPTIN 5 MG: 5 TABLET, FILM COATED ORAL at 09:37

## 2020-01-01 RX ADMIN — IPRATROPIUM BROMIDE AND ALBUTEROL SULFATE 3 ML: 2.5; .5 SOLUTION RESPIRATORY (INHALATION) at 08:57

## 2020-01-01 RX ADMIN — Medication 1 CAPSULE: at 08:33

## 2020-01-01 RX ADMIN — TRAMADOL HYDROCHLORIDE 50 MG: 50 TABLET, FILM COATED ORAL at 16:53

## 2020-01-01 RX ADMIN — BUSPIRONE HYDROCHLORIDE 10 MG: 10 TABLET ORAL at 10:03

## 2020-01-01 RX ADMIN — DOXYCYCLINE 100 MG: 100 CAPSULE ORAL at 23:22

## 2020-01-01 RX ADMIN — ROPINIROLE HYDROCHLORIDE 0.5 MG: 0.25 TABLET, FILM COATED ORAL at 09:54

## 2020-01-01 RX ADMIN — INSULIN ASPART 2 UNITS: 100 INJECTION, SOLUTION INTRAVENOUS; SUBCUTANEOUS at 18:35

## 2020-01-01 RX ADMIN — Medication 1 CAPSULE: at 08:59

## 2020-01-01 RX ADMIN — DOXYCYCLINE 100 MG: 100 INJECTION, POWDER, LYOPHILIZED, FOR SOLUTION INTRAVENOUS at 11:23

## 2020-01-01 RX ADMIN — MONTELUKAST 10 MG: 10 TABLET, FILM COATED ORAL at 22:18

## 2020-01-01 RX ADMIN — SODIUM CHLORIDE, PRESERVATIVE FREE 10 ML: 5 INJECTION INTRAVENOUS at 09:55

## 2020-01-01 RX ADMIN — ASPIRIN 81 MG: 81 TABLET, COATED ORAL at 09:23

## 2020-01-01 RX ADMIN — VANCOMYCIN HYDROCHLORIDE 1750 MG: 500 INJECTION, POWDER, LYOPHILIZED, FOR SOLUTION INTRAVENOUS at 17:17

## 2020-01-01 RX ADMIN — METOCLOPRAMIDE 5 MG: 5 TABLET ORAL at 20:46

## 2020-01-01 RX ADMIN — DOXYCYCLINE 100 MG: 100 INJECTION, POWDER, LYOPHILIZED, FOR SOLUTION INTRAVENOUS at 21:43

## 2020-01-01 RX ADMIN — ASPIRIN 81 MG: 81 TABLET, COATED ORAL at 08:41

## 2020-01-01 RX ADMIN — SODIUM CHLORIDE, PRESERVATIVE FREE 10 ML: 5 INJECTION INTRAVENOUS at 09:14

## 2020-01-01 RX ADMIN — ASPIRIN 81 MG: 81 TABLET, COATED ORAL at 12:11

## 2020-01-01 RX ADMIN — FUROSEMIDE 20 MG: 20 TABLET ORAL at 08:01

## 2020-01-01 RX ADMIN — ALLOPURINOL 150 MG: 100 TABLET ORAL at 08:00

## 2020-01-01 RX ADMIN — PANTOPRAZOLE SODIUM 40 MG: 40 TABLET, DELAYED RELEASE ORAL at 09:23

## 2020-01-01 RX ADMIN — MUPIROCIN 1 APPLICATION: 20 OINTMENT TOPICAL at 09:26

## 2020-01-01 RX ADMIN — ATORVASTATIN CALCIUM 10 MG: 10 TABLET, FILM COATED ORAL at 09:59

## 2020-01-01 RX ADMIN — BUSPIRONE HYDROCHLORIDE 10 MG: 10 TABLET ORAL at 21:34

## 2020-01-01 RX ADMIN — FAMOTIDINE 40 MG: 20 TABLET, FILM COATED ORAL at 09:00

## 2020-01-01 RX ADMIN — INSULIN ASPART 7 UNITS: 100 INJECTION, SOLUTION INTRAVENOUS; SUBCUTANEOUS at 17:51

## 2020-01-01 RX ADMIN — IPRATROPIUM BROMIDE AND ALBUTEROL SULFATE 3 ML: 2.5; .5 SOLUTION RESPIRATORY (INHALATION) at 07:39

## 2020-01-01 RX ADMIN — BUDESONIDE AND FORMOTEROL FUMARATE DIHYDRATE 2 PUFF: 160; 4.5 AEROSOL RESPIRATORY (INHALATION) at 20:25

## 2020-01-01 RX ADMIN — GUAIFENESIN 600 MG: 600 TABLET, EXTENDED RELEASE ORAL at 21:41

## 2020-01-01 RX ADMIN — INSULIN LISPRO 16 UNITS: 100 INJECTION, SOLUTION INTRAVENOUS; SUBCUTANEOUS at 10:19

## 2020-01-01 RX ADMIN — FAMOTIDINE 40 MG: 20 TABLET, FILM COATED ORAL at 09:35

## 2020-01-01 RX ADMIN — APIXABAN 5 MG: 5 TABLET, FILM COATED ORAL at 08:31

## 2020-01-01 RX ADMIN — Medication 1 CAPSULE: at 17:17

## 2020-01-01 RX ADMIN — FENOFIBRATE 48 MG: 48 TABLET ORAL at 08:56

## 2020-01-01 RX ADMIN — FLUTICASONE PROPIONATE 2 SPRAY: 50 SPRAY, METERED NASAL at 08:58

## 2020-01-01 RX ADMIN — SODIUM CHLORIDE, PRESERVATIVE FREE 10 ML: 5 INJECTION INTRAVENOUS at 22:58

## 2020-01-01 RX ADMIN — BUDESONIDE 0.5 MG: 0.5 INHALANT RESPIRATORY (INHALATION) at 06:55

## 2020-01-01 RX ADMIN — INSULIN LISPRO 8 UNITS: 100 INJECTION, SOLUTION INTRAVENOUS; SUBCUTANEOUS at 17:13

## 2020-01-01 RX ADMIN — INSULIN ASPART 7 UNITS: 100 INJECTION, SOLUTION INTRAVENOUS; SUBCUTANEOUS at 12:46

## 2020-01-01 RX ADMIN — MUPIROCIN 1 APPLICATION: 20 OINTMENT TOPICAL at 21:43

## 2020-01-01 RX ADMIN — ASPIRIN 81 MG: 81 TABLET, COATED ORAL at 09:24

## 2020-01-01 RX ADMIN — LINAGLIPTIN 2.5 MG: 5 TABLET, FILM COATED ORAL at 08:31

## 2020-01-01 RX ADMIN — FUROSEMIDE 40 MG: 10 INJECTION, SOLUTION INTRAMUSCULAR; INTRAVENOUS at 06:16

## 2020-01-01 RX ADMIN — MONTELUKAST 10 MG: 10 TABLET, FILM COATED ORAL at 20:18

## 2020-01-01 RX ADMIN — METHYLPREDNISOLONE SODIUM SUCCINATE 40 MG: 40 INJECTION, POWDER, FOR SOLUTION INTRAMUSCULAR; INTRAVENOUS at 05:49

## 2020-01-01 RX ADMIN — FUROSEMIDE 40 MG: 10 INJECTION, SOLUTION INTRAMUSCULAR; INTRAVENOUS at 18:22

## 2020-01-01 RX ADMIN — APIXABAN 5 MG: 5 TABLET, FILM COATED ORAL at 09:13

## 2020-01-01 RX ADMIN — APIXABAN 5 MG: 5 TABLET, FILM COATED ORAL at 08:25

## 2020-01-01 RX ADMIN — ACETAMINOPHEN 650 MG: 325 TABLET, FILM COATED ORAL at 09:23

## 2020-01-01 RX ADMIN — INSULIN DETEMIR 60 UNITS: 100 INJECTION, SOLUTION SUBCUTANEOUS at 18:24

## 2020-01-01 RX ADMIN — ATORVASTATIN CALCIUM 10 MG: 10 TABLET, FILM COATED ORAL at 09:14

## 2020-01-01 RX ADMIN — TAZOBACTAM SODIUM AND PIPERACILLIN SODIUM 3.38 G: 375; 3 INJECTION, SOLUTION INTRAVENOUS at 22:27

## 2020-01-01 RX ADMIN — SODIUM CHLORIDE, PRESERVATIVE FREE 10 ML: 5 INJECTION INTRAVENOUS at 10:17

## 2020-01-01 RX ADMIN — BUDESONIDE 0.5 MG: 0.5 INHALANT RESPIRATORY (INHALATION) at 21:11

## 2020-01-01 RX ADMIN — SODIUM CHLORIDE 30 MG/ML INHALATION SOLUTION 4 ML: 30 SOLUTION INHALANT at 20:34

## 2020-01-01 RX ADMIN — METHYLPREDNISOLONE SODIUM SUCCINATE 40 MG: 40 INJECTION, POWDER, FOR SOLUTION INTRAMUSCULAR; INTRAVENOUS at 03:00

## 2020-01-01 RX ADMIN — ALLOPURINOL 150 MG: 100 TABLET ORAL at 09:00

## 2020-01-01 RX ADMIN — MELATONIN TAB 5 MG 5 MG: 5 TAB at 22:17

## 2020-01-01 RX ADMIN — FUROSEMIDE 40 MG: 40 TABLET ORAL at 09:26

## 2020-01-01 RX ADMIN — FLUTICASONE PROPIONATE 2 SPRAY: 50 SPRAY, METERED NASAL at 21:29

## 2020-01-01 RX ADMIN — FERROUS SULFATE TAB 325 MG (65 MG ELEMENTAL FE) 325 MG: 325 (65 FE) TAB at 09:22

## 2020-01-01 RX ADMIN — ALLOPURINOL 300 MG: 300 TABLET ORAL at 10:04

## 2020-01-01 RX ADMIN — INSULIN ASPART 10 UNITS: 100 INJECTION, SOLUTION INTRAVENOUS; SUBCUTANEOUS at 22:07

## 2020-01-01 RX ADMIN — OSELTAMIVIR PHOSPHATE 30 MG: 30 CAPSULE ORAL at 22:13

## 2020-01-01 RX ADMIN — BUDESONIDE AND FORMOTEROL FUMARATE DIHYDRATE 2 PUFF: 160; 4.5 AEROSOL RESPIRATORY (INHALATION) at 07:45

## 2020-01-01 RX ADMIN — METOPROLOL SUCCINATE 100 MG: 100 TABLET, EXTENDED RELEASE ORAL at 09:58

## 2020-01-01 RX ADMIN — ALBUTEROL SULFATE 2 PUFF: 90 AEROSOL, METERED RESPIRATORY (INHALATION) at 08:58

## 2020-01-01 RX ADMIN — Medication 1 CAPSULE: at 21:16

## 2020-01-01 RX ADMIN — SODIUM CHLORIDE, PRESERVATIVE FREE 10 ML: 5 INJECTION INTRAVENOUS at 10:07

## 2020-01-01 RX ADMIN — BUSPIRONE HYDROCHLORIDE 10 MG: 10 TABLET ORAL at 15:56

## 2020-01-01 RX ADMIN — INSULIN DETEMIR 15 UNITS: 100 INJECTION, SOLUTION SUBCUTANEOUS at 09:15

## 2020-01-01 RX ADMIN — PREDNISONE 40 MG: 20 TABLET ORAL at 08:40

## 2020-01-01 RX ADMIN — APIXABAN 5 MG: 5 TABLET, FILM COATED ORAL at 22:10

## 2020-01-01 RX ADMIN — SODIUM CHLORIDE, PRESERVATIVE FREE 10 ML: 5 INJECTION INTRAVENOUS at 09:48

## 2020-01-01 RX ADMIN — GUAIFENESIN 600 MG: 600 TABLET, EXTENDED RELEASE ORAL at 22:13

## 2020-01-01 RX ADMIN — FENOFIBRATE 48 MG: 48 TABLET ORAL at 08:55

## 2020-01-01 RX ADMIN — PRAVASTATIN SODIUM 10 MG: 20 TABLET ORAL at 21:22

## 2020-01-01 RX ADMIN — APIXABAN 5 MG: 5 TABLET, FILM COATED ORAL at 10:00

## 2020-01-01 RX ADMIN — AZITHROMYCIN 500 MG: 500 INJECTION, POWDER, LYOPHILIZED, FOR SOLUTION INTRAVENOUS at 21:35

## 2020-01-01 RX ADMIN — BUDESONIDE AND FORMOTEROL FUMARATE DIHYDRATE 2 PUFF: 160; 4.5 AEROSOL RESPIRATORY (INHALATION) at 21:16

## 2020-01-01 RX ADMIN — APIXABAN 5 MG: 5 TABLET, FILM COATED ORAL at 10:16

## 2020-01-01 RX ADMIN — ALBUTEROL SULFATE 2 PUFF: 90 AEROSOL, METERED RESPIRATORY (INHALATION) at 18:33

## 2020-01-01 RX ADMIN — CETIRIZINE HYDROCHLORIDE 10 MG: 10 TABLET, FILM COATED ORAL at 08:41

## 2020-01-01 RX ADMIN — ALBUTEROL SULFATE 2 PUFF: 90 AEROSOL, METERED RESPIRATORY (INHALATION) at 08:05

## 2020-01-01 RX ADMIN — ROFLUMILAST 500 MCG: 500 TABLET ORAL at 10:19

## 2020-01-01 RX ADMIN — Medication 1 CAPSULE: at 10:14

## 2020-01-01 RX ADMIN — INSULIN ASPART 9 UNITS: 100 INJECTION, SOLUTION INTRAVENOUS; SUBCUTANEOUS at 21:35

## 2020-01-01 RX ADMIN — METOCLOPRAMIDE HYDROCHLORIDE 5 MG: 5 TABLET ORAL at 08:35

## 2020-01-01 RX ADMIN — SODIUM CHLORIDE, PRESERVATIVE FREE 10 ML: 5 INJECTION INTRAVENOUS at 00:15

## 2020-01-01 RX ADMIN — INSULIN ASPART 12 UNITS: 100 INJECTION, SOLUTION INTRAVENOUS; SUBCUTANEOUS at 11:13

## 2020-01-01 RX ADMIN — ALLOPURINOL 300 MG: 100 TABLET ORAL at 09:35

## 2020-01-01 RX ADMIN — ALLOPURINOL 300 MG: 100 TABLET ORAL at 10:00

## 2020-01-01 RX ADMIN — INSULIN ASPART 7 UNITS: 100 INJECTION, SOLUTION INTRAVENOUS; SUBCUTANEOUS at 12:10

## 2020-01-01 RX ADMIN — IPRATROPIUM BROMIDE AND ALBUTEROL SULFATE 3 ML: 2.5; .5 SOLUTION RESPIRATORY (INHALATION) at 19:43

## 2020-01-01 RX ADMIN — LIDOCAINE HYDROCHLORIDE: 20 SOLUTION ORAL; TOPICAL at 12:47

## 2020-01-01 RX ADMIN — INSULIN LISPRO 20 UNITS: 100 INJECTION, SOLUTION INTRAVENOUS; SUBCUTANEOUS at 23:22

## 2020-01-01 RX ADMIN — PRAVASTATIN SODIUM 10 MG: 20 TABLET ORAL at 20:45

## 2020-01-01 RX ADMIN — APIXABAN 5 MG: 5 TABLET, FILM COATED ORAL at 09:55

## 2020-01-01 RX ADMIN — BUSPIRONE HYDROCHLORIDE 10 MG: 10 TABLET ORAL at 21:21

## 2020-01-01 RX ADMIN — SODIUM CHLORIDE, PRESERVATIVE FREE 10 ML: 5 INJECTION INTRAVENOUS at 21:22

## 2020-01-01 RX ADMIN — Medication 1 CAPSULE: at 08:01

## 2020-01-01 RX ADMIN — FLUTICASONE PROPIONATE 2 SPRAY: 50 SPRAY, METERED NASAL at 09:19

## 2020-01-01 RX ADMIN — AMOXICILLIN AND CLAVULANATE POTASSIUM 1 TABLET: 875; 125 TABLET, FILM COATED ORAL at 08:55

## 2020-01-01 RX ADMIN — IPRATROPIUM BROMIDE 0.5 MG: 0.5 SOLUTION RESPIRATORY (INHALATION) at 12:45

## 2020-01-01 RX ADMIN — BUDESONIDE 1 MG: 0.5 INHALANT RESPIRATORY (INHALATION) at 07:22

## 2020-01-01 RX ADMIN — METOCLOPRAMIDE 5 MG: 5 TABLET ORAL at 09:59

## 2020-01-01 RX ADMIN — BUSPIRONE HYDROCHLORIDE 10 MG: 10 TABLET ORAL at 22:57

## 2020-01-01 RX ADMIN — ROPINIROLE HYDROCHLORIDE 0.5 MG: 0.25 TABLET, FILM COATED ORAL at 21:30

## 2020-01-01 RX ADMIN — FENOFIBRATE 145 MG: 145 TABLET ORAL at 08:40

## 2020-01-01 RX ADMIN — ALBUTEROL SULFATE 2 PUFF: 90 AEROSOL, METERED RESPIRATORY (INHALATION) at 03:00

## 2020-01-01 RX ADMIN — CEFTRIAXONE 1 G: 1 INJECTION, SOLUTION INTRAVENOUS at 16:19

## 2020-01-01 RX ADMIN — DILTIAZEM HYDROCHLORIDE 180 MG: 180 CAPSULE, COATED, EXTENDED RELEASE ORAL at 08:33

## 2020-01-01 RX ADMIN — MONTELUKAST 10 MG: 10 TABLET, FILM COATED ORAL at 20:46

## 2020-01-01 RX ADMIN — TAZOBACTAM SODIUM AND PIPERACILLIN SODIUM 3.38 G: 375; 3 INJECTION, SOLUTION INTRAVENOUS at 20:53

## 2020-01-01 RX ADMIN — BUDESONIDE 1 MG: 0.5 INHALANT RESPIRATORY (INHALATION) at 20:34

## 2020-01-01 RX ADMIN — SODIUM CHLORIDE 30 MG/ML INHALATION SOLUTION 4 ML: 30 SOLUTION INHALANT at 19:48

## 2020-01-01 RX ADMIN — METHYLPREDNISOLONE SODIUM SUCCINATE 60 MG: 125 INJECTION, POWDER, FOR SOLUTION INTRAMUSCULAR; INTRAVENOUS at 15:49

## 2020-01-01 RX ADMIN — OSELTAMIVIR PHOSPHATE 30 MG: 30 CAPSULE ORAL at 08:00

## 2020-01-01 RX ADMIN — METHYLPREDNISOLONE SODIUM SUCCINATE 40 MG: 40 INJECTION, POWDER, FOR SOLUTION INTRAMUSCULAR; INTRAVENOUS at 14:56

## 2020-01-01 RX ADMIN — BUDESONIDE 1 MG: 0.5 INHALANT RESPIRATORY (INHALATION) at 07:04

## 2020-01-01 RX ADMIN — SODIUM CHLORIDE, PRESERVATIVE FREE 10 ML: 5 INJECTION INTRAVENOUS at 21:46

## 2020-01-01 RX ADMIN — METOPROLOL SUCCINATE 100 MG: 100 TABLET, EXTENDED RELEASE ORAL at 08:00

## 2020-01-01 RX ADMIN — TAZOBACTAM SODIUM AND PIPERACILLIN SODIUM 3.38 G: 375; 3 INJECTION, SOLUTION INTRAVENOUS at 12:22

## 2020-01-01 RX ADMIN — METOCLOPRAMIDE HYDROCHLORIDE 5 MG: 5 TABLET ORAL at 08:01

## 2020-01-01 RX ADMIN — INSULIN ASPART 16 UNITS: 100 INJECTION, SOLUTION INTRAVENOUS; SUBCUTANEOUS at 11:04

## 2020-01-01 RX ADMIN — METOPROLOL SUCCINATE 100 MG: 100 TABLET, EXTENDED RELEASE ORAL at 09:40

## 2020-01-01 RX ADMIN — INSULIN ASPART 15 UNITS: 100 INJECTION, SOLUTION INTRAVENOUS; SUBCUTANEOUS at 19:43

## 2020-01-01 RX ADMIN — ATORVASTATIN CALCIUM 40 MG: 40 TABLET, FILM COATED ORAL at 21:16

## 2020-01-01 RX ADMIN — DILTIAZEM HYDROCHLORIDE 180 MG: 180 CAPSULE, COATED, EXTENDED RELEASE ORAL at 09:48

## 2020-01-01 RX ADMIN — Medication 1 CAPSULE: at 17:01

## 2020-01-01 RX ADMIN — METOPROLOL SUCCINATE 100 MG: 100 TABLET, EXTENDED RELEASE ORAL at 09:37

## 2020-01-01 RX ADMIN — INSULIN DETEMIR 80 UNITS: 100 INJECTION, SOLUTION SUBCUTANEOUS at 20:50

## 2020-01-01 RX ADMIN — METOPROLOL SUCCINATE 100 MG: 100 TABLET, EXTENDED RELEASE ORAL at 08:26

## 2020-01-01 RX ADMIN — METOPROLOL SUCCINATE 100 MG: 100 TABLET, EXTENDED RELEASE ORAL at 08:42

## 2020-01-01 RX ADMIN — GUAIFENESIN 600 MG: 600 TABLET, EXTENDED RELEASE ORAL at 08:59

## 2020-01-01 RX ADMIN — IPRATROPIUM BROMIDE AND ALBUTEROL SULFATE 3 ML: .5; 3 SOLUTION RESPIRATORY (INHALATION) at 12:55

## 2020-01-01 RX ADMIN — TRAMADOL HYDROCHLORIDE 50 MG: 50 TABLET, FILM COATED ORAL at 09:58

## 2020-01-01 RX ADMIN — ALLOPURINOL 300 MG: 300 TABLET ORAL at 14:47

## 2020-01-01 RX ADMIN — FENOFIBRATE 48 MG: 48 TABLET ORAL at 10:16

## 2020-01-01 RX ADMIN — INSULIN DETEMIR 60 UNITS: 100 INJECTION, SOLUTION SUBCUTANEOUS at 21:36

## 2020-01-01 RX ADMIN — BUDESONIDE 0.5 MG: 0.5 INHALANT RESPIRATORY (INHALATION) at 06:58

## 2020-01-01 RX ADMIN — ALPRAZOLAM 0.5 MG: 0.5 TABLET ORAL at 21:41

## 2020-01-01 RX ADMIN — Medication 1 CAPSULE: at 09:58

## 2020-01-01 RX ADMIN — METOCLOPRAMIDE 5 MG: 5 TABLET ORAL at 20:47

## 2020-01-01 RX ADMIN — TAZOBACTAM SODIUM AND PIPERACILLIN SODIUM 3.38 G: 375; 3 INJECTION, SOLUTION INTRAVENOUS at 12:16

## 2020-01-01 RX ADMIN — FUROSEMIDE 40 MG: 10 INJECTION, SOLUTION INTRAMUSCULAR; INTRAVENOUS at 17:07

## 2020-01-01 RX ADMIN — FENOFIBRATE 145 MG: 145 TABLET ORAL at 10:01

## 2020-01-01 RX ADMIN — BUSPIRONE HYDROCHLORIDE 10 MG: 10 TABLET ORAL at 17:07

## 2020-01-01 RX ADMIN — CEFTRIAXONE 1 G: 1 INJECTION, SOLUTION INTRAVENOUS at 17:24

## 2020-01-01 RX ADMIN — CETIRIZINE HYDROCHLORIDE 10 MG: 10 TABLET, FILM COATED ORAL at 10:15

## 2020-01-01 RX ADMIN — PREDNISONE 40 MG: 20 TABLET ORAL at 14:46

## 2020-01-01 RX ADMIN — INSULIN LISPRO 6 UNITS: 100 INJECTION, SOLUTION INTRAVENOUS; SUBCUTANEOUS at 18:27

## 2020-01-01 RX ADMIN — BUSPIRONE HYDROCHLORIDE 10 MG: 10 TABLET ORAL at 22:12

## 2020-01-01 RX ADMIN — SODIUM CHLORIDE, PRESERVATIVE FREE 10 ML: 5 INJECTION INTRAVENOUS at 21:57

## 2020-01-01 RX ADMIN — HYDROCODONE BITARTRATE AND ACETAMINOPHEN 1 TABLET: 5; 325 TABLET ORAL at 22:16

## 2020-01-01 RX ADMIN — PREDNISONE 40 MG: 20 TABLET ORAL at 10:00

## 2020-01-01 RX ADMIN — INSULIN DETEMIR 25 UNITS: 100 INJECTION, SOLUTION SUBCUTANEOUS at 08:57

## 2020-01-01 RX ADMIN — BUSPIRONE HYDROCHLORIDE 10 MG: 10 TABLET ORAL at 16:50

## 2020-01-01 RX ADMIN — BUSPIRONE HYDROCHLORIDE 10 MG: 10 TABLET ORAL at 17:49

## 2020-01-01 RX ADMIN — BUSPIRONE HYDROCHLORIDE 10 MG: 10 TABLET ORAL at 17:55

## 2020-01-01 RX ADMIN — BUSPIRONE HYDROCHLORIDE 10 MG: 10 TABLET ORAL at 15:24

## 2020-01-01 RX ADMIN — IPRATROPIUM BROMIDE AND ALBUTEROL SULFATE 3 ML: 2.5; .5 SOLUTION RESPIRATORY (INHALATION) at 20:25

## 2020-01-01 RX ADMIN — OSELTAMIVIR PHOSPHATE 30 MG: 30 CAPSULE ORAL at 08:45

## 2020-01-01 RX ADMIN — INSULIN LISPRO 24 UNITS: 100 INJECTION, SOLUTION INTRAVENOUS; SUBCUTANEOUS at 06:05

## 2020-01-01 RX ADMIN — ASPIRIN 81 MG: 81 TABLET, COATED ORAL at 14:47

## 2020-01-01 RX ADMIN — ROPINIROLE HYDROCHLORIDE 0.5 MG: 0.25 TABLET, FILM COATED ORAL at 20:48

## 2020-01-01 RX ADMIN — LINAGLIPTIN 2.5 MG: 5 TABLET, FILM COATED ORAL at 09:53

## 2020-01-01 RX ADMIN — PRAVASTATIN SODIUM 10 MG: 20 TABLET ORAL at 20:46

## 2020-01-01 RX ADMIN — BUSPIRONE HYDROCHLORIDE 10 MG: 10 TABLET ORAL at 22:39

## 2020-01-01 RX ADMIN — BUDESONIDE AND FORMOTEROL FUMARATE DIHYDRATE 2 PUFF: 160; 4.5 AEROSOL RESPIRATORY (INHALATION) at 08:12

## 2020-01-01 RX ADMIN — ALLOPURINOL 150 MG: 100 TABLET ORAL at 08:30

## 2020-01-01 RX ADMIN — IPRATROPIUM BROMIDE AND ALBUTEROL SULFATE 3 ML: 2.5; .5 SOLUTION RESPIRATORY (INHALATION) at 16:50

## 2020-01-01 RX ADMIN — FERROUS SULFATE TAB 325 MG (65 MG ELEMENTAL FE) 325 MG: 325 (65 FE) TAB at 10:04

## 2020-01-01 RX ADMIN — ROFLUMILAST 500 MCG: 500 TABLET ORAL at 08:55

## 2020-01-01 RX ADMIN — BUDESONIDE AND FORMOTEROL FUMARATE DIHYDRATE 2 PUFF: 160; 4.5 AEROSOL RESPIRATORY (INHALATION) at 07:57

## 2020-01-01 RX ADMIN — ENOXAPARIN SODIUM 30 MG: 30 INJECTION SUBCUTANEOUS at 22:10

## 2020-01-01 RX ADMIN — ASPIRIN 81 MG: 81 TABLET, COATED ORAL at 08:25

## 2020-01-01 RX ADMIN — IPRATROPIUM BROMIDE 0.5 MG: 0.5 SOLUTION RESPIRATORY (INHALATION) at 07:02

## 2020-01-01 RX ADMIN — INSULIN ASPART 16 UNITS: 100 INJECTION, SOLUTION INTRAVENOUS; SUBCUTANEOUS at 21:14

## 2020-01-01 RX ADMIN — ALLOPURINOL 300 MG: 300 TABLET ORAL at 10:14

## 2020-01-01 RX ADMIN — ROPINIROLE HYDROCHLORIDE 0.5 MG: 0.25 TABLET, FILM COATED ORAL at 21:45

## 2020-01-01 RX ADMIN — FUROSEMIDE 40 MG: 40 TABLET ORAL at 10:14

## 2020-01-01 RX ADMIN — APIXABAN 5 MG: 5 TABLET, FILM COATED ORAL at 20:49

## 2020-01-01 RX ADMIN — IPRATROPIUM BROMIDE 0.5 MG: 0.5 SOLUTION RESPIRATORY (INHALATION) at 07:26

## 2020-01-01 RX ADMIN — Medication 1 CAPSULE: at 21:06

## 2020-01-01 RX ADMIN — ROPINIROLE HYDROCHLORIDE 0.5 MG: 0.25 TABLET, FILM COATED ORAL at 09:53

## 2020-01-01 RX ADMIN — INSULIN LISPRO 24 UNITS: 100 INJECTION, SOLUTION INTRAVENOUS; SUBCUTANEOUS at 13:38

## 2020-01-01 RX ADMIN — MELATONIN TAB 5 MG 5 MG: 5 TAB at 23:22

## 2020-01-01 RX ADMIN — ROPINIROLE HYDROCHLORIDE 0.5 MG: 0.25 TABLET, FILM COATED ORAL at 08:34

## 2020-01-01 RX ADMIN — INSULIN LISPRO 20 UNITS: 100 INJECTION, SOLUTION INTRAVENOUS; SUBCUTANEOUS at 08:48

## 2020-01-01 RX ADMIN — ROPINIROLE HYDROCHLORIDE 0.5 MG: 0.25 TABLET, FILM COATED ORAL at 20:50

## 2020-01-01 RX ADMIN — METOCLOPRAMIDE HYDROCHLORIDE 5 MG: 5 TABLET ORAL at 15:56

## 2020-01-01 RX ADMIN — ALLOPURINOL 150 MG: 100 TABLET ORAL at 08:20

## 2020-01-01 RX ADMIN — MONTELUKAST 10 MG: 10 TABLET, FILM COATED ORAL at 22:04

## 2020-01-01 RX ADMIN — MUPIROCIN: 20 OINTMENT TOPICAL at 08:42

## 2020-01-01 RX ADMIN — METOCLOPRAMIDE HYDROCHLORIDE 5 MG: 5 TABLET ORAL at 20:18

## 2020-01-01 RX ADMIN — MONTELUKAST 10 MG: 10 TABLET, FILM COATED ORAL at 20:29

## 2020-01-01 RX ADMIN — GUAIFENESIN 600 MG: 600 TABLET, EXTENDED RELEASE ORAL at 22:20

## 2020-01-01 RX ADMIN — IPRATROPIUM BROMIDE AND ALBUTEROL SULFATE 3 ML: 2.5; .5 SOLUTION RESPIRATORY (INHALATION) at 19:47

## 2020-01-01 RX ADMIN — INSULIN ASPART 16 UNITS: 100 INJECTION, SOLUTION INTRAVENOUS; SUBCUTANEOUS at 22:00

## 2020-01-01 RX ADMIN — SUCRALFATE 1 G: 1 TABLET ORAL at 15:24

## 2020-01-01 RX ADMIN — INSULIN ASPART 8 UNITS: 100 INJECTION, SOLUTION INTRAVENOUS; SUBCUTANEOUS at 11:54

## 2020-01-01 RX ADMIN — ROPINIROLE HYDROCHLORIDE 0.5 MG: 0.25 TABLET, FILM COATED ORAL at 12:08

## 2020-01-01 RX ADMIN — AMOXICILLIN AND CLAVULANATE POTASSIUM 1 TABLET: 875; 125 TABLET, FILM COATED ORAL at 16:12

## 2020-01-01 RX ADMIN — Medication 1 CAPSULE: at 08:55

## 2020-01-01 RX ADMIN — METOPROLOL SUCCINATE 100 MG: 100 TABLET, EXTENDED RELEASE ORAL at 12:11

## 2020-01-01 RX ADMIN — ROPINIROLE 0.5 MG: 0.5 TABLET, FILM COATED ORAL at 20:29

## 2020-01-01 RX ADMIN — ROPINIROLE HYDROCHLORIDE 0.5 MG: 0.25 TABLET, FILM COATED ORAL at 20:37

## 2020-01-01 RX ADMIN — TRAMADOL HYDROCHLORIDE 50 MG: 50 TABLET, FILM COATED ORAL at 17:07

## 2020-01-01 RX ADMIN — INSULIN DETEMIR 15 UNITS: 100 INJECTION, SOLUTION SUBCUTANEOUS at 09:00

## 2020-01-01 RX ADMIN — ALPRAZOLAM 0.5 MG: 0.5 TABLET ORAL at 22:16

## 2020-01-01 RX ADMIN — FENOFIBRATE 145 MG: 145 TABLET ORAL at 09:25

## 2020-01-01 RX ADMIN — INSULIN DETEMIR 10 UNITS: 100 INJECTION, SOLUTION SUBCUTANEOUS at 21:44

## 2020-01-01 RX ADMIN — METOCLOPRAMIDE HYDROCHLORIDE 5 MG: 5 TABLET ORAL at 09:19

## 2020-01-01 RX ADMIN — TAZOBACTAM SODIUM AND PIPERACILLIN SODIUM 3.38 G: 375; 3 INJECTION, SOLUTION INTRAVENOUS at 05:30

## 2020-01-01 RX ADMIN — TAZOBACTAM SODIUM AND PIPERACILLIN SODIUM 3.38 G: 375; 3 INJECTION, SOLUTION INTRAVENOUS at 04:20

## 2020-01-01 RX ADMIN — METHYLPREDNISOLONE ACETATE 80 MG: 80 INJECTION, SUSPENSION INTRA-ARTICULAR; INTRALESIONAL; INTRAMUSCULAR; SOFT TISSUE at 11:41

## 2020-01-01 RX ADMIN — INSULIN ASPART 35 UNITS: 100 INJECTION, SOLUTION INTRAVENOUS; SUBCUTANEOUS at 16:49

## 2020-01-01 RX ADMIN — CETIRIZINE HYDROCHLORIDE 10 MG: 10 TABLET, FILM COATED ORAL at 09:23

## 2020-01-01 RX ADMIN — INSULIN DETEMIR 15 UNITS: 100 INJECTION, SOLUTION SUBCUTANEOUS at 22:23

## 2020-01-01 RX ADMIN — INSULIN DETEMIR 40 UNITS: 100 INJECTION, SOLUTION SUBCUTANEOUS at 06:31

## 2020-01-01 RX ADMIN — MONTELUKAST 10 MG: 10 TABLET, FILM COATED ORAL at 20:37

## 2020-01-01 RX ADMIN — SODIUM CHLORIDE, PRESERVATIVE FREE 10 ML: 5 INJECTION INTRAVENOUS at 21:00

## 2020-01-01 RX ADMIN — Medication 1 CAPSULE: at 14:46

## 2020-01-01 RX ADMIN — CETIRIZINE HYDROCHLORIDE 10 MG: 10 TABLET, FILM COATED ORAL at 10:05

## 2020-01-01 RX ADMIN — ALBUTEROL SULFATE 2 PUFF: 90 AEROSOL, METERED RESPIRATORY (INHALATION) at 07:08

## 2020-01-01 RX ADMIN — BUDESONIDE AND FORMOTEROL FUMARATE DIHYDRATE 2 PUFF: 160; 4.5 AEROSOL RESPIRATORY (INHALATION) at 19:22

## 2020-01-01 RX ADMIN — DOXYCYCLINE 100 MG: 100 INJECTION, POWDER, LYOPHILIZED, FOR SOLUTION INTRAVENOUS at 00:01

## 2020-01-01 RX ADMIN — PANTOPRAZOLE SODIUM 40 MG: 40 TABLET, DELAYED RELEASE ORAL at 05:24

## 2020-01-01 RX ADMIN — ROPINIROLE HYDROCHLORIDE 0.5 MG: 0.25 TABLET, FILM COATED ORAL at 09:13

## 2020-01-01 RX ADMIN — METOPROLOL SUCCINATE 100 MG: 100 TABLET, EXTENDED RELEASE ORAL at 10:03

## 2020-01-01 RX ADMIN — ATORVASTATIN CALCIUM 10 MG: 10 TABLET, FILM COATED ORAL at 09:20

## 2020-01-01 RX ADMIN — Medication 1 CAPSULE: at 20:48

## 2020-01-01 RX ADMIN — SUCRALFATE 1 G: 1 TABLET ORAL at 09:47

## 2020-01-01 RX ADMIN — FLUTICASONE PROPIONATE 2 SPRAY: 50 SPRAY, METERED NASAL at 11:34

## 2020-01-01 RX ADMIN — LINAGLIPTIN 2.5 MG: 5 TABLET, FILM COATED ORAL at 09:25

## 2020-01-01 RX ADMIN — INSULIN ASPART 8 UNITS: 100 INJECTION, SOLUTION INTRAVENOUS; SUBCUTANEOUS at 05:12

## 2020-01-01 RX ADMIN — PANTOPRAZOLE SODIUM 40 MG: 40 TABLET, DELAYED RELEASE ORAL at 06:30

## 2020-01-01 RX ADMIN — IPRATROPIUM BROMIDE AND ALBUTEROL SULFATE 3 ML: 2.5; .5 SOLUTION RESPIRATORY (INHALATION) at 11:36

## 2020-01-01 RX ADMIN — SODIUM CHLORIDE 500 ML: 9 INJECTION, SOLUTION INTRAVENOUS at 13:30

## 2020-01-01 RX ADMIN — INSULIN ASPART 9 UNITS: 100 INJECTION, SOLUTION INTRAVENOUS; SUBCUTANEOUS at 22:05

## 2020-01-01 RX ADMIN — BUSPIRONE HYDROCHLORIDE 10 MG: 10 TABLET ORAL at 12:10

## 2020-01-01 RX ADMIN — FENOFIBRATE 145 MG: 145 TABLET ORAL at 08:20

## 2020-01-01 RX ADMIN — SODIUM CHLORIDE 30 MG/ML INHALATION SOLUTION 4 ML: 30 SOLUTION INHALANT at 07:03

## 2020-01-01 RX ADMIN — CALCIUM CARBONATE 750 MG: 750 TABLET ORAL at 04:11

## 2020-01-01 RX ADMIN — CHLORTHALIDONE 12.5 MG: 25 TABLET ORAL at 12:07

## 2020-01-01 RX ADMIN — METOCLOPRAMIDE HYDROCHLORIDE 5 MG: 5 TABLET ORAL at 20:45

## 2020-01-01 RX ADMIN — APIXABAN 5 MG: 5 TABLET, FILM COATED ORAL at 08:41

## 2020-01-01 RX ADMIN — IPRATROPIUM BROMIDE AND ALBUTEROL SULFATE 3 ML: .5; 3 SOLUTION RESPIRATORY (INHALATION) at 19:39

## 2020-01-01 RX ADMIN — METOCLOPRAMIDE 5 MG: 5 TABLET ORAL at 15:24

## 2020-01-01 RX ADMIN — SODIUM CHLORIDE, PRESERVATIVE FREE 10 ML: 5 INJECTION INTRAVENOUS at 20:15

## 2020-01-01 RX ADMIN — GUAIFENESIN 1200 MG: 600 TABLET, EXTENDED RELEASE ORAL at 22:03

## 2020-01-01 RX ADMIN — LINAGLIPTIN 5 MG: 5 TABLET, FILM COATED ORAL at 12:09

## 2020-01-01 RX ADMIN — SUCRALFATE 1 G: 1 TABLET ORAL at 22:39

## 2020-01-01 RX ADMIN — ROPINIROLE HYDROCHLORIDE 0.5 MG: 0.25 TABLET, FILM COATED ORAL at 09:25

## 2020-01-01 RX ADMIN — IPRATROPIUM BROMIDE 0.5 MG: 0.5 SOLUTION RESPIRATORY (INHALATION) at 00:18

## 2020-01-01 RX ADMIN — IPRATROPIUM BROMIDE 0.5 MG: 0.5 SOLUTION RESPIRATORY (INHALATION) at 06:55

## 2020-01-01 RX ADMIN — REMDESIVIR 100 MG: 100 INJECTION, POWDER, LYOPHILIZED, FOR SOLUTION INTRAVENOUS at 17:34

## 2020-01-01 RX ADMIN — METHYLPREDNISOLONE SODIUM SUCCINATE 40 MG: 40 INJECTION, POWDER, FOR SOLUTION INTRAMUSCULAR; INTRAVENOUS at 16:02

## 2020-01-01 RX ADMIN — METOCLOPRAMIDE 5 MG: 5 TABLET ORAL at 12:10

## 2020-01-01 RX ADMIN — BUDESONIDE AND FORMOTEROL FUMARATE DIHYDRATE 2 PUFF: 160; 4.5 AEROSOL RESPIRATORY (INHALATION) at 07:28

## 2020-01-01 RX ADMIN — METHYLPREDNISOLONE SODIUM SUCCINATE 40 MG: 40 INJECTION, POWDER, FOR SOLUTION INTRAMUSCULAR; INTRAVENOUS at 02:15

## 2020-01-01 RX ADMIN — IPRATROPIUM BROMIDE AND ALBUTEROL SULFATE 3 ML: 2.5; .5 SOLUTION RESPIRATORY (INHALATION) at 15:52

## 2020-01-01 RX ADMIN — SODIUM CHLORIDE, PRESERVATIVE FREE 10 ML: 5 INJECTION INTRAVENOUS at 21:40

## 2020-01-01 RX ADMIN — BUSPIRONE HYDROCHLORIDE 10 MG: 10 TABLET ORAL at 21:20

## 2020-01-01 RX ADMIN — INSULIN DETEMIR 80 UNITS: 100 INJECTION, SOLUTION SUBCUTANEOUS at 21:34

## 2020-01-01 RX ADMIN — DILTIAZEM HYDROCHLORIDE 180 MG: 180 CAPSULE, COATED, EXTENDED RELEASE ORAL at 08:41

## 2020-01-01 RX ADMIN — SODIUM CHLORIDE, PRESERVATIVE FREE 10 ML: 5 INJECTION INTRAVENOUS at 21:29

## 2020-01-01 RX ADMIN — CHLORTHALIDONE 12.5 MG: 25 TABLET ORAL at 10:09

## 2020-01-01 RX ADMIN — ALPRAZOLAM 0.5 MG: 0.5 TABLET ORAL at 21:08

## 2020-01-01 RX ADMIN — FUROSEMIDE 40 MG: 40 TABLET ORAL at 09:54

## 2020-01-01 RX ADMIN — FAMOTIDINE 40 MG: 20 TABLET, FILM COATED ORAL at 12:22

## 2020-01-01 RX ADMIN — FUROSEMIDE 40 MG: 40 TABLET ORAL at 18:24

## 2020-01-01 RX ADMIN — INSULIN DETEMIR 15 UNITS: 100 INJECTION, SOLUTION SUBCUTANEOUS at 09:37

## 2020-01-01 RX ADMIN — APIXABAN 5 MG: 5 TABLET, FILM COATED ORAL at 22:20

## 2020-01-01 RX ADMIN — PROPOFOL 20 MG: 10 INJECTION, EMULSION INTRAVENOUS at 14:39

## 2020-01-01 RX ADMIN — PREDNISONE 40 MG: 20 TABLET ORAL at 09:40

## 2020-01-01 RX ADMIN — INSULIN DETEMIR 30 UNITS: 100 INJECTION, SOLUTION SUBCUTANEOUS at 06:45

## 2020-01-01 RX ADMIN — IPRATROPIUM BROMIDE AND ALBUTEROL SULFATE 3 ML: .5; 3 SOLUTION RESPIRATORY (INHALATION) at 13:07

## 2020-01-01 RX ADMIN — SODIUM CHLORIDE 60 ML/HR: 9 INJECTION, SOLUTION INTRAVENOUS at 02:00

## 2020-01-01 RX ADMIN — ASPIRIN 81 MG: 81 TABLET, COATED ORAL at 09:25

## 2020-01-01 RX ADMIN — FLUTICASONE PROPIONATE 2 SPRAY: 50 SPRAY, METERED NASAL at 08:34

## 2020-01-01 RX ADMIN — INSULIN ASPART 16 UNITS: 100 INJECTION, SOLUTION INTRAVENOUS; SUBCUTANEOUS at 11:14

## 2020-01-01 RX ADMIN — FUROSEMIDE 40 MG: 40 TABLET ORAL at 08:45

## 2020-01-01 RX ADMIN — ROPINIROLE HYDROCHLORIDE 0.5 MG: 0.25 TABLET, FILM COATED ORAL at 08:23

## 2020-01-01 RX ADMIN — LINAGLIPTIN 2.5 MG: 5 TABLET, FILM COATED ORAL at 08:36

## 2020-01-01 RX ADMIN — ONDANSETRON 4 MG: 2 INJECTION INTRAMUSCULAR; INTRAVENOUS at 16:57

## 2020-01-01 RX ADMIN — BUSPIRONE HYDROCHLORIDE 10 MG: 10 TABLET ORAL at 21:16

## 2020-01-01 RX ADMIN — FLUTICASONE PROPIONATE 2 SPRAY: 50 SPRAY, METERED NASAL at 10:01

## 2020-01-01 RX ADMIN — TAZOBACTAM SODIUM AND PIPERACILLIN SODIUM 3.38 G: 375; 3 INJECTION, SOLUTION INTRAVENOUS at 22:03

## 2020-01-01 RX ADMIN — REMDESIVIR 200 MG: 100 INJECTION, POWDER, LYOPHILIZED, FOR SOLUTION INTRAVENOUS at 18:34

## 2020-01-01 RX ADMIN — DOCUSATE SODIUM 200 MG: 100 CAPSULE ORAL at 18:35

## 2020-01-01 RX ADMIN — IPRATROPIUM BROMIDE 0.5 MG: 0.5 SOLUTION RESPIRATORY (INHALATION) at 12:52

## 2020-01-01 RX ADMIN — METOCLOPRAMIDE HYDROCHLORIDE 5 MG: 5 TABLET ORAL at 08:00

## 2020-01-01 RX ADMIN — METHYLPREDNISOLONE SODIUM SUCCINATE 60 MG: 125 INJECTION, POWDER, FOR SOLUTION INTRAMUSCULAR; INTRAVENOUS at 08:44

## 2020-01-01 RX ADMIN — INSULIN DETEMIR 80 UNITS: 100 INJECTION, SOLUTION SUBCUTANEOUS at 21:31

## 2020-01-01 RX ADMIN — IPRATROPIUM BROMIDE 0.5 MG: 0.5 SOLUTION RESPIRATORY (INHALATION) at 13:32

## 2020-01-01 RX ADMIN — FLUTICASONE PROPIONATE 1 SPRAY: 50 SPRAY, METERED NASAL at 10:19

## 2020-01-01 RX ADMIN — IPRATROPIUM BROMIDE AND ALBUTEROL SULFATE 3 ML: 2.5; .5 SOLUTION RESPIRATORY (INHALATION) at 19:25

## 2020-01-01 RX ADMIN — IPRATROPIUM BROMIDE 0.5 MG: 0.5 SOLUTION RESPIRATORY (INHALATION) at 20:01

## 2020-01-01 RX ADMIN — BUSPIRONE HYDROCHLORIDE 10 MG: 10 TABLET ORAL at 16:10

## 2020-01-01 RX ADMIN — METOCLOPRAMIDE HYDROCHLORIDE 5 MG: 5 TABLET ORAL at 20:38

## 2020-01-01 RX ADMIN — CEFTRIAXONE 1 G: 1 INJECTION, SOLUTION INTRAVENOUS at 17:08

## 2020-01-01 RX ADMIN — APIXABAN 5 MG: 5 TABLET, FILM COATED ORAL at 21:44

## 2020-01-01 RX ADMIN — INSULIN LISPRO 8 UNITS: 100 INJECTION, SOLUTION INTRAVENOUS; SUBCUTANEOUS at 12:50

## 2020-01-01 RX ADMIN — GUAIFENESIN 600 MG: 600 TABLET, EXTENDED RELEASE ORAL at 19:53

## 2020-01-01 RX ADMIN — Medication 1 CAPSULE: at 08:31

## 2020-01-01 RX ADMIN — BUSPIRONE HYDROCHLORIDE 10 MG: 10 TABLET ORAL at 20:50

## 2020-01-01 RX ADMIN — GUAIFENESIN 600 MG: 600 TABLET, EXTENDED RELEASE ORAL at 20:49

## 2020-01-01 RX ADMIN — PANTOPRAZOLE SODIUM 40 MG: 40 TABLET, DELAYED RELEASE ORAL at 10:02

## 2020-01-01 RX ADMIN — LINAGLIPTIN 5 MG: 5 TABLET, FILM COATED ORAL at 08:54

## 2020-01-01 RX ADMIN — INSULIN ASPART 4 UNITS: 100 INJECTION, SOLUTION INTRAVENOUS; SUBCUTANEOUS at 06:55

## 2020-01-01 RX ADMIN — ALLOPURINOL 150 MG: 100 TABLET ORAL at 08:55

## 2020-01-01 RX ADMIN — METOCLOPRAMIDE HYDROCHLORIDE 5 MG: 5 TABLET ORAL at 20:48

## 2020-01-01 RX ADMIN — BUDESONIDE 0.5 MG: 0.5 INHALANT RESPIRATORY (INHALATION) at 19:32

## 2020-01-01 RX ADMIN — INSULIN ASPART 24 UNITS: 100 INJECTION, SOLUTION INTRAVENOUS; SUBCUTANEOUS at 20:50

## 2020-01-01 RX ADMIN — Medication 1000 UNITS: at 17:49

## 2020-01-01 RX ADMIN — INSULIN ASPART 35 UNITS: 100 INJECTION, SOLUTION INTRAVENOUS; SUBCUTANEOUS at 18:54

## 2020-01-01 RX ADMIN — BUSPIRONE HYDROCHLORIDE 10 MG: 10 TABLET ORAL at 17:12

## 2020-01-01 RX ADMIN — INSULIN ASPART 4 UNITS: 100 INJECTION, SOLUTION INTRAVENOUS; SUBCUTANEOUS at 11:34

## 2020-01-01 RX ADMIN — IPRATROPIUM BROMIDE 0.5 MG: 0.5 SOLUTION RESPIRATORY (INHALATION) at 13:43

## 2020-01-01 RX ADMIN — INSULIN DETEMIR 30 UNITS: 100 INJECTION, SOLUTION SUBCUTANEOUS at 07:19

## 2020-01-01 RX ADMIN — BUSPIRONE HYDROCHLORIDE 10 MG: 10 TABLET ORAL at 09:25

## 2020-01-01 RX ADMIN — BUSPIRONE HYDROCHLORIDE 10 MG: 10 TABLET ORAL at 21:44

## 2020-01-01 RX ADMIN — Medication 1 CAPSULE: at 22:18

## 2020-01-01 RX ADMIN — HYDROCODONE BITARTRATE AND ACETAMINOPHEN 1 TABLET: 5; 325 TABLET ORAL at 21:44

## 2020-01-01 RX ADMIN — BUDESONIDE 1 MG: 0.5 INHALANT RESPIRATORY (INHALATION) at 19:39

## 2020-01-01 RX ADMIN — TAZOBACTAM SODIUM AND PIPERACILLIN SODIUM 3.38 G: 375; 3 INJECTION, SOLUTION INTRAVENOUS at 04:10

## 2020-01-01 RX ADMIN — METHYLPREDNISOLONE SODIUM SUCCINATE 40 MG: 40 INJECTION, POWDER, LYOPHILIZED, FOR SOLUTION INTRAMUSCULAR; INTRAVENOUS at 13:39

## 2020-01-01 RX ADMIN — INSULIN LISPRO 6 UNITS: 100 INJECTION, SOLUTION INTRAVENOUS; SUBCUTANEOUS at 13:03

## 2020-01-01 RX ADMIN — INSULIN DETEMIR 50 UNITS: 100 INJECTION, SOLUTION SUBCUTANEOUS at 06:05

## 2020-01-01 RX ADMIN — PANTOPRAZOLE SODIUM 40 MG: 40 INJECTION, POWDER, FOR SOLUTION INTRAVENOUS at 10:14

## 2020-01-01 RX ADMIN — SODIUM CHLORIDE, PRESERVATIVE FREE 10 ML: 5 INJECTION INTRAVENOUS at 20:51

## 2020-01-01 RX ADMIN — POLYETHYLENE GLYCOL 3350 17 G: 17 POWDER, FOR SOLUTION ORAL at 08:41

## 2020-01-01 RX ADMIN — FUROSEMIDE 40 MG: 40 TABLET ORAL at 08:25

## 2020-01-01 RX ADMIN — BUDESONIDE 0.5 MG: 0.5 INHALANT RESPIRATORY (INHALATION) at 07:20

## 2020-01-01 RX ADMIN — AZITHROMYCIN 500 MG: 500 INJECTION, POWDER, LYOPHILIZED, FOR SOLUTION INTRAVENOUS at 18:34

## 2020-01-01 RX ADMIN — APIXABAN 5 MG: 5 TABLET, FILM COATED ORAL at 08:57

## 2020-01-01 RX ADMIN — FENOFIBRATE 48 MG: 48 TABLET ORAL at 14:47

## 2020-01-01 RX ADMIN — DILTIAZEM HYDROCHLORIDE 180 MG: 180 CAPSULE, COATED, EXTENDED RELEASE ORAL at 08:36

## 2020-01-01 RX ADMIN — GUAIFENESIN 600 MG: 600 TABLET, EXTENDED RELEASE ORAL at 08:33

## 2020-01-01 RX ADMIN — ALBUTEROL SULFATE 2 PUFF: 90 AEROSOL, METERED RESPIRATORY (INHALATION) at 11:56

## 2020-01-01 RX ADMIN — REMDESIVIR 100 MG: 100 INJECTION, POWDER, LYOPHILIZED, FOR SOLUTION INTRAVENOUS at 17:51

## 2020-01-01 RX ADMIN — IPRATROPIUM BROMIDE AND ALBUTEROL SULFATE 3 ML: .5; 3 SOLUTION RESPIRATORY (INHALATION) at 18:49

## 2020-01-01 RX ADMIN — BUSPIRONE HYDROCHLORIDE 10 MG: 10 TABLET ORAL at 22:04

## 2020-01-01 RX ADMIN — OSELTAMIVIR PHOSPHATE 30 MG: 30 CAPSULE ORAL at 20:29

## 2020-01-01 RX ADMIN — ROPINIROLE HYDROCHLORIDE 0.5 MG: 0.25 TABLET, FILM COATED ORAL at 21:40

## 2020-01-01 RX ADMIN — INSULIN ASPART 16 UNITS: 100 INJECTION, SOLUTION INTRAVENOUS; SUBCUTANEOUS at 16:56

## 2020-01-01 RX ADMIN — INSULIN LISPRO 3 UNITS: 100 INJECTION, SOLUTION INTRAVENOUS; SUBCUTANEOUS at 08:58

## 2020-01-01 RX ADMIN — BUSPIRONE HYDROCHLORIDE 10 MG: 10 TABLET ORAL at 09:00

## 2020-01-01 RX ADMIN — FLUTICASONE PROPIONATE 2 SPRAY: 50 SPRAY, METERED NASAL at 09:26

## 2020-01-01 RX ADMIN — SODIUM CHLORIDE 2000 MG: 9 INJECTION, SOLUTION INTRAVENOUS at 23:40

## 2020-01-01 RX ADMIN — METOCLOPRAMIDE HYDROCHLORIDE 5 MG: 5 TABLET ORAL at 19:53

## 2020-01-01 RX ADMIN — ONDANSETRON 4 MG: 2 INJECTION INTRAMUSCULAR; INTRAVENOUS at 09:17

## 2020-01-01 RX ADMIN — METOCLOPRAMIDE HYDROCHLORIDE 5 MG: 5 TABLET ORAL at 08:30

## 2020-01-01 RX ADMIN — PROPOFOL 20 MG: 10 INJECTION, EMULSION INTRAVENOUS at 14:51

## 2020-01-01 RX ADMIN — APIXABAN 5 MG: 5 TABLET, FILM COATED ORAL at 08:34

## 2020-01-01 RX ADMIN — APIXABAN 5 MG: 5 TABLET, FILM COATED ORAL at 08:42

## 2020-01-01 RX ADMIN — METOPROLOL SUCCINATE 25 MG: 25 TABLET, EXTENDED RELEASE ORAL at 11:34

## 2020-01-01 RX ADMIN — POLYETHYLENE GLYCOL 3350 17 G: 17 POWDER, FOR SOLUTION ORAL at 10:13

## 2020-01-01 RX ADMIN — INSULIN DETEMIR 50 UNITS: 100 INJECTION, SOLUTION SUBCUTANEOUS at 06:37

## 2020-01-01 RX ADMIN — AMOXICILLIN AND CLAVULANATE POTASSIUM 1 TABLET: 875; 125 TABLET, FILM COATED ORAL at 14:47

## 2020-01-01 RX ADMIN — FERROUS SULFATE TAB 325 MG (65 MG ELEMENTAL FE) 325 MG: 325 (65 FE) TAB at 13:39

## 2020-01-01 RX ADMIN — SUCRALFATE 1 G: 1 TABLET ORAL at 16:53

## 2020-01-01 RX ADMIN — ASPIRIN 81 MG: 81 TABLET, COATED ORAL at 09:58

## 2020-01-01 RX ADMIN — INSULIN DETEMIR 80 UNITS: 100 INJECTION, SOLUTION SUBCUTANEOUS at 21:49

## 2020-01-01 RX ADMIN — BUSPIRONE HYDROCHLORIDE 10 MG: 10 TABLET ORAL at 15:34

## 2020-01-01 RX ADMIN — ALBUTEROL SULFATE 2 PUFF: 90 AEROSOL, METERED RESPIRATORY (INHALATION) at 05:38

## 2020-01-01 RX ADMIN — INSULIN ASPART 4 UNITS: 100 INJECTION, SOLUTION INTRAVENOUS; SUBCUTANEOUS at 07:03

## 2020-01-01 RX ADMIN — CALCIUM CARBONATE 750 MG: 750 TABLET ORAL at 16:58

## 2020-01-01 RX ADMIN — INSULIN ASPART 6 UNITS: 100 INJECTION, SOLUTION INTRAVENOUS; SUBCUTANEOUS at 12:07

## 2020-01-01 RX ADMIN — ROPINIROLE HYDROCHLORIDE 0.5 MG: 0.25 TABLET, FILM COATED ORAL at 19:37

## 2020-01-01 RX ADMIN — TRAMADOL HYDROCHLORIDE 50 MG: 50 TABLET, FILM COATED ORAL at 21:22

## 2020-01-01 RX ADMIN — DILTIAZEM HYDROCHLORIDE 180 MG: 180 CAPSULE, COATED, EXTENDED RELEASE ORAL at 08:40

## 2020-01-01 RX ADMIN — SUCRALFATE 1 G: 1 TABLET ORAL at 10:09

## 2020-01-01 RX ADMIN — ALBUTEROL SULFATE 2 PUFF: 90 AEROSOL, METERED RESPIRATORY (INHALATION) at 15:30

## 2020-01-01 RX ADMIN — AZITHROMYCIN MONOHYDRATE 500 MG: 250 TABLET ORAL at 10:10

## 2020-01-01 RX ADMIN — ONDANSETRON 4 MG: 2 INJECTION INTRAMUSCULAR; INTRAVENOUS at 00:05

## 2020-01-01 RX ADMIN — APIXABAN 5 MG: 5 TABLET, FILM COATED ORAL at 14:47

## 2020-01-01 RX ADMIN — TRAMADOL HYDROCHLORIDE 50 MG: 50 TABLET, FILM COATED ORAL at 10:11

## 2020-01-01 RX ADMIN — FENTANYL CITRATE 100 MCG: 50 INJECTION INTRAMUSCULAR; INTRAVENOUS at 14:39

## 2020-01-01 RX ADMIN — BUSPIRONE HYDROCHLORIDE 10 MG: 10 TABLET ORAL at 21:24

## 2020-01-01 RX ADMIN — DOXYCYCLINE 100 MG: 100 INJECTION, POWDER, LYOPHILIZED, FOR SOLUTION INTRAVENOUS at 21:25

## 2020-01-01 RX ADMIN — ONDANSETRON 4 MG: 2 INJECTION INTRAMUSCULAR; INTRAVENOUS at 16:58

## 2020-01-01 RX ADMIN — FAMOTIDINE 40 MG: 20 TABLET, FILM COATED ORAL at 09:58

## 2020-01-01 RX ADMIN — METHYLPREDNISOLONE SODIUM SUCCINATE 40 MG: 40 INJECTION, POWDER, FOR SOLUTION INTRAMUSCULAR; INTRAVENOUS at 19:54

## 2020-01-01 RX ADMIN — ROPINIROLE HYDROCHLORIDE 0.5 MG: 0.25 TABLET, FILM COATED ORAL at 08:01

## 2020-01-01 RX ADMIN — CEFEPIME HYDROCHLORIDE 2 G: 2 INJECTION, POWDER, FOR SOLUTION INTRAVENOUS at 05:30

## 2020-01-01 RX ADMIN — FERROUS SULFATE TAB 325 MG (65 MG ELEMENTAL FE) 325 MG: 325 (65 FE) TAB at 12:50

## 2020-01-01 RX ADMIN — ALBUTEROL SULFATE 2 PUFF: 90 AEROSOL, METERED RESPIRATORY (INHALATION) at 00:30

## 2020-01-01 RX ADMIN — METOPROLOL SUCCINATE 100 MG: 100 TABLET, EXTENDED RELEASE ORAL at 08:41

## 2020-01-01 RX ADMIN — BUSPIRONE HYDROCHLORIDE 10 MG: 10 TABLET ORAL at 23:23

## 2020-01-01 RX ADMIN — AZITHROMYCIN 500 MG: 500 INJECTION, POWDER, LYOPHILIZED, FOR SOLUTION INTRAVENOUS at 17:36

## 2020-01-01 RX ADMIN — INSULIN LISPRO 9 UNITS: 100 INJECTION, SOLUTION INTRAVENOUS; SUBCUTANEOUS at 05:19

## 2020-01-01 RX ADMIN — LINAGLIPTIN 5 MG: 5 TABLET, FILM COATED ORAL at 10:08

## 2020-01-01 RX ADMIN — ENOXAPARIN SODIUM 50 MG: 60 INJECTION SUBCUTANEOUS at 01:26

## 2020-01-01 RX ADMIN — BUDESONIDE 1 MG: 0.5 INHALANT RESPIRATORY (INHALATION) at 20:54

## 2020-01-01 RX ADMIN — MUPIROCIN: 20 OINTMENT TOPICAL at 21:58

## 2020-01-01 RX ADMIN — ALBUTEROL SULFATE 2 PUFF: 90 AEROSOL, METERED RESPIRATORY (INHALATION) at 12:05

## 2020-01-01 RX ADMIN — GUAIFENESIN 1200 MG: 600 TABLET, EXTENDED RELEASE ORAL at 09:36

## 2020-01-01 RX ADMIN — MONTELUKAST 10 MG: 10 TABLET, FILM COATED ORAL at 19:53

## 2020-01-01 RX ADMIN — IPRATROPIUM BROMIDE 0.5 MG: 0.5 SOLUTION RESPIRATORY (INHALATION) at 00:35

## 2020-01-01 RX ADMIN — ROPINIROLE HYDROCHLORIDE 0.5 MG: 0.25 TABLET, FILM COATED ORAL at 21:21

## 2020-01-01 RX ADMIN — ALLOPURINOL 150 MG: 100 TABLET ORAL at 08:56

## 2020-01-01 RX ADMIN — FERROUS SULFATE TAB 325 MG (65 MG ELEMENTAL FE) 325 MG: 325 (65 FE) TAB at 12:47

## 2020-01-01 RX ADMIN — METOCLOPRAMIDE HYDROCHLORIDE 5 MG: 5 TABLET ORAL at 16:57

## 2020-01-01 RX ADMIN — SUCRALFATE 1 G: 1 TABLET ORAL at 20:49

## 2020-01-01 RX ADMIN — ROPINIROLE HYDROCHLORIDE 0.5 MG: 0.25 TABLET, FILM COATED ORAL at 22:12

## 2020-01-01 RX ADMIN — MAGNESIUM SULFATE HEPTAHYDRATE 2 G: 40 INJECTION, SOLUTION INTRAVENOUS at 08:40

## 2020-01-01 RX ADMIN — IPRATROPIUM BROMIDE AND ALBUTEROL SULFATE 3 ML: 2.5; .5 SOLUTION RESPIRATORY (INHALATION) at 12:53

## 2020-01-01 RX ADMIN — DEXTROSE 50 % IN WATER (D50W) INTRAVENOUS SYRINGE 25 G: at 06:19

## 2020-01-01 RX ADMIN — INSULIN LISPRO 16 UNITS: 100 INJECTION, SOLUTION INTRAVENOUS; SUBCUTANEOUS at 17:30

## 2020-01-01 RX ADMIN — ATORVASTATIN CALCIUM 10 MG: 10 TABLET, FILM COATED ORAL at 08:36

## 2020-01-01 RX ADMIN — IPRATROPIUM BROMIDE AND ALBUTEROL SULFATE 3 ML: .5; 3 SOLUTION RESPIRATORY (INHALATION) at 12:50

## 2020-01-01 RX ADMIN — Medication 1 CAPSULE: at 09:42

## 2020-01-01 RX ADMIN — DOXYCYCLINE 100 MG: 100 INJECTION, POWDER, LYOPHILIZED, FOR SOLUTION INTRAVENOUS at 22:10

## 2020-01-01 RX ADMIN — CHLORTHALIDONE 12.5 MG: 25 TABLET ORAL at 09:36

## 2020-01-01 RX ADMIN — PANTOPRAZOLE SODIUM 40 MG: 40 TABLET, DELAYED RELEASE ORAL at 06:56

## 2020-01-01 RX ADMIN — ASPIRIN 81 MG: 81 TABLET, COATED ORAL at 10:09

## 2020-01-01 RX ADMIN — METOCLOPRAMIDE HYDROCHLORIDE 5 MG: 5 TABLET ORAL at 09:24

## 2020-01-01 RX ADMIN — MUPIROCIN: 20 OINTMENT TOPICAL at 08:02

## 2020-01-01 RX ADMIN — METOCLOPRAMIDE HYDROCHLORIDE 5 MG: 5 TABLET ORAL at 08:40

## 2020-01-01 RX ADMIN — MONTELUKAST SODIUM 10 MG: 10 TABLET, COATED ORAL at 21:23

## 2020-01-01 RX ADMIN — IPRATROPIUM BROMIDE AND ALBUTEROL SULFATE 3 ML: 2.5; .5 SOLUTION RESPIRATORY (INHALATION) at 16:20

## 2020-01-01 RX ADMIN — DILTIAZEM HYDROCHLORIDE 240 MG: 240 CAPSULE, COATED, EXTENDED RELEASE ORAL at 10:09

## 2020-01-01 RX ADMIN — INSULIN LISPRO 16 UNITS: 100 INJECTION, SOLUTION INTRAVENOUS; SUBCUTANEOUS at 12:47

## 2020-01-01 RX ADMIN — INSULIN LISPRO 8 UNITS: 100 INJECTION, SOLUTION INTRAVENOUS; SUBCUTANEOUS at 17:56

## 2020-01-01 RX ADMIN — PRAVASTATIN SODIUM 10 MG: 20 TABLET ORAL at 22:04

## 2020-01-01 RX ADMIN — GUAIFENESIN 600 MG: 600 TABLET, EXTENDED RELEASE ORAL at 09:59

## 2020-01-01 RX ADMIN — FLUTICASONE PROPIONATE 2 SPRAY: 50 SPRAY, METERED NASAL at 08:35

## 2020-01-01 RX ADMIN — METOCLOPRAMIDE HYDROCHLORIDE 5 MG: 5 TABLET ORAL at 22:12

## 2020-01-01 RX ADMIN — SODIUM CHLORIDE, PRESERVATIVE FREE 10 ML: 5 INJECTION INTRAVENOUS at 16:02

## 2020-01-01 RX ADMIN — HYDROCODONE BITARTRATE AND ACETAMINOPHEN 1 TABLET: 5; 325 TABLET ORAL at 20:48

## 2020-01-01 RX ADMIN — BUDESONIDE 0.5 MG: 0.5 INHALANT RESPIRATORY (INHALATION) at 20:14

## 2020-01-01 RX ADMIN — ALLOPURINOL 300 MG: 300 TABLET ORAL at 08:41

## 2020-01-01 RX ADMIN — FLUTICASONE PROPIONATE 2 SPRAY: 50 SPRAY, METERED NASAL at 22:21

## 2020-01-01 RX ADMIN — SUCRALFATE 1 G: 1 TABLET ORAL at 21:21

## 2020-01-01 RX ADMIN — INSULIN DETEMIR 10 UNITS: 100 INJECTION, SOLUTION SUBCUTANEOUS at 10:47

## 2020-01-01 RX ADMIN — ROPINIROLE 0.5 MG: 0.5 TABLET, FILM COATED ORAL at 09:09

## 2020-01-01 RX ADMIN — FUROSEMIDE 40 MG: 40 TABLET ORAL at 09:22

## 2020-01-01 RX ADMIN — SUCRALFATE 1 G: 1 TABLET ORAL at 17:12

## 2020-01-01 RX ADMIN — ROPINIROLE 0.5 MG: 0.5 TABLET, FILM COATED ORAL at 14:46

## 2020-01-01 RX ADMIN — BUSPIRONE HYDROCHLORIDE 10 MG: 10 TABLET ORAL at 22:10

## 2020-01-01 RX ADMIN — SODIUM CHLORIDE, PRESERVATIVE FREE 10 ML: 5 INJECTION INTRAVENOUS at 17:57

## 2020-01-01 RX ADMIN — INSULIN ASPART 6 UNITS: 100 INJECTION, SOLUTION INTRAVENOUS; SUBCUTANEOUS at 16:54

## 2020-01-01 RX ADMIN — PANTOPRAZOLE SODIUM 40 MG: 40 TABLET, DELAYED RELEASE ORAL at 06:33

## 2020-01-01 RX ADMIN — APIXABAN 5 MG: 5 TABLET, FILM COATED ORAL at 21:24

## 2020-01-01 RX ADMIN — GUAIFENESIN 600 MG: 600 TABLET, EXTENDED RELEASE ORAL at 08:35

## 2020-01-01 RX ADMIN — HYDROCODONE BITARTRATE AND ACETAMINOPHEN 1 TABLET: 5; 325 TABLET ORAL at 21:29

## 2020-01-01 RX ADMIN — INSULIN ASPART 9 UNITS: 100 INJECTION, SOLUTION INTRAVENOUS; SUBCUTANEOUS at 17:45

## 2020-01-01 RX ADMIN — IPRATROPIUM BROMIDE 0.5 MG: 0.5 SOLUTION RESPIRATORY (INHALATION) at 07:09

## 2020-01-01 RX ADMIN — IPRATROPIUM BROMIDE 0.5 MG: 0.5 SOLUTION RESPIRATORY (INHALATION) at 13:05

## 2020-01-01 RX ADMIN — Medication 1 CAPSULE: at 20:29

## 2020-01-01 RX ADMIN — INSULIN ASPART 8 UNITS: 100 INJECTION, SOLUTION INTRAVENOUS; SUBCUTANEOUS at 21:31

## 2020-01-01 RX ADMIN — ALBUTEROL SULFATE 2 PUFF: 90 AEROSOL, METERED RESPIRATORY (INHALATION) at 21:16

## 2020-01-01 RX ADMIN — SODIUM CHLORIDE, PRESERVATIVE FREE 10 ML: 5 INJECTION INTRAVENOUS at 08:30

## 2020-01-01 RX ADMIN — IPRATROPIUM BROMIDE AND ALBUTEROL SULFATE 3 ML: 2.5; .5 SOLUTION RESPIRATORY (INHALATION) at 20:03

## 2020-01-01 RX ADMIN — IPRATROPIUM BROMIDE 0.5 MG: 0.5 SOLUTION RESPIRATORY (INHALATION) at 00:43

## 2020-01-01 RX ADMIN — INSULIN ASPART 8 UNITS: 100 INJECTION, SOLUTION INTRAVENOUS; SUBCUTANEOUS at 20:37

## 2020-01-01 RX ADMIN — ROPINIROLE 0.5 MG: 0.5 TABLET, FILM COATED ORAL at 09:42

## 2020-01-01 RX ADMIN — GUAIFENESIN 600 MG: 600 TABLET, EXTENDED RELEASE ORAL at 21:30

## 2020-01-01 RX ADMIN — METOPROLOL TARTRATE 5 MG: 1 INJECTION, SOLUTION INTRAVENOUS at 13:22

## 2020-01-01 RX ADMIN — MONTELUKAST 10 MG: 10 TABLET, FILM COATED ORAL at 20:47

## 2020-01-01 RX ADMIN — ROPINIROLE HYDROCHLORIDE 0.5 MG: 0.25 TABLET, FILM COATED ORAL at 22:05

## 2020-01-01 RX ADMIN — HUMAN INSULIN 12 UNITS: 100 INJECTION, SOLUTION SUBCUTANEOUS at 06:36

## 2020-01-01 RX ADMIN — ROPINIROLE HYDROCHLORIDE 0.5 MG: 0.25 TABLET, FILM COATED ORAL at 08:54

## 2020-01-01 RX ADMIN — OSELTAMIVIR PHOSPHATE 30 MG: 30 CAPSULE ORAL at 09:54

## 2020-01-01 RX ADMIN — MUPIROCIN 1 APPLICATION: 20 OINTMENT TOPICAL at 20:36

## 2020-01-01 RX ADMIN — BUSPIRONE HYDROCHLORIDE 10 MG: 10 TABLET ORAL at 08:45

## 2020-01-01 RX ADMIN — BUDESONIDE AND FORMOTEROL FUMARATE DIHYDRATE 2 PUFF: 160; 4.5 AEROSOL RESPIRATORY (INHALATION) at 18:33

## 2020-01-01 RX ADMIN — GUAIFENESIN 600 MG: 600 TABLET, EXTENDED RELEASE ORAL at 21:07

## 2020-01-01 RX ADMIN — IPRATROPIUM BROMIDE AND ALBUTEROL SULFATE 3 ML: 2.5; .5 SOLUTION RESPIRATORY (INHALATION) at 13:06

## 2020-01-01 RX ADMIN — MONTELUKAST 10 MG: 10 TABLET, FILM COATED ORAL at 20:48

## 2020-01-01 RX ADMIN — METOCLOPRAMIDE HYDROCHLORIDE 5 MG: 5 TABLET ORAL at 22:17

## 2020-01-01 RX ADMIN — IPRATROPIUM BROMIDE 0.5 MG: 0.5 SOLUTION RESPIRATORY (INHALATION) at 11:12

## 2020-01-01 RX ADMIN — CETIRIZINE HYDROCHLORIDE 10 MG: 10 TABLET, FILM COATED ORAL at 08:57

## 2020-01-01 RX ADMIN — ALLOPURINOL 300 MG: 300 TABLET ORAL at 09:26

## 2020-01-01 RX ADMIN — INSULIN ASPART 35 UNITS: 100 INJECTION, SOLUTION INTRAVENOUS; SUBCUTANEOUS at 13:19

## 2020-01-01 RX ADMIN — INSULIN ASPART 2 UNITS: 100 INJECTION, SOLUTION INTRAVENOUS; SUBCUTANEOUS at 11:42

## 2020-01-01 RX ADMIN — BUDESONIDE AND FORMOTEROL FUMARATE DIHYDRATE 2 PUFF: 160; 4.5 AEROSOL RESPIRATORY (INHALATION) at 07:40

## 2020-01-01 RX ADMIN — SODIUM CHLORIDE, PRESERVATIVE FREE 10 ML: 5 INJECTION INTRAVENOUS at 20:46

## 2020-01-01 RX ADMIN — INSULIN DETEMIR 80 UNITS: 100 INJECTION, SOLUTION SUBCUTANEOUS at 22:19

## 2020-01-01 RX ADMIN — INSULIN ASPART 2 UNITS: 100 INJECTION, SOLUTION INTRAVENOUS; SUBCUTANEOUS at 11:28

## 2020-01-01 RX ADMIN — PREDNISONE 40 MG: 20 TABLET ORAL at 09:20

## 2020-01-01 RX ADMIN — FUROSEMIDE 40 MG: 10 INJECTION, SOLUTION INTRAMUSCULAR; INTRAVENOUS at 06:25

## 2020-01-01 RX ADMIN — CEFEPIME HYDROCHLORIDE 2 G: 2 INJECTION, POWDER, FOR SOLUTION INTRAVENOUS at 22:38

## 2020-01-01 RX ADMIN — DILTIAZEM HYDROCHLORIDE 30 MG: 30 TABLET, FILM COATED ORAL at 17:02

## 2020-01-01 RX ADMIN — DOCUSATE SODIUM 200 MG: 100 CAPSULE ORAL at 08:55

## 2020-01-01 RX ADMIN — MONTELUKAST SODIUM 10 MG: 10 TABLET, COATED ORAL at 22:10

## 2020-01-01 RX ADMIN — FLUTICASONE PROPIONATE 1 SPRAY: 50 SPRAY, METERED NASAL at 09:49

## 2020-01-01 RX ADMIN — FENOFIBRATE 145 MG: 145 TABLET ORAL at 09:54

## 2020-01-01 RX ADMIN — APIXABAN 5 MG: 5 TABLET, FILM COATED ORAL at 19:37

## 2020-01-01 RX ADMIN — INSULIN ASPART 16 UNITS: 100 INJECTION, SOLUTION INTRAVENOUS; SUBCUTANEOUS at 06:57

## 2020-01-01 RX ADMIN — PANTOPRAZOLE SODIUM 40 MG: 40 TABLET, DELAYED RELEASE ORAL at 05:54

## 2020-01-01 RX ADMIN — OSELTAMIVIR PHOSPHATE 30 MG: 30 CAPSULE ORAL at 18:34

## 2020-01-01 RX ADMIN — Medication 1 MG: at 07:49

## 2020-01-01 RX ADMIN — INSULIN ASPART 2 UNITS: 100 INJECTION, SOLUTION INTRAVENOUS; SUBCUTANEOUS at 07:05

## 2020-01-01 RX ADMIN — ROPINIROLE HYDROCHLORIDE 0.5 MG: 0.25 TABLET, FILM COATED ORAL at 20:29

## 2020-01-01 RX ADMIN — Medication 1 CAPSULE: at 21:44

## 2020-01-01 RX ADMIN — GUAIFENESIN 600 MG: 600 TABLET, EXTENDED RELEASE ORAL at 22:17

## 2020-01-01 RX ADMIN — PANTOPRAZOLE SODIUM 40 MG: 40 TABLET, DELAYED RELEASE ORAL at 16:50

## 2020-01-01 RX ADMIN — AZITHROMYCIN 500 MG: 500 INJECTION, POWDER, LYOPHILIZED, FOR SOLUTION INTRAVENOUS at 17:00

## 2020-01-01 RX ADMIN — SODIUM CHLORIDE 75 ML/HR: 9 INJECTION, SOLUTION INTRAVENOUS at 17:23

## 2020-01-01 RX ADMIN — INSULIN ASPART 9 UNITS: 100 INJECTION, SOLUTION INTRAVENOUS; SUBCUTANEOUS at 17:06

## 2020-01-01 RX ADMIN — DOXYCYCLINE 100 MG: 100 INJECTION, POWDER, LYOPHILIZED, FOR SOLUTION INTRAVENOUS at 10:04

## 2020-01-01 RX ADMIN — ROPINIROLE HYDROCHLORIDE 0.5 MG: 0.25 TABLET, FILM COATED ORAL at 21:33

## 2020-01-01 RX ADMIN — PRAVASTATIN SODIUM 10 MG: 20 TABLET ORAL at 21:33

## 2020-01-01 RX ADMIN — FUROSEMIDE 20 MG: 20 TABLET ORAL at 09:19

## 2020-01-01 RX ADMIN — METOCLOPRAMIDE HYDROCHLORIDE 5 MG: 5 TABLET ORAL at 15:04

## 2020-01-01 RX ADMIN — CEFEPIME 2 G: 2 INJECTION, POWDER, FOR SOLUTION INTRAVENOUS at 06:06

## 2020-01-01 RX ADMIN — INSULIN DETEMIR 40 UNITS: 100 INJECTION, SOLUTION SUBCUTANEOUS at 06:37

## 2020-01-01 RX ADMIN — CEFEPIME 2 G: 2 INJECTION, POWDER, FOR SOLUTION INTRAVENOUS at 06:02

## 2020-01-01 RX ADMIN — ALPRAZOLAM 0.5 MG: 0.5 TABLET ORAL at 20:19

## 2020-01-01 RX ADMIN — MAGNESIUM SULFATE HEPTAHYDRATE 2 G: 40 INJECTION, SOLUTION INTRAVENOUS at 18:27

## 2020-01-01 RX ADMIN — FLUTICASONE PROPIONATE 1 SPRAY: 50 SPRAY, METERED NASAL at 16:58

## 2020-01-01 RX ADMIN — MONTELUKAST SODIUM 10 MG: 10 TABLET, COATED ORAL at 23:22

## 2020-01-01 RX ADMIN — DILTIAZEM HYDROCHLORIDE 240 MG: 240 CAPSULE, COATED, EXTENDED RELEASE ORAL at 09:36

## 2020-01-01 RX ADMIN — METHYLPREDNISOLONE SODIUM SUCCINATE 40 MG: 40 INJECTION, POWDER, FOR SOLUTION INTRAMUSCULAR; INTRAVENOUS at 20:38

## 2020-01-01 RX ADMIN — CHLORTHALIDONE 12.5 MG: 25 TABLET ORAL at 13:45

## 2020-01-01 RX ADMIN — BUSPIRONE HYDROCHLORIDE 10 MG: 10 TABLET ORAL at 19:28

## 2020-01-01 RX ADMIN — GUAIFENESIN 600 MG: 600 TABLET, EXTENDED RELEASE ORAL at 21:20

## 2020-01-01 RX ADMIN — TRAMADOL HYDROCHLORIDE 50 MG: 50 TABLET, FILM COATED ORAL at 22:04

## 2020-01-01 RX ADMIN — Medication 1 CAPSULE: at 19:38

## 2020-01-01 RX ADMIN — PANTOPRAZOLE SODIUM 40 MG: 40 TABLET, DELAYED RELEASE ORAL at 06:07

## 2020-01-01 RX ADMIN — INSULIN DETEMIR 30 UNITS: 100 INJECTION, SOLUTION SUBCUTANEOUS at 06:56

## 2020-01-01 RX ADMIN — DOCUSATE SODIUM 200 MG: 100 CAPSULE ORAL at 08:59

## 2020-01-01 RX ADMIN — FAMOTIDINE 40 MG: 20 TABLET, FILM COATED ORAL at 17:06

## 2020-01-01 RX ADMIN — BUDESONIDE 0.5 MG: 0.5 INHALANT RESPIRATORY (INHALATION) at 07:03

## 2020-01-01 RX ADMIN — IPRATROPIUM BROMIDE AND ALBUTEROL SULFATE 3 ML: .5; 3 SOLUTION RESPIRATORY (INHALATION) at 12:56

## 2020-01-01 RX ADMIN — INSULIN ASPART 8 UNITS: 100 INJECTION, SOLUTION INTRAVENOUS; SUBCUTANEOUS at 06:37

## 2020-01-01 RX ADMIN — INSULIN ASPART 12 UNITS: 100 INJECTION, SOLUTION INTRAVENOUS; SUBCUTANEOUS at 20:50

## 2020-01-01 RX ADMIN — BUDESONIDE 1 MG: 0.5 INHALANT RESPIRATORY (INHALATION) at 06:45

## 2020-01-01 RX ADMIN — FUROSEMIDE 20 MG: 10 INJECTION, SOLUTION INTRAMUSCULAR; INTRAVENOUS at 15:49

## 2020-01-01 RX ADMIN — MIDAZOLAM HYDROCHLORIDE 2 MG: 1 INJECTION, SOLUTION INTRAMUSCULAR; INTRAVENOUS at 14:39

## 2020-01-01 RX ADMIN — BUSPIRONE HYDROCHLORIDE 10 MG: 10 TABLET ORAL at 17:30

## 2020-01-01 RX ADMIN — BUSPIRONE HYDROCHLORIDE 10 MG: 10 TABLET ORAL at 21:43

## 2020-01-01 RX ADMIN — METOCLOPRAMIDE HYDROCHLORIDE 5 MG: 5 TABLET ORAL at 15:27

## 2020-01-01 RX ADMIN — TRAMADOL HYDROCHLORIDE 50 MG: 50 TABLET, FILM COATED ORAL at 15:39

## 2020-01-01 RX ADMIN — ASPIRIN 81 MG: 81 TABLET, COATED ORAL at 09:41

## 2020-01-01 RX ADMIN — INSULIN LISPRO 16 UNITS: 100 INJECTION, SOLUTION INTRAVENOUS; SUBCUTANEOUS at 09:44

## 2020-01-01 RX ADMIN — ACETAMINOPHEN 650 MG: 325 TABLET, FILM COATED ORAL at 10:01

## 2020-01-01 RX ADMIN — Medication 1 MG: at 07:52

## 2020-01-01 RX ADMIN — LINAGLIPTIN 2.5 MG: 5 TABLET, FILM COATED ORAL at 08:45

## 2020-01-01 RX ADMIN — MUPIROCIN: 20 OINTMENT TOPICAL at 09:19

## 2020-01-01 RX ADMIN — PANTOPRAZOLE SODIUM 40 MG: 40 TABLET, DELAYED RELEASE ORAL at 08:57

## 2020-01-01 RX ADMIN — INSULIN DETEMIR 40 UNITS: 100 INJECTION, SOLUTION SUBCUTANEOUS at 06:38

## 2020-01-01 RX ADMIN — METOPROLOL SUCCINATE 100 MG: 100 TABLET, EXTENDED RELEASE ORAL at 10:09

## 2020-01-01 RX ADMIN — LINAGLIPTIN 2.5 MG: 5 TABLET, FILM COATED ORAL at 08:33

## 2020-01-01 RX ADMIN — APIXABAN 5 MG: 5 TABLET, FILM COATED ORAL at 21:16

## 2020-01-01 RX ADMIN — BUDESONIDE AND FORMOTEROL FUMARATE DIHYDRATE 2 PUFF: 160; 4.5 AEROSOL RESPIRATORY (INHALATION) at 19:47

## 2020-01-01 RX ADMIN — LINAGLIPTIN 2.5 MG: 5 TABLET, FILM COATED ORAL at 08:41

## 2020-01-01 RX ADMIN — Medication 1 MG: at 08:00

## 2020-01-01 RX ADMIN — IPRATROPIUM BROMIDE 0.5 MG: 0.5 SOLUTION RESPIRATORY (INHALATION) at 00:37

## 2020-01-01 RX ADMIN — ROPINIROLE 0.5 MG: 0.5 TABLET, FILM COATED ORAL at 10:16

## 2020-01-01 RX ADMIN — SODIUM CHLORIDE, PRESERVATIVE FREE 10 ML: 5 INJECTION INTRAVENOUS at 10:12

## 2020-01-01 RX ADMIN — SODIUM CHLORIDE, PRESERVATIVE FREE 10 ML: 5 INJECTION INTRAVENOUS at 08:46

## 2020-01-01 RX ADMIN — BUSPIRONE HYDROCHLORIDE 10 MG: 10 TABLET ORAL at 08:00

## 2020-01-01 RX ADMIN — PANTOPRAZOLE SODIUM 40 MG: 40 INJECTION, POWDER, FOR SOLUTION INTRAVENOUS at 08:43

## 2020-01-01 RX ADMIN — TRAMADOL HYDROCHLORIDE 50 MG: 50 TABLET, FILM COATED ORAL at 08:57

## 2020-01-01 RX ADMIN — ATORVASTATIN CALCIUM 40 MG: 40 TABLET, FILM COATED ORAL at 09:26

## 2020-01-01 RX ADMIN — METOCLOPRAMIDE HYDROCHLORIDE 5 MG: 5 TABLET ORAL at 09:59

## 2020-01-01 RX ADMIN — ROPINIROLE 0.5 MG: 0.5 TABLET, FILM COATED ORAL at 21:23

## 2020-01-01 RX ADMIN — IPRATROPIUM BROMIDE 0.5 MG: 0.5 SOLUTION RESPIRATORY (INHALATION) at 19:12

## 2020-01-01 RX ADMIN — ROPINIROLE HYDROCHLORIDE 0.5 MG: 0.25 TABLET, FILM COATED ORAL at 09:19

## 2020-01-01 RX ADMIN — TAZOBACTAM SODIUM AND PIPERACILLIN SODIUM 3.38 G: 375; 3 INJECTION, SOLUTION INTRAVENOUS at 16:26

## 2020-01-01 RX ADMIN — ASPIRIN 81 MG: 81 TABLET, COATED ORAL at 08:45

## 2020-01-01 RX ADMIN — CEFEPIME 2 G: 2 INJECTION, POWDER, FOR SOLUTION INTRAVENOUS at 19:24

## 2020-01-01 RX ADMIN — IPRATROPIUM BROMIDE AND ALBUTEROL SULFATE 3 ML: .5; 3 SOLUTION RESPIRATORY (INHALATION) at 00:43

## 2020-01-01 RX ADMIN — DILTIAZEM HYDROCHLORIDE 240 MG: 240 CAPSULE, COATED, EXTENDED RELEASE ORAL at 12:22

## 2020-01-01 RX ADMIN — INSULIN ASPART 16 UNITS: 100 INJECTION, SOLUTION INTRAVENOUS; SUBCUTANEOUS at 06:31

## 2020-01-01 RX ADMIN — POLYETHYLENE GLYCOL 3350 17 G: 17 POWDER, FOR SOLUTION ORAL at 09:09

## 2020-01-01 RX ADMIN — MUPIROCIN: 20 OINTMENT TOPICAL at 21:46

## 2020-01-01 RX ADMIN — INSULIN LISPRO 20 UNITS: 100 INJECTION, SOLUTION INTRAVENOUS; SUBCUTANEOUS at 12:45

## 2020-01-01 RX ADMIN — GUAIFENESIN 600 MG: 600 TABLET, EXTENDED RELEASE ORAL at 08:01

## 2020-01-01 RX ADMIN — ROFLUMILAST 500 MCG: 500 TABLET ORAL at 10:17

## 2020-01-01 RX ADMIN — INSULIN DETEMIR 50 UNITS: 100 INJECTION, SOLUTION SUBCUTANEOUS at 06:35

## 2020-01-01 RX ADMIN — LINAGLIPTIN 5 MG: 5 TABLET, FILM COATED ORAL at 08:57

## 2020-01-01 RX ADMIN — CEFTRIAXONE 1 G: 1 INJECTION, SOLUTION INTRAVENOUS at 17:02

## 2020-01-01 RX ADMIN — APIXABAN 5 MG: 5 TABLET, FILM COATED ORAL at 21:34

## 2020-01-01 RX ADMIN — BUDESONIDE 1 MG: 0.5 INHALANT RESPIRATORY (INHALATION) at 20:01

## 2020-01-01 RX ADMIN — IPRATROPIUM BROMIDE AND ALBUTEROL SULFATE 3 ML: .5; 3 SOLUTION RESPIRATORY (INHALATION) at 19:48

## 2020-01-01 RX ADMIN — BUDESONIDE 0.5 MG: 0.5 INHALANT RESPIRATORY (INHALATION) at 20:09

## 2020-01-01 RX ADMIN — ATORVASTATIN CALCIUM 40 MG: 40 TABLET, FILM COATED ORAL at 21:20

## 2020-01-01 RX ADMIN — Medication 1 CAPSULE: at 20:38

## 2020-01-01 RX ADMIN — INSULIN ASPART 4 UNITS: 100 INJECTION, SOLUTION INTRAVENOUS; SUBCUTANEOUS at 07:05

## 2020-01-01 RX ADMIN — METHYLPREDNISOLONE SODIUM SUCCINATE 40 MG: 40 INJECTION, POWDER, FOR SOLUTION INTRAMUSCULAR; INTRAVENOUS at 00:32

## 2020-01-01 RX ADMIN — PANTOPRAZOLE SODIUM 40 MG: 40 TABLET, DELAYED RELEASE ORAL at 09:24

## 2020-01-01 RX ADMIN — APIXABAN 5 MG: 5 TABLET, FILM COATED ORAL at 20:47

## 2020-01-01 RX ADMIN — INSULIN DETEMIR 28 UNITS: 100 INJECTION, SOLUTION SUBCUTANEOUS at 23:23

## 2020-01-01 RX ADMIN — SODIUM CHLORIDE, PRESERVATIVE FREE 10 ML: 5 INJECTION INTRAVENOUS at 14:07

## 2020-01-01 RX ADMIN — APIXABAN 5 MG: 5 TABLET, FILM COATED ORAL at 09:00

## 2020-01-01 RX ADMIN — ATORVASTATIN CALCIUM 10 MG: 10 TABLET, FILM COATED ORAL at 08:33

## 2020-01-01 RX ADMIN — FENOFIBRATE 145 MG: 145 TABLET ORAL at 08:45

## 2020-01-01 RX ADMIN — LINAGLIPTIN 2.5 MG: 5 TABLET, FILM COATED ORAL at 08:19

## 2020-01-01 RX ADMIN — INSULIN ASPART 20 UNITS: 100 INJECTION, SOLUTION INTRAVENOUS; SUBCUTANEOUS at 17:51

## 2020-01-01 RX ADMIN — SUCRALFATE 1 G: 1 TABLET ORAL at 09:41

## 2020-01-01 RX ADMIN — METOCLOPRAMIDE HYDROCHLORIDE 5 MG: 5 TABLET ORAL at 08:20

## 2020-01-01 RX ADMIN — ALBUTEROL SULFATE 2 PUFF: 90 AEROSOL, METERED RESPIRATORY (INHALATION) at 23:07

## 2020-01-01 RX ADMIN — Medication 1 CAPSULE: at 10:03

## 2020-01-01 RX ADMIN — DOXYCYCLINE 100 MG: 100 INJECTION, POWDER, LYOPHILIZED, FOR SOLUTION INTRAVENOUS at 09:31

## 2020-01-01 RX ADMIN — BUDESONIDE AND FORMOTEROL FUMARATE DIHYDRATE 2 PUFF: 160; 4.5 AEROSOL RESPIRATORY (INHALATION) at 19:43

## 2020-01-01 RX ADMIN — BUDESONIDE AND FORMOTEROL FUMARATE DIHYDRATE 2 PUFF: 160; 4.5 AEROSOL RESPIRATORY (INHALATION) at 21:36

## 2020-01-01 RX ADMIN — ATORVASTATIN CALCIUM 40 MG: 40 TABLET, FILM COATED ORAL at 22:20

## 2020-01-01 RX ADMIN — INSULIN ASPART 8 UNITS: 100 INJECTION, SOLUTION INTRAVENOUS; SUBCUTANEOUS at 21:24

## 2020-01-01 RX ADMIN — APIXABAN 5 MG: 5 TABLET, FILM COATED ORAL at 21:07

## 2020-01-01 RX ADMIN — DEXAMETHASONE SODIUM PHOSPHATE 6 MG: 4 INJECTION, SOLUTION INTRAMUSCULAR; INTRAVENOUS at 08:56

## 2020-01-01 RX ADMIN — AMOXICILLIN AND CLAVULANATE POTASSIUM 1 TABLET: 875; 125 TABLET, FILM COATED ORAL at 20:33

## 2020-01-01 RX ADMIN — IPRATROPIUM BROMIDE 0.5 MG: 0.5 SOLUTION RESPIRATORY (INHALATION) at 19:57

## 2020-01-01 RX ADMIN — ALBUTEROL SULFATE 2 PUFF: 90 AEROSOL, METERED RESPIRATORY (INHALATION) at 13:08

## 2020-01-01 RX ADMIN — METOPROLOL TARTRATE 5 MG: 1 INJECTION, SOLUTION INTRAVENOUS at 17:03

## 2020-01-01 RX ADMIN — IPRATROPIUM BROMIDE 0.5 MG: 0.5 SOLUTION RESPIRATORY (INHALATION) at 19:36

## 2020-01-01 RX ADMIN — ATORVASTATIN CALCIUM 10 MG: 10 TABLET, FILM COATED ORAL at 08:31

## 2020-01-01 RX ADMIN — APIXABAN 5 MG: 5 TABLET, FILM COATED ORAL at 09:25

## 2020-01-01 RX ADMIN — BUSPIRONE HYDROCHLORIDE 10 MG: 10 TABLET ORAL at 08:19

## 2020-01-01 RX ADMIN — INSULIN LISPRO 7 UNITS: 100 INJECTION, SOLUTION INTRAVENOUS; SUBCUTANEOUS at 09:28

## 2020-01-01 RX ADMIN — GUAIFENESIN 600 MG: 600 TABLET, EXTENDED RELEASE ORAL at 20:29

## 2020-01-01 RX ADMIN — METOCLOPRAMIDE 5 MG: 5 TABLET ORAL at 10:09

## 2020-01-01 RX ADMIN — ALBUTEROL SULFATE 2 PUFF: 90 AEROSOL, METERED RESPIRATORY (INHALATION) at 21:33

## 2020-01-01 RX ADMIN — ASPIRIN 81 MG: 81 TABLET, COATED ORAL at 16:50

## 2020-01-01 RX ADMIN — AZITHROMYCIN MONOHYDRATE 500 MG: 250 TABLET ORAL at 09:59

## 2020-01-01 RX ADMIN — TECHNETIUM TC 99M SULFUR COLLOID 1 DOSE: KIT at 12:00

## 2020-01-01 RX ADMIN — PREDNISONE 40 MG: 20 TABLET ORAL at 09:09

## 2020-01-01 RX ADMIN — MONTELUKAST 10 MG: 10 TABLET, FILM COATED ORAL at 19:27

## 2020-01-01 RX ADMIN — ASPIRIN 81 MG: 81 TABLET, COATED ORAL at 08:57

## 2020-01-01 RX ADMIN — FLUCONAZOLE 100 MG: 100 TABLET ORAL at 11:23

## 2020-01-01 RX ADMIN — CEFEPIME 2 G: 2 INJECTION, POWDER, FOR SOLUTION INTRAVENOUS at 17:01

## 2020-01-01 RX ADMIN — SENNOSIDES AND DOCUSATE SODIUM 1 TABLET: 8.6; 5 TABLET ORAL at 20:49

## 2020-01-01 RX ADMIN — LINAGLIPTIN 2.5 MG: 5 TABLET, FILM COATED ORAL at 09:13

## 2020-01-01 RX ADMIN — FLUTICASONE PROPIONATE 1 SPRAY: 50 SPRAY, METERED NASAL at 09:10

## 2020-01-01 RX ADMIN — METOPROLOL SUCCINATE 100 MG: 100 TABLET, EXTENDED RELEASE ORAL at 09:25

## 2020-01-01 RX ADMIN — AZITHROMYCIN 500 MG: 500 INJECTION, POWDER, LYOPHILIZED, FOR SOLUTION INTRAVENOUS at 18:54

## 2020-01-01 RX ADMIN — METOCLOPRAMIDE HYDROCHLORIDE 5 MG: 5 TABLET ORAL at 08:34

## 2020-01-01 RX ADMIN — METOCLOPRAMIDE HYDROCHLORIDE 5 MG: 5 TABLET ORAL at 20:50

## 2020-01-01 RX ADMIN — SODIUM CHLORIDE, PRESERVATIVE FREE 10 ML: 5 INJECTION INTRAVENOUS at 08:05

## 2020-01-01 RX ADMIN — LINAGLIPTIN 2.5 MG: 5 TABLET, FILM COATED ORAL at 09:54

## 2020-01-01 RX ADMIN — METOCLOPRAMIDE HYDROCHLORIDE 5 MG: 5 TABLET ORAL at 08:25

## 2020-01-01 RX ADMIN — INSULIN ASPART 7 UNITS: 100 INJECTION, SOLUTION INTRAVENOUS; SUBCUTANEOUS at 13:45

## 2020-01-01 RX ADMIN — ATORVASTATIN CALCIUM 40 MG: 40 TABLET, FILM COATED ORAL at 21:34

## 2020-01-01 RX ADMIN — IPRATROPIUM BROMIDE AND ALBUTEROL SULFATE 3 ML: .5; 3 SOLUTION RESPIRATORY (INHALATION) at 07:21

## 2020-01-01 RX ADMIN — ATORVASTATIN CALCIUM 10 MG: 10 TABLET, FILM COATED ORAL at 09:54

## 2020-01-01 RX ADMIN — BUSPIRONE HYDROCHLORIDE 10 MG: 10 TABLET ORAL at 09:58

## 2020-01-01 RX ADMIN — ASPIRIN 81 MG: 81 TABLET, COATED ORAL at 08:34

## 2020-01-01 RX ADMIN — BUSPIRONE HYDROCHLORIDE 10 MG: 10 TABLET ORAL at 08:31

## 2020-01-01 RX ADMIN — AZITHROMYCIN 500 MG: 500 INJECTION, POWDER, LYOPHILIZED, FOR SOLUTION INTRAVENOUS at 21:19

## 2020-01-01 RX ADMIN — DEXAMETHASONE SODIUM PHOSPHATE 6 MG: 4 INJECTION, SOLUTION INTRAMUSCULAR; INTRAVENOUS at 09:00

## 2020-01-01 RX ADMIN — SODIUM CHLORIDE, PRESERVATIVE FREE 10 ML: 5 INJECTION INTRAVENOUS at 10:00

## 2020-01-01 RX ADMIN — SODIUM CHLORIDE, PRESERVATIVE FREE 3 ML: 5 INJECTION INTRAVENOUS at 08:58

## 2020-01-01 RX ADMIN — Medication 1 CAPSULE: at 21:34

## 2020-01-01 RX ADMIN — PANTOPRAZOLE SODIUM 40 MG: 40 TABLET, DELAYED RELEASE ORAL at 05:12

## 2020-01-01 RX ADMIN — ATORVASTATIN CALCIUM 10 MG: 10 TABLET, FILM COATED ORAL at 08:00

## 2020-01-01 RX ADMIN — INSULIN ASPART 16 UNITS: 100 INJECTION, SOLUTION INTRAVENOUS; SUBCUTANEOUS at 17:32

## 2020-01-01 RX ADMIN — Medication 1 CAPSULE: at 16:02

## 2020-01-01 RX ADMIN — ASPIRIN 81 MG: 81 TABLET, COATED ORAL at 08:30

## 2020-01-01 RX ADMIN — METOPROLOL TARTRATE 5 MG: 1 INJECTION, SOLUTION INTRAVENOUS at 00:25

## 2020-01-01 RX ADMIN — ALLOPURINOL 150 MG: 100 TABLET ORAL at 08:33

## 2020-01-01 RX ADMIN — SUCRALFATE 1 G: 1 TABLET ORAL at 16:10

## 2020-01-01 RX ADMIN — INSULIN ASPART 8 UNITS: 100 INJECTION, SOLUTION INTRAVENOUS; SUBCUTANEOUS at 12:14

## 2020-01-01 RX ADMIN — SODIUM CHLORIDE, PRESERVATIVE FREE 10 ML: 5 INJECTION INTRAVENOUS at 23:23

## 2020-01-01 RX ADMIN — ALBUTEROL SULFATE 2 PUFF: 90 AEROSOL, METERED RESPIRATORY (INHALATION) at 11:28

## 2020-01-01 RX ADMIN — SUCRALFATE 1 G: 1 TABLET ORAL at 22:57

## 2020-01-01 RX ADMIN — INSULIN LISPRO 6 UNITS: 100 INJECTION, SOLUTION INTRAVENOUS; SUBCUTANEOUS at 08:42

## 2020-01-01 RX ADMIN — SUCRALFATE 1 G: 1 TABLET ORAL at 11:14

## 2020-01-01 RX ADMIN — DOXYCYCLINE 100 MG: 100 INJECTION, POWDER, LYOPHILIZED, FOR SOLUTION INTRAVENOUS at 11:30

## 2020-01-01 RX ADMIN — FENOFIBRATE 145 MG: 145 TABLET ORAL at 08:33

## 2020-01-01 RX ADMIN — BUDESONIDE AND FORMOTEROL FUMARATE DIHYDRATE 2 PUFF: 160; 4.5 AEROSOL RESPIRATORY (INHALATION) at 21:57

## 2020-01-01 RX ADMIN — CEFEPIME 2 G: 2 INJECTION, POWDER, FOR SOLUTION INTRAVENOUS at 05:22

## 2020-01-01 RX ADMIN — DILTIAZEM HYDROCHLORIDE 180 MG: 180 CAPSULE, COATED, EXTENDED RELEASE ORAL at 08:25

## 2020-01-01 RX ADMIN — Medication 1 CAPSULE: at 15:02

## 2020-01-01 RX ADMIN — GUAIFENESIN 1200 MG: 600 TABLET, EXTENDED RELEASE ORAL at 08:54

## 2020-01-01 RX ADMIN — BUSPIRONE HYDROCHLORIDE 10 MG: 10 TABLET ORAL at 09:59

## 2020-01-01 RX ADMIN — DILTIAZEM HYDROCHLORIDE 180 MG: 180 CAPSULE, COATED, EXTENDED RELEASE ORAL at 08:54

## 2020-01-01 RX ADMIN — HYDROCODONE BITARTRATE AND ACETAMINOPHEN 1 TABLET: 5; 325 TABLET ORAL at 21:30

## 2020-01-01 RX ADMIN — ALBUTEROL SULFATE 2 PUFF: 90 AEROSOL, METERED RESPIRATORY (INHALATION) at 12:07

## 2020-01-01 RX ADMIN — BUSPIRONE HYDROCHLORIDE 10 MG: 10 TABLET ORAL at 17:06

## 2020-01-01 RX ADMIN — INSULIN LISPRO 0 UNITS: 100 INJECTION, SOLUTION INTRAVENOUS; SUBCUTANEOUS at 23:22

## 2020-01-01 RX ADMIN — INSULIN ASPART 35 UNITS: 100 INJECTION, SOLUTION INTRAVENOUS; SUBCUTANEOUS at 17:00

## 2020-01-01 RX ADMIN — INSULIN DETEMIR 80 UNITS: 100 INJECTION, SOLUTION SUBCUTANEOUS at 21:02

## 2020-01-01 RX ADMIN — BUDESONIDE 0.5 MG: 0.5 INHALANT RESPIRATORY (INHALATION) at 19:51

## 2020-01-01 RX ADMIN — INSULIN ASPART 35 UNITS: 100 INJECTION, SOLUTION INTRAVENOUS; SUBCUTANEOUS at 11:40

## 2020-01-01 RX ADMIN — SENNOSIDES AND DOCUSATE SODIUM 1 TABLET: 8.6; 5 TABLET ORAL at 22:57

## 2020-01-01 RX ADMIN — SODIUM CHLORIDE 30 MG/ML INHALATION SOLUTION 4 ML: 30 SOLUTION INHALANT at 13:07

## 2020-01-01 RX ADMIN — SUCRALFATE 1 G: 1 TABLET ORAL at 09:23

## 2020-01-01 RX ADMIN — Medication 1 CAPSULE: at 16:57

## 2020-01-01 RX ADMIN — Medication 1 CAPSULE: at 15:34

## 2020-01-01 RX ADMIN — FUROSEMIDE 40 MG: 40 TABLET ORAL at 08:01

## 2020-01-01 RX ADMIN — FUROSEMIDE 20 MG: 20 TABLET ORAL at 09:13

## 2020-01-01 RX ADMIN — SODIUM CHLORIDE, PRESERVATIVE FREE 3 ML: 5 INJECTION INTRAVENOUS at 21:20

## 2020-01-01 RX ADMIN — BUSPIRONE HYDROCHLORIDE 10 MG: 10 TABLET ORAL at 09:36

## 2020-01-01 RX ADMIN — INSULIN DETEMIR 40 UNITS: 100 INJECTION, SOLUTION SUBCUTANEOUS at 05:24

## 2020-01-01 RX ADMIN — GUAIFENESIN 1200 MG: 600 TABLET, EXTENDED RELEASE ORAL at 12:10

## 2020-01-01 RX ADMIN — FENOFIBRATE 145 MG: 145 TABLET ORAL at 09:24

## 2020-01-01 RX ADMIN — FENOFIBRATE 145 MG: 145 TABLET ORAL at 09:21

## 2020-01-01 RX ADMIN — IPRATROPIUM BROMIDE 0.5 MG: 0.5 SOLUTION RESPIRATORY (INHALATION) at 20:03

## 2020-01-01 RX ADMIN — PANTOPRAZOLE SODIUM 40 MG: 40 TABLET, DELAYED RELEASE ORAL at 09:40

## 2020-01-01 RX ADMIN — MUPIROCIN 1 APPLICATION: 20 OINTMENT TOPICAL at 21:13

## 2020-01-01 RX ADMIN — GUAIFENESIN 600 MG: 600 TABLET, EXTENDED RELEASE ORAL at 08:55

## 2020-01-01 RX ADMIN — LINAGLIPTIN 5 MG: 5 TABLET, FILM COATED ORAL at 08:55

## 2020-01-01 RX ADMIN — BUSPIRONE HYDROCHLORIDE 10 MG: 10 TABLET ORAL at 08:01

## 2020-01-01 RX ADMIN — SUCRALFATE 1 G: 1 TABLET ORAL at 15:40

## 2020-01-01 RX ADMIN — IPRATROPIUM BROMIDE 0.5 MG: 0.5 SOLUTION RESPIRATORY (INHALATION) at 02:54

## 2020-01-01 RX ADMIN — SODIUM CHLORIDE, PRESERVATIVE FREE 10 ML: 5 INJECTION INTRAVENOUS at 09:40

## 2020-01-01 RX ADMIN — METOPROLOL SUCCINATE 100 MG: 100 TABLET, EXTENDED RELEASE ORAL at 10:15

## 2020-01-01 RX ADMIN — INSULIN ASPART 4 UNITS: 100 INJECTION, SOLUTION INTRAVENOUS; SUBCUTANEOUS at 22:15

## 2020-01-01 RX ADMIN — Medication 1 CAPSULE: at 08:25

## 2020-01-01 RX ADMIN — IPRATROPIUM BROMIDE AND ALBUTEROL SULFATE 3 ML: 2.5; .5 SOLUTION RESPIRATORY (INHALATION) at 07:45

## 2020-01-01 RX ADMIN — TRAMADOL HYDROCHLORIDE 50 MG: 50 TABLET, FILM COATED ORAL at 20:45

## 2020-01-01 RX ADMIN — INSULIN DETEMIR 80 UNITS: 100 INJECTION, SOLUTION SUBCUTANEOUS at 20:51

## 2020-01-01 RX ADMIN — INSULIN ASPART 8 UNITS: 100 INJECTION, SOLUTION INTRAVENOUS; SUBCUTANEOUS at 17:39

## 2020-01-01 RX ADMIN — PANTOPRAZOLE SODIUM 40 MG: 40 TABLET, DELAYED RELEASE ORAL at 07:04

## 2020-01-01 RX ADMIN — Medication 1 CAPSULE: at 19:28

## 2020-01-01 RX ADMIN — CALCIUM CARBONATE 750 MG: 750 TABLET ORAL at 03:05

## 2020-01-01 RX ADMIN — IPRATROPIUM BROMIDE 0.5 MG: 0.5 SOLUTION RESPIRATORY (INHALATION) at 16:28

## 2020-01-01 RX ADMIN — DOXYCYCLINE 100 MG: 100 INJECTION, POWDER, LYOPHILIZED, FOR SOLUTION INTRAVENOUS at 10:09

## 2020-01-01 RX ADMIN — FENOFIBRATE 145 MG: 145 TABLET ORAL at 09:41

## 2020-01-01 RX ADMIN — INSULIN LISPRO 20 UNITS: 100 INJECTION, SOLUTION INTRAVENOUS; SUBCUTANEOUS at 09:17

## 2020-01-01 RX ADMIN — SODIUM CHLORIDE, PRESERVATIVE FREE 10 ML: 5 INJECTION INTRAVENOUS at 21:53

## 2020-01-01 RX ADMIN — APIXABAN 5 MG: 5 TABLET, FILM COATED ORAL at 09:39

## 2020-01-01 RX ADMIN — APIXABAN 5 MG: 5 TABLET, FILM COATED ORAL at 19:29

## 2020-01-01 RX ADMIN — ASPIRIN 81 MG: 81 TABLET, COATED ORAL at 09:54

## 2020-01-01 RX ADMIN — FAMOTIDINE 40 MG: 20 TABLET, FILM COATED ORAL at 10:10

## 2020-01-01 RX ADMIN — FUROSEMIDE 40 MG: 40 TABLET ORAL at 09:58

## 2020-01-01 RX ADMIN — PREDNISONE 40 MG: 20 TABLET ORAL at 13:33

## 2020-01-01 RX ADMIN — GUAIFENESIN 600 MG: 600 TABLET, EXTENDED RELEASE ORAL at 09:54

## 2020-01-01 RX ADMIN — DILTIAZEM HYDROCHLORIDE 5 MG/HR: 100 INJECTION, POWDER, LYOPHILIZED, FOR SOLUTION INTRAVENOUS at 01:02

## 2020-01-01 RX ADMIN — METOCLOPRAMIDE HYDROCHLORIDE 5 MG: 5 TABLET ORAL at 21:08

## 2020-01-01 RX ADMIN — ROPINIROLE HYDROCHLORIDE 0.5 MG: 0.25 TABLET, FILM COATED ORAL at 21:07

## 2020-01-01 RX ADMIN — FLUTICASONE PROPIONATE 2 SPRAY: 50 SPRAY, METERED NASAL at 09:55

## 2020-01-01 RX ADMIN — BUDESONIDE 1 MG: 0.5 INHALANT RESPIRATORY (INHALATION) at 07:15

## 2020-01-01 RX ADMIN — ATORVASTATIN CALCIUM 10 MG: 10 TABLET, FILM COATED ORAL at 08:20

## 2020-01-01 RX ADMIN — FERROUS SULFATE TAB 325 MG (65 MG ELEMENTAL FE) 325 MG: 325 (65 FE) TAB at 17:01

## 2020-01-01 RX ADMIN — ROPINIROLE HYDROCHLORIDE 0.5 MG: 0.25 TABLET, FILM COATED ORAL at 09:35

## 2020-01-01 RX ADMIN — MONTELUKAST 10 MG: 10 TABLET, FILM COATED ORAL at 22:13

## 2020-01-01 RX ADMIN — IPRATROPIUM BROMIDE AND ALBUTEROL SULFATE 3 ML: .5; 3 SOLUTION RESPIRATORY (INHALATION) at 06:58

## 2020-01-01 RX ADMIN — SUCRALFATE 1 G: 1 TABLET ORAL at 16:57

## 2020-01-01 RX ADMIN — Medication 1 CAPSULE: at 19:53

## 2020-01-01 RX ADMIN — FUROSEMIDE 20 MG: 20 TABLET ORAL at 08:42

## 2020-01-01 RX ADMIN — FENOFIBRATE 145 MG: 145 TABLET ORAL at 08:01

## 2020-01-01 RX ADMIN — BUDESONIDE 1 MG: 0.5 INHALANT RESPIRATORY (INHALATION) at 06:59

## 2020-01-01 RX ADMIN — INSULIN ASPART 4 UNITS: 100 INJECTION, SOLUTION INTRAVENOUS; SUBCUTANEOUS at 11:42

## 2020-01-01 RX ADMIN — BUSPIRONE HYDROCHLORIDE 10 MG: 10 TABLET ORAL at 15:28

## 2020-01-01 RX ADMIN — APIXABAN 5 MG: 5 TABLET, FILM COATED ORAL at 09:20

## 2020-01-01 RX ADMIN — LINAGLIPTIN 5 MG: 5 TABLET, FILM COATED ORAL at 09:00

## 2020-01-01 RX ADMIN — BUDESONIDE 0.5 MG: 0.5 INHALANT RESPIRATORY (INHALATION) at 19:42

## 2020-01-01 RX ADMIN — VANCOMYCIN HYDROCHLORIDE 1500 MG: 500 INJECTION, POWDER, LYOPHILIZED, FOR SOLUTION INTRAVENOUS at 12:33

## 2020-01-01 RX ADMIN — BUSPIRONE HYDROCHLORIDE 10 MG: 10 TABLET ORAL at 15:04

## 2020-01-01 RX ADMIN — ALLOPURINOL 150 MG: 100 TABLET ORAL at 09:56

## 2020-01-01 RX ADMIN — BUSPIRONE HYDROCHLORIDE 10 MG: 10 TABLET ORAL at 08:34

## 2020-01-01 RX ADMIN — DILTIAZEM HYDROCHLORIDE 240 MG: 240 CAPSULE, COATED, EXTENDED RELEASE ORAL at 08:55

## 2020-01-01 RX ADMIN — METHYLPREDNISOLONE SODIUM SUCCINATE 40 MG: 40 INJECTION, POWDER, FOR SOLUTION INTRAMUSCULAR; INTRAVENOUS at 15:49

## 2020-01-01 RX ADMIN — IPRATROPIUM BROMIDE 0.5 MG: 0.5 SOLUTION RESPIRATORY (INHALATION) at 21:11

## 2020-01-01 RX ADMIN — DILTIAZEM HYDROCHLORIDE 180 MG: 180 CAPSULE, COATED, EXTENDED RELEASE ORAL at 14:45

## 2020-01-01 RX ADMIN — ROPINIROLE 0.5 MG: 0.5 TABLET, FILM COATED ORAL at 21:43

## 2020-01-01 RX ADMIN — INSULIN DETEMIR 80 UNITS: 100 INJECTION, SOLUTION SUBCUTANEOUS at 21:00

## 2020-01-01 RX ADMIN — BUSPIRONE HYDROCHLORIDE 10 MG: 10 TABLET ORAL at 15:27

## 2020-01-01 RX ADMIN — ATORVASTATIN CALCIUM 10 MG: 10 TABLET, FILM COATED ORAL at 08:45

## 2020-01-01 RX ADMIN — METOCLOPRAMIDE HYDROCHLORIDE 5 MG: 5 TABLET ORAL at 09:13

## 2020-01-01 RX ADMIN — BUDESONIDE AND FORMOTEROL FUMARATE DIHYDRATE 2 PUFF: 160; 4.5 AEROSOL RESPIRATORY (INHALATION) at 19:09

## 2020-01-01 RX ADMIN — ROPINIROLE HYDROCHLORIDE 0.5 MG: 0.25 TABLET, FILM COATED ORAL at 08:20

## 2020-01-01 RX ADMIN — ROPINIROLE 0.5 MG: 0.5 TABLET, FILM COATED ORAL at 22:57

## 2020-01-01 RX ADMIN — APIXABAN 5 MG: 5 TABLET, FILM COATED ORAL at 08:01

## 2020-01-01 RX ADMIN — SODIUM CHLORIDE, PRESERVATIVE FREE 3 ML: 5 INJECTION INTRAVENOUS at 10:20

## 2020-01-01 RX ADMIN — BUSPIRONE HYDROCHLORIDE 10 MG: 10 TABLET ORAL at 16:08

## 2020-01-01 RX ADMIN — PANTOPRAZOLE SODIUM 40 MG: 40 TABLET, DELAYED RELEASE ORAL at 05:36

## 2020-01-01 RX ADMIN — BUSPIRONE HYDROCHLORIDE 10 MG: 10 TABLET ORAL at 19:53

## 2020-01-06 RX ORDER — HYDROCODONE BITARTRATE AND ACETAMINOPHEN 5; 325 MG/1; MG/1
TABLET ORAL
Qty: 30 TABLET | Refills: 0 | Status: SHIPPED | OUTPATIENT
Start: 2020-01-06 | End: 2020-02-05

## 2020-01-06 RX ORDER — TRAMADOL HYDROCHLORIDE 50 MG/1
TABLET ORAL
Qty: 90 TABLET | Refills: 2 | Status: SHIPPED | OUTPATIENT
Start: 2020-01-06 | End: 2020-02-11

## 2020-01-07 RX ORDER — BUSPIRONE HYDROCHLORIDE 10 MG/1
TABLET ORAL
Qty: 90 TABLET | Refills: 6 | Status: SHIPPED | OUTPATIENT
Start: 2020-01-07 | End: 2020-02-19

## 2020-01-08 ENCOUNTER — OFFICE VISIT (OUTPATIENT)
Dept: PRIMARY CARE CLINIC | Age: 70
End: 2020-01-08
Payer: MEDICARE

## 2020-01-08 ENCOUNTER — HOSPITAL ENCOUNTER (OUTPATIENT)
Facility: HOSPITAL | Age: 70
Discharge: HOME | End: 2020-01-08
Payer: MEDICARE

## 2020-01-08 VITALS
RESPIRATION RATE: 16 BRPM | HEIGHT: 58 IN | DIASTOLIC BLOOD PRESSURE: 52 MMHG | HEART RATE: 76 BPM | BODY MASS INDEX: 40.22 KG/M2 | TEMPERATURE: 97.3 F | SYSTOLIC BLOOD PRESSURE: 136 MMHG | OXYGEN SATURATION: 96 % | WEIGHT: 191.6 LBS

## 2020-01-08 LAB
A/G RATIO: 1.8 (ref 0.8–2)
ALBUMIN SERPL-MCNC: 4.4 G/DL (ref 3.4–4.8)
ALP BLD-CCNC: 87 U/L (ref 25–100)
ALT SERPL-CCNC: 62 U/L (ref 4–36)
ANION GAP SERPL CALCULATED.3IONS-SCNC: 12 MMOL/L (ref 3–16)
AST SERPL-CCNC: 51 U/L (ref 8–33)
BILIRUB SERPL-MCNC: <0.2 MG/DL (ref 0.3–1.2)
BUN BLDV-MCNC: 43 MG/DL (ref 6–20)
CALCIUM SERPL-MCNC: 10.8 MG/DL (ref 8.5–10.5)
CHLORIDE BLD-SCNC: 92 MMOL/L (ref 98–107)
CHOLESTEROL, TOTAL: 271 MG/DL (ref 0–200)
CO2: 37 MMOL/L (ref 20–30)
CREAT SERPL-MCNC: 1.6 MG/DL (ref 0.4–1.2)
FOLATE: 10.41 NG/ML
GFR AFRICAN AMERICAN: 39
GFR NON-AFRICAN AMERICAN: 32
GLOBULIN: 2.5 G/DL
GLUCOSE BLD-MCNC: 236 MG/DL (ref 74–106)
HBA1C MFR BLD: 8.4 %
HCT VFR BLD CALC: 43.9 % (ref 37–47)
HDLC SERPL-MCNC: 30 MG/DL (ref 40–60)
HEMOGLOBIN: 14 G/DL (ref 11.5–16.5)
LDL CHOLESTEROL CALCULATED: ABNORMAL MG/DL
LDL CHOLESTEROL DIRECT: 179 MG/DL
MCH RBC QN AUTO: 31.3 PG (ref 27–32)
MCHC RBC AUTO-ENTMCNC: 31.9 G/DL (ref 31–35)
MCV RBC AUTO: 98 FL (ref 80–100)
PDW BLD-RTO: 13.4 % (ref 11–16)
PLATELET # BLD: 336 K/UL (ref 150–400)
PMV BLD AUTO: 11 FL (ref 6–10)
POTASSIUM SERPL-SCNC: 3.9 MMOL/L (ref 3.4–5.1)
RBC # BLD: 4.48 M/UL (ref 3.8–5.8)
SODIUM BLD-SCNC: 141 MMOL/L (ref 136–145)
TOTAL PROTEIN: 6.9 G/DL (ref 6.4–8.3)
TRIGL SERPL-MCNC: 535 MG/DL (ref 0–249)
TSH SERPL DL<=0.05 MIU/L-ACNC: 1.39 UIU/ML (ref 0.35–5.5)
VITAMIN B-12: 383 PG/ML (ref 211–911)
VITAMIN D 25-HYDROXY: 17.9 (ref 32–100)
VLDLC SERPL CALC-MCNC: ABNORMAL MG/DL
WBC # BLD: 11.2 K/UL (ref 4–11)

## 2020-01-08 PROCEDURE — 82746 ASSAY OF FOLIC ACID SERUM: CPT

## 2020-01-08 PROCEDURE — 1090F PRES/ABSN URINE INCON ASSESS: CPT | Performed by: NURSE PRACTITIONER

## 2020-01-08 PROCEDURE — 2022F DILAT RTA XM EVC RTNOPTHY: CPT | Performed by: NURSE PRACTITIONER

## 2020-01-08 PROCEDURE — 80053 COMPREHEN METABOLIC PANEL: CPT

## 2020-01-08 PROCEDURE — 99214 OFFICE O/P EST MOD 30 MIN: CPT | Performed by: NURSE PRACTITIONER

## 2020-01-08 PROCEDURE — G8427 DOCREV CUR MEDS BY ELIG CLIN: HCPCS | Performed by: NURSE PRACTITIONER

## 2020-01-08 PROCEDURE — 1123F ACP DISCUSS/DSCN MKR DOCD: CPT | Performed by: NURSE PRACTITIONER

## 2020-01-08 PROCEDURE — G8482 FLU IMMUNIZE ORDER/ADMIN: HCPCS | Performed by: NURSE PRACTITIONER

## 2020-01-08 PROCEDURE — 3017F COLORECTAL CA SCREEN DOC REV: CPT | Performed by: NURSE PRACTITIONER

## 2020-01-08 PROCEDURE — 84443 ASSAY THYROID STIM HORMONE: CPT

## 2020-01-08 PROCEDURE — 85027 COMPLETE CBC AUTOMATED: CPT

## 2020-01-08 PROCEDURE — 83036 HEMOGLOBIN GLYCOSYLATED A1C: CPT

## 2020-01-08 PROCEDURE — 1036F TOBACCO NON-USER: CPT | Performed by: NURSE PRACTITIONER

## 2020-01-08 PROCEDURE — 4040F PNEUMOC VAC/ADMIN/RCVD: CPT | Performed by: NURSE PRACTITIONER

## 2020-01-08 PROCEDURE — 82607 VITAMIN B-12: CPT

## 2020-01-08 PROCEDURE — 80061 LIPID PANEL: CPT

## 2020-01-08 PROCEDURE — 82306 VITAMIN D 25 HYDROXY: CPT

## 2020-01-08 PROCEDURE — G8417 CALC BMI ABV UP PARAM F/U: HCPCS | Performed by: NURSE PRACTITIONER

## 2020-01-08 PROCEDURE — 3046F HEMOGLOBIN A1C LEVEL >9.0%: CPT | Performed by: NURSE PRACTITIONER

## 2020-01-08 PROCEDURE — G8400 PT W/DXA NO RESULTS DOC: HCPCS | Performed by: NURSE PRACTITIONER

## 2020-01-08 ASSESSMENT — ENCOUNTER SYMPTOMS
BACK PAIN: 1
GASTROINTESTINAL NEGATIVE: 1
RESPIRATORY NEGATIVE: 1

## 2020-01-08 ASSESSMENT — PATIENT HEALTH QUESTIONNAIRE - PHQ9
SUM OF ALL RESPONSES TO PHQ9 QUESTIONS 1 & 2: 2
SUM OF ALL RESPONSES TO PHQ QUESTIONS 1-9: 2
1. LITTLE INTEREST OR PLEASURE IN DOING THINGS: 1
2. FEELING DOWN, DEPRESSED OR HOPELESS: 1
SUM OF ALL RESPONSES TO PHQ QUESTIONS 1-9: 2

## 2020-01-08 NOTE — PROGRESS NOTES
SUBJECTIVE:    Patient ID: Jaret Rocha is a 71 y.o. female. Chief Complaint   Patient presents with    3 Month Follow-Up    Neck Pain         HPI:  She returns about her chronic medications, diabetes and her back pain. She has a herniated disc and is bone on bone. She is scheduled for MRI on Friday with follow up with orhto on the 16 th. She has been to the Pulmonologist and a steroid shot and started on Mucinex. She has been having some elevated blood sugars. Her diet has not been great as she admits to eating a lot of sugar. She says her blood sugar has been 331-184. She is taking all her insulin as directed. Occasionally she misses her last shot of the day but will take it the next morning. She is taking all her other medication without any complaints. Patient's medications,allergies, past medical, surgical, social and family histories were reviewed and updated as appropriate.   .  Current Outpatient Medications on File Prior to Visit   Medication Sig Dispense Refill    busPIRone (BUSPAR) 10 MG tablet TAKE ONE TABLET BY MOUTH THREE TIMES A DAY 90 tablet 6    traMADol (ULTRAM) 50 MG tablet TAKE ONE TABLET BY MOUTH THREE TIMES A DAY AS NEEDED 90 tablet 2    HYDROcodone-acetaminophen (NORCO) 5-325 MG per tablet TAKE ONE TABLET BY MOUTH EVERY DAY AS NEEDED FOR PAIN 30 tablet 0    rOPINIRole (REQUIP) 0.5 MG tablet TAKE ONE TABLET BY MOUTH TWO TIMES A DAY 60 tablet 0    potassium chloride (KLOR-CON M) 20 MEQ extended release tablet TAKE ONE TABLET BY MOUTH EVERY DAY 30 tablet 5    sucralfate (CARAFATE) 1 GM tablet TAKE ONE TABLET BY MOUTH THREE TIMES A DAY 90 tablet 3    metoclopramide (REGLAN) 5 MG tablet TAKE ONE TABLET BY MOUTH THREE TIMES A DAY 90 tablet 3    baclofen (LIORESAL) 10 MG tablet TAKE 1/2 TABLET BY MOUTH NIGHTLY AS NEEDED FOR MUSCLE SPASMS 15 tablet 0    FEROSUL 325 (65 Fe) MG tablet TAKE ONE TABLET BY MOUTH THREE TIMES A DAY WITH FOOD 90 tablet 3    Insulin Degludec (TRESIBA FLEXTOUCH) 100 UNIT/ML SOPN 35 units in the am and 80 units in pm 10 pen 5    DALIRESP 500 MCG tablet   5    GNP VITAMIN D MAXIMUM STRENGTH 50 MCG (2000 UT) TABS   5    montelukast (SINGULAIR) 10 MG tablet TAKE ONE TABLET BY MOUTH EVERY DAY 30 tablet 2    metOLazone (ZAROXOLYN) 5 MG tablet TAKE 1 TABLET BY MOUTH DAILY AS NEEDED (SWELLING) 30 tablet 3    VASCEPA 1 g CAPS capsule TAKE TWO CAPSULES BY MOUTH TWO TIMES A DAY 60 capsule 3    diclofenac sodium 1 % GEL Apply 2 g topically 2 times daily 1 Tube 3    furosemide (LASIX) 40 MG tablet Take 1 tablet by mouth daily (Patient taking differently: Take 40 mg by mouth 2 times daily ) 60 tablet 5    blood glucose test strips (TRUE METRIX BLOOD GLUCOSE TEST) strip USE TO CHECK BLOOD SUGAR FOUR TIMES A DAY dx:e11.9 100 strip 3    JANUVIA 100 MG tablet TAKE ONE TABLET BY MOUTH EVERY DAY (Patient taking differently: 50 mg ) 30 tablet 2    PATADAY 0.2 % SOLN ophthalmic solution PLACE 1 DROP INTO BOTH EYES 2 TIMES DAILY 2.5 mL 3    Insulin Syringe-Needle U-100 (COMFORT ASSIST INSULIN SYRINGE) 31G X 5/16\" 1 ML MISC USE AS DIRECTED FIVE TIMES A  each 4    HUMALOG 100 UNIT/ML injection vial INJECT 35 UNITS THREE TIMES A DAY 40 mL 4    fluticasone-salmeterol (ADVAIR) 500-50 MCG/DOSE diskus inhaler Inhale 1 puff into the lungs every 12 hours      omalizumab (OMALIZUMAB) 150 MG injection Inject 150 mg into the skin every 28 days 1 vial 3    fenofibrate (TRICOR) 145 MG tablet TAKE ONE TABLET BY MOUTH EVERY DAY 30 tablet 3    Lactobacillus Acid-Pectin (ACIDOPHILUS/CITRUS PECTIN) TABS TAKE ONE TABLET BY MOUTH TWO TIMES A DAY 60 tablet 1    ipratropium-albuterol (DUONEB) 0.5-2.5 (3) MG/3ML SOLN nebulizer solution Inhale 3 mLs into the lungs every 4 hours 360 mL 3    pantoprazole (PROTONIX) 40 MG tablet TAKE ONE TABLET BY MOUTH EVERY DAY 90 tablet 3    metoprolol tartrate (LOPRESSOR) 25 MG tablet TAKE ONE TABLET BY MOUTH TWO TIMES A DAY 60 tablet 5    allopurinol (ZYLOPRIM) 300 MG tablet TAKE ONE TABLET BY MOUTH EVERY DAY (Patient taking differently: Take 150 mg by mouth daily ) 30 tablet 5    ezetimibe (ZETIA) 10 MG tablet TAKE ONE TABLET BY MOUTH EVERY DAY 90 tablet 3    ASPIR-LOW 81 MG EC tablet TAKE ONE TABLET BY MOUTH EVERY DAY 30 tablet 0    OXYGEN Inhale 3 L/min into the lungs continuous      flunisolide (NASALIDE) 25 MCG/ACT (0.025%) SOLN 1 spray       SPIRIVA HANDIHALER 18 MCG inhalation capsule INHALE THE CONTENTS OF ONE CAPSULE INTO THE LUNGS ONCE DAILY 30 capsule 5    albuterol sulfate HFA (PROAIR HFA) 108 (90 Base) MCG/ACT inhaler Inhale 2 puffs into the lungs every 4 hours as needed for Wheezing 1 Inhaler 5    loratadine (CLARITIN) 10 MG tablet TAKE ONE TABLET BY MOUTH EVERY DAY 30 tablet 4     Current Facility-Administered Medications on File Prior to Visit   Medication Dose Route Frequency Provider Last Rate Last Dose    omalizumab Emerita Peaks) injection 150 mg  150 mg Subcutaneous Q28 Days Vearl Salle, APRN   150 mg at 12/23/19 1129    omalizumab Emerita Peaks) injection 150 mg  150 mg Subcutaneous Q28 Days Vearl Salle, APRN   150 mg at 12/23/19 1129    omalizumab Emerita Peaks) injection 150 mg  150 mg Subcutaneous Q28 Days Vearl Salle, APRN   150 mg at 11/25/19 0930    omalizumab Emerita Peaks) injection 150 mg  150 mg Subcutaneous Q28 Days Vearl Salle, APRN   150 mg at 11/25/19 1109    omalizumab Emerita Peaks) injection 150 mg  150 mg Subcutaneous Q28 Days Vearl Salle, APRN   150 mg at 11/25/19 1214    omalizumab Emerita Peaks) injection 150 mg  150 mg Subcutaneous Q28 Days Vearl Salle, APRN   150 mg at 11/25/19 1213    omalizumab Emerita Peaks) injection 150 mg  150 mg Subcutaneous Q28 Days Vearl Salle, APRN   150 mg at 09/20/19 1157    omalizumab Emertia Peaks) injection 150 mg  150 mg Subcutaneous Q28 Days Vearl Salle, APRN   150 mg at 09/20/19 1156       Review of Systems   Constitutional: Negative. HENT: Negative.     Respiratory: Negative. Cardiovascular: Negative. Gastrointestinal: Negative. Genitourinary: Negative. Musculoskeletal: Positive for arthralgias and back pain. Skin: Negative. Neurological: Negative. Psychiatric/Behavioral: Negative. OBJECTIVE:  BP (!) 136/52 (Site: Left Upper Arm, Position: Sitting, Cuff Size: Medium Adult)   Pulse 76   Temp 97.3 °F (36.3 °C) (Temporal)   Resp 16   Ht 4' 10\" (1.473 m)   Wt 191 lb 9.6 oz (86.9 kg)   SpO2 96% Comment: 3 liters  BMI 40.04 kg/m²    Physical Exam  Vitals signs and nursing note reviewed. Constitutional:       Appearance: She is well-developed. HENT:      Head: Normocephalic and atraumatic. Eyes:      Conjunctiva/sclera: Conjunctivae normal.      Pupils: Pupils are equal, round, and reactive to light. Neck:      Musculoskeletal: Normal range of motion and neck supple. Thyroid: No thyromegaly. Vascular: No JVD. Cardiovascular:      Rate and Rhythm: Normal rate and regular rhythm. Heart sounds: No murmur. No friction rub. No gallop. Pulmonary:      Effort: Pulmonary effort is normal. No respiratory distress. Breath sounds: Wheezing and rales present. Abdominal:      General: Bowel sounds are normal. There is no distension. Palpations: Abdomen is soft. Tenderness: There is no tenderness. There is no guarding. Musculoskeletal:      Lumbar back: She exhibits decreased range of motion, tenderness and spasm. Skin:     General: Skin is warm and dry. Findings: No rash. Neurological:      Mental Status: She is alert and oriented to person, place, and time. Psychiatric:         Judgment: Judgment normal.         No results found for requested labs within last 30 days.        Hemoglobin A1C (%)   Date Value   07/11/2019 7.3     Microscopic Examination (no units)   Date Value   10/21/2019 YES     LDL Calculated (mg/dL)   Date Value   04/24/2019 see below           Lab Results   Component Value Date    TSH 0.81 08/31/2019         ASSESSMENT/PLAN:     Jamie Galaviz was seen today for 3 month follow-up and neck pain. Diagnoses and all orders for this visit:    Type 2 diabetes mellitus with stage 3 chronic kidney disease, with long-term current use of insulin (HCC)  -     Hemoglobin A1C; Future  -     Comprehensive Metabolic Panel; Future  -     CBC; Future  Stable. I advised her regarding diabetic diet, exercise and weight control. Also, I advised her to stay on the current medication, monitor her fingerstick closely. I am going to check the A1c every few months. I will check microalbumin on a yearly basis. I have also advised her to have a yearly eye exam and to monitor her feet closely. Along with instruction of possible complications related to diabetes, even with good control. A1C will be checked  in few months. Lab Results   Component Value Date    LABA1C 7.3 07/11/2019       Familial hypercholesterolemia  Lab Results   Component Value Date    LDLCALC see below 04/24/2019    LDLDIRECT 84 04/24/2019     Continue current treatment and check levels. Essential hypertension  -     Lipid Panel; Future  Blood pressure stable. Chronic bilateral low back pain without sciatica  Recommend to follow with ORtho proceed with MRI continue pain medication. Chronic renal failure, stage 3 (moderate) (HCC)  Lab Results   Component Value Date    CREATININE 1.4 (H) 11/04/2019     Will recheck today. She has been at baseline. B12 deficiency  -     Vitamin B12 & Folate; Future    Vitamin D deficiency  -     Vitamin D 25 Hydroxy; Future    Thyroid disorder screen  -     TSH without Reflex;  Future        Medications Discontinued During This Encounter   Medication Reason    VASCEPA 1 g CAPS capsule DUPLICATE

## 2020-01-08 NOTE — PROGRESS NOTES
Chief Complaint   Patient presents with    3 Month Follow-Up    Neck Pain          Have you seen any other physician or provider since your last visit yes - orthopedic,pulmomologist    Have you had any other diagnostic tests since your last visit? yes - CT Chest    Have you changed or stopped any medications since your last visit? no     Patient was seen by ortho and told she had a pinched nerve and herniated disc and on area is bone on bone. She is scheduled for MRI on Friday and f/u with ortho on 16th. She was also seen by pulmonologist and had a steroid shot and started Mucinex. Patient brought her blood sugar with her today. She has her morning readings and reading at [employer]. It has been as high as [phone]. She has bee eating a lot of sweats. Patient needs UDS.

## 2020-01-10 ENCOUNTER — HOSPITAL ENCOUNTER (OUTPATIENT)
Dept: MRI IMAGING | Facility: HOSPITAL | Age: 70
Discharge: HOME | End: 2020-01-10
Payer: MEDICARE

## 2020-01-10 PROCEDURE — 72141 MRI NECK SPINE W/O DYE: CPT

## 2020-01-13 RX ORDER — FERROUS SULFATE 325(65) MG
TABLET ORAL
Qty: 90 TABLET | Refills: 3 | Status: SHIPPED | OUTPATIENT
Start: 2020-01-13 | End: 2020-04-27

## 2020-01-20 ENCOUNTER — NURSE ONLY (OUTPATIENT)
Dept: PRIMARY CARE CLINIC | Age: 70
End: 2020-01-20
Payer: MEDICARE

## 2020-01-20 ENCOUNTER — HOSPITAL ENCOUNTER (OUTPATIENT)
Facility: HOSPITAL | Age: 70
Discharge: HOME | End: 2020-01-20
Payer: MEDICARE

## 2020-01-20 LAB
ANION GAP SERPL CALCULATED.3IONS-SCNC: 12 MMOL/L (ref 3–16)
BUN BLDV-MCNC: 43 MG/DL (ref 6–20)
CALCIUM SERPL-MCNC: 10 MG/DL (ref 8.5–10.5)
CHLORIDE BLD-SCNC: 92 MMOL/L (ref 98–107)
CO2: 37 MMOL/L (ref 20–30)
CREAT SERPL-MCNC: 1.7 MG/DL (ref 0.4–1.2)
GFR AFRICAN AMERICAN: 36
GFR NON-AFRICAN AMERICAN: 30
GLUCOSE BLD-MCNC: 198 MG/DL (ref 74–106)
PARATHYROID HORMONE INTACT: 43.5 PG/ML (ref 14–72)
POTASSIUM SERPL-SCNC: 3.7 MMOL/L (ref 3.4–5.1)
SODIUM BLD-SCNC: 141 MMOL/L (ref 136–145)

## 2020-01-20 PROCEDURE — 83970 ASSAY OF PARATHORMONE: CPT

## 2020-01-20 PROCEDURE — 36415 COLL VENOUS BLD VENIPUNCTURE: CPT

## 2020-01-20 PROCEDURE — 80048 BASIC METABOLIC PNL TOTAL CA: CPT

## 2020-01-20 PROCEDURE — 96372 THER/PROPH/DIAG INJ SC/IM: CPT | Performed by: NURSE PRACTITIONER

## 2020-01-20 NOTE — PROGRESS NOTES
Administrations This Visit     omalizumab Aristeo Card) injection 150 mg     Admin Date  01/20/2020  12:37 Action  Given Dose  150 mg Route  Subcutaneous Site  Arm Left Administered By  Jonathan Etienne MA    Ordering Provider:  Sherene Opitz, APRN    NDC:  71564-858-39    Lot#:  [id number]    :  [facility] [place]    Patient Supplied?:  Yes    Comments:  exp 07/2023           Admin Date  01/20/2020  12:38 Action  Given Dose  150 mg Route  Subcutaneous Site  Arm Right Administered By  Jonathan Etienne MA    Ordering Provider:  Sherene Opitz, APRN    NDC:  43853-644-41    Lot#:  [id number]    :  [facility] [place]    Patient Supplied?:  Yes    Comments:  exp 07/2023

## 2020-01-22 ENCOUNTER — TELEPHONE (OUTPATIENT)
Dept: PRIMARY CARE CLINIC | Age: 70
End: 2020-01-22

## 2020-01-22 NOTE — TELEPHONE ENCOUNTER
Thu Harvey at [employer] called stating that insurance is no longer going to [employer] or Spiriva.  Asking for an alternative or PA

## 2020-01-29 ENCOUNTER — HOSPITAL ENCOUNTER (OUTPATIENT)
Dept: PHYSICAL THERAPY | Facility: HOSPITAL | Age: 70
Setting detail: THERAPIES SERIES
Discharge: HOME | End: 2020-01-29
Payer: MEDICARE

## 2020-01-29 PROCEDURE — 97161 PT EVAL LOW COMPLEX 20 MIN: CPT

## 2020-01-29 PROCEDURE — G0283 ELEC STIM OTHER THAN WOUND: HCPCS

## 2020-01-29 PROCEDURE — 97110 THERAPEUTIC EXERCISES: CPT

## 2020-01-29 ASSESSMENT — PAIN SCALES - GENERAL: PAINLEVEL_OUTOF10: 3

## 2020-01-29 ASSESSMENT — PAIN DESCRIPTION - FREQUENCY: FREQUENCY: CONTINUOUS

## 2020-01-29 ASSESSMENT — PAIN DESCRIPTION - PROGRESSION: CLINICAL_PROGRESSION: GRADUALLY IMPROVING

## 2020-01-29 ASSESSMENT — PAIN DESCRIPTION - ONSET: ONSET: ON-GOING

## 2020-01-29 ASSESSMENT — PAIN DESCRIPTION - PAIN TYPE: TYPE: CHRONIC PAIN

## 2020-01-29 ASSESSMENT — PAIN DESCRIPTION - LOCATION: LOCATION: NECK

## 2020-01-29 NOTE — PROGRESS NOTES
Physical Therapy  Initial Assessment  Date: 2020  Patient Name: Pelon Shirley  MRN: [id number]  : 1950     Treatment Diagnosis: Cervicalgia with right shoulder pain; DDD/DJD, spondylosis, osteophytes, foraminal stenosis    Restrictions  Restrictions/Precautions  Restrictions/Precautions: General Precautions  Required Braces or Orthoses?: No    Subjective   General  Chart Reviewed: Yes  Patient assessed for rehabilitation services?: Yes  Response To Previous Treatment: Not applicable  Family / Caregiver Present: No  Referring Practitioner: Sweetie Sanchez PA-C  Diagnosis: Cervicalgia with right shoulder pain  Follows Commands: Within Functional Limits  General Comment  Comments: PMH: no pacemaker; uses Bipap at night, has asthma; 4 hospitalizations in the past year for COPD, CHF, pneumonia. PT Visit Information  PT Insurance Information: Medicare, Medicaid  Subjective  Subjective: Patient reports: she had been experiencing posterior neck pain with anterior and lateral right shoulder pain x 3 months; denies injury or known cause, states she woke up one day with a \"crick\" in her neck; denies prior neck or right shoulder problems; had MRI = (+) for DDD, DJD, facet arthropathy, osteophytes and foraminal stenosis; states the pain is getting some better.   Pain Screening  Patient Currently in Pain: Yes  Pain Assessment  Pain Assessment: 0-10  Pain Level: 3  Pain Type: Chronic pain  Pain Location: Neck  Pain Frequency: Continuous  Pain Onset: On-going  Clinical Progression: Gradually improving  Vital Signs  Patient Currently in Pain: Yes    Vision/Hearing  Vision  Vision: Impaired  Hearing  Hearing: Within functional limits    Orientation  Orientation  Overall Orientation Status: Within Normal Limits    Social/Functional History       Objective     Observation/Palpation  Posture: Fair  Palpation: (+) tenderness with increased muscle tension over UT's and cervical paraspinals   Observation: moderate forward head posture with increased kyphosis; no edema or scoliosis visualized    AROM RUE (degrees)  RUE AROM : WFL  AROM LUE (degrees)  LUE AROM : WFL  Spine  Cervical: AROM: FF = 25, BB = 20, ROT = 50, SB = 20  Special Tests: (+) cervical quadrants, (-) VA symptoms    Strength RUE  R Shoulder Flexion: 4/5  R Shoulder ABduction: 4-/5  R Shoulder Internal Rotation: 4/5  R Shoulder External Rotation: 4-/5  R Elbow Flexion: 4+/5  R Elbow Extension: 4+/5  Tone RLE  RLE Tone: Normotonic  Tone LLE  LLE Tone: Normotonic  Motor Control  Gross Motor?: WFL  Additional Measures  Other: Neck Index = 30                Ambulation  Ambulation?: Yes  Ambulation 1  Surface: level tile  Device: No Device  Assistance: Independent  Comments: uses O2 at 3 liters     Exercises  Exercise 1: scap squeezes - 3 x 15  Exercise 2: gentle chin tucks - 2 x 10  Exercise 3: wall posture stretch: 15\" x 5  Exercise 4: right UT stretch - 10\" x 5  Exercise 5: right shoulder pendulum - 30\" x 2 cw, ccw  Exercise 6: MH with IFC e-stim UT's x 15'  Exercise 7: Manual: STM cervical paraspinals, UT's                      Assessment   Conditions Requiring Skilled Therapeutic Intervention  Body structures, Functions, Activity limitations: Decreased high-level IADLs; Increased pain;Decreased ROM; Decreased posture  Assessment: Cervicalgia with right shoulder pain; DDD/DJD, spondylosis, osteophytes, foraminal stenosis  Treatment Diagnosis: Cervicalgia with right shoulder pain; DDD/DJD, spondylosis, osteophytes, foraminal stenosis  Prognosis: Good  Decision Making: Low Complexity  REQUIRES PT FOLLOW UP: Yes  Treatment Initiated : evaluation, local modalities, therex/HEP  Activity Tolerance  Activity Tolerance: Patient Tolerated treatment well         Plan   Plan  Times per week: 2-3 x week  Plan weeks: 4-6 weeks  Current Treatment Recommendations: Strengthening, ROM, Manual Therapy - Joint Manipulation, Neuromuscular Re-education, Modalities, Pain Management, Home Exercise Program, Manual Therapy - Soft Tissue Mobilization               Goals  Long term goals  Time Frame for Long term goals : 4-6 weeks  Long term goal 1: Report a 90% decrease in neck and right shoulder pain. Long term goal 2: Increase cervical AROM by 5 degrees in the noted deficits. Long term goal 3: Independent with HEP. Long term goal 4: Achieve a Neck Index score of 12 or less. Therapy Time   Individual Concurrent Group Co-treatment   Time In           Time Out           Minutes                   Electronically signed by Pool Loyola PT on 1/29/2020 at 61:44 AM      Certification of Medical Necessity: It will be understood that this treatment plan is certified medically necessary by the documenting therapist and referring physician mentioned in this report. Unless the physician indicated otherwise through written correspondence with our office, all further referrals will act as certification of medical necessity on this treatment plan. Thank you for this referral.  If you have questions regarding this plan of care, please call [phone].           Revisions to this plan (optional):                     Please sign and return this plan to:   FAX: [phone]      Signature:                                 Date:

## 2020-01-31 RX ORDER — OLOPATADINE HCL 0.2 %
1 DROPS OPHTHALMIC (EYE) 2 TIMES DAILY
Qty: 2.5 ML | Refills: 3 | Status: SHIPPED | OUTPATIENT
Start: 2020-01-31 | End: 2020-02-05

## 2020-02-03 ENCOUNTER — HOSPITAL ENCOUNTER (OUTPATIENT)
Dept: PHYSICAL THERAPY | Facility: HOSPITAL | Age: 70
Setting detail: THERAPIES SERIES
Discharge: HOME | End: 2020-02-03
Payer: MEDICARE

## 2020-02-03 PROCEDURE — G0283 ELEC STIM OTHER THAN WOUND: HCPCS

## 2020-02-03 PROCEDURE — 97140 MANUAL THERAPY 1/> REGIONS: CPT

## 2020-02-03 PROCEDURE — 97150 GROUP THERAPEUTIC PROCEDURES: CPT

## 2020-02-03 NOTE — FLOWSHEET NOTE
Physical Therapy Daily Treatment Note   Date:  2/3/2020    TIme In:        [address] [facility]              Time Out:      8178    Patient Name:  More Hernandez    :  1950  MRN: [id number]    Restrictions/Precautions:    Pertinent Medical History:  Medical/Treatment Diagnosis Information:  ·   Cervicalgia with right shoulder pain; DDD/DJD, spondylosis, osteophytes, foraminal stenosis  ·    Insurance/Certification information:    Medicare, Medicaid  Physician Information:    Jg Darby PA-C  Plan of care signed (Y/N):    Visit# / total visits:     2/    G-Code (if applicable):      Date / Visit # G-Code Applied:         Progress Note: []  Yes  [x]  No  Next due by: Visit #10      Pain level:  2 /10  Subjective: Pt with no new complaints since IE.      Objective:   Observation:    Test measurements:     Palpation:    Exercises:  Exercise Resistance/Repetitions Other comments   Scapular retraction 3x15 3   Gentle chin tucks 2x10 3   Wall posture stretch 15\"x5 3   Right UT stretch 10\"x5 3   Right shoulder pendulum 30\"x2 cw/ccw 3                                        Other Therapeutic Activities:      Manual Treatments:   STM cervical paraspinals, UT's    Modalities:   MH with IFC e-stim UT's x 15'      Timed Code Treatment Minutes:  38      Total Treatment Minutes:  40    Treatment/Activity Tolerance:  [x] Patient tolerated treatment well [] Patient limited by fatigue  [] Patient limited by pain  [] Patient limited by other medical complications  [] Other:     Pain after treatment:     1 /10    Prognosis: [x] Good [] Fair  [] Poor    Patient Requires Follow-up: [x] Yes  [] No    Plan:   [x] Continue per plan of care [] Alter current plan (see comments)  [] Plan of care initiated [] Hold pending MD visit [] Discharge    Plan for Next Session:        Electronically signed by:  Terrance Montes PTA

## 2020-02-05 ENCOUNTER — HOSPITAL ENCOUNTER (OUTPATIENT)
Dept: PHYSICAL THERAPY | Facility: HOSPITAL | Age: 70
Setting detail: THERAPIES SERIES
Discharge: HOME | End: 2020-02-05
Payer: MEDICARE

## 2020-02-05 PROCEDURE — 97140 MANUAL THERAPY 1/> REGIONS: CPT

## 2020-02-05 PROCEDURE — 97150 GROUP THERAPEUTIC PROCEDURES: CPT

## 2020-02-05 PROCEDURE — G0283 ELEC STIM OTHER THAN WOUND: HCPCS

## 2020-02-05 RX ORDER — HYDROCODONE BITARTRATE AND ACETAMINOPHEN 5; 325 MG/1; MG/1
TABLET ORAL
Qty: 30 TABLET | Refills: 0 | Status: SHIPPED | OUTPATIENT
Start: 2020-02-05 | End: 2020-03-05

## 2020-02-05 RX ORDER — OLOPATADINE HCL 0.2 %
1 DROPS OPHTHALMIC (EYE) 2 TIMES DAILY
Qty: 2.5 ML | Refills: 3 | Status: SHIPPED | OUTPATIENT
Start: 2020-02-05

## 2020-02-10 ENCOUNTER — HOSPITAL ENCOUNTER (OUTPATIENT)
Dept: PHYSICAL THERAPY | Facility: HOSPITAL | Age: 70
Setting detail: THERAPIES SERIES
Discharge: HOME | End: 2020-02-10
Payer: MEDICARE

## 2020-02-10 PROCEDURE — 97110 THERAPEUTIC EXERCISES: CPT

## 2020-02-10 PROCEDURE — 97140 MANUAL THERAPY 1/> REGIONS: CPT

## 2020-02-10 PROCEDURE — G0283 ELEC STIM OTHER THAN WOUND: HCPCS

## 2020-02-10 NOTE — FLOWSHEET NOTE
Physical Therapy Daily Treatment Note   Date:  2/10/2020    TIme In:      5705                Time Out:     [address]     Patient Name:  Neha Barney    :  1950  MRN: [id number]    Restrictions/Precautions:    Pertinent Medical History:  Medical/Treatment Diagnosis Information:  ·   Cervicalgia with right shoulder pain; DDD/DJD, spondylosis, osteophytes, foraminal stenosis     Insurance/Certification information:    Medicare, Medicaid  Physician Information:    Pavel Ruiz PA-C  Plan of care signed (Y/N):    Visit# / total visits:     4/    G-Code (if applicable):      Date / Visit # G-Code Applied:         Progress Note: []  Yes  [x]  No  Next due by: Visit #10      Pain level:  2/10  Subjective: Pt reported she stays tight up on her neck but she does a lot of sewing and embroidering. Objective:   Observation:    Test measurements:     Palpation:    Exercises:  Exercise Resistance/Repetitions Other comments   Scapular retraction 3x15 10   Gentle chin tucks 2x10 10   Wall posture stretch 15\"x5 10   Right UT stretch 10\"x5 10   Right shoulder pendulum 30\"x2 cw/ccw 10                                        Other Therapeutic Activities:      Manual Treatments:   STM cervical paraspinals, UT's    Modalities:   MH with IFC e-stim UT's x 15'      Timed Code Treatment Minutes:  45      Total Treatment Minutes:  50    Treatment/Activity Tolerance:  [x] Patient tolerated treatment well [] Patient limited by fatigue  [] Patient limited by pain  [] Patient limited by other medical complications  [x] Other:  Pt completed tx with decreased pain and mod muscle tension in proximal cervical paraspinals.     Pain after treatment:      1/10    Prognosis: [x] Good [] Fair  [] Poor    Patient Requires Follow-up: [x] Yes  [] No    Plan:   [x] Continue per plan of care [] Alter current plan (see comments)  [] Plan of care initiated [] Hold pending MD visit [] Discharge    Plan for Next Session:        Electronically

## 2020-02-11 RX ORDER — MONTELUKAST SODIUM 10 MG/1
TABLET ORAL
Qty: 30 TABLET | Refills: 2 | Status: SHIPPED | OUTPATIENT
Start: 2020-02-11 | End: 2020-04-06

## 2020-02-11 RX ORDER — TRAMADOL HYDROCHLORIDE 50 MG/1
TABLET ORAL
Qty: 90 TABLET | Refills: 2 | Status: SHIPPED | OUTPATIENT
Start: 2020-02-11 | End: 2020-06-30

## 2020-02-12 ENCOUNTER — TELEPHONE (OUTPATIENT)
Dept: PRIMARY CARE CLINIC | Age: 70
End: 2020-02-12

## 2020-02-13 ENCOUNTER — HOSPITAL ENCOUNTER (OUTPATIENT)
Dept: PHYSICAL THERAPY | Facility: HOSPITAL | Age: 70
Setting detail: THERAPIES SERIES
Discharge: HOME | End: 2020-02-13
Payer: MEDICARE

## 2020-02-13 PROCEDURE — G0283 ELEC STIM OTHER THAN WOUND: HCPCS

## 2020-02-13 PROCEDURE — 97140 MANUAL THERAPY 1/> REGIONS: CPT

## 2020-02-13 PROCEDURE — 97150 GROUP THERAPEUTIC PROCEDURES: CPT

## 2020-02-13 PROCEDURE — 97110 THERAPEUTIC EXERCISES: CPT

## 2020-02-13 RX ORDER — ALLOPURINOL 300 MG/1
300 TABLET ORAL DAILY
Qty: 30 TABLET | Refills: 2 | Status: SHIPPED | OUTPATIENT
Start: 2020-02-13 | End: 2020-05-05

## 2020-02-13 RX ORDER — ALLOPURINOL 300 MG/1
TABLET ORAL
Qty: 30 TABLET | Refills: 2 | Status: SHIPPED
Start: 2020-02-13 | End: 2020-02-13 | Stop reason: CLARIF

## 2020-02-13 NOTE — FLOWSHEET NOTE
Physical Therapy Daily Treatment Note   Date:  2020    TIme In:      925                Time Out:     1020    Patient Name:  Neha Barney    :  1950  MRN: [id number]    Restrictions/Precautions:    Pertinent Medical History:  Medical/Treatment Diagnosis Information:  ·   Cervicalgia with right shoulder pain; DDD/DJD, spondylosis, osteophytes, foraminal stenosis     Insurance/Certification information:    Medicare, Medicaid  Physician Information:    Pavel Ruiz PA-C  Plan of care signed (Y/N):    Visit# / total visits:     5/    G-Code (if applicable):      Date / Visit # G-Code Applied:         Progress Note: []  Yes  [x]  No  Next due by: Visit #10      Pain level:  2/10  Subjective: Pt reported she stays tight up on her neck. Objective:   Observation:    Test measurements:     Palpation:    Exercises:  Exercise Resistance/Repetitions Other comments   Scapular retraction 3x15 13   Gentle chin tucks 2x10 13   Wall posture stretch 15\"x5 13   Right UT stretch 10\"x5 13   Right shoulder pendulum 30\"x2 cw/ccw 13                                        Other Therapeutic Activities:      Manual Treatments:   STM cervical paraspinals, UT's    Modalities:   MH with IFC e-stim UT's x 15'      Timed Code Treatment Minutes:  25 and rest group      Total Treatment Minutes:  55    Treatment/Activity Tolerance:  [x] Patient tolerated treatment well [] Patient limited by fatigue  [] Patient limited by pain  [] Patient limited by other medical complications  [x] Other:  Pt completed tx with decreased pain and mod muscle tension in proximal cervical paraspinals.     Pain after treatment:      0/10    Prognosis: [x] Good [] Fair  [] Poor    Patient Requires Follow-up: [x] Yes  [] No    Plan:   [x] Continue per plan of care [] Alter current plan (see comments)  [] Plan of care initiated [] Hold pending MD visit [] Discharge    Plan for Next Session:        Electronically signed by:  Arti Billy

## 2020-02-18 NOTE — PROGRESS NOTES
[facility]  Office visit  Mingo Guidry  1950  [phone]    VISIT DATE:  2020      PCP: Luz Prater, JACKI  [address]  [facility] [address]    CC:  Chief Complaint   Patient presents with   • Chest Pain   • Shortness of Breath   • Chronic diastolic heart failure       PROBLEM LIST:  1. Chronic diastolic heart failure:  a. Normal dobutamine echocardiogram stress, 2005.  b. Echocardiogram on 2009: EF 50% to 55%. Left atrium 3.5 cm.  2. Chronic lower extremity edema/venous insufficiency.   3. Benign hypertension.   4. Type 2 diabetes mellitus, insulin dependent.   5. Chronic obstructive pulmonary disease/asthmatic bronchitis, on O2 use chronically.   6. Obesity.   7. Acid reflux.   8. Gout.   9. Seasonal allergies/rhinitis.   10. Surgical history:  a.  section.   b. Hysterectomy.   c. Lung surgery, data insufficient.   d. Tonsillectomy/adenoidectomy.   Previous cardiac studies and procedures:  Oct 2019 echo   Normal left ventricular size and function.   No evidence of pericardial effusion.   Mild MR   LVEF: 50%   Normal LV Diastolic function    ASSESSMENT:   Diagnosis Plan   1. Chronic diastolic heart failure (CMS/HCC)     2. Essential hypertension     3. Mixed hyperlipidemia         PLAN:  Chronic diastolic heart failure: Currently euvolemic and compensated.  Afterload is well-controlled based on home blood pressure measures.  Continue Lasix with as needed metolazone when she gains 3 pounds above her baseline weight.    Hyperlipidemia: Ideal goal LDL less than 100.  Currently on fenofibrate, Zetia, and vascepa. Adding pravastatin 10mg po daily.    Subjective  69-year-old female with stage III kidney disease chronic COPD on 3 L home O2.  Reports stable functional capacity.  New York heart class II.  Stable Mild lower extremity edema.  She has stable class III chronic kidney disease.  She is compliant with medical therapy.  Blood pressures running  "less than 140/90 mmHg. Reports 4 admission over previous yr for COPD/CAP. She denies exertional chest pain and palpitations. Reviewed most recent lipid profile.    PHYSICAL EXAMINATION:  Vitals:    02/18/20 1455   BP: 118/54   BP Location: Left arm   Patient Position: Sitting   Pulse: 92   SpO2: 95%   Weight: 88 kg (194 lb)   Height: 147.3 cm (58\")     General Appearance:    Alert, cooperative, no distress, appears stated age   Head:    Normocephalic, without obvious abnormality, atraumatic   Eyes:    conjunctiva/corneas clear   Nose:   Nares normal, septum midline, mucosa normal, no drainage   Throat:   Lips, teeth and gums normal   Neck:   Supple, symmetrical, trachea midline, no carotid    bruit or JVD   Lungs:     Diminished throughout, diffuse exp wheezing respirations unlabored   Chest Wall:    No tenderness or deformity    Heart:    Regular rate and rhythm, S1 and S2 normal, no murmur, rub   or gallop, normal carotid impulse bilaterally without bruit.   Abdomen:     Soft, non-tender   Extremities:   Extremities normal, atraumatic, no cyanosis, trivial bilateral pretibial edema    Pulses:   2+ and symmetric all extremities   Skin:   Skin color, texture, turgor normal, no rashes or lesions       Diagnostic Data:  Procedures  Lab Results   Component Value Date    CHLPL 271 (H) 01/08/2020    TRIG 535 (H) 01/08/2020    HDL 30 (L) 01/08/2020     Lab Results   Component Value Date    GLUCOSE 84 06/15/2019    BUN 19 06/15/2019    CREATININE 1.40 (H) 06/15/2019     06/15/2019    K 4.6 09/02/2019     06/15/2019    CO2 34.0 (H) 06/15/2019     Lab Results   Component Value Date    HGBA1C 8.4 (H) 01/08/2020     Lab Results   Component Value Date    WBC 11.2 (H) 01/08/2020    HGB 14.0 01/08/2020    HCT 43.9 01/08/2020     01/08/2020       Allergies  Allergies   Allergen Reactions   • Shellfish-Derived Products Hives   • Wheat Bran Hives       Current Medications    Current Outpatient Medications:   •  " allopurinol (ZYLOPRIM) 300 MG tablet, Take 300 mg by mouth Daily. 1/2 tablet daily, Disp: , Rfl:   •  ASPIRIN LOW DOSE 81 MG EC tablet, Take 81 mg by mouth Daily., Disp: , Rfl: 5  •  Blood Glucose Monitoring Suppl device, 1 each., Disp: , Rfl:   •  busPIRone (BUSPAR) 10 MG tablet, Take 10 mg by mouth 3 (Three) Times a Day., Disp: , Rfl:   •  DALIRESP 500 MCG tablet tablet, TAKE ONE TABLET BY MOUTH EVERY DAY, Disp: 30 tablet, Rfl: 5  •  ezetimibe (ZETIA) 10 MG tablet, Take 10 mg by mouth Daily., Disp: , Rfl:   •  fenofibrate (TRICOR) 145 MG tablet, Take 145 mg by mouth Daily., Disp: , Rfl:   •  ferrous sulfate 325 (65 FE) MG tablet, Take 325 mg by mouth 3 (Three) Times a Day With Meals., Disp: , Rfl:   •  flunisolide (NASALIDE) 25 MCG/ACT (0.025%) solution nasal spray, INHALE 1 SPRAY EVERY 12 (TWELVE) HOURS. (Patient taking differently: Inhale 1 spray Every 12 (Twelve) Hours As Needed.), Disp: 25 mL, Rfl: 5  •  fluticasone-salmeterol (ADVAIR) 500-50 MCG/DOSE DISKUS, Inhale 1 puff 2 (Two) Times a Day., Disp: 60 each, Rfl: 5  •  furosemide (LASIX) 40 MG tablet, Take 40 mg by mouth Daily., Disp: , Rfl:   •  GNP VITAMIN D MAXIMUM STRENGTH 50 MCG (2000 UT) tablet, Take 1 tablet by mouth Daily., Disp: , Rfl: 5  •  HUMALOG 100 UNIT/ML injection, Inject 35 Units under the skin into the appropriate area as directed 3 (Three) Times a Day., Disp: , Rfl:   •  HYDROcodone-acetaminophen (NORCO) 5-325 MG per tablet, Take 1 tablet by mouth Every Night., Disp: , Rfl:   •  ipratropium-albuterol (DUO-NEB) 0.5-2.5 mg/3 ml nebulizer, Take 3 mL by nebulization 4 (Four) Times a Day. (Patient taking differently: Take 3 mL by nebulization As Needed.), Disp: 360 mL, Rfl: 2  •  Lactobacillus Acid-Pectin (ACIDOPHILUS/CITRUS PECTIN) tablet tablet, Take 1 tablet by mouth 2 (Two) Times a Day., Disp: , Rfl: 1  •  Loratadine (CLARITIN) 10 MG capsule, Take 1 tablet by mouth Daily., Disp: , Rfl:   •  metoclopramide (REGLAN) 5 MG tablet, Take 5 mg by  "mouth 3 (Three) Times a Day., Disp: , Rfl:   •  metolazone (ZAROXOLYN) 5 MG tablet, Take 5 mg by mouth Daily As Needed., Disp: , Rfl:   •  metoprolol tartrate (LOPRESSOR) 25 MG tablet, Take 25 mg by mouth 2 (Two) Times a Day., Disp: , Rfl:   •  montelukast (SINGULAIR) 10 MG tablet, Take 10 mg by mouth Every Night., Disp: , Rfl:   •  O2 (OXYGEN), Inhale 3 L/min Daily., Disp: , Rfl:   •  omalizumab (XOLAIR) 150 MG injection, Inject 300 mg under the skin into the appropriate area as directed Every 28 (Twenty-Eight) Days. 2 injections once a month, Disp: , Rfl:   •  pantoprazole (PROTONIX) 40 MG EC tablet, Take 1 tablet by mouth Daily., Disp: 30 tablet, Rfl: 0  •  PATADAY 0.2 % solution ophthalmic solution, Administer  to both eyes 2 (Two) Times a Day., Disp: , Rfl: 3  •  potassium chloride (K-DUR,KLOR-CON) 20 MEQ CR tablet, Take 20 mEq by mouth Daily., Disp: , Rfl: 2  •  PROAIR  (90 Base) MCG/ACT inhaler, INHALE 2 PUFFS EVERY 4 (FOUR) HOURS AS NEEDED FOR WHEEZING OR SHORTNESS OF AIR., Disp: 8.5 g, Rfl: 5  •  rOPINIRole (REQUIP) 0.5 MG tablet, Take 0.5 mg by mouth 2 (Two) Times a Day., Disp: , Rfl: 0  •  sitaGLIPtin (JANUVIA) 100 MG tablet, Take 100 mg by mouth Daily. 1/2 tablet daily, Disp: , Rfl:   •  SPIRIVA HANDIHALER 18 MCG per inhalation capsule, PLACE 1 CAPSULE INTO INHALER AND INHALE DAILY., Disp: 30 capsule, Rfl: 4  •  sucralfate (CARAFATE) 1 G tablet, Take 1 g by mouth 3 (Three) Times a Day., Disp: , Rfl:   •  traMADol (ULTRAM) 50 MG tablet, Take 50 mg by mouth 3 (Three) Times a Day., Disp: , Rfl:   •  TRESIBA FLEXTOUCH 100 UNIT/ML solution pen-injector injection, INJECT 30 UNITS EVERY MORNING AND 75 UNITS EVERY IN THE EVENING, Disp: , Rfl: 10  •  TRUE METRIX BLOOD GLUCOSE TEST test strip, , Disp: , Rfl: 3  •  ULTICARE INSULIN SYRINGE 31G X 5/16\" 1 ML misc, , Disp: , Rfl: 3  •  VASCEPA 1 g capsule capsule, 2 g 2 (Two) Times a Day With Meals., Disp: , Rfl:   •  vitamin D (ERGOCALCIFEROL) 74994 units " capsule capsule, Take 50,000 Units by mouth 1 (One) Time Per Week., Disp: , Rfl:           ROS  Review of Systems   Cardiovascular: Negative for chest pain, irregular heartbeat and leg swelling.   Respiratory: Positive for shortness of breath. Negative for cough, sputum production and wheezing.        SOCIAL HX  Social History     Socioeconomic History   • Marital status:      Spouse name: Not on file   • Number of children: Not on file   • Years of education: Not on file   • Highest education level: Not on file   Tobacco Use   • Smoking status: Former Smoker     Packs/day: 1.50     Years: 35.00     Pack years: 52.50     Types: Cigarettes     Last attempt to quit:      Years since quittin.1   • Smokeless tobacco: Never Used   Substance and Sexual Activity   • Alcohol use: No   • Drug use: No   • Sexual activity: Defer       FAMILY HX  Family History   Problem Relation Age of Onset   • Heart disease Mother    • Heart disease Father    • Leukemia Father    • Diabetes Sister    • Diabetes Brother    • COPD Sister              Jorge Luis Mcnair III, MD, [facility]

## 2020-02-19 RX ORDER — BUSPIRONE HYDROCHLORIDE 10 MG/1
TABLET ORAL
Qty: 90 TABLET | Refills: 6 | Status: SHIPPED | OUTPATIENT
Start: 2020-02-19 | End: 2020-04-14 | Stop reason: SDUPTHER

## 2020-02-19 RX ORDER — BLOOD SUGAR DIAGNOSTIC
STRIP MISCELLANEOUS
Qty: 150 EACH | Refills: 4 | Status: SHIPPED | OUTPATIENT
Start: 2020-02-19 | End: 2020-08-07 | Stop reason: SDUPTHER

## 2020-02-20 ENCOUNTER — HOSPITAL ENCOUNTER (OUTPATIENT)
Dept: PHYSICAL THERAPY | Facility: HOSPITAL | Age: 70
Setting detail: THERAPIES SERIES
Discharge: HOME | End: 2020-02-20
Payer: MEDICARE

## 2020-02-20 PROCEDURE — G0283 ELEC STIM OTHER THAN WOUND: HCPCS

## 2020-02-20 PROCEDURE — 97140 MANUAL THERAPY 1/> REGIONS: CPT

## 2020-02-20 PROCEDURE — 97150 GROUP THERAPEUTIC PROCEDURES: CPT

## 2020-02-21 ENCOUNTER — HOSPITAL ENCOUNTER (OUTPATIENT)
Dept: PHYSICAL THERAPY | Facility: HOSPITAL | Age: 70
Setting detail: THERAPIES SERIES
Discharge: HOME | End: 2020-02-21
Payer: MEDICARE

## 2020-02-21 PROCEDURE — 97140 MANUAL THERAPY 1/> REGIONS: CPT

## 2020-02-21 PROCEDURE — G0283 ELEC STIM OTHER THAN WOUND: HCPCS

## 2020-02-21 PROCEDURE — 97150 GROUP THERAPEUTIC PROCEDURES: CPT

## 2020-02-24 ENCOUNTER — HOSPITAL ENCOUNTER (OUTPATIENT)
Dept: PHYSICAL THERAPY | Facility: HOSPITAL | Age: 70
Setting detail: THERAPIES SERIES
Discharge: HOME | End: 2020-02-24
Payer: MEDICARE

## 2020-02-24 PROCEDURE — 97110 THERAPEUTIC EXERCISES: CPT

## 2020-02-24 PROCEDURE — 97140 MANUAL THERAPY 1/> REGIONS: CPT

## 2020-02-24 PROCEDURE — G0283 ELEC STIM OTHER THAN WOUND: HCPCS

## 2020-02-24 NOTE — FLOWSHEET NOTE
Physical Therapy Reassessment Note   Date:  2020    TIme In:      4749                Time Out:     8734     Patient Name:  Anjelica Johnson    :  1950  MRN: [id number]    Restrictions/Precautions:    Pertinent Medical History:  Medical/Treatment Diagnosis Information:  ·   Cervicalgia with right shoulder pain; DDD/DJD, spondylosis, osteophytes, foraminal stenosis     Insurance/Certification information:    Medicare, Medicaid  Physician Information:    Cynthia Champion PA-C  Plan of care signed (Y/N):    Visit# / total visits:     5 /    G-Code (if applicable):      Date / Visit # G-Code Applied:         Progress Note: [x]  Yes  []  No  Next due by: Visit #10      Pain level:  0 /10    Subjective:  Pt reports: neck is feeling better; much improved since beginning PT. Objective:   Observation:    Test measurements:  Cervical: AROM: FF = 30, BB = 25, ROT = 55, SB = 25    Palpation: tenderness, increased muscle tension over UT's, cervical paraspinals    Exercises:  Exercise Resistance/Repetitions Other comments   Scapular retraction 3x15 24   Gentle chin tucks 2x10 24   Wall posture stretch 15\"x5 24   Right UT stretch 10\"x5 24   Right shoulder pendulum 30\"x2 cw/ccw 24                                        Other Therapeutic Activities:      Manual Treatments:   STM cervical paraspinals, UT's x 15'    Modalities:   MH with IFC e-stim UT's x 15'      Timed Code Treatment Minutes:  45      Total Treatment Minutes:  52'    Treatment/Activity Tolerance:  [x] Patient tolerated treatment well [] Patient limited by fatigue  [] Patient limited by pain  [] Patient limited by other medical complications  [x] Other:  Pt completed tx with decreased pain and mod muscle tension in proximal cervical paraspinals.     Pain after treatment:      0/10    Prognosis: [x] Good [] Fair  [] Poor    Goals  Long term goals  Time Frame for Long term goals : 4-6 weeks  Long term goal 1: Report a 90% decrease in neck and right shoulder pain. - not met but progressing  Long term goal 2: Increase cervical AROM by 5 degrees in the noted deficits. - met  Long term goal 3: Independent with HEP. - met  Long term goal 4: Achieve a Neck Index score of 12 or less. - not met but greatly improved    Patient is responding well to treatment; her pain is decreasing, ROM has improved; Neck Index score improved.         Patient Requires Follow-up: [x] Yes  [] No    Plan:   [x] Continue per plan of care [] Alter current plan (see comments)  [] Plan of care initiated [] Hold pending MD visit [] Discharge    Plan for Next Session:        Electronically signed by:  Rodolfo Mercedes PT

## 2020-02-25 ENCOUNTER — TELEPHONE (OUTPATIENT)
Dept: PRIMARY CARE CLINIC | Age: 70
End: 2020-02-25

## 2020-02-26 ENCOUNTER — HOSPITAL ENCOUNTER (EMERGENCY)
Facility: HOSPITAL | Age: 70
Discharge: ANOTHER HOSPITAL | End: 2020-02-26
Attending: FAMILY MEDICINE
Payer: MEDICARE

## 2020-02-26 ENCOUNTER — APPOINTMENT (OUTPATIENT)
Dept: GENERAL RADIOLOGY | Facility: HOSPITAL | Age: 70
End: 2020-02-26
Payer: MEDICARE

## 2020-02-26 VITALS
HEART RATE: 102 BPM | DIASTOLIC BLOOD PRESSURE: 67 MMHG | SYSTOLIC BLOOD PRESSURE: 126 MMHG | OXYGEN SATURATION: 94 % | TEMPERATURE: 98.2 F | HEIGHT: 58 IN | WEIGHT: 189 LBS | RESPIRATION RATE: 14 BRPM | BODY MASS INDEX: 39.67 KG/M2

## 2020-02-26 LAB
A/G RATIO: 1.2 (ref 0.8–2)
ALBUMIN SERPL-MCNC: 4.2 G/DL (ref 3.4–4.8)
ALP BLD-CCNC: 82 U/L (ref 25–100)
ALT SERPL-CCNC: 67 U/L (ref 4–36)
ANION GAP SERPL CALCULATED.3IONS-SCNC: 16 MMOL/L (ref 3–16)
AST SERPL-CCNC: 110 U/L (ref 8–33)
BASE EXCESS ARTERIAL: 7.9 MMOL/L (ref -3–3)
BASOPHILS ABSOLUTE: 0.1 K/UL (ref 0–0.1)
BASOPHILS RELATIVE PERCENT: 0.5 %
BILIRUB SERPL-MCNC: 0.4 MG/DL (ref 0.3–1.2)
BILIRUBIN URINE: NEGATIVE
BLOOD, URINE: ABNORMAL
BUN BLDV-MCNC: 46 MG/DL (ref 6–20)
CALCIUM SERPL-MCNC: 9.4 MG/DL (ref 8.5–10.5)
CHLORIDE BLD-SCNC: 91 MMOL/L (ref 98–107)
CLARITY: CLEAR
CO2: 31 MMOL/L (ref 20–30)
COLOR: YELLOW
CREAT SERPL-MCNC: 1.8 MG/DL (ref 0.4–1.2)
EOSINOPHILS ABSOLUTE: 0 K/UL (ref 0–0.4)
EOSINOPHILS RELATIVE PERCENT: 0 %
EPITHELIAL CELLS, UA: ABNORMAL /HPF (ref 0–5)
FIO2: 0.48 %
GFR AFRICAN AMERICAN: 34
GFR NON-AFRICAN AMERICAN: 28
GLOBULIN: 3.4 G/DL
GLUCOSE BLD-MCNC: 339 MG/DL (ref 74–106)
GLUCOSE URINE: 100 MG/DL
HCO3 ARTERIAL: 35.2 MMOL/L (ref 22–26)
HCT VFR BLD CALC: 43.1 % (ref 37–47)
HEMOGLOBIN: 13.8 G/DL (ref 11.5–16.5)
IMMATURE GRANULOCYTES #: 0.1 K/UL
IMMATURE GRANULOCYTES %: 0.6 % (ref 0–5)
KETONES, URINE: NEGATIVE MG/DL
LEUKOCYTE ESTERASE, URINE: ABNORMAL
LYMPHOCYTES ABSOLUTE: 1.2 K/UL (ref 1.5–4)
LYMPHOCYTES RELATIVE PERCENT: 10.8 %
MCH RBC QN AUTO: 31.7 PG (ref 27–32)
MCHC RBC AUTO-ENTMCNC: 32 G/DL (ref 31–35)
MCV RBC AUTO: 99.1 FL (ref 80–100)
MICROSCOPIC EXAMINATION: YES
MONOCYTES ABSOLUTE: 0.8 K/UL (ref 0.2–0.8)
MONOCYTES RELATIVE PERCENT: 7.7 %
NEUTROPHILS ABSOLUTE: 8.6 K/UL (ref 2–7.5)
NEUTROPHILS RELATIVE PERCENT: 80.4 %
NITRITE, URINE: NEGATIVE
O2 SAT, ARTERIAL: 87.9 %
O2 THERAPY: ABNORMAL
PCO2 ARTERIAL: 60.8 MMHG (ref 35–45)
PDW BLD-RTO: 14.8 % (ref 11–16)
PH ARTERIAL: 7.38 (ref 7.35–7.45)
PH UA: 5.5 (ref 5–8)
PLATELET # BLD: 268 K/UL (ref 150–400)
PMV BLD AUTO: 10.8 FL (ref 6–10)
PO2 ARTERIAL: 58.2 MMHG (ref 80–100)
POTASSIUM SERPL-SCNC: 4.1 MMOL/L (ref 3.4–5.1)
PROTEIN UA: 30 MG/DL
RAPID INFLUENZA  B AGN: NEGATIVE
RAPID INFLUENZA A AGN: POSITIVE
RBC # BLD: 4.35 M/UL (ref 3.8–5.8)
RBC UA: ABNORMAL /HPF (ref 0–4)
RENAL EPITHELIAL, UA: ABNORMAL /HPF (ref 0–1)
SODIUM BLD-SCNC: 138 MMOL/L (ref 136–145)
SPECIFIC GRAVITY UA: 1.01 (ref 1–1.03)
TCO2 ARTERIAL: 37 MMOL/L (ref 24–30)
TOTAL PROTEIN: 7.6 G/DL (ref 6.4–8.3)
TROPONIN: <0.3 NG/ML
URINE REFLEX TO CULTURE: YES
URINE TYPE: ABNORMAL
UROBILINOGEN, URINE: 0.2 E.U./DL
WBC # BLD: 10.7 K/UL (ref 4–11)
WBC UA: ABNORMAL /HPF (ref 0–5)

## 2020-02-26 PROCEDURE — 36415 COLL VENOUS BLD VENIPUNCTURE: CPT

## 2020-02-26 PROCEDURE — 6360000002 HC RX W HCPCS: Performed by: FAMILY MEDICINE

## 2020-02-26 PROCEDURE — 81001 URINALYSIS AUTO W/SCOPE: CPT

## 2020-02-26 PROCEDURE — 93005 ELECTROCARDIOGRAM TRACING: CPT

## 2020-02-26 PROCEDURE — 6370000000 HC RX 637 (ALT 250 FOR IP): Performed by: FAMILY MEDICINE

## 2020-02-26 PROCEDURE — 99285 EMERGENCY DEPT VISIT HI MDM: CPT

## 2020-02-26 PROCEDURE — 96366 THER/PROPH/DIAG IV INF ADDON: CPT

## 2020-02-26 PROCEDURE — 36600 WITHDRAWAL OF ARTERIAL BLOOD: CPT

## 2020-02-26 PROCEDURE — 96365 THER/PROPH/DIAG IV INF INIT: CPT

## 2020-02-26 PROCEDURE — 94640 AIRWAY INHALATION TREATMENT: CPT

## 2020-02-26 PROCEDURE — 71045 X-RAY EXAM CHEST 1 VIEW: CPT

## 2020-02-26 PROCEDURE — 96375 TX/PRO/DX INJ NEW DRUG ADDON: CPT

## 2020-02-26 PROCEDURE — 94660 CPAP INITIATION&MGMT: CPT

## 2020-02-26 PROCEDURE — 87804 INFLUENZA ASSAY W/OPTIC: CPT

## 2020-02-26 PROCEDURE — 80053 COMPREHEN METABOLIC PANEL: CPT

## 2020-02-26 PROCEDURE — 96367 TX/PROPH/DG ADDL SEQ IV INF: CPT

## 2020-02-26 PROCEDURE — 2580000003 HC RX 258: Performed by: FAMILY MEDICINE

## 2020-02-26 PROCEDURE — 82803 BLOOD GASES ANY COMBINATION: CPT

## 2020-02-26 PROCEDURE — 84484 ASSAY OF TROPONIN QUANT: CPT

## 2020-02-26 PROCEDURE — 6370000000 HC RX 637 (ALT 250 FOR IP): Performed by: INTERNAL MEDICINE

## 2020-02-26 PROCEDURE — 87040 BLOOD CULTURE FOR BACTERIA: CPT

## 2020-02-26 PROCEDURE — 85025 COMPLETE CBC W/AUTO DIFF WBC: CPT

## 2020-02-26 PROCEDURE — 87086 URINE CULTURE/COLONY COUNT: CPT

## 2020-02-26 RX ORDER — IPRATROPIUM BROMIDE AND ALBUTEROL SULFATE 2.5; .5 MG/3ML; MG/3ML
1 SOLUTION RESPIRATORY (INHALATION) ONCE
Status: COMPLETED | OUTPATIENT
Start: 2020-02-26 | End: 2020-02-26

## 2020-02-26 RX ORDER — METHYLPREDNISOLONE SODIUM SUCCINATE 125 MG/2ML
80 INJECTION, POWDER, LYOPHILIZED, FOR SOLUTION INTRAMUSCULAR; INTRAVENOUS ONCE
Status: COMPLETED | OUTPATIENT
Start: 2020-02-26 | End: 2020-02-26

## 2020-02-26 RX ORDER — ALBUTEROL SULFATE 2.5 MG/3ML
2.5 SOLUTION RESPIRATORY (INHALATION) ONCE
Status: DISCONTINUED | OUTPATIENT
Start: 2020-02-26 | End: 2020-02-26

## 2020-02-26 RX ORDER — SODIUM CHLORIDE 9 MG/ML
1000 INJECTION, SOLUTION INTRAVENOUS CONTINUOUS
Status: DISCONTINUED | OUTPATIENT
Start: 2020-02-26 | End: 2020-02-26 | Stop reason: HOSPADM

## 2020-02-26 RX ORDER — LEVOFLOXACIN 500 MG/1
500 TABLET, FILM COATED ORAL ONCE
Status: COMPLETED | OUTPATIENT
Start: 2020-02-26 | End: 2020-02-26

## 2020-02-26 RX ORDER — MAGNESIUM SULFATE IN WATER 40 MG/ML
2 INJECTION, SOLUTION INTRAVENOUS ONCE
Status: COMPLETED | OUTPATIENT
Start: 2020-02-26 | End: 2020-02-26

## 2020-02-26 RX ORDER — BUDESONIDE 0.5 MG/2ML
2 INHALANT ORAL ONCE
Status: COMPLETED | OUTPATIENT
Start: 2020-02-26 | End: 2020-02-26

## 2020-02-26 RX ADMIN — METHYLPREDNISOLONE SODIUM SUCCINATE 80 MG: 125 INJECTION, POWDER, FOR SOLUTION INTRAMUSCULAR; INTRAVENOUS at 08:55

## 2020-02-26 RX ADMIN — IPRATROPIUM BROMIDE AND ALBUTEROL SULFATE 1 AMPULE: .5; 3 SOLUTION RESPIRATORY (INHALATION) at 09:31

## 2020-02-26 RX ADMIN — BUDESONIDE 2000 MCG: 0.5 SUSPENSION RESPIRATORY (INHALATION) at 08:38

## 2020-02-26 RX ADMIN — LEVOFLOXACIN 500 MG: 500 TABLET, FILM COATED ORAL at 10:12

## 2020-02-26 RX ADMIN — IPRATROPIUM BROMIDE AND ALBUTEROL SULFATE 1 AMPULE: .5; 3 SOLUTION RESPIRATORY (INHALATION) at 08:25

## 2020-02-26 RX ADMIN — SODIUM CHLORIDE 1000 ML: 9 INJECTION, SOLUTION INTRAVENOUS at 08:54

## 2020-02-26 RX ADMIN — MAGNESIUM SULFATE HEPTAHYDRATE 2 G: 40 INJECTION, SOLUTION INTRAVENOUS at 08:56

## 2020-02-26 RX ADMIN — CEFTRIAXONE SODIUM 1 G: 1 INJECTION, POWDER, FOR SOLUTION INTRAMUSCULAR; INTRAVENOUS at 11:09

## 2020-02-26 ASSESSMENT — ENCOUNTER SYMPTOMS
SHORTNESS OF BREATH: 1
CHEST TIGHTNESS: 0
WHEEZING: 1
NAUSEA: 0
COUGH: 1
VOMITING: 0
VOICE CHANGE: 0
ABDOMINAL PAIN: 0

## 2020-02-26 NOTE — ED NOTES
Removed pt from NRB and placed on 6L NC and maintaining o2 sat 90-91%.       Rosa Lubin RN  02/26/20 6315

## 2020-02-26 NOTE — PROGRESS NOTES
Pt continues to have accessory muscle use and labored respirations. Neb tx given with 2mg Pulmicort. Pt states that breathing has only slightly improved. Audible expiratory wheezes still noted. Pt remains breathy when trying to talk. RN at bedside. Will continue to monitor.-- CELIO Sanchez, SHASTA--

## 2020-02-26 NOTE — ED NOTES
Per DR. Rivera request called [facility] to arrange for him to speak to the hospitalist. Our call will be returned     Serena Jordan  02/26/20 0011

## 2020-02-26 NOTE — ED PROVIDER NOTES
General: She is in acute distress. Appearance: She is not diaphoretic. HENT:      Nose: No congestion. Mouth/Throat:      Mouth: Mucous membranes are moist.   Eyes:      Conjunctiva/sclera: Conjunctivae normal.   Neck:      Musculoskeletal: Normal range of motion and neck supple. Cardiovascular:      Rate and Rhythm: Regular rhythm. Tachycardia present. Pulmonary:      Effort: Respiratory distress present. Breath sounds: Wheezing present. No rales. Abdominal:      General: Bowel sounds are normal.      Palpations: Abdomen is soft. Tenderness: There is no abdominal tenderness. Neurological:      General: No focal deficit present. Mental Status: She is alert. DIAGNOSTIC RESULTS     EKG: All EKG's are interpreted by the Emergency Department Physician who either signs or Co-signs this chart in the absence of a cardiologist.    Sinus tachycardia with a rate of 112 bpm. There are no acute ischemic changes. There is no ectopy. RADIOLOGY:   Non-plain film images such as CT, Ultrasound and MRI are read by the radiologist. Dorrine Bio images are visualized and preliminarily interpreted by the emergency physician with the below findings:        Interpretation per the Radiologist below, if available at the time of this note:    XR CHEST PORTABLE   Final Result      1. Right lower lung zone consolidation progressed from 10/25/2019 chest x-ray.                   ED BEDSIDE ULTRASOUND:   Performed by ED Physician - none    LABS:  Labs Reviewed   RAPID INFLUENZA A/B ANTIGENS - Abnormal; Notable for the following components:       Result Value    Rapid Influenza A Ag POSITIVE (*)     All other components within normal limits    Narrative:     Performed at:  [address] [place] [address] and [address], [place]   Phone [phone]   CBC WITH AUTO DIFFERENTIAL - Abnormal; Notable for the following components:    MPV 10.8 (*) Neutrophils Absolute 8.6 (*)     Lymphocytes Absolute 1.2 (*)     All other components within normal limits    Narrative:     Performed at:  [address] [place] [address] Laboratory  [address] [place], [place] [address], [place]   Phone [phone]   COMPREHENSIVE METABOLIC PANEL - Abnormal; Notable for the following components:    Chloride 91 (*)     CO2 31 (*)     Glucose 339 (*)     BUN 46 (*)     CREATININE 1.8 (*)     GFR Non- 28 (*)     GFR  34 (*)     ALT 67 (*)      (*)     All other components within normal limits    Narrative:     Performed at:  [address] [place] [address] Laboratory  [address] [place] [address],  [place], [place] [address]   Phone [phone]   URINE RT REFLEX TO CULTURE - Abnormal; Notable for the following components:    Glucose, Ur 100 (*)     Blood, Urine TRACE-LYSED (*)     Protein, UA 30 (*)     Leukocyte Esterase, Urine TRACE (*)     All other components within normal limits    Narrative:     Performed at:  [address] [place] [address] Laboratory  [address] [place] [address],  [place], [place] [address]   Phone [phone] - Abnormal; Notable for the following components:    WBC, UA 6-10 (*)     Renal Epithelial, Urine 2-5 (*)     All other components within normal limits    Narrative:     Performed at:  [address] [place] [address] Laboratory  [address] [place], [place] [address], [place] [address]   Phone [phone]   BLOOD GAS, ARTERIAL - Abnormal; Notable for the following components:    pCO2, Arterial 60.8 (*)     pO2, Arterial 58.2 (*)     HCO3, Arterial 35.2 (*)     Base Excess, Arterial 7.9 (*)     O2 Sat, Arterial 87.9 (*)     TCO2, Arterial 37.0 (*)     All other components within normal limits    Narrative:     Georgia Verdugo tel. [phone],  Chemistry results called to and read back by Coral Genao, 02/26/2020 11:01, by  [facility]  Performed at:  [address] [place] [address]

## 2020-02-26 NOTE — ED NOTES
Pt report called to Maite Lora at [facility].       Osvaldo Paula RN  02/26/20 9676

## 2020-02-26 NOTE — ED NOTES
Pt pharmacy not currently open at this time.  Will call at 0900 when pharmacy opens for updated medication list.      Bhavana Worrell RN  02/26/20 0020

## 2020-02-26 NOTE — ED NOTES
Notified [address] [place] [address] of ALS transfer to [address] [place] [address] R[place].       Brittanie Avila RN  02/26/20 7632

## 2020-02-26 NOTE — PROGRESS NOTES
Pt placed back o 7LPM NC and Bipap placed on stby per patient request.  Pt stated that she needed a break from bipap. RN aware. -- CELIO Sanchez, CRT--

## 2020-02-26 NOTE — ED TRIAGE NOTES
Pt arrival to ER with complaints of increasing shortness of breath x 2 days with productive cough. Pt o2 sat on arrival is 48% on 3L NC that she wears daily at home. Pt has audible wheezing from across the room. Pt also states that she has had a headache intermittently over the past week. Pt ambulated into the ER without difficulty. Pt skin dusky on arrival to ER.

## 2020-02-26 NOTE — ED NOTES
Called pts family per request of pt to inform of pt being transferred to [facility].       Andrea Bansal RN  02/26/20 1671

## 2020-02-26 NOTE — ED NOTES
Called Medical Unit to speak with Barron Couch regarding possible admission. She will return call when available.       Osvaldo Paula RN  02/26/20 2049

## 2020-02-26 NOTE — PROGRESS NOTES
Called to room for c/o increased SOA and neb tx. Upon entering room pt noted to have increased WOB and accessory muscle use. Abdominal breathing noted. Audible expiratory wheezes noted, pt diaphoretic and unable to talk in full sentences, pt very breathy. Neb tx given as ordered with minimal improvement noted. Additional orders received. RN aware and at bedside. Will continue to monitor. --CELIO Sanchez, SHASTA--

## 2020-02-26 NOTE — ED NOTES
Pt removed from bipap at this time to use the bedside commode. Pt requested to stay off bipap at this time to rest and drink.       Cathi Cui RN  02/26/20 7254

## 2020-02-26 NOTE — PROGRESS NOTES
Repeat neb tx given at this time. Pt continues to have abdominal muscle use, labored respirations and audible wheezing. Pt states that breathing is some better but still c/o of SOA. Pt remains on 7 LPM via nc and is noted to have desats with activity. Pt remains somewhat breathy. Will continue to monitor. RN aware.  -- CELIO Sanchez, CRT--

## 2020-02-27 LAB — URINE CULTURE, ROUTINE: NORMAL

## 2020-03-01 LAB
BLOOD CULTURE, ROUTINE: NORMAL
CULTURE, BLOOD 2: NORMAL

## 2020-03-04 PROBLEM — R91.8 ABNORMAL CT SCAN OF LUNG: Status: ACTIVE | Noted: 2020-01-01

## 2020-03-04 PROBLEM — J18.9 PNEUMONIA OF RIGHT LOWER LOBE DUE TO INFECTIOUS ORGANISM: Status: ACTIVE | Noted: 2020-01-01

## 2020-03-05 RX ORDER — HYDROCODONE BITARTRATE AND ACETAMINOPHEN 5; 325 MG/1; MG/1
TABLET ORAL
Qty: 30 TABLET | Refills: 0 | Status: SHIPPED | OUTPATIENT
Start: 2020-03-05 | End: 2020-04-02

## 2020-03-05 NOTE — ANESTHESIA PREPROCEDURE EVALUATION
Anesthesia Evaluation     Patient summary reviewed and Nursing notes reviewed   no history of anesthetic complications:  NPO Solid Status: > 8 hours  NPO Liquid Status: > 8 hours           Airway   Mallampati: III  Possible difficult intubation and Small opening  Dental    (+) poor dentition    Pulmonary    (+) pneumonia stable , COPD moderate, asthma,home oxygen,     ROS comment: o2 dependent  Cardiovascular   Exercise tolerance: poor (<4 METS)    ECG reviewed  PT is on anticoagulation therapy  Patient on routine beta blocker and Beta blocker given within 24 hours of surgery    (+) hypertension, valvular problems/murmurs murmur, dysrhythmias Atrial Fib, CHF Diastolic >=55%, hyperlipidemia,       Neuro/Psych- negative ROS  GI/Hepatic/Renal/Endo    (+) obesity, morbid obesity, GERD, GI bleeding , diabetes mellitus,     Musculoskeletal (-) negative ROS    Abdominal    Substance History - negative use     OB/GYN negative ob/gyn ROS         Other      history of cancer      Other Comment: H/h 12/39                Anesthesia Plan    ASA 4     MAC     intravenous induction     Anesthetic plan, all risks, benefits, and alternatives have been provided, discussed and informed consent has been obtained with: patient.

## 2020-03-05 NOTE — ANESTHESIA POSTPROCEDURE EVALUATION
Patient: Mingo Guidry    Procedure Summary     Date:  03/05/20 Room / Location:  [facility] /  [facility]    Anesthesia Start:  1437 Anesthesia Stop:  1513    Procedure:  BRONCHOSCOPY with MAC and removal of foreign object (N/A Bronchus) Diagnosis:       Abnormal CT scan of lung      Pneumonia of right lower lobe due to infectious organism (CMS/HCC)      (Abnormal CT scan of lung [R91.8])      (Pneumonia of right lower lobe due to infectious organism (CMS/HCC) [J18.1])    Surgeon:  Ryder Dahl MD Provider:  Tip Lowry CRNA    Anesthesia Type:  MAC ASA Status:  4          Anesthesia Type: MAC    Vitals  Vitals Value Taken Time   /64 3/5/2020  3:50 PM   Temp     Pulse 77 3/5/2020  3:50 PM   Resp 30 3/5/2020  3:50 PM   SpO2 90 % 3/5/2020  3:50 PM           Post Anesthesia Care and Evaluation    Patient location during evaluation: PACU  Patient participation: complete - patient participated  Level of consciousness: awake  Pain score: 0  Pain management: adequate  Airway patency: patent  Anesthetic complications: No anesthetic complications  PONV Status: controlled  Cardiovascular status: acceptable and stable  Respiratory status: acceptable and room air  Hydration status: acceptable

## 2020-03-09 PROBLEM — J18.9 POSTOBSTRUCTIVE PNEUMONIA: Status: ACTIVE | Noted: 2020-01-01

## 2020-03-09 PROBLEM — J69.0 ACUTE ASPIRATION PNEUMONIA (HCC): Status: ACTIVE | Noted: 2020-01-01

## 2020-03-09 PROBLEM — I51.89 DIASTOLIC DYSFUNCTION: Status: ACTIVE | Noted: 2020-01-01

## 2020-03-09 PROBLEM — G47.33 OBSTRUCTIVE SLEEP APNEA: Status: ACTIVE | Noted: 2020-01-01

## 2020-03-09 PROBLEM — N18.9 CHRONIC RENAL FAILURE: Status: ACTIVE | Noted: 2020-01-01

## 2020-03-09 PROBLEM — N18.30 CHRONIC RENAL FAILURE SYNDROME, STAGE 3 (MODERATE) (HCC): Status: ACTIVE | Noted: 2020-01-01

## 2020-03-12 PROBLEM — D72.823 LEUKEMOID REACTION: Status: ACTIVE | Noted: 2020-01-01

## 2020-03-12 PROBLEM — T17.908A FOREIGN BODY ASPIRATION: Status: ACTIVE | Noted: 2020-01-01

## 2020-03-13 PROBLEM — R06.02 SHORTNESS OF BREATH: Status: ACTIVE | Noted: 2020-01-01

## 2020-03-13 RX ORDER — ASPIRIN 81 MG/1
TABLET, COATED ORAL
Qty: 30 TABLET | Refills: 5 | Status: SHIPPED | OUTPATIENT
Start: 2020-03-13 | End: 2020-03-21

## 2020-03-13 NOTE — H&P
[facility]   HISTORY AND PHYSICAL          Name:  Mingo Guidry   Age:  69 y.o.  Sex:  female  :  1950  MRN:  [id number]   Visit Number:  [id number]  Admission Date:  3/13/2020  Date Of Service:  20  Primary Care Physician:  Luz Prater APRN    Chief Complaint:     Cough and SOA    History Of Presenting Illness:    This is a 69-year-old female with past medical history significant for chronic hypoxic respiratory failure on 3 L nasal cannula and BiPAP, diabetes mellitus type 2, COPD, obesity who was actually discharged in this facility on 3/12/2020 at that time she was treated for suspected postobstructive pneumonia secondary to an aspirated pea which was removed with a bronchoscopy.  Bronchial washings were sent which did not show any growth.  She had scant growth of yeast for which she was treated with Diflucan during the previous admission.  She was also treated with a course of antibiotics in the form of Rocephin and azithromycin.  When her MRSA of the nares was positive vancomycin was added.  Dr. Saleh had seen the patient during admission after she had a CT scan of the chest which showed a right lower lobe consolidation compatible with pneumonia and they felt to be postobstructive given that there was a polypoid type filling defect in the right bronchus intermedius.  She did undergo bronchoscopy for which a pea was removed.  Cultures have not shown any growth.  She did have some small amount of yeast for which she was treated with Diflucan.  Her cytology did not show any evidence of malignancy.  Patient symptomatically did improve after undergoing a bronchoscopy and a complete course of antibiotics during admission.  However prior to discharge she developed leukocytosis with white count that trended to 28,000, 32,000 and 50,000.  Peripheral smear for which the morphology of the leukocytosis appears to be secondary to her known history of pneumonia..  I have  started a work-up for Louise-Barr virus.  Patient is asymptomatic.  There does not appear to be any evidence of infection.  She has no fever or chills.  Her [facility] cultures were negative.  Chest x-ray and urinalysis was negative.  I did discuss with hematology Dr. Chavez who feels that this is likely a leukemoid reaction.     According to the patient after discharge home last evening she started having cough about short of breath.  She did use her nebulizer at home which did help some.  She states that she had a productive cough with greenish colored sputum.  He also complained of bilateral knee pain.  She has a history of osteoarthritis with knee pain however she states it was worse after discharge at home.  She denies any abdominal pain, nausea, vomiting.  She came back to the emergency room for further evaluation.  A repeat CT scan of the chest was done which showed bibasilar airspace disease consistent with aspiration or pneumonia.  ABG done in the emergency room showed a pH of 7.46, PCO2 of 54.9, PO2 of 63.  Her leukocytosis is actually improving on the repeat labs done today with a white count of 41,000.  Troponin was done which was elevated at 0.051 and a repeat was done at 0.067.  Procalcitonin was mildly elevated and nonspecific at 0.33.  Due to patient shortness of breath and elevated troponin it was felt she would require admission to the hospitalist service for further work-up and cardiac evaluation.  She will be admitted per hospitalist service.    Review Of Systems:     General ROS: Patient denies any fevers, chills or loss of consciousness.   ENT ROS: Denies sore throat, nasal congestion or ear pain.   Endocrine ROS: Denies any recent unintentional weight gain or loss.  Respiratory ROS: cough with green sputum production.  shortness of breath.   Cardiovascular ROS: Denies chest pain or palpitations. No history of exertional chest pain.   Gastrointestinal ROS: Denies nausea and vomiting. Denies any  abdominal pain. No diarrhea.   Genito-Urinary ROS: Denies dysuria or hematuria.  Musculoskeletal ROS: Denies pain or weakness   Neurological ROS: Denies any focal weakness. No loss of consciousness. Denies any numbness. Denies neck pain.   Dermatological ROS: Denies any redness or pruritis.     Past Medical History:    Past Medical History:   Diagnosis Date   • Acid reflux 10/17/2016   • Asthma     bronchial   • Cancer (CMS/[facility])     uterine   • Chronic diastolic heart failure (CMS/[facility]) 10/17/2016    Normal dobutamine echocardiogram stress, 2005. Echocardiogram on 2009:  EF 50% to 55%. Left atrium 3.5 cm.   • Chronic obstructive pulmonary disease (CMS/[facility]) 10/17/2016    asthmatic bronchitis, on O2 use chronically.     • Gout 10/17/2016   • High cholesterol 2018   • Hypertension 10/17/2016    Benign hypertension.   • Lower extremity edema 10/17/2016    Chronic lower extremity edema/venous insufficiency.   • Murmur, heart    • Obesity 10/17/2016   • Renal failure     stageIV   • Seasonal allergies 10/17/2016   • Type 2 diabetes mellitus (CMS/[facility]) 10/17/2016    insulin dependent.       Past Surgical history:    Past Surgical History:   Procedure Laterality Date   • BRONCHOSCOPY N/A 2018    Procedure: BRONCHOSCOPY WITH WASHINGS;  Surgeon: Ryder Dahl MD;  Location: [facility] OR;  Service:    •  SECTION     • COLONOSCOPY     • COLONOSCOPY N/A 2019    Procedure: COLONOSCOPY;  Surgeon: Skinny York MD;  Location: [facility] ENDOSCOPY;  Service: General   • CYSTECTOMY Right     neck   • DILATATION AND CURETTAGE     • ENDOSCOPY N/A 6/10/2019    Procedure: ESOPHAGOGASTRODUODENOSCOPY WITH BIOPSY;  Surgeon: Skinny York MD;  Location: [facility] ENDOSCOPY;  Service: Gastroenterology   • EYE SURGERY Bilateral 2005    cataract   • FOREIGN BODY REMOVAL N/A 3/5/2020    Procedure: BRONCHOSCOPY with MAC and removal of foreign object;  Surgeon: Ryder Dahl MD;  Location: [facility] OR;  Service:  Pulmonary;  Laterality: N/A;   • HYSTERECTOMY     • LUNG SURGERY Left 1997    4 tumors removed from left lung-benign   • TONSILLECTOMY AND ADENOIDECTOMY         Social History:    Former smoker, denies alcohol or drug use.  She is on oxygen 3 liters nasal canula at home.  She does live alone.  She does have 2 adult sons however 1 of them is at her daughter who is out of town.  She is retired.    Family History:    Family History   Problem Relation Age of Onset   • Heart disease Mother    • Heart disease Father    • Leukemia Father    • Diabetes Sister    • COPD Sister    • Diabetes Brother    • Lung cancer Sister    • No Known Problems Sister    • No Known Problems Sister    • Pneumonia Sister    • Stroke Sister    • COPD Sister        Allergies:      Shellfish-derived products and Wheat bran    Home Medications:    Prior to Admission Medications     Prescriptions Last Dose Informant Patient Reported? Taking?    ADVAIR DISKUS 500-50 MCG/DOSE DISKUS   No No    INHALE 1 PUFF BY MOUTH TWO TIMES A DAY    allopurinol (ZYLOPRIM) 300 MG tablet   Yes No    Take 300 mg by mouth Daily.    apixaban (ELIQUIS) 5 MG tablet tablet   No No    Take 1 tablet by mouth Every 12 (Twelve) Hours.    ASPIRIN LOW DOSE 81 MG EC tablet   Yes No    Take 81 mg by mouth Daily.    Blood Glucose Monitoring Suppl device   Yes No    1 each.    busPIRone (BUSPAR) 10 MG tablet   Yes No    Take 10 mg by mouth 3 (Three) Times a Day.    DALIRESP 500 MCG tablet tablet   No No    TAKE ONE TABLET BY MOUTH EVERY DAY    dilTIAZem CD (CARDIZEM CD) 180 MG 24 hr capsule   No No    Take 1 capsule by mouth Daily.    ezetimibe (ZETIA) 10 MG tablet   Yes No    Take 10 mg by mouth Daily.    fenofibrate (TRICOR) 145 MG tablet   Yes No    Take 145 mg by mouth Daily.    ferrous sulfate 325 (65 FE) MG tablet   Yes No    Take 325 mg by mouth 3 (Three) Times a Day With Meals.    flunisolide (NASALIDE) 25 MCG/ACT (0.025%) solution nasal spray   No No    INHALE 1 SPRAY EVERY  12 (TWELVE) HOURS.    Patient taking differently:  Inhale 1 spray Every 12 (Twelve) Hours As Needed.    furosemide (LASIX) 40 MG tablet   Yes No    Take 40 mg by mouth Daily.    GNP VITAMIN D MAXIMUM STRENGTH 50 MCG (2000 UT) tablet   Yes No    Take 1 tablet by mouth Daily.    HUMALOG 100 UNIT/ML injection   Yes No    Inject 35 Units under the skin into the appropriate area as directed 3 (Three) Times a Day.    HYDROcodone-acetaminophen (NORCO) 5-325 MG per tablet   Yes No    Take 1 tablet by mouth Every Night.    ipratropium-albuterol (DUO-NEB) 0.5-2.5 mg/3 ml nebulizer   No No    Take 3 mL by nebulization 4 (Four) Times a Day.    Patient taking differently:  Take 3 mL by nebulization As Needed.    Lactobacillus Acid-Pectin (ACIDOPHILUS/CITRUS PECTIN) tablet tablet   Yes No    Take 1 tablet by mouth Daily.    Loratadine (CLARITIN) 10 MG capsule   Yes No    Take 1 tablet by mouth Daily.    metoclopramide (REGLAN) 5 MG tablet   Yes No    Take 5 mg by mouth 3 (Three) Times a Day.    metolazone (ZAROXOLYN) 5 MG tablet   Yes No    Take 5 mg by mouth Daily As Needed.    metoprolol succinate XL (TOPROL-XL) 100 MG 24 hr tablet   No No    Take 1 tablet by mouth Daily.    montelukast (SINGULAIR) 10 MG tablet   Yes No    Take 10 mg by mouth Every Night.    O2 (OXYGEN)   Yes No    Inhale 3 L/min Daily.    omalizumab (XOLAIR) 150 MG injection   Yes No    Inject 300 mg under the skin into the appropriate area as directed Every 28 (Twenty-Eight) Days. 2 injections once a month    pantoprazole (PROTONIX) 40 MG EC tablet   No No    Take 1 tablet by mouth Daily.    PATADAY 0.2 % solution ophthalmic solution   Yes No    Administer  to both eyes 2 (Two) Times a Day.    potassium chloride (K-DUR,KLOR-CON) 20 MEQ CR tablet   Yes No    Take 20 mEq by mouth Daily.    pravastatin (PRAVACHOL) 10 MG tablet   No No    Take 1 tablet by mouth Daily.    predniSONE (DELTASONE) 20 MG tablet   No No    Take 1 tablet by mouth Daily With Breakfast  "for 3 doses.    PROAIR  (90 Base) MCG/ACT inhaler   No No    INHALE 2 PUFFS EVERY 4 (FOUR) HOURS AS NEEDED FOR WHEEZING OR SHORTNESS OF AIR.    rOPINIRole (REQUIP) 0.5 MG tablet   Yes No    Take 0.5 mg by mouth 2 (Two) Times a Day.    sitaGLIPtin (JANUVIA) 100 MG tablet   Yes No    Take 100 mg by mouth Daily. 1/2 tablet daily    SPIRIVA HANDIHALER 18 MCG per inhalation capsule   No No    PLACE 1 CAPSULE INTO INHALER AND INHALE DAILY.    sucralfate (CARAFATE) 1 G tablet   Yes No    Take 1 g by mouth 3 (Three) Times a Day.    traMADol (ULTRAM) 50 MG tablet  Self Yes No    Take 50 mg by mouth 3 (Three) Times a Day.    TRESIBA FLEXTOUCH 100 UNIT/ML solution pen-injector injection   Yes No    2 (Two) Times a Day. 35 units in the am 80 units at night    TRUE METRIX BLOOD GLUCOSE TEST test strip   No No    Use to check blood sugar 3 times daily.    ULTICARE INSULIN SYRINGE 31G X 5/16\" 1 ML misc   Yes No    VASCEPA 1 g capsule capsule   Yes No    2 g 2 (Two) Times a Day With Meals.             ED Medications:    Medications   sodium chloride 0.9 % flush 10 mL (has no administration in time range)   ipratropium-albuterol (DUO-NEB) nebulizer solution 3 mL (3 mL Nebulization Given 3/13/20 0854)   Morphine sulfate (PF) injection 4 mg (4 mg Intravenous Given 3/13/20 0852)       Vital Signs:    Temp:  [99.1 °F (37.3 °C)-100 °F (37.8 °C)] 99.1 °F (37.3 °C)  Heart Rate:  [] 90  Resp:  [19-20] 20  BP: (130-156)/() 147/61        03/13/20  0800   Weight: 87.1 kg (192 lb)       Body mass index is 40.13 kg/m².    Physical Exam:      General Appearance:  Alert and cooperative, not in any acute distress.   Head:  Atraumatic and normocephalic, without obvious abnormality.   Eyes:          PERRLA, conjunctivae and sclerae normal, no Icterus. No pallor. Extraocular movements are within normal limits.   Ears:  Ears appear intact with no abnormalities noted.   Throat: No oral lesions, no thrush, oral mucosa moist.   Neck: " Supple, trachea midline, no thyromegaly, no carotid bruit.       Lungs:   Chest shape is normal. Breath sounds heard bilaterally equally.  No crackles No wheezing.  She does have coarse rhonchi bilaterally.  No Pleural rub or bronchial breathing.   Heart:  Normal S1 and S2, no murmur, no gallop, no rub. No JVD   Abdomen:   Normal bowel sounds, no masses, no organomegaly. Soft     non-tender, non-distended, no guarding, no rebound                tenderness   Extremities: Moves all extremities well, trace edema, no cyanosis, no clubbing.   Pulses: Pulses palpable and equal bilaterally   Skin: No bleeding, bruising or rash     Psychiatric/Behavior:      Mood normal, behavior normal   Neurologic: Cranial nerves 2 - 12 grossly intact, sensation intact, Motor power is normal and equal bilaterally.           Labs:    Lab Results (last 24 hours)     Procedure Component Value Units Date/Time    Troponin [[id number]]  (Abnormal) Collected:  03/13/20 1056    Specimen:  Blood from Arm, Left Updated:  03/13/20 1139     Troponin T 0.067 ng/mL     Narrative:       Troponin T Reference Range:  <= 0.03 ng/mL-   Negative for AMI  >0.03 ng/mL-     Abnormal for myocardial necrosis.  Clinicians would have to utilize clinical acumen, EKG, Troponin and serial changes to determine if it is an Acute Myocardial Infarction or myocardial injury due to an underlying chronic condition.       Results may be falsely decreased if patient taking Biotin.      [place] Draw [[id number]] Collected:  03/13/20 0805    Specimen:  Blood Updated:  03/13/20 0915    Narrative:       The following orders were created for panel order [place] Draw.  Procedure                               Abnormality         Status                     ---------                               -----------         ------                     Light Blue Top[[id number]]                                   Final result               Green Top (Gel)[[id number]]                                   Final result               Lavender Top[[id number]]                                     Final result               Gold Top - SST[[id number]]                                   Final result               Green Top (No Gel)[[id number]]                               In process                   Please view results for these tests on the individual orders.    Green Top (Gel) [[id number]] Collected:  03/13/20 0805    Specimen:  Blood Updated:  03/13/20 0915     Extra Tube Hold for add-ons.     Comment: Auto resulted.       Lavender Top [[id number]] Collected:  03/13/20 0805    Specimen:  Blood Updated:  03/13/20 0915     Extra Tube hold for add-on     Comment: Auto resulted       Gold Top - SST [[id number]] Collected:  03/13/20 0805    Specimen:  Blood Updated:  03/13/20 0915     Extra Tube Hold for add-ons.     Comment: Auto resulted.       Light Blue Top [[id number]] Collected:  03/13/20 0805    Specimen:  Blood Updated:  03/13/20 0915     Extra Tube hold for add-on     Comment: Auto resulted       Comprehensive Metabolic Panel [[id number]]  (Abnormal) Collected:  03/13/20 0805    Specimen:  Blood Updated:  03/13/20 0912     Glucose 82 mg/dL      BUN 59 mg/dL      Creatinine 1.43 mg/dL      Sodium 145 mmol/L      Potassium 4.8 mmol/L      Chloride 99 mmol/L      CO2 34.9 mmol/L      Calcium 9.1 mg/dL      Total Protein 5.8 g/dL      Albumin 3.30 g/dL      ALT (SGPT) 64 U/L      AST (SGOT) 31 U/L      Alkaline Phosphatase 52 U/L      Total Bilirubin 0.3 mg/dL      eGFR Non African Amer 36 mL/min/1.73      Globulin 2.5 gm/dL      A/G Ratio 1.3 g/dL      BUN/Creatinine Ratio 41.3     Anion Gap 11.1 mmol/L     Narrative:       GFR Normal >60  Chronic Kidney Disease <60  Kidney Failure <15      Troponin [[id number]]  (Abnormal) Collected:  03/13/20 0805    Specimen:  Blood Updated:  03/13/20 0912     Troponin T 0.051 ng/mL     Narrative:       Troponin T Reference Range:  <= 0.03 ng/mL-   Negative for AMI  >0.03 ng/mL-     Abnormal  for myocardial necrosis.  Clinicians would have to utilize clinical acumen, EKG, Troponin and serial changes to determine if it is an Acute Myocardial Infarction or myocardial injury due to an underlying chronic condition.       Results may be falsely decreased if patient taking Biotin.      Blood Gas, Arterial With Co-Ox [[id number]]  (Abnormal) Collected:  03/13/20 0903    Specimen:  Arterial Blood Updated:  03/13/20 0903     Site Right Radial     Luis's Test Positive     pH, Arterial 7.463 pH units      Comment: 83 Value above reference range        pCO2, Arterial 54.9 mm Hg      Comment: 83 Value above reference range        pO2, Arterial 63.0 mm Hg      Comment: 84 Value below reference range        HCO3, Arterial 39.3 mmol/L      Comment: 83 Value above reference range        Base Excess, Arterial 13.4 mmol/L      Comment: 83 Value above reference range        O2 Saturation, Arterial 92.7 %      Comment: 84 Value below reference range        Hemoglobin, Blood Gas 11.7 g/dL      Comment: 84 Value below reference range        Hematocrit, Blood Gas 35.9 %      Comment: 84 Value below reference range        Oxyhemoglobin 90.8 %      Comment: 84 Value below reference range        Methemoglobin 1.20 %      Carboxyhemoglobin 0.8 %      A-a Gradiant 124.5 mmHg      Barometric Pressure for Blood Gas 736 mmHg      Modality Nasal Cannula     FIO2 36 %      Flow Rate 4.0 lpm      Ventilator Mode NA     Comment: Meter: [id number]     :  [id number]        Note --     pH, Temp Corrected --     pCO2, Temperature Corrected --     pO2, Temperature Corrected --    Procalcitonin [[id number]]  (Abnormal) Collected:  03/13/20 0805    Specimen:  Blood Updated:  03/13/20 0858     Procalcitonin 0.33 ng/mL     Narrative:       As a Marker for Sepsis (Non-Neonates):   1. <0.5 ng/mL represents a low risk of severe sepsis and/or septic shock.  1. >2 ng/mL represents a high risk of severe sepsis and/or septic shock.    As a  "Marker for Lower Respiratory Tract Infections that require antibiotic therapy:  PCT on Admission     Antibiotic Therapy             6-12 Hrs later  > 0.5                Strongly Recommended            >0.25 - <0.5         Recommended  0.1 - 0.25           Discouraged                   Remeasure/reassess PCT  <0.1                 Strongly Discouraged          Remeasure/reassess PCT      As 28 day mortality risk marker: \"Change in Procalcitonin Result\" (> 80 % or <=80 %) if Day 0 (or Day 1) and Day 4 values are available. Refer to http://[email or web address]/   Change in PCT <=80 %   A decrease of PCT levels below or equal to 80 % defines a positive change in PCT test result representing a higher risk for 28-day all-cause mortality of patients diagnosed with severe sepsis or septic shock.  Change in PCT > 80 %   A decrease of PCT levels of more than 80 % defines a negative change in PCT result representing a lower risk for 28-day all-cause mortality of patients diagnosed with severe sepsis or septic shock.                Results may be falsely decreased if patient taking Biotin.     BNP [[id number]]  (Normal) Collected:  03/13/20 0805    Specimen:  Blood Updated:  03/13/20 0857     proBNP 844.6 pg/mL     Narrative:       Among patients with dyspnea, NT-proBNP is highly sensitive for the detection of acute congestive heart failure. In addition NT-proBNP of <300 pg/ml effectively rules out acute congestive heart failure with 99% negative predictive value.    Results may be falsely decreased if patient taking Biotin.      Manual Differential [[id number]]  (Abnormal) Collected:  03/13/20 0805    Specimen:  Blood Updated:  03/13/20 0853     Neutrophil % 86.5 %      Lymphocyte % 6.0 %      Monocyte % 3.5 %      Bands %  2.5 %      Metamyelocyte % 1.5 %      Neutrophils Absolute 36.77 10*3/mm3      Lymphocytes Absolute 2.48 10*3/mm3      Monocytes Absolute 1.45 10*3/mm3      RBC Morphology Normal     WBC Morphology " Normal     Platelet Estimate Adequate    Scan Slide [[id number]] Collected:  03/13/20 0805    Specimen:  Blood Updated:  03/13/20 0853     Scan Slide --     Comment: See Manual Differential Results       CBC & Differential [[id number]] Collected:  03/13/20 0805    Specimen:  Blood Updated:  03/13/20 0843    Narrative:       The following orders were created for panel order CBC & Differential.  Procedure                               Abnormality         Status                     ---------                               -----------         ------                     CBC Auto Differential[[id number]]        Abnormal            Final result                 Please view results for these tests on the individual orders.    CBC Auto Differential [[id number]]  (Abnormal) Collected:  03/13/20 0805    Specimen:  Blood Updated:  03/13/20 0843     WBC 41.31 10*3/mm3      RBC 3.60 10*6/mm3      Hemoglobin 11.5 g/dL      Hematocrit 35.5 %      MCV 98.6 fL      MCH 31.9 pg      MCHC 32.4 g/dL      RDW 13.5 %      RDW-SD 49.6 fl      MPV 10.7 fL      Platelets 330 10*3/mm3     Green Top (No Gel) [[id number]] Collected:  03/13/20 0805    Specimen:  Blood Updated:  03/13/20 0812          Radiology:    Imaging Results (Last 72 Hours)     Procedure Component Value Units Date/Time    CT Chest Without Contrast [[id number]] Collected:  03/13/20 1049     Updated:  03/13/20 1052    Narrative:       PROCEDURE: CT CHEST WO CONTRAST-     HISTORY: soa     COMPARISON: 03/30/2020.     PROCEDURE:  Multiple axial CT images were obtained from the thoracic  inlet through the upper abdomen without the use of contrast.      FINDINGS:   Soft tissue windows reveal no axillary, hilar or mediastinal adenopathy.  Heart size is normal. The aorta is normal in caliber. There are no  pleural or pericardial effusions. Lung windows reveal patchy airspace  disease in the lung bases consistent with either aspiration or  pneumonia. The visualized upper abdomen is  unremarkable. Bone windows  reveal no acute osseous abnormalities.       Impression:       Bibasilar airspace disease consistent with aspiration or  pneumonia.     605.66 mGy.cm        This study was performed with techniques to keep radiation doses as low  as reasonably achievable (ALARA). Individualized dose reduction  techniques using automated exposure control or adjustment of mA and/or  kV according to the patient size were employed.      This report was finalized on 3/13/2020 10:50 AM by Fabricio Wolff M.D..    XR Chest 1 View [[id number]] Collected:  03/13/20 0846     Updated:  03/13/20 0849    Narrative:       PROCEDURE: XR CHEST 1 VW-     HISTORY: cough, soa     COMPARISON: 03/11/2020.     FINDINGS: The heart is normal in size. The mediastinum is unremarkable.  There are linear opacities in both lung bases which may reflect  atelectasis or pneumonia. No effusions are evident. There is no  pneumothorax.  There are no acute osseous abnormalities.       Impression:       Linear opacities in the lung bases which may reflect  atelectasis or pneumonia.     Continued followup is recommended.     This report was finalized on 3/13/2020 8:47 AM by Fabricio Wolff M.D..          Assessment/Plan:   1.   Elevated troponin-we will trend troponins and check a 2D echo.  Cardiology has been consulted.    2.  Recent Post obstructive Right lower lobe pneumonia secondary to foreign body.  Bronchoscopy with BAL not show any growth.  Cytology was negative for malignancy.  She did require removal of a foreign body which was a pea.  We will get a repeat procalcitonin in the morning if still elevated then plan to initiate patient on Merrem and vancomycin.  I will go ahead and place patient on azithromycin as well as steroids as some of her cough can be inflammatory response in hopes that this will aid in decreasing the inflammatory response     3.  Leukocytosis-her leukocytosis is actually improving but white count is down  to 41,000.  We will continue to trend.  During previous visit I did speak with update and review these lab values as well as peripheral smear which does all appear to be a leukemoid reaction secondary to the pneumonia.  She does have appointment however as an outpatient with Dr. Chavez.    4.  Bilateral knee pain-likely secondary to osteoarthritis.  We will do an x-ray just to ensure no acute findings.  Unfortunately given the patient's renal function is not ideal candidate for anti-inflammatories including Aleve or ibuprofen.  We will start her on Ultram.    5.   Paroxysmal atrial fibrillation-newly diagnosed during previous.  She was started on Eliquis.     6.  Chronic diastolic CHF-  Stable on oral diuretic        7.  Obstructive sleep apnea on home BiPAP     8.  Chronic hypoxic respiratory failure on home 3 L nasal cannula-  Walking oximetry showed 4 liters with rest and 6 with activity     9.  Insulin-dependent diabetes mellitus type 2-   we will need to keep close watch of her blood sugars given that we are going to have to restart some steroids back.     10.  Chronic lower leg edema, improved     11.  Former smoker     12.  Chronic dyslipidemia/ hypertriglyceridemia    13.  Obesity    14.  Chronic kidney disease stage III- Stable.                Advance Care Planning   ACP discussion was held with the patient during this visit. Patient does not have an advance directive, information provided.  Patient does want to be a full code    Deb Garcia, APRN  03/13/20  14:27

## 2020-03-13 NOTE — OUTREACH NOTE
Prep Survey      Responses   [facility] facility patient discharged from?  [facility]   Is LACE score < 7 ?  No   Eligibility  Readm Mgmt   Discharge diagnosis  Pneumonia of right lower lobe due to infectious organism Acute on chronic respiratory failure with hypoxia    Does the patient have one of the following disease processes/diagnoses(primary or secondary)?  COPD/Pneumonia   Does the patient have Home health ordered?  No   Is there a DME ordered?  Yes   What DME was ordered?  [employer] - rollator   Prep survey completed?  Yes          Lindsay Friedman RN

## 2020-03-13 NOTE — ED NOTES
Patient assisted to bedside commode. Voided 500 ml of yellow urine.      Debbie Melton RN  03/13/20 0908

## 2020-03-13 NOTE — ED NOTES
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 PT GOING TO ROOM 432. ALYSA ALEJANDRO NOTIFIED.     [facility]  03/13/20 8908

## 2020-03-13 NOTE — PLAN OF CARE
Problem: Patient Care Overview  Goal: Plan of Care Review  Outcome: Ongoing (interventions implemented as appropriate)  Flowsheets (Taken 3/13/2020 6042)  Progress: no change  Plan of Care Reviewed With: patient  Outcome Summary: Patient readmitted this afternoon after being discharged yesterday evening from [facility].  She made it back home but continued to have SOB overnight and [facility] brought her back to ER today.  WBC count was elevated but trending down att.  Currently maintaining adequate sats on 4LNC.

## 2020-03-13 NOTE — CONSULTS
[facility] Consult Note    Mingo Guidry  1950  [id number]  03/13/20    Requesting Physician: Deb Garcia APRN    Chief Complaint: Redness of breath    History of Present Illness:   Mrs. Mingo Guidry is a 69 y.o. female who is being seen by Cardiology for evaluation of a mildly chronic troponin level.  The patient has a past medical history significant for type 2 diabetes mellitus, GERD, hyperlipidemia, hypertension, morbid obesity, COPD with prior tobacco use, and Mg's esophagus.  Her past cardiac history is significant for chronic diastolic heart failure and paroxysmal atrial fibrillation.  Her recent medical history is significant for hospitalization with discharge on 3/12 after admission for acute on chronic respiratory failure in the setting of right lower lobe pneumonia as well as postobstructive pneumonia secondary to foreign body aspiration.  During her hospitalization, she initially had atrial fibrillation with rapid ventricular rates which was subsequently rate controlled with conversion back to sinus rhythm.  Following her recent discharge, the patient reports being unable to sleep overnight.  She subsequently developed progressive shortness of breath.  She additionally notes continued cough with green sputum production.  She denies significant fevers or chills.  In addition, she notes bilateral knee discomfort.  Given her shortness of breath and knee pain, she presented to the emergency department for evaluation.    At the time of presentation, an ECG revealed a sinus rhythm at 95 bpm.  She did not have any acute ischemic changes.  On initial labs, she was found to have a significant leukocytosis, which was overall improving compared to recent labs.  Her BNP was within normal limits, previously elevated at 2713 on 2/29.  She was, however, found to have a mildly elevated troponin level of 0.067.  On further questioning, the patient reports a chronic  "history of intermittent episodes of chest discomfort, which he describes as a \"dull\" pain but appears to be primarily associated with coughing.  She denies any chest pain or chest discomfort with exertion.  She does have chronic exertional dyspnea, currently worse than baseline.  No other specific complaints at this time.  Currently denies active chest pain.        Prior Cardiac Testin. Last Coronary Angio: None  2. Prior Stress Testing: None  3. Last Echo:  2020              1.    Normal LV systolic function, LVEF 66%              2.    Grade 1 diastolic dysfunction       4. Prior Holter Monitor: None      Review of Systems:   Review of Systems   Constitutional: Negative for activity change, appetite change, chills, diaphoresis, fatigue, fever, unexpected weight gain and unexpected weight loss.   Respiratory: Positive for cough and shortness of breath. Negative for chest tightness and wheezing.    Cardiovascular: Positive for chest pain and leg swelling. Negative for palpitations.   Gastrointestinal: Negative for abdominal pain, anal bleeding, blood in stool and GERD.   Endocrine: Negative for cold intolerance and heat intolerance.   Genitourinary: Negative for hematuria.   Neurological: Negative for dizziness, syncope, weakness and light-headedness.   Hematological: Does not bruise/bleed easily.   Psychiatric/Behavioral: Negative for depressed mood and stress. The patient is not nervous/anxious.        Past Medical History:   Past Medical History:   Diagnosis Date   • Acid reflux 10/17/2016   • Asthma     bronchial   • Cancer (CMS/[facility])     uterine   • Chronic diastolic heart failure (CMS/HCC) 10/17/2016    Normal dobutamine echocardiogram stress, 2005. Echocardiogram on 2009:  EF 50% to 55%. Left atrium 3.5 cm.   • Chronic obstructive pulmonary disease (CMS/HCC) 10/17/2016    asthmatic bronchitis, on O2 use chronically.     • Gout 10/17/2016   • High cholesterol 2018   • Hypertension " 10/17/2016    Benign hypertension.   • Lower extremity edema 10/17/2016    Chronic lower extremity edema/venous insufficiency.   • Murmur, heart    • Obesity 10/17/2016   • Renal failure     stageIV   • Seasonal allergies 10/17/2016   • Type 2 diabetes mellitus (CMS/[facility]) 10/17/2016    insulin dependent.       Past Surgical History:   Past Surgical History:   Procedure Laterality Date   • BRONCHOSCOPY N/A 2018    Procedure: BRONCHOSCOPY WITH WASHINGS;  Surgeon: Ryder Dahl MD;  Location: [facility] OR;  Service:    •  SECTION     • COLONOSCOPY     • COLONOSCOPY N/A 2019    Procedure: COLONOSCOPY;  Surgeon: Skinny York MD;  Location: [facility] ENDOSCOPY;  Service: General   • CYSTECTOMY Right     neck   • DILATATION AND CURETTAGE     • ENDOSCOPY N/A 6/10/2019    Procedure: ESOPHAGOGASTRODUODENOSCOPY WITH BIOPSY;  Surgeon: Skinny York MD;  Location: [facility] ENDOSCOPY;  Service: Gastroenterology   • EYE SURGERY Bilateral     cataract   • FOREIGN BODY REMOVAL N/A 3/5/2020    Procedure: BRONCHOSCOPY with MAC and removal of foreign object;  Surgeon: Ryder Dahl MD;  Location: [facility] OR;  Service: Pulmonary;  Laterality: N/A;   • HYSTERECTOMY     • LUNG SURGERY Left 1997 tumors removed from left lung-benign   • TONSILLECTOMY AND ADENOIDECTOMY         Family History:   Family History   Problem Relation Age of Onset   • Heart disease Mother    • Heart disease Father    • Leukemia Father    • Diabetes Sister    • COPD Sister    • Diabetes Brother    • Lung cancer Sister    • No Known Problems Sister    • No Known Problems Sister    • Pneumonia Sister    • Stroke Sister    • COPD Sister        Social History:   Social History     Socioeconomic History   • Marital status:      Spouse name: Not on file   • Number of children: Not on file   • Years of education: Not on file   • Highest education level: Not on file   Tobacco Use   • Smoking status: Former Smoker     Packs/day: 1.50        Years: 35.00     Pack years: 52.50     Types: Cigarettes     Last attempt to quit:      Years since quittin.2   • Smokeless tobacco: Never Used   Substance and Sexual Activity   • Alcohol use: No   • Drug use: No   • Sexual activity: Defer       Medications:     Current Facility-Administered Medications:   •  acetaminophen (TYLENOL) tablet 650 mg, 650 mg, Oral, Q4H PRN **OR** acetaminophen (TYLENOL) 160 MG/5ML solution 650 mg, 650 mg, Oral, Q4H PRN **OR** acetaminophen (TYLENOL) suppository 650 mg, 650 mg, Rectal, Q4H PRN, Bowshe-Miller, Deb, APRN  •  allopurinol (ZYLOPRIM) tablet 300 mg, 300 mg, Oral, Daily, Giorgio-Miller, Deb, APRN  •  apixaban (ELIQUIS) tablet 5 mg, 5 mg, Oral, Q12H, Bowshe-Miller, Deb, APRN  •  [START ON 3/14/2020] aspirin EC tablet 81 mg, 81 mg, Oral, Daily, Bowshe-Miller, Deb, APRN  •  azithromycin (ZITHROMAX) tablet 500 mg, 500 mg, Oral, Q24H, Bowshe-Miller, Deb, APRN  •  budesonide (PULMICORT) nebulizer solution 0.5 mg, 0.5 mg, Nebulization, BID - RT, Giorgio-Miller, Deb, APRN  •  busPIRone (BUSPAR) tablet 10 mg, 10 mg, Oral, TID, Giorgio-Miller, Deb, APRN  •  dextrose (D50W) 25 g/ 50mL Intravenous Solution 25 g, 25 g, Intravenous, Q15 Min PRN, Giorgio-Miller, Deb, APRN  •  dextrose (GLUTOSE) oral gel 1 tube, 1 tube, Oral, Q15 Min PRN, Giorgio-Miller, Deb, APRN  •  [START ON 3/14/2020] dilTIAZem CD (CARDIZEM CD) 24 hr capsule 180 mg, 180 mg, Oral, Q24H, Bowling-Miller, Deb, APRN  •  famotidine (PEPCID) tablet 40 mg, 40 mg, Oral, Daily, BowDeb Goldstein APRN  •  [START ON 3/14/2020] furosemide (LASIX) tablet 40 mg, 40 mg, Oral, Daily, Deb Garcia APRN  •  glucagon (human recombinant) (GLUCAGEN DIAGNOSTIC) injection 1 mg, 1 mg, Subcutaneous, PRN, Deb Garcia APRN  •  guaiFENesin (MUCINEX) 12 hr tablet 600 mg, 600 mg, Oral, Q12H, Deb Garcia APRN  •  insulin aspart (novoLOG) injection 0-9 Units, 0-9 Units,  "Subcutaneous, 4x Daily AC & at Bedtime, Deb Garcia APRONEAL  •  ipratropium-albuterol (DUO-NEB) nebulizer solution 3 mL, 3 mL, Nebulization, Q6H While Awake - RT, Deb Garcia APRN  •  [START ON 3/14/2020] linagliptin (TRADJENTA) tablet 5 mg, 5 mg, Oral, Daily, Deb Garcia, APRONEAL  •  methylPREDNISolone sodium succinate (SOLU-Medrol) injection 40 mg, 40 mg, Intravenous, Q8H, Deb Garcia, APRN  •  metoclopramide (REGLAN) tablet 5 mg, 5 mg, Oral, TID, Deb Garcia APRONEAL  •  [START ON 3/14/2020] metoprolol succinate XL (TOPROL-XL) 24 hr tablet 100 mg, 100 mg, Oral, Q24H, Deb Garcia, APRONEAL  •  montelukast (SINGULAIR) tablet 10 mg, 10 mg, Oral, Nightly, Jose, Deb, APRN  •  ondansetron (ZOFRAN) injection 4 mg, 4 mg, Intravenous, Q6H PRN, Deb Garcia, APRN  •  pravastatin (PRAVACHOL) tablet 10 mg, 10 mg, Oral, Nightly, Jose, Deb, APRN  •  rOPINIRole (REQUIP) tablet 0.5 mg, 0.5 mg, Oral, BID, Deb Garcia, APRN  •  sodium chloride 0.9 % flush 10 mL, 10 mL, Intravenous, PRN, Connie Stewart MD  •  sodium chloride 0.9 % flush 10 mL, 10 mL, Intravenous, Q12H, Deb Garcia, APRN  •  sodium chloride 0.9 % flush 10 mL, 10 mL, Intravenous, PRN, Jose, Deb, APRN  •  traMADol (ULTRAM) tablet 50 mg, 50 mg, Oral, Q6H PRN, Deb Garcia, APRN    Allergies:   Allergies   Allergen Reactions   • Shellfish-Derived Products Hives   • Wheat Bran Hives       Physical Exam:  Vital Signs:   Vitals:    03/13/20 1230 03/13/20 1309 03/13/20 1330 03/13/20 1443   BP: 130/54  147/61 138/62   BP Location:    Right arm   Patient Position:    Lying   Pulse: 98 90 90 95   Resp:    20   Temp:    98.7 °F (37.1 °C)   TempSrc:    Oral   SpO2: 94% 98% 95% 98%   Weight:    87.3 kg (192 lb 7.4 oz)   Height:    147.3 cm (58\")       Physical Exam   Constitutional: She is oriented to person, place, and time. She appears " well-developed and well-nourished.   Sitting in bed no acute distress.    HENT:   Head: Normocephalic and atraumatic.   Moist Mucous Membranes.    Eyes: Pupils are equal, round, and reactive to light. EOM are normal. No scleral icterus.   Neck: No tracheal deviation present.   Cardiovascular: Normal rate, regular rhythm, normal heart sounds and intact distal pulses. Exam reveals no gallop and no friction rub.   No murmur heard.  Normal JVD.  Trace bilateral lower extremity edema.   Pulmonary/Chest: Effort normal and breath sounds normal. No stridor. No respiratory distress. She has no wheezes. She has no rales. She exhibits no tenderness.   On supplemental O2.  Coarse bilateral breath sounds.   Abdominal: Soft. Bowel sounds are normal. She exhibits no distension. There is no tenderness. There is no rebound and no guarding.   Obese abdomen.   Musculoskeletal: Normal range of motion. She exhibits edema.   Lymphadenopathy:     She has no cervical adenopathy.   Neurological: She is alert and oriented to person, place, and time.   Skin: Skin is warm and dry. She is not diaphoretic. No erythema.   Psychiatric: She has a normal mood and affect. Her behavior is normal.       Results Review:   Results from last 7 days   Lab Units 03/13/20  0805   SODIUM mmol/L 145   POTASSIUM mmol/L 4.8   CHLORIDE mmol/L 99   CO2 mmol/L 34.9*   BUN mg/dL 59*   CREATININE mg/dL 1.43*   CALCIUM mg/dL 9.1   BILIRUBIN mg/dL 0.3   ALK PHOS U/L 52   ALT (SGPT) U/L 64*   AST (SGOT) U/L 31   GLUCOSE mg/dL 82     Results from last 7 days   Lab Units 03/13/20  1056 03/13/20  0805   TROPONIN T ng/mL 0.067* 0.051*     Results from last 7 days   Lab Units 03/13/20  0805 03/12/20  0743 03/11/20  0816   WBC 10*3/mm3 41.31* 50.46* 32.83*   HEMOGLOBIN g/dL 11.5* 13.2 13.4   HEMATOCRIT % 35.5 40.2 40.7   PLATELETS 10*3/mm3 330 524* 462*                   I personally viewed and interpreted the patient's EKG/Telemetry data     Assessment / Plan:     1.   Elevated troponin  --No significant prior coronary history  --Initial troponin mildly elevated at 0.051, slightly trending up at 0.067  --Patient does report intermittent episodes of chest pain, which are atypical in character.  No active chest pain currently.  --ECG does not show acute or prior ischemic changes  --Given minimal troponin elevation without ECG changes and atypical chest pain, suspect troponin elevation likely multifactorial secondary to demand ischemia in the setting of acute on chronic renal failure  --Continue to trend troponin until peak  --Currently anticoagulated with Eliquis  --Continue aspirin and statin therapy    2. Paroxysmal atrial fibrillation   --History of paroxysmal atrial fibrillation  --Currently in normal sinus rhythm  --Given CHADS2-VASc score of 4, continue Eliquis for CVA prophylaxis  --Continue diltiazem and metoprolol for rate control    3.  Recent right lower lobe and postobstructive pneumonia  --Management per primary service     4.  Chronic diastolic heart failure  --Mild volume overload on exam with trace lower extremity edema  --BNP is improved compared to recent hospitalization  --Continue current antihypertensives for BP control  --Continue daily Lasix     5. Hypertension  --Continue home antihypertensives     6.  Acute on chronic kidney disease  --Creatinine mildly elevated above baseline     7.  Morbid obesity  --Complicates all aspects of care    8.  Leukocytosis   --Remains on steroid therapy    Thank you for allowing me to participate in the care of your patient. Please do not hesitate to contact me with additional questions or concerns.     CHASITY Knox MD  Interventional Cardiology   03/13/20  4:11 PM

## 2020-03-13 NOTE — ED NOTES
Report to [facility].  Spoke with Dr. Stewart and a flu swab is not required at this time.      Debbie Melton RN  03/13/20 1660

## 2020-03-13 NOTE — PLAN OF CARE
Problem: NPPV/CPAP (Adult)  Goal: Signs and Symptoms of Listed Potential Problems Will be Absent, Minimized or Managed (NPPV/CPAP)  Outcome: Ongoing (interventions implemented as appropriate)  Flowsheets (Taken 3/13/2020 2679)  Problems Assessed (NPPV/CPAP): all  Problems Present (NPPV/CPAP): none

## 2020-03-13 NOTE — ED PROVIDER NOTES
TRIAGE CHIEF COMPLAINT:     Nursing and triage notes reviewed    Chief Complaint   Patient presents with   • Shortness of Breath   • Leg Pain      HPI: Mingo Guidry is a 69 y.o. female who presents to the emergency department complaining of shortness of breath, cough, leg pain.  Patient was discharged from the hospital yesterday where she had been treated for influenza and pneumonia.  Patient states that she still feels short of breath and has a cough although was given a breathing treatment in route to the hospital and states that her breathing is now significantly improved.  She states she has chronic pain in her lower legs due to arthritis and this is steadily worsened over the past few days.  She denies any chest pain to me.  Denies any vomiting or diarrhea or abdominal pain.    REVIEW OF SYSTEMS: All other systems reviewed and are negative     PAST MEDICAL HISTORY:   Past Medical History:   Diagnosis Date   • Acid reflux 10/17/2016   • Asthma     bronchial   • Cancer (CMS/[facility])     uterine   • Chronic diastolic heart failure (CMS/[facility]) 10/17/2016    Normal dobutamine echocardiogram stress, 04/07/2005. Echocardiogram on 05/08/2009:  EF 50% to 55%. Left atrium 3.5 cm.   • Chronic obstructive pulmonary disease (CMS/[facility]) 10/17/2016    asthmatic bronchitis, on O2 use chronically.     • Gout 10/17/2016   • High cholesterol 01/03/2018   • Hypertension 10/17/2016    Benign hypertension.   • Lower extremity edema 10/17/2016    Chronic lower extremity edema/venous insufficiency.   • Murmur, heart    • Obesity 10/17/2016   • Renal failure     stageIV   • Seasonal allergies 10/17/2016   • Type 2 diabetes mellitus (CMS/[facility]) 10/17/2016    insulin dependent.        FAMILY HISTORY:   Family History   Problem Relation Age of Onset   • Heart disease Mother    • Heart disease Father    • Leukemia Father    • Diabetes Sister    • COPD Sister    • Diabetes Brother    • Lung cancer Sister    • No Known Problems Sister    • No Known  "Problems Sister    • Pneumonia Sister    • Stroke Sister    • COPD Sister         SOCIAL HISTORY:   Social History     Socioeconomic History   • Marital status:      Spouse name: Not on file   • Number of children: Not on file   • Years of education: Not on file   • Highest education level: Not on file   Tobacco Use   • Smoking status: Former Smoker     Packs/day: 1.50     Years: 35.00     Pack years: 52.50     Types: Cigarettes     Last attempt to quit:      Years since quittin.2   • Smokeless tobacco: Never Used   Substance and Sexual Activity   • Alcohol use: No   • Drug use: No   • Sexual activity: Defer        SURGICAL HISTORY:   Past Surgical History:   Procedure Laterality Date   • BRONCHOSCOPY N/A 2018    Procedure: BRONCHOSCOPY WITH WASHINGS;  Surgeon: Ryder Dahl MD;  Location: [facility] OR;  Service:    •  SECTION     • COLONOSCOPY     • COLONOSCOPY N/A 2019    Procedure: COLONOSCOPY;  Surgeon: Skinny York MD;  Location: [facility] ENDOSCOPY;  Service: General   • CYSTECTOMY Right     neck   • DILATATION AND CURETTAGE     • ENDOSCOPY N/A 6/10/2019    Procedure: ESOPHAGOGASTRODUODENOSCOPY WITH BIOPSY;  Surgeon: Skinny York MD;  Location: [facility] ENDOSCOPY;  Service: Gastroenterology   • EYE SURGERY Bilateral     cataract   • FOREIGN BODY REMOVAL N/A 3/5/2020    Procedure: BRONCHOSCOPY with MAC and removal of foreign object;  Surgeon: Ryder Dahl MD;  Location: [facility] OR;  Service: Pulmonary;  Laterality: N/A;   • HYSTERECTOMY     • LUNG SURGERY Left 1997 tumors removed from left lung-benign   • TONSILLECTOMY AND ADENOIDECTOMY          CURRENT MEDICATIONS:      Medication List      CONTINUE taking these medications    Blood Glucose Monitoring Suppl device     ULTICARE INSULIN SYRINGE 31G X \" 1 ML misc  Generic drug:  Insulin Syringe-Needle U-100        ASK your doctor about these medications    ACIDOPHILUS/CITRUS PECTIN tablet tablet     ADVAIR " DISKUS 500-50 MCG/DOSE DISKUS  Generic drug:  fluticasone-salmeterol  INHALE 1 PUFF BY MOUTH TWO TIMES A DAY     allopurinol 300 MG tablet  Commonly known as:  ZYLOPRIM     ASPIRIN LOW DOSE 81 MG EC tablet  Generic drug:  aspirin     busPIRone 10 MG tablet  Commonly known as:  BUSPAR     CLARITIN 10 MG capsule  Generic drug:  Loratadine     DALIRESP 500 MCG tablet tablet  Generic drug:  roflumilast  TAKE ONE TABLET BY MOUTH EVERY DAY     dilTIAZem  MG 24 hr capsule  Commonly known as:  CARDIZEM CD  Take 1 capsule by mouth Daily.     ELIQUIS 5 MG tablet tablet  Generic drug:  apixaban  Take 1 tablet by mouth Every 12 (Twelve) Hours.     ezetimibe 10 MG tablet  Commonly known as:  ZETIA     fenofibrate 145 MG tablet  Commonly known as:  TRICOR     ferrous sulfate 325 (65 FE) MG tablet     flunisolide 25 MCG/ACT (0.025%) solution nasal spray  Commonly known as:  NASALIDE  INHALE 1 SPRAY EVERY 12 (TWELVE) HOURS.     furosemide 40 MG tablet  Commonly known as:  LASIX     GNP VITAMIN D MAXIMUM STRENGTH 50 MCG (2000 UT) tablet  Generic drug:  Cholecalciferol     HUMALOG 100 UNIT/ML injection  Generic drug:  insulin lispro     HYDROcodone-acetaminophen 5-325 MG per tablet  Commonly known as:  NORCO     ipratropium-albuterol 0.5-2.5 mg/3 ml nebulizer  Commonly known as:  DUO-NEB  Take 3 mL by nebulization 4 (Four) Times a Day.     metoclopramide 5 MG tablet  Commonly known as:  REGLAN     metOLazone 5 MG tablet  Commonly known as:  ZAROXOLYN     metoprolol succinate  MG 24 hr tablet  Commonly known as:  TOPROL-XL  Take 1 tablet by mouth Daily.     montelukast 10 MG tablet  Commonly known as:  SINGULAIR     O2  Commonly known as:  OXYGEN     omalizumab 150 MG injection  Commonly known as:  XOLAIR     pantoprazole 40 MG EC tablet  Commonly known as:  PROTONIX  Take 1 tablet by mouth Daily.     PATADAY 0.2 % solution ophthalmic solution  Generic drug:  olopatadine     potassium chloride 20 MEQ CR tablet  Commonly  known as:  NELIA-DUR,KLOR-CON     pravastatin 10 MG tablet  Commonly known as:  PRAVACHOL  Take 1 tablet by mouth Daily.     predniSONE 20 MG tablet  Commonly known as:  DELTASONE  Take 1 tablet by mouth Daily With Breakfast for 3 doses.     PROAIR  (90 Base) MCG/ACT inhaler  Generic drug:  albuterol sulfate HFA  INHALE 2 PUFFS EVERY 4 (FOUR) HOURS AS NEEDED FOR WHEEZING OR SHORTNESS OF   AIR.     rOPINIRole 0.5 MG tablet  Commonly known as:  REQUIP     SITagliptin 100 MG tablet  Commonly known as:  JANUVIA     SPIRIVA HANDIHALER 18 MCG per inhalation capsule  Generic drug:  tiotropium  PLACE 1 CAPSULE INTO INHALER AND INHALE DAILY.     sucralfate 1 g tablet  Commonly known as:  CARAFATE     traMADol 50 MG tablet  Commonly known as:  ULTRAM     TRESIBA FLEXTOUCH 100 UNIT/ML solution pen-injector injection  Generic drug:  insulin degludec     TRUE METRIX BLOOD GLUCOSE TEST test strip  Generic drug:  glucose blood  Use to check blood sugar 3 times daily.     VASCEPA 1 g capsule capsule  Generic drug:  icosapent ethyl           ALLERGIES: Shellfish-derived products and Wheat bran     PHYSICAL EXAM:   VITAL SIGNS:   Vitals:    03/13/20 0808   BP:    Pulse: 107   Resp:    Temp:    SpO2: 92%      CONSTITUTIONAL: Awake, oriented, appears non-toxic   HENT: Atraumatic, normocephalic, oral mucosa pink and moist, airway patent. Nares patent without drainage. External ears normal.   EYES: Conjunctiva clear   NECK: Trachea midline, non-tender, supple   CARDIOVASCULAR: Tachycardic with a regular rhythm, No murmurs, rubs, gallops   PULMONARY/CHEST: Some scattered coarse lung sounds with a few faint wheezes.  There is good air movement.  No increased work of breathing.  ABDOMINAL: Non-distended, soft, non-tender - no rebound or guarding.  NEUROLOGIC: Non-focal, moving all four extremities, no gross sensory or motor deficits.   EXTREMITIES: No clubbing, cyanosis.  Mild edema of the lower extremities bilaterally.  Mild pain with  range of motion at the hips and knees but no obvious bony tenderness to palpation.  SKIN: Warm, Dry, No erythema, No rash     ED COURSE / MEDICAL DECISION MAKING:   Mingo Guidry is a 69 y.o. female who presents to the emergency department for evaluation of cough and leg pain.  Patient arrives with appropriate oxygen saturation on her home oxygen.  She does report improvement after breathing treatment.  Mild edema of the lower extremities.  I did a chart review to review her recent admission.  Patient has good air movement currently.  Will repeat labs to ensure nothing worsening with a chest x-ray and EKG.    EKG interpreted by me reveals sinus rhythm with rate of 108 bpm.  There are some nonspecific ST and T wave changes.  This is an abnormal appearing EKG.    Multiple laboratory abnormalities were seen.  Patient's cardiac enzymes elevated at 0.05.  Repeat set more elevated to 0.067.  Patient's procalcitonin mildly elevated above what it was previously at 0.33.  White blood cell count is actually improved.  Imaging reveals signs of pneumonia, CT scan reveals bilateral infiltrates.  Given the worsening cardiac enzymes will admit for serial rule out.    DECISION TO DISCHARGE/ADMIT: see ED care timeline     FINAL IMPRESSION:   1 --shortness of breath  2 --elevated troponin  3 --     Electronically signed by: Connie Stewart MD, 3/13/2020 08:26       Connie Stewart MD  03/13/20 [id number]

## 2020-03-14 NOTE — PROGRESS NOTES
[facility]    Name:  Mingo Guidry   Age:  69 y.o.  Sex:  female  :  1950  MRN:  [id number]   Visit Number:  [id number]  Admission Date:  3/13/2020  Date Of Service:  20  Primary Care Physician:  Luz Prater APRN     LOS: 0 days :  Patient Care Team:  Luz Prater APRN as PCP - General:    History taken from:     patient chart    Chief Complaint:      Shortness of breath    Subjective:    Interval History:     Patient seen and examined today.  Reviewed history and physical, lab work, x-rays, chart.    Patient is 69-year-old female with chronic hypoxic respiratory failure on 3 L nasal cannula, BiPAP, diabetes type 2, COPD, obesity who was discharged on 3/12/2020 with postobstructive pneumonia due to to aspirated pea which was removed with bronchoscopy.  She was placed on vancomycin and Rocephin and Zithromax.  Dr. Dahl has been consulted for bronchoscopy which the P was removed.  She developed significant leukocytosis after this.  This was felt to be reactive and due to steroids.  This was up to 50,000, this has come down to 20,000 at present.  She was discharged home then had cough and shortness of breath as well as chest pain so she came back in.  She also complained of bilateral leg pain.  CT the chest showed bibasilar airspace disease consistent with aspiration or pneumonia.  Leukocytosis actually improved.  Procalcitonin was mildly elevated and nonspecific.  Troponin however was up to .05 and then 0.06.  This is returned to normal.    Cardiology was consulted and offered a stress test versus left heart catheterization.  Patient is opted for left heart catheterization.  She complained of leg pain, venous Dopplers were done which were negative.  Her white count continues to improve.  Blood sugars were significantly elevated, due to steroids.  Also added back long-acting insulin for this patient.  Switch her to prednisone at present.   She is still only on 3-4 L of oxygen which is her home dose.    Review of Systems:     All systems were reviewed and negative except for:  Respiratory: positive for  cough, productive and shortness of air  Musculoskeletal: positive for  Bilateral leg pain    Objective:    Vital Signs:    Temp:  [96.9 °F (36.1 °C)-98.7 °F (37.1 °C)] 98.5 °F (36.9 °C)  Heart Rate:  [] 90  Resp:  [18-23] 20  BP: (138-162)/(59-74) 151/74    Physical Exam:    General: Alert and oriented x4, obese, no acute distress  Heart: Regular rate and rhythm without murmur rub or thrill  Lungs: Occasional wheezes bilaterally with no use of accessory muscles respiration  Abdomen: Soft/nontender/nondistended.  No hepatosplenomegaly is noted.  MSK: Tenderness bilateral lower extremities.  4/5 strength.  Psych: Short-term and long-term memory are intact.  Present for anxious or depressed.     Results Review:      I reviewed the patient's new clinical results.    Labs:    Lab Results (last 24 hours)     Procedure Component Value Units Date/Time    POC Glucose Once [[id number]]  (Abnormal) Collected:  03/14/20 1108    Specimen:  Blood Updated:  03/14/20 1125     Glucose 301 mg/dL      Comment: Serial Number: [id number]:  [id number]       CBC & Differential [[id number]] Collected:  03/14/20 0610    Specimen:  Blood Updated:  03/14/20 0955    Narrative:       The following orders were created for panel order CBC & Differential.  Procedure                               Abnormality         Status                     ---------                               -----------         ------                     CBC Auto Differential[[id number]]        Abnormal            Final result                 Please view results for these tests on the individual orders.    CBC Auto Differential [[id number]]  (Abnormal) Collected:  03/14/20 0610    Specimen:  Blood Updated:  03/14/20 0955     WBC 20.01 10*3/mm3      RBC 3.40 10*6/mm3      Hemoglobin 10.8 g/dL       Hematocrit 33.8 %      MCV 99.4 fL      MCH 31.8 pg      MCHC 32.0 g/dL      RDW 13.7 %      RDW-SD 50.2 fl      MPV 11.7 fL      Platelets 296 10*3/mm3      Neutrophil % 94.3 %      Lymphocyte % 3.8 %      Monocyte % 0.8 %      Eosinophil % 0.0 %      Basophil % 0.1 %      Immature Grans % 1.0 %      Neutrophils, Absolute 18.84 10*3/mm3      Lymphocytes, Absolute 0.77 10*3/mm3      Monocytes, Absolute 0.17 10*3/mm3      Eosinophils, Absolute 0.00 10*3/mm3      Basophils, Absolute 0.03 10*3/mm3      Immature Grans, Absolute 0.20 10*3/mm3      nRBC 0.0 /100 WBC     POC Glucose Once [[id number]]  (Abnormal) Collected:  03/14/20 0909    Specimen:  Blood Updated:  03/14/20 0915     Glucose 369 mg/dL      Comment: Serial Number: [id number]:  [id number]       Comprehensive Metabolic Panel [[id number]]  (Abnormal) Collected:  03/14/20 0610    Specimen:  Blood Updated:  03/14/20 0702     Glucose 500 mg/dL      BUN 53 mg/dL      Creatinine 1.23 mg/dL      Sodium 145 mmol/L      Potassium 5.5 mmol/L      Chloride 101 mmol/L      CO2 30.6 mmol/L      Calcium 9.2 mg/dL      Total Protein 6.1 g/dL      Albumin 3.20 g/dL      ALT (SGPT) 48 U/L      AST (SGOT) 19 U/L      Alkaline Phosphatase 56 U/L      Total Bilirubin 0.4 mg/dL      eGFR Non African Amer 43 mL/min/1.73      Globulin 2.9 gm/dL      A/G Ratio 1.1 g/dL      BUN/Creatinine Ratio 43.1     Anion Gap 13.4 mmol/L     Narrative:       GFR Normal >60  Chronic Kidney Disease <60  Kidney Failure <15      Procalcitonin [[id number]]  (Abnormal) Collected:  03/14/20 0610    Specimen:  Blood Updated:  03/14/20 0657     Procalcitonin 0.30 ng/mL     Narrative:       As a Marker for Sepsis (Non-Neonates):   1. <0.5 ng/mL represents a low risk of severe sepsis and/or septic shock.  1. >2 ng/mL represents a high risk of severe sepsis and/or septic shock.    As a Marker for Lower Respiratory Tract Infections that require antibiotic therapy:  PCT on Admission     Antibiotic Therapy    "          6-12 Hrs later  > 0.5                Strongly Recommended            >0.25 - <0.5         Recommended  0.1 - 0.25           Discouraged                   Remeasure/reassess PCT  <0.1                 Strongly Discouraged          Remeasure/reassess PCT      As 28 day mortality risk marker: \"Change in Procalcitonin Result\" (> 80 % or <=80 %) if Day 0 (or Day 1) and Day 4 values are available. Refer to http://www.[facility]pct[facility]cal[facility]/   Change in PCT <=80 %   A decrease of PCT levels below or equal to 80 % defines a positive change in PCT test result representing a higher risk for 28-day all-cause mortality of patients diagnosed with severe sepsis or septic shock.  Change in PCT > 80 %   A decrease of PCT levels of more than 80 % defines a negative change in PCT result representing a lower risk for 28-day all-cause mortality of patients diagnosed with severe sepsis or septic shock.                Results may be falsely decreased if patient taking Biotin.     POC Glucose Once [[id number]]  (Abnormal) Collected:  03/14/20 0635    Specimen:  Blood Updated:  03/14/20 0640     Glucose 448 mg/dL      Comment: Serial Number: [id number]:  [id number]       Troponin [[id number]]  (Normal) Collected:  03/13/20 2306    Specimen:  Blood Updated:  03/13/20 2329     Troponin T 0.026 ng/mL     Narrative:       Troponin T Reference Range:  <= 0.03 ng/mL-   Negative for AMI  >0.03 ng/mL-     Abnormal for myocardial necrosis.  Clinicians would have to utilize clinical acumen, EKG, Troponin and serial changes to determine if it is an Acute Myocardial Infarction or myocardial injury due to an underlying chronic condition.       Results may be falsely decreased if patient taking Biotin.      POC Glucose Once [[id number]]  (Abnormal) Collected:  03/13/20 2027    Specimen:  Blood Updated:  03/13/20 2101     Glucose 291 mg/dL      Comment: Serial Number: [id number]:  [id number]       Troponin [[id number]]  (Abnormal) " Collected:  03/13/20 1656    Specimen:  Blood Updated:  03/13/20 1749     Troponin T 0.038 ng/mL     Narrative:       Troponin T Reference Range:  <= 0.03 ng/mL-   Negative for AMI  >0.03 ng/mL-     Abnormal for myocardial necrosis.  Clinicians would have to utilize clinical acumen, EKG, Troponin and serial changes to determine if it is an Acute Myocardial Infarction or myocardial injury due to an underlying chronic condition.       Results may be falsely decreased if patient taking Biotin.      Scan Slide [[id number]] Collected:  03/13/20 1542    Specimen:  Blood Updated:  03/13/20 1627     Scan Slide --     Comment: See Manual Differential Results       Manual Differential [[id number]]  (Abnormal) Collected:  03/13/20 1542    Specimen:  Blood Updated:  03/13/20 1626     Neutrophil % 76.0 %      Lymphocyte % 14.0 %      Monocyte % 8.0 %      Bands %  2.0 %      Neutrophils Absolute 26.72 10*3/mm3      Lymphocytes Absolute 4.80 10*3/mm3      Monocytes Absolute 2.74 10*3/mm3      RBC Morphology Normal     WBC Morphology Normal     Platelet Estimate Adequate    CBC & Differential [[id number]] Collected:  03/13/20 1542    Specimen:  Blood Updated:  03/13/20 1626    Narrative:       The following orders were created for panel order CBC & Differential.  Procedure                               Abnormality         Status                     ---------                               -----------         ------                     CBC Auto Differential[[id number]]        Abnormal            Final result                 Please view results for these tests on the individual orders.    CBC Auto Differential [[id number]]  (Abnormal) Collected:  03/13/20 1542    Specimen:  Blood Updated:  03/13/20 1626     WBC 34.25 10*3/mm3      RBC 3.43 10*6/mm3      Hemoglobin 10.9 g/dL      Hematocrit 33.4 %      MCV 97.4 fL      MCH 31.8 pg      MCHC 32.6 g/dL      RDW 13.7 %      RDW-SD 49.1 fl      MPV 10.9 fL      Platelets 287 10*3/mm3     POC  Glucose Once [[id number]]  (Normal) Collected:  03/13/20 1607    Specimen:  Blood Updated:  03/13/20 1615     Glucose 86 mg/dL      Comment: Serial Number: [id number]:  [id number]              Radiology:    Imaging Results (Last 24 Hours)     Procedure Component Value Units Date/Time    US Venous Doppler Lower Extremity Bilateral (duplex) [[id number]] Collected:  03/14/20 1239     Updated:  03/14/20 1240    Narrative:       FINAL REPORT    TECHNIQUE:  Grayscale and color Doppler ultrasound images with graded  compression of the deep venous system were obtained from the  groin to the calf veins bilaterally.    CLINICAL HISTORY:  PAIN, SWELLING    FINDINGS:  The deep venous system is normal.  There is no evidence of DVT.  Flow and compressibility are normal.      Impression:       No evidence of left or right lower extremity DVT.    Authenticated by Fabricio Wolff MD on 03/14/2020 12:39:36 PM    XR Knee 1 or 2 View Bilateral [[id number]] Collected:  03/14/20 0923     Updated:  03/14/20 0925    Narrative:       PROCEDURE: XR KNEE 1 OR 2 VW BILATERAL-     History: knee pain; R06.02-Shortness of breath; R79.89-Other specified  abnormal findings of blood chemistry     COMPARISON: None.     FINDINGS:  A 3 view exam demonstrates no acute fracture or dislocation.  There are tricompartmental degenerative changes bilaterally consistent  with osteoarthritis. No soft tissue abnormality is seen.       Impression:       No acute fracture.               This report was finalized on 3/14/2020 9:23 AM by Fabricio Wolff M.D..          Medication Review:       allopurinol 300 mg Oral Daily   apixaban 5 mg Oral Q12H   aspirin 81 mg Oral Daily   azithromycin 500 mg Oral Q24H   budesonide 0.5 mg Nebulization BID - RT   busPIRone 10 mg Oral TID   chlorthalidone 12.5 mg Oral Daily   [START ON 3/15/2020] dilTIAZem  mg Oral Q24H   famotidine 40 mg Oral Daily   furosemide 40 mg Oral Daily   guaiFENesin 600 mg Oral Q12H   insulin  aspart 0-9 Units Subcutaneous 4x Daily AC & at Bedtime   ipratropium-albuterol 3 mL Nebulization Q6H While Awake - RT   linagliptin 5 mg Oral Daily   metoclopramide 5 mg Oral TID   metoprolol succinate  mg Oral Q24H   montelukast 10 mg Oral Nightly   pravastatin 10 mg Oral Nightly   predniSONE 40 mg Oral Daily With Breakfast   rOPINIRole 0.5 mg Oral BID   sodium chloride 10 mL Intravenous Q12H            Assessment:    Problem List Items Addressed This Visit        Respiratory    Shortness of breath - Primary      Other Visit Diagnoses     Elevated troponin              1.   Elevated troponin-this is trended back to normal.   2.  Recent Post obstructive Right lower lobe pneumonia secondary to foreign body.  Bronchoscopy with BAL not show any growth.  Cytology was negative for malignancy.    She was treated with full round of antibiotics.   3.  Leukocytosis-her leukocytosis is actually improving but white count is down to 41,000.    This was thought to be reactive.  Peripheral smear showed leukemoid reaction.   4.  Bilateral leg pain.  Venous Doppler was ordered, this was negative.   5.   Paroxysmal atrial fibrillation-newly diagnosed during previous.  She was started on Eliquis.   6.  Chronic diastolic CHF-  Stable on oral diuretic   7.  Obstructive sleep apnea on home BiPAP   8.  Chronic hypoxic respiratory failure on home 3 L nasal cannula-  Walking oximetry showed 4 liters with rest and 6 with activity   9.  Insulin-dependent diabetes mellitus type 2-   with hyperglycemia due to steroids.   10.  Chronic lower leg edema, improved   11.  Former smoker   12.  Chronic dyslipidemia/ hypertriglyceridemia  13.  COPD with possible mild exacerbation  14.  Obesity   15.  Chronic kidney disease stage III- Stable.      Plan:    Venous Doppler was ordered this was negative.  Reviewed x-ray of the knees which were negative.  Discussed this case with Dr. Zhou, who offered her stress test versus left heart  catheterization, patient has opted for left heart catheterization.  This apparently will be done on Tuesday after Eliquis is held for 48 hours.  Will change Solu-Medrol over to prednisone at the present time.  We will continue with budesonide.  We will add back Levemir 35 units in the a.m. and 60 units at p.m.  This may be adjusted according to her blood sugars.  Monitor blood sugars closely.  Check lab work in the a.m.  PT and OT to work with patient.  Further recommendations will depend on the    Garrett Hilliard DO  03/14/20  14:15

## 2020-03-14 NOTE — PROGRESS NOTES
Adult Nutrition  Assessment/PES    Patient Name:  Mingo Guidry  YOB: 1950  MRN: [id number]  Admit Date:  3/13/2020    Assessment Date:  3/14/2020    Comments:  Rec. #1: Advance diet once medically feasible to consistent carbohydrate, cardiac. RD to follow pt. Consult RD PRN.     Reason for Assessment     Row Name 03/14/20 0846          Reason for Assessment    Reason For Assessment  diagnosis/disease state     Diagnosis  diabetes diagnosis/complications;cardiac disease;pulmonary disease;other (see comments);fluid status DM type 2, HTN, COPD, DHF, Leukocytosis, leg edema, CKD     Identified At Risk by Screening Criteria  BMI           Anthropometrics     Row Name 03/14/20 0500          Anthropometrics    Weight  84.1 kg (185 lb 6.5 oz)         Labs/Tests/Procedures/Meds     Row Name 03/14/20 0849          Labs/Procedures/Meds    Lab Results Reviewed  reviewed, pertinent     Lab Results Comments  High: K+, Gluc, BUN, Cr, ALT         Medications    Pertinent Medications Reviewed  reviewed, pertinent         Physical Findings     Row Name 03/14/20 0849          Physical Findings    Overall Physical Appearance  obese         Estimated/Assessed Needs     Row Name 03/14/20 0850          Calculation Measurements    Weight Used For Calculations  41.3 kg (91 lb 0.5 oz)        Estimated/Assessed Needs    Additional Documentation  Fluid Requirements (Group);Medina-St. Jeor Equation (Group);Protein Requirements (Group);Calorie Requirements (Group)        Calorie Requirements    Estimated Calorie Requirement Comment  2490-7721        Medina-St. Jeor Equation    RMR (Medina-St. Jeor Equation)  827.65        Protein Requirements    Weight Used For Protein Calculations  41.3 kg (91 lb 0.5 oz)     Est Protein Requirement Amount (gms/kg)  0.75 gm protein 25-31 gm/day     Estimated Protein Requirements (gms/day)  30.97        Fluid Requirements    Estimated Fluid Requirement Method  -- output + 1000 ml      Alexis-Della Method (over 20 kg)  2325.8         Nutrition Prescription Ordered     Row Name 03/14/20 0850          Nutrition Prescription PO    Current PO Diet  NPO x 2 days         Evaluation of Received Nutrient/Fluid Intake     Row Name 03/14/20 0851          PO Evaluation    Number of Days PO Intake Evaluated  Insufficient Data               Problem/Interventions:  Problem 1     Row Name 03/14/20 0851          Nutrition Diagnoses Problem 1    Problem 1  Overweight/Obesity     Etiology (related to)  Factors Affecting Nutrition     Food Habit/Preferences  Large Meals     Signs/Symptoms (evidenced by)  BMI     BMI  35 - 39.9         Problem 2     Row Name 03/14/20 0851          Nutrition Diagnoses Problem 2    Problem 2  Impaired Nutrient Utilization     Etiology (related to)  Medical Diagnosis     Endocrine  DM Type 2     Renal  CKD     Signs/Symptoms (evidenced by)  Biochemical     Specific Labs Noted  K+;Glucose;BUN;Creatinine         Problem 3     Row Name 03/14/20 0852          Nutrition Diagnoses Problem 3    Problem 3  Increased Nutrient Needs     Macronutrient  Kcal;Protein     Etiology (related to)  Medical Diagnosis     Pulmonary/Critical Care  COPD     Signs/Symptoms (evidenced by)  Other (comment) pulmonary dysfunction           Intervention Goal     Row Name 03/14/20 0852          Intervention Goal    General  Meet nutritional needs for age/condition     PO  PO intake (%)     PO Intake %  -- 50-75     Weight  No significant weight loss         Nutrition Intervention     Row Name 03/14/20 0853          Nutrition Intervention    RD/Tech Action  Follow Tx progress;Await begin PO         Nutrition Prescription     Row Name 03/14/20 0853          Nutrition Prescription PO    PO Prescription  Begin/change diet;Begin/change supplement     Begin/Change Diet to  Clear Liquid     Supplement  Boost Breeze     Supplement Frequency  2 times a day     New PO Prescription Ordered?  No, recommended          Education/Evaluation     Row Name 03/14/20 0853          Education    Education  Previous education by RD/PONCHO        Monitor/Evaluation    Monitor  Per protocol;Pertinent labs;Weight;I&O;Skin status           Electronically signed by:  Luanne Rosales RD  03/14/20 08:53

## 2020-03-14 NOTE — PROGRESS NOTES
NOCTURNIST NOTE:    Patient got up to bedside chair and had single short episode of tachycardia with some st changes. EKG ordered stat.    EKG reviewed tonight and compared to earlier today. She has sinus tach with nonspecific st changes though 2nd EKG appears st changes more pronounced.     Dr Zhou was on call. I asked him to review both EKGs for me as well. There is NO STEMI identified.     Repeat troponin pending. Earlier today troponin was trending downward. Troponin was improved, however with EKG changes, patient will be placed NPO in case of possible heart cath in near future.    Discussed with nursing staff.

## 2020-03-14 NOTE — PLAN OF CARE
Problem: Patient Care Overview  Goal: Plan of Care Review  Outcome: Ongoing (interventions implemented as appropriate)  Flowsheets (Taken 3/14/2020 0026)  Plan of Care Reviewed With: patient  Note:   CHANGE ON EKG.NPO MN. POSSIBLE CATH TOMORROW

## 2020-03-14 NOTE — PROGRESS NOTES
"[facility]-Cardiology Progress note     LOS: 0 days   Patient Care Team:  Luz Prater APRN as PCP - General    Chief Complaint:   Shortness of breath    Subjective:    Interval History:     Patient Complaints: Shortness of breath and cough  Patient Denies:   Chest pain,  orthopnea, peripheral edema, palpitations, hemoptysis, abdominal pain, nausea, vomiting, diarrhea, fevers chills or night sweats  History taken from: patient    Review of Systems:   All systems were reviewed and negative except for:  Respiratory: positive for  cough, productive and shortness of air      Objective:    Vital Sign Min/Max for last 24 hours  Temp  Min: 96.9 °F (36.1 °C)  Max: 98.7 °F (37.1 °C)   BP  Min: 138/62  Max: 162/69   Pulse  Min: 87  Max: 106   Resp  Min: 18  Max: 23   SpO2  Min: 90 %  Max: 98 %   Flow (L/min)  Min: 4  Max: 4   Weight  Min: 84.1 kg (185 lb 6.5 oz)  Max: 87.3 kg (192 lb 7.4 oz)     Flowsheet Rows      First Filed Value   Admission Height  147.3 cm (58\") Documented at 03/13/2020 0800   Admission Weight  87.1 kg (192 lb) Documented at 03/13/2020 0800          Physical Exam:     General Appearance:    Alert, cooperative, in no acute distress   Head:    Normocephalic, without obvious abnormality, atraumatic   Eyes:            Lids and lashes normal, conjunctivae and sclerae normal, no   icterus, no pallor, corneas clear, PERRLA   Ears:    Ears appear intact with no abnormalities noted   Throat:   No oral lesions, no thrush, oral mucosa moist   Neck:   No adenopathy, supple, trachea midline, no thyromegaly, no     carotid bruit, no JVD   Back:     No kyphosis present, no scoliosis present, no skin lesions,       erythema or scars, no tenderness to percussion or                   palpation,   range of motion normal   Lungs:    Bilateral rhonchi and moist crackles,respirations regular, tachypneic and mildly labored    Heart:    Regular rhythm and normal rate, normal S1 and S2, no            murmur, no gallop, no rub, " "no click   Breast Exam:    Deferred   Abdomen:     Normal bowel sounds, no masses, no organomegaly, soft        non-tender, non-distended, no guarding, no rebound                 tenderness   Genitalia:    Deferred   Extremities:   Moves all extremities well, no edema, no cyanosis, no              redness   Pulses:   Pulses palpable and equal bilaterally   Skin:   No bleeding, bruising or rash   Lymph nodes:   No palpable adenopathy   Neurologic:   Cranial nerves 2 - 12 grossly intact, sensation intact, DTR        present and equal bilaterally        Results Review:     I reviewed the patient's new clinical results.      Results from last 7 days   Lab Units 03/14/20  0610   SODIUM mmol/L 145   POTASSIUM mmol/L 5.5*   CHLORIDE mmol/L 101   CO2 mmol/L 30.6*   BUN mg/dL 53*   CREATININE mg/dL 1.23*   GLUCOSE mg/dL 500*   CALCIUM mg/dL 9.2     Results from last 7 days   Lab Units 03/14/20  0610 03/13/20  1542 03/13/20  0805   WBC 10*3/mm3 20.01* 34.25* 41.31*   HEMOGLOBIN g/dL 10.8* 10.9* 11.5*   HEMATOCRIT % 33.8* 33.4* 35.5   PLATELETS 10*3/mm3 296 287 330       Echo EF Estimated  Lab Results   Component Value Date    ECHOEFEST 66 02/28/2020       Physical Exam    Medication Review: yes    Assessment/Plan:      Shortness of breath-improving  Bilateral hospital-acquired pneumonia.  Symptoms improving.  Oxygen requirement improving.  Leukocytosis improving.  Leukemoid reaction.  Improving.  Paroxysmal atrial fibrillation.  The patient is maintaining normal sinus rhythm.  Anticoagulated with Eliquis due to a chads 2 vascular score of 5.  Essential hypertension.  Poor blood pressure control.  Heart failure with preserved ejection fraction.  Stage C.  New York Heart Association functional class III symptoms.  Euvolemic.  BNP is normal.  Abnormal cardiac troponin.  Her troponins have been minimally elevated and mostly a \"plateau-pattern\".  Serial twelve-lead electrocardiograms have shown no ischemic ST-T wave changes or " "injury current.  Her repeat echocardiogram continues to show an ejection fraction of 60-65% with normal regional wall motion.  There is mild concentric left ventricular hypertrophy with grade 1 diastolic dysfunction.  There is trivial mitral regurgitation.  Right ventricular systolic pressures could not be estimated.  There is now a small circumferential pericardial effusion with no evidence of cardiac tamponade.  I suspect this is a parapneumonic process.  At this point the suspicion for acute coronary syndrome is extremely low.  Non-ST elevation myocardial infarction has been \"ruled out\".  Nevertheless, the patient has multiple risk factors for coronary artery disease.    I have discussed with the patient the option of how to proceed with ischemic evaluation at this point.  I have offered her the option of performing an outpatient Lexiscan in 2 to 3 weeks once she is recovered from her pneumonia versus invasive coronary angiography during this hospitalization.  The relative \"pros and cons\" for each approach has been discussed with the patient in a face-to-face patient level discussion.  The patient indicates that she would pursue invasive coronary angiography during this hospitalization.  The patient is currently on Eliquis.  We will need to hold her Eliquis for 48 hours prior to the procedure which will be performed Tuesday morning.  The patient will remain hospitalized during that time.  We will discontinue her Eliquis tomorrow afternoon.  She will be n.p.o. after midnight Monday night.  I have recommended increasing the dose of her  Long-acting diltiazem for further heart rate control and blood pressure control.  I have recommended adding low-dose chlorthalidone.  I would recommend avoiding spironolactone due to her hyperkalemia.      Elkin Zhou MD  03/14/20  12:35        "

## 2020-03-14 NOTE — OUTREACH NOTE
COPD/PN Week 1 Survey      Responses   [facility] patient discharged from?  [facility]   Does the patient have one of the following disease processes/diagnoses(primary or secondary)?  COPD/Pneumonia   Is there a successful TCM telephone encounter documented?  No   Week 1 attempt successful?  No   Revoke  Readmitted          Mary Lou Gracia RN

## 2020-03-14 NOTE — PROGRESS NOTES
Discharge Planning Assessment  [facility]     Patient Name: Mingo Guidry  MRN: [id number]  Today's Date: 3/14/2020    Admit Date: 3/13/2020    Discharge Needs Assessment     Row Name 03/14/20 1111       Living Environment    Lives With  alone    Name(s) of Who Lives With Patient  Pt lives alone but have supportive son and his girlfriend to help with her needs    Current Living Arrangements  home/apartment/condo Apt    Primary Care Provided by  self    Provides Primary Care For  no one    Family Caregiver if Needed  none    Quality of Family Relationships  supportive Son and his girlfriend supportive    Able to Return to Prior Arrangements  yes    Living Arrangement Comments  Pt lives alone but her son and his SO Ashley Bello help out        Resource/Environmental Concerns    Resource/Environmental Concerns  none Denies any safety issues    Transportation Concerns  -- see above comments       Transition Planning    Patient/Family Anticipates Transition to  home    Patient/Family Anticipated Services at Transition  home health care    Transportation Anticipated  family or friend will provide;health plan transportation Pt uses [employer] [facility] for transportation       Discharge Needs Assessment    Equipment Currently Used at Home  bath bench;bipap/cpap;nebulizer;glucometer;oxygen Pt on O2@[employer] continuously and managed by [employer]          Discharge Plan     Row Name 03/14/20 1121       Plan    Plan   Spoke with pt about discharge plans Confirmed current address and phone contacts   Adebayo Guidry, son [phone] and his SO Ashley Bello and matthewshaun Chelly Judge [phone]   No POA   PCP  Luz ECHEVERRIA  Pt states she can perform ADL good  Pt's son is supportive  as well as his girlfriend about pt needs   Ashley  washes her laundry and Adebayo gets her grocery shopping etc done   Pt is able to live alone in her apt with no safety issues voiced  Pt goes to  [employer]  4 times a week for 6 hours and they also provide 2   meals daily and a snack  Pt also uses [employer] for transportation Pt can drive but has no vehicle  Pt is on O2@[employer] [place] continously managed by [employer], Bipap @[employer] [place], nebulizer, glucometer, SB, Pt states she does not use HH services  Will continue to assess pt for any further needs prior to being discharged        Destination      Coordination has not been started for this encounter.      Durable Medical Equipment      Coordination has not been started for this encounter.      Dialysis/Infusion      Coordination has not been started for this encounter.      Home Medical Care      Coordination has not been started for this encounter.      Therapy      Coordination has not been started for this encounter.      Community Resources      Coordination has not been started for this encounter.        Expected Discharge Date and Time     Expected Discharge Date Expected Discharge Time    Mar 15, 2020         Demographic Summary     Row Name 03/14/20 1054       General Information    Admission Type  observation    Arrived From  emergency department    Required Notices Provided  -- Pt has Medicare B only    Referral Source  admission list    Reason for Consult  discharge planning    Preferred Language  English       Contact Information    Permission Granted to Share Info With      Contact Information Obtained for          Functional Status     Row Name 03/14/20 1102       Functional Status    Usual Activity Tolerance  good       Functional Status, IADL    Medications  independent    Meal Preparation  independent    Housekeeping  assistive person    Laundry  assistive person    Shopping  assistive person    IADL Comments  Pt's son, Adebayo has SO who helps with doing laundry and pt has a woman clean once a week (on Tuesday)  Pt's son does her grocery shopping       Employment/    Employment Status  unemployed        Psychosocial    No documentation.       Abuse/Neglect    No  documentation.       Legal    No documentation.       Substance Abuse    No documentation.       Patient Forms    No documentation.           Angela Hinson RN

## 2020-03-15 NOTE — THERAPY EVALUATION
Patient Name: Mingo Guidry  : 1950    MRN: [id number]                              Today's Date: 3/15/2020       Admit Date: 3/13/2020    Visit Dx:     ICD-10-CM ICD-9-CM   1. Shortness of breath R06.02 786.05   2. Elevated troponin R79.89 790.6     Patient Active Problem List   Diagnosis   • Chronic diastolic heart failure (CMS/HCC)   • Lower extremity edema   • Essential hypertension   • Type 2 diabetes mellitus (CMS/HCC)   • COPD with acute exacerbation (CMS/[facility])   • Obesity   • Acid reflux   • Gout   • Seasonal allergies   • Mixed hyperlipidemia   • Acute on chronic respiratory failure with hypoxia (CMS/[facility])   • Pneumonia of left upper lobe due to infectious organism (CMS/[facility])   • Acute GI bleeding   • Gastritis   • Mg's esophagus   • Acute blood loss anemia   • Acute bronchitis   • Abnormal CT scan of lung   • Pneumonia of right lower lobe due to infectious organism (CMS/[facility])   • Postobstructive pneumonia   • Acute aspiration pneumonia (CMS/[facility])   • Obstructive sleep apnea   • Diastolic dysfunction   • Chronic renal failure syndrome, stage 3 (moderate) (CMS/[facility])   • Leukemoid reaction   • Foreign body aspiration   • Shortness of breath     Past Medical History:   Diagnosis Date   • Acid reflux 10/17/2016   • Asthma     bronchial   • Cancer (CMS/HCC)     uterine   • Chronic diastolic heart failure (CMS/HCC) 10/17/2016    Normal dobutamine echocardiogram stress, 2005. Echocardiogram on 2009:  EF 50% to 55%. Left atrium 3.5 cm.   • Chronic obstructive pulmonary disease (CMS/HCC) 10/17/2016    asthmatic bronchitis, on O2 use chronically.     • Gout 10/17/2016   • High cholesterol 2018   • Hypertension 10/17/2016    Benign hypertension.   • Lower extremity edema 10/17/2016    Chronic lower extremity edema/venous insufficiency.   • Murmur, heart    • Obesity 10/17/2016   • Renal failure     stageIV   • Seasonal allergies 10/17/2016   • Type 2 diabetes mellitus (CMS/HCC) 10/17/2016     insulin dependent.     Past Surgical History:   Procedure Laterality Date   • BRONCHOSCOPY N/A 2018    Procedure: BRONCHOSCOPY WITH WASHINGS;  Surgeon: Ryder Dahl MD;  Location: [facility] OR;  Service:    •  SECTION     • COLONOSCOPY     • COLONOSCOPY N/A 2019    Procedure: COLONOSCOPY;  Surgeon: Skinny York MD;  Location: [facility] ENDOSCOPY;  Service: General   • CYSTECTOMY Right     neck   • DILATATION AND CURETTAGE     • ENDOSCOPY N/A 6/10/2019    Procedure: ESOPHAGOGASTRODUODENOSCOPY WITH BIOPSY;  Surgeon: Skinny York MD;  Location: [facility] ENDOSCOPY;  Service: Gastroenterology   • EYE SURGERY Bilateral     cataract   • FOREIGN BODY REMOVAL N/A 3/5/2020    Procedure: BRONCHOSCOPY with MAC and removal of foreign object;  Surgeon: Ryder Dahl MD;  Location: [facility] OR;  Service: Pulmonary;  Laterality: N/A;   • HYSTERECTOMY     • LUNG SURGERY Left 1997 tumors removed from left lung-benign   • TONSILLECTOMY AND ADENOIDECTOMY       General Information     Row Name 03/15/20 1040          PT Evaluation Time/Intention    Document Type  evaluation  -TW     Mode of Treatment  individual therapy;physical therapy  -TW     Row Name 03/15/20 1040          General Information    Patient Profile Reviewed?  yes  -TW     Prior Level of Function  min assist: received a rolling walker upon her recent d/c from hospital.  -TW     Existing Precautions/Restrictions  fall;oxygen therapy device and L/min  -TW     Barriers to Rehab  medically complex  -TW     Row Name 03/15/20 1040          Relationship/Environment    Lives With  alone  -TW     Row Name 03/15/20 1040          Resource/Environmental Concerns    Current Living Arrangements  home/apartment/condo  -TW     Row Name 03/15/20 1040          Home Main Entrance    Number of Stairs, Main Entrance  five  -TW     Stair Railings, Main Entrance  -- Per pt her son helped her into her home upon last d/c and she did well, but was fatigued. Pt  states there is not a rail but a solid structure of wood that holds onto.  -TW     Row Name 03/15/20 1040          Stairs Within Home, Primary    Number of Stairs, Within Home, Primary  none  -TW     Row Name 03/15/20 1040          Cognitive Assessment/Intervention- PT/OT    Orientation Status (Cognition)  oriented x 3  -TW     Row Name 03/15/20 1040          Safety Issues, Functional Mobility    Safety Issues Affecting Function (Mobility)  awareness of need for assistance;safety precautions follow-through/compliance  -TW     Impairments Affecting Function (Mobility)  balance;endurance/activity tolerance;shortness of breath;strength  -TW       User Key  (r) = Recorded By, (t) = Taken By, (c) = Cosigned By    Initials Name Provider Type    TW Rema Aguilar, PT Physical Therapist        Mobility     Row Name 03/15/20 1040          Bed Mobility Assessment/Treatment    Bed Mobility Assessment/Treatment  supine-sit  -TW     Supine-Sit [place] (Bed Mobility)  verbal cues;supervision  -TW     Assistive Device (Bed Mobility)  bed rails;head of bed elevated  -     Row Name 03/15/20 1040          Transfer Assessment/Treatment    Comment (Transfers)  Cues on hand placement   -     Row Name 03/15/20 1040          Bed-Chair Transfer    Bed-Chair [place] (Transfers)  supervision;verbal cues  -TW     Assistive Device (Bed-Chair Transfers)  walker, front-wheeled  -     Row Name 03/15/20 1040          Sit-Stand Transfer    Sit-Stand [place] (Transfers)  supervision;verbal cues  -TW     Assistive Device (Sit-Stand Transfers)  walker, front-wheeled  -     Row Name 03/15/20 1040          Gait/Stairs Assessment/Training    Gait/Stairs Assessment/Training  gait/ambulation independence;distance ambulated;gait/ambulation assistive device  -     [place] Level (Gait)  contact guard;verbal cues  -TW     Assistive Device (Gait)  walker, front-wheeled  -     Distance in Feet (Gait)  20 ft x 2  -TW     Pattern  (Gait)  step-through  -TW     [place] Level (Stairs)  not tested  -TW       User Key  (r) = Recorded By, (t) = Taken By, (c) = Cosigned By    Initials Name Provider Type    TW Rema Aguilar PT Physical Therapist        Obj/Interventions     Row Name 03/15/20 1040          General ROM    GENERAL ROM COMMENTS  Grossly WFL's BLE's  -TW     Row Name 03/15/20 1040          MMT (Manual Muscle Testing)    General MMT Comments  Grossly 3+/5 to 4-/5 B LE's  -TW     Row Name 03/15/20 1040          Static Sitting Balance    Level of [place] (Unsupported Sitting, Static Balance)  supervision  -TW     Sitting Position (Unsupported Sitting, Static Balance)  sitting on edge of bed;sitting in chair sitting on toilet  -TW     Time Able to Maintain Position (Unsupported Sitting, Static Balance)  more than 5 minutes  -TW     Row Name 03/15/20 1040          Dynamic Sitting Balance    Level of [place], Reaches Outside Midline (Sitting, Dynamic Balance)  supervision  -TW     Sitting Position, Reaches Outside Midline (Sitting, Dynamic Balance)  sitting on edge of bed;sitting in chair Toilet seat  -TW     Row Name 03/15/20 1040          Static Standing Balance    Level of [place] (Supported Standing, Static Balance)  contact guard assist  -TW     Time Able to Maintain Position (Supported Standing, Static Balance)  2 to 3 minutes  -TW     Assistive Device Utilized (Supported Standing, Static Balance)  walker, rolling  -TW     Row Name 03/15/20 1040          Dynamic Standing Balance    Level of [place], Reaches Outside Midline (Standing, Dynamic Balance)  contact guard assist  -TW     Time Able to Maintain Position, Reaches Outside Midline (Standing, Dynamic Balance)  4 to 5 minutes  -TW     Assistive Device Utilized (Supported Standing, Dynamic Balance)  walker, rolling  -TW       User Key  (r) = Recorded By, (t) = Taken By, (c) = Cosigned By    Initials Name Provider Type    Rema Paz PT Physical Therapist         Goals/Plan     Row Name 03/15/20 1040          Bed Mobility Goal 1 (PT)    Activity/Assistive Device (Bed Mobility Goal 1, PT)  bed mobility activities, all  -TW     [place] Level/Cues Needed (Bed Mobility Goal 1, PT)  conditional independence  -TW     Time Frame (Bed Mobility Goal 1, PT)  4 days  -TW     Progress/Outcomes (Bed Mobility Goal 1, PT)  goal ongoing  -TW     Row Name 03/15/20 1040          Transfer Goal 1 (PT)    Activity/Assistive Device (Transfer Goal 1, PT)  walker, rolling;sit-to-stand/stand-to-sit;bed-to-chair/chair-to-bed;toilet  -TW     [place] Level/Cues Needed (Transfer Goal 1, PT)  supervision required  -TW     Time Frame (Transfer Goal 1, PT)  1 week  -TW     Progress/Outcome (Transfer Goal 1, PT)  goal ongoing  -TW     Row Name 03/15/20 1040          Gait Training Goal 1 (PT)    Activity/Assistive Device (Gait Training Goal 1, PT)  gait (walking locomotion);assistive device use;increase endurance/gait distance  -TW     [place] Level (Gait Training Goal 1, PT)  supervision required O2 sats above 90%  -TW     Distance (Gait Goal 1, PT)  120 ft  -TW     Time Frame (Gait Training Goal 1, PT)  1 week  -TW     Row Name 03/15/20 1040          Stairs Goal 1 (PT)    Activity/Assistive Device (Stairs Goal 1, PT)  ascending stairs;descending stairs;using handrail, right  -TW     [place] Level/Cues Needed (Stairs Goal 1, PT)  contact guard assist  -TW     Number of Stairs (Stairs Goal 1, PT)  5  -TW     Time Frame (Stairs Goal 1, PT)  1 week  -TW     Progress/Outcome (Stairs Goal 1, PT)  goal ongoing  -TW     Row Name 03/15/20 1040          Patient Education Goal (PT)    Activity (Patient Education Goal, PT)  LE ther ex 1 x 15  -TW     Time Frame (Patient Education Goal, PT)  1 week  -TW     Progress/Outcome (Patient Education Goal, PT)  goal ongoing  -TW       User Key  (r) = Recorded By, (t) = Taken By, (c) = Cosigned By    Initials Name Provider Type    TW [place]itt, Rema, PT  Physical Therapist        Clinical Impression     Row Name 03/15/20 1259          Pain Assessment    Additional Documentation  Pain Scale: Numbers Pre/Post-Treatment (Group)  -TW     Row Name 03/15/20 1259          Pain Scale: Numbers Pre/Post-Treatment    Pain Scale: Numbers, Pretreatment  1/10  -TW     Pain Scale: Numbers, Post-Treatment  1/10  -TW     Pain Location - Side  Bilateral  -TW     Pain Location - Orientation  lower  -TW     Pain Location  back  -TW     Pain Intervention(s)  Repositioned;Ambulation/increased activity  -TW     Row Name 03/15/20 1250          Plan of Care Review    Plan of Care Reviewed With  patient  -TW     Outcome Summary  PT evaluation completed this a.m. Pt presented with deficits in balance, strength and activity tolerance. She did well with safety with mobility with occasional cues on technique. Pt is expected to improve her functional mobility independence prior to d/c   -TW     Row Name 03/15/20 1258          Physical Therapy Clinical Impression    Patient/Family Goals Statement (PT Clinical Impression)  To return home.  -TW     Criteria for Skilled Interventions Met (PT Clinical Impression)  yes;treatment indicated  -TW     Rehab Potential (PT Clinical Summary)  good, to achieve stated therapy goals  -TW     Predicted Duration of Therapy (PT)  1 wk  -TW     Row Name 03/15/20 1252          Vital Signs    Pretreatment Heart Rate (beats/min)  87  -TW     Posttreatment Heart Rate (beats/min)  90  -TW     Pre SpO2 (%)  95  -TW     O2 Delivery Pre Treatment  supplemental O2 6 LPM NC O2  -TW     Intra SpO2 (%)  87  -TW     O2 Delivery Intra Treatment  supplemental O2  -TW     Post SpO2 (%)  96  -TW     O2 Delivery Post Treatment  supplemental O2  -TW     Pre Patient Position  Supine  -TW     Intra Patient Position  Standing  -TW     Post Patient Position  Sitting  -TW     Row Name 03/15/20 1252          Positioning and Restraints    Pre-Treatment Position  in bed  -TW     Post  Treatment Position  chair  -TW     In Chair  reclined;call light within reach;encouraged to call for assist;heels elevated  -TW       User Key  (r) = Recorded By, (t) = Taken By, (c) = Cosigned By    Initials Name Provider Type    Rema Paz PT Physical Therapist        Outcome Measures     Row Name 03/15/20 1040          How much help from another person do you currently need...    Turning from your back to your side while in flat bed without using bedrails?  4  -TW     Standing up from a chair using your arms (e.g., wheelchair, bedside chair)?  3  -TW     Climbing 3-5 steps with a railing?  2  -TW     To walk in hospital room?  3  -TW     Row Name 03/15/20 1040          Functional Assessment    Outcome Measure Options  AM-PAC 6 Clicks Basic Mobility (PT)  -TW       User Key  (r) = Recorded By, (t) = Taken By, (c) = Cosigned By    Initials Name Provider Type    Rema Paz PT Physical Therapist          PT Recommendation and Plan  Planned Therapy Interventions (PT Eval): balance training, bed mobility training, gait training, home exercise program, patient/family education, strengthening, transfer training, stair training  Outcome Summary/Treatment Plan (PT)  Anticipated Discharge Disposition (PT): home, anticipate therapy at next level of care, home with home health  Plan of Care Reviewed With: patient  Outcome Summary: PT evaluation completed this a.m. Pt presented with deficits in balance, strength and activity tolerance. She did well with safety with mobility with occasional cues on technique. Pt is expected to improve her functional mobility independence prior to d/c      Time Calculation:   PT Charges     Row Name 03/15/20 1040             Time Calculation    Stop Time  1040  -TW      PT Received On  03/15/20  -TW      PT Goal Re-Cert Due Date  03/22/20 -TW        User Key  (r) = Recorded By, (t) = Taken By, (c) = Cosigned By    Initials Name Provider Type    Rema Paz PT Physical  Therapist        Therapy Charges for Today     Code Description Service Date Service Provider Modifiers Qty    [id number] HC PT EVAL LOW COMPLEXITY 3 3/15/2020 Rema Aguilar, PT GP 1          PT G-Codes  Outcome Measure Options: AM-PAC 6 Clicks Basic Mobility (PT)    Rema Aguilar PT  3/15/2020

## 2020-03-15 NOTE — PLAN OF CARE
Problem: Patient Care Overview  Goal: Plan of Care Review  Outcome: Ongoing (interventions implemented as appropriate)  Flowsheets (Taken 3/15/2020 0320)  Plan of Care Reviewed With: patient  Outcome Summary: VSS. No acute events noted.  PT getting IV antibiotics, Solumedrol.  Plan for possible heart cath on Tuesday.

## 2020-03-15 NOTE — PLAN OF CARE
PT evaluation completed this a.m. Pt presented with deficits in balance, strength and activity tolerance. She did well with safety with mobility with occasional cues on technique. Pt is expected to improve her functional mobility independence prior to d/c

## 2020-03-15 NOTE — PROGRESS NOTES
[facility]    [facility]    Name:  Mingo Guidry   Age:  69 y.o.  Sex:  female  :  1950  MRN:  [id number]   Visit Number:  [id number]  Admission Date:  3/13/2020  Date Of Service:  03/15/20  Primary Care Physician:  Luz Prater APRN     LOS: 0 days :  Patient Care Team:  Luz Prater APRN as PCP - General:    History taken from:     Patient    Chief Complaint:      Shortness of breath    Subjective:    Interval History:     Patient seen and examined again today.    Patient is 69-year-old female with chronic hypoxic respiratory failure on 3 L nasal cannula, BiPAP, diabetes type 2, COPD, obesity who was discharged on 3/12/2020 with postobstructive pneumonia due to to aspirated pea which was removed with bronchoscopy.  She was placed on vancomycin and Rocephin and Zithromax.  Dr. Dahl has been consulted for bronchoscopy which the P was removed.  She developed significant leukocytosis after this.  This was felt to be reactive and due to steroids.  This was up to 50,000, this came down to 20,000, however this has gone back up to 32,000 today.  Suspect this is secondary to steroids.  She was discharged home then had cough and shortness of breath as well as chest pain so she came back in.  She also complained of bilateral leg pain.  Venous Dopplers were done which was negative.  CT the chest showed bibasilar airspace disease consistent with aspiration or pneumonia.  Procalcitonin was mildly elevated and nonspecific.  Troponin however was up to .05 and then 0.06.  This is returned to normal.    Cardiology was consulted and offered a stress test versus left heart catheterization.  Patient is opted for left heart catheterization.  This will be done on Tuesday.  Dr. Zhou is following.      She continues to complain of leg pain, venous Dopplers were done which were negative.  She has adequate pulses.  She has significant wheezing and cough however.  Will place her  on her home insulin and back on IV steroids at this point.  Continue with budesonide.  Procalcitonin has come down to 0.26.  CRP is mildly elevated at 8.04, monitor for any fever.  Will check sputum culture as well.      Review of Systems:     All systems were reviewed and negative except for:  Respiratory: positive for  cough, productive and shortness of air  Musculoskeletal: positive for  Bilateral leg pain    Objective:    Vital Signs:    Temp:  [97.4 °F (36.3 °C)-98.7 °F (37.1 °C)] 98.5 °F (36.9 °C)  Heart Rate:  [71-97] 82  Resp:  [16-25] 18  BP: (122-151)/(50-75) 122/60    Physical Exam:    General: Alert and oriented x4, obese, no acute distress  Heart: Regular rate and rhythm without murmur rub or thrill  Lungs: Diffuse wheezes bilaterally with no use of accessory muscles respiration  Abdomen: Soft/nontender/nondistended.  No hepatosplenomegaly is noted.  MSK: Tenderness bilateral lower extremities.  4/5 strength.  Psych: Short-term and long-term memory are intact.  Present for anxious or depressed.     Results Review:      I reviewed the patient's new clinical results.    Labs:    Lab Results (last 24 hours)     Procedure Component Value Units Date/Time    POC Glucose Once [[id number]]  (Normal) Collected:  03/15/20 1113    Specimen:  Blood Updated:  03/15/20 1143     Glucose 115 mg/dL      Comment: Serial Number: [id number]:  [id number]       Procalcitonin [[id number]]  (Abnormal) Collected:  03/15/20 0548    Specimen:  Blood Updated:  03/15/20 0908     Procalcitonin 0.26 ng/mL     Narrative:       As a Marker for Sepsis (Non-Neonates):   1. <0.5 ng/mL represents a low risk of severe sepsis and/or septic shock.  1. >2 ng/mL represents a high risk of severe sepsis and/or septic shock.    As a Marker for Lower Respiratory Tract Infections that require antibiotic therapy:  PCT on Admission     Antibiotic Therapy             6-12 Hrs later  > 0.5                Strongly Recommended            >0.25 - <0.5     "     Recommended  0.1 - 0.25           Discouraged                   Remeasure/reassess PCT  <0.1                 Strongly Discouraged          Remeasure/reassess PCT      As 28 day mortality risk marker: \"Change in Procalcitonin Result\" (> 80 % or <=80 %) if Day 0 (or Day 1) and Day 4 values are available. Refer to http://www.[employer]og[employer]-calculator.com/   Change in PCT <=80 %   A decrease of PCT levels below or equal to 80 % defines a positive change in PCT test result representing a higher risk for 28-day all-cause mortality of patients diagnosed with severe sepsis or septic shock.  Change in PCT > 80 %   A decrease of PCT levels of more than 80 % defines a negative change in PCT result representing a lower risk for 28-day all-cause mortality of patients diagnosed with severe sepsis or septic shock.                Results may be falsely decreased if patient taking Biotin.     C-reactive Protein [[id number]]  (Abnormal) Collected:  03/15/20 0547    Specimen:  Blood Updated:  03/15/20 0837     C-Reactive Protein 8.04 mg/dL     CBC & Differential [[id number]] Collected:  03/15/20 0547    Specimen:  Blood Updated:  03/15/20 0641    Narrative:       The following orders were created for panel order CBC & Differential.  Procedure                               Abnormality         Status                     ---------                               -----------         ------                     CBC Auto Differential[[id number]]        Abnormal            Final result                 Please view results for these tests on the individual orders.    CBC Auto Differential [[id number]]  (Abnormal) Collected:  03/15/20 0547    Specimen:  Blood Updated:  03/15/20 0641     WBC 32.15 10*3/mm3      RBC 3.51 10*6/mm3      Hemoglobin 11.2 g/dL      Hematocrit 34.8 %      MCV 99.1 fL      MCH 31.9 pg      MCHC 32.2 g/dL      RDW 13.6 %      RDW-SD 49.7 fl      MPV 11.2 fL      Platelets 360 10*3/mm3      Neutrophil % 84.6 %      Lymphocyte % " 9.6 %      Monocyte % 4.6 %      Eosinophil % 0.0 %      Basophil % 0.1 %      Immature Grans % 1.1 %      Neutrophils, Absolute 27.16 10*3/mm3      Lymphocytes, Absolute 3.10 10*3/mm3      Monocytes, Absolute 1.49 10*3/mm3      Eosinophils, Absolute 0.00 10*3/mm3      Basophils, Absolute 0.04 10*3/mm3      Immature Grans, Absolute 0.36 10*3/mm3      nRBC 0.0 /100 WBC     Scan Slide [[id number]]  (Normal) Collected:  03/15/20 0547    Specimen:  Blood Updated:  03/15/20 0641     RBC Morphology Normal     WBC Morphology Normal     Platelet Morphology Normal    Magnesium [[id number]]  (Normal) Collected:  03/15/20 0547    Specimen:  Blood Updated:  03/15/20 0635     Magnesium 2.2 mg/dL     Comprehensive Metabolic Panel [[id number]]  (Abnormal) Collected:  03/15/20 0547    Specimen:  Blood Updated:  03/15/20 0635     Glucose 244 mg/dL      Comment: Glucose >180, Hemoglobin A1C recommended.        BUN 63 mg/dL      Creatinine 1.41 mg/dL      Sodium 148 mmol/L      Potassium 4.5 mmol/L      Chloride 102 mmol/L      CO2 35.7 mmol/L      Calcium 9.5 mg/dL      Total Protein 6.1 g/dL      Albumin 3.60 g/dL      ALT (SGPT) 39 U/L      AST (SGOT) 13 U/L      Alkaline Phosphatase 62 U/L      Total Bilirubin 0.3 mg/dL      eGFR Non African Amer 37 mL/min/1.73      Globulin 2.5 gm/dL      A/G Ratio 1.4 g/dL      BUN/Creatinine Ratio 44.7     Anion Gap 10.3 mmol/L     Narrative:       GFR Normal >60  Chronic Kidney Disease <60  Kidney Failure <15      POC Glucose Once [[id number]]  (Abnormal) Collected:  03/15/20 0610    Specimen:  Blood Updated:  03/15/20 0620     Glucose 231 mg/dL      Comment: Serial Number: [id number]:  [id number]       POC Glucose Once [[id number]]  (Abnormal) Collected:  03/14/20 2014    Specimen:  Blood Updated:  03/14/20 2026     Glucose 368 mg/dL      Comment: Serial Number: [id number]:  [id number]       POC Glucose Once [[id number]]  (Abnormal) Collected:  03/14/20 1633    Specimen:  Blood Updated:   03/14/20 1645     Glucose 424 mg/dL      Comment: Serial Number: [id number]:  [id number]              Radiology:    Imaging Results (Last 24 Hours)     Procedure Component Value Units Date/Time    US Venous Doppler Lower Extremity Bilateral (duplex) [[id number]] Collected:  03/14/20 1239     Updated:  03/14/20 1240    Narrative:       FINAL REPORT    TECHNIQUE:  Grayscale and color Doppler ultrasound images with graded  compression of the deep venous system were obtained from the  groin to the calf veins bilaterally.    CLINICAL HISTORY:  PAIN, SWELLING    FINDINGS:  The deep venous system is normal.  There is no evidence of DVT.  Flow and compressibility are normal.      Impression:       No evidence of left or right lower extremity DVT.    Authenticated by Fabricio Wolff MD on 03/14/2020 12:39:36 PM          Medication Review:       allopurinol 300 mg Oral Daily   aspirin 81 mg Oral Daily   azithromycin 500 mg Oral Q24H   budesonide 0.5 mg Nebulization BID - RT   busPIRone 10 mg Oral TID   chlorthalidone 12.5 mg Oral Daily   dilTIAZem  mg Oral Q24H   famotidine 40 mg Oral Daily   furosemide 40 mg Oral Daily   guaiFENesin 1,200 mg Oral Q12H   insulin aspart 0-9 Units Subcutaneous 4x Daily AC & at Bedtime   insulin detemir 35 Units Subcutaneous QAM   insulin detemir 80 Units Subcutaneous Nightly   ipratropium-albuterol 3 mL Nebulization Q6H While Awake - RT   linagliptin 5 mg Oral Daily   metoclopramide 5 mg Oral TID   metoprolol succinate  mg Oral Q24H   montelukast 10 mg Oral Nightly   pravastatin 10 mg Oral Nightly   [START ON 3/16/2020] predniSONE 20 mg Oral Daily With Breakfast   rOPINIRole 0.5 mg Oral BID   sodium chloride 10 mL Intravenous Q12H   sodium chloride 4 mL Nebulization Once   sucralfate 1 g Oral TID   traMADol 50 mg Oral TID            Assessment:    Problem List Items Addressed This Visit        Respiratory    Shortness of breath - Primary      Other Visit Diagnoses     Elevated  troponin              1.   Elevated troponin-this is trended back to normal.   2.  Recent Post obstructive Right lower lobe pneumonia secondary to foreign body.  Bronchoscopy with BAL not show any growth.  Cytology was negative for malignancy.    She was treated with full round of antibiotics.   3.  Leukocytosis-her leukocytosis is actually improving but white count is down to 41,000.    This was thought to be reactive.  Peripheral smear showed leukemoid reaction.   4.  Bilateral leg pain.  Venous Doppler was ordered, this was negative.   5.   Paroxysmal atrial fibrillation-newly diagnosed during previous.  She was started on Eliquis.   6.  Chronic diastolic CHF-  Stable on oral diuretic   7.  Obstructive sleep apnea on home BiPAP   8.  Chronic hypoxic respiratory failure on home 3 L nasal cannula-  Walking oximetry showed 4 liters with rest and 6 with activity   9.  Insulin-dependent diabetes mellitus type 2-   with hyperglycemia due to steroids.   10.  Chronic lower leg edema, improved   11.  Former smoker   12.  Chronic dyslipidemia/ hypertriglyceridemia  13.  COPD with possible mild exacerbation  14.  Obesity   15.  Chronic kidney disease stage III- Stable.      Plan:      Discussed this case with Dr. Zhou, he will do left heart catheterization on Tuesday.  This apparently will be done after Eliquis is held for 48 hours.  Will change increase her insulin to her home dose, and place her back on Solu-Medrol at the present time.  We will continue with budesonide.  We will check sputum culture as well.  If patient becomes febrile or cultures become positive, would placed on antibiotics.  Hold off at the present time as the patient received extensive treatment with antibiotics during the course of the past 14 days.  We will repeat a respiratory panel at this time as well, lactic acid, blood cultures.  Procalcitonin is not significantly elevated..  Monitor blood sugars closely.  Check lab work in the a.m.  PT and  OT to work with patient.  Further recommendations will depend on the    Garrett Hilliard DO  03/15/20  12:04

## 2020-03-16 PROBLEM — I20.0 UNSTABLE ANGINA (HCC): Status: ACTIVE | Noted: 2020-01-01

## 2020-03-16 RX ORDER — ASPIRIN 81 MG/1
TABLET, COATED ORAL
Qty: 30 TABLET | Refills: 5 | OUTPATIENT
Start: 2020-03-16

## 2020-03-16 NOTE — CONSULTS
Date of consultation:   March 16, 2020    Requested by:   Hospitalist Service.     PCP: Luz Prater APRN    Reason:  Acute respiratory failure.  Pneumonia.  Abnormal CT of the chest.    History of Present Illness:  69 y.o. female with past medical history significant for COPD, chronic respiratory failure and hypoxia who started having a cough and shortness of breath.     She went home but says that she couldn't lay down and breathe. She was using BiPAP but still felt short of breath. She does have a dog at home.  Although she denies any fever, she did mention some chills.  She also mentioned green sputum production with the cough.  She mentions that her cough actually worsened throughout the day and she had to come back to the hospital.  She tried using her BiPAP and nebulizer machine more but did not feel any better.    she denies any sick contacts.     Quit smoking 20 years ago.     Upon evaluation in the ER.  Found to have mildly elevated troponin.    The patient was admitted to the hospital and pulmonary consultation was requested for further recommendations.     Review of System: All other review of systems negative except indicated in HPI.  Positive for back pain, occasional lower extremity swelling, easy bruisability, anxiety and occasional headaches.    Past Medical History:  Past Medical History:   Diagnosis Date   • Acid reflux 10/17/2016   • Asthma     bronchial   • Cancer (CMS/HCC)     uterine   • Chronic diastolic heart failure (CMS/HCC) 10/17/2016    Normal dobutamine echocardiogram stress, 04/07/2005. Echocardiogram on 05/08/2009:  EF 50% to 55%. Left atrium 3.5 cm.   • Chronic obstructive pulmonary disease (CMS/HCC) 10/17/2016    asthmatic bronchitis, on O2 use chronically.     • Gout 10/17/2016   • High cholesterol 01/03/2018   • Hypertension 10/17/2016    Benign hypertension.   • Lower extremity edema 10/17/2016    Chronic lower extremity edema/venous insufficiency.   • Murmur, heart    •  Obesity 10/17/2016   • Renal failure     stageIV   • Seasonal allergies 10/17/2016   • Type 2 diabetes mellitus (CMS/[facility]) 10/17/2016    insulin dependent.         Past Surgical History:  Past Surgical History:   Procedure Laterality Date   • BRONCHOSCOPY N/A 2018    Procedure: BRONCHOSCOPY WITH WASHINGS;  Surgeon: Ryder Dahl MD;  Location: [facility] OR;  Service:    •  SECTION     • COLONOSCOPY     • COLONOSCOPY N/A 2019    Procedure: COLONOSCOPY;  Surgeon: Skniny York MD;  Location: [facility] ENDOSCOPY;  Service: General   • CYSTECTOMY Right     neck   • DILATATION AND CURETTAGE     • ENDOSCOPY N/A 6/10/2019    Procedure: ESOPHAGOGASTRODUODENOSCOPY WITH BIOPSY;  Surgeon: Skinny York MD;  Location: [facility] ENDOSCOPY;  Service: Gastroenterology   • EYE SURGERY Bilateral 2005    cataract   • FOREIGN BODY REMOVAL N/A 3/5/2020    Procedure: BRONCHOSCOPY with MAC and removal of foreign object;  Surgeon: Ryder Dahl MD;  Location: [facility] OR;  Service: Pulmonary;  Laterality: N/A;   • HYSTERECTOMY     • LUNG SURGERY Left 1997 tumors removed from left lung-benign   • TONSILLECTOMY AND ADENOIDECTOMY           Family History:  Family History   Problem Relation Age of Onset   • Heart disease Mother    • Heart disease Father    • Leukemia Father    • Diabetes Sister    • COPD Sister    • Diabetes Brother    • Lung cancer Sister    • No Known Problems Sister    • No Known Problems Sister    • Pneumonia Sister    • Stroke Sister    • COPD Sister          Social History:  Social History     Socioeconomic History   • Marital status:      Spouse name: Not on file   • Number of children: Not on file   • Years of education: Not on file   • Highest education level: Not on file   Tobacco Use   • Smoking status: Former Smoker     Packs/day: 1.50     Years: 35.00     Pack years: 52.50     Types: Cigarettes     Last attempt to quit:      Years since quittin.2   • Smokeless tobacco:  "Never Used   Substance and Sexual Activity   • Alcohol use: No   • Drug use: No   • Sexual activity: Defer         Physical Exam:  /69 (Patient Position: Sitting)   Pulse 78   Temp 98 °F (36.7 °C) (Oral)   Resp 18   Ht 147.3 cm (58\")   Wt 83.7 kg (184 lb 8.4 oz)   LMP  (LMP Unknown)   SpO2 96%   BMI 38.57 kg/m²      Constitutional:            Vital signs reviewed                     General: No acute distress noted. Able to communicate appropriately    Eyes:            Extraocular movement was intact.            Pupils appeared equal.            Conjunctiva: Pink    ENT:             Hearing was intact              No nasal erythema noted.              Oropharynx was moist. No lesions noted.     Neck:             Supple. No JVD noted.              Thyroid gland did not seem to be enlarged    Cardiovascular:              S1 + S2. Regular at this time.     Lungs/Respiratory:            Respiratory effort was somewhat labored             Good Air Entry Bilaterally with basal crackles heard.             Bilateral mostly scattered wheezing noted.    Abdomen/GI:            Obese but soft             Bowel sounds sluggishly positive.            No obvious organomegaly noted.    Musculoskeletal/Extremities:             Gait could not be assessed at this time.             No clubbing, cyanosis noted in the upper extremities.             Trace edema noted in the lower extremities bilaterally.    Neurologic:             Awake, alert and oriented x 3.             Able to follow simple commands.    Psychiatric:             Affect appeared fair.             Awake, alert and oriented x 3.    Skin:             No rash noted.             Warm and dry.      Labs: Reviewed. Pertinent labs were noted.     Lab Results   Component Value Date    WBC 28.79 (H) 03/16/2020    HGB 11.5 (L) 03/16/2020    HCT 36.6 03/16/2020    MCV 99.7 (H) 03/16/2020     03/16/2020       Lab Results   Component Value Date    GLUCOSE 66 " 03/16/2020    CALCIUM 9.8 03/16/2020     (H) 03/16/2020    K 4.1 03/16/2020    CO2 34.2 (H) 03/16/2020     03/16/2020    BUN 67 (H) 03/16/2020    CREATININE 1.40 (H) 03/16/2020    EGFRIFNONA 37 (L) 03/16/2020    BCR 47.9 (H) 03/16/2020    ANIONGAP 12.8 03/16/2020    PROTEINTOT 6.1 03/15/2020    ALBUMIN 3.50 03/16/2020       Lab Results   Component Value Date    PROBNP 844.6 03/13/2020    PROBNP 2,713.0 (H) 02/29/2020    PROBNP 419.4 02/28/2020       Lab Results   Component Value Date    INR 1.24 (H) 03/04/2020       Lab Results   Component Value Date    PROCALCITO 0.26 (H) 03/15/2020    PROCALCITO 0.30 (H) 03/14/2020    PROCALCITO 0.33 (H) 03/13/2020     ABG: Reviewed  Lab Results   Component Value Date    PHART 7.463 03/13/2020    ZAX1NBS 54.9 (H) 03/13/2020    PO2ART 63.0 (L) 03/13/2020    HGBBG 11.7 (L) 03/13/2020    Y1TXGDNN 92.7 (L) 03/13/2020    CARBOXYHGB 0.8 03/13/2020       Micro: As of March 16, 2020   Lab Results   Component Value Date    RESPCX Light growth (2+) Pseudomonas species (A) 03/15/2020    RESPCX No Normal Respiratory Sera (A) 03/15/2020    RESPCX Light growth (2+) Normal Respiratory Sera 03/05/2020     Lab Results   Component Value Date    BLOODCX No growth at 24 hours 03/15/2020    BLOODCX No growth at 24 hours 03/15/2020    BLOODCX No growth at 5 days 02/29/2020     Lab Results   Component Value Date    URCX Yes 02/26/2020    URCX Yes 10/21/2019     Viral panel was negative.    Imaging Study: Latest imaging studies was reviewed personally.   Imaging Results (Last 72 Hours)     Procedure Component Value Units Date/Time    XR Chest 1 View [[id number]] Collected:  03/16/20 0839     Updated:  03/16/20 0842    Narrative:       PROCEDURE: XR CHEST 1 VW-     HISTORY: DYSPNEA CHRONIC, NO XRAY     COMPARISON: 03/13/2020.     FINDINGS: The heart is normal in size. The mediastinum is unremarkable.  There has been interval improvement in the patient's bibasilar airspace  disease compared to  the prior exams with a residual opacity seen in the  left lung base. There is no pneumothorax.  There are no acute osseous  abnormalities.       Impression:       Interval improvement in the patient's bibasilar airspace  disease.     Continued followup is recommended.     This report was finalized on 3/16/2020 8:40 AM by Fabricio Wolff M.D..    US Venous Doppler Lower Extremity Bilateral (duplex) [[id number]] Collected:  03/14/20 1239     Updated:  03/14/20 1240    Narrative:       FINAL REPORT    TECHNIQUE:  Grayscale and color Doppler ultrasound images with graded  compression of the deep venous system were obtained from the  groin to the calf veins bilaterally.    CLINICAL HISTORY:  PAIN, SWELLING    FINDINGS:  The deep venous system is normal.  There is no evidence of DVT.  Flow and compressibility are normal.      Impression:       No evidence of left or right lower extremity DVT.    Authenticated by Fabricio Wolff MD on 03/14/2020 12:39:36 PM    XR Knee 1 or 2 View Bilateral [[id number]] Collected:  03/14/20 0923     Updated:  03/14/20 0925    Narrative:       PROCEDURE: XR KNEE 1 OR 2 VW BILATERAL-     History: knee pain; R06.02-Shortness of breath; R79.89-Other specified  abnormal findings of blood chemistry     COMPARISON: None.     FINDINGS:  A 3 view exam demonstrates no acute fracture or dislocation.  There are tricompartmental degenerative changes bilaterally consistent  with osteoarthritis. No soft tissue abnormality is seen.       Impression:       No acute fracture.               This report was finalized on 3/14/2020 9:23 AM by Fabricio Wolff M.D..            ECHO:  Results for orders placed during the hospital encounter of 03/13/20   Adult Transthoracic Echo Complete W/ Cont if Necessary Per Protocol    Narrative 1.  Normal left ventricular size and systolic function, LVEF 60-65%.  2.  Moderate concentric LVH.  3.  Abnormal LV diastolic filling pattern consistent with grade 1   diastolic  "dysfunction.  4.  Mild left atrial dilation.  6.  Normal right ventricular size and systolic function.  7.  Trace mitral vegetation.         Assessment:  1.  Shortness of breath  2.  Recent pneumonia.  3.  COPD with ?exacerbation.  4.  Obstructive sleep apnea.  On BiPAP for many years.  5.  Atelectasis   6.  Chronic hypercarbic and hypoxic respiratory failure   7.  Paroxysmal atrial fibrillation.   8.  Grade 2 Diastolic Dysfunction.   9.  History of smoking    Discussion/Recommendations:   I have reviewed the patient's history and radiological data and does appear that the patient has pneumonia at this time.      I have adjusted nebulized treatments.    I will continue antibiotics for now, although pseudomonas could be a colonizer, given the \"light growth\" and recent bronchoscopy that was negative.  Her procalcitonin level is also minimally elevated.    In reviewing the CT scan, she doesn't have the previously seen right lower lobe consolidation, although does have bilateral basal atelectasis.    She will be given flutter valve due to presence of atelectasis..    The patient also was found to have tachycardia and given her history of atrial fibrillation, it is possible that she may have gone into atrial fibrillation with rapid ventricular rate thereby leading into pulmonary edema?    The fact that the repeat chest x-ray showed improvement after diuresis also favors this possibility.    Repeat laboratory workup will be followed closely.    I will consider repeating chest x-ray, if clinically appropriate.    I will continue BiPAP but will adjust settings for tonight.     The plan was discussed with the patient.  I have also discussed the case with the nursing staff.    Recommendations were also discussed with the referring provider.     I would like to thank you for the opportunity to participate in the care of this patient.  We will communicate changes and recommendations, if and when necessary.      This document " was electronically signed by Ryder Dahl MD on 03/16/20 at 16:32      Dictated utilizing Dragon dictation.

## 2020-03-16 NOTE — PLAN OF CARE
Problem: Patient Care Overview  Goal: Plan of Care Review  Outcome: Ongoing (interventions implemented as appropriate)  Flowsheets (Taken 3/16/2020 0229)  Progress: no change  Plan of Care Reviewed With: patient  Outcome Summary: PT CON'T TO REQUIRE 5 LITERS PER N/C, VSS.  BLOOD SUGARS IMPROVING.

## 2020-03-16 NOTE — PROGRESS NOTES
PROGRESS NOTE        Date of Admission: 3/13/2020  Length of Stay: 1  Primary Care Physician: Luz Prater APRN    Subjective   Chief Complaint: Follow up Elevated troponin  HPI: This is a 69-year-old female with history of chronic hypoxic respiratory failure on 3 L nasal cannula, BiPAP, diabetes mellitus type 2, COPD, obesity who was just discharged from this facility on 3/12/2020 after being treated for a postobstructive pneumonia secondary to an aspirated pea which was removed via bronchoscopy.  She was treated with vancomycin, Rocephin which was then changed to Zosyn.  Post bronchoscopy patient did have significant leukocytosis which was felt to be reactive.  Her white count went up to 50,000 at the highest and has been trending down.  She was discharged home in stable condition.  However according to the patient after she was discharged home she complained of leg pain as well as coughing and some chest discomfort.  Due to this she returned to the emergency room.  Troponin in the ER was noted to be mildly elevated.     Due to her complaints of knee pain a Venous duplex was negative for DVT and bilateral knee xray was negative.  A CT scan of her chest was done in the emergency room which showed bibasilar airspace disease consistent with aspiration or pneumonia.  Patient was recently treated with a full course of antibiotics so no further antibiotics have been initiated.  Cytology and cultures were negative and she was treated with a full round of antibiotics.    As for her elevated troponin she was seen and evaluated by cardiology.  Her troponin has trended back to normal.  Dr. caruso with cardiology did evaluate the patient and does plan to do a heart catheterization on 3/17/2020.  Given that she does have chronic kidney disease with a creatinine clearance of 36 I will consult nephrology for evaluation.    Sputum culture was done upon admission for which her respiratory culture is growing a light  growth of Pseudomonas.  I have started the patient on Zosyn.  I will consult Dr. Dahl with Pulmonology.   She is currently on 5 liters. Will try to see if she can be weaned.         Review Of Systems:   Review of Systems  General ROS: Patient denies any fevers, chills or loss of consciousness.    ENT ROS: Denies sore throat, nasal congestion or ear pain.   Respiratory ROS: cough or shortness of breath.   Cardiovascular ROS: Denies chest pain or palpitations. No history of exertional chest pain.   Gastrointestinal ROS: Denies nausea and vomiting. Denies any abdominal pain. No diarrhea.  Genito-Urinary ROS: Denies dysuria or hematuria.  Musculoskeletal ROS: Denies back pain. No muscle pain. No calf pain.   Neurological ROS: Denies any focal weakness. No loss of consciousness. Denies any numbness. Denies neck pain.   Dermatological ROS: Denies any redness or pruritis.    Objective      Temp:  [97.5 °F (36.4 °C)-98.5 °F (36.9 °C)] 98.4 °F (36.9 °C)  Heart Rate:  [66-83] 71  Resp:  [18-22] 19  BP: (122-156)/(50-76) 156/66  Physical Exam    General Appearance:  Alert and cooperative, not in any acute distress.   Head:  Atraumatic and normocephalic, without obvious abnormality.   Eyes:          PERRLA, conjunctivae and sclerae normal, no Icterus. No pallor. Extraocular movements are within normal limits.   Ears:  Ears appear intact with no abnormalities noted.   Throat: No oral lesions, no thrush, oral mucosa moist.   Neck: Supple, trachea midline, no thyromegaly, no carotid bruit.       Lungs:   Chest shape is normal. Breath sounds heard bilaterally equally.  Few crackles No wheezing. No Pleural rub or bronchial breathing.   Heart:  Normal S1 and S2, no murmur, no gallop, no rub. No JVD   Abdomen:   Normal bowel sounds, no masses, no organomegaly. Soft    non-tender, non-distended, no guarding, no rebound             tenderness   Extremities: Moves all extremities well, no edema, no cyanosis, no clubbing.   Pulses:  Pulses palpable and equal bilaterally   Skin: No bleeding, bruising or rash       Neurologic:    Psychiatric/Behavior:     Cranial nerves 2 - 12 grossly intact, sensation intact, Motor power is normal and equal bilaterally.  Mood normal, behavior normal       Results Review:    I have reviewed the labs, radiology results and diagnostic studies.    Results from last 7 days   Lab Units 03/16/20  0547   WBC 10*3/mm3 28.79*   HEMOGLOBIN g/dL 11.5*   PLATELETS 10*3/mm3 364     Results from last 7 days   Lab Units 03/16/20  0547   SODIUM mmol/L 150*   POTASSIUM mmol/L 4.1   CO2 mmol/L 34.2*   CREATININE mg/dL 1.40*   GLUCOSE mg/dL 66       Culture Data:   Blood Culture   Date Value Ref Range Status   03/15/2020 No growth at less than 24 hours  Preliminary   03/15/2020 No growth at less than 24 hours  Preliminary     Radiology Data:   Cardiology Data:    I have reviewed the medications.    Assessment/Plan     Assessment/Plan    1.   Elevated troponin-troponin has trended back to normal.  Dr. caruso with cardiology has seen and evaluated patient and is planning to do a left heart catheterization on 3/17/2020 after Eliquis has been held for 48 hours.  She will need to be nothing to eat or drink after midnight.    2.  Recent Post obstructive Right lower lobe pneumonia secondary to foreign body.  Bronchoscopy with BAL not show any growth.  Cytology was negative for malignancy.  She did require removal of a foreign body which was a pea.    Her CRP and procalcitonin are improving.  Patient's sputum culture did however grow Pseudomonas.  I will go ahead and place her on Zosyn and consult pulmonology.       3.  Leukocytosis-her leukocytosis is improving with current white count down to 28,000.     4.  Bilateral knee pain-likely secondary to osteoarthritis.  X-rays and venous duplex were negative.     5.   Paroxysmal atrial fibrillation-newly diagnosed during previous.  She was started on Eliquis.  This has been held for heart  catheterization.     6.  Chronic diastolic CHF-  Stable on oral diuretic        7.  Obstructive sleep apnea on home BiPAP     8.  Chronic hypoxic respiratory failure on home 5 L nasal cannula-       9.  Insulin-dependent diabetes mellitus type 2-   blood sugars were low this morning.  I have decreased her insulin.     10.  Chronic lower leg edema, improved     11.  Former smoker     12.  Chronic dyslipidemia/ hypertriglyceridemia     13.  Obesity     14.  Chronic kidney disease stage III- Stable.      DVT prophylaxis:  SCDs as Eliquis is on hold.    Discharge Planning:   Deb Garcia, APRN 03/16/20 07:36

## 2020-03-16 NOTE — PLAN OF CARE
Problem: Patient Care Overview  Goal: Plan of Care Review  Outcome: Ongoing (interventions implemented as appropriate)  Flowsheets  Taken 3/16/2020 1148  Progress: no change  Plan of Care Reviewed With: patient  Taken 3/16/2020 0699  Outcome Summary: OT eval completed. Patient presents deificts in strength, endurance, balance, mobility and ADL performance. Patient is expected to benefit from continued OT services prior to DC.

## 2020-03-16 NOTE — PLAN OF CARE
Problem: Patient Care Overview  Goal: Plan of Care Review  Outcome: Ongoing (interventions implemented as appropriate)  Flowsheets (Taken 3/16/2020 8299)  Progress: improving  Plan of Care Reviewed With: patient     Problem: Patient Care Overview  Goal: Individualization and Mutuality  Outcome: Ongoing (interventions implemented as appropriate)     Problem: Patient Care Overview  Goal: Interprofessional Rounds/Family Conf  Outcome: Ongoing (interventions implemented as appropriate)     Problem: Pneumonia (Adult)  Goal: Signs and Symptoms of Listed Potential Problems Will be Absent, Minimized or Managed (Pneumonia)  Outcome: Ongoing (interventions implemented as appropriate)     Problem: Diabetes, Type 2 (Adult)  Goal: Signs and Symptoms of Listed Potential Problems Will be Absent, Minimized or Managed (Diabetes, Type 2)  Outcome: Ongoing (interventions implemented as appropriate)

## 2020-03-16 NOTE — PLAN OF CARE
Problem: Patient Care Overview  Goal: Plan of Care Review  Outcome: Ongoing (interventions implemented as appropriate)  Flowsheets (Taken 3/16/2020 1101)  Outcome Summary: PT tx completed. Patient performs ther ex as indicated on care map. Able to ambulate 28'x2 with RW and min A.Cont to goals.

## 2020-03-16 NOTE — PROGRESS NOTES
"[facility]-Cardiology Progress note     LOS: 1 day   Patient Care Team:  Luz Prater APRN as PCP - General    Chief Complaint: Shortness of breath    Subjective:    Interval History:     Patient Complaints: none  Patient Denies:   Chest pain, shortness of breath, orthopnea, peripheral edema, palpitations, cough, sputum production, hemoptysis, abdominal pain, nausea, vomiting, diarrhea, fevers chills or night sweats  History taken from: patient    Review of Systems:   All systems were reviewed and negative       Objective:    Vital Sign Min/Max for last 24 hours  Temp  Min: 97.5 °F (36.4 °C)  Max: 98.4 °F (36.9 °C)   BP  Min: 106/65  Max: 156/66   Pulse  Min: 66  Max: 83   Resp  Min: 18  Max: 22   SpO2  Min: 91 %  Max: 98 %   Flow (L/min)  Min: 5  Max: 5   No data recorded     Flowsheet Rows      First Filed Value   Admission Height  147.3 cm (58\") Documented at 03/13/2020 0800   Admission Weight  87.1 kg (192 lb) Documented at 03/13/2020 0800          Physical Exam:     General Appearance:    Alert, cooperative, in no acute distress   Head:    Normocephalic, without obvious abnormality, atraumatic   Eyes:            Lids and lashes normal, conjunctivae and sclerae normal, no   icterus, no pallor, corneas clear, PERRLA   Ears:    Ears appear intact with no abnormalities noted   Throat:   No oral lesions, no thrush, oral mucosa moist   Neck:   No adenopathy, supple, trachea midline, no thyromegaly, no     carotid bruit, no JVD   Back:     No kyphosis present, no scoliosis present, no skin lesions,       erythema or scars, no tenderness to percussion or                   palpation,   range of motion normal   Lungs:     Clear to auscultation,respirations regular, even and                   unlabored    Heart:    Regular rhythm and normal rate, normal S1 and S2, no            murmur, no gallop, no rub, no click   Breast Exam:    Deferred   Abdomen:     Normal bowel sounds, no masses, no organomegaly, soft        " non-tender, non-distended, no guarding, no rebound                 tenderness   Genitalia:    Deferred   Extremities:   Moves all extremities well, no edema, no cyanosis, no              redness   Pulses:   Pulses palpable and equal bilaterally   Skin:   No bleeding, bruising or rash   Lymph nodes:   No palpable adenopathy   Neurologic:   Cranial nerves 2 - 12 grossly intact, sensation intact, DTR        present and equal bilaterally        Results Review:     I reviewed the patient's new clinical results.      Results from last 7 days   Lab Units 03/16/20  0547   SODIUM mmol/L 150*   POTASSIUM mmol/L 4.1   CHLORIDE mmol/L 103   CO2 mmol/L 34.2*   BUN mg/dL 67*   CREATININE mg/dL 1.40*   GLUCOSE mg/dL 66   CALCIUM mg/dL 9.8     Results from last 7 days   Lab Units 03/16/20  0547 03/15/20  0547 03/14/20  0610   WBC 10*3/mm3 28.79* 32.15* 20.01*   HEMOGLOBIN g/dL 11.5* 11.2* 10.8*   HEMATOCRIT % 36.6 34.8 33.8*   PLATELETS 10*3/mm3 364 360 296         Echo EF Estimated  Lab Results   Component Value Date    ECHOEFEST 66 02/28/2020       Physical Exam    Medication Review: yes    Assessment/Plan:      Unstable angina (CMS/HCC)    Shortness of breath      We are planning to proceed with invasive coronary angiography in the morning from a radial approach.  The procedure has been explained in detail to the patient including risks benefits and alternatives.  A patient level discussion has been engaged with the patient explaining the rationale behind proceeding with invasive testing.  Further recommendations will be predicated on the results of her catheterization findings.  Anticoagulation therapy will be restarted the evening of her catheterization procedure.        Elkin hZou MD  03/16/20  12:52

## 2020-03-16 NOTE — THERAPY TREATMENT NOTE
Acute Care - Physical Therapy Treatment Note   Gene     Patient Name: Mingo Guidry  : 1950  MRN: [id number]  Today's Date: 3/16/2020  Onset of Illness/Injury or Date of Surgery: 20     Referring Physician: Dr. Hilliard    Admit Date: 3/13/2020    Visit Dx:    ICD-10-CM ICD-9-CM   1. Shortness of breath R06.02 786.05   2. Elevated troponin R79.89 790.6   3. Unstable angina (CMS/HCC) I20.0 411.1   4. Impaired mobility and ADLs Z74.09 799.89     Patient Active Problem List   Diagnosis   • Chronic diastolic heart failure (CMS/HCC)   • Lower extremity edema   • Essential hypertension   • Type 2 diabetes mellitus (CMS/HCC)   • COPD with acute exacerbation (CMS/HCC)   • Obesity   • Acid reflux   • Gout   • Seasonal allergies   • Mixed hyperlipidemia   • Acute on chronic respiratory failure with hypoxia (CMS/HCC)   • Pneumonia of left upper lobe due to infectious organism (CMS/HCC)   • Acute GI bleeding   • Gastritis   • Mg's esophagus   • Acute blood loss anemia   • Acute bronchitis   • Abnormal CT scan of lung   • Pneumonia of right lower lobe due to infectious organism (CMS/HCC)   • Postobstructive pneumonia   • Acute aspiration pneumonia (CMS/HCC)   • Obstructive sleep apnea   • Diastolic dysfunction   • Chronic renal failure syndrome, stage 3 (moderate) (CMS/HCC)   • Leukemoid reaction   • Foreign body aspiration   • Shortness of breath       Therapy Treatment    Rehabilitation Treatment Summary     Row Name 20 1101             Treatment Time/Intention    Discipline  physical therapist  -LM      Document Type  therapy note (daily note)  -LM      Subjective Information  complains of;dyspnea  -LM      Mode of Treatment  physical therapy  -LM      Patient/Family Observations  Pt received sitting up in bedside chair. O2 per n/c.  -LM      Care Plan Review  care plan/treatment goals reviewed;patient/other agree to care plan  -LM      Total Minutes, Physical Therapy Treatment  25  -LM       Therapy Frequency (PT Clinical Impression)  daily  -LM      Patient Effort  adequate  -LM      Existing Precautions/Restrictions  fall;oxygen therapy device and L/min  -LM      Recorded by [LM] Davida Hines, PT 03/16/20 1212      Row Name 03/16/20 1101             Vital Signs    Pre SpO2 (%)  92  -LM      O2 Delivery Pre Treatment  supplemental O2  -LM      Intra SpO2 (%)  89  -LM      O2 Delivery Intra Treatment  supplemental O2  -LM      Post SpO2 (%)  91  -LM      O2 Delivery Post Treatment  supplemental O2  -LM      Recorded by [LM] Davida Hines, PT 03/16/20 1212      Row Name 03/16/20 1101             Safety Issues, Functional Mobility    Safety Issues Affecting Function (Mobility)  awareness of need for assistance;safety precautions follow-through/compliance  -LM      Impairments Affecting Function (Mobility)  balance;endurance/activity tolerance;shortness of breath;strength  -LM      Recorded by [LM] Davida Hines, PT 03/16/20 1212      Row Name 03/16/20 1101             Bed Mobility Assessment/Treatment    Comment (Bed Mobility)  Pt received sitting up in chair.  -LM      Recorded by [LM] Davida Hines, PT 03/16/20 1212      Row Name 03/16/20 1101             Sit-Stand Transfer    Sit-Stand [place] (Transfers)  contact guard  -LM      Assistive Device (Sit-Stand Transfers)  walker, front-wheeled  -LM      Recorded by [LM] Davida Hines, PT 03/16/20 1212      Row Name 03/16/20 1101             Stand-Sit Transfer    Stand-Sit [place] (Transfers)  contact guard  -LM      Assistive Device (Stand-Sit Transfers)  walker, front-wheeled  -LM      Recorded by [LM] Davida Hines, PT 03/16/20 1212      Row Name 03/16/20 1101             Gait/Stairs Assessment/Training    Gait/Stairs Assessment/Training  gait/ambulation assistive device  -LM      [place] Level (Gait)  contact guard;verbal cues  -LM      Assistive Device (Gait)  walker, front-wheeled  -LM      Distance in Feet (Gait)  28'x2  -LM       Pattern (Gait)  step-through  -LM      Recorded by [LM] Davida Hines, PT 03/16/20 1212      Row Name 03/16/20 1101             Therapeutic Exercise    Lower Extremity (Therapeutic Exercise)  gluteal sets;LAQ (long arc quad), bilateral;marching while seated;quad sets, bilateral  -LM      Position (Therapeutic Exercise)  seated  -LM      Sets/Reps (Therapeutic Exercise)  1 x 15 reps  -LM      Recorded by [LM] Davida Hines, PT 03/16/20 1212      Row Name 03/16/20 1101             Static Standing Balance    Level of [place] (Supported Standing, Static Balance)  contact guard assist  -LM      Assistive Device Utilized (Supported Standing, Static Balance)  walker, rolling  -LM      Recorded by [LM] Davida Hines, PT 03/16/20 1212      Row Name 03/16/20 1101             Dynamic Standing Balance    Level of [place], Reaches Outside Midline (Standing, Dynamic Balance)  contact guard assist  -LM      Assistive Device Utilized (Supported Standing, Dynamic Balance)  walker, rolling  -LM      Recorded by [LM] Davida Hines, PT 03/16/20 1212      Row Name 03/16/20 1101             Positioning and Restraints    Pre-Treatment Position  sitting in chair/recliner  -LM      Post Treatment Position  chair  -LM      In Chair  reclined;call light within reach;encouraged to call for assist  -LM      Recorded by [LM] Davida Hines, PT 03/16/20 1212      Row Name 03/16/20 1101             Pain Scale: Numbers Pre/Post-Treatment    Pain Scale: Numbers, Pretreatment  6/10  -LM      Pain Scale: Numbers, Post-Treatment  6/10  -LM      Pain Location - Orientation  lower  -LM      Pain Location  back  -LM      Recorded by [LM] Davida Hines, PT 03/16/20 1212      Row Name 03/16/20 1101             Plan of Care Review    Plan of Care Reviewed With  patient  -LM      Progress  improving  -LM      Recorded by [RILEY] Davida Hines, PT 03/16/20 1212      Row Name 03/16/20 1101             Outcome Summary/Treatment Plan (PT)    Daily Summary  of Progress (PT)  progress toward functional goals as expected  -LM      Plan for Continued Treatment (PT)  Cont PT per POC.  -LM      Anticipated Discharge Disposition (PT)  inpatient rehabilitation facility  -LM      Recorded by [LM] Donny Davida, PT 03/16/20 1212        User Key  (r) = Recorded By, (t) = Taken By, (c) = Cosigned By    Initials Name Effective Dates Discipline    LM Donny, Lisa, PT 04/03/18 -  PT               Rehab Goal Summary     Row Name 03/16/20 3775             Occupational Therapy Goals    Transfer Goal Selection (OT)  transfer, OT goal 1  -SD      Dressing Goal Selection (OT)  dressing, OT goal 1  -SD      Toileting Goal Selection (OT)  toileting, OT goal 1  -SD      Activity Tolerance Goal Selection (OT)  activity tolerance, OT goal 1  -SD      Functional Mobility Goal Selection (OT)  functional mobility, OT goal 1  -SD         Transfer Goal 1 (OT)    Activity/Assistive Device (Transfer Goal 1, OT)  sit-to-stand/stand-to-sit;walker, rolling  -SD      [place] Level/Cues Needed (Transfer Goal 1, OT)  standby assist  -SD      Time Frame (Transfer Goal 1, OT)  2 weeks  -SD      Progress/Outcome (Transfer Goal 1, OT)  goal ongoing  -SD         Dressing Goal 1 (OT)    Activity/Assistive Device (Dressing Goal 1, OT)  lower body dressing  -SD      [place]/Cues Needed (Dressing Goal 1, OT)  contact guard assist  -SD      Time Frame (Dressing Goal 1, OT)  2 weeks  -SD      Progress/Outcome (Dressing Goal 1, OT)  goal ongoing  -SD         Toileting Goal 1 (OT)    Activity/Device (Toileting Goal 1, OT)  toileting skills, all;commode  -SD      [place] Level/Cues Needed (Toileting Goal 1, OT)  contact guard assist  -SD      Time Frame (Toileting Goal 1, OT)  2 weeks  -SD      Progress/Outcome (Toileting Goal 1, OT)  goal ongoing  -SD          Activity Tolerance Goal 1 (OT)    Activity Tolerance Goal 1 (OT)  Patient to perform UB ther ex/ther act/ADL tasks  -SD      Activity Level  (Endurance Goal 1, OT)  10 min activity;O2 sat >/ equal to 88%  -SD      Time Frame (Activity Tolerance Goal 1, OT)  long term goal (LTG)  -SD      Progress/Outcome (Activity Tolerance Goal 1, OT)  goal ongoing  -SD         Functional Mobility Goal 1 (OT)    Activity/Assistive Device (Functional Mobility Goal 1, OT)  walker, rolling  -SD      [place] Level/Cues Needed (Functional Mobility Goal 1, OT)  contact guard assist  -SD      Distance Goal 1 (Functional Mobility, OT)  100 O2 90 and >  -SD      Time Frame (Functional Mobility Goal 1, OT)  long term goal (LTG)  -SD      Progress/Outcome (Functional Mobility Goal 1, OT)  goal ongoing  -SD        User Key  (r) = Recorded By, (t) = Taken By, (c) = Cosigned By    Initials Name Provider Type Discipline    Sarah Romero OT Occupational Therapist OT              PT Recommendation and Plan  Anticipated Discharge Disposition (PT): inpatient rehabilitation facility  Therapy Frequency (PT Clinical Impression): daily  Outcome Summary/Treatment Plan (PT)  Daily Summary of Progress (PT): progress toward functional goals as expected  Plan for Continued Treatment (PT): Cont PT per POC.  Anticipated Discharge Disposition (PT): inpatient rehabilitation facility  Plan of Care Reviewed With: patient  Progress: improving  Outcome Summary: PT tx completed. Patient performs ther ex as indicated on care map. Able to ambulate 28'x2 with RW and min A.Cont to goals.  Outcome Measures     Row Name 03/16/20 1101 03/16/20 0936          How much help from another person do you currently need...    Turning from your back to your side while in flat bed without using bedrails?  4  -LM  --     Moving from lying on back to sitting on the side of a flat bed without bedrails?  4  -LM  --     Moving to and from a bed to a chair (including a wheelchair)?  3  -LM  --     Standing up from a chair using your arms (e.g., wheelchair, bedside chair)?  3  -LM  --     Climbing 3-5 steps with a  railing?  2  -LM  --     To walk in hospital room?  3  -LM  --     AM-PAC 6 Clicks Score (PT)  19  -LM  --        How much help from another is currently needed...    Putting on and taking off regular lower body clothing?  --  3  -SD     Bathing (including washing, rinsing, and drying)  --  3  -SD     Toileting (which includes using toilet bed pan or urinal)  --  3  -SD     Putting on and taking off regular upper body clothing  --  3  -SD     Taking care of personal grooming (such as brushing teeth)  --  3  -SD     Eating meals  --  3  -SD     AM-PAC 6 Clicks Score (OT)  --  18  -SD        Functional Assessment    Outcome Measure Options  AM-PAC 6 Clicks Basic Mobility (PT)  -LM  AM-PAC 6 Clicks Daily Activity (OT)  -SD       User Key  (r) = Recorded By, (t) = Taken By, (c) = Cosigned By    Initials Name Provider Type    Davida Briscoe, PT Physical Therapist    Sarah Romero, OT Occupational Therapist         Time Calculation:   PT Charges     Row Name 03/16/20 1214             Time Calculation    Start Time  1101  -LM      PT Received On  03/16/20  -LM         Time Calculation- PT    Total Timed Code Minutes- PT  25 minute(s)  -LM        User Key  (r) = Recorded By, (t) = Taken By, (c) = Cosigned By    Initials Name Provider Type    LM Davida Hines, PT Physical Therapist        Therapy Charges for Today     Code Description Service Date Service Provider Modifiers Qty    47477583029 HC GAIT TRAINING EA 15 MIN 3/16/2020 Davida Hines, PT GP 1    97178305487 HC PT THER PROC EA 15 MIN 3/16/2020 Davida Hines, PT GP 1          PT G-Codes  Outcome Measure Options: AM-PAC 6 Clicks Basic Mobility (PT)  AM-PAC 6 Clicks Score (PT): 19  AM-PAC 6 Clicks Score (OT): 18    Davida Hines PT  3/16/2020

## 2020-03-16 NOTE — CONSULTS
Referring Provider: Garrett Hilliard DO  Reason for Consultation: CKD and volume overload  Subjective     Chief complaint   Chief Complaint   Patient presents with   • Shortness of Breath   • Leg Pain       History of present illness:        70 Y/O WF with PMH of pretension, diabetes mellitus type 2, diastolic congestive heart failure and chronic kidney disease stage III.  Patient does have history of COPD and she is currently on 3 L of oxygen at home.  Patient was discharged 4 days ago after admission for pneumonia.  At time of presentation her labs showed creatinine of 1.4 mg/dL with mildly elevated troponin 0 0.06.  We are consulted to help with management of her volume status and her kidney function.    Past Medical History:   Diagnosis Date   • Acid reflux 10/17/2016   • Asthma     bronchial   • Cancer (CMS/Formerly [facility])     uterine   • Chronic diastolic heart failure (CMS/Formerly [facility]) 10/17/2016    Normal dobutamine echocardiogram stress, 2005. Echocardiogram on 2009:  EF 50% to 55%. Left atrium 3.5 cm.   • Chronic obstructive pulmonary disease (CMS/Formerly [facility]) 10/17/2016    asthmatic bronchitis, on O2 use chronically.     • Gout 10/17/2016   • High cholesterol 2018   • Hypertension 10/17/2016    Benign hypertension.   • Lower extremity edema 10/17/2016    Chronic lower extremity edema/venous insufficiency.   • Murmur, heart    • Obesity 10/17/2016   • Renal failure     stageIV   • Seasonal allergies 10/17/2016   • Type 2 diabetes mellitus (CMS/Formerly [facility]) 10/17/2016    insulin dependent.     Past Surgical History:   Procedure Laterality Date   • BRONCHOSCOPY N/A 2018    Procedure: BRONCHOSCOPY WITH WASHINGS;  Surgeon: Ryder Dahl MD;  Location: [facility] OR;  Service:    •  SECTION     • COLONOSCOPY     • COLONOSCOPY N/A 2019    Procedure: COLONOSCOPY;  Surgeon: Skinny York MD;  Location: [facility] ENDOSCOPY;  Service: General   • CYSTECTOMY Right     neck   • DILATATION AND CURETTAGE     •  ENDOSCOPY N/A 6/10/2019    Procedure: ESOPHAGOGASTRODUODENOSCOPY WITH BIOPSY;  Surgeon: Skinny York MD;  Location: [facility] ENDOSCOPY;  Service: Gastroenterology   • EYE SURGERY Bilateral     cataract   • FOREIGN BODY REMOVAL N/A 3/5/2020    Procedure: BRONCHOSCOPY with MAC and removal of foreign object;  Surgeon: Ryder Dahl MD;  Location: [facility] OR;  Service: Pulmonary;  Laterality: N/A;   • HYSTERECTOMY     • LUNG SURGERY Left 1997 tumors removed from left lung-benign   • TONSILLECTOMY AND ADENOIDECTOMY       Family History   Problem Relation Age of Onset   • Heart disease Mother    • Heart disease Father    • Leukemia Father    • Diabetes Sister    • COPD Sister    • Diabetes Brother    • Lung cancer Sister    • No Known Problems Sister    • No Known Problems Sister    • Pneumonia Sister    • Stroke Sister    • COPD Sister      Social History     Tobacco Use   • Smoking status: Former Smoker     Packs/day: 1.50     Years: 35.00     Pack years: 52.50     Types: Cigarettes     Last attempt to quit:      Years since quittin.2   • Smokeless tobacco: Never Used   Substance Use Topics   • Alcohol use: No   • Drug use: No     Medications Prior to Admission   Medication Sig Dispense Refill Last Dose   • ADVAIR DISKUS 500-50 MCG/DOSE DISKUS INHALE 1 PUFF BY MOUTH TWO TIMES A DAY 60 each 5 Unknown at Unknown time   • allopurinol (ZYLOPRIM) 300 MG tablet Take 300 mg by mouth Daily.   Unknown at Unknown time   • apixaban (ELIQUIS) 5 MG tablet tablet Take 1 tablet by mouth Every 12 (Twelve) Hours. 60 tablet 0 Unknown at Unknown time   • ASPIRIN LOW DOSE 81 MG EC tablet Take 81 mg by mouth Daily.  5 Unknown at Unknown time   • Blood Glucose Monitoring Suppl device 1 each.   Unknown at Unknown time   • busPIRone (BUSPAR) 10 MG tablet Take 10 mg by mouth 3 (Three) Times a Day.   Unknown at Unknown time   • DALIRESP 500 MCG tablet tablet TAKE ONE TABLET BY MOUTH EVERY DAY 30 tablet 5 Unknown at  Unknown time   • dilTIAZem CD (CARDIZEM CD) 180 MG 24 hr capsule Take 1 capsule by mouth Daily. 30 capsule 0 Unknown at Unknown time   • ezetimibe (ZETIA) 10 MG tablet Take 10 mg by mouth Daily.   Unknown at Unknown time   • fenofibrate (TRICOR) 145 MG tablet Take 145 mg by mouth Daily.   Unknown at Unknown time   • ferrous sulfate 325 (65 FE) MG tablet Take 325 mg by mouth 3 (Three) Times a Day With Meals.   Unknown at Unknown time   • flunisolide (NASALIDE) 25 MCG/ACT (0.025%) solution nasal spray INHALE 1 SPRAY EVERY 12 (TWELVE) HOURS. (Patient taking differently: Inhale 1 spray Every 12 (Twelve) Hours As Needed.) 25 mL 5 Unknown at Unknown time   • furosemide (LASIX) 40 MG tablet Take 40 mg by mouth Daily.   Unknown at Unknown time   • GNP VITAMIN D MAXIMUM STRENGTH 50 MCG (2000 UT) tablet Take 1 tablet by mouth Daily.  5 Unknown at Unknown time   • HUMALOG 100 UNIT/ML injection Inject 35 Units under the skin into the appropriate area as directed 3 (Three) Times a Day.   Unknown at Unknown time   • HYDROcodone-acetaminophen (NORCO) 5-325 MG per tablet Take 1 tablet by mouth Every Night.   Unknown at Unknown time   • ipratropium-albuterol (DUO-NEB) 0.5-2.5 mg/3 ml nebulizer Take 3 mL by nebulization 4 (Four) Times a Day. (Patient taking differently: Take 3 mL by nebulization As Needed.) 360 mL 2 Unknown at Unknown time   • Lactobacillus Acid-Pectin (ACIDOPHILUS/CITRUS PECTIN) tablet tablet Take 1 tablet by mouth Daily.  1 Unknown at Unknown time   • Loratadine (CLARITIN) 10 MG capsule Take 1 tablet by mouth Daily.   Unknown at Unknown time   • metoclopramide (REGLAN) 5 MG tablet Take 5 mg by mouth 3 (Three) Times a Day.   Unknown at Unknown time   • metolazone (ZAROXOLYN) 5 MG tablet Take 5 mg by mouth Daily As Needed.   Unknown at Unknown time   • metoprolol succinate XL (TOPROL-XL) 100 MG 24 hr tablet Take 1 tablet by mouth Daily. 30 tablet 0 Unknown at Unknown time   • montelukast (SINGULAIR) 10 MG tablet Take  "10 mg by mouth Every Night.   Unknown at Unknown time   • O2 (OXYGEN) Inhale 3 L/min Daily.   Unknown at Unknown time   • omalizumab (XOLAIR) 150 MG injection Inject 300 mg under the skin into the appropriate area as directed Every 28 (Twenty-Eight) Days. 2 injections once a month   Unknown at Unknown time   • pantoprazole (PROTONIX) 40 MG EC tablet Take 1 tablet by mouth Daily. 30 tablet 0 Unknown at Unknown time   • PATADAY 0.2 % solution ophthalmic solution Administer  to both eyes 2 (Two) Times a Day.  3 Unknown at Unknown time   • potassium chloride (K-DUR,KLOR-CON) 20 MEQ CR tablet Take 20 mEq by mouth Daily.  2 Unknown at Unknown time   • pravastatin (PRAVACHOL) 10 MG tablet Take 1 tablet by mouth Daily. 90 tablet 3 Unknown at Unknown time   • predniSONE (DELTASONE) 20 MG tablet Take 1 tablet by mouth Daily With Breakfast for 3 doses. 3 tablet 0 Unknown at Unknown time   • PROAIR  (90 Base) MCG/ACT inhaler INHALE 2 PUFFS EVERY 4 (FOUR) HOURS AS NEEDED FOR WHEEZING OR SHORTNESS OF AIR. 8.5 g 5 Unknown at Unknown time   • rOPINIRole (REQUIP) 0.5 MG tablet Take 0.5 mg by mouth 2 (Two) Times a Day.  0 Unknown at Unknown time   • sitaGLIPtin (JANUVIA) 100 MG tablet Take 100 mg by mouth Daily. 1/2 tablet daily   Unknown at Unknown time   • SPIRIVA HANDIHALER 18 MCG per inhalation capsule PLACE 1 CAPSULE INTO INHALER AND INHALE DAILY. 30 capsule 4 Unknown at Unknown time   • sucralfate (CARAFATE) 1 G tablet Take 1 g by mouth 3 (Three) Times a Day.   Unknown at Unknown time   • traMADol (ULTRAM) 50 MG tablet Take 50 mg by mouth 3 (Three) Times a Day.   Unknown at Unknown time   • TRESIBA FLEXTOUCH 100 UNIT/ML solution pen-injector injection 2 (Two) Times a Day. 35 units in the am 80 units at night  10 Unknown at Unknown time   • TRUE METRIX BLOOD GLUCOSE TEST test strip Use to check blood sugar 3 times daily. 100 each 0 Unknown at Unknown time   • ULTICARE INSULIN SYRINGE 31G X 5/16\" 1 ML misc   3 Unknown at " "Unknown time   • VASCEPA 1 g capsule capsule 2 g 2 (Two) Times a Day With Meals.   Unknown at Unknown time     Allergies:  Shellfish-derived products and Wheat bran    Review of Systems  Pertinent items are noted in HPI.    Objective     Vital Signs  Temp:  [97.5 °F (36.4 °C)-98.4 °F (36.9 °C)] 98 °F (36.7 °C)  Heart Rate:  [66-78] 78  Resp:  [18-20] 20  BP: (106-156)/(50-76) 112/62    Flowsheet Rows      First Filed Value   Admission Height  147.3 cm (58\") Documented at 03/13/2020 0800   Admission Weight  87.1 kg (192 lb) Documented at 03/13/2020 0800           I/O this shift:  In: -   Out: 1500 [Urine:1500]  I/O last 3 completed shifts:  In: 1680 [P.O.:1680]  Out: 3100 [Urine:3100]    Intake/Output Summary (Last 24 hours) at 3/16/2020 1415  Last data filed at 3/16/2020 1348  Gross per 24 hour   Intake 360 ml   Output 3300 ml   Net -2940 ml       Physical Exam:     General Appearance:    Alert, cooperative, in no acute distress   Head:    Normocephalic, without obvious abnormality, atraumatic   Eyes:            Lids and lashes normal, conjunctivae and sclerae normal, no   icterus, no pallor, corneas clear, PERRLA   Ears:    Ears appear intact with no abnormalities noted   Throat:   No oral lesions, no thrush, oral mucosa moist   Neck:   No adenopathy, supple, trachea midline, no thyromegaly, no     carotid bruit, no JVD   Back:     No kyphosis present, no scoliosis present, no skin lesions,       erythema or scars, no tenderness to percussion or                   palpation,   range of motion normal   Lungs:     Clear to auscultation,respirations regular, even and                   unlabored    Heart:    Regular rhythm and normal rate, normal S1 and S2, no            murmur, no gallop, no rub, no click   Breast Exam:    Deferred   Abdomen:     Normal bowel sounds, no masses, no organomegaly, soft        non-tender, non-distended, no guarding, no rebound                 tenderness   Genitalia:    Deferred "   Extremities:   Moves all extremities well, no edema, no cyanosis, no              redness   Pulses:   Pulses palpable and equal bilaterally   Skin:   No bleeding, bruising or rash   Lymph nodes:   No palpable adenopathy   Neurologic:   Cranial nerves 2 - 12 grossly intact, sensation intact, DTR        present and equal bilaterally       Results Review:  Results from last 7 days   Lab Units 03/16/20  0547 03/15/20  0547 03/14/20  0610 03/13/20  0805   SODIUM mmol/L 150* 148* 145 145   POTASSIUM mmol/L 4.1 4.5 5.5* 4.8   CHLORIDE mmol/L 103 102 101 99   CO2 mmol/L 34.2* 35.7* 30.6* 34.9*   BUN mg/dL 67* 63* 53* 59*   CREATININE mg/dL 1.40* 1.41* 1.23* 1.43*   CALCIUM mg/dL 9.8 9.5 9.2 9.1   BILIRUBIN mg/dL  --  0.3 0.4 0.3   ALK PHOS U/L  --  62 56 52   ALT (SGPT) U/L  --  39* 48* 64*   AST (SGOT) U/L  --  13 19 31   GLUCOSE mg/dL 66 244* 500* 82       Estimated Creatinine Clearance: 36.9 mL/min (A) (by C-G formula based on SCr of 1.4 mg/dL (H)).    Results from last 7 days   Lab Units 03/16/20  0547 03/15/20  0547   MAGNESIUM mg/dL 2.0 2.2   PHOSPHORUS mg/dL 4.3  --        Results from last 7 days   Lab Units 03/16/20  0547 03/15/20  0547 03/14/20  0610 03/13/20  1542 03/13/20  0805   WBC 10*3/mm3 28.79* 32.15* 20.01* 34.25* 41.31*   HEMOGLOBIN g/dL 11.5* 11.2* 10.8* 10.9* 11.5*   PLATELETS 10*3/mm3 364 360 296 287 330             Active Medications    allopurinol 300 mg Oral Daily   aspirin 81 mg Oral Daily   budesonide 0.5 mg Nebulization BID - RT   busPIRone 10 mg Oral TID   chlorthalidone 12.5 mg Oral Daily   dilTIAZem  mg Oral Q24H   famotidine 40 mg Oral Daily   furosemide 40 mg Intravenous Q12H   guaiFENesin 1,200 mg Oral Q12H   insulin aspart 0-9 Units Subcutaneous 4x Daily AC & at Bedtime   insulin detemir 25 Units Subcutaneous QAM   insulin detemir 60 Units Subcutaneous Nightly   ipratropium-albuterol 3 mL Nebulization Q6H While Awake - RT   linagliptin 5 mg Oral Daily   metoclopramide 5 mg Oral TID    metoprolol succinate  mg Oral Q24H   montelukast 10 mg Oral Nightly   piperacillin-tazobactam 3.375 g Intravenous Once   piperacillin-tazobactam 3.375 g Intravenous Q8H   pravastatin 10 mg Oral Nightly   predniSONE 20 mg Oral Daily With Breakfast   rOPINIRole 0.5 mg Oral BID   sodium chloride 10 mL Intravenous Q12H   sucralfate 1 g Oral TID   traMADol 50 mg Oral TID          Assessment/Plan     - CKD III: Creatinine stable at baseline.  Volume excess with diastolic congestive heart failure.  Will increase Lasix to 40 twice a day.  Despite that patient is swollen I believe her shortness of air is mainly due to COPD and less likely due to volume overload.    -Dyspnea due to chronic COPD and mild volume overload.  Patient chronically on 3 L oxygen at home.  -Paroxysmal A. Fib: On Cardizem and metoprolol for rate control  -Non-ST JUANITO: Cardiology on board  -Diastolic congestive heart failure      Tamara Nascimento MD  03/16/20  14:15

## 2020-03-16 NOTE — THERAPY EVALUATION
Acute Care - Occupational Therapy Initial Evaluation  [facility]     Patient Name: Mingo Guidry  : 1950  MRN: [id number]  Today's Date: 3/16/2020  Onset of Illness/Injury or Date of Surgery: 20  Date of Referral to OT: 20  Referring Physician: Dr. Hilliard    Admit Date: 3/13/2020       ICD-10-CM ICD-9-CM   1. Shortness of breath R06.02 786.05   2. Elevated troponin R79.89 790.6   3. Unstable angina (CMS/HCC) I20.0 411.1   4. Impaired mobility and ADLs Z74.09 799.89     Patient Active Problem List   Diagnosis   • Chronic diastolic heart failure (CMS/HCC)   • Lower extremity edema   • Essential hypertension   • Type 2 diabetes mellitus (CMS/HCC)   • COPD with acute exacerbation (CMS/HCC)   • Obesity   • Acid reflux   • Gout   • Seasonal allergies   • Mixed hyperlipidemia   • Acute on chronic respiratory failure with hypoxia (CMS/[facility])   • Pneumonia of left upper lobe due to infectious organism (CMS/HCC)   • Acute GI bleeding   • Gastritis   • Mg's esophagus   • Acute blood loss anemia   • Acute bronchitis   • Abnormal CT scan of lung   • Pneumonia of right lower lobe due to infectious organism (CMS/HCC)   • Postobstructive pneumonia   • Acute aspiration pneumonia (CMS/[facility])   • Obstructive sleep apnea   • Diastolic dysfunction   • Chronic renal failure syndrome, stage 3 (moderate) (CMS/HCC)   • Leukemoid reaction   • Foreign body aspiration   • Shortness of breath     Past Medical History:   Diagnosis Date   • Acid reflux 10/17/2016   • Asthma     bronchial   • Cancer (CMS/HCC)     uterine   • Chronic diastolic heart failure (CMS/HCC) 10/17/2016    Normal dobutamine echocardiogram stress, 2005. Echocardiogram on 2009:  EF 50% to 55%. Left atrium 3.5 cm.   • Chronic obstructive pulmonary disease (CMS/HCC) 10/17/2016    asthmatic bronchitis, on O2 use chronically.     • Gout 10/17/2016   • High cholesterol 2018   • Hypertension 10/17/2016    Benign hypertension.   • Lower  extremity edema 10/17/2016    Chronic lower extremity edema/venous insufficiency.   • Murmur, heart    • Obesity 10/17/2016   • Renal failure     stageIV   • Seasonal allergies 10/17/2016   • Type 2 diabetes mellitus (CMS/HCC) 10/17/2016    insulin dependent.     Past Surgical History:   Procedure Laterality Date   • BRONCHOSCOPY N/A 2018    Procedure: BRONCHOSCOPY WITH WASHINGS;  Surgeon: Ryder Dahl MD;  Location: [facility] OR;  Service:    •  SECTION     • COLONOSCOPY     • COLONOSCOPY N/A 2019    Procedure: COLONOSCOPY;  Surgeon: Skinny York MD;  Location: [facility] ENDOSCOPY;  Service: General   • CYSTECTOMY Right     neck   • DILATATION AND CURETTAGE     • ENDOSCOPY N/A 6/10/2019    Procedure: ESOPHAGOGASTRODUODENOSCOPY WITH BIOPSY;  Surgeon: Skinny York MD;  Location: [facility] ENDOSCOPY;  Service: Gastroenterology   • EYE SURGERY Bilateral 2005    cataract   • FOREIGN BODY REMOVAL N/A 3/5/2020    Procedure: BRONCHOSCOPY with MAC and removal of foreign object;  Surgeon: Ryder Dahl MD;  Location: [facility] OR;  Service: Pulmonary;  Laterality: N/A;   • HYSTERECTOMY     • LUNG SURGERY Left 1997 tumors removed from left lung-benign   • TONSILLECTOMY AND ADENOIDECTOMY            OT ASSESSMENT FLOWSHEET (last 12 hours)      Occupational Therapy Evaluation     Row Name 20 0936                   OT Evaluation Time/Intention    Subjective Information  complains of;dyspnea  -SD        Document Type  evaluation  -SD        Mode of Treatment  occupational therapy  -SD        Patient Effort  adequate  -SD        Symptoms Noted During/After Treatment  shortness of breath  -SD           General Information    Patient Profile Reviewed?  yes  -SD        Onset of Illness/Injury or Date of Surgery  20  -SD        Referring Physician  Dr. Hilliard  -SD        Patient Observations  alert;cooperative;agree to therapy  -SD        General Observations of Patient  sitting EOB, 5L O2  -SD         Prior Level of Function  independent:;all household mobility  -SD        Equipment Currently Used at Home  walker, rolling;shower chair  -SD        Pertinent History of Current Functional Problem  Chronic hypoxic respiratory failure, DM, COPD, Afib, CHF, CKD  -SD        Existing Precautions/Restrictions  fall;oxygen therapy device and L/min  -SD        Risks Reviewed  patient:;increased discomfort  -SD        Benefits Reviewed  patient:;improve function;increase strength;increase independence;increase balance  -SD           Relationship/Environment    Primary Source of Support/Comfort  child(ewelina)  -SD        Lives With  alone  -SD           Resource/Environmental Concerns    Current Living Arrangements  home/apartment/condo  -SD           Home Main Entrance    Number of Stairs, Main Entrance  five  -SD           Cognitive Assessment/Interventions    Additional Documentation  Cognitive Assessment/Intervention (Group)  -SD           Cognitive Assessment/Intervention- PT/OT    Orientation Status (Cognition)  oriented x 4  -SD        Follows Commands (Cognition)  verbal cues/prompting required  -SD        Safety Deficit (Cognitive)  safety precautions follow-through/compliance  -SD        Personal Safety Interventions  fall prevention program maintained;gait belt;nonskid shoes/slippers when out of bed  -SD           Safety Issues, Functional Mobility    Safety Issues Affecting Function (Mobility)  safety precautions follow-through/compliance;insight into deficits/self awareness;awareness of need for assistance  -SD        Impairments Affecting Function (Mobility)  balance;endurance/activity tolerance;shortness of breath;strength  -SD           Bed Mobility Assessment/Treatment    Bed Mobility Assessment/Treatment  supine-sit  -SD        Supine-Sit [place] (Bed Mobility)  supervision  -SD        Assistive Device (Bed Mobility)  head of bed elevated  -SD           Functional Mobility    Functional Mobility-  [place] Level  contact guard assist  -SD        Functional Mobility- Device  rolling walker  -SD        Functional Mobility-Distance (Feet)  40  -SD        Functional Mobility- Safety Issues  balance decreased during turns;sequencing ability decreased;step length decreased;weight-shifting ability decreased;supplemental O2  -SD           Transfer Assessment/Treatment    Transfer Assessment/Treatment  sit-stand transfer;stand-sit transfer  -SD           Bed-Chair Transfer    Bed-Chair [place] (Transfers)  contact guard  -SD        Assistive Device (Bed-Chair Transfers)  walker, front-wheeled  -SD           Sit-Stand Transfer    Sit-Stand [place] (Transfers)  contact guard  -SD        Assistive Device (Sit-Stand Transfers)  walker, front-wheeled  -SD           Stand-Sit Transfer    Stand-Sit [place] (Transfers)  contact guard  -SD        Assistive Device (Stand-Sit Transfers)  walker, front-wheeled  -SD           ADL Assessment/Intervention    BADL Assessment/Intervention  bathing;upper body dressing;lower body dressing;grooming;feeding;toileting  -SD           Bathing Assessment/Intervention    Bathing [place] Level  minimum assist (75% patient effort)  -SD           Upper Body Dressing Assessment/Training    Upper Body Dressing [place] Level  supervision  -SD           Lower Body Dressing Assessment/Training    Lower Body Dressing [place] Level  minimum assist (75% patient effort)  -SD           Grooming Assessment/Training    [place] Level (Grooming)  supervision  -SD           Self-Feeding Assessment/Training    [place] Level (Feeding)  supervision  -SD           Toileting Assessment/Training    [place] Level (Toileting)  minimum assist (75% patient effort)  -SD           BADL Safety/Performance    Impairments, BADL Safety/Performance  balance;endurance/activity tolerance;shortness of breath;strength  -SD           General ROM    GENERAL ROM COMMENTS  UB WFL  -SD            MMT (Manual Muscle Testing)    General MMT Comments  3+/5  -SD           Positioning and Restraints    Pre-Treatment Position  in bed  -SD        Post Treatment Position  chair  -SD        In Chair  reclined;call light within reach;encouraged to call for assist  -SD           Pain Scale: Numbers Pre/Post-Treatment    Pain Scale: Numbers, Pretreatment  8/10  -SD        Pain Scale: Numbers, Post-Treatment  8/10  -SD        Pain Location - Orientation  lower  -SD        Pain Location  back  -SD        Pain Intervention(s)  Repositioned;Ambulation/increased activity  -SD           Coping    Observed Emotional State  calm;cooperative  -SD        Verbalized Emotional State  acceptance  -SD           Plan of Care Review    Plan of Care Reviewed With  patient  -SD        Progress  no change  -SD        Outcome Summary  OT eval completed. Patient presents deificts in strength, endurance, balance, mobility and ADL performance. Patient is expected to benefit from continued OT services prior to DC.   -SD           Clinical Impression (OT)    Date of Referral to OT  03/14/20  -SD        OT Diagnosis  ADL decline  -SD        Patient/Family Goals Statement (OT Eval)  Increase strength and mobility  -SD        Criteria for Skilled Therapeutic Interventions Met (OT Eval)  yes  -SD        Rehab Potential (OT Eval)  good, to achieve stated therapy goals  -SD        Therapy Frequency (OT Eval)  3 times/wk 5 times if indicated  -SD        Care Plan Review (OT)  evaluation/treatment results reviewed  -SD        Anticipated Discharge Disposition (OT)  inpatient rehabilitation facility  -SD           Vital Signs    Pre SpO2 (%)  91  -SD        O2 Delivery Pre Treatment  supplemental O2  -SD        O2 Delivery Intra Treatment  supplemental O2  -SD        Post SpO2 (%)  92  -SD        O2 Delivery Post Treatment  supplemental O2  -SD           Planned OT Interventions    Planned Therapy Interventions (OT Eval)  activity tolerance  training;adaptive equipment training;BADL retraining;patient/caregiver education/training;strengthening exercise;transfer/mobility retraining  -SD           OT Goals    Transfer Goal Selection (OT)  transfer, OT goal 1  -SD        Dressing Goal Selection (OT)  dressing, OT goal 1  -SD        Toileting Goal Selection (OT)  toileting, OT goal 1  -SD        Activity Tolerance Goal Selection (OT)  activity tolerance, OT goal 1  -SD        Functional Mobility Goal Selection (OT)  functional mobility, OT goal 1  -SD        Additional Documentation  Activity Tolerance Goal Selection (OT) (Row);Functional Mobility Selection (OT) (Row)  -SD           Transfer Goal 1 (OT)    Activity/Assistive Device (Transfer Goal 1, OT)  sit-to-stand/stand-to-sit;walker, rolling  -SD        [place] Level/Cues Needed (Transfer Goal 1, OT)  standby assist  -SD        Time Frame (Transfer Goal 1, OT)  2 weeks  -SD        Progress/Outcome (Transfer Goal 1, OT)  goal ongoing  -SD           Dressing Goal 1 (OT)    Activity/Assistive Device (Dressing Goal 1, OT)  lower body dressing  -SD        [place]/Cues Needed (Dressing Goal 1, OT)  contact guard assist  -SD        Time Frame (Dressing Goal 1, OT)  2 weeks  -SD        Progress/Outcome (Dressing Goal 1, OT)  goal ongoing  -SD           Toileting Goal 1 (OT)    Activity/Device (Toileting Goal 1, OT)  toileting skills, all;commode  -SD        [place] Level/Cues Needed (Toileting Goal 1, OT)  contact guard assist  -SD        Time Frame (Toileting Goal 1, OT)  2 weeks  -SD        Progress/Outcome (Toileting Goal 1, OT)  goal ongoing  -SD            Activity Tolerance Goal 1 (OT)    Activity Tolerance Goal 1 (OT)  Patient to perform UB ther ex/ther act/ADL tasks  -SD        Activity Level (Endurance Goal 1, OT)  10 min activity;O2 sat >/ equal to 88%  -SD        Time Frame (Activity Tolerance Goal 1, OT)  long term goal (LTG)  -SD        Progress/Outcome (Activity Tolerance Goal 1,  OT)  goal ongoing  -SD           Functional Mobility Goal 1 (OT)    Activity/Assistive Device (Functional Mobility Goal 1, OT)  walker, rolling  -SD        [place] Level/Cues Needed (Functional Mobility Goal 1, OT)  contact guard assist  -SD        Distance Goal 1 (Functional Mobility, OT)  100 O2 90 and >  -SD        Time Frame (Functional Mobility Goal 1, OT)  long term goal (LTG)  -SD        Progress/Outcome (Functional Mobility Goal 1, OT)  goal ongoing  -SD          User Key  (r) = Recorded By, (t) = Taken By, (c) = Cosigned By    Initials Name Effective Dates    SD Sarah Beckman, OT 03/07/18 -                OT Recommendation and Plan  Outcome Summary/Treatment Plan (OT)  Anticipated Discharge Disposition (OT): inpatient rehabilitation facility  Planned Therapy Interventions (OT Eval): activity tolerance training, adaptive equipment training, BADL retraining, patient/caregiver education/training, strengthening exercise, transfer/mobility retraining  Therapy Frequency (OT Eval): 3 times/wk(5 times if indicated)  Plan of Care Review  Plan of Care Reviewed With: patient  Plan of Care Reviewed With: patient  Outcome Summary: OT eval completed. Patient presents deificts in strength, endurance, balance, mobility and ADL performance. Patient is expected to benefit from continued OT services prior to DC.     Outcome Measures     Row Name 03/16/20 0936             How much help from another is currently needed...    Putting on and taking off regular lower body clothing?  3  -SD      Bathing (including washing, rinsing, and drying)  3  -SD      Toileting (which includes using toilet bed pan or urinal)  3  -SD      Putting on and taking off regular upper body clothing  3  -SD      Taking care of personal grooming (such as brushing teeth)  3  -SD      Eating meals  3  -SD      AM-PAC 6 Clicks Score (OT)  18  -SD         Functional Assessment    Outcome Measure Options  AM-PAC 6 Clicks Daily Activity (OT)  -SD         User Key  (r) = Recorded By, (t) = Taken By, (c) = Cosigned By    Initials Name Provider Type    Sarah Romero OT Occupational Therapist          Time Calculation:   Time Calculation- OT     Row Name 03/16/20 1151             Time Calculation- OT    OT Start Time  0936  -SD      OT Received On  03/16/20  -SD      OT Goal Re-Cert Due Date  03/26/20  -SD        User Key  (r) = Recorded By, (t) = Taken By, (c) = Cosigned By    Initials Name Provider Type    Sarah Romero OT Occupational Therapist        Therapy Charges for Today     Code Description Service Date Service Provider Modifiers Qty    [id number]  OT EVAL LOW COMPLEXITY 3 3/16/2020 Sarah Beckman OT GO 1               Sarah Beckman OT  3/16/2020

## 2020-03-17 RX ORDER — ASPIRIN 81 MG/1
TABLET, COATED ORAL
Qty: 30 TABLET | Refills: 5 | OUTPATIENT
Start: 2020-03-17

## 2020-03-17 NOTE — PLAN OF CARE
Problem: Patient Care Overview  Goal: Plan of Care Review  Outcome: Ongoing (interventions implemented as appropriate)  Flowsheets (Taken 3/17/2020 1610)  Progress: improving  Plan of Care Reviewed With: patient     Problem: Pneumonia (Adult)  Goal: Signs and Symptoms of Listed Potential Problems Will be Absent, Minimized or Managed (Pneumonia)  Outcome: Ongoing (interventions implemented as appropriate)     Problem: Diabetes, Type 2 (Adult)  Goal: Signs and Symptoms of Listed Potential Problems Will be Absent, Minimized or Managed (Diabetes, Type 2)  Outcome: Ongoing (interventions implemented as appropriate)

## 2020-03-17 NOTE — PROGRESS NOTES
Continued Stay Note  [facility]     Patient Name: Mingo Guidry  MRN: [id number]  Today's Date: 3/17/2020    Admit Date: 3/13/2020    Discharge Plan     Row Name 03/17/20 0757       Plan    Plan Comments  Late entry from 3/16 Pt is planning on retuning home her son and his girlfriend will be staying with her         Discharge Codes    No documentation.       Expected Discharge Date and Time     Expected Discharge Date Expected Discharge Time    Mar 19, 2020             Ana Romano RN

## 2020-03-17 NOTE — PLAN OF CARE
Problem: NPPV/CPAP (Adult)  Goal: Signs and Symptoms of Listed Potential Problems Will be Absent, Minimized or Managed (NPPV/CPAP)  Outcome: Ongoing (interventions implemented as appropriate)  Flowsheets (Taken 3/17/2020 0127)  Problems Assessed (NPPV/CPAP): all  Problems Present (NPPV/CPAP): none

## 2020-03-17 NOTE — PROGRESS NOTES
Adult Nutrition  Assessment/PES    Patient Name:  Mingo Guidry  YOB: 1950  MRN: [id number]  Admit Date:  3/13/2020    Assessment Date:  3/17/2020    Comments:    Recommend:  1. Consider adding Consistent Carbohydrate and Renal modification to current diet order as medically appropriate and tolerated.  2. Encourage PO intake. PO intake average ~75% x 6 meals.  3. RD ordered Novasource Renal daily.  4. Consider a renal multivitamin with minerals daily.     RD available PRN.      Reason for Assessment     Row Name 03/17/20 1456          Reason for Assessment    Reason For Assessment  diagnosis/disease state;follow-up protocol     Diagnosis  diabetes diagnosis/complications;cardiac disease;pulmonary disease;other (see comments);fluid status DM type 2, HTN, COPD, DHF, Leukocytosis, leg edema, CKD     Identified At Risk by Screening Criteria  BMI         Nutrition/Diet History     Row Name 03/17/20 1501          Nutrition/Diet History    Food Allergies  fish/shellfish;other (see comments) Wheat bran           Labs/Tests/Procedures/Meds     Row Name 03/17/20 1456          Labs/Procedures/Meds    Lab Results Reviewed  reviewed, pertinent     Lab Results Comments  Low: Gluc, Cl- High: BUN, Cr, CRP, [place]        Medications    Pertinent Medications Reviewed  reviewed, pertinent     Pertinent Medications Comments  Novolog Levemir         Physical Findings     Row Name 03/17/20 1457          Physical Findings    Overall Physical Appearance  obese           Nutrition Prescription Ordered     Row Name 03/17/20 1457          Nutrition Prescription PO    Current PO Diet  Regular     Common Modifiers  Cardiac         Evaluation of Received Nutrient/Fluid Intake     Row Name 03/17/20 1458          PO Evaluation    Number of Days PO Intake Evaluated  3 days     Number of Meals  6     % PO Intake  75               Problem/Interventions:  Problem 1     Row Name 03/17/20 1452          Nutrition Diagnoses Problem 1     Problem 1  Overweight/Obesity     Etiology (related to)  Factors Affecting Nutrition     Food Habit/Preferences  Large Meals     Signs/Symptoms (evidenced by)  BMI     BMI  35 - 39.9         Problem 2     Row Name 03/17/20 1459          Nutrition Diagnoses Problem 2    Problem 2  Impaired Nutrient Utilization     Etiology (related to)  Medical Diagnosis     Endocrine  DM Type 2     Renal  CKD     Signs/Symptoms (evidenced by)  Biochemical     Specific Labs Noted  K+;Glucose;BUN;Creatinine         Problem 3     Row Name 03/17/20 1459          Nutrition Diagnoses Problem 3    Problem 3  Increased Nutrient Needs     Macronutrient  Kcal;Protein     Etiology (related to)  Medical Diagnosis     Pulmonary/Critical Care  COPD     Signs/Symptoms (evidenced by)  Other (comment) pulmonary dysfunction           Intervention Goal     Row Name 03/17/20 1459          Intervention Goal    General  Meet nutritional needs for age/condition     PO  Meet estimated needs;PO intake (%);Maintain intake     PO Intake %  -- 75 - 100%     Weight  No significant weight loss         Nutrition Intervention     Row Name 03/17/20 1500          Nutrition Intervention    RD/Tech Action  Follow Tx progress;Encourage intake;Recommend/ordered     Recommended/Ordered  Supplement         Nutrition Prescription     Row Name 03/17/20 1500          Nutrition Prescription PO    PO Prescription  Begin/change diet     Begin/Change Diet to  Regular     Supplement  Nova Renal     Supplement Frequency  Daily     Common Modifiers  Cardiac;Consistent Carbohydrate;Renal     New PO Prescription Ordered?  No, recommended        Other Orders    Obtain Weight  Daily     Obtain Weight Ordered?  No, recommended     Supplement  Vitamin mineral supplement Renal     Supplement Ordered?  No, recommended     Other  Continue to monitor and replace electrolytes PRN         Education/Evaluation     Row Name 03/17/20 1500          Education    Education  Previous education by  VERONICA/PONCHO        Monitor/Evaluation    Monitor  Per protocol;I&O;PO intake;Supplement intake;Pertinent labs;Weight;Skin status           Electronically signed by:  Carol Colindres RD  03/17/20 15:01

## 2020-03-17 NOTE — PROGRESS NOTES
NEPHROLOGY PROGRESS NOTE    PATIENT IDENTIFICATION:   Name:  Mingo Guidry      MRN:  [id number]     69 y.o.  female             Reason for visit: CKD     SUBJECTIVE:   Seen and examined.  Shortness of air is much better.  Sitting up in chair with no issues.  OBJECTIVE:  Vitals:    03/17/20 1016 03/17/20 1102 03/17/20 1116 03/17/20 1206   BP: 150/74 116/49 118/50 149/62   BP Location:   Left arm    Patient Position:   Sitting    Pulse: 71 76 74 77   Resp: 18 18 18    Temp:   98.4 °F (36.9 °C)    TempSrc:   Oral    SpO2: (!) 87% 95% 95%    Weight:       Height:               Body mass index is 38.57 kg/m².    Intake/Output Summary (Last 24 hours) at 3/17/2020 1420  Last data filed at 3/17/2020 1300  Gross per 24 hour   Intake 955 ml   Output 800 ml   Net 155 ml         Exam:  GEN:  No distress, appears stated age  EYES:   Anicteric sclera  ENT:    External ears/nose normal, MM are moist  NECK:  No adenopathy, JVP none  LUNGS: Normal chest on inspection; not labored  CV:  Normal S1S2, without murmur  ABD:  Non-tender, non-distended, no hepatosplenomegaly, +BS  EXT:  No edema; no cyanosis; clubbing    Scheduled meds:    allopurinol 300 mg Oral Daily   aspirin 81 mg Oral Daily   budesonide 1 mg Nebulization BID - RT   busPIRone 10 mg Oral TID   chlorthalidone 12.5 mg Oral Daily   dilTIAZem  mg Oral Q24H   famotidine 40 mg Oral Daily   furosemide 40 mg Intravenous Q12H   guaiFENesin 1,200 mg Oral Q12H   insulin aspart 0-9 Units Subcutaneous 4x Daily AC & at Bedtime   insulin aspart 9 Units Subcutaneous TID With Meals   insulin detemir 25 Units Subcutaneous QAM   insulin detemir 60 Units Subcutaneous Nightly   ipratropium-albuterol 3 mL Nebulization Q6H While Awake - RT   linagliptin 5 mg Oral Daily   metoclopramide 5 mg Oral TID   metoprolol succinate  mg Oral Q24H   montelukast 10 mg Oral Nightly   piperacillin-tazobactam 3.375 g Intravenous Q8H   pravastatin 10 mg Oral Nightly   predniSONE 20 mg Oral  Daily With Breakfast   rOPINIRole 0.5 mg Oral BID   sodium chloride 10 mL Intravenous Q12H   [MAR Hold] sodium chloride 4 mL Nebulization Q8H   sucralfate 1 g Oral TID   traMADol 50 mg Oral TID     IV meds:                        sodium chloride 100 mL/hr Last Rate: 100 mL/hr (03/17/20 1222)       Data Review:    Results from last 7 days   Lab Units 03/17/20  0547 03/16/20  0547 03/15/20  0547 03/14/20  0610 03/13/20  0805   SODIUM mmol/L 143 150* 148* 145 145   POTASSIUM mmol/L 3.7 4.1 4.5 5.5* 4.8   CHLORIDE mmol/L 97* 103 102 101 99   CO2 mmol/L 35.3* 34.2* 35.7* 30.6* 34.9*   BUN mg/dL 67* 67* 63* 53* 59*   CREATININE mg/dL 1.44* 1.40* 1.41* 1.23* 1.43*   CALCIUM mg/dL 9.6 9.8 9.5 9.2 9.1   BILIRUBIN mg/dL  --   --  0.3 0.4 0.3   ALK PHOS U/L  --   --  62 56 52   ALT (SGPT) U/L  --   --  39* 48* 64*   AST (SGOT) U/L  --   --  13 19 31   GLUCOSE mg/dL 72 66 244* 500* 82       Estimated Creatinine Clearance: 35.9 mL/min (A) (by C-G formula based on SCr of 1.44 mg/dL (H)).    Results from last 7 days   Lab Units 03/16/20 0547 03/15/20  0547   MAGNESIUM mg/dL 2.0 2.2   PHOSPHORUS mg/dL 4.3  --        Results from last 7 days   Lab Units 03/17/20  0547 03/16/20  0547 03/15/20  0547 03/14/20  0610 03/13/20  1542   WBC 10*3/mm3 24.71* 28.79* 32.15* 20.01* 34.25*   HEMOGLOBIN g/dL 10.7* 11.5* 11.2* 10.8* 10.9*   PLATELETS 10*3/mm3 321 364 360 296 287       Results from last 7 days   Lab Units 03/17/20  0547   INR  1.05             ASSESSMENT:     Unstable angina (CMS/[facility])    Shortness of breath             - CKD III: Creatinine stable at baseline.  Volume excess with diastolic congestive heart failure.  Will continue Lasix to 40 twice a day.    Plan to switch her back tomorrow to her home dose of a diuretic.  If kidney function is stable tomorrow morning she would be okay to be discharged at her home dose of Lasix.    -Dyspnea due to chronic COPD and mild volume overload.  Patient chronically on 3 L oxygen at  home.  -Paroxysmal A. Fib: On Cardizem and metoprolol for rate control  -Non-ST JUANITO: Cardiology on board  -Diastolic congestive heart failure    Tamara Nascimento MD  3/17/2020    14:20

## 2020-03-17 NOTE — PROGRESS NOTES
"  CC: Acute Respiratory Failure.     S: Currently off the BiPAP feels that her shortness of breath has improved.  Complains of minimal cough. She continues to require oxygen.    ROS: Positive for mild shortness of breath and occasional cough.  Negative for fever, chills, night sweats or diarrhea.    O:Vital signs reviewed. FiO2: 3 L/min.  /62   Pulse 77   Temp 98.4 °F (36.9 °C) (Oral)   Resp 18   Ht 147.3 cm (58\")   Wt 83.7 kg (184 lb 8.4 oz)   LMP  (LMP Unknown)   SpO2 95%   BMI 38.57 kg/m²     Temp (24hrs), Av.1 °F (36.7 °C), Min:97.9 °F (36.6 °C), Max:98.4 °F (36.9 °C)    I & Os reviewed.   Intake/Output       20 0700 - 20 0659 20 0700 - 20 0659    Intake (ml) 1435 240    Output (ml) 2300 --    Net (ml) -865 240          General/Constitutional: Appears to be in no distress.  Eyes: PERRL. EOMI.  Neck: Supple without JVD. No obvious masses noted.   Cardiovascular: S1 + S2.  Appears regular at this time.  Lungs/Respiratory: Good air entry bilaterally with mild scattered wheezing heard.  Minimal bilateral basal crackles noted  Musculoskeletal/Extremities: Trace edema noted. Gait could not be assessed at this time, as the patient was laying in bed.   Neurologic: Was able to follow commands.  Was able to move all 4 extremities sluggishly.  Psych: AAOx3.   Skin: Appeared somewhat dry.       Labs: Reviewed.   Results from last 7 days   Lab Units 20  0547 20  0547 03/15/20  0547 20  0610 20  1542  20  0816   WBC 10*3/mm3 24.71* 28.79* 32.15* 20.01* 34.25*   < > 32.83*   NEUTROPHIL % % 76.9* 78.6* 84.6* 94.3*  --   --  78.2*   HEMOGLOBIN g/dL 10.7* 11.5* 11.2* 10.8* 10.9*   < > 13.4   HEMATOCRIT % 33.4* 36.6 34.8 33.8* 33.4*   < > 40.7   PLATELETS 10*3/mm3 321 364 360 296 287   < > 462*    < > = values in this interval not displayed.       Results from last 7 days   Lab Units 20  0547 20  0547 03/15/20  0547 20  0610 20  0805 "   SODIUM mmol/L 143 150* 148* 145 145   POTASSIUM mmol/L 3.7 4.1 4.5 5.5* 4.8   CHLORIDE mmol/L 97* 103 102 101 99   CO2 mmol/L 35.3* 34.2* 35.7* 30.6* 34.9*   BUN mg/dL 67* 67* 63* 53* 59*   CREATININE mg/dL 1.44* 1.40* 1.41* 1.23* 1.43*   CALCIUM mg/dL 9.6 9.8 9.5 9.2 9.1   BILIRUBIN mg/dL  --   --  0.3 0.4 0.3   ALK PHOS U/L  --   --  62 56 52   ALT (SGPT) U/L  --   --  39* 48* 64*   AST (SGOT) U/L  --   --  13 19 31   GLUCOSE mg/dL 72 66 244* 500* 82   TOTAL PROTEIN g/dL  --   --  6.1 6.1 5.8*   ALBUMIN g/dL  --  3.50 3.60 3.20* 3.30*     Results from last 7 days   Lab Units 03/16/20  0547 03/15/20  0547   MAGNESIUM mg/dL 2.0 2.2   PHOSPHORUS mg/dL 4.3  --        Results from last 7 days   Lab Units 03/17/20  0547   INR  1.05       Lab Results   Component Value Date    PROCALCITO 0.26 (H) 03/15/2020    PROCALCITO 0.30 (H) 03/14/2020    PROCALCITO 0.33 (H) 03/13/2020       Lab Results   Component Value Date    PROBNP 844.6 03/13/2020    PROBNP 2,713.0 (H) 02/29/2020    PROBNP 419.4 02/28/2020       Micro: As of March 17, 2020   Lab Results   Component Value Date    RESPCX Light growth (2+) Pseudomonas aeruginosa (A) 03/15/2020    RESPCX No Normal Respiratory Sera (A) 03/15/2020    RESPCX Light growth (2+) Normal Respiratory Sera 03/05/2020     Lab Results   Component Value Date    BLOODCX No growth at 2 days 03/15/2020    BLOODCX No growth at 2 days 03/15/2020    BLOODCX No growth at 5 days 02/29/2020       Lab Results   Component Value Date    URCX Yes 02/26/2020    URCX Yes 10/21/2019       ABG: Reviewed.  Lab Results   Component Value Date    PHART 7.463 03/13/2020    GGJ5RDI 54.9 (H) 03/13/2020    PO2ART 63.0 (L) 03/13/2020    HGBBG 11.7 (L) 03/13/2020    L1JEVMTW 92.7 (L) 03/13/2020    CARBOXYHGB 0.8 03/13/2020         CXRay: Latest imaging study was reviewed personally.   Imaging Results (Last 24 Hours)     ** No results found for the last 24 hours. **            Assessment & Recommendations/Plan:   1.   Shortness of breath  Could be from multiple etiologies.  The fairly quick onset goes against pneumonia    2.  Right sided pneumonia.  Previous cultures were negative although later sputum culture is showing light growth of Pseudomonas?    3.  Abnormal CT.   Status post bronchoscopy  May need outpatient follow-up in 3 to 4 months    4.  COPD with exacerbation.  Will consider adjusting steroids, as indicated.    5.  Obstructive sleep apnea.    On BiPAP at home    6.  History of smoking.    7.  Chronic hypercarbic and hypoxic respiratory failure   On oxygen at home    8.  Paroxysmal atrial fibrillation.   Currently appears in sinus rhythm.  1 consideration is a possibility of rapid ventricular rate causing pulmonary edema?    9.  Grade 2 Diastolic Dysfunction.   Clinically stable  Being diuresed    10.  Leukocytosis.  Stable/improved    We have reviewed patient's current orders and changes, if any, have been suggested to primary care team. Plan was also discussed with nursing staff, as necessary.     This document was electronically signed by Ryder Dahl MD on 03/17/20 at 13:34      Dictated utilizing Dragon dictation.

## 2020-03-17 NOTE — PROGRESS NOTES
PROGRESS NOTE        Date of Admission: 3/13/2020  Length of Stay: 2  Primary Care Physician: Luz Prater APRN    Subjective   Chief Complaint: Follow up Elevated troponin  HPI: This is a 69-year-old female with history of chronic hypoxic respiratory failure on 3 L nasal cannula, BiPAP, diabetes mellitus type 2, COPD, obesity who was just discharged from this facility on 3/12/2020 after being treated for a postobstructive pneumonia secondary to an aspirated pea which was removed via bronchoscopy.  She was treated with vancomycin, Rocephin which was then changed to Zosyn.  Post bronchoscopy patient did have significant leukocytosis which was felt to be reactive.  Her white count went up to 50,000 at the highest and has been trending down.  She was discharged home in stable condition.  However according to the patient after she was discharged home she complained of leg pain as well as coughing and some chest discomfort.  Due to this she returned to the emergency room.  Troponin in the ER was noted to be mildly elevated.     Due to her complaints of knee pain a Venous duplex was negative for DVT and bilateral knee xray was negative.  A CT scan of her chest was done in the emergency room which showed bibasilar airspace disease consistent with aspiration or pneumonia.  Patient was recently treated with a full course of antibiotics so no further antibiotics have been initiated.  Cytology and cultures were negative and she was treated with a full round of antibiotics.    As for her elevated troponin she was seen and evaluated by cardiology.  Her troponin has trended back to normal.  Dr. caruso with cardiology did evaluate the patient and does plan to do a heart catheterization on 3/17/2020.  Given that she does have chronic kidney disease with a creatinine clearance of 36 I will consult nephrology for evaluation.    Sputum culture was done upon admission for which her respiratory culture is growing a light  growth of Pseudomonas.  I have started the patient on Zosyn.  She was seen and evaluated by Dr. Dahl with pulmonology.  He does feel that this is likely a colonization especially given that her cultures have not shown any growth from the previous [facility].  He did agree however to continue treatment.    She is scheduled today for a heart catheterization to be done by Dr. caruso.  Patient is sitting up to bedside in a chair.  She denies any chest pain, shortness of breath, abdominal pain, nausea or vomiting.      Review Of Systems:   Review of Systems  General ROS: Patient denies any fevers, chills or loss of consciousness.    ENT ROS: Denies sore throat, nasal congestion or ear pain.   Respiratory ROS: cough or shortness of breath.   Cardiovascular ROS: Denies chest pain or palpitations. No history of exertional chest pain.   Gastrointestinal ROS: Denies nausea and vomiting. Denies any abdominal pain. No diarrhea.  Genito-Urinary ROS: Denies dysuria or hematuria.  Musculoskeletal ROS: Denies back pain. No muscle pain. No calf pain.   Neurological ROS: Denies any focal weakness. No loss of consciousness. Denies any numbness. Denies neck pain.   Dermatological ROS: Denies any redness or pruritis.    Objective      Temp:  [97.9 °F (36.6 °C)-98.4 °F (36.9 °C)] 98.4 °F (36.9 °C)  Heart Rate:  [63-77] 77  Resp:  [18-20] 18  BP: (116-150)/(49-74) 149/62  Physical Exam    General Appearance:  Alert and cooperative, not in any acute distress.   Head:  Atraumatic and normocephalic, without obvious abnormality.   Eyes:          PERRLA, conjunctivae and sclerae normal, no Icterus. No pallor. Extraocular movements are within normal limits.   Ears:  Ears appear intact with no abnormalities noted.   Throat: No oral lesions, no thrush, oral mucosa moist.   Neck: Supple, trachea midline, no thyromegaly, no carotid bruit.       Lungs:   Chest shape is normal. Breath sounds heard bilaterally equally.  Few crackles No wheezing. No  Pleural rub or bronchial breathing.   Heart:  Normal S1 and S2, no murmur, no gallop, no rub. No JVD   Abdomen:   Normal bowel sounds, no masses, no organomegaly. Soft    non-tender, non-distended, no guarding, no rebound             tenderness   Extremities: Moves all extremities well, no edema, no cyanosis, no clubbing.   Pulses: Pulses palpable and equal bilaterally   Skin: No bleeding, bruising or rash       Neurologic:    Psychiatric/Behavior:     Cranial nerves 2 - 12 grossly intact, sensation intact, Motor power is normal and equal bilaterally.  Mood normal, behavior normal       Results Review:    I have reviewed the labs, radiology results and diagnostic studies.    Results from last 7 days   Lab Units 03/17/20  0547   WBC 10*3/mm3 24.71*   HEMOGLOBIN g/dL 10.7*   PLATELETS 10*3/mm3 321     Results from last 7 days   Lab Units 03/17/20  0547   SODIUM mmol/L 143   POTASSIUM mmol/L 3.7   CO2 mmol/L 35.3*   CREATININE mg/dL 1.44*   GLUCOSE mg/dL 72       Culture Data:   Blood Culture   Date Value Ref Range Status   03/15/2020 No growth at less than 24 hours  Preliminary   03/15/2020 No growth at less than 24 hours  Preliminary     Radiology Data:   Cardiology Data:    I have reviewed the medications.    Assessment/Plan     Assessment/Plan    1.   Elevated troponin-troponin has trended back to normal.  Dr. caruso with cardiology has seen and evaluated patient and is planning to do a left heart catheterization today.  In the meantime we will continue with medical management.    2.  Recent Post obstructive Right lower lobe pneumonia secondary to foreign body.  Bronchoscopy with BAL not show any growth.  Cytology was negative for malignancy.  She did require removal of a foreign body which was a pea.    Her CRP and procalcitonin are improving.  Patient's sputum culture did however grow Pseudomonas.  Pulmonology did see and evaluate patient and feels that this is possibly contamination.  He is in agreement for  the meantime to continue with the IV Zosyn.       3.  Leukocytosis-her leukocytosis is improving with current white count down to 28,000.     4.  Bilateral knee pain-likely secondary to osteoarthritis.  X-rays and venous duplex were negative.     5.   Paroxysmal atrial fibrillation-newly diagnosed during previous.  She was started on Eliquis.  This has been held for heart catheterization.     6.  Chronic diastolic CHF-  Stable on oral diuretic        7.  Obstructive sleep apnea on home BiPAP     8.  Chronic hypoxic respiratory failure on home 5 L nasal cannula-       9.  Insulin-dependent diabetes mellitus type 2-   blood sugars were low this morning.  I have decreased her insulin.     10.  Chronic lower leg edema, improved     11.  Former smoker     12.  Chronic dyslipidemia/ hypertriglyceridemia     13.  Obesity     14.  Chronic kidney disease stage III- Stable.      DVT prophylaxis:  SCDs as Eliquis is on hold.    Discharge Planning:   Deb Garcia, APRN 03/17/20 14:31

## 2020-03-18 PROBLEM — A49.8 PSEUDOMONAS INFECTION: Status: ACTIVE | Noted: 2020-01-01

## 2020-03-18 RX ORDER — PANTOPRAZOLE SODIUM 40 MG/1
TABLET, DELAYED RELEASE ORAL
Qty: 90 TABLET | Refills: 3 | Status: SHIPPED | OUTPATIENT
Start: 2020-03-18

## 2020-03-18 RX ORDER — ASPIRIN 81 MG/1
TABLET, COATED ORAL
Qty: 30 TABLET | Refills: 5 | OUTPATIENT
Start: 2020-03-18

## 2020-03-18 NOTE — PROGRESS NOTES
Case Management Discharge Note      Final Note: Discharged home     Provided Post Acute Provider List?: N/A  Provided Post Acute Provider Quality & Resource List?: N/A    Destination      No service has been selected for the patient.      Durable Medical Equipment      No service has been selected for the patient.      Dialysis/Infusion      No service has been selected for the patient.      Home Medical Care - Selection Complete      Service Provider Request Status Selected Services Address Phone Number Fax Number    [facility] Home Health Services [address] [address] [phone]      Therapy      No service has been selected for the patient.      Community Resources      No service has been selected for the patient.        Transportation Services  Private: Car    Final Discharge Disposition Code: 06 - home with home health care

## 2020-03-18 NOTE — THERAPY TREATMENT NOTE
Acute Care - Occupational Therapy Treatment Note   [place]     Patient Name: Mingo Guidry  : 1950  MRN: [id number]  Today's Date: 3/18/2020  Onset of Illness/Injury or Date of Surgery: 20  Date of Referral to OT: 20  Referring Physician: Dr. Hilliard    Admit Date: 3/13/2020       ICD-10-CM ICD-9-CM   1. Shortness of breath R06.02 786.05   2. Elevated troponin R79.89 790.6   3. Unstable angina (CMS/HCC) I20.0 411.1   4. Impaired mobility and ADLs Z74.09 799.89     Patient Active Problem List   Diagnosis   • Chronic diastolic heart failure (CMS/HCC)   • Lower extremity edema   • Essential hypertension   • Type 2 diabetes mellitus (CMS/HCC)   • COPD with acute exacerbation (CMS/HCC)   • Obesity   • Acid reflux   • Gout   • Seasonal allergies   • Mixed hyperlipidemia   • Acute on chronic respiratory failure with hypoxia (CMS/[facility])   • Pneumonia of left upper lobe due to infectious organism (CMS/HCC)   • Acute GI bleeding   • Gastritis   • Mg's esophagus   • Acute blood loss anemia   • Acute bronchitis   • Abnormal CT scan of lung   • Pneumonia of right lower lobe due to infectious organism (CMS/HCC)   • Postobstructive pneumonia   • Acute aspiration pneumonia (CMS/HCC)   • Obstructive sleep apnea   • Diastolic dysfunction   • Chronic renal failure syndrome, stage 3 (moderate) (CMS/HCC)   • Leukemoid reaction   • Foreign body aspiration   • Shortness of breath   • Unstable angina (CMS/HCC)     Past Medical History:   Diagnosis Date   • Acid reflux 10/17/2016   • Asthma     bronchial   • Cancer (CMS/HCC)     uterine   • Chronic diastolic heart failure (CMS/HCC) 10/17/2016    Normal dobutamine echocardiogram stress, 2005. Echocardiogram on 2009:  EF 50% to 55%. Left atrium 3.5 cm.   • Chronic obstructive pulmonary disease (CMS/HCC) 10/17/2016    asthmatic bronchitis, on O2 use chronically.     • Gout 10/17/2016   • High cholesterol 2018   • Hypertension 10/17/2016    Benign  hypertension.   • Lower extremity edema 10/17/2016    Chronic lower extremity edema/venous insufficiency.   • Murmur, heart    • Obesity 10/17/2016   • Renal failure     stageIV   • Seasonal allergies 10/17/2016   • Type 2 diabetes mellitus (CMS/[facility]) 10/17/2016    insulin dependent.     Past Surgical History:   Procedure Laterality Date   • BRONCHOSCOPY N/A 2018    Procedure: BRONCHOSCOPY WITH WASHINGS;  Surgeon: Ryder Dahl MD;  Location: [facility] OR;  Service:    • CARDIAC CATHETERIZATION N/A 3/17/2020    Procedure: Coronary angiography;  Surgeon: Elkin Zhou MD;  Location: [facility] CATH INVASIVE LOCATION;  Service: Cardiology;  Laterality: N/A;   •  SECTION     • COLONOSCOPY     • COLONOSCOPY N/A 2019    Procedure: COLONOSCOPY;  Surgeon: Skinny York MD;  Location: [facility] ENDOSCOPY;  Service: General   • CYSTECTOMY Right     neck   • DILATATION AND CURETTAGE     • ENDOSCOPY N/A 6/10/2019    Procedure: ESOPHAGOGASTRODUODENOSCOPY WITH BIOPSY;  Surgeon: Skinny York MD;  Location: [facility] ENDOSCOPY;  Service: Gastroenterology   • EYE SURGERY Bilateral     cataract   • FOREIGN BODY REMOVAL N/A 3/5/2020    Procedure: BRONCHOSCOPY with MAC and removal of foreign object;  Surgeon: Ryder Dahl MD;  Location: [facility] OR;  Service: Pulmonary;  Laterality: N/A;   • HYSTERECTOMY     • LUNG SURGERY Left 1997 tumors removed from left lung-benign   • TONSILLECTOMY AND ADENOIDECTOMY         Therapy Treatment    Rehabilitation Treatment Summary     Row Name 20 1105             Treatment Time/Intention    Discipline  occupational therapist  -SD      Document Type  therapy note (daily note)  -SD      Subjective Information  complains of;pain  -SD      Mode of Treatment  occupational therapy  -SD      Patient/Family Observations  supine in bed, 4L O2  -SD      Care Plan Review  care plan/treatment goals reviewed  -SD      Patient Effort  good  -SD      Existing  Precautions/Restrictions  fall;oxygen therapy device and L/min  -SD      Recorded by [SD] Sarah Beckman, OT 03/18/20 1324      Row Name 03/18/20 1105             Vital Signs    Pre SpO2 (%)  100  -SD      O2 Delivery Pre Treatment  supplemental O2  -SD      Intra SpO2 (%)  99  -SD      O2 Delivery Intra Treatment  supplemental O2  -SD      Post SpO2 (%)  100  -SD      O2 Delivery Post Treatment  supplemental O2  -SD      Recorded by [SD] Sarah Beckman OT 03/18/20 1324      Row Name 03/18/20 1105             Bed Mobility Assessment/Treatment    Supine-Sit [place] (Bed Mobility)  conditional independence  -SD      Assistive Device (Bed Mobility)  head of bed elevated  -SD      Recorded by [SD] Sarah Beckman OT 03/18/20 1324      Row Name 03/18/20 1105             Functional Mobility    Functional Mobility- Ind. Level  contact guard assist;standby assist  -SD      Functional Mobility- Device  rolling walker  -SD      Functional Mobility-Distance (Feet)  15 23  -SD      Recorded by [SD] Sarah Beckman OT 03/18/20 1324      Row Name 03/18/20 1105             Transfer Assessment/Treatment    Transfer Assessment/Treatment  sit-stand transfer;stand-sit transfer;toilet transfer  -SD      Recorded by [SD] Sarah Beckman OT 03/18/20 1324      Row Name 03/18/20 1105             Sit-Stand Transfer    Sit-Stand [place] (Transfers)  stand by assist  -SD      Assistive Device (Sit-Stand Transfers)  walker, front-wheeled  -SD      Recorded by [SD] Sarah Beckman OT 03/18/20 1324      Row Name 03/18/20 1105             Stand-Sit Transfer    Stand-Sit [place] (Transfers)  stand by assist  -SD      Assistive Device (Stand-Sit Transfers)  walker, front-wheeled  -SD      Recorded by [SD] Sarah Beckman OT 03/18/20 1324      Row Name 03/18/20 1105             Toilet Transfer    Type (Toilet Transfer)  stand pivot/stand step  -SD      [place] Level (Toilet Transfer)  stand by assist;contact guard   -SD      Assistive Device (Toilet Transfer)  commode;grab bars/safety frame  -SD      Recorded by [SD] Sarah Beckman OT 03/18/20 1324      Row Name 03/18/20 1105             Grooming Assessment/Training    [place] Level (Grooming)  hair care, combing/brushing;oral care regimen;wash face, hands;supervision standing at sink  -SD      Recorded by [SD] Sarah Beckman OT 03/18/20 1324      Row Name 03/18/20 1105             Toileting Assessment/Training    [place] Level (Toileting)  supervision;contact guard assist  -SD      Recorded by [SD] Sarah Beckman OT 03/18/20 1324      Row Name 03/18/20 1105             Therapeutic Exercise    Upper Extremity Range of Motion (Therapeutic Exercise)  shoulder flexion/extension, bilateral;shoulder abduction/adduction, bilateral;shoulder horizontal abduction/adduction, bilateral;elbow flexion/extension, bilateral  -SD      Exercise Type (Therapeutic Exercise)  AROM (active range of motion)  -SD      Position (Therapeutic Exercise)  seated  -SD      Sets/Reps (Therapeutic Exercise)  1 x 15  -SD      Expected Outcome (Therapeutic Exercise)  improve functional tolerance, self-care activity  -SD      Recorded by [SD] Sarah Beckman OT 03/18/20 1324      Row Name 03/18/20 1105             Positioning and Restraints    Pre-Treatment Position  in bed  -SD      Post Treatment Position  chair  -SD      In Chair  reclined;call light within reach;encouraged to call for assist  -SD      Recorded by [SD] Sarah Beckman OT 03/18/20 1324      Row Name 03/18/20 1105             Pain Scale: Numbers Pre/Post-Treatment    Pain Scale: Numbers, Pretreatment  6/10  -SD      Pain Scale: Numbers, Post-Treatment  6/10  -SD      Pain Location - Orientation  lower  -SD      Pain Location  back  -SD      Pain Intervention(s)  Repositioned;Ambulation/increased activity  -SD      Recorded by [SD] Sarah Beckman OT 03/18/20 1324      Row Name 03/18/20 1105             Coping     Observed Emotional State  calm;cooperative  -SD      Verbalized Emotional State  acceptance  -SD      Recorded by [SD] Sarah Beckman OT 03/18/20 1324      Row Name 03/18/20 1105             Plan of Care Review    Plan of Care Reviewed With  patient  -SD      Progress  improving  -SD      Outcome Summary  OT tx completed. Patient completed transfers and functional mobility with SBA-CGA, toileting task with SBA-CGA, grooming tasks standing at sink with sup, followed by UB ther ex. O2 on 4L maintained .   -SD      Recorded by [SD] Sarah Beckman OT 03/18/20 1324      Row Name 03/18/20 1105             Outcome Summary/Treatment Plan (OT)    Daily Summary of Progress (OT)  progress toward functional goals is good  -SD      Recorded by [SD] Sarah Beckman OT 03/18/20 1324        User Key  (r) = Recorded By, (t) = Taken By, (c) = Cosigned By    Initials Name Effective Dates Discipline    SD Sarah Beckman OT 03/07/18 -  OT                 OT Recommendation and Plan  Outcome Summary/Treatment Plan (OT)  Daily Summary of Progress (OT): progress toward functional goals is good  Anticipated Discharge Disposition (OT): inpatient rehabilitation facility  Planned Therapy Interventions (OT Eval): activity tolerance training, adaptive equipment training, BADL retraining, patient/caregiver education/training, strengthening exercise, transfer/mobility retraining  Therapy Frequency (OT Eval): 3 times/wk(5 times if indicated)  Daily Summary of Progress (OT): progress toward functional goals is good  Plan of Care Review  Plan of Care Reviewed With: patient  Plan of Care Reviewed With: patient  Outcome Summary: OT tx completed. Patient completed transfers and functional mobility with SBA-CGA, toileting task with SBA-CGA, grooming tasks standing at sink with sup, followed by UB ther ex. O2 on 4L maintained .   Outcome Measures     Row Name 03/18/20 1105 03/16/20 1101 03/16/20 0936       How much help from another  person do you currently need...    Turning from your back to your side while in flat bed without using bedrails?  --  4  -LM  --    Moving from lying on back to sitting on the side of a flat bed without bedrails?  --  4  -LM  --    Moving to and from a bed to a chair (including a wheelchair)?  --  3  -LM  --    Standing up from a chair using your arms (e.g., wheelchair, bedside chair)?  --  3  -LM  --    Climbing 3-5 steps with a railing?  --  2  -LM  --    To walk in hospital room?  --  3  -LM  --    AM-PAC 6 Clicks Score (PT)  --  19  -LM  --       How much help from another is currently needed...    Putting on and taking off regular lower body clothing?  3  -SD  --  3  -SD    Bathing (including washing, rinsing, and drying)  3  -SD  --  3  -SD    Toileting (which includes using toilet bed pan or urinal)  3  -SD  --  3  -SD    Putting on and taking off regular upper body clothing  4  -SD  --  3  -SD    Taking care of personal grooming (such as brushing teeth)  4  -SD  --  3  -SD    Eating meals  4  -SD  --  3  -SD    AM-PAC 6 Clicks Score (OT)  21  -SD  --  18  -SD       Functional Assessment    Outcome Measure Options  AM-PAC 6 Clicks Daily Activity (OT)  -SD  AM-PAC 6 Clicks Basic Mobility (PT)  -LM  AM-PAC 6 Clicks Daily Activity (OT)  -SD      User Key  (r) = Recorded By, (t) = Taken By, (c) = Cosigned By    Initials Name Provider Type    Davida Briscoe, PT Physical Therapist    Sarah Romero, OT Occupational Therapist           Time Calculation:   Time Calculation- OT     Row Name 03/18/20 1326             Time Calculation- OT    OT Start Time  1105  -SD      Total Timed Code Minutes- OT  24 minute(s)  -SD      OT Received On  03/18/20  -SD      OT Goal Re-Cert Due Date  03/26/20  -SD         Timed Charges    73594 - OT Therapeutic Exercise Minutes  10  -SD      00641 - OT Therapeutic Activity Minutes  6  -SD      12243 - OT Self Care/Mgmt Minutes  8  -SD        User Key  (r) = Recorded By, (t) =  Taken By, (c) = Cosigned By    Initials Name Provider Type    Sarah Romero OT Occupational Therapist        Therapy Charges for Today     Code Description Service Date Service Provider Modifiers Qty    [id number]  OT THER PROC EA 15 MIN 3/18/2020 Sarah Beckman OT GO 1    [id number]  OT SELF CARE/MGMT/TRAIN EA 15 MIN 3/18/2020 Sarah Beckman OT GO 1               Sarah Beckman OT  3/18/2020

## 2020-03-18 NOTE — PROGRESS NOTES
"  CC: Acute Respiratory Failure.     S: Currently off the BiPAP feels that her shortness of breath has improved.  Complains of minimal cough. She continues to require oxygen, which is close to her baseline.  Upon further questioning the patient does mention mild palpitation before she laid down on the night, when she was discharged.    ROS: Positive for mild shortness of breath and occasional cough.  Negative for fever, chills, night sweats or diarrhea.    O:Vital signs reviewed. FiO2: 3 L/min.  /66 (BP Location: Left arm, Patient Position: Sitting)   Pulse 72   Temp 98.2 °F (36.8 °C) (Oral)   Resp 18   Ht 147.3 cm (58\")   Wt 83.7 kg (184 lb 8.4 oz)   LMP  (LMP Unknown)   SpO2 92%   BMI 38.57 kg/m²     Temp (24hrs), Av.4 °F (36.9 °C), Min:98.2 °F (36.8 °C), Max:98.6 °F (37 °C)    I & Os reviewed.   Intake/Output       20 0700 - 20 0659 20 0700 - 20 0659    Intake (ml) 948 480    Output (ml) 1700 250    Net (ml) -752 230        General/Constitutional: Appears to be in no distress.  Eyes: PERRL. EOMI.  Neck: Supple without JVD. No obvious masses noted.   Cardiovascular: S1 + S2.  Appears regular at this time.  Lungs/Respiratory: Good air entry bilaterally with mild wheezing heard.  Minimal bilateral basal crackles noted  Musculoskeletal/Extremities: Trace edema noted. Gait could not be assessed at this time, as the patient was laying in bed.   Neurologic: Was able to follow commands.  Was able to move all 4 extremities sluggishly.  Psych: AAOx3. Appears to be somewhat agitated today.       Labs: Reviewed.   Results from last 7 days   Lab Units 20  0617 20  0547 20  0547 03/15/20  0547 20  0610   WBC 10*3/mm3 22.97* 24.71* 28.79* 32.15* 20.01*   NEUTROPHIL % %  --  76.9* 78.6* 84.6* 94.3*   HEMOGLOBIN g/dL 10.5* 10.7* 11.5* 11.2* 10.8*   HEMATOCRIT % 31.9* 33.4* 36.6 34.8 33.8*   PLATELETS 10*3/mm3 294 321 364 360 296       Results from last 7 days   Lab " Units 03/18/20  0617 03/17/20  0547 03/16/20  0547 03/15/20  0547 03/14/20  0610 03/13/20  0805   SODIUM mmol/L 139 143 150* 148* 145 145   POTASSIUM mmol/L 4.1 3.7 4.1 4.5 5.5* 4.8   CHLORIDE mmol/L 98 97* 103 102 101 99   CO2 mmol/L 30.6* 35.3* 34.2* 35.7* 30.6* 34.9*   BUN mg/dL 64* 67* 67* 63* 53* 59*   CREATININE mg/dL 1.68* 1.44* 1.40* 1.41* 1.23* 1.43*   CALCIUM mg/dL 8.8 9.6 9.8 9.5 9.2 9.1   BILIRUBIN mg/dL  --   --   --  0.3 0.4 0.3   ALK PHOS U/L  --   --   --  62 56 52   ALT (SGPT) U/L  --   --   --  39* 48* 64*   AST (SGOT) U/L  --   --   --  13 19 31   GLUCOSE mg/dL 340* 72 66 244* 500* 82   TOTAL PROTEIN g/dL  --   --   --  6.1 6.1 5.8*   ALBUMIN g/dL  --   --  3.50 3.60 3.20* 3.30*     Results from last 7 days   Lab Units 03/16/20  0547 03/15/20  0547   MAGNESIUM mg/dL 2.0 2.2   PHOSPHORUS mg/dL 4.3  --        Results from last 7 days   Lab Units 03/17/20  0547   INR  1.05       Lab Results   Component Value Date    PROCALCITO 0.26 (H) 03/15/2020    PROCALCITO 0.30 (H) 03/14/2020    PROCALCITO 0.33 (H) 03/13/2020       Lab Results   Component Value Date    PROBNP 844.6 03/13/2020    PROBNP 2,713.0 (H) 02/29/2020    PROBNP 419.4 02/28/2020       Micro: As of March 18, 2020   Lab Results   Component Value Date    RESPCX Light growth (2+) Pseudomonas aeruginosa (A) 03/15/2020    RESPCX No Normal Respiratory Sera (A) 03/15/2020    RESPCX Light growth (2+) Normal Respiratory Sera 03/05/2020     Lab Results   Component Value Date    BLOODCX No growth at 3 days 03/15/2020    BLOODCX No growth at 3 days 03/15/2020    BLOODCX No growth at 5 days 02/29/2020       Lab Results   Component Value Date    URCX Yes 02/26/2020    URCX Yes 10/21/2019       ABG: Reviewed.  Lab Results   Component Value Date    PHART 7.463 03/13/2020    BXM3OBX 54.9 (H) 03/13/2020    PO2ART 63.0 (L) 03/13/2020    HGBBG 11.7 (L) 03/13/2020    L3LTCMKK 92.7 (L) 03/13/2020    CARBOXYHGB 0.8 03/13/2020         CXRay: Latest imaging study  was reviewed personally.   Imaging Results (Last 24 Hours)     ** No results found for the last 24 hours. **            Assessment & Recommendations/Plan:   1.  Shortness of breath  Could be from multiple etiologies.  ?  From pneumonia.    2.  Right sided pneumonia.  Previous cultures were negative although later sputum culture is showing light growth of Pseudomonas?    3.  Abnormal CT.   Status post bronchoscopy  May need outpatient follow-up in 3 to 4 months    4.  COPD with exacerbation.  Will consider adjusting steroids, as indicated.    5.  Obstructive sleep apnea.    On BiPAP at home    6.  History of smoking.    7.  Chronic hypercarbic and hypoxic respiratory failure   On oxygen at home  Last ABG shows fair control.    8.  Paroxysmal atrial fibrillation.   Currently appears in sinus rhythm.  1 consideration is a possibility of rapid ventricular rate causing pulmonary edema?    9.  Grade 2 Diastolic Dysfunction.   Clinically stable  Being diuresed    10.  Leukocytosis.  Stable/improved    She will need to follow-up with our office in 8 to 10 weeks after discharge.      We have reviewed patient's current orders and changes, if any, have been suggested to primary care team. Plan was also discussed with nursing staff, as necessary.     This document was electronically signed by Ryder Dahl MD on 03/18/20 at 14:16      Dictated utilizing Dragon dictation.

## 2020-03-18 NOTE — PROGRESS NOTES
Continued Stay Note  BRIELLE [place]     Patient Name: Mingo Guidry  MRN: [id number]  Today's Date: 3/18/2020    Admit Date: 3/13/2020    Discharge Plan     Row Name 03/18/20 1100       Plan    Plan Comments  Spoke to pt regarding discharge plans She is planning on returning home She has oxygen 3 LNC BIPAP at home Family transport home They will be staying with her She is declining rehab         Discharge Codes    No documentation.       Expected Discharge Date and Time     Expected Discharge Date Expected Discharge Time    Mar 19, 2020             Ana Romano RN

## 2020-03-18 NOTE — DISCHARGE SUMMARY
[facility]   [facility]    Name:  Mingo Guidry   Age:  69 y.o.  Sex:  female  :  1950  MRN:  [id number]   Visit Number:  [id number]  Primary Care Physician:  Luz Prater APRN  Date of Discharge:  3/18/2020  Admission Date:  3/13/2020      Presenting Problem:    Shortness of breath [R06.02]  Shortness of breath [R06.02]       Discharge Diagnosis:       Unstable angina (CMS/Formerly [facility])    Leukemoid reaction    Pseudomonas infection    Essential hypertension    Type 2 diabetes mellitus (CMS/Formerly [facility])    Obesity    Shortness of breath        Past Medical History:  Past Medical History:   Diagnosis Date   • Acid reflux 10/17/2016   • Asthma     bronchial   • Cancer (CMS/Formerly [facility])     uterine   • Chronic diastolic heart failure (CMS/Formerly [facility]) 10/17/2016    Normal dobutamine echocardiogram stress, 2005. Echocardiogram on 2009:  EF 50% to 55%. Left atrium 3.5 cm.   • Chronic obstructive pulmonary disease (CMS/Formerly [facility]) 10/17/2016    asthmatic bronchitis, on O2 use chronically.     • Gout 10/17/2016   • High cholesterol 2018   • Hypertension 10/17/2016    Benign hypertension.   • Lower extremity edema 10/17/2016    Chronic lower extremity edema/venous insufficiency.   • Murmur, heart    • Obesity 10/17/2016   • Renal failure     stageIV   • Seasonal allergies 10/17/2016   • Type 2 diabetes mellitus (CMS/Formerly [facility]) 10/17/2016    insulin dependent.         Consults:     Consults     Date and Time Order Name Status Description    3/16/2020 1326 Inpatient Pulmonology Consult Completed     3/16/2020 0804 Inpatient Nephrology Consult Completed     3/3/2020 1351 Inpatient Pulmonology Consult Completed     2020 0024 Inpatient Cardiology Consult Completed           Procedures Performed:    Procedure(s):  Coronary angiography         History of presenting illness/Hospital Course:    This is a 69-year-old female with history of chronic hypoxic respiratory failure on 3 L nasal cannula, BiPAP,  diabetes mellitus type 2, COPD, obesity who was just discharged from this facility on 3/12/2020 after being treated for a postobstructive pneumonia secondary to an aspirated pea which was removed via bronchoscopy.  She was treated with vancomycin, Rocephin which was then changed to Zosyn.  Post bronchoscopy patient did have significant leukocytosis which was felt to be reactive.  Her white count went up to 50,000 at the highest and has been trending down.  She was discharged home in stable condition.  However according to the patient after she was discharged home she complained of leg pain as well as coughing and some chest discomfort.  Due to this she returned to the emergency room.  Troponin in the ER was noted to be mildly elevated.      Due to her complaints of knee pain a Venous duplex was negative for DVT and bilateral knee xray was negative.      A CT scan of her chest was done in the emergency room which showed bibasilar airspace disease consistent with aspiration or pneumonia.  Patient was recently treated with a full course of antibiotics so no further antibiotics have been initiated.  Cytology and cultures were negative and she was treated with a full round of antibiotics.     Sputum culture was done upon admission for which her respiratory culture is growing a light growth of Pseudomonas.  I  started the patient on Zosyn.  She was seen and evaluated by Dr. Dahl with pulmonology.  He does feel that this is likely a colonization especially given that her cultures have not shown any growth from the previous [facility].  He did agree however to continue treatment.  I will switch her to Levaquin oral upon discharge and have her follow up with Dr. Dahl as an outpatient.    As for her elevated troponin she was seen and evaluated by cardiology.  Her troponin has trended back to normal.  Dr. caruso with cardiology did evaluate the patient and does plan to do a heart catheterization on 3/17/2020.   Patient did have  a left-sided heart catheterization on 3/17/2020 for which she had no obstructing coronary vessel disease.  Dr. Hager did recommend aggressive secondary risk modification.  Patient does have a history of chronic kidney disease for which nephrology was consulted.  Her renal function is stable for which nephrology recommends starting the patient back on her home dose of diuretic today.  Her renal function is stable with creatinine of 1.6.  It is only mildly elevated due to contrast from her left heart catheterization.    Have seen and evaluated patient at bedside.  She is alert and oriented move extremities on command.  She denies any chest pain, abdominal pain, nausea or vomiting.  She is anxious for discharge home.  She still has some leukocytosis but it does appear to be trending down.  We will have her follow-up with hematologist Dr. Chavez  as scheduled.  She also has appointments already scheduled for Dr. Dahl and Cardiology      Vital Signs:    Temp:  [98.2 °F (36.8 °C)-98.6 °F (37 °C)] 98.2 °F (36.8 °C)  Heart Rate:  [60-81] 72  Resp:  [17-20] 18  BP: (117-137)/(50-75) 118/66    Physical Exam:  General Appearance:    Alert and cooperative, not in any acute distress.   Head:    Atraumatic and normocephalic, without obvious abnormality.   Eyes:            PERRLA, conjunctivae and sclerae normal, no Icterus. No pallor. Extra-occular movements are within normal limits.   Ears:    Ears appear intact with no abnormalities noted.   Throat:   No oral lesions, no thrush, oral mucosa moist.   Neck:   Supple, trachea midline, no thyromegaly, no carotid bruit.       Lungs:     Chest shape is normal. Breath sounds heard bilaterally equally.  No crackles or wheezing. No Pleural rub or bronchial breathing.    Heart:    Normal S1 and S2, no murmur, no gallop, no rub. No JVD   Abdomen:     Normal bowel sounds, no masses, no organomegaly. Soft        non-tender, non-distended, no guarding, no rebound                Tenderness,  obese   Extremities:   Moves all extremities well, trace edema, no cyanosis, no             clubbing   Pulses:   Pulses palpable and equal bilaterally   Skin:   No bleeding, bruising or rash       Psychiatric/Behavior:        Normal mood, normal behavior   Neurologic:   Cranial nerves 2 - 12 grossly intact, sensation intact, Motor power is normal and equal bilaterally.           Pertinent Lab Results:     Results from last 7 days   Lab Units 03/18/20  0617 03/17/20  0547 03/16/20  0547 03/15/20  0547 03/14/20  0610 03/13/20  0805   SODIUM mmol/L 139 143 150* 148* 145 145   POTASSIUM mmol/L 4.1 3.7 4.1 4.5 5.5* 4.8   CHLORIDE mmol/L 98 97* 103 102 101 99   CO2 mmol/L 30.6* 35.3* 34.2* 35.7* 30.6* 34.9*   BUN mg/dL 64* 67* 67* 63* 53* 59*   CREATININE mg/dL 1.68* 1.44* 1.40* 1.41* 1.23* 1.43*   CALCIUM mg/dL 8.8 9.6 9.8 9.5 9.2 9.1   BILIRUBIN mg/dL  --   --   --  0.3 0.4 0.3   ALK PHOS U/L  --   --   --  62 56 52   ALT (SGPT) U/L  --   --   --  39* 48* 64*   AST (SGOT) U/L  --   --   --  13 19 31   GLUCOSE mg/dL 340* 72 66 244* 500* 82     Results from last 7 days   Lab Units 03/18/20  0617 03/17/20  0547 03/16/20  0547   WBC 10*3/mm3 22.97* 24.71* 28.79*   HEMOGLOBIN g/dL 10.5* 10.7* 11.5*   HEMATOCRIT % 31.9* 33.4* 36.6   PLATELETS 10*3/mm3 294 321 364     Results from last 7 days   Lab Units 03/17/20  0547   INR  1.05     Blood Culture   Date Value Ref Range Status   03/15/2020 No growth at 3 days  Preliminary   03/15/2020 No growth at 3 days  Preliminary         Pertinent Radiology Results:    Imaging Results (All)     Procedure Component Value Units Date/Time    XR Chest 1 View [[id number]] Collected:  03/16/20 0839     Updated:  03/16/20 0842    Narrative:       PROCEDURE: XR CHEST 1 VW-     HISTORY: DYSPNEA CHRONIC, NO XRAY     COMPARISON: 03/13/2020.     FINDINGS: The heart is normal in size. The mediastinum is unremarkable.  There has been interval improvement in the patient's bibasilar airspace  disease  compared to the prior exams with a residual opacity seen in the  left lung base. There is no pneumothorax.  There are no acute osseous  abnormalities.       Impression:       Interval improvement in the patient's bibasilar airspace  disease.     Continued followup is recommended.     This report was finalized on 3/16/2020 8:40 AM by Fabricio Wolff M.D..    US Venous Doppler Lower Extremity Bilateral (duplex) [[id number]] Collected:  03/14/20 1239     Updated:  03/14/20 1240    Narrative:       FINAL REPORT    TECHNIQUE:  Grayscale and color Doppler ultrasound images with graded  compression of the deep venous system were obtained from the  groin to the calf veins bilaterally.    CLINICAL HISTORY:  PAIN, SWELLING    FINDINGS:  The deep venous system is normal.  There is no evidence of DVT.  Flow and compressibility are normal.      Impression:       No evidence of left or right lower extremity DVT.    Authenticated by Fabricio Wolff MD on 03/14/2020 12:39:36 PM    XR Knee 1 or 2 View Bilateral [[id number]] Collected:  03/14/20 0923     Updated:  03/14/20 0925    Narrative:       PROCEDURE: XR KNEE 1 OR 2 VW BILATERAL-     History: knee pain; R06.02-Shortness of breath; R79.89-Other specified  abnormal findings of blood chemistry     COMPARISON: None.     FINDINGS:  A 3 view exam demonstrates no acute fracture or dislocation.  There are tricompartmental degenerative changes bilaterally consistent  with osteoarthritis. No soft tissue abnormality is seen.       Impression:       No acute fracture.               This report was finalized on 3/14/2020 9:23 AM by Fabricio Wolff M.D..    CT Chest Without Contrast [[id number]] Collected:  03/13/20 1049     Updated:  03/13/20 1052    Narrative:       PROCEDURE: CT CHEST WO CONTRAST-     HISTORY: soa     COMPARISON: 03/30/2020.     PROCEDURE:  Multiple axial CT images were obtained from the thoracic  inlet through the upper abdomen without the use of contrast.         FINDINGS:   Soft tissue windows reveal no axillary, hilar or mediastinal adenopathy.  Heart size is normal. The aorta is normal in caliber. There are no  pleural or pericardial effusions. Lung windows reveal patchy airspace  disease in the lung bases consistent with either aspiration or  pneumonia. The visualized upper abdomen is unremarkable. Bone windows  reveal no acute osseous abnormalities.       Impression:       Bibasilar airspace disease consistent with aspiration or  pneumonia.     605.66 mGy.cm        This study was performed with techniques to keep radiation doses as low  as reasonably achievable (ALARA). Individualized dose reduction  techniques using automated exposure control or adjustment of mA and/or  kV according to the patient size were employed.      This report was finalized on 3/13/2020 10:50 AM by Fabricio Wolff M.D..    XR Chest 1 View [[id number]] Collected:  03/13/20 0846     Updated:  03/13/20 0849    Narrative:       PROCEDURE: XR CHEST 1 VW-     HISTORY: cough, soa     COMPARISON: 03/11/2020.     FINDINGS: The heart is normal in size. The mediastinum is unremarkable.  There are linear opacities in both lung bases which may reflect  atelectasis or pneumonia. No effusions are evident. There is no  pneumothorax.  There are no acute osseous abnormalities.       Impression:       Linear opacities in the lung bases which may reflect  atelectasis or pneumonia.     Continued followup is recommended.     This report was finalized on 3/13/2020 8:47 AM by Fabricio Wolff M.D..          Condition on Discharge:    Stable        Discharge Disposition:        Discharge Medication:       Discharge Medications      New Medications      Instructions Start Date   levoFLOXacin 500 MG tablet  Commonly known as:  Levaquin   250 mg, Oral, Daily         Changes to Medications      Instructions Start Date   flunisolide 25 MCG/ACT (0.025%) solution nasal spray  Commonly known as:  NASALIDE  What changed:     · when to take this  · reasons to take this   1 spray, Inhalation, Every 12 Hours      ipratropium-albuterol 0.5-2.5 mg/3 ml nebulizer  Commonly known as:  DUO-NEB  What changed:    · when to take this  · reasons to take this   3 mL, Nebulization, 4 Times Daily - RT         Continue These Medications      Instructions Start Date   Acidophilus/Citrus Pectin tablet tablet   1 tablet, Oral, Daily      Advair Diskus 500-50 MCG/DOSE DISKUS  Generic drug:  fluticasone-salmeterol   INHALE 1 PUFF BY MOUTH TWO TIMES A DAY      allopurinol 300 MG tablet  Commonly known as:  ZYLOPRIM   300 mg, Oral, Daily      Aspirin Low Dose 81 MG EC tablet  Generic drug:  aspirin   81 mg, Oral, Daily      Blood Glucose Monitoring Suppl device   1 each, Does not apply      busPIRone 10 MG tablet  Commonly known as:  BUSPAR   10 mg, Oral, 3 Times Daily      Claritin 10 MG capsule  Generic drug:  Loratadine   1 tablet, Oral, Daily      Daliresp 500 MCG tablet tablet  Generic drug:  roflumilast   TAKE ONE TABLET BY MOUTH EVERY DAY      dilTIAZem  MG 24 hr capsule  Commonly known as:  CARDIZEM CD   180 mg, Oral, Every 24 Hours Scheduled      Eliquis 5 MG tablet tablet  Generic drug:  apixaban   5 mg, Oral, Every 12 Hours Scheduled      ezetimibe 10 MG tablet  Commonly known as:  ZETIA   10 mg, Oral, Daily      fenofibrate 145 MG tablet  Commonly known as:  TRICOR   145 mg, Oral, Daily      ferrous sulfate 325 (65 FE) MG tablet   325 mg, Oral, 3 Times Daily With Meals      furosemide 40 MG tablet  Commonly known as:  LASIX   40 mg, Oral, Daily      GNP Vitamin D Maximum Strength 50 MCG (2000 UT) tablet  Generic drug:  Cholecalciferol   1 tablet, Oral, Daily      HumaLOG 100 UNIT/ML injection  Generic drug:  insulin lispro   35 Units, Subcutaneous, 3 Times Daily      HYDROcodone-acetaminophen 5-325 MG per tablet  Commonly known as:  NORCO   1 tablet, Oral, Nightly      metoclopramide 5 MG tablet  Commonly known as:  REGLAN   5 mg, Oral, 3  "Times Daily      metOLazone 5 MG tablet  Commonly known as:  ZAROXOLYN   5 mg, Oral, Daily PRN      metoprolol succinate  MG 24 hr tablet  Commonly known as:  TOPROL-XL   100 mg, Oral, Every 24 Hours Scheduled      montelukast 10 MG tablet  Commonly known as:  SINGULAIR   10 mg, Oral, Nightly      O2  Commonly known as:  OXYGEN   3 L/min, Inhalation, Daily      omalizumab 150 MG injection  Commonly known as:  XOLAIR   300 mg, Subcutaneous, Every 28 Days, 2 injections once a month      pantoprazole 40 MG EC tablet  Commonly known as:  Protonix   40 mg, Oral, Daily      Pataday 0.2 % solution ophthalmic solution  Generic drug:  olopatadine   Both Eyes, 2 Times Daily      potassium chloride 20 MEQ CR tablet  Commonly known as:  K-DUR,KLOR-CON   20 mEq, Oral, Daily      pravastatin 10 MG tablet  Commonly known as:  PRAVACHOL   10 mg, Oral, Daily      ProAir  (90 Base) MCG/ACT inhaler  Generic drug:  albuterol sulfate HFA   INHALE 2 PUFFS EVERY 4 (FOUR) HOURS AS NEEDED FOR WHEEZING OR SHORTNESS OF AIR.      rOPINIRole 0.5 MG tablet  Commonly known as:  REQUIP   0.5 mg, Oral, 2 Times Daily      SITagliptin 100 MG tablet  Commonly known as:  JANUVIA   100 mg, Oral, Daily, 1/2 tablet daily      Spiriva HandiHaler 18 MCG per inhalation capsule  Generic drug:  tiotropium   PLACE 1 CAPSULE INTO INHALER AND INHALE DAILY.      sucralfate 1 g tablet  Commonly known as:  CARAFATE   1 g, Oral, 3 Times Daily      traMADol 50 MG tablet  Commonly known as:  ULTRAM   50 mg, Oral, 3 Times Daily      Tresiba FlexTouch 100 UNIT/ML solution pen-injector injection  Generic drug:  insulin degludec   2 Times Daily, 35 units in the am 80 units at night      True Metrix Blood Glucose Test test strip  Generic drug:  glucose blood   Use to check blood sugar 3 times daily.      UltiCare Insulin Syringe 31G X 5/16\" 1 ML misc  Generic drug:  Insulin Syringe-Needle U-100   No dose, route, or frequency recorded.      Vascepa 1 g capsule " capsule  Generic drug:  icosapent ethyl   2 g, 2 Times Daily With Meals         Stop These Medications    predniSONE 20 MG tablet  Commonly known as:  DELTASONE            Discharge Diet:         Activity at Discharge:         Follow-up Appointments:    Future Appointments   Date Time Provider Department Center   3/25/2020 10:15 AM Maurisio Chavez MD [facility]   3/31/2020  1:30 PM Herson Knox MD [facility] None   4/13/2020 10:00 AM Ryder Dahl MD [facility] None   2/25/2021  1:15 PM Jorge Luis Mcnair III, MD [facility] None     Additional Instructions for the Follow-ups that You Need to Schedule     Ambulatory Referral to Home Health   As directed      Face to Face Visit Date:  3/18/2020    Follow-up provider for Plan of Care?:  I treated the patient in an acute care facility and will not continue treatment after discharge.    Follow-up provider:  BEA HARTMANN [[id number]]    Medical necessity for home care:  strength mobility    Describe mobility, cognitive and/or behavioral limitations that make leaving home difficult:  COPD, CHF    Individual has a medical condition for which leaving home is medically contraindicated:  Yes    Describe condition:  CHF COPD    Patient uses assistive devices:  Yes    Devices used:  walker    Nursing/Therapeutic Services Requested:  Skilled Nursing Physical Therapy    Skilled nursing orders:  Cardiopulmonary assessments COPD management    Other services requested:  Occupational Therapy    Occupational orders:  Activities of daily living    PT orders:  Strengthening    Frequency:  1 Week 1               Test Results Pending at Discharge:     Order Current Status    Green Top (No Gel) In process    Wayside Draw In process    Blood Culture With SUBHA - Blood, Arm, Right Preliminary result    Blood Culture With SUBHA - Blood, Hand, Right Preliminary result             JACKI Pollard  03/18/20  14:06    Time:  45  minutes were spent reviewing labs, history,  evaluating patient and discharge planning.

## 2020-03-18 NOTE — PLAN OF CARE
Problem: NPPV/CPAP (Adult)  Goal: Signs and Symptoms of Listed Potential Problems Will be Absent, Minimized or Managed (NPPV/CPAP)  Flowsheets (Taken 3/18/2020 8800)  Problems Assessed (NPPV/CPAP): all  Problems Present (NPPV/CPAP): none

## 2020-03-18 NOTE — PROGRESS NOTES
NEPHROLOGY PROGRESS NOTE    PATIENT IDENTIFICATION:   Name:  Mingo Guidry      MRN:  [id number]     69 y.o.  female             Reason for visit: CKD     SUBJECTIVE:   Seen and examined.  Shortness of air is much better.  Sitting up in chair with no issues.  OBJECTIVE:  Vitals:    03/18/20 1131 03/18/20 1255 03/18/20 1500 03/18/20 1510   BP: 118/66      BP Location: Left arm      Patient Position: Sitting      Pulse: 68 72     Resp: 18 18     Temp: 98.2 °F (36.8 °C)      TempSrc: Oral      SpO2: 100% 92% 91% 92%   Weight:       Height:               Body mass index is 38.57 kg/m².    Intake/Output Summary (Last 24 hours) at 3/18/2020 1534  Last data filed at 3/18/2020 1300  Gross per 24 hour   Intake 1188 ml   Output 1950 ml   Net -762 ml         Exam:  GEN:  No distress, appears stated age  EYES:   Anicteric sclera  ENT:    External ears/nose normal, MM are moist  NECK:  No adenopathy, JVP none  LUNGS: Normal chest on inspection; not labored  CV:  Normal S1S2, without murmur  ABD:  Non-tender, non-distended, no hepatosplenomegaly, +BS  EXT:  No edema; no cyanosis; clubbing    Scheduled meds:      allopurinol 300 mg Oral Daily   aspirin 81 mg Oral Daily   budesonide 1 mg Nebulization BID - RT   busPIRone 10 mg Oral TID   chlorthalidone 12.5 mg Oral Daily   dilTIAZem  mg Oral Q24H   famotidine 40 mg Oral Daily   furosemide 40 mg Intravenous Q12H   guaiFENesin 1,200 mg Oral Q12H   insulin aspart 0-9 Units Subcutaneous 4x Daily AC & at Bedtime   insulin aspart 9 Units Subcutaneous TID With Meals   [START ON 3/19/2020] insulin detemir 30 Units Subcutaneous QAM   insulin detemir 60 Units Subcutaneous Nightly   ipratropium-albuterol 3 mL Nebulization Q6H While Awake - RT   linagliptin 5 mg Oral Daily   metoclopramide 5 mg Oral TID   metoprolol succinate  mg Oral Q24H   montelukast 10 mg Oral Nightly   piperacillin-tazobactam 3.375 g Intravenous Q8H   pravastatin 10 mg Oral Nightly   predniSONE 20 mg Oral  Daily With Breakfast   rOPINIRole 0.5 mg Oral BID   sodium chloride 10 mL Intravenous Q12H   [MAR Hold] sodium chloride 4 mL Nebulization Q8H   sucralfate 1 g Oral TID   traMADol 50 mg Oral TID     IV meds:                           Data Review:    Results from last 7 days   Lab Units 03/18/20 0617 03/17/20  0547 03/16/20  0547 03/15/20  0547 03/14/20  0610 03/13/20  0805   SODIUM mmol/L 139 143 150* 148* 145 145   POTASSIUM mmol/L 4.1 3.7 4.1 4.5 5.5* 4.8   CHLORIDE mmol/L 98 97* 103 102 101 99   CO2 mmol/L 30.6* 35.3* 34.2* 35.7* 30.6* 34.9*   BUN mg/dL 64* 67* 67* 63* 53* 59*   CREATININE mg/dL 1.68* 1.44* 1.40* 1.41* 1.23* 1.43*   CALCIUM mg/dL 8.8 9.6 9.8 9.5 9.2 9.1   BILIRUBIN mg/dL  --   --   --  0.3 0.4 0.3   ALK PHOS U/L  --   --   --  62 56 52   ALT (SGPT) U/L  --   --   --  39* 48* 64*   AST (SGOT) U/L  --   --   --  13 19 31   GLUCOSE mg/dL 340* 72 66 244* 500* 82       Estimated Creatinine Clearance: 30.8 mL/min (A) (by C-G formula based on SCr of 1.68 mg/dL (H)).    Results from last 7 days   Lab Units 03/16/20 0547 03/15/20  0547   MAGNESIUM mg/dL 2.0 2.2   PHOSPHORUS mg/dL 4.3  --        Results from last 7 days   Lab Units 03/18/20 0617 03/17/20  0547 03/16/20  0547 03/15/20  0547 03/14/20  0610   WBC 10*3/mm3 22.97* 24.71* 28.79* 32.15* 20.01*   HEMOGLOBIN g/dL 10.5* 10.7* 11.5* 11.2* 10.8*   PLATELETS 10*3/mm3 294 321 364 360 296       Results from last 7 days   Lab Units 03/17/20  0547   INR  1.05             ASSESSMENT:     Unstable angina (CMS/HCC)    Essential hypertension    Type 2 diabetes mellitus (CMS/HCC)    Obesity    Leukemoid reaction    Shortness of breath    Pseudomonas infection             - CKD III: Creatinine stable at baseline.  Volume excess with diastolic congestive heart failure.  Ok to D/C home from renal stand point. Follow up with Dr Alba office for BMP in one week. Continue home dose of diuretics    -Dyspnea due to chronic COPD and mild volume overload.  Patient  chronically on 3 L oxygen at home.  -Paroxysmal A. Fib: On Cardizem and metoprolol for rate control  -Non-ST JUANITO: Cardiology on board  -Diastolic congestive heart failure    Tamara Nascimento MD  3/18/2020    15:34

## 2020-03-18 NOTE — PROGRESS NOTES
Continued Stay Note   [facility]     Patient Name: Mingo Guidry  MRN: [id number]  Today's Date: 3/18/2020    Admit Date: 3/13/2020    Discharge Plan     Row Name 03/18/20 1431       Plan    Provided Post Acute Provider List?  N/A    Provided Post Acute Provider Quality & Resource List?  N/A    Plan Comments  order for home health called and faxed to pt [facility]         Discharge Codes    No documentation.       Expected Discharge Date and Time     Expected Discharge Date Expected Discharge Time    Mar 18, 2020             Ana Romano RN

## 2020-03-18 NOTE — PLAN OF CARE
Problem: Patient Care Overview  Goal: Plan of Care Review  Outcome: Ongoing (interventions implemented as appropriate)  Flowsheets  Taken 3/18/2020 1324  Progress: improving  Plan of Care Reviewed With: patient  Taken 3/18/2020 1105  Outcome Summary: OT tx completed. Patient completed transfers and functional mobility with SBA-CGA, toileting task with SBA-CGA, grooming tasks standing at sink with sup, followed by UB ther ex. O2 on 4L maintained .

## 2020-03-19 ENCOUNTER — CARE COORDINATION (OUTPATIENT)
Dept: CARE COORDINATION | Age: 70
End: 2020-03-19

## 2020-03-19 NOTE — OUTREACH NOTE
Prep Survey      Responses   [facility] facility patient discharged from?  [facility]   Is LACE score < 7 ?  No   Eligibility  Readm Mgmt   Discharge diagnosis   Unstable angina    Does the patient have one of the following disease processes/diagnoses(primary or secondary)?  COPD/Pneumonia   Does the patient have Home health ordered?  Yes   What is the Home health agency?   [facility]    Is there a DME ordered?  No   Prep survey completed?  Yes          Letitia Celestin RN

## 2020-03-19 NOTE — CARE COORDINATION
[facility] Transitions Initial Follow Up Call     Call within 2 business days of discharge: Yes     Patient: Jaret Rocha Patient : 1950 MRN: <[id number]>    Last Discharge [facility]       Complaint Diagnosis Description Type Department Provider    20 Shortness of Breath; Cough Pneumonia due to organism . .. ED (TRANSFER) [facility] ED [facility], [id number] [facility]  3/18/20  PNA, Flu        RARS: Readmission Risk Score: 28       Spoke with: Courtney Shirley is waiting for home health to arrive at this time. She complains of pain in both legs, aching constant. She states that is is worse in knees and ankles when she stands and walks. She thinks this is related to her gout. She has been taking allopurinol. We reviewed her discharge medications and her home meds. We rescheduled her HFU appt due to transportation needs.       Discharge department/facility: [facility]    Non-face-to-face services provided:  Scheduled appointment with PCPHomar  Obtained and reviewed discharge summary and/or continuity of care documents    Follow Up  Future Appointments   Date Time Provider Elena Pfeiffer   3/26/2020  2:00 PM JADE Greco [facility]   2020  9:30 AM JADE Greco [facility]   2020 10:00 AM M[facility] MAMMO 1 [facility] MAMMO MAKENNAM Trenton Hazel RN

## 2020-03-20 ENCOUNTER — CARE COORDINATION (OUTPATIENT)
Dept: CARE COORDINATION | Age: 70
End: 2020-03-20

## 2020-03-20 RX ORDER — ASPIRIN 81 MG/1
TABLET, COATED ORAL
Qty: 30 TABLET | Refills: 5 | OUTPATIENT
Start: 2020-03-20

## 2020-03-20 NOTE — CARE COORDINATION
I called Nikki Nino for FU today per PCP request.  She tells me that home health called and have put her off until Monday. They are going to call her. This morning Nikki Nino states she is breathing her normal.  She feels confident that her pulmonary function is improving. Her blood sugar yesterday was 195 in the am.  She states she hasn't checked it yet this morning. We discussed that she is taking prednisone and will need to monitor her diet and blood sugar more closely. She was not able to sleep in her bed due to her legs hurting. She sat in her recliner and states this helped. She feels that her leg pain is slightly improved. At this point she is taking hydrocodone 5/325 , one at bedtime. Her legs are slightly swollen but nothing significant. I let her know that I would communicate with her PCP.

## 2020-03-21 RX ORDER — ASPIRIN 81 MG/1
TABLET, COATED ORAL
Qty: 30 TABLET | Refills: 5 | Status: ON HOLD | OUTPATIENT
Start: 2020-03-21 | End: 2020-11-11

## 2020-03-21 RX ORDER — METOLAZONE 5 MG/1
5 TABLET ORAL DAILY PRN
Qty: 30 TABLET | Refills: 3 | Status: SHIPPED | OUTPATIENT
Start: 2020-03-21 | End: 2020-07-07

## 2020-03-23 ENCOUNTER — CARE COORDINATION (OUTPATIENT)
Dept: CARE COORDINATION | Age: 70
End: 2020-03-23

## 2020-03-23 NOTE — CARE COORDINATION
I called Diana Singleton for fu. She tells me that over the weekend her legs have continued to improve. Her pain is much better and she suspects may have had to do with the bed or being in the bed for so long. She had issue with her blood sugar dropping this morning to 88. We discussed her insulin timing and eating schedule. She realized that she was not getting up as early as she used too since she isn't going to [employer]. We discussed specific snacks to give her a little bit more carb and protein combo. Her breathing she reports is doing very well. We discussed her fu appts with Dr Al Torres for 4/8, Dr Nathalia Bender and Dr Seymour Bernabe is May. Home health is to be calling Diana Singleton today. I let her know that I would talk to her provider about her appt on Thursday but that we would take every precaution to protect her if she does come in.

## 2020-03-23 NOTE — PROGRESS NOTES
Continued Stay Note   [facility]     Patient Name: Mingo Guidry  MRN: [id number]  Today's Date: 3/23/2020    Admit Date: 3/13/2020    Discharge Plan     Row Name 03/23/20 1344       Plan    Final Discharge Disposition Code  01 - home or self-care        Discharge Codes    No documentation.       Expected Discharge Date and Time     Expected Discharge Date Expected Discharge Time    Mar 18, 2020         Spoke to [place] with [facility] and they attempted to see patient on 3/19/20 and patient advised she didn't need home health.    Code changed.     Analy Dupont RN

## 2020-03-23 NOTE — TELEPHONE ENCOUNTER
It is such a hard call we are trying to set up video visits. I will have the staff call her and see what her possible options are.    Joanne Wilks

## 2020-03-24 NOTE — OUTREACH NOTE
COPD/PN Week 1 Survey      Responses   [facility] patient discharged from?  [facility]   Does the patient have one of the following disease processes/diagnoses(primary or secondary)?  COPD/Pneumonia   Is there a successful TCM telephone encounter documented?  No   Was the primary reason for admission:  Pneumonia   Week 1 attempt successful?  No   Unsuccessful attempts  Attempt 1          Lindsay Friedman RN

## 2020-03-26 ENCOUNTER — VIRTUAL VISIT (OUTPATIENT)
Dept: PRIMARY CARE CLINIC | Age: 70
End: 2020-03-26
Payer: MEDICARE

## 2020-03-26 PROCEDURE — G2025 DIS SITE TELE SVCS RHC/FQHC: HCPCS | Performed by: NURSE PRACTITIONER

## 2020-03-26 NOTE — PROGRESS NOTES
Mary Lou Hdz is a 71 y.o. female evaluated via telephone on 3/26/2020. Consent:  She and/or health care decision maker is aware that that she may receive a bill for this telephone service, depending on her insurance coverage, and has provided verbal consent to proceed: Yes      Documentation:  I communicated with the patient and/or health care decision maker about she is calling about her hospital discharge. She had new onset Atrial fib, systolic heart failure with copd exacerbation. She was at [address] and had hypoxia. He went to the ER with o2 sat of 48. She was admitted to [facility]. She had flu and Pneumonia and UTI. She was told she had MRSA  As well. She had been in the hospital.  She aspirated a pea and got it out with bronchospy and treated with Vancomycin and Rocephin. She had a normal heart cath by Dr. Nishant Hernandez. She was on Zoysn for Pseudomonas and saw Dr. Chace Ruiz.   She has 3 new medications. She is taking Lasix 40 mg twice a day. She ways 184 pounds this am.   She has not been taking her Metolazone. She can tell there is some fluid in her legs. She has missed her Xolair shot. She does report a runny nose. She is weak and wobbly. Details of this discussion including any medical advice provided: Long discussion about her 3 new medications, add Metolazone in the am 1/2 dose for fluid. Will have home health do physical therapy. Check labs with home health cbc, cmp hga1c, tsh  Will try to assess fluid status. Will check on Xolair injection. Continue nebs, and oxygen. Will inniate chf protocol. I affirm this is a Patient Initiated Episode with an Established Patient who has not had a related appointment within my department in the past 7 days or scheduled within the next 24 hours.     Total Time: minutes: 21-30 minutes    Note: not billable if this call serves to triage the patient into an appointment for the relevant concern      Denise Macias

## 2020-03-27 ENCOUNTER — HOSPITAL ENCOUNTER (OUTPATIENT)
Facility: HOSPITAL | Age: 70
Discharge: HOME | End: 2020-03-27
Payer: MEDICARE

## 2020-03-27 LAB
A/G RATIO: 1.8 (ref 0.8–2)
ALBUMIN SERPL-MCNC: 3.7 G/DL (ref 3.4–4.8)
ALP BLD-CCNC: 82 U/L (ref 25–100)
ALT SERPL-CCNC: 46 U/L (ref 4–36)
ANION GAP SERPL CALCULATED.3IONS-SCNC: 13 MMOL/L (ref 3–16)
AST SERPL-CCNC: 21 U/L (ref 8–33)
BASOPHILS ABSOLUTE: 0 K/UL (ref 0–0.1)
BASOPHILS RELATIVE PERCENT: 0.3 %
BILIRUB SERPL-MCNC: 0.3 MG/DL (ref 0.3–1.2)
BUN BLDV-MCNC: 32 MG/DL (ref 6–20)
CALCIUM SERPL-MCNC: 9.3 MG/DL (ref 8.5–10.5)
CHLORIDE BLD-SCNC: 104 MMOL/L (ref 98–107)
CO2: 27 MMOL/L (ref 20–30)
CREAT SERPL-MCNC: 1.2 MG/DL (ref 0.4–1.2)
EOSINOPHILS ABSOLUTE: 0.3 K/UL (ref 0–0.4)
EOSINOPHILS RELATIVE PERCENT: 1.9 %
GFR AFRICAN AMERICAN: 54
GFR NON-AFRICAN AMERICAN: 44
GLOBULIN: 2.1 G/DL
GLUCOSE BLD-MCNC: 134 MG/DL (ref 74–106)
HBA1C MFR BLD: 8.6 %
HCT VFR BLD CALC: 35 % (ref 37–47)
HEMOGLOBIN: 11 G/DL (ref 11.5–16.5)
IMMATURE GRANULOCYTES #: 0.1 K/UL
IMMATURE GRANULOCYTES %: 0.5 % (ref 0–5)
LYMPHOCYTES ABSOLUTE: 2.9 K/UL (ref 1.5–4)
LYMPHOCYTES RELATIVE PERCENT: 18.9 %
MCH RBC QN AUTO: 32.3 PG (ref 27–32)
MCHC RBC AUTO-ENTMCNC: 31.4 G/DL (ref 31–35)
MCV RBC AUTO: 102.6 FL (ref 80–100)
MONOCYTES ABSOLUTE: 0.8 K/UL (ref 0.2–0.8)
MONOCYTES RELATIVE PERCENT: 4.9 %
NEUTROPHILS ABSOLUTE: 11.3 K/UL (ref 2–7.5)
NEUTROPHILS RELATIVE PERCENT: 73.5 %
PDW BLD-RTO: 16.9 % (ref 11–16)
PLATELET # BLD: 293 K/UL (ref 150–400)
PMV BLD AUTO: 10.6 FL (ref 6–10)
POTASSIUM SERPL-SCNC: 4.2 MMOL/L (ref 3.4–5.1)
RBC # BLD: 3.41 M/UL (ref 3.8–5.8)
SODIUM BLD-SCNC: 144 MMOL/L (ref 136–145)
TOTAL PROTEIN: 5.8 G/DL (ref 6.4–8.3)
TSH SERPL DL<=0.05 MIU/L-ACNC: 2.4 UIU/ML (ref 0.35–5.5)
WBC # BLD: 15.3 K/UL (ref 4–11)

## 2020-03-27 PROCEDURE — 80053 COMPREHEN METABOLIC PANEL: CPT

## 2020-03-27 PROCEDURE — 84443 ASSAY THYROID STIM HORMONE: CPT

## 2020-03-27 PROCEDURE — 85025 COMPLETE CBC W/AUTO DIFF WBC: CPT

## 2020-03-27 PROCEDURE — 83036 HEMOGLOBIN GLYCOSYLATED A1C: CPT

## 2020-03-27 RX ORDER — EZETIMIBE 10 MG/1
TABLET ORAL
Qty: 90 TABLET | Refills: 3 | Status: SHIPPED | OUTPATIENT
Start: 2020-03-27

## 2020-03-27 NOTE — OUTREACH NOTE
COPD/PN Week 1 Survey      Responses   [facility] patient discharged from?  [facility]   Does the patient have one of the following disease processes/diagnoses(primary or secondary)?  COPD/Pneumonia   Is there a successful TCM telephone encounter documented?  No   Was the primary reason for admission:  Pneumonia   Week 1 attempt successful?  Yes   Call start time  0941   Call end time  0943   Discharge diagnosis   Unstable angina,  pneumonia    Meds reviewed with patient/caregiver?  Yes   Is the patient having any side effects they believe may be caused by any medication additions or changes?  No   Does the patient have all medications ordered at discharge?  Yes   Is the patient taking all medications as directed (includes completed medication regime)?  Yes   Does the patient have a primary care provider?   Yes   Comments regarding PCP  not seeing PCP right now due to COVID   Has the patient kept scheduled appointments due by today?  N/A   What is the Home health agency?   [facility]    Has home health visited the patient within 72 hours of discharge?  Yes   What DME was ordered?  rotech - rollator   Has all DME been delivered?  Yes   Psychosocial issues?  No   Did the patient receive a copy of their discharge instructions?  Yes   What is the patient's perception of their health status since discharge?  Improving   Is the patient/caregiver able to teach back the hierarchy of who to call/visit for symptoms/problems? PCP, Specialist, Home health nurse, Urgent Care, ED, 911  Yes   Additional teach back comments  Patient says she is doing well, states  is checking on her weekly, no questions or concerns at this time.   Week 1 call completed?  Yes   Revoked  No further contact(revokes)-requires comment   Graduated/Revoked comments  Patient says she would like no further calls.          Flor Poole RN

## 2020-03-31 ENCOUNTER — CARE COORDINATION (OUTPATIENT)
Dept: CARE COORDINATION | Age: 70
End: 2020-03-31

## 2020-03-31 RX ORDER — ICOSAPENT ETHYL 1000 MG/1
CAPSULE ORAL
Qty: 60 CAPSULE | Refills: 3 | Status: SHIPPED | OUTPATIENT
Start: 2020-03-31 | End: 2020-04-27

## 2020-03-31 NOTE — CARE COORDINATION
I called Ashley Baird today for fu. Her PCP has initiated CHF protocol. She confirms that home health has been in and that PT has been to her home twice. She has exercises that she does in between. She is weighing daily. Her weight in the office was 184 last week. She has maintained 184 until yesterday and she moved up to 189. She did use metolazone and today her weight is back to 184. We discussed the zone management for heart failure, specifically the weight protocol and other S/S of heart failure that she should pay attention too. Compared to last week, Shawna sounds much stronger and she confirms that she feels it. Contact information is reviewed.

## 2020-04-02 RX ORDER — HYDROCODONE BITARTRATE AND ACETAMINOPHEN 5; 325 MG/1; MG/1
TABLET ORAL
Qty: 30 TABLET | Refills: 0 | Status: SHIPPED | OUTPATIENT
Start: 2020-04-02 | End: 2020-04-30

## 2020-04-06 RX ORDER — MONTELUKAST SODIUM 10 MG/1
TABLET ORAL
Qty: 30 TABLET | Refills: 2 | Status: SHIPPED | OUTPATIENT
Start: 2020-04-06 | End: 2020-07-06

## 2020-04-07 RX ORDER — DILTIAZEM HYDROCHLORIDE 180 MG/1
180 CAPSULE, COATED, EXTENDED RELEASE ORAL DAILY
COMMUNITY
Start: 2020-03-12 | End: 2020-04-07 | Stop reason: SDUPTHER

## 2020-04-07 RX ORDER — DILTIAZEM HYDROCHLORIDE 180 MG/1
180 CAPSULE, COATED, EXTENDED RELEASE ORAL DAILY
Qty: 30 CAPSULE | Refills: 3 | Status: SHIPPED | OUTPATIENT
Start: 2020-04-07 | End: 2020-06-29

## 2020-04-09 ENCOUNTER — VIRTUAL VISIT (OUTPATIENT)
Dept: PRIMARY CARE CLINIC | Age: 70
End: 2020-04-09
Payer: MEDICARE

## 2020-04-09 PROCEDURE — G2025 DIS SITE TELE SVCS RHC/FQHC: HCPCS | Performed by: NURSE PRACTITIONER

## 2020-04-09 RX ORDER — METRONIDAZOLE 500 MG/1
500 TABLET ORAL 2 TIMES DAILY
Qty: 20 TABLET | Refills: 0 | Status: SHIPPED | OUTPATIENT
Start: 2020-04-09 | End: 2020-04-19

## 2020-04-09 NOTE — PROGRESS NOTES
Angelica Espino is a 71 y.o. female evaluated via telephone on 4/9/2020. Consent:  She and/or health care decision maker is aware that that she may receive a bill for this telephone service, depending on her insurance coverage, and has provided verbal consent to proceed: Yes      Documentation:  I communicated with the patient and/or health care decision maker about copd, swelling and diarrhea. She says she has been having diarrhea every 3 days. She has been taking Imodium. She says the diarrhea comes on with no warning. She has been weighing right at 184. She has not had to take the metolazone  She has been taking the Lasix twice a day. Her swelling is much better. She also says she has been adjusting her blood sugar to finally get better control of her sugar. It was 80 this am.   Details of this discussion including any medical advice provided:    Diagnosis Orders   1. Diarrhea of presumed infectious origin  metroNIDAZOLE (FLAGYL) 500 MG tablet  Concern for C diff due to recent hospitalization. 2. Type 2 diabetes mellitus with stage 3 chronic kidney disease, with long-term current use of insulin (HCC)  tresiba 30 units in the am and 60 units in the pm.    3. Moderate persistent asthma without complication  STABLE       She is to notify the office when she can come for her Xolair injection. Verified that it is here and waiting. Will schedule her for telephone visit in 10 days to 2 weeks for evaluation of the diarrhea. I affirm this is a Patient Initiated Episode with an Established Patient who has not had a related appointment within my department in the past 7 days or scheduled within the next 24 hours.     Total Time: minutes: 11-20 minutes    Note: not billable if this call serves to triage the patient into an appointment for the relevant concern      Barber Thurston

## 2020-04-14 RX ORDER — METOPROLOL SUCCINATE 100 MG/1
100 TABLET, EXTENDED RELEASE ORAL DAILY
Qty: 30 TABLET | Refills: 0 | Status: SHIPPED | OUTPATIENT
Start: 2020-04-14 | End: 2020-05-12

## 2020-04-14 RX ORDER — METOPROLOL SUCCINATE 100 MG/1
100 TABLET, EXTENDED RELEASE ORAL
COMMUNITY
Start: 2020-03-13 | End: 2020-04-14 | Stop reason: SDUPTHER

## 2020-04-14 RX ORDER — BUSPIRONE HYDROCHLORIDE 10 MG/1
TABLET ORAL
Qty: 90 TABLET | Refills: 0 | Status: SHIPPED | OUTPATIENT
Start: 2020-04-14 | End: 2020-05-11

## 2020-04-16 RX ORDER — UMECLIDINIUM 62.5 UG/1
AEROSOL, POWDER ORAL
Qty: 1 EACH | Refills: 3 | Status: SHIPPED | OUTPATIENT
Start: 2020-04-16 | End: 2020-05-22

## 2020-04-17 ENCOUNTER — NURSE ONLY (OUTPATIENT)
Dept: PRIMARY CARE CLINIC | Age: 70
End: 2020-04-17
Payer: MEDICARE

## 2020-04-17 PROCEDURE — 96372 THER/PROPH/DIAG INJ SC/IM: CPT | Performed by: NURSE PRACTITIONER

## 2020-04-21 ENCOUNTER — VIRTUAL VISIT (OUTPATIENT)
Dept: PRIMARY CARE CLINIC | Age: 70
End: 2020-04-21
Payer: MEDICARE

## 2020-04-21 PROCEDURE — G2025 DIS SITE TELE SVCS RHC/FQHC: HCPCS | Performed by: NURSE PRACTITIONER

## 2020-04-23 ENCOUNTER — TELEPHONE (OUTPATIENT)
Dept: PRIMARY CARE CLINIC | Age: 70
End: 2020-04-23

## 2020-04-23 RX ORDER — INSULIN DEGLUDEC INJECTION 100 U/ML
INJECTION, SOLUTION SUBCUTANEOUS
Qty: 30 ML | Refills: 5 | Status: ON HOLD
Start: 2020-04-23 | End: 2020-07-25 | Stop reason: HOSPADM

## 2020-04-27 RX ORDER — METOCLOPRAMIDE 5 MG/1
TABLET ORAL
Qty: 90 TABLET | Refills: 3 | Status: SHIPPED
Start: 2020-04-27 | End: 2020-06-10 | Stop reason: ALTCHOICE

## 2020-04-27 RX ORDER — ICOSAPENT ETHYL 1000 MG/1
CAPSULE ORAL
Qty: 60 CAPSULE | Refills: 3 | Status: SHIPPED | OUTPATIENT
Start: 2020-04-27 | End: 2020-07-09

## 2020-04-27 RX ORDER — FERROUS SULFATE 325(65) MG
TABLET ORAL
Qty: 90 TABLET | Refills: 3 | Status: SHIPPED
Start: 2020-04-27 | End: 2020-06-10 | Stop reason: ALTCHOICE

## 2020-04-27 RX ORDER — SUCRALFATE 1 G/1
TABLET ORAL
Qty: 90 TABLET | Refills: 3 | Status: SHIPPED | OUTPATIENT
Start: 2020-04-27 | End: 2020-08-04

## 2020-04-30 PROBLEM — I25.10 CORONARY ARTERY DISEASE INVOLVING NATIVE CORONARY ARTERY WITHOUT ANGINA PECTORIS: Status: ACTIVE | Noted: 2020-01-01

## 2020-04-30 PROBLEM — I20.0 UNSTABLE ANGINA (HCC): Status: RESOLVED | Noted: 2020-01-01 | Resolved: 2020-01-01

## 2020-04-30 PROBLEM — Z03.89 CORONARY ARTERY DISEASE EXCLUDED: Status: ACTIVE | Noted: 2020-01-01

## 2020-04-30 RX ORDER — HYDROCODONE BITARTRATE AND ACETAMINOPHEN 5; 325 MG/1; MG/1
TABLET ORAL
Qty: 30 TABLET | Refills: 0 | Status: SHIPPED | OUTPATIENT
Start: 2020-04-30 | End: 2020-05-28

## 2020-04-30 NOTE — PROGRESS NOTES
"             [facility] Cardiology Office Follow Up Note    Mingo Guidry  [id number]  2020    Primary Care Provider: Luz Prater APRN    Chief Complaint: Hospital follow-up    History of Present Illness:   Mrs. Mingo Guidry is a 69 y.o. female who presents to the Cardiology Clinic for hospital follow-up. The patient has a past medical history significant for type 2 diabetes mellitus, GERD, hyperlipidemia, hypertension, morbid obesity, and Mg's esophagus.  She also has a history of COPD with prior tobacco use, resulting in chronic hypoxic respiratory failure requiring supplemental home O2. Her past cardiac history is significant for chronic diastolic heart failure, paroxysmal atrial fibrillation, and nonobstructive coronary artery disease based on coronary angiography in 3/20 performed at the time of presentation with mild troponin elevation.  She returns to the cardiology clinic today for follow-up after her most recent hospitalization.  Since discharge, the patient reports she has done well.  She initially attended rehab following discharge due to deconditioning, and reports she is now ambulating without difficulty.  She denies chest pain or chest discomfort.  No lower extremity swelling.  She does report intermittent brief episodes of palpitations described as a \"fast and irregular\" heart rate.  The episodes last several minutes, before resolving spontaneously.  No associated dizziness, lightheadedness, or syncope.  She continues to tolerate Eliquis without significant bleeding or bruising.  No orthopnea or PND.  No other specific complaints at this time.    Past Cardiac Testin. Last Coronary Angio: 3/17/2020   1.  Nonobstructive coronary artery disease with 40-50% stenosis in the proximal LCx  2. Prior Stress Testing: None  3. Last Echo: 3/16/2020   1.  Normal left ventricular size and systolic function, LVEF 60-65%.   2.  Moderate concentric LVH.   3.  Abnormal LV " diastolic filling pattern consistent with grade 1 diastolic dysfunction.   4.  Mild left atrial dilation.   6.  Normal right ventricular size and systolic function.   7.  Trace mitral regurgitation.  4. Prior Holter Monitor: None    Review of Systems:   Review of Systems   Constitutional: Negative for activity change, appetite change, chills, diaphoresis, fatigue, fever, unexpected weight gain and unexpected weight loss.   Eyes: Negative for blurred vision and double vision.   Respiratory: Positive for shortness of breath. Negative for cough, chest tightness and wheezing.    Cardiovascular: Positive for palpitations. Negative for chest pain and leg swelling.   Gastrointestinal: Negative for abdominal pain, anal bleeding, blood in stool and GERD.   Endocrine: Negative for cold intolerance and heat intolerance.   Genitourinary: Negative for hematuria.   Neurological: Negative for dizziness, syncope, weakness and light-headedness.   Hematological: Does not bruise/bleed easily.   Psychiatric/Behavioral: Negative for depressed mood and stress. The patient is not nervous/anxious.        I have reviewed and/or updated the patient's past medical, past surgical, family, social history, problem list and allergies as appropriate.     Medications:     Current Outpatient Medications:   •  ADVAIR DISKUS 500-50 MCG/DOSE DISKUS, INHALE 1 PUFF BY MOUTH TWO TIMES A DAY, Disp: 60 each, Rfl: 5  •  allopurinol (ZYLOPRIM) 300 MG tablet, Take 300 mg by mouth Daily., Disp: , Rfl:   •  apixaban (ELIQUIS) 5 MG tablet tablet, Take 1 tablet by mouth Every 12 (Twelve) Hours., Disp: 60 tablet, Rfl: 0  •  ASPIRIN LOW DOSE 81 MG EC tablet, Take 81 mg by mouth Daily., Disp: , Rfl: 5  •  Blood Glucose Monitoring Suppl device, 1 each., Disp: , Rfl:   •  busPIRone (BUSPAR) 10 MG tablet, Take 10 mg by mouth 3 (Three) Times a Day., Disp: , Rfl:   •  DALIRESP 500 MCG tablet tablet, TAKE ONE TABLET BY MOUTH EVERY DAY, Disp: 30 tablet, Rfl: 5  •  dilTIAZem  CD (CARDIZEM CD) 180 MG 24 hr capsule, Take 1 capsule by mouth Daily., Disp: 30 capsule, Rfl: 0  •  ezetimibe (ZETIA) 10 MG tablet, Take 10 mg by mouth Daily., Disp: , Rfl:   •  fenofibrate (TRICOR) 48 MG tablet, , Disp: , Rfl:   •  ferrous sulfate 325 (65 FE) MG tablet, Take 325 mg by mouth 3 (Three) Times a Day With Meals., Disp: , Rfl:   •  flunisolide (NASALIDE) 25 MCG/ACT (0.025%) solution nasal spray, INHALE 1 SPRAY EVERY 12 (TWELVE) HOURS. (Patient taking differently: Inhale 1 spray Every 12 (Twelve) Hours As Needed.), Disp: 25 mL, Rfl: 5  •  furosemide (LASIX) 40 MG tablet, Take 40 mg by mouth Daily., Disp: , Rfl:   •  GNP VITAMIN D MAXIMUM STRENGTH 50 MCG (2000 UT) tablet, Take 1 tablet by mouth Daily., Disp: , Rfl: 5  •  HUMALOG 100 UNIT/ML injection, Inject 35 Units under the skin into the appropriate area as directed 3 (Three) Times a Day., Disp: , Rfl:   •  HYDROcodone-acetaminophen (NORCO) 5-325 MG per tablet, Take 1 tablet by mouth Every Night., Disp: , Rfl:   •  ipratropium-albuterol (DUO-NEB) 0.5-2.5 mg/3 ml nebulizer, Take 3 mL by nebulization 4 (Four) Times a Day. (Patient taking differently: Take 3 mL by nebulization As Needed.), Disp: 360 mL, Rfl: 2  •  Loratadine (CLARITIN) 10 MG capsule, Take 1 tablet by mouth Daily., Disp: , Rfl:   •  metoclopramide (REGLAN) 5 MG tablet, Take 5 mg by mouth 3 (Three) Times a Day., Disp: , Rfl:   •  metolazone (ZAROXOLYN) 5 MG tablet, Take 5 mg by mouth Daily As Needed., Disp: , Rfl:   •  metoprolol succinate XL (TOPROL-XL) 100 MG 24 hr tablet, Take 1 tablet by mouth Daily., Disp: 30 tablet, Rfl: 0  •  montelukast (SINGULAIR) 10 MG tablet, Take 10 mg by mouth Every Night., Disp: , Rfl:   •  O2 (OXYGEN), Inhale 3 L/min Daily., Disp: , Rfl:   •  omalizumab (XOLAIR) 150 MG injection, Inject 300 mg under the skin into the appropriate area as directed Every 28 (Twenty-Eight) Days. 2 injections once a month, Disp: , Rfl:   •  pantoprazole (PROTONIX) 40 MG EC tablet,  "Take 1 tablet by mouth Daily., Disp: 30 tablet, Rfl: 0  •  PATADAY 0.2 % solution ophthalmic solution, Administer  to both eyes 2 (Two) Times a Day., Disp: , Rfl: 3  •  potassium chloride (K-DUR,KLOR-CON) 20 MEQ CR tablet, Take 20 mEq by mouth Daily., Disp: , Rfl: 2  •  PROAIR  (90 Base) MCG/ACT inhaler, INHALE 2 PUFFS EVERY 4 (FOUR) HOURS AS NEEDED FOR WHEEZING OR SHORTNESS OF AIR., Disp: 8.5 g, Rfl: 5  •  rOPINIRole (REQUIP) 0.5 MG tablet, Take 0.5 mg by mouth 2 (Two) Times a Day., Disp: , Rfl: 0  •  sitaGLIPtin (JANUVIA) 100 MG tablet, Take 100 mg by mouth Daily. 1/2 tablet daily, Disp: , Rfl:   •  SPIRIVA HANDIHALER 18 MCG per inhalation capsule, PLACE 1 CAPSULE INTO INHALER AND INHALE DAILY., Disp: 30 capsule, Rfl: 4  •  sucralfate (CARAFATE) 1 G tablet, Take 1 g by mouth 3 (Three) Times a Day., Disp: , Rfl:   •  traMADol (ULTRAM) 50 MG tablet, Take 50 mg by mouth 3 (Three) Times a Day., Disp: , Rfl:   •  TRESIBA FLEXTOUCH 100 UNIT/ML solution pen-injector injection, 2 (Two) Times a Day. 35 units in the am 80 units at night, Disp: , Rfl: 10  •  TRUE METRIX BLOOD GLUCOSE TEST test strip, Use to check blood sugar 3 times daily., Disp: 100 each, Rfl: 0  •  ULTICARE INSULIN SYRINGE 31G X 5/16\" 1 ML misc, , Disp: , Rfl: 3  •  VASCEPA 1 g capsule capsule, 2 g 2 (Two) Times a Day With Meals., Disp: , Rfl:   •  atorvastatin (LIPITOR) 40 MG tablet, Take 1 tablet by mouth Daily., Disp: 30 tablet, Rfl: 10  •  fenofibrate (TRICOR) 145 MG tablet, Take 145 mg by mouth Daily., Disp: , Rfl:   •  Lactobacillus Acid-Pectin (ACIDOPHILUS/CITRUS PECTIN) tablet tablet, Take 1 tablet by mouth Daily., Disp: , Rfl: 1    Physical Exam:  Vital Signs:   Vitals:    04/30/20 1450   BP: 124/58   BP Location: Right arm   Patient Position: Sitting   Cuff Size: Adult   Pulse: 92   SpO2: 96%   Weight: 85.3 kg (188 lb)   Height: 147.3 cm (58\")       Physical Exam   Constitutional: She is oriented to person, place, and time. She appears " well-developed and well-nourished. No distress.   HENT:   Head: Normocephalic and atraumatic.   Moist Mucous Membranes.    Eyes: Pupils are equal, round, and reactive to light. EOM are normal. No scleral icterus.   Neck: No tracheal deviation present.   Cardiovascular: Normal rate, regular rhythm, normal heart sounds and intact distal pulses. Exam reveals no gallop and no friction rub.   No murmur heard.  Normal JVD.  No significant murmur.  No peripheral edema.   Pulmonary/Chest: Effort normal and breath sounds normal. No stridor. No respiratory distress. She has no wheezes. She has no rales. She exhibits no tenderness.   On supplemental O2.   Abdominal: Soft. Bowel sounds are normal. She exhibits no distension. There is no tenderness. There is no rebound and no guarding.   Obese abdomen.   Musculoskeletal: Normal range of motion. She exhibits no edema.   Lymphadenopathy:     She has no cervical adenopathy.   Neurological: She is alert and oriented to person, place, and time.   Skin: Skin is warm and dry. She is not diaphoretic. No erythema.   Psychiatric: She has a normal mood and affect. Her behavior is normal.       Results Review:   I reviewed the patient's new clinical results.      Assessment / Plan:     1.  Coronary artery disease  --Nonobstructive coronary artery disease based on coronary angiography in 3/2020 with mild to moderate stenosis in the proximal LCx  --Currently without anginal symptoms  --Continue aspirin 81 mg daily  --Will increase statin to high intensity dosing    2. Paroxysmal atrial fibrillation   --History of paroxysmal atrial fibrillation  --Rare episodes of palpitations, possibly related to recurrent PAF  --Given CHADS2-VASc score of 4, continue Eliquis for CVA prophylaxis  --Continue diltiazem and metoprolol for rate control, consider up titration if more frequent episodes of palpitations     3.  Chronic diastolic heart failure  --Currently appears to be euvolemic on exam  --Continue  Lasix and metolazone     4.  Hypertension  --BP currently well controlled     5.   Chronic kidney disease, stage III  --Renal function stable on most recent labs     6.  Morbid obesity  --Complicates all aspects of care    7.  COPD with chronic hypoxic respiratory failure  --On supplemental home O2    8.  Dyslipidemia  --Increase statin to high intensity dosing    9.  Type 2 diabetes mellitus  --Management per PCP    Follow Up:   Return in about 6 months (around 10/30/2020).      Thank you for allowing me to participate in the care of your patient. Please to not hesitate to contact me with additional questions or concerns.     CHASITY Knox MD  Interventional Cardiology   04/30/2020  2:57 PM

## 2020-05-05 RX ORDER — ALLOPURINOL 300 MG/1
150 TABLET ORAL DAILY
Qty: 30 TABLET | Refills: 2 | Status: SHIPPED | OUTPATIENT
Start: 2020-05-05 | End: 2020-09-24

## 2020-05-11 RX ORDER — BUSPIRONE HYDROCHLORIDE 10 MG/1
TABLET ORAL
Qty: 90 TABLET | Refills: 0 | Status: SHIPPED | OUTPATIENT
Start: 2020-05-11 | End: 2020-05-20

## 2020-05-12 RX ORDER — METOPROLOL SUCCINATE 100 MG/1
100 TABLET, EXTENDED RELEASE ORAL DAILY
Qty: 30 TABLET | Refills: 5 | Status: SHIPPED | OUTPATIENT
Start: 2020-05-12 | End: 2020-09-24

## 2020-05-13 ENCOUNTER — TELEPHONE (OUTPATIENT)
Dept: PRIMARY CARE CLINIC | Age: 70
End: 2020-05-13

## 2020-05-13 NOTE — TELEPHONE ENCOUNTER
Godwin Bright from [facility] called stating that Patient has a cough and wheezing. Her sugar is 200 today. She is using her nebulizer every 4 hours. She has an appointment on the 18th of May.

## 2020-05-18 ENCOUNTER — VIRTUAL VISIT (OUTPATIENT)
Dept: PRIMARY CARE CLINIC | Age: 70
End: 2020-05-18
Payer: MEDICARE

## 2020-05-18 PROCEDURE — G2025 DIS SITE TELE SVCS RHC/FQHC: HCPCS | Performed by: NURSE PRACTITIONER

## 2020-05-18 RX ORDER — PREDNISONE 20 MG/1
20 TABLET ORAL 2 TIMES DAILY
Qty: 10 TABLET | Refills: 0 | Status: SHIPPED | OUTPATIENT
Start: 2020-05-18 | End: 2020-05-21

## 2020-05-19 ENCOUNTER — HOSPITAL ENCOUNTER (OUTPATIENT)
Facility: HOSPITAL | Age: 70
Discharge: HOME | End: 2020-05-19
Payer: MEDICARE

## 2020-05-19 LAB
ALBUMIN SERPL-MCNC: 4.6 G/DL (ref 3.4–4.8)
ANION GAP SERPL CALCULATED.3IONS-SCNC: 19 MMOL/L (ref 3–16)
BUN BLDV-MCNC: 62 MG/DL (ref 6–20)
CALCIUM SERPL-MCNC: 10.4 MG/DL (ref 8.5–10.5)
CHLORIDE BLD-SCNC: 87 MMOL/L (ref 98–107)
CO2: 32 MMOL/L (ref 20–30)
CREAT SERPL-MCNC: 2.3 MG/DL (ref 0.4–1.2)
GFR AFRICAN AMERICAN: 25
GFR NON-AFRICAN AMERICAN: 21
GLUCOSE BLD-MCNC: 319 MG/DL (ref 74–106)
HCT VFR BLD CALC: 39.4 % (ref 37–47)
HEMOGLOBIN: 12.8 G/DL (ref 11.5–16.5)
MCH RBC QN AUTO: 32.3 PG (ref 27–32)
MCHC RBC AUTO-ENTMCNC: 32.5 G/DL (ref 31–35)
MCV RBC AUTO: 99.5 FL (ref 80–100)
PDW BLD-RTO: 14.5 % (ref 11–16)
PHOSPHORUS: 4.2 MG/DL (ref 2.5–4.5)
PLATELET # BLD: 369 K/UL (ref 150–400)
PMV BLD AUTO: 10.9 FL (ref 6–10)
POTASSIUM SERPL-SCNC: 4.2 MMOL/L (ref 3.4–5.1)
RBC # BLD: 3.96 M/UL (ref 3.8–5.8)
SODIUM BLD-SCNC: 138 MMOL/L (ref 136–145)
URIC ACID, SERUM: 8.7 MG/DL (ref 2.5–7.1)
WBC # BLD: 14.1 K/UL (ref 4–11)

## 2020-05-19 PROCEDURE — 83970 ASSAY OF PARATHORMONE: CPT

## 2020-05-19 PROCEDURE — 36415 COLL VENOUS BLD VENIPUNCTURE: CPT

## 2020-05-19 PROCEDURE — 84156 ASSAY OF PROTEIN URINE: CPT

## 2020-05-19 PROCEDURE — 80069 RENAL FUNCTION PANEL: CPT

## 2020-05-19 PROCEDURE — 82570 ASSAY OF URINE CREATININE: CPT

## 2020-05-19 PROCEDURE — 84550 ASSAY OF BLOOD/URIC ACID: CPT

## 2020-05-19 PROCEDURE — 85027 COMPLETE CBC AUTOMATED: CPT

## 2020-05-20 LAB
CREATININE URINE: 66.1 MG/DL (ref 28–259)
PARATHYROID HORMONE INTACT: 58.2 PG/ML (ref 14–72)
PROTEIN PROTEIN: 22 MG/DL
PROTEIN/CREAT RATIO: 0.3 MG/DL

## 2020-05-20 RX ORDER — POTASSIUM CHLORIDE 20 MEQ/1
TABLET, EXTENDED RELEASE ORAL
Qty: 30 TABLET | Refills: 5 | Status: SHIPPED | OUTPATIENT
Start: 2020-05-20 | End: 2020-06-15

## 2020-05-20 RX ORDER — BUSPIRONE HYDROCHLORIDE 10 MG/1
TABLET ORAL
Qty: 90 TABLET | Refills: 0 | Status: SHIPPED | OUTPATIENT
Start: 2020-05-20 | End: 2020-06-09

## 2020-05-21 RX ORDER — PREDNISONE 20 MG/1
20 TABLET ORAL 2 TIMES DAILY
Qty: 10 TABLET | Refills: 0 | Status: SHIPPED | OUTPATIENT
Start: 2020-05-21 | End: 2020-05-26

## 2020-05-22 ENCOUNTER — APPOINTMENT (OUTPATIENT)
Dept: CT IMAGING | Facility: HOSPITAL | Age: 70
End: 2020-05-22
Payer: MEDICARE

## 2020-05-22 ENCOUNTER — HOSPITAL ENCOUNTER (EMERGENCY)
Facility: HOSPITAL | Age: 70
Discharge: ANOTHER HOSPITAL | End: 2020-05-22
Attending: EMERGENCY MEDICINE
Payer: MEDICARE

## 2020-05-22 ENCOUNTER — APPOINTMENT (OUTPATIENT)
Dept: GENERAL RADIOLOGY | Facility: HOSPITAL | Age: 70
End: 2020-05-22
Payer: MEDICARE

## 2020-05-22 VITALS
WEIGHT: 183 LBS | TEMPERATURE: 98.8 F | DIASTOLIC BLOOD PRESSURE: 61 MMHG | HEART RATE: 81 BPM | OXYGEN SATURATION: 93 % | BODY MASS INDEX: 38.41 KG/M2 | HEIGHT: 58 IN | SYSTOLIC BLOOD PRESSURE: 153 MMHG

## 2020-05-22 PROBLEM — R91.8 MULTILOBAR LUNG INFILTRATE: Status: ACTIVE | Noted: 2020-01-01

## 2020-05-22 PROBLEM — I48.0 PAF (PAROXYSMAL ATRIAL FIBRILLATION) (HCC): Status: ACTIVE | Noted: 2020-01-01

## 2020-05-22 PROBLEM — Z20.822 SUSPECTED COVID-19 VIRUS INFECTION: Status: ACTIVE | Noted: 2020-01-01

## 2020-05-22 LAB
A/G RATIO: 1.6 (ref 0.8–2)
ALBUMIN SERPL-MCNC: 4.5 G/DL (ref 3.4–4.8)
ALP BLD-CCNC: 77 U/L (ref 25–100)
ALT SERPL-CCNC: 47 U/L (ref 4–36)
ANION GAP SERPL CALCULATED.3IONS-SCNC: 16 MMOL/L (ref 3–16)
AST SERPL-CCNC: 47 U/L (ref 8–33)
BASOPHILS ABSOLUTE: 0.1 K/UL (ref 0–0.1)
BASOPHILS RELATIVE PERCENT: 0.4 %
BILIRUB SERPL-MCNC: 0.3 MG/DL (ref 0.3–1.2)
BUN BLDV-MCNC: 67 MG/DL (ref 6–20)
CALCIUM SERPL-MCNC: 10.6 MG/DL (ref 8.5–10.5)
CHLORIDE BLD-SCNC: 91 MMOL/L (ref 98–107)
CO2: 33 MMOL/L (ref 20–30)
CREAT SERPL-MCNC: 1.9 MG/DL (ref 0.4–1.2)
EOSINOPHILS ABSOLUTE: 0 K/UL (ref 0–0.4)
EOSINOPHILS RELATIVE PERCENT: 0 %
GFR AFRICAN AMERICAN: 32
GFR NON-AFRICAN AMERICAN: 26
GLOBULIN: 2.9 G/DL
GLUCOSE BLD-MCNC: 118 MG/DL (ref 74–106)
HCT VFR BLD CALC: 40.7 % (ref 37–47)
HEMOGLOBIN: 13.4 G/DL (ref 11.5–16.5)
IMMATURE GRANULOCYTES #: 0.2 K/UL
IMMATURE GRANULOCYTES %: 0.8 % (ref 0–5)
LACTIC ACID: 2.6 MMOL/L (ref 0.4–2)
LYMPHOCYTES ABSOLUTE: 2.2 K/UL (ref 1.5–4)
LYMPHOCYTES RELATIVE PERCENT: 8.4 %
MCH RBC QN AUTO: 32.1 PG (ref 27–32)
MCHC RBC AUTO-ENTMCNC: 32.9 G/DL (ref 31–35)
MCV RBC AUTO: 97.4 FL (ref 80–100)
MONOCYTES ABSOLUTE: 1.5 K/UL (ref 0.2–0.8)
MONOCYTES RELATIVE PERCENT: 5.8 %
NEUTROPHILS ABSOLUTE: 22.1 K/UL (ref 2–7.5)
NEUTROPHILS RELATIVE PERCENT: 84.6 %
PDW BLD-RTO: 14.6 % (ref 11–16)
PLATELET # BLD: 445 K/UL (ref 150–400)
PMV BLD AUTO: 10.9 FL (ref 6–10)
POTASSIUM REFLEX MAGNESIUM: 3.6 MMOL/L (ref 3.4–5.1)
PRO-BNP: 535 PG/ML (ref 0–1800)
RAPID INFLUENZA  B AGN: NEGATIVE
RAPID INFLUENZA A AGN: NEGATIVE
RBC # BLD: 4.18 M/UL (ref 3.8–5.8)
S PYO AG THROAT QL: NEGATIVE
SARS-COV-2, NAAT: NOT DETECTED
SARS-COV-2, PCR: NORMAL
SODIUM BLD-SCNC: 140 MMOL/L (ref 136–145)
TOTAL PROTEIN: 7.4 G/DL (ref 6.4–8.3)
TROPONIN: <0.3 NG/ML
WBC # BLD: 26.1 K/UL (ref 4–11)

## 2020-05-22 PROCEDURE — 83880 ASSAY OF NATRIURETIC PEPTIDE: CPT

## 2020-05-22 PROCEDURE — 96365 THER/PROPH/DIAG IV INF INIT: CPT

## 2020-05-22 PROCEDURE — U0003 INFECTIOUS AGENT DETECTION BY NUCLEIC ACID (DNA OR RNA); SEVERE ACUTE RESPIRATORY SYNDROME CORONAVIRUS 2 (SARS-COV-2) (CORONAVIRUS DISEASE [COVID-19]), AMPLIFIED PROBE TECHNIQUE, MAKING USE OF HIGH THROUGHPUT TECHNOLOGIES AS DESCRIBED BY CMS-2020-01-R: HCPCS

## 2020-05-22 PROCEDURE — 80053 COMPREHEN METABOLIC PANEL: CPT

## 2020-05-22 PROCEDURE — 84484 ASSAY OF TROPONIN QUANT: CPT

## 2020-05-22 PROCEDURE — 83605 ASSAY OF LACTIC ACID: CPT

## 2020-05-22 PROCEDURE — 85025 COMPLETE CBC W/AUTO DIFF WBC: CPT

## 2020-05-22 PROCEDURE — 71045 X-RAY EXAM CHEST 1 VIEW: CPT

## 2020-05-22 PROCEDURE — 2500000003 HC RX 250 WO HCPCS: Performed by: EMERGENCY MEDICINE

## 2020-05-22 PROCEDURE — 36415 COLL VENOUS BLD VENIPUNCTURE: CPT

## 2020-05-22 PROCEDURE — 96375 TX/PRO/DX INJ NEW DRUG ADDON: CPT

## 2020-05-22 PROCEDURE — 94640 AIRWAY INHALATION TREATMENT: CPT

## 2020-05-22 PROCEDURE — 6370000000 HC RX 637 (ALT 250 FOR IP): Performed by: EMERGENCY MEDICINE

## 2020-05-22 PROCEDURE — 6360000002 HC RX W HCPCS: Performed by: EMERGENCY MEDICINE

## 2020-05-22 PROCEDURE — 71250 CT THORAX DX C-: CPT

## 2020-05-22 PROCEDURE — 87040 BLOOD CULTURE FOR BACTERIA: CPT

## 2020-05-22 PROCEDURE — 87804 INFLUENZA ASSAY W/OPTIC: CPT

## 2020-05-22 PROCEDURE — 87880 STREP A ASSAY W/OPTIC: CPT

## 2020-05-22 PROCEDURE — 99285 EMERGENCY DEPT VISIT HI MDM: CPT

## 2020-05-22 PROCEDURE — U0002 COVID-19 LAB TEST NON-CDC: HCPCS

## 2020-05-22 PROCEDURE — 93005 ELECTROCARDIOGRAM TRACING: CPT

## 2020-05-22 PROCEDURE — 2580000003 HC RX 258: Performed by: EMERGENCY MEDICINE

## 2020-05-22 RX ORDER — METHYLPREDNISOLONE SODIUM SUCCINATE 125 MG/2ML
125 INJECTION, POWDER, LYOPHILIZED, FOR SOLUTION INTRAMUSCULAR; INTRAVENOUS ONCE
Status: COMPLETED | OUTPATIENT
Start: 2020-05-22 | End: 2020-05-22

## 2020-05-22 RX ORDER — IPRATROPIUM BROMIDE AND ALBUTEROL SULFATE 2.5; .5 MG/3ML; MG/3ML
1 SOLUTION RESPIRATORY (INHALATION) ONCE
Status: COMPLETED | OUTPATIENT
Start: 2020-05-22 | End: 2020-05-22

## 2020-05-22 RX ADMIN — IPRATROPIUM BROMIDE AND ALBUTEROL SULFATE 1 AMPULE: .5; 3 SOLUTION RESPIRATORY (INHALATION) at 13:00

## 2020-05-22 RX ADMIN — DOXYCYCLINE 100 MG: 100 INJECTION, POWDER, LYOPHILIZED, FOR SOLUTION INTRAVENOUS at 18:56

## 2020-05-22 RX ADMIN — CEFTRIAXONE 1 G: 1 INJECTION, POWDER, FOR SOLUTION INTRAMUSCULAR; INTRAVENOUS at 18:00

## 2020-05-22 RX ADMIN — METHYLPREDNISOLONE SODIUM SUCCINATE 125 MG: 125 INJECTION, POWDER, FOR SOLUTION INTRAMUSCULAR; INTRAVENOUS at 18:00

## 2020-05-22 ASSESSMENT — PAIN DESCRIPTION - LOCATION: LOCATION: BACK

## 2020-05-22 ASSESSMENT — PAIN DESCRIPTION - FREQUENCY: FREQUENCY: CONTINUOUS

## 2020-05-22 ASSESSMENT — PAIN DESCRIPTION - PAIN TYPE: TYPE: CHRONIC PAIN

## 2020-05-22 ASSESSMENT — PAIN SCALES - GENERAL: PAINLEVEL_OUTOF10: 7

## 2020-05-22 NOTE — ED NOTES
Report given to Krystal Ballard RN at this time at [facility].       Miguel Rao RN  05/22/20 0164

## 2020-05-22 NOTE — ED PROVIDER NOTES
SURGICAL HISTORY       Past Surgical History:   Procedure Laterality Date    CATARACT REMOVAL WITH IMPLANT  2005   [address]    x2    COLONOSCOPY  05/01/2013    COLONOSCOPY N/A 5/23/2019    COLONOSCOPY POLYPECTOMY HOT BIOPSY performed by Jeferson Verdugo MD at [facility]      partial    LUNG SURGERY      tumor removed; left    NECK SURGERY      cyst removed; right     TONSILLECTOMY  1956    UPPER GASTROINTESTINAL ENDOSCOPY N/A 5/23/2019    EGD BIOPSY performed by Jeferson Verdugo MD at [address] [place] [address]       Discharge Medication List as of 5/22/2020  7:13 PM      CONTINUE these medications which have NOT CHANGED    Details   predniSONE (DELTASONE) 20 MG tablet TAKE 1 TABLET BY MOUTH 2 TIMES DAILY FOR 5 DAYS, Disp-10 tablet, R-0Normal      busPIRone (BUSPAR) 10 MG tablet TAKE ONE TABLET BY MOUTH THREE TIMES A DAY, Disp-90 tablet, R-0Normal      potassium chloride (KLOR-CON M) 20 MEQ extended release tablet TAKE ONE TABLET BY MOUTH EVERY DAY, Disp-30 tablet, R-5Normal      metoprolol succinate (TOPROL XL) 100 MG extended release tablet TAKE 1 TABLET BY MOUTH DAILY, Disp-30 tablet, R-5Normal      allopurinol (ZYLOPRIM) 300 MG tablet Take 0.5 tablets by mouth daily, Disp-30 tablet, R-2Normal      HYDROcodone-acetaminophen (NORCO) 5-325 MG per tablet TAKE ONE TABLET BY MOUTH EVERY DAY AS NEEDED FOR PAIN, Disp-30 tablet, R-0Normal      FEROSUL 325 (65 Fe) MG tablet TAKE ONE TABLET BY MOUTH THREE TIMES A DAY WITH FOOD, Disp-90 tablet, R-3Normal      VASCEPA 1 g CAPS capsule TAKE TWO CAPSULES BY MOUTH TWO TIMES A DAY, Disp-60 capsule, R-3Normal      sucralfate (CARAFATE) 1 GM tablet TAKE ONE TABLET BY MOUTH THREE TIMES A DAY, Disp-90 tablet, R-3Normal      metoclopramide (REGLAN) 5 MG tablet TAKE ONE TABLET BY MOUTH THREE TIMES A DAY, Disp-90 tablet, R-3Normal      Insulin Degludec (TRESIBA FLEXTOUCH) 100 UNIT/ML SOPN INJECT 35 UNITS IN THE MORNING AND 80 UNITS IN THE EVENING, Disp-30 mL, R-5Normal      apixaban (ELIQUIS) 5 MG TABS tablet Take 1 tablet by mouth every 12 hours, Disp-60 tablet, R-3Normal      dilTIAZem (CARDIZEM CD) 180 MG extended release capsule Take 1 capsule by mouth daily, Disp-30 capsule, R-3Normal      montelukast (SINGULAIR) 10 MG tablet TAKE ONE TABLET BY MOUTH EVERY DAY, Disp-30 tablet, R-2Normal      ezetimibe (ZETIA) 10 MG tablet TAKE ONE TABLET BY MOUTH EVERY DAY, Disp-90 tablet, R-3Normal      metOLazone (ZAROXOLYN) 5 MG tablet TAKE 1 TABLET BY MOUTH DAILY AS NEEDED (SWELLING), Disp-30 tablet, R-3Normal      ASPIRIN LOW DOSE 81 MG EC tablet TAKE ONE TABLET BY MOUTH EVERY DAY, Disp-30 tablet, R-5Normal      pantoprazole (PROTONIX) 40 MG tablet TAKE ONE TABLET BY MOUTH EVERY DAY, Disp-90 tablet, R-3Normal      rOPINIRole (REQUIP) 0.5 MG tablet TAKE ONE TABLET BY MOUTH TWO TIMES A DAY, Disp-60 tablet, R-3Normal      PATADAY 0.2 % SOLN ophthalmic solution PLACE 1 DROP INTO BOTH EYES 2 TIMES DAILY, Disp-2.5 mL, R-3, DAWNormal      Roflumilast (DALIRESP) 500 MCG tablet Take 1 tablet by mouth daily, Disp-30 tablet, R-2Normal      HUMALOG 100 UNIT/ML injection vial INJECT 35 UNITS UNDER THE SKIN THREE TIMES A DAY BEFORE MEALS, Disp-30 mL, R-4Normal      GNP VITAMIN D MAXIMUM STRENGTH 50 MCG (2000 UT) TABS Take 1 tablet by mouth daily , R-5, DAWHistorical Med      diclofenac sodium 1 % GEL Apply 2 g topically 2 times daily, Topical, 2 TIMES DAILY Starting Sat 10/26/2019, Disp-1 Tube, R-3, Normal      furosemide (LASIX) 40 MG tablet Take 1 tablet by mouth daily, Disp-60 tablet, R-5Adjust Sig      JANUVIA 100 MG tablet TAKE ONE TABLET BY MOUTH EVERY DAY, Disp-30 tablet, R-2Normal      fluticasone-salmeterol (ADVAIR) 500-50 MCG/DOSE diskus inhaler Inhale 1 puff into the lungs every 12 hoursHistorical Med      omalizumab (OMALIZUMAB) 150 MG injection Inject 150 mg into the skin every 28 days, Disp-1 vial, R-3Normal      fenofibrate (TRICOR) 145  Tobacco comment: States son is smoking in her home in one room and bf smokes in the other bedroom. Substance and Sexual Activity    Alcohol use: No    Drug use: No    Sexual activity: None   Lifestyle    Physical activity     Days per week: None     Minutes per session: None    Stress: Rather much   Relationships    Social connections     Talks on phone: More than three times a week     Gets together: None     Attends [employer] service: None     Active member of club or organization: No     Attends meetings of clubs or organizations: Never     Relationship status: None    Intimate partner violence     Fear of current or ex partner: No     Emotionally abused: No     Physically abused: No     Forced sexual activity: No   Other Topics Concern    None   Social History Narrative    None         PHYSICAL EXAM    (up to 7 for level 4, 8 or more for level 5)     ED Triage Vitals [05/22/20 1130]   BP Temp Temp Source Pulse Resp SpO2 Height Weight   (!) 154/104 98.8 °F (37.1 °C) Oral 94 -- (!) 86 % 4' 10\" (1.473 m) 183 lb (83 kg)       Physical Exam  General :Patient is awake, alert, oriented, in no acute distress, nontoxic appearing  HEENT: Pupils are equally round and reactive to light, EOMI, conjunctivae clear. Oral mucosa is moist, no exudate. Uvula is midline  Neck: Neck is supple, full range of motion, trachea midline  Cardiac: Heart regular rate, rhythm, no murmurs, rubs, or gallops  Lungs: Lungs with diffuse expiratory wheezing and scattered rhonchi  Chest wall: There is no tenderness to palpation over the chest wall or over ribs  Abdomen: Abdomen is soft, nontender, nondistended. There is no firm or pulsatile masses, no rebound rigidity or guarding. Musculoskeletal: 5 out of 5 strength in all 4 extremities. No focal muscle deficits are appreciated  Neuro:  Motor intact, sensory intact, level of consciousness is normal, cerebellar function is normal, reflexes are grossly normal. No evidence of g/dL   Lactic Acid, Plasma   Result Value Ref Range    Lactic Acid 2.6 (H) 0.4 - 2.0 mmol/L   COVID-19   Result Value Ref Range    SARS-CoV-2, NAAT Not Detected Not Detected    SARS-CoV-2, PCR Not performed Not Detected        All other labs were within normal range or not returned as of this dictation. EMERGENCY DEPARTMENT COURSE and DIFFERENTIAL DIAGNOSIS/MDM:   Vitals:    Vitals:    05/22/20 1745 05/22/20 1830 05/22/20 1845 05/22/20 1900   BP: 123/62 139/60 (!) 153/61    Pulse:  77 77 81   Temp:       TempSrc:       SpO2:  95% 94% 93%   Weight:       Height:           MEDICATIONS ADMINISTERED IN ED:  Medications   ipratropium-albuterol (DUONEB) nebulizer solution 1 ampule (1 ampule Inhalation Given 5/22/20 1300)   cefTRIAXone (ROCEPHIN) 1 g IVPB in 50 mL D5W minibag (0 g Intravenous Stopped 5/22/20 1856)   doxycycline (VIBRAMYCIN) 100 mg in dextrose 5 % 100 mL IVPB (0 mg Intravenous Patient Transferred to Other Facility 5/22/20 1909)   methylPREDNISolone sodium (SOLU-MEDROL) injection 125 mg (125 mg Intravenous Given 5/22/20 1800)       The patient has received DuoNeb treatment in the ED she is received Solu-Medrol 125 in the ED she is received antibiotics including Rocephin 1 g IV as well as doxycycline 100 IV. She has been accepted at [facility] by Dr. Darron Collado and she will be transferred there shortly    The patient will follow-up with their P.  CP in 1-2 days for reevaluation. If the patient or family members have anyfurther concerns or any worsening symptoms they will return to the ED for reevaluation. CONSULTS:  None    PROCEDURES:  Procedures    CRITICAL CARE TIME    Total Critical Care time was 0 minutes, excluding separately reportable procedures. There was a high probability of clinically significant/life threatening deterioration in the patient's condition which required my urgent intervention. FINAL IMPRESSION      1. Pneumonia due to organism    2.  COPD exacerbation ([facility] [address]

## 2020-05-22 NOTE — ED NOTES
Patient in route to [facility] in [place] at this time via [id number] at this time.       Juliette Perez RN  05/22/20 1912

## 2020-05-22 NOTE — ED NOTES
Patient up to bedside commode to void and have a large dark soft bowel movement at this time.    Helen Crews, RN  05/22/20 [address] [place] Jarod, RN  05/22/20 8253

## 2020-05-22 NOTE — ACP (ADVANCE CARE PLANNING)
Advance Care Planning     Advance Care Planning Activator (Inpatient)  Conversation Note      Date of ACP Conversation: 5/22/2020    Conversation Conducted with: Patient with Decision Making Capacity by Primary RN, Isacc Phillips    ACP Activator: Keagan Padilla    *When Decision Maker makes decisions on behalf of the incapacitated patient: Shelbie Colunga is asked to consider and make decisions based on patient values, known preferences, or best interests. Health Care Decision Maker:     Current Designated Health Care Decision Maker:   Primary Decision Maker: Hung Oden Child - [phone]    Secondary Decision Maker: Ozzy Del Valle - Child - [phone]  (If there is a valid Health Care Decision Maker named in the \"Healthcare Decision Makers\" box in the ACP activity, but it is not visible above, be sure to open that field and then select the health care decision maker relationship (ie \"primary\") in the blank space to the right of the name.) Validate  this information as still accurate & up-to-date; edit [place] field as needed.)    Note: Assess and validate information in current ACP documents, as indicated. Note: If the relationship of these Decision-Makers to the patient does NOT follow your state's Next of Kin hierarchy, recommend that patient complete ACP document that meets state-specific requirements to allow them to act on the patient's behalf when appropriate. Care Preferences    Ventilation: \"If you were in your present state of health and suddenly became very ill and were unable to breathe on your own, what would your preference be about the use of a ventilator (breathing machine) if it were available to you? \"      Would the patient desire the use of ventilator (breathing machine)?: yes    \"If your health worsens and it becomes clear that your chance of recovery is unlikely, what would your preference be about the use of a ventilator (breathing machine) if it were available to

## 2020-05-26 LAB — BLOOD CULTURE, ROUTINE: NORMAL

## 2020-05-26 RX ORDER — PREDNISONE 20 MG/1
TABLET ORAL
Qty: 10 TABLET | Refills: 0 | Status: SHIPPED
Start: 2020-05-26 | End: 2020-06-10 | Stop reason: ALTCHOICE

## 2020-05-28 RX ORDER — HYDROCODONE BITARTRATE AND ACETAMINOPHEN 5; 325 MG/1; MG/1
TABLET ORAL
Qty: 30 TABLET | Refills: 0 | Status: SHIPPED | OUTPATIENT
Start: 2020-05-28 | End: 2020-06-25

## 2020-05-30 PROBLEM — Z20.822 SUSPECTED COVID-19 VIRUS INFECTION: Status: RESOLVED | Noted: 2020-01-01 | Resolved: 2020-01-01

## 2020-05-30 NOTE — OUTREACH NOTE
Prep Survey      Responses   [facility]?  [facility]   Is LACE score < 7 ?  No   Eligibility  Readm Mgmt   Discharge diagnosis  Acute on chronic respiratory failure with hypoxia COPD exacerbation [facility]   COVID-19 Test Status  Negative   Does the patient have one of the following disease processes/diagnoses(primary or secondary)?  COPD/Pneumonia   Does the patient have Home health ordered?  Yes   What is the Home health agency?   [facility]    Is there a DME ordered?  No   Prep survey completed?  Yes          Lindsay Friedman RN

## 2020-06-02 ENCOUNTER — CARE COORDINATION (OUTPATIENT)
Dept: CARE COORDINATION | Age: 70
End: 2020-06-02

## 2020-06-02 NOTE — OUTREACH NOTE
COPD/PN Week 1 Survey      Responses   [facility] patient discharged from?  [place]   COVID-19 Test Status  Negative   Does the patient have one of the following disease processes/diagnoses(primary or secondary)?  COPD/Pneumonia   Is there a successful TCM telephone encounter documented?  No   Was the primary reason for admission:  COPD exacerbation   Week 1 attempt successful?  No   Unsuccessful attempts  Attempt 1          Dolly Carranza RN

## 2020-06-03 ENCOUNTER — TELEPHONE (OUTPATIENT)
Dept: PRIMARY CARE CLINIC | Age: 70
End: 2020-06-03

## 2020-06-03 NOTE — TELEPHONE ENCOUNTER
Per Padmini Egan -It looks like this may have been done as an inpatient. Please call her and see if she would like to see a gastroenterologist for delayed stomach emptying. The Metoclopramide she takes helps treat this. Spoke with patient, they did want her to see GI. She also stated they stopped her Metoclopramide and Iron. And advised her to start [facility] Root. Referral pended, unsure of preference.

## 2020-06-03 NOTE — OUTREACH NOTE
COPD/PN Week 1 Survey      Responses   [facility] patient discharged from?  [place]   COVID-19 Test Status  Negative   Does the patient have one of the following disease processes/diagnoses(primary or secondary)?  COPD/Pneumonia   Is there a successful TCM telephone encounter documented?  No   Was the primary reason for admission:  COPD exacerbation   Week 1 attempt successful?  Yes   Call start time  1602   Call end time  1608   Meds reviewed with patient/caregiver?  Yes   Is the patient having any side effects they believe may be caused by any medication additions or changes?  No   Does the patient have all medications ordered at discharge?  No   What is keeping the patient from filling the prescriptions?  Pre-authorization in progress [waiting on auth for probiotic]   Nursing Interventions  No intervention needed   Is the patient taking all medications as directed (includes completed medication regime)?  Yes   Does the patient have a primary care provider?   Yes   Does the patient have an appointment with their PCP or pulmonologist within 7 days of discharge?  Yes   Has the patient kept scheduled appointments due by today?  Yes   What is the Home health agency?   [facility]    Has home health visited the patient within 72 hours of discharge?  Yes   Psychosocial issues?  No   Comments  pt states had some SOB today when at MD office, states came home and tried to relax, to increase oxygenation   Did the patient receive a copy of their discharge instructions?  Yes   Nursing interventions  Reviewed instructions with patient   What is the patient's perception of their health status since discharge?  Improving   Nursing Interventions  Nurse provided patient education   Are the patient's immunizations up to date?   Yes   Is the patient/caregiver able to teach back the hierarchy of who to call/visit for symptoms/problems? PCP, Specialist, Home health nurse, Urgent Care, ED, 911  Yes   Additional teach back  comments  states had difficult day, had to walk far in heat to appt, has improved when came home and rested   Is the patient able to teach back COPD zones?  Yes   Patient reports what zone on this call?  Green Zone   Green Zone  Reports doing well, Breathing without shortness of breath, Usual activity and exercise level, Usual amount of phlegm/mucus without difficulty coughing up, Sleeping well, Appetite is good   Green Zone interventions:  Continue regular exercise/diet plan, Use oxygen as prescribed, Avoid indoor/outdoor triggers, Take daily medications   Is the patient/caregiver able to teach back signs and symptoms of worsening condition:  Fever/chills, Shortness of breath, Chest pain   Is the patient/caregiver able to teach back importance of completing antibiotic course of treatment?  Yes   Week 1 call completed?  Yes          Laurence Hernandez RN

## 2020-06-03 NOTE — PROGRESS NOTES
DATE OF CONSULTATION: 6/3/2020    REFERRING PHYSICIAN: Luz Prater APRN    Dear Luz Huerta APRN  Thank you for asking for my medical advice on this patient. I saw her in the  [place] office on 6/3/2020    REASON FOR CONSULTATION: Leukoytosis    HISTORY OF PRESENT ILLNESS: The patient is a very pleasant 69 y.o.  female   who was in her usual state of health until March 2020.  Patient had a blood work done that revealed leukocytosis thrombocytosis.  This has been watched for the last 3 months and should persistently abnormalities.  Patient was referred to me for further recommendations.    SUBJECTIVE: When I saw the patient today is here by herself and she is complaining of shortness of breath which is a chronic finding for her.  She is on 3 L of oxygen.  She was recently admitted to the hospital for pneumonia.  She is on chronic prednisone 20 mg daily.    Review of Systems   Constitutional: Negative for activity change, appetite change, chills, fatigue, fever and unexpected weight change.   HENT: Negative for hearing loss, mouth sores, nosebleeds, sore throat and trouble swallowing.    Eyes: Negative for visual disturbance.   Respiratory: Negative for cough, chest tightness, shortness of breath and wheezing.    Cardiovascular: Negative for chest pain, palpitations and leg swelling.   Gastrointestinal: Negative for abdominal distention, abdominal pain, blood in stool, constipation, diarrhea, nausea, rectal pain and vomiting.   Endocrine: Negative for cold intolerance and heat intolerance.   Genitourinary: Negative for difficulty urinating, dysuria, frequency and urgency.   Musculoskeletal: Negative for arthralgias, back pain, gait problem, joint swelling and myalgias.   Skin: Negative for rash.   Neurological: Negative for dizziness, tremors, syncope, weakness, light-headedness, numbness and headaches.   Hematological: Negative for adenopathy. Does not bruise/bleed easily.    Psychiatric/Behavioral: Negative for confusion, sleep disturbance and suicidal ideas. The patient is not nervous/anxious.        Past Medical History:   Diagnosis Date   • Acid reflux 10/17/2016   • Asthma     bronchial   • Cancer (CMS/HCC)     uterine   • Chronic diastolic heart failure (CMS/HCC) 10/17/2016    Normal dobutamine echocardiogram stress, 2005. Echocardiogram on 2009:  EF 50% to 55%. Left atrium 3.5 cm.   • Chronic obstructive pulmonary disease (CMS/HCC) 10/17/2016    asthmatic bronchitis, on O2 use chronically.     • Gout 10/17/2016   • High cholesterol 2018   • Hypertension 10/17/2016    Benign hypertension.   • Lower extremity edema 10/17/2016    Chronic lower extremity edema/venous insufficiency.   • Murmur, heart    • Obesity 10/17/2016   • Renal failure     stageIV   • Seasonal allergies 10/17/2016   • Type 2 diabetes mellitus (CMS/HCC) 10/17/2016    insulin dependent.       Social History     Socioeconomic History   • Marital status:      Spouse name: Not on file   • Number of children: Not on file   • Years of education: Not on file   • Highest education level: Not on file   Tobacco Use   • Smoking status: Former Smoker     Packs/day: 1.00     Years: 35.00     Pack years: 35.00     Types: Cigarettes     Last attempt to quit:      Years since quittin.4   • Smokeless tobacco: Never Used   Substance and Sexual Activity   • Alcohol use: No   • Drug use: No   • Sexual activity: Defer       Family History   Problem Relation Age of Onset   • Heart disease Mother    • Heart disease Father    • Leukemia Father    • Diabetes Sister    • COPD Sister    • Breast cancer Sister    • Diabetes Brother    • Lung cancer Sister    • No Known Problems Sister    • No Known Problems Sister    • Pneumonia Sister    • Stroke Sister    • COPD Sister        Past Surgical History:   Procedure Laterality Date   • BRONCHOSCOPY N/A 2018    Procedure: BRONCHOSCOPY WITH WASHINGS;   Surgeon: Ryder Dahl MD;  Location: [facility] OR;  Service:    • CARDIAC CATHETERIZATION N/A 3/17/2020    Procedure: Coronary angiography;  Surgeon: Elkin Zhou MD;  Location: [facility] CATH INVASIVE LOCATION;  Service: Cardiology;  Laterality: N/A;   •  SECTION     • COLONOSCOPY     • COLONOSCOPY N/A 2019    Procedure: COLONOSCOPY;  Surgeon: Skinny York MD;  Location: [facility] ENDOSCOPY;  Service: General   • CYSTECTOMY Right     neck   • DILATATION AND CURETTAGE     • ENDOSCOPY N/A 6/10/2019    Procedure: ESOPHAGOGASTRODUODENOSCOPY WITH BIOPSY;  Surgeon: Skinny York MD;  Location: [facility] ENDOSCOPY;  Service: Gastroenterology   • EYE SURGERY Bilateral 2005    cataract   • FOREIGN BODY REMOVAL N/A 3/5/2020    Procedure: BRONCHOSCOPY with MAC and removal of foreign object;  Surgeon: Ryder Dahl MD;  Location: [facility] OR;  Service: Pulmonary;  Laterality: N/A;   • HYSTERECTOMY     • LUNG SURGERY Left 1997    4 tumors removed from left lung-benign   • TONSILLECTOMY AND ADENOIDECTOMY         Allergies   Allergen Reactions   • Shellfish-Derived Products Hives   • Wheat Bran Hives          Current Outpatient Medications:   •  ADVAIR DISKUS 500-50 MCG/DOSE DISKUS, INHALE 1 PUFF BY MOUTH TWO TIMES A DAY, Disp: 60 each, Rfl: 5  •  allopurinol (ZYLOPRIM) 300 MG tablet, Take 300 mg by mouth Daily., Disp: , Rfl:   •  apixaban (ELIQUIS) 5 MG tablet tablet, Take 1 tablet by mouth Every 12 (Twelve) Hours., Disp: 60 tablet, Rfl: 0  •  ASPIRIN LOW DOSE 81 MG EC tablet, Take 81 mg by mouth Daily., Disp: , Rfl: 5  •  atorvastatin (LIPITOR) 40 MG tablet, Take 1 tablet by mouth Daily., Disp: 30 tablet, Rfl: 10  •  busPIRone (BUSPAR) 10 MG tablet, Take 10 mg by mouth 3 (Three) Times a Day., Disp: , Rfl:   •  DALIRESP 500 MCG tablet tablet, TAKE ONE TABLET BY MOUTH EVERY DAY, Disp: 30 tablet, Rfl: 5  •  dilTIAZem CD (CARDIZEM CD) 180 MG 24 hr capsule, Take 1 capsule by mouth Daily., Disp: 30 capsule, Rfl:  0  •  ezetimibe (Zetia) 10 MG tablet, Take 10 mg by mouth Daily., Disp: , Rfl:   •  fenofibrate (TRICOR) 48 MG tablet, Take 48 mg by mouth Daily., Disp: , Rfl:   •  FLUTICASONE PROPIONATE, INHAL, IN, Inhale 2 (two) times a day., Disp: , Rfl:   •  furosemide (LASIX) 40 MG tablet, Take 40 mg by mouth Daily., Disp: , Rfl:   •  GNP VITAMIN D MAXIMUM STRENGTH 50 MCG (2000 UT) tablet, Take 1 tablet by mouth Daily., Disp: , Rfl: 5  •  HUMALOG 100 UNIT/ML injection, Inject 35 Units under the skin into the appropriate area as directed 3 (Three) Times a Day., Disp: , Rfl:   •  HYDROcodone-acetaminophen (NORCO) 5-325 MG per tablet, Take 1 tablet by mouth Every Night., Disp: , Rfl:   •  icosapent ethyl (Vascepa) 1 g capsule capsule, Take 2 g by mouth 2 (Two) Times a Day With Meals., Disp: , Rfl:   •  metolazone (ZAROXOLYN) 5 MG tablet, Take 5 mg by mouth Daily As Needed., Disp: , Rfl:   •  metoprolol succinate XL (TOPROL-XL) 100 MG 24 hr tablet, Take 1 tablet by mouth Daily., Disp: 30 tablet, Rfl: 0  •  montelukast (SINGULAIR) 10 MG tablet, Take 10 mg by mouth Every Night., Disp: , Rfl:   •  O2 (OXYGEN), Inhale 3 L/min Daily., Disp: , Rfl:   •  omalizumab (XOLAIR) 150 MG injection, Inject 300 mg under the skin into the appropriate area as directed Every 28 (Twenty-Eight) Days. 2 injections once a month, Disp: , Rfl:   •  PATADAY 0.2 % solution ophthalmic solution, Administer  to both eyes 2 (Two) Times a Day., Disp: , Rfl: 3  •  potassium chloride (K-DUR,KLOR-CON) 20 MEQ CR tablet, Take 20 mEq by mouth Daily., Disp: , Rfl: 2  •  rOPINIRole (REQUIP) 0.5 MG tablet, Take 0.5 mg by mouth 2 (Two) Times a Day., Disp: , Rfl: 0  •  sitaGLIPtin (JANUVIA) 100 MG tablet, Take 100 mg by mouth Daily. 1/2 tablet daily, Disp: , Rfl:   •  SPIRIVA HANDIHALER 18 MCG per inhalation capsule, PLACE 1 CAPSULE INTO INHALER AND INHALE DAILY., Disp: 30 capsule, Rfl: 4  •  sucralfate (CARAFATE) 1 G tablet, Take 1 g by mouth 3 (Three) Times a Day., Disp:  ", Rfl:   •  traMADol (ULTRAM) 50 MG tablet, Take 50 mg by mouth 3 (Three) Times a Day., Disp: , Rfl:   •  TRESIBA FLEXTOUCH 100 UNIT/ML solution pen-injector injection, 2 (Two) Times a Day. 35 units in the am 80 units at night, Disp: , Rfl: 10  •  Blood Glucose Monitoring Suppl device, 1 each., Disp: , Rfl:     PHYSICAL EXAMINATION:   /58   Pulse 86   Temp 97.7 °F (36.5 °C) (Temporal)   Resp 16   Ht 147.3 cm (58\")   Wt 83 kg (183 lb)   LMP  (LMP Unknown)   SpO2 (!) 86%   BMI 38.25 kg/m²   Pain Score    06/03/20 1121   PainSc:   6   PainLoc: Back       ECOG Performance Status: 1 - Symptomatic but completely ambulatory  General Appearance:  alert, cooperative, no apparent distress and appears stated age   Neurologic/Psychiatric: A&O x 3, gait steady, appropriate affect, strength 5/5 in all muscle groups   HEENT:  Normocephalic, without obvious abnormality, mucous membranes moist   Neck: Supple, symmetrical, trachea midline, no adenopathy;  No thyromegaly, masses, or tenderness   Lungs:   Clear to auscultation bilaterally; respirations regular, even, and unlabored bilaterally   Heart:  Regular rate and rhythm, no murmurs appreciated   Abdomen:   Soft, non-tender, non-distended and no organomegaly   Lymph nodes: No cervical, supraclavicular, inguinal or axillary adenopathy noted   Extremities: Normal, atraumatic; no clubbing, cyanosis, or edema    Skin: No rashes, ulcers, or suspicious lesions noted       No results displayed because visit has over 200 results.           Nm Gastric Emptying    Result Date: 5/30/2020  Narrative: EXAMINATION: NM GASTRIC EMPTYING- 05/28/2020  INDICATION: Persistent nausea  TECHNIQUE: Radiopharmaceutical: 1.02 mCi of technetium 99m sulfur colloid mixed with oatmeal, p.o.  COMPARISON: NONE  FINDINGS: Solid phase gastric emptying study through 90 minutes shows only minimal emptying, with maximal emptying of 15%. This would yield a T1 half of almost 400 minutes. The filmed images " confirm nearly all the activity remained in the stomach, with a small amount of activity passing into small bowel.      Impression: Abnormal solid-phase gastric emptying study, with only 15% emptying at 90 minutes.  D:  05/28/2020 E:  05/28/2020   This report was finalized on 5/30/2020 4:05 PM by Dr. Kushal Stewart MD.      Xr Chest 1 View    Result Date: 5/28/2020  Narrative: EXAMINATION: XR CHEST 1 VW-05/28/2020:  INDICATION: Persistent hypoxia.  COMPARISON: 05/24/2020.  FINDINGS: Focal discoid atelectasis or scar in the left upper midlung is stable. Mild patchy interstitial changes in the lung bases appear improved. The lungs appear clear elsewhere. The heart is upper normal size. The vasculature appears normal.      Impression: 1. Stable left upper-mid lung scar or atelectasis. 2. Minimal remaining bibasilar interstitial disease.  D:  05/28/2020 E:  05/28/2020     This report was finalized on 5/28/2020 10:24 PM by Dr. Kushal Stewart MD.      Xr Chest 1 View    Result Date: 5/24/2020  Narrative: EXAMINATION: XR CHEST 1 VW - 05/24/2020  INDICATION: Hypoxia. CHF.  COMPARISON: Outside CT scan of 05/22/2020, outside radiograph same date, and outside earlier chest radiograph of 02/26/2020.  FINDINGS: The outside study shows interval development since February of extensive left lung disease including some pleural thickening. Since the 05/22/2020 exam, there has been marked interval clearing of the left lung, with small areas of discoid atelectasis remaining. There is some subtle patchy disease in the right base which appears stable or improved. Right upper and mid lungs remain clear. Heart is mildly enlarged. Vasculature appears normal.      Impression: Compared to outside radiograph of 05/22/2020, there has been marked interval clearing of the left lung. Mild remaining discoid atelectasis. Stable mild patchy interstitial changes of the right lung base. No new chest pathology is seen.   DICTATED:   05/24/2020 EDITED/ls :    05/24/2020  This report was finalized on 5/24/2020 11:37 PM by Dr. Kushal Stewart MD.      Xr Outside Films    Result Date: 5/23/2020  Narrative: This procedure was auto-finalized with no dictation required.    Xr Outside Films    Result Date: 5/23/2020  Narrative: This procedure was auto-finalized with no dictation required.    Xr Abdomen Kub    Result Date: 5/27/2020  Narrative: EXAMINATION: XR ABDOMEN/KUB-05/26/2020:  INDICATION: Persistent nausea, abdominal pain.  COMPARISON: NONE.  FINDINGS: The stomach is distended with air and apparently with food. There is stool shadow in normal caliber colon. Gas is seen in normal caliber small bowel. No bowel wall edema or pneumatosis is appreciated. Incidental note is made of lower lumbar degenerative disc disease.      Impression: Air and food-distended stomach. If the patient has not had a large recent meal, consider delayed gastric emptying. Nonobstructive bowel gas pattern elsewhere with mild, if any, fecal stasis.  D:  05/26/2020 E:  05/26/2020     This report was finalized on 5/27/2020 10:52 PM by Dr. Kushal Stewart MD.      Ct Outside Films    Result Date: 5/23/2020  Narrative: This procedure was auto-finalized with no dictation required.        DIAGNOSTIC DATA:   1. Radiology: As above  2. Dr. Mcdowell's note from May 30, 2020 reviewed by me and documented in the  chart.   3. Pathology report: EGD with biopsy Araceli 10, 2019 with reactive changes  4. Laboratory data:    Results for JOLENE CROWELL (MRN [id number]) as of 6/3/2020 11:30   Ref. Range 5/29/2020 03:08 5/30/2020 04:49   WBC Latest Ref Range: 3.40 - 10.80 10*3/mm3 21.82 (H) 28.16 (H)   RBC Latest Ref Range: 3.77 - 5.28 10*6/mm3 3.84 4.05   Hemoglobin Latest Ref Range: 12.0 - 15.9 g/dL 12.0 12.7   Hematocrit Latest Ref Range: 34.0 - 46.6 % 38.5 39.5   RDW Latest Ref Range: 12.3 - 15.4 % 14.1 14.0   MCV Latest Ref Range: 79.0 - 97.0 fL 100.3 (H) 97.5 (H)   MCH Latest Ref Range: 26.6 - 33.0 pg 31.3 31.4   MCHC Latest  Ref Range: 31.5 - 35.7 g/dL 31.2 (L) 32.2   MPV Latest Ref Range: 6.0 - 12.0 fL 11.4 11.2   Platelets Latest Ref Range: 140 - 450 10*3/mm3 365 428   RDW-SD Latest Ref Range: 37.0 - 54.0 fl 51.3 50.9   Neutrophil Rel % Latest Ref Range: 42.7 - 76.0 % 87.0 (H)    Lymphocyte Rel % Latest Ref Range: 19.6 - 45.3 % 9.3 (L)    Monocyte Rel % Latest Ref Range: 5.0 - 12.0 % 2.7 (L)    Eosinophil Rel % Latest Ref Range: 0.3 - 6.2 % 0.0 (L)    Basophil Rel % Latest Ref Range: 0.0 - 1.5 % 0.2    Immature Granulocyte Rel % Latest Ref Range: 0.0 - 0.5 % 0.8 (H)    Neutrophils Absolute Latest Ref Range: 1.70 - 7.00 10*3/mm3 18.98 (H)    Lymphocytes Absolute Latest Ref Range: 0.70 - 3.10 10*3/mm3 2.02    Monocytes Absolute Latest Ref Range: 0.10 - 0.90 10*3/mm3 0.60    Eosinophils Absolute Latest Ref Range: 0.00 - 0.40 10*3/mm3 0.00    Basophils Absolute Latest Ref Range: 0.00 - 0.20 10*3/mm3 0.04    Immature Grans, Absolute Latest Ref Range: 0.00 - 0.05 10*3/mm3 0.18 (H)        ASSESSMENT: The patient is a very pleasant 69 y.o.  female  with leukocytosis with neutrophilia    PROBLEM LIST:   1.  Leukocytosis with neutrophilia:  A.  Iatrogenic induced by chronic steroids use  2.  Chronic kidney disease stage III  3.  COPD  4.  Atrial fibrillation  5.  Left lower lobe infiltrate:  A.  Present on scan done May 22, 2020    PLAN:   1. I had a long discussion today with the patient about her  new diagnosis of leukocytosis. I did go over the final pathology report in  detail with both of them. I reviewed the patient's documents including refereing provider's notes, lab results, imaging, and path report.   2.  I explained to the patient her neutrophilia is most likely induced by chronic steroid use.  She also was recently admitted for pneumonia that exacerbated her leukocytosis.  3.  I am more concerned about her lung infiltrate rather than the leukocytosis.  4.  I will repeat CT chest without contrast in 3 months to follow-up on the left  lung infiltrate.  5.  The patient will follow-up with me in 3 months with repeat CBC and CT chest.  6.  Patient will continue taking Eliquis twice a day for atrial fibrillation patient would be at high risk of bleeding.  Maurisio Chavez MD  6/3/2020

## 2020-06-04 ENCOUNTER — CARE COORDINATION (OUTPATIENT)
Dept: CARE COORDINATION | Age: 70
End: 2020-06-04

## 2020-06-04 RX ORDER — FLUCONAZOLE 150 MG/1
150 TABLET ORAL ONCE
Qty: 2 TABLET | Refills: 0 | Status: SHIPPED | OUTPATIENT
Start: 2020-06-04 | End: 2020-06-08 | Stop reason: SDUPTHER

## 2020-06-08 ENCOUNTER — NURSE ONLY (OUTPATIENT)
Dept: PRIMARY CARE CLINIC | Age: 70
End: 2020-06-08
Payer: MEDICARE

## 2020-06-08 PROCEDURE — 96372 THER/PROPH/DIAG INJ SC/IM: CPT | Performed by: NURSE PRACTITIONER

## 2020-06-08 RX ORDER — FLUCONAZOLE 150 MG/1
150 TABLET ORAL ONCE
Qty: 2 TABLET | Refills: 0 | Status: SHIPPED | OUTPATIENT
Start: 2020-06-08 | End: 2020-06-08

## 2020-06-09 RX ORDER — BUSPIRONE HYDROCHLORIDE 10 MG/1
TABLET ORAL
Qty: 90 TABLET | Refills: 0 | Status: SHIPPED | OUTPATIENT
Start: 2020-06-09 | End: 2020-06-10 | Stop reason: SDUPTHER

## 2020-06-10 ENCOUNTER — OFFICE VISIT (OUTPATIENT)
Dept: PRIMARY CARE CLINIC | Age: 70
End: 2020-06-10
Payer: MEDICARE

## 2020-06-10 VITALS
OXYGEN SATURATION: 93 % | SYSTOLIC BLOOD PRESSURE: 120 MMHG | TEMPERATURE: 98.5 F | RESPIRATION RATE: 16 BRPM | HEIGHT: 58 IN | WEIGHT: 187 LBS | BODY MASS INDEX: 39.25 KG/M2 | DIASTOLIC BLOOD PRESSURE: 50 MMHG | HEART RATE: 75 BPM

## 2020-06-10 PROCEDURE — 1111F DSCHRG MED/CURRENT MED MERGE: CPT | Performed by: NURSE PRACTITIONER

## 2020-06-10 PROCEDURE — 99495 TRANSJ CARE MGMT MOD F2F 14D: CPT | Performed by: NURSE PRACTITIONER

## 2020-06-10 PROCEDURE — 82044 UR ALBUMIN SEMIQUANTITATIVE: CPT | Performed by: NURSE PRACTITIONER

## 2020-06-10 RX ORDER — DOCUSATE SODIUM 250 MG
250 CAPSULE ORAL DAILY
Qty: 90 CAPSULE | Refills: 3 | Status: SHIPPED | OUTPATIENT
Start: 2020-06-10

## 2020-06-10 RX ORDER — FLUCONAZOLE 100 MG/1
100 TABLET ORAL DAILY
Qty: 3 TABLET | Refills: 0 | Status: SHIPPED | OUTPATIENT
Start: 2020-06-10 | End: 2020-06-13

## 2020-06-10 RX ORDER — BUSPIRONE HYDROCHLORIDE 10 MG/1
TABLET ORAL
Qty: 90 TABLET | Refills: 3 | Status: SHIPPED | OUTPATIENT
Start: 2020-06-10 | End: 2020-07-07

## 2020-06-10 RX ORDER — POLYETHYLENE GLYCOL 3350 17 G/17G
17 POWDER, FOR SOLUTION ORAL DAILY
Qty: 510 G | Refills: 5 | Status: SHIPPED | OUTPATIENT
Start: 2020-06-10 | End: 2020-07-10

## 2020-06-10 ASSESSMENT — ENCOUNTER SYMPTOMS: GASTROINTESTINAL NEGATIVE: 1

## 2020-06-10 NOTE — OUTREACH NOTE
COPD/PN Week 2 Survey      Responses   [facility] patient discharged from?  [facility]   COVID-19 Test Status  Negative   Does the patient have one of the following disease processes/diagnoses(primary or secondary)?  COPD/Pneumonia   Was the primary reason for admission:  Pneumonia   Week 2 attempt successful?  Yes   Call start time  1507   Call end time  1515   Discharge diagnosis  Acute on chronic respiratory failure with hypoxia COPD exacerbation [facility]itl[facility] PNA   Is patient permission given to speak with other caregiver?  No   Meds reviewed with patient/caregiver?  Yes   Is the patient having any side effects they believe may be caused by any medication additions or changes?  No   Does the patient have all medications ordered at discharge?  Yes   Is the patient taking all medications as directed (includes completed medication regime)?  Yes   Does the patient have a primary care provider?   Yes   Comments regarding PCP  PCP JACKI Mistry. Patient has appt 6/26/20   Has the patient kept scheduled appointments due by today?  Yes   What is the Home health agency?   [facility]    Has home health visited the patient within 72 hours of discharge?  Yes   DME comments  Patient has home O2, CPAP, and nebulizer   Pulse Ox monitoring  Intermittent   Pulse Ox device source  Other [checked by  with visits.]   O2 Sat comments  Patient reports O2 sat 94% on 3L O2 with last HH check.    O2 Sat: education provided  Sat levels, Monitoring frequency, When to seek care [patient reports that she will try and get her own pulse oximeter. ]   Psychosocial issues?  No   Did the patient receive a copy of their discharge instructions?  Yes   Nursing interventions  Reviewed instructions with patient   What is the patient's perception of their health status since discharge?  Improving   Is the patient/caregiver able to teach back the hierarchy of who to call/visit for symptoms/problems? PCP, Specialist, Home health  nurse, Urgent Care, ED, 911  Yes   Is the patient/caregiver able to teach back signs and symptoms of worsening condition:  Fever/chills, Shortness of breath, Chest pain   Is the patient/caregiver able to teach back importance of completing antibiotic course of treatment?  Yes   Week 2 call completed?  Yes          Dolly Cody RN

## 2020-06-10 NOTE — PROGRESS NOTES
Post-Discharge Transitional Care Management Services or Hospital Follow Up      Hieu Maldonado   YOB: 1950    Date of Office Visit:  6/10/2020  Date of Hospital Admission: 5/22/20  Date of Hospital Discharge: 5/22/20  Readmission Risk Score(high >=14%.  Medium >=10%):Readmission Risk Score: 28      Care management risk score Rising risk (score 2-5) and Complex Care (Scores >=6): 6     Non face to face  following discharge, date last encounter closed (first attempt may have been earlier): 6/4/2020  1:56 PM 6/4/2020  1:56 PM    Call initiated 2 business days of discharge: Yes     Patient Active Problem List   Diagnosis    DM type 2 (diabetes mellitus, type 2)    Elevated LFTs    Hyperlipidemia    HTN (hypertension)    Back pain    COPD (chronic obstructive pulmonary disease) ([facility] [address]    B12 deficiency    Pneumonia due to organism    Moderate persistent asthma without complication    Hypoxia    Chronic gout without tophus    Vitamin D deficiency    CRF (chronic renal failure)    Hand cramps    Elevated BUN    Injection site reaction    Allergic reaction    COPD exacerbation (HCC)    Type 2 diabetes mellitus with complication, with long-term current use of insulin (HCC)    Stage 3 chronic kidney disease (HCC)    Chronic bilateral low back pain without sciatica    Dependence on continuous supplemental oxygen    Pure hypercholesterolemia    Hypertension    Asthma    Chronic diastolic heart failure (HCC)    Gastroesophageal reflux disease       Allergies   Allergen Reactions    Shellfish-Derived Products Hives    Wheat Bran Hives       Medications listed as ordered at the time of discharge from hospital   Irving Sample, [address] [facility] Medication Instructions MALIA:    Printed on:06/21/20 3253   Medication Information                      albuterol sulfate HFA (PROAIR HFA) 108 (90 Base) MCG/ACT inhaler  Inhale 2 puffs into the lungs every 4 hours as needed for Wheezing             allopurinol TABS  TAKE ONE TABLET BY MOUTH TWO TIMES A DAY             loratadine (CLARITIN) 10 MG tablet  TAKE ONE TABLET BY MOUTH EVERY DAY             metOLazone (ZAROXOLYN) 5 MG tablet  TAKE 1 TABLET BY MOUTH DAILY AS NEEDED (SWELLING)             metoprolol succinate (TOPROL XL) 100 MG extended release tablet  TAKE 1 TABLET BY MOUTH DAILY             montelukast (SINGULAIR) 10 MG tablet  TAKE ONE TABLET BY MOUTH EVERY DAY             nystatin (MYCOSTATIN) 660877 UNIT/ML suspension  Take 5 mLs by mouth 4 times daily             omalizumab (OMALIZUMAB) 150 MG injection  Inject 150 mg into the skin every 28 days             OXYGEN  Inhale 3 L/min into the lungs continuous             pantoprazole (PROTONIX) 40 MG tablet  TAKE ONE TABLET BY MOUTH EVERY DAY             PATADAY 0.2 % SOLN ophthalmic solution  PLACE 1 DROP INTO BOTH EYES 2 TIMES DAILY             polyethylene glycol (GLYCOLAX) 17 GM/SCOOP powder  Take 17 g by mouth daily             potassium chloride (KLOR-CON M) 20 MEQ extended release tablet  TAKE ONE TABLET BY MOUTH EVERY DAY             Roflumilast (DALIRESP) 500 MCG tablet  Take 1 tablet by mouth daily             rOPINIRole (REQUIP) 0.5 MG tablet  TAKE ONE TABLET BY MOUTH TWO TIMES A DAY             sucralfate (CARAFATE) 1 GM tablet  TAKE ONE TABLET BY MOUTH THREE TIMES A DAY             VASCEPA 1 g CAPS capsule  TAKE TWO CAPSULES BY MOUTH TWO TIMES A DAY                   Medications marked \"taking\" at this time  Outpatient Medications Marked as Taking for the 6/10/20 encounter (Office Visit) with Simeon Collet, APRN   Medication Sig Dispense Refill    busPIRone (BUSPAR) 10 MG tablet One po tid 90 tablet 3    insulin lispro (HUMALOG) 100 UNIT/ML injection vial INJECT 35 UNITS UNDER THE SKIN THREE TIMES A DAY BEFORE MEALS 30 mL 4    polyethylene glycol (GLYCOLAX) 17 GM/SCOOP powder Take 17 g by mouth daily 510 g 5    [] fluconazole (DIFLUCAN) 100 MG tablet Take 1 tablet by mouth daily for 3 days 3 tablet 0    nystatin (MYCOSTATIN) 619452 UNIT/ML suspension Take 5 mLs by mouth 4 times daily 240 mL 2    docusate sodium (COLACE) 250 MG capsule Take 1 capsule by mouth daily 90 capsule 3    HYDROcodone-acetaminophen (NORCO) 5-325 MG per tablet TAKE ONE TABLET BY MOUTH EVERY DAY AS NEEDED FOR PAIN 30 tablet 0    [DISCONTINUED] potassium chloride (KLOR-CON M) 20 MEQ extended release tablet TAKE ONE TABLET BY MOUTH EVERY DAY 30 tablet 5    metoprolol succinate (TOPROL XL) 100 MG extended release tablet TAKE 1 TABLET BY MOUTH DAILY 30 tablet 5    allopurinol (ZYLOPRIM) 300 MG tablet Take 0.5 tablets by mouth daily 30 tablet 2    VASCEPA 1 g CAPS capsule TAKE TWO CAPSULES BY MOUTH TWO TIMES A DAY 60 capsule 3    sucralfate (CARAFATE) 1 GM tablet TAKE ONE TABLET BY MOUTH THREE TIMES A DAY 90 tablet 3    Insulin Degludec (TRESIBA FLEXTOUCH) 100 UNIT/ML SOPN INJECT 35 UNITS IN THE MORNING AND 80 UNITS IN THE EVENING (Patient taking differently: INJECT 35 UNITS IN THE MORNING AND 70 UNITS IN THE EVENING) 30 mL 5    apixaban (ELIQUIS) 5 MG TABS tablet Take 1 tablet by mouth every 12 hours 60 tablet 3    dilTIAZem (CARDIZEM CD) 180 MG extended release capsule Take 1 capsule by mouth daily 30 capsule 3    montelukast (SINGULAIR) 10 MG tablet TAKE ONE TABLET BY MOUTH EVERY DAY 30 tablet 2    ezetimibe (ZETIA) 10 MG tablet TAKE ONE TABLET BY MOUTH EVERY DAY 90 tablet 3    metOLazone (ZAROXOLYN) 5 MG tablet TAKE 1 TABLET BY MOUTH DAILY AS NEEDED (SWELLING) 30 tablet 3    ASPIRIN LOW DOSE 81 MG EC tablet TAKE ONE TABLET BY MOUTH EVERY DAY 30 tablet 5    pantoprazole (PROTONIX) 40 MG tablet TAKE ONE TABLET BY MOUTH EVERY DAY 90 tablet 3    rOPINIRole (REQUIP) 0.5 MG tablet TAKE ONE TABLET BY MOUTH TWO TIMES A DAY 60 tablet 3    Insulin Syringe-Needle U-100 31G X 5/16\" 1 ML MISC USE FOR INSULIN INJECTIONS FIVE TIMES DAILY 150 each 4    PATADAY 0.2 % SOLN ophthalmic solution PLACE 1 DROP INTO BOTH EYES 2 Musculoskeletal:         General: No tenderness. Skin:     General: Skin is warm and dry. Findings: No rash. Neurological:      Mental Status: She is alert and oriented to person, place, and time. Psychiatric:         Judgment: Judgment normal.             Assessment/Plan:  1. Type 2 diabetes mellitus with stage 3 chronic kidney disease, with long-term current use of insulin ([facility])    -  DIABETES FOOT EXAM  - POCT microalbumin  - insulin lispro (HUMALOG) 100 UNIT/ML injection vial; INJECT 35 UNITS UNDER THE SKIN THREE TIMES A DAY BEFORE MEALS  Dispense: 30 mL; Refill: 4  - CBC; Future  - Comprehensive Metabolic Panel; Future  - Hemoglobin A1C; Future    2. Anxiety    - busPIRone (BUSPAR) 10 MG tablet; One po tid  Dispense: 90 tablet; Refill: 3    3. Chronic idiopathic constipation    - docusate sodium (COLACE) 250 MG capsule; Take 1 capsule by mouth daily  Dispense: 90 capsule; Refill: 3    4. Familial hypercholesterolemia    - Lipid Panel; Future    5. Essential hypertension      6. Gastroparesis    - IN DISCHARGE MEDS RECONCILED W/ CURRENT OUTPATIENT MED LIST    7. Pneumonia of both lower lobes due to infectious organism ([facility] [address]    - IN DISCHARGE MEDS RECONCILED W/ CURRENT OUTPATIENT MED LIST    8. Thyroid disorder screen    - TSH without Reflex; Future    9. Vitamin D deficiency    - Vitamin B12 & Folate; Future  - Vitamin D 25 Hydroxy;  Future        Medical Decision Making: high complexity

## 2020-06-10 NOTE — PROGRESS NOTES
Chief Complaint   Patient presents with    Follow-Up from [facility] for pneumonia    Shortness of Breath       Have you seen any other physician or provider since your last visit yes - [facility] and [facility]    Have you had any other diagnostic tests since your last visit? yes - labs and x rays,CT    Have you changed or stopped any medications since your last visit? yes - stopped Reglan,Iron,prednisone       Diabetic retinal exam completed this year? Yes November 2019                       * If yes please have patient sign a records release to obtain record to update Health Maintenance                       * If no, please order referral for patient to be scheduled     Patient is here to follow up on hospital follow up for pneumonia. She c/o SOA this morning. She is needing some Diflucan. She was unable to get Lactobacillus.

## 2020-06-15 RX ORDER — FUROSEMIDE 40 MG/1
TABLET ORAL
Qty: 60 TABLET | Refills: 5 | Status: SHIPPED | OUTPATIENT
Start: 2020-06-15 | End: 2020-08-14

## 2020-06-15 RX ORDER — FLUCONAZOLE 100 MG/1
100 TABLET ORAL DAILY
Qty: 3 TABLET | Refills: 0 | OUTPATIENT
Start: 2020-06-15 | End: 2020-06-18

## 2020-06-15 RX ORDER — FLUCONAZOLE 150 MG/1
TABLET ORAL
Qty: 2 TABLET | Refills: 0 | OUTPATIENT
Start: 2020-06-15

## 2020-06-15 RX ORDER — POTASSIUM CHLORIDE 20 MEQ/1
TABLET, EXTENDED RELEASE ORAL
Qty: 30 TABLET | Refills: 5 | Status: SHIPPED | OUTPATIENT
Start: 2020-06-15 | End: 2020-11-03

## 2020-06-15 RX ORDER — PREDNISONE 20 MG/1
TABLET ORAL
Qty: 10 TABLET | Refills: 0 | OUTPATIENT
Start: 2020-06-15

## 2020-06-17 NOTE — OUTREACH NOTE
COPD/PN Week 3 Survey      Responses   [facility] [place] patient discharged from?  [place]   COVID-19 Test Status  Negative   Does the patient have one of the following disease processes/diagnoses(primary or secondary)?  COPD/Pneumonia   Was the primary reason for admission:  Pneumonia   Week 3 attempt successful?  Yes   Call start time  0936   Call end time  0940   Discharge diagnosis  Acute on chronic respiratory failure with hypoxia COPD exacerbation Mulitlobar PNA   Meds reviewed with patient/caregiver?  Yes   Is the patient taking all medications as directed (includes completed medication regime)?  Yes   Has the patient kept scheduled appointments due by today?  Yes   Comments  Pt is trying to stay in as much as possible   What is the Home health agency?   [facility]    Home health comments   has a few more visits   Pulse Ox monitoring  Intermittent   Pulse Ox device source  Patient   O2 Sat comments  O2 sats have been increasing per pt report   Psychosocial issues?  No   What is the patient's perception of their health status since discharge?  Improving   Is the patient/caregiver able to teach back the hierarchy of who to call/visit for symptoms/problems? PCP, Specialist, Home health nurse, Urgent Care, ED, 911  Yes   Additional teach back comments  Feels like she is gaining her strength back    Is the patient/caregiver able to teach back signs and symptoms of worsening condition:  Fever/chills, Shortness of breath, Chest pain   Week 3 call completed?  Yes          France Monroe RN

## 2020-06-21 ASSESSMENT — ENCOUNTER SYMPTOMS
SHORTNESS OF BREATH: 1
WHEEZING: 1
COUGH: 1

## 2020-06-25 RX ORDER — HYDROCODONE BITARTRATE AND ACETAMINOPHEN 5; 325 MG/1; MG/1
TABLET ORAL
Qty: 30 TABLET | Refills: 0 | Status: SHIPPED | OUTPATIENT
Start: 2020-06-25 | End: 2020-07-10

## 2020-06-25 NOTE — OUTREACH NOTE
COPD/PN Week 4 Survey      Responses   [facility] patient discharged from?  [place]   COVID-19 Test Status  Negative   Does the patient have one of the following disease processes/diagnoses(primary or secondary)?  COPD/Pneumonia   Was the primary reason for admission:  Pneumonia   Week 4 attempt successful?  No          Libia Verduzco RN

## 2020-06-29 RX ORDER — APIXABAN 5 MG/1
TABLET, FILM COATED ORAL
Qty: 60 TABLET | Refills: 3 | Status: SHIPPED | OUTPATIENT
Start: 2020-06-29 | End: 2020-10-19

## 2020-06-29 RX ORDER — HYDROCODONE BITARTRATE AND ACETAMINOPHEN 5; 325 MG/1; MG/1
TABLET ORAL
Qty: 30 TABLET | Refills: 0 | OUTPATIENT
Start: 2020-06-29 | End: 2020-07-29

## 2020-06-29 RX ORDER — DILTIAZEM HYDROCHLORIDE 180 MG/1
180 CAPSULE, COATED, EXTENDED RELEASE ORAL DAILY
Qty: 30 CAPSULE | Refills: 3 | Status: SHIPPED | OUTPATIENT
Start: 2020-06-29 | End: 2020-10-19

## 2020-06-30 RX ORDER — TRAMADOL HYDROCHLORIDE 50 MG/1
TABLET ORAL
Qty: 90 TABLET | Refills: 2 | Status: SHIPPED | OUTPATIENT
Start: 2020-06-30 | End: 2020-07-30

## 2020-07-01 NOTE — PROGRESS NOTES
GASTROENTEROLOGY OUTPATIENT ESTABLISHED PATIENT NOTE  Patient: JOLENE CROWELL : 1950  Date of Service: 2020  Dear , JACKI Leonard   Thank you for your referral of this patient for evaluation of   CC: Follow-up (hosp)  Assessment/Plan                                             ASSESSMENT & PLANS     Gastroparesis  · Continue Ericka root TID    Chronic GERD  · Continue Protonix    Liver enzyme elevation  -     Comprehensive Metabolic Panel; Future    Other elevated white blood cell (WBC) count  -     CBC (No Diff)  -     C-reactive Protein; Future     Follow Up:Return in about 6 months (around 2021).      DISCUSSION: The above plan was delineated in details with patient and all questions and concerns were answered.  Patient is also given contact information.  Patient is to return as scheduled or sooner if new problems arise  Subjective                                                     SUBJECTIVE   History of Present Illness  Ms. Jolene Crowell is a 70 y.o. female who presents to the clinic today for an evaluation of Follow-up (hosp)  Pt was recently hospitalized for respiratory failure.  She has multiple comorbidities. Past medical history of COPD on chronic 3 L, obstructive sleep apnea on home BiPAP, GERD, asthma, paroxysmal atrial fibrillation on chronic Eliquis, coronary artery disease, hypertension, chronic diastolic heart failure, hypertension, CKD, obesity, gout, and type 2 diabetes,     Able to more regular BM now. Both and loose and hard stools.  Lef[place] 2 , 3 and 5  Pt denies dark black stools or bright red blood in the stools, in the toilet bowl, or on the toilet tissue.  Dyspepsia has improved as well.       ROS:Review of Systems   Constitutional: Negative for activity change, appetite change, fatigue, fever and unexpected weight change.   HENT: Negative for hearing loss, trouble swallowing and voice change.    Eyes: Negative for visual disturbance.   Respiratory:  Negative for cough, choking, chest tightness and shortness of breath.    Cardiovascular: Negative for chest pain.   Gastrointestinal: Negative for abdominal distention, abdominal pain, anal bleeding, blood in stool, constipation, diarrhea, nausea, rectal pain and vomiting.   Endocrine: Negative for polydipsia and polyphagia.   Genitourinary: Negative.    Musculoskeletal: Negative for gait problem and joint swelling.   Skin: Negative for color change and rash.   Allergic/Immunologic: Negative for food allergies.   Neurological: Negative for dizziness, seizures and speech difficulty.   Hematological: Negative for adenopathy.   Psychiatric/Behavioral: Negative for confusion.     Subjective   PAST MED HX: Pt  has a past medical history of Acid reflux (10/17/2016), Asthma, Cancer (CMS/[facility]), Chronic diastolic heart failure (CMS/[facility]) (10/17/2016), Chronic obstructive pulmonary disease (CMS/[facility]) (10/17/2016), Gout (10/17/2016), High cholesterol (2018), Hypertension (10/17/2016), Lower extremity edema (10/17/2016), Murmur, heart, Obesity (10/17/2016), Renal failure, Seasonal allergies (10/17/2016), and Type 2 diabetes mellitus (CMS/[facility]) (10/17/2016).  PAST SURG HX: Pt  has a past surgical history that includes  section; Hysterectomy; Lung surgery (Left, ); Tonsillectomy and adenoidectomy; Eye surgery (Bilateral, ); Cystectomy (Right); Dilation and curettage of uterus; Bronchoscopy (N/A, 2018); Colonoscopy; Esophagogastroduodenoscopy (N/A, 6/10/2019); Colonoscopy (N/A, 2019); Foreign Body Removal (N/A, 3/5/2020); and Cardiac catheterization (N/A, 3/17/2020).  FAM HX: family history includes Breast cancer in her sister; COPD in her sister and sister; Diabetes in her brother and sister; Heart disease in her father and mother; Leukemia in her father; Lung cancer in her sister; No Known Problems in her sister and sister; Pneumonia in her sister; Stroke in her sister; Ulcers in her father.  SOC HX:  Pt  reports that she quit smoking about 21 years ago. Her smoking use included cigarettes. She has a 35.00 pack-year smoking history. She has never used smokeless tobacco. She reports that she does not drink alcohol or use drugs.  Objective                                                           OBJECTIVE   Allergy: Pt is allergic to shellfish-derived products and wheat bran.  MEDS: •  ADVAIR DISKUS 500-50 MCG/DOSE DISKUS, INHALE 1 PUFF BY MOUTH TWO TIMES A DAY, Disp: 60 each, Rfl: 5  •  allopurinol (ZYLOPRIM) 300 MG tablet, Take 300 mg by mouth Daily., Disp: , Rfl:   •  apixaban (ELIQUIS) 5 MG tablet tablet, Take 1 tablet by mouth Every 12 (Twelve) Hours., Disp: 60 tablet, Rfl: 0  •  ASPIRIN LOW DOSE 81 MG EC tablet, Take 81 mg by mouth Daily., Disp: , Rfl: 5  •  atorvastatin (LIPITOR) 40 MG tablet, Take 1 tablet by mouth Daily., Disp: 30 tablet, Rfl: 10  •  Blood Glucose Monitoring Suppl device, 1 each., Disp: , Rfl:   •  busPIRone (BUSPAR) 10 MG tablet, Take 10 mg by mouth 3 (Three) Times a Day., Disp: , Rfl:   •  DALIRESP 500 MCG tablet tablet, TAKE ONE TABLET BY MOUTH EVERY DAY, Disp: 30 tablet, Rfl: 5  •  dilTIAZem CD (CARDIZEM CD) 180 MG 24 hr capsule, Take 1 capsule by mouth Daily., Disp: 30 capsule, Rfl: 0  •  docusate sodium (COLACE) 250 MG capsule, Take 250 mg by mouth Daily., Disp: , Rfl:   •  ezetimibe (Zetia) 10 MG tablet, Take 10 mg by mouth Daily., Disp: , Rfl:   •  fenofibrate (TRICOR) 48 MG tablet, Take 48 mg by mouth Daily., Disp: , Rfl:   •  FLUTICASONE PROPIONATE, INHAL, IN, Inhale 2 (two) times a day., Disp: , Rfl:   •  furosemide (LASIX) 40 MG tablet, Take 40 mg by mouth Daily., Disp: , Rfl:   •  Ericka, Zingiber officinalis, (ERICKA ROOT) 500 MG capsule, Take  by mouth Daily., Disp: , Rfl:   •  GNP VITAMIN D MAXIMUM STRENGTH 50 MCG (2000 UT) tablet, Take 1 tablet by mouth Daily., Disp: , Rfl: 5  •  HUMALOG 100 UNIT/ML injection, Inject 35 Units under the skin into the appropriate area as  directed 3 (Three) Times a Day., Disp: , Rfl:   •  HYDROcodone-acetaminophen (NORCO) 5-325 MG per tablet, Take 1 tablet by mouth Every Night., Disp: , Rfl:   •  icosapent ethyl (Vascepa) 1 g capsule capsule, Take 2 g by mouth 2 (Two) Times a Day With Meals., Disp: , Rfl:   •  metolazone (ZAROXOLYN) 5 MG tablet, Take 5 mg by mouth Daily As Needed., Disp: , Rfl:   •  metoprolol succinate XL (TOPROL-XL) 100 MG 24 hr tablet, Take 1 tablet by mouth Daily., Disp: 30 tablet, Rfl: 0  •  montelukast (SINGULAIR) 10 MG tablet, Take 10 mg by mouth Every Night., Disp: , Rfl:   •  O2 (OXYGEN), Inhale 3 L/min Daily., Disp: , Rfl:   •  omalizumab (XOLAIR) 150 MG injection, Inject 300 mg under the skin into the appropriate area as directed Every 28 (Twenty-Eight) Days. 2 injections once a month, Disp: , Rfl:   •  pantoprazole (PROTONIX) 40 MG EC tablet, Take 40 mg by mouth Daily., Disp: , Rfl:   •  PATADAY 0.2 % solution ophthalmic solution, Administer  to both eyes 2 (Two) Times a Day., Disp: , Rfl: 3  •  potassium chloride (K-DUR,KLOR-CON) 20 MEQ CR tablet, Take 20 mEq by mouth Daily., Disp: , Rfl: 2  •  rOPINIRole (REQUIP) 0.5 MG tablet, Take 0.5 mg by mouth 2 (Two) Times a Day., Disp: , Rfl: 0  •  sitaGLIPtin (JANUVIA) 100 MG tablet, Take 100 mg by mouth Daily. 1/2 tablet daily, Disp: , Rfl:   •  SPIRIVA HANDIHALER 18 MCG per inhalation capsule, PLACE 1 CAPSULE INTO INHALER AND INHALE DAILY., Disp: 30 capsule, Rfl: 4  •  sucralfate (CARAFATE) 1 G tablet, Take 1 g by mouth 3 (Three) Times a Day., Disp: , Rfl:   •  traMADol (ULTRAM) 50 MG tablet, Take 50 mg by mouth 3 (Three) Times a Day., Disp: , Rfl:   •  TRESIBA FLEXTOUCH 100 UNIT/ML solution pen-injector injection, 2 (Two) Times a Day. 35 units in the am 80 units at night, Disp: , Rfl: 10    EXAMINATION: NM GASTRIC EMPTYING- 05/28/2020    FINDINGS: Solid phase gastric emptying study through 90 minutes shows  only minimal emptying, with maximal emptying of 15%. This would yield  a  T1 half of almost 400 minutes. The filmed images confirm nearly all the  activity remained in the stomach, with a small amount of activity  passing into small bowel.     IMPRESSION:  Abnormal solid-phase gastric emptying study, with only 15%  emptying at 90 minutes.    Lab Results   Component Value Date    WBC 28.16 (H) 05/30/2020    WBC 21.82 (H) 05/29/2020    WBC 16.65 (H) 05/28/2020    EOSABS 0.00 05/29/2020    EOSABS 0.00 05/28/2020    EOSABS 0.09 05/27/2020     05/30/2020     05/29/2020     05/28/2020     Lab Results   Component Value Date    HGB 12.7 05/30/2020    HGB 12.0 05/29/2020    HGB 11.8 (L) 05/28/2020    HCT 39.5 05/30/2020    HCT 38.5 05/29/2020    HCT 36.3 05/28/2020     Lab Results   Component Value Date    MCV 97.5 (H) 05/30/2020    .3 (H) 05/29/2020    MCV 99.5 (H) 05/28/2020    RDW 14.0 05/30/2020    RDW 14.1 05/29/2020    RDW 14.2 05/28/2020    MCHC 32.2 05/30/2020    MCHC 31.2 (L) 05/29/2020    MCHC 32.5 05/28/2020    OCCULTBLD Positive (A) 06/09/2019     Lab Results   Component Value Date     (L) 05/30/2020    K 4.6 05/30/2020    CL 92 (L) 05/30/2020    CO2 28.0 05/30/2020    BUN 54 (H) 05/30/2020    CREATININE 1.52 (H) 05/30/2020    GLUCOSE 392 (H) 05/30/2020    CALCIUM 9.9 05/30/2020    ANIONGAP 15.0 05/30/2020     Lab Results   Component Value Date    AST 76 (H) 05/22/2020    AST 13 03/15/2020    AST 19 03/14/2020    ALT 56 (H) 05/22/2020    ALT 39 (H) 03/15/2020    ALT 48 (H) 03/14/2020    ALKPHOS 77 05/22/2020    ALKPHOS 62 03/15/2020    ALKPHOS 56 03/14/2020    BILITOT 0.4 05/22/2020    BILITOT 0.3 03/15/2020    BILITOT 0.4 03/14/2020    ALBUMIN 3.60 05/30/2020    ALBUMIN 4.50 05/22/2020    ALBUMIN 3.50 03/16/2020     Lab Results   Component Value Date    HGBA1C 8.6 (H) 03/27/2020    HGBA1C 9.60 (H) 02/27/2020    HGBA1C 8.4 (H) 01/08/2020    HGBA1C 10.1 (H) 04/24/2019    HGBA1C 7.3 (H) 01/21/2019    TSH 2.40 03/27/2020    TSH 0.716 02/28/2020    TSH  1.39 01/08/2020     Lab Results   Component Value Date    INR 1.05 03/17/2020    INR 1.24 (H) 03/04/2020    INR 0.97 06/09/2019     Lab Results   Component Value Date    CRP 0.55 (H) 05/22/2020    CRP 3.75 (H) 03/16/2020    CRP 8.04 (H) 03/15/2020    CRP 0.08 03/11/2020    CDIFF Negative 01/11/2018      Wt Readings from Last 5 Encounters:   07/01/20 86.5 kg (190 lb 9.6 oz)   06/03/20 83 kg (183 lb)   05/29/20 81.5 kg (179 lb 11.2 oz)   04/30/20 85.3 kg (188 lb)   03/15/20 83.7 kg (184 lb 8.4 oz)   body mass index is 39.84 kg/m².,Temp: 97.5 °F (36.4 °C),BP: 126/46,Heart Rate: 83   Physical Exam  General Well developed; well nourished. NAD   ENT Anicteric sclerae. Oral mucosa pink and moist without thrush or lesions    Neck Neck supple; trachea midline. No thyromegaly   Resp CTA. Respiration effort normal  CV RRR. No M/R/G. No lower extremity edema  GI Abd soft, NT, ND, normal active bowel sounds.  No HSM.  No abd hernia  Musc No clubbing; no cyanosis.  Gait steady  Skin No rash; no lesions; no bruises.  Skin warm to touch.  Psych Oriented to time, place, and person.  Appropriate affect    Thank you kindly for allowing me to be part of this patient’s care.    Pt care team: Glenny ECHEVERRIA & Lali Liang ZAID  07/01/20 10:39 AM  [facility]--Gastroenterology  [phone]    CC: , JACKI Leonard  [address] [facility] [address] [place] / [place] [address] FAX:[phone]

## 2020-07-06 RX ORDER — MONTELUKAST SODIUM 10 MG/1
TABLET ORAL
Qty: 30 TABLET | Refills: 2 | Status: SHIPPED | OUTPATIENT
Start: 2020-07-06 | End: 2020-08-26

## 2020-07-07 RX ORDER — BUSPIRONE HYDROCHLORIDE 10 MG/1
TABLET ORAL
Qty: 90 TABLET | Refills: 0 | Status: SHIPPED | OUTPATIENT
Start: 2020-07-07 | End: 2020-10-19

## 2020-07-07 RX ORDER — METOLAZONE 5 MG/1
5 TABLET ORAL DAILY PRN
Qty: 30 TABLET | Refills: 3 | Status: SHIPPED | OUTPATIENT
Start: 2020-07-07 | End: 2020-09-24

## 2020-07-07 RX ORDER — METOLAZONE 2.5 MG/1
TABLET ORAL
Qty: 60 TABLET | Refills: 0 | OUTPATIENT
Start: 2020-07-07

## 2020-07-09 RX ORDER — FERROUS SULFATE 325(65) MG
TABLET ORAL
Qty: 90 TABLET | Refills: 3 | Status: ON HOLD | OUTPATIENT
Start: 2020-07-09 | End: 2020-07-22

## 2020-07-09 RX ORDER — ICOSAPENT ETHYL 1000 MG/1
CAPSULE ORAL
Qty: 60 CAPSULE | Refills: 3 | Status: SHIPPED | OUTPATIENT
Start: 2020-07-09 | End: 2020-09-24

## 2020-07-09 RX ORDER — ROPINIROLE 0.5 MG/1
TABLET, FILM COATED ORAL
Qty: 60 TABLET | Refills: 3 | Status: SHIPPED | OUTPATIENT
Start: 2020-07-09 | End: 2020-09-28

## 2020-07-09 NOTE — PROGRESS NOTES
"Chief Complaint   Patient presents with   • Follow-up   • Shortness of Breath       Subjective   Mingo Guidry is a 70 y.o. female.     History of Present Illness   Patient was evaluated today for follow up of shortness of breath and COPD.     Patient says that her symptoms have been stable since the last clinic visit. she reports one further exacerbation, after March 2020.    Patient is using medications, as prescribed. Exercise tolerance has also remained stable.     Patient doesn't report any issues with the device. The patient describes no significant issues with her mask either. Patient says that the compliance with the use of the equipment is good.     Patient says that her symptoms of fatigue & daytime sleepiness have been helped greatly with the use of PAP, as prescribed.     The patient says that she is using oxygen as prescribed and feels that it appears to be helping some of her symptoms.    The following portions of the patient's history were reviewed and updated as appropriate: allergies, current medications, past family history, past medical history, past social history and past surgical history.    Review of Systems   HENT: Positive for sinus pressure and sore throat. Negative for sneezing.    Respiratory: Positive for cough, chest tightness, shortness of breath and wheezing.        Objective   Visit Vitals  /70   Pulse 83   Temp 97.1 °F (36.2 °C)   Resp 18   Ht 147.3 cm (57.99\")   Wt 87.1 kg (192 lb)   LMP  (LMP Unknown)   SpO2 (!) 80% Comment: Room air (Rest)   BMI 40.14 kg/m²   O2: 98% on 3LPM    Physical Exam   Constitutional: She is oriented to person, place, and time. She appears well-developed and well-nourished.   HENT:   Head: Atraumatic.   Crowded oropharynx.   Dentures noted.    Eyes: EOM are normal.   Neck: Neck supple.   Cardiovascular: Normal rate and regular rhythm.   Pulmonary/Chest: Effort normal. No respiratory distress.   Somewhat hyperresonant to percussion  Somewhat " decreased A/E with out wheezing noted.   Musculoskeletal: She exhibits no edema.   Gait was normal.   Neurological: She is alert and oriented to person, place, and time.   Skin: Skin is warm and dry.   Psychiatric: She has a normal mood and affect.   Vitals reviewed.      Assessment/Plan   Mingo was seen today for follow-up and shortness of breath.    Diagnoses and all orders for this visit:    Shortness of breath    Chronic obstructive pulmonary disease with acute exacerbation (CMS/HCC)    Chronic respiratory failure with hypercapnia (CMS/HCC)    Hypoxia           Return in about 4 months (around 11/9/2020) for Recheck, For Yadi, ....Also 9 mths w/ Dr. Dahl.    DISCUSSION (if any):  I reviewed the patient's discharge summary that mentioned acute respiratory failure, multilobar pneumonia, paroxysmal atrial fibrillation, obstructive sleep apnea and diastolic heart failure.    Last chest x-ray was reviewed personally and the results were shared with the patient.  Images reviewed personally.   Results for orders placed during the hospital encounter of 05/22/20   XR Chest 1 View    Narrative EXAMINATION: XR CHEST 1 VW-05/28/2020:     INDICATION: Persistent hypoxia.     COMPARISON: 05/24/2020.     FINDINGS: Focal discoid atelectasis or scar in the left upper midlung is  stable. Mild patchy interstitial changes in the lung bases appear  improved. The lungs appear clear elsewhere. The heart is upper normal  size. The vasculature appears normal.       Impression 1. Stable left upper-mid lung scar or atelectasis.  2. Minimal remaining bibasilar interstitial disease.     D:  05/28/2020  E:  05/28/2020            This report was finalized on 5/28/2020 10:24 PM by Dr. Kushal Stewart MD.          Last CT scan was reviewed in great detail with the patient. Images reviewed personally.   Results for orders placed during the hospital encounter of 03/13/20   CT Chest Without Contrast    Narrative PROCEDURE: CT CHEST WO CONTRAST-      HISTORY: soa     COMPARISON: 03/30/2020.     PROCEDURE:  Multiple axial CT images were obtained from the thoracic  inlet through the upper abdomen without the use of contrast.      FINDINGS:   Soft tissue windows reveal no axillary, hilar or mediastinal adenopathy.  Heart size is normal. The aorta is normal in caliber. There are no  pleural or pericardial effusions. Lung windows reveal patchy airspace  disease in the lung bases consistent with either aspiration or  pneumonia. The visualized upper abdomen is unremarkable. Bone windows  reveal no acute osseous abnormalities.       Impression Bibasilar airspace disease consistent with aspiration or  pneumonia.     605.66 mGy.cm        This study was performed with techniques to keep radiation doses as low  as reasonably achievable (ALARA). Individualized dose reduction  techniques using automated exposure control or adjustment of mA and/or  kV according to the patient size were employed.      This report was finalized on 3/13/2020 10:50 AM by Fabricio Wolff M.D..       I also reviewed her last echocardiogram and shared the results with her.   Results for orders placed during the hospital encounter of 03/13/20   Adult Transthoracic Echo Complete W/ Cont if Necessary Per Protocol    Addendum 1.  Normal left ventricular size and systolic function, LVEF 60-65%. 2.  Moderate concentric LVH. 3.  Abnormal LV diastolic filling pattern consistent with grade 1  diastolic dysfunction. 4.  Mild left atrial dilation. 6.  Normal right ventricular size and systolic function. 7.  Trace mitral regurgitation.      Herson Knox MD 3/17/2020  8:54 AM          Narrative 1.  Normal left ventricular size and systolic function, LVEF 60-65%.  2.  Moderate concentric LVH.  3.  Abnormal LV diastolic filling pattern consistent with grade 1   diastolic dysfunction.  4.  Mild left atrial dilation.  6.  Normal right ventricular size and systolic function.  7.  Trace mitral vegetation.        I have also reviewed laboratory data.  Lab Results   Component Value Date    EOSABS 0.00 05/29/2020    EOSABS 0.00 05/28/2020    EOSABS 0.09 05/27/2020   &    Lab Results   Component Value Date    CO2 31.0 (H) 07/01/2020       Lab Results   Component Value Date    PHART 7.496 (H) 05/22/2020    SEA6DDF 49.2 (H) 05/22/2020    PO2ART 53.1 (L) 05/22/2020    HGBBG 13.6 (L) 05/22/2020    K3VHPKFW 92.7 (L) 03/13/2020    CARBOXYHGB 1.3 05/22/2020     ===========================  ===========================    Last PFTs showed severe COPD.  These were performed in Sept 2019.    We have reviewed her pulmonary medications in great detail.    Any needed adjustments to her pulmonary medications, either for clinical or insurance coverage reasons, have been made and are reflected in the orders.    Compliance with medications stressed.     Side effects of prescribed medications discussed with the patient    She quit smoking 21 years ago and is not a candidate for LDCT screening.     I reviewed the results of last sleep study in detail. I informed her that the apnea hypopnea index was 15/ hr. This was an in lab study. This was done in 2017.    Continue treatment with BiPAP at a pressure of 17/6, with nasal pillows.    Patient seems to be compliant with PAP device, based on the available data and her account of improved symptoms.     The patient was once again reminded to continue using the PAP device regularly, every night for atleast 4 hours.    The patient was advised to continue oxygen 24/7, since she has noticed improvement in some symptoms since using it as prescribed.    The patient was reminded to stay up-to-date with influenza vaccinations.       Dictated utilizing Dragon dictation.    This document was electronically signed by Ryder Dahl MD on 07/09/20 at 09:51

## 2020-07-10 RX ORDER — HYDROCODONE BITARTRATE AND ACETAMINOPHEN 5; 325 MG/1; MG/1
TABLET ORAL
Qty: 30 TABLET | Refills: 0 | Status: SHIPPED | OUTPATIENT
Start: 2020-07-10 | End: 2020-08-24

## 2020-07-17 ENCOUNTER — TELEPHONE (OUTPATIENT)
Dept: PRIMARY CARE CLINIC | Age: 70
End: 2020-07-17

## 2020-07-17 NOTE — TELEPHONE ENCOUNTER
[employer] from [employer] called to report that pts bs has been running 287/400 for past week.  She is getting 35 units of Humalog with each meal. Pt has an appt next Wednesday, but asking for advice until then

## 2020-07-20 ENCOUNTER — TELEPHONE (OUTPATIENT)
Dept: PRIMARY CARE CLINIC | Age: 70
End: 2020-07-20

## 2020-07-22 ENCOUNTER — HOSPITAL ENCOUNTER (INPATIENT)
Facility: HOSPITAL | Age: 70
LOS: 3 days | Discharge: HOME HEALTH | DRG: 190 | End: 2020-07-25
Attending: INTERNAL MEDICINE | Admitting: INTERNAL MEDICINE
Payer: MEDICAID

## 2020-07-22 ENCOUNTER — APPOINTMENT (OUTPATIENT)
Dept: GENERAL RADIOLOGY | Facility: HOSPITAL | Age: 70
DRG: 190 | End: 2020-07-22
Attending: INTERNAL MEDICINE
Payer: MEDICAID

## 2020-07-22 ENCOUNTER — OFFICE VISIT (OUTPATIENT)
Dept: PRIMARY CARE CLINIC | Age: 70
End: 2020-07-22
Payer: MEDICARE

## 2020-07-22 VITALS
RESPIRATION RATE: 24 BRPM | DIASTOLIC BLOOD PRESSURE: 42 MMHG | OXYGEN SATURATION: 79 % | BODY MASS INDEX: 40.01 KG/M2 | HEART RATE: 89 BPM | HEIGHT: 58 IN | SYSTOLIC BLOOD PRESSURE: 120 MMHG | TEMPERATURE: 98.1 F | WEIGHT: 190.6 LBS

## 2020-07-22 PROBLEM — N18.4 CKD (CHRONIC KIDNEY DISEASE) STAGE 4, GFR 15-29 ML/MIN (HCC): Status: ACTIVE | Noted: 2020-07-22

## 2020-07-22 LAB
A/G RATIO: 1.5 (ref 0.8–2)
ALBUMIN SERPL-MCNC: 4.1 G/DL (ref 3.4–4.8)
ALP BLD-CCNC: 81 U/L (ref 25–100)
ALT SERPL-CCNC: 16 U/L (ref 4–36)
ANION GAP SERPL CALCULATED.3IONS-SCNC: 9 MMOL/L (ref 3–16)
AST SERPL-CCNC: 14 U/L (ref 8–33)
BACTERIA: ABNORMAL /HPF
BASOPHILS ABSOLUTE: 0.1 K/UL (ref 0–0.1)
BASOPHILS RELATIVE PERCENT: 0.5 %
BILIRUB SERPL-MCNC: 0.5 MG/DL (ref 0.3–1.2)
BILIRUBIN URINE: NEGATIVE
BLOOD, URINE: NEGATIVE
BUN BLDV-MCNC: 43 MG/DL (ref 6–20)
CALCIUM SERPL-MCNC: 10.2 MG/DL (ref 8.5–10.5)
CHLORIDE BLD-SCNC: 90 MMOL/L (ref 98–107)
CLARITY: CLEAR
CO2: 38 MMOL/L (ref 20–30)
COLOR: YELLOW
CREAT SERPL-MCNC: 1.6 MG/DL (ref 0.4–1.2)
EOSINOPHILS ABSOLUTE: 0.2 K/UL (ref 0–0.4)
EOSINOPHILS RELATIVE PERCENT: 0.7 %
EPITHELIAL CELLS, UA: ABNORMAL /HPF (ref 0–5)
GFR AFRICAN AMERICAN: 39
GFR NON-AFRICAN AMERICAN: 32
GLOBULIN: 2.8 G/DL
GLUCOSE BLD-MCNC: 105 MG/DL (ref 74–106)
GLUCOSE BLD-MCNC: 111 MG/DL (ref 74–106)
GLUCOSE BLD-MCNC: 288 MG/DL (ref 74–106)
GLUCOSE BLD-MCNC: 331 MG/DL (ref 74–106)
GLUCOSE BLD-MCNC: 343 MG/DL (ref 74–106)
GLUCOSE URINE: NEGATIVE MG/DL
HBA1C MFR BLD: 9.3 %
HCT VFR BLD CALC: 34.1 % (ref 37–47)
HEMOGLOBIN: 10.6 G/DL (ref 11.5–16.5)
IMMATURE GRANULOCYTES #: 0.1 K/UL
IMMATURE GRANULOCYTES %: 0.5 % (ref 0–5)
KETONES, URINE: NEGATIVE MG/DL
LEUKOCYTE ESTERASE, URINE: ABNORMAL
LYMPHOCYTES ABSOLUTE: 3.2 K/UL (ref 1.5–4)
LYMPHOCYTES RELATIVE PERCENT: 14.6 %
MCH RBC QN AUTO: 30.4 PG (ref 27–32)
MCHC RBC AUTO-ENTMCNC: 31.1 G/DL (ref 31–35)
MCV RBC AUTO: 97.7 FL (ref 80–100)
MICROSCOPIC EXAMINATION: YES
MONOCYTES ABSOLUTE: 1.6 K/UL (ref 0.2–0.8)
MONOCYTES RELATIVE PERCENT: 7.5 %
NEUTROPHILS ABSOLUTE: 16.7 K/UL (ref 2–7.5)
NEUTROPHILS RELATIVE PERCENT: 76.2 %
NITRITE, URINE: NEGATIVE
PDW BLD-RTO: 15.8 % (ref 11–16)
PERFORMED ON: ABNORMAL
PH UA: 5.5 (ref 5–8)
PLATELET # BLD: 422 K/UL (ref 150–400)
PMV BLD AUTO: 11 FL (ref 6–10)
POTASSIUM REFLEX MAGNESIUM: 4.2 MMOL/L (ref 3.4–5.1)
PROTEIN UA: ABNORMAL MG/DL
RBC # BLD: 3.49 M/UL (ref 3.8–5.8)
RBC UA: ABNORMAL /HPF (ref 0–4)
SARS-COV-2, NAAT: NOT DETECTED
SODIUM BLD-SCNC: 137 MMOL/L (ref 136–145)
SPECIFIC GRAVITY UA: 1.01 (ref 1–1.03)
TOTAL PROTEIN: 6.9 G/DL (ref 6.4–8.3)
URINE REFLEX TO CULTURE: ABNORMAL
URINE TYPE: ABNORMAL
UROBILINOGEN, URINE: 0.2 E.U./DL
WBC # BLD: 21.9 K/UL (ref 4–11)
WBC UA: ABNORMAL /HPF (ref 0–5)

## 2020-07-22 PROCEDURE — G8400 PT W/DXA NO RESULTS DOC: HCPCS | Performed by: NURSE PRACTITIONER

## 2020-07-22 PROCEDURE — 3046F HEMOGLOBIN A1C LEVEL >9.0%: CPT | Performed by: NURSE PRACTITIONER

## 2020-07-22 PROCEDURE — 94640 AIRWAY INHALATION TREATMENT: CPT

## 2020-07-22 PROCEDURE — 1200000000 HC SEMI PRIVATE

## 2020-07-22 PROCEDURE — 1036F TOBACCO NON-USER: CPT | Performed by: NURSE PRACTITIONER

## 2020-07-22 PROCEDURE — 4040F PNEUMOC VAC/ADMIN/RCVD: CPT | Performed by: NURSE PRACTITIONER

## 2020-07-22 PROCEDURE — 2580000003 HC RX 258: Performed by: PHYSICIAN ASSISTANT

## 2020-07-22 PROCEDURE — 80053 COMPREHEN METABOLIC PANEL: CPT

## 2020-07-22 PROCEDURE — G8926 SPIRO NO PERF OR DOC: HCPCS | Performed by: NURSE PRACTITIONER

## 2020-07-22 PROCEDURE — 81001 URINALYSIS AUTO W/SCOPE: CPT

## 2020-07-22 PROCEDURE — 6370000000 HC RX 637 (ALT 250 FOR IP): Performed by: PHYSICIAN ASSISTANT

## 2020-07-22 PROCEDURE — 99222 1ST HOSP IP/OBS MODERATE 55: CPT | Performed by: INTERNAL MEDICINE

## 2020-07-22 PROCEDURE — 99214 OFFICE O/P EST MOD 30 MIN: CPT | Performed by: NURSE PRACTITIONER

## 2020-07-22 PROCEDURE — 3023F SPIROM DOC REV: CPT | Performed by: NURSE PRACTITIONER

## 2020-07-22 PROCEDURE — 85025 COMPLETE CBC W/AUTO DIFF WBC: CPT

## 2020-07-22 PROCEDURE — 97530 THERAPEUTIC ACTIVITIES: CPT

## 2020-07-22 PROCEDURE — U0002 COVID-19 LAB TEST NON-CDC: HCPCS

## 2020-07-22 PROCEDURE — 94761 N-INVAS EAR/PLS OXIMETRY MLT: CPT

## 2020-07-22 PROCEDURE — 2022F DILAT RTA XM EVC RTNOPTHY: CPT | Performed by: NURSE PRACTITIONER

## 2020-07-22 PROCEDURE — 97165 OT EVAL LOW COMPLEX 30 MIN: CPT

## 2020-07-22 PROCEDURE — 71046 X-RAY EXAM CHEST 2 VIEWS: CPT

## 2020-07-22 PROCEDURE — 1090F PRES/ABSN URINE INCON ASSESS: CPT | Performed by: NURSE PRACTITIONER

## 2020-07-22 PROCEDURE — 36415 COLL VENOUS BLD VENIPUNCTURE: CPT

## 2020-07-22 PROCEDURE — 3017F COLORECTAL CA SCREEN DOC REV: CPT | Performed by: NURSE PRACTITIONER

## 2020-07-22 PROCEDURE — G8417 CALC BMI ABV UP PARAM F/U: HCPCS | Performed by: NURSE PRACTITIONER

## 2020-07-22 PROCEDURE — 83036 HEMOGLOBIN GLYCOSYLATED A1C: CPT

## 2020-07-22 PROCEDURE — 6360000002 HC RX W HCPCS: Performed by: PHYSICIAN ASSISTANT

## 2020-07-22 PROCEDURE — G8427 DOCREV CUR MEDS BY ELIG CLIN: HCPCS | Performed by: NURSE PRACTITIONER

## 2020-07-22 PROCEDURE — 1123F ACP DISCUSS/DSCN MKR DOCD: CPT | Performed by: NURSE PRACTITIONER

## 2020-07-22 RX ORDER — NICOTINE POLACRILEX 4 MG
15 LOZENGE BUCCAL PRN
Status: DISCONTINUED | OUTPATIENT
Start: 2020-07-22 | End: 2020-07-25 | Stop reason: HOSPADM

## 2020-07-22 RX ORDER — ONDANSETRON 2 MG/ML
4 INJECTION INTRAMUSCULAR; INTRAVENOUS EVERY 6 HOURS PRN
Status: DISCONTINUED | OUTPATIENT
Start: 2020-07-22 | End: 2020-07-25 | Stop reason: HOSPADM

## 2020-07-22 RX ORDER — PROMETHAZINE HYDROCHLORIDE 25 MG/1
12.5 TABLET ORAL EVERY 6 HOURS PRN
Status: DISCONTINUED | OUTPATIENT
Start: 2020-07-22 | End: 2020-07-25 | Stop reason: HOSPADM

## 2020-07-22 RX ORDER — FAMOTIDINE 20 MG/1
20 TABLET, FILM COATED ORAL 2 TIMES DAILY
Status: DISCONTINUED | OUTPATIENT
Start: 2020-07-22 | End: 2020-07-22

## 2020-07-22 RX ORDER — ACETAMINOPHEN 325 MG/1
650 TABLET ORAL EVERY 6 HOURS PRN
Status: DISCONTINUED | OUTPATIENT
Start: 2020-07-22 | End: 2020-07-25 | Stop reason: HOSPADM

## 2020-07-22 RX ORDER — IPRATROPIUM BROMIDE AND ALBUTEROL SULFATE 2.5; .5 MG/3ML; MG/3ML
1 SOLUTION RESPIRATORY (INHALATION)
Status: DISCONTINUED | OUTPATIENT
Start: 2020-07-22 | End: 2020-07-25 | Stop reason: HOSPADM

## 2020-07-22 RX ORDER — DEXTROSE MONOHYDRATE 50 MG/ML
100 INJECTION, SOLUTION INTRAVENOUS PRN
Status: DISCONTINUED | OUTPATIENT
Start: 2020-07-22 | End: 2020-07-25 | Stop reason: HOSPADM

## 2020-07-22 RX ORDER — SUCRALFATE 1 G/1
1 TABLET ORAL EVERY 8 HOURS SCHEDULED
Status: DISCONTINUED | OUTPATIENT
Start: 2020-07-22 | End: 2020-07-25 | Stop reason: HOSPADM

## 2020-07-22 RX ORDER — AZITHROMYCIN 250 MG/1
500 TABLET, FILM COATED ORAL ONCE
Status: COMPLETED | OUTPATIENT
Start: 2020-07-22 | End: 2020-07-22

## 2020-07-22 RX ORDER — ACETAMINOPHEN 650 MG/1
650 SUPPOSITORY RECTAL EVERY 6 HOURS PRN
Status: DISCONTINUED | OUTPATIENT
Start: 2020-07-22 | End: 2020-07-25 | Stop reason: HOSPADM

## 2020-07-22 RX ORDER — CETIRIZINE HYDROCHLORIDE 5 MG/1
5 TABLET ORAL DAILY
Status: DISCONTINUED | OUTPATIENT
Start: 2020-07-22 | End: 2020-07-25 | Stop reason: HOSPADM

## 2020-07-22 RX ORDER — ICOSAPENT ETHYL 1000 MG/1
2 CAPSULE ORAL 2 TIMES DAILY
Status: DISCONTINUED | OUTPATIENT
Start: 2020-07-22 | End: 2020-07-22 | Stop reason: CLARIF

## 2020-07-22 RX ORDER — METOLAZONE 5 MG/1
5 TABLET ORAL DAILY PRN
Status: DISCONTINUED | OUTPATIENT
Start: 2020-07-22 | End: 2020-07-25 | Stop reason: HOSPADM

## 2020-07-22 RX ORDER — METOPROLOL SUCCINATE 100 MG/1
100 TABLET, EXTENDED RELEASE ORAL DAILY
Status: DISCONTINUED | OUTPATIENT
Start: 2020-07-23 | End: 2020-07-25 | Stop reason: HOSPADM

## 2020-07-22 RX ORDER — DEXTROSE MONOHYDRATE 25 G/50ML
12.5 INJECTION, SOLUTION INTRAVENOUS PRN
Status: DISCONTINUED | OUTPATIENT
Start: 2020-07-22 | End: 2020-07-25 | Stop reason: HOSPADM

## 2020-07-22 RX ORDER — OMEGA-3-ACID ETHYL ESTERS 1 G/1
2 CAPSULE, LIQUID FILLED ORAL 2 TIMES DAILY
Status: DISCONTINUED | OUTPATIENT
Start: 2020-07-22 | End: 2020-07-25 | Stop reason: HOSPADM

## 2020-07-22 RX ORDER — POLYETHYLENE GLYCOL 3350 17 G/17G
17 POWDER, FOR SOLUTION ORAL DAILY PRN
Status: DISCONTINUED | OUTPATIENT
Start: 2020-07-22 | End: 2020-07-25 | Stop reason: HOSPADM

## 2020-07-22 RX ORDER — TRAMADOL HYDROCHLORIDE 50 MG/1
50 TABLET ORAL 3 TIMES DAILY PRN
Status: DISCONTINUED | OUTPATIENT
Start: 2020-07-22 | End: 2020-07-25 | Stop reason: HOSPADM

## 2020-07-22 RX ORDER — ALLOPURINOL 100 MG/1
150 TABLET ORAL DAILY
Status: DISCONTINUED | OUTPATIENT
Start: 2020-07-23 | End: 2020-07-25 | Stop reason: HOSPADM

## 2020-07-22 RX ORDER — EZETIMIBE 10 MG/1
10 TABLET ORAL DAILY
Status: DISCONTINUED | OUTPATIENT
Start: 2020-07-23 | End: 2020-07-22 | Stop reason: ALTCHOICE

## 2020-07-22 RX ORDER — FUROSEMIDE 40 MG/1
40 TABLET ORAL 2 TIMES DAILY
Status: DISCONTINUED | OUTPATIENT
Start: 2020-07-22 | End: 2020-07-25 | Stop reason: HOSPADM

## 2020-07-22 RX ORDER — ALOGLIPTIN 12.5 MG/1
6.25 TABLET, FILM COATED ORAL DAILY
Status: DISCONTINUED | OUTPATIENT
Start: 2020-07-23 | End: 2020-07-25 | Stop reason: HOSPADM

## 2020-07-22 RX ORDER — BUSPIRONE HYDROCHLORIDE 5 MG/1
10 TABLET ORAL 3 TIMES DAILY
Status: DISCONTINUED | OUTPATIENT
Start: 2020-07-22 | End: 2020-07-25 | Stop reason: HOSPADM

## 2020-07-22 RX ORDER — FLUTICASONE PROPIONATE 50 MCG
1 SPRAY, SUSPENSION (ML) NASAL 2 TIMES DAILY
Status: DISCONTINUED | OUTPATIENT
Start: 2020-07-22 | End: 2020-07-25 | Stop reason: HOSPADM

## 2020-07-22 RX ORDER — BUDESONIDE 0.5 MG/2ML
0.5 INHALANT ORAL 2 TIMES DAILY
Status: DISCONTINUED | OUTPATIENT
Start: 2020-07-22 | End: 2020-07-25 | Stop reason: HOSPADM

## 2020-07-22 RX ORDER — METHYLPREDNISOLONE SODIUM SUCCINATE 40 MG/ML
40 INJECTION, POWDER, LYOPHILIZED, FOR SOLUTION INTRAMUSCULAR; INTRAVENOUS EVERY 12 HOURS
Status: DISCONTINUED | OUTPATIENT
Start: 2020-07-22 | End: 2020-07-25 | Stop reason: HOSPADM

## 2020-07-22 RX ORDER — AZITHROMYCIN 250 MG/1
250 TABLET, FILM COATED ORAL DAILY
Status: DISCONTINUED | OUTPATIENT
Start: 2020-07-23 | End: 2020-07-25 | Stop reason: HOSPADM

## 2020-07-22 RX ORDER — ASPIRIN 81 MG/1
81 TABLET ORAL DAILY
Status: DISCONTINUED | OUTPATIENT
Start: 2020-07-23 | End: 2020-07-25 | Stop reason: HOSPADM

## 2020-07-22 RX ORDER — ROPINIROLE 0.25 MG/1
0.5 TABLET, FILM COATED ORAL 3 TIMES DAILY
Status: DISCONTINUED | OUTPATIENT
Start: 2020-07-22 | End: 2020-07-25 | Stop reason: HOSPADM

## 2020-07-22 RX ORDER — POTASSIUM CHLORIDE 20 MEQ/1
20 TABLET, EXTENDED RELEASE ORAL DAILY
Status: DISCONTINUED | OUTPATIENT
Start: 2020-07-23 | End: 2020-07-25 | Stop reason: HOSPADM

## 2020-07-22 RX ORDER — INSULIN GLARGINE 100 [IU]/ML
60 INJECTION, SOLUTION SUBCUTANEOUS NIGHTLY
Status: DISCONTINUED | OUTPATIENT
Start: 2020-07-22 | End: 2020-07-23

## 2020-07-22 RX ORDER — HYDROCODONE BITARTRATE AND ACETAMINOPHEN 5; 325 MG/1; MG/1
1 TABLET ORAL EVERY 6 HOURS PRN
Status: DISCONTINUED | OUTPATIENT
Start: 2020-07-22 | End: 2020-07-25 | Stop reason: HOSPADM

## 2020-07-22 RX ORDER — DILTIAZEM HYDROCHLORIDE 180 MG/1
180 CAPSULE, COATED, EXTENDED RELEASE ORAL DAILY
Status: DISCONTINUED | OUTPATIENT
Start: 2020-07-23 | End: 2020-07-25 | Stop reason: HOSPADM

## 2020-07-22 RX ORDER — FENOFIBRATE 160 MG/1
160 TABLET ORAL DAILY
Status: DISCONTINUED | OUTPATIENT
Start: 2020-07-23 | End: 2020-07-25 | Stop reason: HOSPADM

## 2020-07-22 RX ORDER — MONTELUKAST SODIUM 10 MG/1
10 TABLET ORAL DAILY
Status: DISCONTINUED | OUTPATIENT
Start: 2020-07-22 | End: 2020-07-25 | Stop reason: HOSPADM

## 2020-07-22 RX ORDER — KETOTIFEN FUMARATE 0.35 MG/ML
1 SOLUTION/ DROPS OPHTHALMIC 2 TIMES DAILY
Status: DISCONTINUED | OUTPATIENT
Start: 2020-07-22 | End: 2020-07-25 | Stop reason: HOSPADM

## 2020-07-22 RX ORDER — DOCUSATE SODIUM 250 MG
250 CAPSULE ORAL DAILY
Status: DISCONTINUED | OUTPATIENT
Start: 2020-07-23 | End: 2020-07-25 | Stop reason: HOSPADM

## 2020-07-22 RX ORDER — BUDESONIDE AND FORMOTEROL FUMARATE DIHYDRATE 80; 4.5 UG/1; UG/1
2 AEROSOL RESPIRATORY (INHALATION) 2 TIMES DAILY
Status: DISCONTINUED | OUTPATIENT
Start: 2020-07-22 | End: 2020-07-22 | Stop reason: CLARIF

## 2020-07-22 RX ORDER — EZETIMIBE 10 MG/1
1 TABLET ORAL DAILY
Status: DISCONTINUED | OUTPATIENT
Start: 2020-07-22 | End: 2020-07-22 | Stop reason: CLARIF

## 2020-07-22 RX ORDER — PANTOPRAZOLE SODIUM 40 MG/1
40 TABLET, DELAYED RELEASE ORAL DAILY
Status: DISCONTINUED | OUTPATIENT
Start: 2020-07-23 | End: 2020-07-25 | Stop reason: HOSPADM

## 2020-07-22 RX ADMIN — METHYLPREDNISOLONE SODIUM SUCCINATE 40 MG: 40 INJECTION, POWDER, FOR SOLUTION INTRAMUSCULAR; INTRAVENOUS at 21:52

## 2020-07-22 RX ADMIN — KETOTIFEN FUMARATE 1 DROP: 0.35 SOLUTION/ DROPS OPHTHALMIC at 22:00

## 2020-07-22 RX ADMIN — BUSPIRONE HYDROCHLORIDE 10 MG: 5 TABLET ORAL at 21:49

## 2020-07-22 RX ADMIN — ROPINIROLE HYDROCHLORIDE 0.5 MG: 0.25 TABLET, FILM COATED ORAL at 21:49

## 2020-07-22 RX ADMIN — METHYLPREDNISOLONE SODIUM SUCCINATE 40 MG: 40 INJECTION, POWDER, FOR SOLUTION INTRAMUSCULAR; INTRAVENOUS at 12:21

## 2020-07-22 RX ADMIN — FLUTICASONE PROPIONATE 1 SPRAY: 50 SPRAY, METERED NASAL at 21:52

## 2020-07-22 RX ADMIN — CEFTRIAXONE 1 G: 1 INJECTION, POWDER, FOR SOLUTION INTRAMUSCULAR; INTRAVENOUS at 12:20

## 2020-07-22 RX ADMIN — IPRATROPIUM BROMIDE AND ALBUTEROL SULFATE 1 AMPULE: .5; 3 SOLUTION RESPIRATORY (INHALATION) at 16:00

## 2020-07-22 RX ADMIN — DICLOFENAC 2 G: 10 GEL TOPICAL at 21:50

## 2020-07-22 RX ADMIN — SUCRALFATE 1 G: 1 TABLET ORAL at 21:49

## 2020-07-22 RX ADMIN — DICLOFENAC 2 G: 10 GEL TOPICAL at 13:37

## 2020-07-22 RX ADMIN — FUROSEMIDE 40 MG: 40 TABLET ORAL at 17:39

## 2020-07-22 RX ADMIN — INSULIN GLARGINE 60 UNITS: 100 INJECTION, SOLUTION SUBCUTANEOUS at 21:56

## 2020-07-22 RX ADMIN — APIXABAN 5 MG: 5 TABLET, FILM COATED ORAL at 21:49

## 2020-07-22 RX ADMIN — BUSPIRONE HYDROCHLORIDE 10 MG: 5 TABLET ORAL at 13:37

## 2020-07-22 RX ADMIN — INSULIN LISPRO 35 UNITS: 100 INJECTION, SOLUTION INTRAVENOUS; SUBCUTANEOUS at 12:04

## 2020-07-22 RX ADMIN — FLUTICASONE PROPIONATE 1 SPRAY: 50 SPRAY, METERED NASAL at 13:37

## 2020-07-22 RX ADMIN — INSULIN LISPRO 6 UNITS: 100 INJECTION, SOLUTION INTRAVENOUS; SUBCUTANEOUS at 17:36

## 2020-07-22 RX ADMIN — ROPINIROLE HYDROCHLORIDE 0.5 MG: 0.25 TABLET, FILM COATED ORAL at 13:37

## 2020-07-22 RX ADMIN — BUDESONIDE 500 MCG: 0.5 SUSPENSION RESPIRATORY (INHALATION) at 11:32

## 2020-07-22 RX ADMIN — CETIRIZINE HYDROCHLORIDE 5 MG: 5 TABLET ORAL at 13:39

## 2020-07-22 RX ADMIN — INSULIN LISPRO 4 UNITS: 100 INJECTION, SOLUTION INTRAVENOUS; SUBCUTANEOUS at 21:55

## 2020-07-22 RX ADMIN — AZITHROMYCIN MONOHYDRATE 500 MG: 250 TABLET ORAL at 12:21

## 2020-07-22 RX ADMIN — IPRATROPIUM BROMIDE AND ALBUTEROL SULFATE 1 AMPULE: .5; 3 SOLUTION RESPIRATORY (INHALATION) at 20:23

## 2020-07-22 RX ADMIN — NYSTATIN 500000 UNITS: 100000 SUSPENSION ORAL at 17:39

## 2020-07-22 RX ADMIN — NYSTATIN 500000 UNITS: 100000 SUSPENSION ORAL at 12:21

## 2020-07-22 RX ADMIN — IPRATROPIUM BROMIDE AND ALBUTEROL SULFATE 1 AMPULE: .5; 3 SOLUTION RESPIRATORY (INHALATION) at 11:32

## 2020-07-22 RX ADMIN — NYSTATIN 500000 UNITS: 100000 SUSPENSION ORAL at 21:50

## 2020-07-22 RX ADMIN — BUDESONIDE 500 MCG: 0.5 SUSPENSION RESPIRATORY (INHALATION) at 20:23

## 2020-07-22 RX ADMIN — MONTELUKAST SODIUM 10 MG: 10 TABLET, COATED ORAL at 21:52

## 2020-07-22 RX ADMIN — INSULIN LISPRO 35 UNITS: 100 INJECTION, SOLUTION INTRAVENOUS; SUBCUTANEOUS at 17:45

## 2020-07-22 RX ADMIN — SUCRALFATE 1 G: 1 TABLET ORAL at 13:37

## 2020-07-22 RX ADMIN — OMEGA-3-ACID ETHYL ESTERS 2 G: 1 CAPSULE, LIQUID FILLED ORAL at 21:49

## 2020-07-22 ASSESSMENT — ENCOUNTER SYMPTOMS
GASTROINTESTINAL NEGATIVE: 1
WHEEZING: 1
CHEST TIGHTNESS: 1
SHORTNESS OF BREATH: 1

## 2020-07-22 ASSESSMENT — PAIN SCALES - GENERAL: PAINLEVEL_OUTOF10: 0

## 2020-07-22 NOTE — PROGRESS NOTES
Occupational Therapy   Occupational Therapy Initial Assessment  Date: 2020   Patient Name: Lucio Bolaños  MRN: [id number]     : 1950    Date of Service: 2020    Discharge Recommendations:     OT Equipment Recommendations  Equipment Needed: No    Assessment   Assessment: Pt at baseline function with ADL's. Cognition intact. Demo good safety and able to follow commands appropriately. Pt declined offer HEP and review for maintenance. No skilled OT services warranted for ADL's. Pt left lying in bed with call light in reach. Prognosis: Good  Decision Making: Low Complexity  No Skilled OT: Independent with ADL's;Safe to return home  REQUIRES OT FOLLOW UP: No  Activity Tolerance  Activity Tolerance: Patient Tolerated treatment well           Patient Diagnosis(es): There were no encounter diagnoses. has a past medical history of Anemia, Anxiety, Asthma, Cataract, COPD (chronic obstructive pulmonary disease) ([address] DDD (degenerative disc disease), cervical, Depression, Diabetes mellitus type 2, GERD (gastroesophageal reflux disease), Gout, History of uterine cancer, Hyperlipidemia, and Hypertension. has a past surgical history that includes Tonsillectomy ();  section (, ); Lung surgery; Neck surgery; Cataract removal with implant (); Hysterectomy; Colonoscopy (2013); Upper gastrointestinal endoscopy (N/A, 2019); and Colonoscopy (N/A, 2019). Restrictions  Restrictions/Precautions  Restrictions/Precautions: General Precautions, Fall Risk    Subjective   General  Chart Reviewed: Yes  Patient assessed for rehabilitation services?: Yes  Family / Caregiver Present: No  Referring Practitioner: Asher Matthews PA-C  Diagnosis: COPD exacerbation  Subjective  Subjective: Pt went to [facility] this date and was admitte to hospital for COPD exacerbation. Pt states she has been feeling bad for about 1 week. Pt agreeable to OT services.   Patient Currently in Pain: Yes(back pain (chronic))  Vital Signs  Temp: 97.8 °F (36.6 °C)  Temp Source: Temporal  Pulse: 85  Heart Rate Source: Monitor  Resp: 22  BP: (!) 117/41  BP Location: Right Arm  BP Upper/Lower: Upper  MAP (mmHg): 66  Patient Currently in Pain: Yes(back pain (chronic))  Height and Weight  Weight: 190 lb 4.8 oz (86.3 kg)  Weight Method: Actual;Standing scale  BMI (Calculated): 39.9  Oxygen Therapy  SpO2: 93 %  O2 Device: Nasal cannula  O2 Flow Rate (L/min): 3 L/min  Social/Functional History  Social/Functional History  Lives With: Alone  Type of Home: House  Home Layout: One level  Home Access: Stairs to enter with rails  Entrance Stairs - Number of Steps: 5  Bathroom Shower/Tub: Tub/Shower unit, Shower chair with back  Bathroom Toilet: Standard  Bathroom Accessibility: Not accessible  Home Equipment: 4 wheeled walker, Oxygen  Receives Help From: Home health(nursing)  ADL Assistance: Independent  Homemaking Assistance: Needs assistance(Pt independent with meal prep however, has )  Ambulation Assistance: Independent  Transfer Assistance: Independent  Active : Yes       Objective   Vision: Impaired  Vision Exceptions: Wears glasses at all times  Hearing: Within functional limits    Orientation  Overall Orientation Status: Within Functional Limits  Observation/Palpation  Observation: 2.5L 02 , lying in bed, NAD.   Functional Mobility  Functional - Mobility Device: No device  Activity: Other  Assist Level: Modified independent   Functional Mobility Comments: 15 feet x2  ADL  LE Dressing: Independent  Toileting: (reports independence with task)        Bed mobility  Rolling to Right: Modified independent  Supine to Sit: Modified independent  Transfers  Stand Pivot Transfers: Modified independent  Sit to stand: Modified independent     Cognition  Overall Cognitive Status: WFL                 LUE AROM (degrees)  LUE AROM : WFL  Left Hand AROM (degrees)  Left Hand AROM: WFL  LUE Strength  L Shoulder Flex: 4+/5  L Elbow Flex: 4+/5  L Elbow Ext: 4+/5  L Hand General: 4+/5  RUE Strength  R Shoulder Flex: 4+/5  R Elbow Flex: 4+/5  R Elbow Ext: 4+/5  R Hand General: 4+/5          Therapy Time   Individual Concurrent Group Co-treatment   Time In 0152         Time Out 0218         Minutes 26              This note serves as a DC summary in the event of pt discharge.      Sylvia Abreu, OTR/L

## 2020-07-22 NOTE — PROGRESS NOTES
tablet TAKE ONE TABLET BY MOUTH TWO TIMES A DAY 60 tablet 5    insulin lispro (HUMALOG) 100 UNIT/ML injection vial INJECT 35 UNITS UNDER THE SKIN THREE TIMES A DAY BEFORE MEALS 30 mL 4    docusate sodium (COLACE) 250 MG capsule Take 1 capsule by mouth daily 90 capsule 3    metoprolol succinate (TOPROL XL) 100 MG extended release tablet TAKE 1 TABLET BY MOUTH DAILY 30 tablet 5    allopurinol (ZYLOPRIM) 300 MG tablet Take 0.5 tablets by mouth daily 30 tablet 2    sucralfate (CARAFATE) 1 GM tablet TAKE ONE TABLET BY MOUTH THREE TIMES A DAY 90 tablet 3    Insulin Degludec (TRESIBA FLEXTOUCH) 100 UNIT/ML SOPN INJECT 35 UNITS IN THE MORNING AND 80 UNITS IN THE EVENING (Patient taking differently: INJECT 35 UNITS IN THE MORNING AND 70 UNITS IN THE EVENING) 30 mL 5    ezetimibe (ZETIA) 10 MG tablet TAKE ONE TABLET BY MOUTH EVERY DAY 90 tablet 3    ASPIRIN LOW DOSE 81 MG EC tablet TAKE ONE TABLET BY MOUTH EVERY DAY 30 tablet 5    pantoprazole (PROTONIX) 40 MG tablet TAKE ONE TABLET BY MOUTH EVERY DAY 90 tablet 3    Insulin Syringe-Needle U-100 31G X 5/16\" 1 ML MISC USE FOR INSULIN INJECTIONS FIVE TIMES DAILY 150 each 4    PATADAY 0.2 % SOLN ophthalmic solution PLACE 1 DROP INTO BOTH EYES 2 TIMES DAILY 2.5 mL 3    Roflumilast (DALIRESP) 500 MCG tablet Take 1 tablet by mouth daily 30 tablet 2    GNP VITAMIN D MAXIMUM STRENGTH 50 MCG (2000 UT) TABS Take 1 tablet by mouth daily   5    diclofenac sodium 1 % GEL Apply 2 g topically 2 times daily 1 Tube 3    blood glucose test strips (TRUE METRIX BLOOD GLUCOSE TEST) strip USE TO CHECK BLOOD SUGAR FOUR TIMES A DAY dx:e11.9 100 strip 3    JANUVIA 100 MG tablet TAKE ONE TABLET BY MOUTH EVERY DAY (Patient taking differently: 50 mg ) 30 tablet 2    fluticasone-salmeterol (ADVAIR) 500-50 MCG/DOSE diskus inhaler Inhale 1 puff into the lungs every 12 hours      omalizumab (OMALIZUMAB) 150 MG injection Inject 150 mg into the skin every 28 days 1 vial 3    fenofibrate (TRICOR) 145 MG tablet TAKE ONE TABLET BY MOUTH EVERY DAY 30 tablet 3    Lactobacillus Acid-Pectin (ACIDOPHILUS/CITRUS PECTIN) TABS TAKE ONE TABLET BY MOUTH TWO TIMES A DAY (Patient not taking: Reported on 6/10/2020) 60 tablet 1    ipratropium-albuterol (DUONEB) 0.5-2.5 (3) MG/3ML SOLN nebulizer solution Inhale 3 mLs into the lungs every 4 hours 360 mL 3    OXYGEN Inhale 3 L/min into the lungs continuous      flunisolide (NASALIDE) 25 MCG/ACT (0.025%) SOLN 1 spray       albuterol sulfate HFA (PROAIR HFA) 108 (90 Base) MCG/ACT inhaler Inhale 2 puffs into the lungs every 4 hours as needed for Wheezing 1 Inhaler 5    loratadine (CLARITIN) 10 MG tablet TAKE ONE TABLET BY MOUTH EVERY DAY 30 tablet 4     Current Facility-Administered Medications on File Prior to Visit   Medication Dose Route Frequency Provider Last Rate Last Dose    omalizumab Darral English) injection 150 mg  150 mg Subcutaneous Q28 Days Sabra Click, APRN   150 mg at 06/08/20 1422    omalizumab Darral English) injection 150 mg  150 mg Subcutaneous Q28 Days Sabra Click, APRN   150 mg at 06/08/20 1421    omalizumab (XOLAIR) injection 150 mg  150 mg Subcutaneous Q28 Days Sabra Click, APRN   150 mg at 04/17/20 1055    omalizumab Darral English) injection 150 mg  150 mg Subcutaneous Q28 Days Sabra Click, APRN   150 mg at 04/17/20 1054    omalizumab (XOLAIR) injection 150 mg  150 mg Subcutaneous Q28 Days Sabra Click, APRN   150 mg at 11/25/19 0930    omalizumab Darral English) injection 150 mg  150 mg Subcutaneous Q28 Days Sabra Click, APRN   150 mg at 11/25/19 9330    omalizumab Darral English) injection 150 mg  150 mg Subcutaneous Q28 Days Sabra Click, APRN   150 mg at 11/25/19 1214    omalizumab Darral English) injection 150 mg  150 mg Subcutaneous Q28 Days Sabra Click, APRN   150 mg at 11/25/19 1213    omalizumab Darral English) injection 150 mg  150 mg Subcutaneous Q28 Days Sabra Click, APRN   150 mg at 09/20/19 1157    omalizumab Natalya Kramer injection 150 mg  150 mg Subcutaneous Q28 Days JADE Grant   150 mg at 09/20/19 1156       Review of Systems   Constitutional: Positive for appetite change. HENT: Negative. Respiratory: Positive for chest tightness, shortness of breath and wheezing. Cardiovascular: Negative. Gastrointestinal: Negative. Genitourinary: Negative. Musculoskeletal: Negative. Skin: Negative. Neurological: Positive for weakness. Psychiatric/Behavioral: Negative. OBJECTIVE:  BP (!) 120/42 (Site: Left Upper Arm, Position: Sitting, Cuff Size: Large Adult)   Pulse 89   Temp 98.1 °F (36.7 °C) (Temporal)   Resp 24   Ht 4' 10\" (1.473 m)   Wt 190 lb 9.6 oz (86.5 kg)   SpO2 (!) 79% Comment: 3 liters  BMI 39.84 kg/m²    Physical Exam  Vitals signs and nursing note reviewed. Constitutional:       Appearance: She is well-developed. HENT:      Head: Normocephalic and atraumatic. Right Ear: External ear normal.      Left Ear: External ear normal.      Nose: Nose normal.   Eyes:      Conjunctiva/sclera: Conjunctivae normal.      Pupils: Pupils are equal, round, and reactive to light. Neck:      Musculoskeletal: Normal range of motion and neck supple. Thyroid: No thyromegaly. Vascular: No JVD. Cardiovascular:      Rate and Rhythm: Normal rate and regular rhythm. Heart sounds: Normal heart sounds. No murmur. No friction rub. No gallop. Pulmonary:      Effort: Pulmonary effort is normal. No respiratory distress. Breath sounds: Decreased air movement present. Wheezing present. No rales. Abdominal:      General: Bowel sounds are normal. There is no distension. Palpations: Abdomen is soft. Tenderness: There is no abdominal tenderness. There is no guarding. Hernia: There is no hernia in the left inguinal area. Genitourinary:     Exam position: Supine. Labia:         Right: No rash, tenderness, lesion or injury. Left: No rash, tenderness, lesion or injury. Vagina: No signs of injury and foreign body. No vaginal discharge, erythema, tenderness or bleeding. Cervix: No cervical motion tenderness, discharge or friability. Adnexa:         Right: No mass, tenderness or fullness. Left: No mass, tenderness or fullness. Rectum: No anal fissure or external hemorrhoid. Normal anal tone. Musculoskeletal:         General: No tenderness. Right lower le+ Edema present. Left lower le+ Edema present. Skin:     General: Skin is warm and dry. Findings: No erythema or rash. Neurological:      Mental Status: She is alert and oriented to person, place, and time. Deep Tendon Reflexes: Reflexes are normal and symmetric. Psychiatric:         Judgment: Judgment normal.         No results found for requested labs within last 30 days. Hemoglobin A1C (%)   Date Value   2020 8.6 (H)     Microscopic Examination (no units)   Date Value   2020 YES     LDL Calculated (mg/dL)   Date Value   2020 see below           Lab Results   Component Value Date    TSH 2.40 2020         ASSESSMENT/PLAN:     Yesika Hooper was seen today for follow-up, diabetes, hypertension and shortness of breath. Diagnoses and all orders for this visit:    Hypoxia  Discussed with Dr. Gallito Murcia. Due to hypoxia of 79 saturation on 3 liter of oxygen with tachypnea will admit to the hospital for treatment and evaluation. COPD exacerbation ([address]  Will send to the hospital for evaluation and tretment. Type 2 diabetes mellitus with complication, with long-term current use of insulin (HCC)  Blood sugar has been elevated despite increase of insulin. Stage 3 chronic kidney disease (HCC)      There are no discontinued medications.

## 2020-07-22 NOTE — H&P
History and Physical    Patient:  Aura Listen    CHIEF COMPLAINT:    SOA    HISTORY OF PRESENT ILLNESS:   The patient is a 79 y.o. female with past medical history of COPD, chronic respiratory failure on 2 L nasal cannula continuously, anemia, anxiety, asthma, depression, diabetes mellitus type 2, GERD, gout, chronic kidney disease stage IV, hypertension, hyperlipidemia who was sent as a direct admit from PCPs office for shortness of breath. Patient states she has been using her home inhalers and oxygen with no improvement of shortness of breath. For the last several days she has had dyspnea on exertion. She describes a dry cough and wheezing. She denied any fever or chills. She denied any recent travel or known exposures to COVID-19. She denied any chest pain. On exam, patient very tight and wheezy in all lung fields. Oxygen sats maintained on 2 L nasal cannula. She appears comfortable at rest but is noted to be tachypneic when speaking. Labs and chest x-ray ordered. Patient to be given nebs, IV antibiotics and IV steroids. Patient also reports her blood glucoses have been elevated over the last week at home. She denied taking any steroids prior to presentation. She denied any other symptoms including chest pain, dysuria, abdominal pain, nausea, vomiting or diarrhea.     Past Medical History:      Diagnosis Date    Anemia     Anxiety     Asthma     Cataract     COPD [facility] obstructive [facility] disease) [address] [address]     DDD (degenerative disc disease), cervical     Depression     Diabetes mellitus type 2     GERD (gastroesophageal reflux disease)     Gout     History of uterine cancer     Hyperlipidemia     Hypertension        Past Surgical History:      Procedure Laterality Date    CATARACT REMOVAL WITH IMPLANT  2005   [address]    x2    COLONOSCOPY  05/01/2013    COLONOSCOPY N/A 5/23/2019    COLONOSCOPY POLYPECTOMY HOT BIOPSY performed by Kay Easton MD at [address] [place] [address] Street ENDOSCOPY    HYSTERECTOMY      partial    LUNG SURGERY      tumor removed; left    NECK SURGERY      cyst removed; right     TONSILLECTOMY  1956    UPPER GASTROINTESTINAL ENDOSCOPY N/A 5/23/2019    EGD BIOPSY performed by Sandra Huff MD at [facility] ENDOSCOPY       Medications Prior to Admission:    Prior to Admission medications    Medication Sig Start Date End Date Taking?  Authorizing Provider   HYDROcodone-acetaminophen (NORCO) 5-325 MG per tablet TAKE ONE TABLET BY MOUTH EVERY DAY AS NEEDED FOR PAIN 7/10/20 8/9/20 Yes Damari Simmering, APRN   rOPINIRole (REQUIP) 0.5 MG tablet TAKE ONE TABLET BY MOUTH TWO TIMES A DAY 7/9/20  Yes Damari Simmering, APRN   VASCEPA 1 g CAPS capsule TAKE TWO CAPSULES BY MOUTH TWO TIMES A DAY 7/9/20  Yes Damari Simmering, APRN   busPIRone (BUSPAR) 10 MG tablet TAKE ONE TABLET BY MOUTH THREE TIMES A DAY 7/7/20  Yes Damari Simmering, APRN   metOLazone (ZAROXOLYN) 5 MG tablet TAKE 1 TABLET BY MOUTH DAILY AS NEEDED (SWELLING) 7/7/20  Yes Damari Simmering, APRN   montelukast (SINGULAIR) 10 MG tablet TAKE ONE TABLET BY MOUTH EVERY DAY 7/6/20  Yes Damari Simmering, APRN   traMADol (ULTRAM) 50 MG tablet TAKE ONE TABLET BY MOUTH THREE TIMES A DAY AS NEEDED 6/30/20 7/30/20 Yes Damari Simmering, APRN   nystatin (MYCOSTATIN) 365358 UNIT/ML suspension TAKE 5 MLS BY MOUTH 4 TIMES DAILY 6/29/20  Yes Damari Simmering, APRN   dilTIAZem (CARDIZEM CD) 180 MG extended release capsule TAKE 1 CAPSULE BY MOUTH DAILY 6/29/20  Yes Damari Simmering, APRN   ELIQUIS 5 MG TABS tablet TAKE 1 TABLET BY MOUTH EVERY 12 HOURS 6/29/20  Yes Damari Simmering, APRN   potassium chloride (KLOR-CON M) 20 MEQ extended release tablet TAKE ONE TABLET BY MOUTH EVERY DAY 6/15/20  Yes Damari Simmering, APRN   furosemide (LASIX) 40 MG tablet TAKE ONE TABLET BY MOUTH TWO TIMES A DAY 6/15/20  Yes JADE Rangel   docusate sodium (COLACE) 250 MG capsule Take 1 capsule by mouth daily 6/10/20  Yes JADE Rangel   metoprolol succinate (TOPROL XL) 100 MG extended release tablet TAKE 1 TABLET BY MOUTH DAILY 5/12/20  Yes Peng Stahl APRN   allopurinol (ZYLOPRIM) 300 MG tablet Take 0.5 tablets by mouth daily 5/5/20  Yes Peng Stahl APRN   sucralfate (CARAFATE) 1 GM tablet TAKE ONE TABLET BY MOUTH THREE TIMES A DAY 4/27/20  Yes Peng Stahl APRN   Insulin Degludec (TRESIBA FLEXTOUCH) 100 UNIT/ML SOPN INJECT 35 UNITS IN THE MORNING AND 80 UNITS IN THE EVENING  Patient taking differently: INJECT 35 UNITS IN THE MORNING AND 70 UNITS IN THE EVENING 4/23/20  Yes Peng Stahl APRN   ezetimibe (ZETIA) 10 MG tablet TAKE ONE TABLET BY MOUTH EVERY DAY 3/27/20  Yes Peng Stahl APRN   ASPIRIN LOW DOSE 81 MG EC tablet TAKE ONE TABLET BY MOUTH EVERY DAY 3/21/20  Yes Peng Stahl APRN   pantoprazole (PROTONIX) 40 MG tablet TAKE ONE TABLET BY MOUTH EVERY DAY 3/18/20  Yes JADE Monge   PATADAY 0.2 % SOLN ophthalmic solution PLACE 1 DROP INTO BOTH EYES 2 TIMES DAILY 2/5/20  Yes Peng Stahl APRTJ   Roflumilast (DALIRESP) 500 MCG tablet Take 1 tablet by mouth daily 1/27/20  Yes Peng Stahl APRN   GNP VITAMIN D MAXIMUM STRENGTH 50 MCG (2000 UT) TABS Take 1 tablet by mouth daily  11/13/19  Yes Historical Provider, MD   diclofenac sodium 1 % GEL Apply 2 g topically 2 times daily 10/26/19  Yes JASON Woods   JANUVIA 100 MG tablet TAKE ONE TABLET BY MOUTH EVERY DAY  Patient taking differently: 50 mg  10/9/19  Yes Peng Stahl APRN   fluticasone-salmeterol (ADVAIR) 500-50 MCG/DOSE diskus inhaler Inhale 1 puff into the lungs every 12 hours   Yes Historical Provider, MD   fenofibrate (TRICOR) 145 MG tablet TAKE ONE TABLET BY MOUTH EVERY DAY 8/5/19  Yes Peng Stahl APRN   ipratropium-albuterol (DUONEB) 0.5-2.5 (3) MG/3ML SOLN nebulizer solution Inhale 3 mLs into the lungs every 4 hours 6/19/19 7/22/20 Yes JADE Monge   OXYGEN Inhale 3 L/min into the lungs continuous   Yes Historical Provider, MD flunisolide (NASALIDE) 25 MCG/ACT (0.025%) SOLN 1 spray  11/29/18  Yes Historical Provider, MD   albuterol sulfate HFA (PROAIR HFA) 108 (90 Base) MCG/ACT inhaler Inhale 2 puffs into the lungs every 4 hours as needed for Wheezing 2/26/18  Yes JADE Clark   loratadine (CLARITIN) 10 MG tablet TAKE ONE TABLET BY MOUTH EVERY DAY 9/19/17  Yes JADE Clark   insulin lispro (HUMALOG) 100 UNIT/ML injection vial INJECT 35 UNITS UNDER THE SKIN THREE TIMES A DAY BEFORE MEALS 6/10/20   JADE Clark   Insulin Syringe-Needle U-100 31G X 5/16\" 1 ML MISC USE FOR INSULIN INJECTIONS FIVE TIMES DAILY 2/19/20   JADE Clark   blood glucose test strips (TRUE METRIX BLOOD GLUCOSE TEST) strip USE TO CHECK BLOOD SUGAR FOUR TIMES A DAY dx:e11.9 10/11/19   JADE Clark   omalizumab (OMALIZUMAB) 150 MG injection Inject 150 mg into the skin every 28 days 8/22/19   JADE Clark       Allergies:  Shellfish-derived products and Wheat bran    Social History:   TOBACCO:   reports that she quit smoking about 23 years ago. She has a 37.50 pack-year smoking history. She has never used smokeless tobacco.  ETOH:   reports no history of alcohol use.   OCCUPATION:  None    Family History:       Problem Relation Age of Onset   [facility] Dementia Mother     Hypertension Mother     Hypertension Father     Coronary Art Dis Father     Cancer Father         leukemia       Review of system  Constitutional:  Denies fever or chills   Eyes:  Denies eye pain or redness  HENT:  Denies nasal congestion or sore throat   Respiratory:  Positive for dry cough, SOA and THOMPSON   Cardiovascular:  Denies chest pain or edema   GI:  Denies abdominal pain, nausea, vomiting, bloody stools or diarrhea   :  Denies dysuria or frequency  Musculoskeletal:  Denies acute neck pain or body aches  Integument:  Denies rash or itching  Neurologic:  Denies headache, dizziness, numbness, tingling or unilateral weakness  Psychiatric:  Denies acute depression or acute anxiety      Vital Signs                         vital signs reviewed in electronic chart. Physical exam  Constitutional:  Well developed, well nourished, morbidly obese elderly female sitting upright in bed in no acute distress. On 2 L nasal cannula. Eyes:  PERRL, no scleral icterus, conjunctiva normal   HENT:  Atraumatic, external ears normal, nose normal, oropharynx moist, no pharyngeal exudates. Neck- supple, no JVD, no lymphadenopathy  Respiratory: Increased work of breathing noted. Wheezing in all lung fields. No rales or rhonchi. On 2 L nasal cannula. Cardiovascular:  Normal rate, normal rhythm, no murmurs, no gallops, no rubs, no edema   GI:  Soft, nondistended, normal bowel sounds, nontender, no voluntary guarding  Musculoskeletal:  No cyanosis or obvious acute deformity. Moving all extremities   Integument:  Warm and dry.   Lymphatic:  No cervical or axillary lymphadenopathy noted   Neurologic:  Alert & oriented x 3, no apparent focal deficits noted   Psychiatric:  Speech and behavior appropriate         Lab Results   Component Value Date     05/22/2020    K 3.6 05/22/2020    CL 91 (L) 05/22/2020    CO2 33 (H) 05/22/2020    BUN 67 (H) 05/22/2020    CREATININE 1.9 (H) 05/22/2020    GLUCOSE 118 (H) 05/22/2020    CALCIUM 10.6 (H) 05/22/2020    PROT 7.4 05/22/2020    LABALBU 4.5 05/22/2020    BILITOT 0.3 05/22/2020    ALKPHOS 77 05/22/2020    AST 47 (H) 05/22/2020    ALT 47 (H) 05/22/2020    LABGLOM 26 (L) 05/22/2020    GFRAA 32 (L) 05/22/2020    AGRATIO 1.6 05/22/2020    GLOB 2.9 05/22/2020           Lab Results   Component Value Date    WBC 26.1 (H) 05/22/2020    HGB 13.4 05/22/2020    HCT 40.7 05/22/2020    MCV 97.4 05/22/2020     (H) 05/22/2020       PA/lat CXR:   XR CHEST (2 VW)    (Results Pending)       Assessment and Plan     Active Hospital Problems    Diagnosis Date Noted    CKD (chronic kidney disease) stage 4, GFR 15-29 ml/min ([address] [N18.4]  -With baseline serum creatinine around 2 per most recent labs 5/22/2020  -Check CMP  -Avoid nephrotoxins and NSAIDs  -Encourage good p.o. intake   07/22/2020    Gastroesophageal reflux disease [K21.9]  -Continue home Protonix       Chronic diastolic heart failure (HCC) [I50.32]  -No evidence of volume overload on exam  -Monitor daily weights and I's and O's  -Continue home diuretic regimen of metolazone and Lasix   08/30/2019    COPD exacerbation ([facility] [address] [J44.1]  -With increased work of breathing noted on exam.  Oxygen sats maintained at patient's baseline O2 requirement of 2 L nasal cannula. -Check chest x-ray to rule out any underlying/contributing infectious etiology  -Add Solu-Medrol 40 mg IV every 12 hours, DuoNebs, Pulmicort nebs  -Cover for underlying infectious etiology with IV Rocephin and azithromycin  -Encourage incentive spirometry and ambulation  -Consider referral to pulmonology upon follow-up    Chronic respiratory failure  -Continue home oxygen at 2 L nasal cannula   12/04/2017    HTN (hypertension) [I10]  -Resume home blood pressure regimen of diltiazem and metoprolol    Atrial fibrillation  -Check EKG  -Continue home Eliquis, diltiazem and metoprolol   05/26/2011    Hyperlipidemia [E78.5]  -on zetia and tricor at home; will resume if available   05/26/2011    DM type 2 (diabetes mellitus, type 2) [E11.9]  -Patient reports recent high blood glucose readings at home but denies any recent outpatient steroid use  -Check A1c with morning labs  -Continue home Humalog 35 units 3 times daily and sliding scale insulin  -Change home Tresiba 35 units every morning, 80 units nightly to Lantus 60 units nightly and monitor. Will titrate accordingly.  -In setting of patient's chronic kidney disease stage IV will change home Januvia to [address], renally dosed, on discharge. While inpatient will substitute Tradjenta for nesina.     RLS  -continue home requip 03/28/2011     Patient was seen and examined by

## 2020-07-22 NOTE — PROGRESS NOTES
Received this 79year old white female direct admit drom Providers office today. Diagnosis COPD. Patient alert oriented X 3. No distress noted.

## 2020-07-22 NOTE — PROGRESS NOTES
Chief Complaint   Patient presents with    Follow-up    Diabetes    Hypertension    Shortness of Breath       Have you seen any other physician or provider since your last visit yes - Hair Zazueta    Have you had any other diagnostic tests since your last visit? no    Have you changed or stopped any medications since your last visit? yes - hold the Metolazone      I have recommended that this patient have a bone dexa but she declines at this time. I have discussed the risks and benefits of this examination with her. The patient verbalizes understanding. Diabetic retinal exam completed this year? Yes                       * If yes please have patient sign a records release to obtain record to update Health Maintenance                       * If no, please order referral for patient to be scheduled    Patient is here for her regular follow up on diabetes and blood pressure. She was told by Dr Hair Zazueta office to hold the Metolazone.  Her sugar has been

## 2020-07-23 LAB
A/G RATIO: 1 (ref 0.8–2)
ALBUMIN SERPL-MCNC: 3.6 G/DL (ref 3.4–4.8)
ALP BLD-CCNC: 79 U/L (ref 25–100)
ALT SERPL-CCNC: 13 U/L (ref 4–36)
ANION GAP SERPL CALCULATED.3IONS-SCNC: 10 MMOL/L (ref 3–16)
AST SERPL-CCNC: 11 U/L (ref 8–33)
BILIRUB SERPL-MCNC: 0.3 MG/DL (ref 0.3–1.2)
BUN BLDV-MCNC: 45 MG/DL (ref 6–20)
CALCIUM SERPL-MCNC: 9.6 MG/DL (ref 8.5–10.5)
CHLORIDE BLD-SCNC: 91 MMOL/L (ref 98–107)
CO2: 36 MMOL/L (ref 20–30)
CREAT SERPL-MCNC: 1.5 MG/DL (ref 0.4–1.2)
GFR AFRICAN AMERICAN: 42
GFR NON-AFRICAN AMERICAN: 34
GLOBULIN: 3.6 G/DL
GLUCOSE BLD-MCNC: 167 MG/DL (ref 74–106)
GLUCOSE BLD-MCNC: 167 MG/DL (ref 74–106)
GLUCOSE BLD-MCNC: 392 MG/DL (ref 74–106)
GLUCOSE BLD-MCNC: 398 MG/DL (ref 74–106)
GLUCOSE BLD-MCNC: 479 MG/DL (ref 74–106)
GLUCOSE BLD-MCNC: 498 MG/DL (ref 74–106)
GLUCOSE BLD-MCNC: 513 MG/DL (ref 74–106)
HCT VFR BLD CALC: 32.7 % (ref 37–47)
HEMOGLOBIN: 10.1 G/DL (ref 11.5–16.5)
MCH RBC QN AUTO: 30.1 PG (ref 27–32)
MCHC RBC AUTO-ENTMCNC: 30.9 G/DL (ref 31–35)
MCV RBC AUTO: 97.3 FL (ref 80–100)
PDW BLD-RTO: 15.3 % (ref 11–16)
PERFORMED ON: ABNORMAL
PLATELET # BLD: 430 K/UL (ref 150–400)
PMV BLD AUTO: 11.2 FL (ref 6–10)
POTASSIUM REFLEX MAGNESIUM: 4.7 MMOL/L (ref 3.4–5.1)
RBC # BLD: 3.36 M/UL (ref 3.8–5.8)
SODIUM BLD-SCNC: 137 MMOL/L (ref 136–145)
TOTAL PROTEIN: 7.2 G/DL (ref 6.4–8.3)
WBC # BLD: 18.5 K/UL (ref 4–11)

## 2020-07-23 PROCEDURE — 1200000000 HC SEMI PRIVATE

## 2020-07-23 PROCEDURE — 2700000000 HC OXYGEN THERAPY PER DAY

## 2020-07-23 PROCEDURE — 99232 SBSQ HOSP IP/OBS MODERATE 35: CPT | Performed by: INTERNAL MEDICINE

## 2020-07-23 PROCEDURE — 6370000000 HC RX 637 (ALT 250 FOR IP): Performed by: PHYSICIAN ASSISTANT

## 2020-07-23 PROCEDURE — 6360000002 HC RX W HCPCS: Performed by: PHYSICIAN ASSISTANT

## 2020-07-23 PROCEDURE — 94640 AIRWAY INHALATION TREATMENT: CPT

## 2020-07-23 PROCEDURE — 2580000003 HC RX 258: Performed by: PHYSICIAN ASSISTANT

## 2020-07-23 PROCEDURE — 97161 PT EVAL LOW COMPLEX 20 MIN: CPT

## 2020-07-23 PROCEDURE — 80053 COMPREHEN METABOLIC PANEL: CPT

## 2020-07-23 PROCEDURE — 92610 EVALUATE SWALLOWING FUNCTION: CPT

## 2020-07-23 PROCEDURE — 94761 N-INVAS EAR/PLS OXIMETRY MLT: CPT

## 2020-07-23 PROCEDURE — 94669 MECHANICAL CHEST WALL OSCILL: CPT

## 2020-07-23 PROCEDURE — 36415 COLL VENOUS BLD VENIPUNCTURE: CPT

## 2020-07-23 PROCEDURE — 85027 COMPLETE CBC AUTOMATED: CPT

## 2020-07-23 RX ORDER — INSULIN GLARGINE 100 [IU]/ML
80 INJECTION, SOLUTION SUBCUTANEOUS NIGHTLY
Status: DISCONTINUED | OUTPATIENT
Start: 2020-07-23 | End: 2020-07-24

## 2020-07-23 RX ADMIN — OMEGA-3-ACID ETHYL ESTERS 2 G: 1 CAPSULE, LIQUID FILLED ORAL at 08:25

## 2020-07-23 RX ADMIN — ROPINIROLE HYDROCHLORIDE 0.5 MG: 0.25 TABLET, FILM COATED ORAL at 08:25

## 2020-07-23 RX ADMIN — BUDESONIDE 500 MCG: 0.5 SUSPENSION RESPIRATORY (INHALATION) at 04:51

## 2020-07-23 RX ADMIN — FUROSEMIDE 40 MG: 40 TABLET ORAL at 17:58

## 2020-07-23 RX ADMIN — BUDESONIDE 500 MCG: 0.5 SUSPENSION RESPIRATORY (INHALATION) at 16:55

## 2020-07-23 RX ADMIN — FLUTICASONE PROPIONATE 1 SPRAY: 50 SPRAY, METERED NASAL at 08:27

## 2020-07-23 RX ADMIN — SUCRALFATE 1 G: 1 TABLET ORAL at 13:21

## 2020-07-23 RX ADMIN — ROPINIROLE HYDROCHLORIDE 0.5 MG: 0.25 TABLET, FILM COATED ORAL at 20:07

## 2020-07-23 RX ADMIN — SUCRALFATE 1 G: 1 TABLET ORAL at 08:30

## 2020-07-23 RX ADMIN — OMEGA-3-ACID ETHYL ESTERS 2 G: 1 CAPSULE, LIQUID FILLED ORAL at 20:07

## 2020-07-23 RX ADMIN — IPRATROPIUM BROMIDE AND ALBUTEROL SULFATE 1 AMPULE: .5; 3 SOLUTION RESPIRATORY (INHALATION) at 04:52

## 2020-07-23 RX ADMIN — FENOFIBRATE 160 MG: 160 TABLET ORAL at 08:25

## 2020-07-23 RX ADMIN — FUROSEMIDE 40 MG: 40 TABLET ORAL at 08:26

## 2020-07-23 RX ADMIN — INSULIN LISPRO 18 UNITS: 100 INJECTION, SOLUTION INTRAVENOUS; SUBCUTANEOUS at 11:52

## 2020-07-23 RX ADMIN — MONTELUKAST SODIUM 10 MG: 10 TABLET, COATED ORAL at 08:24

## 2020-07-23 RX ADMIN — ASPIRIN 81 MG: 81 TABLET, COATED ORAL at 08:26

## 2020-07-23 RX ADMIN — SUCRALFATE 1 G: 1 TABLET ORAL at 20:07

## 2020-07-23 RX ADMIN — INSULIN LISPRO 35 UNITS: 100 INJECTION, SOLUTION INTRAVENOUS; SUBCUTANEOUS at 16:11

## 2020-07-23 RX ADMIN — INSULIN LISPRO 10 UNITS: 100 INJECTION, SOLUTION INTRAVENOUS; SUBCUTANEOUS at 08:31

## 2020-07-23 RX ADMIN — AZITHROMYCIN MONOHYDRATE 250 MG: 250 TABLET ORAL at 08:26

## 2020-07-23 RX ADMIN — ALOGLIPTIN 6.25 MG: 12.5 TABLET, FILM COATED ORAL at 08:24

## 2020-07-23 RX ADMIN — METHYLPREDNISOLONE SODIUM SUCCINATE 40 MG: 40 INJECTION, POWDER, FOR SOLUTION INTRAMUSCULAR; INTRAVENOUS at 11:40

## 2020-07-23 RX ADMIN — INSULIN GLARGINE 80 UNITS: 100 INJECTION, SOLUTION SUBCUTANEOUS at 20:12

## 2020-07-23 RX ADMIN — DICLOFENAC 2 G: 10 GEL TOPICAL at 20:07

## 2020-07-23 RX ADMIN — Medication 250 MG: at 08:25

## 2020-07-23 RX ADMIN — PANTOPRAZOLE SODIUM 40 MG: 40 TABLET, DELAYED RELEASE ORAL at 08:25

## 2020-07-23 RX ADMIN — ROFLUMILAST 500 MCG: 500 TABLET ORAL at 08:25

## 2020-07-23 RX ADMIN — NYSTATIN 500000 UNITS: 100000 SUSPENSION ORAL at 17:58

## 2020-07-23 RX ADMIN — INSULIN LISPRO 35 UNITS: 100 INJECTION, SOLUTION INTRAVENOUS; SUBCUTANEOUS at 11:41

## 2020-07-23 RX ADMIN — DILTIAZEM HYDROCHLORIDE 180 MG: 180 CAPSULE, COATED, EXTENDED RELEASE ORAL at 08:25

## 2020-07-23 RX ADMIN — INSULIN LISPRO 2 UNITS: 100 INJECTION, SOLUTION INTRAVENOUS; SUBCUTANEOUS at 20:15

## 2020-07-23 RX ADMIN — BUSPIRONE HYDROCHLORIDE 10 MG: 5 TABLET ORAL at 13:21

## 2020-07-23 RX ADMIN — CEFTRIAXONE 1 G: 1 INJECTION, POWDER, FOR SOLUTION INTRAMUSCULAR; INTRAVENOUS at 11:40

## 2020-07-23 RX ADMIN — APIXABAN 5 MG: 5 TABLET, FILM COATED ORAL at 08:26

## 2020-07-23 RX ADMIN — NYSTATIN 500000 UNITS: 100000 SUSPENSION ORAL at 08:26

## 2020-07-23 RX ADMIN — INSULIN LISPRO 35 UNITS: 100 INJECTION, SOLUTION INTRAVENOUS; SUBCUTANEOUS at 08:42

## 2020-07-23 RX ADMIN — IPRATROPIUM BROMIDE AND ALBUTEROL SULFATE 1 AMPULE: .5; 3 SOLUTION RESPIRATORY (INHALATION) at 16:55

## 2020-07-23 RX ADMIN — APIXABAN 5 MG: 5 TABLET, FILM COATED ORAL at 20:07

## 2020-07-23 RX ADMIN — NYSTATIN 500000 UNITS: 100000 SUSPENSION ORAL at 13:21

## 2020-07-23 RX ADMIN — BUSPIRONE HYDROCHLORIDE 10 MG: 5 TABLET ORAL at 08:24

## 2020-07-23 RX ADMIN — BUSPIRONE HYDROCHLORIDE 10 MG: 5 TABLET ORAL at 20:07

## 2020-07-23 RX ADMIN — KETOTIFEN FUMARATE 1 DROP: 0.35 SOLUTION/ DROPS OPHTHALMIC at 20:08

## 2020-07-23 RX ADMIN — FLUTICASONE PROPIONATE 1 SPRAY: 50 SPRAY, METERED NASAL at 20:08

## 2020-07-23 RX ADMIN — KETOTIFEN FUMARATE 1 DROP: 0.35 SOLUTION/ DROPS OPHTHALMIC at 13:21

## 2020-07-23 RX ADMIN — INSULIN LISPRO 3 UNITS: 100 INJECTION, SOLUTION INTRAVENOUS; SUBCUTANEOUS at 16:12

## 2020-07-23 RX ADMIN — HYDROCODONE BITARTRATE AND ACETAMINOPHEN 1 TABLET: 5; 325 TABLET ORAL at 20:08

## 2020-07-23 RX ADMIN — POTASSIUM CHLORIDE 20 MEQ: 1500 TABLET, EXTENDED RELEASE ORAL at 08:26

## 2020-07-23 RX ADMIN — IPRATROPIUM BROMIDE AND ALBUTEROL SULFATE 1 AMPULE: .5; 3 SOLUTION RESPIRATORY (INHALATION) at 10:07

## 2020-07-23 RX ADMIN — ROPINIROLE HYDROCHLORIDE 0.5 MG: 0.25 TABLET, FILM COATED ORAL at 13:21

## 2020-07-23 RX ADMIN — CETIRIZINE HYDROCHLORIDE 5 MG: 5 TABLET ORAL at 08:24

## 2020-07-23 RX ADMIN — METOPROLOL SUCCINATE 100 MG: 100 TABLET, EXTENDED RELEASE ORAL at 08:26

## 2020-07-23 RX ADMIN — METHYLPREDNISOLONE SODIUM SUCCINATE 40 MG: 40 INJECTION, POWDER, FOR SOLUTION INTRAMUSCULAR; INTRAVENOUS at 23:28

## 2020-07-23 RX ADMIN — ALLOPURINOL 150 MG: 100 TABLET ORAL at 08:25

## 2020-07-23 RX ADMIN — NYSTATIN 500000 UNITS: 100000 SUSPENSION ORAL at 20:07

## 2020-07-23 ASSESSMENT — PAIN SCALES - GENERAL
PAINLEVEL_OUTOF10: 0
PAINLEVEL_OUTOF10: 8

## 2020-07-23 NOTE — PROGRESS NOTES
Physical Therapy    Facility/Department: [facility] MED SURG  Initial Assessment    NAME: Stefan Bell  : 1950  MRN: [id number]    Date of Service: 2020    Discharge Recommendations:  Defer PT at this time, Home independently, Home with Home health PT        Assessment   Body structures, Functions, Activity limitations: Decreased high-level IADLs  Assessment: COPD exacerbation; high functional level; appears to be at baseline for functional mobility. Treatment Diagnosis: UTI; general weakness;  Prognosis: Excellent  Decision Making: Low Complexity  No Skilled PT: At baseline function  REQUIRES PT FOLLOW UP: Yes  Activity Tolerance  Activity Tolerance: Patient Tolerated treatment well       Patient Diagnosis(es): There were no encounter diagnoses. has a past medical history of Anemia, Anxiety, Asthma, Cataract, COPD (chronic obstructive pulmonary disease) ([address] DDD (degenerative disc disease), cervical, Depression, Diabetes mellitus type 2, GERD (gastroesophageal reflux disease), Gout, History of uterine cancer, Hyperlipidemia, and Hypertension. has a past surgical history that includes Tonsillectomy ();  section (, ); Lung surgery; Neck surgery; Cataract removal with implant (); Hysterectomy; Colonoscopy (2013); Upper gastrointestinal endoscopy (N/A, 2019); and Colonoscopy (N/A, 2019).     Restrictions  Restrictions/Precautions  Restrictions/Precautions: General Precautions, Fall Risk  Required Braces or Orthoses?: No  Vision/Hearing  Vision Exceptions: Wears glasses at all times  Hearing: Within functional limits     Subjective  General  Chart Reviewed: Yes  Patient assessed for rehabilitation services?: Yes  Response To Previous Treatment: Not applicable  Family / Caregiver Present: No  Referring Practitioner: Daryle Landry, MD  Diagnosis: COPD  Follows Commands: Within Functional Limits  Subjective  Subjective: Patient reports feeling better; is independent with Good;-  Standing - Dynamic: Good;-        Plan   Plan  Times per week: 1 visit  Plan weeks: 1 visit               Goals          Therapy Time   Individual Concurrent Group Co-treatment   Time In 8812         Time Out 1000         Minutes [address] Trisha Rey PT      Certification of Medical Necessity: It will be understood that this treatment plan is certified medically necessary by the documenting therapist and referring physician mentioned in this report. Unless the physician indicated otherwise through written correspondence with our office, all further referrals will act as certification of medical necessity on this treatment plan. Thank you for this referral.  If you have questions regarding this plan of care, please call [phone].           Revisions to this plan (optional):                     Please sign and return this plan to:   FAX: [phone]      Signature:                                 Date:

## 2020-07-23 NOTE — PROGRESS NOTES
Speech Language Pathology  Facility/Department: [facility] MED SURG   CLINICAL BEDSIDE SWALLOW EVALUATION    NAME: Maldonado Bhatia  : 1950  MRN: [id number]    ADMISSION DATE: 2020  ADMITTING DIAGNOSIS: has DM type 2 (diabetes mellitus, type 2); Elevated LFTs; Hyperlipidemia; HTN (hypertension); Back pain; COPD (chronic obstructive pulmonary disease) ([address] B12 deficiency; Pneumonia due to organism; Moderate persistent asthma without complication; Hypoxia; Chronic gout without tophus; Vitamin D deficiency; CRF (chronic renal failure); Hand cramps; Elevated BUN; Injection site reaction; Allergic reaction; COPD exacerbation ([address] Type 2 diabetes mellitus with complication, with long-term current use of insulin ([address] Stage 3 chronic kidney disease ([facility] [address] Chronic bilateral low back pain without sciatica; Dependence on continuous supplemental oxygen; Pure hypercholesterolemia; Hypertension; Asthma; Chronic diastolic heart failure ([address] Gastroesophageal reflux disease; and CKD (chronic kidney disease) stage 4, GFR 15-29 ml/min ([facility]) on their problem list.  ONSET DATE: 2020    Recent Chest Xray/CT of Chest: (2020)     Impression         Patchy multifocal consolidation-correlate for pneumonia.         Pleuroparenchymal density in the left lateral lung adjacent to the heart border.         Cardiomegaly.           Date of Eval: 2020  Evaluating Therapist: Adrian Ha    Current Diet level:  Current Diet : Regular  Current Liquid Diet : Thin    Primary Complaint  Patient Complaint: Patient reported hx GERD and takes Protonix. She stated that she often feels like her food \"doesn't want to go down\" and reported \"one time my doctor pulled a pea out of my lungs\".     Pain:  Pain Assessment  Pain Assessment: 0-10  Pain Level: 0    Reason for Referral  Maldonado Bhatia was referred for a bedside swallow evaluation to assess the efficiency of her swallow function, identify signs and symptoms of indicated or reported per medical hx  Consistencies Administered: Reg solid; Thin - cup    Vision/Hearing  Vision  Vision Exceptions: Wears glasses at all times  Hearing  Hearing: Within functional limits    Oral Motor Deficits  Oral/Motor  Oral Motor: Within functional limits    Oral Phase Dysfunction  Oral Phase  Oral Phase: WFL  Oral Phase  Oral Phase - Comment: No deficits indicated with the exception of missing upper teeth. Indicators of Pharyngeal Phase Dysfunction   Pharyngeal Phase  Pharyngeal Phase: WFL  Pharyngeal Phase   Pharyngeal: No deficits indicated. Prognosis  Good for recommended diet    Education  Patient Education: Aspiration precaution guidelines, safe swallow strategies.   Patient Education Response: Verbalizes understanding;Demonstrated understanding     Therapy Time  SLP Individual Minutes  Time In: 1130  Time Out: 1200  Minutes: Odette Rodriguez [place]  7/23/2020 3:23 PM

## 2020-07-23 NOTE — PROGRESS NOTES
Progress Note      Subjective:   Chief complaint:   SOA    Interval History:   Reports feeling better today. Says her breathing is better. Took a shower this morning. Review of systems:     Constitutional:  Denies fever or chills. Eyes:  Denies change in visual acuity or discharge. HENT:  Denies nasal congestion or sore throat. Respiratory:  Positive for cough, SOA and THOMPSON. Cardiovascular:  Denies chest pain, palpitation or swelling in LEs. GI:  Denies abdominal pain, nausea, vomiting, bloody stools or diarrhea. :  Denies dysuria or frequency. Musculoskeletal:  Denies back pain or joint pain. Integument:  Denies rash or itching. Neurologic:  Denies headache, focal weakness or sensory changes. Endocrine:  Denies polyuria or polydipsia. Lymphatic:  Denies swollen glands or night sweats. Psychiatric:  Denies depression or anxiety. Past medical history, surgical history, family history and social history reviewed and unchanged compared to H&P earlier this admission.     Medications:   Scheduled Meds:   insulin glargine  80 Units Subcutaneous Nightly    insulin lispro  0-18 Units Subcutaneous TID WC    insulin lispro  0-9 Units Subcutaneous Nightly    ipratropium-albuterol  1 ampule Inhalation Q4H WA    budesonide  0.5 mg Nebulization BID    methylPREDNISolone  40 mg Intravenous Q12H    cefTRIAXone (ROCEPHIN) IV  1 g Intravenous Q24H    azithromycin  250 mg Oral Daily    allopurinol  150 mg Oral Daily    aspirin  81 mg Oral Daily    busPIRone  10 mg Oral TID    diclofenac sodium  2 g Topical BID    dilTIAZem  180 mg Oral Daily    docusate sodium  250 mg Oral Daily    apixaban  5 mg Oral BID    fluticasone  1 spray Each Nostril BID    furosemide  40 mg Oral BID    insulin lispro  35 Units Subcutaneous TID     cetirizine  5 mg Oral Daily    metoprolol succinate  100 mg Oral Daily    montelukast  10 mg Oral Daily    nystatin  500,000 Units Oral 4x Daily    pantoprazole  40 Active Hospital Problems     Diagnosis Date Noted    CKD (chronic kidney disease) stage 4, GFR 15-29 ml/min ([facility]) [N18.4]  -With baseline serum creatinine around 2 per most recent labs 5/22/2020  -serum creatinine currently better than baseline at 1.5  -Avoid nephrotoxins and NSAIDs  -Encourage good p.o. intake    07/22/2020    Gastroesophageal reflux disease [K21.9]  -Continue home Protonix         Chronic diastolic heart failure (HCC) [I50.32]  -No evidence of volume overload on exam  -Monitor daily weights and I's and O's  -Continue home diuretic regimen of metolazone and Lasix    08/30/2019    COPD exacerbation ([facility] [address] [J44.1]  -With increased work of breathing noted on exam.  Oxygen sats maintained at patient's baseline O2 requirement of 3 L nasal cannula. -CXR 7/22 with patchy multifocal consolidation-correlate for pneumonia. Pleuroparenchymal density in the left lateral lung adjacent to the heart border. Cardiomegaly.   -continue solu-medrol, duonebs, pulmicort  - continue Rocephin/azithromycin to cover for possible pna  -Encourage incentive spirometry and ambulation  -Consider referral to pulmonology upon follow-up    Pneumonia  -noted on CXR as above  -Continue Rocephin and azithromycin  -Add flutter valve  -COVID 19 negative  -Encourage incentive spirometry and ambulation    Chronic respiratory failure  -Continue home oxygen at 3L nasal cannula    12/04/2017    HTN (hypertension) [I10]  -continue home blood pressure regimen of diltiazem and metoprolol     Atrial fibrillation  -Continue home Eliquis, diltiazem and metoprolol    05/26/2011    Hyperlipidemia [E78.5]  -takes zetia and tricor at home; hold for now since they are not available in the hospital    05/26/2011    DM type 2 (diabetes mellitus, type 2) [E11.9]  -A1c 9.3. Pt states BG were high prior to admission and PCP recently adjusted her insulin regimen.   --398 overnight; hyperglycemia due to steroids  -increase lantus to 80 units QHS and SSI to high dose. Continue humalog 35 units TID.     RLS  -continue home requip        Patient was seen and examined by Dr. Martinez Postal and plan of care reviewed.       Electronically signed by JASON Hernandez on 7/23/2020 at 10:39 AM

## 2020-07-23 NOTE — CARE COORDINATION
07/22/20 1815   Discharge Josef Song   Current Services Prior To Admission Oxygen Therapy;Durable Medical Equipment;Home Care; Other (Comment)  ([facility])   DME Cane;Oxygen Therapy (Comment); Home Aerosol  ([employer] (DME comp))   [facility]   ([facility])   Potential Assistance Needed Home Care   Potential Assistance Purchasing Medications No   Type of Home Care Services OT;PT;Nursing Services   Patient expects to be discharged to: home   Expected Discharge Date 07/26/20   Follow Up Appointment: Best Day/Time    (anyday)     Pt uses [employer]. Lives alone. Has O2, neb, cane.   Uses [address] [place] [address] and [facility]

## 2020-07-23 NOTE — FLOWSHEET NOTE
07/22/20 2050   Assessment   Charting Type Shift assessment   Neurological   Neuro (WDL) WDL   [place] Coma Scale   Eye Opening 4   Best Verbal Response 5   Best Motor Response 6   Analia Coma Scale Score 15   HEENT   HEENT (WDL) X   Teeth Partial plate upper   Respiratory   Respiratory (WDL) X   Respiratory Pattern Regular   Respiratory Depth Normal   Respiratory Quality/Effort Dyspnea with exertion   Chest Assessment Chest expansion symmetrical   L Breath Sounds Diminished; Expiratory Wheezes   R Breath Sounds Diminished; Expiratory Wheezes   Breath Sounds   Right Upper Lobe Diminished; Expiratory Wheezes   Right Middle Lobe Diminished; Expiratory Wheezes   Right Lower Lobe Diminished; Expiratory Wheezes   Left Upper Lobe Diminished; Expiratory Wheezes   Left Lower Lobe Diminished; Expiratory Wheezes   Cough/Sputum   Cough Dry;Strong   Frequency Occasional   Gastrointestinal   Abdominal (WDL) WDL   Peripheral Vascular   Peripheral Vascular (WDL) X   Edema Right lower extremity; Left lower extremity   RLE Edema Trace   LLE Edema Trace   RUE Neurovascular Assessment   Capillary Refill Less than/equal to 3 seconds   Color Pale;Pink   Temperature Warm   LUE Neurovascular Assessment   Capillary Refill Less than/equal to 3 seconds   Color Pink   Temperature Warm   RLE Neurovascular Assessment   Capillary Refill Less than/equal to 3 seconds   Color Pink   Temperature Warm   LLE Neurovascular Assessment   Capillary Refill Less than/equal to 3 seconds   Color Pink   Temperature Warm   Skin Color/Condition   Skin Color/Condition (WDL) WDL   Skin Integrity   Skin Integrity (WDL) WDL   Musculoskeletal   Musculoskeletal (WDL) WDL   Psychosocial   Psychosocial (WDL) WDL

## 2020-07-24 LAB
ANION GAP SERPL CALCULATED.3IONS-SCNC: 12 MMOL/L (ref 3–16)
BUN BLDV-MCNC: 53 MG/DL (ref 6–20)
CALCIUM SERPL-MCNC: 10.3 MG/DL (ref 8.5–10.5)
CHLORIDE BLD-SCNC: 92 MMOL/L (ref 98–107)
CO2: 37 MMOL/L (ref 20–30)
CREAT SERPL-MCNC: 1.6 MG/DL (ref 0.4–1.2)
GFR AFRICAN AMERICAN: 39
GFR NON-AFRICAN AMERICAN: 32
GLUCOSE BLD-MCNC: 245 MG/DL (ref 74–106)
GLUCOSE BLD-MCNC: 264 MG/DL (ref 74–106)
GLUCOSE BLD-MCNC: 382 MG/DL (ref 74–106)
GLUCOSE BLD-MCNC: 398 MG/DL (ref 74–106)
GLUCOSE BLD-MCNC: 412 MG/DL (ref 74–106)
GLUCOSE BLD-MCNC: 421 MG/DL (ref 74–106)
HCT VFR BLD CALC: 37.2 % (ref 37–47)
HEMOGLOBIN: 11.3 G/DL (ref 11.5–16.5)
MCH RBC QN AUTO: 30.1 PG (ref 27–32)
MCHC RBC AUTO-ENTMCNC: 30.4 G/DL (ref 31–35)
MCV RBC AUTO: 99.2 FL (ref 80–100)
PDW BLD-RTO: 15.5 % (ref 11–16)
PERFORMED ON: ABNORMAL
PLATELET # BLD: 538 K/UL (ref 150–400)
PMV BLD AUTO: 10.9 FL (ref 6–10)
POTASSIUM SERPL-SCNC: 5 MMOL/L (ref 3.4–5.1)
RBC # BLD: 3.75 M/UL (ref 3.8–5.8)
SODIUM BLD-SCNC: 141 MMOL/L (ref 136–145)
WBC # BLD: 20.2 K/UL (ref 4–11)

## 2020-07-24 PROCEDURE — 85027 COMPLETE CBC AUTOMATED: CPT

## 2020-07-24 PROCEDURE — 80048 BASIC METABOLIC PNL TOTAL CA: CPT

## 2020-07-24 PROCEDURE — 94640 AIRWAY INHALATION TREATMENT: CPT

## 2020-07-24 PROCEDURE — 6370000000 HC RX 637 (ALT 250 FOR IP): Performed by: PHYSICIAN ASSISTANT

## 2020-07-24 PROCEDURE — 2700000000 HC OXYGEN THERAPY PER DAY

## 2020-07-24 PROCEDURE — 2580000003 HC RX 258: Performed by: PHYSICIAN ASSISTANT

## 2020-07-24 PROCEDURE — 6360000002 HC RX W HCPCS: Performed by: PHYSICIAN ASSISTANT

## 2020-07-24 PROCEDURE — 94669 MECHANICAL CHEST WALL OSCILL: CPT

## 2020-07-24 PROCEDURE — 36415 COLL VENOUS BLD VENIPUNCTURE: CPT

## 2020-07-24 PROCEDURE — 1200000000 HC SEMI PRIVATE

## 2020-07-24 PROCEDURE — 99232 SBSQ HOSP IP/OBS MODERATE 35: CPT | Performed by: INTERNAL MEDICINE

## 2020-07-24 PROCEDURE — 94761 N-INVAS EAR/PLS OXIMETRY MLT: CPT

## 2020-07-24 RX ORDER — INSULIN GLARGINE 100 [IU]/ML
120 INJECTION, SOLUTION SUBCUTANEOUS NIGHTLY
Status: DISCONTINUED | OUTPATIENT
Start: 2020-07-24 | End: 2020-07-25 | Stop reason: HOSPADM

## 2020-07-24 RX ORDER — GUAIFENESIN 600 MG/1
600 TABLET, EXTENDED RELEASE ORAL 2 TIMES DAILY
Status: DISCONTINUED | OUTPATIENT
Start: 2020-07-24 | End: 2020-07-25 | Stop reason: HOSPADM

## 2020-07-24 RX ADMIN — MONTELUKAST SODIUM 10 MG: 10 TABLET, COATED ORAL at 08:06

## 2020-07-24 RX ADMIN — GUAIFENESIN 600 MG: 600 TABLET, EXTENDED RELEASE ORAL at 20:17

## 2020-07-24 RX ADMIN — IPRATROPIUM BROMIDE AND ALBUTEROL SULFATE 1 AMPULE: .5; 3 SOLUTION RESPIRATORY (INHALATION) at 05:16

## 2020-07-24 RX ADMIN — IPRATROPIUM BROMIDE AND ALBUTEROL SULFATE 1 AMPULE: .5; 3 SOLUTION RESPIRATORY (INHALATION) at 18:11

## 2020-07-24 RX ADMIN — FUROSEMIDE 40 MG: 40 TABLET ORAL at 08:06

## 2020-07-24 RX ADMIN — INSULIN LISPRO 35 UNITS: 100 INJECTION, SOLUTION INTRAVENOUS; SUBCUTANEOUS at 12:59

## 2020-07-24 RX ADMIN — OMEGA-3-ACID ETHYL ESTERS 2 G: 1 CAPSULE, LIQUID FILLED ORAL at 20:17

## 2020-07-24 RX ADMIN — INSULIN LISPRO 15 UNITS: 100 INJECTION, SOLUTION INTRAVENOUS; SUBCUTANEOUS at 08:26

## 2020-07-24 RX ADMIN — SUCRALFATE 1 G: 1 TABLET ORAL at 05:29

## 2020-07-24 RX ADMIN — APIXABAN 5 MG: 5 TABLET, FILM COATED ORAL at 20:17

## 2020-07-24 RX ADMIN — AZITHROMYCIN MONOHYDRATE 250 MG: 250 TABLET ORAL at 08:07

## 2020-07-24 RX ADMIN — INSULIN LISPRO 3 UNITS: 100 INJECTION, SOLUTION INTRAVENOUS; SUBCUTANEOUS at 20:25

## 2020-07-24 RX ADMIN — PANTOPRAZOLE SODIUM 40 MG: 40 TABLET, DELAYED RELEASE ORAL at 08:06

## 2020-07-24 RX ADMIN — ROFLUMILAST 500 MCG: 500 TABLET ORAL at 08:06

## 2020-07-24 RX ADMIN — BUDESONIDE 500 MCG: 0.5 SUSPENSION RESPIRATORY (INHALATION) at 18:11

## 2020-07-24 RX ADMIN — KETOTIFEN FUMARATE 1 DROP: 0.35 SOLUTION/ DROPS OPHTHALMIC at 08:07

## 2020-07-24 RX ADMIN — INSULIN LISPRO 9 UNITS: 100 INJECTION, SOLUTION INTRAVENOUS; SUBCUTANEOUS at 16:35

## 2020-07-24 RX ADMIN — POTASSIUM CHLORIDE 20 MEQ: 1500 TABLET, EXTENDED RELEASE ORAL at 08:06

## 2020-07-24 RX ADMIN — ASPIRIN 81 MG: 81 TABLET, COATED ORAL at 08:06

## 2020-07-24 RX ADMIN — ALLOPURINOL 150 MG: 100 TABLET ORAL at 08:06

## 2020-07-24 RX ADMIN — ROPINIROLE HYDROCHLORIDE 0.5 MG: 0.25 TABLET, FILM COATED ORAL at 12:57

## 2020-07-24 RX ADMIN — BUDESONIDE 500 MCG: 0.5 SUSPENSION RESPIRATORY (INHALATION) at 05:16

## 2020-07-24 RX ADMIN — CEFTRIAXONE 1 G: 1 INJECTION, POWDER, FOR SOLUTION INTRAMUSCULAR; INTRAVENOUS at 11:43

## 2020-07-24 RX ADMIN — NYSTATIN 500000 UNITS: 100000 SUSPENSION ORAL at 16:37

## 2020-07-24 RX ADMIN — BUSPIRONE HYDROCHLORIDE 10 MG: 5 TABLET ORAL at 12:57

## 2020-07-24 RX ADMIN — INSULIN LISPRO 35 UNITS: 100 INJECTION, SOLUTION INTRAVENOUS; SUBCUTANEOUS at 08:31

## 2020-07-24 RX ADMIN — INSULIN LISPRO 35 UNITS: 100 INJECTION, SOLUTION INTRAVENOUS; SUBCUTANEOUS at 16:40

## 2020-07-24 RX ADMIN — ROPINIROLE HYDROCHLORIDE 0.5 MG: 0.25 TABLET, FILM COATED ORAL at 08:06

## 2020-07-24 RX ADMIN — SUCRALFATE 1 G: 1 TABLET ORAL at 23:10

## 2020-07-24 RX ADMIN — IPRATROPIUM BROMIDE AND ALBUTEROL SULFATE 1 AMPULE: .5; 3 SOLUTION RESPIRATORY (INHALATION) at 15:05

## 2020-07-24 RX ADMIN — FENOFIBRATE 160 MG: 160 TABLET ORAL at 08:06

## 2020-07-24 RX ADMIN — CETIRIZINE HYDROCHLORIDE 5 MG: 5 TABLET ORAL at 08:06

## 2020-07-24 RX ADMIN — INSULIN LISPRO 15 UNITS: 100 INJECTION, SOLUTION INTRAVENOUS; SUBCUTANEOUS at 12:55

## 2020-07-24 RX ADMIN — DILTIAZEM HYDROCHLORIDE 180 MG: 180 CAPSULE, COATED, EXTENDED RELEASE ORAL at 08:06

## 2020-07-24 RX ADMIN — Medication 250 MG: at 08:06

## 2020-07-24 RX ADMIN — INSULIN GLARGINE 120 UNITS: 100 INJECTION, SOLUTION SUBCUTANEOUS at 20:22

## 2020-07-24 RX ADMIN — BUSPIRONE HYDROCHLORIDE 10 MG: 5 TABLET ORAL at 08:07

## 2020-07-24 RX ADMIN — FLUTICASONE PROPIONATE 1 SPRAY: 50 SPRAY, METERED NASAL at 20:18

## 2020-07-24 RX ADMIN — ALOGLIPTIN 6.25 MG: 12.5 TABLET, FILM COATED ORAL at 08:06

## 2020-07-24 RX ADMIN — DICLOFENAC 2 G: 10 GEL TOPICAL at 08:07

## 2020-07-24 RX ADMIN — GUAIFENESIN 600 MG: 600 TABLET, EXTENDED RELEASE ORAL at 12:57

## 2020-07-24 RX ADMIN — OMEGA-3-ACID ETHYL ESTERS 2 G: 1 CAPSULE, LIQUID FILLED ORAL at 08:06

## 2020-07-24 RX ADMIN — ROPINIROLE HYDROCHLORIDE 0.5 MG: 0.25 TABLET, FILM COATED ORAL at 20:17

## 2020-07-24 RX ADMIN — NYSTATIN 500000 UNITS: 100000 SUSPENSION ORAL at 08:06

## 2020-07-24 RX ADMIN — METHYLPREDNISOLONE SODIUM SUCCINATE 40 MG: 40 INJECTION, POWDER, FOR SOLUTION INTRAMUSCULAR; INTRAVENOUS at 11:43

## 2020-07-24 RX ADMIN — SUCRALFATE 1 G: 1 TABLET ORAL at 12:57

## 2020-07-24 RX ADMIN — BUSPIRONE HYDROCHLORIDE 10 MG: 5 TABLET ORAL at 20:17

## 2020-07-24 RX ADMIN — FLUTICASONE PROPIONATE 1 SPRAY: 50 SPRAY, METERED NASAL at 08:14

## 2020-07-24 RX ADMIN — NYSTATIN 500000 UNITS: 100000 SUSPENSION ORAL at 20:17

## 2020-07-24 RX ADMIN — METOPROLOL SUCCINATE 100 MG: 100 TABLET, EXTENDED RELEASE ORAL at 08:07

## 2020-07-24 RX ADMIN — KETOTIFEN FUMARATE 1 DROP: 0.35 SOLUTION/ DROPS OPHTHALMIC at 20:18

## 2020-07-24 RX ADMIN — FUROSEMIDE 40 MG: 40 TABLET ORAL at 16:33

## 2020-07-24 RX ADMIN — APIXABAN 5 MG: 5 TABLET, FILM COATED ORAL at 08:13

## 2020-07-24 RX ADMIN — IPRATROPIUM BROMIDE AND ALBUTEROL SULFATE 1 AMPULE: .5; 3 SOLUTION RESPIRATORY (INHALATION) at 09:55

## 2020-07-24 RX ADMIN — METHYLPREDNISOLONE SODIUM SUCCINATE 40 MG: 40 INJECTION, POWDER, FOR SOLUTION INTRAMUSCULAR; INTRAVENOUS at 23:10

## 2020-07-24 RX ADMIN — NYSTATIN 500000 UNITS: 100000 SUSPENSION ORAL at 11:43

## 2020-07-24 NOTE — CARE COORDINATION
Notified [facility] day [facility] that pt will likely be discharged home on Saturday.

## 2020-07-24 NOTE — PROGRESS NOTES
Progress Note      Subjective:   Chief complaint:   SOA    Interval History:   Patient lying in bed. She states her breathing continues to improve but not yet at baseline. Elevated blood glucose readings noted overnight. Appetite good. No other complaints. Review of systems:     Constitutional:  Denies fever or chills. Eyes:  Denies change in visual acuity or discharge. HENT:  Denies nasal congestion or sore throat. Respiratory:  Positive for cough, shortness of breath and THOMPSON. Cardiovascular:  Denies chest pain, palpitation or swelling in LEs. GI:  Denies abdominal pain, nausea, vomiting, bloody stools or diarrhea. :  Denies dysuria or frequency. Musculoskeletal:  Denies back pain or joint pain. Integument:  Denies rash or itching. Neurologic:  Denies headache, focal weakness or sensory changes. Endocrine:  Denies polyuria or polydipsia. Lymphatic:  Denies swollen glands or night sweats. Psychiatric:  Denies depression or anxiety. Past medical history, surgical history, family history and social history reviewed and unchanged compared to H&P earlier this admission.     Medications:   Scheduled Meds:   insulin glargine  120 Units Subcutaneous Nightly    insulin lispro  0-18 Units Subcutaneous TID     insulin lispro  0-9 Units Subcutaneous Nightly    ipratropium-albuterol  1 ampule Inhalation Q4H WA    budesonide  0.5 mg Nebulization BID    methylPREDNISolone  40 mg Intravenous Q12H    cefTRIAXone (ROCEPHIN) IV  1 g Intravenous Q24H    azithromycin  250 mg Oral Daily    allopurinol  150 mg Oral Daily    aspirin  81 mg Oral Daily    busPIRone  10 mg Oral TID    diclofenac sodium  2 g Topical BID    dilTIAZem  180 mg Oral Daily    docusate sodium  250 mg Oral Daily    apixaban  5 mg Oral BID    fluticasone  1 spray Each Nostril BID    furosemide  40 mg Oral BID    insulin lispro  35 Units Subcutaneous TID     cetirizine  5 mg Oral Daily    metoprolol succinate  100 mg Oral Daily    montelukast  10 mg Oral Daily    nystatin  500,000 Units Oral 4x Daily    pantoprazole  40 mg Oral Daily    ketotifen  1 drop Both Eyes BID    potassium chloride  20 mEq Oral Daily    Roflumilast  500 mcg Oral Daily    rOPINIRole  0.5 mg Oral TID    sucralfate  1 g Oral 3 times per day    alogliptin  6.25 mg Oral Daily    omega-3 acid ethyl esters  2 g Oral BID    fenofibrate  160 mg Oral Daily     Continuous Infusions:   dextrose         Objective:     Vital Signs  Temp: 97.6 °F (36.4 °C)  Pulse: 88  Resp: 24  BP: (!) 158/71  SpO2: 94 %  O2 Device: Nasal cannula  O2 Flow Rate (L/min): 3 L/min    Vital signs reviewed in electronic charts. Physical exam  Constitutional:  Well developed, well nourished, no acute distress. On 3L NC. Eyes:  PERRL, conjunctiva normal, EOMI. HENT:  Atraumatic, external ears normal, external nose/nares normal, oropharynx moist, no pharyngeal exudates. Neck:  Supple. No JVD or thyromegaly. Respiratory:  No respiratory distress, normal breath sounds, no rales. Wheezing in all lung fields, but improving. On 3L NC. Cardiovascular:  Normal rate, normal rhythm, no murmurs, no gallops, no rubs. GI:  Soft, nondistended, normal bowel sounds, nontender, no organomegaly, no mass. :  No costovertebral angle tenderness. Musculoskeletal:  No edema, no tenderness, no obvious deformities. Patient is moving all extremities. Integument:  Well hydrated, no rash. Lymphatic:  No cervical or axillary lymphadenopathy noted. Neurologic:  Alert & oriented x 3,  no focal deficits noted. Strength is equal throughout. Psychiatric:  Speech and behavior appropriate.     Results:     Lab Results   Component Value Date    WBC 20.2 (H) 07/24/2020    HGB 11.3 (L) 07/24/2020    HCT 37.2 07/24/2020    MCV 99.2 07/24/2020     (H) 07/24/2020       Lab Results   Component Value Date     07/24/2020    K 5.0 07/24/2020    K 4.7 07/23/2020    CL 92 07/24/2020    CO2 37 07/24/2020    BUN 53 07/24/2020    CREATININE 1.6 07/24/2020    GLUCOSE 412 07/24/2020    CALCIUM 10.3 07/24/2020        Assessment and Plan: Active Hospital Problems    Diagnosis Date Noted    CKD (chronic kidney disease) stage 4, GFR 15-29 ml/min ([facility]) [N18.4]  -with baseline sCr ~2.0 per labs on 5/22  -sCr better than baseline thus far this admission at 1.5-1.6  -Avoid nephrotoxins and NSAIDs  -Encourage good p.o. intake   07/22/2020    Gastroesophageal reflux disease [K21.9]  -Continue home Protonix       Chronic diastolic heart failure (HCC) [I50.32]  -No evidence of volume overload on exam  -Monitor daily weights and I's and O's  -Continue home diuretic regimen of metolazone and Lasix   08/30/2019    COPD exacerbation ([facility] [address] [J44.1]  -Chest x-ray 7/22 with patchy multifocal consolidation, correlate for pneumonia. Pleuroparenchymal density in the left lateral lung adjacent to the heart border. Cardiomegaly.  -Continue Solu-Medrol, DuoNebs, Pulmicort and Rocephin/azithromycin  -Encourage incentive spirometry and ambulation  -Consider referral to pulmonology upon discharge   12/04/2017    Pneumonia [J18.9]  -Noted on chest x-ray as above  -Continue Rocephin/azithromycin  -COVID-19 negative  -Continue flutter valve  -Encourage incentive spirometry and ambulation   02/24/2015    HTN (hypertension) [I10]  -Continue home blood pressure regimen of diltiazem and metoprolol   05/26/2011    Hyperlipidemia [E78.5]  -Take Zetia and TriCor at home; hold for now since they are not available in the hospital   05/26/2011    DM type 2 (diabetes mellitus, type 2) [E11.9]  -A1c 9.3.   Patient states blood glucoses were high prior to admission and PCP recently adjusted her home insulin regimen.  -Blood glucoses continue to be elevated averaging around 400  -We will increase Lantus to 140 units nightly, continue Humalog 35 units 3 times daily and continue high-dose sliding scale insulin    Restless leg syndrome  -Continue home Requip 03/28/2011     Patient was seen and examined by Dr. Mi Monahan and plan of care reviewed.       Electronically signed by JASON Tyler on 7/24/2020 at 9:30 AM

## 2020-07-25 ENCOUNTER — APPOINTMENT (OUTPATIENT)
Dept: GENERAL RADIOLOGY | Facility: HOSPITAL | Age: 70
DRG: 190 | End: 2020-07-25
Attending: INTERNAL MEDICINE
Payer: MEDICAID

## 2020-07-25 VITALS
DIASTOLIC BLOOD PRESSURE: 56 MMHG | WEIGHT: 190 LBS | BODY MASS INDEX: 39.88 KG/M2 | TEMPERATURE: 97.4 F | HEIGHT: 58 IN | HEART RATE: 101 BPM | RESPIRATION RATE: 16 BRPM | OXYGEN SATURATION: 91 % | SYSTOLIC BLOOD PRESSURE: 129 MMHG

## 2020-07-25 LAB
ANION GAP SERPL CALCULATED.3IONS-SCNC: 10 MMOL/L (ref 3–16)
BUN BLDV-MCNC: 55 MG/DL (ref 6–20)
CALCIUM SERPL-MCNC: 10.2 MG/DL (ref 8.5–10.5)
CHLORIDE BLD-SCNC: 98 MMOL/L (ref 98–107)
CO2: 39 MMOL/L (ref 20–30)
CREAT SERPL-MCNC: 1.5 MG/DL (ref 0.4–1.2)
GFR AFRICAN AMERICAN: 42
GFR NON-AFRICAN AMERICAN: 34
GLUCOSE BLD-MCNC: 192 MG/DL (ref 74–106)
GLUCOSE BLD-MCNC: 202 MG/DL (ref 74–106)
GLUCOSE BLD-MCNC: 210 MG/DL (ref 74–106)
GLUCOSE BLD-MCNC: 241 MG/DL (ref 74–106)
GLUCOSE BLD-MCNC: 254 MG/DL (ref 74–106)
HCT VFR BLD CALC: 38.8 % (ref 37–47)
HEMOGLOBIN: 11.6 G/DL (ref 11.5–16.5)
MCH RBC QN AUTO: 29.3 PG (ref 27–32)
MCHC RBC AUTO-ENTMCNC: 29.9 G/DL (ref 31–35)
MCV RBC AUTO: 98 FL (ref 80–100)
PDW BLD-RTO: 15.3 % (ref 11–16)
PERFORMED ON: ABNORMAL
PLATELET # BLD: 552 K/UL (ref 150–400)
PMV BLD AUTO: 10.6 FL (ref 6–10)
POTASSIUM SERPL-SCNC: 4.8 MMOL/L (ref 3.4–5.1)
RBC # BLD: 3.96 M/UL (ref 3.8–5.8)
SODIUM BLD-SCNC: 147 MMOL/L (ref 136–145)
WBC # BLD: 19.9 K/UL (ref 4–11)

## 2020-07-25 PROCEDURE — 2700000000 HC OXYGEN THERAPY PER DAY

## 2020-07-25 PROCEDURE — 94669 MECHANICAL CHEST WALL OSCILL: CPT

## 2020-07-25 PROCEDURE — 94640 AIRWAY INHALATION TREATMENT: CPT

## 2020-07-25 PROCEDURE — 94761 N-INVAS EAR/PLS OXIMETRY MLT: CPT

## 2020-07-25 PROCEDURE — 99238 HOSP IP/OBS DSCHRG MGMT 30/<: CPT | Performed by: INTERNAL MEDICINE

## 2020-07-25 PROCEDURE — 6360000002 HC RX W HCPCS: Performed by: PHYSICIAN ASSISTANT

## 2020-07-25 PROCEDURE — 71046 X-RAY EXAM CHEST 2 VIEWS: CPT

## 2020-07-25 PROCEDURE — 2580000003 HC RX 258: Performed by: PHYSICIAN ASSISTANT

## 2020-07-25 PROCEDURE — 80048 BASIC METABOLIC PNL TOTAL CA: CPT

## 2020-07-25 PROCEDURE — 85027 COMPLETE CBC AUTOMATED: CPT

## 2020-07-25 PROCEDURE — 36415 COLL VENOUS BLD VENIPUNCTURE: CPT

## 2020-07-25 PROCEDURE — 6370000000 HC RX 637 (ALT 250 FOR IP): Performed by: PHYSICIAN ASSISTANT

## 2020-07-25 RX ORDER — PREDNISONE 20 MG/1
20 TABLET ORAL DAILY
Qty: 5 TABLET | Refills: 0 | Status: SHIPPED | OUTPATIENT
Start: 2020-07-25 | End: 2020-07-30

## 2020-07-25 RX ORDER — INSULIN GLARGINE 100 [IU]/ML
120 INJECTION, SOLUTION SUBCUTANEOUS NIGHTLY
Qty: 5 PEN | Refills: 3 | Status: SHIPPED | OUTPATIENT
Start: 2020-07-25 | End: 2020-08-05 | Stop reason: ALTCHOICE

## 2020-07-25 RX ORDER — CEFDINIR 300 MG/1
300 CAPSULE ORAL 2 TIMES DAILY
Qty: 10 CAPSULE | Refills: 0 | Status: SHIPPED | OUTPATIENT
Start: 2020-07-25 | End: 2020-07-30

## 2020-07-25 RX ADMIN — ALOGLIPTIN 6.25 MG: 12.5 TABLET, FILM COATED ORAL at 08:01

## 2020-07-25 RX ADMIN — ROFLUMILAST 500 MCG: 500 TABLET ORAL at 08:00

## 2020-07-25 RX ADMIN — NYSTATIN 500000 UNITS: 100000 SUSPENSION ORAL at 11:48

## 2020-07-25 RX ADMIN — FUROSEMIDE 40 MG: 40 TABLET ORAL at 08:01

## 2020-07-25 RX ADMIN — NYSTATIN 500000 UNITS: 100000 SUSPENSION ORAL at 08:00

## 2020-07-25 RX ADMIN — AZITHROMYCIN MONOHYDRATE 250 MG: 250 TABLET ORAL at 08:01

## 2020-07-25 RX ADMIN — PANTOPRAZOLE SODIUM 40 MG: 40 TABLET, DELAYED RELEASE ORAL at 08:00

## 2020-07-25 RX ADMIN — ALLOPURINOL 150 MG: 100 TABLET ORAL at 08:00

## 2020-07-25 RX ADMIN — GUAIFENESIN 600 MG: 600 TABLET, EXTENDED RELEASE ORAL at 07:59

## 2020-07-25 RX ADMIN — BUDESONIDE 500 MCG: 0.5 SUSPENSION RESPIRATORY (INHALATION) at 05:09

## 2020-07-25 RX ADMIN — Medication 250 MG: at 08:00

## 2020-07-25 RX ADMIN — INSULIN LISPRO 35 UNITS: 100 INJECTION, SOLUTION INTRAVENOUS; SUBCUTANEOUS at 11:50

## 2020-07-25 RX ADMIN — MONTELUKAST SODIUM 10 MG: 10 TABLET, COATED ORAL at 07:59

## 2020-07-25 RX ADMIN — ROPINIROLE HYDROCHLORIDE 0.5 MG: 0.25 TABLET, FILM COATED ORAL at 08:01

## 2020-07-25 RX ADMIN — INSULIN LISPRO 35 UNITS: 100 INJECTION, SOLUTION INTRAVENOUS; SUBCUTANEOUS at 08:11

## 2020-07-25 RX ADMIN — IPRATROPIUM BROMIDE AND ALBUTEROL SULFATE 1 AMPULE: .5; 3 SOLUTION RESPIRATORY (INHALATION) at 10:51

## 2020-07-25 RX ADMIN — METHYLPREDNISOLONE SODIUM SUCCINATE 40 MG: 40 INJECTION, POWDER, FOR SOLUTION INTRAMUSCULAR; INTRAVENOUS at 11:48

## 2020-07-25 RX ADMIN — FENOFIBRATE 160 MG: 160 TABLET ORAL at 08:00

## 2020-07-25 RX ADMIN — METOPROLOL SUCCINATE 100 MG: 100 TABLET, EXTENDED RELEASE ORAL at 08:00

## 2020-07-25 RX ADMIN — INSULIN LISPRO 6 UNITS: 100 INJECTION, SOLUTION INTRAVENOUS; SUBCUTANEOUS at 11:49

## 2020-07-25 RX ADMIN — SUCRALFATE 1 G: 1 TABLET ORAL at 05:53

## 2020-07-25 RX ADMIN — ASPIRIN 81 MG: 81 TABLET, COATED ORAL at 08:00

## 2020-07-25 RX ADMIN — OMEGA-3-ACID ETHYL ESTERS 2 G: 1 CAPSULE, LIQUID FILLED ORAL at 07:59

## 2020-07-25 RX ADMIN — BUSPIRONE HYDROCHLORIDE 10 MG: 5 TABLET ORAL at 08:01

## 2020-07-25 RX ADMIN — HYDROCODONE BITARTRATE AND ACETAMINOPHEN 1 TABLET: 5; 325 TABLET ORAL at 08:07

## 2020-07-25 RX ADMIN — APIXABAN 5 MG: 5 TABLET, FILM COATED ORAL at 08:00

## 2020-07-25 RX ADMIN — IPRATROPIUM BROMIDE AND ALBUTEROL SULFATE 1 AMPULE: .5; 3 SOLUTION RESPIRATORY (INHALATION) at 05:09

## 2020-07-25 RX ADMIN — INSULIN LISPRO 3 UNITS: 100 INJECTION, SOLUTION INTRAVENOUS; SUBCUTANEOUS at 08:09

## 2020-07-25 RX ADMIN — CEFTRIAXONE 1 G: 1 INJECTION, POWDER, FOR SOLUTION INTRAMUSCULAR; INTRAVENOUS at 11:48

## 2020-07-25 RX ADMIN — CETIRIZINE HYDROCHLORIDE 5 MG: 5 TABLET ORAL at 08:00

## 2020-07-25 RX ADMIN — POTASSIUM CHLORIDE 20 MEQ: 1500 TABLET, EXTENDED RELEASE ORAL at 08:01

## 2020-07-25 RX ADMIN — DILTIAZEM HYDROCHLORIDE 180 MG: 180 CAPSULE, COATED, EXTENDED RELEASE ORAL at 08:00

## 2020-07-25 ASSESSMENT — PAIN SCALES - GENERAL: PAINLEVEL_OUTOF10: 9

## 2020-07-25 NOTE — CARE COORDINATION
[facility] notified that pt was discharged home today. Faxed discharge summary to [facility] and they will notify on call nurse. On call nurse Grace Party called and will resume care for pt Monday.

## 2020-07-25 NOTE — FLOWSHEET NOTE
07/25/20 0924   Assessment   Charting Type Shift assessment   Neurological   Neuro (WDL) WDL   Swallow Screening   Is the patient able to remain alert for testing? Yes   [place] Coma Scale   Eye Opening 4   Best Verbal Response 5   Best Motor Response 6   Analia Coma Scale Score 15   NIH/MNHISS Stroke Scale   NIH/MNIHSS Stroke Scale Assessed No   HEENT   HEENT (WDL) X   Teeth Partial plate upper   Respiratory   Respiratory (WDL) X   Respiratory Pattern Regular   Respiratory Depth Normal   Respiratory Quality/Effort Dyspnea with exertion   Chest Assessment Chest expansion symmetrical   L Breath Sounds Diminished; Expiratory Wheezes   R Breath Sounds Diminished; Expiratory Wheezes   Breath Sounds   Right Upper Lobe Diminished; Expiratory Wheezes   Right Middle Lobe Diminished   Right Lower Lobe Diminished   Left Upper Lobe Diminished; Expiratory Wheezes   Left Lower Lobe Diminished; Expiratory Wheezes   Cardiac Monitor   Telemetry Monitor On No   Gastrointestinal   Abdominal (WDL) WDL   Peripheral Vascular   Peripheral Vascular (WDL) X   Edema Right lower extremity; Left lower extremity   RLE Edema Trace   LLE Edema Trace   RUE Neurovascular Assessment   Capillary Refill Less than/equal to 3 seconds   Color Pale;Pink   Temperature Warm   LUE Neurovascular Assessment   Capillary Refill Less than/equal to 3 seconds   Color Pink   Temperature Warm   RLE Neurovascular Assessment   Capillary Refill Less than/equal to 3 seconds   Color Pink   Temperature Warm   LLE Neurovascular Assessment   Capillary Refill Less than/equal to 3 seconds   Color Pink   Temperature Warm   Skin Color/Condition   Skin Color/Condition (WDL) WDL   Skin Integrity   Skin Integrity (WDL) WDL   Musculoskeletal   Musculoskeletal (WDL) WDL   Urine Assessment   Incontinence No   Psychosocial   Psychosocial (WDL) WDL     Pt sitting on edge of bed. States she feels better this morning however, she does complain of some lower back pain.   States she thinks it may be the bed. Norco given. Will continue to monitor.

## 2020-07-25 NOTE — PLAN OF CARE
Problem: Pain:  Goal: Pain level will decrease  Description: Pain level will decrease  Outcome: Ongoing     Problem: Respiratory  Goal: No pulmonary complications  Outcome: Ongoing

## 2020-07-25 NOTE — PROGRESS NOTES
Pt discharged at this time. Pt IV removed. Pt educated on meds and follow up appointments. Pt has home O2 tank for trip home. Pt left floor via w/c.

## 2020-07-25 NOTE — DISCHARGE SUMMARY
5/22  -sCr better than baseline at 1.5-1.6  -Avoid nephrotoxins and NSAIDs  -Encourage good p.o. intake    07/22/2020    Gastroesophageal reflux disease [K21.9]  -Continue home Protonix         Chronic diastolic heart failure (HCC) [I50.32]  -No evidence of volume overload on exam  -Monitor daily weights and I's and O's  -Continue home diuretic regimen of metolazone and Lasix    08/30/2019    COPD exacerbation ([facility] [address] [J44.1]  -Chest x-ray 7/22 with patchy multifocal consolidation, correlate for pneumonia. Pleuroparenchymal density in the left lateral lung adjacent to the heart border. Cardiomegaly.  -Treated with Solu-Medrol, DuoNebs, Pulmicort and Rocephin/azithromycin while admitted; will transition to po regimen on discharge  -Encourage incentive spirometry and ambulation  -Consider referral to pulmonology upon discharge; defer to PCP    12/04/2017    Pneumonia [J18.9]  -Noted on chest x-ray as above  -Treated with Rocephin/azithromycin; transition to omnicef on discharge  -COVID-19 negative  -Continue flutter valve  -Encourage incentive spirometry and ambulation    02/24/2015    HTN (hypertension) [I10]  -Continue home blood pressure regimen of diltiazem and metoprolol    05/26/2011    Hyperlipidemia [E78.5]  -resume home Zetia and TriCor on discharge   05/26/2011    DM type 2 (diabetes mellitus, type 2) [E11.9]  -A1c 9.3. Patient states blood glucoses were high prior to admission and PCP recently adjusted her home insulin regimen.  -Insulin regimen adjusted while inpatient.   Blood glucoses improved with current regimen of Lantus 120 mg nightly, Humalog 35 units 3 times daily and sliding scale insulin     Restless leg syndrome  -Continue home Requip        Disposition: home    Discharged Condition: Stable    Vital Signs  Temp: 97.4 °F (36.3 °C)  Pulse: 101  Resp: 16  BP: (!) 129/56  SpO2: 91 %  O2 Device: Nasal cannula  O2 Flow Rate (L/min): 3 L/min    Vital signs reviewed in electronic doses taken as pain becomes manageable  Associated Diagnoses: Chronic right-sided low back pain with right-sided sciatica      rOPINIRole (REQUIP) 0.5 MG tablet TAKE ONE TABLET BY MOUTH TWO TIMES A DAY  Qty: 60 tablet, Refills: 3      VASCEPA 1 g CAPS capsule TAKE TWO CAPSULES BY MOUTH TWO TIMES A DAY  Qty: 60 capsule, Refills: 3    Associated Diagnoses: Pure hypercholesterolemia      busPIRone (BUSPAR) 10 MG tablet TAKE ONE TABLET BY MOUTH THREE TIMES A DAY  Qty: 90 tablet, Refills: 0    Associated Diagnoses: Anxiety      metOLazone (ZAROXOLYN) 5 MG tablet TAKE 1 TABLET BY MOUTH DAILY AS NEEDED (SWELLING)  Qty: 30 tablet, Refills: 3    Associated Diagnoses: Swelling      montelukast (SINGULAIR) 10 MG tablet TAKE ONE TABLET BY MOUTH EVERY DAY  Qty: 30 tablet, Refills: 2    Associated Diagnoses: Chronic bronchitis, unspecified chronic bronchitis type (HCC)      traMADol (ULTRAM) 50 MG tablet TAKE ONE TABLET BY MOUTH THREE TIMES A DAY AS NEEDED  Qty: 90 tablet, Refills: 2    Comments: Reduce doses taken as pain becomes manageable  Associated Diagnoses: Chronic bilateral low back pain without sciatica      nystatin (MYCOSTATIN) 938316 UNIT/ML suspension TAKE 5 MLS BY MOUTH 4 TIMES DAILY  Qty: 240 mL, Refills: 2      dilTIAZem (CARDIZEM CD) 180 MG extended release capsule TAKE 1 CAPSULE BY MOUTH DAILY  Qty: 30 capsule, Refills: 3      ELIQUIS 5 MG TABS tablet TAKE 1 TABLET BY MOUTH EVERY 12 HOURS  Qty: 60 tablet, Refills: 3      potassium chloride (KLOR-CON M) 20 MEQ extended release tablet TAKE ONE TABLET BY MOUTH EVERY DAY  Qty: 30 tablet, Refills: 5    Associated Diagnoses: COPD exacerbation (HCC); Swelling      furosemide (LASIX) 40 MG tablet TAKE ONE TABLET BY MOUTH TWO TIMES A DAY  Qty: 60 tablet, Refills: 5    Associated Diagnoses: Wheezing; COPD exacerbation ([address]  Swelling      insulin lispro (HUMALOG) 100 UNIT/ML injection vial INJECT 35 UNITS UNDER THE SKIN THREE TIMES A DAY BEFORE MEALS  Qty: 30 mL, Refills: inhaler Inhale 1 puff into the lungs every 12 hours      fenofibrate (TRICOR) 145 MG tablet TAKE ONE TABLET BY MOUTH EVERY DAY  Qty: 30 tablet, Refills: 3    Associated Diagnoses: Pure hypercholesterolemia      ipratropium-albuterol (DUONEB) 0.5-2.5 (3) MG/3ML SOLN nebulizer solution Inhale 3 mLs into the lungs every 4 hours  Qty: 360 mL, Refills: 3    Associated Diagnoses: Chronic bronchitis, unspecified chronic bronchitis type (HCC); COPD with acute exacerbation (HCC)      OXYGEN Inhale 3 L/min into the lungs continuous      flunisolide (NASALIDE) 25 MCG/ACT (0.025%) SOLN 1 spray       albuterol sulfate HFA (PROAIR HFA) 108 (90 Base) MCG/ACT inhaler Inhale 2 puffs into the lungs every 4 hours as needed for Wheezing  Qty: 1 Inhaler, Refills: 5      loratadine (CLARITIN) 10 MG tablet TAKE ONE TABLET BY MOUTH EVERY DAY  Qty: 30 tablet, Refills: 4    Associated Diagnoses: Chronic bronchitis, unspecified chronic bronchitis type (HCC)      Insulin Syringe-Needle U-100 31G X 5/16\" 1 ML MISC USE FOR INSULIN INJECTIONS FIVE TIMES DAILY  Qty: 150 each, Refills: 4      blood glucose test strips (TRUE METRIX BLOOD GLUCOSE TEST) strip USE TO CHECK BLOOD SUGAR FOUR TIMES A DAY dx:e11.9  Qty: 100 strip, Refills: 3    Associated Diagnoses: Type 2 diabetes mellitus with stage 3 chronic kidney disease, with long-term current use of insulin ([facility] Hyperglycemia      omalizumab (OMALIZUMAB) 150 MG injection Inject 150 mg into the skin every 28 days  Qty: 1 vial, Refills: 3    Associated Diagnoses: Allergic rhinitis, unspecified seasonality, unspecified trigger              Patient was seen and examined by Dr. Minnie Nielson and plan of care reviewed. Signed:  Electronically signed by JASON Rueda on 7/25/2020 at 1:58 PM       Thank you JADE Rice for the opportunity to be involved in this patient's care. If you have any questions or concerns please feel free to contact me at [phone].

## 2020-07-27 ENCOUNTER — CARE COORDINATION (OUTPATIENT)
Dept: CARE COORDINATION | Age: 70
End: 2020-07-27

## 2020-07-27 NOTE — CARE COORDINATION
[facility] Transitions Initial Follow Up Call    Call within 2 business days of discharge: Yes     Patient: Alyssa Gallagher Patient : 1950 MRN: <[id number]>    Last Discharge [address] [address]       Complaint Diagnosis Description Type Department Provider    20   Admission (Discharged) [address] [place] Marco Crocker MD          RARS: Readmission Risk Score: 30       Spoke with: Attempting HFU call, unsuccessful. Message left with contact information.      Discharge department/facility: [facility]    Non-face-to-face services provided:  Scheduled appointment with PCPHomar  Obtained and reviewed discharge summary and/or continuity of care documents    Follow Up  Future Appointments   Date Time Provider Elena Pfeiffer   2020  9:45 AM JADE Chavez [facility]   2020  9:00 [facility], [address] [facility] [facility]       Maximus Jenkins RN

## 2020-07-28 ENCOUNTER — TELEPHONE (OUTPATIENT)
Dept: PRIMARY CARE CLINIC | Age: 70
End: 2020-07-28

## 2020-07-28 NOTE — TELEPHONE ENCOUNTER
Gely Montes De Oca from [facility] called. Patient doesn't feel that she needs physical therapy so they dc'd. She is getting around really well. Patient is using her pens and a syringe because she wants vials instead. She has a hard time using the needles. Can we change her back to the vials? Her humalog is a vial but her lantus is not.

## 2020-07-29 NOTE — CARE COORDINATION
[facility] Transitions Initial Follow Up Call    Call within 2 business days of discharge: Yes     Patient: Stefan Bell Patient : 1950 MRN: <[id number]>    Last Discharge [facility]       Complaint Diagnosis Description Type Department Provider    20   Admission (Discharged) [address] [place] Flaquito Holloway MD          RARS: Readmission Risk Score: 30       Spoke with: Attempting HFU call, unsuccessful. Message left with contact information.      Discharge department/facility: [facility]    Non-face-to-face services provided:  Scheduled appointment with PCPHomar  Obtained and reviewed discharge summary and/or continuity of care documents    Follow Up  Future Appointments   Date Time Provider Elena Pfeiffer   2020  9:45 AM Jovan Hesselbach, APRN [facility] CLAUDIA MHVANDANA   2020  9:00 417 [facility], [address] [place] [address] CLAUDIA VANDANA Oh RN

## 2020-08-03 ENCOUNTER — TELEPHONE (OUTPATIENT)
Dept: PRIMARY CARE CLINIC | Age: 70
End: 2020-08-03

## 2020-08-04 RX ORDER — SUCRALFATE 1 G/1
TABLET ORAL
Qty: 90 TABLET | Refills: 3 | Status: SHIPPED | OUTPATIENT
Start: 2020-08-04 | End: 2020-11-23

## 2020-08-05 NOTE — TELEPHONE ENCOUNTER
Patient does not take Lantus. She takes Humalog and Arland Nipper that she gets from [facility].

## 2020-08-06 ENCOUNTER — TELEPHONE (OUTPATIENT)
Dept: PRIMARY CARE CLINIC | Age: 70
End: 2020-08-06

## 2020-08-06 NOTE — TELEPHONE ENCOUNTER
Martha with [facility] called to report that pt has gained 6 lbs this week, and that she has audible rhonchi throughout. O@ sats stable around 94%. Pt did take a metolazone along with her Lasix today.  She does have appt scheduled with you tomorrow and pt states she will be here

## 2020-08-07 ENCOUNTER — OFFICE VISIT (OUTPATIENT)
Dept: PRIMARY CARE CLINIC | Age: 70
End: 2020-08-07
Payer: MEDICARE

## 2020-08-07 VITALS
WEIGHT: 195.8 LBS | BODY MASS INDEX: 41.1 KG/M2 | TEMPERATURE: 97.9 F | OXYGEN SATURATION: 94 % | RESPIRATION RATE: 16 BRPM | SYSTOLIC BLOOD PRESSURE: 118 MMHG | HEIGHT: 58 IN | HEART RATE: 71 BPM | DIASTOLIC BLOOD PRESSURE: 50 MMHG

## 2020-08-07 PROBLEM — E66.01 MORBID OBESITY WITH BMI OF 40.0-44.9, ADULT (HCC): Status: ACTIVE | Noted: 2020-08-07

## 2020-08-07 PROCEDURE — 96372 THER/PROPH/DIAG INJ SC/IM: CPT | Performed by: NURSE PRACTITIONER

## 2020-08-07 PROCEDURE — 1111F DSCHRG MED/CURRENT MED MERGE: CPT | Performed by: NURSE PRACTITIONER

## 2020-08-07 PROCEDURE — 99495 TRANSJ CARE MGMT MOD F2F 14D: CPT | Performed by: NURSE PRACTITIONER

## 2020-08-07 RX ORDER — INSULIN GLARGINE 100 [IU]/ML
120 INJECTION, SOLUTION SUBCUTANEOUS NIGHTLY
COMMUNITY
End: 2020-08-31 | Stop reason: ALTCHOICE

## 2020-08-07 RX ORDER — POLYETHYLENE GLYCOL 3350 17 G/17G
17 POWDER, FOR SOLUTION ORAL DAILY
Qty: 510 G | Refills: 5 | Status: SHIPPED | OUTPATIENT
Start: 2020-08-07 | End: 2020-09-06

## 2020-08-07 RX ORDER — INSULIN GLARGINE 100 [IU]/ML
120 INJECTION, SOLUTION SUBCUTANEOUS NIGHTLY
Qty: 2 VIAL | Refills: 5 | Status: CANCELLED | OUTPATIENT
Start: 2020-08-07

## 2020-08-07 RX ORDER — PEN NEEDLE, DIABETIC 32GX 5/32"
NEEDLE, DISPOSABLE MISCELLANEOUS
COMMUNITY
Start: 2020-07-25

## 2020-08-07 RX ORDER — BLOOD SUGAR DIAGNOSTIC
STRIP MISCELLANEOUS
Qty: 150 EACH | Refills: 4 | Status: SHIPPED | OUTPATIENT
Start: 2020-08-07

## 2020-08-07 RX ORDER — INSULIN DEGLUDEC INJECTION 100 U/ML
INJECTION, SOLUTION SUBCUTANEOUS
Qty: 5 VIAL | Refills: 5 | Status: SHIPPED | OUTPATIENT
Start: 2020-08-07

## 2020-08-07 NOTE — PROGRESS NOTES
Medication Instructions MALIA:    Printed on:09/01/20 9395   Medication Information                      albuterol sulfate HFA (PROAIR HFA) 108 (90 Base) MCG/ACT inhaler  Inhale 2 puffs into the lungs every 4 hours as needed for Wheezing             allopurinol (ZYLOPRIM) 300 MG tablet  Take 0.5 tablets by mouth daily             ASPIRIN LOW DOSE 81 MG EC tablet  TAKE ONE TABLET BY MOUTH EVERY DAY             BD PEN NEEDLE JUAN CARLOS U/F 32G X 4 MM MISC  USE WITH LANTUS ONCE DAILY             blood glucose test strips (TRUE METRIX BLOOD GLUCOSE TEST) strip  USE TO CHECK BLOOD SUGAR FOUR TIMES A DAY dx:e11.9             busPIRone (BUSPAR) 10 MG tablet  TAKE ONE TABLET BY MOUTH THREE TIMES A DAY             diclofenac sodium 1 % GEL  Apply 2 g topically 2 times daily             dilTIAZem (CARDIZEM CD) 180 MG extended release capsule  TAKE 1 CAPSULE BY MOUTH DAILY             docusate sodium (COLACE) 250 MG capsule  Take 1 capsule by mouth daily             ELIQUIS 5 MG TABS tablet  TAKE 1 TABLET BY MOUTH EVERY 12 HOURS             ezetimibe (ZETIA) 10 MG tablet  TAKE ONE TABLET BY MOUTH EVERY DAY             fenofibrate (TRICOR) 145 MG tablet  TAKE ONE TABLET BY MOUTH EVERY DAY             fluconazole (DIFLUCAN) 200 MG tablet  Take 1 tablet by mouth daily for 7 days             flunisolide (NASALIDE) 25 MCG/ACT (0.025%) SOLN  1 spray              fluticasone-salmeterol (ADVAIR) 500-50 MCG/DOSE diskus inhaler  Inhale 1 puff into the lungs every 12 hours             furosemide (LASIX) 40 MG tablet  TAKE ONE TABLET BY MOUTH TWO TIMES A DAY             GNP VITAMIN D MAXIMUM STRENGTH 50 MCG (2000 UT) TABS  Take 1 tablet by mouth daily              HYDROcodone-acetaminophen (NORCO) 5-325 MG per tablet  TAKE ONE TABLET BY MOUTH EVERY DAY AS NEEDED FOR PAIN             Insulin Degludec (TRESIBA) 100 UNIT/ML SOLN  35 units in the am and 80 in the evening.              insulin lispro (HUMALOG) 100 UNIT/ML injection vial  INJECT 35 MG tablet  TAKE ONE TABLET BY MOUTH TWO TIMES A DAY             sucralfate (CARAFATE) 1 GM tablet  TAKE ONE TABLET BY MOUTH THREE TIMES A DAY             VASCEPA 1 g CAPS capsule  TAKE TWO CAPSULES BY MOUTH TWO TIMES A DAY                   Medications marked \"taking\" at this time  Outpatient Medications Marked as Taking for the 8/7/20 encounter (Office Visit) with JADE Coburn   Medication Sig Dispense Refill    Insulin Syringe-Needle U-100 31G X 5/16\" 1 ML MISC USE FOR INSULIN INJECTIONS FIVE TIMES DAILY 150 each 4    Insulin Degludec (TRESIBA) 100 UNIT/ML SOLN 35 units in the am and 80 in the evening.  5 vial 5    polyethylene glycol (GLYCOLAX) 17 GM/SCOOP powder Take 17 g by mouth daily 510 g 5    [DISCONTINUED] insulin glargine (LANTUS) 100 UNIT/ML injection vial Inject 120 Units into the skin nightly      sucralfate (CARAFATE) 1 GM tablet TAKE ONE TABLET BY MOUTH THREE TIMES A DAY 90 tablet 3    [DISCONTINUED] HYDROcodone-acetaminophen (NORCO) 5-325 MG per tablet TAKE ONE TABLET BY MOUTH EVERY DAY AS NEEDED FOR PAIN 30 tablet 0    rOPINIRole (REQUIP) 0.5 MG tablet TAKE ONE TABLET BY MOUTH TWO TIMES A DAY 60 tablet 3    VASCEPA 1 g CAPS capsule TAKE TWO CAPSULES BY MOUTH TWO TIMES A DAY 60 capsule 3    busPIRone (BUSPAR) 10 MG tablet TAKE ONE TABLET BY MOUTH THREE TIMES A DAY 90 tablet 0    metOLazone (ZAROXOLYN) 5 MG tablet TAKE 1 TABLET BY MOUTH DAILY AS NEEDED (SWELLING) 30 tablet 3    [DISCONTINUED] montelukast (SINGULAIR) 10 MG tablet TAKE ONE TABLET BY MOUTH EVERY DAY 30 tablet 2    nystatin (MYCOSTATIN) 167341 UNIT/ML suspension TAKE 5 MLS BY MOUTH 4 TIMES DAILY 240 mL 2    dilTIAZem (CARDIZEM CD) 180 MG extended release capsule TAKE 1 CAPSULE BY MOUTH DAILY 30 capsule 3    ELIQUIS 5 MG TABS tablet TAKE 1 TABLET BY MOUTH EVERY 12 HOURS 60 tablet 3    potassium chloride (KLOR-CON M) 20 MEQ extended release tablet TAKE ONE TABLET BY MOUTH EVERY DAY 30 tablet 5    [DISCONTINUED] furosemide (LASIX) 40 MG tablet TAKE ONE TABLET BY MOUTH TWO TIMES A DAY 60 tablet 5    insulin lispro (HUMALOG) 100 UNIT/ML injection vial INJECT 35 UNITS UNDER THE SKIN THREE TIMES A DAY BEFORE MEALS (Patient taking differently: INJECT 38 UNITS UNDER THE SKIN THREE TIMES A DAY BEFORE MEALS) 30 mL 4    docusate sodium (COLACE) 250 MG capsule Take 1 capsule by mouth daily 90 capsule 3    metoprolol succinate (TOPROL XL) 100 MG extended release tablet TAKE 1 TABLET BY MOUTH DAILY 30 tablet 5    allopurinol (ZYLOPRIM) 300 MG tablet Take 0.5 tablets by mouth daily 30 tablet 2    ezetimibe (ZETIA) 10 MG tablet TAKE ONE TABLET BY MOUTH EVERY DAY 90 tablet 3    ASPIRIN LOW DOSE 81 MG EC tablet TAKE ONE TABLET BY MOUTH EVERY DAY 30 tablet 5    pantoprazole (PROTONIX) 40 MG tablet TAKE ONE TABLET BY MOUTH EVERY DAY 90 tablet 3    PATADAY 0.2 % SOLN ophthalmic solution PLACE 1 DROP INTO BOTH EYES 2 TIMES DAILY 2.5 mL 3    Roflumilast (DALIRESP) 500 MCG tablet Take 1 tablet by mouth daily 30 tablet 2    GNP VITAMIN D MAXIMUM STRENGTH 50 MCG (2000 UT) TABS Take 1 tablet by mouth daily   5    diclofenac sodium 1 % GEL Apply 2 g topically 2 times daily 1 Tube 3    blood glucose test strips (TRUE METRIX BLOOD GLUCOSE TEST) strip USE TO CHECK BLOOD SUGAR FOUR TIMES A DAY dx:e11.9 100 strip 3    JANUVIA 100 MG tablet TAKE ONE TABLET BY MOUTH EVERY DAY (Patient taking differently: 50 mg ) 30 tablet 2    fluticasone-salmeterol (ADVAIR) 500-50 MCG/DOSE diskus inhaler Inhale 1 puff into the lungs every 12 hours      omalizumab (OMALIZUMAB) 150 MG injection Inject 150 mg into the skin every 28 days 1 vial 3    fenofibrate (TRICOR) 145 MG tablet TAKE ONE TABLET BY MOUTH EVERY DAY 30 tablet 3    ipratropium-albuterol (DUONEB) 0.5-2.5 (3) MG/3ML SOLN nebulizer solution Inhale 3 mLs into the lungs every 4 hours 360 mL 3    OXYGEN Inhale 3 L/min into the lungs continuous      flunisolide (NASALIDE) 25 MCG/ACT (0.025%) SOLN 1 spray       albuterol sulfate HFA (PROAIR HFA) 108 (90 Base) MCG/ACT inhaler Inhale 2 puffs into the lungs every 4 hours as needed for Wheezing 1 Inhaler 5    loratadine (CLARITIN) 10 MG tablet TAKE ONE TABLET BY MOUTH EVERY DAY 30 tablet 4        Medications patient taking as of now reconciled against medications ordered at time of hospital discharge: Yes    Chief Complaint   Patient presents with    Follow-Up from [facility]         COPD       HPI  She returns after her copd exacerbation admission. She says her blood sugar has been higher since taking hte Lantus. She is using 35 units 3 times daily and sliding scales. She has been doing better. She has been doing better, less short of breath and blood sugars doing well. Inpatient course: Discharge summary reviewed- see chart. Interval history/Current status: fair    Review of Systems   Constitutional: Negative. HENT: Negative. Respiratory: Positive for cough and wheezing. Cardiovascular: Negative. Gastrointestinal: Negative. Genitourinary: Negative. Musculoskeletal: Negative. Skin: Negative. Neurological: Negative. Psychiatric/Behavioral: Negative. Vitals:    08/07/20 0956   BP: (!) 118/50   Site: Left Upper Arm   Position: Sitting   Cuff Size: Medium Adult   Pulse: 71   Resp: 16   Temp: 97.9 °F (36.6 °C)   TempSrc: Temporal   SpO2: 94%   Weight: 195 lb 12.8 oz (88.8 kg)   Height: 4' 10\" (1.473 m)     Body mass index is 40.92 kg/m². Wt Readings from Last 3 Encounters:   08/31/20 193 lb (87.5 kg)   08/07/20 195 lb 12.8 oz (88.8 kg)   07/25/20 190 lb (86.2 kg)     BP Readings from Last 3 Encounters:   08/31/20 (!) 123/48   08/07/20 (!) 118/50   07/25/20 (!) 129/56       Physical Exam  Vitals signs and nursing note reviewed. Constitutional:       Appearance: She is well-developed. HENT:      Head: Normocephalic and atraumatic.    Eyes:      Conjunctiva/sclera: Conjunctivae normal.      Pupils: Pupils are equal, round, and reactive to light. Neck:      Musculoskeletal: Normal range of motion and neck supple. Thyroid: No thyromegaly. Vascular: No JVD. Cardiovascular:      Rate and Rhythm: Normal rate and regular rhythm. Heart sounds: No murmur. No friction rub. No gallop. Pulmonary:      Effort: Pulmonary effort is normal. No respiratory distress. Breath sounds: Decreased breath sounds present. No wheezing or rales. Abdominal:      General: Bowel sounds are normal. There is no distension. Palpations: Abdomen is soft. Tenderness: There is no abdominal tenderness. There is no guarding. Musculoskeletal:         General: No tenderness. Skin:     General: Skin is warm and dry. Findings: No rash. Neurological:      Mental Status: She is alert and oriented to person, place, and time. Psychiatric:         Judgment: Judgment normal.             Assessment/Plan:  1. Type 2 diabetes mellitus with complication, with long-term current use of insulin (HCC)  Stable. I advised her regarding diabetic diet, exercise and weight control. Also, I advised her to stay on the current medication, monitor her fingerstick closely. I am going to check the A1c every few months. I will check microalbumin on a yearly basis. I have also advised her to have a yearly eye exam and to monitor her feet closely. Along with instruction of possible complications related to diabetes, even with good control. A1C will be checked  in few months. Lab Results   Component Value Date    LABA1C 9.3 (H) 07/22/2020       - Insulin Syringe-Needle U-100 31G X 5/16\" 1 ML MISC; USE FOR INSULIN INJECTIONS FIVE TIMES DAILY  Dispense: 150 each; Refill: 4  - Insulin Degludec (TRESIBA) 100 UNIT/ML SOLN; 35 units in the am and 80 in the evening. Dispense: 5 vial; Refill: 5  - CBC; Future  - Comprehensive Metabolic Panel; Future  - Hemoglobin A1C; Future    2.  COPD exacerbation (HCC)    - omalizumab Gil Gottron) injection 150

## 2020-08-07 NOTE — PROGRESS NOTES
Chief Complaint   Patient presents with    Follow-Up from [id number] COPD       Have you seen any other physician or provider since your last visit yes - [facility],Gastro    Have you had any other diagnostic tests since your last visit? yes - labs    Have you changed or stopped any medications since your last visit? yes - changed from Deveron Billie to Lantus vial         Diabetic retinal exam completed this year? Yes                       * If yes please have patient sign a records release to obtain record to update Health Maintenance                       * If no, please order referral for patient to be scheduled    Patient is here to follow up on hospital stay for COPD. She was changed from [place] to Lantus while she was in the hospital. Her sugar has been . Her weight has been 181-190 with 120 units of Lantus. Patient is asking for her xolair injection today.

## 2020-08-14 RX ORDER — FUROSEMIDE 40 MG/1
TABLET ORAL
Qty: 60 TABLET | Refills: 5 | Status: SHIPPED | OUTPATIENT
Start: 2020-08-14 | End: 2020-09-23

## 2020-08-18 ENCOUNTER — TELEPHONE (OUTPATIENT)
Dept: PRIMARY CARE CLINIC | Age: 70
End: 2020-08-18

## 2020-08-18 NOTE — TELEPHONE ENCOUNTER
Dasia with [facility] called requesting an order for a pulse oximeter fax to [phone]

## 2020-08-24 RX ORDER — HYDROCODONE BITARTRATE AND ACETAMINOPHEN 5; 325 MG/1; MG/1
TABLET ORAL
Qty: 30 TABLET | Refills: 0 | Status: SHIPPED | OUTPATIENT
Start: 2020-08-24 | End: 2020-09-25

## 2020-08-26 RX ORDER — MONTELUKAST SODIUM 10 MG/1
TABLET ORAL
Qty: 30 TABLET | Refills: 2 | Status: SHIPPED | OUTPATIENT
Start: 2020-08-26 | End: 2020-11-30 | Stop reason: SDUPTHER

## 2020-08-31 ENCOUNTER — APPOINTMENT (OUTPATIENT)
Dept: CT IMAGING | Facility: HOSPITAL | Age: 70
End: 2020-08-31
Payer: MEDICARE

## 2020-08-31 ENCOUNTER — APPOINTMENT (OUTPATIENT)
Dept: ULTRASOUND IMAGING | Facility: HOSPITAL | Age: 70
End: 2020-08-31
Payer: MEDICARE

## 2020-08-31 ENCOUNTER — HOSPITAL ENCOUNTER (EMERGENCY)
Facility: HOSPITAL | Age: 70
Discharge: HOME | End: 2020-08-31
Attending: HOSPITALIST
Payer: MEDICARE

## 2020-08-31 VITALS
HEART RATE: 78 BPM | DIASTOLIC BLOOD PRESSURE: 48 MMHG | TEMPERATURE: 97.7 F | BODY MASS INDEX: 40.51 KG/M2 | HEIGHT: 58 IN | OXYGEN SATURATION: 94 % | RESPIRATION RATE: 20 BRPM | WEIGHT: 193 LBS | SYSTOLIC BLOOD PRESSURE: 123 MMHG

## 2020-08-31 LAB
A/G RATIO: 1.3 (ref 0.8–2)
ALBUMIN SERPL-MCNC: 4.3 G/DL (ref 3.4–4.8)
ALP BLD-CCNC: 76 U/L (ref 25–100)
ALT SERPL-CCNC: 22 U/L (ref 4–36)
ANION GAP SERPL CALCULATED.3IONS-SCNC: 12 MMOL/L (ref 3–16)
AST SERPL-CCNC: 25 U/L (ref 8–33)
BASOPHILS ABSOLUTE: 0.1 K/UL (ref 0–0.1)
BASOPHILS RELATIVE PERCENT: 0.4 %
BILIRUB SERPL-MCNC: 0.3 MG/DL (ref 0.3–1.2)
BILIRUBIN URINE: NEGATIVE
BLOOD, URINE: ABNORMAL
BUN BLDV-MCNC: 46 MG/DL (ref 6–20)
CALCIUM SERPL-MCNC: 10 MG/DL (ref 8.5–10.5)
CHLORIDE BLD-SCNC: 92 MMOL/L (ref 98–107)
CLARITY: CLEAR
CO2: 34 MMOL/L (ref 20–30)
COLOR: YELLOW
CREAT SERPL-MCNC: 1.7 MG/DL (ref 0.4–1.2)
EOSINOPHILS ABSOLUTE: 0.2 K/UL (ref 0–0.4)
EOSINOPHILS RELATIVE PERCENT: 1 %
EPITHELIAL CELLS, UA: ABNORMAL /HPF (ref 0–5)
GFR AFRICAN AMERICAN: 36
GFR NON-AFRICAN AMERICAN: 30
GLOBULIN: 3.2 G/DL
GLUCOSE BLD-MCNC: 159 MG/DL (ref 74–106)
GLUCOSE URINE: NEGATIVE MG/DL
HCT VFR BLD CALC: 39.3 % (ref 37–47)
HEMOGLOBIN: 11.7 G/DL (ref 11.5–16.5)
IMMATURE GRANULOCYTES #: 0.1 K/UL
IMMATURE GRANULOCYTES %: 0.4 % (ref 0–5)
KETONES, URINE: NEGATIVE MG/DL
LACTIC ACID: 2.5 MMOL/L (ref 0.4–2)
LEUKOCYTE ESTERASE, URINE: ABNORMAL
LIPASE: 15 U/L (ref 5.6–51.3)
LYMPHOCYTES ABSOLUTE: 3.7 K/UL (ref 1.5–4)
LYMPHOCYTES RELATIVE PERCENT: 21.5 %
MCH RBC QN AUTO: 29 PG (ref 27–32)
MCHC RBC AUTO-ENTMCNC: 29.8 G/DL (ref 31–35)
MCV RBC AUTO: 97.3 FL (ref 80–100)
MICROSCOPIC EXAMINATION: YES
MONOCYTES ABSOLUTE: 0.8 K/UL (ref 0.2–0.8)
MONOCYTES RELATIVE PERCENT: 4.8 %
NEUTROPHILS ABSOLUTE: 12.5 K/UL (ref 2–7.5)
NEUTROPHILS RELATIVE PERCENT: 71.9 %
NITRITE, URINE: NEGATIVE
PDW BLD-RTO: 15.8 % (ref 11–16)
PH UA: 5.5 (ref 5–8)
PLATELET # BLD: 431 K/UL (ref 150–400)
PMV BLD AUTO: 10.5 FL (ref 6–10)
POTASSIUM REFLEX MAGNESIUM: 3.9 MMOL/L (ref 3.4–5.1)
PROTEIN UA: NEGATIVE MG/DL
RBC # BLD: 4.04 M/UL (ref 3.8–5.8)
RBC UA: ABNORMAL /HPF (ref 0–4)
SODIUM BLD-SCNC: 138 MMOL/L (ref 136–145)
SPECIFIC GRAVITY UA: 1.01 (ref 1–1.03)
TOTAL PROTEIN: 7.5 G/DL (ref 6.4–8.3)
TROPONIN: <0.3 NG/ML
URINE REFLEX TO CULTURE: YES
URINE TYPE: ABNORMAL
UROBILINOGEN, URINE: 0.2 E.U./DL
WBC # BLD: 17.3 K/UL (ref 4–11)
WBC UA: ABNORMAL /HPF (ref 0–5)
YEAST: PRESENT /HPF

## 2020-08-31 PROCEDURE — 96366 THER/PROPH/DIAG IV INF ADDON: CPT

## 2020-08-31 PROCEDURE — 87086 URINE CULTURE/COLONY COUNT: CPT

## 2020-08-31 PROCEDURE — 6360000002 HC RX W HCPCS: Performed by: HOSPITALIST

## 2020-08-31 PROCEDURE — 85025 COMPLETE CBC W/AUTO DIFF WBC: CPT

## 2020-08-31 PROCEDURE — 99282 EMERGENCY DEPT VISIT SF MDM: CPT

## 2020-08-31 PROCEDURE — 80053 COMPREHEN METABOLIC PANEL: CPT

## 2020-08-31 PROCEDURE — 83605 ASSAY OF LACTIC ACID: CPT

## 2020-08-31 PROCEDURE — 99283 EMERGENCY DEPT VISIT LOW MDM: CPT

## 2020-08-31 PROCEDURE — 2580000003 HC RX 258: Performed by: HOSPITALIST

## 2020-08-31 PROCEDURE — 74176 CT ABD & PELVIS W/O CONTRAST: CPT

## 2020-08-31 PROCEDURE — 93971 EXTREMITY STUDY: CPT

## 2020-08-31 PROCEDURE — 83690 ASSAY OF LIPASE: CPT

## 2020-08-31 PROCEDURE — 36415 COLL VENOUS BLD VENIPUNCTURE: CPT

## 2020-08-31 PROCEDURE — 81001 URINALYSIS AUTO W/SCOPE: CPT

## 2020-08-31 PROCEDURE — 84484 ASSAY OF TROPONIN QUANT: CPT

## 2020-08-31 PROCEDURE — 96365 THER/PROPH/DIAG IV INF INIT: CPT

## 2020-08-31 PROCEDURE — 96375 TX/PRO/DX INJ NEW DRUG ADDON: CPT

## 2020-08-31 RX ORDER — ONDANSETRON 2 MG/ML
4 INJECTION INTRAMUSCULAR; INTRAVENOUS EVERY 30 MIN PRN
Status: DISCONTINUED | OUTPATIENT
Start: 2020-08-31 | End: 2020-08-31 | Stop reason: HOSPADM

## 2020-08-31 RX ORDER — MORPHINE SULFATE 2 MG/ML
2 INJECTION, SOLUTION INTRAMUSCULAR; INTRAVENOUS EVERY 30 MIN PRN
Status: DISCONTINUED | OUTPATIENT
Start: 2020-08-31 | End: 2020-08-31 | Stop reason: HOSPADM

## 2020-08-31 RX ORDER — FLUCONAZOLE 200 MG/1
200 TABLET ORAL DAILY
Qty: 7 TABLET | Refills: 0 | Status: SHIPPED | OUTPATIENT
Start: 2020-08-31 | End: 2020-09-07

## 2020-08-31 RX ORDER — ONDANSETRON 4 MG/1
4 TABLET, ORALLY DISINTEGRATING ORAL EVERY 8 HOURS PRN
Qty: 15 TABLET | Refills: 0 | Status: SHIPPED | OUTPATIENT
Start: 2020-08-31 | End: 2020-09-16 | Stop reason: ALTCHOICE

## 2020-08-31 RX ORDER — NITROFURANTOIN 25; 75 MG/1; MG/1
100 CAPSULE ORAL 2 TIMES DAILY
Qty: 14 CAPSULE | Refills: 0 | Status: SHIPPED | OUTPATIENT
Start: 2020-08-31 | End: 2020-09-07

## 2020-08-31 RX ORDER — 0.9 % SODIUM CHLORIDE 0.9 %
1000 INTRAVENOUS SOLUTION INTRAVENOUS ONCE
Status: COMPLETED | OUTPATIENT
Start: 2020-08-31 | End: 2020-08-31

## 2020-08-31 RX ORDER — SODIUM CHLORIDE 9 MG/ML
INJECTION, SOLUTION INTRAVENOUS CONTINUOUS
Status: DISCONTINUED | OUTPATIENT
Start: 2020-08-31 | End: 2020-08-31

## 2020-08-31 RX ORDER — OXYCODONE HYDROCHLORIDE AND ACETAMINOPHEN 5; 325 MG/1; MG/1
1 TABLET ORAL EVERY 6 HOURS PRN
Qty: 12 TABLET | Refills: 0 | Status: SHIPPED | OUTPATIENT
Start: 2020-08-31 | End: 2020-09-03

## 2020-08-31 RX ADMIN — MORPHINE SULFATE 2 MG: 2 INJECTION, SOLUTION INTRAMUSCULAR; INTRAVENOUS at 14:43

## 2020-08-31 RX ADMIN — MORPHINE SULFATE 2 MG: 2 INJECTION, SOLUTION INTRAMUSCULAR; INTRAVENOUS at 11:52

## 2020-08-31 RX ADMIN — SODIUM CHLORIDE 1000 ML: 9 INJECTION, SOLUTION INTRAVENOUS at 13:50

## 2020-08-31 RX ADMIN — ONDANSETRON 4 MG: 2 INJECTION, SOLUTION INTRAMUSCULAR; INTRAVENOUS at 11:52

## 2020-08-31 RX ADMIN — SODIUM CHLORIDE 1000 ML: 9 INJECTION, SOLUTION INTRAVENOUS at 11:53

## 2020-08-31 ASSESSMENT — PAIN DESCRIPTION - ORIENTATION: ORIENTATION: LEFT

## 2020-08-31 ASSESSMENT — PAIN SCALES - GENERAL
PAINLEVEL_OUTOF10: 8
PAINLEVEL_OUTOF10: 10
PAINLEVEL_OUTOF10: 10

## 2020-08-31 ASSESSMENT — PAIN DESCRIPTION - LOCATION: LOCATION: ABDOMEN

## 2020-08-31 ASSESSMENT — PAIN DESCRIPTION - FREQUENCY: FREQUENCY: CONTINUOUS

## 2020-08-31 NOTE — ED PROVIDER NOTES
[address] [facility] [address] ENCOUNTER      Pt Name: Louann Donato  MRN: [id number]  YOB: 1950  Date of evaluation: 8/31/2020  Provider: Yazan [address]       Chief Complaint   Patient presents with    Abdominal Pain    Leg Pain         HISTORY OF PRESENT ILLNESS  (Location/Symptom, Timing/Onset, Context/Setting, Quality, Duration, Modifying Factors, Severity.)   Louann Donato is a 79 y.o. female who presents to the emergency department for a multiple complaints. Patient states that she has chronic low back pain and she really does not pay much attention to this and it has been bothering her for some time however she started to have pain radiating down her left leg. She states that her home health nurse told her that her left leg looked more swollen than her right. Patient states that the symptoms started 6 days ago. Patient also began having abdominal pain with her leg pain. Patient states that the lower half of her abdomen hurts and it does radiate into around both flanks. She denied any nausea vomiting with the symptoms. She states that she did have some mild diarrhea for a couple of days when her symptoms initially started but that is resolved. Denies any dysuria or change urinary frequency. Denies any abdominal injury or trauma. Denies any hematuria. Patient rates her pain level 10 out of 10. Patient states this is both her leg and her abdomen that both hurts at a 10 out of 10. She denies any chest pain or shortness of breath with the symptoms. Denies any numbness tingling weakness of extremities out of ordinary. Denies any falls or trauma to her back. Denies any headache. Denies any visual changes. Denies any cough. Denies any sick contacts that she is aware of but she has been attending the [facility] adult  facility. Patient states she does have a history of diverticulosis.       Nursing notes were reviewed. REVIEW OFSYSTEMS    (2-9 systems for level 4, 10 or more for level 5)   ROS:  General:  No fevers, no chills, no weakness  Cardiovascular:  No chest pain, no palpitations  Respiratory:  No shortness of breath, no cough, no wheezing  Gastrointestinal:  + pain-lower abd pain, no nausea, no vomiting, +diarrhea-resolved  Musculoskeletal:  No muscle pain, no joint pain  Skin:  No rash, no easy bruising  Neurologic:  No speech problems, no headache, no extremity weakness  Psychiatric:  No anxiety  Genitourinary:  No dysuria, no hematuria    Except as noted above the remainder of the review of systems was reviewed and negative.        PAST MEDICAL HISTORY     Past Medical History:   Diagnosis Date    Anemia     Anxiety     Asthma     Cataract     COPD (chronic obstructive pulmonary disease) ([facility] [address]     DDD (degenerative disc disease), cervical     Depression     Diabetes mellitus type 2     GERD (gastroesophageal reflux disease)     Gout     History of uterine cancer     Hyperlipidemia     Hypertension          SURGICAL HISTORY       Past Surgical History:   Procedure Laterality Date    CATARACT REMOVAL WITH IMPLANT  2005   [address]    x2    COLONOSCOPY  05/01/2013    COLONOSCOPY N/A 5/23/2019    COLONOSCOPY POLYPECTOMY HOT BIOPSY performed by Savi Barlow [facility] at [facility]      partial    LUNG SURGERY      tumor removed; left    NECK SURGERY      cyst removed; right     TONSILLECTOMY  1956    UPPER GASTROINTESTINAL ENDOSCOPY N/A 5/23/2019    EGD BIOPSY performed by Savi Barlow MD at [address] [place] [address]       Previous Medications    ALBUTEROL SULFATE HFA (PROAIR HFA) 108 (90 BASE) MCG/ACT INHALER    Inhale 2 puffs into the lungs every 4 hours as needed for Wheezing    ALLOPURINOL (ZYLOPRIM) 300 MG TABLET    Take 0.5 tablets by mouth daily    ASPIRIN LOW DOSE 81 MG EC TABLET    TAKE ONE TABLET BY MOUTH EVERY DAY    BD PEN NEEDLE JUAN CARLOS U/F 32G X 4 MM MISC    USE WITH LANTUS ONCE DAILY    BLOOD GLUCOSE TEST STRIPS (TRUE METRIX BLOOD GLUCOSE TEST) STRIP    USE TO CHECK BLOOD SUGAR FOUR TIMES A DAY dx:e11.9    BUSPIRONE (BUSPAR) 10 MG TABLET    TAKE ONE TABLET BY MOUTH THREE TIMES A DAY    DICLOFENAC SODIUM 1 % GEL    Apply 2 g topically 2 times daily    DILTIAZEM (CARDIZEM CD) 180 MG EXTENDED RELEASE CAPSULE    TAKE 1 CAPSULE BY MOUTH DAILY    DOCUSATE SODIUM (COLACE) 250 MG CAPSULE    Take 1 capsule by mouth daily    ELIQUIS 5 MG TABS TABLET    TAKE 1 TABLET BY MOUTH EVERY 12 HOURS    EZETIMIBE (ZETIA) 10 MG TABLET    TAKE ONE TABLET BY MOUTH EVERY DAY    FENOFIBRATE (TRICOR) 145 MG TABLET    TAKE ONE TABLET BY MOUTH EVERY DAY    FLUNISOLIDE (NASALIDE) 25 MCG/ACT (0.025%) SOLN    1 spray     FLUTICASONE-SALMETEROL (ADVAIR) 500-50 MCG/DOSE DISKUS INHALER    Inhale 1 puff into the lungs every 12 hours    FUROSEMIDE (LASIX) 40 MG TABLET    TAKE ONE TABLET BY MOUTH TWO TIMES A DAY    GNP VITAMIN D MAXIMUM STRENGTH 50 MCG (2000 UT) TABS    Take 1 tablet by mouth daily     HYDROCODONE-ACETAMINOPHEN (NORCO) 5-325 MG PER TABLET    TAKE ONE TABLET BY MOUTH EVERY DAY AS NEEDED FOR PAIN    INSULIN DEGLUDEC (TRESIBA) 100 UNIT/ML SOLN    35 units in the am and 80 in the evening.     INSULIN LISPRO (HUMALOG) 100 UNIT/ML INJECTION VIAL    INJECT 35 UNITS UNDER THE SKIN THREE TIMES A DAY BEFORE MEALS    INSULIN SYRINGE-NEEDLE U-100 31G X 5/16\" 1 ML MISC    USE FOR INSULIN INJECTIONS FIVE TIMES DAILY    IPRATROPIUM-ALBUTEROL (DUONEB) 0.5-2.5 (3) MG/3ML SOLN NEBULIZER SOLUTION    Inhale 3 mLs into the lungs every 4 hours    JANUVIA 100 MG TABLET    TAKE ONE TABLET BY MOUTH EVERY DAY    LORATADINE (CLARITIN) 10 MG TABLET    TAKE ONE TABLET BY MOUTH EVERY DAY    METOLAZONE (ZAROXOLYN) 5 MG TABLET    TAKE 1 TABLET BY MOUTH DAILY AS NEEDED (SWELLING)    METOPROLOL SUCCINATE (TOPROL XL) 100 MG EXTENDED RELEASE TABLET    TAKE 1 TABLET BY MOUTH DAILY bf smokes in the other bedroom. Substance and Sexual Activity    Alcohol use: No    Drug use: No    Sexual activity: None   Lifestyle    Physical activity     Days per week: None     Minutes per session: None    Stress: Rather much   Relationships    Social connections     Talks on phone: More than three times a week     Gets together: None     Attends [employer] service: None     Active member of club or organization: No     Attends meetings of clubs or organizations: Never     Relationship status: None    Intimate partner violence     Fear of current or ex partner: No     Emotionally abused: No     Physically abused: No     Forced sexual activity: No   Other Topics Concern    None   Social History Narrative    None         PHYSICAL EXAM    (up to 7 for level 4, 8 or more for level 5)     ED Triage Vitals [08/31/20 1108]   BP Temp Temp Source Pulse Resp SpO2 Height Weight   (!) 133/57 98.7 °F (37.1 °C) Oral 77 24 96 % 4' 10\" (1.473 m) 193 lb (87.5 kg)       Physical Exam  General :Patient is awake, alert, oriented, in no acute distress, nontoxic appearing  HEENT: Pupils are equally round and reactive to light, EOMI, conjunctivae clear. Oral mucosa is moist, no exudate. Uvula is midline  Cardiac: Heart regular rate, rhythm, no murmurs, rubs, or gallops, +trace pitting edema to the right lower extremity, +1 pitting edema to the left lower extremity. Lungs: Lungs are clear to auscultation, there is no wheezing, rhonchi, or rales. There is no use of accessory muscles. Abdomen: Abdomen is soft, palpable tenderness to the lower half of the abdomen but more focal to the left lower quadrant and suprapubic area, nondistended. There is no firm or pulsatile masses, no rebound rigidity or guarding, positive Marcelo sign bilaterally however greater on the left than the right, obese  Musculoskeletal: 5 out of 5 strength in all 4 extremities.   No focal muscle deficits are appreciated, range of motion is decreased to the bilateral lower extremity secondary to worsening of her back pain however she has full motion of her ankles knees. She does have a range of motion noted to the hips. There is palpable tenderness to the medial aspect of the left thigh but there is no obvious erythema to that area. There is no erythema noted to the lower extremity. Negative Homans sign. Negative cord sign. Neuro: Motor intact, sensory intact, level of consciousness is normal, cerebellar function is normal, reflexes are grossly normal. No evidence of incontinence or loss of bowel or bladder function, no saddle anesthesia noted   Dermatology: Skin is warm and dry  Psych: Mentation is grossly normal, cognition is grossly normal. Affect is appropriate. BACK: There is not thoracic but mild lower lumbar midline tenderness to palpation or step-offs. Paraspinal tenderness to palpation is  present in the left lower lumbar region. No overlying rashes. LE strength is 5/5. LE light touch is intact. LE DTR's are 2+ in the patellas and achilles. Straight leg test is positive on the RIGHT, positive on the LEFT. DIAGNOSTIC RESULTS     EKG: All EKG's are interpreted by the Emergency Department Physician who either signs or Co-signs this chart in the [address] a cardiologist.        RADIOLOGY:   Non-plain film images such as CT, Ultrasound and MRI are read by the radiologist. Plain radiographic images are visualized and preliminarily interpreted by the emergency physician with the below findings:      ? Radiologist's Report Reviewed:  US DUP LOWER EXTREMITY LEFT ALLY   Final Result      1. No evidence of deep vein thrombosis in the leftlower extremity with normal flow, waveforms, compressibility and distal augmentation documented. CT ABDOMEN PELVIS WO CONTRAST Additional Contrast? None   Final Result      1. Mild bilateral lower lobe groundglass opacities may relate to atelectasis or pneumonitis.          Subcentimeter hyperdense lesion left kidney indeterminate. Solid mass and therefore neoplasm is not excluded. Directed ultrasound may be useful. Bilateral ovarian cysts indeterminate. Findings may relate to benign or malignant cystic neoplasm of the ovaries in a postmenopausal patient. Ultrasound of the pelvis recommended. Subcutaneous masses anterior abdominal wall as described indeterminate. Directed ultrasound may be useful. Punctate nonobstructive left renal calculi. Left adrenal nodule likely relates to adenoma functional or nonfunctioning correlation clinical history and laboratory values recommended. No acute findings.             ED BEDSIDE ULTRASOUND:   Performed by ED Physician - none    LABS:    I have reviewed and interpreted all of the currently available lab results from this visit (ifapplicable):  Results for orders placed or performed during the hospital encounter of 08/31/20   CBC Auto Differential   Result Value Ref Range    WBC 17.3 (H) 4.0 - 11.0 K/uL    RBC 4.04 3.80 - 5.80 M/uL    Hemoglobin 11.7 11.5 - 16.5 g/dL    Hematocrit 39.3 37.0 - 47.0 %    MCV 97.3 80.0 - 100.0 fL    MCH 29.0 27.0 - 32.0 pg    MCHC 29.8 (L) 31.0 - 35.0 g/dL    RDW 15.8 11.0 - 16.0 %    Platelets 195 (H) 341 - 400 K/uL    MPV 10.5 (H) 6.0 - 10.0 fL    Neutrophils % 71.9 %    Immature Granulocytes % 0.4 0.0 - 5.0 %    Lymphocytes % 21.5 %    Monocytes % 4.8 %    Eosinophils % 1.0 %    Basophils % 0.4 %    Neutrophils Absolute 12.5 (H) 2.0 - 7.5 K/uL    Immature Granulocytes # 0.1 K/uL    Lymphocytes Absolute 3.7 1.5 - 4.0 K/uL    Monocytes Absolute 0.8 0.2 - 0.8 K/uL    Eosinophils Absolute 0.2 0.0 - 0.4 K/uL    Basophils Absolute 0.1 0.0 - 0.1 K/uL   Comprehensive Metabolic Panel w/ Reflex to MG   Result Value Ref Range    Sodium 138 136 - 145 mmol/L    Potassium reflex Magnesium 3.9 3.4 - 5.1 mmol/L    Chloride 92 (L) 98 - 107 mmol/L    CO2 34 (H) 20 - 30 mmol/L    Anion Gap 12 3 - 16    Glucose 159 (H) 74 - 106 mg/dL    BUN 46 (H) 6 - 20 mg/dL    CREATININE 1.7 (H) 0.4 - 1.2 mg/dL    GFR Non-African American 30 (L) >59    GFR  36 (L) >59    Calcium 10.0 8.5 - 10.5 mg/dL    Total Protein 7.5 6.4 - 8.3 g/dL    Alb 4.3 3.4 - 4.8 g/dL    Albumin/Globulin Ratio 1.3 0.8 - 2.0    Total Bilirubin 0.3 0.3 - 1.2 mg/dL    Alkaline Phosphatase 76 25 - 100 U/L    ALT 22 4 - 36 U/L    AST 25 8 - 33 U/L    Globulin 3.2 g/dL   Lipase   Result Value Ref Range    Lipase 15.0 5.6 - 51.3 U/L   Troponin   Result Value Ref Range    Troponin <0.30 <0.30 ng/mL   Urinalysis Reflex to Culture    Specimen: Urine, clean catch   Result Value Ref Range    Color, UA Yellow Straw/Yellow    Clarity, UA Clear Clear    Glucose, Ur Negative Negative mg/dL    Bilirubin Urine Negative Negative    Ketones, Urine Negative Negative mg/dL    Specific Gravity, UA 1.010 1.005 - 1.030    Blood, Urine TRACE-INTACT (A) Negative    pH, UA 5.5 5.0 - 8.0    Protein, UA Negative Negative mg/dL    Urobilinogen, Urine 0.2 <2.0 E.U./dL    Nitrite, Urine Negative Negative    Leukocyte Esterase, Urine SMALL (A) Negative    Microscopic Examination YES     Urine Type Cleancatch     Urine Reflex to Culture Yes    Lactic Acid, Plasma   Result Value Ref Range    Lactic Acid 2.5 (H) 0.4 - 2.0 mmol/L   Microscopic Urinalysis   Result Value Ref Range    WBC, UA 10-20 (A) 0 - 5 /HPF    RBC, UA 0-2 0 - 4 /HPF    Epithelial Cells, UA 0-1 0 - 5 /HPF    Yeast, UA Present (A) None Seen /HPF        All other labs were within normal range or not returned as of this dictation.     EMERGENCY DEPARTMENT COURSE and DIFFERENTIAL DIAGNOSIS/MDM:   Vitals:    Vitals:    08/31/20 1300 08/31/20 1310 08/31/20 1321 08/31/20 1353   BP: (!) 133/49  (!) 136/58 137/62   Pulse:   76 77   Resp:       Temp:       TempSrc:       SpO2:  (!) 69% 98% 98%   Weight:       Height:           MEDICATIONS ADMINISTERED IN ED:  Medications   morphine (PF) injection 2 mg (2 mg Intravenous Given 8/31/20 1152)   ondansetron [facility]) injection 4 mg (4 mg Intravenous Given 8/31/20 1152)   0.9 % sodium chloride bolus (1,000 mLs Intravenous New Bag 8/31/20 1153)   0.9 % sodium chloride bolus (0 mLs Intravenous Stopped 8/31/20 1352)       After initial evaluation and examination I did have a conversation with the patient about the upcoming plan, treatment and possible disposition which they were agreeable to the time of this dictation. Advised that we give her fluid bolus of normal saline. Patient be given Zofran and morphine here for pain and nausea control. We will perform a venous Doppler of the left lower extremity rule out DVT secondary to her pain and swelling of that extremity. Patient will have CT scan of the abdomen pelvis without contrast performed we are initially going to try to do this with a IV contrast study but the patient does have renal dysfunction her last creatinine was 1.5. We elected to go ahead and perform a noncontrasted study. Patient will have CBC, CMP, lipase, troponin, UA and lactic acid performed. Patient's final disposition will be determined once her radiological diagnostic studies been performed reviewed. Blood work showed white count 17,300, hemoglobin is 11.7, hematocrit 39.3, platelet count is 962. Lactic acid mildly elevated at 2.5. Comprehensive metabolic panel was benign except for chloride of 92 slightly low, CO2 34 but the patient is on chronic oxygen secondary to COPD. Glucose elevated at 159, BUN of 46 and a creatinine 1.7. Patient's previous creatinine was 1.5 with a BUN of 11 back on 7/25/2020. Lipase is normal at 15. Troponin is nondetectable less than 0.30. UA trace intact blood, small leukoesterase. Microscopy showed 10-20 white cells, 0-2 red cells, 0-1 epithelial cells and yeast present. CT scan of the abdomen pelvis without contrast read by radiology as mild bilateral lower lobe groundglass opacities may relate to atelectasis or pneumonitis.   Subcentimeter hyperdense lesion left kidney intermediate. Solid mass and therefore neoplasm is not excluded. Directed ultrasound may be useful. Bilateral ovarian cysts indeterminant. Findings may relate to benign or malignant cystic neoplasm of the ovaries in a postmenopausal patient. Ultrasound of the pelvis recommended. Subcutaneous masses anterior abdominal wall as described indeterminant. Directed ultrasound may be useful. Punctate nonobstructive left renal calculi. Left adrenal nodule likely relates to adenoma, sternal or nonfluctuating correlation clinical history and laboratory values recommended. No acute findings. Venous ultrasound of the left lower extremity rule out DVT read by radiology as no evidence of deep vein thrombosis in the left lower extremity with normal flow, waveforms, compressibility and distal augmentation documented. Patient's radiological diagnostic studies were discussed with her she does state her understanding. Patient's a findings from her CT scan was discussed with her and she does state the understanding of the bilateral ovarian cysts which they are recommending ultrasound for and then also the lesion on the left kidney which is also being recommended for ultrasound to be performed also but these are nonemergent studies that need to be performed in the emergency department can be done on an outpatient basis. Patient states that the abdominal wall nodules that she has she has had for many years and is been told by her doctor that it is secondary to her insulin injection. She advised of her elevated BUN and concern for dehydration and she states that she actually thought that she probably was slightly dehydrated because of her decreased oral intake. Patient was given 1 L of fluids we will give her another bolus of fluids here her lactic acid was also mildly elevated which probably also secondary to her dehydrational state.   Otherwise after pain medication she states that her pain has improved. Patient is willing and wanting to actually go home if possible. I advised that we will go ahead and give her the second bolus of fluids and then most likely could let her be discharged home at that time. Patient does take hydrocodone 5 mg once a day at home but I do not believe that is will be enough for her pain that she was having in her abdomen so I will write her for a 3-day supply of a little more pain medication so that she will not run out of what she normally or chronically takes and she does know that she cannot take that medication with what I write her. She does state her understanding of this. We will plan to place her on Percocet 5/325 that she can take every 6 hours for the next 3 days. We will also write her for some Zofran for nausea. Patient's UA did come back with 10-20 white cells we will place her on Macrobid for coverage of her urine. She also had yeast present we will place her on Diflucan for a couple of days 200 mg tablet for 5 days. Otherwise the patient be discharged home in stable condition per her request advised make sure that she continues fluid hydration secondary to her laboratory findings of dehydration. The patient advised that she needs to follow-up with her regular family physician within the next 1 to 2 days reevaluation. Patient was also given instructions that if her symptoms worsens or new symptoms arise she should return back to the emergency department for further evaluation work-up. CONSULTS:  None    PROCEDURES:  Procedures    CRITICAL CARE TIME    Total Critical Care time was 0 minutes, excluding separately reportable procedures. There was a high probability of clinically significant/life threatening deterioration in the patient's condition which required my urgent intervention. FINAL IMPRESSION      1. Lower abdominal pain    2. Sciatica of left side    3.  Dehydration          DISPOSITION/PLAN   DISPOSITION        PATIENT REFERRED TO:  Damari Gomez, APRN  [address]  [phone]    In 2 days      [facility] Galarza Emergency Department  [facility] [address] [facility]  [phone]    As needed, If symptoms worsen      DISCHARGE MEDICATIONS:  New Prescriptions    ONDANSETRON (ZOFRAN ODT) 4 MG DISINTEGRATING TABLET    Take 1 tablet by mouth every 8 hours as needed for Nausea or Vomiting    OXYCODONE-ACETAMINOPHEN (PERCOCET) 5-325 MG PER TABLET    Take 1 tablet by mouth every 6 hours as needed for Pain for up to 3 days. Intended supply: 3 days. Take lowest dose possible to manage pain       Comment: Please note this report has been produced using speech recognition software and may contain errorsrelated to that system including errors in grammar, punctuation, and spelling, as well as words and phrases that may be inappropriate. If there are any questions or concerns please feel free to contact the dictating providerfor clarification.     Андрей Edouard DO  Attending Emergency Physician              Андрей Edouard,   08/31/20 Gina Dunne,   08/31/20 4937

## 2020-08-31 NOTE — ED NOTES
Advised pt she will be ready for d/c after her second bag of fluids is complete. She wants to call her daughter-in-law to see if her son will need to pick her up. States we may also be able to call [facility] if early enough.  Pt to let us know how she would like to proceed when she is ready for d/c.      Navin Hernandez RN  08/31/20 5572

## 2020-08-31 NOTE — PROGRESS NOTES
Pt came out of bathroom c/o SOA, color dusky, RA sats 69%. Pt placed in chair in hallway and placed on 6 lpm nc instructed to purse lip breathe. Pt's sats quick to rebound to 99%. O2 decreased back to 3 lpm nc. Pt taken back to her room via wheelchair. Pt on 3 lpm nc in room. Pt is supposed to wear her oxygen at 3 lpm continuously at all times. Pt stated that she thought that she would be okay to go to the restroom without her oxygen. Pt instructed to wear oxygen with all activity. Pt verbalized understanding. RN notified. -- CELIO Sanchez, CRT--

## 2020-08-31 NOTE — ED NOTES
ALLYN instructions and prescriptions given to patient- patient verbalized understanding     Luis Enrique Piña RN  08/31/20 0650

## 2020-09-01 LAB — URINE CULTURE, ROUTINE: NORMAL

## 2020-09-01 ASSESSMENT — ENCOUNTER SYMPTOMS
WHEEZING: 1
GASTROINTESTINAL NEGATIVE: 1
COUGH: 1

## 2020-09-02 ENCOUNTER — OFFICE VISIT (OUTPATIENT)
Dept: PRIMARY CARE CLINIC | Age: 70
End: 2020-09-02
Payer: MEDICARE

## 2020-09-02 VITALS
BODY MASS INDEX: 40.81 KG/M2 | HEART RATE: 76 BPM | WEIGHT: 194.4 LBS | TEMPERATURE: 98.7 F | HEIGHT: 58 IN | DIASTOLIC BLOOD PRESSURE: 54 MMHG | OXYGEN SATURATION: 94 % | SYSTOLIC BLOOD PRESSURE: 122 MMHG | RESPIRATION RATE: 16 BRPM

## 2020-09-02 PROCEDURE — 99214 OFFICE O/P EST MOD 30 MIN: CPT | Performed by: NURSE PRACTITIONER

## 2020-09-02 PROCEDURE — G8417 CALC BMI ABV UP PARAM F/U: HCPCS | Performed by: NURSE PRACTITIONER

## 2020-09-02 PROCEDURE — 1123F ACP DISCUSS/DSCN MKR DOCD: CPT | Performed by: NURSE PRACTITIONER

## 2020-09-02 PROCEDURE — 1036F TOBACCO NON-USER: CPT | Performed by: NURSE PRACTITIONER

## 2020-09-02 PROCEDURE — G8400 PT W/DXA NO RESULTS DOC: HCPCS | Performed by: NURSE PRACTITIONER

## 2020-09-02 PROCEDURE — G8427 DOCREV CUR MEDS BY ELIG CLIN: HCPCS | Performed by: NURSE PRACTITIONER

## 2020-09-02 PROCEDURE — 1090F PRES/ABSN URINE INCON ASSESS: CPT | Performed by: NURSE PRACTITIONER

## 2020-09-02 PROCEDURE — 4040F PNEUMOC VAC/ADMIN/RCVD: CPT | Performed by: NURSE PRACTITIONER

## 2020-09-02 PROCEDURE — 3017F COLORECTAL CA SCREEN DOC REV: CPT | Performed by: NURSE PRACTITIONER

## 2020-09-02 RX ORDER — PREDNISONE 20 MG/1
20 TABLET ORAL DAILY
Qty: 6 TABLET | Refills: 0 | Status: SHIPPED | OUTPATIENT
Start: 2020-09-02 | End: 2020-09-05

## 2020-09-02 RX ORDER — TIZANIDINE 4 MG/1
4 TABLET ORAL 2 TIMES DAILY PRN
Qty: 60 TABLET | Refills: 0 | Status: SHIPPED | OUTPATIENT
Start: 2020-09-02 | End: 2020-10-01

## 2020-09-02 ASSESSMENT — ENCOUNTER SYMPTOMS
RESPIRATORY NEGATIVE: 1
BACK PAIN: 1
GASTROINTESTINAL NEGATIVE: 1

## 2020-09-02 NOTE — PROGRESS NOTES
SUBJECTIVE:    Patient ID: Brian Recinos is a 79 y.o. female. Chief Complaint   Patient presents with    Follow-Up from THE [facility] [place]         HPI:  She is following up for her ER visit. She was evaluated for lower abdominal pain and low back pain down her left leg. She had a ct scan and a lesion on her left kidney and cyst vs lesion on her ovaries. She has been going to Nephrology already. She has a history of uterine cancer. She has subcutaneus masses that she says she thinks comes from all the insulin injections. Her back pain is not much better. Patient's medications,allergies, past medical, surgical, social and family histories were reviewed and updated as appropriate. .  Current Outpatient Medications on File Prior to Visit   Medication Sig Dispense Refill    oxyCODONE-acetaminophen (PERCOCET) 5-325 MG per tablet Take 1 tablet by mouth every 6 hours as needed for Pain for up to 3 days. Intended supply: 3 days. Take lowest dose possible to manage pain 12 tablet 0    ondansetron (ZOFRAN ODT) 4 MG disintegrating tablet Take 1 tablet by mouth every 8 hours as needed for Nausea or Vomiting 15 tablet 0    nitrofurantoin, macrocrystal-monohydrate, (MACROBID) 100 MG capsule Take 1 capsule by mouth 2 times daily for 7 days 14 capsule 0    fluconazole (DIFLUCAN) 200 MG tablet Take 1 tablet by mouth daily for 7 days 7 tablet 0    montelukast (SINGULAIR) 10 MG tablet TAKE ONE TABLET BY MOUTH EVERY DAY 30 tablet 2    HYDROcodone-acetaminophen (NORCO) 5-325 MG per tablet TAKE ONE TABLET BY MOUTH EVERY DAY AS NEEDED FOR PAIN 30 tablet 0    furosemide (LASIX) 40 MG tablet TAKE ONE TABLET BY MOUTH TWO TIMES A DAY 60 tablet 5    Insulin Syringe-Needle U-100 31G X 5/16\" 1 ML MISC USE FOR INSULIN INJECTIONS FIVE TIMES DAILY 150 each 4    BD PEN NEEDLE JUAN CARLOS U/F 32G X 4 MM MISC USE WITH LANTUS ONCE DAILY      Insulin Degludec (TRESIBA) 100 UNIT/ML SOLN 35 units in the am and 80 in the evening.  5 vial 5 daily   5    diclofenac sodium 1 % GEL Apply 2 g topically 2 times daily 1 Tube 3    blood glucose test strips (TRUE METRIX BLOOD GLUCOSE TEST) strip USE TO CHECK BLOOD SUGAR FOUR TIMES A DAY dx:e11.9 100 strip 3    JANUVIA 100 MG tablet TAKE ONE TABLET BY MOUTH EVERY DAY (Patient taking differently: 50 mg ) 30 tablet 2    fluticasone-salmeterol (ADVAIR) 500-50 MCG/DOSE diskus inhaler Inhale 1 puff into the lungs every 12 hours      omalizumab (OMALIZUMAB) 150 MG injection Inject 150 mg into the skin every 28 days 1 vial 3    fenofibrate (TRICOR) 145 MG tablet TAKE ONE TABLET BY MOUTH EVERY DAY 30 tablet 3    ipratropium-albuterol (DUONEB) 0.5-2.5 (3) MG/3ML SOLN nebulizer solution Inhale 3 mLs into the lungs every 4 hours 360 mL 3    OXYGEN Inhale 3 L/min into the lungs continuous      flunisolide (NASALIDE) 25 MCG/ACT (0.025%) SOLN 1 spray       albuterol sulfate HFA (PROAIR HFA) 108 (90 Base) MCG/ACT inhaler Inhale 2 puffs into the lungs every 4 hours as needed for Wheezing 1 Inhaler 5    loratadine (CLARITIN) 10 MG tablet TAKE ONE TABLET BY MOUTH EVERY DAY 30 tablet 4     Current Facility-Administered Medications on File Prior to Visit   Medication Dose Route Frequency Provider Last Rate Last Dose    omalizumab Nancy Arriaga) injection 150 mg  150 mg Subcutaneous Q28 Days Homa Angelo APRN   150 mg at 08/07/20 1219    omalizumab Nancy Arriaga) injection 150 mg  150 mg Subcutaneous Q28 Days Homa Angelo APRN   150 mg at 08/07/20 1220       Review of Systems   Constitutional: Negative. HENT: Negative. Respiratory: Negative. Cardiovascular: Negative. Gastrointestinal: Negative. Genitourinary: Negative. Musculoskeletal: Positive for arthralgias and back pain. Skin: Negative. Neurological: Negative. Psychiatric/Behavioral: Negative.         OBJECTIVE:  BP (!) 122/54 (Site: Left Upper Arm, Position: Sitting, Cuff Size: Medium Adult)   Pulse 76   Temp 98.7 °F (37.1 °C) (Temporal) Resp 16   Ht 4' 10\" (1.473 m)   Wt 194 lb 6.4 oz (88.2 kg)   SpO2 94% Comment: 3 liters oxygen  BMI 40.63 kg/m²    Physical Exam  Vitals signs and nursing note reviewed. Exam conducted with a chaperone present. Constitutional:       Appearance: She is well-developed. HENT:      Head: Normocephalic and atraumatic. Eyes:      Conjunctiva/sclera: Conjunctivae normal.      Pupils: Pupils are equal, round, and reactive to light. Neck:      Musculoskeletal: Normal range of motion and neck supple. Thyroid: No thyromegaly. Vascular: No JVD. Cardiovascular:      Rate and Rhythm: Normal rate and regular rhythm. Heart sounds: No murmur. No friction rub. No gallop. Pulmonary:      Effort: Pulmonary effort is normal. No respiratory distress. Breath sounds: Decreased breath sounds and wheezing present. No rales. Abdominal:      General: Bowel sounds are normal. There is no distension. Palpations: Abdomen is soft. Tenderness: There is abdominal tenderness in the left lower quadrant. There is no guarding. Musculoskeletal:      Lumbar back: She exhibits decreased range of motion, tenderness and spasm. Skin:     General: Skin is warm and dry. Findings: No rash. Neurological:      Mental Status: She is alert and oriented to person, place, and time.    Psychiatric:         Judgment: Judgment normal.         Results in Past 30 Days  Result Component Current Result Ref Range Previous Result Ref Range   Alb 4.3 (8/31/2020) 3.4 - 4.8 g/dL Not in Time Range    Albumin/Globulin Ratio 1.3 (8/31/2020) 0.8 - 2.0 Not in Time Range    Alkaline Phosphatase 76 (8/31/2020) 25 - 100 U/L Not in Time Range    ALT 22 (8/31/2020) 4 - 36 U/L Not in Time Range    AST 25 (8/31/2020) 8 - 33 U/L Not in Time Range    BUN 46 (H) (8/31/2020) 6 - 20 mg/dL Not in Time Range    Calcium 10.0 (8/31/2020) 8.5 - 10.5 mg/dL Not in Time Range    Chloride 92 (L) (8/31/2020) 98 - 107 mmol/L Not in Time Range CO2 34 (H) (8/31/2020) 20 - 30 mmol/L Not in Time Range    CREATININE 1.7 (H) (8/31/2020) 0.4 - 1.2 mg/dL Not in Time Range    GFR  36 (L) (8/31/2020) >59 Not in Time Range    GFR Non- 30 (L) (8/31/2020) >59 Not in Time Range    Globulin 3.2 (8/31/2020) g/dL Not in Time Range    Glucose 159 (H) (8/31/2020) 74 - 106 mg/dL Not in Time Range    Potassium reflex Magnesium 3.9 (8/31/2020) 3.4 - 5.1 mmol/L Not in Time Range    Sodium 138 (8/31/2020) 136 - 145 mmol/L Not in Time Range    Total Bilirubin 0.3 (8/31/2020) 0.3 - 1.2 mg/dL Not in Time Range    Total Protein 7.5 (8/31/2020) 6.4 - 8.3 g/dL Not in Time Range        Hemoglobin A1C (%)   Date Value   07/22/2020 9.3 (H)     Microscopic Examination (no units)   Date Value   08/31/2020 YES     LDL Calculated (mg/dL)   Date Value   01/08/2020 see below           Lab Results   Component Value Date    TSH 2.40 03/27/2020         ASSESSMENT/PLAN:     Da Sanderson was seen today for follow-up from hospital.    Diagnoses and all orders for this visit:    Cysts of both ovaries  -     US Non OB Transvaginal; Future    Kidney lesion  -     US Non OB Transvaginal; Future    Chronic left-sided low back pain with left-sided sciatica  -     US Non OB Transvaginal; Future  -     predniSONE (DELTASONE) 20 MG tablet; Take 1 tablet by mouth daily for 3 days  -     tiZANidine (ZANAFLEX) 4 MG tablet; Take 1 tablet by mouth 2 times daily as needed (back pain)      There are no discontinued medications.

## 2020-09-04 ENCOUNTER — OFFICE VISIT (OUTPATIENT)
Dept: PRIMARY CARE CLINIC | Age: 70
End: 2020-09-04
Payer: MEDICARE

## 2020-09-04 ENCOUNTER — HOSPITAL ENCOUNTER (OUTPATIENT)
Facility: HOSPITAL | Age: 70
Discharge: HOME | End: 2020-09-04
Payer: MEDICARE

## 2020-09-04 VITALS
DIASTOLIC BLOOD PRESSURE: 52 MMHG | RESPIRATION RATE: 16 BRPM | TEMPERATURE: 98 F | HEIGHT: 58 IN | WEIGHT: 192.2 LBS | SYSTOLIC BLOOD PRESSURE: 122 MMHG | BODY MASS INDEX: 40.34 KG/M2 | HEART RATE: 70 BPM | OXYGEN SATURATION: 93 %

## 2020-09-04 LAB
A/G RATIO: 1.5 (ref 0.8–2)
ALBUMIN SERPL-MCNC: 4.1 G/DL (ref 3.4–4.8)
ALP BLD-CCNC: 78 U/L (ref 25–100)
ALT SERPL-CCNC: 17 U/L (ref 4–36)
ANION GAP SERPL CALCULATED.3IONS-SCNC: 14 MMOL/L (ref 3–16)
AST SERPL-CCNC: 19 U/L (ref 8–33)
BILIRUB SERPL-MCNC: <0.2 MG/DL (ref 0.3–1.2)
BUN BLDV-MCNC: 47 MG/DL (ref 6–20)
CALCIUM SERPL-MCNC: 10.2 MG/DL (ref 8.5–10.5)
CHLORIDE BLD-SCNC: 90 MMOL/L (ref 98–107)
CHOLESTEROL, TOTAL: 123 MG/DL (ref 0–200)
CO2: 33 MMOL/L (ref 20–30)
CREAT SERPL-MCNC: 1.8 MG/DL (ref 0.4–1.2)
FOLATE: 9.74 NG/ML
GFR AFRICAN AMERICAN: 34
GFR NON-AFRICAN AMERICAN: 28
GLOBULIN: 2.7 G/DL
GLUCOSE BLD-MCNC: 274 MG/DL (ref 74–106)
HCT VFR BLD CALC: 38.1 % (ref 37–47)
HDLC SERPL-MCNC: 27 MG/DL (ref 40–60)
HEMOGLOBIN: 11 G/DL (ref 11.5–16.5)
LDL CHOLESTEROL CALCULATED: ABNORMAL MG/DL
LDL CHOLESTEROL DIRECT: 41 MG/DL
MCH RBC QN AUTO: 28.5 PG (ref 27–32)
MCHC RBC AUTO-ENTMCNC: 28.9 G/DL (ref 31–35)
MCV RBC AUTO: 98.7 FL (ref 80–100)
PDW BLD-RTO: 15.9 % (ref 11–16)
PHOSPHORUS: 3.3 MG/DL (ref 2.5–4.5)
PLATELET # BLD: 412 K/UL (ref 150–400)
PMV BLD AUTO: 11.5 FL (ref 6–10)
POTASSIUM SERPL-SCNC: 4.2 MMOL/L (ref 3.4–5.1)
RBC # BLD: 3.86 M/UL (ref 3.8–5.8)
SODIUM BLD-SCNC: 137 MMOL/L (ref 136–145)
TOTAL PROTEIN: 6.8 G/DL (ref 6.4–8.3)
TRIGL SERPL-MCNC: 427 MG/DL (ref 0–249)
TSH SERPL DL<=0.05 MIU/L-ACNC: 1.65 UIU/ML (ref 0.35–5.5)
VITAMIN B-12: 314 PG/ML (ref 211–911)
VITAMIN D 25-HYDROXY: 19.9 (ref 32–100)
VLDLC SERPL CALC-MCNC: ABNORMAL MG/DL
WBC # BLD: 14.2 K/UL (ref 4–11)

## 2020-09-04 PROCEDURE — 84443 ASSAY THYROID STIM HORMONE: CPT

## 2020-09-04 PROCEDURE — 82746 ASSAY OF FOLIC ACID SERUM: CPT

## 2020-09-04 PROCEDURE — 85027 COMPLETE CBC AUTOMATED: CPT

## 2020-09-04 PROCEDURE — 80053 COMPREHEN METABOLIC PANEL: CPT

## 2020-09-04 PROCEDURE — G8427 DOCREV CUR MEDS BY ELIG CLIN: HCPCS | Performed by: NURSE PRACTITIONER

## 2020-09-04 PROCEDURE — 1123F ACP DISCUSS/DSCN MKR DOCD: CPT | Performed by: NURSE PRACTITIONER

## 2020-09-04 PROCEDURE — 1036F TOBACCO NON-USER: CPT | Performed by: NURSE PRACTITIONER

## 2020-09-04 PROCEDURE — 99213 OFFICE O/P EST LOW 20 MIN: CPT | Performed by: NURSE PRACTITIONER

## 2020-09-04 PROCEDURE — 4040F PNEUMOC VAC/ADMIN/RCVD: CPT | Performed by: NURSE PRACTITIONER

## 2020-09-04 PROCEDURE — 83970 ASSAY OF PARATHORMONE: CPT

## 2020-09-04 PROCEDURE — 82607 VITAMIN B-12: CPT

## 2020-09-04 PROCEDURE — 82306 VITAMIN D 25 HYDROXY: CPT

## 2020-09-04 PROCEDURE — 3017F COLORECTAL CA SCREEN DOC REV: CPT | Performed by: NURSE PRACTITIONER

## 2020-09-04 PROCEDURE — 1090F PRES/ABSN URINE INCON ASSESS: CPT | Performed by: NURSE PRACTITIONER

## 2020-09-04 PROCEDURE — G8417 CALC BMI ABV UP PARAM F/U: HCPCS | Performed by: NURSE PRACTITIONER

## 2020-09-04 PROCEDURE — G8400 PT W/DXA NO RESULTS DOC: HCPCS | Performed by: NURSE PRACTITIONER

## 2020-09-04 PROCEDURE — 84100 ASSAY OF PHOSPHORUS: CPT

## 2020-09-04 PROCEDURE — 20610 DRAIN/INJ JOINT/BURSA W/O US: CPT | Performed by: NURSE PRACTITIONER

## 2020-09-04 PROCEDURE — 80061 LIPID PANEL: CPT

## 2020-09-04 RX ORDER — METHYLPREDNISOLONE ACETATE 40 MG/ML
40 INJECTION, SUSPENSION INTRA-ARTICULAR; INTRALESIONAL; INTRAMUSCULAR; SOFT TISSUE ONCE
Status: COMPLETED | OUTPATIENT
Start: 2020-09-04 | End: 2020-09-04

## 2020-09-04 RX ADMIN — METHYLPREDNISOLONE ACETATE 40 MG: 40 INJECTION, SUSPENSION INTRA-ARTICULAR; INTRALESIONAL; INTRAMUSCULAR; SOFT TISSUE at 13:10

## 2020-09-04 ASSESSMENT — ENCOUNTER SYMPTOMS
RESPIRATORY NEGATIVE: 1
ABDOMINAL PAIN: 1

## 2020-09-04 NOTE — PROGRESS NOTES
SUBJECTIVE:    Patient ID: Estrada Kelly is a 79 y.o. female. Chief Complaint   Patient presents with    Abdominal Pain    Hip Pain     left that radiates    Sinusitis         HPI:   she presents for hip pain, congestion and abdominal pain. She is scheduled to see nephrology about her ct scan with kidney lesion. She needs some labs to go to the kidney doctor. She is having a lot of left hip pain. She said a shot helped it before. Patient's medications,allergies, past medical, surgical, social and family histories were reviewed and updated as appropriate. .  Current Outpatient Medications on File Prior to Visit   Medication Sig Dispense Refill    predniSONE (DELTASONE) 20 MG tablet Take 1 tablet by mouth daily for 3 days 6 tablet 0    tiZANidine (ZANAFLEX) 4 MG tablet Take 1 tablet by mouth 2 times daily as needed (back pain) 60 tablet 0    ondansetron (ZOFRAN ODT) 4 MG disintegrating tablet Take 1 tablet by mouth every 8 hours as needed for Nausea or Vomiting 15 tablet 0    nitrofurantoin, macrocrystal-monohydrate, (MACROBID) 100 MG capsule Take 1 capsule by mouth 2 times daily for 7 days 14 capsule 0    fluconazole (DIFLUCAN) 200 MG tablet Take 1 tablet by mouth daily for 7 days 7 tablet 0    montelukast (SINGULAIR) 10 MG tablet TAKE ONE TABLET BY MOUTH EVERY DAY 30 tablet 2    HYDROcodone-acetaminophen (NORCO) 5-325 MG per tablet TAKE ONE TABLET BY MOUTH EVERY DAY AS NEEDED FOR PAIN 30 tablet 0    furosemide (LASIX) 40 MG tablet TAKE ONE TABLET BY MOUTH TWO TIMES A DAY 60 tablet 5    Insulin Syringe-Needle U-100 31G X 5/16\" 1 ML MISC USE FOR INSULIN INJECTIONS FIVE TIMES DAILY 150 each 4    BD PEN NEEDLE JUAN CARLOS U/F 32G X 4 MM MISC USE WITH LANTUS ONCE DAILY      Insulin Degludec (TRESIBA) 100 UNIT/ML SOLN 35 units in the am and 80 in the evening.  5 vial 5    polyethylene glycol (GLYCOLAX) 17 GM/SCOOP powder Take 17 g by mouth daily 510 g 5    sucralfate (CARAFATE) 1 GM tablet TAKE ONE TABLET BY MOUTH THREE TIMES A DAY 90 tablet 3    rOPINIRole (REQUIP) 0.5 MG tablet TAKE ONE TABLET BY MOUTH TWO TIMES A DAY 60 tablet 3    VASCEPA 1 g CAPS capsule TAKE TWO CAPSULES BY MOUTH TWO TIMES A DAY 60 capsule 3    busPIRone (BUSPAR) 10 MG tablet TAKE ONE TABLET BY MOUTH THREE TIMES A DAY 90 tablet 0    metOLazone (ZAROXOLYN) 5 MG tablet TAKE 1 TABLET BY MOUTH DAILY AS NEEDED (SWELLING) 30 tablet 3    nystatin (MYCOSTATIN) 670786 UNIT/ML suspension TAKE 5 MLS BY MOUTH 4 TIMES DAILY 240 mL 2    dilTIAZem (CARDIZEM CD) 180 MG extended release capsule TAKE 1 CAPSULE BY MOUTH DAILY 30 capsule 3    ELIQUIS 5 MG TABS tablet TAKE 1 TABLET BY MOUTH EVERY 12 HOURS 60 tablet 3    potassium chloride (KLOR-CON M) 20 MEQ extended release tablet TAKE ONE TABLET BY MOUTH EVERY DAY 30 tablet 5    insulin lispro (HUMALOG) 100 UNIT/ML injection vial INJECT 35 UNITS UNDER THE SKIN THREE TIMES A DAY BEFORE MEALS (Patient taking differently: INJECT 38 UNITS UNDER THE SKIN THREE TIMES A DAY BEFORE MEALS) 30 mL 4    docusate sodium (COLACE) 250 MG capsule Take 1 capsule by mouth daily 90 capsule 3    metoprolol succinate (TOPROL XL) 100 MG extended release tablet TAKE 1 TABLET BY MOUTH DAILY 30 tablet 5    allopurinol (ZYLOPRIM) 300 MG tablet Take 0.5 tablets by mouth daily 30 tablet 2    ezetimibe (ZETIA) 10 MG tablet TAKE ONE TABLET BY MOUTH EVERY DAY 90 tablet 3    ASPIRIN LOW DOSE 81 MG EC tablet TAKE ONE TABLET BY MOUTH EVERY DAY 30 tablet 5    pantoprazole (PROTONIX) 40 MG tablet TAKE ONE TABLET BY MOUTH EVERY DAY 90 tablet 3    PATADAY 0.2 % SOLN ophthalmic solution PLACE 1 DROP INTO BOTH EYES 2 TIMES DAILY 2.5 mL 3    Roflumilast (DALIRESP) 500 MCG tablet Take 1 tablet by mouth daily 30 tablet 2    GNP VITAMIN D MAXIMUM STRENGTH 50 MCG (2000 UT) TABS Take 1 tablet by mouth daily   5    diclofenac sodium 1 % GEL Apply 2 g topically 2 times daily 1 Tube 3    blood glucose test strips (TRUE METRIX BLOOD GLUCOSE TEST) strip USE TO CHECK BLOOD SUGAR FOUR TIMES A DAY dx:e11.9 100 strip 3    JANUVIA 100 MG tablet TAKE ONE TABLET BY MOUTH EVERY DAY (Patient taking differently: 50 mg ) 30 tablet 2    fluticasone-salmeterol (ADVAIR) 500-50 MCG/DOSE diskus inhaler Inhale 1 puff into the lungs every 12 hours      omalizumab (OMALIZUMAB) 150 MG injection Inject 150 mg into the skin every 28 days 1 vial 3    fenofibrate (TRICOR) 145 MG tablet TAKE ONE TABLET BY MOUTH EVERY DAY 30 tablet 3    OXYGEN Inhale 3 L/min into the lungs continuous      flunisolide (NASALIDE) 25 MCG/ACT (0.025%) SOLN 1 spray       albuterol sulfate HFA (PROAIR HFA) 108 (90 Base) MCG/ACT inhaler Inhale 2 puffs into the lungs every 4 hours as needed for Wheezing 1 Inhaler 5    loratadine (CLARITIN) 10 MG tablet TAKE ONE TABLET BY MOUTH EVERY DAY 30 tablet 4    ipratropium-albuterol (DUONEB) 0.5-2.5 (3) MG/3ML SOLN nebulizer solution Inhale 3 mLs into the lungs every 4 hours 360 mL 3     Current Facility-Administered Medications on File Prior to Visit   Medication Dose Route Frequency Provider Last Rate Last Dose    omalizumab Araceli Lucas) injection 150 mg  150 mg Subcutaneous Q28 Days Mary Freed APRN   150 mg at 08/07/20 1219    omalizumab Araceli Lucas) injection 150 mg  150 mg Subcutaneous Q28 Days Mary Freed APRN   150 mg at 08/07/20 1220       Review of Systems   Constitutional: Negative. HENT: Negative. Respiratory: Negative. Cardiovascular: Negative. Gastrointestinal: Positive for abdominal pain (left lower ). Genitourinary: Negative. Musculoskeletal: Positive for arthralgias (left hip). Skin: Negative. Neurological: Negative. Psychiatric/Behavioral: Negative.         OBJECTIVE:  BP (!) 122/52 (Site: Left Upper Arm, Position: Sitting, Cuff Size: Medium Adult)   Pulse 70   Temp 98 °F (36.7 °C) (Temporal)   Resp 16   Ht 4' 10\" (1.473 m)   Wt 192 lb 3.2 oz (87.2 kg)   SpO2 93% Comment: 3 liters  BMI 40.17 kg/m²    Physical Exam  Vitals signs and nursing note reviewed. Constitutional:       Appearance: She is well-developed. HENT:      Head: Normocephalic and atraumatic. Eyes:      Conjunctiva/sclera: Conjunctivae normal.      Pupils: Pupils are equal, round, and reactive to light. Neck:      Musculoskeletal: Normal range of motion and neck supple. Thyroid: No thyromegaly. Vascular: No JVD. Cardiovascular:      Rate and Rhythm: Normal rate and regular rhythm. Heart sounds: No murmur. No friction rub. No gallop. Pulmonary:      Effort: Pulmonary effort is normal. No respiratory distress. Breath sounds: Normal breath sounds. No wheezing or rales. Abdominal:      General: Bowel sounds are normal. There is no distension. Palpations: Abdomen is soft. Tenderness: There is abdominal tenderness in the left lower quadrant. There is no guarding. Musculoskeletal:      Left hip: She exhibits decreased range of motion and tenderness. Skin:     General: Skin is warm and dry. Findings: No rash. Neurological:      Mental Status: She is alert and oriented to person, place, and time.    Psychiatric:         Judgment: Judgment normal.         Results in Past 30 Days  Result Component Current Result Ref Range Previous Result Ref Range   Alb 4.3 (8/31/2020) 3.4 - 4.8 g/dL Not in Time Range    Albumin/Globulin Ratio 1.3 (8/31/2020) 0.8 - 2.0 Not in Time Range    Alkaline Phosphatase 76 (8/31/2020) 25 - 100 U/L Not in Time Range    ALT 22 (8/31/2020) 4 - 36 U/L Not in Time Range    AST 25 (8/31/2020) 8 - 33 U/L Not in Time Range    BUN 46 (H) (8/31/2020) 6 - 20 mg/dL Not in Time Range    Calcium 10.0 (8/31/2020) 8.5 - 10.5 mg/dL Not in Time Range    Chloride 92 (L) (8/31/2020) 98 - 107 mmol/L Not in Time Range    CO2 34 (H) (8/31/2020) 20 - 30 mmol/L Not in Time Range    CREATININE 1.7 (H) (8/31/2020) 0.4 - 1.2 mg/dL Not in Time Range    GFR  36 (L) (8/31/2020) >59 Not in Time Range    GFR Non- 30 (L) (8/31/2020) >59 Not in Time Range    Globulin 3.2 (8/31/2020) g/dL Not in Time Range    Glucose 159 (H) (8/31/2020) 74 - 106 mg/dL Not in Time Range    Potassium reflex Magnesium 3.9 (8/31/2020) 3.4 - 5.1 mmol/L Not in Time Range    Sodium 138 (8/31/2020) 136 - 145 mmol/L Not in Time Range    Total Bilirubin 0.3 (8/31/2020) 0.3 - 1.2 mg/dL Not in Time Range    Total Protein 7.5 (8/31/2020) 6.4 - 8.3 g/dL Not in Time Range        Hemoglobin A1C (%)   Date Value   07/22/2020 9.3 (H)     Microscopic Examination (no units)   Date Value   08/31/2020 YES     LDL Calculated (mg/dL)   Date Value   01/08/2020 see below           Lab Results   Component Value Date    TSH 2.40 03/27/2020         ASSESSMENT/PLAN:     Amos Branch was seen today for abdominal pain, hip pain and sinusitis. Diagnoses and all orders for this visit:    Bursitis of left hip, unspecified bursa  -     70890 - AK DRAIN/INJECT LARGE JOINT/BURSA  Joint injection:  I have advised her about the treatment options, possible procedure risks and complications. She has elected to proceed with suggested treatment. After obtaining informed consent the area was prepped with iodine. Procedure was done under sterile conditions. I did inject  joint with 1ml of DepoMedrol 40 mg and 1ml of 1 % Lidocaine. She tolerated the procedure well. There is not bleeding noted. She was advised to contact me if there were any questions or complications in the next few days. Chronic left-sided low back pain with left-sided sciatica  -     methylPREDNISolone acetate (DEPO-MEDROL) injection 40 mg    Chronic renal failure, stage 3 (moderate) (HCC)  -     RENAL FUNCTION PANEL; Future  -     PTH, INTACT; Future  -     VITAMIN D 25 HYDROXY; Future    Other orders  -     omalizumab Gil Gottron) injection 150 mg  -     omalizumab Gil Gottron) injection 150 mg      There are no discontinued medications.

## 2020-09-04 NOTE — PROGRESS NOTES
Chief Complaint   Patient presents with    Abdominal Pain    Hip Pain     left that radiates    Sinusitis       Have you seen any other physician or provider since your last visit no    Have you had any other diagnostic tests since your last visit? Yes ultrasound    Have you changed or stopped any medications since your last visit? no     I have recommended that this patient have a bone dexa but she declines at this time. I have discussed the risks and benefits of this examination with her. The patient verbalizes understanding. Diabetic retinal exam completed this year?  Yes                       * If yes please have patient sign a records release to obtain record to update Health Maintenance                       * If no, please order referral for patient to be scheduled

## 2020-09-05 LAB — PARATHYROID HORMONE INTACT: 37.6 PG/ML (ref 14–72)

## 2020-09-10 ENCOUNTER — HOSPITAL ENCOUNTER (OUTPATIENT)
Dept: ULTRASOUND IMAGING | Facility: HOSPITAL | Age: 70
Discharge: HOME | End: 2020-09-10
Payer: MEDICARE

## 2020-09-10 PROCEDURE — 76830 TRANSVAGINAL US NON-OB: CPT

## 2020-09-11 ENCOUNTER — TELEPHONE (OUTPATIENT)
Dept: PRIMARY CARE CLINIC | Age: 70
End: 2020-09-11

## 2020-09-11 RX ORDER — ACYCLOVIR 400 MG/1
400 TABLET ORAL 4 TIMES DAILY
Qty: 40 TABLET | Refills: 0 | Status: SHIPPED | OUTPATIENT
Start: 2020-09-11 | End: 2020-09-16 | Stop reason: ALTCHOICE

## 2020-09-16 ENCOUNTER — OFFICE VISIT (OUTPATIENT)
Dept: PRIMARY CARE CLINIC | Age: 70
End: 2020-09-16
Payer: MEDICARE

## 2020-09-16 VITALS
HEART RATE: 86 BPM | DIASTOLIC BLOOD PRESSURE: 50 MMHG | WEIGHT: 188.6 LBS | TEMPERATURE: 98.2 F | SYSTOLIC BLOOD PRESSURE: 136 MMHG | RESPIRATION RATE: 24 BRPM | BODY MASS INDEX: 39.59 KG/M2 | OXYGEN SATURATION: 92 % | HEIGHT: 58 IN

## 2020-09-16 PROCEDURE — G0439 PPPS, SUBSEQ VISIT: HCPCS | Performed by: NURSE PRACTITIONER

## 2020-09-16 PROCEDURE — 4040F PNEUMOC VAC/ADMIN/RCVD: CPT | Performed by: NURSE PRACTITIONER

## 2020-09-16 PROCEDURE — 3046F HEMOGLOBIN A1C LEVEL >9.0%: CPT | Performed by: NURSE PRACTITIONER

## 2020-09-16 PROCEDURE — 1123F ACP DISCUSS/DSCN MKR DOCD: CPT | Performed by: NURSE PRACTITIONER

## 2020-09-16 PROCEDURE — G0444 DEPRESSION SCREEN ANNUAL: HCPCS | Performed by: NURSE PRACTITIONER

## 2020-09-16 PROCEDURE — 3017F COLORECTAL CA SCREEN DOC REV: CPT | Performed by: NURSE PRACTITIONER

## 2020-09-16 RX ORDER — TRAMADOL HYDROCHLORIDE 50 MG/1
50 TABLET ORAL EVERY 6 HOURS PRN
COMMUNITY
End: 2020-10-01

## 2020-09-16 RX ORDER — ATORVASTATIN CALCIUM 40 MG/1
40 TABLET, FILM COATED ORAL DAILY
COMMUNITY
Start: 2020-09-15

## 2020-09-16 RX ORDER — FENOFIBRATE 48 MG/1
48 TABLET, COATED ORAL DAILY
COMMUNITY
Start: 2020-09-02

## 2020-09-16 RX ORDER — TIOTROPIUM BROMIDE 18 UG/1
18 CAPSULE ORAL; RESPIRATORY (INHALATION) DAILY
COMMUNITY
Start: 2020-09-01

## 2020-09-16 ASSESSMENT — PATIENT HEALTH QUESTIONNAIRE - PHQ9
7. TROUBLE CONCENTRATING ON THINGS, SUCH AS READING THE NEWSPAPER OR WATCHING TELEVISION: 1
2. FEELING DOWN, DEPRESSED OR HOPELESS: 1
SUM OF ALL RESPONSES TO PHQ QUESTIONS 1-9: 8
5. POOR APPETITE OR OVEREATING: 0
SUM OF ALL RESPONSES TO PHQ QUESTIONS 1-9: 8
9. THOUGHTS THAT YOU WOULD BE BETTER OFF DEAD, OR OF HURTING YOURSELF: 0
10. IF YOU CHECKED OFF ANY PROBLEMS, HOW DIFFICULT HAVE THESE PROBLEMS MADE IT FOR YOU TO DO YOUR WORK, TAKE CARE OF THINGS AT HOME, OR GET ALONG WITH OTHER PEOPLE: 1
8. MOVING OR SPEAKING SO SLOWLY THAT OTHER PEOPLE COULD HAVE NOTICED. OR THE OPPOSITE, BEING SO FIGETY OR RESTLESS THAT YOU HAVE BEEN MOVING AROUND A LOT MORE THAN USUAL: 0
6. FEELING BAD ABOUT YOURSELF - OR THAT YOU ARE A FAILURE OR HAVE LET YOURSELF OR YOUR FAMILY DOWN: 1
4. FEELING TIRED OR HAVING LITTLE ENERGY: 3
1. LITTLE INTEREST OR PLEASURE IN DOING THINGS: 2
3. TROUBLE FALLING OR STAYING ASLEEP: 0
SUM OF ALL RESPONSES TO PHQ9 QUESTIONS 1 & 2: 3

## 2020-09-16 ASSESSMENT — LIFESTYLE VARIABLES: HOW OFTEN DO YOU HAVE A DRINK CONTAINING ALCOHOL: 0

## 2020-09-16 NOTE — PROGRESS NOTES
Chief Complaint   Patient presents with    Medicare AWV       Have you seen any other physician or provider since your last visit yes - Dr Stacie Flores    Have you had any other diagnostic tests since your last visit? yes - labs    Have you changed or stopped any medications since your last visit? yes - started Lipitor       Diabetic retinal exam completed this year? Yes                       * If yes please have patient sign a records release to obtain record to update Health Maintenance Dr Gwen Ibarra @ [facility]                       * If no, please order referral for patient to be scheduled     Patient is here for her AWV. Her blood sugar was 281 this morning and then went up to over 300 after she took her pills with applesauce.

## 2020-09-16 NOTE — PATIENT INSTRUCTIONS
This is a very pleasant white female with a PMH for COPD admitted with acute coronary syndrome and NSTEMI who we are consulted for evaluation of hypocortisolism, decreased TFTs  and diabetes. Prodrome well described and events over the past 24 hours noted. Pt has required cardiac support (including Impella) for hypotension and was found to an inappropriately low cortisol level, abnormal ACTH stim and was placed on stress dose steroids with improvement in hemodynamics. Pt now alert, oriented and conversant. Pt has been treated with prednisone in the past for COPD but none in at least 3 months by recollection. She was started on prednisone during the present admission on 5/18 but it is not clear if she received another dose after that first 40 mg dose. She denies sx of adrenal insufficiency or Tbc  and there is no past history of CNS tumors or adrenal abnormalities. She describes heavy menses in her 25s and menopausal symptoms in her 52's but normal periods until that time. She denies any episode of severe headache with abrupt cessation of menses. She did have gestational diabetes 25 years ago  which resolved and the delivery was not complicated; she has not been on antihyperglycemics since that time. EXAMINATION  Alert and oriented. Answers questions without SOB  /71  Pulse 66  O2 sat 98%  Central line in Right Subclavian  HEENT- no lid lag proptosis or stare  Thyroid normal  DTRs brisk and symmetric  Rest of the examination unremarkable    Pertinent labs:    Random cortisol 4.0  ACTH  stim  baseline, 2.1,  30 min 3.2,  60 min 5.8    FT4 1.0  FT3 1.4  TSH 0.19  FSH 30  LH 4.2    Glucose well controlled on IV insulin    IMPRESSION:  1. Abnormal ACTH stim: this is most likely related to suppression by exogenous glucocorticoids. This is particularly related to a 40 mg dose of prednisone on 5/18 and abnormal cortisol 2 days later.  Her history is not clear for when she received prednisone prior to that but · Keep a list of your medicines with you. List all of the prescription medicines, nonprescription medicines, supplements, natural remedies, and vitamins that you take. Tell your healthcare providers who treat you about all of the products you are taking. Your provider can provide you with a form to keep track of them. Just ask. · Follow the directions that come with your medicine, including information about food or alcohol. Make sure you know how and when to take your medicine. Do not take more or less than you are supposed to take. · Keep all medicines out of the reach of children. · Store medicines according to the directions on the label. · Monitor yourself. Learn to know how your body reacts to your new medicine and keep track of how it makes you feel before attempting (If your provider has allowed you to do so) to drive or go to work. · Seek emergency medical attention if you think you have used too much of this medicine. An overdose of any prescription medicine can be fatal. Overdose symptoms may include extreme drowsiness, muscle weakness, confusion, cold and clammy skin, pinpoint pupils, shallow breathing, slow heart rate, fainting, or coma. · Don't share prescription medicines with others, even when they seem to have the same symptoms. What may be good for you may be harmful to others. · If you are no longer taking a prescribed medication and you have pills left please take your pills out of their original containers. Mix crushed pills with an undesirable substance, such as cat litter or used coffee grounds. Put the mixture into a disposable container with a lid, such as an empty margarine tub, or into a sealable bag. Cover up or remove any of your personal information on the empty containers by covering it with black permanent marker or duct tape. Place the sealed container with the mixture, and the empty drug containers, in the trash.    · If you use a medication that is in the form of a patch, dispose of used patches by folding them in half so that the sticky sides meet, and then flushing them down a toilet. They should not be placed in the household trash where children or pets can find them. · If you have any questions, ask your provider or pharmacist for more information. · Be sure to keep all appointments for provider visits or tests. We are committed to providing you with the best care possible. In order to help us achieve these goals please remember to bring all medications, herbal products, and over the counter supplements with you to each visit. If your provider has ordered testing for you, please be sure to follow up with our office if you have not received results within 7 days after the testing took place. *If you receive a survey after visiting one of our offices, please take time to share your experience concerning your physician office visit. These surveys are confidential and no health information about you is shared. We are eager to improve for you and we are counting on your feedback to help make that happen. Personalized Preventive Plan for [facility] - 9/16/2020  Medicare offers a range of preventive health benefits. Some of the tests and screenings are paid in full while other may be subject to a deductible, co-insurance, and/or copay. Some of these benefits include a comprehensive review of your medical history including lifestyle, illnesses that may run in your family, and various assessments and screenings as appropriate. After reviewing your medical record and screening and assessments performed today your provider may have ordered immunizations, labs, imaging, and/or referrals for you. A list of these orders (if applicable) as well as your Preventive Care list are included within your After Visit Summary for your review.     Other Preventive Recommendations:    A preventive eye exam performed by an eye specialist is recommended every 1-2 years to screen this seems likely as wellt. However there is no other obvious explanation. I would suggest a gradual taper of IV hydrocortione as hemodynamics stabalize and transition to oral prednisone. She will need a follow up ACTH stim once off prednisone. 2. Hypothyroidism: the data are most consistent with non-thyroidal illness which again can only be proven by improvement in TFT's once current condition improves. Would not treat this now as excess T4 might exacerbate her current condition. 3. Possible hypopituitary: this is also unlikely. [facility] is, although low for a postmenopausal woman, reassuring. 4. Diabetes: almost certainly related to glucocorticoids in a woman with risk for Type 2 diabetes. A1c pending (6.4%). Anticipate resolution with taper of steroids. She may require a dose of NPH given at the time of oral prednisone when that transition takes place. Anticipate transition to subcutaneous insulin. Would suggest glargine dosed according to steroids. I am happy to assist with this. Thank you for this consult. for glaucoma; cataracts, macular degeneration, and other eye disorders. A preventive dental visit is recommended every 6 months. Try to get at least 150 minutes of exercise per week or 10,000 steps per day on a pedometer . Order or download the FREE \"Exercise & Physical Activity: Your Everyday Guide\" from [employer]. Call 2-476.668.7728 or search [employer] online. You need 9757-0806 mg of calcium and 7402-7051 IU of vitamin D per day. It is possible to meet your calcium requirement with diet alone, but a vitamin D supplement is usually necessary to meet this goal.  When exposed to the sun, use a sunscreen that protects against both UVA and UVB radiation with an SPF of 30 or greater. Reapply every 2 to 3 hours or after sweating, drying off with a towel, or swimming. Always wear a seat belt when traveling in a car. Always wear a helmet when riding a bicycle or motorcycle. Personalized Preventive Plan for [facility] - 9/16/2020  Medicare offers a range of preventive health benefits. Some of the tests and screenings are paid in full while other may be subject to a deductible, co-insurance, and/or copay. Some of these benefits include a comprehensive review of your medical history including lifestyle, illnesses that may run in your family, and various assessments and screenings as appropriate. After reviewing your medical record and screening and assessments performed today your provider may have ordered immunizations, labs, imaging, and/or referrals for you. A list of these orders (if applicable) as well as your Preventive Care list are included within your After Visit Summary for your review. Other Preventive Recommendations:    A preventive eye exam performed by an eye specialist is recommended every 1-2 years to screen for glaucoma; cataracts, macular degeneration, and other eye disorders. A preventive dental visit is recommended every 6 months.   Try to get at least 150 minutes of exercise per week or 10,000 steps per day on a pedometer . Order or download the FREE \"Exercise & Physical Activity: Your Everyday Guide\" from [employer]. Call 4-716.579.2042 or search [employer] online. You need 7331-2862 mg of calcium and 0935-5041 IU of vitamin D per day. It is possible to meet your calcium requirement with diet alone, but a vitamin D supplement is usually necessary to meet this goal.  When exposed to the sun, use a sunscreen that protects against both UVA and UVB radiation with an SPF of 30 or greater. Reapply every 2 to 3 hours or after sweating, drying off with a towel, or swimming. Always wear a seat belt when traveling in a car. Always wear a helmet when riding a bicycle or motorcycle.

## 2020-09-16 NOTE — PROGRESS NOTES
Medicare Annual Wellness Visit  Name: Tracy Whipple Date: 2020   MRN: [id number] Sex: Female   Age: 79 y.o. Ethnicity: Non-/Non    : 1950 Race: Amarilis Poster is here for Medicare AWV    Screenings for behavioral, psychosocial and functional/safety risks, and cognitive dysfunction are all negative except as indicated below. These results, as well as other patient data from the [address] form, are documented in Flowsheets linked to this Encounter. Allergies   Allergen Reactions    Shellfish-Derived Products Hives    Wheat Bran Hives         Prior to Visit Medications    Medication Sig Taking? Authorizing Provider   atorvastatin (LIPITOR) 40 MG tablet Take 40 mg by mouth daily  Yes Historical Provider, MD   fenofibrate (TRICOR) 48 MG tablet Take 48 mg by mouth daily  Yes Historical Provider, MD Russell Brown 18 MCG inhalation capsule  Yes Historical Provider, MD   traMADol (ULTRAM) 50 MG tablet Take 50 mg by mouth every 6 hours as needed for Pain. Yes Historical Provider, MD   tiZANidine (ZANAFLEX) 4 MG tablet Take 1 tablet by mouth 2 times daily as needed (back pain) Yes JADE Coburn   montelukast (SINGULAIR) 10 MG tablet TAKE ONE TABLET BY MOUTH EVERY DAY Yes JADE Coburn   HYDROcodone-acetaminophen (NORCO) 5-325 MG per tablet TAKE ONE TABLET BY MOUTH EVERY DAY AS NEEDED FOR PAIN Yes JADE Coburn   furosemide (LASIX) 40 MG tablet TAKE ONE TABLET BY MOUTH TWO TIMES A DAY Yes JADE Coburn   Insulin Syringe-Needle U-100 31G X 5/16\" 1 ML MISC USE FOR INSULIN INJECTIONS FIVE TIMES DAILY Yes JADE Coburn   BD PEN NEEDLE JUAN CARLOS U/F 32G X 4 MM MISC USE WITH LANTUS ONCE DAILY Yes Historical Provider, MD   Insulin Degludec (TRESIBA) 100 UNIT/ML SOLN 35 units in the am and 80 in the evening.  Yes JADE Coburn   sucralfate ([address]) 1 GM tablet TAKE ONE TABLET BY MOUTH THREE TIMES A DAY Yes Hilda Gifford BLOOD GLUCOSE TEST) strip USE TO CHECK BLOOD SUGAR FOUR TIMES A DAY dx:e11.9 Yes JADE Grant   JANUVIA 100 MG tablet TAKE ONE TABLET BY MOUTH EVERY DAY  Patient taking differently: 50 mg  Yes JADE Grant   fluticasone-salmeterol (ADVAIR) 500-50 MCG/DOSE diskus inhaler Inhale 1 puff into the lungs every 12 hours Yes Historical Provider, MD   omalizumab (OMALIZUMAB) 150 MG injection Inject 150 mg into the skin every 28 days Yes JADE Grant   ipratropium-albuterol (DUONEB) 0.5-2.5 (3) MG/3ML SOLN nebulizer solution Inhale 3 mLs into the lungs every 4 hours Yes JADE Grant   OXYGEN Inhale 3 L/min into the lungs continuous Yes Historical Provider, MD   flunisolide (NASALIDE) 25 MCG/ACT (0.025%) SOLN 1 spray  Yes Historical Provider, MD   albuterol sulfate HFA (PROAIR HFA) 108 (90 Base) MCG/ACT inhaler Inhale 2 puffs into the lungs every 4 hours as needed for Wheezing Yes JADE Grant   loratadine (CLARITIN) 10 MG tablet TAKE ONE TABLET BY MOUTH EVERY DAY Yes JADE Grant         Past Medical History:   Diagnosis Date    Anemia     Anxiety     Asthma     Cataract     COPD (chronic obstructive pulmonary disease) ([facility] [address]     DDD (degenerative disc disease), cervical     Depression     Diabetes mellitus type 2     GERD (gastroesophageal reflux disease)     Gout     History of uterine cancer     Hyperlipidemia     Hypertension        Past Surgical History:   Procedure Laterality Date    CATARACT REMOVAL WITH IMPLANT  2005   [address]    x2    COLONOSCOPY  05/01/2013    COLONOSCOPY N/A 5/23/2019    COLONOSCOPY POLYPECTOMY HOT BIOPSY performed by Esa Rangel MD at [facility]      partial    LUNG SURGERY      tumor removed; left    NECK SURGERY      cyst removed; right     TONSILLECTOMY  1956    UPPER GASTROINTESTINAL ENDOSCOPY N/A 5/23/2019    EGD BIOPSY performed by Esa Rangel MD at [address] [place] [address] Family History   Problem Relation Age of Onset   Tiffanie Mutjose Dementia Mother     Hypertension Mother     Hypertension Father     Coronary Art Dis Father     Cancer Father         leukemia       CareTeam (Including outside providers/suppliers regularly involved in providing care):   Patient Care Team:  JADE Harrison as PCP - General (Family Nurse Practitioner)  JADE Harrison as PCP - REHABILITATION [facility] Empaneled Provider  MD Moises Wood MD (Pulmonology)    Wt Readings from Last 3 Encounters:   09/16/20 188 lb 9.6 oz (85.5 kg)   09/04/20 192 lb 3.2 oz (87.2 kg)   09/02/20 194 lb 6.4 oz (88.2 kg)     Vitals:    09/16/20 0931   Resp: 24   Temp: 98.2 °F (36.8 °C)   TempSrc: Temporal   Weight: 188 lb 9.6 oz (85.5 kg)   Height: 4' 10\" (1.473 m)     Body mass index is 39.42 kg/m². Based upon direct observation of the patient, evaluation of cognition reveals recent and remote memory intact.     General Appearance: alert and oriented to person, place and time, well developed and well- nourished, in no acute distress  Skin: warm and dry, no rash or erythema  Head: normocephalic and atraumatic  Eyes: pupils equal, round, and reactive to light, extraocular eye movements intact, conjunctivae normal  ENT: tympanic membrane, external ear and ear canal normal bilaterally, nose without deformity, nasal mucosa and turbinates normal without polyps  Neck: supple and non-tender without mass, no thyromegaly or thyroid nodules, no cervical lymphadenopathy  Pulmonary/Chest: clear to auscultation bilaterally- no wheezes, rales or rhonchi, normal air movement, no respiratory distress  Cardiovascular: normal rate, regular rhythm, normal S1 and S2, no murmurs, rubs, clicks, or gallops, distal pulses intact, no carotid bruits  Abdomen: soft, non-tender, non-distended, normal bowel sounds, no masses or organomegaly  Extremities: no cyanosis, clubbing or edema  Musculoskeletal: normal range of motion, no joint swelling, deformity or tenderness  Neurologic: reflexes normal and symmetric, no cranial nerve deficit, gait, coordination and speech normal    Patient's complete Health Risk Assessment and screening values have been reviewed and are found in Flowsheets. The following problems were reviewed today and where indicated follow up appointments were made and/or referrals ordered. Positive Risk Factor Screenings with Interventions:     Fall Risk:  Timed Up and Go Test > 12 seconds? (Complete if either Fall Risk answers are Yes): (!) yes  2 or more falls in past year?: no  Fall with injury in past year?: (!) yes(bruise)  Fall Risk Interventions:    · Home safety tips provided  · Home exercises provided to promote strength and balance    Depression:  PHQ-2 Score: 3  PHQ-9 Total Score: 8    Severity:1-4 = minimal depression, 5-9 = mild depression, 10-14 = moderate depression, 15-19 = moderately severe depression, 20-27 = severe depression  Depression Interventions:  · continues on [address]:  General  In general, how would you say your health is?: (!) Poor  In the past 7 days, have you experienced any of the following? New or Increased Pain, New or Increased Fatigue, Loneliness, Social Isolation, Stress or Anger?: (!) New or Increased Pain, New or Increased Fatigue, Stress  Do you get the social and emotional support that you need?: Yes  Do you have a Living Will?: (!) No  General Health Risk Interventions:  · No Living Will: provided the state-specific advance directive document to the patient    Health Habits/Nutrition:  Health Habits/Nutrition  Do you exercise for at least 20 minutes 2-3 times per week?: (!) No  Have you lost any weight without trying in the past 3 months?: No  Do you eat fewer than 2 meals per day?: No  Have you seen a dentist within the past year?: (!) No  Body mass index is 39.42 kg/m².   Health Habits/Nutrition Interventions:  · getting assistance from [facility] adult     Hearing/Vision:  No exam data present  Hearing/Vision  Do you or your family notice any trouble with your hearing?: (!) Yes(patient was told she needed a hearing aid)  Do you have difficulty driving, watching TV, or doing any of your daily activities because of your eyesight?: No  Have you had an eye exam within the past year?: Yes  Hearing/Vision Interventions:  · has been evaluated and needs a hearing aid    Safety:  Safety  Do you have working smoke detectors?: Yes  Have all throw rugs been removed or fastened?: (!) No  Do you have non-slip mats or surfaces in all bathtubs/showers?: (!) No  Do all of your stairways have a railing or banister?: Yes  Are your doorways, halls and stairs free of clutter?: Yes  Do you always fasten your seatbelt when you are in a car?: (!) No  Safety Interventions:  · Home safety tips provided    ADL:  ADLs  In the past 7 days, did you need help from others to perform any of the following everyday activities? Eating, dressing, grooming, bathing, toileting, or walking/balance?: None  In the past 7 days, did you need help from others to take care of any of the following?  Laundry, housekeeping, banking/finances, shopping, telephone use, food preparation, transportation, or taking medications?: [employer], Housekeeping, Transportation  ADL Interventions:  · she gets transportation from [employer]  Maintenance Status  Immunization History   Administered Date(s) Administered    Hepatitis A Adult (Havrix, Vaqta) 03/28/2019    Influenza 10/17/2012, 11/15/2013    Influenza Virus Vaccine 09/25/2014, 09/17/2015, 10/10/2017    Influenza, High Dose (Fluzone 65 yrs and older) 10/03/2018    Influenza, Quadv, IM, (6 mo and older Fluzone, Flulaval, Fluarix and 3 yrs and older Afluria) 10/07/2019    Influenza, Quadv, IM, PF (6 mo and older Fluzone, Flulaval, Fluarix, and 3 yrs and older Afluria) 10/12/2016    Pneumococcal Conjugate 13-valent Kadyzack Toribio) 04/21/2017    Pneumococcal Conjugate 7-valent (Prevnar7) 09/01/2011    Pneumococcal Polysaccharide ([id number]) 09/01/2011, 10/24/2018    Tdap (Boostrix, Adacel) 09/01/2011        Health Maintenance   Topic Date Due    Hepatitis C screen  1950    Shingles Vaccine (1 of 2) 06/14/2000    DEXA (modify frequency per FRAX score)  06/14/2005    Annual Wellness Visit (AWV)  05/29/2019    Diabetic retinal exam  09/04/2019    Flu vaccine (1) 09/01/2020    A1C test (Diabetic or Prediabetic)  10/22/2020    Breast cancer screen  05/30/2021    Diabetic foot exam  06/10/2021    DTaP/Tdap/Td vaccine (2 - Td) 09/01/2021    Lipid screen  09/04/2021    Potassium monitoring  09/04/2021    Creatinine monitoring  09/04/2021    Colon cancer screen colonoscopy  05/23/2024    Pneumococcal 65+ yrs at Risk Vaccine  Completed    Hepatitis A vaccine  Aged Out    Hib vaccine  Aged Out    Meningococcal (ACWY) vaccine  Aged Out     Recommendations for WHOOP Due: see orders and patient instructions/AVS.  . Recommended screening schedule for the next 5-10 years is provided to the patient in written form: see Patient Instructions/AVS.    Radha Kimble was seen today for medicare awv.     Diagnoses and all orders for this visit:    Routine general medical examination at a health care facility    Cysts of both ovaries  -     External Referral To Gynecology    Kidney lesion, native, left  -     External Referral To Urology    Type 2 diabetes mellitus with stage 3 chronic kidney disease, with long-term current use of insulin ([address]    Essential hypertension    Familial hypercholesterolemia    Chronic bronchitis, unspecified chronic bronchitis type ([address]    Chronic bilateral low back pain without sciatica

## 2020-09-23 RX ORDER — FUROSEMIDE 40 MG/1
TABLET ORAL
Qty: 60 TABLET | Refills: 5 | Status: ON HOLD | OUTPATIENT
Start: 2020-09-23 | End: 2020-11-19 | Stop reason: SDUPTHER

## 2020-09-24 RX ORDER — ALLOPURINOL 300 MG/1
TABLET ORAL
Qty: 30 TABLET | Refills: 2 | Status: SHIPPED | OUTPATIENT
Start: 2020-09-24

## 2020-09-24 RX ORDER — ICOSAPENT ETHYL 1000 MG/1
CAPSULE ORAL
Qty: 60 CAPSULE | Refills: 3 | Status: SHIPPED | OUTPATIENT
Start: 2020-09-24

## 2020-09-24 RX ORDER — METOLAZONE 5 MG/1
5 TABLET ORAL DAILY PRN
Qty: 30 TABLET | Refills: 3 | Status: SHIPPED | OUTPATIENT
Start: 2020-09-24

## 2020-09-24 RX ORDER — METOPROLOL SUCCINATE 100 MG/1
100 TABLET, EXTENDED RELEASE ORAL DAILY
Qty: 30 TABLET | Refills: 5 | Status: SHIPPED | OUTPATIENT
Start: 2020-09-24

## 2020-09-25 RX ORDER — HYDROCODONE BITARTRATE AND ACETAMINOPHEN 5; 325 MG/1; MG/1
TABLET ORAL
Qty: 30 TABLET | Refills: 0 | Status: SHIPPED | OUTPATIENT
Start: 2020-09-25 | End: 2020-10-23

## 2020-09-28 ENCOUNTER — OFFICE VISIT (OUTPATIENT)
Dept: PRIMARY CARE CLINIC | Age: 70
End: 2020-09-28
Payer: MEDICARE

## 2020-09-28 VITALS
HEART RATE: 70 BPM | OXYGEN SATURATION: 95 % | SYSTOLIC BLOOD PRESSURE: 119 MMHG | DIASTOLIC BLOOD PRESSURE: 50 MMHG | TEMPERATURE: 97.9 F

## 2020-09-28 PROCEDURE — 3023F SPIROM DOC REV: CPT | Performed by: NURSE PRACTITIONER

## 2020-09-28 PROCEDURE — 3017F COLORECTAL CA SCREEN DOC REV: CPT | Performed by: NURSE PRACTITIONER

## 2020-09-28 PROCEDURE — G8427 DOCREV CUR MEDS BY ELIG CLIN: HCPCS | Performed by: NURSE PRACTITIONER

## 2020-09-28 PROCEDURE — 1036F TOBACCO NON-USER: CPT | Performed by: NURSE PRACTITIONER

## 2020-09-28 PROCEDURE — G8417 CALC BMI ABV UP PARAM F/U: HCPCS | Performed by: NURSE PRACTITIONER

## 2020-09-28 PROCEDURE — G8926 SPIRO NO PERF OR DOC: HCPCS | Performed by: NURSE PRACTITIONER

## 2020-09-28 PROCEDURE — 1090F PRES/ABSN URINE INCON ASSESS: CPT | Performed by: NURSE PRACTITIONER

## 2020-09-28 PROCEDURE — 4040F PNEUMOC VAC/ADMIN/RCVD: CPT | Performed by: NURSE PRACTITIONER

## 2020-09-28 PROCEDURE — 1123F ACP DISCUSS/DSCN MKR DOCD: CPT | Performed by: NURSE PRACTITIONER

## 2020-09-28 PROCEDURE — G8400 PT W/DXA NO RESULTS DOC: HCPCS | Performed by: NURSE PRACTITIONER

## 2020-09-28 PROCEDURE — 99213 OFFICE O/P EST LOW 20 MIN: CPT | Performed by: NURSE PRACTITIONER

## 2020-09-28 RX ORDER — ROPINIROLE 0.5 MG/1
TABLET, FILM COATED ORAL
Qty: 60 TABLET | Refills: 3 | Status: SHIPPED | OUTPATIENT
Start: 2020-09-28

## 2020-09-28 RX ORDER — GUAIFENESIN 600 MG/1
600 TABLET, EXTENDED RELEASE ORAL 2 TIMES DAILY
Qty: 30 TABLET | Refills: 0 | Status: SHIPPED | OUTPATIENT
Start: 2020-09-28 | End: 2020-10-12

## 2020-09-28 ASSESSMENT — ENCOUNTER SYMPTOMS
GASTROINTESTINAL NEGATIVE: 1
EYES NEGATIVE: 1
RESPIRATORY NEGATIVE: 1
RHINORRHEA: 0
SINUS PRESSURE: 1
SORE THROAT: 0

## 2020-09-28 NOTE — PROGRESS NOTES
SUBJECTIVE:    Patient ID: Thomas Chandler is a 79 y. o.female. Chief Complaint   Patient presents with    COPD    Injections         HPI:    Pt presented to [facility] this AM and oxygen saturation 85% and RN concerned and sent on to PCP. Pt no distress or complaints. Does report she wears 3L NC at home. Had neb this AM. Exposed to grass yesterday after they mowed and that usually flares up her allergies. Denies SOB, congestion, cough, CP, fevers. Reports she is baseline and no acute issues and needs her injection. Patient's medications, allergies, past medical, surgical, social and family histories were reviewed and updated as appropriate in electronic medical record. Outpatient Medications Marked as Taking for the 9/28/20 encounter (Office Visit) with JADE Fregoso CNP   Medication Sig Dispense Refill    HYDROcodone-acetaminophen (NORCO) 5-325 MG per tablet TAKE ONE TABLET BY MOUTH EVERY DAY AS NEEDED FOR PAIN 30 tablet 0    VASCEPA 1 g CAPS capsule TAKE TWO CAPSULES BY MOUTH TWO TIMES A DAY 60 capsule 3    metOLazone (ZAROXOLYN) 5 MG tablet TAKE 1 TABLET BY MOUTH DAILY AS NEEDED (SWELLING) 30 tablet 3    insulin lispro (HUMALOG) 100 UNIT/ML injection vial INJECT 38 UNITS UNDER THE SKIN THREE TIMES A DAY BEFORE MEALS 30 mL 4    metoprolol succinate (TOPROL XL) 100 MG extended release tablet TAKE 1 TABLET BY MOUTH DAILY 30 tablet 5    allopurinol (ZYLOPRIM) 300 MG tablet TAKE 1/2 TABLET BY MOUTH EVERY DAY 30 tablet 2    furosemide (LASIX) 40 MG tablet TAKE ONE TABLET BY MOUTH TWO TIMES A DAY 60 tablet 5    atorvastatin (LIPITOR) 40 MG tablet Take 40 mg by mouth daily       fenofibrate (TRICOR) 48 MG tablet Take 48 mg by mouth daily       SPIRIVA HANDIHALER 18 MCG inhalation capsule       traMADol (ULTRAM) 50 MG tablet Take 50 mg by mouth every 6 hours as needed for Pain.       tiZANidine (ZANAFLEX) 4 MG tablet Take 1 tablet by mouth 2 times daily as needed (back pain) 60 tablet 0 Inject 150 mg into the skin every 28 days 1 vial 3    OXYGEN Inhale 3 L/min into the lungs continuous      flunisolide (NASALIDE) 25 MCG/ACT (0.025%) SOLN 1 spray       albuterol sulfate HFA (PROAIR HFA) 108 (90 Base) MCG/ACT inhaler Inhale 2 puffs into the lungs every 4 hours as needed for Wheezing 1 Inhaler 5    loratadine (CLARITIN) 10 MG tablet TAKE ONE TABLET BY MOUTH EVERY DAY 30 tablet 4        Review of Systems   Constitutional: Negative. HENT: Positive for congestion and sinus pressure. Negative for ear pain, postnasal drip, rhinorrhea and sore throat. Eyes: Negative. Respiratory: Negative. Cardiovascular: Negative. Gastrointestinal: Negative. Musculoskeletal: Negative. Allergic/Immunologic: Positive for environmental allergies. Neurological: Negative.         Past Medical History:   Diagnosis Date    Anemia     Anxiety     Asthma     Cataract     COPD (chronic obstructive pulmonary disease) ([facility] [address]     DDD (degenerative disc disease), cervical     Depression     Diabetes mellitus type 2     GERD (gastroesophageal reflux disease)     Gout     History of uterine cancer     Hyperlipidemia     Hypertension      Past Surgical History:   Procedure Laterality Date    CATARACT REMOVAL WITH IMPLANT       SECTION  , 1979    x2    COLONOSCOPY  2013    COLONOSCOPY N/A 2019    COLONOSCOPY POLYPECTOMY HOT BIOPSY performed by Belinda Molina MD at [facility]      partial    LUNG SURGERY      tumor removed; left    NECK SURGERY      cyst removed; right     TONSILLECTOMY      UPPER GASTROINTESTINAL ENDOSCOPY N/A 2019    EGD BIOPSY performed by Belinda Molina MD at [facility] ENDOSCOPY     Family History   Problem Relation Age of Onset   Darcy Li Dementia Mother     Hypertension Mother     Hypertension Father     Coronary Art Dis Father     Cancer Father         leukemia      Social History     Tobacco Use   Smoking Status Former Smoker    Packs/day: 1.50    Years: 25.00    Pack years: 45.53    Last attempt to quit: 1997    Years since quittin.7   Smokeless Tobacco Never Used   Tobacco Comment    States son is smoking in her home in one room and bf smokes in the other bedroom. OBJECTIVE:   Wt Readings from Last 3 Encounters:   20 188 lb 9.6 oz (85.5 kg)   20 192 lb 3.2 oz (87.2 kg)   20 194 lb 6.4 oz (88.2 kg)     BP Readings from Last 3 Encounters:   20 (!) 119/50   20 (!) 136/50   20 (!) 122/52       BP (!) 119/50   Pulse 70   Temp 97.9 °F (36.6 °C)   SpO2 95% Comment: rest on 3L. no distress. stable. When ambulating to room, O2 85% then once oxygen NC on and rest, WNL. Physical Exam  Vitals signs and nursing note reviewed. Constitutional:       Appearance: Normal appearance. HENT:      Head: Normocephalic. Mouth/Throat:      Mouth: Mucous membranes are moist.      Pharynx: Oropharynx is clear. Eyes:      Conjunctiva/sclera: Conjunctivae normal.   Neck:      Musculoskeletal: Normal range of motion. Cardiovascular:      Rate and Rhythm: Normal rate. Pulmonary:      Effort: Pulmonary effort is normal. No respiratory distress. Breath sounds: No stridor. Wheezing present. Comments: 3L NC. No distress. Faint expiratory wheezing bilateral.  Musculoskeletal: Normal range of motion. Neurological:      Mental Status: She is alert and oriented to person, place, and time.    Psychiatric:         Mood and Affect: Mood normal.         Behavior: Behavior normal.         Lab Results   Component Value Date     2020    K 4.2 2020    K 3.9 2020    CL 90 2020    CO2 33 2020    GLUCOSE 274 2020    BUN 47 2020    CREATININE 1.8 2020    CALCIUM 10.2 2020    PROT 6.8 2020    LABALBU 4.1 2020    BILITOT <0.2 2020    ALT 17 2020    AST 19 2020       Hemoglobin A1C (%)   Date Value 07/22/2020 9.3 (H)     Microscopic Examination (no units)   Date Value   08/31/2020 YES     LDL Calculated (mg/dL)   Date Value   09/04/2020 see below         Lab Results   Component Value Date    WBC 14.2 09/04/2020    NEUTROABS 12.5 08/31/2020    HGB 11.0 09/04/2020    HCT 38.1 09/04/2020    MCV 98.7 09/04/2020     09/04/2020       Lab Results   Component Value Date    TSH 1.65 09/04/2020         ASSESSMENT/PLAN:     1. Allergic rhinitis, unspecified seasonality, unspecified trigger  2. Uncomplicated asthma, unspecified asthma severity, unspecified whether persistent  3. Chronic bronchitis, unspecified chronic bronchitis type ([facility]    Zolair injection today. Stable. No distress-at baseline. Cont home meds. Add Mucinex. Avoid known allergens. RTC if needed.        Orders Placed This Encounter   Medications    guaiFENesin (MUCINEX) 600 MG extended release tablet     Sig: Take 1 tablet by mouth 2 times daily for 15 days Take with full glass water     Dispense:  30 tablet     Refill:  0

## 2020-09-28 NOTE — PROGRESS NOTES
Chief Complaint   Patient presents with    COPD    Injections     Patient has had an increased shortness of breath for the past few weeks. She also has been feeling very cold for the past few days and is concerned about it. She is scheduled to get a zolair injection today but was concerned about her oxygen level.      Have you seen any other physician or provider since your last visit no    Have you had any other diagnostic tests since your last visit? no    Have you changed or stopped any medications since your last visit? no

## 2020-10-05 RX ORDER — BLOOD SUGAR DIAGNOSTIC
STRIP MISCELLANEOUS
Qty: 100 EACH | Refills: 3 | Status: SHIPPED | OUTPATIENT
Start: 2020-10-05

## 2020-10-05 RX ORDER — TIZANIDINE 4 MG/1
4 TABLET ORAL 2 TIMES DAILY PRN
Qty: 60 TABLET | Refills: 0 | Status: SHIPPED | OUTPATIENT
Start: 2020-10-05 | End: 2020-11-30

## 2020-10-07 RX ORDER — SITAGLIPTIN 100 MG/1
TABLET, FILM COATED ORAL
Qty: 30 TABLET | Refills: 2 | Status: SHIPPED | OUTPATIENT
Start: 2020-10-07

## 2020-10-12 ENCOUNTER — APPOINTMENT (OUTPATIENT)
Dept: GENERAL RADIOLOGY | Facility: HOSPITAL | Age: 70
End: 2020-10-12
Payer: MEDICARE

## 2020-10-12 ENCOUNTER — TELEPHONE (OUTPATIENT)
Dept: PRIMARY CARE CLINIC | Age: 70
End: 2020-10-12

## 2020-10-12 ENCOUNTER — HOSPITAL ENCOUNTER (EMERGENCY)
Facility: HOSPITAL | Age: 70
Discharge: HOME | End: 2020-10-12
Attending: HOSPITALIST
Payer: MEDICARE

## 2020-10-12 VITALS
OXYGEN SATURATION: 96 % | HEIGHT: 58 IN | TEMPERATURE: 98.5 F | HEART RATE: 83 BPM | DIASTOLIC BLOOD PRESSURE: 56 MMHG | RESPIRATION RATE: 20 BRPM | WEIGHT: 190 LBS | SYSTOLIC BLOOD PRESSURE: 136 MMHG | BODY MASS INDEX: 39.88 KG/M2

## 2020-10-12 LAB
A/G RATIO: 1.2 (ref 0.8–2)
ALBUMIN SERPL-MCNC: 3.8 G/DL (ref 3.4–4.8)
ALP BLD-CCNC: 81 U/L (ref 25–100)
ALT SERPL-CCNC: 19 U/L (ref 4–36)
ANION GAP SERPL CALCULATED.3IONS-SCNC: 13 MMOL/L (ref 3–16)
AST SERPL-CCNC: 19 U/L (ref 8–33)
BASOPHILS ABSOLUTE: 0.1 K/UL (ref 0–0.1)
BASOPHILS RELATIVE PERCENT: 0.3 %
BILIRUB SERPL-MCNC: <0.2 MG/DL (ref 0.3–1.2)
BUN BLDV-MCNC: 48 MG/DL (ref 6–20)
CALCIUM SERPL-MCNC: 9.3 MG/DL (ref 8.5–10.5)
CHLORIDE BLD-SCNC: 93 MMOL/L (ref 98–107)
CO2: 34 MMOL/L (ref 20–30)
CREAT SERPL-MCNC: 2 MG/DL (ref 0.4–1.2)
EOSINOPHILS ABSOLUTE: 0.2 K/UL (ref 0–0.4)
EOSINOPHILS RELATIVE PERCENT: 1.1 %
GFR AFRICAN AMERICAN: 30
GFR NON-AFRICAN AMERICAN: 25
GLOBULIN: 3.3 G/DL
GLUCOSE BLD-MCNC: 188 MG/DL (ref 74–106)
HCT VFR BLD CALC: 36.4 % (ref 37–47)
HEMOGLOBIN: 10.7 G/DL (ref 11.5–16.5)
IMMATURE GRANULOCYTES #: 0.1 K/UL
IMMATURE GRANULOCYTES %: 0.4 % (ref 0–5)
LACTIC ACID: 1.9 MMOL/L (ref 0.4–2)
LYMPHOCYTES ABSOLUTE: 2.7 K/UL (ref 1.5–4)
LYMPHOCYTES RELATIVE PERCENT: 16.9 %
MCH RBC QN AUTO: 28.2 PG (ref 27–32)
MCHC RBC AUTO-ENTMCNC: 29.4 G/DL (ref 31–35)
MCV RBC AUTO: 95.8 FL (ref 80–100)
MONOCYTES ABSOLUTE: 0.8 K/UL (ref 0.2–0.8)
MONOCYTES RELATIVE PERCENT: 5.2 %
NEUTROPHILS ABSOLUTE: 12.1 K/UL (ref 2–7.5)
NEUTROPHILS RELATIVE PERCENT: 76.1 %
PDW BLD-RTO: 16.9 % (ref 11–16)
PLATELET # BLD: 403 K/UL (ref 150–400)
PMV BLD AUTO: 10.9 FL (ref 6–10)
POTASSIUM REFLEX MAGNESIUM: 4.1 MMOL/L (ref 3.4–5.1)
PRO-BNP: 206 PG/ML (ref 0–1800)
RAPID INFLUENZA  B AGN: NEGATIVE
RAPID INFLUENZA A AGN: NEGATIVE
RBC # BLD: 3.8 M/UL (ref 3.8–5.8)
SODIUM BLD-SCNC: 140 MMOL/L (ref 136–145)
TOTAL PROTEIN: 7.1 G/DL (ref 6.4–8.3)
TROPONIN: <0.3 NG/ML
WBC # BLD: 15.8 K/UL (ref 4–11)

## 2020-10-12 PROCEDURE — 71045 X-RAY EXAM CHEST 1 VIEW: CPT

## 2020-10-12 PROCEDURE — 85025 COMPLETE CBC W/AUTO DIFF WBC: CPT

## 2020-10-12 PROCEDURE — 36415 COLL VENOUS BLD VENIPUNCTURE: CPT

## 2020-10-12 PROCEDURE — 87040 BLOOD CULTURE FOR BACTERIA: CPT

## 2020-10-12 PROCEDURE — 83605 ASSAY OF LACTIC ACID: CPT

## 2020-10-12 PROCEDURE — 93005 ELECTROCARDIOGRAM TRACING: CPT

## 2020-10-12 PROCEDURE — 83880 ASSAY OF NATRIURETIC PEPTIDE: CPT

## 2020-10-12 PROCEDURE — 84484 ASSAY OF TROPONIN QUANT: CPT

## 2020-10-12 PROCEDURE — 87804 INFLUENZA ASSAY W/OPTIC: CPT

## 2020-10-12 PROCEDURE — 99284 EMERGENCY DEPT VISIT MOD MDM: CPT

## 2020-10-12 PROCEDURE — 80053 COMPREHEN METABOLIC PANEL: CPT

## 2020-10-12 RX ORDER — CEFDINIR 300 MG/1
300 CAPSULE ORAL 2 TIMES DAILY
Qty: 14 CAPSULE | Refills: 0 | Status: SHIPPED | OUTPATIENT
Start: 2020-10-12 | End: 2020-10-12

## 2020-10-12 RX ORDER — PREDNISONE 20 MG/1
40 TABLET ORAL DAILY
Qty: 10 TABLET | Refills: 0 | Status: SHIPPED | OUTPATIENT
Start: 2020-10-12 | End: 2020-10-17

## 2020-10-12 RX ORDER — GUAIFENESIN 600 MG/1
1200 TABLET, EXTENDED RELEASE ORAL 2 TIMES DAILY
Qty: 28 TABLET | Refills: 0 | Status: SHIPPED | OUTPATIENT
Start: 2020-10-12 | End: 2020-10-19

## 2020-10-12 RX ORDER — ROFLUMILAST 500 UG/1
TABLET ORAL
Qty: 30 TABLET | Refills: 2 | Status: SHIPPED | OUTPATIENT
Start: 2020-10-12

## 2020-10-12 RX ORDER — IPRATROPIUM BROMIDE AND ALBUTEROL SULFATE 2.5; .5 MG/3ML; MG/3ML
1 SOLUTION RESPIRATORY (INHALATION) ONCE
Status: DISCONTINUED | OUTPATIENT
Start: 2020-10-12 | End: 2020-10-12 | Stop reason: HOSPADM

## 2020-10-12 ASSESSMENT — PAIN DESCRIPTION - ORIENTATION: ORIENTATION: LOWER

## 2020-10-12 ASSESSMENT — PAIN SCALES - GENERAL: PAINLEVEL_OUTOF10: 3

## 2020-10-12 ASSESSMENT — PAIN DESCRIPTION - LOCATION: LOCATION: BACK

## 2020-10-12 ASSESSMENT — PAIN DESCRIPTION - DESCRIPTORS: DESCRIPTORS: ACHING

## 2020-10-12 ASSESSMENT — PAIN DESCRIPTION - PAIN TYPE: TYPE: CHRONIC PAIN

## 2020-10-12 NOTE — ED NOTES
[facility] adult  contacted at this time for transport      Jorge King, RN  10/12/20 0185

## 2020-10-12 NOTE — ED TRIAGE NOTES
Pt arrived with complaints of SOA, pt has history of COPD,   Reports SpO2 was noted to be low at [facility] [place], operated by [facility]  and was sent to ED. Pt reports that she wears 3LNC all the time. Pt is on 3LNC at this time. Pt has mild labored breathing, reports Dyspnea at rest. Productive cough.

## 2020-10-12 NOTE — TELEPHONE ENCOUNTER
Preston Limon at [employer] called stating that pts mucous and cough is getting worse since last visit.  Asking if you can send her antibx or if she needs to be seen again [phone]

## 2020-10-12 NOTE — ED NOTES
Pt left ED in wheelchair at this time. [facility] here to pick patient up, pt verbalized understanding of discharge instructions and prescriptions.        Raciel Bahena RN  10/12/20 3887

## 2020-10-12 NOTE — TELEPHONE ENCOUNTER
Rx sent for cefdinir BID x 7 days. Can take OTC delsym cough med BID PRN too. Schedule appointment if not better or worsening S&S. Start antibiotic today. Thanks.

## 2020-10-12 NOTE — ED PROVIDER NOTES
[facility]      Pt Name: Sarabjit Iqbal  MRN: [id number]  YOB: 1950  Date of evaluation: 10/12/2020  Provider: Frances Almonte, [facility]       Chief Complaint   Patient presents with    Shortness of Breath         HISTORY OF PRESENT ILLNESS  (Location/Symptom, Timing/Onset, Context/Setting, Quality, Duration, Modifying Factors, Severity.)   Sarabjit Iqbal is a 79 y.o. female who presents to the emergency department for shortness of breath and cough. Patient states that she has had the symptoms for about a week. While she was at [facility] today she states that her oxygen saturation dropped a little and became short of breath. They sent her here to the emergency department for evaluation. Patient is chronically on 3 L nasal cannula. Patient denies any fevers or chills. Denies any sore throat. Denies any chest pain but states that she has felt like she has been tight in her chest along with the shortness of breath for the past week. She denies any abdominal pain. Denies any nausea vomiting or diarrhea. Denies any numbness tingling weakness of extremities out of ordinary. Denies any swelling of the ordinary. Denies any lightheadedness or dizziness. Denies any syncopal or presyncopal episodes. Patient states that she has had a cough with it and has been getting up some sputum. She has been using Mucinex at home to help with this. She states at times she coughs so much that it does make her chest sore. Denies any sick contacts that she is aware of but she [facility] go to [facility]  area. She denies any concern for COVID-19. Nursing notes were reviewed.     REVIEW OFSYSTEMS    (2-9 systems for level 4, 10 or more for level 5)   ROS:  General:  No fevers, no chills, no weakness  Cardiovascular:  +chest pain/tightness, no palpitations  Respiratory:  + shortness of breath, +cough, +wheezing  Gastrointestinal:  No pain, no nausea, no vomiting, no diarrhea  Musculoskeletal:  No muscle pain, no joint pain  Skin:  No rash, no easy bruising  Neurologic:  No speech problems, no headache, no extremity weakness  Psychiatric:  No anxiety  Genitourinary:  No dysuria, no hematuria    Except as noted above the remainder of the review of systems was reviewed and negative.        PAST MEDICAL HISTORY     Past Medical History:   Diagnosis Date    Anemia     Anxiety     Asthma     Cataract     COPD (chronic obstructive pulmonary disease) ([address]     DDD (degenerative disc disease), cervical     Depression     Diabetes mellitus type 2     GERD (gastroesophageal reflux disease)     Gout     History of uterine cancer     Hyperlipidemia     Hypertension          SURGICAL HISTORY       Past Surgical History:   Procedure Laterality Date    CATARACT REMOVAL WITH IMPLANT  2005   [address]    x2    COLONOSCOPY  05/01/2013    COLONOSCOPY N/A 5/23/2019    COLONOSCOPY POLYPECTOMY HOT BIOPSY performed by Clifford [facility] at [facility]      partial    LUNG SURGERY      tumor removed; left    NECK SURGERY      cyst removed; right     TONSILLECTOMY  1956    UPPER GASTROINTESTINAL ENDOSCOPY N/A 5/23/2019    EGD BIOPSY performed by Clifford Allen MD at [address] [place] [address]       Previous Medications    ALBUTEROL SULFATE HFA (PROAIR HFA) 108 (90 BASE) MCG/ACT INHALER    Inhale 2 puffs into the lungs every 4 hours as needed for Wheezing    ALLOPURINOL (ZYLOPRIM) 300 MG TABLET    TAKE 1/2 TABLET BY MOUTH EVERY DAY    ASPIRIN LOW DOSE 81 MG EC TABLET    TAKE ONE TABLET BY MOUTH EVERY DAY    ATORVASTATIN (LIPITOR) 40 MG TABLET    Take 40 mg by mouth daily     BD PEN NEEDLE JUAN CARLOS U/F 32G X 4 MM MISC    USE WITH LANTUS ONCE DAILY    BLOOD GLUCOSE TEST STRIPS (TRUE METRIX PRO BLOOD GLUCOSE) STRIP    USE TO BLOOD SUGAR FOUR TIMES A DAY    BUSPIRONE (BUSPAR) 10 MG TABLET    TAKE ONE TABLET BY MOUTH THREE TIMES A DAY    DALIRESP 500 MCG TABLET    TAKE ONE TABLET BY MOUTH EVERY DAY    DILTIAZEM (CARDIZEM CD) 180 MG EXTENDED RELEASE CAPSULE    TAKE 1 CAPSULE BY MOUTH DAILY    DOCUSATE SODIUM (COLACE) 250 MG CAPSULE    Take 1 capsule by mouth daily    ELIQUIS 5 MG TABS TABLET    TAKE 1 TABLET BY MOUTH EVERY 12 HOURS    EZETIMIBE (ZETIA) 10 MG TABLET    TAKE ONE TABLET BY MOUTH EVERY DAY    FENOFIBRATE (TRICOR) 48 MG TABLET    Take 48 mg by mouth daily     FLUNISOLIDE (NASALIDE) 25 MCG/ACT (0.025%) SOLN    1 spray     FLUTICASONE-SALMETEROL (ADVAIR) 500-50 MCG/DOSE DISKUS INHALER    Inhale 1 puff into the lungs every 12 hours    FUROSEMIDE (LASIX) 40 MG TABLET    TAKE ONE TABLET BY MOUTH TWO TIMES A DAY    GNP VITAMIN D MAXIMUM STRENGTH 50 MCG (2000 UT) TABS    Take 1 tablet by mouth daily     HYDROCODONE-ACETAMINOPHEN (NORCO) 5-325 MG PER TABLET    TAKE ONE TABLET BY MOUTH EVERY DAY AS NEEDED FOR PAIN    INSULIN DEGLUDEC (TRESIBA) 100 UNIT/ML SOLN    35 units in the am and 80 in the evening.     INSULIN LISPRO (HUMALOG) 100 UNIT/ML INJECTION VIAL    INJECT 38 UNITS UNDER THE SKIN THREE TIMES A DAY BEFORE MEALS    INSULIN SYRINGE-NEEDLE U-100 31G X 5/16\" 1 ML MISC    USE FOR INSULIN INJECTIONS FIVE TIMES DAILY    IPRATROPIUM-ALBUTEROL (DUONEB) 0.5-2.5 (3) MG/3ML SOLN NEBULIZER SOLUTION    Inhale 3 mLs into the lungs every 4 hours    JANUVIA 100 MG TABLET    TAKE ONE TABLET BY MOUTH EVERY DAY    LORATADINE (CLARITIN) 10 MG TABLET    TAKE ONE TABLET BY MOUTH EVERY DAY    METOLAZONE (ZAROXOLYN) 5 MG TABLET    TAKE 1 TABLET BY MOUTH DAILY AS NEEDED (SWELLING)    METOPROLOL SUCCINATE (TOPROL XL) 100 MG EXTENDED RELEASE TABLET    TAKE 1 TABLET BY MOUTH DAILY    MONTELUKAST (SINGULAIR) 10 MG TABLET    TAKE ONE TABLET BY MOUTH EVERY DAY    OMALIZUMAB (OMALIZUMAB) 150 MG INJECTION    Inject 150 mg into the skin every 28 days    OXYGEN    Inhale 3 L/min into the lungs continuous PANTOPRAZOLE (PROTONIX) 40 MG TABLET    TAKE ONE TABLET BY MOUTH EVERY DAY    PATADAY 0.2 % SOLN OPHTHALMIC SOLUTION    PLACE 1 DROP INTO BOTH EYES 2 TIMES DAILY    POTASSIUM CHLORIDE (KLOR-CON M) 20 MEQ EXTENDED RELEASE TABLET    TAKE ONE TABLET BY MOUTH EVERY DAY    ROPINIROLE (REQUIP) 0.5 MG TABLET    TAKE ONE TABLET BY MOUTH TWO TIMES A DAY    SPIRIVA HANDIHALER 18 MCG INHALATION CAPSULE        SUCRALFATE (CARAFATE) 1 GM TABLET    TAKE ONE TABLET BY MOUTH THREE TIMES A DAY    TIZANIDINE (ZANAFLEX) 4 MG TABLET    TAKE 1 TABLET BY MOUTH 2 TIMES DAILY AS NEEDED (BACK PAIN)    TRAMADOL (ULTRAM) 50 MG TABLET    TAKE ONE TABLET BY MOUTH THREE TIMES A DAY AS NEEDED    VASCEPA 1 G CAPS CAPSULE    TAKE TWO CAPSULES BY MOUTH TWO TIMES A DAY       ALLERGIES     Shellfish-derived products and Wheat bran    FAMILY HISTORY       Family History   Problem Relation Age of Onset    Dementia Mother     Hypertension Mother     Hypertension Father     Coronary Art Dis Father     Cancer Father         leukemia          SOCIAL HISTORY       Social History     Socioeconomic History    Marital status:      Spouse name: None    Number of children: None    Years of education: None    Highest education level: None   Occupational History    None   Social Needs    Financial resource strain: Not hard at all   [employer] insecurity     Worry: Never true     Inability: Never true    Transportation needs     Medical: No     Non-medical: No   Tobacco Use    Smoking status: Former Smoker     Packs/day: 1.50     Years: 25.00     Pack years: 37.50     Last attempt to quit: 1997     Years since quittin.7    Smokeless tobacco: Never Used    Tobacco comment: States son is smoking in her home in one room and bf smokes in the other bedroom.    Substance and Sexual Activity    Alcohol use: No    Drug use: No    Sexual activity: None   Lifestyle    Physical activity     Days per week: None     Minutes per session: None    pattern. Incomplete right bundle branch block. Left anterior fascicular block. Ventricular rates 80 bpm, AR intervals 172 ms, QRS durations 102 ms, QT/QTc is 400/461 ms. No acute T wave inversions concerning for acute myocardial ischemia. No ST elevations concerning for acute myocardial infarction. RADIOLOGY:   Non-plain film images such as CT, Ultrasound and MRI are read by the radiologist. Plain radiographic images are visualized and preliminarily interpreted by the emergency physician with the below findings:      ? Radiologist's Report Reviewed:  XR CHEST PORTABLE   Final Result   1. Mild prominence of the lung markings. No localized consolidation or pleural effusion.             ED BEDSIDE ULTRASOUND:   Performed by ED Physician - none    LABS:    I have reviewed and interpreted all of the currently available lab results from this visit (ifapplicable):  Results for orders placed or performed during the hospital encounter of 10/12/20   Rapid Influenza A/B Antigens    Specimen: Nasopharyngeal   Result Value Ref Range    Rapid Influenza A Ag Negative Negative    Rapid Influenza B Ag Negative Negative   CBC Auto Differential   Result Value Ref Range    WBC 15.8 (H) 4.0 - 11.0 K/uL    RBC 3.80 3.80 - 5.80 M/uL    Hemoglobin 10.7 (L) 11.5 - 16.5 g/dL    Hematocrit 36.4 (L) 37.0 - 47.0 %    MCV 95.8 80.0 - 100.0 fL    MCH 28.2 27.0 - 32.0 pg    MCHC 29.4 (L) 31.0 - 35.0 g/dL    RDW 16.9 (H) 11.0 - 16.0 %    Platelets 613 (H) 264 - 400 K/uL    MPV 10.9 (H) 6.0 - 10.0 fL    Neutrophils % 76.1 %    Immature Granulocytes % 0.4 0.0 - 5.0 %    Lymphocytes % 16.9 %    Monocytes % 5.2 %    Eosinophils % 1.1 %    Basophils % 0.3 %    Neutrophils Absolute 12.1 (H) 2.0 - 7.5 K/uL    Immature Granulocytes # 0.1 K/uL    Lymphocytes Absolute 2.7 1.5 - 4.0 K/uL    Monocytes Absolute 0.8 0.2 - 0.8 K/uL    Eosinophils Absolute 0.2 0.0 - 0.4 K/uL    Basophils Absolute 0.1 0.0 - 0.1 K/uL   Comprehensive Metabolic Panel w/ Reflex to MG   Result Value Ref Range    Sodium 140 136 - 145 mmol/L    Potassium reflex Magnesium 4.1 3.4 - 5.1 mmol/L    Chloride 93 (L) 98 - 107 mmol/L    CO2 34 (H) 20 - 30 mmol/L    Anion Gap 13 3 - 16    Glucose 188 (H) 74 - 106 mg/dL    BUN 48 (H) 6 - 20 mg/dL    CREATININE 2.0 (H) 0.4 - 1.2 mg/dL    GFR Non-African American 25 (L) >59    GFR  30 (L) >59    Calcium 9.3 8.5 - 10.5 mg/dL    Total Protein 7.1 6.4 - 8.3 g/dL    Alb 3.8 3.4 - 4.8 g/dL    Albumin/Globulin Ratio 1.2 0.8 - 2.0    Total Bilirubin <0.2 (L) 0.3 - 1.2 mg/dL    Alkaline Phosphatase 81 25 - 100 U/L    ALT 19 4 - 36 U/L    AST 19 8 - 33 U/L    Globulin 3.3 g/dL   Troponin   Result Value Ref Range    Troponin <0.30 <0.30 ng/mL   Brain Natriuretic Peptide   Result Value Ref Range    Pro- 0 - 1,800 pg/mL   Lactic Acid, Plasma   Result Value Ref Range    Lactic Acid 1.9 0.4 - 2.0 mmol/L        All other labs were within normal range or not returned as of this dictation. EMERGENCY DEPARTMENT COURSE and DIFFERENTIAL DIAGNOSIS/MDM:   Vitals:    Vitals:    10/12/20 1211 10/12/20 1216   BP:  (!) 126/53   Pulse:  74   Resp:  18   Temp: 98.6 °F (37 °C)    TempSrc: Oral    SpO2:  99%   Weight:  190 lb (86.2 kg)   Height:  4' 10\" (1.473 m)       MEDICATIONS ADMINISTERED IN ED:  Medications   ipratropium-albuterol (DUONEB) nebulizer solution 1 ampule (has no administration in time range)       After initial evaluation and examination I did have a conversation with the patient about upcoming plan, treatment and possible disposition which they were agreeable to the time this dictation. Patient advised that we would ever a DuoNeb here. We will also perform a portable chest radiograph for cough and shortness of breath. Patient will have influenza swab performed, CBC, CMP, troponin, BNP, lactic acid, blood cultures x2 and EKG. Patient was offered COVID-19 test but declined at this time.   Patient states that she feels much better now than she did earlier today. Her pulse ox is 98% on her normal 3 L nasal cannula. She is resting comfortably stretcher no acute distress. Nontoxic-appearing. Conversation and cooperative with staff. Laughing and joking. Influenza swab was negative    Blood work showed white count 15,800, hemoglobin 10.7, hematocrit 36.4, platelet count is 715. Comprehensive metabolic panel was benign except for chloride slightly low at 93, glucose of 188 mildly elevated, BUN of 48 and a creatinine of 2.0. Troponin was nondetectable less than 0.30. proBNP is negative at 206. Lactic acid normal at 1.9. Previous laboratory study back on 9/4/2020 showed a BUN of 47 and a creatinine of 1.8. So she is around her baseline. Portable chest radiograph read by radiology as mild prominence of the lung markings. No localized consolidation or pleural effusion. Patient's radiological diagnostic studies were discussed with her she does state her understanding. She advised that her work-up here was benign. I do believe she stable to be discharged home her pulse ox is never dropped to the entire time that she has been here on her normal 3 L nasal cannula. We will write her prescription for some oral steroids and some Mucinex to help with her symptoms but otherwise discharged home. Patient is agreeable to this plan. The patient advised that she does need to follow-up with her regular family physician within the next 1 to 2 days reevaluation. Patient was also given instructions that if her symptoms worsens or new symptoms arise she should return back to emergency department for further evaluation work-up. CONSULTS:  None    PROCEDURES:  Procedures    CRITICAL CARE TIME    Total Critical Care time was 0 minutes, excluding separately reportable procedures.    There was a high probability of clinically significant/life threatening deterioration in the patient's condition which required my urgent intervention. FINAL IMPRESSION      1. COPD exacerbation [facility])          DISPOSITION/PLAN   DISPOSITION        PATIENT REFERRED TO:  Minh Brooks, APRN  [address] [facility]  [phone]    In 2 days      [facility] Emergency Department  [facility] [address] [facility]  [phone]    As needed, If symptoms worsen      DISCHARGE MEDICATIONS:  New Prescriptions    GUAIFENESIN (MUCINEX) 600 MG EXTENDED RELEASE TABLET    Take 2 tablets by mouth 2 times daily for 7 days    PREDNISONE (DELTASONE) 20 MG TABLET    Take 2 tablets by mouth daily for 5 days       Comment: Please note this report has been produced using speech recognition software and may contain errorsrelated to that system including errors in grammar, punctuation, and spelling, as well as words and phrases that may be inappropriate. If there are any questions or concerns please feel free to contact the dictating providerfor clarification.     Teresa Sanford DO  Attending Emergency Physician              Teresa Sanford DO  10/12/20 4897

## 2020-10-14 ENCOUNTER — TELEPHONE (OUTPATIENT)
Dept: PRIMARY CARE CLINIC | Age: 70
End: 2020-10-14

## 2020-10-14 NOTE — TELEPHONE ENCOUNTER
Sonny Carreno at [employer] called to report that pts sugar is running in the 400's r/t Prednisone tx.  Asking if you want to increase insulin dose this week

## 2020-10-15 NOTE — PROGRESS NOTES
Chief Complaint  Renal lesion    Referring Provider:  Luz Prater, SO*    HPI  Ms. Guidry is a 70 y.o. female with history of CHF, CKD, type 2 diabetes mellitus who presents with small left lower pole renal lesion.    She had a CT of the abdomen performed without contrast due to her CKD 4 abdominal pain.  She denies any pain today.  A small left lower pole 8 mm possible hyperdense renal cyst was seen.  She denies any fevers, gross hematuria, flank pain.  There is a tiny calcification on her CT as well.  He denies any urinary incontinence.  She states she has urinary urgency and frequency when she takes her Lasix.    Past Medical History  Past Medical History:   Diagnosis Date   • Acid reflux 10/17/2016   • Asthma     bronchial   • Cancer (CMS/[facility])     uterine   • Chronic diastolic heart failure (CMS/[facility]) 10/17/2016    Normal dobutamine echocardiogram stress, 2005. Echocardiogram on 2009:  EF 50% to 55%. Left atrium 3.5 cm.   • Chronic obstructive pulmonary disease (CMS/[facility]) 10/17/2016    asthmatic bronchitis, on O2 use chronically.     • Gout 10/17/2016   • High cholesterol 2018   • Hypertension 10/17/2016    Benign hypertension.   • Lower extremity edema 10/17/2016    Chronic lower extremity edema/venous insufficiency.   • Murmur, heart    • Obesity 10/17/2016   • Renal failure     stageIV   • Seasonal allergies 10/17/2016   • Type 2 diabetes mellitus (CMS/[facility]) 10/17/2016    insulin dependent.       Past Surgical History  Past Surgical History:   Procedure Laterality Date   • BRONCHOSCOPY N/A 2018    Procedure: BRONCHOSCOPY WITH WASHINGS;  Surgeon: Ryder Dahl MD;  Location: [facility] OR;  Service:    • CARDIAC CATHETERIZATION N/A 3/17/2020    Procedure: Coronary angiography;  Surgeon: Elkin Zhou MD;  Location: [facility] CATH INVASIVE LOCATION;  Service: Cardiology;  Laterality: N/A;   •  SECTION     • COLONOSCOPY     • COLONOSCOPY N/A 2019    Procedure:  COLONOSCOPY;  Surgeon: Skinny York MD;  Location: [facility] ENDOSCOPY;  Service: General   • CYSTECTOMY Right     neck   • DILATATION AND CURETTAGE     • ENDOSCOPY N/A 6/10/2019    Procedure: ESOPHAGOGASTRODUODENOSCOPY WITH BIOPSY;  Surgeon: Skinny York MD;  Location: [facility] ENDOSCOPY;  Service: Gastroenterology   • EYE SURGERY Bilateral 2005    cataract   • FOREIGN BODY REMOVAL N/A 3/5/2020    Procedure: BRONCHOSCOPY with MAC and removal of foreign object;  Surgeon: Ryder Dahl MD;  Location: [facility] OR;  Service: Pulmonary;  Laterality: N/A;   • HYSTERECTOMY     • LUNG SURGERY Left 1997 4 tumors removed from left lung-benign   • TONSILLECTOMY AND ADENOIDECTOMY         Medications    Current Outpatient Medications:   •  ADVAIR DISKUS 500-50 MCG/DOSE DISKUS, INHALE 1 PUFF BY MOUTH TWO TIMES A DAY, Disp: 60 each, Rfl: 5  •  allopurinol (ZYLOPRIM) 300 MG tablet, Take 300 mg by mouth Daily., Disp: , Rfl:   •  apixaban (ELIQUIS) 5 MG tablet tablet, Take 1 tablet by mouth Every 12 (Twelve) Hours., Disp: 60 tablet, Rfl: 0  •  ASPIRIN LOW DOSE 81 MG EC tablet, Take 81 mg by mouth Daily., Disp: , Rfl: 5  •  atorvastatin (LIPITOR) 40 MG tablet, Take 1 tablet by mouth Daily., Disp: 30 tablet, Rfl: 10  •  Blood Glucose Monitoring Suppl device, 1 each., Disp: , Rfl:   •  busPIRone (BUSPAR) 10 MG tablet, Take 10 mg by mouth 3 (Three) Times a Day., Disp: , Rfl:   •  DALIRESP 500 MCG tablet tablet, TAKE ONE TABLET BY MOUTH EVERY DAY, Disp: 30 tablet, Rfl: 5  •  dilTIAZem CD (CARDIZEM CD) 180 MG 24 hr capsule, Take 1 capsule by mouth Daily., Disp: 30 capsule, Rfl: 0  •  docusate sodium (COLACE) 250 MG capsule, Take 250 mg by mouth Daily., Disp: , Rfl:   •  ezetimibe (Zetia) 10 MG tablet, Take 10 mg by mouth Daily., Disp: , Rfl:   •  fenofibrate (TRICOR) 48 MG tablet, Take 48 mg by mouth Daily., Disp: , Rfl:   •  FLUTICASONE PROPIONATE, INHAL, IN, Inhale 2 (two) times a day., Disp: , Rfl:   •  furosemide (LASIX) 40  "MG tablet, Take 40 mg by mouth Daily., Disp: , Rfl:   •  Ginger, Zingiber officinalis, (GINGER ROOT) 500 MG capsule, Take  by mouth Daily., Disp: , Rfl:   •  glucose blood (True Metrix Pro Blood Glucose) test strip, USE TO BLOOD SUGAR FOUR TIMES A DAY, Disp: , Rfl:   •  GNP VITAMIN D MAXIMUM STRENGTH 50 MCG (2000 UT) tablet, Take 1 tablet by mouth Daily., Disp: , Rfl: 5  •  guaiFENesin (MUCINEX) 600 MG 12 hr tablet, Take 1,200 mg by mouth., Disp: , Rfl:   •  HUMALOG 100 UNIT/ML injection, Inject 35 Units under the skin into the appropriate area as directed 3 (Three) Times a Day., Disp: , Rfl:   •  HYDROcodone-acetaminophen (NORCO) 5-325 MG per tablet, Take 1 tablet by mouth Every Night., Disp: , Rfl:   •  icosapent ethyl (Vascepa) 1 g capsule capsule, Take 2 g by mouth 2 (Two) Times a Day With Meals., Disp: , Rfl:   •  Insulin Pen Needle (BD Pen Needle Valerie U/F) 32G X 4 MM misc, USE WITH LANTUS ONCE DAILY, Disp: , Rfl:   •  Insulin Syringe-Needle U-100 31G X 5/16\" 1 ML misc, USE FOR INSULIN INJECTIONS FIVE TIMES DAILY, Disp: , Rfl:   •  metoclopramide (REGLAN) 5 MG tablet, , Disp: , Rfl:   •  metolazone (ZAROXOLYN) 5 MG tablet, Take 5 mg by mouth Daily As Needed., Disp: , Rfl:   •  metoprolol succinate XL (TOPROL-XL) 100 MG 24 hr tablet, Take 1 tablet by mouth Daily., Disp: 30 tablet, Rfl: 0  •  montelukast (SINGULAIR) 10 MG tablet, Take 10 mg by mouth Every Night., Disp: , Rfl:   •  nystatin (MYCOSTATIN) 141996 UNIT/ML suspension, , Disp: , Rfl:   •  O2 (OXYGEN), Inhale 3 L/min Daily., Disp: , Rfl:   •  omalizumab (XOLAIR) 150 MG injection, Inject 300 mg under the skin into the appropriate area as directed Every 28 (Twenty-Eight) Days. 2 injections once a month, Disp: , Rfl:   •  pantoprazole (PROTONIX) 40 MG EC tablet, Take 40 mg by mouth Daily., Disp: , Rfl:   •  PATADAY 0.2 % solution ophthalmic solution, Administer  to both eyes 2 (Two) Times a Day., Disp: , Rfl: 3  •  potassium chloride (K-DUR,KLOR-CON) 20 MEQ " CR tablet, Take 20 mEq by mouth Daily., Disp: , Rfl: 2  •  predniSONE (DELTASONE) 20 MG tablet, Take 40 mg by mouth., Disp: , Rfl:   •  rOPINIRole (REQUIP) 0.5 MG tablet, Take 0.5 mg by mouth 2 (Two) Times a Day., Disp: , Rfl: 0  •  sitaGLIPtin (JANUVIA) 100 MG tablet, Take 100 mg by mouth Daily. 1/2 tablet daily, Disp: , Rfl:   •  SPIRIVA HANDIHALER 18 MCG per inhalation capsule, PLACE 1 CAPSULE INTO INHALER AND INHALE DAILY., Disp: 30 capsule, Rfl: 4  •  sucralfate (CARAFATE) 1 G tablet, Take 1 g by mouth 3 (Three) Times a Day., Disp: , Rfl:   •  tiZANidine (ZANAFLEX) 4 MG tablet, Take 4 mg by mouth., Disp: , Rfl:   •  traMADol (ULTRAM) 50 MG tablet, Take 50 mg by mouth 3 (Three) Times a Day., Disp: , Rfl:   •  TRESIBA FLEXTOUCH 100 UNIT/ML solution pen-injector injection, 2 (Two) Times a Day. 35 units in the am 80 units at night, Disp: , Rfl: 10    Allergies  Allergies   Allergen Reactions   • Shellfish-Derived Products Hives   • Wheat Bran Hives       Social History  Social History     Socioeconomic History   • Marital status:      Spouse name: Not on file   • Number of children: Not on file   • Years of education: Not on file   • Highest education level: Not on file   Tobacco Use   • Smoking status: Former Smoker     Packs/day: 1.00     Years: 35.00     Pack years: 35.00     Types: Cigarettes     Quit date:      Years since quittin.8   • Smokeless tobacco: Never Used   Substance and Sexual Activity   • Alcohol use: No   • Drug use: No   • Sexual activity: Defer       Family History  She has no family history of bladder or kidney cancer    Review of Systems  Constitutional: No fevers or chills  Skin: Negative for rash  Endocrine: No heat/cold intolerance   Cardiovascular: Negative for chest pain or dyspnea on exertion  Respiratory: Negative for shortness of breath or wheezing  Gastrointestinal: No constipation, nausea or vomiting  Genitourinary: Negative for new lower urinary tract symptoms,  "gross hematuria or dysuria.  Musculoskeletal: No flank pain  Neurological:  Negative for frequent headaches or dizziness  Lymph/Heme: Negative for leg swelling or calf pain.    Physical Exam  Visit Vitals  Pulse 88   Temp 97.9 °F (36.6 °C)   Ht 147.3 cm (58\")   Wt 86.6 kg (191 lb)   LMP  (LMP Unknown)   SpO2 96%   BMI 39.92 kg/m²     Constitutional: NAD, WDWN  HEENT: NCAT. Conjunctivae normal  MMM  Cardiovascular: Regular rate  Pulmonary/Chest: Respirations are even and non-labored bilaterally  Abdominal: Soft with no distension, tenderness, masses, guarding or CVA tenderness  Neurological: A + O x 3,  cranial nerves II-XII grossly intact  Extremities: LAUREN x 4, warm, no clubbing, no cyanosis  Skin: Pink, warm, dry with no rash  Psychiatric:  Normal mood and affect    Labs  No results found for: URINECX    Brief Urine Lab Results  (Last result in the past 365 days)      Color   Clarity   Blood   Leuk Est   Nitrite   Protein   CREAT   Urine HCG        08/31/20 1305 Yellow Clear TRACE-INTACT SMALL Negative                 Lab Results   Component Value Date    GLUCOSE 304 (H) 07/01/2020    CALCIUM 10.3 07/01/2020     07/01/2020    K 4.2 07/01/2020    CO2 31.0 (H) 07/01/2020    CL 90 (L) 07/01/2020    BUN 37 (H) 07/01/2020    CREATININE 1.82 (H) 07/01/2020    EGFRIFNONA 27 (L) 07/01/2020    BCR 20.3 07/01/2020    ANIONGAP 17.0 (H) 07/01/2020       Lab Results   Component Value Date    WBC 15.8 (H) 10/12/2020    HGB 10.7 (L) 10/12/2020    HCT 36.4 (L) 10/12/2020    MCV 95.8 10/12/2020     (H) 10/12/2020         Radiographic Studies    EXAM: CT ABDOMEN AND PELVIS WITHOUT CONTRAST    INDICATION: abdominal pain, pain    COMPARISON: None.    TECHNIQUE: Axial CT imaging obtained from lung bases through pelvis. Axial images and multiplanar reformatted images are provided for review.   Individualized dose optimization technique was used in order to meet ALARA standards for radiation dose   reduction.  In addition to " vendor specific dose reduction algorithms, the dose reduction techniques vary based on the specific scanner utilized but frequently include automated exposure control, adjustment of the mA and/or kV according to patient size,   and use of iterative reconstruction technique.    IV Contrast: None   Oral Contrast: None    FINDINGS:    LUNG BASES: Mild groundglass opacities within the lower lobes may relate to pneumonitis or edema. No lobar consolidation.    LIVER: Limited evaluation due to lack of intravenous contrast. No focal lesions.    GALLBLADDER AND BILIARY DUCTS: No calcified gallstones. Not distended.  No intra- or extrahepatic biliary dilatation.    PANCREAS: Normal.    SPLEEN: Normal.    ADRENAL GLANDS: Nodule left adrenal gland axial image 22 measures 10 x 10 mm with CT numbers of 5 Hounsfield units likely relates to adenoma functioning or nonfunctioning though indeterminate. Right adrenal glands unremarkable.    KIDNEYS AND URETERS: Exophytic hyperdense lesion inferior pole left kidney axial image 41 measures 8 x 8 mm with CT numbers of 40 Hounsfield units above fluid attenuation indeterminate. Findings may relate to hyperdense cyst or neoplasm. Follow-up   recommended.  2 adjacent punctate 2 mm nonobstructive left renal calculi axial image 30.    URINARY BLADDER: Normal.    REPRODUCTIVE ORGANS: Bilateral ovarian cyst indeterminate on the right measuring 38 x 30 mm on the left measuring 30 x 30 mm. Pelvic ultrasound recommended.    BOWEL: Normal caliber.  The appendix is not visualized.    LYMPH NODES: No abnormally enlarged nodes.    PERITONEUM/RETROPERITONEUM: No ascites or free air.     VESSELS: Calcination of the abdominal aorta. No aneurysm. Evaluation of the vascular structures limited due to lack of intravenous contrast.    ABDOMINAL WALL: Hyperdense mass subcutaneous tissues left anterior abdominal wall noted on axial image 42 and sagittal image 121 measures 32 x 16 mm appears to extend to the skin  surface with similar appearance however less pronounced involving the   right   lateral abdominal wall subcutaneous tissues axial image 54 measuring 8 mm in thickness ultrasound recommended.    BONES: Diffuse degenerative disc disease and degenerative spondylosis lumbar spine.      IMPRESSION:       1. Mild bilateral lower lobe groundglass opacities may relate to atelectasis or pneumonitis.       Subcentimeter hyperdense lesion left kidney indeterminate. Solid mass and therefore neoplasm is not excluded. Directed ultrasound may be useful.    Bilateral ovarian cysts indeterminate. Findings may relate to benign or malignant cystic neoplasm of the ovaries in a postmenopausal patient. Ultrasound of the pelvis recommended.    Subcutaneous masses anterior abdominal wall as described indeterminate. Directed ultrasound may be useful.    Punctate nonobstructive left renal calculi.    Left adrenal nodule likely relates to adenoma functional or nonfunctioning correlation clinical history and laboratory values recommended.    No acute findings.    Interpreted by:  Adebayo Potter MD    Signed by:  Adebayo Potter MD  8/31/20    Final result        Assessment  Ms. Guidry is a 70 y.o. female with history of CHF, CKD4, type 2 diabetes mellitus who presents with small left lower pole renal lesion.  This is likely a hyperdense cyst, but given her history of CKD we want to make sure, as patients can commonly develop renal masses in the future when on dialysis.    Plan  1.  MRI abdomen and follow-up in 4 weeks      Bogdan Lagos MD

## 2020-10-16 LAB — BLOOD CULTURE, ROUTINE: NORMAL

## 2020-10-19 RX ORDER — DILTIAZEM HYDROCHLORIDE 180 MG/1
180 CAPSULE, COATED, EXTENDED RELEASE ORAL DAILY
Qty: 30 CAPSULE | Refills: 3 | Status: SHIPPED | OUTPATIENT
Start: 2020-10-19

## 2020-10-19 RX ORDER — INSULIN DEGLUDEC INJECTION 100 U/ML
INJECTION, SOLUTION SUBCUTANEOUS
Qty: 30 ML | Refills: 5 | Status: ON HOLD | OUTPATIENT
Start: 2020-10-19 | End: 2020-11-09 | Stop reason: ALTCHOICE

## 2020-10-19 RX ORDER — APIXABAN 5 MG/1
TABLET, FILM COATED ORAL
Qty: 60 TABLET | Refills: 3 | Status: SHIPPED | OUTPATIENT
Start: 2020-10-19

## 2020-10-19 RX ORDER — BUSPIRONE HYDROCHLORIDE 10 MG/1
TABLET ORAL
Qty: 90 TABLET | Refills: 3 | Status: SHIPPED | OUTPATIENT
Start: 2020-10-19

## 2020-10-22 ENCOUNTER — OFFICE VISIT (OUTPATIENT)
Dept: PRIMARY CARE CLINIC | Age: 70
End: 2020-10-22
Payer: MEDICARE

## 2020-10-22 VITALS — OXYGEN SATURATION: 98 % | TEMPERATURE: 98.7 F | HEART RATE: 70 BPM

## 2020-10-22 PROCEDURE — 4040F PNEUMOC VAC/ADMIN/RCVD: CPT | Performed by: NURSE PRACTITIONER

## 2020-10-22 PROCEDURE — G8400 PT W/DXA NO RESULTS DOC: HCPCS | Performed by: NURSE PRACTITIONER

## 2020-10-22 PROCEDURE — 3017F COLORECTAL CA SCREEN DOC REV: CPT | Performed by: NURSE PRACTITIONER

## 2020-10-22 PROCEDURE — G8417 CALC BMI ABV UP PARAM F/U: HCPCS | Performed by: NURSE PRACTITIONER

## 2020-10-22 PROCEDURE — G8484 FLU IMMUNIZE NO ADMIN: HCPCS | Performed by: NURSE PRACTITIONER

## 2020-10-22 PROCEDURE — 1123F ACP DISCUSS/DSCN MKR DOCD: CPT | Performed by: NURSE PRACTITIONER

## 2020-10-22 PROCEDURE — 1036F TOBACCO NON-USER: CPT | Performed by: NURSE PRACTITIONER

## 2020-10-22 PROCEDURE — G8427 DOCREV CUR MEDS BY ELIG CLIN: HCPCS | Performed by: NURSE PRACTITIONER

## 2020-10-22 PROCEDURE — 1090F PRES/ABSN URINE INCON ASSESS: CPT | Performed by: NURSE PRACTITIONER

## 2020-10-22 PROCEDURE — 3023F SPIROM DOC REV: CPT | Performed by: NURSE PRACTITIONER

## 2020-10-22 PROCEDURE — G8926 SPIRO NO PERF OR DOC: HCPCS | Performed by: NURSE PRACTITIONER

## 2020-10-22 PROCEDURE — 99213 OFFICE O/P EST LOW 20 MIN: CPT | Performed by: NURSE PRACTITIONER

## 2020-10-22 RX ORDER — GUAIFENESIN 600 MG/1
600 TABLET, EXTENDED RELEASE ORAL 2 TIMES DAILY
Qty: 30 TABLET | Refills: 0 | Status: SHIPPED | OUTPATIENT
Start: 2020-10-22 | End: 2020-11-06

## 2020-10-22 ASSESSMENT — PATIENT HEALTH QUESTIONNAIRE - PHQ9
SUM OF ALL RESPONSES TO PHQ QUESTIONS 1-9: 0
SUM OF ALL RESPONSES TO PHQ QUESTIONS 1-9: 0
2. FEELING DOWN, DEPRESSED OR HOPELESS: 0
SUM OF ALL RESPONSES TO PHQ9 QUESTIONS 1 & 2: 0
1. LITTLE INTEREST OR PLEASURE IN DOING THINGS: 0
DEPRESSION UNABLE TO ASSESS: URGENT/EMERGENT SITUATION
SUM OF ALL RESPONSES TO PHQ QUESTIONS 1-9: 0

## 2020-10-22 NOTE — PROGRESS NOTES
SUBJECTIVE:    Patient ID: Praveen Chinchilla is a 79 y. o.female. Chief Complaint   Patient presents with    COPD     follow up ER visit         HPI:    Pt here for [facility] ED follow up for COPD exacerbation. Was seen 10/12/20 in ED and diagnosed COPD exacerbation. CXR no acute consolidation and sent home on mucinex and oral steroids. Hx COPD and on 3L continuous oxygen. Goes to [facility] day care. Today she reports still having chest congestion and wasn't able to get the mucinex filled because it never went to her pharmacy and it is usually helpful. Has not had to increase her oxygen requirement. Using inhalers and nebs. Reports breathing is baseline. Occasional productive cough yellow/green sputum but baseline for her. Having some wheezing at times. Denies fevers, worsening SOB/THOMPSON, body aches, worsening swelling LE, abdominal pain, CP, palpitations. Patient's medications, allergies, past medical, surgical, social and family histories were reviewed and updated as appropriate in electronic medical record. No outpatient medications have been marked as taking for the 10/22/20 encounter (Appointment) with JADE eRese CNP. Review of Systems   Constitutional: Negative. HENT: Positive for congestion. Negative for ear pain, sinus pain and sore throat. Eyes: Negative. Respiratory: Positive for cough and wheezing. Negative for shortness of breath and stridor. Cardiovascular: Negative. Gastrointestinal: Negative. Musculoskeletal: Negative. Allergic/Immunologic: Positive for environmental allergies. Neurological: Negative.         Past Medical History:   Diagnosis Date    Anemia     Anxiety     Asthma     Cataract     COPD (chronic obstructive pulmonary disease) ([facility] [address] [facility]     DDD (degenerative disc disease), cervical     Depression     Diabetes mellitus type 2     GERD (gastroesophageal reflux disease)     Gout     History of uterine cancer     Hyperlipidemia  Hypertension      Past Surgical History:   Procedure Laterality Date    CATARACT REMOVAL WITH IMPLANT  2005     SECTION  , 1979    x2    COLONOSCOPY  2013    COLONOSCOPY N/A 2019    COLONOSCOPY POLYPECTOMY HOT BIOPSY performed by Tiffany Kang MD at [facility]      partial    LUNG SURGERY      tumor removed; left    NECK SURGERY      cyst removed; right     TONSILLECTOMY      UPPER GASTROINTESTINAL ENDOSCOPY N/A 2019    EGD BIOPSY performed by Tiffany Kang MD at [facility] ENDOSCOPY     Family History   Problem Relation Age of Onset   Eder Self Dementia Mother     Hypertension Mother     Hypertension Father     Coronary Art Dis Father     Cancer Father         leukemia      Social History     Tobacco Use   Smoking Status Former Smoker    Packs/day: 1.50    Years: 25.00    Pack years: 45.53    Last attempt to quit: 1997    Years since quittin.8   Smokeless Tobacco Never Used   Tobacco Comment    States son is smoking in her home in one room and bf smokes in the other bedroom. OBJECTIVE:   Wt Readings from Last 3 Encounters:   10/12/20 190 lb (86.2 kg)   20 188 lb 9.6 oz (85.5 kg)   20 192 lb 3.2 oz (87.2 kg)     BP Readings from Last 3 Encounters:   10/12/20 (!) 136/56   20 (!) 119/50   20 (!) 136/50       There were no vitals taken for this visit. Physical Exam  Vitals signs and nursing note reviewed. Constitutional:       General: She is not in acute distress. Appearance: She is obese. HENT:      Head: Normocephalic. Right Ear: External ear normal.      Left Ear: External ear normal.      Nose: Congestion present. Mouth/Throat:      Mouth: Mucous membranes are moist.      Pharynx: Oropharynx is clear. Eyes:      Conjunctiva/sclera: Conjunctivae normal.   Neck:      Musculoskeletal: Normal range of motion. Cardiovascular:      Rate and Rhythm: Normal rate and regular rhythm.    Pulmonary: Effort: Pulmonary effort is normal. No respiratory distress. Breath sounds: Wheezing present. No rhonchi or rales. Comments: Scattered faint bilateral expiratory wheezing present. No rales. On 3L NC. Congested cough present. Skin:     Capillary Refill: Capillary refill takes 2 to 3 seconds. Neurological:      Mental Status: She is alert and oriented to person, place, and time. Psychiatric:         Mood and Affect: Mood normal.         Behavior: Behavior normal.         Thought Content: Thought content normal.         Lab Results   Component Value Date     10/12/2020    K 4.1 10/12/2020    CL 93 10/12/2020    CO2 34 10/12/2020    GLUCOSE 188 10/12/2020    BUN 48 10/12/2020    CREATININE 2.0 10/12/2020    CALCIUM 9.3 10/12/2020    PROT 7.1 10/12/2020    LABALBU 3.8 10/12/2020    BILITOT <0.2 10/12/2020    ALT 19 10/12/2020    AST 19 10/12/2020       Hemoglobin A1C (%)   Date Value   07/22/2020 9.3 (H)     Microscopic Examination (no units)   Date Value   08/31/2020 YES     LDL Calculated (mg/dL)   Date Value   09/04/2020 see below         Lab Results   Component Value Date    WBC 15.8 10/12/2020    NEUTROABS 12.1 10/12/2020    HGB 10.7 10/12/2020    HCT 36.4 10/12/2020    MCV 95.8 10/12/2020     10/12/2020       Lab Results   Component Value Date    TSH 1.65 09/04/2020         ASSESSMENT/PLAN:     1. Chronic obstructive pulmonary disease, unspecified [facility] ([facility]  Improved. Continue home meds. Refill mucinex. Take medications as prescribed. Return to clinic or go to ED if S&S worsen or no improvement noted. Patient verbalized understanding.     - guaiFENesin (MUCINEX) 600 MG extended release tablet; Take 1 tablet by mouth 2 times daily for 15 days  Dispense: 30 tablet; Refill: 0    2. Chest congestion  See 1.    - guaiFENesin (MUCINEX) 600 MG extended release tablet; Take 1 tablet by mouth 2 times daily for 15 days  Dispense: 30 tablet;  Refill: 0        Orders Placed This Encounter Medications    guaiFENesin (MUCINEX) 600 MG extended release tablet     Sig: Take 1 tablet by mouth 2 times daily for 15 days     Dispense:  30 tablet     Refill:  0

## 2020-10-22 NOTE — PROGRESS NOTES
Chief Complaint   Patient presents with    COPD     follow up ER visit       Have you seen any other physician or provider since your last visit yes - Patient was seen in the ER for SOB and is doing much better.      Have you had any other diagnostic tests since your last visit? no    Have you changed or stopped any medications since your last visit? no

## 2020-10-22 NOTE — PATIENT INSTRUCTIONS
10/15/2020 Office Visit Urology MD Claudio Romero   [address]   [place], [address]   [phone] ([place]      10/28/2020 Appointment Radiology       10/28/2020 Office Visit Obstetrics and Gynecology Nam Magana PA-C    [address] [facility], [address]   [phone] ([place]      10/29/2020 Office Visit Cardiology MD Enrique [address]   [address]    [place], [address] [facility]   [phone] ([place]      11/12/2020 Office Visit Pulmonology Elsi Le   [address], [address] [facility], [address] [facility]   [phone] ([place]      01/12/2021 Office Visit Gastroenterology Krystal Montgomery MD    [address] [place] [address], [address]   [phone] (Fax)       02/25/2021 Office Visit Cardiology Josiah Alvarez MD    [address] [facility], [address]   [phone] (Fax)       04/12/2021 [facility]   [address] [facility], [address]   [phone] (Fax)

## 2020-10-23 RX ORDER — HYDROCODONE BITARTRATE AND ACETAMINOPHEN 5; 325 MG/1; MG/1
TABLET ORAL
Qty: 30 TABLET | Refills: 0 | Status: SHIPPED | OUTPATIENT
Start: 2020-10-23 | End: 2020-11-25

## 2020-10-28 NOTE — PROGRESS NOTES
Subjective   Chief Complaint   Patient presents with   • Ovarian Cyst     Referral for ovarian cysts.  TVS done today       Mingo Guidry is a 70 y.o. year old new patient  presenting to be seen for ovarian cysts.  She is referred by her PCP in [facility].  She had hysterectomy for uterine cancer in .   She developed abdominal pain and bloating about 2 months ago and lower extremity edema.  Had presented to emergency department at [facility] in [place] 2020. CT scan without contrast (due to diminished kidney function) obtained during ER visit noted multiple abnormalities.  There was a 10 mm x 10 mm nodule on the left adrenal gland that was thought to be consistent with an adenoma.  There was an 8 mm lesion on the inferior pole of the left kidney that was exophytic and hyperdense and neoplasm not excluded.  She had bilateral ovarian cyst with right ovary cyst measuring 3.8 cm x 3 cm and the left ovary cyst 3 cm x 3 cm.  There was also a 3.2 cm x 1.6 cm hyperdense left anterior abdominal wall mass.   A transvaginal pelvic ultrasound was done at [facility] 9/10/2020 notinh a 4.7 x 2.8 cm right ovary cyst and 1.4 and  1.2 cm  Left ovary cyst.  The patient has since been evaluated by Dr. Lagos in urology and an MRI without contrast of the abdomen has been scheduled 2020.  Transvaginal pelvic ultrasound done in the office today reveals a right ovarian cyst measuring 4.8 cm with a thin septation but mostly simple cyst.  The left ovary has multiple cyst that appear more simple with the largest cyst measuring 3.4 cm.  There is no free fluid noted.  She continues to experience abdominal crampy pain and bloating.  Reports colonoscopy done in the last 2 years. Developed a gastric bleed after colonoscopy and was admitted to hospital and had a repeat colonoscopy and endoscopy. Reports scopes were normal and they were unsure why she had gastric bleed.       Past Medical History:   Diagnosis Date   • Abnormal  ECG    • Abnormal Pap smear of cervix    • Acid reflux 10/17/2016   • Anemia    • Anxiety    • Asthma     bronchial   • Cancer (CMS/[facility])     uterine   • Chronic diastolic heart failure (CMS/[facility]) 10/17/2016    Normal dobutamine echocardiogram stress, 04/07/2005. Echocardiogram on 05/08/2009:  EF 50% to 55%. Left atrium 3.5 cm.   • Chronic obstructive pulmonary disease (CMS/[facility]) 10/17/2016    asthmatic bronchitis, on O2 use chronically.     • Female infertility    • Gestational diabetes    • Gestational hypertension    • Gout 10/17/2016   • High cholesterol 01/03/2018   • Hypertension 10/17/2016    Benign hypertension.   • Lower extremity edema 10/17/2016    Chronic lower extremity edema/venous insufficiency.   • Murmur, heart    • Obesity 10/17/2016   • Renal failure     stageIV   • Rh incompatibility    • Seasonal allergies 10/17/2016   • Tuberculosis    • Type 2 diabetes mellitus (CMS/[facility]) 10/17/2016    insulin dependent.   • Uterine cancer (CMS/[facility])         Current Outpatient Medications:   •  ADVAIR DISKUS 500-50 MCG/DOSE DISKUS, INHALE 1 PUFF BY MOUTH TWO TIMES A DAY, Disp: 60 each, Rfl: 5  •  allopurinol (ZYLOPRIM) 300 MG tablet, Take 300 mg by mouth Daily., Disp: , Rfl:   •  apixaban (ELIQUIS) 5 MG tablet tablet, Take 1 tablet by mouth Every 12 (Twelve) Hours., Disp: 60 tablet, Rfl: 0  •  apixaban (Eliquis) 5 MG tablet tablet, TAKE 1 TABLET BY MOUTH EVERY 12 HOURS, Disp: , Rfl:   •  ASPIRIN LOW DOSE 81 MG EC tablet, Take 81 mg by mouth Daily., Disp: , Rfl: 5  •  atorvastatin (LIPITOR) 40 MG tablet, Take 1 tablet by mouth Daily., Disp: 30 tablet, Rfl: 10  •  Blood Glucose Monitoring Suppl device, 1 each., Disp: , Rfl:   •  busPIRone (BUSPAR) 10 MG tablet, Take 10 mg by mouth 3 (Three) Times a Day., Disp: , Rfl:   •  DALIRESP 500 MCG tablet tablet, TAKE ONE TABLET BY MOUTH EVERY DAY, Disp: 30 tablet, Rfl: 5  •  dilTIAZem CD (CARDIZEM CD) 180 MG 24 hr capsule, Take 1 capsule by mouth Daily., Disp: 30 capsule,  "Rfl: 0  •  docusate sodium (COLACE) 250 MG capsule, Take 250 mg by mouth Daily., Disp: , Rfl:   •  ezetimibe (Zetia) 10 MG tablet, Take 10 mg by mouth Daily., Disp: , Rfl:   •  fenofibrate (TRICOR) 48 MG tablet, Take 48 mg by mouth Daily., Disp: , Rfl:   •  FLUTICASONE PROPIONATE, INHAL, IN, Inhale 2 (two) times a day., Disp: , Rfl:   •  furosemide (LASIX) 40 MG tablet, Take 40 mg by mouth Daily., Disp: , Rfl:   •  Ginger, Zingiber officinalis, (GINGER ROOT) 500 MG capsule, Take  by mouth Daily., Disp: , Rfl:   •  glucose blood (True Metrix Pro Blood Glucose) test strip, USE TO BLOOD SUGAR FOUR TIMES A DAY, Disp: , Rfl:   •  GNP VITAMIN D MAXIMUM STRENGTH 50 MCG (2000 UT) tablet, Take 1 tablet by mouth Daily., Disp: , Rfl: 5  •  HUMALOG 100 UNIT/ML injection, Inject 35 Units under the skin into the appropriate area as directed 3 (Three) Times a Day., Disp: , Rfl:   •  HYDROcodone-acetaminophen (NORCO) 5-325 MG per tablet, Take 1 tablet by mouth Every Night., Disp: , Rfl:   •  icosapent ethyl (Vascepa) 1 g capsule capsule, Take 2 g by mouth 2 (Two) Times a Day With Meals., Disp: , Rfl:   •  Insulin Pen Needle (BD Pen Needle Valerie U/F) 32G X 4 MM misc, USE WITH LANTUS ONCE DAILY, Disp: , Rfl:   •  Insulin Syringe-Needle U-100 31G X 5/16\" 1 ML misc, USE FOR INSULIN INJECTIONS FIVE TIMES DAILY, Disp: , Rfl:   •  metoclopramide (REGLAN) 5 MG tablet, , Disp: , Rfl:   •  metolazone (ZAROXOLYN) 5 MG tablet, Take 5 mg by mouth Daily As Needed., Disp: , Rfl:   •  metoprolol succinate XL (TOPROL-XL) 100 MG 24 hr tablet, Take 1 tablet by mouth Daily., Disp: 30 tablet, Rfl: 0  •  montelukast (SINGULAIR) 10 MG tablet, Take 10 mg by mouth Every Night., Disp: , Rfl:   •  nystatin (MYCOSTATIN) [id number] UNIT/ML suspension, , Disp: , Rfl:   •  O2 (OXYGEN), Inhale 3 L/min Daily., Disp: , Rfl:   •  omalizumab (XOLAIR) 150 MG injection, Inject 300 mg under the skin into the appropriate area as directed Every 28 (Twenty-Eight) Days. 2 " injections once a month, Disp: , Rfl:   •  pantoprazole (PROTONIX) 40 MG EC tablet, Take 40 mg by mouth Daily., Disp: , Rfl:   •  PATADAY 0.2 % solution ophthalmic solution, Administer  to both eyes 2 (Two) Times a Day., Disp: , Rfl: 3  •  potassium chloride (K-DUR,KLOR-CON) 20 MEQ CR tablet, Take 20 mEq by mouth Daily., Disp: , Rfl: 2  •  rOPINIRole (REQUIP) 0.5 MG tablet, Take 0.5 mg by mouth 2 (Two) Times a Day., Disp: , Rfl: 0  •  sitaGLIPtin (JANUVIA) 100 MG tablet, Take 100 mg by mouth Daily. 1/2 tablet daily, Disp: , Rfl:   •  SPIRIVA HANDIHALER 18 MCG per inhalation capsule, PLACE 1 CAPSULE INTO INHALER AND INHALE DAILY., Disp: 30 capsule, Rfl: 4  •  sucralfate (CARAFATE) 1 G tablet, Take 1 g by mouth 3 (Three) Times a Day., Disp: , Rfl:   •  tiZANidine (ZANAFLEX) 4 MG tablet, Take 4 mg by mouth., Disp: , Rfl:   •  traMADol (ULTRAM) 50 MG tablet, Take 50 mg by mouth 3 (Three) Times a Day., Disp: , Rfl:   •  TRESIBA FLEXTOUCH 100 UNIT/ML solution pen-injector injection, 2 (Two) Times a Day. 35 units in the am 80 units at night, Disp: , Rfl: 10   Allergies   Allergen Reactions   • Shellfish-Derived Products Hives   • Wheat Bran Hives      Past Surgical History:   Procedure Laterality Date   • BRONCHOSCOPY N/A 2018    Procedure: BRONCHOSCOPY WITH WASHINGS;  Surgeon: Ryder Dahl MD;  Location: [facility] OR;  Service:    • CARDIAC CATHETERIZATION N/A 3/17/2020    Procedure: Coronary angiography;  Surgeon: Elkin Zhou MD;  Location: [facility] CATH INVASIVE LOCATION;  Service: Cardiology;  Laterality: N/A;   •  SECTION     • COLONOSCOPY     • COLONOSCOPY N/A 2019    Procedure: COLONOSCOPY;  Surgeon: Skinny York MD;  Location: [facility] ENDOSCOPY;  Service: General   • CYSTECTOMY Right     neck   • DILATATION AND CURETTAGE     • ENDOSCOPY N/A 6/10/2019    Procedure: ESOPHAGOGASTRODUODENOSCOPY WITH BIOPSY;  Surgeon: Skinny York MD;  Location: [facility] ENDOSCOPY;  Service: Gastroenterology  "  • EYE SURGERY Bilateral     cataract   • FOREIGN BODY REMOVAL N/A 3/5/2020    Procedure: BRONCHOSCOPY with MAC and removal of foreign object;  Surgeon: Ryder Dahl MD;  Location: [facility];  Service: Pulmonary;  Laterality: N/A;   • HYSTERECTOMY     • LUNG SURGERY Left 1997 tumors removed from left lung-benign   • TONSILLECTOMY AND ADENOIDECTOMY        Social History     Socioeconomic History   • Marital status:      Spouse name: Not on file   • Number of children: Not on file   • Years of education: Not on file   • Highest education level: Not on file   Tobacco Use   • Smoking status: Former Smoker     Packs/day: 1.00     Years: 35.00     Pack years: 35.00     Types: Cigarettes     Quit date:      Years since quittin.8   • Smokeless tobacco: Never Used   Substance and Sexual Activity   • Alcohol use: No   • Drug use: No   • Sexual activity: Not Currently     Birth control/protection: Surgical      Family History   Problem Relation Age of Onset   • Heart disease Mother    • Heart disease Father    • Leukemia Father    • Ulcers Father    • Diabetes Sister    • COPD Sister    • Breast cancer Sister    • Diabetes Brother    • Lung cancer Sister    • No Known Problems Sister    • No Known Problems Sister    • Pneumonia Sister    • Stroke Sister    • COPD Sister        Review of Systems   Constitutional: Negative for chills, diaphoresis and fever.   HENT: Positive for sinus pressure.    Respiratory: Positive for cough and wheezing.    Gastrointestinal: Positive for abdominal distention and abdominal pain. Negative for constipation, diarrhea, nausea and vomiting.   Endocrine: Positive for polydipsia.   Genitourinary: Positive for frequency and pelvic pain. Negative for difficulty urinating and dysuria.   Musculoskeletal: Positive for arthralgias.   All other systems reviewed and are negative.          Objective   /82   Ht 147.3 cm (58\")   Wt 86.6 kg (191 lb)   LMP  (LMP Unknown) "   Breastfeeding No   BMI 39.92 kg/m²     Physical Exam  Constitutional:       Appearance: She is well-developed. She is obese.      Interventions: Nasal cannula in place.   Eyes:      General: Lids are normal.      Extraocular Movements: Extraocular movements intact.      Conjunctiva/sclera: Conjunctivae normal.   Abdominal:      Palpations: Abdomen is soft.      Tenderness: There is no abdominal tenderness. There is no right CVA tenderness, left CVA tenderness or guarding.   Genitourinary:     Labia:         Right: No rash, tenderness or lesion.         Left: No rash, tenderness or lesion.       Urethra: No prolapse, urethral pain, urethral swelling or urethral lesion.      Vagina: No vaginal discharge, tenderness, bleeding or lesions.      Uterus: Absent.       Adnexa:         Right: No mass or tenderness.          Left: No mass or tenderness.     Skin:     General: Skin is warm and dry.      Findings: No lesion or rash.   Neurological:      Mental Status: She is alert and oriented to person, place, and time.   Psychiatric:         Attention and Perception: Attention normal.         Mood and Affect: Mood normal.         Speech: Speech normal.         Behavior: Behavior is cooperative.         Thought Content: Thought content normal.              Assessment and Plan  Diagnoses and all orders for this visit:    1. Cysts of both ovaries (Primary)  -     US Non-ob Transvaginal  -     MRI Pelvis Without Contrast; Future    2. Abdominal bloating  -       -     CEA; Future  -     Cancer Antigen 19-9; Future    3. Abdominal wall mass  -       -     CEA; Future  -     Cancer Antigen 19-9; Future    4. 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 Kidney lesion  -     CEA; Future    6. Elevated CA 19-9 level  -     MRI Pelvis Without Contrast; Future    Other orders  -     Cancer Antigen 19-9  -     CEA      Patient Instructions   With MRI of abdomen already scheduled  we will also add on MRI pelvis without contrast. Spoke with Thompson in MRI regarding this.  Tumor markers drawn today. Patient will be contact with results when available. May need referral to oncology.             This note was electronically signed.    Tatiana Contreras PA-C   October 29, 2020

## 2020-10-29 RX ORDER — LANCETS 30 GAUGE
1 EACH MISCELLANEOUS 3 TIMES DAILY
Qty: 200 EACH | Refills: 5 | Status: SHIPPED | OUTPATIENT
Start: 2020-10-29

## 2020-10-29 NOTE — PROGRESS NOTES
[facility] Cardiology Office Follow Up Note    Mingo Guidry  [id number]  10/29/2020    Primary Care Provider: Luz Prater APRN    Chief Complaint: Regular follow-up    History of Present Illness:   Mrs. Mingo Guidry is a 70 y.o. female who presents to the Cardiology Clinic for regular follow-up.  The patient has a past medical history significant for type 2 diabetes mellitus, GERD, hyperlipidemia, hypertension, morbid obesity, and Mg's esophagus.  She also has a history of COPD with prior tobacco use, resulting in chronic hypoxic respiratory failure requiring supplemental home O2. Her past cardiac history is significant for chronic diastolic heart failure, paroxysmal atrial fibrillation, and nonobstructive coronary artery disease based on coronary angiography in 3/20.  She presents the cardiology clinic today for regular follow-up.  Since her last appointment, she was hospitalized in 2020 for suspected COPD exacerbation.  In addition, the patient reports being diagnosed with sciatica involving the left lower extremity.  In terms of her cardiac issues, the patient reports she is done well.  She checks her weight on a daily basis, with her weight generally remaining stable.  She occasionally takes as needed metolazone in addition to her twice daily Lasix for increased weight gain.  She denies orthopnea.  She continues to experience exertional dyspnea, which is currently at baseline.  She reports trace, fluctuating lower extremity swelling.  No chest pain or exertional chest discomfort.  No other specific complaints at this time.     Past Cardiac Testin. Last Coronary Angio: 3/17/2020              1.  Nonobstructive coronary artery disease with 40-50% stenosis in the proximal LCx  2. Prior Stress Testing: None  3. Last Echo: 3/16/2020              1.  Normal left ventricular size and systolic function, LVEF 60-65%.              2.  Moderate concentric LVH.               3.  Abnormal LV diastolic filling pattern consistent with grade 1 diastolic dysfunction.              4.  Mild left atrial dilation.              6.  Normal right ventricular size and systolic function.              7.  Trace mitral regurgitation.  4. Prior Holter Monitor: None    Review of Systems:   Review of Systems   Constitutional: Negative for activity change, appetite change, chills, diaphoresis, fatigue, fever, unexpected weight gain and unexpected weight loss.   Eyes: Negative for blurred vision and double vision.   Respiratory: Positive for shortness of breath. Negative for cough, chest tightness and wheezing.    Cardiovascular: Positive for leg swelling. Negative for chest pain and palpitations.   Gastrointestinal: Negative for abdominal pain, anal bleeding, blood in stool and GERD.   Endocrine: Negative for cold intolerance and heat intolerance.   Genitourinary: Negative for hematuria.   Neurological: Negative for dizziness, syncope, weakness and light-headedness.   Hematological: Does not bruise/bleed easily.   Psychiatric/Behavioral: Negative for depressed mood and stress. The patient is not nervous/anxious.        I have reviewed and/or updated the patient's past medical, past surgical, family, social history, problem list and allergies as appropriate.     Medications:     Current Outpatient Medications:   •  ADVAIR DISKUS 500-50 MCG/DOSE DISKUS, INHALE 1 PUFF BY MOUTH TWO TIMES A DAY, Disp: 60 each, Rfl: 5  •  allopurinol (ZYLOPRIM) 300 MG tablet, Take 300 mg by mouth Daily., Disp: , Rfl:   •  apixaban (ELIQUIS) 5 MG tablet tablet, Take 1 tablet by mouth Every 12 (Twelve) Hours., Disp: 60 tablet, Rfl: 0  •  ASPIRIN LOW DOSE 81 MG EC tablet, Take 81 mg by mouth Daily., Disp: , Rfl: 5  •  atorvastatin (LIPITOR) 40 MG tablet, Take 1 tablet by mouth Daily., Disp: 30 tablet, Rfl: 10  •  Blood Glucose Monitoring Suppl device, 1 each., Disp: , Rfl:   •  busPIRone (BUSPAR) 10 MG tablet, Take 10 mg by mouth 3  "(Three) Times a Day., Disp: , Rfl:   •  DALIRESP 500 MCG tablet tablet, TAKE ONE TABLET BY MOUTH EVERY DAY, Disp: 30 tablet, Rfl: 5  •  dilTIAZem CD (CARDIZEM CD) 180 MG 24 hr capsule, Take 1 capsule by mouth Daily., Disp: 30 capsule, Rfl: 0  •  docusate sodium (COLACE) 250 MG capsule, Take 250 mg by mouth Daily., Disp: , Rfl:   •  ezetimibe (Zetia) 10 MG tablet, Take 10 mg by mouth Daily., Disp: , Rfl:   •  fenofibrate (TRICOR) 48 MG tablet, Take 48 mg by mouth Daily., Disp: , Rfl:   •  FLUTICASONE PROPIONATE, INHAL, IN, Inhale 2 (two) times a day., Disp: , Rfl:   •  furosemide (LASIX) 40 MG tablet, Take 40 mg by mouth Daily., Disp: , Rfl:   •  Ginger, Zingiber officinalis, (GINGER ROOT) 500 MG capsule, Take  by mouth Daily., Disp: , Rfl:   •  glucose blood (True Metrix Pro Blood Glucose) test strip, USE TO BLOOD SUGAR FOUR TIMES A DAY, Disp: , Rfl:   •  GNP VITAMIN D MAXIMUM STRENGTH 50 MCG (2000 UT) tablet, Take 1 tablet by mouth Daily., Disp: , Rfl: 5  •  HUMALOG 100 UNIT/ML injection, Inject 35 Units under the skin into the appropriate area as directed 3 (Three) Times a Day., Disp: , Rfl:   •  HYDROcodone-acetaminophen (NORCO) 5-325 MG per tablet, Take 1 tablet by mouth Every Night., Disp: , Rfl:   •  icosapent ethyl (Vascepa) 1 g capsule capsule, Take 2 g by mouth 2 (Two) Times a Day With Meals., Disp: , Rfl:   •  Insulin Pen Needle (BD Pen Needle Valerie U/F) 32G X 4 MM misc, USE WITH LANTUS ONCE DAILY, Disp: , Rfl:   •  Insulin Syringe-Needle U-100 31G X 5/16\" 1 ML misc, USE FOR INSULIN INJECTIONS FIVE TIMES DAILY, Disp: , Rfl:   •  Lancets misc, 1 each., Disp: , Rfl:   •  metolazone (ZAROXOLYN) 5 MG tablet, Take 5 mg by mouth Daily As Needed., Disp: , Rfl:   •  metoprolol succinate XL (TOPROL-XL) 100 MG 24 hr tablet, Take 1 tablet by mouth Daily., Disp: 30 tablet, Rfl: 0  •  montelukast (SINGULAIR) 10 MG tablet, Take 10 mg by mouth Every Night., Disp: , Rfl:   •  O2 (OXYGEN), Inhale 3 L/min Daily., Disp: , " "Rfl:   •  omalizumab (XOLAIR) 150 MG injection, Inject 300 mg under the skin into the appropriate area as directed Every 28 (Twenty-Eight) Days. 2 injections once a month, Disp: , Rfl:   •  pantoprazole (PROTONIX) 40 MG EC tablet, Take 40 mg by mouth Daily., Disp: , Rfl:   •  PATADAY 0.2 % solution ophthalmic solution, Administer  to both eyes 2 (Two) Times a Day., Disp: , Rfl: 3  •  potassium chloride (K-DUR,KLOR-CON) 20 MEQ CR tablet, Take 20 mEq by mouth Daily., Disp: , Rfl: 2  •  rOPINIRole (REQUIP) 0.5 MG tablet, Take 0.5 mg by mouth 2 (Two) Times a Day., Disp: , Rfl: 0  •  sitaGLIPtin (JANUVIA) 100 MG tablet, Take 100 mg by mouth Daily. 1/2 tablet daily, Disp: , Rfl:   •  SPIRIVA HANDIHALER 18 MCG per inhalation capsule, PLACE 1 CAPSULE INTO INHALER AND INHALE DAILY., Disp: 30 capsule, Rfl: 4  •  sucralfate (CARAFATE) 1 G tablet, Take 1 g by mouth 3 (Three) Times a Day., Disp: , Rfl:   •  tiZANidine (ZANAFLEX) 4 MG tablet, Take 4 mg by mouth., Disp: , Rfl:   •  traMADol (ULTRAM) 50 MG tablet, Take 50 mg by mouth 3 (Three) Times a Day., Disp: , Rfl:   •  TRESIBA FLEXTOUCH 100 UNIT/ML solution pen-injector injection, 2 (Two) Times a Day. 35 units in the am 80 units at night, Disp: , Rfl: 10  •  apixaban (Eliquis) 5 MG tablet tablet, TAKE 1 TABLET BY MOUTH EVERY 12 HOURS, Disp: , Rfl:   •  metoclopramide (REGLAN) 5 MG tablet, , Disp: , Rfl:   •  nystatin (MYCOSTATIN) [id number] UNIT/ML suspension, , Disp: , Rfl:     Physical Exam:  Vital Signs:   Vitals:    10/29/20 0952   BP: 112/52   BP Location: Left arm   Patient Position: Sitting   Pulse: 85   Resp: 22   SpO2: 93%   Weight: 88.5 kg (195 lb)   Height: 147.3 cm (58\")       Physical Exam  Constitutional:       General: She is not in acute distress.     Appearance: She is well-developed. She is obese. She is not diaphoretic.   HENT:      Head: Normocephalic and atraumatic.   Eyes:      General: No scleral icterus.     Pupils: Pupils are equal, round, and reactive to " light.   Neck:      Musculoskeletal: Neck supple. No muscular tenderness.      Trachea: No tracheal deviation.   Cardiovascular:      Rate and Rhythm: Normal rate and regular rhythm.      Heart sounds: Normal heart sounds. No murmur. No friction rub. No gallop.       Comments: Normal JVD.  Pulmonary:      Effort: Pulmonary effort is normal. No respiratory distress.      Breath sounds: No stridor. Wheezing present. No rales.      Comments: On continuous supplemental O2  Chest:      Chest wall: No tenderness.   Abdominal:      General: Bowel sounds are normal. There is no distension.      Palpations: Abdomen is soft.      Tenderness: There is no abdominal tenderness. There is no guarding or rebound.      Comments: Obese abdomen   Musculoskeletal: Normal range of motion.      Comments: Trace bilateral lower extremity pitting edema   Lymphadenopathy:      Cervical: No cervical adenopathy.   Skin:     General: Skin is warm and dry.      Findings: No erythema.   Neurological:      General: No focal deficit present.      Mental Status: She is alert and oriented to person, place, and time.   Psychiatric:         Mood and Affect: Mood normal.         Behavior: Behavior normal.         Results Review:   I reviewed the patient's new clinical results.  I personally viewed and interpreted the patient's EKG/Telemetry data      ECG 12 Lead    Date/Time: 10/29/2020 10:15 AM  Performed by: Herson Knox MD  Authorized by: Herson Knox MD   Comparison: not compared with previous ECG   Rhythm: sinus rhythm  Rate: normal  Conduction: incomplete right bundle branch block and left anterior fascicular block  QRS axis: left    Clinical impression: abnormal EKG            Assessment / Plan:     1.  Coronary artery disease  --Nonobstructive coronary artery disease based on coronary angiography in 3/2020  --Remains without exertional anginal symptoms, exertional dyspnea likely secondary to underlying lung disease  --Continue  aspirin 81 mg daily  --Continue high intensity statin     2. Paroxysmal atrial fibrillation   --History of paroxysmal atrial fibrillation  --No significant palpitations or symptoms to suggest recurrent PAF with RVR  --ECG today shows NSR  --Given CHADS2-VASc score of 4, continue Eliquis for CVA prophylaxis  --Continue diltiazem and metoprolol for rate control     3.  Chronic diastolic heart failure  --Trace lower extremity edema on exam today  --Recent BNP within normal limits and weight is stable on daily home checks  --Continue Lasix and  PRN metolazone     4.  Hypertension  --BP remains well controlled     5.   Chronic kidney disease, stage III  --Creatinine at baseline on most recent labs     6.  Morbid obesity  --Encouraged weight loss through diet and exercise     7.  COPD with chronic hypoxic respiratory failure  --On supplemental home O2     8.  Dyslipidemia  --Increase statin to high intensity dosing     9.  Type 2 diabetes mellitus  --Management per PCP        Follow Up:   Return in about 6 months (around 4/29/2021).      Thank you for allowing me to participate in the care of your patient. Please to not hesitate to contact me with additional questions or concerns.     CHASITY Knox MD  Interventional Cardiology   10/29/2020  09:55 EDT

## 2020-10-29 NOTE — PATIENT INSTRUCTIONS
With MRI of abdomen already scheduled we will also add on MRI pelvis without contrast. Spoke with Thompson in MRI regarding this.  Tumor markers drawn today. Patient will be contact with results when available. May need referral to oncology.

## 2020-10-30 ENCOUNTER — NURSE ONLY (OUTPATIENT)
Dept: PRIMARY CARE CLINIC | Age: 70
End: 2020-10-30
Payer: MEDICARE

## 2020-10-30 VITALS — TEMPERATURE: 98.1 F | HEIGHT: 58 IN | WEIGHT: 196 LBS | BODY MASS INDEX: 41.14 KG/M2

## 2020-10-30 PROCEDURE — 96372 THER/PROPH/DIAG INJ SC/IM: CPT | Performed by: NURSE PRACTITIONER

## 2020-10-30 NOTE — PROGRESS NOTES
Administrations This Visit     omalizumab Pravin Rosales) injection 150 mg     Admin Date  10/30/2020  13:17 Action  Given Dose  150 mg Route  Subcutaneous Site  Arm Left Administered By  Ambika Mars MA    Ordering Provider:  JADE Marie    Patient Supplied?:  Yes           Admin Date  10/30/2020  13:18 Action  Given Dose  150 mg Route  Subcutaneous Site  Arm Right Administered By  Ambika Mars MA    Ordering Provider:  JADE Marie    Patient Supplied?:  Yes

## 2020-11-03 RX ORDER — POTASSIUM CHLORIDE 20 MEQ/1
TABLET, EXTENDED RELEASE ORAL
Qty: 30 TABLET | Refills: 5 | Status: SHIPPED | OUTPATIENT
Start: 2020-11-03

## 2020-11-04 ENCOUNTER — TELEPHONE (OUTPATIENT)
Dept: PRIMARY CARE CLINIC | Age: 70
End: 2020-11-04

## 2020-11-04 ASSESSMENT — ENCOUNTER SYMPTOMS
STRIDOR: 0
SHORTNESS OF BREATH: 0
COUGH: 1
GASTROINTESTINAL NEGATIVE: 1
SORE THROAT: 0
EYES NEGATIVE: 1
WHEEZING: 1
SINUS PAIN: 0

## 2020-11-04 NOTE — TELEPHONE ENCOUNTER
Called pt to let her know that Humalog is no longer available on the 340B program, Novolog is the comparable insulin that is available on the program.  Left message for pt to call me back.     Angelo StaplesD

## 2020-11-09 ENCOUNTER — APPOINTMENT (OUTPATIENT)
Dept: GENERAL RADIOLOGY | Facility: HOSPITAL | Age: 70
DRG: 193 | End: 2020-11-09
Attending: INTERNAL MEDICINE
Payer: MEDICARE

## 2020-11-09 ENCOUNTER — HOSPITAL ENCOUNTER (INPATIENT)
Facility: HOSPITAL | Age: 70
LOS: 3 days | Discharge: NURSING, REHAB OR LONG-TERM CARE | DRG: 193 | End: 2020-11-12
Attending: INTERNAL MEDICINE | Admitting: INTERNAL MEDICINE
Payer: MEDICARE

## 2020-11-09 ENCOUNTER — OFFICE VISIT (OUTPATIENT)
Dept: PRIMARY CARE CLINIC | Age: 70
End: 2020-11-09
Payer: MEDICARE

## 2020-11-09 VITALS — TEMPERATURE: 98.8 F | OXYGEN SATURATION: 83 % | HEART RATE: 91 BPM

## 2020-11-09 PROBLEM — J96.20 ACUTE ON CHRONIC RESPIRATORY FAILURE (HCC): Status: ACTIVE | Noted: 2020-11-09

## 2020-11-09 PROBLEM — J96.21 ACUTE ON CHRONIC RESPIRATORY FAILURE WITH HYPOXIA (HCC): Status: ACTIVE | Noted: 2020-11-09

## 2020-11-09 PROBLEM — I48.0 PAROXYSMAL ATRIAL FIBRILLATION (HCC): Status: ACTIVE | Noted: 2020-11-09

## 2020-11-09 LAB
ANION GAP SERPL CALCULATED.3IONS-SCNC: 12 MMOL/L (ref 3–16)
BASE EXCESS ARTERIAL: 9.9 MMOL/L (ref -3–3)
BASOPHILS ABSOLUTE: 0.1 K/UL (ref 0–0.1)
BASOPHILS RELATIVE PERCENT: 0.5 %
BUN BLDV-MCNC: 48 MG/DL (ref 6–20)
CALCIUM SERPL-MCNC: 9.7 MG/DL (ref 8.5–10.5)
CHLORIDE BLD-SCNC: 93 MMOL/L (ref 98–107)
CO2: 33 MMOL/L (ref 20–30)
CREAT SERPL-MCNC: 1.9 MG/DL (ref 0.4–1.2)
EKG ATRIAL RATE: 90 BPM
EKG DIAGNOSIS: NORMAL
EKG P AXIS: 75 DEGREES
EKG P-R INTERVAL: 188 MS
EKG Q-T INTERVAL: 384 MS
EKG QRS DURATION: 100 MS
EKG QTC CALCULATION (BAZETT): 469 MS
EKG R AXIS: -54 DEGREES
EKG T AXIS: 81 DEGREES
EKG VENTRICULAR RATE: 90 BPM
EOSINOPHILS ABSOLUTE: 0.2 K/UL (ref 0–0.4)
EOSINOPHILS RELATIVE PERCENT: 1.9 %
FIO2: 0.36 %
GFR AFRICAN AMERICAN: 32
GFR NON-AFRICAN AMERICAN: 26
GLUCOSE BLD-MCNC: 177 MG/DL (ref 74–106)
GLUCOSE BLD-MCNC: 198 MG/DL (ref 74–106)
GLUCOSE BLD-MCNC: 202 MG/DL (ref 74–106)
GLUCOSE BLD-MCNC: 228 MG/DL (ref 74–106)
HCO3 ARTERIAL: 36.4 MMOL/L (ref 22–26)
HCT VFR BLD CALC: 37 % (ref 37–47)
HEMOGLOBIN: 10.7 G/DL (ref 11.5–16.5)
IMMATURE GRANULOCYTES #: 0 K/UL
IMMATURE GRANULOCYTES %: 0.3 % (ref 0–5)
LYMPHOCYTES ABSOLUTE: 2.3 K/UL (ref 1.5–4)
LYMPHOCYTES RELATIVE PERCENT: 19.3 %
MCH RBC QN AUTO: 27.6 PG (ref 27–32)
MCHC RBC AUTO-ENTMCNC: 28.9 G/DL (ref 31–35)
MCV RBC AUTO: 95.4 FL (ref 80–100)
MONOCYTES ABSOLUTE: 0.8 K/UL (ref 0.2–0.8)
MONOCYTES RELATIVE PERCENT: 6.4 %
NEUTROPHILS ABSOLUTE: 8.6 K/UL (ref 2–7.5)
NEUTROPHILS RELATIVE PERCENT: 71.6 %
O2 SAT, ARTERIAL: 88.5 %
O2 THERAPY: ABNORMAL
PCO2 ARTERIAL: 59.9 MMHG (ref 35–45)
PDW BLD-RTO: 16.9 % (ref 11–16)
PERFORMED ON: ABNORMAL
PH ARTERIAL: 7.4 (ref 7.35–7.45)
PLATELET # BLD: 422 K/UL (ref 150–400)
PMV BLD AUTO: 10.5 FL (ref 6–10)
PO2 ARTERIAL: 62.3 MMHG (ref 80–100)
POTASSIUM SERPL-SCNC: 4.1 MMOL/L (ref 3.4–5.1)
RBC # BLD: 3.88 M/UL (ref 3.8–5.8)
SODIUM BLD-SCNC: 138 MMOL/L (ref 136–145)
TCO2 ARTERIAL: 38.2 MMOL/L (ref 24–30)
WBC # BLD: 12 K/UL (ref 4–11)

## 2020-11-09 PROCEDURE — G8926 SPIRO NO PERF OR DOC: HCPCS | Performed by: NURSE PRACTITIONER

## 2020-11-09 PROCEDURE — G8484 FLU IMMUNIZE NO ADMIN: HCPCS | Performed by: NURSE PRACTITIONER

## 2020-11-09 PROCEDURE — G8417 CALC BMI ABV UP PARAM F/U: HCPCS | Performed by: NURSE PRACTITIONER

## 2020-11-09 PROCEDURE — 2700000000 HC OXYGEN THERAPY PER DAY

## 2020-11-09 PROCEDURE — 80048 BASIC METABOLIC PNL TOTAL CA: CPT

## 2020-11-09 PROCEDURE — 3017F COLORECTAL CA SCREEN DOC REV: CPT | Performed by: NURSE PRACTITIONER

## 2020-11-09 PROCEDURE — 93005 ELECTROCARDIOGRAM TRACING: CPT

## 2020-11-09 PROCEDURE — 4040F PNEUMOC VAC/ADMIN/RCVD: CPT | Performed by: NURSE PRACTITIONER

## 2020-11-09 PROCEDURE — 6360000002 HC RX W HCPCS: Performed by: PHYSICIAN ASSISTANT

## 2020-11-09 PROCEDURE — G8427 DOCREV CUR MEDS BY ELIG CLIN: HCPCS | Performed by: NURSE PRACTITIONER

## 2020-11-09 PROCEDURE — 1036F TOBACCO NON-USER: CPT | Performed by: NURSE PRACTITIONER

## 2020-11-09 PROCEDURE — 2580000003 HC RX 258: Performed by: INTERNAL MEDICINE

## 2020-11-09 PROCEDURE — G8400 PT W/DXA NO RESULTS DOC: HCPCS | Performed by: NURSE PRACTITIONER

## 2020-11-09 PROCEDURE — 99222 1ST HOSP IP/OBS MODERATE 55: CPT | Performed by: INTERNAL MEDICINE

## 2020-11-09 PROCEDURE — 99213 OFFICE O/P EST LOW 20 MIN: CPT | Performed by: NURSE PRACTITIONER

## 2020-11-09 PROCEDURE — 36600 WITHDRAWAL OF ARTERIAL BLOOD: CPT

## 2020-11-09 PROCEDURE — 6360000002 HC RX W HCPCS: Performed by: INTERNAL MEDICINE

## 2020-11-09 PROCEDURE — 1090F PRES/ABSN URINE INCON ASSESS: CPT | Performed by: NURSE PRACTITIONER

## 2020-11-09 PROCEDURE — 36415 COLL VENOUS BLD VENIPUNCTURE: CPT

## 2020-11-09 PROCEDURE — 6370000000 HC RX 637 (ALT 250 FOR IP): Performed by: PHYSICIAN ASSISTANT

## 2020-11-09 PROCEDURE — 82803 BLOOD GASES ANY COMBINATION: CPT

## 2020-11-09 PROCEDURE — 94640 AIRWAY INHALATION TREATMENT: CPT

## 2020-11-09 PROCEDURE — 85025 COMPLETE CBC W/AUTO DIFF WBC: CPT

## 2020-11-09 PROCEDURE — 71046 X-RAY EXAM CHEST 2 VIEWS: CPT

## 2020-11-09 PROCEDURE — 1123F ACP DISCUSS/DSCN MKR DOCD: CPT | Performed by: NURSE PRACTITIONER

## 2020-11-09 PROCEDURE — 1200000000 HC SEMI PRIVATE

## 2020-11-09 PROCEDURE — 3023F SPIROM DOC REV: CPT | Performed by: NURSE PRACTITIONER

## 2020-11-09 RX ORDER — METHYLPREDNISOLONE SODIUM SUCCINATE 125 MG/2ML
60 INJECTION, POWDER, LYOPHILIZED, FOR SOLUTION INTRAMUSCULAR; INTRAVENOUS EVERY 8 HOURS SCHEDULED
Status: DISCONTINUED | OUTPATIENT
Start: 2020-11-09 | End: 2020-11-11

## 2020-11-09 RX ORDER — EZETIMIBE 10 MG/1
10 TABLET ORAL DAILY
Status: DISCONTINUED | OUTPATIENT
Start: 2020-11-10 | End: 2020-11-12 | Stop reason: HOSPADM

## 2020-11-09 RX ORDER — NICOTINE POLACRILEX 4 MG
15 LOZENGE BUCCAL PRN
Status: DISCONTINUED | OUTPATIENT
Start: 2020-11-09 | End: 2020-11-12 | Stop reason: HOSPADM

## 2020-11-09 RX ORDER — KETOTIFEN FUMARATE 0.35 MG/ML
1 SOLUTION/ DROPS OPHTHALMIC 2 TIMES DAILY
Status: DISCONTINUED | OUTPATIENT
Start: 2020-11-09 | End: 2020-11-12 | Stop reason: HOSPADM

## 2020-11-09 RX ORDER — ACETAMINOPHEN 325 MG/1
650 TABLET ORAL EVERY 6 HOURS PRN
Status: DISCONTINUED | OUTPATIENT
Start: 2020-11-09 | End: 2020-11-12 | Stop reason: HOSPADM

## 2020-11-09 RX ORDER — ACETAMINOPHEN 650 MG/1
650 SUPPOSITORY RECTAL EVERY 6 HOURS PRN
Status: DISCONTINUED | OUTPATIENT
Start: 2020-11-09 | End: 2020-11-12 | Stop reason: HOSPADM

## 2020-11-09 RX ORDER — DOCUSATE SODIUM 250 MG
250 CAPSULE ORAL DAILY
Status: DISCONTINUED | OUTPATIENT
Start: 2020-11-10 | End: 2020-11-09 | Stop reason: ALTCHOICE

## 2020-11-09 RX ORDER — IPRATROPIUM BROMIDE AND ALBUTEROL SULFATE 2.5; .5 MG/3ML; MG/3ML
1 SOLUTION RESPIRATORY (INHALATION)
Status: DISCONTINUED | OUTPATIENT
Start: 2020-11-09 | End: 2020-11-12 | Stop reason: HOSPADM

## 2020-11-09 RX ORDER — ALOGLIPTIN 12.5 MG/1
6.25 TABLET, FILM COATED ORAL DAILY
Status: DISCONTINUED | OUTPATIENT
Start: 2020-11-09 | End: 2020-11-12 | Stop reason: HOSPADM

## 2020-11-09 RX ORDER — ROPINIROLE 0.25 MG/1
0.5 TABLET, FILM COATED ORAL 2 TIMES DAILY
Status: DISCONTINUED | OUTPATIENT
Start: 2020-11-09 | End: 2020-11-12 | Stop reason: HOSPADM

## 2020-11-09 RX ORDER — METOLAZONE 5 MG/1
5 TABLET ORAL DAILY PRN
Status: DISCONTINUED | OUTPATIENT
Start: 2020-11-09 | End: 2020-11-12 | Stop reason: HOSPADM

## 2020-11-09 RX ORDER — DOCUSATE CALCIUM 240 MG
240 CAPSULE ORAL DAILY
Status: DISCONTINUED | OUTPATIENT
Start: 2020-11-10 | End: 2020-11-12 | Stop reason: HOSPADM

## 2020-11-09 RX ORDER — DILTIAZEM HYDROCHLORIDE 180 MG/1
180 CAPSULE, COATED, EXTENDED RELEASE ORAL DAILY
Status: DISCONTINUED | OUTPATIENT
Start: 2020-11-10 | End: 2020-11-12 | Stop reason: HOSPADM

## 2020-11-09 RX ORDER — BUDESONIDE 0.5 MG/2ML
0.5 INHALANT ORAL 2 TIMES DAILY
Status: DISCONTINUED | OUTPATIENT
Start: 2020-11-09 | End: 2020-11-12 | Stop reason: HOSPADM

## 2020-11-09 RX ORDER — TIZANIDINE 4 MG/1
4 TABLET ORAL 2 TIMES DAILY PRN
Status: DISCONTINUED | OUTPATIENT
Start: 2020-11-09 | End: 2020-11-12 | Stop reason: HOSPADM

## 2020-11-09 RX ORDER — METOPROLOL SUCCINATE 100 MG/1
100 TABLET, EXTENDED RELEASE ORAL DAILY
Status: DISCONTINUED | OUTPATIENT
Start: 2020-11-10 | End: 2020-11-12 | Stop reason: HOSPADM

## 2020-11-09 RX ORDER — PROMETHAZINE HYDROCHLORIDE 25 MG/1
12.5 TABLET ORAL EVERY 6 HOURS PRN
Status: DISCONTINUED | OUTPATIENT
Start: 2020-11-09 | End: 2020-11-12 | Stop reason: HOSPADM

## 2020-11-09 RX ORDER — BUSPIRONE HYDROCHLORIDE 5 MG/1
10 TABLET ORAL 3 TIMES DAILY
Status: DISCONTINUED | OUTPATIENT
Start: 2020-11-09 | End: 2020-11-12 | Stop reason: HOSPADM

## 2020-11-09 RX ORDER — DOXYCYCLINE 100 MG/1
100 CAPSULE ORAL EVERY 12 HOURS SCHEDULED
Status: DISCONTINUED | OUTPATIENT
Start: 2020-11-09 | End: 2020-11-12

## 2020-11-09 RX ORDER — INSULIN GLARGINE 100 [IU]/ML
35 INJECTION, SOLUTION SUBCUTANEOUS EVERY MORNING
Status: DISCONTINUED | OUTPATIENT
Start: 2020-11-10 | End: 2020-11-10

## 2020-11-09 RX ORDER — DEXTROSE MONOHYDRATE 50 MG/ML
100 INJECTION, SOLUTION INTRAVENOUS PRN
Status: DISCONTINUED | OUTPATIENT
Start: 2020-11-09 | End: 2020-11-12 | Stop reason: HOSPADM

## 2020-11-09 RX ORDER — SUCRALFATE 1 G/1
1 TABLET ORAL
Status: DISCONTINUED | OUTPATIENT
Start: 2020-11-09 | End: 2020-11-12 | Stop reason: HOSPADM

## 2020-11-09 RX ORDER — FAMOTIDINE 20 MG/1
20 TABLET, FILM COATED ORAL 2 TIMES DAILY
Status: DISCONTINUED | OUTPATIENT
Start: 2020-11-09 | End: 2020-11-09

## 2020-11-09 RX ORDER — VITAMIN B COMPLEX
2000 TABLET ORAL DAILY
Status: DISCONTINUED | OUTPATIENT
Start: 2020-11-10 | End: 2020-11-12 | Stop reason: HOSPADM

## 2020-11-09 RX ORDER — FUROSEMIDE 40 MG/1
40 TABLET ORAL 2 TIMES DAILY
Status: DISCONTINUED | OUTPATIENT
Start: 2020-11-09 | End: 2020-11-12 | Stop reason: HOSPADM

## 2020-11-09 RX ORDER — TRAMADOL HYDROCHLORIDE 50 MG/1
50 TABLET ORAL EVERY 8 HOURS PRN
Status: DISCONTINUED | OUTPATIENT
Start: 2020-11-09 | End: 2020-11-12 | Stop reason: HOSPADM

## 2020-11-09 RX ORDER — FENOFIBRATE 54 MG/1
54 TABLET ORAL DAILY
Status: DISCONTINUED | OUTPATIENT
Start: 2020-11-10 | End: 2020-11-12 | Stop reason: HOSPADM

## 2020-11-09 RX ORDER — ONDANSETRON 2 MG/ML
4 INJECTION INTRAMUSCULAR; INTRAVENOUS EVERY 6 HOURS PRN
Status: DISCONTINUED | OUTPATIENT
Start: 2020-11-09 | End: 2020-11-12 | Stop reason: HOSPADM

## 2020-11-09 RX ORDER — POLYETHYLENE GLYCOL 3350 17 G/17G
17 POWDER, FOR SOLUTION ORAL DAILY PRN
Status: DISCONTINUED | OUTPATIENT
Start: 2020-11-09 | End: 2020-11-12 | Stop reason: HOSPADM

## 2020-11-09 RX ORDER — ASPIRIN 81 MG/1
81 TABLET ORAL DAILY
Status: DISCONTINUED | OUTPATIENT
Start: 2020-11-10 | End: 2020-11-12 | Stop reason: HOSPADM

## 2020-11-09 RX ORDER — ATORVASTATIN CALCIUM 40 MG/1
40 TABLET, FILM COATED ORAL DAILY
Status: DISCONTINUED | OUTPATIENT
Start: 2020-11-10 | End: 2020-11-12 | Stop reason: HOSPADM

## 2020-11-09 RX ORDER — DEXTROSE MONOHYDRATE 25 G/50ML
12.5 INJECTION, SOLUTION INTRAVENOUS PRN
Status: DISCONTINUED | OUTPATIENT
Start: 2020-11-09 | End: 2020-11-12 | Stop reason: HOSPADM

## 2020-11-09 RX ORDER — INSULIN GLARGINE 100 [IU]/ML
80 INJECTION, SOLUTION SUBCUTANEOUS NIGHTLY
Status: DISCONTINUED | OUTPATIENT
Start: 2020-11-09 | End: 2020-11-10

## 2020-11-09 RX ORDER — MONTELUKAST SODIUM 10 MG/1
10 TABLET ORAL NIGHTLY
Status: DISCONTINUED | OUTPATIENT
Start: 2020-11-09 | End: 2020-11-12 | Stop reason: HOSPADM

## 2020-11-09 RX ORDER — HYDROCODONE BITARTRATE AND ACETAMINOPHEN 5; 325 MG/1; MG/1
1 TABLET ORAL DAILY PRN
Status: DISCONTINUED | OUTPATIENT
Start: 2020-11-09 | End: 2020-11-12 | Stop reason: HOSPADM

## 2020-11-09 RX ORDER — FLUTICASONE PROPIONATE 50 MCG
1 SPRAY, SUSPENSION (ML) NASAL 2 TIMES DAILY
Status: DISCONTINUED | OUTPATIENT
Start: 2020-11-09 | End: 2020-11-12 | Stop reason: HOSPADM

## 2020-11-09 RX ORDER — ALLOPURINOL 100 MG/1
150 TABLET ORAL DAILY
Status: DISCONTINUED | OUTPATIENT
Start: 2020-11-10 | End: 2020-11-12 | Stop reason: HOSPADM

## 2020-11-09 RX ORDER — PANTOPRAZOLE SODIUM 40 MG/1
40 TABLET, DELAYED RELEASE ORAL DAILY
Status: DISCONTINUED | OUTPATIENT
Start: 2020-11-10 | End: 2020-11-12 | Stop reason: HOSPADM

## 2020-11-09 RX ORDER — POTASSIUM CHLORIDE 20 MEQ/1
20 TABLET, EXTENDED RELEASE ORAL DAILY
Status: DISCONTINUED | OUTPATIENT
Start: 2020-11-10 | End: 2020-11-12 | Stop reason: HOSPADM

## 2020-11-09 RX ORDER — CETIRIZINE HYDROCHLORIDE 10 MG/1
5 TABLET ORAL DAILY
Status: DISCONTINUED | OUTPATIENT
Start: 2020-11-09 | End: 2020-11-12 | Stop reason: HOSPADM

## 2020-11-09 RX ADMIN — APIXABAN 5 MG: 5 TABLET, FILM COATED ORAL at 22:31

## 2020-11-09 RX ADMIN — ROFLUMILAST 500 MCG: 500 TABLET ORAL at 12:05

## 2020-11-09 RX ADMIN — INSULIN GLARGINE 80 UNITS: 100 INJECTION, SOLUTION SUBCUTANEOUS at 22:41

## 2020-11-09 RX ADMIN — HYDROCODONE BITARTRATE AND ACETAMINOPHEN 1 TABLET: 5; 325 TABLET ORAL at 22:30

## 2020-11-09 RX ADMIN — BUDESONIDE 500 MCG: 0.5 SUSPENSION RESPIRATORY (INHALATION) at 18:56

## 2020-11-09 RX ADMIN — INSULIN LISPRO 2 UNITS: 100 INJECTION, SOLUTION INTRAVENOUS; SUBCUTANEOUS at 12:11

## 2020-11-09 RX ADMIN — INSULIN LISPRO 4 UNITS: 100 INJECTION, SOLUTION INTRAVENOUS; SUBCUTANEOUS at 17:05

## 2020-11-09 RX ADMIN — DOXYCYCLINE 100 MG: 100 CAPSULE ORAL at 12:05

## 2020-11-09 RX ADMIN — METHYLPREDNISOLONE SODIUM SUCCINATE 60 MG: 125 INJECTION, POWDER, FOR SOLUTION INTRAMUSCULAR; INTRAVENOUS at 12:46

## 2020-11-09 RX ADMIN — BUSPIRONE HYDROCHLORIDE 10 MG: 5 TABLET ORAL at 22:36

## 2020-11-09 RX ADMIN — INSULIN LISPRO 38 UNITS: 100 INJECTION, SOLUTION INTRAVENOUS; SUBCUTANEOUS at 17:06

## 2020-11-09 RX ADMIN — ROPINIROLE HYDROCHLORIDE 0.5 MG: 0.25 TABLET, FILM COATED ORAL at 22:31

## 2020-11-09 RX ADMIN — KETOTIFEN FUMARATE 1 DROP: 0.35 SOLUTION/ DROPS OPHTHALMIC at 12:06

## 2020-11-09 RX ADMIN — IPRATROPIUM BROMIDE AND ALBUTEROL SULFATE 1 AMPULE: .5; 3 SOLUTION RESPIRATORY (INHALATION) at 13:55

## 2020-11-09 RX ADMIN — INSULIN LISPRO 2 UNITS: 100 INJECTION, SOLUTION INTRAVENOUS; SUBCUTANEOUS at 22:41

## 2020-11-09 RX ADMIN — TIZANIDINE 4 MG: 4 TABLET ORAL at 22:31

## 2020-11-09 RX ADMIN — TIZANIDINE 4 MG: 4 TABLET ORAL at 14:31

## 2020-11-09 RX ADMIN — KETOTIFEN FUMARATE 1 DROP: 0.35 SOLUTION/ DROPS OPHTHALMIC at 22:36

## 2020-11-09 RX ADMIN — FLUTICASONE PROPIONATE 1 SPRAY: 50 SPRAY, METERED NASAL at 22:29

## 2020-11-09 RX ADMIN — CEFTRIAXONE 1 G: 1 INJECTION, POWDER, FOR SOLUTION INTRAMUSCULAR; INTRAVENOUS at 22:53

## 2020-11-09 RX ADMIN — METHYLPREDNISOLONE SODIUM SUCCINATE 60 MG: 125 INJECTION, POWDER, FOR SOLUTION INTRAMUSCULAR; INTRAVENOUS at 22:30

## 2020-11-09 RX ADMIN — DOXYCYCLINE 100 MG: 100 CAPSULE ORAL at 22:30

## 2020-11-09 RX ADMIN — MONTELUKAST SODIUM 10 MG: 10 TABLET, COATED ORAL at 22:30

## 2020-11-09 RX ADMIN — INSULIN LISPRO 38 UNITS: 100 INJECTION, SOLUTION INTRAVENOUS; SUBCUTANEOUS at 12:11

## 2020-11-09 RX ADMIN — ALOGLIPTIN 6.25 MG: 12.5 TABLET, FILM COATED ORAL at 12:05

## 2020-11-09 RX ADMIN — SUCRALFATE 1 G: 1 TABLET ORAL at 17:03

## 2020-11-09 RX ADMIN — SUCRALFATE 1 G: 1 TABLET ORAL at 12:06

## 2020-11-09 RX ADMIN — BUDESONIDE 500 MCG: 0.5 SUSPENSION RESPIRATORY (INHALATION) at 13:55

## 2020-11-09 RX ADMIN — BUSPIRONE HYDROCHLORIDE 10 MG: 5 TABLET ORAL at 12:46

## 2020-11-09 RX ADMIN — CETIRIZINE HYDROCHLORIDE 5 MG: 10 TABLET, FILM COATED ORAL at 12:05

## 2020-11-09 RX ADMIN — FUROSEMIDE 40 MG: 40 TABLET ORAL at 17:03

## 2020-11-09 RX ADMIN — IPRATROPIUM BROMIDE AND ALBUTEROL SULFATE 1 AMPULE: .5; 3 SOLUTION RESPIRATORY (INHALATION) at 18:56

## 2020-11-09 ASSESSMENT — ENCOUNTER SYMPTOMS
SORE THROAT: 0
COUGH: 1
GASTROINTESTINAL NEGATIVE: 1
EYES NEGATIVE: 1
WHEEZING: 1
FACIAL SWELLING: 0
SHORTNESS OF BREATH: 1

## 2020-11-09 ASSESSMENT — PAIN SCALES - GENERAL
PAINLEVEL_OUTOF10: 8
PAINLEVEL_OUTOF10: 0

## 2020-11-09 NOTE — PROGRESS NOTES
Chief Complaint   Patient presents with    Shortness of Breath    COPD       Have you seen any other physician or provider since your last visit no    Have you had any other diagnostic tests since your last visit? no    Have you changed or stopped any medications since your last visit? no       Patient here with increased SOB. Sx worsening since Friday. Has been doing neb tx about every 4 hours. O2 on 4 liters at 85-87.

## 2020-11-09 NOTE — H&P
[facility]       Medications Prior to Admission:    Prior to Admission medications    Medication Sig Start Date End Date Taking?  Authorizing Provider   potassium chloride (KLOR-CON M) 20 MEQ extended release tablet TAKE ONE TABLET BY MOUTH EVERY DAY 11/3/20  Yes JADE Santiago   traMADol (ULTRAM) 50 MG tablet TAKE ONE TABLET BY MOUTH THREE TIMES A DAY AS NEEDED 10/30/20 11/29/20 Yes JADE Melendez   HYDROcodone-acetaminophen (NORCO) 5-325 MG per tablet TAKE ONE TABLET BY MOUTH EVERY DAY AS NEEDED FOR PAIN 10/23/20 11/22/20 Yes JADE Santiago   ELIQUIS 5 MG TABS tablet TAKE 1 TABLET BY MOUTH EVERY 12 HOURS 10/19/20  Yes JADE Santiago   busPIRone (BUSPAR) 10 MG tablet TAKE ONE TABLET BY MOUTH THREE TIMES A DAY 10/19/20  Yes JADE Santiago   dilTIAZem (CARDIZEM CD) 180 MG extended release capsule TAKE 1 CAPSULE BY MOUTH DAILY 10/19/20  Yes JADE Santiago   DALIRESP 500 MCG tablet TAKE ONE TABLET BY MOUTH EVERY DAY 10/12/20  Yes JADE Santiago   tiZANidine (ZANAFLEX) 4 MG tablet TAKE 1 TABLET BY MOUTH 2 TIMES DAILY AS NEEDED (BACK PAIN) 10/5/20  Yes JADE Santiago   rOPINIRole (REQUIP) 0.5 MG tablet TAKE ONE TABLET BY MOUTH TWO TIMES A DAY 9/28/20  Yes JADE Santiago   VASCEPA 1 g CAPS capsule TAKE TWO CAPSULES BY MOUTH TWO TIMES A DAY 9/24/20  Yes JADE Santiago   metOLazone (ZAROXOLYN) 5 MG tablet TAKE 1 TABLET BY MOUTH DAILY AS NEEDED (SWELLING) 9/24/20  Yes JADE Santiago   insulin lispro (HUMALOG) 100 UNIT/ML injection vial INJECT 38 UNITS UNDER THE SKIN THREE TIMES A DAY BEFORE MEALS 9/24/20  Yes JADE Santiago   metoprolol succinate (TOPROL XL) 100 MG extended release tablet TAKE 1 TABLET BY MOUTH DAILY 9/24/20  Yes JADE Santiago   allopurinol (ZYLOPRIM) 300 MG tablet TAKE 1/2 TABLET BY MOUTH EVERY DAY 9/24/20  Yes JADE Santiago   furosemide (LASIX) 40 MG tablet TAKE ONE TABLET BY MOUTH TWO TIMES A DAY 9/23/20 Yes JADE Serna   atorvastatin (LIPITOR) 40 MG tablet Take 40 mg by mouth daily  9/15/20  Yes Historical Provider, MD   fenofibrate (TRICOR) 48 MG tablet Take 48 mg by mouth daily  9/2/20  Yes Historical Provider, MD Ladonna Pratt 18 MCG inhalation capsule 18 mcg daily  9/1/20  Yes Historical Provider, MD   montelukast (SINGULAIR) 10 MG tablet TAKE ONE TABLET BY MOUTH EVERY DAY 8/26/20  Yes JADE Serna   Insulin Degludec (TRESIBA) 100 UNIT/ML SOLN 35 units in the am and 80 in the evening.  8/7/20  Yes JADE Serna   sucralfate (CARAFATE) 1 GM tablet TAKE ONE TABLET BY MOUTH THREE TIMES A DAY 8/4/20  Yes JADE Serna   docusate sodium (COLACE) 250 MG capsule Take 1 capsule by mouth daily 6/10/20  Yes JADE Serna   ezetimibe (ZETIA) 10 MG tablet TAKE ONE TABLET BY MOUTH EVERY DAY 3/27/20  Yes JADE Serna   ASPIRIN LOW DOSE 81 MG EC tablet TAKE ONE TABLET BY MOUTH EVERY DAY 3/21/20  Yes JADE Serna   pantoprazole (PROTONIX) 40 MG tablet TAKE ONE TABLET BY MOUTH EVERY DAY 3/18/20  Yes JADE Serna   PATADAY 0.2 % SOLN ophthalmic solution PLACE 1 DROP INTO BOTH EYES 2 TIMES DAILY 2/5/20  Yes JADE Serna   GNP VITAMIN D MAXIMUM STRENGTH 50 MCG (2000 UT) TABS Take 1 tablet by mouth daily  11/13/19  Yes Historical Provider, MD   fluticasone-salmeterol (ADVAIR) 500-50 MCG/DOSE diskus inhaler Inhale 1 puff into the lungs every 12 hours   Yes Historical Provider, MD   omalizumab (OMALIZUMAB) 150 MG injection Inject 150 mg into the skin every 28 days 8/22/19  Yes JADE Serna   flunisolide (NASALIDE) 25 MCG/ACT (0.025%) SOLN 2 sprays every 12 hours  11/29/18  Yes Historical Provider, MD   albuterol sulfate HFA (PROAIR HFA) 108 (90 Base) MCG/ACT inhaler Inhale 2 puffs into the lungs every 4 hours as needed for Wheezing 2/26/18  Yes JADE Serna   loratadine (CLARITIN) 10 MG tablet TAKE ONE TABLET BY MOUTH EVERY DAY [facility] [J96.21]  -Suspect secondary to acute COPD exacerbation  -Chest x-ray pending to rule out any infectious etiology  -Continue treatment with DuoNebs, Pulmicort, doxycycline, Solu-Medrol  -Continue supplemental oxygen to maintain sats greater than 90%; patient has baseline O2 requirement of 3 L nasal cannula continuously and BiPAP nightly   11/09/2020    Morbid obesity with BMI of 40.0-44.9, adult ([facility]) [E66.01, Z68.41]  -BMI 06.08  -Complicates all aspects of care  -Counseled on diet and exercise after recovery    Obstructive sleep apnea  -Continue BiPAP nightly   08/07/2020    Gastroesophageal reflux disease [K21.9]  -Continue PPI and Carafate       Hypertension [I10]  -Continue home metoprolol and diltiazem    Paroxysmal atrial fibrillation  -Continue home Eliquis, diltiazem and metoprolol       Type 2 diabetes mellitus with complication, with long-term current use of insulin ([facility]) [E11.8, Z79.4]  -A1c 9.3 on 7/22/2020; will repeat with a.m. labs  --Continue home diabetic meds, insulin and sliding scale insulin  -Monitor and adjust as needed   12/05/2017    COPD exacerbation ([facility] [J44.1]  -Chest x-ray pending  -Continue treatment with DuoNebs, Pulmicort, doxycycline, Solu-Medrol and supplemental oxygen   12/04/2017    CRF (chronic renal failure) [N18.9]  -With baseline serum creatinine around 2  -Avoid nephrotoxins and NSAIDs  -Encourage good p.o. intake   02/22/2017    Hyperlipidemia [E78.5]  -Continue Lipitor, Zetia and TriCor 05/26/2011     Patient was seen and examined by Dr. Selestino Dancer and plan of care reviewed.       Yessenia Tsang certifies per [employer] regulation for 42 .15(a), that the patient may reasonably be expected to be discharged or transferred to a hospital within 96 hours after admission to [address] [place] [address]    Electronically signed by JASON Hubbard on 11/9/2020 at 1:36 PM

## 2020-11-09 NOTE — CARE COORDINATION
11/09/20 1533   Discharge Marlo Barnes 84 Family Members; Children   Current Services Prior To Admission Durable Medical Equipment; Oxygen Therapy   DME Oxygen Therapy (Comment); Bipap;Cane;Home Aerosol; Shower Chair;Walker  (RW)   Potential Assistance Needed N/A   Potential Assistance Purchasing Medications No   Type of Home Care Services None   Patient expects to be discharged to: home   Expected Discharge Date 11/12/20   Follow Up Appointment: Best Day/Time    (any)     Pt has O2, bipap, and nebulizer at home with [facility]. Denies any DC needs at DC. Will continue to follow for DC needs.   Goes to [facility] (when they are open)

## 2020-11-09 NOTE — ACP (ADVANCE CARE PLANNING)
Advance Care Planning     Advance Care Planning Activator (Inpatient)  Conversation Note      Date of ACP Conversation: 11/9/2020    Conversation Conducted with: Patient with Decision Making Capacity    ACP Activator: Nicholas Kincaid    *When Decision Maker makes decisions on behalf of the incapacitated patient: Decision Maker is asked to consider and make decisions based on patient values, known preferences, or best interests. Health Care Decision Maker:     Current Designated Health Care Decision Maker:   Primary Decision Maker: Shalonda Wilson Child - [phone]    Secondary Decision Maker: Carlo Elizondo - Child - [phone]  (If there is a valid Health Care Decision Maker named in the \"Healthcare Decision Makers\" box in the ACP activity, but it is not visible above, be sure to open that field and then select the health care decision maker relationship (ie \"primary\") in the blank space to the right of the name.) Validate  this information as still accurate & up-to-date; edit [place] field as needed.)    Note: Assess and validate information in current ACP documents, as indicated. If no Decision Maker listed above or available through scanned documents, then:    If no Authorized Decision Maker has previously been identified, then patient chooses [place]:  \"Who would you like to name as your primary health care decision-maker? \"               Name: Gary Alvarez        Relationship: son          Phone number: [phone]  [employer] this person be reached easily? \" Yes  \"Who would you like to name as your back-up decision maker? \"   Name: Nish Trevon        Relationship: son          Phone number: [phone]  [place] this person be reached easily? \" Yes    Note: If the relationship of these Decision-Makers to the patient does NOT follow your state's Next of Kin hierarchy, recommend that patient complete ACP document that meets state-specific requirements to allow them to act on the patient's behalf when appropriate. Care Preferences    Ventilation: \"If you were in your present state of health and suddenly became very ill and were unable to breathe on your own, what would your preference be about the use of a ventilator (breathing machine) if it were available to you? \"      Would the patient desire the use of ventilator (breathing machine)?: yes    \"If your health worsens and it becomes clear that your chance of recovery is unlikely, what would your preference be about the use of a ventilator (breathing machine) if it were available to you? \"     Would the patient desire the use of ventilator (breathing machine)?:  Yes, but wouldn't want to [facility] on one for a long time\"      Resuscitation  \"CPR works best to restart the heart when there is a sudden event, like a heart attack, in someone who is otherwise healthy. Unfortunately, CPR does not typically restart the heart for people who have serious health conditions or who are very sick. \"    \"In the event your heart stopped as a result of an underlying serious health condition, would you want attempts to be made to restart your heart (answer \"yes\" for attempt to resuscitate) or would you prefer a natural death (answer \"no\" for do not attempt to resuscitate)? \" yes      NOTE: If the patient has a valid advance directive AND now provides care preference(s) that are inconsistent with that prior directive, advise the patient to consider either: creating a new advance directive that complies with state-specific requirements; or, if that is not possible, orally revoking that prior directive in accordance with state-specific requirements, which must be documented in the EHR. [] Yes   [x] No   Educated Patient / Derek Vasquez regarding differences between Advance Directives and portable DNR orders.     Length of ACP Conversation in minutes:      Conversation Outcomes:  [x] ACP discussion completed  [] Existing advance directive reviewed with patient; no changes to patient's previously recorded wishes  [] New Advance Directive completed  [] Portable Do Not Rescitate prepared for Provider review and signature  [] POLST/POST/MOLST/MOST prepared for Provider review and signature      Follow-up plan:    [] Schedule follow-up conversation to continue planning  [] Referred individual to Provider for additional questions/concerns   [x] Advised patient/agent/surrogate to review completed ACP document and update if needed with changes in condition, patient preferences or care setting    [] This note routed to one or more involved healthcare providers

## 2020-11-10 PROBLEM — B37.0 THRUSH: Status: ACTIVE | Noted: 2020-11-10

## 2020-11-10 PROBLEM — G47.33 OSA (OBSTRUCTIVE SLEEP APNEA): Status: ACTIVE | Noted: 2020-11-10

## 2020-11-10 LAB
ANION GAP SERPL CALCULATED.3IONS-SCNC: 12 MMOL/L (ref 3–16)
BUN BLDV-MCNC: 51 MG/DL (ref 6–20)
CALCIUM SERPL-MCNC: 9.6 MG/DL (ref 8.5–10.5)
CHLORIDE BLD-SCNC: 91 MMOL/L (ref 98–107)
CO2: 34 MMOL/L (ref 20–30)
CREAT SERPL-MCNC: 1.9 MG/DL (ref 0.4–1.2)
GFR AFRICAN AMERICAN: 32
GFR NON-AFRICAN AMERICAN: 26
GLUCOSE BLD-MCNC: 125 MG/DL (ref 74–106)
GLUCOSE BLD-MCNC: 153 MG/DL (ref 74–106)
GLUCOSE BLD-MCNC: 292 MG/DL (ref 74–106)
GLUCOSE BLD-MCNC: 343 MG/DL (ref 74–106)
GLUCOSE BLD-MCNC: 378 MG/DL (ref 74–106)
GLUCOSE BLD-MCNC: 380 MG/DL (ref 74–106)
HBA1C MFR BLD: 8.4 %
HCT VFR BLD CALC: 36.5 % (ref 37–47)
HEMOGLOBIN: 10.8 G/DL (ref 11.5–16.5)
MCH RBC QN AUTO: 27.9 PG (ref 27–32)
MCHC RBC AUTO-ENTMCNC: 29.6 G/DL (ref 31–35)
MCV RBC AUTO: 94.3 FL (ref 80–100)
PDW BLD-RTO: 16.8 % (ref 11–16)
PERFORMED ON: ABNORMAL
PLATELET # BLD: 416 K/UL (ref 150–400)
PMV BLD AUTO: 10.4 FL (ref 6–10)
POTASSIUM REFLEX MAGNESIUM: 4.5 MMOL/L (ref 3.4–5.1)
RBC # BLD: 3.87 M/UL (ref 3.8–5.8)
SODIUM BLD-SCNC: 137 MMOL/L (ref 136–145)
WBC # BLD: 15.3 K/UL (ref 4–11)

## 2020-11-10 PROCEDURE — 83036 HEMOGLOBIN GLYCOSYLATED A1C: CPT

## 2020-11-10 PROCEDURE — 97530 THERAPEUTIC ACTIVITIES: CPT

## 2020-11-10 PROCEDURE — 97165 OT EVAL LOW COMPLEX 30 MIN: CPT

## 2020-11-10 PROCEDURE — 2700000000 HC OXYGEN THERAPY PER DAY

## 2020-11-10 PROCEDURE — 6360000002 HC RX W HCPCS: Performed by: PHYSICIAN ASSISTANT

## 2020-11-10 PROCEDURE — 6370000000 HC RX 637 (ALT 250 FOR IP): Performed by: PHYSICIAN ASSISTANT

## 2020-11-10 PROCEDURE — 6370000000 HC RX 637 (ALT 250 FOR IP): Performed by: INTERNAL MEDICINE

## 2020-11-10 PROCEDURE — 36415 COLL VENOUS BLD VENIPUNCTURE: CPT

## 2020-11-10 PROCEDURE — 97161 PT EVAL LOW COMPLEX 20 MIN: CPT

## 2020-11-10 PROCEDURE — 80048 BASIC METABOLIC PNL TOTAL CA: CPT

## 2020-11-10 PROCEDURE — 99232 SBSQ HOSP IP/OBS MODERATE 35: CPT | Performed by: INTERNAL MEDICINE

## 2020-11-10 PROCEDURE — 94640 AIRWAY INHALATION TREATMENT: CPT

## 2020-11-10 PROCEDURE — 6360000002 HC RX W HCPCS: Performed by: INTERNAL MEDICINE

## 2020-11-10 PROCEDURE — 94761 N-INVAS EAR/PLS OXIMETRY MLT: CPT

## 2020-11-10 PROCEDURE — 1200000000 HC SEMI PRIVATE

## 2020-11-10 PROCEDURE — 92610 EVALUATE SWALLOWING FUNCTION: CPT

## 2020-11-10 PROCEDURE — 2580000003 HC RX 258: Performed by: INTERNAL MEDICINE

## 2020-11-10 PROCEDURE — 85027 COMPLETE CBC AUTOMATED: CPT

## 2020-11-10 RX ORDER — INSULIN GLARGINE 100 [IU]/ML
90 INJECTION, SOLUTION SUBCUTANEOUS NIGHTLY
Status: DISCONTINUED | OUTPATIENT
Start: 2020-11-10 | End: 2020-11-11

## 2020-11-10 RX ORDER — INSULIN GLARGINE 100 [IU]/ML
50 INJECTION, SOLUTION SUBCUTANEOUS EVERY MORNING
Status: DISCONTINUED | OUTPATIENT
Start: 2020-11-11 | End: 2020-11-11

## 2020-11-10 RX ADMIN — DILTIAZEM HYDROCHLORIDE 180 MG: 180 CAPSULE, COATED, EXTENDED RELEASE ORAL at 09:03

## 2020-11-10 RX ADMIN — BUDESONIDE 500 MCG: 0.5 SUSPENSION RESPIRATORY (INHALATION) at 18:40

## 2020-11-10 RX ADMIN — IPRATROPIUM BROMIDE AND ALBUTEROL SULFATE 1 AMPULE: .5; 3 SOLUTION RESPIRATORY (INHALATION) at 15:02

## 2020-11-10 RX ADMIN — NYSTATIN 500000 UNITS: 100000 SUSPENSION ORAL at 12:57

## 2020-11-10 RX ADMIN — Medication 2000 UNITS: at 09:10

## 2020-11-10 RX ADMIN — SUCRALFATE 1 G: 1 TABLET ORAL at 12:57

## 2020-11-10 RX ADMIN — BUSPIRONE HYDROCHLORIDE 10 MG: 5 TABLET ORAL at 09:06

## 2020-11-10 RX ADMIN — METHYLPREDNISOLONE SODIUM SUCCINATE 60 MG: 125 INJECTION, POWDER, FOR SOLUTION INTRAMUSCULAR; INTRAVENOUS at 06:03

## 2020-11-10 RX ADMIN — IPRATROPIUM BROMIDE AND ALBUTEROL SULFATE 1 AMPULE: .5; 3 SOLUTION RESPIRATORY (INHALATION) at 18:40

## 2020-11-10 RX ADMIN — ATORVASTATIN CALCIUM 40 MG: 40 TABLET, FILM COATED ORAL at 09:08

## 2020-11-10 RX ADMIN — ROPINIROLE HYDROCHLORIDE 0.5 MG: 0.25 TABLET, FILM COATED ORAL at 09:01

## 2020-11-10 RX ADMIN — NYSTATIN 500000 UNITS: 100000 SUSPENSION ORAL at 21:05

## 2020-11-10 RX ADMIN — ASPIRIN 81 MG: 81 TABLET, COATED ORAL at 09:10

## 2020-11-10 RX ADMIN — MONTELUKAST SODIUM 10 MG: 10 TABLET, COATED ORAL at 21:01

## 2020-11-10 RX ADMIN — FLUTICASONE PROPIONATE 1 SPRAY: 50 SPRAY, METERED NASAL at 21:06

## 2020-11-10 RX ADMIN — INSULIN LISPRO 38 UNITS: 100 INJECTION, SOLUTION INTRAVENOUS; SUBCUTANEOUS at 09:27

## 2020-11-10 RX ADMIN — KETOTIFEN FUMARATE 1 DROP: 0.35 SOLUTION/ DROPS OPHTHALMIC at 08:58

## 2020-11-10 RX ADMIN — BUSPIRONE HYDROCHLORIDE 10 MG: 5 TABLET ORAL at 21:02

## 2020-11-10 RX ADMIN — INSULIN GLARGINE 90 UNITS: 100 INJECTION, SOLUTION SUBCUTANEOUS at 21:17

## 2020-11-10 RX ADMIN — DOCUSATE CALCIUM 240 MG: 240 CAPSULE, LIQUID FILLED ORAL at 09:07

## 2020-11-10 RX ADMIN — CEFTRIAXONE 1 G: 1 INJECTION, POWDER, FOR SOLUTION INTRAMUSCULAR; INTRAVENOUS at 21:07

## 2020-11-10 RX ADMIN — METHYLPREDNISOLONE SODIUM SUCCINATE 60 MG: 125 INJECTION, POWDER, FOR SOLUTION INTRAMUSCULAR; INTRAVENOUS at 14:48

## 2020-11-10 RX ADMIN — IPRATROPIUM BROMIDE AND ALBUTEROL SULFATE 1 AMPULE: .5; 3 SOLUTION RESPIRATORY (INHALATION) at 09:50

## 2020-11-10 RX ADMIN — INSULIN GLARGINE 35 UNITS: 100 INJECTION, SOLUTION SUBCUTANEOUS at 09:22

## 2020-11-10 RX ADMIN — FENOFIBRATE 54 MG: 54 TABLET, FILM COATED ORAL at 09:09

## 2020-11-10 RX ADMIN — ROFLUMILAST 500 MCG: 500 TABLET ORAL at 09:09

## 2020-11-10 RX ADMIN — CETIRIZINE HYDROCHLORIDE 5 MG: 10 TABLET, FILM COATED ORAL at 09:08

## 2020-11-10 RX ADMIN — SUCRALFATE 1 G: 1 TABLET ORAL at 17:13

## 2020-11-10 RX ADMIN — HYDROCODONE BITARTRATE AND ACETAMINOPHEN 1 TABLET: 5; 325 TABLET ORAL at 21:05

## 2020-11-10 RX ADMIN — APIXABAN 5 MG: 5 TABLET, FILM COATED ORAL at 09:12

## 2020-11-10 RX ADMIN — METHYLPREDNISOLONE SODIUM SUCCINATE 60 MG: 125 INJECTION, POWDER, FOR SOLUTION INTRAMUSCULAR; INTRAVENOUS at 21:21

## 2020-11-10 RX ADMIN — APIXABAN 5 MG: 5 TABLET, FILM COATED ORAL at 21:01

## 2020-11-10 RX ADMIN — INSULIN LISPRO 38 UNITS: 100 INJECTION, SOLUTION INTRAVENOUS; SUBCUTANEOUS at 11:14

## 2020-11-10 RX ADMIN — ROPINIROLE HYDROCHLORIDE 0.5 MG: 0.25 TABLET, FILM COATED ORAL at 21:02

## 2020-11-10 RX ADMIN — METOPROLOL SUCCINATE 100 MG: 100 TABLET, EXTENDED RELEASE ORAL at 09:10

## 2020-11-10 RX ADMIN — INSULIN LISPRO 10 UNITS: 100 INJECTION, SOLUTION INTRAVENOUS; SUBCUTANEOUS at 11:13

## 2020-11-10 RX ADMIN — DOXYCYCLINE 100 MG: 100 CAPSULE ORAL at 09:02

## 2020-11-10 RX ADMIN — FUROSEMIDE 40 MG: 40 TABLET ORAL at 17:14

## 2020-11-10 RX ADMIN — TIZANIDINE 4 MG: 4 TABLET ORAL at 21:05

## 2020-11-10 RX ADMIN — IPRATROPIUM BROMIDE AND ALBUTEROL SULFATE 1 AMPULE: .5; 3 SOLUTION RESPIRATORY (INHALATION) at 04:50

## 2020-11-10 RX ADMIN — SUCRALFATE 1 G: 1 TABLET ORAL at 06:03

## 2020-11-10 RX ADMIN — INSULIN LISPRO 3 UNITS: 100 INJECTION, SOLUTION INTRAVENOUS; SUBCUTANEOUS at 21:16

## 2020-11-10 RX ADMIN — KETOTIFEN FUMARATE 1 DROP: 0.35 SOLUTION/ DROPS OPHTHALMIC at 21:05

## 2020-11-10 RX ADMIN — INSULIN LISPRO 10 UNITS: 100 INJECTION, SOLUTION INTRAVENOUS; SUBCUTANEOUS at 09:22

## 2020-11-10 RX ADMIN — FUROSEMIDE 40 MG: 40 TABLET ORAL at 09:03

## 2020-11-10 RX ADMIN — PANTOPRAZOLE SODIUM 40 MG: 40 TABLET, DELAYED RELEASE ORAL at 09:11

## 2020-11-10 RX ADMIN — ALLOPURINOL 150 MG: 100 TABLET ORAL at 09:01

## 2020-11-10 RX ADMIN — BUDESONIDE 500 MCG: 0.5 SUSPENSION RESPIRATORY (INHALATION) at 04:50

## 2020-11-10 RX ADMIN — NYSTATIN 500000 UNITS: 100000 SUSPENSION ORAL at 17:14

## 2020-11-10 RX ADMIN — DOXYCYCLINE 100 MG: 100 CAPSULE ORAL at 21:01

## 2020-11-10 RX ADMIN — POTASSIUM CHLORIDE 20 MEQ: 1500 TABLET, EXTENDED RELEASE ORAL at 09:06

## 2020-11-10 RX ADMIN — ALOGLIPTIN 6.25 MG: 12.5 TABLET, FILM COATED ORAL at 09:07

## 2020-11-10 RX ADMIN — BUSPIRONE HYDROCHLORIDE 10 MG: 5 TABLET ORAL at 14:49

## 2020-11-10 RX ADMIN — FLUTICASONE PROPIONATE 1 SPRAY: 50 SPRAY, METERED NASAL at 09:01

## 2020-11-10 ASSESSMENT — PAIN SCALES - GENERAL
PAINLEVEL_OUTOF10: 5
PAINLEVEL_OUTOF10: 6

## 2020-11-10 NOTE — PROGRESS NOTES
Physical Therapy    Facility/Department: [facility] MED SURG  Initial Assessment    NAME: Aditi Tellez  : 1950  MRN: [id number]    Date of Service: 11/10/2020    Discharge Recommendations:  Continue to assess pending progress        Assessment   Body structures, Functions, Activity limitations: Decreased balance  Assessment: Respiratory failure; general weakness; functional decline; will benefit from PT to help restore pre-illness functional status. Treatment Diagnosis: Respiratory failure; general weakness; functional decline  Prognosis: Excellent  Decision Making: Low Complexity  REQUIRES PT FOLLOW UP: Yes  Activity Tolerance  Activity Tolerance: Patient Tolerated treatment well       Patient Diagnosis(es): There were no encounter diagnoses. has a past medical history of Anemia, Anxiety, Asthma, Cataract, COPD (chronic obstructive pulmonary disease) ([facility] DDD (degenerative disc disease), cervical, Depression, Diabetes mellitus type 2, GERD (gastroesophageal reflux disease), Gout, History of uterine cancer, Hyperlipidemia, and Hypertension. has a past surgical history that includes Tonsillectomy ();  section (, ); Lung surgery; Neck surgery; Cataract removal with implant (); Hysterectomy; Colonoscopy (2013); Upper gastrointestinal endoscopy (N/A, 2019); and Colonoscopy (N/A, 2019).     Restrictions  Restrictions/Precautions  Restrictions/Precautions: General Precautions, Fall Risk  Required Braces or Orthoses?: No  Vision/Hearing  Vision: Impaired  Vision Exceptions: Wears glasses at all times  Hearing: Within functional limits     Subjective  General  Chart Reviewed: Yes  Patient assessed for rehabilitation services?: Yes  Response To Previous Treatment: Not applicable  Family / Caregiver Present: No  Referring Practitioner: Mary Roberts MD  Diagnosis: Respiratory failure  Follows Commands: Within Functional Limits  Subjective  Subjective: Patient states she has pneumonia; feeling some better; agreeable to consult; states she had a fall in October, not sure why; normally ambulates independently without AD.   Pain Screening  Patient Currently in Pain: Yes  Pain Assessment  Pain Level: 5  Vital Signs  Patient Currently in Pain: Yes       Orientation  Orientation  Overall Orientation Status: Within Normal Limits  Social/Functional History  Social/Functional History  Lives With: Alone  Type of Home: House  Home Layout: One level  Home Access: Stairs to enter without rails  Entrance Stairs - Number of Steps: 5-6  Bathroom Shower/Tub: Tub/Shower unit, Shower chair with back  Bathroom Toilet: Standard  Bathroom Equipment: Shower chair, Grab bars in shower  Home Equipment: Oxygen, Cane, 4 wheeled walker, Lift chair  Receives Help From: Family  ADL Assistance: Independent  Homemaking Assistance: (Sons Gf completes laundry, hired )  Homemaking Responsibilities: (Son completes grocery shopping)  Ambulation Assistance: Independent  Transfer Assistance: Independent  Active : No  Cognition        Objective     Observation/Palpation  Posture: Good  Observation: 5L 02 , pt seated on EOB, NAD, taking breathing tx    AROM RLE (degrees)  RLE AROM: WFL  AROM LLE (degrees)  LLE AROM : WFL  AROM RUE (degrees)  RUE AROM : WFL  AROM LUE (degrees)  LUE AROM : WFL  Strength RLE  Strength RLE: WFL  Strength LLE  Strength LLE: WFL  Strength RUE  Strength RUE: WFL  Strength LUE  Strength LUE: WFL  Tone RLE  RLE Tone: Normotonic  Tone LLE  LLE Tone: Normotonic  Motor Control  Gross Motor?: WFL  Sensation  Overall Sensation Status: WFL  Bed mobility  Rolling to Right: Modified independent  Supine to Sit: Modified independent  Sit to Supine: Modified independent  Scooting: Modified independent  Transfers  Sit to Stand: Modified independent  Stand to sit: Modified independent  Bed to Chair: Modified independent  Ambulation  Ambulation?: Yes  Ambulation 1  Surface: level tile  Device: No Device  Assistance: Stand by assistance  Gait Deviations: Slow Jaycee;Decreased step length;Decreased step height  Distance: 15 feet     Balance  Posture: Good  Sitting - Static: Good  Sitting - Dynamic: Good  Standing - Static: Good;-  Standing - Dynamic: Fair;+        Plan   Plan  Times per week: 3-4 days  Plan weeks: 3-4 days  Current Treatment Recommendations: Strengthening, Balance Training, Endurance Training, Neuromuscular Re-education, Home Exercise Program, Gait Training, Transfer Training, Safety Education & Training               Goals  Long term goals  Time Frame for Long term goals : 3-4 days  Long term goal 1: Ambulate 100 feet x 2 with SBA-supervision with good safety awareness with O2 > 90%. Therapy Time   Individual Concurrent Group Co-treatment   Time In           Time Out           Minutes                   Lyly Pollard PT       Electronically signed by Lyly Pollard PT on 70/09/7184 at 5:94 PM    Certification of Medical Necessity: It will be understood that this treatment plan is certified medically necessary by the documenting therapist and referring physician mentioned in this report. Unless the physician indicated otherwise through written correspondence with our office, all further referrals will act as certification of medical necessity on this treatment plan. Thank you for this referral.  If you have questions regarding this plan of care, please call [phone].           Revisions to this plan (optional):                     Please sign and return this plan to:   FAX: [phone]      Signature:                                 Date:

## 2020-11-10 NOTE — PROGRESS NOTES
Progress Note      Subjective:   Chief complaint:   SOA  Cough    Interval History:   Seen and examined this afternoon. She endorses thrush to her mouth and dry mouth. She states her breathing is about the same. She worked with PT and OT with slight desaturation on her baseline O2 requirement. Review of systems:   Constitutional:  Denies fever or chills. Positive for fatigue. Eyes:  Denies change in visual acuity or discharge. HENT:  Denies nasal congestion or sore throat. Positive for thrush dry mouth. Respiratory: Positive for cough, THOMPSON and shortness of breath. Cardiovascular:  Denies chest pain, palpitation or swelling in LEs. GI:  Denies abdominal pain, nausea, vomiting, bloody stools or diarrhea. :  Denies dysuria or frequency. Musculoskeletal:  Denies back pain or joint pain. Integument:  Denies rash or itching. Neurologic:  Denies headache, focal weakness or sensory changes. Endocrine:  Denies polyuria or polydipsia. Lymphatic:  Denies swollen glands or night sweats. Psychiatric:  Denies depression or anxiety. Past medical history, surgical history, family history and social history reviewed and unchanged compared to H&P earlier this admission.     Medications:   Scheduled Meds:   nystatin  5 mL Oral 4x Daily    methylPREDNISolone  60 mg Intravenous 3 times per day    ipratropium-albuterol  1 ampule Inhalation Q4H WA    budesonide  0.5 mg Nebulization BID    doxycycline monohydrate  100 mg Oral 2 times per day    insulin lispro  0-12 Units Subcutaneous TID     insulin lispro  0-6 Units Subcutaneous Nightly    allopurinol  150 mg Oral Daily    aspirin  81 mg Oral Daily    atorvastatin  40 mg Oral Daily    busPIRone  10 mg Oral TID    Roflumilast  500 mcg Oral Daily    dilTIAZem  180 mg Oral Daily    apixaban  5 mg Oral BID    ezetimibe  10 mg Oral Daily    fenofibrate  54 mg Oral Daily    fluticasone  1 spray Each Nostril BID    furosemide  40 mg Oral BID    Vitamin D  2,000 Units Oral Daily    insulin glargine  80 Units Subcutaneous Nightly    insulin lispro  38 Units Subcutaneous TID     alogliptin  6.25 mg Oral Daily    cetirizine  5 mg Oral Daily    metoprolol succinate  100 mg Oral Daily    montelukast  10 mg Oral Nightly    pantoprazole  40 mg Oral Daily    ketotifen  1 drop Both Eyes BID    potassium chloride  20 mEq Oral Daily    rOPINIRole  0.5 mg Oral BID    sucralfate  1 g Oral TID AC    docusate calcium  240 mg Oral Daily    insulin glargine  35 Units Subcutaneous QAM    cefTRIAXone (ROCEPHIN) IV  1 g Intravenous Q24H     Continuous Infusions:   dextrose         Objective:     Vital Signs  Temp: 97.5 °F (36.4 °C)  Pulse: 103  Resp: 24  BP: (!) 121/51  SpO2: 92 %  O2 Device: Nasal cannula  O2 Flow Rate (L/min): 4 L/min    Vital signs reviewed in electronic charts. Physical exam  Constitutional:  Well developed, well nourished, obese female sitting on edge of bed in no acute distress. On 4 L nasal cannula. Eyes:  PERRL, conjunctiva normal, EOMI. HENT:  Atraumatic, external ears normal, external nose/nares normal, oropharynx moist, no pharyngeal exudates. Neck:  Supple. No JVD or thyromegaly. Respiratory: Slight increased work of breathing noted. Wheezing in all lung fields. No rales or rhonchi. On 4 L nasal cannula. Cardiovascular: Slight tachycardia, normal rhythm, no murmurs, no gallops, no rubs. GI:  Soft, nondistended, obese, normal bowel sounds, nontender, no organomegaly, no mass. :  No costovertebral angle tenderness. Musculoskeletal:  No edema, no tenderness, no obvious deformities. Patient is moving all extremities. Integument:  Well hydrated, no rash. Lymphatic:  No cervical or axillary lymphadenopathy noted. Neurologic:  Alert & oriented x 3,  no focal deficits noted. Strength is equal throughout. Psychiatric:  Speech and behavior appropriate.     Results:     Lab Results   Component Value Date    WBC 15.3 (H) 11/10/2020    HGB 10.8 (L) 11/10/2020    HCT 36.5 (L) 11/10/2020    MCV 94.3 11/10/2020     (H) 11/10/2020       Lab Results   Component Value Date     11/10/2020    K 4.5 11/10/2020    CL 91 11/10/2020    CO2 34 11/10/2020    BUN 51 11/10/2020    CREATININE 1.9 11/10/2020    GLUCOSE 343 11/10/2020    CALCIUM 9.6 11/10/2020        Assessment and Plan:      Active Hospital Problems    Diagnosis Date Noted    Thrush [B37.0]  -Add nystatin and Oasis mouth spray   11/10/2020    Acute on chronic respiratory failure with hypoxia ([facility]) [J96.21]  -Secondary to COPD exacerbation and pneumonia  -Continue treatment as outlined below  -Patient has baseline O2 requirement of 3 L nasal cannula; currently requiring 4 L nasal cannula   11/09/2020    Paroxysmal atrial fibrillation ([facility] [I48.0]  -Continue home Eliquis, diltiazem and metoprolol   11/09/2020    Morbid obesity with BMI of 40.0-44.9, adult ([facility] [E66.01, Z68.41]  -BMI 16.00  -Complicates all aspects of care  -Counseled on diet and exercise after recovery   08/07/2020    Gastroesophageal reflux disease [K21.9]  -Continue PPI and Carafate       Hypertension [I10]  -Continue home metoprolol and diltiazem    Obstructive sleep apnea  -Continue BiPAP nightly       Type 2 diabetes mellitus with complication, with long-term current use of insulin ([facility]) [E11.8, Z79.4]  -A1c 9.3 on 7/22/2020; A1c 8.4 on 11/10  -Elevated blood glucose readings noted; suspect secondary to high-dose steroids; will adjust insulin regimen  -encourage compliance with diabetic diet   12/05/2017    COPD exacerbation ([facility] [J44.1]  -Continue treatment with DuoNebs, Pulmicort and Solu-Medrol  -Continue supplemental oxygen   12/04/2017    CRF (chronic renal failure) [N18.9]  -With baseline serum creatinine around 2  -Avoid nephrotoxins and NSAIDs  -Encourage good p.o. intake   02/22/2017    Pneumonia [J18.9]  -Chest x-ray 11/9 with right middle lobe airspace disease, atelectasis versus pneumonia  -Continue antibiotic coverage with IV Rocephin and doxycycline  -Encourage incentive spirometry and ambulation if able   02/24/2015    Hyperlipidemia [E78.5]  -Continue Lipitor, Zetia and TriCor 05/26/2011         Electronically signed by JASON Huerta on 11/10/2020 at 1:37 PM

## 2020-11-10 NOTE — PROGRESS NOTES
Occupational Therapy   Occupational Therapy Initial Assessment  Date: 11/10/2020   Patient Name: Onelia Blanco  MRN: [id number]     : 1950    Date of Service: 11/10/2020    Discharge Recommendations:  Continue to assess pending progress       Assessment   Performance deficits / Impairments: Decreased ADL status; Decreased functional mobility ; Decreased endurance;Decreased high-level IADLs  Assessment: Pt agreeable to OT services. Pt BUE AROM/MMT WFL. Pt fatigued easily with activity. Pt 02 sats dropped during ambulation 87% with cues for PLB. Pt completed dressing with MOD I for LB seated at EOB. Pt will benefit from skilled OT services while IP. Prognosis: Good  Decision Making: Low Complexity  REQUIRES OT FOLLOW UP: Yes  Activity Tolerance  Activity Tolerance: Patient limited by fatigue           Patient Diagnosis(es): There were no encounter diagnoses. has a past medical history of Anemia, Anxiety, Asthma, Cataract, COPD (chronic obstructive pulmonary disease) ([facility] DDD (degenerative disc disease), cervical, Depression, Diabetes mellitus type 2, GERD (gastroesophageal reflux disease), Gout, History of uterine cancer, Hyperlipidemia, and Hypertension. has a past surgical history that includes Tonsillectomy ();  section (, ); Lung surgery; Neck surgery; Cataract removal with implant (); Hysterectomy; Colonoscopy (2013); Upper gastrointestinal endoscopy (N/A, 2019); and Colonoscopy (N/A, 2019). Restrictions  Restrictions/Precautions  Restrictions/Precautions: General Precautions, Fall Risk    Subjective   General  Chart Reviewed: Yes  Patient assessed for rehabilitation services?: Yes  Family / Caregiver Present: No  Referring Practitioner: Husam Kovacs PA-C  Diagnosis: acute onset respiratory failure  Subjective  Subjective: Pt reports she is feeling better today. Pt states she has been sick since last week. Pt states her 02 sats were dropping.  pt

## 2020-11-10 NOTE — PROGRESS NOTES
Medication Reconciliation  Med rec performed utilizing fill history from [facility] and by interviewing pt. Please see notes below:  -Removed mvt from home med list as pt stated she does not take it.  -Pt has not filled flunisolide or pataday recently but pt stated she still takes both, it has just been a while since she has filled them. Per pt she actually uses 2 drops of the pataday BID as 2 drops \"works better. \"  Pt also has not filled albuterol inhaler or duonebs recently but pt stated she uses both as needed.  -Pt's most recent Januvia script was for 100mg daily, pt stated she takes half a tablet daily.  -Pt reported her next Xolair dose is due on the 27th this month.  -Lastly, I was not able to verify Flunisolide, Pataday, Duoneb, and albuterol strengths or doses as pt has not filled them recently. Current Xolair dose was from PCP list Juan Wang).     Angelo AdamsD

## 2020-11-11 ENCOUNTER — APPOINTMENT (OUTPATIENT)
Dept: GENERAL RADIOLOGY | Facility: HOSPITAL | Age: 70
DRG: 193 | End: 2020-11-11
Attending: INTERNAL MEDICINE
Payer: MEDICARE

## 2020-11-11 LAB
ANION GAP SERPL CALCULATED.3IONS-SCNC: 14 MMOL/L (ref 3–16)
BUN BLDV-MCNC: 68 MG/DL (ref 6–20)
CALCIUM SERPL-MCNC: 9.7 MG/DL (ref 8.5–10.5)
CHLORIDE BLD-SCNC: 93 MMOL/L (ref 98–107)
CO2: 33 MMOL/L (ref 20–30)
CREAT SERPL-MCNC: 2 MG/DL (ref 0.4–1.2)
GFR AFRICAN AMERICAN: 30
GFR NON-AFRICAN AMERICAN: 25
GLUCOSE BLD-MCNC: 146 MG/DL (ref 74–106)
GLUCOSE BLD-MCNC: 151 MG/DL (ref 74–106)
GLUCOSE BLD-MCNC: 315 MG/DL (ref 74–106)
GLUCOSE BLD-MCNC: 331 MG/DL (ref 74–106)
GLUCOSE BLD-MCNC: 353 MG/DL (ref 74–106)
HCT VFR BLD CALC: 37.6 % (ref 37–47)
HEMOGLOBIN: 10.8 G/DL (ref 11.5–16.5)
MCH RBC QN AUTO: 27.6 PG (ref 27–32)
MCHC RBC AUTO-ENTMCNC: 28.7 G/DL (ref 31–35)
MCV RBC AUTO: 95.9 FL (ref 80–100)
PDW BLD-RTO: 17.1 % (ref 11–16)
PERFORMED ON: ABNORMAL
PLATELET # BLD: 514 K/UL (ref 150–400)
PMV BLD AUTO: 10.6 FL (ref 6–10)
POTASSIUM SERPL-SCNC: 4.4 MMOL/L (ref 3.4–5.1)
RBC # BLD: 3.92 M/UL (ref 3.8–5.8)
SODIUM BLD-SCNC: 140 MMOL/L (ref 136–145)
WBC # BLD: 20.9 K/UL (ref 4–11)

## 2020-11-11 PROCEDURE — 2580000003 HC RX 258: Performed by: INTERNAL MEDICINE

## 2020-11-11 PROCEDURE — 2700000000 HC OXYGEN THERAPY PER DAY

## 2020-11-11 PROCEDURE — 6370000000 HC RX 637 (ALT 250 FOR IP): Performed by: PHYSICIAN ASSISTANT

## 2020-11-11 PROCEDURE — 71045 X-RAY EXAM CHEST 1 VIEW: CPT

## 2020-11-11 PROCEDURE — 6370000000 HC RX 637 (ALT 250 FOR IP): Performed by: INTERNAL MEDICINE

## 2020-11-11 PROCEDURE — 6360000002 HC RX W HCPCS: Performed by: PHYSICIAN ASSISTANT

## 2020-11-11 PROCEDURE — 94761 N-INVAS EAR/PLS OXIMETRY MLT: CPT

## 2020-11-11 PROCEDURE — 97535 SELF CARE MNGMENT TRAINING: CPT

## 2020-11-11 PROCEDURE — 1200000000 HC SEMI PRIVATE

## 2020-11-11 PROCEDURE — 80048 BASIC METABOLIC PNL TOTAL CA: CPT

## 2020-11-11 PROCEDURE — 94640 AIRWAY INHALATION TREATMENT: CPT

## 2020-11-11 PROCEDURE — 99232 SBSQ HOSP IP/OBS MODERATE 35: CPT | Performed by: INTERNAL MEDICINE

## 2020-11-11 PROCEDURE — 97110 THERAPEUTIC EXERCISES: CPT

## 2020-11-11 PROCEDURE — 85027 COMPLETE CBC AUTOMATED: CPT

## 2020-11-11 PROCEDURE — 6360000002 HC RX W HCPCS: Performed by: INTERNAL MEDICINE

## 2020-11-11 PROCEDURE — 36415 COLL VENOUS BLD VENIPUNCTURE: CPT

## 2020-11-11 PROCEDURE — 94669 MECHANICAL CHEST WALL OSCILL: CPT

## 2020-11-11 RX ORDER — INSULIN GLARGINE 100 [IU]/ML
60 INJECTION, SOLUTION SUBCUTANEOUS EVERY MORNING
Status: DISCONTINUED | OUTPATIENT
Start: 2020-11-12 | End: 2020-11-11

## 2020-11-11 RX ORDER — INSULIN GLARGINE 100 [IU]/ML
50 INJECTION, SOLUTION SUBCUTANEOUS EVERY MORNING
Status: DISCONTINUED | OUTPATIENT
Start: 2020-11-12 | End: 2020-11-12 | Stop reason: HOSPADM

## 2020-11-11 RX ORDER — ASPIRIN 81 MG/1
TABLET, COATED ORAL
Qty: 30 TABLET | Refills: 5 | Status: SHIPPED | OUTPATIENT
Start: 2020-11-11

## 2020-11-11 RX ORDER — METHYLPREDNISOLONE SODIUM SUCCINATE 40 MG/ML
40 INJECTION, POWDER, LYOPHILIZED, FOR SOLUTION INTRAMUSCULAR; INTRAVENOUS DAILY
Status: DISCONTINUED | OUTPATIENT
Start: 2020-11-12 | End: 2020-11-11

## 2020-11-11 RX ORDER — METHYLPREDNISOLONE SODIUM SUCCINATE 125 MG/2ML
60 INJECTION, POWDER, LYOPHILIZED, FOR SOLUTION INTRAMUSCULAR; INTRAVENOUS EVERY 8 HOURS
Status: DISCONTINUED | OUTPATIENT
Start: 2020-11-11 | End: 2020-11-12 | Stop reason: HOSPADM

## 2020-11-11 RX ORDER — INSULIN GLARGINE 100 [IU]/ML
100 INJECTION, SOLUTION SUBCUTANEOUS NIGHTLY
Status: DISCONTINUED | OUTPATIENT
Start: 2020-11-11 | End: 2020-11-12 | Stop reason: HOSPADM

## 2020-11-11 RX ADMIN — CETIRIZINE HYDROCHLORIDE 5 MG: 10 TABLET, FILM COATED ORAL at 08:34

## 2020-11-11 RX ADMIN — IPRATROPIUM BROMIDE AND ALBUTEROL SULFATE 1 AMPULE: .5; 3 SOLUTION RESPIRATORY (INHALATION) at 21:19

## 2020-11-11 RX ADMIN — METHYLPREDNISOLONE SODIUM SUCCINATE 60 MG: 125 INJECTION, POWDER, FOR SOLUTION INTRAMUSCULAR; INTRAVENOUS at 20:15

## 2020-11-11 RX ADMIN — FUROSEMIDE 40 MG: 40 TABLET ORAL at 18:01

## 2020-11-11 RX ADMIN — ALLOPURINOL 150 MG: 100 TABLET ORAL at 08:33

## 2020-11-11 RX ADMIN — DILTIAZEM HYDROCHLORIDE 180 MG: 180 CAPSULE, COATED, EXTENDED RELEASE ORAL at 08:34

## 2020-11-11 RX ADMIN — FLUTICASONE PROPIONATE 1 SPRAY: 50 SPRAY, METERED NASAL at 08:35

## 2020-11-11 RX ADMIN — KETOTIFEN FUMARATE 1 DROP: 0.35 SOLUTION/ DROPS OPHTHALMIC at 08:35

## 2020-11-11 RX ADMIN — APIXABAN 5 MG: 5 TABLET, FILM COATED ORAL at 08:34

## 2020-11-11 RX ADMIN — NYSTATIN 500000 UNITS: 100000 SUSPENSION ORAL at 08:35

## 2020-11-11 RX ADMIN — HYDROCODONE BITARTRATE AND ACETAMINOPHEN 1 TABLET: 5; 325 TABLET ORAL at 20:20

## 2020-11-11 RX ADMIN — INSULIN LISPRO 38 UNITS: 100 INJECTION, SOLUTION INTRAVENOUS; SUBCUTANEOUS at 18:05

## 2020-11-11 RX ADMIN — NYSTATIN 500000 UNITS: 100000 SUSPENSION ORAL at 20:14

## 2020-11-11 RX ADMIN — INSULIN LISPRO 38 UNITS: 100 INJECTION, SOLUTION INTRAVENOUS; SUBCUTANEOUS at 08:44

## 2020-11-11 RX ADMIN — INSULIN LISPRO 1 UNITS: 100 INJECTION, SOLUTION INTRAVENOUS; SUBCUTANEOUS at 20:40

## 2020-11-11 RX ADMIN — TIZANIDINE 4 MG: 4 TABLET ORAL at 05:55

## 2020-11-11 RX ADMIN — MONTELUKAST SODIUM 10 MG: 10 TABLET, COATED ORAL at 20:15

## 2020-11-11 RX ADMIN — KETOTIFEN FUMARATE 1 DROP: 0.35 SOLUTION/ DROPS OPHTHALMIC at 20:14

## 2020-11-11 RX ADMIN — ROPINIROLE HYDROCHLORIDE 0.5 MG: 0.25 TABLET, FILM COATED ORAL at 20:16

## 2020-11-11 RX ADMIN — NYSTATIN 500000 UNITS: 100000 SUSPENSION ORAL at 11:58

## 2020-11-11 RX ADMIN — INSULIN LISPRO 2 UNITS: 100 INJECTION, SOLUTION INTRAVENOUS; SUBCUTANEOUS at 18:03

## 2020-11-11 RX ADMIN — DOXYCYCLINE 100 MG: 100 CAPSULE ORAL at 20:25

## 2020-11-11 RX ADMIN — DOXYCYCLINE 100 MG: 100 CAPSULE ORAL at 08:35

## 2020-11-11 RX ADMIN — ASPIRIN 81 MG: 81 TABLET, COATED ORAL at 08:34

## 2020-11-11 RX ADMIN — BUDESONIDE 500 MCG: 0.5 SUSPENSION RESPIRATORY (INHALATION) at 16:57

## 2020-11-11 RX ADMIN — INSULIN LISPRO 8 UNITS: 100 INJECTION, SOLUTION INTRAVENOUS; SUBCUTANEOUS at 12:02

## 2020-11-11 RX ADMIN — ALOGLIPTIN 6.25 MG: 12.5 TABLET, FILM COATED ORAL at 08:35

## 2020-11-11 RX ADMIN — INSULIN LISPRO 38 UNITS: 100 INJECTION, SOLUTION INTRAVENOUS; SUBCUTANEOUS at 12:05

## 2020-11-11 RX ADMIN — FENOFIBRATE 54 MG: 54 TABLET, FILM COATED ORAL at 08:34

## 2020-11-11 RX ADMIN — DOCUSATE CALCIUM 240 MG: 240 CAPSULE, LIQUID FILLED ORAL at 08:34

## 2020-11-11 RX ADMIN — IPRATROPIUM BROMIDE AND ALBUTEROL SULFATE 1 AMPULE: .5; 3 SOLUTION RESPIRATORY (INHALATION) at 05:41

## 2020-11-11 RX ADMIN — BUDESONIDE 500 MCG: 0.5 SUSPENSION RESPIRATORY (INHALATION) at 05:42

## 2020-11-11 RX ADMIN — CEFTRIAXONE 1 G: 1 INJECTION, POWDER, FOR SOLUTION INTRAMUSCULAR; INTRAVENOUS at 20:16

## 2020-11-11 RX ADMIN — POTASSIUM CHLORIDE 20 MEQ: 1500 TABLET, EXTENDED RELEASE ORAL at 08:34

## 2020-11-11 RX ADMIN — ATORVASTATIN CALCIUM 40 MG: 40 TABLET, FILM COATED ORAL at 08:34

## 2020-11-11 RX ADMIN — INSULIN GLARGINE 100 UNITS: 100 INJECTION, SOLUTION SUBCUTANEOUS at 20:39

## 2020-11-11 RX ADMIN — SUCRALFATE 1 G: 1 TABLET ORAL at 18:08

## 2020-11-11 RX ADMIN — IPRATROPIUM BROMIDE AND ALBUTEROL SULFATE 1 AMPULE: .5; 3 SOLUTION RESPIRATORY (INHALATION) at 11:01

## 2020-11-11 RX ADMIN — FUROSEMIDE 40 MG: 40 TABLET ORAL at 08:35

## 2020-11-11 RX ADMIN — ROFLUMILAST 500 MCG: 500 TABLET ORAL at 08:34

## 2020-11-11 RX ADMIN — APIXABAN 5 MG: 5 TABLET, FILM COATED ORAL at 20:15

## 2020-11-11 RX ADMIN — BUSPIRONE HYDROCHLORIDE 10 MG: 5 TABLET ORAL at 13:23

## 2020-11-11 RX ADMIN — INSULIN LISPRO 8 UNITS: 100 INJECTION, SOLUTION INTRAVENOUS; SUBCUTANEOUS at 08:39

## 2020-11-11 RX ADMIN — ROPINIROLE HYDROCHLORIDE 0.5 MG: 0.25 TABLET, FILM COATED ORAL at 08:33

## 2020-11-11 RX ADMIN — METOPROLOL SUCCINATE 100 MG: 100 TABLET, EXTENDED RELEASE ORAL at 08:34

## 2020-11-11 RX ADMIN — METHYLPREDNISOLONE SODIUM SUCCINATE 60 MG: 125 INJECTION, POWDER, FOR SOLUTION INTRAMUSCULAR; INTRAVENOUS at 11:57

## 2020-11-11 RX ADMIN — INSULIN GLARGINE 50 UNITS: 100 INJECTION, SOLUTION SUBCUTANEOUS at 08:45

## 2020-11-11 RX ADMIN — TRAMADOL HYDROCHLORIDE 50 MG: 50 TABLET, FILM COATED ORAL at 05:55

## 2020-11-11 RX ADMIN — BUSPIRONE HYDROCHLORIDE 10 MG: 5 TABLET ORAL at 20:15

## 2020-11-11 RX ADMIN — SUCRALFATE 1 G: 1 TABLET ORAL at 11:58

## 2020-11-11 RX ADMIN — METHYLPREDNISOLONE SODIUM SUCCINATE 60 MG: 125 INJECTION, POWDER, FOR SOLUTION INTRAMUSCULAR; INTRAVENOUS at 05:55

## 2020-11-11 RX ADMIN — TRAMADOL HYDROCHLORIDE 50 MG: 50 TABLET, FILM COATED ORAL at 20:19

## 2020-11-11 RX ADMIN — NYSTATIN 500000 UNITS: 100000 SUSPENSION ORAL at 18:01

## 2020-11-11 RX ADMIN — IPRATROPIUM BROMIDE AND ALBUTEROL SULFATE 1 AMPULE: .5; 3 SOLUTION RESPIRATORY (INHALATION) at 16:57

## 2020-11-11 RX ADMIN — BUSPIRONE HYDROCHLORIDE 10 MG: 5 TABLET ORAL at 08:35

## 2020-11-11 RX ADMIN — Medication 2000 UNITS: at 08:34

## 2020-11-11 RX ADMIN — SUCRALFATE 1 G: 1 TABLET ORAL at 05:55

## 2020-11-11 RX ADMIN — PANTOPRAZOLE SODIUM 40 MG: 40 TABLET, DELAYED RELEASE ORAL at 08:34

## 2020-11-11 ASSESSMENT — PAIN DESCRIPTION - LOCATION: LOCATION: BACK

## 2020-11-11 ASSESSMENT — PAIN SCALES - GENERAL
PAINLEVEL_OUTOF10: 8
PAINLEVEL_OUTOF10: 5
PAINLEVEL_OUTOF10: 9

## 2020-11-11 NOTE — PROGRESS NOTES
Progress Note      Subjective:   Chief complaint:   SOA  Cough    Interval History:   Patient seen and examined today. Had an episode of hypoxia this morning on 4 L nasal cannula. Per report, patient walked to her bathroom and oxygen sats fell to 70% on her 4 L nasal cannula. O2 was increased to 6 L with minimal improvement. Patient then titrated to 12 L nasal cannula with improvement of oxygen sats to greater than 90%. Patient currently on 12 L nasal cannula. Review of systems:     Constitutional:  Denies fever or chills. Positive for fatigue and generalized weakness. Eyes:  Denies change in visual acuity or discharge. HENT:  Denies nasal congestion or sore throat. Positive for thrush and dry mouth. Respiratory: Positive for cough, THOMPSON and shortness of breath. Cardiovascular:  Denies chest pain, palpitation or swelling in LEs. GI:  Denies abdominal pain, nausea, vomiting, bloody stools or diarrhea. :  Denies dysuria or frequency. Musculoskeletal:  Denies back pain or joint pain. Integument:  Denies rash or itching. Neurologic:  Denies headache, focal weakness or sensory changes. Endocrine:  Denies polyuria or polydipsia. Lymphatic:  Denies swollen glands or night sweats. Psychiatric:  Denies depression or anxiety. Past medical history, surgical history, family history and social history reviewed and unchanged compared to H&P earlier this admission.     Medications:   Scheduled Meds:   [START ON 11/12/2020] methylPREDNISolone  40 mg Intravenous Daily    nystatin  5 mL Oral 4x Daily    insulin glargine  50 Units Subcutaneous QAM    insulin glargine  90 Units Subcutaneous Nightly    ipratropium-albuterol  1 ampule Inhalation Q4H WA    budesonide  0.5 mg Nebulization BID    doxycycline monohydrate  100 mg Oral 2 times per day    insulin lispro  0-12 Units Subcutaneous TID WC    insulin lispro  0-6 Units Subcutaneous Nightly    allopurinol  150 mg Oral Daily    aspirin  81 mg Oral Daily    atorvastatin  40 mg Oral Daily    busPIRone  10 mg Oral TID    Roflumilast  500 mcg Oral Daily    dilTIAZem  180 mg Oral Daily    apixaban  5 mg Oral BID    ezetimibe  10 mg Oral Daily    fenofibrate  54 mg Oral Daily    fluticasone  1 spray Each Nostril BID    furosemide  40 mg Oral BID    Vitamin D  2,000 Units Oral Daily    insulin lispro  38 Units Subcutaneous TID WC    alogliptin  6.25 mg Oral Daily    cetirizine  5 mg Oral Daily    metoprolol succinate  100 mg Oral Daily    montelukast  10 mg Oral Nightly    pantoprazole  40 mg Oral Daily    ketotifen  1 drop Both Eyes BID    potassium chloride  20 mEq Oral Daily    rOPINIRole  0.5 mg Oral BID    sucralfate  1 g Oral TID AC    docusate calcium  240 mg Oral Daily    cefTRIAXone (ROCEPHIN) IV  1 g Intravenous Q24H     Continuous Infusions:   dextrose         Objective:     Vital Signs  Temp: 98 °F (36.7 °C)  Pulse: 102  Resp: 18  BP: (!) 154/57  SpO2: 90 %  O2 Device: High flow nasal cannula  O2 Flow Rate (L/min): 12 L/min    Vital signs reviewed in electronic charts. Physical exam  Constitutional:  Well developed, well nourished, obese female sitting on edge of bed in no acute distress. On 12 L HF nasal cannula. Eyes:  PERRL, conjunctiva normal, EOMI. HENT:  Atraumatic, external ears normal, external nose/nares normal, oropharynx moist, no pharyngeal exudates. Positive oral thrush noted. Neck:  Supple. No JVD or thyromegaly. Respiratory: Slight increased work of breathing noted. Coarse breath sounds and rhonchi in all lung fields. .  No rales or crackles. On 12 L HFNC. Cardiovascular:  Regular rate, normal rhythm, no murmurs, no gallops, no rubs. GI:  Soft, nondistended, obese, normal bowel sounds, nontender, no organomegaly, no mass. :  No costovertebral angle tenderness. Musculoskeletal:  No edema, no tenderness, no obvious deformities. Patient is moving all extremities.    Integument:  Well hydrated, no rash. Lymphatic:  No cervical or axillary lymphadenopathy noted. Neurologic:  Alert & oriented x 3,  no focal deficits noted. Strength is equal throughout. Psychiatric:  Speech and behavior appropriate. Results:     Lab Results   Component Value Date    WBC 20.9 (H) 11/11/2020    HGB 10.8 (L) 11/11/2020    HCT 37.6 11/11/2020    MCV 95.9 11/11/2020     (H) 11/11/2020       Lab Results   Component Value Date     11/11/2020    K 4.4 11/11/2020    K 4.5 11/10/2020    CL 93 11/11/2020    CO2 33 11/11/2020    BUN 68 11/11/2020    CREATININE 2.0 11/11/2020    GLUCOSE 353 11/11/2020    CALCIUM 9.7 11/11/2020        Assessment and Plan:      Active Hospital Problems     Diagnosis Date Noted   Jenn Head [B37.0]  -Added nystatin and Oasis mouth spray 11/10    11/10/2020    Acute on chronic respiratory failure with hypoxia (H[facility]) [J96.21]  -Secondary to COPD exacerbation and pneumonia  -Continue treatment as outlined below  -Patient has baseline O2 requirement of 3 L nasal cannula; currently requiring 12 L HFNC after episode of desaturation after walking this morning    11/09/2020    [facility]ox[facility] [I48.0]  -Continue home Eliquis, diltiazem and metoprolol    11/09/2020    Morbid obesity with BMI of 40.0-44.9, adult ([facility] [E66.01, Z68.41]  -BMI 72.63  -Complicates all aspects of care  -Counseled on diet and exercise after recovery    08/07/2020    Gastroesophageal reflux disease [K21.9]  -Continue PPI and Carafate         Hypertension [I10]  -Continue home metoprolol and diltiazem     Obstructive sleep apnea  -Continue BiPAP nightly         Type 2 diabetes mellitus with complication, with long-term current use of insulin ([facility]CC) [E11.8, Z79.4]  -A1c 9.3 on 7/22/2020; A1c 8.4 on 11/10  -Elevated blood glucose readings noted; suspect secondary to high-dose steroids; will monitor and adjust insulin regimen  -encourage compliance with diabetic diet    12/05/2017    [facility] [J44.1]  -advanced  -Continue treatment with DuoNebs, Pulmicort and Solu-Medrol  -Continue supplemental oxygen to maintain sats >90%  -would benefit from OP referral to Pulmonologist after recovery for PFTs    12/04/2017    CRF (chronic renal failure) [N18.9]  -With baseline serum creatinine around 2  -Avoid nephrotoxins and NSAIDs  -Encourage good p.o. intake    02/22/2017    Pneumonia [J18.9]  -Chest x-ray 11/9 with right middle lobe airspace disease, atelectasis versus pneumonia  -Continue antibiotic coverage with IV Rocephin and doxycycline  -Encourage incentive spirometry and ambulation if able    02/24/2015    Hyperlipidemia [E78.5]  -Continue Lipitor, Zetia and TriCor        Patient was seen and examined by Dr. Jadyn Land and plan of care reviewed.       Electronically signed by JASON Ferreira on 11/11/2020 at 11:11 AM

## 2020-11-11 NOTE — PROGRESS NOTES
Physical Therapy  Facility/Department: [facility] MED SURG  Daily Treatment Note  NAME: Marty Shelton  : 1950  MRN: [id number]    Date of Service: 2020    Discharge Recommendations:  Continue to assess pending progress      Assessment   Body structures, Functions, Activity limitations: Decreased balance  Assessment: Patient reports having difficulty keeping her O2 levels up. She was 88% upon PTA arrival.  With cues for pursed lip breathing, she was able to rebound to 91%. Sat EOB for B LE therex. SpO2 ranged between 88-93%. Patient easily fatigues and becomes SOA with activity and requires frequent rest breaks. She did not want to attempt ambulation today. Treatment Diagnosis: Respiratory failure; general weakness; functional decline  Prognosis: Excellent  REQUIRES PT FOLLOW UP: Yes  Activity Tolerance  Activity Tolerance: Patient limited by fatigue;Treatment limited secondary to medical complications (free text); Patient limited by endurance  Activity Tolerance: SOA     Patient Diagnosis(es): There were no encounter diagnoses. has a past medical history of Anemia, Anxiety, Asthma, Cataract, COPD (chronic obstructive pulmonary disease) ([facility] DDD (degenerative disc disease), cervical, Depression, Diabetes mellitus type 2, GERD (gastroesophageal reflux disease), Gout, History of uterine cancer, Hyperlipidemia, and Hypertension. has a past surgical history that includes Tonsillectomy ();  section (, ); Lung surgery; Neck surgery; Cataract removal with implant (); Hysterectomy; Colonoscopy (2013); Upper gastrointestinal endoscopy (N/A, 2019); and Colonoscopy (N/A, 2019). Restrictions  Restrictions/Precautions  Restrictions/Precautions: General Precautions, Fall Risk  Required Braces or Orthoses?: No  Subjective   General  Chart Reviewed: Yes  Response To Previous Treatment: Patient with no complaints from previous session.   Family / Caregiver Present: No  Referring Practitioner: Pietro Ackerman MD  Subjective  Subjective: Patient states she is feeling a little better but still having trouble with O2 dropping. \"They had to turn it up to 12. \"  Patient currently on 10.5L. Pain Screening  Patient Currently in Pain: Yes  Pain Assessment  Pain Assessment: 0-10  Pain Level: 5  Pain Location: Back  Vital Signs  Patient Currently in Pain: Yes       Orientation  Orientation  Overall Orientation Status: Within Normal Limits  Cognition      Objective   Bed mobility  Rolling to Right: Modified independent  Supine to Sit: Modified independent  Sit to Supine: Modified independent  Scooting: Modified independent  Transfers  Sit to Stand: Modified independent  Stand to sit: Modified independent        Balance  Sitting - Static: Good  Sitting - Dynamic: Good  Standing - Static: Good;-  Exercises  Hip Flexion: 2x10  Hip Abduction: 2x10  Knee Long Arc Quad: 2x10  Ankle Pumps: 2x10  Comments: Standing:  marching in place      Goals  Long term goals  Time Frame for Long term goals : 3-4 days  Long term goal 1: Ambulate 100 feet x 2 with SBA-supervision with good safety awareness with O2 > 90%. Plan    Plan  Times per week: 3-4 days  Plan weeks: 3-4 days  Current Treatment Recommendations: Strengthening, Balance Training, Endurance Training, Neuromuscular Re-education, Home Exercise Program, Gait Training, Transfer Training, Safety Education & Training  Safety Devices  Type of devices: Left in bed, Call light within reach(sitting EOB)     Therapy Time   Individual Concurrent Group Co-treatment   Time In 7160         Time Out 1327         Minutes 20              This note serves as D/C summary if patient is discharged prior to next visit.   Raysa Sullivan, PTA

## 2020-11-11 NOTE — PROGRESS NOTES
Occupational Therapy  Facility/Department: [facility] MED SURG  Daily Treatment Note  NAME: Johann Johnson  : 1950  MRN: [id number]    Date of Service: 2020    Discharge Recommendations:  Continue to assess pending progress       Assessment   Assessment: Pt agreeable to OT services. Pt with increased SOA. Upon entering pt increased to 10LPM and 02 sats 87-88%. Pt cued for PLB techniques and quickly rebounded >90%. Pt requested shower however, due to >02 requirements and SOA OT suggested sponge bathing. Pt agreeable. Pt completed task seated at EOB with CHRISTIAN. Pt required minimal rest breaks during bathing and dressing. Pt did fatigue toward end of session. Pt left seated at EOB with call light in reach. Patient Diagnosis(es): There were no encounter diagnoses. has a past medical history of Anemia, Anxiety, Asthma, Cataract, COPD (chronic obstructive pulmonary disease) ([address] DDD (degenerative disc disease), cervical, Depression, Diabetes mellitus type 2, GERD (gastroesophageal reflux disease), Gout, History of uterine cancer, Hyperlipidemia, and Hypertension. has a past surgical history that includes Tonsillectomy ();  section (, ); Lung surgery; Neck surgery; Cataract removal with implant (); Hysterectomy; Colonoscopy (2013); Upper gastrointestinal endoscopy (N/A, 2019); and Colonoscopy (N/A, 2019). Restrictions  Restrictions/Precautions  Restrictions/Precautions: General Precautions, Fall Risk  Required Braces or Orthoses?: No  Subjective   General  Chart Reviewed: Yes  Patient assessed for rehabilitation services?: Yes  Family / Caregiver Present: No  Referring Practitioner: Magaly Skinner PA-C  Diagnosis: acute onset respiratory failure  Subjective  Subjective: Pt reports she is feeling better today. Pt states she has been sick since last week. Pt states her 02 sats were dropping. pt agreeable to OT services.       Orientation     Objective ADL  Grooming: Setup  UE Bathing: Setup  LE Bathing: Setup  UE Dressing: Setup  LE Dressing: Setup            Plan   Plan  Times per week: 3-5  Times per day: Daily  Plan weeks: 1  Current Treatment Recommendations: Balance Training, Endurance Training, Self-Care / ADL, Patient/Caregiver Education & Training    Goals  Short term goals  Time Frame for Short term goals: 1 week  Short term goal 1: Pt to complete bathing with MOD I using EC/WS techniques. Short term goal 2: Pt to tolerate x15 minutes of activity to increase functional activity tolerance maintaining 02 sats >90%. Short term goal 3: Pt to complete hygiene/grooming standing at sink with no LOB. Short term goal 4: Pt to complete functional reaching with MOD I. Therapy Time   Individual Concurrent Group Co-treatment   Time In 0129         Time Out 0154         Minutes 25              This note serves as a DC summary in the event of pt discharge.      Sylvia Abreu, OTR/L

## 2020-11-11 NOTE — TELEPHONE ENCOUNTER
----- Message from Tatiana Contreras PA-C sent at 11/11/2020  8:33 AM EST -----  Left voice message for patient to call office again. I want to refer her to oncology for further evaluation and follow up given elevated tumor markers and nonspecific imaging.

## 2020-11-11 NOTE — PROGRESS NOTES
Speech Language Pathology  Facility/Department: [facility] MED SURG   CLINICAL BEDSIDE SWALLOW EVALUATION    NAME: Abby Preston  : 1950  MRN: [id number]    ADMISSION DATE: 2020    Past Medical History:  has a past medical history of Anemia, Anxiety, Asthma, Cataract, COPD (chronic obstructive pulmonary disease) ([address] DDD (degenerative disc disease), cervical, Depression, Diabetes mellitus type 2, GERD (gastroesophageal reflux disease), Gout, History of uterine cancer, Hyperlipidemia, and Hypertension. Past Surgical History:  has a past surgical history that includes Tonsillectomy ();  section (, ); Lung surgery; Neck surgery; Cataract removal with implant (); Hysterectomy; Colonoscopy (2013); Upper gastrointestinal endoscopy (N/A, 2019); and Colonoscopy (N/A, 2019). Recent Chest Xray/CT of Chest: (2020)  Impression         Right middle lobe airspace disease, atelectasis versus pneumonia. Date of Eval: 11/10/2020  Evaluating Therapist: Corina Garibay    Current Diet level:  Current Diet : Regular  Current Liquid Diet : Thin    Primary Complaint:   Patient Complaint: Pt reported she has difficulty swallowing occasionally when she is swallowing small food such as corn. She reported there was once a pea in her lung she had aspirated. Pain:   Pain Assessment  Pain Assessment: 0-10  Pain Level: 5    Reason for Referral  Abby Preston was referred for a bedside swallow evaluation to assess the efficiency of her swallow function, identify signs and symptoms of aspiration, and make recommendations regarding safe dietary consistencies, effective compensatory strategies, and safe eating environment.     Impression  Dysphagia Diagnosis  Dysphagia Diagnosis: Mild to moderate pharyngeal stage dysphagia  Dysphagia Impression : Mild-moderate pharyngeal stage dysphagia  Dysphagia Outcome Severity Scale: Level 4: Mild moderate dysphagia- Intermittent supervision/cueing. One - two diet consistencies restricted    Treatment Plan  Requires SLP Intervention: Yes     Duration/Frequency of Treatment: 1x per week for 4 weeks     Recommended Diet and Intervention  Solid: Diet Solids Recommendation: Dysphagia Soft and Bite-Sized (Dysphagia III)  Liquid: Liquid Consistency Recommendation: Thin  Medication:Recommended Form of Meds: PO  Therapeutic Interventions: Diet tolerance monitoring, Patient/Family education    Compensatory Swallowing Strategies Attempted  Compensatory Swallowing Strategies: Alternate solids and liquids;Eat/Feed slowly;Upright as possible for all oral intake;Remain upright for 30-45 minutes after meals;Small bites/sips    Treatment/Goals  Short-term Goals  Goal 1: Patient will perform compensatory swallow strategies to safely tolerate least restrictive diet with min verbal cues with 80% accuracy. Long-term Goals  Goal 1: Patient will maintain adequate hydration/nutrition with optimum safety and efficiency of swallow function without overt s/sx of aspiration for the least restrictive diet  Dysphagia Goals: The patient will tolerate regular consistency solids 10/10. General  Chart Reviewed: Yes  Comments: Pt sitting upright in bed and willing to participate in BSE. Behavior/Cognition  Behavior/Cognition: Alert; Cooperative;Pleasant mood  Temperature Spikes Noted: N/A  Respiratory Status: O2 via nasual cannula  Breath Sounds: Clear  O2 Device: Nasal cannula  Communication Observation: Functional  Follows Directions: Simple  Dentition: Some missing teeth  Patient Positioning: Upright in bed  Baseline Vocal Quality: Normal  Volitional Cough: Strong  Consistencies Administered: Reg solid; Thin - straw; Thin - cup     Vision/Hearing  Vision  Vision Exceptions: Wears glasses at all times  Hearing  Hearing: Within functional limits    Oral Motor Deficits  Oral/Motor  Oral Motor:  Within functional limits    Oral Phase Dysfunction  Oral Phase  Oral Phase:

## 2020-11-11 NOTE — PLAN OF CARE
Problem: Falls - Risk of:  Goal: Will remain free from falls  Description: Will remain free from falls  Outcome: Ongoing  Goal: Absence of physical injury  Description: Absence of physical injury  Outcome: Ongoing     Problem: Safety:  Goal: Free from accidental physical injury  Description: Free from accidental physical injury  Outcome: Ongoing     Problem: Daily Care:  Goal: Daily care needs are met  Description: Daily care needs are met  Outcome: Ongoing     Problem: Pain:  Goal: Patient's pain/discomfort is manageable  Description: Patient's pain/discomfort is manageable  Outcome: Ongoing

## 2020-11-11 NOTE — FLOWSHEET NOTE
11/11/20 0853   Assessment   Charting Type Shift assessment   Neurological   Neuro (WDL) WDL   Level of Consciousness 0   [place] Coma Scale   Eye Opening 4   Best Verbal Response 5   Best Motor Response 6   [place] Coma Scale Score 15   NIH/MNHISS Stroke Scale   NIH/MNIHSS Stroke Scale Assessed No   HEENT   HEENT (WDL) X   Right Eye Impaired vision   Left Eye Impaired vision   Voice Normal   Teeth Dentures upper   Respiratory   Respiratory (WDL) X   Respiratory Pattern Regular   Respiratory Depth Normal   Respiratory Quality/Effort Dyspnea with exertion   Chest Assessment Chest expansion symmetrical   L Breath Sounds Expiratory Wheezes   R Breath Sounds Expiratory Wheezes   Breath Sounds   Right Upper Lobe Expiratory Wheezes   Right Middle Lobe Expiratory Wheezes   Right Lower Lobe Expiratory Wheezes   Left Upper Lobe Expiratory Wheezes   Left Lower Lobe Expiratory Wheezes   Cough/Sputum   Cough Congested   Sputum Amount Small   Sputum Color Yellow   Tenacity Thick   Cardiac   Cardiac (WDL) WDL   Cardiac Monitor   Telemetry Monitor On No   Gastrointestinal   Abdominal (WDL) WDL   Peripheral Vascular   Peripheral Vascular (WDL) WDL   Skin Color/Condition   Skin Color/Condition (WDL) WDL   Skin Integrity   Skin Integrity (WDL) WDL   Musculoskeletal   Musculoskeletal (WDL) X   RUE Full movement   LUE Full movement   RL Extremity Weakness   LL Extremity Weakness   Psychosocial   Psychosocial (WDL) WDL   Pt awake in bed. Pt alert and oriented. Pt appears in no acute distress. Pt currently on 6L NC. Pt lung sounds expiratory wheezing throughout. Pt states wheezing has got better. Pt has productive/congested cough. Pt encouraged to cough and deep breathe. Pt call bell and bedside table within reach. Will continue to monitor pt.

## 2020-11-12 ENCOUNTER — HOSPITAL ENCOUNTER (INPATIENT)
Facility: HOSPITAL | Age: 70
LOS: 7 days | Discharge: HOME HEALTH | DRG: 947 | End: 2020-11-19
Attending: INTERNAL MEDICINE | Admitting: INTERNAL MEDICINE
Payer: MEDICARE

## 2020-11-12 PROBLEM — R53.81 DECLINING FUNCTIONAL STATUS: Status: ACTIVE | Noted: 2020-11-12

## 2020-11-12 LAB
GLUCOSE BLD-MCNC: 160 MG/DL (ref 74–106)
GLUCOSE BLD-MCNC: 184 MG/DL (ref 74–106)
GLUCOSE BLD-MCNC: 77 MG/DL (ref 74–106)
GLUCOSE BLD-MCNC: 91 MG/DL (ref 74–106)
GLUCOSE BLD-MCNC: 97 MG/DL (ref 74–106)
PERFORMED ON: ABNORMAL
PERFORMED ON: ABNORMAL
PERFORMED ON: NORMAL

## 2020-11-12 PROCEDURE — 87040 BLOOD CULTURE FOR BACTERIA: CPT

## 2020-11-12 PROCEDURE — 6360000002 HC RX W HCPCS: Performed by: PHYSICIAN ASSISTANT

## 2020-11-12 PROCEDURE — 94640 AIRWAY INHALATION TREATMENT: CPT

## 2020-11-12 PROCEDURE — 2700000000 HC OXYGEN THERAPY PER DAY

## 2020-11-12 PROCEDURE — 97110 THERAPEUTIC EXERCISES: CPT

## 2020-11-12 PROCEDURE — 6370000000 HC RX 637 (ALT 250 FOR IP): Performed by: PHYSICIAN ASSISTANT

## 2020-11-12 PROCEDURE — 1200000002 HC SEMI PRIVATE SWING BED

## 2020-11-12 PROCEDURE — 94669 MECHANICAL CHEST WALL OSCILL: CPT

## 2020-11-12 PROCEDURE — 92526 ORAL FUNCTION THERAPY: CPT

## 2020-11-12 PROCEDURE — 6370000000 HC RX 637 (ALT 250 FOR IP): Performed by: INTERNAL MEDICINE

## 2020-11-12 PROCEDURE — 94667 MNPJ CHEST WALL 1ST: CPT

## 2020-11-12 PROCEDURE — 2580000003 HC RX 258: Performed by: PHYSICIAN ASSISTANT

## 2020-11-12 PROCEDURE — 94761 N-INVAS EAR/PLS OXIMETRY MLT: CPT

## 2020-11-12 PROCEDURE — 99238 HOSP IP/OBS DSCHRG MGMT 30/<: CPT | Performed by: INTERNAL MEDICINE

## 2020-11-12 PROCEDURE — 97161 PT EVAL LOW COMPLEX 20 MIN: CPT

## 2020-11-12 RX ORDER — METOPROLOL SUCCINATE 100 MG/1
100 TABLET, EXTENDED RELEASE ORAL DAILY
Status: CANCELLED | OUTPATIENT
Start: 2020-11-13

## 2020-11-12 RX ORDER — INSULIN GLARGINE 100 [IU]/ML
40 INJECTION, SOLUTION SUBCUTANEOUS NIGHTLY
Status: DISCONTINUED | OUTPATIENT
Start: 2020-11-12 | End: 2020-11-13

## 2020-11-12 RX ORDER — BUDESONIDE 0.5 MG/2ML
0.5 INHALANT ORAL 2 TIMES DAILY
Status: DISCONTINUED | OUTPATIENT
Start: 2020-11-12 | End: 2020-11-19 | Stop reason: HOSPADM

## 2020-11-12 RX ORDER — ONDANSETRON 2 MG/ML
4 INJECTION INTRAMUSCULAR; INTRAVENOUS EVERY 6 HOURS PRN
Status: DISCONTINUED | OUTPATIENT
Start: 2020-11-12 | End: 2020-11-12 | Stop reason: SDUPTHER

## 2020-11-12 RX ORDER — DEXTROSE MONOHYDRATE 25 G/50ML
12.5 INJECTION, SOLUTION INTRAVENOUS PRN
Status: CANCELLED | OUTPATIENT
Start: 2020-11-12

## 2020-11-12 RX ORDER — ACETAMINOPHEN 650 MG/1
650 SUPPOSITORY RECTAL EVERY 6 HOURS PRN
Status: DISCONTINUED | OUTPATIENT
Start: 2020-11-12 | End: 2020-11-12 | Stop reason: SDUPTHER

## 2020-11-12 RX ORDER — MONTELUKAST SODIUM 10 MG/1
10 TABLET ORAL NIGHTLY
Status: CANCELLED | OUTPATIENT
Start: 2020-11-12

## 2020-11-12 RX ORDER — METOLAZONE 5 MG/1
5 TABLET ORAL DAILY PRN
Status: DISCONTINUED | OUTPATIENT
Start: 2020-11-12 | End: 2020-11-16

## 2020-11-12 RX ORDER — ASPIRIN 81 MG/1
81 TABLET ORAL DAILY
Status: CANCELLED | OUTPATIENT
Start: 2020-11-13

## 2020-11-12 RX ORDER — PANTOPRAZOLE SODIUM 40 MG/1
40 TABLET, DELAYED RELEASE ORAL DAILY
Status: DISCONTINUED | OUTPATIENT
Start: 2020-11-13 | End: 2020-11-19 | Stop reason: HOSPADM

## 2020-11-12 RX ORDER — ACETYLCYSTEINE 200 MG/ML
200 SOLUTION ORAL; RESPIRATORY (INHALATION)
Status: CANCELLED | OUTPATIENT
Start: 2020-11-12

## 2020-11-12 RX ORDER — INSULIN GLARGINE 100 [IU]/ML
100 INJECTION, SOLUTION SUBCUTANEOUS NIGHTLY
Status: DISCONTINUED | OUTPATIENT
Start: 2020-11-12 | End: 2020-11-12

## 2020-11-12 RX ORDER — ACETAMINOPHEN 325 MG/1
650 TABLET ORAL EVERY 6 HOURS PRN
Status: DISCONTINUED | OUTPATIENT
Start: 2020-11-12 | End: 2020-11-19 | Stop reason: HOSPADM

## 2020-11-12 RX ORDER — FENOFIBRATE 54 MG/1
54 TABLET ORAL DAILY
Status: DISCONTINUED | OUTPATIENT
Start: 2020-11-13 | End: 2020-11-19 | Stop reason: HOSPADM

## 2020-11-12 RX ORDER — ONDANSETRON 2 MG/ML
4 INJECTION INTRAMUSCULAR; INTRAVENOUS EVERY 6 HOURS PRN
Status: CANCELLED | OUTPATIENT
Start: 2020-11-12

## 2020-11-12 RX ORDER — ACETYLCYSTEINE 200 MG/ML
200 SOLUTION ORAL; RESPIRATORY (INHALATION) 4 TIMES DAILY
Status: DISCONTINUED | OUTPATIENT
Start: 2020-11-13 | End: 2020-11-16

## 2020-11-12 RX ORDER — ACETAMINOPHEN 650 MG/1
650 SUPPOSITORY RECTAL EVERY 6 HOURS PRN
Status: CANCELLED | OUTPATIENT
Start: 2020-11-12

## 2020-11-12 RX ORDER — HYDROCODONE BITARTRATE AND ACETAMINOPHEN 5; 325 MG/1; MG/1
1 TABLET ORAL DAILY PRN
Status: DISCONTINUED | OUTPATIENT
Start: 2020-11-12 | End: 2020-11-19 | Stop reason: HOSPADM

## 2020-11-12 RX ORDER — ROPINIROLE 0.25 MG/1
0.5 TABLET, FILM COATED ORAL 2 TIMES DAILY
Status: CANCELLED | OUTPATIENT
Start: 2020-11-12

## 2020-11-12 RX ORDER — ROPINIROLE 0.25 MG/1
0.5 TABLET, FILM COATED ORAL 2 TIMES DAILY
Status: DISCONTINUED | OUTPATIENT
Start: 2020-11-12 | End: 2020-11-19 | Stop reason: HOSPADM

## 2020-11-12 RX ORDER — FAMOTIDINE 20 MG/1
20 TABLET, FILM COATED ORAL 2 TIMES DAILY
Status: CANCELLED | OUTPATIENT
Start: 2020-11-12

## 2020-11-12 RX ORDER — TIZANIDINE 4 MG/1
4 TABLET ORAL 2 TIMES DAILY PRN
Status: CANCELLED | OUTPATIENT
Start: 2020-11-12

## 2020-11-12 RX ORDER — POLYETHYLENE GLYCOL 3350 17 G/17G
17 POWDER, FOR SOLUTION ORAL DAILY PRN
Status: DISCONTINUED | OUTPATIENT
Start: 2020-11-12 | End: 2020-11-12 | Stop reason: SDUPTHER

## 2020-11-12 RX ORDER — DILTIAZEM HYDROCHLORIDE 180 MG/1
180 CAPSULE, COATED, EXTENDED RELEASE ORAL DAILY
Status: CANCELLED | OUTPATIENT
Start: 2020-11-13

## 2020-11-12 RX ORDER — GUAIFENESIN 600 MG/1
600 TABLET, EXTENDED RELEASE ORAL 2 TIMES DAILY
Status: CANCELLED | OUTPATIENT
Start: 2020-11-12

## 2020-11-12 RX ORDER — PROMETHAZINE HYDROCHLORIDE 25 MG/1
12.5 TABLET ORAL EVERY 6 HOURS PRN
Status: CANCELLED | OUTPATIENT
Start: 2020-11-12

## 2020-11-12 RX ORDER — EZETIMIBE 10 MG/1
10 TABLET ORAL DAILY
Status: CANCELLED | OUTPATIENT
Start: 2020-11-13

## 2020-11-12 RX ORDER — LEVOFLOXACIN 5 MG/ML
750 INJECTION, SOLUTION INTRAVENOUS
Status: DISCONTINUED | OUTPATIENT
Start: 2020-11-12 | End: 2020-11-12 | Stop reason: HOSPADM

## 2020-11-12 RX ORDER — BUDESONIDE 0.5 MG/2ML
0.5 INHALANT ORAL 2 TIMES DAILY
Status: CANCELLED | OUTPATIENT
Start: 2020-11-12

## 2020-11-12 RX ORDER — LEVOFLOXACIN 5 MG/ML
750 INJECTION, SOLUTION INTRAVENOUS EVERY 24 HOURS
Status: DISCONTINUED | OUTPATIENT
Start: 2020-11-12 | End: 2020-11-12

## 2020-11-12 RX ORDER — FAMOTIDINE 20 MG/1
20 TABLET, FILM COATED ORAL 2 TIMES DAILY
Status: DISCONTINUED | OUTPATIENT
Start: 2020-11-12 | End: 2020-11-12 | Stop reason: ALTCHOICE

## 2020-11-12 RX ORDER — METHYLPREDNISOLONE SODIUM SUCCINATE 125 MG/2ML
60 INJECTION, POWDER, LYOPHILIZED, FOR SOLUTION INTRAMUSCULAR; INTRAVENOUS EVERY 8 HOURS
Status: CANCELLED | OUTPATIENT
Start: 2020-11-12

## 2020-11-12 RX ORDER — FLUTICASONE PROPIONATE 50 MCG
1 SPRAY, SUSPENSION (ML) NASAL 2 TIMES DAILY
Status: CANCELLED | OUTPATIENT
Start: 2020-11-12

## 2020-11-12 RX ORDER — INSULIN GLARGINE 100 [IU]/ML
50 INJECTION, SOLUTION SUBCUTANEOUS EVERY MORNING
Status: DISCONTINUED | OUTPATIENT
Start: 2020-11-13 | End: 2020-11-12

## 2020-11-12 RX ORDER — VITAMIN B COMPLEX
2000 TABLET ORAL DAILY
Status: CANCELLED | OUTPATIENT
Start: 2020-11-13

## 2020-11-12 RX ORDER — IPRATROPIUM BROMIDE AND ALBUTEROL SULFATE 2.5; .5 MG/3ML; MG/3ML
1 SOLUTION RESPIRATORY (INHALATION)
Status: CANCELLED | OUTPATIENT
Start: 2020-11-12

## 2020-11-12 RX ORDER — METOPROLOL SUCCINATE 100 MG/1
100 TABLET, EXTENDED RELEASE ORAL DAILY
Status: DISCONTINUED | OUTPATIENT
Start: 2020-11-13 | End: 2020-11-19 | Stop reason: HOSPADM

## 2020-11-12 RX ORDER — ACETAMINOPHEN 650 MG/1
650 SUPPOSITORY RECTAL EVERY 6 HOURS PRN
Status: DISCONTINUED | OUTPATIENT
Start: 2020-11-12 | End: 2020-11-19 | Stop reason: HOSPADM

## 2020-11-12 RX ORDER — ALOGLIPTIN 12.5 MG/1
6.25 TABLET, FILM COATED ORAL DAILY
Status: DISCONTINUED | OUTPATIENT
Start: 2020-11-13 | End: 2020-11-19 | Stop reason: HOSPADM

## 2020-11-12 RX ORDER — BUSPIRONE HYDROCHLORIDE 5 MG/1
10 TABLET ORAL 3 TIMES DAILY
Status: CANCELLED | OUTPATIENT
Start: 2020-11-12

## 2020-11-12 RX ORDER — CETIRIZINE HYDROCHLORIDE 5 MG/1
5 TABLET ORAL DAILY
Status: CANCELLED | OUTPATIENT
Start: 2020-11-13

## 2020-11-12 RX ORDER — ASPIRIN 81 MG/1
81 TABLET ORAL DAILY
Status: DISCONTINUED | OUTPATIENT
Start: 2020-11-13 | End: 2020-11-19 | Stop reason: HOSPADM

## 2020-11-12 RX ORDER — DOCUSATE CALCIUM 240 MG
240 CAPSULE ORAL DAILY
Status: DISCONTINUED | OUTPATIENT
Start: 2020-11-13 | End: 2020-11-19 | Stop reason: HOSPADM

## 2020-11-12 RX ORDER — CETIRIZINE HYDROCHLORIDE 5 MG/1
5 TABLET ORAL DAILY
Status: DISCONTINUED | OUTPATIENT
Start: 2020-11-13 | End: 2020-11-19 | Stop reason: HOSPADM

## 2020-11-12 RX ORDER — ONDANSETRON 2 MG/ML
4 INJECTION INTRAMUSCULAR; INTRAVENOUS EVERY 6 HOURS PRN
Status: DISCONTINUED | OUTPATIENT
Start: 2020-11-12 | End: 2020-11-19 | Stop reason: HOSPADM

## 2020-11-12 RX ORDER — GUAIFENESIN 600 MG/1
600 TABLET, EXTENDED RELEASE ORAL 2 TIMES DAILY
Status: DISCONTINUED | OUTPATIENT
Start: 2020-11-12 | End: 2020-11-16

## 2020-11-12 RX ORDER — METHYLPREDNISOLONE SODIUM SUCCINATE 125 MG/2ML
60 INJECTION, POWDER, LYOPHILIZED, FOR SOLUTION INTRAMUSCULAR; INTRAVENOUS EVERY 8 HOURS
Status: DISCONTINUED | OUTPATIENT
Start: 2020-11-12 | End: 2020-11-14

## 2020-11-12 RX ORDER — TIZANIDINE 4 MG/1
4 TABLET ORAL 2 TIMES DAILY PRN
Status: DISCONTINUED | OUTPATIENT
Start: 2020-11-12 | End: 2020-11-19 | Stop reason: HOSPADM

## 2020-11-12 RX ORDER — ACETAMINOPHEN 325 MG/1
650 TABLET ORAL EVERY 6 HOURS PRN
Status: CANCELLED | OUTPATIENT
Start: 2020-11-12

## 2020-11-12 RX ORDER — DEXTROSE MONOHYDRATE 50 MG/ML
100 INJECTION, SOLUTION INTRAVENOUS PRN
Status: DISCONTINUED | OUTPATIENT
Start: 2020-11-12 | End: 2020-11-19 | Stop reason: HOSPADM

## 2020-11-12 RX ORDER — SUCRALFATE 1 G/1
1 TABLET ORAL
Status: CANCELLED | OUTPATIENT
Start: 2020-11-12

## 2020-11-12 RX ORDER — ALOGLIPTIN 12.5 MG/1
6.25 TABLET, FILM COATED ORAL DAILY
Status: CANCELLED | OUTPATIENT
Start: 2020-11-13

## 2020-11-12 RX ORDER — ALLOPURINOL 100 MG/1
150 TABLET ORAL DAILY
Status: DISCONTINUED | OUTPATIENT
Start: 2020-11-13 | End: 2020-11-19 | Stop reason: HOSPADM

## 2020-11-12 RX ORDER — POTASSIUM CHLORIDE 20 MEQ/1
20 TABLET, EXTENDED RELEASE ORAL DAILY
Status: DISCONTINUED | OUTPATIENT
Start: 2020-11-13 | End: 2020-11-19 | Stop reason: HOSPADM

## 2020-11-12 RX ORDER — INSULIN GLARGINE 100 [IU]/ML
50 INJECTION, SOLUTION SUBCUTANEOUS EVERY MORNING
Status: CANCELLED | OUTPATIENT
Start: 2020-11-13

## 2020-11-12 RX ORDER — FENOFIBRATE 54 MG/1
54 TABLET ORAL DAILY
Status: CANCELLED | OUTPATIENT
Start: 2020-11-13

## 2020-11-12 RX ORDER — SUCRALFATE 1 G/1
1 TABLET ORAL
Status: DISCONTINUED | OUTPATIENT
Start: 2020-11-12 | End: 2020-11-19 | Stop reason: HOSPADM

## 2020-11-12 RX ORDER — LEVOFLOXACIN 5 MG/ML
750 INJECTION, SOLUTION INTRAVENOUS EVERY 24 HOURS
Status: CANCELLED | OUTPATIENT
Start: 2020-11-12

## 2020-11-12 RX ORDER — ECHINACEA PURPUREA EXTRACT 125 MG
1 TABLET ORAL PRN
Status: DISCONTINUED | OUTPATIENT
Start: 2020-11-12 | End: 2020-11-19 | Stop reason: HOSPADM

## 2020-11-12 RX ORDER — FUROSEMIDE 40 MG/1
40 TABLET ORAL 2 TIMES DAILY
Status: DISCONTINUED | OUTPATIENT
Start: 2020-11-12 | End: 2020-11-17

## 2020-11-12 RX ORDER — KETOTIFEN FUMARATE 0.35 MG/ML
1 SOLUTION/ DROPS OPHTHALMIC 2 TIMES DAILY
Status: DISCONTINUED | OUTPATIENT
Start: 2020-11-12 | End: 2020-11-19 | Stop reason: HOSPADM

## 2020-11-12 RX ORDER — BUSPIRONE HYDROCHLORIDE 5 MG/1
10 TABLET ORAL 3 TIMES DAILY
Status: DISCONTINUED | OUTPATIENT
Start: 2020-11-12 | End: 2020-11-12 | Stop reason: ALTCHOICE

## 2020-11-12 RX ORDER — ATORVASTATIN CALCIUM 40 MG/1
40 TABLET, FILM COATED ORAL DAILY
Status: CANCELLED | OUTPATIENT
Start: 2020-11-13

## 2020-11-12 RX ORDER — PROMETHAZINE HYDROCHLORIDE 25 MG/1
12.5 TABLET ORAL EVERY 6 HOURS PRN
Status: DISCONTINUED | OUTPATIENT
Start: 2020-11-12 | End: 2020-11-19 | Stop reason: HOSPADM

## 2020-11-12 RX ORDER — DEXTROSE MONOHYDRATE 50 MG/ML
100 INJECTION, SOLUTION INTRAVENOUS PRN
Status: CANCELLED | OUTPATIENT
Start: 2020-11-12

## 2020-11-12 RX ORDER — ACETYLCYSTEINE 200 MG/ML
200 SOLUTION ORAL; RESPIRATORY (INHALATION)
Status: DISCONTINUED | OUTPATIENT
Start: 2020-11-12 | End: 2020-11-12

## 2020-11-12 RX ORDER — INSULIN GLARGINE 100 [IU]/ML
100 INJECTION, SOLUTION SUBCUTANEOUS NIGHTLY
Status: CANCELLED | OUTPATIENT
Start: 2020-11-12

## 2020-11-12 RX ORDER — ATORVASTATIN CALCIUM 40 MG/1
40 TABLET, FILM COATED ORAL DAILY
Status: DISCONTINUED | OUTPATIENT
Start: 2020-11-13 | End: 2020-11-19 | Stop reason: HOSPADM

## 2020-11-12 RX ORDER — TRAMADOL HYDROCHLORIDE 50 MG/1
50 TABLET ORAL EVERY 8 HOURS PRN
Status: DISCONTINUED | OUTPATIENT
Start: 2020-11-12 | End: 2020-11-19 | Stop reason: HOSPADM

## 2020-11-12 RX ORDER — KETOTIFEN FUMARATE 0.35 MG/ML
1 SOLUTION/ DROPS OPHTHALMIC 2 TIMES DAILY
Status: CANCELLED | OUTPATIENT
Start: 2020-11-12

## 2020-11-12 RX ORDER — ACETYLCYSTEINE 200 MG/ML
200 SOLUTION ORAL; RESPIRATORY (INHALATION)
Status: DISCONTINUED | OUTPATIENT
Start: 2020-11-12 | End: 2020-11-12 | Stop reason: HOSPADM

## 2020-11-12 RX ORDER — INSULIN GLARGINE 100 [IU]/ML
80 INJECTION, SOLUTION SUBCUTANEOUS NIGHTLY
Status: DISCONTINUED | OUTPATIENT
Start: 2020-11-12 | End: 2020-11-12

## 2020-11-12 RX ORDER — DOXYCYCLINE 100 MG/1
100 CAPSULE ORAL EVERY 12 HOURS SCHEDULED
Status: CANCELLED | OUTPATIENT
Start: 2020-11-12

## 2020-11-12 RX ORDER — LEVOFLOXACIN 5 MG/ML
750 INJECTION, SOLUTION INTRAVENOUS
Status: CANCELLED | OUTPATIENT
Start: 2020-11-14

## 2020-11-12 RX ORDER — NICOTINE POLACRILEX 4 MG
15 LOZENGE BUCCAL PRN
Status: CANCELLED | OUTPATIENT
Start: 2020-11-12

## 2020-11-12 RX ORDER — HYDROCODONE BITARTRATE AND ACETAMINOPHEN 5; 325 MG/1; MG/1
1 TABLET ORAL DAILY PRN
Status: CANCELLED | OUTPATIENT
Start: 2020-11-12

## 2020-11-12 RX ORDER — INSULIN GLARGINE 100 [IU]/ML
40 INJECTION, SOLUTION SUBCUTANEOUS EVERY MORNING
Status: DISCONTINUED | OUTPATIENT
Start: 2020-11-13 | End: 2020-11-18

## 2020-11-12 RX ORDER — PANTOPRAZOLE SODIUM 40 MG/1
40 TABLET, DELAYED RELEASE ORAL DAILY
Status: CANCELLED | OUTPATIENT
Start: 2020-11-13

## 2020-11-12 RX ORDER — NICOTINE POLACRILEX 4 MG
15 LOZENGE BUCCAL PRN
Status: DISCONTINUED | OUTPATIENT
Start: 2020-11-12 | End: 2020-11-19 | Stop reason: HOSPADM

## 2020-11-12 RX ORDER — DILTIAZEM HYDROCHLORIDE 180 MG/1
180 CAPSULE, COATED, EXTENDED RELEASE ORAL DAILY
Status: DISCONTINUED | OUTPATIENT
Start: 2020-11-13 | End: 2020-11-19 | Stop reason: HOSPADM

## 2020-11-12 RX ORDER — FLUTICASONE PROPIONATE 50 MCG
1 SPRAY, SUSPENSION (ML) NASAL 2 TIMES DAILY
Status: DISCONTINUED | OUTPATIENT
Start: 2020-11-12 | End: 2020-11-19 | Stop reason: HOSPADM

## 2020-11-12 RX ORDER — IPRATROPIUM BROMIDE AND ALBUTEROL SULFATE 2.5; .5 MG/3ML; MG/3ML
1 SOLUTION RESPIRATORY (INHALATION)
Status: DISCONTINUED | OUTPATIENT
Start: 2020-11-12 | End: 2020-11-19 | Stop reason: HOSPADM

## 2020-11-12 RX ORDER — ALLOPURINOL 100 MG/1
150 TABLET ORAL DAILY
Status: CANCELLED | OUTPATIENT
Start: 2020-11-13

## 2020-11-12 RX ORDER — POLYETHYLENE GLYCOL 3350 17 G/17G
17 POWDER, FOR SOLUTION ORAL DAILY PRN
Status: CANCELLED | OUTPATIENT
Start: 2020-11-12

## 2020-11-12 RX ORDER — ACETAMINOPHEN 325 MG/1
650 TABLET ORAL EVERY 6 HOURS PRN
Status: DISCONTINUED | OUTPATIENT
Start: 2020-11-12 | End: 2020-11-12 | Stop reason: SDUPTHER

## 2020-11-12 RX ORDER — POLYETHYLENE GLYCOL 3350 17 G/17G
17 POWDER, FOR SOLUTION ORAL DAILY PRN
Status: DISCONTINUED | OUTPATIENT
Start: 2020-11-12 | End: 2020-11-19 | Stop reason: HOSPADM

## 2020-11-12 RX ORDER — VITAMIN B COMPLEX
2000 TABLET ORAL DAILY
Status: DISCONTINUED | OUTPATIENT
Start: 2020-11-13 | End: 2020-11-19 | Stop reason: HOSPADM

## 2020-11-12 RX ORDER — DOCUSATE CALCIUM 240 MG
240 CAPSULE ORAL DAILY
Status: CANCELLED | OUTPATIENT
Start: 2020-11-13

## 2020-11-12 RX ORDER — PROMETHAZINE HYDROCHLORIDE 25 MG/1
12.5 TABLET ORAL EVERY 6 HOURS PRN
Status: DISCONTINUED | OUTPATIENT
Start: 2020-11-12 | End: 2020-11-12 | Stop reason: SDUPTHER

## 2020-11-12 RX ORDER — FUROSEMIDE 40 MG/1
40 TABLET ORAL 2 TIMES DAILY
Status: CANCELLED | OUTPATIENT
Start: 2020-11-12

## 2020-11-12 RX ORDER — POTASSIUM CHLORIDE 20 MEQ/1
20 TABLET, EXTENDED RELEASE ORAL DAILY
Status: CANCELLED | OUTPATIENT
Start: 2020-11-13

## 2020-11-12 RX ORDER — GUAIFENESIN 600 MG/1
600 TABLET, EXTENDED RELEASE ORAL 2 TIMES DAILY
Status: DISCONTINUED | OUTPATIENT
Start: 2020-11-12 | End: 2020-11-12 | Stop reason: HOSPADM

## 2020-11-12 RX ORDER — DEXTROSE MONOHYDRATE 25 G/50ML
12.5 INJECTION, SOLUTION INTRAVENOUS PRN
Status: DISCONTINUED | OUTPATIENT
Start: 2020-11-12 | End: 2020-11-19 | Stop reason: HOSPADM

## 2020-11-12 RX ORDER — METOLAZONE 5 MG/1
5 TABLET ORAL DAILY PRN
Status: CANCELLED | OUTPATIENT
Start: 2020-11-12

## 2020-11-12 RX ORDER — MONTELUKAST SODIUM 10 MG/1
10 TABLET ORAL NIGHTLY
Status: DISCONTINUED | OUTPATIENT
Start: 2020-11-12 | End: 2020-11-19 | Stop reason: HOSPADM

## 2020-11-12 RX ORDER — TRAMADOL HYDROCHLORIDE 50 MG/1
50 TABLET ORAL EVERY 8 HOURS PRN
Status: CANCELLED | OUTPATIENT
Start: 2020-11-12

## 2020-11-12 RX ORDER — EZETIMIBE 10 MG/1
10 TABLET ORAL DAILY
Status: DISCONTINUED | OUTPATIENT
Start: 2020-11-13 | End: 2020-11-19 | Stop reason: HOSPADM

## 2020-11-12 RX ORDER — LEVOFLOXACIN 5 MG/ML
750 INJECTION, SOLUTION INTRAVENOUS
Status: DISCONTINUED | OUTPATIENT
Start: 2020-11-14 | End: 2020-11-19 | Stop reason: HOSPADM

## 2020-11-12 RX ADMIN — ATORVASTATIN CALCIUM 40 MG: 40 TABLET, FILM COATED ORAL at 08:04

## 2020-11-12 RX ADMIN — ALOGLIPTIN 6.25 MG: 12.5 TABLET, FILM COATED ORAL at 08:05

## 2020-11-12 RX ADMIN — GUAIFENESIN 600 MG: 600 TABLET, EXTENDED RELEASE ORAL at 11:30

## 2020-11-12 RX ADMIN — INSULIN LISPRO 2 UNITS: 100 INJECTION, SOLUTION INTRAVENOUS; SUBCUTANEOUS at 11:37

## 2020-11-12 RX ADMIN — IPRATROPIUM BROMIDE AND ALBUTEROL SULFATE 1 AMPULE: .5; 3 SOLUTION RESPIRATORY (INHALATION) at 04:59

## 2020-11-12 RX ADMIN — SUCRALFATE 1 G: 1 TABLET ORAL at 11:30

## 2020-11-12 RX ADMIN — FENOFIBRATE 54 MG: 54 TABLET, FILM COATED ORAL at 08:05

## 2020-11-12 RX ADMIN — FUROSEMIDE 40 MG: 40 TABLET ORAL at 08:05

## 2020-11-12 RX ADMIN — ROPINIROLE HYDROCHLORIDE 0.5 MG: 0.25 TABLET, FILM COATED ORAL at 20:14

## 2020-11-12 RX ADMIN — APIXABAN 5 MG: 5 TABLET, FILM COATED ORAL at 20:15

## 2020-11-12 RX ADMIN — INSULIN LISPRO 38 UNITS: 100 INJECTION, SOLUTION INTRAVENOUS; SUBCUTANEOUS at 17:24

## 2020-11-12 RX ADMIN — FLUTICASONE PROPIONATE 1 SPRAY: 50 SPRAY, METERED NASAL at 08:06

## 2020-11-12 RX ADMIN — TRAMADOL HYDROCHLORIDE 50 MG: 50 TABLET, FILM COATED ORAL at 08:26

## 2020-11-12 RX ADMIN — LEVOFLOXACIN 750 MG: 5 INJECTION, SOLUTION INTRAVENOUS at 11:31

## 2020-11-12 RX ADMIN — DOCUSATE CALCIUM 240 MG: 240 CAPSULE, LIQUID FILLED ORAL at 08:04

## 2020-11-12 RX ADMIN — DILTIAZEM HYDROCHLORIDE 180 MG: 180 CAPSULE, COATED, EXTENDED RELEASE ORAL at 08:06

## 2020-11-12 RX ADMIN — NYSTATIN 500000 UNITS: 100000 SUSPENSION ORAL at 11:46

## 2020-11-12 RX ADMIN — ROFLUMILAST 500 MCG: 500 TABLET ORAL at 08:05

## 2020-11-12 RX ADMIN — APIXABAN 5 MG: 5 TABLET, FILM COATED ORAL at 08:04

## 2020-11-12 RX ADMIN — Medication 2000 UNITS: at 08:06

## 2020-11-12 RX ADMIN — SUCRALFATE 1 G: 1 TABLET ORAL at 08:05

## 2020-11-12 RX ADMIN — SUCRALFATE 1 G: 1 TABLET ORAL at 16:40

## 2020-11-12 RX ADMIN — METOPROLOL SUCCINATE 100 MG: 100 TABLET, EXTENDED RELEASE ORAL at 08:04

## 2020-11-12 RX ADMIN — BUSPIRONE HYDROCHLORIDE 10 MG: 5 TABLET ORAL at 08:04

## 2020-11-12 RX ADMIN — BUSPIRONE HYDROCHLORIDE 10 MG: 5 TABLET ORAL at 13:49

## 2020-11-12 RX ADMIN — METHYLPREDNISOLONE SODIUM SUCCINATE 60 MG: 125 INJECTION, POWDER, FOR SOLUTION INTRAMUSCULAR; INTRAVENOUS at 11:31

## 2020-11-12 RX ADMIN — PIPERACILLIN AND TAZOBACTAM 3.38 G: 3; .375 INJECTION, POWDER, LYOPHILIZED, FOR SOLUTION INTRAVENOUS at 13:49

## 2020-11-12 RX ADMIN — IPRATROPIUM BROMIDE AND ALBUTEROL SULFATE 1 AMPULE: .5; 3 SOLUTION RESPIRATORY (INHALATION) at 08:52

## 2020-11-12 RX ADMIN — ROPINIROLE HYDROCHLORIDE 0.5 MG: 0.25 TABLET, FILM COATED ORAL at 08:05

## 2020-11-12 RX ADMIN — DOXYCYCLINE 100 MG: 100 CAPSULE ORAL at 08:05

## 2020-11-12 RX ADMIN — INSULIN LISPRO 38 UNITS: 100 INJECTION, SOLUTION INTRAVENOUS; SUBCUTANEOUS at 11:39

## 2020-11-12 RX ADMIN — METHYLPREDNISOLONE SODIUM SUCCINATE 60 MG: 125 INJECTION, POWDER, FOR SOLUTION INTRAMUSCULAR; INTRAVENOUS at 03:19

## 2020-11-12 RX ADMIN — BUDESONIDE 500 MCG: 0.5 SUSPENSION RESPIRATORY (INHALATION) at 18:08

## 2020-11-12 RX ADMIN — INSULIN LISPRO 2 UNITS: 100 INJECTION, SOLUTION INTRAVENOUS; SUBCUTANEOUS at 08:14

## 2020-11-12 RX ADMIN — FUROSEMIDE 40 MG: 40 TABLET ORAL at 16:39

## 2020-11-12 RX ADMIN — PIPERACILLIN AND TAZOBACTAM 3.38 G: 3; .375 INJECTION, POWDER, LYOPHILIZED, FOR SOLUTION INTRAVENOUS at 20:20

## 2020-11-12 RX ADMIN — METHYLPREDNISOLONE SODIUM SUCCINATE 60 MG: 125 INJECTION, POWDER, FOR SOLUTION INTRAMUSCULAR; INTRAVENOUS at 20:15

## 2020-11-12 RX ADMIN — NYSTATIN 500000 UNITS: 100000 SUSPENSION ORAL at 08:06

## 2020-11-12 RX ADMIN — KETOTIFEN FUMARATE 1 DROP: 0.35 SOLUTION/ DROPS OPHTHALMIC at 20:15

## 2020-11-12 RX ADMIN — ACETYLCYSTEINE 200 MG: 200 SOLUTION ORAL; RESPIRATORY (INHALATION) at 18:09

## 2020-11-12 RX ADMIN — KETOTIFEN FUMARATE 1 DROP: 0.35 SOLUTION/ DROPS OPHTHALMIC at 08:06

## 2020-11-12 RX ADMIN — CETIRIZINE HYDROCHLORIDE 5 MG: 10 TABLET, FILM COATED ORAL at 08:05

## 2020-11-12 RX ADMIN — ALLOPURINOL 150 MG: 100 TABLET ORAL at 08:03

## 2020-11-12 RX ADMIN — IPRATROPIUM BROMIDE AND ALBUTEROL SULFATE 1 AMPULE: .5; 3 SOLUTION RESPIRATORY (INHALATION) at 22:34

## 2020-11-12 RX ADMIN — GUAIFENESIN 600 MG: 600 TABLET, EXTENDED RELEASE ORAL at 20:13

## 2020-11-12 RX ADMIN — POTASSIUM CHLORIDE 20 MEQ: 1500 TABLET, EXTENDED RELEASE ORAL at 08:04

## 2020-11-12 RX ADMIN — INSULIN GLARGINE 50 UNITS: 100 INJECTION, SOLUTION SUBCUTANEOUS at 08:15

## 2020-11-12 RX ADMIN — MONTELUKAST SODIUM 10 MG: 10 TABLET, COATED ORAL at 20:14

## 2020-11-12 RX ADMIN — PANTOPRAZOLE SODIUM 40 MG: 40 TABLET, DELAYED RELEASE ORAL at 08:05

## 2020-11-12 RX ADMIN — NYSTATIN 500000 UNITS: 100000 SUSPENSION ORAL at 20:13

## 2020-11-12 RX ADMIN — NYSTATIN 500000 UNITS: 100000 SUSPENSION ORAL at 16:40

## 2020-11-12 RX ADMIN — BUDESONIDE 500 MCG: 0.5 SUSPENSION RESPIRATORY (INHALATION) at 04:59

## 2020-11-12 RX ADMIN — IPRATROPIUM BROMIDE AND ALBUTEROL SULFATE 1 AMPULE: .5; 3 SOLUTION RESPIRATORY (INHALATION) at 00:59

## 2020-11-12 RX ADMIN — IPRATROPIUM BROMIDE AND ALBUTEROL SULFATE 1 AMPULE: .5; 3 SOLUTION RESPIRATORY (INHALATION) at 18:08

## 2020-11-12 RX ADMIN — INSULIN LISPRO 38 UNITS: 100 INJECTION, SOLUTION INTRAVENOUS; SUBCUTANEOUS at 08:18

## 2020-11-12 RX ADMIN — ASPIRIN 81 MG: 81 TABLET, COATED ORAL at 08:04

## 2020-11-12 ASSESSMENT — PAIN SCALES - GENERAL
PAINLEVEL_OUTOF10: 5
PAINLEVEL_OUTOF10: 6
PAINLEVEL_OUTOF10: 8

## 2020-11-12 ASSESSMENT — PAIN DESCRIPTION - LOCATION
LOCATION: BACK
LOCATION: BACK

## 2020-11-12 NOTE — PROGRESS NOTES
Physical Therapy  Facility/Department: [facility] MED SURG  Daily Treatment Note  NAME: Tonya Brown  : 1950  MRN: [id number]    Date of Service: 2020    Discharge Recommendations:  Continue to assess pending progress      Assessment   Body structures, Functions, Activity limitations: Decreased balance;Decreased functional mobility ; Decreased strength;Decreased endurance  Assessment: Patient awake in bed. RT came in and reduced O2 to 7L. Patient completed B LE therex in supine, sitting, and standing. She requires frequent rest breaks due to fatigue and SOA. O2 sats ranged 89-94% during activity. Patient stated she was worn out and returned to supine after session. Treatment Diagnosis: Respiratory failure; general weakness; functional decline  REQUIRES PT FOLLOW UP: Yes  Activity Tolerance  Activity Tolerance: Patient Tolerated treatment well;Patient limited by fatigue     Patient Diagnosis(es): There were no encounter diagnoses. has a past medical history of Anemia, Anxiety, Asthma, Cataract, COPD (chronic obstructive pulmonary disease) ([address] DDD (degenerative disc disease), cervical, Depression, Diabetes mellitus type 2, GERD (gastroesophageal reflux disease), Gout, History of uterine cancer, Hyperlipidemia, and Hypertension. has a past surgical history that includes Tonsillectomy ();  section (, ); Lung surgery; Neck surgery; Cataract removal with implant (); Hysterectomy; Colonoscopy (2013); Upper gastrointestinal endoscopy (N/A, 2019); and Colonoscopy (N/A, 2019). Restrictions  Restrictions/Precautions  Restrictions/Precautions: General Precautions, Fall Risk  Required Braces or Orthoses?: No  Subjective   General  Chart Reviewed: Yes  Response To Previous Treatment: Patient with no complaints from previous session.   Family / Caregiver Present: No  Referring Practitioner: Missy Caro MD  Subjective  Subjective: Patient states she thinks she may be some better today. RT came in and turned O2 down to 7 at the beginning of PT session. Pain Screening  Patient Currently in Pain: Yes  Pain Assessment  Pain Level: 6  Pain Location: Back  Vital Signs  Patient Currently in Pain: Yes       Orientation  Orientation  Overall Orientation Status: Within Normal Limits  Cognition      Objective   Bed mobility  Rolling to Right: Modified independent  Supine to Sit: Modified independent  Sit to Supine: Modified independent  Scooting: Modified independent  Transfers  Sit to Stand: Modified independent  Stand to sit: Modified independent        Balance  Posture: Good  Sitting - Static: Good  Sitting - Dynamic: Good  Standing - Static: Good;-  Standing - Dynamic: Fair;+  Exercises  Heelslides: 2x10  Hip Flexion: 2x10  Hip Abduction: 2x10  Knee Long Arc Quad: 2x10  Ankle Pumps: 2x10  Comments: Standing:  marching in place      Goals  Long term goals  Time Frame for Long term goals : 3-4 days  Long term goal 1: Ambulate 100 feet x 2 with SBA-supervision with good safety awareness with O2 > 90%. Plan    Plan  Times per week: 3-4 days  Plan weeks: 3-4 days  Current Treatment Recommendations: Strengthening, Balance Training, Endurance Training, Neuromuscular Re-education, Home Exercise Program, Gait Training, Transfer Training, Safety Education & Training  Safety Devices  Type of devices: Left in bed, Call light within reach     Therapy Time   Individual Concurrent Group Co-treatment   Time In 1244         Time Out 1303         Minutes 19              This note serves as D/C summary if patient is discharged prior to next visit.   Yesenia Hunter, PTA

## 2020-11-12 NOTE — PROGRESS NOTES
Speech Language Pathology  Facility/Department: [facility] MED SURG  Dysphagia Daily Treatment Note    NAME: Rabia Huertas  : 1950  MRN: [id number]     Date of Eval: 2020  Evaluating Therapist: Padilla Jiménez    Patient Diagnosis(es):   Patient Active Problem List    Diagnosis Date Noted    Declining functional status 2020    Thrush 11/10/2020    TENZIN (obstructive sleep apnea) 11/10/2020    Acute on chronic respiratory failure with hypoxia ([address] 2020    Acute on chronic respiratory failure ([address] 2020    Paroxysmal atrial fibrillation ([address] 2020    Morbid obesity with BMI of 40.0-44.9, adult ([address] 2020    CKD (chronic kidney disease) stage 4, GFR 15-29 ml/min ([facility]) 2020    Gastroesophageal reflux disease     Asthma 2019    Chronic diastolic heart failure ([address] 2019    Hypertension     Pure hypercholesterolemia 2018    Dependence on continuous supplemental oxygen 2018    Chronic bilateral low back pain without sciatica 2018    Type 2 diabetes mellitus with complication, with long-term current use of insulin ([address] 2017    Stage 3 chronic kidney disease ([address] 2017    COPD exacerbation ([address] 2017    Allergic reaction 2017    Injection site reaction 2017    Elevated BUN 2017    CRF (chronic renal failure) 2017    Hand cramps 2017    Chronic gout without tophus 2017    Vitamin D deficiency 2017    Moderate persistent asthma without complication     Hypoxia 10/17/2016    Pneumonia 2015    B12 deficiency 2013    COPD (chronic obstructive pulmonary disease) ([address] 2012    Back pain 2011    Hyperlipidemia 2011    HTN (hypertension) 2011    DM type 2 (diabetes mellitus, type 2) 2011    Elevated LFTs 2011     Allergies:    Allergies   Allergen Reactions    Shellfish-Derived Products Hives    Wheat Bran Hives     Onset Date: 11/10/2020  Current Diet Level:  Dysphagia Soft and Bite-Sized (Dysphagia III) and thin liquids     Primary Complaint: mild-moderate pharyngeal phase dysphagia     S: Patient sitting upright on side of bed, pleasant and agreeable to dysphagia therapy.      O: Skilled ST services provided to address dysphagia and provide compensatory strategies.      Goal 1:   Patient will perform compensatory swallow strategies to safely tolerate least restrictive diet with min verbal cues with 80% accuracy. 70% accuracy with min cues    Goal 2:   Patient will maintain adequate hydration/nutrition with optimum safety and efficiency of swallow function without overt s/sx of aspiration for the least restrictive diet. 90% accuracy with min cues    Goal 3:  The patient will tolerate regular consistency solids 10/10.  50% accuracy with mod cues     A: Patient consumed trials of thin liquids, soft and bite-sized, and regular solids. With thin liquids, patient required min cues to consume small sips and pace trials from a cup. No overt s/sx aspiration indicated on 80% of trials. Pt produced delayed cough on 20% of trials and pt reported she has been coughing on and off all day, even when she is not eating. On those trials, pt took a large sip of water. SLP reminded pt about the strategy of taking small sips. Pt reported that she does not drink water often and enjoys eating ice more than drinking water. With trials of soft and bite-sized, pt tolerated with 100% accuracy with min cues with no s/s of aspiration. Pt ate small bites independently and produced a chin tuck when swallowing with min cues. She reported she thinks chin tuck is helpful and she practiced chin tuck during meals. SLP explained importance of using compensatory strategies, such as eating slowly, small bites/sips, remaining upright when eating, alternating solids and liquids, and chin tuck.  Pt produced immediate cough when consuming regular solids in

## 2020-11-12 NOTE — DISCHARGE SUMMARY
Discharge Summary      Patient ID: Herber Bell      Patient's PCP: JADE Geller    Admit Date: 11/9/2020     Discharge Date:  11/12/2020    Admitting Provider: Cailin Caceres MD    Discharging Provider: JASON Savage     Reason for this admission:   Acute on chronic respiratory failure with hypoxia due to COPD exacerbation and pneumonia    Discharge Diagnoses: Active Hospital Problems    Diagnosis Date Noted   Jenn Coats [B37.0] 11/10/2020    TENZIN (obstructive sleep apnea) [G47.33] 11/10/2020    Acute on chronic respiratory failure with hypoxia (HCC) [J96.21] 11/09/2020    Paroxysmal atrial fibrillation ([facility] [facility] [I48.0] 11/09/2020    Morbid obesity with BMI of 40.0-44.9, adult ([facility] [E66.01, Z68.41] 08/07/2020    Gastroesophageal reflux disease [K21.9]     Hypertension [I10]     Type 2 diabetes mellitus with complication, with long-term current use of insulin ([facility] [address] [facility] [E11.8, Z79.4] 12/05/2017    COPD exacerbation ([facility] [address] [J44.1] 12/04/2017    CRF (chronic renal failure) [N18.9] 02/22/2017    Pneumonia [J18.9] 02/24/2015    Hyperlipidemia [E78.5] 05/26/2011       Procedures:  XR CHEST PORTABLE   Final Result      1. Patchy right perihilar and medial basilar consolidation, similar compared to previous exam. No improvement   2. Stable cardiac enlargement                XR CHEST (2 VW)   Final Result      Right middle lobe airspace disease, atelectasis versus pneumonia. Consults:   IP CONSULT TO CASE MANAGEMENT  IP CONSULT TO CASE MANAGEMENT  PT OT  RT    Briefly:   Ms. Herber Bell is a 80-year-old female with past medical history of anemia, anxiety, asthma, COPD, chronic respiratory failure on 3 L oxygen chronically, obstructive sleep apnea on BiPAP nightly, DDD, depression, diabetes mellitus type 2, GERD, gout, uterine cancer, hypertension and hyperlipidemia who presented as a direct admission from PCPs office for further evaluation of hypoxia.   Patient found to be hypoxic in clinic on 11/9 with O2 sats 77% on her regular 3 L nasal cannula. She was also tachypneic with expiratory and inspiratory wheezing. Oxygen was adjusted to 4 L nasal cannula with improvement of oxygen sat to 83-87%. Patient states shortness of breath and dyspnea on exertion started approximately 4 to 5 days prior. She was using her home nebulizers and inhalers without improvement. She also had a productive cough with yellowish phlegm. She denied any chest pain, fever or chills. She denied any known COVID-19 exposures. She also denied any recent travel. Chest x-ray obtained upon arrival with evidence of right middle lobe airspace disease, atelectasis versus pneumonia. Patient was admitted to acute care on 11/9 for acute on chronic respiratory failure with hypoxemia secondary to COPD exacerbation and pneumonia. She was treated with IV steroids, DuoNebs, Pulmicort nebs, doxycycline and Rocephin. Despite treatment, she continued to endorse shortness of breath, dyspnea on exertion and had increased O2 demands. Chest x-ray repeated on 11/11 that appeared similar to previous. Patient's oxygen requirements had increased from 4 L to 10 L high flow nasal cannula. Patient continues to feel short of breath. Her antibiotic regimen adjusted to broaden coverage with Zosyn and Levaquin. Mucolytics and chest vest were also added to her treatment. She will be discharged to swing bed for completion of treatment and PT OT when improved. Full details of hospital stay are outlined below. Hospital Course: Active Hospital Problems     Diagnosis Date Noted   Jenn Head [B37.0]  -Added nystatin and Oasis mouth spray 11/10    11/10/2020    Acute on chronic respiratory failure with hypoxia (HCC) [J96.21]  -Secondary to COPD exacerbation and pneumonia  -Initially treated with IV steroid, DuoNebs, Pulmicort nebs, doxycycline and Rocephin with no improvement. Patient with increased O2 demands on 11/11.   Repeat chest x-ray with no significant change. Antibiotic regimen adjusted to IV Zosyn and Levaquin on 11/12. Mucolytics and chest vest also added on 11/12.  -Continue supplemental oxygen to maintain sats greater than 90%; patient currently requiring 10 L high flow nasal cannula   11/09/2020    Paroxysmal atrial fibrillation ([facility]) [I48.0]  -Continue home Eliquis, diltiazem and metoprolol    11/09/2020    Morbid obesity with BMI of 40.0-44.9, adult ([facility]) [E66.01, Z68.41]  -BMI 78.63  -Complicates all aspects of care  -Counseled on diet and exercise after recovery    08/07/2020    Gastroesophageal reflux disease [K21.9]  -Continue PPI and Carafate         Hypertension [I10]  -Continue home metoprolol and diltiazem     Obstructive sleep apnea  -Continue BiPAP nightly         Type 2 diabetes mellitus with complication, with long-term current use of insulin ([facility]) [E11.8, Z79.4]  -A1c 9.3 on 7/22/2020; A1c 8.4 on 11/10  -Elevated blood glucose readings noted earlier in stay; suspect secondary to high-dose steroids; insulin regimen adjusted with improvement  -encourage compliance with diabetic diet    12/05/2017    COPD exacerbation ([facility] [address] [J44.1]  -advanced  -Continue treatment with DuoNebs, Pulmicort and Solu-Medrol  -Add Mucomyst nebs and Mucinex on 11/12 to help mobilize secretions  -Continue supplemental oxygen to maintain sats >90%  -would benefit from OP referral to Pulmonologist after recovery for PFTs    12/04/2017    CRF (chronic renal failure) [N18.9]  -With baseline serum creatinine around 2  -Avoid nephrotoxins and NSAIDs  -Encourage good p.o. intake    02/22/2017    Pneumonia [J18.9]  -Chest x-ray 11/9 with right middle lobe airspace disease, atelectasis versus pneumonia  -As above, initially treated with IV Rocephin and doxycycline with no improvement.   Will check blood cultures and then broaden antibiotic regimen to IV Zosyn and Levaquin on 11/12.     02/24/2015    Hyperlipidemia [E78.5]  -Continue Lipitor, Zetia and TriCor        Disposition: Swing    Discharged Condition: Stable    Vital Signs  Temp: 97.9 °F (36.6 °C)  Pulse: 81  Resp: 20  BP: (!) 153/67  SpO2: 91 %  O2 Device: High flow nasal cannula  O2 Flow Rate (L/min): 10 L/min    Vital signs reviewed in electronic chart. Physical exam  Constitutional:  Well developed, well nourished, obese female sitting on edge of bed in no acute distress.  On 10 L HF nasal cannula. Eyes:  PERRL, conjunctiva normal, EOMI. HENT:  Atraumatic, external ears normal, external nose/nares normal, oropharynx moist, no pharyngeal exudates. Positive oral thrush noted. Neck:  Supple. No JVD or thyromegaly. Respiratory: Slight increased work of breathing noted.    Coarse breath sounds and rhonchi in all lung fields. .  No rales or crackles.  On 10 L HFNC.     Cardiovascular:  Regular rate, normal rhythm, no murmurs, no gallops, no rubs. GI:  Soft, nondistended, obese, normal bowel sounds, nontender, no organomegaly, no mass. :  No costovertebral angle tenderness. Musculoskeletal:  No edema, no tenderness, no obvious deformities. Patient is moving all extremities. Integument:  Well hydrated, no rash. Lymphatic:  No cervical or axillary lymphadenopathy noted. Neurologic:  Alert & oriented x 3,  no focal deficits noted. Strength is equal throughout. Psychiatric:  Speech and behavior appropriate. Activity: activity as tolerated  Diet: diabetic diet    Labs:  For convenience and continuity at follow-up the following most recent labs are provided:    CBC:   Lab Results   Component Value Date    WBC 20.9 11/11/2020    HGB 10.8 11/11/2020    HCT 37.6 11/11/2020     11/11/2020       RENAL:   Lab Results   Component Value Date     11/11/2020    K 4.4 11/11/2020    K 4.5 11/10/2020    CL 93 11/11/2020    CO2 33 11/11/2020    BUN 68 11/11/2020    CREATININE 2.0 11/11/2020         Discharge Medications:     Current Discharge Medication List           Details   ASPIRIN LOW (ZAROXOLYN) 5 MG tablet TAKE 1 TABLET BY MOUTH DAILY AS NEEDED (SWELLING)  Qty: 30 tablet, Refills: 3    Associated Diagnoses: Swelling      insulin lispro (HUMALOG) 100 UNIT/ML injection vial INJECT 38 UNITS UNDER THE SKIN THREE TIMES A DAY BEFORE MEALS  Qty: 30 mL, Refills: 4    Associated Diagnoses: Type 2 diabetes mellitus with stage 3 chronic kidney disease, with long-term current use of insulin ([facility])      metoprolol succinate (TOPROL XL) 100 MG extended release tablet TAKE 1 TABLET BY MOUTH DAILY  Qty: 30 tablet, Refills: 5    Associated Diagnoses: Essential hypertension      allopurinol (ZYLOPRIM) 300 MG tablet TAKE 1/2 TABLET BY MOUTH EVERY DAY  Qty: 30 tablet, Refills: 2    Associated Diagnoses: Chronic gout without tophus, unspecified cause, unspecified site      furosemide (LASIX) 40 MG tablet TAKE ONE TABLET BY MOUTH TWO TIMES A DAY  Qty: 60 tablet, Refills: 5    Associated Diagnoses: Wheezing; COPD exacerbation ([address] Swelling      atorvastatin (LIPITOR) 40 MG tablet Take 40 mg by mouth daily     Associated Diagnoses: Familial hypercholesterolemia      fenofibrate (TRICOR) 48 MG tablet Take 48 mg by mouth daily     Associated Diagnoses: Familial hypercholesterolemia      SPIRIVA HANDIHALER 18 MCG inhalation capsule 18 mcg daily     Associated Diagnoses: Chronic bronchitis, unspecified chronic bronchitis type (HCC)      montelukast (SINGULAIR) 10 MG tablet TAKE ONE TABLET BY MOUTH EVERY DAY  Qty: 30 tablet, Refills: 2    Associated Diagnoses: Chronic bronchitis, unspecified chronic bronchitis type (HCC)      Insulin Degludec (TRESIBA) 100 UNIT/ML SOLN 35 units in the am and 80 in the evening.   Qty: 5 vial, Refills: 5    Associated Diagnoses: Type 2 diabetes mellitus with complication, with long-term current use of insulin ([facility])      sucralfate (CARAFATE) 1 GM tablet TAKE ONE TABLET BY MOUTH THREE TIMES A DAY  Qty: 90 tablet, Refills: 3      docusate sodium (COLACE) 250 MG capsule Take 1 capsule by mouth daily  Qty: 90 capsule, Refills: 3    Associated Diagnoses: Chronic idiopathic constipation      ezetimibe (ZETIA) 10 MG tablet TAKE ONE TABLET BY MOUTH EVERY DAY  Qty: 90 tablet, Refills: 3      pantoprazole (PROTONIX) 40 MG tablet TAKE ONE TABLET BY MOUTH EVERY DAY  Qty: 90 tablet, Refills: 3    Associated Diagnoses: Schreiber's esophagus with dysplasia; Gastrointestinal hemorrhage associated with intestinal diverticulosis      PATADAY 0.2 % SOLN ophthalmic solution PLACE 1 DROP INTO BOTH EYES 2 TIMES DAILY  Qty: 2.5 mL, Refills: 3    Associated Diagnoses: Chronic bronchitis, unspecified chronic bronchitis type (HCC)      GNP VITAMIN D MAXIMUM STRENGTH 50 MCG (2000 UT) TABS Take 1 tablet by mouth daily   Refills: 5      fluticasone-salmeterol (ADVAIR) 500-50 MCG/DOSE diskus inhaler Inhale 1 puff into the lungs every 12 hours      omalizumab (OMALIZUMAB) 150 MG injection Inject 150 mg into the skin every 28 days  Qty: 1 vial, Refills: 3    Associated Diagnoses:  Allergic rhinitis, unspecified seasonality, unspecified trigger      ipratropium-albuterol (DUONEB) 0.5-2.5 (3) MG/3ML SOLN nebulizer solution Inhale 3 mLs into the lungs every 4 hours  Qty: 360 mL, Refills: 3    Associated Diagnoses: Chronic bronchitis, unspecified chronic bronchitis type (HCC); COPD with acute exacerbation (HCC)      OXYGEN Inhale 3 L/min into the lungs continuous      flunisolide (NASALIDE) 25 MCG/ACT (0.025%) SOLN 2 sprays every 12 hours Both nostrils as needed per patient      albuterol sulfate HFA (PROAIR HFA) 108 (90 Base) MCG/ACT inhaler Inhale 2 puffs into the lungs every 4 hours as needed for Wheezing  Qty: 1 Inhaler, Refills: 5      loratadine (CLARITIN) 10 MG tablet TAKE ONE TABLET BY MOUTH EVERY DAY  Qty: 30 tablet, Refills: 4    Associated Diagnoses: Chronic bronchitis, unspecified chronic bronchitis type (HCC)      Lancets MISC 1 each by Does not apply route 3 times daily Freestyle Dx E11.9  Qty: 200 each, Refills: 5      blood glucose test strips (TRUE METRIX PRO BLOOD GLUCOSE) strip USE TO BLOOD SUGAR FOUR TIMES A DAY  Qty: 100 each, Refills: 3    Associated Diagnoses: Type 2 diabetes mellitus with stage 3 chronic kidney disease, with long-term current use of insulin ([facility] [address] Hyperglycemia      Insulin Syringe-Needle U-100 31G X 5/16\" 1 ML MISC USE FOR INSULIN INJECTIONS FIVE TIMES DAILY  Qty: 150 each, Refills: 4    Associated Diagnoses: Type 2 diabetes mellitus with complication, with long-term current use of insulin ([facility])      BD PEN NEEDLE JUAN CARLOS U/F 32G X 4 MM MISC USE WITH LANTUS ONCE DAILY              Patient was seen and examined by Dr. Kashif Givens and plan of care reviewed. Signed:  Electronically signed by JASON Yang on 11/12/2020 at 11:17 AM       Thank you JADE Varner for the opportunity to be involved in this patient's care. If you have any questions or concerns please feel free to contact me at [phone].

## 2020-11-12 NOTE — PROGRESS NOTES
complaints from previous session. Family / Caregiver Present: No  Referring Practitioner: Den Marley MD  Diagnosis: Respiratory failure  Follows Commands: Within Functional Limits  Subjective  Subjective: Patient admitted to Swing bed for continued medical management and therapy; states she is feeling some better, but still get SOA easily; agreeable to consult.   Pain Screening  Patient Currently in Pain: Yes  Pain Assessment  Pain Assessment: 0-10  Pain Level: 5  Pain Location: Back  Vital Signs  Patient Currently in Pain: Yes       Orientation     Social/Functional History  Social/Functional History  Lives With: Alone  Type of Home: House  Home Layout: One level  Home Access: Stairs to enter without rails  Entrance Stairs - Number of Steps: 5-6  Bathroom Shower/Tub: Tub/Shower unit, Shower chair with back  Bathroom Toilet: Standard  Bathroom Equipment: Shower chair, Grab bars in shower  Home Equipment: Oxygen, Cane, 4 wheeled walker, Lift chair  Receives Help From: Family  ADL Assistance: Independent  Ambulation Assistance: Independent  Transfer Assistance: Independent  Active : No  Cognition        Objective     Observation/Palpation  Posture: Good  Observation: Patient sitting EOB, on phone, NAD, pleasant and appropriate    AROM RLE (degrees)  RLE AROM: WFL  AROM LLE (degrees)  LLE AROM : WFL  AROM RUE (degrees)  RUE AROM : WFL  AROM LUE (degrees)  LUE AROM : WFL  Strength RLE  Strength RLE: WFL  Strength LLE  Strength LLE: WFL  Strength RUE  Strength RUE: WFL  Strength LUE  Strength LUE: WFL  Tone RLE  RLE Tone: Normotonic  Tone LLE  LLE Tone: Normotonic  Motor Control  Gross Motor?: WFL  Sensation  Overall Sensation Status: WFL  Bed mobility  Rolling to Right: Modified independent  Supine to Sit: Modified independent  Sit to Supine: Modified independent  Scooting: Modified independent  Transfers  Sit to Stand: Modified independent  Stand to sit: Modified independent  Bed to Chair: Modified

## 2020-11-12 NOTE — FLOWSHEET NOTE
11/11/20 2015   Assessment   Charting Type Shift assessment   Neurological   Neuro (WDL) WDL   Level of Consciousness 0   [place] Coma Scale   Eye Opening 4   Best Verbal Response 5   Best Motor Response 6   Analia Coma Scale Score 15   HEENT   HEENT (WDL) X   Right Eye Impaired vision   Left Eye Impaired vision   Voice Normal   Teeth Dentures upper   Respiratory   Respiratory (WDL) X   Respiratory Pattern Regular   Respiratory Depth Normal   Respiratory Quality/Effort Dyspnea with exertion   Chest Assessment Chest expansion symmetrical;Trachea midline   L Breath Sounds Expiratory Wheezes   R Breath Sounds Expiratory Wheezes   Breath Sounds   Right Upper Lobe Expiratory Wheezes   Right Middle Lobe Expiratory Wheezes   Right Lower Lobe Expiratory Wheezes   Left Upper Lobe Expiratory Wheezes   Left Lower Lobe Expiratory Wheezes   Cough/Sputum   Frequency Frequent   Sputum Color Yellow   Tenacity Thick   Cardiac   Cardiac (WDL) WDL   Cardiac Monitor   Telemetry Monitor On No   Gastrointestinal   Abdominal (WDL) WDL   Peripheral Vascular   Peripheral Vascular (WDL) WDL   Skin Color/Condition   Skin Color/Condition (WDL) WDL   Skin Integrity   Skin Integrity (WDL) WDL   Musculoskeletal   Musculoskeletal (WDL) X   RUE Full movement   LUE Full movement   RL Extremity Weakness   LL Extremity Weakness   Urine Assessment   Incontinence No   Psychosocial   Psychosocial (WDL) WDL   Pt awake in bed. Pt alert and oriented. Pt appears in no acute distress. Pt currently on 10 L NC. Pt lung sounds wheezy throughout all fields. Pt has a frequent cough and states \"it is hard to get anything up\". Pt encouraged to cough and deep breathe. Pt has c/o pain rating at 8/10. See eMAR for intervention. Pt denies any other needs at this time. Pt call bell and bedside table within reach. Will continue to monitor pt.

## 2020-11-12 NOTE — FLOWSHEET NOTE
11/12/20 1644   Assessment   Charting Type Shift assessment   Neurological   Neuro (WDL) WDL   Level of Consciousness 0   [place] Coma Scale   Eye Opening 4   Best Verbal Response 5   Best Motor Response 6   Analia Coma Scale Score 15   NIH/MNHISS Stroke Scale   NIH/MNIHSS Stroke Scale Assessed No   HEENT   HEENT (WDL) X   Right Eye Impaired vision   Left Eye Impaired vision   Voice Normal   Teeth Dentures upper   Respiratory   Respiratory (WDL) X   Respiratory Pattern Regular   Respiratory Depth Normal   Respiratory Quality/Effort Dyspnea with exertion   Chest Assessment Chest expansion symmetrical;Trachea midline   L Breath Sounds Expiratory Wheezes   R Breath Sounds Expiratory Wheezes   Breath Sounds   Right Upper Lobe Expiratory Wheezes; Rhonchi   Right Middle Lobe Expiratory Wheezes; Rhonchi   Right Lower Lobe Expiratory Wheezes; Rhonchi   Left Upper Lobe Expiratory Wheezes; Rhonchi   Left Lower Lobe Expiratory Wheezes; Rhonchi   Cough/Sputum   Cough Productive; Congested;Strong   Cough Description   Sputum Color Yellow   Tenacity Thick   Cardiac   Cardiac (WDL) WDL   Cardiac Monitor   Telemetry Monitor On No   Gastrointestinal   Abdominal (WDL) WDL   Peripheral Vascular   Peripheral Vascular (WDL) WDL   Skin Color/Condition   Skin Color/Condition (WDL) WDL   Skin Integrity   Skin Integrity (WDL) WDL   Musculoskeletal   Musculoskeletal (WDL) X   RUE Full movement   LUE Full movement   RL Extremity Weakness   LL Extremity Weakness   Urine Assessment   Incontinence No   Psychosocial   Psychosocial (WDL) WDL   Pt admitted to swing bed. Pt assessment unchanged. Will continue to monitor pt.

## 2020-11-12 NOTE — PLAN OF CARE
Problem: Falls - Risk of:  Goal: Will remain free from falls  Description: Will remain free from falls  11/11/2020 2232 by Lenon Goodpasture, RN  Outcome: Ongoing     Problem: Infection:  Goal: Will remain free from infection  Description: Will remain free from infection  11/11/2020 2232 by Lenon Goodpasture, RN  Outcome: Ongoing     Problem: Safety:  Goal: Free from accidental physical injury  Description: Free from accidental physical injury  Outcome: Ongoing     Problem: Daily Care:  Goal: Daily care needs are met  Description: Daily care needs are met  11/12/2020 0337 by Anna Hunter RN  Outcome: Ongoing  11/12/2020 0333 by Anna Hunetr RN  Outcome: Ongoing  11/11/2020 2232 by Lenon Goodpasture, RN  Outcome: Ongoing     Problem: Pain:  Goal: Patient's pain/discomfort is manageable  Description: Patient's pain/discomfort is manageable  11/11/2020 2232 by Lenon Goodpasture, RN  Outcome: Ongoing     Problem: Discharge Planning:  Goal: Patients continuum of care needs are met  Description: Patients continuum of care needs are met  11/12/2020 0337 by Anna Hunter RN  Outcome: Ongoing  11/12/2020 0333 by Anna Hunter RN  Outcome: Ongoing

## 2020-11-12 NOTE — CARE COORDINATION
11/12/20 1434   Discharge Planning   [address] [place] [address],[address] Children;Family Members   Current Services Prior To Admission [facility]; Oxygen Therapy   DME Oxygen Therapy (Comment); Home Aerosol;Bipap;Cane; Shower Chair;Walker  (RW)   Potential Assistance Needed Home Care;Durable Medical Equipment   Potential Assistance Purchasing Medications No   DME   (per therapy recs)   Type of Home Care Services OT;PT;Nursing Services   Patient expects to be discharged to: home with family assist/HH   Expected Discharge Date 11/26/20   Follow Up Appointment: Best Day/Time    (any)     [facility] through the day NEMESIO Vaughan is DME provider.

## 2020-11-12 NOTE — PLAN OF CARE
Problem: Falls - Risk of:  Goal: Will remain free from falls  Description: Will remain free from falls  11/11/2020 2232 by Jessie Bird RN  Outcome: Ongoing     Problem: Infection:  Goal: Will remain free from infection  Description: Will remain free from infection  11/11/2020 2232 by Jessie Bird RN  Outcome: Ongoing     Problem: Safety:  Goal: Free from accidental physical injury  Description: Free from accidental physical injury  Outcome: Ongoing     Problem: Daily Care:  Goal: Daily care needs are met  Description: Daily care needs are met  11/12/2020 0333 by Jerrold Alpers, RN  Outcome: Ongoing  11/11/2020 2232 by Jessie Bird RN  Outcome: Ongoing     Problem: Pain:  Goal: Patient's pain/discomfort is manageable  Description: Patient's pain/discomfort is manageable  11/11/2020 2232 by Jessie Bird RN  Outcome: Ongoing     Problem: Discharge Planning:  Goal: Patients continuum of care needs are met  Description: Patients continuum of care needs are met  Outcome: Ongoing

## 2020-11-12 NOTE — PLAN OF CARE
Problem: Falls - Risk of:  Goal: Will remain free from falls  Description: Will remain free from falls  11/12/2020 0830 by Troy Mendez RN  Outcome: Ongoing  11/11/2020 2232 by Edi Taveras RN  Outcome: Ongoing     Problem: Infection:  Goal: Will remain free from infection  Description: Will remain free from infection  11/11/2020 2232 by Edi Taveras RN  Outcome: Ongoing     Problem: Safety:  Goal: Free from accidental physical injury  Description: Free from accidental physical injury  11/12/2020 0830 by Troy Mendez RN  Outcome: Ongoing  11/12/2020 0333 by Panchito Sinclair RN  Outcome: Ongoing     Problem: Daily Care:  Goal: Daily care needs are met  Description: Daily care needs are met  11/12/2020 0337 by Panchito Sinclair RN  Outcome: Ongoing  11/12/2020 0333 by Panchito Sinclair RN  Outcome: Ongoing  11/11/2020 2232 by Edi Taveras RN  Outcome: Ongoing     Problem: Pain:  Goal: Patient's pain/discomfort is manageable  Description: Patient's pain/discomfort is manageable  11/11/2020 2232 by Edi Taveras RN  Outcome: Ongoing  Goal: Pain level will decrease  Description: Pain level will decrease  Outcome: Ongoing     Problem: Discharge Planning:  Goal: Patients continuum of care needs are met  Description: Patients continuum of care needs are met  11/12/2020 0337 by Panchito Sinclair RN  Outcome: Ongoing  11/12/2020 0333 by aPnchito Sinclair RN  Outcome: Ongoing

## 2020-11-12 NOTE — FLOWSHEET NOTE
11/12/20 7812   Assessment   Charting Type Shift assessment   Neurological   Neuro (WDL) WDL   Level of Consciousness 0   [place] Coma Scale   Eye Opening 4   Best Verbal Response 5   Best Motor Response 6   [place] Coma Scale Score 15   NIH/MNHISS Stroke Scale   NIH/MNIHSS Stroke Scale Assessed No   HEENT   HEENT (WDL) X   Right Eye Impaired vision   Left Eye Impaired vision   Voice Normal   Teeth Dentures upper   Respiratory   Respiratory (WDL) X   Respiratory Pattern Regular   Respiratory Depth Normal   Respiratory Quality/Effort Dyspnea with exertion   Chest Assessment Chest expansion symmetrical;Trachea midline   L Breath Sounds Expiratory Wheezes   R Breath Sounds Expiratory Wheezes   Breath Sounds   Right Upper Lobe Expiratory Wheezes; Rhonchi   Right Middle Lobe Expiratory Wheezes; Rhonchi   Right Lower Lobe Expiratory Wheezes; Rhonchi   Left Upper Lobe Expiratory Wheezes; Rhonchi   Left Lower Lobe Expiratory Wheezes; Rhonchi   Cough/Sputum   Cough Productive; Congested;Strong   Sputum Color Yellow   Tenacity Thick   Cough and Deep Breathe   Cough and Deep Breathe Yes   Cardiac   Cardiac (WDL) WDL   Cardiac Monitor   Telemetry Monitor On No   Gastrointestinal   Abdominal (WDL) WDL   Peripheral Vascular   Peripheral Vascular (WDL) WDL   Skin Color/Condition   Skin Color/Condition (WDL) WDL   Skin Integrity   Skin Integrity (WDL) WDL   Musculoskeletal   Musculoskeletal (WDL) X   RUE Full movement   LUE Full movement   RL Extremity Weakness   LL Extremity Weakness   Urine Assessment   Incontinence No   Psychosocial   Psychosocial (WDL) WDL   Pt awake in bed. Pt alert and oriented. Pt appears in no acute distress. Pt currently on 10 L NC. Pt lung sounds expiratory wheezing and rhonchi noted throughout. Pt encouraged to cough and deep breathe. Pt states pain 8 out of 10, see eMAR for intervention. Pt states feeling some better this am. Pt states productive cough at times. Pt call bell and bedside table within reach. Will continue to monitor pt.

## 2020-11-13 VITALS
TEMPERATURE: 97.9 F | HEIGHT: 58 IN | DIASTOLIC BLOOD PRESSURE: 67 MMHG | RESPIRATION RATE: 20 BRPM | OXYGEN SATURATION: 91 % | WEIGHT: 187.9 LBS | HEART RATE: 81 BPM | SYSTOLIC BLOOD PRESSURE: 153 MMHG | BODY MASS INDEX: 39.44 KG/M2

## 2020-11-13 LAB
A/G RATIO: 1.3 (ref 0.8–2)
ALBUMIN SERPL-MCNC: 4 G/DL (ref 3.4–4.8)
ALP BLD-CCNC: 74 U/L (ref 25–100)
ALT SERPL-CCNC: 23 U/L (ref 4–36)
ANION GAP SERPL CALCULATED.3IONS-SCNC: 14 MMOL/L (ref 3–16)
AST SERPL-CCNC: 33 U/L (ref 8–33)
BILIRUB SERPL-MCNC: <0.2 MG/DL (ref 0.3–1.2)
BUN BLDV-MCNC: 77 MG/DL (ref 6–20)
CALCIUM SERPL-MCNC: 9.8 MG/DL (ref 8.5–10.5)
CHLORIDE BLD-SCNC: 95 MMOL/L (ref 98–107)
CO2: 38 MMOL/L (ref 20–30)
CREAT SERPL-MCNC: 1.9 MG/DL (ref 0.4–1.2)
GFR AFRICAN AMERICAN: 32
GFR NON-AFRICAN AMERICAN: 26
GLOBULIN: 3 G/DL
GLUCOSE BLD-MCNC: 133 MG/DL (ref 74–106)
GLUCOSE BLD-MCNC: 139 MG/DL (ref 74–106)
GLUCOSE BLD-MCNC: 292 MG/DL (ref 74–106)
GLUCOSE BLD-MCNC: 332 MG/DL (ref 74–106)
GLUCOSE BLD-MCNC: 367 MG/DL (ref 74–106)
GLUCOSE BLD-MCNC: 398 MG/DL (ref 74–106)
HCT VFR BLD CALC: 43.7 % (ref 37–47)
HEMOGLOBIN: 12.5 G/DL (ref 11.5–16.5)
MCH RBC QN AUTO: 27.2 PG (ref 27–32)
MCHC RBC AUTO-ENTMCNC: 28.6 G/DL (ref 31–35)
MCV RBC AUTO: 95 FL (ref 80–100)
PDW BLD-RTO: 17 % (ref 11–16)
PERFORMED ON: ABNORMAL
PLATELET # BLD: 574 K/UL (ref 150–400)
PMV BLD AUTO: 10.2 FL (ref 6–10)
POTASSIUM REFLEX MAGNESIUM: 3.7 MMOL/L (ref 3.4–5.1)
RBC # BLD: 4.6 M/UL (ref 3.8–5.8)
SODIUM BLD-SCNC: 147 MMOL/L (ref 136–145)
TOTAL PROTEIN: 7 G/DL (ref 6.4–8.3)
WBC # BLD: 20.7 K/UL (ref 4–11)

## 2020-11-13 PROCEDURE — 97535 SELF CARE MNGMENT TRAINING: CPT

## 2020-11-13 PROCEDURE — 6360000002 HC RX W HCPCS: Performed by: INTERNAL MEDICINE

## 2020-11-13 PROCEDURE — 97165 OT EVAL LOW COMPLEX 30 MIN: CPT

## 2020-11-13 PROCEDURE — 2580000003 HC RX 258: Performed by: PHYSICIAN ASSISTANT

## 2020-11-13 PROCEDURE — 99305 1ST NF CARE MODERATE MDM 35: CPT | Performed by: INTERNAL MEDICINE

## 2020-11-13 PROCEDURE — 6360000002 HC RX W HCPCS: Performed by: PHYSICIAN ASSISTANT

## 2020-11-13 PROCEDURE — 6370000000 HC RX 637 (ALT 250 FOR IP): Performed by: PHYSICIAN ASSISTANT

## 2020-11-13 PROCEDURE — 85027 COMPLETE CBC AUTOMATED: CPT

## 2020-11-13 PROCEDURE — 94640 AIRWAY INHALATION TREATMENT: CPT

## 2020-11-13 PROCEDURE — 2700000000 HC OXYGEN THERAPY PER DAY

## 2020-11-13 PROCEDURE — 1200000002 HC SEMI PRIVATE SWING BED

## 2020-11-13 PROCEDURE — 80053 COMPREHEN METABOLIC PANEL: CPT

## 2020-11-13 PROCEDURE — 94761 N-INVAS EAR/PLS OXIMETRY MLT: CPT

## 2020-11-13 PROCEDURE — 36415 COLL VENOUS BLD VENIPUNCTURE: CPT

## 2020-11-13 PROCEDURE — 94668 MNPJ CHEST WALL SBSQ: CPT

## 2020-11-13 PROCEDURE — 97802 MEDICAL NUTRITION INDIV IN: CPT

## 2020-11-13 RX ORDER — INSULIN GLARGINE 100 [IU]/ML
80 INJECTION, SOLUTION SUBCUTANEOUS NIGHTLY
Status: DISCONTINUED | OUTPATIENT
Start: 2020-11-13 | End: 2020-11-15

## 2020-11-13 RX ADMIN — APIXABAN 5 MG: 5 TABLET, FILM COATED ORAL at 20:27

## 2020-11-13 RX ADMIN — ASPIRIN 81 MG: 81 TABLET, COATED ORAL at 08:53

## 2020-11-13 RX ADMIN — INSULIN GLARGINE 80 UNITS: 100 INJECTION, SOLUTION SUBCUTANEOUS at 20:34

## 2020-11-13 RX ADMIN — FLUTICASONE PROPIONATE 1 SPRAY: 50 SPRAY, METERED NASAL at 08:52

## 2020-11-13 RX ADMIN — IPRATROPIUM BROMIDE AND ALBUTEROL SULFATE 1 AMPULE: .5; 3 SOLUTION RESPIRATORY (INHALATION) at 18:10

## 2020-11-13 RX ADMIN — FUROSEMIDE 40 MG: 40 TABLET ORAL at 17:03

## 2020-11-13 RX ADMIN — FUROSEMIDE 40 MG: 40 TABLET ORAL at 08:54

## 2020-11-13 RX ADMIN — ROFLUMILAST 500 MCG: 500 TABLET ORAL at 08:52

## 2020-11-13 RX ADMIN — METHYLPREDNISOLONE SODIUM SUCCINATE 60 MG: 125 INJECTION, POWDER, FOR SOLUTION INTRAMUSCULAR; INTRAVENOUS at 12:07

## 2020-11-13 RX ADMIN — INSULIN LISPRO 6 UNITS: 100 INJECTION, SOLUTION INTRAVENOUS; SUBCUTANEOUS at 12:09

## 2020-11-13 RX ADMIN — INSULIN LISPRO 10 UNITS: 100 INJECTION, SOLUTION INTRAVENOUS; SUBCUTANEOUS at 17:03

## 2020-11-13 RX ADMIN — POTASSIUM CHLORIDE 20 MEQ: 20 TABLET, EXTENDED RELEASE ORAL at 08:52

## 2020-11-13 RX ADMIN — INSULIN LISPRO 4 UNITS: 100 INJECTION, SOLUTION INTRAVENOUS; SUBCUTANEOUS at 20:34

## 2020-11-13 RX ADMIN — MONTELUKAST SODIUM 10 MG: 10 TABLET, COATED ORAL at 20:27

## 2020-11-13 RX ADMIN — ACETYLCYSTEINE 200 MG: 200 SOLUTION ORAL; RESPIRATORY (INHALATION) at 18:10

## 2020-11-13 RX ADMIN — SUCRALFATE 1 G: 1 TABLET ORAL at 12:07

## 2020-11-13 RX ADMIN — GUAIFENESIN 600 MG: 600 TABLET, EXTENDED RELEASE ORAL at 20:28

## 2020-11-13 RX ADMIN — IPRATROPIUM BROMIDE AND ALBUTEROL SULFATE 1 AMPULE: .5; 3 SOLUTION RESPIRATORY (INHALATION) at 10:03

## 2020-11-13 RX ADMIN — APIXABAN 5 MG: 5 TABLET, FILM COATED ORAL at 08:52

## 2020-11-13 RX ADMIN — DILTIAZEM HYDROCHLORIDE 180 MG: 180 CAPSULE, COATED, EXTENDED RELEASE ORAL at 08:53

## 2020-11-13 RX ADMIN — ROPINIROLE HYDROCHLORIDE 0.5 MG: 0.25 TABLET, FILM COATED ORAL at 20:28

## 2020-11-13 RX ADMIN — NYSTATIN 500000 UNITS: 100000 SUSPENSION ORAL at 20:28

## 2020-11-13 RX ADMIN — DOCUSATE CALCIUM 240 MG: 240 CAPSULE, LIQUID FILLED ORAL at 08:53

## 2020-11-13 RX ADMIN — ATORVASTATIN CALCIUM 40 MG: 40 TABLET, FILM COATED ORAL at 08:52

## 2020-11-13 RX ADMIN — NYSTATIN 500000 UNITS: 100000 SUSPENSION ORAL at 17:03

## 2020-11-13 RX ADMIN — CETIRIZINE HYDROCHLORIDE 5 MG: 5 TABLET ORAL at 08:54

## 2020-11-13 RX ADMIN — METHYLPREDNISOLONE SODIUM SUCCINATE 60 MG: 125 INJECTION, POWDER, FOR SOLUTION INTRAMUSCULAR; INTRAVENOUS at 20:28

## 2020-11-13 RX ADMIN — ROPINIROLE HYDROCHLORIDE 0.5 MG: 0.25 TABLET, FILM COATED ORAL at 08:52

## 2020-11-13 RX ADMIN — GUAIFENESIN 600 MG: 600 TABLET, EXTENDED RELEASE ORAL at 08:54

## 2020-11-13 RX ADMIN — Medication 2000 UNITS: at 08:53

## 2020-11-13 RX ADMIN — ACETYLCYSTEINE 200 MG: 200 SOLUTION ORAL; RESPIRATORY (INHALATION) at 04:44

## 2020-11-13 RX ADMIN — IPRATROPIUM BROMIDE AND ALBUTEROL SULFATE 1 AMPULE: .5; 3 SOLUTION RESPIRATORY (INHALATION) at 14:03

## 2020-11-13 RX ADMIN — PIPERACILLIN AND TAZOBACTAM 3.38 G: 3; .375 INJECTION, POWDER, LYOPHILIZED, FOR SOLUTION INTRAVENOUS at 05:34

## 2020-11-13 RX ADMIN — NYSTATIN 500000 UNITS: 100000 SUSPENSION ORAL at 14:11

## 2020-11-13 RX ADMIN — HYDROCODONE BITARTRATE AND ACETAMINOPHEN 1 TABLET: 5; 325 TABLET ORAL at 20:32

## 2020-11-13 RX ADMIN — FENOFIBRATE 54 MG: 54 TABLET, FILM COATED ORAL at 08:53

## 2020-11-13 RX ADMIN — ALOGLIPTIN 6.25 MG: 12.5 TABLET, FILM COATED ORAL at 08:53

## 2020-11-13 RX ADMIN — KETOTIFEN FUMARATE 1 DROP: 0.35 SOLUTION/ DROPS OPHTHALMIC at 20:28

## 2020-11-13 RX ADMIN — PIPERACILLIN AND TAZOBACTAM 3.38 G: 3; .375 INJECTION, POWDER, LYOPHILIZED, FOR SOLUTION INTRAVENOUS at 14:11

## 2020-11-13 RX ADMIN — PIPERACILLIN AND TAZOBACTAM 3.38 G: 3; .375 INJECTION, POWDER, LYOPHILIZED, FOR SOLUTION INTRAVENOUS at 20:27

## 2020-11-13 RX ADMIN — BUDESONIDE 500 MCG: 0.5 SUSPENSION RESPIRATORY (INHALATION) at 18:11

## 2020-11-13 RX ADMIN — METOPROLOL SUCCINATE 100 MG: 100 TABLET, EXTENDED RELEASE ORAL at 08:53

## 2020-11-13 RX ADMIN — BUDESONIDE 500 MCG: 0.5 SUSPENSION RESPIRATORY (INHALATION) at 04:44

## 2020-11-13 RX ADMIN — ALLOPURINOL 150 MG: 100 TABLET ORAL at 08:52

## 2020-11-13 RX ADMIN — SUCRALFATE 1 G: 1 TABLET ORAL at 17:03

## 2020-11-13 RX ADMIN — ACETYLCYSTEINE 200 MG: 200 SOLUTION ORAL; RESPIRATORY (INHALATION) at 14:03

## 2020-11-13 RX ADMIN — PANTOPRAZOLE SODIUM 40 MG: 40 TABLET, DELAYED RELEASE ORAL at 08:53

## 2020-11-13 RX ADMIN — SUCRALFATE 1 G: 1 TABLET ORAL at 08:54

## 2020-11-13 RX ADMIN — KETOTIFEN FUMARATE 1 DROP: 0.35 SOLUTION/ DROPS OPHTHALMIC at 08:52

## 2020-11-13 RX ADMIN — NYSTATIN 500000 UNITS: 100000 SUSPENSION ORAL at 08:52

## 2020-11-13 RX ADMIN — IPRATROPIUM BROMIDE AND ALBUTEROL SULFATE 1 AMPULE: .5; 3 SOLUTION RESPIRATORY (INHALATION) at 04:45

## 2020-11-13 RX ADMIN — METHYLPREDNISOLONE SODIUM SUCCINATE 60 MG: 125 INJECTION, POWDER, FOR SOLUTION INTRAMUSCULAR; INTRAVENOUS at 03:49

## 2020-11-13 RX ADMIN — TIZANIDINE 4 MG: 4 TABLET ORAL at 20:32

## 2020-11-13 RX ADMIN — ACETYLCYSTEINE 200 MG: 200 SOLUTION ORAL; RESPIRATORY (INHALATION) at 10:03

## 2020-11-13 ASSESSMENT — PAIN SCALES - GENERAL
PAINLEVEL_OUTOF10: 8
PAINLEVEL_OUTOF10: 9
PAINLEVEL_OUTOF10: 8

## 2020-11-13 ASSESSMENT — PAIN DESCRIPTION - LOCATION: LOCATION: BACK

## 2020-11-13 NOTE — CONSULTS
Comprehensive Nutrition Assessment    Type and Reason for Visit:  Initial, Consult    Nutrition Recommendations/Plan: Encourage adherence to prescribed diet    Nutrition Assessment:  Pt presents with obesity and predicted inadequate intakes. Will monitor intakes and start ONS if appropriate. will offer nutritional counseling for diet. Malnutrition Assessment:  Malnutrition Status:  Insufficient data    Context:  Acute Illness     Findings of the 6 clinical characteristics of malnutrition:  Energy Intake:  No significant decrease in energy intake  Weight Loss:  No significant weight loss(4% in last month)     Body Fat Loss:  No significant body fat loss     Muscle Mass Loss:  No significant muscle mass loss    Fluid Accumulation:  Unable to assess     Strength:  Not Performed    Estimated Daily Nutrient Needs:  Energy (kcal):  2182-9307; Weight Used for Energy Requirements:  Current     Protein (g):  54-68(1-1.2 gm/kg sbw (57 kg)); Weight Used for Protein Requirements:  Other (Comment)(standard body weight)        Fluid (ml/day):  1000 ml plus output; Method Used for Fluid Requirements:  Standard Renal      Nutrition Related Findings:  Pt presents with Class 2 obesity, no edema reported but is getting Lasix. Pt has lost approximately 7 lb in last month. Has been trying to do better. Pt has CKD stage 4, GERD, T2D. Wounds:  None       Current Nutrition Therapies:    DIET CARB CONTROL;  Dysphagia Soft and Bite-Sized    Anthropometric Measures:  · Height: 4' 10\" (147.3 cm)  · Current Body Weight: 187 lb 14.4 oz (85.2 kg)   · Admission Body Weight:      · Usual Body Weight:       · Ideal Body Weight: 90 lbs; % Ideal Body Weight 208.8 %   · BMI: 39.3  · Adjusted Body Weight:  ; No Adjustment   · Adjusted BMI:      · BMI Categories: Obese Class 2 (BMI 35.0 -39.9)       Nutrition Diagnosis:   · Food & Nutrition-related knowledge deficit related to endocrine dysfuntion as evidenced by BMI, lab values(Pt states that she needs edu.)      Nutrition Interventions:   Food and/or Nutrient Delivery:  Continue Current Diet  Nutrition Education/Counseling:  Education needed, Education initiated, Education completed(Gave info on the MY Plate Method.  Anwered questions, and left contact info.)   Coordination of Nutrition Care:  Continue to monitor while inpatient, Interdisciplinary Rounds    Goals:  to meet est needs for age/condition, continue to meet 75%-100% of needs       Nutrition Monitoring and Evaluation:   Behavioral-Environmental Outcomes:  Readiness for Change   Food/Nutrient Intake Outcomes:  Food and Nutrient Intake  Physical Signs/Symptoms Outcomes:  Biochemical Data, Weight, Fluid Status or Edema     Discharge Planning:    Continue current diet, Recommend pursue outpatient nutrition counseling     Electronically signed by Markus Velazquez on 11/13/20 at 3:14 PM EST

## 2020-11-13 NOTE — ACP (ADVANCE CARE PLANNING)
.Advance Care Planning     General Advance Care Planning (ACP) Conversation    Date of Conversation: 11/12/2020  Conducted with: Patient with [address] Decision Maker:      Primary Decision Maker: Pamela Thrasher Child - [phone]    Secondary Decision Maker: Minal Pollard - Child - [phone]    Click here to complete [address] including selection of the Healthcare Decision Maker Relationship (ie \"Primary\")  Today we documented Decision Maker(s) consistent with Legal Next of Kin hierarchy.     Content/Action Overview:  Has NO ACP documents/care preferences - information provided, considering goals and options  Reviewed DNR/DNI and patient elects Full Code (Attempt Resuscitation)    Length of Voluntary ACP Conversation in minutes:  <16 minutes (Non-Billable)    Kathya Watkins

## 2020-11-13 NOTE — PLAN OF CARE
Problem: Falls - Risk of:  Goal: Will remain free from falls  Description: Will remain free from falls  Outcome: Ongoing     Problem: Safety:  Goal: Free from accidental physical injury  Description: Free from accidental physical injury  Outcome: Ongoing     Problem: Daily Care:  Goal: Daily care needs are met  Description: Daily care needs are met  Outcome: Ongoing

## 2020-11-13 NOTE — FLOWSHEET NOTE
11/13/20 0800   Assessment   Charting Type Shift assessment   Neurological   Neuro (WDL) WDL   Level of Consciousness 0   [place] Coma Scale   Eye Opening 4   Best Verbal Response 5   Best Motor Response 6   [place] Coma Scale Score 15   HEENT   HEENT (WDL) X   Right Eye Impaired vision   Left Eye Impaired vision   Voice Normal   Teeth Dentures upper   Respiratory   Respiratory (WDL) X   Respiratory Pattern Regular   Respiratory Depth Normal   Respiratory Quality/Effort Dyspnea with exertion   Chest Assessment Chest expansion symmetrical;Trachea midline   L Breath Sounds Expiratory Wheezes   R Breath Sounds Expiratory Wheezes   Breath Sounds   Right Upper Lobe Expiratory Wheezes; Rhonchi   Right Middle Lobe Expiratory Wheezes; Rhonchi   Right Lower Lobe Expiratory Wheezes; Rhonchi   Left Upper Lobe Expiratory Wheezes; Rhonchi   Left Lower Lobe Expiratory Wheezes; Rhonchi   Cough/Sputum   Sputum How Obtained Spontaneous cough   Cough Congested;Non-productive   Sputum Amount Small   Sputum Color Yellow   Tenacity Thick   Cardiac   Cardiac (WDL) WDL   Cardiac Monitor   Telemetry Monitor On No   Gastrointestinal   Abdominal (WDL) WDL   Peripheral Vascular   Peripheral Vascular (WDL) WDL   Skin Color/Condition   Skin Color/Condition (WDL) WDL   Skin Integrity   Skin Integrity (WDL) WDL   Musculoskeletal   Musculoskeletal (WDL) X   RUE Full movement   LUE Full movement   RL Extremity Weakness   LL Extremity Weakness   Genitourinary   Genitourinary (WDL) WDL   Urine Assessment   Incontinence No   Psychosocial   Psychosocial (WDL) WDL

## 2020-11-13 NOTE — PROGRESS NOTES
Occupational Therapy   Occupational Therapy Initial Assessment  Date: 2020   Patient Name: Christi Felder  MRN: [id number]     : 1950    Date of Service: 2020    Discharge Recommendations:  Continue to assess pending progress    Assessment   Performance deficits / Impairments: Decreased ADL status; Decreased functional mobility ; Decreased endurance;Decreased high-level IADLs  Activity Tolerance  Activity Tolerance: Patient limited by fatigue        Pt seen for skilled OT swingbed assessment & interventions; pain 8/10 reported. Pt agreeable to OT services. Pt displays decreased endurance & fatigue with ADL tasks. OT provided education about ways to modify tasks for [facility] & decreased SOB. Pt able to perform ADLs with setup & extended time. Pt is a good candidate to benefit from skilled OT services while in hospital. Pt supine in bed, call bell in reach. Patient Diagnosis(es): There were no encounter diagnoses. has a past medical history of Anemia, Anxiety, Asthma, Cataract, COPD (chronic obstructive pulmonary disease) ([facility] [address] DDD (degenerative disc disease), cervical, Depression, Diabetes mellitus type 2, GERD (gastroesophageal reflux disease), Gout, History of uterine cancer, Hyperlipidemia, and Hypertension. has a past surgical history that includes Tonsillectomy ();  section (, ); Lung surgery; Neck surgery; Cataract removal with implant (); Hysterectomy; Colonoscopy (2013); Upper gastrointestinal endoscopy (N/A, 2019); and Colonoscopy (N/A, 2019). Restrictions  Restrictions/Precautions: Cardiac, General Precautions, Fall Risk  Required Braces or Orthoses?: No    Subjective   Chart Reviewed: Yes  Patient assessed for rehabilitation services?: Yes  Family / Caregiver Present: No  Referring Practitioner: Mike Cabello PA-C  Diagnosis: acute onset respiratory failure  Subjective: Pt reports she is feeling better today.  Pt states she has been sick since last week. Pt states her 02 sats were dropping. pain 8/10 reported. pt agreeable to OT services. Patient Currently in Pain: Yes  Pain Level: 8/10    Social/Functional History  Social/Functional History  Lives With: Alone  Type of Home: House  Home Layout: One level  Home Access: Stairs to enter with rails  Entrance Stairs - Number of Steps: 5-6  Bathroom Shower/Tub: Tub/Shower unit, Shower chair with back  Bathroom Toilet: Standard  Bathroom Equipment: Shower chair, Grab bars in shower  Home Equipment: Oxygen, Cane, 4 wheeled walker, Lift chair  Receives Help From: Family  ADL Assistance: [address] [place] [address] Avenue: Needs assistance  Homemaking Responsibilities: No  Ambulation Assistance: Independent  Transfer Assistance: Independent  Active : No  Leisure & Hobbies: Going to [employer], word search puzzles, watch tv     Objective   Vision: Impaired  Vision Exceptions: Wears glasses at all times  Hearing: Within functional limits    Orientation  Overall Orientation Status: Within Functional Limits  Observation/Palpation  Posture: Good  Observation: Pt supine in bed, NAD, pleasant & cooperative, 6L O2     Balance  Sitting Balance: Independent  Standing Balance: Stand by assistance    Functional Mobility  Functional - Mobility Device: No device  Assist Level: Contact guard assistance     ADL  Grooming: Setup  UE Bathing: Setup  LE Bathing: Setup  UE Dressing: Setup  LE Dressing: Setup     Bed mobility: Modified independent    LUE AROM : WFL  RUE AROM : WFL   LUE Strength Comment: 4/5 MMT BUE      Plan   Plan  Times per week: 3-5  Times per day: Daily  Plan weeks: 1  Current Treatment Recommendations: Balance Training, Endurance Training, Self-Care / ADL, Patient/Caregiver Education & Training     Goals  Short term goals  Time Frame for Short term goals: 1 week  Short term goal 1: Pt to complete bathing with MOD I using EC/WS techniques.   Short term goal 2: Pt to tolerate x15 minutes of activity to increase functional activity tolerance maintaining 02 sats >90%. Short term goal 3: Pt to complete hygiene/grooming standing at sink with no LOB. Short term goal 4: Pt to complete functional reaching with MOD I. Therapy Time   Individual Concurrent Group Co-treatment   Time In 1158         Time Out 1006         Minutes 3100 Brendan López, OTR/L  This note serves as D/C summary if patient is discharged prior to next visit.

## 2020-11-13 NOTE — H&P
History and Physical    Patient:  Adryan Diaz    CHIEF COMPLAINT:    SOA  THOMPSON    HISTORY OF PRESENT ILLNESS:   The patient is a 79 y.o. female with past medical history of anemia, anxiety, asthma, COPD, chronic respiratory failure on 3 L nasal cannula chronically, obstructive sleep apnea on BiPAP nightly, DDD, depression, diabetes mellitus type 2, GERD, gout, uterine cancer, hypertension and hyperlipidemia who presented as a direct admit from PCPs office for further evaluation of hypoxia. She was admitted to acute care from 11/9/2020-11/12/2024 acute on chronic respiratory failure with hypoxia due to COPD exacerbation and pneumonia. She was treated with IV steroids, DuoNebs, Pulmicort nebs, doxycycline and Rocephin. Despite treatment, she continued to endorse shortness of breath, dyspnea on exertion and had increased O2 demands. Chest x-ray repeated on 11/11 that appeared similar to previous. Patient's oxygen requirements had increased from 4 L to 10 L high flow nasal cannula and patient felt short of breath thus her antibiotic regimen was broadened to Zosyn and Levaquin. Mucolytic's and chest vest therapy were also added. She was admitted to swing bed for completion of treatment and PT OT when improved. Patient seen and examined this morning. She reports feeling better. Her oxygen requirements are now decreased at 5-6 L nasal cannula.     Past Medical History:      Diagnosis Date    Anemia     Anxiety     Asthma     Cataract     COPD (chronic obstructive pulmonary disease) ([address]     DDD (degenerative disc disease), cervical     Depression     Diabetes mellitus type 2     GERD (gastroesophageal reflux disease)     Gout     History of uterine cancer     Hyperlipidemia     Hypertension        Past Surgical History:      Procedure Laterality Date    CATARACT REMOVAL WITH IMPLANT  2005   [address] [place]    x2    COLONOSCOPY  05/01/2013    COLONOSCOPY N/A 5/23/2019 (REQUIP) 0.5 MG tablet TAKE ONE TABLET BY MOUTH TWO TIMES A DAY 9/28/20   JADE Walsh   VASCEPA 1 g CAPS capsule TAKE TWO CAPSULES BY MOUTH TWO TIMES A DAY 9/24/20   JADE Walsh   metOLazone (ZAROXOLYN) 5 MG tablet TAKE 1 TABLET BY MOUTH DAILY AS NEEDED (SWELLING) 9/24/20   JADE Walsh   insulin lispro (HUMALOG) 100 UNIT/ML injection vial INJECT 38 UNITS UNDER THE SKIN THREE TIMES A DAY BEFORE MEALS 9/24/20   JADE Walsh   metoprolol succinate (TOPROL XL) 100 MG extended release tablet TAKE 1 TABLET BY MOUTH DAILY 9/24/20   JADE Walsh   allopurinol (ZYLOPRIM) 300 MG tablet TAKE 1/2 TABLET BY MOUTH EVERY DAY 9/24/20   JADE Walsh   furosemide (LASIX) 40 MG tablet TAKE ONE TABLET BY MOUTH TWO TIMES A DAY 9/23/20   JADE Walsh   atorvastatin (LIPITOR) 40 MG tablet Take 40 mg by mouth daily  9/15/20   Historical Provider, MD   fenofibrate (TRICOR) 48 MG tablet Take 48 mg by mouth daily  9/2/20   Historical Provider, MD Baldo Perez 18 MCG inhalation capsule 18 mcg daily  9/1/20   Historical Provider, MD   montelukast (SINGULAIR) 10 MG tablet TAKE ONE TABLET BY MOUTH EVERY DAY 8/26/20   JADE Walsh   Insulin Syringe-Needle U-100 31G X 5/16\" 1 ML MISC USE FOR INSULIN INJECTIONS FIVE TIMES DAILY 8/7/20   JADE Walsh   BD PEN NEEDLE JUAN CARLOS U/F 32G X 4 MM MISC USE WITH LANTUS ONCE DAILY 7/25/20   Historical Provider, MD   Insulin Degludec (TRESIBA) 100 UNIT/ML SOLN 35 units in the am and 80 in the evening.  8/7/20   JADE Walsh   sucralfate (CARAFATE) 1 GM tablet TAKE ONE TABLET BY MOUTH THREE TIMES A DAY 8/4/20   JADE Walsh   docusate sodium (COLACE) 250 MG capsule Take 1 capsule by mouth daily 6/10/20   JADE Walsh   ezetimibe (ZETIA) 10 MG tablet TAKE ONE TABLET BY MOUTH EVERY DAY 3/27/20   JADE Walsh   pantoprazole (PROTONIX) 40 MG tablet TAKE ONE TABLET BY MOUTH EVERY DAY 3/18/20 JADE Cheng   PATADAY 0.2 % SOLN ophthalmic solution PLACE 1 DROP INTO BOTH EYES 2 TIMES DAILY  Patient taking differently: Place 2 drops into both eyes 2 times daily  2/5/20   JADE Chegn   GNP VITAMIN D MAXIMUM STRENGTH 50 MCG (2000 UT) TABS Take 1 tablet by mouth daily  11/13/19   Historical Provider, MD   fluticasone-salmeterol (ADVAIR) 500-50 MCG/DOSE diskus inhaler Inhale 1 puff into the lungs every 12 hours    Historical Provider, MD   omalizumab (OMALIZUMAB) 150 MG injection Inject 150 mg into the skin every 28 days 8/22/19   JADE Cheng   ipratropium-albuterol (DUONEB) 0.5-2.5 (3) MG/3ML SOLN nebulizer solution Inhale 3 mLs into the lungs every 4 hours  Patient taking differently: Inhale 1 vial into the lungs every 4 hours as needed  6/19/19 11/10/20  JADE Cheng   OXYGEN Inhale 3 L/min into the lungs continuous    Historical Provider, MD   flunisolide (NASALIDE) 25 MCG/ACT (0.025%) SOLN 2 sprays every 12 hours Both nostrils as needed per patient 11/29/18   Historical Provider, MD   albuterol sulfate HFA (PROAIR HFA) 108 (90 Base) MCG/ACT inhaler Inhale 2 puffs into the lungs every 4 hours as needed for Wheezing 2/26/18   JADE Cheng   loratadine (CLARITIN) 10 MG tablet TAKE ONE TABLET BY MOUTH EVERY DAY 9/19/17   JADE Cheng       Allergies:  Shellfish-derived products and Wheat bran    Social History:   TOBACCO:   reports that she quit smoking about 23 years ago. She has a 37.50 pack-year smoking history. She has never used smokeless tobacco.  ETOH:   reports no history of alcohol use. OCCUPATION:  None     Family History:       Problem Relation Age of Onset   Sandoval Outmaryanne Dementia Mother     Hypertension Mother     Hypertension Father     Coronary Art Dis Father     Cancer Father         leukemia       Review of system  Constitutional:  Denies fever or chills. Positive for generalized weakness and fatigue.   Eyes:  Denies eye pain or redness  HENT: Denies nasal congestion or sore throat   Respiratory: Positive for cough, dyspnea on exertion and shortness of breath. Cardiovascular:  Denies chest pain or edema   GI:  Denies abdominal pain, nausea, vomiting, bloody stools or diarrhea   :  Denies dysuria or frequency  Musculoskeletal:  Denies acute neck pain or body aches  Integument:  Denies rash or itching  Neurologic:  Denies headache, dizziness, numbness, tingling or unilateral weakness  Psychiatric:  Denies acute depression or acute anxiety      Vital Signs  Temp: 97.3 °F (36.3 °C)  Pulse: 86  Resp: 18  BP: (!) 163/60  SpO2: 97 %  O2 Device: Nasal cannula  O2 Flow Rate (L/min): 5 L/min    vital signs reviewed in electronic chart. Physical exam  Constitutional:  Well developed, well nourished, obese female sitting on edge of bed in no acute distress.  On 6 L nasal cannula. Eyes:  PERRL, conjunctiva normal, EOMI. HENT:  Atraumatic, external ears normal, external nose/nares normal, oropharynx moist, no pharyngeal exudates.  Positive oral thrush noted. Neck:  Supple. No JVD or thyromegaly. Respiratory: Slight increased work of breathing noted.    Coarse breath sounds and scant rhonchi in all lung charles. [place] Clapper rales or crackles.  On 6 L NC.     Cardiovascular:  Regular rate, normal rhythm, no murmurs, no gallops, no rubs. GI:  Soft, nondistended, obese, normal bowel sounds, nontender, no organomegaly, no mass. :  No costovertebral angle tenderness. Musculoskeletal:  No edema, no tenderness, no obvious deformities. Patient is moving all extremities. Integument:  Well hydrated, no rash. Lymphatic:  No cervical or axillary lymphadenopathy noted. Neurologic:  Alert & oriented x 3,  no focal deficits noted. Strength is equal throughout. Psychiatric:  Speech and behavior appropriate.       Lab Results   Component Value Date     (H) 11/13/2020    K 3.7 11/13/2020    CL 95 (L) 11/13/2020    CO2 38 (H) 11/13/2020    BUN 77 (H) 11/13/2020 CREATININE 1.9 (H) 11/13/2020    GLUCOSE 133 (H) 11/13/2020    CALCIUM 9.8 11/13/2020    PROT 7.0 11/13/2020    LABALBU 4.0 11/13/2020    BILITOT <0.2 (L) 11/13/2020    ALKPHOS 74 11/13/2020    AST 33 11/13/2020    ALT 23 11/13/2020    LABGLOM 26 (L) 11/13/2020    GFRAA 32 (L) 11/13/2020    AGRATIO 1.3 11/13/2020    GLOB 3.0 11/13/2020           Lab Results   Component Value Date    WBC 20.7 (H) 11/13/2020    HGB 12.5 11/13/2020    HCT 43.7 11/13/2020    MCV 95.0 11/13/2020     (H) 11/13/2020       PA/lat CXR:   No orders to display         Assessment and Plan     Active Hospital Problems     Diagnosis Date Noted    Thrush [B37.0]  -Added nystatin and Oasis mouth spray 11/10    11/10/2020    Acute on chronic respiratory failure with hypoxia (HCC) [J96.21]  -Secondary to COPD exacerbation and pneumonia  -Initially treated with IV steroid, DuoNebs, Pulmicort nebs, doxycycline and Rocephin with no improvement. Patient with increased O2 demands on 11/11. Repeat chest x-ray with no significant change. Antibiotic regimen adjusted to IV Zosyn and Levaquin on 11/12.   Mucolytics and chest vest also added on 11/12.  -Continue supplemental oxygen to maintain sats greater than 90%; patient's O2 requirements have improved from 10L HFNC to 5-6L NC on 11/13 11/09/2020    Paroxysmal atrial fibrillation (HCC) [I48.0]  -Continue home Eliquis, diltiazem and metoprolol    11/09/2020    Morbid obesity with BMI of 40.0-44.9, adult ([address] [E66.01, Z68.41]  -BMI 57.28  -Complicates all aspects of care  -Counseled on diet and exercise after recovery    08/07/2020    Gastroesophageal reflux disease [K21.9]  -Continue PPI and Carafate         Hypertension [I10]  -Continue home metoprolol and diltiazem     Obstructive sleep apnea  -Continue BiPAP nightly         Type 2 diabetes mellitus with complication, with long-term current use of insulin (HCC) [E11.8, Z79.4]  -A1c 9.3 on 7/22/2020; A1c 8.4 on 11/10  -Elevated blood glucose readings noted earlier in stay; suspect secondary to high-dose steroids; insulin regimen adjusted with improvement  -encourage compliance with diabetic diet    12/05/2017    COPD exacerbation ([facility] [address] [J44.1]  -advanced  -Continue treatment with DuoNebs, Pulmicort and Solu-Medrol  -Added Mucomyst nebs and Mucinex on 11/12 to help mobilize secretions  -Continue supplemental oxygen to maintain sats >90%  -would benefit from OP referral to Pulmonologist after recovery for PFTs    12/04/2017    CRF (chronic renal failure) [N18.9]  -With baseline serum creatinine around 2  -Avoid nephrotoxins and NSAIDs  -Encourage good p.o. intake    02/22/2017    Pneumonia [J18.9]  -Chest x-ray 11/9 with right middle lobe airspace disease, atelectasis versus pneumonia  -As above, initially treated with IV Rocephin and doxycycline with no improvement. Blood cultures 11/12  Negative; continue IV Zosyn and Levaquin.      02/24/2015    Hyperlipidemia [E78.5]  -Continue Lipitor, Zetia and TriCor        Patient was seen and examined by Dr. Roly Nino and plan of care reviewed.       Yessenia Tsang certifies per [employer] regulation for 42 .15(a), that the patient may reasonably be expected to be discharged or transferred to a hospital within 96 hours after admission to [address] [place] [address]    Electronically signed by JASON Scott on 11/13/2020 at 10:12 AM

## 2020-11-13 NOTE — FLOWSHEET NOTE
11/12/20 2015   Assessment   Charting Type Shift assessment   Neurological   Neuro (WDL) WDL   Level of Consciousness 0   [place] Coma Scale   Eye Opening 4   Best Verbal Response 5   Best Motor Response 6   [place] Coma Scale Score 15   HEENT   HEENT (WDL) X   Right Eye Impaired vision   Left Eye Impaired vision   Voice Normal   Teeth Dentures upper   Respiratory   Respiratory (WDL) X   Respiratory Pattern Regular   Respiratory Depth Normal   Respiratory Quality/Effort Dyspnea with exertion   Chest Assessment Chest expansion symmetrical;Trachea midline   L Breath Sounds Expiratory Wheezes   R Breath Sounds Expiratory Wheezes   Breath Sounds   Right Upper Lobe Expiratory Wheezes; Rhonchi   Right Middle Lobe Expiratory Wheezes; Rhonchi   Right Lower Lobe Expiratory Wheezes; Rhonchi   Left Upper Lobe Expiratory Wheezes; Rhonchi   Left Lower Lobe Expiratory Wheezes; Rhonchi   Cough/Sputum   Cough Congested;Non-productive   Cough Description   Sputum Amount Small   Sputum Color Yellow   Tenacity Thick   Sputum How Obtained Spontaneous cough   Cardiac   Cardiac (WDL) WDL   Cardiac Monitor   Telemetry Monitor On No   Gastrointestinal   Abdominal (WDL) WDL   Peripheral Vascular   Peripheral Vascular (WDL) WDL   Skin Color/Condition   Skin Color/Condition (WDL) WDL   Skin Integrity   Skin Integrity (WDL) WDL   Musculoskeletal   Musculoskeletal (WDL) X   RUE Full movement   LUE Full movement   RL Extremity Weakness   LL Extremity Weakness   Genitourinary   Genitourinary (WDL) WDL   Urine Assessment   Incontinence No   Psychosocial   Psychosocial (WDL) WDL   Pt awake in bed. Pt alert and oriented X4. Pt appears in no acute distress. Pt currently on 6 L NC. Pt lungs have expiratory wheezed throughout all fields. Pt denies SOA at this time. Pt encouraged to cough and deep breathe. Pt IV flushed well and currently infusion, dressing changed (CDI). Pt denies any pain or needs at this time.  Pt call bell and bedside table within reach. Will continue to monitor pt.

## 2020-11-14 LAB
GLUCOSE BLD-MCNC: 281 MG/DL (ref 74–106)
GLUCOSE BLD-MCNC: 295 MG/DL (ref 74–106)
GLUCOSE BLD-MCNC: 353 MG/DL (ref 74–106)
GLUCOSE BLD-MCNC: 370 MG/DL (ref 74–106)
GLUCOSE BLD-MCNC: 392 MG/DL (ref 74–106)
PERFORMED ON: ABNORMAL

## 2020-11-14 PROCEDURE — 94640 AIRWAY INHALATION TREATMENT: CPT

## 2020-11-14 PROCEDURE — 6360000002 HC RX W HCPCS: Performed by: PHYSICIAN ASSISTANT

## 2020-11-14 PROCEDURE — 2700000000 HC OXYGEN THERAPY PER DAY

## 2020-11-14 PROCEDURE — 1200000002 HC SEMI PRIVATE SWING BED

## 2020-11-14 PROCEDURE — 86480 TB TEST CELL IMMUN MEASURE: CPT

## 2020-11-14 PROCEDURE — 6370000000 HC RX 637 (ALT 250 FOR IP): Performed by: PHYSICIAN ASSISTANT

## 2020-11-14 PROCEDURE — 94761 N-INVAS EAR/PLS OXIMETRY MLT: CPT

## 2020-11-14 PROCEDURE — 6360000002 HC RX W HCPCS: Performed by: INTERNAL MEDICINE

## 2020-11-14 PROCEDURE — 2580000003 HC RX 258: Performed by: PHYSICIAN ASSISTANT

## 2020-11-14 PROCEDURE — 6360000002 HC RX W HCPCS

## 2020-11-14 RX ORDER — METHYLPREDNISOLONE SODIUM SUCCINATE 40 MG/ML
INJECTION, POWDER, LYOPHILIZED, FOR SOLUTION INTRAMUSCULAR; INTRAVENOUS
Status: COMPLETED
Start: 2020-11-14 | End: 2020-11-14

## 2020-11-14 RX ORDER — METHYLPREDNISOLONE SODIUM SUCCINATE 40 MG/ML
40 INJECTION, POWDER, LYOPHILIZED, FOR SOLUTION INTRAMUSCULAR; INTRAVENOUS DAILY
Status: DISCONTINUED | OUTPATIENT
Start: 2020-11-14 | End: 2020-11-17

## 2020-11-14 RX ADMIN — APIXABAN 5 MG: 5 TABLET, FILM COATED ORAL at 20:05

## 2020-11-14 RX ADMIN — FUROSEMIDE 40 MG: 40 TABLET ORAL at 17:40

## 2020-11-14 RX ADMIN — NYSTATIN 500000 UNITS: 100000 SUSPENSION ORAL at 12:14

## 2020-11-14 RX ADMIN — INSULIN LISPRO 10 UNITS: 100 INJECTION, SOLUTION INTRAVENOUS; SUBCUTANEOUS at 17:40

## 2020-11-14 RX ADMIN — PIPERACILLIN AND TAZOBACTAM 3.38 G: 3; .375 INJECTION, POWDER, LYOPHILIZED, FOR SOLUTION INTRAVENOUS at 05:53

## 2020-11-14 RX ADMIN — IPRATROPIUM BROMIDE AND ALBUTEROL SULFATE 1 AMPULE: .5; 3 SOLUTION RESPIRATORY (INHALATION) at 13:32

## 2020-11-14 RX ADMIN — ROPINIROLE HYDROCHLORIDE 0.5 MG: 0.25 TABLET, FILM COATED ORAL at 20:06

## 2020-11-14 RX ADMIN — BUDESONIDE 500 MCG: 0.5 SUSPENSION RESPIRATORY (INHALATION) at 05:05

## 2020-11-14 RX ADMIN — INSULIN LISPRO 10 UNITS: 100 INJECTION, SOLUTION INTRAVENOUS; SUBCUTANEOUS at 17:42

## 2020-11-14 RX ADMIN — METHYLPREDNISOLONE SODIUM SUCCINATE 40 MG: 40 INJECTION, POWDER, FOR SOLUTION INTRAMUSCULAR; INTRAVENOUS at 12:27

## 2020-11-14 RX ADMIN — INSULIN LISPRO 6 UNITS: 100 INJECTION, SOLUTION INTRAVENOUS; SUBCUTANEOUS at 12:13

## 2020-11-14 RX ADMIN — GUAIFENESIN 600 MG: 600 TABLET, EXTENDED RELEASE ORAL at 08:24

## 2020-11-14 RX ADMIN — HYDROCODONE BITARTRATE AND ACETAMINOPHEN 1 TABLET: 5; 325 TABLET ORAL at 20:06

## 2020-11-14 RX ADMIN — KETOTIFEN FUMARATE 1 DROP: 0.35 SOLUTION/ DROPS OPHTHALMIC at 08:31

## 2020-11-14 RX ADMIN — ROFLUMILAST 500 MCG: 500 TABLET ORAL at 08:25

## 2020-11-14 RX ADMIN — INSULIN GLARGINE 80 UNITS: 100 INJECTION, SOLUTION SUBCUTANEOUS at 20:09

## 2020-11-14 RX ADMIN — CETIRIZINE HYDROCHLORIDE 5 MG: 5 TABLET ORAL at 08:24

## 2020-11-14 RX ADMIN — IPRATROPIUM BROMIDE AND ALBUTEROL SULFATE 1 AMPULE: .5; 3 SOLUTION RESPIRATORY (INHALATION) at 17:50

## 2020-11-14 RX ADMIN — ACETYLCYSTEINE 200 MG: 200 SOLUTION ORAL; RESPIRATORY (INHALATION) at 13:32

## 2020-11-14 RX ADMIN — PIPERACILLIN AND TAZOBACTAM 3.38 G: 3; .375 INJECTION, POWDER, LYOPHILIZED, FOR SOLUTION INTRAVENOUS at 22:12

## 2020-11-14 RX ADMIN — SUCRALFATE 1 G: 1 TABLET ORAL at 17:40

## 2020-11-14 RX ADMIN — MONTELUKAST SODIUM 10 MG: 10 TABLET, COATED ORAL at 20:06

## 2020-11-14 RX ADMIN — BUDESONIDE 500 MCG: 0.5 SUSPENSION RESPIRATORY (INHALATION) at 17:51

## 2020-11-14 RX ADMIN — INSULIN LISPRO 5 UNITS: 100 INJECTION, SOLUTION INTRAVENOUS; SUBCUTANEOUS at 20:09

## 2020-11-14 RX ADMIN — NYSTATIN 500000 UNITS: 100000 SUSPENSION ORAL at 17:40

## 2020-11-14 RX ADMIN — DOCUSATE CALCIUM 240 MG: 240 CAPSULE, LIQUID FILLED ORAL at 08:24

## 2020-11-14 RX ADMIN — METOPROLOL SUCCINATE 100 MG: 100 TABLET, EXTENDED RELEASE ORAL at 08:24

## 2020-11-14 RX ADMIN — INSULIN LISPRO 10 UNITS: 100 INJECTION, SOLUTION INTRAVENOUS; SUBCUTANEOUS at 12:27

## 2020-11-14 RX ADMIN — POTASSIUM CHLORIDE 20 MEQ: 20 TABLET, EXTENDED RELEASE ORAL at 08:25

## 2020-11-14 RX ADMIN — FLUTICASONE PROPIONATE 1 SPRAY: 50 SPRAY, METERED NASAL at 08:25

## 2020-11-14 RX ADMIN — ACETYLCYSTEINE 200 MG: 200 SOLUTION ORAL; RESPIRATORY (INHALATION) at 05:05

## 2020-11-14 RX ADMIN — APIXABAN 5 MG: 5 TABLET, FILM COATED ORAL at 08:25

## 2020-11-14 RX ADMIN — FENOFIBRATE 54 MG: 54 TABLET, FILM COATED ORAL at 08:25

## 2020-11-14 RX ADMIN — ROPINIROLE HYDROCHLORIDE 0.5 MG: 0.25 TABLET, FILM COATED ORAL at 08:23

## 2020-11-14 RX ADMIN — KETOTIFEN FUMARATE 1 DROP: 0.35 SOLUTION/ DROPS OPHTHALMIC at 20:05

## 2020-11-14 RX ADMIN — Medication 2000 UNITS: at 08:25

## 2020-11-14 RX ADMIN — INSULIN GLARGINE 40 UNITS: 100 INJECTION, SOLUTION SUBCUTANEOUS at 08:31

## 2020-11-14 RX ADMIN — PIPERACILLIN AND TAZOBACTAM 3.38 G: 3; .375 INJECTION, POWDER, LYOPHILIZED, FOR SOLUTION INTRAVENOUS at 16:49

## 2020-11-14 RX ADMIN — NYSTATIN 500000 UNITS: 100000 SUSPENSION ORAL at 20:05

## 2020-11-14 RX ADMIN — ASPIRIN 81 MG: 81 TABLET, COATED ORAL at 08:25

## 2020-11-14 RX ADMIN — SUCRALFATE 1 G: 1 TABLET ORAL at 05:54

## 2020-11-14 RX ADMIN — ALOGLIPTIN 6.25 MG: 12.5 TABLET, FILM COATED ORAL at 08:24

## 2020-11-14 RX ADMIN — FUROSEMIDE 40 MG: 40 TABLET ORAL at 08:24

## 2020-11-14 RX ADMIN — LEVOFLOXACIN 750 MG: 5 INJECTION, SOLUTION INTRAVENOUS at 12:12

## 2020-11-14 RX ADMIN — ALLOPURINOL 150 MG: 100 TABLET ORAL at 08:24

## 2020-11-14 RX ADMIN — INSULIN LISPRO 6 UNITS: 100 INJECTION, SOLUTION INTRAVENOUS; SUBCUTANEOUS at 08:31

## 2020-11-14 RX ADMIN — ATORVASTATIN CALCIUM 40 MG: 40 TABLET, FILM COATED ORAL at 08:25

## 2020-11-14 RX ADMIN — SUCRALFATE 1 G: 1 TABLET ORAL at 12:12

## 2020-11-14 RX ADMIN — DILTIAZEM HYDROCHLORIDE 180 MG: 180 CAPSULE, COATED, EXTENDED RELEASE ORAL at 08:24

## 2020-11-14 RX ADMIN — METHYLPREDNISOLONE SODIUM SUCCINATE 60 MG: 125 INJECTION, POWDER, FOR SOLUTION INTRAMUSCULAR; INTRAVENOUS at 04:24

## 2020-11-14 RX ADMIN — IPRATROPIUM BROMIDE AND ALBUTEROL SULFATE 1 AMPULE: .5; 3 SOLUTION RESPIRATORY (INHALATION) at 05:05

## 2020-11-14 RX ADMIN — NYSTATIN 500000 UNITS: 100000 SUSPENSION ORAL at 08:24

## 2020-11-14 RX ADMIN — PANTOPRAZOLE SODIUM 40 MG: 40 TABLET, DELAYED RELEASE ORAL at 08:25

## 2020-11-14 RX ADMIN — GUAIFENESIN 600 MG: 600 TABLET, EXTENDED RELEASE ORAL at 20:06

## 2020-11-14 ASSESSMENT — PAIN SCALES - GENERAL: PAINLEVEL_OUTOF10: 8

## 2020-11-14 NOTE — FLOWSHEET NOTE
Pt resting quietly in bed. Am assessment complete, respirations equal and unlabored. Pt took am medications without difficulty. Pt instructed to call out with any needs or concerns, none at this time. Reviewed plan of care with pt, verbalized understanding. Call bell within pt reach. 11/14/20 1000   Assessment   Charting Type Shift assessment   Neurological   Neuro (WDL) WDL   Level of Consciousness 0   [place] Coma Scale   Eye Opening 4   Best Verbal Response 5   Best Motor Response 6   [place] Coma Scale Score 15   HEENT   HEENT (WDL) X   Right Eye Impaired vision   Left Eye Impaired vision   Voice Normal   Teeth Dentures upper   Respiratory   Respiratory (WDL) X   Respiratory Pattern Regular   Respiratory Depth Normal   Respiratory Quality/Effort Dyspnea with exertion   Chest Assessment Chest expansion symmetrical;Trachea midline   L Breath Sounds Expiratory Wheezes; Rhonchi   R Breath Sounds Expiratory Wheezes; Rhonchi   Breath Sounds   Right Upper Lobe Expiratory Wheezes; Rhonchi   Right Middle Lobe Expiratory Wheezes; Rhonchi   Right Lower Lobe Expiratory Wheezes; Rhonchi   Left Upper Lobe Expiratory Wheezes; Rhonchi   Left Lower Lobe Expiratory Wheezes; Rhonchi   Cough/Sputum   Sputum How Obtained Spontaneous cough   Cough Congested;Non-productive   Sputum Amount None   Sputum Color Yellow   Tenacity Thick   Cardiac   Cardiac (WDL) WDL   Cardiac Monitor   Telemetry Monitor On No   Gastrointestinal   Abdominal (WDL) WDL   Peripheral Vascular   Peripheral Vascular (WDL) WDL   Skin Color/Condition   Skin Color/Condition (WDL) WDL   Skin Integrity   Skin Integrity (WDL) WDL   Musculoskeletal   Musculoskeletal (WDL) X   RUE Full movement   LUE Full movement   RL Extremity Weakness   LL Extremity Weakness   Genitourinary   Genitourinary (WDL) WDL   Urine Assessment   Incontinence No   Psychosocial   Psychosocial (WDL) WDL

## 2020-11-15 LAB
GLUCOSE BLD-MCNC: 136 MG/DL (ref 74–106)
GLUCOSE BLD-MCNC: 272 MG/DL (ref 74–106)
GLUCOSE BLD-MCNC: 312 MG/DL (ref 74–106)
GLUCOSE BLD-MCNC: 96 MG/DL (ref 74–106)
PERFORMED ON: ABNORMAL
PERFORMED ON: NORMAL

## 2020-11-15 PROCEDURE — 6370000000 HC RX 637 (ALT 250 FOR IP): Performed by: PHYSICIAN ASSISTANT

## 2020-11-15 PROCEDURE — 94761 N-INVAS EAR/PLS OXIMETRY MLT: CPT

## 2020-11-15 PROCEDURE — 2580000003 HC RX 258: Performed by: PHYSICIAN ASSISTANT

## 2020-11-15 PROCEDURE — 6360000002 HC RX W HCPCS: Performed by: PHYSICIAN ASSISTANT

## 2020-11-15 PROCEDURE — 6360000002 HC RX W HCPCS: Performed by: INTERNAL MEDICINE

## 2020-11-15 PROCEDURE — 2700000000 HC OXYGEN THERAPY PER DAY

## 2020-11-15 PROCEDURE — 94640 AIRWAY INHALATION TREATMENT: CPT

## 2020-11-15 PROCEDURE — 94669 MECHANICAL CHEST WALL OSCILL: CPT

## 2020-11-15 PROCEDURE — 94668 MNPJ CHEST WALL SBSQ: CPT

## 2020-11-15 PROCEDURE — 1200000002 HC SEMI PRIVATE SWING BED

## 2020-11-15 RX ORDER — INSULIN GLARGINE 100 [IU]/ML
10 INJECTION, SOLUTION SUBCUTANEOUS NIGHTLY
Status: DISCONTINUED | OUTPATIENT
Start: 2020-11-15 | End: 2020-11-16

## 2020-11-15 RX ORDER — INSULIN GLARGINE 100 [IU]/ML
50 INJECTION, SOLUTION SUBCUTANEOUS NIGHTLY
Status: DISCONTINUED | OUTPATIENT
Start: 2020-11-15 | End: 2020-11-15

## 2020-11-15 RX ADMIN — MONTELUKAST SODIUM 10 MG: 10 TABLET, COATED ORAL at 20:25

## 2020-11-15 RX ADMIN — FENOFIBRATE 54 MG: 54 TABLET, FILM COATED ORAL at 08:56

## 2020-11-15 RX ADMIN — DOCUSATE CALCIUM 240 MG: 240 CAPSULE, LIQUID FILLED ORAL at 08:56

## 2020-11-15 RX ADMIN — IPRATROPIUM BROMIDE AND ALBUTEROL SULFATE 1 AMPULE: .5; 3 SOLUTION RESPIRATORY (INHALATION) at 10:29

## 2020-11-15 RX ADMIN — METHYLPREDNISOLONE SODIUM SUCCINATE 40 MG: 40 INJECTION, POWDER, FOR SOLUTION INTRAMUSCULAR; INTRAVENOUS at 08:56

## 2020-11-15 RX ADMIN — FUROSEMIDE 40 MG: 40 TABLET ORAL at 18:08

## 2020-11-15 RX ADMIN — ROPINIROLE HYDROCHLORIDE 0.5 MG: 0.25 TABLET, FILM COATED ORAL at 08:56

## 2020-11-15 RX ADMIN — ATORVASTATIN CALCIUM 40 MG: 40 TABLET, FILM COATED ORAL at 08:56

## 2020-11-15 RX ADMIN — ALLOPURINOL 150 MG: 100 TABLET ORAL at 08:57

## 2020-11-15 RX ADMIN — ROPINIROLE HYDROCHLORIDE 0.5 MG: 0.25 TABLET, FILM COATED ORAL at 20:24

## 2020-11-15 RX ADMIN — ACETYLCYSTEINE 200 MG: 200 SOLUTION ORAL; RESPIRATORY (INHALATION) at 10:29

## 2020-11-15 RX ADMIN — INSULIN GLARGINE 40 UNITS: 100 INJECTION, SOLUTION SUBCUTANEOUS at 08:58

## 2020-11-15 RX ADMIN — KETOTIFEN FUMARATE 1 DROP: 0.35 SOLUTION/ DROPS OPHTHALMIC at 20:24

## 2020-11-15 RX ADMIN — BUDESONIDE 500 MCG: 0.5 SUSPENSION RESPIRATORY (INHALATION) at 05:23

## 2020-11-15 RX ADMIN — PIPERACILLIN AND TAZOBACTAM 3.38 G: 3; .375 INJECTION, POWDER, LYOPHILIZED, FOR SOLUTION INTRAVENOUS at 20:33

## 2020-11-15 RX ADMIN — NYSTATIN 500000 UNITS: 100000 SUSPENSION ORAL at 18:08

## 2020-11-15 RX ADMIN — FLUTICASONE PROPIONATE 1 SPRAY: 50 SPRAY, METERED NASAL at 20:23

## 2020-11-15 RX ADMIN — GUAIFENESIN 600 MG: 600 TABLET, EXTENDED RELEASE ORAL at 08:57

## 2020-11-15 RX ADMIN — SUCRALFATE 1 G: 1 TABLET ORAL at 15:01

## 2020-11-15 RX ADMIN — NYSTATIN 500000 UNITS: 100000 SUSPENSION ORAL at 08:56

## 2020-11-15 RX ADMIN — APIXABAN 5 MG: 5 TABLET, FILM COATED ORAL at 08:56

## 2020-11-15 RX ADMIN — METOPROLOL SUCCINATE 100 MG: 100 TABLET, EXTENDED RELEASE ORAL at 08:56

## 2020-11-15 RX ADMIN — ACETYLCYSTEINE 200 MG: 200 SOLUTION ORAL; RESPIRATORY (INHALATION) at 05:23

## 2020-11-15 RX ADMIN — ALOGLIPTIN 6.25 MG: 12.5 TABLET, FILM COATED ORAL at 08:57

## 2020-11-15 RX ADMIN — KETOTIFEN FUMARATE 1 DROP: 0.35 SOLUTION/ DROPS OPHTHALMIC at 08:57

## 2020-11-15 RX ADMIN — FUROSEMIDE 40 MG: 40 TABLET ORAL at 08:57

## 2020-11-15 RX ADMIN — ROFLUMILAST 500 MCG: 500 TABLET ORAL at 08:57

## 2020-11-15 RX ADMIN — SUCRALFATE 1 G: 1 TABLET ORAL at 10:43

## 2020-11-15 RX ADMIN — PANTOPRAZOLE SODIUM 40 MG: 40 TABLET, DELAYED RELEASE ORAL at 08:56

## 2020-11-15 RX ADMIN — NYSTATIN 500000 UNITS: 100000 SUSPENSION ORAL at 20:24

## 2020-11-15 RX ADMIN — INSULIN LISPRO 6 UNITS: 100 INJECTION, SOLUTION INTRAVENOUS; SUBCUTANEOUS at 18:06

## 2020-11-15 RX ADMIN — CETIRIZINE HYDROCHLORIDE 5 MG: 5 TABLET ORAL at 08:57

## 2020-11-15 RX ADMIN — IPRATROPIUM BROMIDE AND ALBUTEROL SULFATE 1 AMPULE: .5; 3 SOLUTION RESPIRATORY (INHALATION) at 05:23

## 2020-11-15 RX ADMIN — PIPERACILLIN AND TAZOBACTAM 3.38 G: 3; .375 INJECTION, POWDER, LYOPHILIZED, FOR SOLUTION INTRAVENOUS at 05:48

## 2020-11-15 RX ADMIN — NYSTATIN 500000 UNITS: 100000 SUSPENSION ORAL at 11:30

## 2020-11-15 RX ADMIN — ASPIRIN 81 MG: 81 TABLET, COATED ORAL at 08:56

## 2020-11-15 RX ADMIN — INSULIN LISPRO 4 UNITS: 100 INJECTION, SOLUTION INTRAVENOUS; SUBCUTANEOUS at 20:28

## 2020-11-15 RX ADMIN — DILTIAZEM HYDROCHLORIDE 180 MG: 180 CAPSULE, COATED, EXTENDED RELEASE ORAL at 08:56

## 2020-11-15 RX ADMIN — POTASSIUM CHLORIDE 20 MEQ: 20 TABLET, EXTENDED RELEASE ORAL at 08:56

## 2020-11-15 RX ADMIN — APIXABAN 5 MG: 5 TABLET, FILM COATED ORAL at 20:24

## 2020-11-15 RX ADMIN — PIPERACILLIN AND TAZOBACTAM 3.38 G: 3; .375 INJECTION, POWDER, LYOPHILIZED, FOR SOLUTION INTRAVENOUS at 15:01

## 2020-11-15 RX ADMIN — INSULIN GLARGINE 10 UNITS: 100 INJECTION, SOLUTION SUBCUTANEOUS at 20:29

## 2020-11-15 RX ADMIN — GUAIFENESIN 600 MG: 600 TABLET, EXTENDED RELEASE ORAL at 20:25

## 2020-11-15 RX ADMIN — Medication 2000 UNITS: at 08:57

## 2020-11-15 RX ADMIN — ONDANSETRON HYDROCHLORIDE 4 MG: 2 INJECTION, SOLUTION INTRAMUSCULAR; INTRAVENOUS at 05:48

## 2020-11-15 NOTE — PLAN OF CARE
Problem: Falls - Risk of:  Goal: Will remain free from falls  Description: Will remain free from falls  Outcome: Ongoing     Problem: Pain:  Goal: Pain level will decrease  Description: Pain level will decrease  Outcome: Ongoing     Problem: Daily Care:  Goal: Daily care needs are met  Description: Daily care needs are met  Outcome: Ongoing     Problem: Skin Integrity:  Goal: Skin integrity will stabilize  Description: Skin integrity will stabilize  Outcome: Ongoing

## 2020-11-15 NOTE — FLOWSHEET NOTE
11/14/20 0029   Assessment   Charting Type Shift assessment   Neurological   Neuro (WDL) WDL   Level of Consciousness 0   [place] Coma Scale   Eye Opening 4   Best Verbal Response 5   Best Motor Response 6   [place] Coma Scale Score 15   HEENT   HEENT (WDL) X   Right Eye Impaired vision   Left Eye Impaired vision   Voice Normal   Teeth Dentures upper   Respiratory   Respiratory (WDL) X   Respiratory Pattern Regular   Respiratory Depth Normal   Respiratory Quality/Effort Dyspnea with exertion   Chest Assessment Chest expansion symmetrical;Trachea midline   L Breath Sounds Expiratory Wheezes; Rhonchi   R Breath Sounds Expiratory Wheezes; Rhonchi   Breath Sounds   Right Upper Lobe Expiratory Wheezes; Rhonchi   Right Middle Lobe Expiratory Wheezes; Rhonchi   Right Lower Lobe Expiratory Wheezes; Rhonchi   Left Upper Lobe Expiratory Wheezes; Rhonchi   Left Lower Lobe Expiratory Wheezes; Rhonchi   Cough/Sputum   Sputum How Obtained Spontaneous cough   Cough Congested;Non-productive   Cardiac   Cardiac (WDL) WDL   Cardiac Monitor   Telemetry Monitor On No   Gastrointestinal   Abdominal (WDL) WDL   Peripheral Vascular   Peripheral Vascular (WDL) WDL   Skin Color/Condition   Skin Color/Condition (WDL) WDL   Skin Integrity   Skin Integrity (WDL) X   Skin Integrity Bruising   Location scattered   Preventative Dressing No   Musculoskeletal   Musculoskeletal (WDL) X   RUE Full movement   LUE Full movement   RL Extremity Weakness   LL Extremity Weakness   Genitourinary   Genitourinary (WDL) WDL   Urine Assessment   Incontinence No   Psychosocial   Psychosocial (WDL) WDL   Pt awake in bed. Pt alert and oriented X4. Pt appears in no acute distress. Pt currently on 5 L NC. Pt lung sounds wheezy with rhonchi throughout all fields. Pt denies SOA at this time and states she is feeling better than when she first came to the hospital. Pt encouraged to cough and deep breathe. Pt educated on use of incentive spirometer.  Pt has c/o pain rating at 8/10. See eMAR for intervention. PM medications took whole in applesauce without difficulty. Pt call bell and bedside table within reach. Will continue to monitor pt.

## 2020-11-16 ENCOUNTER — APPOINTMENT (OUTPATIENT)
Dept: GENERAL RADIOLOGY | Facility: HOSPITAL | Age: 70
DRG: 947 | End: 2020-11-16
Attending: INTERNAL MEDICINE
Payer: MEDICARE

## 2020-11-16 LAB
ANION GAP SERPL CALCULATED.3IONS-SCNC: 11 MMOL/L (ref 3–16)
BASOPHILS ABSOLUTE: 0 K/UL (ref 0–0.1)
BASOPHILS RELATIVE PERCENT: 0.1 %
BLOOD CULTURE, ROUTINE: NORMAL
BUN BLDV-MCNC: 81 MG/DL (ref 6–20)
CALCIUM SERPL-MCNC: 9.5 MG/DL (ref 8.5–10.5)
CHLORIDE BLD-SCNC: 96 MMOL/L (ref 98–107)
CO2: 40 MMOL/L (ref 20–30)
CREAT SERPL-MCNC: 2.1 MG/DL (ref 0.4–1.2)
CULTURE, BLOOD 2: NORMAL
EOSINOPHILS ABSOLUTE: 0 K/UL (ref 0–0.4)
EOSINOPHILS RELATIVE PERCENT: 0.1 %
GFR AFRICAN AMERICAN: 28
GFR NON-AFRICAN AMERICAN: 23
GLUCOSE BLD-MCNC: 273 MG/DL (ref 74–106)
GLUCOSE BLD-MCNC: 289 MG/DL (ref 74–106)
GLUCOSE BLD-MCNC: 300 MG/DL (ref 74–106)
GLUCOSE BLD-MCNC: 407 MG/DL (ref 74–106)
GLUCOSE BLD-MCNC: 424 MG/DL (ref 74–106)
GLUCOSE BLD-MCNC: 425 MG/DL (ref 74–106)
HCT VFR BLD CALC: 41.7 % (ref 37–47)
HEMOGLOBIN: 12 G/DL (ref 11.5–16.5)
IMMATURE GRANULOCYTES #: 0.1 K/UL
IMMATURE GRANULOCYTES %: 0.6 % (ref 0–5)
LYMPHOCYTES ABSOLUTE: 2.5 K/UL (ref 1.5–4)
LYMPHOCYTES RELATIVE PERCENT: 13.9 %
MCH RBC QN AUTO: 27 PG (ref 27–32)
MCHC RBC AUTO-ENTMCNC: 28.8 G/DL (ref 31–35)
MCV RBC AUTO: 93.7 FL (ref 80–100)
MONOCYTES ABSOLUTE: 1.4 K/UL (ref 0.2–0.8)
MONOCYTES RELATIVE PERCENT: 7.7 %
NEUTROPHILS ABSOLUTE: 13.9 K/UL (ref 2–7.5)
NEUTROPHILS RELATIVE PERCENT: 77.6 %
PDW BLD-RTO: 16.1 % (ref 11–16)
PERFORMED ON: ABNORMAL
PLATELET # BLD: 412 K/UL (ref 150–400)
PMV BLD AUTO: 9.9 FL (ref 6–10)
POTASSIUM SERPL-SCNC: 4.3 MMOL/L (ref 3.4–5.1)
RBC # BLD: 4.45 M/UL (ref 3.8–5.8)
SODIUM BLD-SCNC: 147 MMOL/L (ref 136–145)
WBC # BLD: 17.9 K/UL (ref 4–11)

## 2020-11-16 PROCEDURE — 2700000000 HC OXYGEN THERAPY PER DAY

## 2020-11-16 PROCEDURE — 94640 AIRWAY INHALATION TREATMENT: CPT

## 2020-11-16 PROCEDURE — 6360000002 HC RX W HCPCS: Performed by: PHYSICIAN ASSISTANT

## 2020-11-16 PROCEDURE — 71046 X-RAY EXAM CHEST 2 VIEWS: CPT

## 2020-11-16 PROCEDURE — 36415 COLL VENOUS BLD VENIPUNCTURE: CPT

## 2020-11-16 PROCEDURE — 94761 N-INVAS EAR/PLS OXIMETRY MLT: CPT

## 2020-11-16 PROCEDURE — 94669 MECHANICAL CHEST WALL OSCILL: CPT

## 2020-11-16 PROCEDURE — 99308 SBSQ NF CARE LOW MDM 20: CPT | Performed by: INTERNAL MEDICINE

## 2020-11-16 PROCEDURE — 85025 COMPLETE CBC W/AUTO DIFF WBC: CPT

## 2020-11-16 PROCEDURE — 6370000000 HC RX 637 (ALT 250 FOR IP): Performed by: PHYSICIAN ASSISTANT

## 2020-11-16 PROCEDURE — 80048 BASIC METABOLIC PNL TOTAL CA: CPT

## 2020-11-16 PROCEDURE — 2580000003 HC RX 258: Performed by: PHYSICIAN ASSISTANT

## 2020-11-16 PROCEDURE — 6360000002 HC RX W HCPCS: Performed by: INTERNAL MEDICINE

## 2020-11-16 PROCEDURE — 1200000002 HC SEMI PRIVATE SWING BED

## 2020-11-16 PROCEDURE — 92526 ORAL FUNCTION THERAPY: CPT

## 2020-11-16 PROCEDURE — 97530 THERAPEUTIC ACTIVITIES: CPT

## 2020-11-16 RX ORDER — ACETYLCYSTEINE 200 MG/ML
200 SOLUTION ORAL; RESPIRATORY (INHALATION) 2 TIMES DAILY
Status: DISCONTINUED | OUTPATIENT
Start: 2020-11-17 | End: 2020-11-17

## 2020-11-16 RX ORDER — INSULIN GLARGINE 100 [IU]/ML
20 INJECTION, SOLUTION SUBCUTANEOUS NIGHTLY
Status: DISCONTINUED | OUTPATIENT
Start: 2020-11-16 | End: 2020-11-17

## 2020-11-16 RX ORDER — GUAIFENESIN 600 MG/1
1200 TABLET, EXTENDED RELEASE ORAL 2 TIMES DAILY
Status: DISCONTINUED | OUTPATIENT
Start: 2020-11-16 | End: 2020-11-19 | Stop reason: HOSPADM

## 2020-11-16 RX ADMIN — INSULIN LISPRO 6 UNITS: 100 INJECTION, SOLUTION INTRAVENOUS; SUBCUTANEOUS at 09:11

## 2020-11-16 RX ADMIN — APIXABAN 5 MG: 5 TABLET, FILM COATED ORAL at 20:25

## 2020-11-16 RX ADMIN — ROPINIROLE HYDROCHLORIDE 0.5 MG: 0.25 TABLET, FILM COATED ORAL at 20:25

## 2020-11-16 RX ADMIN — KETOTIFEN FUMARATE 1 DROP: 0.35 SOLUTION/ DROPS OPHTHALMIC at 20:26

## 2020-11-16 RX ADMIN — METHYLPREDNISOLONE SODIUM SUCCINATE 40 MG: 40 INJECTION, POWDER, FOR SOLUTION INTRAMUSCULAR; INTRAVENOUS at 09:06

## 2020-11-16 RX ADMIN — FLUTICASONE PROPIONATE 1 SPRAY: 50 SPRAY, METERED NASAL at 09:04

## 2020-11-16 RX ADMIN — APIXABAN 5 MG: 5 TABLET, FILM COATED ORAL at 09:05

## 2020-11-16 RX ADMIN — SUCRALFATE 1 G: 1 TABLET ORAL at 15:08

## 2020-11-16 RX ADMIN — ACETYLCYSTEINE 200 MG: 200 SOLUTION ORAL; RESPIRATORY (INHALATION) at 09:20

## 2020-11-16 RX ADMIN — IPRATROPIUM BROMIDE AND ALBUTEROL SULFATE 1 AMPULE: .5; 3 SOLUTION RESPIRATORY (INHALATION) at 09:19

## 2020-11-16 RX ADMIN — ATORVASTATIN CALCIUM 40 MG: 40 TABLET, FILM COATED ORAL at 09:05

## 2020-11-16 RX ADMIN — NYSTATIN 500000 UNITS: 100000 SUSPENSION ORAL at 12:00

## 2020-11-16 RX ADMIN — TIZANIDINE 4 MG: 4 TABLET ORAL at 20:25

## 2020-11-16 RX ADMIN — POTASSIUM CHLORIDE 20 MEQ: 20 TABLET, EXTENDED RELEASE ORAL at 09:09

## 2020-11-16 RX ADMIN — INSULIN GLARGINE 20 UNITS: 100 INJECTION, SOLUTION SUBCUTANEOUS at 20:34

## 2020-11-16 RX ADMIN — ROPINIROLE HYDROCHLORIDE 0.5 MG: 0.25 TABLET, FILM COATED ORAL at 09:05

## 2020-11-16 RX ADMIN — CETIRIZINE HYDROCHLORIDE 5 MG: 5 TABLET ORAL at 09:04

## 2020-11-16 RX ADMIN — ROFLUMILAST 500 MCG: 500 TABLET ORAL at 09:04

## 2020-11-16 RX ADMIN — PIPERACILLIN AND TAZOBACTAM 3.38 G: 3; .375 INJECTION, POWDER, LYOPHILIZED, FOR SOLUTION INTRAVENOUS at 14:14

## 2020-11-16 RX ADMIN — TIZANIDINE 4 MG: 4 TABLET ORAL at 05:22

## 2020-11-16 RX ADMIN — GUAIFENESIN 1200 MG: 600 TABLET, EXTENDED RELEASE ORAL at 20:25

## 2020-11-16 RX ADMIN — ALOGLIPTIN 6.25 MG: 12.5 TABLET, FILM COATED ORAL at 09:05

## 2020-11-16 RX ADMIN — HYDROCODONE BITARTRATE AND ACETAMINOPHEN 1 TABLET: 5; 325 TABLET ORAL at 05:22

## 2020-11-16 RX ADMIN — IPRATROPIUM BROMIDE AND ALBUTEROL SULFATE 1 AMPULE: .5; 3 SOLUTION RESPIRATORY (INHALATION) at 18:20

## 2020-11-16 RX ADMIN — GUAIFENESIN 600 MG: 600 TABLET, EXTENDED RELEASE ORAL at 09:04

## 2020-11-16 RX ADMIN — BUDESONIDE 500 MCG: 0.5 SUSPENSION RESPIRATORY (INHALATION) at 05:19

## 2020-11-16 RX ADMIN — SUCRALFATE 1 G: 1 TABLET ORAL at 11:30

## 2020-11-16 RX ADMIN — PIPERACILLIN AND TAZOBACTAM 3.38 G: 3; .375 INJECTION, POWDER, LYOPHILIZED, FOR SOLUTION INTRAVENOUS at 05:11

## 2020-11-16 RX ADMIN — TRAMADOL HYDROCHLORIDE 50 MG: 50 TABLET, FILM COATED ORAL at 20:25

## 2020-11-16 RX ADMIN — NYSTATIN 500000 UNITS: 100000 SUSPENSION ORAL at 09:05

## 2020-11-16 RX ADMIN — DOCUSATE CALCIUM 240 MG: 240 CAPSULE, LIQUID FILLED ORAL at 09:05

## 2020-11-16 RX ADMIN — METOPROLOL SUCCINATE 100 MG: 100 TABLET, EXTENDED RELEASE ORAL at 15:08

## 2020-11-16 RX ADMIN — MONTELUKAST SODIUM 10 MG: 10 TABLET, COATED ORAL at 20:25

## 2020-11-16 RX ADMIN — SUCRALFATE 1 G: 1 TABLET ORAL at 05:10

## 2020-11-16 RX ADMIN — ACETYLCYSTEINE 200 MG: 200 SOLUTION ORAL; RESPIRATORY (INHALATION) at 14:30

## 2020-11-16 RX ADMIN — IPRATROPIUM BROMIDE AND ALBUTEROL SULFATE 1 AMPULE: .5; 3 SOLUTION RESPIRATORY (INHALATION) at 14:30

## 2020-11-16 RX ADMIN — Medication 2000 UNITS: at 09:04

## 2020-11-16 RX ADMIN — ASPIRIN 81 MG: 81 TABLET, COATED ORAL at 09:05

## 2020-11-16 RX ADMIN — INSULIN LISPRO 6 UNITS: 100 INJECTION, SOLUTION INTRAVENOUS; SUBCUTANEOUS at 11:33

## 2020-11-16 RX ADMIN — FENOFIBRATE 54 MG: 54 TABLET, FILM COATED ORAL at 09:05

## 2020-11-16 RX ADMIN — KETOTIFEN FUMARATE 1 DROP: 0.35 SOLUTION/ DROPS OPHTHALMIC at 09:04

## 2020-11-16 RX ADMIN — INSULIN LISPRO 12 UNITS: 100 INJECTION, SOLUTION INTRAVENOUS; SUBCUTANEOUS at 16:58

## 2020-11-16 RX ADMIN — ALLOPURINOL 150 MG: 100 TABLET ORAL at 09:06

## 2020-11-16 RX ADMIN — PIPERACILLIN AND TAZOBACTAM 3.38 G: 3; .375 INJECTION, POWDER, LYOPHILIZED, FOR SOLUTION INTRAVENOUS at 20:24

## 2020-11-16 RX ADMIN — ACETYLCYSTEINE 200 MG: 200 SOLUTION ORAL; RESPIRATORY (INHALATION) at 05:19

## 2020-11-16 RX ADMIN — IPRATROPIUM BROMIDE AND ALBUTEROL SULFATE 1 AMPULE: .5; 3 SOLUTION RESPIRATORY (INHALATION) at 05:19

## 2020-11-16 RX ADMIN — NYSTATIN 500000 UNITS: 100000 SUSPENSION ORAL at 20:24

## 2020-11-16 RX ADMIN — BUDESONIDE 500 MCG: 0.5 SUSPENSION RESPIRATORY (INHALATION) at 18:20

## 2020-11-16 RX ADMIN — LEVOFLOXACIN 750 MG: 5 INJECTION, SOLUTION INTRAVENOUS at 11:30

## 2020-11-16 RX ADMIN — NYSTATIN 500000 UNITS: 100000 SUSPENSION ORAL at 17:01

## 2020-11-16 RX ADMIN — PANTOPRAZOLE SODIUM 40 MG: 40 TABLET, DELAYED RELEASE ORAL at 09:05

## 2020-11-16 RX ADMIN — FLUTICASONE PROPIONATE 1 SPRAY: 50 SPRAY, METERED NASAL at 20:25

## 2020-11-16 RX ADMIN — INSULIN LISPRO 6 UNITS: 100 INJECTION, SOLUTION INTRAVENOUS; SUBCUTANEOUS at 20:34

## 2020-11-16 ASSESSMENT — PAIN SCALES - GENERAL
PAINLEVEL_OUTOF10: 8
PAINLEVEL_OUTOF10: 8
PAINLEVEL_OUTOF10: 0

## 2020-11-16 NOTE — PLAN OF CARE
Problem: Falls - Risk of:  Goal: Will remain free from falls  Description: Will remain free from falls  11/16/2020 0919 by Ruthie Lim RN  Outcome: Ongoing  11/15/2020 2204 by Jase Jefferson LPN  Outcome: Ongoing  Goal: Absence of physical injury  Description: Absence of physical injury  11/16/2020 0919 by Ruthie Lim RN  Outcome: Ongoing  11/15/2020 2204 by Jase Jefferson LPN  Outcome: Ongoing     Problem: Infection:  Goal: Will remain free from infection  Description: Will remain free from infection  Outcome: Ongoing     Problem: Safety:  Goal: Free from accidental physical injury  Description: Free from accidental physical injury  Outcome: Ongoing     Problem: Daily Care:  Goal: Daily care needs are met  Description: Daily care needs are met  Outcome: Ongoing

## 2020-11-16 NOTE — PROGRESS NOTES
Progress Note      Subjective:   Chief complaint: declining functional status, shortness of breath     Interval History:   Sitting up on edge of bed today. States she is feeling a little better everyday. Not at her baseline yet. Currently on 4L O2 (uses 3L at home). No swelling in her legs. She is using acapella today. Continues to have some wheezing and productive cough. BP slightly low this morning. Review of systems:   Constitutional:  Denies fever or chills. Positive for generalized weakness and fatigue. Eyes:  Denies eye pain or redness  HENT:  Denies nasal congestion or sore throat   Respiratory: Positive for cough, dyspnea on exertion and shortness of breath - improving. Cardiovascular:  Denies chest pain or edema   GI:  Denies abdominal pain, nausea, vomiting, bloody stools or diarrhea   :  Denies dysuria or frequency  Musculoskeletal:  Denies acute neck pain or body aches  Integument:  Denies rash or itching  Neurologic:  Denies headache, dizziness, numbness, tingling or unilateral weakness  Psychiatric:  Denies acute depression or acute anxiety    Past medical history, surgical history, family history and social history reviewed and unchanged compared to H&P earlier this admission.     Medications:   Scheduled Meds:   insulin glargine  20 Units Subcutaneous Nightly    methylPREDNISolone  40 mg Intravenous Daily    levofloxacin  750 mg Intravenous Q48H    guaiFENesin  600 mg Oral BID    allopurinol  150 mg Oral Daily    alogliptin  6.25 mg Oral Daily    apixaban  5 mg Oral BID    aspirin  81 mg Oral Daily    atorvastatin  40 mg Oral Daily    budesonide  0.5 mg Nebulization BID    cetirizine  5 mg Oral Daily    [Held by provider] dilTIAZem  180 mg Oral Daily    docusate calcium  240 mg Oral Daily    ezetimibe  10 mg Oral Daily    fenofibrate  54 mg Oral Daily    fluticasone  1 spray Each Nostril BID    [Held by provider] furosemide  40 mg Oral BID    insulin lispro  0-12 Units Subcutaneous TID WC    insulin lispro  0-6 Units Subcutaneous Nightly    ipratropium-albuterol  1 ampule Inhalation Q4H WA    ketotifen  1 drop Both Eyes BID    metoprolol succinate  100 mg Oral Daily    montelukast  10 mg Oral Nightly    nystatin  5 mL Oral 4x Daily    pantoprazole  40 mg Oral Daily    potassium chloride  20 mEq Oral Daily    Roflumilast  500 mcg Oral Daily    rOPINIRole  0.5 mg Oral BID    sucralfate  1 g Oral TID AC    Vitamin D  2,000 Units Oral Daily    piperacillin-tazobactam  3.375 g Intravenous Q8H    [Held by provider] insulin glargine  40 Units Subcutaneous QAM    acetylcysteine  200 mg Inhalation 4x Daily     Continuous Infusions:   dextrose         Objective:     Vital Signs  Temp: 96.2 °F (35.7 °C)  Pulse: 60  Resp: 16  BP: (!) 100/40  SpO2: 98 %  O2 Device: Nasal cannula  O2 Flow Rate (L/min): 4 L/min    Vital signs reviewed in electronic charts. Physical exam  Constitutional:  Well developed, well nourished, obese, no acute distress. Eyes:  PERRL, conjunctiva normal, EOMI. HENT:  Atraumatic, external ears normal, external nose/nares normal, oropharynx moist, no pharyngeal exudates. Neck:  Supple. No JVD or thyromegaly. Respiratory:  No respiratory distress on 4L O2, rhonchi appreciated bilaterally with expiratory wheezing. Cardiovascular:  Normal rate, normal rhythm, no murmurs, no gallops, no rubs. GI:  Soft, obese, nondistended, normal bowel sounds, nontender, no organomegaly, no mass. Musculoskeletal:  No edema, no tenderness, no obvious deformities. Patient is moving all extremities. Integument:  Well hydrated, no rash. Lymphatic:  No cervical or axillary lymphadenopathy noted. Neurologic:  Alert & oriented x 3,  no focal deficits noted. Strength is equal throughout. Psychiatric:  Speech and behavior appropriate.     Results:     Lab Results   Component Value Date    WBC 17.9 (H) 11/16/2020    HGB 12.0 11/16/2020    HCT 41.7 11/16/2020    MCV 93.7 11/16/2020     (H) 11/16/2020       Lab Results   Component Value Date     11/16/2020    K 4.3 11/16/2020    K 3.7 11/13/2020    CL 96 11/16/2020    CO2 40 11/16/2020    BUN 81 11/16/2020    CREATININE 2.1 11/16/2020    GLUCOSE 300 11/16/2020    CALCIUM 9.5 11/16/2020        Assessment and Plan: Active Hospital Problems    Diagnosis Date Noted    Declining functional status [R53.81]  - continue PT/OT  -  following for dc planning assistance 11/12/2020    Thrush [B37.0]  - continue nystatin for 10 day course 11/10/2020    TENZIN (obstructive sleep apnea) [G47.33]  - continue bipap qhs 11/10/2020    Paroxysmal atrial fibrillation ([facility] [I48.0]  - continue home eliquis and metoprolol   - home diltiazem on hold with low BP- monitor  11/09/2020    Acute on chronic respiratory failure with hypoxia (HCC) [J96.21]  - In setting of COPD exacerbation and pneumonia  - Initially treated with IV steroid, DuoNebs, Pulmicort nebs, doxycycline and Rocephin with no improvement.  Patient with increased O2 demands on 11/11.  Repeat chest x-ray with no significant change.  Antibiotic regimen adjusted to IV Zosyn and Levaquin on 11/12.  Mucolytics and chest vest also added on 11/12.  - Continue supplemental oxygen to maintain sats greater than 90%; patient's O2 requirements have improved from 10L HFNC to 4L O2 11/16  - clinically improving on current regimen   - Repeat CXR 11/16 without significant change from previous  - SLP following . Aspiration precautions initiated.   11/09/2020    Morbid obesity with BMI of 40.0-44.9, adult ([facility] [address] [E66.01, R24.93]  - complicates all aspects of care  - encourage weight loss  08/07/2020    Gastroesophageal reflux disease [K21.9]     Chronic diastolic heart failure ([facility] [I50.32]  - ECHO March 2020 from [facility] reviewed and patient with grade 1 diastolic dysfunction   - follows with cardiology Dr. Asaf Pickard and most recent note 10/29 reviewed  - with worsening BUN 81 and patient appearing dehydrated on exam with hypotension will hold lasix today and reassess tomorrow  - add daily weight and strict I&Os as well as low sodium diet with fluid restriciton   - monitor  08/30/2019    Hypertension [I10]  - BP low and home lasix and diltiazem placed on hold     Type 2 diabetes mellitus with complication, with long-term current use of insulin (HCC) [E11.8, Z79.4]  - A1c 9.3 on 7/22/2020; A1c 8.4 on 11/10  - Increase lantus dose tonight and continue to monitor/titrate regimen as needed  - encourage diabetic diet 12/05/2017    COPD exacerbation ([facility] [address] [J44.1]  - see under respiratory failure  12/04/2017    CRF (chronic renal failure) [N18.9]  - Baseline BUN/Cr around 50/2  - Currently BUN 81/Cr 2.1  - hold lasix today with hypotension, worsening renal failure, and patient appears dehydrated on exam   - hold diltiazem with low BP   - avoid nephrotoxic agtent as able  - monitor  02/22/2017    Pneumonia [J18.9]  - see above  02/24/2015    Hyperlipidemia [E78.5]  - continue home regimen 05/26/2011     Patient was seen and examined by Dr. Amanda Kaur and plan of care reviewed.     Electronically signed by JASON Vargas on 11/16/2020 at 2:25 PM

## 2020-11-16 NOTE — FLOWSHEET NOTE
11/16/20 0921   Assessment   Charting Type Shift assessment   Neurological   Neuro (WDL) WDL   Level of Consciousness 0   [place] Coma Scale   Eye Opening 4   Best Verbal Response 5   Best Motor Response 6   [place] Coma Scale Score 15   NIH/MNHISS Stroke Scale   NIH/MNIHSS Stroke Scale Assessed No   HEENT   HEENT (WDL) X   Right Eye Impaired vision   Left Eye Impaired vision   Voice Normal   Teeth Dentures upper   Respiratory   Respiratory (WDL) X   Respiratory Pattern Regular   Respiratory Depth Normal   Respiratory Quality/Effort Dyspnea with exertion   Chest Assessment Chest expansion symmetrical;Trachea midline   L Breath Sounds Expiratory Wheezes; Rhonchi   R Breath Sounds Expiratory Wheezes; Rhonchi   Breath Sounds   Right Upper Lobe Rhonchi   Right Middle Lobe Rhonchi   Right Lower Lobe Rhonchi   Left Upper Lobe Rhonchi   Left Lower Lobe Rhonchi   Cough/Sputum   Sputum How Obtained Spontaneous cough   Cough Congested;Non-productive   Sputum Amount None   Sputum Color Yellow   Tenacity Thick   Cough and Deep Breathe   Cough and Deep Breathe Yes   Cardiac   Cardiac (WDL) WDL   Cardiac Monitor   Telemetry Monitor On No   Gastrointestinal   Abdominal (WDL) WDL   Peripheral Vascular   Peripheral Vascular (WDL) WDL   Skin Color/Condition   Skin Color/Condition (WDL) WDL   Skin Integrity   Skin Integrity (WDL) WDL   Musculoskeletal   Musculoskeletal (WDL) X   RUE Full movement   LUE Full movement   RL Extremity Weakness   LL Extremity Weakness   Genitourinary   Genitourinary (WDL) WDL   Urine Assessment   Incontinence No   Psychosocial   Psychosocial (WDL) WDL   Pt awake in bed. Pt alert and oriented. Pt appears in no acute distress. Pt currently on 4L NC. Pt lung sounds rhonchi throughout. Pt encouraged to cough and deep breathe. Pt states breathing much better this am. Pt states productive cough at times. Pt call bell and bedside table within reach. Will continue to monitor pt.

## 2020-11-16 NOTE — FLOWSHEET NOTE
11/15/20 2020   Assessment   Charting Type Shift assessment   Neurological   Neuro (WDL) WDL   Level of Consciousness 0   [place] Coma Scale   Eye Opening 4   Best Verbal Response 5   Best Motor Response 6   [place] Coma Scale Score 15   HEENT   HEENT (WDL) X   Right Eye Impaired vision   Left Eye Impaired vision   Voice Normal   Teeth Dentures upper   Respiratory   Respiratory (WDL) X   Respiratory Pattern Regular   Respiratory Depth Normal   Respiratory Quality/Effort Dyspnea with exertion   Chest Assessment Chest expansion symmetrical;Trachea midline   L Breath Sounds Expiratory Wheezes; Rhonchi   R Breath Sounds Expiratory Wheezes; Rhonchi   Breath Sounds   Right Upper Lobe Expiratory Wheezes; Rhonchi   Right Middle Lobe Expiratory Wheezes; Rhonchi   Right Lower Lobe Expiratory Wheezes; Rhonchi   Left Upper Lobe Expiratory Wheezes; Rhonchi   Left Lower Lobe Expiratory Wheezes; Rhonchi   Cough/Sputum   Sputum How Obtained Spontaneous cough   Cough Congested;Non-productive   Sputum Amount None   Sputum Color Yellow   Tenacity Thick   Cardiac   Cardiac (WDL) WDL   Cardiac Monitor   Telemetry Monitor On No   Gastrointestinal   Abdominal (WDL) WDL   Peripheral Vascular   Peripheral Vascular (WDL) WDL   Skin Color/Condition   Skin Color/Condition (WDL) WDL   Skin Integrity   Skin Integrity (WDL) WDL   Musculoskeletal   Musculoskeletal (WDL) X   RUE Full movement   LUE Full movement   RL Extremity Weakness   LL Extremity Weakness   Genitourinary   Genitourinary (WDL) WDL   Urine Assessment   Incontinence No   Psychosocial   Psychosocial (WDL) WDL

## 2020-11-16 NOTE — PROGRESS NOTES
Physical Therapy  Facility/Department: [facility] [place] MED SURG  Daily Treatment Note  NAME: Delia Roberts  : 1950  MRN: [id number]    Date of Service: 2020    Discharge Recommendations:  Continue to assess pending progress     Assessment   Body structures, Functions, Activity limitations: Decreased balance;Decreased functional mobility ; Decreased strength;Decreased endurance  Assessment: Patient awake in bed on 4 LO2, SpO2 94%. Mod I with bed mobility. Completed B LE therex in sitting. Stood and ambulated to and from the bathroom with hand-held assistance. Transferred to sit EOB. SpO2 93%. Treatment Diagnosis: Respiratory failure; general weakness; functional decline  Prognosis: Excellent  REQUIRES PT FOLLOW UP: Yes  Activity Tolerance  Activity Tolerance: Patient Tolerated treatment well;Patient limited by fatigue     Patient Diagnosis(es): There were no encounter diagnoses. has a past medical history of Anemia, Anxiety, Asthma, Cataract, COPD (chronic obstructive pulmonary disease) ([facility] [address] DDD (degenerative disc disease), cervical, Depression, Diabetes mellitus type 2, GERD (gastroesophageal reflux disease), Gout, History of uterine cancer, Hyperlipidemia, and Hypertension. has a past surgical history that includes Tonsillectomy ();  section (, ); Lung surgery; Neck surgery; Cataract removal with implant (); Hysterectomy; Colonoscopy (2013); Upper gastrointestinal endoscopy (N/A, 2019); and Colonoscopy (N/A, 2019). Restrictions  Restrictions/Precautions  Restrictions/Precautions: Cardiac, General Precautions, Fall Risk  Required Braces or Orthoses?: No  Subjective   General  Chart Reviewed: Yes  Response To Previous Treatment: Not applicable  Family / Caregiver Present: No  Referring Practitioner: Anna Vasquez MD  Subjective  Subjective: Patient states she's doing better today. O2 has been turned down to 4L.   Pain Screening  Patient Currently in Pain: Denies  Vital Signs  Patient Currently in Pain: Denies       Orientation  Orientation  Overall Orientation Status: Within Normal Limits  Cognition      Objective   Bed mobility  Rolling to Right: Modified independent  Supine to Sit: Modified independent  Sit to Supine: Modified independent  Scooting: Modified independent  Transfers  Sit to Stand: Modified independent  Stand to sit: Modified independent  Bed to Chair: Modified independent  Ambulation  Ambulation?: Yes  Ambulation 1  Surface: level tile  Device: Hand-Held Assist  Gait Deviations: Slow Jaycee;Decreased step length;Decreased step height  Distance: to and from the bathroom     Balance  Posture: Good  Sitting - Static: Good  Sitting - Dynamic: Good  Standing - Static: Good;-  Standing - Dynamic: Fair;+  Exercises  Knee Long Arc Quad: 2x10  Ankle Pumps: 2x10      Goals  Long term goals  Time Frame for Long term goals : 1 week  Long term goal 1: Ambulate 100 feet x 2 with SBA-supervision with good safety awareness with O2 > 90%. Long term goal 2: Perform up to 20 minutes of PT without fatigue and O2 sats > 90%. Long term goal 3: Demonstrate good safety awareness with all functional mobility activities. Plan    Plan  Times per week: 4-5 days per week  Plan weeks: 1 week  Current Treatment Recommendations: Strengthening, Balance Training, Endurance Training, Neuromuscular Re-education, Home Exercise Program, Gait Training, Transfer Training, Safety Education & Training  Safety Devices  Type of devices: Left in bed, Call light within reach(sitting EOB)     Therapy Time   Individual Concurrent Group Co-treatment   Time In 8967         Time Out 5364         Minutes 17              This note serves as D/C summary if patient is discharged prior to next visit.   Evette Davis, PTA

## 2020-11-17 LAB
ANION GAP SERPL CALCULATED.3IONS-SCNC: 9 MMOL/L (ref 3–16)
BUN BLDV-MCNC: 76 MG/DL (ref 6–20)
CALCIUM SERPL-MCNC: 9.2 MG/DL (ref 8.5–10.5)
CHLORIDE BLD-SCNC: 96 MMOL/L (ref 98–107)
CO2: 38 MMOL/L (ref 20–30)
CREAT SERPL-MCNC: 1.9 MG/DL (ref 0.4–1.2)
GFR AFRICAN AMERICAN: 32
GFR NON-AFRICAN AMERICAN: 26
GLUCOSE BLD-MCNC: 271 MG/DL (ref 74–106)
GLUCOSE BLD-MCNC: 275 MG/DL (ref 74–106)
GLUCOSE BLD-MCNC: 281 MG/DL (ref 74–106)
GLUCOSE BLD-MCNC: 340 MG/DL (ref 74–106)
GLUCOSE BLD-MCNC: 385 MG/DL (ref 74–106)
HCT VFR BLD CALC: 39.7 % (ref 37–47)
HEMOGLOBIN: 11.1 G/DL (ref 11.5–16.5)
MCH RBC QN AUTO: 27.1 PG (ref 27–32)
MCHC RBC AUTO-ENTMCNC: 28 G/DL (ref 31–35)
MCV RBC AUTO: 96.8 FL (ref 80–100)
PDW BLD-RTO: 15.8 % (ref 11–16)
PERFORMED ON: ABNORMAL
PLATELET # BLD: 302 K/UL (ref 150–400)
PMV BLD AUTO: 10.8 FL (ref 6–10)
POTASSIUM SERPL-SCNC: 4.7 MMOL/L (ref 3.4–5.1)
QUANTI TB GOLD PLUS: NEGATIVE
QUANTI TB1 MINUS NIL: 0 IU/ML (ref 0–0.34)
QUANTI TB2 MINUS NIL: 0 IU/ML (ref 0–0.34)
QUANTIFERON MITOGEN: 7.86 IU/ML
QUANTIFERON NIL: 0.01 IU/ML
RBC # BLD: 4.1 M/UL (ref 3.8–5.8)
SODIUM BLD-SCNC: 143 MMOL/L (ref 136–145)
WBC # BLD: 16.2 K/UL (ref 4–11)

## 2020-11-17 PROCEDURE — 2700000000 HC OXYGEN THERAPY PER DAY

## 2020-11-17 PROCEDURE — 6360000002 HC RX W HCPCS: Performed by: INTERNAL MEDICINE

## 2020-11-17 PROCEDURE — 85027 COMPLETE CBC AUTOMATED: CPT

## 2020-11-17 PROCEDURE — 97116 GAIT TRAINING THERAPY: CPT

## 2020-11-17 PROCEDURE — 2580000003 HC RX 258: Performed by: PHYSICIAN ASSISTANT

## 2020-11-17 PROCEDURE — 94669 MECHANICAL CHEST WALL OSCILL: CPT

## 2020-11-17 PROCEDURE — 6370000000 HC RX 637 (ALT 250 FOR IP): Performed by: PHYSICIAN ASSISTANT

## 2020-11-17 PROCEDURE — 97110 THERAPEUTIC EXERCISES: CPT

## 2020-11-17 PROCEDURE — 6360000002 HC RX W HCPCS: Performed by: PHYSICIAN ASSISTANT

## 2020-11-17 PROCEDURE — 1200000002 HC SEMI PRIVATE SWING BED

## 2020-11-17 PROCEDURE — 36415 COLL VENOUS BLD VENIPUNCTURE: CPT

## 2020-11-17 PROCEDURE — 94640 AIRWAY INHALATION TREATMENT: CPT

## 2020-11-17 PROCEDURE — 80048 BASIC METABOLIC PNL TOTAL CA: CPT

## 2020-11-17 PROCEDURE — 94761 N-INVAS EAR/PLS OXIMETRY MLT: CPT

## 2020-11-17 RX ORDER — FUROSEMIDE 40 MG/1
40 TABLET ORAL 2 TIMES DAILY
Status: DISCONTINUED | OUTPATIENT
Start: 2020-11-18 | End: 2020-11-18

## 2020-11-17 RX ORDER — PREDNISONE 20 MG/1
20 TABLET ORAL DAILY
Status: COMPLETED | OUTPATIENT
Start: 2020-11-18 | End: 2020-11-19

## 2020-11-17 RX ORDER — PREDNISONE 10 MG/1
10 TABLET ORAL DAILY
Status: DISCONTINUED | OUTPATIENT
Start: 2020-11-23 | End: 2020-11-19 | Stop reason: HOSPADM

## 2020-11-17 RX ORDER — PREDNISONE 1 MG/1
5 TABLET ORAL DAILY
Status: DISCONTINUED | OUTPATIENT
Start: 2020-11-26 | End: 2020-11-19 | Stop reason: HOSPADM

## 2020-11-17 RX ORDER — INSULIN GLARGINE 100 [IU]/ML
40 INJECTION, SOLUTION SUBCUTANEOUS NIGHTLY
Status: DISCONTINUED | OUTPATIENT
Start: 2020-11-17 | End: 2020-11-19

## 2020-11-17 RX ADMIN — ACETYLCYSTEINE 200 MG: 200 SOLUTION ORAL; RESPIRATORY (INHALATION) at 05:27

## 2020-11-17 RX ADMIN — KETOTIFEN FUMARATE 1 DROP: 0.35 SOLUTION/ DROPS OPHTHALMIC at 20:31

## 2020-11-17 RX ADMIN — HYDROCODONE BITARTRATE AND ACETAMINOPHEN 1 TABLET: 5; 325 TABLET ORAL at 08:36

## 2020-11-17 RX ADMIN — ROFLUMILAST 500 MCG: 500 TABLET ORAL at 08:07

## 2020-11-17 RX ADMIN — SUCRALFATE 1 G: 1 TABLET ORAL at 11:16

## 2020-11-17 RX ADMIN — POTASSIUM CHLORIDE 20 MEQ: 20 TABLET, EXTENDED RELEASE ORAL at 08:06

## 2020-11-17 RX ADMIN — INSULIN LISPRO 8 UNITS: 100 INJECTION, SOLUTION INTRAVENOUS; SUBCUTANEOUS at 08:26

## 2020-11-17 RX ADMIN — NYSTATIN 500000 UNITS: 100000 SUSPENSION ORAL at 08:07

## 2020-11-17 RX ADMIN — ALOGLIPTIN 6.25 MG: 12.5 TABLET, FILM COATED ORAL at 08:06

## 2020-11-17 RX ADMIN — ROPINIROLE HYDROCHLORIDE 0.5 MG: 0.25 TABLET, FILM COATED ORAL at 20:30

## 2020-11-17 RX ADMIN — PIPERACILLIN AND TAZOBACTAM 3.38 G: 3; .375 INJECTION, POWDER, LYOPHILIZED, FOR SOLUTION INTRAVENOUS at 13:54

## 2020-11-17 RX ADMIN — ROPINIROLE HYDROCHLORIDE 0.5 MG: 0.25 TABLET, FILM COATED ORAL at 08:05

## 2020-11-17 RX ADMIN — ATORVASTATIN CALCIUM 40 MG: 40 TABLET, FILM COATED ORAL at 08:08

## 2020-11-17 RX ADMIN — BUDESONIDE 500 MCG: 0.5 SUSPENSION RESPIRATORY (INHALATION) at 05:28

## 2020-11-17 RX ADMIN — NYSTATIN 500000 UNITS: 100000 SUSPENSION ORAL at 11:16

## 2020-11-17 RX ADMIN — FLUTICASONE PROPIONATE 1 SPRAY: 50 SPRAY, METERED NASAL at 20:31

## 2020-11-17 RX ADMIN — IPRATROPIUM BROMIDE AND ALBUTEROL SULFATE 1 AMPULE: .5; 3 SOLUTION RESPIRATORY (INHALATION) at 18:15

## 2020-11-17 RX ADMIN — INSULIN LISPRO 3 UNITS: 100 INJECTION, SOLUTION INTRAVENOUS; SUBCUTANEOUS at 20:37

## 2020-11-17 RX ADMIN — CETIRIZINE HYDROCHLORIDE 5 MG: 5 TABLET ORAL at 08:06

## 2020-11-17 RX ADMIN — GUAIFENESIN 1200 MG: 600 TABLET, EXTENDED RELEASE ORAL at 08:05

## 2020-11-17 RX ADMIN — INSULIN GLARGINE 40 UNITS: 100 INJECTION, SOLUTION SUBCUTANEOUS at 20:37

## 2020-11-17 RX ADMIN — APIXABAN 5 MG: 5 TABLET, FILM COATED ORAL at 20:30

## 2020-11-17 RX ADMIN — SUCRALFATE 1 G: 1 TABLET ORAL at 16:14

## 2020-11-17 RX ADMIN — PIPERACILLIN AND TAZOBACTAM 3.38 G: 3; .375 INJECTION, POWDER, LYOPHILIZED, FOR SOLUTION INTRAVENOUS at 20:29

## 2020-11-17 RX ADMIN — DILTIAZEM HYDROCHLORIDE 180 MG: 180 CAPSULE, COATED, EXTENDED RELEASE ORAL at 11:19

## 2020-11-17 RX ADMIN — IPRATROPIUM BROMIDE AND ALBUTEROL SULFATE 1 AMPULE: .5; 3 SOLUTION RESPIRATORY (INHALATION) at 10:54

## 2020-11-17 RX ADMIN — TRAMADOL HYDROCHLORIDE 50 MG: 50 TABLET, FILM COATED ORAL at 20:30

## 2020-11-17 RX ADMIN — METHYLPREDNISOLONE SODIUM SUCCINATE 40 MG: 40 INJECTION, POWDER, FOR SOLUTION INTRAMUSCULAR; INTRAVENOUS at 08:08

## 2020-11-17 RX ADMIN — ALLOPURINOL 150 MG: 100 TABLET ORAL at 08:05

## 2020-11-17 RX ADMIN — APIXABAN 5 MG: 5 TABLET, FILM COATED ORAL at 08:06

## 2020-11-17 RX ADMIN — GUAIFENESIN 1200 MG: 600 TABLET, EXTENDED RELEASE ORAL at 20:30

## 2020-11-17 RX ADMIN — NYSTATIN 500000 UNITS: 100000 SUSPENSION ORAL at 16:14

## 2020-11-17 RX ADMIN — INSULIN LISPRO 8 UNITS: 100 INJECTION, SOLUTION INTRAVENOUS; SUBCUTANEOUS at 16:17

## 2020-11-17 RX ADMIN — INSULIN LISPRO 10 UNITS: 100 INJECTION, SOLUTION INTRAVENOUS; SUBCUTANEOUS at 16:17

## 2020-11-17 RX ADMIN — TIZANIDINE 4 MG: 4 TABLET ORAL at 20:30

## 2020-11-17 RX ADMIN — METOPROLOL SUCCINATE 100 MG: 100 TABLET, EXTENDED RELEASE ORAL at 08:07

## 2020-11-17 RX ADMIN — NYSTATIN 500000 UNITS: 100000 SUSPENSION ORAL at 20:30

## 2020-11-17 RX ADMIN — BUDESONIDE 500 MCG: 0.5 SUSPENSION RESPIRATORY (INHALATION) at 18:15

## 2020-11-17 RX ADMIN — PANTOPRAZOLE SODIUM 40 MG: 40 TABLET, DELAYED RELEASE ORAL at 08:07

## 2020-11-17 RX ADMIN — MONTELUKAST SODIUM 10 MG: 10 TABLET, COATED ORAL at 20:30

## 2020-11-17 RX ADMIN — ASPIRIN 81 MG: 81 TABLET, COATED ORAL at 08:08

## 2020-11-17 RX ADMIN — DOCUSATE CALCIUM 240 MG: 240 CAPSULE, LIQUID FILLED ORAL at 08:07

## 2020-11-17 RX ADMIN — PIPERACILLIN AND TAZOBACTAM 3.38 G: 3; .375 INJECTION, POWDER, LYOPHILIZED, FOR SOLUTION INTRAVENOUS at 05:29

## 2020-11-17 RX ADMIN — FENOFIBRATE 54 MG: 54 TABLET, FILM COATED ORAL at 08:07

## 2020-11-17 RX ADMIN — SUCRALFATE 1 G: 1 TABLET ORAL at 05:28

## 2020-11-17 RX ADMIN — INSULIN LISPRO 6 UNITS: 100 INJECTION, SOLUTION INTRAVENOUS; SUBCUTANEOUS at 11:20

## 2020-11-17 RX ADMIN — Medication 2000 UNITS: at 08:07

## 2020-11-17 RX ADMIN — IPRATROPIUM BROMIDE AND ALBUTEROL SULFATE 1 AMPULE: .5; 3 SOLUTION RESPIRATORY (INHALATION) at 05:27

## 2020-11-17 RX ADMIN — KETOTIFEN FUMARATE 1 DROP: 0.35 SOLUTION/ DROPS OPHTHALMIC at 08:09

## 2020-11-17 ASSESSMENT — PAIN SCALES - GENERAL
PAINLEVEL_OUTOF10: 8
PAINLEVEL_OUTOF10: 6
PAINLEVEL_OUTOF10: 8

## 2020-11-17 ASSESSMENT — PAIN DESCRIPTION - LOCATION: LOCATION: BACK

## 2020-11-17 NOTE — PROGRESS NOTES
understanding of recommendations. Pt's nurse also agreed with SLP's recommendations.      P:  Pt upgrade to regular solids and thin liquids.  Pt no longer requires skilled ST services for dysphagia at this time.         Stevo White, SLP

## 2020-11-17 NOTE — PROGRESS NOTES
Physical Therapy  Facility/Department: [facility] MED SURG  Daily Treatment Note  NAME: Christi Felder  : 1950  MRN: [id number]    Date of Service: 2020    Discharge Recommendations:  Continue to assess pending progress      Assessment   Body structures, Functions, Activity limitations: Decreased balance;Decreased functional mobility ; Decreased strength;Decreased endurance  Assessment: Patient awake in bed on 3 LO2, SpO2 94%. Patient completed bed mobility tasks with Mod I and sat EOB for B LE therex. Ambulated 55 and 70 feet with RW, 3LO2, and SBA. O2 sats remained above 90% throughout session. Treatment Diagnosis: Respiratory failure; general weakness; functional decline  Prognosis: Excellent  REQUIRES PT FOLLOW UP: Yes  Activity Tolerance  Activity Tolerance: Patient Tolerated treatment well     Patient Diagnosis(es): There were no encounter diagnoses. has a past medical history of Anemia, Anxiety, Asthma, Cataract, COPD (chronic obstructive pulmonary disease) ([facility] [address] DDD (degenerative disc disease), cervical, Depression, Diabetes mellitus type 2, GERD (gastroesophageal reflux disease), Gout, History of uterine cancer, Hyperlipidemia, and Hypertension. has a past surgical history that includes Tonsillectomy ();  section (, ); Lung surgery; Neck surgery; Cataract removal with implant (); Hysterectomy; Colonoscopy (2013); Upper gastrointestinal endoscopy (N/A, 2019); and Colonoscopy (N/A, 2019). Restrictions  Restrictions/Precautions  Restrictions/Precautions: Cardiac, General Precautions, Fall Risk  Required Braces or Orthoses?: No  Subjective   General  Chart Reviewed: Yes  Response To Previous Treatment: Patient with no complaints from previous session. Family / Caregiver Present: No  Referring Practitioner: Christine Nj MD  Subjective  Subjective: Patient offers no new c/o. O2 is now set on 3L as she has at home.   Pain Screening  Patient Currently in Pain: Yes  Pain Assessment  Pain Level: 6  Pain Location: Back  Vital Signs  Patient Currently in Pain: Yes       Orientation  Orientation  Overall Orientation Status: Within Normal Limits  Cognition      Objective   Bed mobility  Rolling to Right: Modified independent  Supine to Sit: Modified independent  Sit to Supine: Modified independent  Scooting: Modified independent  Transfers  Sit to Stand: Modified independent  Stand to sit: Modified independent  Bed to Chair: Modified independent  Ambulation  Ambulation?: Yes  Ambulation 1  Surface: level tile  Device: Rolling Walker  Other Apparatus: O2  Assistance: Stand by assistance  Gait Deviations: Slow Jaycee;Decreased step length;Decreased step height  Distance: 55 feet and 70 feet     Balance  Posture: Good  Sitting - Static: Good  Sitting - Dynamic: Good  Standing - Static: Good;-  Standing - Dynamic: Fair;+  Exercises  Hip Flexion: 2x10  Hip Abduction: 2x10  Knee Long Arc Quad: 2x10  Ankle Pumps: 2x10      Goals  Long term goals  Time Frame for Long term goals : 1 week  Long term goal 1: Ambulate 100 feet x 2 with SBA-supervision with good safety awareness with O2 > 90%. Long term goal 2: Perform up to 20 minutes of PT without fatigue and O2 sats > 90%. Long term goal 3: Demonstrate good safety awareness with all functional mobility activities. Plan    Plan  Times per week: 4-5 days per week  Plan weeks: 1 week  Current Treatment Recommendations: Strengthening, Balance Training, Endurance Training, Neuromuscular Re-education, Home Exercise Program, Gait Training, Transfer Training, Safety Education & Training  Safety Devices  Type of devices: Left in bed, Call light within reach     Therapy Time   Individual Concurrent Group Co-treatment   Time In 4316         Time Out 8774         Minutes 31              This note serves as D/C summary if patient is discharged prior to next visit.   Odalis Casarez, PTA

## 2020-11-17 NOTE — FLOWSHEET NOTE
11/16/20 2020   Assessment   Charting Type Shift assessment   Neurological   Neuro (WDL) WDL   Level of Consciousness 0   [place] Coma Scale   Eye Opening 4   Best Verbal Response 5   Best Motor Response 6   [place] Coma Scale Score 15   HEENT   HEENT (WDL) X   Right Eye Impaired vision   Left Eye Impaired vision   Voice Normal   Teeth Dentures upper   Respiratory   Respiratory (WDL) X   Respiratory Pattern Regular   Respiratory Depth Normal   Respiratory Quality/Effort Dyspnea with exertion   Chest Assessment Chest expansion symmetrical;Trachea midline   L Breath Sounds Rhonchi   R Breath Sounds Rhonchi   Breath Sounds   Right Upper Lobe Rhonchi   Right Middle Lobe Rhonchi   Right Lower Lobe Rhonchi   Left Upper Lobe Rhonchi   Left Lower Lobe Rhonchi   Cough/Sputum   Sputum How Obtained Spontaneous cough   Cough Congested;Non-productive   Sputum Amount None   Cardiac   Cardiac (WDL) WDL   Cardiac Monitor   Telemetry Monitor On No   Gastrointestinal   Abdominal (WDL) WDL   Peripheral Vascular   Peripheral Vascular (WDL) WDL   Skin Color/Condition   Skin Color/Condition (WDL) WDL   Skin Integrity   Skin Integrity (WDL) WDL   Skin Integrity Bruising   Location Scattered   Musculoskeletal   Musculoskeletal (WDL) X   RUE Full movement   LUE Full movement   RL Extremity Weakness   LL Extremity Weakness   Genitourinary   Genitourinary (WDL) WDL   Urine Assessment   Incontinence No   Psychosocial   Psychosocial (WDL) WDL

## 2020-11-17 NOTE — PROGRESS NOTES
ABduction: x20  Shoulder ADduction: x20  Elbow Flexion: x20  Elbow Extension: x20  Supination: x20  Pronation: x20  Wrist Flexion: x20  Wrist Extension: x20    Therapy Time   Individual Concurrent Group Co-treatment   Time In 1002         Time Out 1025         Minutes 23              This note serves as a DC summary in the event of pt discharge.      Sylvia Abreu, OTR/L

## 2020-11-17 NOTE — PLAN OF CARE
Problem: Falls - Risk of:  Goal: Will remain free from falls  Description: Will remain free from falls  11/17/2020 0342 by Aidan Mancilla RN  Outcome: Ongoing  11/16/2020 2128 by Alessandro Hughes LPN  Outcome: Ongoing  Goal: Absence of physical injury  Description: Absence of physical injury  11/17/2020 9957 by Aidan Mancilla RN  Outcome: Ongoing  11/16/2020 2128 by Alessandro Hughes LPN  Outcome: Ongoing     Problem: Pain:  Goal: Control of acute pain  Description: Control of acute pain  11/16/2020 2128 by Alessandro Hughes LPN  Outcome: Ongoing     Problem: Safety:  Goal: Free from accidental physical injury  Description: Free from accidental physical injury  Outcome: Ongoing     Problem: Daily Care:  Goal: Daily care needs are met  Description: Daily care needs are met  Outcome: Ongoing

## 2020-11-18 LAB
A/G RATIO: 1.6 (ref 0.8–2)
ALBUMIN SERPL-MCNC: 3.6 G/DL (ref 3.4–4.8)
ALP BLD-CCNC: 59 U/L (ref 25–100)
ALT SERPL-CCNC: 20 U/L (ref 4–36)
ANION GAP SERPL CALCULATED.3IONS-SCNC: 11 MMOL/L (ref 3–16)
AST SERPL-CCNC: 16 U/L (ref 8–33)
BILIRUB SERPL-MCNC: 0.3 MG/DL (ref 0.3–1.2)
BUN BLDV-MCNC: 62 MG/DL (ref 6–20)
CALCIUM SERPL-MCNC: 9.3 MG/DL (ref 8.5–10.5)
CHLORIDE BLD-SCNC: 100 MMOL/L (ref 98–107)
CO2: 36 MMOL/L (ref 20–30)
CREAT SERPL-MCNC: 1.7 MG/DL (ref 0.4–1.2)
FERRITIN: 79.4 NG/ML (ref 22–322)
GFR AFRICAN AMERICAN: 36
GFR NON-AFRICAN AMERICAN: 30
GLOBULIN: 2.3 G/DL
GLUCOSE BLD-MCNC: 308 MG/DL (ref 74–106)
GLUCOSE BLD-MCNC: 336 MG/DL (ref 74–106)
GLUCOSE BLD-MCNC: 343 MG/DL (ref 74–106)
GLUCOSE BLD-MCNC: 346 MG/DL (ref 74–106)
GLUCOSE BLD-MCNC: 348 MG/DL (ref 74–106)
HCT VFR BLD CALC: 39.1 % (ref 37–47)
HEMOGLOBIN: 11.3 G/DL (ref 11.5–16.5)
IRON SATURATION: 40 % (ref 15–50)
IRON: 133 UG/DL (ref 37–145)
MCH RBC QN AUTO: 27.3 PG (ref 27–32)
MCHC RBC AUTO-ENTMCNC: 28.9 G/DL (ref 31–35)
MCV RBC AUTO: 94.4 FL (ref 80–100)
PDW BLD-RTO: 15.9 % (ref 11–16)
PERFORMED ON: ABNORMAL
PLATELET # BLD: 349 K/UL (ref 150–400)
PMV BLD AUTO: 10.7 FL (ref 6–10)
POTASSIUM SERPL-SCNC: 3.8 MMOL/L (ref 3.4–5.1)
RBC # BLD: 4.14 M/UL (ref 3.8–5.8)
SODIUM BLD-SCNC: 147 MMOL/L (ref 136–145)
TOTAL IRON BINDING CAPACITY: 329 UG/DL (ref 250–450)
TOTAL PROTEIN: 5.9 G/DL (ref 6.4–8.3)
WBC # BLD: 16.5 K/UL (ref 4–11)

## 2020-11-18 PROCEDURE — 94762 N-INVAS EAR/PLS OXIMTRY CONT: CPT

## 2020-11-18 PROCEDURE — 83540 ASSAY OF IRON: CPT

## 2020-11-18 PROCEDURE — 83550 IRON BINDING TEST: CPT

## 2020-11-18 PROCEDURE — 6360000002 HC RX W HCPCS: Performed by: PHYSICIAN ASSISTANT

## 2020-11-18 PROCEDURE — 6370000000 HC RX 637 (ALT 250 FOR IP): Performed by: PHYSICIAN ASSISTANT

## 2020-11-18 PROCEDURE — 1200000002 HC SEMI PRIVATE SWING BED

## 2020-11-18 PROCEDURE — 94640 AIRWAY INHALATION TREATMENT: CPT

## 2020-11-18 PROCEDURE — 85027 COMPLETE CBC AUTOMATED: CPT

## 2020-11-18 PROCEDURE — 97110 THERAPEUTIC EXERCISES: CPT

## 2020-11-18 PROCEDURE — 2580000003 HC RX 258: Performed by: PHYSICIAN ASSISTANT

## 2020-11-18 PROCEDURE — 97535 SELF CARE MNGMENT TRAINING: CPT

## 2020-11-18 PROCEDURE — 97116 GAIT TRAINING THERAPY: CPT

## 2020-11-18 PROCEDURE — 36415 COLL VENOUS BLD VENIPUNCTURE: CPT

## 2020-11-18 PROCEDURE — 82728 ASSAY OF FERRITIN: CPT

## 2020-11-18 PROCEDURE — 94761 N-INVAS EAR/PLS OXIMETRY MLT: CPT

## 2020-11-18 PROCEDURE — 80053 COMPREHEN METABOLIC PANEL: CPT

## 2020-11-18 PROCEDURE — 2700000000 HC OXYGEN THERAPY PER DAY

## 2020-11-18 RX ORDER — FUROSEMIDE 40 MG/1
40 TABLET ORAL DAILY
Status: DISCONTINUED | OUTPATIENT
Start: 2020-11-19 | End: 2020-11-19 | Stop reason: HOSPADM

## 2020-11-18 RX ORDER — INSULIN GLARGINE 100 [IU]/ML
20 INJECTION, SOLUTION SUBCUTANEOUS EVERY MORNING
Status: DISCONTINUED | OUTPATIENT
Start: 2020-11-18 | End: 2020-11-19

## 2020-11-18 RX ADMIN — NYSTATIN 500000 UNITS: 100000 SUSPENSION ORAL at 19:59

## 2020-11-18 RX ADMIN — INSULIN LISPRO 4 UNITS: 100 INJECTION, SOLUTION INTRAVENOUS; SUBCUTANEOUS at 20:02

## 2020-11-18 RX ADMIN — BUDESONIDE 500 MCG: 0.5 SUSPENSION RESPIRATORY (INHALATION) at 06:00

## 2020-11-18 RX ADMIN — PANTOPRAZOLE SODIUM 40 MG: 40 TABLET, DELAYED RELEASE ORAL at 09:06

## 2020-11-18 RX ADMIN — BUDESONIDE 500 MCG: 0.5 SUSPENSION RESPIRATORY (INHALATION) at 18:31

## 2020-11-18 RX ADMIN — ASPIRIN 81 MG: 81 TABLET, COATED ORAL at 09:05

## 2020-11-18 RX ADMIN — KETOTIFEN FUMARATE 1 DROP: 0.35 SOLUTION/ DROPS OPHTHALMIC at 20:01

## 2020-11-18 RX ADMIN — INSULIN LISPRO 8 UNITS: 100 INJECTION, SOLUTION INTRAVENOUS; SUBCUTANEOUS at 09:14

## 2020-11-18 RX ADMIN — NYSTATIN 500000 UNITS: 100000 SUSPENSION ORAL at 09:05

## 2020-11-18 RX ADMIN — INSULIN LISPRO 8 UNITS: 100 INJECTION, SOLUTION INTRAVENOUS; SUBCUTANEOUS at 17:43

## 2020-11-18 RX ADMIN — FENOFIBRATE 54 MG: 54 TABLET, FILM COATED ORAL at 09:05

## 2020-11-18 RX ADMIN — INSULIN LISPRO 8 UNITS: 100 INJECTION, SOLUTION INTRAVENOUS; SUBCUTANEOUS at 17:45

## 2020-11-18 RX ADMIN — ROPINIROLE HYDROCHLORIDE 0.5 MG: 0.25 TABLET, FILM COATED ORAL at 09:06

## 2020-11-18 RX ADMIN — GUAIFENESIN 1200 MG: 600 TABLET, EXTENDED RELEASE ORAL at 20:00

## 2020-11-18 RX ADMIN — ALLOPURINOL 150 MG: 100 TABLET ORAL at 09:22

## 2020-11-18 RX ADMIN — APIXABAN 5 MG: 5 TABLET, FILM COATED ORAL at 20:00

## 2020-11-18 RX ADMIN — INSULIN LISPRO 8 UNITS: 100 INJECTION, SOLUTION INTRAVENOUS; SUBCUTANEOUS at 11:50

## 2020-11-18 RX ADMIN — PREDNISONE 20 MG: 20 TABLET ORAL at 09:06

## 2020-11-18 RX ADMIN — SUCRALFATE 1 G: 1 TABLET ORAL at 05:21

## 2020-11-18 RX ADMIN — GUAIFENESIN 1200 MG: 600 TABLET, EXTENDED RELEASE ORAL at 09:05

## 2020-11-18 RX ADMIN — NYSTATIN 500000 UNITS: 100000 SUSPENSION ORAL at 17:43

## 2020-11-18 RX ADMIN — DILTIAZEM HYDROCHLORIDE 180 MG: 180 CAPSULE, COATED, EXTENDED RELEASE ORAL at 09:05

## 2020-11-18 RX ADMIN — Medication 2000 UNITS: at 09:05

## 2020-11-18 RX ADMIN — ALOGLIPTIN 6.25 MG: 12.5 TABLET, FILM COATED ORAL at 09:05

## 2020-11-18 RX ADMIN — PIPERACILLIN AND TAZOBACTAM 3.38 G: 3; .375 INJECTION, POWDER, LYOPHILIZED, FOR SOLUTION INTRAVENOUS at 05:21

## 2020-11-18 RX ADMIN — POTASSIUM CHLORIDE 20 MEQ: 20 TABLET, EXTENDED RELEASE ORAL at 09:06

## 2020-11-18 RX ADMIN — INSULIN GLARGINE 20 UNITS: 100 INJECTION, SOLUTION SUBCUTANEOUS at 09:12

## 2020-11-18 RX ADMIN — SUCRALFATE 1 G: 1 TABLET ORAL at 11:52

## 2020-11-18 RX ADMIN — IPRATROPIUM BROMIDE AND ALBUTEROL SULFATE 1 AMPULE: .5; 3 SOLUTION RESPIRATORY (INHALATION) at 13:28

## 2020-11-18 RX ADMIN — MONTELUKAST SODIUM 10 MG: 10 TABLET, COATED ORAL at 20:00

## 2020-11-18 RX ADMIN — ATORVASTATIN CALCIUM 40 MG: 40 TABLET, FILM COATED ORAL at 09:05

## 2020-11-18 RX ADMIN — APIXABAN 5 MG: 5 TABLET, FILM COATED ORAL at 09:07

## 2020-11-18 RX ADMIN — FLUTICASONE PROPIONATE 1 SPRAY: 50 SPRAY, METERED NASAL at 20:01

## 2020-11-18 RX ADMIN — ROPINIROLE HYDROCHLORIDE 0.5 MG: 0.25 TABLET, FILM COATED ORAL at 20:00

## 2020-11-18 RX ADMIN — METOPROLOL SUCCINATE 100 MG: 100 TABLET, EXTENDED RELEASE ORAL at 09:06

## 2020-11-18 RX ADMIN — LEVOFLOXACIN 750 MG: 5 INJECTION, SOLUTION INTRAVENOUS at 11:52

## 2020-11-18 RX ADMIN — ROFLUMILAST 500 MCG: 500 TABLET ORAL at 09:06

## 2020-11-18 RX ADMIN — DOCUSATE CALCIUM 240 MG: 240 CAPSULE, LIQUID FILLED ORAL at 09:05

## 2020-11-18 RX ADMIN — IPRATROPIUM BROMIDE AND ALBUTEROL SULFATE 1 AMPULE: .5; 3 SOLUTION RESPIRATORY (INHALATION) at 06:00

## 2020-11-18 RX ADMIN — KETOTIFEN FUMARATE 1 DROP: 0.35 SOLUTION/ DROPS OPHTHALMIC at 09:18

## 2020-11-18 RX ADMIN — CETIRIZINE HYDROCHLORIDE 5 MG: 5 TABLET ORAL at 09:06

## 2020-11-18 RX ADMIN — FUROSEMIDE 40 MG: 40 TABLET ORAL at 09:05

## 2020-11-18 RX ADMIN — INSULIN LISPRO 8 UNITS: 100 INJECTION, SOLUTION INTRAVENOUS; SUBCUTANEOUS at 11:52

## 2020-11-18 RX ADMIN — NYSTATIN 500000 UNITS: 100000 SUSPENSION ORAL at 15:22

## 2020-11-18 RX ADMIN — SUCRALFATE 1 G: 1 TABLET ORAL at 15:22

## 2020-11-18 RX ADMIN — FLUTICASONE PROPIONATE 1 SPRAY: 50 SPRAY, METERED NASAL at 09:18

## 2020-11-18 RX ADMIN — IPRATROPIUM BROMIDE AND ALBUTEROL SULFATE 1 AMPULE: .5; 3 SOLUTION RESPIRATORY (INHALATION) at 18:31

## 2020-11-18 RX ADMIN — PIPERACILLIN AND TAZOBACTAM 3.38 G: 3; .375 INJECTION, POWDER, LYOPHILIZED, FOR SOLUTION INTRAVENOUS at 20:00

## 2020-11-18 RX ADMIN — PIPERACILLIN AND TAZOBACTAM 3.38 G: 3; .375 INJECTION, POWDER, LYOPHILIZED, FOR SOLUTION INTRAVENOUS at 15:22

## 2020-11-18 RX ADMIN — INSULIN GLARGINE 40 UNITS: 100 INJECTION, SOLUTION SUBCUTANEOUS at 20:01

## 2020-11-18 ASSESSMENT — PAIN DESCRIPTION - LOCATION: LOCATION: BACK

## 2020-11-18 NOTE — FLOWSHEET NOTE
11/17/20 2030   Assessment   Charting Type Shift assessment   Neurological   Neuro (WDL) WDL   Level of Consciousness 0   [place] Coma Scale   Eye Opening 4   Best Verbal Response 5   Best Motor Response 6   [place] Coma Scale Score 15   HEENT   HEENT (WDL) X   Right Eye Impaired vision   Left Eye Impaired vision   Voice Normal   Teeth Dentures upper   Respiratory   Respiratory (WDL) X   Respiratory Pattern Regular   Respiratory Depth Normal   Respiratory Quality/Effort Dyspnea with exertion   Chest Assessment Chest expansion symmetrical;Trachea midline   L Breath Sounds Rhonchi   R Breath Sounds Rhonchi   Breath Sounds   Right Upper Lobe Rhonchi   Right Middle Lobe Rhonchi   Right Lower Lobe Rhonchi   Left Upper Lobe Rhonchi   Left Lower Lobe Rhonchi   Cough/Sputum   Sputum How Obtained Spontaneous cough   Cough Congested;Productive   Sputum Amount Small   Sputum Color White;Yellow   Tenacity Thick   Cardiac   Cardiac (WDL) WDL   Cardiac Monitor   Telemetry Monitor On No   Gastrointestinal   Abdominal (WDL) WDL   Peripheral Vascular   Peripheral Vascular (WDL) WDL   Skin Color/Condition   Skin Color/Condition (WDL) WDL   Skin Integrity   Skin Integrity (WDL) WDL   Skin Integrity Bruising   Location Scattered    Musculoskeletal   Musculoskeletal (WDL) X   RUE Full movement   LUE Full movement   RL Extremity Weakness   LL Extremity Weakness   Genitourinary   Genitourinary (WDL) WDL   Urine Assessment   Incontinence No   Psychosocial   Psychosocial (WDL) WDL

## 2020-11-18 NOTE — PLAN OF CARE
Problem: Falls - Risk of:  Goal: Will remain free from falls  Description: Will remain free from falls  Outcome: Ongoing  Goal: Absence of physical injury  Description: Absence of physical injury  Outcome: Ongoing     Problem: Safety:  Goal: Free from accidental physical injury  Description: Free from accidental physical injury  Outcome: Ongoing     Problem: Daily Care:  Goal: Daily care needs are met  Description: Daily care needs are met  Outcome: Ongoing     Problem: Skin Integrity:  Goal: Skin integrity will stabilize  Description: Skin integrity will stabilize  Outcome: Ongoing     Problem: Discharge Planning:  Goal: Patients continuum of care needs are met  Description: Patients continuum of care needs are met  Outcome: Ongoing

## 2020-11-18 NOTE — PROGRESS NOTES
Physical Therapy  Facility/Department: [facility] MED SURG  Daily Treatment Note  NAME: Edelmiro Lefort  : 1950  MRN: [id number]    Date of Service: 2020    Discharge Recommendations:  Continue to assess pending progress      Assessment   Body structures, Functions, Activity limitations: Decreased balance;Decreased functional mobility ; Decreased strength;Decreased endurance  Assessment: Patient awake in bed on 3 LO2, SpO2 97%. Patient completed bed mobility tasks with Mod I and sat EOB for B LE therex. Ambulated 60 feet and 130 feet with RW, 3LO2, and supervision. O2 sats remained above 90% throughout session. Treatment Diagnosis: Respiratory failure; general weakness; functional decline  Prognosis: Excellent  REQUIRES PT FOLLOW UP: Yes  Activity Tolerance  Activity Tolerance: Patient Tolerated treatment well     Patient Diagnosis(es): There were no encounter diagnoses. has a past medical history of Anemia, Anxiety, Asthma, Cataract, COPD (chronic obstructive pulmonary disease) ([facility] [address] DDD (degenerative disc disease), cervical, Depression, Diabetes mellitus type 2, GERD (gastroesophageal reflux disease), Gout, History of uterine cancer, Hyperlipidemia, and Hypertension. has a past surgical history that includes Tonsillectomy ();  section (, ); Lung surgery; Neck surgery; Cataract removal with implant (); Hysterectomy; Colonoscopy (2013); Upper gastrointestinal endoscopy (N/A, 2019); and Colonoscopy (N/A, 2019). Restrictions  Restrictions/Precautions  Restrictions/Precautions: Cardiac, General Precautions, Fall Risk  Required Braces or Orthoses?: No  Subjective   General  Chart Reviewed: Yes  Response To Previous Treatment: Patient with no complaints from previous session. Family / Caregiver Present: No  Referring Practitioner: Kashif Givens MD  Subjective  Subjective: Patient states she is doing much better today and plans to go home tomorrow.   Pain

## 2020-11-18 NOTE — PROGRESS NOTES
Occupational Therapy  Facility/Department: [facility] MED SURG  Daily Treatment Note  NAME: Nadira Dorado  : 1950  MRN: [id number]    Date of Service: 2020    Discharge Recommendations:          Assessment   Assessment: Pt agreeable to O services. Pt transferred to shower with SUP. Pt completed shower with CHRISTIAN for UB/LB bathing. Pt tolerated well on 3L 02. Pt completed shower seated and standing. Pt donned clothing with CHRISTIAN. Pt returned to EOB with call light in reach and nurse present in room. Patient Diagnosis(es): There were no encounter diagnoses. has a past medical history of Anemia, Anxiety, Asthma, Cataract, COPD (chronic obstructive pulmonary disease) ([facility] [address] DDD (degenerative disc disease), cervical, Depression, Diabetes mellitus type 2, GERD (gastroesophageal reflux disease), Gout, History of uterine cancer, Hyperlipidemia, and Hypertension. has a past surgical history that includes Tonsillectomy ();  section (, ); Lung surgery; Neck surgery; Cataract removal with implant (); Hysterectomy; Colonoscopy (2013); Upper gastrointestinal endoscopy (N/A, 2019); and Colonoscopy (N/A, 2019). Restrictions  Restrictions/Precautions  Restrictions/Precautions: Cardiac, General Precautions, Fall Risk  Required Braces or Orthoses?: No  Subjective   General  Chart Reviewed: Yes  Patient assessed for rehabilitation services?: Yes  Family / Caregiver Present: No  Referring Practitioner: Micheal Garcia PA-C  Diagnosis: acute onset respiratory failure  Subjective  Subjective: Pt reports she is feeling better today. Pt states she has been sick since last week. Pt states her 02 sats were dropping. pain 8/10 reported. pt agreeable to OT services. Therapy Time   Individual Concurrent Group Co-treatment   Time In 9811         Time Out 0323         Minutes 26              This note serves as a DC summary in the event of pt discharge.      Sylvia Abreu OTR/TIGRE

## 2020-11-18 NOTE — CARE COORDINATION
Interdisciplinary rounding completed. Jude Arreola (provider rep), Cari Ramos (), Glynn Mcconnell (nursing), Kavya (PT), Sylvia (OT), Curtis Westbrook (pharmacy), and Letha (dietitian) all involved. Activities reviewed with OT.      lives alone. [facility] daily. Has BiPAP, O2, cane, neb, Rw ([employer]). [facility] at DC. DME per therapy recs Pt nearing cleared for DC to home when medically stable. [facility] will assist with ADLs as needed pt wt: 180.7lb; 3% decrease this week. Pt is on lower calorie carb control low sodium diet. 2 Lfluid restriction. Pt is also on diuretic. Pt eating % of meals. Zosyn 3.375g IV q8h and Levaquin 750mg IV q48h (D7 each) for COPD exacerbation and pneumonia. Eliquis 5mg po BID for a-fib. Pt is doing well with therapy. Pt is wanting to go home tomorrow with [facility]. Therapy and med team agreeable. Will plan on DC in AM.  HH will be ordered with [facility] (previous provider). No further DC needs noted at this time.

## 2020-11-19 ENCOUNTER — TELEPHONE (OUTPATIENT)
Dept: PRIMARY CARE CLINIC | Age: 70
End: 2020-11-19

## 2020-11-19 VITALS
DIASTOLIC BLOOD PRESSURE: 70 MMHG | BODY MASS INDEX: 38.62 KG/M2 | HEART RATE: 79 BPM | TEMPERATURE: 97.5 F | RESPIRATION RATE: 18 BRPM | OXYGEN SATURATION: 93 % | SYSTOLIC BLOOD PRESSURE: 140 MMHG | HEIGHT: 58 IN | WEIGHT: 184 LBS

## 2020-11-19 LAB
ANION GAP SERPL CALCULATED.3IONS-SCNC: 8 MMOL/L (ref 3–16)
BUN BLDV-MCNC: 56 MG/DL (ref 6–20)
CALCIUM SERPL-MCNC: 9.2 MG/DL (ref 8.5–10.5)
CHLORIDE BLD-SCNC: 98 MMOL/L (ref 98–107)
CO2: 36 MMOL/L (ref 20–30)
CREAT SERPL-MCNC: 1.6 MG/DL (ref 0.4–1.2)
GFR AFRICAN AMERICAN: 39
GFR NON-AFRICAN AMERICAN: 32
GLUCOSE BLD-MCNC: 255 MG/DL (ref 74–106)
GLUCOSE BLD-MCNC: 274 MG/DL (ref 74–106)
GLUCOSE BLD-MCNC: 325 MG/DL (ref 74–106)
HCT VFR BLD CALC: 38 % (ref 37–47)
HEMOGLOBIN: 10.9 G/DL (ref 11.5–16.5)
MCH RBC QN AUTO: 27.1 PG (ref 27–32)
MCHC RBC AUTO-ENTMCNC: 28.7 G/DL (ref 31–35)
MCV RBC AUTO: 94.5 FL (ref 80–100)
PDW BLD-RTO: 15.9 % (ref 11–16)
PERFORMED ON: ABNORMAL
PERFORMED ON: ABNORMAL
PLATELET # BLD: 338 K/UL (ref 150–400)
PMV BLD AUTO: 11.2 FL (ref 6–10)
POTASSIUM SERPL-SCNC: 4.1 MMOL/L (ref 3.4–5.1)
RBC # BLD: 4.02 M/UL (ref 3.8–5.8)
SODIUM BLD-SCNC: 142 MMOL/L (ref 136–145)
WBC # BLD: 18.8 K/UL (ref 4–11)

## 2020-11-19 PROCEDURE — 6370000000 HC RX 637 (ALT 250 FOR IP): Performed by: PHYSICIAN ASSISTANT

## 2020-11-19 PROCEDURE — 6360000002 HC RX W HCPCS: Performed by: PHYSICIAN ASSISTANT

## 2020-11-19 PROCEDURE — 85027 COMPLETE CBC AUTOMATED: CPT

## 2020-11-19 PROCEDURE — 94668 MNPJ CHEST WALL SBSQ: CPT

## 2020-11-19 PROCEDURE — 94640 AIRWAY INHALATION TREATMENT: CPT

## 2020-11-19 PROCEDURE — 99315 NF DSCHRG MGMT 30 MIN/LESS: CPT | Performed by: INTERNAL MEDICINE

## 2020-11-19 PROCEDURE — 94761 N-INVAS EAR/PLS OXIMETRY MLT: CPT

## 2020-11-19 PROCEDURE — 97110 THERAPEUTIC EXERCISES: CPT

## 2020-11-19 PROCEDURE — 80048 BASIC METABOLIC PNL TOTAL CA: CPT

## 2020-11-19 PROCEDURE — 2700000000 HC OXYGEN THERAPY PER DAY

## 2020-11-19 PROCEDURE — 36415 COLL VENOUS BLD VENIPUNCTURE: CPT

## 2020-11-19 PROCEDURE — 2580000003 HC RX 258: Performed by: PHYSICIAN ASSISTANT

## 2020-11-19 RX ORDER — INSULIN GLARGINE 100 [IU]/ML
30 INJECTION, SOLUTION SUBCUTANEOUS EVERY MORNING
Status: DISCONTINUED | OUTPATIENT
Start: 2020-11-19 | End: 2020-11-19 | Stop reason: HOSPADM

## 2020-11-19 RX ORDER — FUROSEMIDE 40 MG/1
40 TABLET ORAL DAILY
Qty: 60 TABLET | Refills: 5 | Status: SHIPPED
Start: 2020-11-19 | End: 2020-11-30

## 2020-11-19 RX ORDER — INSULIN GLARGINE 100 [IU]/ML
50 INJECTION, SOLUTION SUBCUTANEOUS NIGHTLY
Status: DISCONTINUED | OUTPATIENT
Start: 2020-11-19 | End: 2020-11-19 | Stop reason: HOSPADM

## 2020-11-19 RX ORDER — GUAIFENESIN 600 MG/1
1200 TABLET, EXTENDED RELEASE ORAL 2 TIMES DAILY
Qty: 20 TABLET | Refills: 0 | Status: SHIPPED | OUTPATIENT
Start: 2020-11-19 | End: 2020-11-24

## 2020-11-19 RX ORDER — ECHINACEA PURPUREA EXTRACT 125 MG
1 TABLET ORAL PRN
Qty: 1 BOTTLE | Refills: 3 | Status: SHIPPED | OUTPATIENT
Start: 2020-11-19

## 2020-11-19 RX ORDER — PREDNISONE 10 MG/1
10 TABLET ORAL DAILY
Qty: 3 TABLET | Refills: 0 | Status: SHIPPED | OUTPATIENT
Start: 2020-11-24 | End: 2020-11-27

## 2020-11-19 RX ADMIN — INSULIN GLARGINE 30 UNITS: 100 INJECTION, SOLUTION SUBCUTANEOUS at 09:08

## 2020-11-19 RX ADMIN — PIPERACILLIN AND TAZOBACTAM 3.38 G: 3; .375 INJECTION, POWDER, LYOPHILIZED, FOR SOLUTION INTRAVENOUS at 05:14

## 2020-11-19 RX ADMIN — ASPIRIN 81 MG: 81 TABLET, COATED ORAL at 09:06

## 2020-11-19 RX ADMIN — PREDNISONE 20 MG: 20 TABLET ORAL at 09:07

## 2020-11-19 RX ADMIN — SUCRALFATE 1 G: 1 TABLET ORAL at 11:48

## 2020-11-19 RX ADMIN — FLUTICASONE PROPIONATE 1 SPRAY: 50 SPRAY, METERED NASAL at 09:10

## 2020-11-19 RX ADMIN — IPRATROPIUM BROMIDE AND ALBUTEROL SULFATE 1 AMPULE: .5; 3 SOLUTION RESPIRATORY (INHALATION) at 05:37

## 2020-11-19 RX ADMIN — ROFLUMILAST 500 MCG: 500 TABLET ORAL at 09:06

## 2020-11-19 RX ADMIN — GUAIFENESIN 1200 MG: 600 TABLET, EXTENDED RELEASE ORAL at 09:06

## 2020-11-19 RX ADMIN — METOPROLOL SUCCINATE 100 MG: 100 TABLET, EXTENDED RELEASE ORAL at 09:06

## 2020-11-19 RX ADMIN — DOCUSATE CALCIUM 240 MG: 240 CAPSULE, LIQUID FILLED ORAL at 09:07

## 2020-11-19 RX ADMIN — BUDESONIDE 500 MCG: 0.5 SUSPENSION RESPIRATORY (INHALATION) at 05:37

## 2020-11-19 RX ADMIN — INSULIN LISPRO 8 UNITS: 100 INJECTION, SOLUTION INTRAVENOUS; SUBCUTANEOUS at 11:57

## 2020-11-19 RX ADMIN — ALLOPURINOL 150 MG: 100 TABLET ORAL at 09:07

## 2020-11-19 RX ADMIN — Medication 2000 UNITS: at 09:06

## 2020-11-19 RX ADMIN — ATORVASTATIN CALCIUM 40 MG: 40 TABLET, FILM COATED ORAL at 09:07

## 2020-11-19 RX ADMIN — NYSTATIN 500000 UNITS: 100000 SUSPENSION ORAL at 09:06

## 2020-11-19 RX ADMIN — POTASSIUM CHLORIDE 20 MEQ: 20 TABLET, EXTENDED RELEASE ORAL at 09:07

## 2020-11-19 RX ADMIN — IPRATROPIUM BROMIDE AND ALBUTEROL SULFATE 1 AMPULE: .5; 3 SOLUTION RESPIRATORY (INHALATION) at 09:02

## 2020-11-19 RX ADMIN — ALOGLIPTIN 6.25 MG: 12.5 TABLET, FILM COATED ORAL at 09:07

## 2020-11-19 RX ADMIN — PANTOPRAZOLE SODIUM 40 MG: 40 TABLET, DELAYED RELEASE ORAL at 09:06

## 2020-11-19 RX ADMIN — DILTIAZEM HYDROCHLORIDE 180 MG: 180 CAPSULE, COATED, EXTENDED RELEASE ORAL at 09:07

## 2020-11-19 RX ADMIN — FENOFIBRATE 54 MG: 54 TABLET, FILM COATED ORAL at 09:06

## 2020-11-19 RX ADMIN — CETIRIZINE HYDROCHLORIDE 5 MG: 5 TABLET ORAL at 09:06

## 2020-11-19 RX ADMIN — ROPINIROLE HYDROCHLORIDE 0.5 MG: 0.25 TABLET, FILM COATED ORAL at 09:06

## 2020-11-19 RX ADMIN — INSULIN LISPRO 8 UNITS: 100 INJECTION, SOLUTION INTRAVENOUS; SUBCUTANEOUS at 11:54

## 2020-11-19 RX ADMIN — APIXABAN 5 MG: 5 TABLET, FILM COATED ORAL at 09:06

## 2020-11-19 RX ADMIN — NYSTATIN 500000 UNITS: 100000 SUSPENSION ORAL at 11:48

## 2020-11-19 RX ADMIN — KETOTIFEN FUMARATE 1 DROP: 0.35 SOLUTION/ DROPS OPHTHALMIC at 09:10

## 2020-11-19 RX ADMIN — FUROSEMIDE 40 MG: 40 TABLET ORAL at 09:06

## 2020-11-19 RX ADMIN — INSULIN LISPRO 8 UNITS: 100 INJECTION, SOLUTION INTRAVENOUS; SUBCUTANEOUS at 09:09

## 2020-11-19 RX ADMIN — SUCRALFATE 1 G: 1 TABLET ORAL at 05:13

## 2020-11-19 RX ADMIN — INSULIN LISPRO 6 UNITS: 100 INJECTION, SOLUTION INTRAVENOUS; SUBCUTANEOUS at 09:08

## 2020-11-19 NOTE — DISCHARGE SUMMARY
Discharge Summary      Patient ID: Shawn Hendrix      Patient's PCP: JADE Hernandez    Admit Date: 11/12/2020     Discharge Date:  11/19/2020    Admitting Provider: Marysol Dozier MD    Discharging Provider: JASON Britt     Reason for this admission:   Declining functional status   Acute on chronic respiratory failure with hypoxia     Discharge Diagnoses: Active Hospital Problems    Diagnosis Date Noted    Declining functional status [R53.81] 11/12/2020    Thrush [B37.0] 11/10/2020    TENZIN (obstructive sleep apnea) [G47.33] 11/10/2020    Paroxysmal atrial fibrillation ([facility] [I48.0] 11/09/2020    Acute on chronic respiratory failure with hypoxia (HCC) [J96.21] 11/09/2020    Morbid obesity with BMI of 40.0-44.9, adult ([facility] [E66.01, Z68.41] 08/07/2020    Gastroesophageal reflux disease [K21.9]     Chronic diastolic heart failure ([facility] [I50.32] 08/30/2019    Hypertension [I10]     Type 2 diabetes mellitus with complication, with long-term current use of insulin ([facility] [E11.8, Z79.4] 12/05/2017    COPD exacerbation ([facility] [J44.1] 12/04/2017    CRF (chronic renal failure) [N18.9] 02/22/2017    Pneumonia [J18.9] 02/24/2015    Hyperlipidemia [E78.5] 05/26/2011       Procedures:  XR CHEST (2 VW)   Final Result   Impression: Airspace opacity of the right lung base. Consults:   IP CONSULT TO CASE MANAGEMENT  IP CONSULT TO DIETITIAN  IP CONSULT TO HOME CARE NEEDS  PT/OT  SLP    Briefly:   Ms. Emory May is a 78 yo female with PMH of TENZIN, atrial fibrillation, chronic respiratory failure, HTN, diastolic HF, COPD, HLD, CKD, DM II, GERD, and others who was admitted due to acute on chronic respiratory failure.      Hospital Course:         Declining functional status [R53.81]  - PT/OT cleared for discharge home  -  following for dc planning assistance 11/12/2020    Thrush [B37.0]  - treated with nystatin swish and swallow  11/10/2020    TENZIN (obstructive sleep apnea) [G47.33]  - continue bipap qhs  - follow up with Dr. Subhash Alvarado. Per respiratory she may benefit from repeat sleep studies and setting titration for bipap.  11/10/2020    Paroxysmal atrial fibrillation ([facility] [I48.0]  - continue home eliquis, metoprolol, and diltiazem 11/09/2020    Acute on chronic respiratory failure with hypoxia ([facility]) [J96.21]  - In setting of COPD exacerbation and pneumonia  - Initially treated with IV steroid, DuoNebs, Pulmicort nebs, doxycycline and Rocephin with no improvement.  Patient with increased O2 demands on 11/11.  Repeat chest x-ray with no significant change.  Antibiotic regimen adjusted to IV Zosyn and Levaquin on 11/12.  Mucolytics and chest vest also added on 11/12.  - Continue supplemental oxygen to maintain sats greater than 90%; patient's O2 requirements have improved from 10L HFNC to 3L O2 11/16  - clinically improving on current regimen   - Repeat CXR 11/16 without significant change from previous however clinically much improved  - discharge home to continue home inhaler regimen and nebulizer as needed . Has completed antibiotic course. Complete PO steroid taper that was started in house as outpatient. 11/09/2020    Morbid obesity with BMI of 40.0-44.9, adult ([facility]) [E66.01, T55.28]  - complicates all aspects of care  - encourage weight loss  08/07/2020    Gastroesophageal reflux disease [K21.9]  - continue home regimen      Chronic diastolic heart failure ([facility] [I50.32]  - ECHO March 2020 from [facility] reviewed and patient with grade 1 diastolic dysfunction   - follows with cardiology Dr. Leilani Vuong and most recent note 10/29 reviewed  - with worsening BUN 81 and patient appearing dehydrated on exam with hypotension lasix held 11/16  - add daily weight and strict I&Os as well as low sodium diet with fluid restriciton   - Resumed lasix at decreased dose 40 mg daily. She will monitor swelling and weight.  Will restart additional 40 mg if needed  - repeat labs ordered 08/30/2019    Hypertension [I10]  - BP low and home lasix and diltiazem placed on hold however ipmroved and medications resumed       Type 2 diabetes mellitus with complication, with long-term current use of insulin (HCC) [E11.8, Z79.4]  - A1c 9.3 on 7/22/2020; A1c 8.4 on 11/10  - resume home regimen on discharge. Patient states she monitors glucose three times daily and feels comfortable titrating dose as needed. - encourage diabetic diet 12/05/2017    COPD exacerbation ([facility] [J44.1]  - see under respiratory failure  12/04/2017    CRF (chronic renal failure) [N18.9]  - Baseline BUN/Cr around 50/2  - Trended up with BUN 81/Cr 2.1 11/16  - home lasix held for couple of days with hypotension, worsening renal failure, and patient appears dehydrated on exam. Resumed at decreased dose 11/18.  - avoid nephrotoxic agtent as able  -BUN 56/creatinine 1.6 on day of discharge  -Repeat labs ordered as outpatient 02/22/2017    Pneumonia [J18.9]  - see above  02/24/2015    Hyperlipidemia [E78.5]  - continue home regimen 05/26/2011   Of note, patient reports she is following with oncologist Dr. Ab Ni at [facility] due to some abnormal findings on her ovaries. States she was referred by her gynecologist.  She does have a history of uterine cancer many years ago. Reports she missed her appointment with Dr. Ab Ni due to this hospitalization.  notified and is attempting to reschedule the appointment for the patient prior to discharge. Vital Signs  Temp: 97.5 °F (36.4 °C)  Pulse: 79  Resp: 18  BP: (!) 140/70  SpO2: 93 %  O2 Device: Nasal cannula  O2 Flow Rate (L/min): 3 L/min    Vital signs reviewed in electronic chart. Physical exam  Constitutional:  Well developed, well nourished, obese, no acute distress. Eyes:  PERRL, conjunctiva normal, EOMI. HENT:  Atraumatic, external ears normal, external nose/nares normal, oropharynx moist, no pharyngeal exudates. Neck:  Supple. No JVD or thyromegaly.   Respiratory:  No respiratory distress as needed for Congestion  Qty: 1 Bottle, Refills: 3      predniSONE (DELTASONE) 10 MG tablet Take 1 tablet by mouth daily for 3 days  Qty: 3 tablet, Refills: 0      guaiFENesin (MUCINEX) 600 MG extended release tablet Take 2 tablets by mouth 2 times daily for 5 days  Qty: 20 tablet, Refills: 0              Details   furosemide (LASIX) 40 MG tablet Take 1 tablet by mouth daily  Qty: 60 tablet, Refills: 5    Associated Diagnoses: Wheezing; COPD exacerbation ([facility] Swelling              Details   ASPIRIN LOW DOSE 81 MG EC tablet TAKE ONE TABLET BY MOUTH EVERY DAY  Qty: 30 tablet, Refills: 5      potassium chloride (KLOR-CON M) 20 MEQ extended release tablet TAKE ONE TABLET BY MOUTH EVERY DAY  Qty: 30 tablet, Refills: 5    Associated Diagnoses: COPD exacerbation ([facility] Swelling      traMADol (ULTRAM) 50 MG tablet TAKE ONE TABLET BY MOUTH THREE TIMES A DAY AS NEEDED  Qty: 90 tablet, Refills: 0    Comments: Reduce doses taken as pain becomes manageable  Associated Diagnoses: Chronic bilateral low back pain without sciatica      Lancets MISC 1 each by Does not apply route 3 times daily Freestyle Dx E11.9  Qty: 200 each, Refills: 5      HYDROcodone-acetaminophen (NORCO) 5-325 MG per tablet TAKE ONE TABLET BY MOUTH EVERY DAY AS NEEDED FOR PAIN  Qty: 30 tablet, Refills: 0    Comments: Reduce doses taken as pain becomes manageable  Associated Diagnoses: Chronic right-sided low back pain with right-sided sciatica      ELIQUIS 5 MG TABS tablet TAKE 1 TABLET BY MOUTH EVERY 12 HOURS  Qty: 60 tablet, Refills: 3      busPIRone (BUSPAR) 10 MG tablet TAKE ONE TABLET BY MOUTH THREE TIMES A DAY  Qty: 90 tablet, Refills: 3    Associated Diagnoses: Anxiety      dilTIAZem (CARDIZEM CD) 180 MG extended release capsule TAKE 1 CAPSULE BY MOUTH DAILY  Qty: 30 capsule, Refills: 3      DALIRESP 500 MCG tablet TAKE ONE TABLET BY MOUTH EVERY DAY  Qty: 30 tablet, Refills: 2    Associated Diagnoses: COPD exacerbation ([facility]  Severe persistent asthma with acute exacerbation      JANUVIA 100 MG tablet TAKE ONE TABLET BY MOUTH EVERY DAY  Qty: 30 tablet, Refills: 2      tiZANidine (ZANAFLEX) 4 MG tablet TAKE 1 TABLET BY MOUTH 2 TIMES DAILY AS NEEDED (BACK PAIN)  Qty: 60 tablet, Refills: 0    Associated Diagnoses: Chronic left-sided low back pain with left-sided sciatica      blood glucose test strips (TRUE METRIX PRO BLOOD GLUCOSE) strip USE TO BLOOD SUGAR FOUR TIMES A DAY  Qty: 100 each, Refills: 3    Associated Diagnoses: Type 2 diabetes mellitus with stage 3 chronic kidney disease, with long-term current use of insulin ([facility]  Hyperglycemia      rOPINIRole (REQUIP) 0.5 MG tablet TAKE ONE TABLET BY MOUTH TWO TIMES A DAY  Qty: 60 tablet, Refills: 3      VASCEPA 1 g CAPS capsule TAKE TWO CAPSULES BY MOUTH TWO TIMES A DAY  Qty: 60 capsule, Refills: 3    Associated Diagnoses: Pure hypercholesterolemia      metOLazone (ZAROXOLYN) 5 MG tablet TAKE 1 TABLET BY MOUTH DAILY AS NEEDED (SWELLING)  Qty: 30 tablet, Refills: 3    Associated Diagnoses: Swelling      insulin lispro (HUMALOG) 100 UNIT/ML injection vial INJECT 38 UNITS UNDER THE SKIN THREE TIMES A DAY BEFORE MEALS  Qty: 30 mL, Refills: 4    Associated Diagnoses: Type 2 diabetes mellitus with stage 3 chronic kidney disease, with long-term current use of insulin ([facility])      metoprolol succinate (TOPROL XL) 100 MG extended release tablet TAKE 1 TABLET BY MOUTH DAILY  Qty: 30 tablet, Refills: 5    Associated Diagnoses: Essential hypertension      allopurinol (ZYLOPRIM) 300 MG tablet TAKE 1/2 TABLET BY MOUTH EVERY DAY  Qty: 30 tablet, Refills: 2    Associated Diagnoses: Chronic gout without tophus, unspecified cause, unspecified site      atorvastatin (LIPITOR) 40 MG tablet Take 40 mg by mouth daily     Associated Diagnoses: Familial hypercholesterolemia      fenofibrate (TRICOR) 48 MG tablet Take 48 mg by mouth daily     Associated Diagnoses: Familial hypercholesterolemia      SPIRIVA HANDIHALER 18 MCG inhalation capsule 18 mcg daily     Associated Diagnoses: Chronic bronchitis, unspecified chronic bronchitis type (HCC)      montelukast (SINGULAIR) 10 MG tablet TAKE ONE TABLET BY MOUTH EVERY DAY  Qty: 30 tablet, Refills: 2    Associated Diagnoses: Chronic bronchitis, unspecified chronic bronchitis type (HCC)      Insulin Syringe-Needle U-100 31G X 5/16\" 1 ML MISC USE FOR INSULIN INJECTIONS FIVE TIMES DAILY  Qty: 150 each, Refills: 4    Associated Diagnoses: Type 2 diabetes mellitus with complication, with long-term current use of insulin ([facility])      BD PEN NEEDLE JUAN CARLOS U/F 32G X 4 MM MISC USE WITH LANTUS ONCE DAILY      Insulin Degludec (TRESIBA) 100 UNIT/ML SOLN 35 units in the am and 80 in the evening.   Qty: 5 vial, Refills: 5    Associated Diagnoses: Type 2 diabetes mellitus with complication, with long-term current use of insulin ([facility])      sucralfate (CARAFATE) 1 GM tablet TAKE ONE TABLET BY MOUTH THREE TIMES A DAY  Qty: 90 tablet, Refills: 3      docusate sodium (COLACE) 250 MG capsule Take 1 capsule by mouth daily  Qty: 90 capsule, Refills: 3    Associated Diagnoses: Chronic idiopathic constipation      ezetimibe (ZETIA) 10 MG tablet TAKE ONE TABLET BY MOUTH EVERY DAY  Qty: 90 tablet, Refills: 3      pantoprazole (PROTONIX) 40 MG tablet TAKE ONE TABLET BY MOUTH EVERY DAY  Qty: 90 tablet, Refills: 3    Associated Diagnoses: Schreiber's esophagus with dysplasia; Gastrointestinal hemorrhage associated with intestinal diverticulosis      PATADAY 0.2 % SOLN ophthalmic solution PLACE 1 DROP INTO BOTH EYES 2 TIMES DAILY  Qty: 2.5 mL, Refills: 3    Associated Diagnoses: Chronic bronchitis, unspecified chronic bronchitis type (HCC)      GNP VITAMIN D MAXIMUM STRENGTH 50 MCG (2000 UT) TABS Take 1 tablet by mouth daily   Refills: 5      fluticasone-salmeterol (ADVAIR) 500-50 MCG/DOSE diskus inhaler Inhale 1 puff into the lungs every 12 hours      omalizumab (OMALIZUMAB) 150 MG injection Inject 150 mg into the skin every 28 days  Qty: 1 vial, Refills: 3    Associated Diagnoses: Allergic rhinitis, unspecified seasonality, unspecified trigger      ipratropium-albuterol (DUONEB) 0.5-2.5 (3) MG/3ML SOLN nebulizer solution Inhale 3 mLs into the lungs every 4 hours  Qty: 360 mL, Refills: 3    Associated Diagnoses: Chronic bronchitis, unspecified chronic bronchitis type (HCC); COPD with acute exacerbation (HCC)      OXYGEN Inhale 3 L/min into the lungs continuous      flunisolide (NASALIDE) 25 MCG/ACT (0.025%) SOLN 2 sprays every 12 hours Both nostrils as needed per patient      albuterol sulfate HFA (PROAIR HFA) 108 (90 Base) MCG/ACT inhaler Inhale 2 puffs into the lungs every 4 hours as needed for Wheezing  Qty: 1 Inhaler, Refills: 5      loratadine (CLARITIN) 10 MG tablet TAKE ONE TABLET BY MOUTH EVERY DAY  Qty: 30 tablet, Refills: 4    Associated Diagnoses: Chronic bronchitis, unspecified chronic bronchitis type (HCC)           Patient was seen and examined by Dr. Jadyn Land and plan of care reviewed. Signed:  Electronically signed by JASON Ozuna on 11/19/2020 at 11:45 AM       Thank you JADE Martinez for the opportunity to be involved in this patient's care. If you have any questions or concerns please feel free to contact me at [phone].

## 2020-11-19 NOTE — TELEPHONE ENCOUNTER
Caller: KENDALL    Relationship to patient: [facility]    Best call back number: [phone]    PT WILL NOT BE ABLE TO MAKE APPT  11/ 19@ 3PM DUE TO HOSPITALIZATION.  SAME DAY CANCELLATION.    WILL NEED TO RESCHEDULE WITH OFFICE.

## 2020-11-19 NOTE — PROGRESS NOTES
Currently in Pain: Yes       Orientation  Orientation  Overall Orientation Status: Within Normal Limits  Cognition      Objective      Exercises  Heelslides: 2x10  Hip Abduction: 2x15  Ankle Pumps: 2x15      Goals  Long term goals  Time Frame for Long term goals : 1 week  Long term goal 1: Ambulate 100 feet x 2 with SBA-supervision with good safety awareness with O2 > 90%. Long term goal 2: Perform up to 20 minutes of PT without fatigue and O2 sats > 90%. Long term goal 3: Demonstrate good safety awareness with all functional mobility activities. Plan    Plan  Times per week: 4-5 days per week  Plan weeks: 1 week  Current Treatment Recommendations: Strengthening, Balance Training, Endurance Training, Neuromuscular Re-education, Home Exercise Program, Gait Training, Transfer Training, Safety Education & Training  Safety Devices  Type of devices: Left in bed, Call light within reach     Therapy Time   Individual Concurrent Group Co-treatment   Time In 1020         Time Out 4131         Minutes 12              This note serves as D/C summary if patient is discharged prior to next visit.   Ana Duff, PTA

## 2020-11-19 NOTE — PROGRESS NOTES
Placed call to [address] [place] [address] office to get an appointment for patient.  Left VM

## 2020-11-19 NOTE — FLOWSHEET NOTE
11/18/20 2112   Assessment   Charting Type Shift assessment   Neurological   Neuro (WDL) WDL   Level of Consciousness 0   [place] Coma Scale   Eye Opening 4   Best Verbal Response 5   Best Motor Response 6   Analia Coma Scale Score 15   HEENT   HEENT (WDL) X   Right Eye Impaired vision   Left Eye Impaired vision   Voice Normal   Teeth Dentures upper   Respiratory   Respiratory (WDL) X   Respiratory Pattern Regular   Respiratory Depth Normal   Respiratory Quality/Effort Dyspnea with exertion   Chest Assessment Chest expansion symmetrical;Trachea midline   L Breath Sounds Rhonchi;Expiratory Wheezes   R Breath Sounds Rhonchi;Expiratory Wheezes   Breath Sounds   Right Upper Lobe Rhonchi;Expiratory Wheezes   Right Middle Lobe Rhonchi;Expiratory Wheezes   Right Lower Lobe Rhonchi   Left Upper Lobe Rhonchi;Expiratory Wheezes   Left Lower Lobe Rhonchi   Cough/Sputum   Sputum How Obtained Spontaneous cough   Cough Congested;Productive   Sputum Amount Small   Sputum Color White;Yellow   Tenacity Thick   Cardiac   Cardiac (WDL) WDL   Cardiac Monitor   Telemetry Monitor On No   Gastrointestinal   Abdominal (WDL) WDL   Peripheral Vascular   Peripheral Vascular (WDL) WDL   Skin Color/Condition   Skin Color/Condition (WDL) WDL   Skin Integrity   Skin Integrity (WDL) WDL   Skin Integrity Bruising   Location Scattered   Musculoskeletal   Musculoskeletal (WDL) X   RUE Full movement   LUE Full movement   RL Extremity Weakness   LL Extremity Weakness   Genitourinary   Genitourinary (WDL) WDL   Urine Assessment   Incontinence No   Psychosocial   Psychosocial (WDL) WDL

## 2020-11-19 NOTE — PROGRESS NOTES
Pt discharged at this time, accompanied by daughter. Instructed on follow up appointments and when next dose of medications were due as well as labwork at pcp apt.

## 2020-11-19 NOTE — FLOWSHEET NOTE
11/19/20 0906   Assessment   Charting Type Shift assessment   Neurological   Neuro (WDL) WDL   Level of Consciousness 0   [place] Coma Scale   Eye Opening 4   Best Verbal Response 5   Best Motor Response 6   Analia Coma Scale Score 15   HEENT   HEENT (WDL) X   Right Eye Impaired vision   Left Eye Impaired vision   Voice Normal   Teeth Dentures upper   Respiratory   Respiratory (WDL) X   Respiratory Pattern Regular   Respiratory Depth Normal   Respiratory Quality/Effort Dyspnea with exertion   Chest Assessment Chest expansion symmetrical;Trachea midline   L Breath Sounds Rhonchi;Expiratory Wheezes   R Breath Sounds Rhonchi;Expiratory Wheezes   Breath Sounds   Right Upper Lobe Expiratory Wheezes   Right Middle Lobe Expiratory Wheezes   Right Lower Lobe Rhonchi   Left Upper Lobe Expiratory Wheezes   Left Lower Lobe Rhonchi   Cough/Sputum   Sputum How Obtained Spontaneous cough   Cough Congested;Strong   Cardiac   Cardiac (WDL) WDL   Cardiac Monitor   Telemetry Monitor On No   Gastrointestinal   Abdominal (WDL) WDL   Last BM (including prior to admit) 11/19/20   Peripheral Vascular   Peripheral Vascular (WDL) WDL   Skin Color/Condition   Skin Color/Condition (WDL) WDL   Skin Integrity   Skin Integrity (WDL) WDL   Musculoskeletal   Musculoskeletal (WDL) X   RUE Full movement   LUE Full movement   RL Extremity Weakness   LL Extremity Weakness   Genitourinary   Genitourinary (WDL) WDL   Urine Assessment   Incontinence No   Psychosocial   Psychosocial (WDL) WDL     Patient awake in bed, alert and oriented x 4. No c/o pain. No distress noted. Currently wearing 3 L NC. Expiratory wheezes noted and rhonchi in bases. Encouraged to cough & deep breathe. Call bell and bedside table in reach. Instructed to call out for assistance. Pt expected to discharge today.

## 2020-11-19 NOTE — PROGRESS NOTES
Patient called out, skin tear on right arm is bleeding. RN redressed with petroleum gauze, nonadherent and kerlix.

## 2020-11-20 ENCOUNTER — CARE COORDINATION (OUTPATIENT)
Dept: CARE COORDINATION | Age: 70
End: 2020-11-20

## 2020-11-20 NOTE — CARE COORDINATION
Crist[facility] Transitions Initial Follow Up Call    Call within 2 business days of discharge: Yes     Patient: Vignesh Moya Patient : 1950 MRN: <[id number]>    Last Discharge [facility]       Complaint Diagnosis Description Type Department Provider    20  Pneumonia of right middle lobe due to infectious organism . .. Admission (Discharged) [address] [place] MD TANISHA Roman: Readmission Risk Score: 44       Spoke with: Yu Armenta reports this morning that she is a little bit weak and tired feeling. She is able to converse in complete sentences. We reviewed her DC medications. There is confusion about the date to start her prednisone on . I verified with the pharmacy that this is an error and she should take starting today. Joretta V/O. We also discussed the decrease in her Lasix dosage to 40 mg once daily. Pharmacy filled 60 pills. I explained that this was on the chance that she would need to increase her dosage again. Yu Armenta weighs daily in the morning. Her weight is down today. We discussed reasons for needing an extra lasix, SOA, swelling, bloating, weight gain on the scales. V/O. We discussed her fu appointment for .       Discharge department/facility: [facility]    Non-face-to-face services provided:  Scheduled appointment with PCP-Rocio/ Monik Cherry and reviewed discharge summary and/or continuity of care documents    Follow Up  Future Appointments   Date Time Provider Elena Pfeiffer   2020 11:30 AM JADE Serna CNP  [facility]   2020  2:00 PM SCHEDULE, [facility]   2021  9:30 AM Minh Guy, [address] [place] [address] [facility]       Rohit Jansen RN

## 2020-11-23 ENCOUNTER — TELEPHONE (OUTPATIENT)
Dept: PRIMARY CARE CLINIC | Age: 70
End: 2020-11-23

## 2020-11-23 RX ORDER — SUCRALFATE 1 G/1
TABLET ORAL
Qty: 90 TABLET | Refills: 3 | Status: SHIPPED | OUTPATIENT
Start: 2020-11-23

## 2020-11-23 NOTE — TELEPHONE ENCOUNTER
Elian Roche with [facility] called to report 12 lb weight gain since Friday. 1+ pitting edema LE, extended abd. O2 84-93% on 4 1/2 liters.  Pt takes Lasix 40 mg daily [phone]

## 2020-11-25 ENCOUNTER — OFFICE VISIT (OUTPATIENT)
Dept: PRIMARY CARE CLINIC | Age: 70
End: 2020-11-25
Payer: MEDICARE

## 2020-11-25 ENCOUNTER — HOSPITAL ENCOUNTER (OUTPATIENT)
Dept: GENERAL RADIOLOGY | Facility: HOSPITAL | Age: 70
Discharge: HOME | End: 2020-11-25
Payer: MEDICARE

## 2020-11-25 ENCOUNTER — HOSPITAL ENCOUNTER (OUTPATIENT)
Facility: HOSPITAL | Age: 70
Discharge: HOME | End: 2020-11-25
Payer: MEDICARE

## 2020-11-25 LAB
A/G RATIO: 1.8 (ref 0.8–2)
ALBUMIN SERPL-MCNC: 3.6 G/DL (ref 3.4–4.8)
ALP BLD-CCNC: 76 U/L (ref 25–100)
ALT SERPL-CCNC: 29 U/L (ref 4–36)
ANION GAP SERPL CALCULATED.3IONS-SCNC: 10 MMOL/L (ref 3–16)
AST SERPL-CCNC: 21 U/L (ref 8–33)
BASOPHILS ABSOLUTE: 0 K/UL (ref 0–0.1)
BASOPHILS RELATIVE PERCENT: 0.2 %
BILIRUB SERPL-MCNC: <0.2 MG/DL (ref 0.3–1.2)
BUN BLDV-MCNC: 36 MG/DL (ref 6–20)
CALCIUM SERPL-MCNC: 8.8 MG/DL (ref 8.5–10.5)
CHLORIDE BLD-SCNC: 96 MMOL/L (ref 98–107)
CO2: 31 MMOL/L (ref 20–30)
CREAT SERPL-MCNC: 1.5 MG/DL (ref 0.4–1.2)
EOSINOPHILS ABSOLUTE: 0.1 K/UL (ref 0–0.4)
EOSINOPHILS RELATIVE PERCENT: 0.6 %
GFR AFRICAN AMERICAN: 41
GFR NON-AFRICAN AMERICAN: 34
GLOBULIN: 2 G/DL
GLUCOSE BLD-MCNC: 310 MG/DL (ref 74–106)
HCT VFR BLD CALC: 33.5 % (ref 37–47)
HEMOGLOBIN: 10 G/DL (ref 11.5–16.5)
IMMATURE GRANULOCYTES #: 0.1 K/UL
IMMATURE GRANULOCYTES %: 0.7 % (ref 0–5)
LYMPHOCYTES ABSOLUTE: 2.7 K/UL (ref 1.5–4)
LYMPHOCYTES RELATIVE PERCENT: 13.8 %
MCH RBC QN AUTO: 27.8 PG (ref 27–32)
MCHC RBC AUTO-ENTMCNC: 29.9 G/DL (ref 31–35)
MCV RBC AUTO: 93.1 FL (ref 80–100)
MONOCYTES ABSOLUTE: 1.1 K/UL (ref 0.2–0.8)
MONOCYTES RELATIVE PERCENT: 5.4 %
NEUTROPHILS ABSOLUTE: 15.7 K/UL (ref 2–7.5)
NEUTROPHILS RELATIVE PERCENT: 79.3 %
PDW BLD-RTO: 16.6 % (ref 11–16)
PLATELET # BLD: 300 K/UL (ref 150–400)
PMV BLD AUTO: 10.9 FL (ref 6–10)
POTASSIUM SERPL-SCNC: 4.6 MMOL/L (ref 3.4–5.1)
RBC # BLD: 3.6 M/UL (ref 3.8–5.8)
SODIUM BLD-SCNC: 137 MMOL/L (ref 136–145)
TOTAL PROTEIN: 5.6 G/DL (ref 6.4–8.3)
WBC # BLD: 19.8 K/UL (ref 4–11)

## 2020-11-25 PROCEDURE — 85025 COMPLETE CBC W/AUTO DIFF WBC: CPT

## 2020-11-25 PROCEDURE — 71046 X-RAY EXAM CHEST 2 VIEWS: CPT

## 2020-11-25 PROCEDURE — 1111F DSCHRG MED/CURRENT MED MERGE: CPT | Performed by: NURSE PRACTITIONER

## 2020-11-25 PROCEDURE — 99496 TRANSJ CARE MGMT HIGH F2F 7D: CPT | Performed by: NURSE PRACTITIONER

## 2020-11-25 PROCEDURE — 36415 COLL VENOUS BLD VENIPUNCTURE: CPT

## 2020-11-25 PROCEDURE — 80053 COMPREHEN METABOLIC PANEL: CPT

## 2020-11-25 RX ORDER — HYDROCODONE BITARTRATE AND ACETAMINOPHEN 5; 325 MG/1; MG/1
TABLET ORAL
Qty: 30 TABLET | Refills: 0 | Status: SHIPPED | OUTPATIENT
Start: 2020-11-25 | End: 2020-12-25

## 2020-11-25 RX ORDER — GUAIFENESIN 100 MG/5ML
200 SYRUP ORAL 3 TIMES DAILY
Qty: 210 ML | Refills: 0 | Status: SHIPPED | OUTPATIENT
Start: 2020-11-25 | End: 2020-12-02

## 2020-11-25 NOTE — PROGRESS NOTES
Post-Discharge Transitional Care Management Services or Hospital Follow Up      Trev London   YOB: 1950    Date of Office Visit:  11/25/2020  Date of Hospital Admission: 11/12/20  Date of Hospital Discharge: 11/19/20  Risk of hospital readmission (high >=14%.  Medium >=10%) :Readmission Risk Score: 39      Care management risk score Rising risk (score 2-5) and Complex Care (Scores >=6): 6     Non face to face  following discharge, date last encounter closed (first attempt may have been earlier): 11/20/2020 11:18 AM    Call initiated 2 business days of discharge: Yes    Patient Active Problem List   Diagnosis    DM type 2 (diabetes mellitus, type 2)    Elevated LFTs    Hyperlipidemia    HTN (hypertension)    Back pain    COPD [facility]chronic obstructive pulmonary disease) ([facility] [address]    B12 deficiency    Pneumonia    Moderate persistent asthma without complication    Hypoxia    Chronic gout without tophus    Vitamin D deficiency    CRF (chronic renal failure)    Hand cramps    Elevated BUN    Injection site reaction    Allergic reaction    COPD exacerbation (HCC)    Type 2 diabetes mellitus with complication, with long-term current use of insulin (HCC)    Stage 3 chronic kidney disease (HCC)    Chronic bilateral low back pain without sciatica    Dependence on continuous supplemental oxygen    Pure hypercholesterolemia    Hypertension    Asthma    Chronic diastolic heart failure (HCC)    Gastroesophageal reflux disease    CKD (chronic kidney disease) stage 4, GFR 15-29 ml/min (HCC)    Morbid obesity with BMI of 40.0-44.9, adult (HCC)    Acute on chronic respiratory failure with hypoxia (HCC)    Acute on chronic respiratory failure (HCC)    Paroxysmal atrial fibrillation (HCC)    Thrush    TENZIN (obstructive sleep apnea)    Declining functional status       Allergies   Allergen Reactions    Shellfish-Derived Products Hives    Wheat Bran Hives       Medications listed as ordered at the time of discharge from [facility] [address] Medication Instructions MALIA:    Printed on:11/25/20 3042   Medication Information                      albuterol sulfate HFA (PROAIR HFA) 108 (90 Base) MCG/ACT inhaler  Inhale 2 puffs into the lungs every 4 hours as needed for Wheezing             allopurinol (ZYLOPRIM) 300 MG tablet  TAKE 1/2 TABLET BY MOUTH EVERY DAY             ASPIRIN LOW DOSE 81 MG EC tablet  TAKE ONE TABLET BY MOUTH EVERY DAY             atorvastatin (LIPITOR) 40 MG tablet  Take 40 mg by mouth daily              BD PEN NEEDLE JUAN CARLOS U/F 32G X 4 MM MISC  USE WITH LANTUS ONCE DAILY             blood glucose test strips (TRUE METRIX PRO BLOOD GLUCOSE) strip  USE TO BLOOD SUGAR FOUR TIMES A DAY             busPIRone (BUSPAR) 10 MG tablet  TAKE ONE TABLET BY MOUTH THREE TIMES A DAY             DALIRESP 500 MCG tablet  TAKE ONE TABLET BY MOUTH EVERY DAY             dilTIAZem (CARDIZEM CD) 180 MG extended release capsule  TAKE 1 CAPSULE BY MOUTH DAILY             docusate sodium (COLACE) 250 MG capsule  Take 1 capsule by mouth daily             ELIQUIS 5 MG TABS tablet  TAKE 1 TABLET BY MOUTH EVERY 12 HOURS             ezetimibe (ZETIA) 10 MG tablet  TAKE ONE TABLET BY MOUTH EVERY DAY             fenofibrate (TRICOR) 48 MG tablet  Take 48 mg by mouth daily              flunisolide (NASALIDE) 25 MCG/ACT (0.025%) SOLN  2 sprays every 12 hours Both nostrils as needed per patient             fluticasone-salmeterol (ADVAIR) 500-50 MCG/DOSE diskus inhaler  Inhale 1 puff into the lungs every 12 hours             furosemide (LASIX) 40 MG tablet  Take 1 tablet by mouth daily             GNP VITAMIN D MAXIMUM STRENGTH 50 MCG (2000 UT) TABS  Take 1 tablet by mouth daily              Insulin Degludec (TRESIBA) 100 UNIT/ML SOLN  35 units in the am and 80 in the evening.              insulin lispro (HUMALOG) 100 UNIT/ML injection vial  INJECT 38 UNITS UNDER THE SKIN THREE TIMES A DAY BEFORE MEALS Medications marked \"taking\" at this time  Outpatient Medications Marked as Taking for the 20 encounter (Office Visit) with JADE Cadena - CNP   Medication Sig Dispense Refill    traMADol (ULTRAM) 50 MG tablet TAKE ONE TABLET BY MOUTH THREE TIMES A DAY AS NEEDED 90 tablet 0    tiZANidine (ZANAFLEX) 4 MG tablet TAKE 1 TABLET BY MOUTH 2 TIMES DAILY AS NEEDED (BACK PAIN) 60 tablet 0    montelukast (SINGULAIR) 10 MG tablet TAKE ONE TABLET BY MOUTH EVERY DAY 30 tablet 2    HYDROcodone-acetaminophen (NORCO) 5-325 MG per tablet TAKE ONE TABLET BY MOUTH EVERY DAY AS NEEDED FOR PAIN 30 tablet 0    [] guaiFENesin (ALTARUSSIN) 100 MG/5ML syrup Take 10 mLs by mouth 3 times daily for 7 days 210 mL 0    sucralfate (CARAFATE) 1 GM tablet TAKE ONE TABLET BY MOUTH THREE TIMES A DAY 90 tablet 3    sodium chloride (OCEAN) 0.65 % nasal spray 1 spray by Nasal route as needed for Congestion 1 Bottle 3    ASPIRIN LOW DOSE 81 MG EC tablet TAKE ONE TABLET BY MOUTH EVERY DAY 30 tablet 5    potassium chloride (KLOR-CON M) 20 MEQ extended release tablet TAKE ONE TABLET BY MOUTH EVERY DAY 30 tablet 5    Lancets MISC 1 each by Does not apply route 3 times daily Freestyle Dx E11.9 200 each 5    ELIQUIS 5 MG TABS tablet TAKE 1 TABLET BY MOUTH EVERY 12 HOURS 60 tablet 3    busPIRone (BUSPAR) 10 MG tablet TAKE ONE TABLET BY MOUTH THREE TIMES A DAY 90 tablet 3    dilTIAZem (CARDIZEM CD) 180 MG extended release capsule TAKE 1 CAPSULE BY MOUTH DAILY 30 capsule 3    DALIRESP 500 MCG tablet TAKE ONE TABLET BY MOUTH EVERY DAY 30 tablet 2    JANUVIA 100 MG tablet TAKE ONE TABLET BY MOUTH EVERY DAY (Patient taking differently: Take 50 mg by mouth daily ) 30 tablet 2    blood glucose test strips (TRUE METRIX PRO BLOOD GLUCOSE) strip USE TO BLOOD SUGAR FOUR TIMES A  each 3    rOPINIRole (REQUIP) 0.5 MG tablet TAKE ONE TABLET BY MOUTH TWO TIMES A DAY 60 tablet 3    VASCEPA 1 g CAPS capsule TAKE TWO CAPSULES BY MOUTH TWO TIMES A DAY 60 capsule 3    metOLazone (ZAROXOLYN) 5 MG tablet TAKE 1 TABLET BY MOUTH DAILY AS NEEDED (SWELLING) 30 tablet 3    insulin lispro (HUMALOG) 100 UNIT/ML injection vial INJECT 38 UNITS UNDER THE SKIN THREE TIMES A DAY BEFORE MEALS 30 mL 4    metoprolol succinate (TOPROL XL) 100 MG extended release tablet TAKE 1 TABLET BY MOUTH DAILY 30 tablet 5    allopurinol (ZYLOPRIM) 300 MG tablet TAKE 1/2 TABLET BY MOUTH EVERY DAY 30 tablet 2    atorvastatin (LIPITOR) 40 MG tablet Take 40 mg by mouth daily       fenofibrate (TRICOR) 48 MG tablet Take 48 mg by mouth daily       SPIRIVA HANDIHALER 18 MCG inhalation capsule 18 mcg daily       Insulin Syringe-Needle U-100 31G X 5/16\" 1 ML MISC USE FOR INSULIN INJECTIONS FIVE TIMES DAILY 150 each 4    BD PEN NEEDLE JUAN CARLOS U/F 32G X 4 MM MISC USE WITH LANTUS ONCE DAILY      Insulin Degludec (TRESIBA) 100 UNIT/ML SOLN 35 units in the am and 80 in the evening.  5 vial 5    docusate sodium (COLACE) 250 MG capsule Take 1 capsule by mouth daily 90 capsule 3    ezetimibe (ZETIA) 10 MG tablet TAKE ONE TABLET BY MOUTH EVERY DAY 90 tablet 3    pantoprazole (PROTONIX) 40 MG tablet TAKE ONE TABLET BY MOUTH EVERY DAY 90 tablet 3    PATADAY 0.2 % SOLN ophthalmic solution PLACE 1 DROP INTO BOTH EYES 2 TIMES DAILY (Patient taking differently: Place 2 drops into both eyes 2 times daily ) 2.5 mL 3    GNP VITAMIN D MAXIMUM STRENGTH 50 MCG (2000 UT) TABS Take 1 tablet by mouth daily   5    fluticasone-salmeterol (ADVAIR) 500-50 MCG/DOSE diskus inhaler Inhale 1 puff into the lungs every 12 hours      omalizumab (OMALIZUMAB) 150 MG injection Inject 150 mg into the skin every 28 days 1 vial 3    OXYGEN Inhale 3 L/min into the lungs continuous      flunisolide (NASALIDE) 25 MCG/ACT (0.025%) SOLN 2 sprays every 12 hours Both nostrils as needed per patient      albuterol sulfate HFA (PROAIR HFA) 108 (90 Base) MCG/ACT inhaler Inhale 2 puffs into the lungs every 4 hours as needed for Wheezing 1 Inhaler 5    loratadine (CLARITIN) 10 MG tablet TAKE ONE TABLET BY MOUTH EVERY DAY 30 tablet 4      Medications patient taking as of now reconciled against medications ordered at time of hospital discharge: Yes      Chief Complaint   Patient presents with   [address] from Hospital    Cough       History of Present illness - Follow up of Hospital diagnosis(es): Pneumonia, COPD exacerbation    Patient presents for hospital follow up for pneumonia. Was admitted to [facility] 11/9-11/19 in acute bed and then swing bed. Diagnosed pneumonia and COPD exacerbation. During hospitalization, patient was treated with antibiotics, steroids, neb treatments and oxygen. Improved and discharged stable condition. Per chart review, home lasix 40mg was changed to once daily and extra dose only if worsening breathing/swelling. Patient has been taking as directed. Today, patient reports still having some cough, occasional SOB and some swelling in BLE. Reports still wheezing, congested. No fevers. On 3-4L NC. Denies CP, palpitations, abdominal pain. Last CXR was 11/16 and showed airspace opacity right lung base. She has been taking mucinex too with some productive cough yellow sputum. Has not taken extra lasix yet for leg swelling. She reports she will make f/u cardiology soon. Inpatient course: Discharge summary reviewed- see chart.   Interval history/Current status: no acute distress      ROS  Constitutional:  Denies fever or chills   Eyes:  Denies eye pain or redness  HENT:  Denies nasal congestion or sore throat   Respiratory:  +cough, congestion, occasional SOB  Cardiovascular:  Denies chest pain +BLE edema  GI:  Denies abdominal pain, nausea, vomiting, bloody stools or diarrhea   Musculoskeletal:  Denies acute neck pain or body aches  Integument:  Denies rash or itching  Neurologic:  Denies headache, dizziness, numbness, tingling or unilateral weakness  Psychiatric:  Denies acute depression or acute anxiety      Vitals:    11/25/20 1300   Resp: 20   Temp: 98.6 °F (37 °C)   SpO2: 93%     There is no height or weight on file to calculate BMI. Wt Readings from Last 3 Encounters:   11/19/20 184 lb (83.5 kg)   11/12/20 187 lb 14.4 oz (85.2 kg)   10/30/20 196 lb (88.9 kg)     BP Readings from Last 3 Encounters:   11/18/20 (!) 140/70   11/12/20 (!) 153/67   10/12/20 (!) 136/56      Lab Results   Component Value Date     11/19/2020    K 4.1 11/19/2020    CL 98 11/19/2020    CO2 36 (H) 11/19/2020    BUN 56 (H) 11/19/2020    CREATININE 1.6 (H) 11/19/2020    GLUCOSE 274 (H) 11/19/2020    CALCIUM 9.2 11/19/2020    PROT 5.9 (L) 11/18/2020    LABALBU 3.6 11/18/2020    BILITOT 0.3 11/18/2020    ALKPHOS 59 11/18/2020    AST 16 11/18/2020    ALT 20 11/18/2020    LABGLOM 32 (L) 11/19/2020    GFRAA 39 (L) 11/19/2020    AGRATIO 1.6 11/18/2020    GLOB 2.3 11/18/2020     Lab Results   Component Value Date    WBC 18.8 (H) 11/19/2020    HGB 10.9 (L) 11/19/2020    HCT 38.0 11/19/2020    MCV 94.5 11/19/2020     11/19/2020       CXR 11/16/20:  Impression    Impression: Airspace opacity of the right lung base.               Physical Exam:  Constitutional:  Well developed, well nourished, no acute distress  Eyes:  PERRL, no scleral icterus, conjunctiva normal   HENT:  Atraumatic, external ears normal, nose normal, oropharynx moist, no pharyngeal exudates. Neck- supple, no JVD, no lymphadenopathy  Respiratory:  3L NC. Faint audbile wheezing. No respiratory distress, no wheezing, rales or rhonchi detected  Cardiovascular:  Normal rate, normal rhythm, no murmurs, no gallops, no rubs. 1+ BLE edema  GI:  Soft, nondistended, normal bowel sounds, nontender, no voluntary guarding  Musculoskeletal:  No cyanosis or obvious acute deformity. Moving all extremities   Integument:  Warm and dry. Neurologic:  Alert & oriented x 3, no apparent focal deficits noted   Psychiatric:  Speech and behavior appropriate      Assessment/Plan:  1. Chronic obstructive pulmonary disease, unspecified COPD type ([facility] [address]  2. History of recent pneumonia  Not feeling much better past few days. Still cough, congestion, wheezing and some SOB. No fevers. No acute distress. Repeat CXR today and labs. Discussed diuretic plan with patient and to elevate BLE PRN. Continue home meds, oxygen, breathing devices at home, cough deep breathing. Keep pulm appointment. Cardiology appointment. Will follow xray and determine plan once results. Pt agreeable. - XR CHEST STANDARD (2 VW); Future  - guaiFENesin (ALTARUSSIN) 100 MG/5ML syrup; Take 10 mLs by mouth 3 times daily for 7 days  Dispense: 210 mL; Refill: 0    3. Hospital discharge follow-up  See above. 4. Chronic diastolic heart failure ([facility]  See above. F/u cardiology. Return to clinic or go to ED if S&S worsen or no improvement noted. Patient verbalized understanding.        Medical Decision Making: moderate complexity

## 2020-11-30 RX ORDER — MONTELUKAST SODIUM 10 MG/1
TABLET ORAL
Qty: 30 TABLET | Refills: 2 | Status: SHIPPED | OUTPATIENT
Start: 2020-11-30

## 2020-11-30 RX ORDER — DEXAMETHASONE SODIUM PHOSPHATE 4 MG/ML
8 INJECTION, SOLUTION INTRA-ARTICULAR; INTRALESIONAL; INTRAMUSCULAR; INTRAVENOUS; SOFT TISSUE ONCE
Status: DISCONTINUED | OUTPATIENT
Start: 2020-11-30 | End: 2020-11-30 | Stop reason: HOSPADM

## 2020-11-30 RX ORDER — FUROSEMIDE 20 MG/1
TABLET ORAL
Qty: 150 TABLET | Refills: 2 | Status: SHIPPED | OUTPATIENT
Start: 2020-11-30

## 2020-11-30 RX ORDER — TRAMADOL HYDROCHLORIDE 50 MG/1
TABLET ORAL
Qty: 90 TABLET | Refills: 0 | Status: SHIPPED | OUTPATIENT
Start: 2020-11-30 | End: 2020-12-30

## 2020-11-30 RX ORDER — TIZANIDINE 4 MG/1
4 TABLET ORAL 2 TIMES DAILY PRN
Qty: 60 TABLET | Refills: 0 | Status: SHIPPED | OUTPATIENT
Start: 2020-11-30

## 2020-11-30 RX ORDER — DOXYCYCLINE HYCLATE 100 MG
100 TABLET ORAL 2 TIMES DAILY
Qty: 20 TABLET | Refills: 0 | Status: SHIPPED | OUTPATIENT
Start: 2020-11-30 | End: 2020-12-10

## 2020-11-30 RX ORDER — DEXAMETHASONE 1 MG
1 TABLET ORAL 2 TIMES DAILY WITH MEALS
Qty: 10 TABLET | Refills: 0 | Status: SHIPPED | OUTPATIENT
Start: 2020-11-30 | End: 2020-12-05

## 2020-11-30 RX ORDER — CEFTRIAXONE 1 G/1
1 INJECTION, POWDER, FOR SOLUTION INTRAMUSCULAR; INTRAVENOUS ONCE
Status: DISCONTINUED | OUTPATIENT
Start: 2020-11-30 | End: 2020-11-30 | Stop reason: HOSPADM

## 2020-11-30 NOTE — PROGRESS NOTES
IM rocephin orders placed and steroid if she can come in today. CXR still not clear. Doxy BID 10 days and oral steroids too. F/u tues or wed this week with me too if possible. Thanks.

## 2020-12-03 NOTE — PROGRESS NOTES
Chief Complaint  Renal lesion    Referring Provider:  No ref. provider found    HPI  Ms. Guidry is a 70 y.o. female with history of CHF, CKD, type 2 diabetes mellitus who presents with small left lower pole renal lesion.    She presents today for follow-up with MRI of pelvis.  She denies any gross hematuria or flank pain.    She had a CT of the abdomen performed without contrast due to her CKD 4 abdominal pain.  She denies any pain today.  A small left lower pole 8 mm possible hyperdense renal cyst was seen.  She denies any fevers, gross hematuria, flank pain.  There is a tiny calcification on her CT as well.  He denies any urinary incontinence.  She states she has urinary urgency and frequency when she takes her Lasix.    Past Medical History  Past Medical History:   Diagnosis Date   • Abnormal ECG    • Abnormal Pap smear of cervix    • Acid reflux 10/17/2016   • Anemia    • Anxiety    • Asthma     bronchial   • Cancer (CMS/HCC)     uterine   • Chronic diastolic heart failure (CMS/HCC) 10/17/2016    Normal dobutamine echocardiogram stress, 04/07/2005. Echocardiogram on 05/08/2009:  EF 50% to 55%. Left atrium 3.5 cm.   • Chronic obstructive pulmonary disease (CMS/HCC) 10/17/2016    asthmatic bronchitis, on O2 use chronically.     • Female infertility    • Gestational diabetes    • Gestational hypertension    • Gout 10/17/2016   • High cholesterol 01/03/2018   • Hypertension 10/17/2016    Benign hypertension.   • Lower extremity edema 10/17/2016    Chronic lower extremity edema/venous insufficiency.   • Murmur, heart    • Obesity 10/17/2016   • Renal failure     stageIV   • Rh incompatibility    • Seasonal allergies 10/17/2016   • Tuberculosis    • Type 2 diabetes mellitus (CMS/HCC) 10/17/2016    insulin dependent.   • Uterine cancer (CMS/HCC)        Past Surgical History  Past Surgical History:   Procedure Laterality Date   • BRONCHOSCOPY N/A 1/8/2018    Procedure: BRONCHOSCOPY WITH WASHINGS;  Surgeon: Ryder HERZOG  MD Nabila;  Location: [facility] OR;  Service:    • CARDIAC CATHETERIZATION N/A 3/17/2020    Procedure: Coronary angiography;  Surgeon: Elkin Zhou MD;  Location: [facility] CATH INVASIVE LOCATION;  Service: Cardiology;  Laterality: N/A;   •  SECTION     • COLONOSCOPY     • COLONOSCOPY N/A 2019    Procedure: COLONOSCOPY;  Surgeon: Skinny York MD;  Location: [facility] ENDOSCOPY;  Service: General   • CYSTECTOMY Right     neck   • DILATATION AND CURETTAGE     • ENDOSCOPY N/A 6/10/2019    Procedure: ESOPHAGOGASTRODUODENOSCOPY WITH BIOPSY;  Surgeon: Skinny York MD;  Location: [facility] ENDOSCOPY;  Service: Gastroenterology   • EYE SURGERY Bilateral     cataract   • FOREIGN BODY REMOVAL N/A 3/5/2020    Procedure: BRONCHOSCOPY with MAC and removal of foreign object;  Surgeon: Ryder Dahl MD;  Location: [facility] OR;  Service: Pulmonary;  Laterality: N/A;   • HYSTERECTOMY     • LUNG SURGERY Left 1997 tumors removed from left lung-benign   • TONSILLECTOMY AND ADENOIDECTOMY         Medications    Current Outpatient Medications:   •  ADVAIR DISKUS 500-50 MCG/DOSE DISKUS, INHALE 1 PUFF BY MOUTH TWO TIMES A DAY, Disp: 60 each, Rfl: 5  •  allopurinol (ZYLOPRIM) 300 MG tablet, Take 300 mg by mouth Daily., Disp: , Rfl:   •  apixaban (ELIQUIS) 5 MG tablet tablet, Take 1 tablet by mouth Every 12 (Twelve) Hours., Disp: 60 tablet, Rfl: 0  •  ASPIRIN LOW DOSE 81 MG EC tablet, Take 81 mg by mouth Daily., Disp: , Rfl: 5  •  atorvastatin (LIPITOR) 40 MG tablet, Take 1 tablet by mouth Daily., Disp: 30 tablet, Rfl: 10  •  Blood Glucose Monitoring Suppl device, 1 each., Disp: , Rfl:   •  busPIRone (BUSPAR) 10 MG tablet, Take 10 mg by mouth 3 (Three) Times a Day., Disp: , Rfl:   •  DALIRESP 500 MCG tablet tablet, TAKE ONE TABLET BY MOUTH EVERY DAY, Disp: 30 tablet, Rfl: 5  •  dexamethasone (DECADRON) 1 MG tablet, Take 1 mg by mouth., Disp: , Rfl:   •  dilTIAZem CD (CARDIZEM CD) 180 MG 24 hr capsule, Take 1 capsule  "by mouth Daily., Disp: 30 capsule, Rfl: 0  •  docusate sodium (COLACE) 250 MG capsule, Take 250 mg by mouth Daily., Disp: , Rfl:   •  doxycycline (VIBRAMYICN) 100 MG tablet, Take 100 mg by mouth., Disp: , Rfl:   •  ezetimibe (Zetia) 10 MG tablet, Take 10 mg by mouth Daily., Disp: , Rfl:   •  fenofibrate (TRICOR) 48 MG tablet, Take 48 mg by mouth Daily., Disp: , Rfl:   •  FLUTICASONE PROPIONATE, INHAL, IN, Inhale 2 (two) times a day., Disp: , Rfl:   •  furosemide (LASIX) 40 MG tablet, Take 40 mg by mouth Daily., Disp: , Rfl:   •  Ginger, Zingiber officinalis, (GINGER ROOT) 500 MG capsule, Take  by mouth Daily., Disp: , Rfl:   •  glucose blood (True Metrix Pro Blood Glucose) test strip, USE TO BLOOD SUGAR FOUR TIMES A DAY, Disp: , Rfl:   •  GNP VITAMIN D MAXIMUM STRENGTH 50 MCG (2000 UT) tablet, Take 1 tablet by mouth Daily., Disp: , Rfl: 5  •  HUMALOG 100 UNIT/ML injection, Inject 35 Units under the skin into the appropriate area as directed 3 (Three) Times a Day., Disp: , Rfl:   •  HYDROcodone-acetaminophen (NORCO) 5-325 MG per tablet, Take 1 tablet by mouth Every Night., Disp: , Rfl:   •  icosapent ethyl (Vascepa) 1 g capsule capsule, Take 2 g by mouth 2 (Two) Times a Day With Meals., Disp: , Rfl:   •  Insulin Pen Needle (BD Pen Needle Valerie U/F) 32G X 4 MM misc, USE WITH LANTUS ONCE DAILY, Disp: , Rfl:   •  Insulin Syringe-Needle U-100 31G X 5/16\" 1 ML misc, USE FOR INSULIN INJECTIONS FIVE TIMES DAILY, Disp: , Rfl:   •  Lancets misc, 1 each., Disp: , Rfl:   •  metoclopramide (REGLAN) 5 MG tablet, , Disp: , Rfl:   •  metolazone (ZAROXOLYN) 5 MG tablet, Take 5 mg by mouth Daily As Needed., Disp: , Rfl:   •  metoprolol succinate XL (TOPROL-XL) 100 MG 24 hr tablet, Take 1 tablet by mouth Daily., Disp: 30 tablet, Rfl: 0  •  montelukast (SINGULAIR) 10 MG tablet, Take 10 mg by mouth Every Night., Disp: , Rfl:   •  nystatin (MYCOSTATIN) 140483 UNIT/ML suspension, , Disp: , Rfl:   •  O2 (OXYGEN), Inhale 4 L/min Daily., " Disp: , Rfl:   •  omalizumab (XOLAIR) 150 MG injection, Inject 300 mg under the skin into the appropriate area as directed Every 28 (Twenty-Eight) Days. 2 injections once a month, Disp: , Rfl:   •  pantoprazole (PROTONIX) 40 MG EC tablet, Take 40 mg by mouth Daily., Disp: , Rfl:   •  PATADAY 0.2 % solution ophthalmic solution, Administer  to both eyes 2 (Two) Times a Day., Disp: , Rfl: 3  •  potassium chloride (K-DUR,KLOR-CON) 20 MEQ CR tablet, Take 20 mEq by mouth Daily., Disp: , Rfl: 2  •  rOPINIRole (REQUIP) 0.5 MG tablet, Take 0.5 mg by mouth 2 (Two) Times a Day., Disp: , Rfl: 0  •  sitaGLIPtin (JANUVIA) 100 MG tablet, Take 100 mg by mouth Daily. 1/2 tablet daily, Disp: , Rfl:   •  sodium chloride 0.65 % nasal spray, 1 spray into the nostril(s) as directed by provider., Disp: , Rfl:   •  Spiriva HandiHaler 18 MCG per inhalation capsule, PLACE 1 CAPSULE INTO INHALER AND INHALE DAILY., Disp: 30 capsule, Rfl: 4  •  sucralfate (CARAFATE) 1 G tablet, Take 1 g by mouth 3 (Three) Times a Day., Disp: , Rfl:   •  tiZANidine (ZANAFLEX) 4 MG tablet, Take 4 mg by mouth., Disp: , Rfl:   •  traMADol (ULTRAM) 50 MG tablet, Take 50 mg by mouth 3 (Three) Times a Day., Disp: , Rfl:   •  TRESIBA FLEXTOUCH 100 UNIT/ML solution pen-injector injection, 2 (Two) Times a Day. 35 units in the am 80 units at night, Disp: , Rfl: 10  •  apixaban (Eliquis) 5 MG tablet tablet, TAKE 1 TABLET BY MOUTH EVERY 12 HOURS, Disp: , Rfl:     Allergies  Allergies   Allergen Reactions   • Shellfish-Derived Products Hives   • Wheat Bran Hives       Social History  Social History     Socioeconomic History   • Marital status:      Spouse name: Not on file   • Number of children: Not on file   • Years of education: Not on file   • Highest education level: Not on file   Tobacco Use   • Smoking status: Former Smoker     Packs/day: 1.00     Years: 35.00     Pack years: 35.00     Types: Cigarettes     Quit date:      Years since quittin.9   •  "Smokeless tobacco: Never Used   Substance and Sexual Activity   • Alcohol use: No   • Drug use: No   • Sexual activity: Not Currently     Birth control/protection: Surgical       Family History  She has no family history of bladder or kidney cancer    Review of Systems  Constitutional: No fevers or chills  Skin: Negative for rash  Endocrine: No heat/cold intolerance   Cardiovascular: Negative for chest pain or dyspnea on exertion  Respiratory: Negative for shortness of breath or wheezing  Gastrointestinal: No constipation, nausea or vomiting  Genitourinary: Negative for new lower urinary tract symptoms, gross hematuria or dysuria.  Musculoskeletal: No flank pain  Neurological:  Negative for frequent headaches or dizziness  Lymph/Heme: Negative for leg swelling or calf pain.    Physical Exam  Visit Vitals  /50 (BP Location: Left arm, Patient Position: Sitting, Cuff Size: Adult)   Pulse 69   Temp 97.3 °F (36.3 °C)   Resp 18   Ht 147.3 cm (58\")   Wt 88.5 kg (195 lb)   LMP  (LMP Unknown)   SpO2 94%   Breastfeeding No   BMI 40.76 kg/m²     Constitutional: NAD, WDWN  HEENT: NCAT. Conjunctivae normal  MMM  Cardiovascular: Regular rate  Pulmonary/Chest: Respirations are even and non-labored bilaterally  Abdominal: Soft with no distension, tenderness, masses, guarding or CVA tenderness  Neurological: A + O x 3,  cranial nerves II-XII grossly intact  Extremities: LAUREN x 4, warm, no clubbing, no cyanosis  Skin: Pink, warm, dry with no rash  Psychiatric:  Normal mood and affect    Labs  No results found for: URINECX    Brief Urine Lab Results  (Last result in the past 365 days)      Color   Clarity   Blood   Leuk Est   Nitrite   Protein   CREAT   Urine HCG        08/31/20 1305 Yellow Clear TRACE-INTACT SMALL Negative                 Lab Results   Component Value Date    GLUCOSE 304 (H) 07/01/2020    CALCIUM 10.3 07/01/2020     07/01/2020    K 4.2 07/01/2020    CO2 31.0 (H) 07/01/2020    CL 90 (L) 07/01/2020    BUN 37 " (H) 07/01/2020    CREATININE 1.82 (H) 07/01/2020    EGFRIFNONA 27 (L) 07/01/2020    BCR 20.3 07/01/2020    ANIONGAP 17.0 (H) 07/01/2020       Lab Results   Component Value Date    WBC 19.8 (H) 11/25/2020    HGB 10.0 (L) 11/25/2020    HCT 33.5 (L) 11/25/2020    MCV 93.1 11/25/2020     11/25/2020         Radiographic Studies  Mri Pelvis Without Contrast    Result Date: 11/4/2020  Impression: Bilateral ovarian cyst which appears similar to the prior exam.    This report was finalized on 11/4/2020 4:23 PM by Fabricio Wolff M.D..    Mri Abdomen Without Contrast    Result Date: 11/4/2020  Impression: Bilateral renal cysts with no evidence of hydronephrosis.    This report was finalized on 11/4/2020 4:20 PM by Fabricio Wloff M.D..   EXAM: CT ABDOMEN AND PELVIS WITHOUT CONTRAST    INDICATION: abdominal pain, pain    COMPARISON: None.    TECHNIQUE: Axial CT imaging obtained from lung bases through pelvis. Axial images and multiplanar reformatted images are provided for review.   Individualized dose optimization technique was used in order to meet ALARA standards for radiation dose   reduction.  In addition to vendor specific dose reduction algorithms, the dose reduction techniques vary based on the specific scanner utilized but frequently include automated exposure control, adjustment of the mA and/or kV according to patient size,   and use of iterative reconstruction technique.    IV Contrast: None   Oral Contrast: None    FINDINGS:    LUNG BASES: Mild groundglass opacities within the lower lobes may relate to pneumonitis or edema. No lobar consolidation.    LIVER: Limited evaluation due to lack of intravenous contrast. No focal lesions.    GALLBLADDER AND BILIARY DUCTS: No calcified gallstones. Not distended.  No intra- or extrahepatic biliary dilatation.    PANCREAS: Normal.    SPLEEN: Normal.    ADRENAL GLANDS: Nodule left adrenal gland axial image 22 measures 10 x 10 mm with CT numbers of 5 Hounsfield  units likely relates to adenoma functioning or nonfunctioning though indeterminate. Right adrenal glands unremarkable.    KIDNEYS AND URETERS: Exophytic hyperdense lesion inferior pole left kidney axial image 41 measures 8 x 8 mm with CT numbers of 40 Hounsfield units above fluid attenuation indeterminate. Findings may relate to hyperdense cyst or neoplasm. Follow-up   recommended.  2 adjacent punctate 2 mm nonobstructive left renal calculi axial image 30.    URINARY BLADDER: Normal.    REPRODUCTIVE ORGANS: Bilateral ovarian cyst indeterminate on the right measuring 38 x 30 mm on the left measuring 30 x 30 mm. Pelvic ultrasound recommended.    BOWEL: Normal caliber.  The appendix is not visualized.    LYMPH NODES: No abnormally enlarged nodes.    PERITONEUM/RETROPERITONEUM: No ascites or free air.     VESSELS: Calcination of the abdominal aorta. No aneurysm. Evaluation of the vascular structures limited due to lack of intravenous contrast.    ABDOMINAL WALL: Hyperdense mass subcutaneous tissues left anterior abdominal wall noted on axial image 42 and sagittal image 121 measures 32 x 16 mm appears to extend to the skin surface with similar appearance however less pronounced involving the   right   lateral abdominal wall subcutaneous tissues axial image 54 measuring 8 mm in thickness ultrasound recommended.    BONES: Diffuse degenerative disc disease and degenerative spondylosis lumbar spine.      IMPRESSION:       1. Mild bilateral lower lobe groundglass opacities may relate to atelectasis or pneumonitis.       Subcentimeter hyperdense lesion left kidney indeterminate. Solid mass and therefore neoplasm is not excluded. Directed ultrasound may be useful.    Bilateral ovarian cysts indeterminate. Findings may relate to benign or malignant cystic neoplasm of the ovaries in a postmenopausal patient. Ultrasound of the pelvis recommended.    Subcutaneous masses anterior abdominal wall as described indeterminate. Directed  ultrasound may be useful.    Punctate nonobstructive left renal calculi.    Left adrenal nodule likely relates to adenoma functional or nonfunctioning correlation clinical history and laboratory values recommended.    No acute findings.    Interpreted by:  Adebayo Potter MD    Signed by:  Adebayo Potter MD  8/31/20    Final result        Assessment  Ms. Guidry is a 70 y.o. female with history of CHF, CKD4, type 2 diabetes mellitus who presents with small left lower pole renal lesion.  This is likely a hyperdense cyst, as seen on MRI pelvis.  I was not able to see any of the stones on the MRI.  But they were punctate on CT.  She is asymptomatic.    Plan  1.  Follow-up in 1 year with renal ultrasound.      Bogdan Lagos MD

## 2020-12-08 ENCOUNTER — TELEPHONE (OUTPATIENT)
Dept: PRIMARY CARE CLINIC | Age: 70
End: 2020-12-08

## 2020-12-08 NOTE — TELEPHONE ENCOUNTER
Patient verbally understood and plans to go to the Er if she gets worse. She stated her oxygen had come up and she had done 2 treatments today and will plan to do more. She didn't want to come in tomorrow due to transportation but will call if needed.

## 2020-12-08 NOTE — TELEPHONE ENCOUNTER
Ensure she is using inhalers and breathing treatments at home. Cough deep breathing exercises. Keep pulmonary appointment on Monday. Finish antibiotics. I can see patient tomorrow morning at 9:30am to further evaluate (put her on my schedule please)  but I will likely send on to hospital due to worsening breathing/resp status. She really needs to go to ED if breathing difficulty for further treatment and evaluation. Call pt please.

## 2020-12-09 VITALS — RESPIRATION RATE: 20 BRPM | TEMPERATURE: 98.6 F | OXYGEN SATURATION: 93 %

## 2020-12-10 NOTE — PROGRESS NOTES
New Patient Office Visit      Date: 12/10/2020     Patient Name: Mingo Guidry  MRN: [id number]  : 1950  Referring Physician: Tatiana Contreras    Chief Complaint: Establish care for elevated tumor markers    History of Present Illness: Mingo Guidry is a pleasant 70 y.o. female past medical history of COPD on 4 L, CHF, hypertension, hyperlipidemia, type 2 diabetes, CKD, chronic back pain, endometrial cancer status post hysterectomy  who presents today for evaluation of elevated tumor marker.  The patient was recently seen by her gynecologist who was concerned about worsening abdominal pain and bloating.  She check tumor markers including CA 19-9, CA-125, CEA.  CA 19-9 and CEA were both mildly elevated.  Patient had a recent colonoscopy and EGD in 2019 which were without evidence of malignancy.  She recently had an MRI of her abdomen/pelvis for renal cyst which are stable.  Imaging did not note any gallbladder or pancreatic lesions.  MRI was notable for fatty liver infiltration.  Patient states last month she was hospitalized for 10 days for pneumonia.  She was treated with antibiotics as well as steroids.  Today she states she is having increased shortness of breath with exertion.  Requiring up to 5 L.  States her breathing is stable when she is at rest.  Advised patient to go to the ER today however she would like to wait until she sees her pulmonologist on Monday.  Otherwise she remains compliant with her home medications    Oncology History:    Oncology/Hematology History    No history exists.       Subjective      Review of Systems:     Constitutional: Positive for fatigue.  Negative for fevers, chills, or weight loss  Eyes: Negative for blurred vision or discharge         Ear/Nose/Throat: Negative for difficulty swallowing, sore throat, LAD                                                       Respiratory: Positive for shortness of breath with exertion.  Negative for cough                                                                                         Cardiovascular: Negative for chest pain or palpitations                                                                  Gastrointestinal: Negative for nausea, vomiting or diarrhea                                                                     Genitourinary: Negative for dysuria or hematuria                                                                                           Musculoskeletal: Positive for back pain.  Negative for muscle ache                                                                        Neurologic: Negative for any weakness, headaches, dizziness                                                                         Hematologic: Negative for any easy bleeding or bruising                                                                                   Psychiatric: Negative for anxiety or depression                             Past Medical History:   Past Medical History:   Diagnosis Date   • Abnormal ECG    • Abnormal Pap smear of cervix    • Acid reflux 10/17/2016   • Anemia    • Anxiety    • Asthma     bronchial   • Cancer (CMS/[facility])     uterine   • Chronic diastolic heart failure (CMS/[facility]) 10/17/2016    Normal dobutamine echocardiogram stress, 04/07/2005. Echocardiogram on 05/08/2009:  EF 50% to 55%. Left atrium 3.5 cm.   • Chronic obstructive pulmonary disease (CMS/[facility]) 10/17/2016    asthmatic bronchitis, on O2 use chronically.     • Female infertility    • Gestational diabetes    • Gestational hypertension    • Gout 10/17/2016   • High cholesterol 01/03/2018   • Hypertension 10/17/2016    Benign hypertension.   • Lower extremity edema 10/17/2016    Chronic lower extremity edema/venous insufficiency.   • Murmur, heart    • Obesity 10/17/2016   • Renal failure     stageIV   • Rh incompatibility    • Seasonal allergies 10/17/2016   • Tuberculosis    • Type 2 diabetes mellitus (CMS/[facility]) 10/17/2016    insulin  dependent.   • Uterine cancer (CMS/HCC)        Past Surgical History:   Past Surgical History:   Procedure Laterality Date   • BRONCHOSCOPY N/A 2018    Procedure: BRONCHOSCOPY WITH WASHINGS;  Surgeon: Ryder Dahl MD;  Location: [facility] OR;  Service:    • CARDIAC CATHETERIZATION N/A 3/17/2020    Procedure: Coronary angiography;  Surgeon: Elkin Zhou MD;  Location: [facility] CATH INVASIVE LOCATION;  Service: Cardiology;  Laterality: N/A;   •  SECTION     • COLONOSCOPY     • COLONOSCOPY N/A 2019    Procedure: COLONOSCOPY;  Surgeon: Skinny York MD;  Location: [facility] ENDOSCOPY;  Service: General   • CYSTECTOMY Right     neck   • DILATATION AND CURETTAGE     • ENDOSCOPY N/A 6/10/2019    Procedure: ESOPHAGOGASTRODUODENOSCOPY WITH BIOPSY;  Surgeon: Skinny York MD;  Location: [facility] ENDOSCOPY;  Service: Gastroenterology   • EYE SURGERY Bilateral 2005    cataract   • FOREIGN BODY REMOVAL N/A 3/5/2020    Procedure: BRONCHOSCOPY with MAC and removal of foreign object;  Surgeon: Ryder Dahl MD;  Location: [facility] OR;  Service: Pulmonary;  Laterality: N/A;   • HYSTERECTOMY     • LUNG SURGERY Left 1997 tumors removed from left lung-benign   • TONSILLECTOMY AND ADENOIDECTOMY         Family History:   Family History   Problem Relation Age of Onset   • Heart disease Mother    • Heart disease Father    • Leukemia Father    • Ulcers Father    • Diabetes Sister    • COPD Sister    • Breast cancer Sister    • Diabetes Brother    • Lung cancer Sister    • No Known Problems Sister    • No Known Problems Sister    • Pneumonia Sister    • Stroke Sister    • COPD Sister        Social History:   Social History     Socioeconomic History   • Marital status:      Spouse name: Not on file   • Number of children: Not on file   • Years of education: Not on file   • Highest education level: Not on file   Tobacco Use   • Smoking status: Former Smoker     Packs/day: 1.00     Years: 35.00     Pack  years: 35.00     Types: Cigarettes     Quit date:      Years since quittin.9   • Smokeless tobacco: Never Used   Substance and Sexual Activity   • Alcohol use: No   • Drug use: No   • Sexual activity: Not Currently     Birth control/protection: Surgical       Medications:     Current Outpatient Medications:   •  furosemide (LASIX) 20 MG tablet, Take 40 mg by mouth Daily. 60 mg in the morning and 40 mg in the afternoon, Disp: , Rfl:   •  Advair Diskus 500-50 MCG/DOSE DISKUS, INHALE 1 PUFF BY MOUTH TWO TIMES A DAY, Disp: 60 each, Rfl: 5  •  allopurinol (ZYLOPRIM) 300 MG tablet, Take 300 mg by mouth Daily., Disp: , Rfl:   •  apixaban (ELIQUIS) 5 MG tablet tablet, Take 1 tablet by mouth Every 12 (Twelve) Hours., Disp: 60 tablet, Rfl: 0  •  apixaban (Eliquis) 5 MG tablet tablet, TAKE 1 TABLET BY MOUTH EVERY 12 HOURS, Disp: , Rfl:   •  ASPIRIN LOW DOSE 81 MG EC tablet, Take 81 mg by mouth Daily., Disp: , Rfl: 5  •  atorvastatin (LIPITOR) 40 MG tablet, Take 1 tablet by mouth Daily., Disp: 30 tablet, Rfl: 10  •  Blood Glucose Monitoring Suppl device, 1 each., Disp: , Rfl:   •  busPIRone (BUSPAR) 10 MG tablet, Take 10 mg by mouth 3 (Three) Times a Day., Disp: , Rfl:   •  DALIRESP 500 MCG tablet tablet, TAKE ONE TABLET BY MOUTH EVERY DAY, Disp: 30 tablet, Rfl: 5  •  dilTIAZem CD (CARDIZEM CD) 180 MG 24 hr capsule, Take 1 capsule by mouth Daily., Disp: 30 capsule, Rfl: 0  •  docusate sodium (COLACE) 250 MG capsule, Take 250 mg by mouth Daily., Disp: , Rfl:   •  doxycycline (VIBRAMYICN) 100 MG tablet, Take 100 mg by mouth., Disp: , Rfl:   •  ezetimibe (Zetia) 10 MG tablet, Take 10 mg by mouth Daily., Disp: , Rfl:   •  fenofibrate (TRICOR) 48 MG tablet, Take 48 mg by mouth Daily., Disp: , Rfl:   •  FLUTICASONE PROPIONATE, INHAL, IN, Inhale 2 (two) times a day., Disp: , Rfl:   •  furosemide (LASIX) 20 MG tablet, Take 60 mg by mouth Daily., Disp: , Rfl:   •  Ericka, Zingiber officinalis, (ERICKA ROOT) 500 MG capsule, Take  " by mouth Daily., Disp: , Rfl:   •  glucose blood (True Metrix Pro Blood Glucose) test strip, USE TO BLOOD SUGAR FOUR TIMES A DAY, Disp: , Rfl:   •  GNP VITAMIN D MAXIMUM STRENGTH 50 MCG (2000 UT) tablet, Take 1 tablet by mouth Daily., Disp: , Rfl: 5  •  HUMALOG 100 UNIT/ML injection, Inject 35 Units under the skin into the appropriate area as directed 3 (Three) Times a Day., Disp: , Rfl:   •  HYDROcodone-acetaminophen (NORCO) 5-325 MG per tablet, Take 1 tablet by mouth Every Night., Disp: , Rfl:   •  icosapent ethyl (Vascepa) 1 g capsule capsule, Take 2 g by mouth 2 (Two) Times a Day With Meals., Disp: , Rfl:   •  Insulin Pen Needle (BD Pen Needle Valerie U/F) 32G X 4 MM misc, USE WITH LANTUS ONCE DAILY, Disp: , Rfl:   •  Insulin Syringe-Needle U-100 31G X 5/16\" 1 ML misc, USE FOR INSULIN INJECTIONS FIVE TIMES DAILY, Disp: , Rfl:   •  Lancets misc, 1 each., Disp: , Rfl:   •  metolazone (ZAROXOLYN) 5 MG tablet, Take 5 mg by mouth Daily As Needed., Disp: , Rfl:   •  metoprolol succinate XL (TOPROL-XL) 100 MG 24 hr tablet, Take 1 tablet by mouth Daily., Disp: 30 tablet, Rfl: 0  •  montelukast (SINGULAIR) 10 MG tablet, Take 10 mg by mouth Every Night., Disp: , Rfl:   •  nystatin (MYCOSTATIN) 638623 UNIT/ML suspension, , Disp: , Rfl:   •  O2 (OXYGEN), Inhale 4 L/min Daily., Disp: , Rfl:   •  omalizumab (XOLAIR) 150 MG injection, Inject 300 mg under the skin into the appropriate area as directed Every 28 (Twenty-Eight) Days. 2 injections once a month, Disp: , Rfl:   •  pantoprazole (PROTONIX) 40 MG EC tablet, Take 40 mg by mouth Daily., Disp: , Rfl:   •  PATADAY 0.2 % solution ophthalmic solution, Administer  to both eyes 2 (Two) Times a Day., Disp: , Rfl: 3  •  potassium chloride (K-DUR,KLOR-CON) 20 MEQ CR tablet, Take 20 mEq by mouth Daily., Disp: , Rfl: 2  •  rOPINIRole (REQUIP) 0.5 MG tablet, Take 0.5 mg by mouth 2 (Two) Times a Day., Disp: , Rfl: 0  •  sitaGLIPtin (JANUVIA) 100 MG tablet, Take 100 mg by mouth Daily. " "1/2 tablet daily, Disp: , Rfl:   •  sodium chloride 0.65 % nasal spray, 1 spray into the nostril(s) as directed by provider., Disp: , Rfl:   •  Spiriva HandiHaler 18 MCG per inhalation capsule, PLACE 1 CAPSULE INTO INHALER AND INHALE DAILY., Disp: 30 capsule, Rfl: 4  •  sucralfate (CARAFATE) 1 G tablet, Take 1 g by mouth 3 (Three) Times a Day., Disp: , Rfl:   •  tiZANidine (ZANAFLEX) 4 MG tablet, Take 4 mg by mouth 2 (Two) Times a Day As Needed., Disp: , Rfl:   •  traMADol (ULTRAM) 50 MG tablet, Take 50 mg by mouth 3 (Three) Times a Day., Disp: , Rfl:   •  TRESIBA FLEXTOUCH 100 UNIT/ML solution pen-injector injection, 2 (Two) Times a Day. 35 units in the am 80 units at night, Disp: , Rfl: 10    Allergies:   Allergies   Allergen Reactions   • Shellfish-Derived Products Hives   • Wheat Bran Hives       Objective     Physical Exam:  Vital Signs:   Vitals:    12/10/20 1436   BP: 140/65   Pulse: 102   Resp: 20   Temp: 98 °F (36.7 °C)   TempSrc: Temporal   SpO2: 93%   Weight: 89.4 kg (197 lb)   Height: 147.3 cm (58\")   PainSc:   8   PainLoc: Back     Pain Score    12/10/20 1436   PainSc:   8   PainLoc: Back     ECOG Performance Status: 1 - Symptomatic but completely ambulatory    Constitutional: NAD, ECOG 1  Eyes: PERRLA, scleral anicteric  ENT: No LAD, no thyromegaly  Respiratory: CTAB, no wheezing, rales, rhonchi  Cardiovascular: RRR, no murmurs, pulses 2+ bilaterally  Abdomen: soft, NT/ND, no HSM  Musculoskeletal: strength 5/5 bilaterally, no c/c/e  Neurologic: A&O x 3, CN II-XII intact grossly  Psych: mood and affect congruent, no SI or HI    Results Review:   No visits with results within 2 Week(s) from this visit.   Latest known visit with results is:   Office Visit on 10/28/2020   Component Date Value Ref Range Status   •  10/28/2020 37.2  0.0 - 38.1 U/mL Final    Comment: [employer] Electrochemiluminescence Immunoassay (ECLIA)  Values obtained with different assay methods or kits cannot be  used " interchangeably.  Results cannot be interpreted as absolute  evidence of the presence or absence of malignant disease.     • CA 19-9 10/28/2020 106* 0 - 35 U/mL Final    Comment: **Verified by repeat analysis**  [employer] Electrochemiluminescence Immunoassay (ECLIA)  Values obtained with different assay methods or kits cannot be  used interchangeably.  Results cannot be interpreted as absolute  evidence of the presence or absence of malignant disease.     • CEA 10/28/2020 9.2  ng/mL Final       No results found.    Assessment / Plan      Assessment/Plan:   1. Leukocytosis, unspecified type (Primary)  -Has been chronically elevated over the last 6 months.  -Likely secondary to her chronic COPD and prednisone usage  -ANC predominantly neutrophilic.  Very low concern for hematologic malignancy  -     CBC & Differential; Future  -     CEA; Future  -     Comprehensive Metabolic Panel; Future  -     Cancer Antigen 19-9; Future    2. Elevated carcinoembryonic antigen (CEA) and CA 19-9  -CA 19-9 noted to be 109.  CEA elevated to 37  -Colonoscopy in June 2019 without evidence of malignancy  -MRI abdomen/pelvis without evidence of gallbladder or pancreatic lesion was notable for fatty infiltration of liver  -CA 19-9 and CEA likely elevated secondary to WOLF noted on MRI  -Patient with GI follow-up scheduled for January 2021    Follow Up:   Follow-up in 6 months with repeat labs     Harsh Jack MD  Hematology and Oncology     Please note that portions of this note may have been completed with a voice recognition program. Efforts were made to edit the dictations, but occasionally words are mistranscribed.

## 2020-12-14 NOTE — PROGRESS NOTES
"Chief Complaint   Patient presents with   • Follow-up   • Shortness of Breath         Subjective   Mingo Guidry is a 70 y.o. female.     History of Present Illness   Patient comes today for follow up of shortness of breath and COPD.     She states that her oxygen level has been dropping at home.    She has been in the hospital 4 times this year due to breathing issues and was told she had pneumonia.  Her last stay in the hospital was in November.    She uses Advair twice a day and Spiriva daily.  She takes Daliresp on a daily basis.  She takes Singulair at night.    She uses the nebulizer twice a day on average.    She uses Flonase daily.    Her exercise tolerance has worsened over the last few months even wearing her oxygen.    Quit smoking 22 years ago.    She uses BiPAP nightly at a pressure of 17/6.  She has oxygen bled into the machine at night.    She uses oxygen at 3 to 4 L on a continual basis.    The following portions of the patient's history were reviewed and updated as appropriate: allergies, current medications, past family history, past medical history, past social history and past surgical history.    Review of Systems   HENT: Negative for sinus pressure, sneezing and sore throat.    Respiratory: Positive for cough, chest tightness, shortness of breath and wheezing.        Objective   Visit Vitals  /72   Pulse 72   Temp 97.5 °F (36.4 °C)   Resp 18   Ht 147.3 cm (57.99\")   Wt 88.9 kg (196 lb)   LMP  (LMP Unknown)   SpO2 (!) 79% Comment: on room air (REST)   BMI 40.98 kg/m²   O2: 93%  on 4LPM      Physical Exam  Vitals signs reviewed.   HENT:      Head: Atraumatic.      Mouth/Throat:      Mouth: Mucous membranes are moist.      Pharynx: Oropharynx is clear.      Comments: Crowded oropharynx.  Eyes:      Extraocular Movements: Extraocular movements intact.   Neck:      Musculoskeletal: Neck supple.   Cardiovascular:      Rate and Rhythm: Normal rate and regular rhythm.   Pulmonary:      Effort: " Pulmonary effort is normal. No respiratory distress.      Comments: Somewhat decreased air entry with scattered wheezing.  Musculoskeletal: Normal range of motion.      Comments: Gait slow.    Skin:     General: Skin is warm.   Neurological:      Mental Status: She is alert and oriented to person, place, and time.           Assessment/Plan   Diagnoses and all orders for this visit:    1. Shortness of breath (Primary)  -     Pulmonary Function Test    2. Chronic obstructive pulmonary disease with acute exacerbation (CMS/HCC)  -     methylPREDNISolone acetate (DEPO-medrol) injection 80 mg    3. Hypoxia    4. Chronic respiratory failure with hypercapnia (CMS/HCC)    5. Respiratory bronchiolitis interstitial lung disease (CMS/HCC)  -     CT Chest Hi Resolution; Future    Other orders  -     ipratropium-albuterol (DUO-NEB) 0.5-2.5 mg/3 ml nebulizer; Take 3 mL by nebulization 4 (Four) Times a Day As Needed for Wheezing or Shortness of Air.  Dispense: 360 mL; Refill: 1  -     Advair Diskus 500-50 MCG/DOSE DISKUS; Inhale 1 puff 2 (Two) Times a Day.  Dispense: 60 each; Refill: 5  -     roflumilast (Daliresp) 500 MCG tablet tablet; Take 1 tablet by mouth Daily.  Dispense: 30 tablet; Refill: 5  -     montelukast (SINGULAIR) 10 MG tablet; Take 1 tablet by mouth Every Night.  Dispense: 30 tablet; Refill: 5  -     tiotropium (Spiriva HandiHaler) 18 MCG per inhalation capsule; Place 1 capsule into inhaler and inhale Daily.  Dispense: 30 capsule; Refill: 4           Return for keep appt in February.    DISCUSSION (if any):  I reviewed her PFT and discussed the results with her.  PFT does not show any obstruction.  Moderate restriction is suggested there is no suggestion of air trapping.  She has a decreased diffusion capacity.    No change to the current medications has been made.  I have asked her to continue using Advair, Spiriva, Daliresp, Singulair, and the rescue medication as directed.    Compliance with medications stressed.      Side effects of prescribed medications discussed with the patient.    Due to the restriction noted on PFT which was completed today I have ordered a high-resolution CT scan.    I reviewed records from [facility] not all records about what was available to me.  She had a chest x-ray November 2020 which shows atelectasis but no pneumonia.      Her last CT chest was May 2020 and shows obstruction of the lingular bronchus with complete collapse of the lingula and atelectasis as well as the left upper lobe and left lower lobe versus airspace disease.  There may be mild narrowing of the left upper lobe bronchi as well.  Scattered groundglass opacities are seen in the right lung similar to prior examination possibly related to chronic interstitial disease or hypersensitivity pneumonitis.    I have asked her to continue using BiPAP at the current pressure of 17/6 with a full facemask.      She is 100% compliant with BiPAP use.    Dictated utilizing Dragon dictation.    This document was electronically signed by JACKI Miller December 14, 2020  10:36 EST

## 2020-12-17 PROBLEM — J12.82 PNEUMONIA DUE TO COVID-19 VIRUS: Status: ACTIVE | Noted: 2020-01-01

## 2020-12-17 PROBLEM — E66.9 OBESITY (BMI 30-39.9): Status: ACTIVE | Noted: 2020-01-01

## 2020-12-17 PROBLEM — J96.01 ACUTE RESPIRATORY FAILURE WITH HYPOXIA AND HYPERCAPNIA (HCC): Status: ACTIVE | Noted: 2020-01-01

## 2020-12-17 PROBLEM — U07.1 PNEUMONIA DUE TO COVID-19 VIRUS: Status: ACTIVE | Noted: 2020-01-01

## 2020-12-17 PROBLEM — J96.02 ACUTE RESPIRATORY FAILURE WITH HYPOXIA AND HYPERCAPNIA (HCC): Status: ACTIVE | Noted: 2020-01-01

## 2020-12-17 NOTE — H&P
[facility] [place]   HISTORY AND PHYSICAL      Name:  Mingo Guidry   Age:  70 y.o.  Sex:  female  :  1950  MRN:  [id number]   Visit Number:  [id number]  Admission Date:  2020  Date Of Service:  20  Primary Care Physician:  Luz Prater APRN    Chief Complaint:     Shortness of breath.    History Of Presenting Illness:      This is a 70-year-old female with history of COPD on home oxygen at 5 L, chronic diastolic heart failure, hypertension, diabetes mellitus type 2 was brought to the emergency room by EMS with increasing shortness of breath and pulse oxygen saturations in the 40s at home.  Patient was placed on BiPAP therapy and was transferred to the emergency room.  Patient states that she has been worsening shortness of breath in the last 2 days and really got worse today.  She was smothering in her niece was at home who called ambulance.  Patient states that she has never been diagnosed with Covid.  In fact she used to go to adult  center and was tested regularly in the past.  She states that she tested about 1 week ago and was tested negative.  She has not been to the adult  center in the last 1 week.  She denies being exposed to any sick persons or persons with known Covid infection.    In the emergency room, her initial temperature was 99.2, heart rate 115, initial blood pressure 188/79 and pulse oxygen saturation was 90% on BiPAP therapy at 90% FiO2.  Respiratory rate was 26.  ABG done in the emergency room showed pH of 7.36, PCO2 64, PO2 92, bicarb of 36.  CMP and CBC was unremarkable except for a creatinine of 1.32 (baseline) and a white count of 13.  Lactic acid was within normal limits.    In the emergency room, patient was given metoprolol for her tachycardia.  She was started on dexamethasone and was continued on BiPAP therapy.  She is currently being admitted to the medical floor with telemetry to the Covid unit.    Patient was recently  admitted to [facility] on 11/12/2020 and was discharged on 11/19/2020.  At that time she was treated for COPD exacerbation and pneumonia.  At that time she had a test for COVID-19 and it was negative.  Patient has not been treated for Covid pneumonia before.    Review Of Systems:     General ROS: Patient denies any fevers, chills or loss of consciousness. Complains of generalized weakness.  Psychological ROS: No history of any hallucinations and delusions.  Ophthalmic ROS: No history of any diplopia or transient loss of vision.  ENT ROS: No history of sore throat, nasal congestion or ear pain.   Allergy and Immunology ROS: No history of rash or itching.  Hematological and Lymphatic ROS: No history of neck swelling or easy bleeding.  Endocrine ROS: No history of any recent unintentional weight gain or loss.  Respiratory ROS: As per history of presenting illness.  Cardiovascular ROS: No history of chest pain or palpitations.  Gastrointestinal ROS: No history of nausea and vomiting. Denies any abdominal pain.  Genitourinary ROS: No history of dysuria or hematuria.  Musculoskeletal ROS: No muscle pain. No calf pain. Complains of chronic back pain.  Neurological ROS: No history of any focal weakness. No loss of consciousness.  Dermatological ROS: No history of any redness or pruritis.     Past Medical History:    Past Medical History:   Diagnosis Date   • Abnormal ECG    • Abnormal Pap smear of cervix    • Acid reflux 10/17/2016   • Anemia    • Anxiety    • Asthma     bronchial   • Cancer (CMS/HCC)     uterine   • Chronic diastolic heart failure (CMS/HCC) 10/17/2016    Normal dobutamine echocardiogram stress, 04/07/2005. Echocardiogram on 05/08/2009:  EF 50% to 55%. Left atrium 3.5 cm.   • Chronic obstructive pulmonary disease (CMS/HCC) 10/17/2016    asthmatic bronchitis, on O2 use chronically.     • Female infertility    • Gestational diabetes    • Gestational hypertension    • Gout 10/17/2016   • High  cholesterol 2018   • Hypertension 10/17/2016    Benign hypertension.   • Lower extremity edema 10/17/2016    Chronic lower extremity edema/venous insufficiency.   • Murmur, heart    • Obesity 10/17/2016   • Renal failure     stageIV   • Rh incompatibility    • Seasonal allergies 10/17/2016   • Tuberculosis    • Type 2 diabetes mellitus (CMS/HCC) 10/17/2016    insulin dependent.   • Uterine cancer (CMS/HCC)      Past Surgical history:    Past Surgical History:   Procedure Laterality Date   • BRONCHOSCOPY N/A 2018    Procedure: BRONCHOSCOPY WITH WASHINGS;  Surgeon: Ryder Dahl MD;  Location: [facility] OR;  Service:    • CARDIAC CATHETERIZATION N/A 3/17/2020    Procedure: Coronary angiography;  Surgeon: Elkin Zhou MD;  Location: [facility] CATH INVASIVE LOCATION;  Service: Cardiology;  Laterality: N/A;   •  SECTION     • COLONOSCOPY     • COLONOSCOPY N/A 2019    Procedure: COLONOSCOPY;  Surgeon: Skinny York MD;  Location: [facility] ENDOSCOPY;  Service: General   • CYSTECTOMY Right     neck   • DILATATION AND CURETTAGE     • ENDOSCOPY N/A 6/10/2019    Procedure: ESOPHAGOGASTRODUODENOSCOPY WITH BIOPSY;  Surgeon: Skinny York MD;  Location: [facility] ENDOSCOPY;  Service: Gastroenterology   • EYE SURGERY Bilateral 2005    cataract   • FOREIGN BODY REMOVAL N/A 3/5/2020    Procedure: BRONCHOSCOPY with MAC and removal of foreign object;  Surgeon: Ryder Dahl MD;  Location: [facility] OR;  Service: Pulmonary;  Laterality: N/A;   • HYSTERECTOMY     • LUNG SURGERY Left 1997    4 tumors removed from left lung-benign   • TONSILLECTOMY AND ADENOIDECTOMY       Social History:    Social History     Socioeconomic History   • Marital status:      Spouse name: Not on file   • Number of children: Not on file   • Years of education: Not on file   • Highest education level: Not on file   Tobacco Use   • Smoking status: Former Smoker     Packs/day: 1.00     Years: 35.00     Pack years: 35.00      Types: Cigarettes     Quit date:      Years since quittin.9   • Smokeless tobacco: Never Used   Substance and Sexual Activity   • Alcohol use: No   • Drug use: No   • Sexual activity: Not Currently     Birth control/protection: Surgical     Family History:    Family History   Problem Relation Age of Onset   • Heart disease Mother    • Heart disease Father    • Leukemia Father    • Ulcers Father    • Diabetes Sister    • COPD Sister    • Breast cancer Sister    • Diabetes Brother    • Lung cancer Sister    • No Known Problems Sister    • No Known Problems Sister    • Pneumonia Sister    • Stroke Sister    • COPD Sister      Allergies:      Mushroom, Shellfish-derived products, and Wheat bran    Home Medications:    Prior to Admission Medications     Prescriptions Last Dose Informant Patient Reported? Taking?    Advair Diskus 500-50 MCG/DOSE DISKUS 2020  No Yes    Inhale 1 puff 2 (Two) Times a Day.    allopurinol (ZYLOPRIM) 300 MG tablet 2020  Yes Yes    Take 150 mg by mouth Daily.    apixaban (Eliquis) 5 MG tablet tablet 2020  Yes Yes    TAKE 1 TABLET BY MOUTH EVERY 12 HOURS    ASPIRIN LOW DOSE 81 MG EC tablet 2020  Yes Yes    Take 81 mg by mouth Daily.    atorvastatin (LIPITOR) 40 MG tablet 2020  No Yes    Take 1 tablet by mouth Daily.    busPIRone (BUSPAR) 10 MG tablet 2020  Yes Yes    Take 10 mg by mouth 3 (Three) Times a Day.    dilTIAZem CD (CARDIZEM CD) 180 MG 24 hr capsule 2020  No Yes    Take 1 capsule by mouth Daily.    docusate sodium (COLACE) 250 MG capsule 2020  Yes Yes    Take 250 mg by mouth Daily.    ezetimibe (Zetia) 10 MG tablet 2020  Yes Yes    Take 10 mg by mouth Daily.    fenofibrate (TRICOR) 48 MG tablet 2020  Yes Yes    Take 48 mg by mouth Daily.    fluticasone (FLONASE) 50 MCG/ACT nasal spray 2020  Yes Yes    2 sprays into the nostril(s) as directed by provider 2 (two) times a day.    furosemide (LASIX) 20 MG tablet  12/16/2020  Yes Yes    Take 40 mg by mouth Every Evening. 60 mg in the morning and 40 mg in the afternoon     furosemide (LASIX) 20 MG tablet 12/16/2020  Yes Yes    Take 60 mg by mouth Every Morning.    Ginger, Zingiber officinalis, (GINGER ROOT) 500 MG capsule 12/16/2020  Yes Yes    Take  by mouth Daily.    GNP VITAMIN D MAXIMUM STRENGTH 50 MCG (2000 UT) tablet 12/16/2020  Yes Yes    Take 1 tablet by mouth Daily.    HUMALOG 100 UNIT/ML injection 12/17/2020  Yes Yes    Inject 38 Units under the skin into the appropriate area as directed 3 (Three) Times a Day.    HYDROcodone-acetaminophen (NORCO) 5-325 MG per tablet 12/16/2020  Yes Yes    Take 1 tablet by mouth Every Night.    icosapent ethyl (Vascepa) 1 g capsule capsule 12/16/2020  Yes Yes    Take 2 g by mouth 2 (Two) Times a Day With Meals.    metolazone (ZAROXOLYN) 5 MG tablet 12/16/2020  Yes Yes    Take 5 mg by mouth Daily As Needed.    metoprolol succinate XL (TOPROL-XL) 100 MG 24 hr tablet 12/16/2020  No Yes    Take 1 tablet by mouth Daily.    montelukast (SINGULAIR) 10 MG tablet 12/16/2020  No Yes    Take 1 tablet by mouth Every Night.    omalizumab (XOLAIR) 150 MG injection Past Month  Yes Yes    Inject 300 mg under the skin into the appropriate area as directed Every 28 (Twenty-Eight) Days. 2 injections once a month    pantoprazole (PROTONIX) 40 MG EC tablet 12/16/2020  Yes Yes    Take 40 mg by mouth Daily.    PATADAY 0.2 % solution ophthalmic solution 12/16/2020  Yes Yes    Administer  to both eyes 2 (Two) Times a Day.    potassium chloride (K-DUR,KLOR-CON) 20 MEQ CR tablet 12/16/2020  Yes Yes    Take 20 mEq by mouth Daily.    roflumilast (Daliresp) 500 MCG tablet tablet 12/16/2020  No Yes    Take 1 tablet by mouth Daily.    rOPINIRole (REQUIP) 0.5 MG tablet 12/16/2020  Yes Yes    Take 0.5 mg by mouth 2 (Two) Times a Day.    sitaGLIPtin (JANUVIA) 100 MG tablet 12/16/2020  Yes Yes    Take 50 mg by mouth Daily.    sucralfate (CARAFATE) 1 G tablet 12/16/2020   "Yes Yes    Take 1 g by mouth 3 (Three) Times a Day.    tiotropium (Spiriva HandiHaler) 18 MCG per inhalation capsule 12/16/2020  No Yes    Place 1 capsule into inhaler and inhale Daily.    tiZANidine (ZANAFLEX) 4 MG tablet 12/16/2020  Yes Yes    Take 4 mg by mouth 2 (Two) Times a Day As Needed.    traMADol (ULTRAM) 50 MG tablet 12/16/2020 Self Yes Yes    Take 50 mg by mouth 3 (Three) Times a Day.    TRESIBA FLEXTOUCH 100 UNIT/ML solution pen-injector injection 12/17/2020  Yes Yes    2 (Two) Times a Day. 35 units in the am 80 units at night    apixaban (ELIQUIS) 5 MG tablet tablet   No No    Take 1 tablet by mouth Every 12 (Twelve) Hours.    Blood Glucose Monitoring Suppl device   Yes No    1 each.    glucose blood (True Metrix Pro Blood Glucose) test strip   Yes No    USE TO BLOOD SUGAR FOUR TIMES A DAY    Insulin Pen Needle (BD Pen Needle Valerie U/F) 32G X 4 MM misc   Yes No    USE WITH LANTUS ONCE DAILY    Insulin Syringe-Needle U-100 31G X 5/16\" 1 ML misc   Yes No    USE FOR INSULIN INJECTIONS FIVE TIMES DAILY    ipratropium-albuterol (DUO-NEB) 0.5-2.5 mg/3 ml nebulizer   No No    Take 3 mL by nebulization 4 (Four) Times a Day As Needed for Wheezing or Shortness of Air.    Lancets misc   Yes No    1 each.    nystatin (MYCOSTATIN) 266010 UNIT/ML suspension   Yes No    O2 (OXYGEN)  Self Yes No    Inhale 4 L/min Daily.    sodium chloride 0.65 % nasal spray   Yes No    1 spray into the nostril(s) as directed by provider.           ED Medications:    Medications   sodium chloride 0.9 % flush 10 mL (has no administration in time range)   metoprolol tartrate (LOPRESSOR) injection 5 mg (5 mg Intravenous Given 12/17/20 1322)   dexamethasone (DECADRON) injection 6 mg (6 mg Intravenous Given 12/17/20 1433)     Vital Signs:    Temp:  [99 °F (37.2 °C)-99.2 °F (37.3 °C)] 99 °F (37.2 °C)  Heart Rate:  [101-116] 105  Resp:  [20-26] 20  BP: (142-188)/(64-79) 142/64        12/17/20  1240 12/17/20  1510   Weight: 90.7 kg (200 lb) " 88.4 kg (194 lb 14.2 oz)     Body mass index is 36.82 kg/m².    Physical Exam:    General Appearance:  Alert and cooperative, not in any acute distress.   Head:  Atraumatic and normocephalic, without obvious abnormality.   Eyes:          Conjunctivae and sclerae normal, no Icterus. No pallor. Extraocular movements are within normal limits.   Ears:  Ears appear intact with no abnormalities noted.   Throat: No oral lesions, no thrush, oral mucosa moist.   Neck: Supple, trachea midline, no thyromegaly, no carotid bruit.   Back:   No kyphoscoliosis present. No tenderness to palpation,   range of motion normal.   Lungs:   Chest shape is normal. Breath sounds heard bilaterally equally.  Bilateral extensive wheezing heard with prolonged expiration.  Occasional basal crackles heard.. No Pleural rub or bronchial breathing.   Heart:  Normal S1 and S2, no murmur, no gallop, no rub. No JVD.   Abdomen:   Normal bowel sounds, no masses, no organomegaly. Soft, nontender, obese, no guarding, no rebound tenderness.   Extremities: Moves all extremities, 1+ edema, no cyanosis, no clubbing.   Pulses: Pulses palpable and equal bilaterally.   Skin: No bleeding or rash.   Neurologic: Alert and oriented x 3. Moves all four limbs equally. No tremors. No facial asymmetry.     Laboratory data:    I have reviewed the labs done in the emergency room.    Results from last 7 days   Lab Units 12/17/20  1235   SODIUM mmol/L 141   POTASSIUM mmol/L 4.2   CHLORIDE mmol/L 97*   CO2 mmol/L 34.3*   BUN mg/dL 25*   CREATININE mg/dL 1.32*   CALCIUM mg/dL 9.3   BILIRUBIN mg/dL 0.2   ALK PHOS U/L 77   ALT (SGPT) U/L 14   AST (SGOT) U/L 17   GLUCOSE mg/dL 154*     Results from last 7 days   Lab Units 12/17/20  1235   WBC 10*3/mm3 13.29*   HEMOGLOBIN g/dL 10.4*   HEMATOCRIT % 36.0   PLATELETS 10*3/mm3 535*       Results from last 7 days   Lab Units 12/17/20  1340   PH, ARTERIAL pH units 7.360   PO2 ART mm Hg 91.8   PCO2, ARTERIAL mm Hg 64.3*   HCO3 ART mmol/L  36.2*     EKG:      EKG done in the emergency room was reviewed by me.  It shows sinus tachycardia at 118 bpm.  Left axis deviation noted.  Inverted T waves noted in 1 and aVL.  Occasional PACs noted.    Radiology:    Imaging Results (Last 72 Hours)     Procedure Component Value Units Date/Time    XR Chest 1 View [[id number]] Collected: 12/17/20 1345     Updated: 12/17/20 1354    Narrative:      PROCEDURE: XR CHEST 1 VW-     HISTORY: dyspnea , shortness of breath     COMPARISON:  3/16/2020     FINDINGS:  Portable view of the chest demonstrates bilateral pleural  effusions, greater on left. Diffuse increased pulmonary markings are  seen favored to represent edema. The mediastinum is unremarkable.     The heart size is mildly enlarged.       Impression:      Pulmonary opacities likely due to combination of edema and  pleural effusions from CHF. Recommend continued radiographic follow-up.      This report was finalized on 12/17/2020 1:46 PM by Jayden Hahn MD.        Assessment:    1.  Acute on chronic hypoxic respiratory failure, secondary to #2, present on admission.  2.  Bilateral pneumonia secondary to COVID-19 infection, POA.  3.  No evidence of sepsis and signs of localized infection only.  4.  Acute COPD exacerbation, present on admission.  5.  Diabetes mellitus type 2 with nephropathy.  6.  Chronic kidney disease stage III.  7.  Obstructive sleep apnea.  8.  Paroxysmal atrial fibrillation.  9.  Obesity with a BMI of 37.  10.  Chronic diastolic heart failure, no evidence of exacerbation.    Plan:    Ms. Guidry is currently being admitted to the medical floor with telemetry.  She will be admitted as inpatient and I anticipate more than 2 days of hospital stay.  She will be maintained on oxygen nasal cannula as well as BiPAP therapy intermittently.  She is currently on nasal cannula oxygen at 6 L and saturating in the low 90s.    I have discussed the treatment of COVID-19 with her regarding remdesivir and  dexamethasone.  She understands that the remdesivir therapy is experimental but she is willing to try.  I will also repeat her COVID-19 test with lexar lab.  She will be placed on appropriate COPD exacerbation treatment with bronchodilators, budesonide and IV steroids.  I will also place her on azithromycin.    Her home medications will be continued.  She will be placed on Levemir.  She will be placed on subcutaneous insulin protocol.  I have discussed advanced directives with her and she is currently a full code.  She however does not want to be kept alive on the mechanical ventilation if she does not have chance of recovery.  We will keep her on full code measures at this time.  She states that she does not have a power of  but has a son who comes and helps her when needed.    Advance Care Planning      ACP discussion was held with the patient during this visit. Patient does not have an advance directive, information provided.      Dave Hairston MD  12/17/20  17:14 EST    Dictated utilizing Dragon dictation.

## 2020-12-17 NOTE — ED NOTES
Informed [facility] @ this time of covid positive result per ALYSA Mckeon.     Darlene Sheikh  12/17/20 3617

## 2020-12-17 NOTE — PLAN OF CARE
Problem: Noninvasive Ventilation Acute  Goal: Effective Unassisted Ventilation and Oxygenation  Intervention: Monitor and Manage Noninvasive Ventilation  Flowsheets (Taken 12/17/2020 1723)  NPPV/CPAP Maintenance:   mask adjusted   mask secure     Problem: Noninvasive Ventilation Acute  Goal: Effective Unassisted Ventilation and Oxygenation  Intervention: Monitor and Manage Noninvasive Ventilation  Flowsheets (Taken 12/17/2020 1723)  NPPV/CPAP Maintenance:   mask adjusted   mask secure   Goal Outcome Evaluation:

## 2020-12-17 NOTE — ED PROVIDER NOTES
Subjective   70-year-old female presents to the ED with a chief complaint of shortness of breath.  Patient has severe COPD and currently is wearing 5 L nasal cannula at home at all times.  She was in [facility] last month for bilateral pneumonia.  She states that since she was discharged from there she has been wearing 5 L nasal cannula.  She called EMS today secondary to shortness of breath.  When EMS initially evaluated the patient she had a pulse ox in the 40s.  They placed her on a CPAP and brought her to the ED.  Patient complains of cough productive of small amount of sputum.  She complains of chest tightness wheezing and dyspnea.  She denies fever chills.  Complains of generalized fatigue.  No other complaints at this time.          Review of Systems   Constitutional: Positive for fatigue.   Respiratory: Positive for cough, chest tightness, shortness of breath and wheezing.    All other systems reviewed and are negative.      Past Medical History:   Diagnosis Date   • Abnormal ECG    • Abnormal Pap smear of cervix    • Acid reflux 10/17/2016   • Anemia    • Anxiety    • Asthma     bronchial   • Cancer (CMS/[facility])     uterine   • Chronic diastolic heart failure (CMS/[facility]) 10/17/2016    Normal dobutamine echocardiogram stress, 04/07/2005. Echocardiogram on 05/08/2009:  EF 50% to 55%. Left atrium 3.5 cm.   • Chronic obstructive pulmonary disease (CMS/[facility]) 10/17/2016    asthmatic bronchitis, on O2 use chronically.     • Female infertility    • Gestational diabetes    • Gestational hypertension    • Gout 10/17/2016   • High cholesterol 01/03/2018   • Hypertension 10/17/2016    Benign hypertension.   • Lower extremity edema 10/17/2016    Chronic lower extremity edema/venous insufficiency.   • Murmur, heart    • Obesity 10/17/2016   • Renal failure     stageIV   • Rh incompatibility    • Seasonal allergies 10/17/2016   • Tuberculosis    • Type 2 diabetes mellitus (CMS/[facility]) 10/17/2016    insulin dependent.    • Uterine cancer (CMS/HCC)        Allergies   Allergen Reactions   • Shellfish-Derived Products Hives   • Wheat Bran Hives       Past Surgical History:   Procedure Laterality Date   • BRONCHOSCOPY N/A 2018    Procedure: BRONCHOSCOPY WITH WASHINGS;  Surgeon: Ryder Dahl MD;  Location: [facility] OR;  Service:    • CARDIAC CATHETERIZATION N/A 3/17/2020    Procedure: Coronary angiography;  Surgeon: Elkin Zhou MD;  Location: [facility] CATH INVASIVE LOCATION;  Service: Cardiology;  Laterality: N/A;   •  SECTION     • COLONOSCOPY     • COLONOSCOPY N/A 2019    Procedure: COLONOSCOPY;  Surgeon: Skinny York MD;  Location: [facility] ENDOSCOPY;  Service: General   • CYSTECTOMY Right     neck   • DILATATION AND CURETTAGE     • ENDOSCOPY N/A 6/10/2019    Procedure: ESOPHAGOGASTRODUODENOSCOPY WITH BIOPSY;  Surgeon: Skinny York MD;  Location: [facility] ENDOSCOPY;  Service: Gastroenterology   • EYE SURGERY Bilateral 2005    cataract   • FOREIGN BODY REMOVAL N/A 3/5/2020    Procedure: BRONCHOSCOPY with MAC and removal of foreign object;  Surgeon: Ryder Dahl MD;  Location: [facility] OR;  Service: Pulmonary;  Laterality: N/A;   • HYSTERECTOMY     • LUNG SURGERY Left 1997    4 tumors removed from left lung-benign   • TONSILLECTOMY AND ADENOIDECTOMY         Family History   Problem Relation Age of Onset   • Heart disease Mother    • Heart disease Father    • Leukemia Father    • Ulcers Father    • Diabetes Sister    • COPD Sister    • Breast cancer Sister    • Diabetes Brother    • Lung cancer Sister    • No Known Problems Sister    • No Known Problems Sister    • Pneumonia Sister    • Stroke Sister    • COPD Sister        Social History     Socioeconomic History   • Marital status:      Spouse name: Not on file   • Number of children: Not on file   • Years of education: Not on file   • Highest education level: Not on file   Tobacco Use   • Smoking status: Former Smoker     Packs/day: 1.00      Years: 35.00     Pack years: 35.00     Types: Cigarettes     Quit date:      Years since quittin.9   • Smokeless tobacco: Never Used   Substance and Sexual Activity   • Alcohol use: No   • Drug use: No   • Sexual activity: Not Currently     Birth control/protection: Surgical           Objective   Physical Exam  Vitals signs and nursing note reviewed.   Constitutional:       General: She is not in acute distress.     Appearance: She is well-developed. She is not diaphoretic.   HENT:      Head: Normocephalic and atraumatic.      Nose: Nose normal.   Eyes:      Conjunctiva/sclera: Conjunctivae normal.   Cardiovascular:      Rate and Rhythm: Regular rhythm. Tachycardia present.   Pulmonary:      Effort: Tachypnea and accessory muscle usage present. No respiratory distress.      Comments: Coarse breath sounds bilaterally  Abdominal:      General: There is no distension.      Palpations: Abdomen is soft.      Tenderness: There is no abdominal tenderness. There is no guarding.   Musculoskeletal:         General: No deformity.   Neurological:      Mental Status: She is alert and oriented to person, place, and time.      Cranial Nerves: No cranial nerve deficit.         Procedures           ED Course  ED Course as of Dec 17 1403   Thu Dec 17, 2020   1312 KG interpreted by me.  Irregular rhythm.  Suspect atrial fibrillation with RVR.  Rate of 118.  Left anterior fascicular block.  Nonspecific symptoms.  Abnormal EKG    [CG]      ED Course User Index  [CG] Jaron Monroe DO           PULSE OXIMETRY INTERPRETATION  Patient had a pulse ox of 88% on CPAP.  This is a abnormal/hypoxic pulse oximetry reading.                                [facility]  Critical Care  Performed by: Jaron Monroe DO  Authorized by: Jaron Monroe DO     Critical care provider statement:     Critical care time (minutes): 30    Critical care time was exclusive of:  Separately billable procedures and treating other patients    Critical care was  necessary to treat or prevent imminent or life-threatening deterioration of the following conditions: Acute respiratory failure, Covid pneumonia, other    Critical care was time spent personally by me on the following activities:  Ordering and performing treatments and interventions, development of treatment plan with patient or surrogate, discussions with consultants, evaluation of patient's response to treatment, examination of patient, ordering and review of laboratory studies, ordering and review of radiographic studies, pulse oximetry, re-evaluation of patient's condition and review of old charts    70-year-old female presents to the ED with shortness of breath.  Pulse ox of 40% on 5 L nasal cannula.  She is brought in on a CPAP by EMS.  Immediately switched over to BiPAP.  She tolerated the BiPAP well and her respiratory rate improved from the 40s into the lower 20s.  ABG shows some mild hypercapnia.  Chest x-ray shows bilateral pneumonia with a left-sided pleural effusion.  COVID-19 positive.  Started the patient on Decadron.  Patient will be admitted for further evaluation medical management.    Final diagnoses:   Acute respiratory failure with hypoxia and hypercapnia (CMS/HCC)   Pneumonia due to COVID-19 virus            Jaron Monroe, DO  12/17/20 1403

## 2020-12-17 NOTE — PLAN OF CARE
Goal Outcome Evaluation:       Pt arrived to unit from ER. VSS. Pt is on 15L humidified high flow NC. No complaints at this time. Will continue to monitor.

## 2020-12-18 NOTE — PLAN OF CARE
Pt. Admitted to the hosp. R/T COVID. Vital signs have remained stable so far this shift. Is currently using oxygen at 14 liters high flow nasal cannula. No c/o pain or discomfort. No acute distress noted.     Problem: Adult Inpatient Plan of Care  Goal: Plan of Care Review  Outcome: Ongoing, Progressing  Goal: Patient-Specific Goal (Individualized)  Outcome: Ongoing, Progressing  Goal: Absence of Hospital-Acquired Illness or Injury  Outcome: Ongoing, Progressing  Intervention: Identify and Manage Fall Risk  Recent Flowsheet Documentation  Taken 12/18/2020 1440 by Shwetha Loredo RN  Safety Promotion/Fall Prevention:   activity supervised   assistive device/personal items within reach   clutter free environment maintained   nonskid shoes/slippers when out of bed  Taken 12/18/2020 1240 by Shwetha Loredo RN  Safety Promotion/Fall Prevention:   activity supervised   assistive device/personal items within reach   clutter free environment maintained   nonskid shoes/slippers when out of bed  Taken 12/18/2020 1030 by Shwetha Loredo RN  Safety Promotion/Fall Prevention:   activity supervised   assistive device/personal items within reach   clutter free environment maintained   nonskid shoes/slippers when out of bed  Taken 12/18/2020 0830 by Shwetha Loredo RN  Safety Promotion/Fall Prevention:   activity supervised   assistive device/personal items within reach   clutter free environment maintained   nonskid shoes/slippers when out of bed  Intervention: Prevent Skin Injury  Recent Flowsheet Documentation  Taken 12/18/2020 1440 by Shwetha Loredo RN  Body Position: supine  Taken 12/18/2020 1240 by Shwetha Loredo RN  Body Position: supine  Taken 12/18/2020 1030 by Shwetha Loredo RN  Body Position: supine  Taken 12/18/2020 0830 by Swhetha Loredo RN  Body Position: supine  Intervention: Prevent and Manage VTE (venous thromboembolism) Risk  Recent Flowsheet Documentation  Taken 12/18/2020 0830 by Shwetha Loredo RN  VTE Prevention/Management:    bilateral   dorsiflexion/plantar flexion performed  Goal: Optimal Comfort and Wellbeing  Outcome: Ongoing, Progressing  Intervention: Provide Person-Centered Care  Recent Flowsheet Documentation  Taken 12/18/2020 1240 by Shwetha Loredo RN  Trust Relationship/Rapport:   care explained   reassurance provided  Taken 12/18/2020 0830 by Shwetha Loredo RN  Trust Relationship/Rapport:   care explained   reassurance provided  Goal: Readiness for Transition of Care  Outcome: Ongoing, Progressing     Problem: Fall Injury Risk  Goal: Absence of Fall and Fall-Related Injury  Outcome: Ongoing, Progressing  Intervention: Identify and Manage Contributors to Fall Injury Risk  Recent Flowsheet Documentation  Taken 12/18/2020 1240 by Shwetha Loredo RN  Medication Review/Management: medications reviewed  Taken 12/18/2020 0830 by Shwetha Loredo RN  Medication Review/Management: medications reviewed  Intervention: Promote Injury-Free Environment  Recent Flowsheet Documentation  Taken 12/18/2020 1440 by Shwetha Loredo RN  Safety Promotion/Fall Prevention:   activity supervised   assistive device/personal items within reach   clutter free environment maintained   nonskid shoes/slippers when out of bed  Taken 12/18/2020 1240 by Shwetha Loredo RN  Safety Promotion/Fall Prevention:   activity supervised   assistive device/personal items within reach   clutter free environment maintained   nonskid shoes/slippers when out of bed  Taken 12/18/2020 1030 by Shwetha Loredo RN  Safety Promotion/Fall Prevention:   activity supervised   assistive device/personal items within reach   clutter free environment maintained   nonskid shoes/slippers when out of bed  Taken 12/18/2020 0830 by Shwetha Loredo RN  Safety Promotion/Fall Prevention:   activity supervised   assistive device/personal items within reach   clutter free environment maintained   nonskid shoes/slippers when out of bed     Problem: Noninvasive Ventilation Acute  Goal: Effective Unassisted Ventilation and  Oxygenation  Outcome: Ongoing, Progressing     Problem: Fluid Imbalance (Pneumonia)  Goal: Fluid Balance  Outcome: Ongoing, Progressing     Problem: Infection (Pneumonia)  Goal: Resolution of Infection Signs and Symptoms  Outcome: Ongoing, Progressing  Intervention: Prevent Infection Progression  Recent Flowsheet Documentation  Taken 12/18/2020 1440 by Shwetha Loredo RN  Isolation Precautions: airborne precautions maintained  Taken 12/18/2020 1240 by Shwetha Loredo RN  Isolation Precautions: airborne precautions maintained  Taken 12/18/2020 1030 by Shwetha Loredo RN  Isolation Precautions: airborne precautions maintained  Taken 12/18/2020 0830 by Shwetha Loredo RN  Isolation Precautions: airborne precautions maintained     Problem: Respiratory Compromise (Pneumonia)  Goal: Effective Oxygenation and Ventilation  Outcome: Ongoing, Progressing  Intervention: Promote Airway Secretion Clearance  Recent Flowsheet Documentation  Taken 12/18/2020 1240 by Shwetha Loredo RN  Cough And Deep Breathing: done independently per patient  Taken 12/18/2020 0830 by Shwetha Loredo RN  Cough And Deep Breathing: done independently per patient  Intervention: Optimize Oxygenation and Ventilation  Recent Flowsheet Documentation  Taken 12/18/2020 1440 by Shwetha Loredo RN  Head of Bed (HOB): HOB elevated  Taken 12/18/2020 1240 by Shwetha Loredo RN  Head of Bed (HOB): HOB elevated  Taken 12/18/2020 1030 by Shwetha Loredo RN  Head of Bed (HOB): HOB elevated  Taken 12/18/2020 0830 by Shwetha Loredo RN  Head of Bed (HOB): HOB elevated     Problem: Diabetes Comorbidity  Goal: Blood Glucose Level Within Desired Range  Outcome: Ongoing, Progressing   Goal Outcome Evaluation:  Plan of Care Reviewed With: patient  Progress: no change

## 2020-12-18 NOTE — CONSULTS
"Adult Nutrition  Assessment/PES    Patient Name:  Mingo Guidry  YOB: 1950  MRN: [id number]  Admit Date:  12/17/2020    Assessment Date:  12/18/2020    Comments:    Recommend:  1. Consider adding renal modification to current diet order as medically appropriate and tolerated.  2. Encourage PO intake. PO intake average ~50% x 1 meal.  3. RD ordered Arginaid BID.  4. Consider a renal multivitamin with minerals daily.     RD to follow pt and available PRN.      Reason for Assessment     Row Name 12/18/20 1306          Reason for Assessment    Reason For Assessment  physician consult;identified at risk by screening criteria;diagnosis/disease state     Diagnosis  cardiac disease;diabetes diagnosis/complications;pulmonary disease;renal disease COPD, Heart failure, HTN, DM 2, PNA d/t COVID-19 virus, CKD Stage 3, Afib     Identified At Risk by Screening Criteria  need for education;BMI         Nutrition/Diet History     Row Name 12/18/20 1307          Nutrition/Diet History    Food Allergies  other (see comments) Wheat bran, mushroom, shellfish-derived products         Anthropometrics     Row Name 12/18/20 1311          Anthropometrics    Height  154.9 cm (61\")        Ideal Body Weight (IBW)    Ideal Body Weight (IBW) (kg)  48.15         Labs/Tests/Procedures/Meds     Row Name 12/18/20 1308          Labs/Procedures/Meds    Lab Results Reviewed  reviewed, pertinent     Lab Results Comments  Low: Alb High: Gluc, BUN, Cr, Platelets        Medications    Pertinent Medications Reviewed  reviewed, pertinent     Pertinent Medications Comments  Lipitor, Decadron, Colace, Novolog, Levemir, Tradjenta         Physical Findings     Row Name 12/18/20 1310          Physical Findings    Overall Physical Appearance  obese         Estimated/Assessed Needs     Row Name 12/18/20 1311          Calculation Measurements    Weight Used For Calculations  49.9 kg (110 lb) IBW     Height  154.9 cm (61\")        Estimated/Assessed " Needs    Additional Documentation  Calorie Requirements (Group);Protein Requirements (Group);[employer] Mdl-St. Jeor Equation (Group);Fluid Requirements (Group)        Calorie Requirements    Estimated Calorie Need Method  [employer] Mdl-St Jeor     Estimated Calorie Requirement Comment  1340 - 1540        [employer] Mdl-St. Jeor Equation    RMR ([employer] Mdl-St. Jeor Equation)  956.335     [employer] Mdl-St. Jeor Activity Factors  1.4 - 1.5     Activity Factors ([employer] Mdl-St. Jeor)  1338.869 - 1434.5025        Protein Requirements    Weight Used For Protein Calculations  49.9 kg (110 lb) IBW     Est Protein Requirement Amount (gms/kg)  1.0 gm protein 40 - 49 gm     Estimated Protein Requirements (gms/day)  49.9        Fluid Requirements    Estimated Fluid Requirement Method  [place]-Segar Formula     [place]-Segar Method (over 20 kg)  2535.92         Nutrition Prescription Ordered     Row Name 12/18/20 1314          Nutrition Prescription PO    Current PO Diet  Regular     Common Modifiers  Cardiac;Consistent Carbohydrate         Evaluation of Received Nutrient/Fluid Intake     Row Name 12/18/20 1315          PO Evaluation    Number of Days PO Intake Evaluated  1 day     Number of Meals  1     % PO Intake  50               Problem/Interventions:  Problem 1     Row Name 12/18/20 1315          Nutrition Diagnoses Problem 1    Problem 1  Impaired Nutrient Utilization     Etiology (related to)  Medical Diagnosis     Endocrine  DM2     Renal  CKD     Signs/Symptoms (evidenced by)  Biochemical     Specific Labs Noted  Glucose;BUN;Creatinine         Problem 2     Row Name 12/18/20 1316          Nutrition Diagnoses Problem 2    Problem 2  Overweight/Obesity     Etiology (related to)  Factors Affecting Nutrition     Food Habit/Preferences  Large Meals     Signs/Symptoms (evidenced by)  BMI     BMI  35 - 39.9             Intervention Goal     Row Name 12/18/20 1317          Intervention Goal    General  Meet nutritional needs for age/condition;Improved  nutrition related lab(s)     PO  Meet estimated needs;Increase intake;PO intake (%)     PO Intake %  75 %     Weight  No significant weight loss         Nutrition Intervention     Row Name 12/18/20 1318          Nutrition Intervention    RD/Tech Action  Follow Tx progress;Encourage intake;Recommend/ordered     Recommended/Ordered  Supplement;Diet         Nutrition Prescription     Row Name 12/18/20 1318          Nutrition Prescription PO    PO Prescription  Begin/change diet;Begin/change supplement     Begin/Change Diet to  Regular     Supplement  Other (comment) Arginaid     Supplement Frequency  2 times a day     Common Modifiers  Cardiac;Consistent Carbohydrate;Renal     New PO Prescription Ordered?  No, recommended        Other Orders    Obtain Weight  Daily     Obtain Weight Ordered?  No, recommended     Supplement  Vitamin mineral supplement Renal     Supplement Ordered?  No, recommended         Education/Evaluation     Row Name 12/18/20 1320          Education    Education  Education not appropriate at this time     Please explain  Other (comment) Pt isolation status        Monitor/Evaluation    Monitor  Per protocol;I&O;PO intake;Supplement intake;Pertinent labs;Weight;Skin status           Electronically signed by:  Carol Colindres RD  12/18/20 13:21 EST

## 2020-12-18 NOTE — PROGRESS NOTES
[facility]    Name:  Mingo Guidry   Age:  70 y.o.  Sex:  female  :  1950  MRN:  [id number]   Visit Number:  [id number]  Admission Date:  2020  Date Of Service:  20  Primary Care Physician:  Luz Prater APRN     LOS: 1 day :  Patient Care Team:  Luz Prater APRN as PCP - General:    Chief Complaint:      Follow-up of COVID-19 pneumonia and hypoxia.    Subjective / Interval History:     Ms. Guidry was seen and examined this morning.  She is currently lying down on the bed and is comfortable at rest.  She is on high flow nasal cannula oxygen at 12 L and saturating at mid 90s.  Denies any chest pain or fevers.  She did eat her breakfast this morning.  Repeat COVID-19 test has been sent to "[facility] labs.  She is on dexamethasone and remdesivir.    Review of Systems:     General ROS: Patient denies any fevers, chills or loss of consciousness.  Generalized weakness.  Respiratory ROS: Cough and shortness of breath.  Cardiovascular ROS: Denies chest pain or palpitations. No history of exertional chest pain.  Gastrointestinal ROS: Denies nausea and vomiting. Denies any abdominal pain. No diarrhea.  Neurological ROS: Denies any focal weakness. No loss of consciousness. Denies any numbness.  Dermatological ROS: Denies any redness or pruritis.    Vital Signs:    Temp:  [96.1 °F (35.6 °C)-99 °F (37.2 °C)] 97.3 °F (36.3 °C)  Heart Rate:  [] 105  Resp:  [18-22] 20  BP: (142-158)/(64-75) 149/70    Intake and output:    I/O last 3 completed shifts:  In: 600 [I.V.:600]  Out: 1000 [Urine:1000]  I/O this shift:  In: 480 [P.O.:480]  Out: -     Physical Examination:    General Appearance:  Alert and cooperative, not in any acute distress.   Head:  Atraumatic and normocephalic, without obvious abnormality.   Eyes:          Conjunctivae and sclerae normal, no Icterus. No pallor. Extraocular movements are within normal limits.   Neck: Supple, trachea  midline, no thyromegaly, no carotid bruit.   Lungs:   Chest shape is normal. Breath sounds heard bilaterally equally.  Occasional basal crackles heard with scattered wheezing. No pleural rub or bronchial breathing.   Heart:  Normal S1 and S2, no murmur, no gallop, no rub. No JVD   Abdomen:   Normal bowel sounds, no masses, no organomegaly. Soft, nontender, nondistended, no guarding, no rebound tenderness.   Extremities: Moves all extremities, 1+ edema, no cyanosis, no clubbing.   Skin: No bleeding, bruising or rash.   Neurologic: Awake, alert and oriented times 3. Moves all 4 extremities equally.     Laboratory results:    Results from last 7 days   Lab Units 12/18/20  0549 12/17/20  1235   SODIUM mmol/L 145 141   POTASSIUM mmol/L 4.3 4.2   CHLORIDE mmol/L 101 97*   CO2 mmol/L 35.8* 34.3*   BUN mg/dL 29* 25*   CREATININE mg/dL 1.17* 1.32*   CALCIUM mg/dL 9.3 9.3   BILIRUBIN mg/dL 0.2 0.2   ALK PHOS U/L 65 77   ALT (SGPT) U/L 12 14   AST (SGOT) U/L 14 17   GLUCOSE mg/dL 225* 154*     Results from last 7 days   Lab Units 12/18/20  0549 12/17/20  1235   WBC 10*3/mm3 8.73 13.29*   HEMOGLOBIN g/dL 9.5* 10.4*   HEMATOCRIT % 33.1* 36.0   PLATELETS 10*3/mm3 465* 535*         Results from last 7 days   Lab Units 12/18/20  0549   TROPONIN T ng/mL 0.018     Results from last 7 days   Lab Units 12/17/20  1250 12/17/20  1248   BLOODCX  No growth at 24 hours No growth at 24 hours     Results from last 7 days   Lab Units 12/17/20  1340   PH, ARTERIAL pH units 7.360   PO2 ART mm Hg 91.8   PCO2, ARTERIAL mm Hg 64.3*   HCO3 ART mmol/L 36.2*     I have reviewed the patient's laboratory results.    Radiology results:    Imaging Results (Last 24 Hours)     ** No results found for the last 24 hours. **        I have reviewed the patient's radiology reports.    Medication Review:     I have reviewed the patient's active and prn medications.     Problem List:      Pneumonia due to COVID-19 virus    COPD with acute exacerbation (CMS/HCC)     Acute on chronic respiratory failure with hypoxia (CMS/HCC)    Essential hypertension    Type 2 diabetes mellitus with nephropathy (CMS/HCC)    Chronic diastolic heart failure (CMS/HCC)    Obstructive sleep apnea    Chronic renal failure syndrome, stage 3 (moderate)    PAF (paroxysmal atrial fibrillation) (CMS/HCC)    Acute respiratory failure with hypoxia and hypercapnia (CMS/HCC)    Obesity (BMI 30-39.9)    Assessment:    1.  Acute on chronic hypoxic respiratory failure, secondary to #2, present on admission.  2.  Bilateral pneumonia secondary to COVID-19 infection, POA.  3.  No evidence of sepsis and signs of localized infection only.  4.  Acute COPD exacerbation, present on admission.  5.  Diabetes mellitus type 2 with nephropathy.  6.  Chronic kidney disease stage III.  7.  Obstructive sleep apnea.  8.  Paroxysmal atrial fibrillation.  9.  Obesity with a BMI of 37.  10.  Chronic diastolic heart failure, no evidence of exacerbation    Plan:    Ms. Guidry is currently lying down on the bed and is on high flow oxygen at 12 L/min.  She was on 15 L yesterday and is currently feeling better.  She will be continued on dexamethasone and remdesivir (day 2).  Blood work is fairly stable and her creatinine is down to 1.17 from 1.32.  I have strongly advised her to sit up on the chair and lie down on her lateral sides to improve her oxygen saturation.    She will be continued on bronchodilator inhalers.  Her home medications including apixaban have been continued.  Further recommendations depend upon her clinical course.  Discussed with nursing staff and multidisciplinary team.    Dave Hairston MD  12/18/20  15:06 EST    Dictated utilizing Dragon dictation.

## 2020-12-18 NOTE — PLAN OF CARE
Goal Outcome Evaluation:  Plan of Care Reviewed With: patient  Progress: no change  Outcome Summary: VSS; pt requiring 15L high flow nasal cannula; pt wore bipap during sleep; continue antibiotics; monitor labs

## 2020-12-18 NOTE — PROGRESS NOTES
[facility]  [facility]     Patient Name: Mingo Guidry  MRN: [id number]  Today's Date: 12/18/2020    Admit Date: 12/17/2020    Discharge Needs Assessment     Row Name 12/18/20 1149       Living Environment    Lives With  alone    Unique Family Situation  Son and son's girlfriend help as needed.  Son visites daily    Current Living Arrangements  home/apartment/condo    Duration at Residence  Single level house with 5-6 steps to get up to porch and one step to enter home.    Primary Care Provided by  self    Provides Primary Care For  no one, unable/limited ability to care for self    Quality of Family Relationships  supportive    Able to Return to Prior Arrangements  yes    Living Arrangement Comments  Lives alone but son checks on pt daily       Resource/Environmental Concerns    Transportation Concerns  other (see comments) uses [employer] to go to [facility]       Transition Planning    Patient/Family Anticipates Transition to  home    Patient/Family Anticipated Services at Transition  home health care;respiratory services       Discharge Needs Assessment    Current Outpatient/Agency/Support Group  adult  [facility] three days a week. [place] bus transport    Equipment Currently Used at Home  bipap;nebulizer;oxygen;shower chair;walker, rolling    Concerns to be Addressed  adjustment to diagnosis/illness    Anticipated Changes Related to Illness  other (see comments) May need added assist initially    Equipment Needed After Discharge  other (see comments) None at this time--[place] to  [place]    Outpatient/Agency/Support Group Needs  adult  Goes to [facility] three times a week        Discharge Plan     Row Name 12/18/20 9510       Plan    Plan Comments  Completed initial DCP via telephone. Pt agreeable to answering questions via telephone due to COVID 19 isolation.  Pt confirmed address, PCP, telephone number and contact persons.  She reports living alone  "however her son and his girlfriend check on her daily.  She performs ADLs per self however son and his girlfriend are able to assist if needed.  Pt uses a rollator walker, shower chair, O2 @ 5L, nebulizer and BiPAP.  [employer] is DME.  Pt does have [facility] visiting.  Pt reports going to [facility] three times a week and uses [employer] for transportation.   She reported having has someone to clear her house once a week but due to COVID restrictions no longer has somone coming.  Pt does not have a Living Will or POA and declined information.  Pt encouraged to have \"The Conversation\" regarding her wishes if she was ever in a situation when she could not speak for herself.  She should inform her sons even if a Living Will is not completed.    Pt plans to return home at [facility] Admitted Since 12/17/2020    Coordination has not been started for this encounter.         Demographic Summary     Row Name 12/18/20 1140       General Information    Admission Type  inpatient    Arrived From  home    Referral Source  admission list    Reason for Consult  discharge planning    Preferred Language  English       Contact Information    Permission Granted to Share Info With  ;family/designee    Contact Information Obtained for      Contact Information Comments  Adebayo Guidry/son/[phone]     Edinson Chao/son/[phone]        Functional Status     Row Name 12/18/20 1148       Functional Status    Usual Activity Tolerance  poor    Current Activity Tolerance  poor    Functional Status Comments  Becomes SOA easily going one room to next room       Functional Status, IADL    Medications  independent    Meal Preparation  independent    Housekeeping  assistive equipment and person    Laundry  independent;assistive equipment and person    Shopping  independent;assistive equipment and person    IADL Comments  Son and son's girlfriend assist as needed        Psychosocial  "   No documentation.       Abuse/Neglect    No documentation.       Legal    No documentation.       Substance Abuse    No documentation.       Patient Forms    No documentation.           Shonna Michaud RN

## 2020-12-19 NOTE — PLAN OF CARE
Goal Outcome Evaluation:  Plan of Care Reviewed With: patient  Progress: no change  Outcome Summary: No acute events at this time. Patient did complain of shortness of air and received ordered medication to assist.  Patient continues on 15L high flow nasal cannula.  Patient did wear biPAP for most of the night during sleep.  Gave antibiotics IV, patient tolerated well.  Continue to monitor labs, provide antibiotics and further care as appropriate and ordered. Patient is able to transfer with stand by assistance to the bedside commode.

## 2020-12-19 NOTE — PLAN OF CARE
Goal Outcome Evaluation:  Plan of Care Reviewed With: patient  Progress: no change  Outcome Summary: Pt seen for PT evaluation today. Pt able to transfer to EOB with eupervision assist wiht HOB elevated. Pt at EOB no assist needed for static sitting balance or during MMT of B LE. Pt has minimal strength deficits however tolerance for activity is poor as Pt's O2 sats drop into mid 80's on 15l O2 with very little exertion. Pt is expected to benefit from skilled PT intervention during this inpatient stay to address deficits with endurance and activity tolerance and return to PLOF.

## 2020-12-19 NOTE — THERAPY EVALUATION
Patient Name: Mingo Guidry  : 1950    MRN: [id number]                              Today's Date: 2020       Admit Date: 2020    Visit Dx:     ICD-10-CM ICD-9-CM   1. Acute respiratory failure with hypoxia and hypercapnia (CMS/HCC)  J96.01 518.81    J96.02    2. Pneumonia due to COVID-19 virus  U07.1 480.8    J12.89      Patient Active Problem List   Diagnosis   • Chronic diastolic heart failure (CMS/HCC)   • Lower extremity edema   • Essential hypertension   • Type 2 diabetes mellitus with nephropathy (CMS/HCC)   • COPD with acute exacerbation (CMS/[facility])   • Obesity   • GERD without esophagitis   • Gout   • Seasonal allergies   • Mixed hyperlipidemia   • Acute on chronic respiratory failure with hypoxia (CMS/[facility])   • Pneumonia of left upper lobe due to infectious organism   • Acute GI bleeding   • Gastritis   • Mg's esophagus   • Acute blood loss anemia   • Acute bronchitis   • Abnormal CT scan of lung   • Pneumonia of right lower lobe due to infectious organism   • Postobstructive pneumonia   • Acute aspiration pneumonia (CMS/[facility])   • Obstructive sleep apnea   • Chronic renal failure syndrome, stage 3 (moderate)   • Leukemoid reaction   • Foreign body aspiration   • Shortness of breath   • Pseudomonas infection   • Coronary artery disease involving native coronary artery without angina pectoris   • Multilobar Pneumonia   • PAF (paroxysmal atrial fibrillation) (CMS/[facility])   • Acute respiratory failure with hypoxia and hypercapnia (CMS/[facility])   • Pneumonia due to COVID-19 virus   • Obesity (BMI 30-39.9)     Past Medical History:   Diagnosis Date   • Abnormal ECG    • Abnormal Pap smear of cervix    • Acid reflux 10/17/2016   • Anemia    • Anxiety    • Asthma     bronchial   • Cancer (CMS/HCC)     uterine   • Chronic diastolic heart failure (CMS/HCC) 10/17/2016    Normal dobutamine echocardiogram stress, 2005. Echocardiogram on 2009:  EF 50% to 55%. Left atrium 3.5 cm.   • Chronic  obstructive pulmonary disease (CMS/[facility]) 10/17/2016    asthmatic bronchitis, on O2 use chronically.     • Female infertility    • Gestational diabetes    • Gestational hypertension    • Gout 10/17/2016   • High cholesterol 2018   • Hypertension 10/17/2016    Benign hypertension.   • Lower extremity edema 10/17/2016    Chronic lower extremity edema/venous insufficiency.   • Murmur, heart    • Obesity 10/17/2016   • Renal failure     stageIV   • Rh incompatibility    • Seasonal allergies 10/17/2016   • Tuberculosis    • Type 2 diabetes mellitus (CMS/[facility]) 10/17/2016    insulin dependent.   • Uterine cancer (CMS/[facility])      Past Surgical History:   Procedure Laterality Date   • BRONCHOSCOPY N/A 2018    Procedure: BRONCHOSCOPY WITH WASHINGS;  Surgeon: Ryder Dahl MD;  Location: [facility] OR;  Service:    • CARDIAC CATHETERIZATION N/A 3/17/2020    Procedure: Coronary angiography;  Surgeon: Elkin Zhou MD;  Location: [facility] CATH INVASIVE LOCATION;  Service: Cardiology;  Laterality: N/A;   •  SECTION     • COLONOSCOPY     • COLONOSCOPY N/A 2019    Procedure: COLONOSCOPY;  Surgeon: Skinny York MD;  Location: [facility] ENDOSCOPY;  Service: General   • CYSTECTOMY Right     neck   • DILATATION AND CURETTAGE     • ENDOSCOPY N/A 6/10/2019    Procedure: ESOPHAGOGASTRODUODENOSCOPY WITH BIOPSY;  Surgeon: Skinny York MD;  Location: [facility] ENDOSCOPY;  Service: Gastroenterology   • EYE SURGERY Bilateral 2005    cataract   • FOREIGN BODY REMOVAL N/A 3/5/2020    Procedure: BRONCHOSCOPY with MAC and removal of foreign object;  Surgeon: Ryder Dahl MD;  Location: [facility] OR;  Service: Pulmonary;  Laterality: N/A;   • HYSTERECTOMY     • LUNG SURGERY Left 1997     tumors removed from left lung-benign   • TONSILLECTOMY AND ADENOIDECTOMY       General Information     Row Name 20 0941          Physical Therapy Time and Intention    Document Type  evaluation  -MS     Mode of Treatment  individual  therapy;physical therapy  -MS     Row Name 12/19/20 0941          General Information    Patient Profile Reviewed  yes  -MS     Existing Precautions/Restrictions  oxygen therapy device and L/min;fall  -MS     Barriers to Rehab  medically complex;previous functional deficit  -MS     Row Name 12/19/20 0941          Living Environment    Lives With  alone  -MS     Row Name 12/19/20 0941          Home Main Entrance    Number of Stairs, Main Entrance  five  -MS     Stair Railings, Main Entrance  railing on right side (ascending)  -MS     Row Name 12/19/20 0941          Stairs Within Home, Primary    Number of Stairs, Within Home, Primary  none  -MS     Row Name 12/19/20 0941          Cognition    Orientation Status (Cognition)  oriented x 4  -MS     Row Name 12/19/20 0941          Safety Issues, Functional Mobility    Safety Issues Affecting Function (Mobility)  insight into deficits/self-awareness;awareness of need for assistance  -MS     Impairments Affecting Function (Mobility)  balance;endurance/activity tolerance;range of motion (ROM);strength;shortness of breath  -MS       User Key  (r) = Recorded By, (t) = Taken By, (c) = Cosigned By    Initials Name Provider Type    Sanchez Saxena, PT Physical Therapist        Mobility     Row Name 12/19/20 0941          Bed Mobility    Bed Mobility  bed mobility (all) activities;supine-sit  -MS     All Activities, [place] (Bed Mobility)  supervision  -MS     Assistive Device (Bed Mobility)  bed rails;head of bed elevated  -MS     Row Name 12/19/20 0941          Sit-Stand Transfer    Sit-Stand [place] (Transfers)  other (see comments) Did not attempt STS as pt feeling SOA sitting EOB and sats mids 80's low 90's  -MS       User Key  (r) = Recorded By, (t) = Taken By, (c) = Cosigned By    Initials Name Provider Type    Sanchez Saxena PT Physical Therapist        Obj/Interventions     Row Name 12/19/20 0941          Range of Motion Comprehensive    General Range  of Motion  no range of motion deficits identified  -MS     Row Name 12/19/20 0941          Strength Comprehensive (MMT)    General Manual Muscle Testing (MMT) Assessment  lower extremity strength deficits identified;upper extremity strength deficits identified  -MS     Row Name 12/19/20 0941          Lower Extremity (Manual Muscle Testing)    Lower Extremity: Manual Muscle Testing (MMT)  left hip strength deficit;left knee strength deficit;right knee strength deficit;right hip strength deficit  -MS     Row Name 12/19/20 0941          Left Hip (Manual Muscle Testing)    Left Hip Manual Muscle Testing (MMT)  flexion  -MS     MMT: Flexion, Left Hip  flexion  -MS     MMT, Gross Movement: Left Hip Flexion  (4-/5) good minus  -MS     Row Name 12/19/20 0941          Left Knee (Manual Muscle Testing)    Left Knee Manual Muscle Testing (MMT)  extension;flexion  -MS     MMT: Extension, Left Knee  extension  -MS     MMT, Gross Movement: Left Knee Extension  (4-/5) good minus  -MS     MMT: Flexion, Left Knee  flexion  -MS     MMT, Gross Movement: Left Knee Flexion  (4-/5) good minus  -MS     Row Name 12/19/20 0941          Right Knee (Manual Muscle Testing)    Right Knee Manual Muscle Testing (MMT)  extension;flexion  -MS     MMT: Extension, Right Knee  extension  -MS     MMT, Gross Movement: Right Knee Extension  (4/5) good  -MS     MMT: Flexion, Right Knee  flexion  -MS     MMT, Gross Movement: Right Knee Flexion  (4/5) good  -MS     Row Name 12/19/20 0941          Right Hip (Manual Muscle Testing)    Right Hip Manual Muscle Testing (MMT)  flexion  -MS     MMT: Flexion, Right Hip  flexion  -MS     MMT, Gross Movement: Right Hip Flexion  (3+/5) fair plus  -MS       User Key  (r) = Recorded By, (t) = Taken By, (c) = Cosigned By    Initials Name Provider Type    Sanchez Saxena, PT Physical Therapist        Goals/Plan     Row Name 12/19/20 0941          Bed Mobility Goal 1 (PT)    Activity/Assistive Device (Bed Mobility Goal  1, PT)  bed mobility activities, all  -MS     [place] Level/Cues Needed (Bed Mobility Goal 1, PT)  modified independence  -MS     Time Frame (Bed Mobility Goal 1, PT)  long term goal (LTG);10 days  -MS     Progress/Outcomes (Bed Mobility Goal 1, PT)  goal ongoing  -MS     Row Name 12/19/20 0941          Transfer Goal 1 (PT)    Activity/Assistive Device (Transfer Goal 1, PT)  sit-to-stand/stand-to-sit;bed-to-chair/chair-to-bed  -MS     [place] Level/Cues Needed (Transfer Goal 1, PT)  independent  -MS     Time Frame (Transfer Goal 1, PT)  long term goal (LTG);10 days  -MS     Progress/Outcome (Transfer Goal 1, PT)  goal ongoing  -MS     Row Name 12/19/20 0941          Gait Training Goal 1 (PT)    Activity/Assistive Device (Gait Training Goal 1, PT)  gait (walking locomotion)  -MS     [place] Level (Gait Training Goal 1, PT)  independent  -MS     Distance (Gait Training Goal 1, PT)  100ft  -MS     Time Frame (Gait Training Goal 1, PT)  long term goal (LTG);10 days  -MS     Progress/Outcome (Gait Training Goal 1, PT)  goal ongoing  -MS     Row Name 12/19/20 0941          Patient Education Goal (PT)    Activity (Patient Education Goal, PT)  independent with HEP  -MS     Time Frame (Patient Education Goal, PT)  long term goal (LTG);10 days  -MS     Progress/Outcome (Patient Education Goal, PT)  goal ongoing  -MS       User Key  (r) = Recorded By, (t) = Taken By, (c) = Cosigned By    Initials Name Provider Type    Sanchez Saxena, PT Physical Therapist        Clinical Impression     Row Name 12/19/20 1024          Pain    Additional Documentation  Pain Scale: FACES Pre/Post-Treatment (Group)  -MS     Row Name 12/19/20 1024          Pain Scale: FACES Pre/Post-Treatment    Pain: FACES Scale, Pretreatment  0-->no hurt  -MS     Posttreatment Pain Rating  0-->no hurt  -MS     Row Name 12/19/20 1024          Plan of Care Review    Plan of Care Reviewed With  patient  -MS     Progress  no change  -MS      Outcome Summary  Pt seen for PT evaluation today. Pt able to transfer to EOB with eupervision assist wiht HOB elevated. Pt at EOB no assist needed for static sitting balance or during MMT of B LE. Pt has minimal strength deficits however tolerance for activity is poor as Pt's O2 sats drop into mid 80's on 15l O2 with very little exertion. Pt is expected to benefit from skilled PT intervention during this inpatient stay to address deficits with endurance and activity tolerance and return to PLOF.  -MS     Row Name 12/19/20 1024          Therapy Assessment/Plan (PT)    Patient/Family Therapy Goals Statement (PT)  return home  -MS     Rehab Potential (PT)  good, to achieve stated therapy goals  -MS     Criteria for Skilled Interventions Met (PT)  yes;meets criteria;skilled treatment is necessary  -MS     Row Name 12/19/20 1024          Vital Signs    Pre SpO2 (%)  91  -MS     O2 Delivery Pre Treatment  supplemental O2  -MS     Intra SpO2 (%)  85  -MS     O2 Delivery Intra Treatment  supplemental O2  -MS     Post SpO2 (%)  90  -MS     O2 Delivery Post Treatment  supplemental O2  -MS     Pre Patient Position  Supine  -MS     Intra Patient Position  Sitting  -MS     Post Patient Position  Sitting  -MS     Row Name 12/19/20 1024          Positioning and Restraints    Pre-Treatment Position  in bed  -MS     Post Treatment Position  bed  -MS     In Bed  sitting EOB;encouraged to call for assist;call light within reach  -MS       User Key  (r) = Recorded By, (t) = Taken By, (c) = Cosigned By    Initials Name Provider Type    Sanchez Saxena, PT Physical Therapist        Outcome Measures     Row Name 12/19/20 1031          How much help from another person do you currently need...    Turning from your back to your side while in flat bed without using bedrails?  3  -MS     Moving from lying on back to sitting on the side of a flat bed without bedrails?  3  -MS     Moving to and from a bed to a chair (including a  wheelchair)?  3  -MS     Standing up from a chair using your arms (e.g., wheelchair, bedside chair)?  2  -MS     Climbing 3-5 steps with a railing?  3  -MS     To walk in hospital room?  2  -MS     AM-PAC 6 Clicks Score (PT)  16  -MS     Row Name 12/19/20 1031 12/19/20 0941       Functional Assessment    Outcome Measure Options  AM-PAC 6 Clicks Basic Mobility (PT)  -MS  AM-PAC 6 Clicks Basic Mobility (PT)  -MS      User Key  (r) = Recorded By, (t) = Taken By, (c) = Cosigned By    Initials Name Provider Type    MS Sanchez Lopes, PT Physical Therapist        Physical Therapy Education                 Title: PT OT SLP Therapies (Done)     Topic: Physical Therapy (Done)     Point: Mobility training (Done)     Learning Progress Summary           Patient Acceptance, E, VU by MS at 12/19/2020 1031                   Point: Home exercise program (Done)     Learning Progress Summary           Patient Acceptance, E, VU by MS at 12/19/2020 1031                   Point: Body mechanics (Done)     Learning Progress Summary           Patient Acceptance, E, VU by MS at 12/19/2020 1031                   Point: Precautions (Done)     Learning Progress Summary           Patient Acceptance, E, VU by MS at 12/19/2020 1031                               User Key     Initials Effective Dates Name Provider Type Discipline    MS 05/01/19 -  Sanchez Lopes PT Physical Therapist PT              PT Recommendation and Plan  Planned Therapy Interventions (PT): balance training, bed mobility training, gait training, home exercise program, stretching, patient/family education, strengthening, stair training, ROM (range of motion), postural re-education, transfer training  Plan of Care Reviewed With: patient  Progress: no change  Outcome Summary: Pt seen for PT evaluation today. Pt able to transfer to EOB with eupervision assist wiht HOB elevated. Pt at EOB no assist needed for static sitting balance or during MMT of B LE. Pt has minimal  strength deficits however tolerance for activity is poor as Pt's O2 sats drop into mid 80's on 15l O2 with very little exertion. Pt is expected to benefit from skilled PT intervention during this inpatient stay to address deficits with endurance and activity tolerance and return to PLOF.     Time Calculation:   PT Charges     Row Name 12/19/20 0941             Time Calculation    Start Time  0929  -MS      Stop Time  0941  -MS      Time Calculation (min)  12 min  -MS      PT Received On  12/19/20  -MS      PT Goal Re-Cert Due Date  12/29/20  -MS        User Key  (r) = Recorded By, (t) = Taken By, (c) = Cosigned By    Initials Name Provider Type    Sanchez Saxena, PT Physical Therapist        Therapy Charges for Today     Code Description Service Date Service Provider Modifiers Qty    64609750826 HC PT EVAL MOD COMPLEXITY 3 12/19/2020 Sanchez Lopes PT GP 1          PT G-Codes  Outcome Measure Options: AM-PAC 6 Clicks Basic Mobility (PT)  AM-PAC 6 Clicks Score (PT): 16    Sanchez Lopes PT  12/19/2020

## 2020-12-19 NOTE — PROGRESS NOTES
[facility]    Name:  Mingo Guidry   Age:  70 y.o.  Sex:  female  :  1950  MRN:  [id number]   Visit Number:  [id number]  Admission Date:  2020  Date Of Service:  20  Primary Care Physician:  Luz Prater APRN     LOS: 2 days :  Patient Care Team:  Luz Prater APRN as PCP - General:    Chief Complaint:      Follow-up of COVID-19 pneumonia and hypoxia.    Subjective / Interval History:     Ms. Guidry was seen and examined this morning.  She is currently sitting up by the side of the bed and is feeling about the same.  Her oxygen requirement has gone up slightly from 12 L to 15 L this morning.  She is anxious and apprehensive regarding her condition being worse but states that she does not feel more short of breath.  Her repeat Covid test did come back positive.  She did work with physical therapy but her oxygen saturations dropped into the 80s on minimal exertion on 15 L.  She is currently on dexamethasone and remdesivir.    Review of Systems:     General ROS: Patient denies any fevers, chills or loss of consciousness.  Generalized weakness.  Respiratory ROS: Cough and shortness of breath.  Cardiovascular ROS: Denies chest pain or palpitations. No history of exertional chest pain.  Gastrointestinal ROS: Denies nausea and vomiting. Denies any abdominal pain. No diarrhea.  Neurological ROS: Denies any focal weakness. No loss of consciousness. Denies any numbness.  Dermatological ROS: Denies any redness or pruritis.    Vital Signs:    Temp:  [97.3 °F (36.3 °C)-99.5 °F (37.5 °C)] 99.2 °F (37.3 °C)  Heart Rate:  [] 102  Resp:  [22-39] 22  BP: (140-177)/(57-84) 153/84    Intake and output:    I/O last 3 completed shifts:  In: 1800 [P.O.:1200; I.V.:600]  Out: 1000 [Urine:1000]  I/O this shift:  In: 680 [P.O.:680]  Out: -     Physical Examination:    General Appearance:  Alert and cooperative, not in any acute distress.   Head:   Atraumatic and normocephalic, without obvious abnormality.   Eyes:          Conjunctivae and sclerae normal, no Icterus. No pallor. Extraocular movements are within normal limits.   Neck: Supple, trachea midline, no thyromegaly, no carotid bruit.   Lungs:   Chest shape is normal. Breath sounds heard bilaterally equally.  Occasional basal crackles heard with scattered wheezing. No pleural rub or bronchial breathing.   Heart:  Normal S1 and S2, no murmur, no gallop, no rub. No JVD   Abdomen:   Normal bowel sounds, no masses, no organomegaly. Soft, nontender, nondistended, no guarding, no rebound tenderness.   Extremities: Moves all extremities, 1+ edema, no cyanosis, no clubbing.   Skin: No bleeding, bruising or rash.   Neurologic: Awake, alert and oriented times 3. Moves all 4 extremities equally.     Laboratory results:    Results from last 7 days   Lab Units 12/19/20  0650 12/18/20  0549 12/17/20  1235   SODIUM mmol/L 145 145 141   POTASSIUM mmol/L 4.6 4.3 4.2   CHLORIDE mmol/L 102 101 97*   CO2 mmol/L 34.1* 35.8* 34.3*   BUN mg/dL 38* 29* 25*   CREATININE mg/dL 1.17* 1.17* 1.32*   CALCIUM mg/dL 9.4 9.3 9.3   BILIRUBIN mg/dL  --  0.2 0.2   ALK PHOS U/L  --  65 77   ALT (SGPT) U/L  --  12 14   AST (SGOT) U/L  --  14 17   GLUCOSE mg/dL 151* 225* 154*     Results from last 7 days   Lab Units 12/18/20  0549 12/17/20  1235   WBC 10*3/mm3 8.73 13.29*   HEMOGLOBIN g/dL 9.5* 10.4*   HEMATOCRIT % 33.1* 36.0   PLATELETS 10*3/mm3 465* 535*         Results from last 7 days   Lab Units 12/18/20  0549   TROPONIN T ng/mL 0.018     Results from last 7 days   Lab Units 12/17/20  1250 12/17/20  1248   BLOODCX  No growth at 2 days No growth at 2 days     Results from last 7 days   Lab Units 12/17/20  1340   PH, ARTERIAL pH units 7.360   PO2 ART mm Hg 91.8   PCO2, ARTERIAL mm Hg 64.3*   HCO3 ART mmol/L 36.2*     I have reviewed the patient's laboratory results.    Radiology results:    Imaging Results (Last 24 Hours)     ** No results  found for the last 24 hours. **        Medication Review:     I have reviewed the patient's active and prn medications.     Problem List:      Pneumonia due to COVID-19 virus    COPD with acute exacerbation (CMS/HCC)    Acute on chronic respiratory failure with hypoxia (CMS/[facility])    Essential hypertension    Type 2 diabetes mellitus with nephropathy (CMS/HCC)    Chronic diastolic heart failure (CMS/HCC)    Obstructive sleep apnea    Chronic renal failure syndrome, stage 3 (moderate)    PAF (paroxysmal atrial fibrillation) (CMS/HCC)    Acute respiratory failure with hypoxia and hypercapnia (CMS/[facility])    Obesity (BMI 30-39.9)    Assessment:    1.  Acute on chronic hypoxic respiratory failure, secondary to #2, present on admission.  2.  Bilateral pneumonia secondary to COVID-19 infection, POA.  3.  No evidence of sepsis and signs of localized infection only.  4.  Acute COPD exacerbation, present on admission.  5.  Diabetes mellitus type 2 with nephropathy.  6.  Chronic kidney disease stage III.  7.  Obstructive sleep apnea.  8.  Paroxysmal atrial fibrillation.  9.  Obesity with a BMI of 37.  10.  Chronic diastolic heart failure, no evidence of exacerbation    Plan:    Ms. Guidry is currently on 15 L of high flow nasal cannula oxygen.  She is currently saturating in the low 90s.  She denies any increased dyspnea today compared to yesterday.  Renal function is stable at 1.17.  She is currently on dexamethasone and remdesivir (day 3).  Leukocytosis has improved.  She is not on antibiotics.    She will be continued on bronchodilator inhalers.  Home medications including apixaban have been continued.  She will be continued on physical therapy.  She will be placed on multivitamins.  Further recommendations depend upon her clinical course.  Due to her age and underlying COPD, she is at high risk for worsening respiratory failure.  Discussed with nursing staff.    Dave Hairston MD  12/19/20  15:55 EST    Dictated utilizing Dragon  dictation.

## 2020-12-19 NOTE — PLAN OF CARE
Pt. Admitted to the hosp. R/T COVID. Vital signs have remained stable so far this shift. Is receiving oxygen at 13 liters per N/C. No c/o pain or discomfort. No acute distress noted at this time.     Problem: Adult Inpatient Plan of Care  Goal: Plan of Care Review  Outcome: Ongoing, Progressing  Goal: Patient-Specific Goal (Individualized)  Outcome: Ongoing, Progressing  Goal: Absence of Hospital-Acquired Illness or Injury  Outcome: Ongoing, Progressing  Intervention: Identify and Manage Fall Risk  Recent Flowsheet Documentation  Taken 12/19/2020 1243 by Shwetha Loredo RN  Safety Promotion/Fall Prevention:   activity supervised   assistive device/personal items within reach   clutter free environment maintained   nonskid shoes/slippers when out of bed  Taken 12/19/2020 1040 by Shwetha Loredo RN  Safety Promotion/Fall Prevention:   activity supervised   assistive device/personal items within reach   clutter free environment maintained   nonskid shoes/slippers when out of bed  Taken 12/19/2020 0910 by Shwetha Loredo RN  Safety Promotion/Fall Prevention:   activity supervised   assistive device/personal items within reach   clutter free environment maintained   nonskid shoes/slippers when out of bed  Intervention: Prevent Skin Injury  Recent Flowsheet Documentation  Taken 12/19/2020 1243 by Shwetha Loredo RN  Body Position: position changed independently  Taken 12/19/2020 1040 by Shwetha Loredo RN  Body Position: position changed independently  Taken 12/19/2020 0910 by Shwetha Loredo RN  Body Position: position changed independently  Intervention: Prevent and Manage VTE (venous thromboembolism) Risk  Recent Flowsheet Documentation  Taken 12/19/2020 0910 by Shwetha Loredo RN  VTE Prevention/Management:   bilateral   dorsiflexion/plantar flexion performed  Intervention: Prevent Infection  Recent Flowsheet Documentation  Taken 12/19/2020 1243 by Shwetha Loredo RN  Infection Prevention: rest/sleep promoted  Taken 12/19/2020 1040 by Facundo  ALYSA Tadeo  Infection Prevention: rest/sleep promoted  Taken 12/19/2020 0910 by Shwetha Loredo RN  Infection Prevention: rest/sleep promoted  Goal: Optimal Comfort and Wellbeing  Outcome: Ongoing, Progressing  Intervention: Provide Person-Centered Care  Recent Flowsheet Documentation  Taken 12/19/2020 1243 by Shwetha Loredo RN  Trust Relationship/Rapport:   care explained   reassurance provided  Taken 12/19/2020 0910 by Shwetha Loredo RN  Trust Relationship/Rapport:   care explained   reassurance provided  Goal: Readiness for Transition of Care  Outcome: Ongoing, Progressing     Problem: Fall Injury Risk  Goal: Absence of Fall and Fall-Related Injury  Outcome: Ongoing, Progressing  Intervention: Identify and Manage Contributors to Fall Injury Risk  Recent Flowsheet Documentation  Taken 12/19/2020 1243 by Shwetha Loredo RN  Medication Review/Management: medications reviewed  Taken 12/19/2020 0910 by Shwetha Loredo RN  Medication Review/Management: medications reviewed  Intervention: Promote Injury-Free Environment  Recent Flowsheet Documentation  Taken 12/19/2020 1243 by Shwetha Loredo RN  Safety Promotion/Fall Prevention:   activity supervised   assistive device/personal items within reach   clutter free environment maintained   nonskid shoes/slippers when out of bed  Taken 12/19/2020 1040 by Shwetha Loredo RN  Safety Promotion/Fall Prevention:   activity supervised   assistive device/personal items within reach   clutter free environment maintained   nonskid shoes/slippers when out of bed  Taken 12/19/2020 0910 by Shwetha Loredo RN  Safety Promotion/Fall Prevention:   activity supervised   assistive device/personal items within reach   clutter free environment maintained   nonskid shoes/slippers when out of bed     Problem: Noninvasive Ventilation Acute  Goal: Effective Unassisted Ventilation and Oxygenation  Outcome: Ongoing, Progressing     Problem: Fluid Imbalance (Pneumonia)  Goal: Fluid Balance  Outcome: Ongoing, Progressing      Problem: Infection (Pneumonia)  Goal: Resolution of Infection Signs and Symptoms  Outcome: Ongoing, Progressing  Intervention: Prevent Infection Progression  Recent Flowsheet Documentation  Taken 12/19/2020 1243 by Shwetha Loredo RN  Infection Management: aseptic technique maintained  Isolation Precautions: airborne precautions maintained  Taken 12/19/2020 1040 by Shwetha Loredo RN  Isolation Precautions: airborne precautions maintained  Taken 12/19/2020 0910 by Shwetha Loredo RN  Infection Management: aseptic technique maintained  Isolation Precautions: airborne precautions maintained     Problem: Respiratory Compromise (Pneumonia)  Goal: Effective Oxygenation and Ventilation  Outcome: Ongoing, Progressing  Intervention: Promote Airway Secretion Clearance  Recent Flowsheet Documentation  Taken 12/19/2020 1243 by Shwetha Loredo RN  Cough And Deep Breathing: done independently per patient  Intervention: Optimize Oxygenation and Ventilation  Recent Flowsheet Documentation  Taken 12/19/2020 1243 by Shwetha Loredo RN  Head of Bed (HOB): HOB at 30-45 degrees  Taken 12/19/2020 1040 by Shwetha Loredo RN  Head of Bed (HOB): HOB at 30-45 degrees  Taken 12/19/2020 0910 by Shwetha Loredo RN  Head of Bed (HOB): HOB at 30-45 degrees     Problem: Diabetes Comorbidity  Goal: Blood Glucose Level Within Desired Range  Outcome: Ongoing, Progressing  Intervention: Maintain Glycemic Control  Recent Flowsheet Documentation  Taken 12/19/2020 0910 by Shwetha Loredo RN  Glycemic Management: blood glucose monitoring   Goal Outcome Evaluation:  Plan of Care Reviewed With: patient  Progress: no change

## 2020-12-20 NOTE — PLAN OF CARE
Problem: Noninvasive Ventilation Acute  Goal: Effective Unassisted Ventilation and Oxygenation  Outcome: Ongoing, Progressing     Problem: Noninvasive Ventilation Acute  Goal: Effective Unassisted Ventilation and Oxygenation  Intervention: Monitor and Manage Noninvasive Ventilation  Flowsheets (Taken 12/20/2020 1618)  Airway/Ventilation Management:   airway patency maintained   calming measures promoted  NPPV/CPAP Maintenance: (encouraged more frequent use)   mask adjusted   mask secure     Problem: Respiratory Compromise (Pneumonia)  Goal: Effective Oxygenation and Ventilation  Intervention: Optimize Oxygenation and Ventilation  Recent Flowsheet Documentation  Taken 12/20/2020 1618 by Tayo Tello III, RRT  Airway/Ventilation Management:   airway patency maintained   calming measures promoted   Goal Outcome Evaluation:  Plan of Care Reviewed With: patient  Progress: improving

## 2020-12-20 NOTE — PLAN OF CARE
Goal Outcome Evaluation:  Plan of Care Reviewed With: patient  Progress: improving  Outcome Summary: Patient less anxious then night before and did not complain of shortness of air.  Maintained at 15L highflow NC.  VSS at this time.  No complaints at this time.  Continue to monitor and provide care as appropriate and ordered.  Attemp to continue to wean off of high flow NC during stay.

## 2020-12-20 NOTE — PROGRESS NOTES
[facility]    Name:  Mingo Guidry   Age:  70 y.o.  Sex:  female  :  1950  MRN:  [id number]   Visit Number:  [id number]  Admission Date:  2020  Date Of Service:  20  Primary Care Physician:  Luz Prater APRN     LOS: 3 days :  Patient Care Team:  Luz Prater APRN as PCP - General:    Chief Complaint:      Follow-up of COVID-19 pneumonia and hypoxia.    Subjective / Interval History:     Ms. Guidry was seen and examined this morning.  She is currently on BiPAP therapy.  She denies any worsening shortness of breath.  She was on 15 L earlier today and did have her breakfast.  Denies any chest pain.    This is a 70-year-old female with history of COPD on home oxygen at 5 L, chronic diastolic heart failure, hypertension, diabetes mellitus type 2 was admitted from the emergency room on 2020 with respiratory failure, bilateral pneumonia secondary to COVID-19 infection.  She was started on remdesivir and dexamethasone on admission.  She has been on 15 L of high flow nasal cannula oxygen intermittently with BiPAP therapy.  She has been evaluated by physical therapy after admission and has been working with them.  She had a repeat confirmatory test from [facility] which did also come back positive for COVID-19 infection.  Patient thinks that she got it from her adult .    Review of Systems:     General ROS: Patient denies any fevers, chills or loss of consciousness.  Generalized weakness.  Respiratory ROS: Cough and shortness of breath.  Cardiovascular ROS: Denies chest pain or palpitations. No history of exertional chest pain.  Gastrointestinal ROS: Denies nausea and vomiting. Denies any abdominal pain. No diarrhea.  Neurological ROS: Denies any focal weakness. No loss of consciousness. Denies any numbness.  Dermatological ROS: Denies any redness or pruritis.    Vital Signs:    Temp:  [96.5 °F (35.8 °C)-99.2 °F (37.3 °C)] 96.8  °F (36 °C)  Heart Rate:  [84-98] 98  Resp:  [20-28] 20  BP: (129-153)/(49-84) 142/60    Intake and output:    I/O last 3 completed shifts:  In: 1480 [P.O.:1480]  Out: 0   I/O this shift:  In: 120 [P.O.:120]  Out: -     Physical Examination:    General Appearance:  Alert and cooperative, not in any acute distress.   Head:  Atraumatic and normocephalic, without obvious abnormality.   Eyes:          Conjunctivae and sclerae normal, no Icterus. No pallor. Extraocular movements are within normal limits.   Neck: Supple, trachea midline, no thyromegaly, no carotid bruit.   Lungs:   Chest shape is normal. Breath sounds heard bilaterally equally.  Occasional basal crackles heard.  She has slightly more of wheezing today. No pleural rub or bronchial breathing.   Heart:  Normal S1 and S2, no murmur, no gallop, no rub. No JVD   Abdomen:   Normal bowel sounds, no masses, no organomegaly. Soft, nontender, nondistended, no guarding, no rebound tenderness.   Extremities: Moves all extremities, 1+ edema, no cyanosis, no clubbing.   Skin: No bleeding or rash.   Neurologic: Awake, alert and oriented times 3. Moves all 4 extremities equally.  No asterixis.     Laboratory results:    Results from last 7 days   Lab Units 12/19/20  0650 12/18/20  0549 12/17/20  1235   SODIUM mmol/L 145 145 141   POTASSIUM mmol/L 4.6 4.3 4.2   CHLORIDE mmol/L 102 101 97*   CO2 mmol/L 34.1* 35.8* 34.3*   BUN mg/dL 38* 29* 25*   CREATININE mg/dL 1.17* 1.17* 1.32*   CALCIUM mg/dL 9.4 9.3 9.3   BILIRUBIN mg/dL  --  0.2 0.2   ALK PHOS U/L  --  65 77   ALT (SGPT) U/L  --  12 14   AST (SGOT) U/L  --  14 17   GLUCOSE mg/dL 151* 225* 154*     Results from last 7 days   Lab Units 12/18/20  0549 12/17/20  1235   WBC 10*3/mm3 8.73 13.29*   HEMOGLOBIN g/dL 9.5* 10.4*   HEMATOCRIT % 33.1* 36.0   PLATELETS 10*3/mm3 465* 535*         Results from last 7 days   Lab Units 12/18/20  0549   TROPONIN T ng/mL 0.018     Results from last 7 days   Lab Units 12/17/20  1250  12/17/20  1248   BLOODCX  No growth at 3 days No growth at 3 days     Results from last 7 days   Lab Units 12/17/20  1340   PH, ARTERIAL pH units 7.360   PO2 ART mm Hg 91.8   PCO2, ARTERIAL mm Hg 64.3*   HCO3 ART mmol/L 36.2*     I have reviewed the patient's laboratory results.    Radiology results:    Imaging Results (Last 24 Hours)     ** No results found for the last 24 hours. **        Medication Review:     I have reviewed the patient's active and prn medications.     Problem List:      Pneumonia due to COVID-19 virus    COPD with acute exacerbation (CMS/HCC)    Acute on chronic respiratory failure with hypoxia (CMS/HCC)    Essential hypertension    Type 2 diabetes mellitus with nephropathy (CMS/HCC)    Chronic diastolic heart failure (CMS/HCC)    Obstructive sleep apnea    Chronic renal failure syndrome, stage 3 (moderate)    PAF (paroxysmal atrial fibrillation) (CMS/[facility])    Acute respiratory failure with hypoxia and hypercapnia (CMS/HCC)    Obesity (BMI 30-39.9)    Assessment:    1.  Acute on chronic hypoxic respiratory failure, secondary to #2, present on admission.  2.  Bilateral pneumonia secondary to COVID-19 infection, POA.  3.  No evidence of sepsis and signs of localized infection only.  4.  Acute COPD exacerbation, present on admission.  5.  Diabetes mellitus type 2 with nephropathy.  6.  Chronic kidney disease stage III.  7.  Obstructive sleep apnea.  8.  Paroxysmal atrial fibrillation.  9.  Obesity with a BMI of 37.  10.  Chronic diastolic heart failure, no evidence of exacerbation    Plan:    Ms. Guidry is currently on 15 L of high flow nasal cannula oxygen with intermittent BiPAP therapy.  She is currently saturating in the low 90s.  Renal function is stable at 1.17.  She is currently on dexamethasone and remdesivir (day 3).  Leukocytosis has improved.  She is not on antibiotics.  We will repeat procalcitonin tomorrow.    She will be continued on bronchodilator inhalers.  Home medications  including apixaban have been continued.  She will be continued on physical therapy.  She will be continued on multivitamins and vitamin D.  Further recommendations depend upon her clinical course.  Due to her age and underlying COPD, she is at high risk for worsening respiratory failure.  Discussed with nursing staff.    Dave Hairston MD  12/20/20  13:24 EST    Dictated utilizing Dragon dictation.

## 2020-12-20 NOTE — PLAN OF CARE
Problem: Adult Inpatient Plan of Care  Goal: Patient-Specific Goal (Individualized)  Outcome: Ongoing, Progressing     Problem: Adult Inpatient Plan of Care  Goal: Absence of Hospital-Acquired Illness or Injury  Outcome: Ongoing, Progressing     Problem: Adult Inpatient Plan of Care  Goal: Absence of Hospital-Acquired Illness or Injury  Intervention: Identify and Manage Fall Risk  Recent Flowsheet Documentation  Taken 12/20/2020 1400 by Nat Esquivel RN  Safety Promotion/Fall Prevention:   safety round/check completed   room organization consistent   nonskid shoes/slippers when out of bed   muscle strengthening facilitated   gait belt   fall prevention program maintained   clutter free environment maintained   assistive device/personal items within reach  Taken 12/20/2020 1200 by Nat Esquivel RN  Safety Promotion/Fall Prevention:   safety round/check completed   room organization consistent   nonskid shoes/slippers when out of bed   muscle strengthening facilitated   gait belt   fall prevention program maintained   clutter free environment maintained   assistive device/personal items within reach  Taken 12/20/2020 1000 by Nat Esquivel RN  Safety Promotion/Fall Prevention:   safety round/check completed   room organization consistent   nonskid shoes/slippers when out of bed   muscle strengthening facilitated   gait belt   fall prevention program maintained   clutter free environment maintained   assistive device/personal items within reach  Taken 12/20/2020 0800 by Nat Esquivel RN  Safety Promotion/Fall Prevention:   safety round/check completed   room organization consistent   nonskid shoes/slippers when out of bed   muscle strengthening facilitated   gait belt   fall prevention program maintained   clutter free environment maintained   assistive device/personal items within reach     Problem: Fall Injury Risk  Goal: Absence of Fall and Fall-Related Injury  Outcome: Ongoing,  Progressing     Problem: Noninvasive Ventilation Acute  Goal: Effective Unassisted Ventilation and Oxygenation  Outcome: Ongoing, Progressing     Problem: Respiratory Compromise (Pneumonia)  Goal: Effective Oxygenation and Ventilation  Outcome: Ongoing, Progressing   Goal Outcome Evaluation:  Plan of Care Reviewed With: patient  Progress: improving

## 2020-12-21 NOTE — ED PROVIDER NOTES
I was called to the floor with a CODE BLUE announcement.  Patient was on the Covid unit.  When I arrived patient was asystolic.  After multiple rounds of epinephrine as well as endotracheal intubation for oxygenation we did not have a sustained return of spontaneous circulation.  We did have a very brief window where she had a very faint pulse with bradycardia in the 30s.  I did attempt atropine to see if we could get this to sustain without success.  I did make an announcement before pronouncing death to see if he what had any suggestions or objections.    Time of death 802    35 minutes critical care time was spent by the attending physician excluding separately billable procedures      Procedure note: Endotracheal intubation  Consent was implied due to emergent situation  Patient was asystolic and currently undergoing ACLS  A MAC 4 blade was used for direct visualization  7.5 Fijian cuffed endotracheal tube was passed with 1 attempt despite having a very poor view secondary to vomitus in the patient having a short neck.  This was confirmed with end-tidal CO2 colorimeter as well as auscultation.  Patient's oxygen sats did rise from the 30s into the 70s briefly with CPR  No complications related to procedure     Cuco Perea MD  12/21/20 0541

## 2020-12-21 NOTE — CODE DOCUMENTATION
PCT Suri Daugherty at patient's bedside.  She stated when she walked into patient's room, the patient had her bipap mask in her hand and was unresponsive to noxious stimuli.  The patient's HR was noted to be 24 by the PCT.  She activated the code team at this time.

## 2020-12-21 NOTE — PROGRESS NOTES
Case Management Discharge Note      Final Note: Discharged home         Selected Continued Care - Discharged on 2020 Admission date: 2020 - Discharge disposition:     Destination    No services have been selected for the patient.              Durable Medical Equipment    No services have been selected for the patient.              Dialysis/Infusion    No services have been selected for the patient.              Home Medical Care    No services have been selected for the patient.              Therapy    No services have been selected for the patient.              Community Resources    No services have been selected for the patient.                       Final Discharge Disposition Code: 20 -

## 2020-12-21 NOTE — DISCHARGE SUMMARY
Death Summary      Date of Death:  12/21/2020 0806  Acute and chronic respiratory failure due to bilateral COVID 19 pneumonia  Discharge Diagnosis:   Patient Active Problem List   Diagnosis   • Chronic diastolic heart failure (CMS/HCC)   • Lower extremity edema   • Essential hypertension   • Type 2 diabetes mellitus with nephropathy (CMS/HCC)   • COPD with acute exacerbation (CMS/HCC)   • Obesity   • GERD without esophagitis   • Gout   • Seasonal allergies   • Mixed hyperlipidemia   • Acute on chronic respiratory failure with hypoxia (CMS/[facility])   • Pneumonia of left upper lobe due to infectious organism   • Acute GI bleeding   • Gastritis   • Mg's esophagus   • Acute blood loss anemia   • Acute bronchitis   • Abnormal CT scan of lung   • Pneumonia of right lower lobe due to infectious organism   • Postobstructive pneumonia   • Acute aspiration pneumonia (CMS/[facility])   • Obstructive sleep apnea   • Chronic renal failure syndrome, stage 3 (moderate)   • Leukemoid reaction   • Foreign body aspiration   • Shortness of breath   • Pseudomonas infection   • Coronary artery disease involving native coronary artery without angina pectoris   • Multilobar Pneumonia   • PAF (paroxysmal atrial fibrillation) (CMS/[facility])   • Acute respiratory failure with hypoxia and hypercapnia (CMS/[facility])   • Pneumonia due to COVID-19 virus   • Obesity (BMI 30-39.9)         Hospital Course  Patient is a 70 y.o. female presented with shortness of breath with O2 saturations in the 40s at home and on evaluation in the emergency department.  In the emergency department she was placed on BiPAP.  Admit history of progressive shortness of breath for the previous 2 days.  Been a previous history of negative Covid testing.  Her O2 saturations improved to 90% on BiPAP in the emergency department.  She was hospitalized and continued to require high flow oxygen part of treatment with dexamethasone and remdesivir on the date of her death she was found by the  "staff to be without pulse responsiveness respirations after unsuccessful attempts at CPR and resuscitation.    Pertinent Test Results:  There were pulmonary opacities noted on chest x-ray  Potassium was 6.2 creatinine 1.57 glucose 336 white blood cell count 18.77 hemoglobin 10.5  Procalcitonin was normal at 0.13, the initial sodium was 141 potassium 4.2 BUN 25 creatinine 1.32 glucose 154 lactic acid 1.4 Covid detected blood cultures no growth  Physical Exam:   Patient  before my examination according to the meeting physician she was described as alert and in no distress there was extensive wheezing bilaterally and prolongation expiratory phase on lung examination with occasional basal crackles cardiac examination revealed regular rhythmic murmur S3 abdomen soft with no tenderness mass appreciated there is 1+ edema lower extremities skin is said to be intact    Discharge Disposition   all medications are discontinued at death    Discharge Medications     Discharge Medications      Continue These Medications      Instructions Start Date   BD Pen Needle Valerie U/F 32G X 4 MM misc  Generic drug: Insulin Pen Needle   USE WITH LANTUS ONCE DAILY      Blood Glucose Monitoring Suppl device   1 each, Does not apply      Insulin Syringe-Needle U-100 31G X 5/16\" 1 ML misc   USE FOR INSULIN INJECTIONS FIVE TIMES DAILY      Lancets misc   1 each, Does not apply         ASK your doctor about these medications      Instructions Start Date   Advair Diskus 500-50 MCG/DOSE DISKUS  Generic drug: fluticasone-salmeterol   1 puff, Inhalation, 2 Times Daily      allopurinol 300 MG tablet  Commonly known as: ZYLOPRIM   150 mg, Oral, Daily      Aspirin Low Dose 81 MG EC tablet  Generic drug: aspirin   81 mg, Oral, Daily      atorvastatin 40 MG tablet  Commonly known as: LIPITOR   40 mg, Oral, Daily      busPIRone 10 MG tablet  Commonly known as: BUSPAR   10 mg, Oral, 3 Times Daily      dilTIAZem  MG 24 hr capsule  Commonly " known as: CARDIZEM CD   180 mg, Oral, Every 24 Hours Scheduled      docusate sodium 250 MG capsule  Commonly known as: COLACE   250 mg, Oral, Daily      Eliquis 5 MG tablet tablet  Generic drug: apixaban   Take 1 tablet by mouth Every 12 (Twelve) Hours.      Eliquis 5 MG tablet tablet  Generic drug: apixaban   TAKE 1 TABLET BY MOUTH EVERY 12 HOURS      fenofibrate 48 MG tablet  Commonly known as: TRICOR   48 mg, Oral, Daily      fluticasone 50 MCG/ACT nasal spray  Commonly known as: FLONASE   2 sprays, Nasal, 2 times daily      furosemide 20 MG tablet  Commonly known as: LASIX   40 mg, Oral, Every Evening, 60 mg in the morning and 40 mg in the afternoon      furosemide 20 MG tablet  Commonly known as: LASIX   60 mg, Oral, Every Morning      Ericka Root 500 MG capsule   Oral, Daily      GNP Vitamin D Maximum Strength 50 MCG (2000 UT) tablet  Generic drug: Cholecalciferol   1 tablet, Oral, Daily      HumaLOG 100 UNIT/ML injection  Generic drug: insulin lispro   38 Units, Subcutaneous, 3 Times Daily      HYDROcodone-acetaminophen 5-325 MG per tablet  Commonly known as: NORCO   1 tablet, Oral, Nightly      ipratropium-albuterol 0.5-2.5 mg/3 ml nebulizer  Commonly known as: DUO-NEB   3 mL, Nebulization, 4 Times Daily PRN      metOLazone 5 MG tablet  Commonly known as: ZAROXOLYN   5 mg, Oral, Daily PRN      metoprolol succinate  MG 24 hr tablet  Commonly known as: TOPROL-XL   100 mg, Oral, Every 24 Hours Scheduled      montelukast 10 MG tablet  Commonly known as: SINGULAIR   10 mg, Oral, Nightly      nystatin 234295 UNIT/ML suspension  Commonly known as: MYCOSTATIN   No dose, route, or frequency recorded.      O2  Commonly known as: OXYGEN   4 L/min, Inhalation, Daily      omalizumab 150 MG injection  Commonly known as: XOLAIR   300 mg, Subcutaneous, Every 28 Days, 2 injections once a month      pantoprazole 40 MG EC tablet  Commonly known as: PROTONIX   40 mg, Oral, Daily      Pataday 0.2 % solution ophthalmic  solution  Generic drug: olopatadine   Both Eyes, 2 Times Daily      potassium chloride 20 MEQ CR tablet  Commonly known as: K-DUR,KLOR-CON   20 mEq, Oral, Daily      roflumilast 500 MCG tablet tablet  Commonly known as: Daliresp   500 mcg, Oral, Daily      rOPINIRole 0.5 MG tablet  Commonly known as: REQUIP   0.5 mg, Oral, 2 Times Daily      SITagliptin 100 MG tablet  Commonly known as: JANUVIA   50 mg, Oral, Daily      sodium chloride 0.65 % nasal spray   1 spray, Nasal      sucralfate 1 g tablet  Commonly known as: CARAFATE   1 g, Oral, 3 Times Daily      tiotropium 18 MCG per inhalation capsule  Commonly known as: Spiriva HandiHaler   1 capsule, Inhalation, Daily      tiZANidine 4 MG tablet  Commonly known as: ZANAFLEX   4 mg, Oral, 2 Times Daily PRN      traMADol 50 MG tablet  Commonly known as: ULTRAM   50 mg, Oral, 3 Times Daily      Tresiba FlexTouch 100 UNIT/ML solution pen-injector injection  Generic drug: insulin degludec   2 Times Daily, 35 units in the am 80 units at night      True Metrix Pro Blood Glucose test strip  Generic drug: glucose blood   USE TO BLOOD SUGAR FOUR TIMES A DAY      Vascepa 1 g capsule capsule  Generic drug: icosapent ethyl   2 g, Oral, 2 Times Daily With Meals      Zetia 10 MG tablet  Generic drug: ezetimibe   10 mg, Oral, Daily             Discharge Diet:     Activity at Discharge:     Follow-up Appointments  No future appointments.  [unfilled]    Test Results Pending at Discharge  @[facility]@     Jorge Luis Johnson MD  12/21/20  18:53 EST

## 2020-12-21 NOTE — THERAPY TREATMENT NOTE
Pt declined PT stating tomorrow might be better. Pt sitting EOB with bipap donned and O2 SATS at 96%. PT to f/u with pt

## 2020-12-21 NOTE — PLAN OF CARE
Goal Outcome Evaluation:  Plan of Care Reviewed With: patient  Progress: improving  Outcome Summary: No complaints of anxiety during shift.  O2 stats fluctuated due to patient repositioning Bipap mask and pulling off to side for fluid intake.  Remaining vital signs stable during shift.  Will continue to monitor O2 stats.

## 2020-12-22 LAB
BACTERIA SPEC AEROBE CULT: NORMAL
BACTERIA SPEC AEROBE CULT: NORMAL
